# Patient Record
Sex: FEMALE | Race: BLACK OR AFRICAN AMERICAN | NOT HISPANIC OR LATINO
[De-identification: names, ages, dates, MRNs, and addresses within clinical notes are randomized per-mention and may not be internally consistent; named-entity substitution may affect disease eponyms.]

---

## 2016-10-11 RX ORDER — DOCUSATE SODIUM 100 MG
1 CAPSULE ORAL
Qty: 0 | Refills: 0 | DISCHARGE
Start: 2016-10-11

## 2017-01-29 ENCOUNTER — RESULT REVIEW (OUTPATIENT)
Age: 52
End: 2017-01-29

## 2017-01-30 ENCOUNTER — OUTPATIENT (OUTPATIENT)
Dept: OUTPATIENT SERVICES | Facility: HOSPITAL | Age: 52
LOS: 1 days | End: 2017-01-30

## 2017-01-30 ENCOUNTER — APPOINTMENT (OUTPATIENT)
Dept: OBGYN | Facility: HOSPITAL | Age: 52
End: 2017-01-30

## 2017-01-30 ENCOUNTER — LABORATORY RESULT (OUTPATIENT)
Age: 52
End: 2017-01-30

## 2017-01-30 VITALS
SYSTOLIC BLOOD PRESSURE: 120 MMHG | BODY MASS INDEX: 53.92 KG/M2 | HEIGHT: 62 IN | DIASTOLIC BLOOD PRESSURE: 75 MMHG | WEIGHT: 293 LBS

## 2017-01-30 DIAGNOSIS — E66.01 MORBID (SEVERE) OBESITY DUE TO EXCESS CALORIES: ICD-10-CM

## 2017-01-30 DIAGNOSIS — Z98.51 TUBAL LIGATION STATUS: Chronic | ICD-10-CM

## 2017-01-30 DIAGNOSIS — Z00.00 ENCOUNTER FOR GENERAL ADULT MEDICAL EXAMINATION W/OUT ABNORMAL FINDINGS: ICD-10-CM

## 2017-01-30 DIAGNOSIS — R10.2 PELVIC AND PERINEAL PAIN: ICD-10-CM

## 2017-01-31 DIAGNOSIS — R10.31 RIGHT LOWER QUADRANT PAIN: ICD-10-CM

## 2017-01-31 DIAGNOSIS — Z00.00 ENCOUNTER FOR GENERAL ADULT MEDICAL EXAMINATION WITHOUT ABNORMAL FINDINGS: ICD-10-CM

## 2017-01-31 DIAGNOSIS — R10.2 PELVIC AND PERINEAL PAIN: ICD-10-CM

## 2017-01-31 PROBLEM — E66.01 MORBID OBESITY: Status: RESOLVED | Noted: 2017-01-31 | Resolved: 2017-01-31

## 2017-01-31 LAB
C TRACH RRNA SPEC QL NAA+PROBE: NOT DETECTED — SIGNIFICANT CHANGE UP
C TRACH RRNA SPEC QL NAA+PROBE: SIGNIFICANT CHANGE UP
GC AMPLIFICATION INTERPRETATION: SIGNIFICANT CHANGE UP
HPV HIGH+LOW RISK DNA PNL CVX: SIGNIFICANT CHANGE UP
N GONORRHOEA RRNA SPEC QL NAA+PROBE: NOT DETECTED — SIGNIFICANT CHANGE UP
SPECIMEN SOURCE: SIGNIFICANT CHANGE UP

## 2017-02-02 LAB — CYTOLOGY SPEC DOC CYTO: SIGNIFICANT CHANGE UP

## 2017-03-16 ENCOUNTER — APPOINTMENT (OUTPATIENT)
Dept: GASTROENTEROLOGY | Facility: HOSPITAL | Age: 52
End: 2017-03-16

## 2019-04-24 ENCOUNTER — APPOINTMENT (OUTPATIENT)
Dept: CARDIOLOGY | Facility: CLINIC | Age: 54
End: 2019-04-24

## 2019-04-29 ENCOUNTER — APPOINTMENT (OUTPATIENT)
Dept: VASCULAR SURGERY | Facility: CLINIC | Age: 54
End: 2019-04-29
Payer: MEDICARE

## 2019-04-29 VITALS
WEIGHT: 293 LBS | SYSTOLIC BLOOD PRESSURE: 109 MMHG | HEIGHT: 62 IN | HEART RATE: 70 BPM | DIASTOLIC BLOOD PRESSURE: 50 MMHG | TEMPERATURE: 98 F | BODY MASS INDEX: 53.92 KG/M2

## 2019-04-29 PROCEDURE — 99204 OFFICE O/P NEW MOD 45 MIN: CPT

## 2019-04-29 PROCEDURE — G0365: CPT

## 2019-04-30 NOTE — PHYSICAL EXAM
[2+] : left 2+ [No Rash or Lesion] : No rash or lesion [Alert] : alert [Calm] : calm [Normal Heart Sounds] : normal heart sounds [JVD] : no jugular venous distention  [Normal Breath Sounds] : Normal breath sounds [Abdomen Masses] : No abdominal masses [] : not present [Ankle Swelling (On Exam)] : not present [Varicose Veins Of Lower Extremities] : not present [Oriented to Place] : oriented to place [Skin Ulcer] : no ulcer [de-identified] : right ij permcath  [de-identified] : appears well

## 2019-04-30 NOTE — HISTORY OF PRESENT ILLNESS
[FreeTextEntry1] : 52 yo female with history of dm, htn, hld, afib on eliquis, s/p loop recorder, esrd on hd started in February via right ij PermCath presents for evaluation prior to permanent hd access.  pt is right hand dominant \par pt denies any history of left upper extremity surgery.  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  \par

## 2019-04-30 NOTE — CONSULT LETTER
[Dear  ___] : Dear  [unfilled], [Consult Letter:] : I had the pleasure of evaluating your patient, [unfilled]. [Consult Closing:] : Thank you very much for allowing me to participate in the care of this patient.  If you have any questions, please do not hesitate to contact me. [Please see my note below.] : Please see my note below. [Sincerely,] : Sincerely, [FreeTextEntry3] : REESE Manuel MD

## 2019-04-30 NOTE — ASSESSMENT
[FreeTextEntry1] : 52 yo female with history of dm, htn, hld, afib on eliquis, s/p loop recorder, esrd on hd started in February via right ij PermCath presents for evaluation prior to permanent hd access.\par left upper extremity vein mapping completed \par will plan for left upper extremity brachial cephalic avf\par pt advised to obtain medical and cardiac clearance prior to surgery

## 2019-05-16 ENCOUNTER — APPOINTMENT (OUTPATIENT)
Dept: VASCULAR SURGERY | Facility: HOSPITAL | Age: 54
End: 2019-05-16

## 2019-05-31 ENCOUNTER — APPOINTMENT (OUTPATIENT)
Dept: VASCULAR SURGERY | Facility: CLINIC | Age: 54
End: 2019-05-31

## 2019-06-16 ENCOUNTER — FORM ENCOUNTER (OUTPATIENT)
Age: 54
End: 2019-06-16

## 2019-06-17 ENCOUNTER — OUTPATIENT (OUTPATIENT)
Dept: OUTPATIENT SERVICES | Facility: HOSPITAL | Age: 54
LOS: 1 days | End: 2019-06-17
Payer: COMMERCIAL

## 2019-06-17 VITALS
WEIGHT: 293 LBS | OXYGEN SATURATION: 96 % | TEMPERATURE: 98 F | DIASTOLIC BLOOD PRESSURE: 80 MMHG | HEART RATE: 72 BPM | SYSTOLIC BLOOD PRESSURE: 140 MMHG | HEIGHT: 62 IN | RESPIRATION RATE: 18 BRPM

## 2019-06-17 DIAGNOSIS — E11.9 TYPE 2 DIABETES MELLITUS WITHOUT COMPLICATIONS: ICD-10-CM

## 2019-06-17 DIAGNOSIS — N18.6 END STAGE RENAL DISEASE: ICD-10-CM

## 2019-06-17 DIAGNOSIS — Z98.51 TUBAL LIGATION STATUS: Chronic | ICD-10-CM

## 2019-06-17 DIAGNOSIS — Z01.818 ENCOUNTER FOR OTHER PREPROCEDURAL EXAMINATION: ICD-10-CM

## 2019-06-17 LAB
MRSA PCR RESULT.: SIGNIFICANT CHANGE UP
S AUREUS DNA NOSE QL NAA+PROBE: SIGNIFICANT CHANGE UP

## 2019-06-17 PROCEDURE — 71046 X-RAY EXAM CHEST 2 VIEWS: CPT | Mod: 26

## 2019-06-17 PROCEDURE — 71046 X-RAY EXAM CHEST 2 VIEWS: CPT

## 2019-06-17 PROCEDURE — 87641 MR-STAPH DNA AMP PROBE: CPT

## 2019-06-17 PROCEDURE — 87640 STAPH A DNA AMP PROBE: CPT

## 2019-06-17 PROCEDURE — G0463: CPT

## 2019-06-17 RX ORDER — INSULIN ASPART 100 [IU]/ML
30 INJECTION, SUSPENSION SUBCUTANEOUS
Qty: 0 | Refills: 0 | DISCHARGE

## 2019-06-17 NOTE — H&P PST ADULT - HISTORY OF PRESENT ILLNESS
This is a 52 y/o female with PMH  of COPD,  HTN, HLD, DM, afib with loop recorder , last dose eliquis 6/16/2019, with renal failure , on hemodialysis  3x a week via perm cath, she presents today for left  upper brachial AV fistula creation

## 2019-06-17 NOTE — H&P PST ADULT - NSICDXPROBLEM_GEN_ALL_CORE_FT
PROBLEM DIAGNOSES  Problem: End stage renal disease  Assessment and Plan: left upper brachial arteriovenous fistula creation  STAT BMP on admit    Problem: Diabetes mellitus  Assessment and Plan: finger stick on admit

## 2019-06-17 NOTE — H&P PST ADULT - NSICDXPASTMEDICALHX_GEN_ALL_CORE_FT
PAST MEDICAL HISTORY:  Atrial fibrillation     Chronic GERD     COPD (chronic obstructive pulmonary disease)     DM (diabetes mellitus)     HTN (hypertension)     Morbid obesity     CHAMP (obstructive sleep apnea) non compliance with CPAP PAST MEDICAL HISTORY:  Atrial fibrillation with loop recorder 2014    Chronic GERD     COPD (chronic obstructive pulmonary disease)     DM (diabetes mellitus)     HTN (hypertension)     Morbid obesity     CHAMP (obstructive sleep apnea) non compliance with CPAP

## 2019-06-17 NOTE — H&P PST ADULT - NSICDXFAMILYHX_GEN_ALL_CORE_FT
FAMILY HISTORY:  Family hx of hypertension    Mother  Still living? Unknown  Family history of diabetes mellitus, Age at diagnosis: Age Unknown

## 2019-06-19 ENCOUNTER — INPATIENT (INPATIENT)
Facility: HOSPITAL | Age: 54
LOS: 2 days | Discharge: HOME | DRG: 264 | End: 2019-06-22
Attending: INTERNAL MEDICINE | Admitting: INTERNAL MEDICINE
Payer: COMMERCIAL

## 2019-06-19 ENCOUNTER — FORM ENCOUNTER (OUTPATIENT)
Age: 54
End: 2019-06-19

## 2019-06-19 VITALS
HEIGHT: 62 IN | WEIGHT: 293 LBS | OXYGEN SATURATION: 100 % | RESPIRATION RATE: 18 BRPM | DIASTOLIC BLOOD PRESSURE: 69 MMHG | TEMPERATURE: 98 F | SYSTOLIC BLOOD PRESSURE: 130 MMHG | HEART RATE: 84 BPM

## 2019-06-19 DIAGNOSIS — G47.33 OBSTRUCTIVE SLEEP APNEA (ADULT) (PEDIATRIC): ICD-10-CM

## 2019-06-19 DIAGNOSIS — E66.01 MORBID (SEVERE) OBESITY DUE TO EXCESS CALORIES: ICD-10-CM

## 2019-06-19 DIAGNOSIS — J44.9 CHRONIC OBSTRUCTIVE PULMONARY DISEASE, UNSPECIFIED: ICD-10-CM

## 2019-06-19 DIAGNOSIS — Z98.51 TUBAL LIGATION STATUS: Chronic | ICD-10-CM

## 2019-06-19 DIAGNOSIS — K21.9 GASTRO-ESOPHAGEAL REFLUX DISEASE WITHOUT ESOPHAGITIS: ICD-10-CM

## 2019-06-19 DIAGNOSIS — Z29.9 ENCOUNTER FOR PROPHYLACTIC MEASURES, UNSPECIFIED: ICD-10-CM

## 2019-06-19 DIAGNOSIS — I48.91 UNSPECIFIED ATRIAL FIBRILLATION: ICD-10-CM

## 2019-06-19 DIAGNOSIS — T82.41XA BREAKDOWN (MECHANICAL) OF VASCULAR DIALYSIS CATHETER, INITIAL ENCOUNTER: ICD-10-CM

## 2019-06-19 DIAGNOSIS — N18.6 END STAGE RENAL DISEASE: ICD-10-CM

## 2019-06-19 DIAGNOSIS — I10 ESSENTIAL (PRIMARY) HYPERTENSION: ICD-10-CM

## 2019-06-19 DIAGNOSIS — E11.9 TYPE 2 DIABETES MELLITUS WITHOUT COMPLICATIONS: ICD-10-CM

## 2019-06-19 LAB
ALBUMIN SERPL ELPH-MCNC: 3.3 G/DL — LOW (ref 3.5–5)
ALP SERPL-CCNC: 194 U/L — HIGH (ref 40–120)
ALT FLD-CCNC: 15 U/L DA — SIGNIFICANT CHANGE UP (ref 10–60)
ANION GAP SERPL CALC-SCNC: 7 MMOL/L — SIGNIFICANT CHANGE UP (ref 5–17)
APTT BLD: 34.3 SEC — SIGNIFICANT CHANGE UP (ref 27.5–36.3)
AST SERPL-CCNC: 53 U/L — HIGH (ref 10–40)
BASOPHILS # BLD AUTO: 0.04 K/UL — SIGNIFICANT CHANGE UP (ref 0–0.2)
BASOPHILS NFR BLD AUTO: 0.5 % — SIGNIFICANT CHANGE UP (ref 0–2)
BILIRUB SERPL-MCNC: 0.4 MG/DL — SIGNIFICANT CHANGE UP (ref 0.2–1.2)
BUN SERPL-MCNC: 40 MG/DL — HIGH (ref 7–18)
CALCIUM SERPL-MCNC: 8.7 MG/DL — SIGNIFICANT CHANGE UP (ref 8.4–10.5)
CHLORIDE SERPL-SCNC: 101 MMOL/L — SIGNIFICANT CHANGE UP (ref 96–108)
CO2 SERPL-SCNC: 29 MMOL/L — SIGNIFICANT CHANGE UP (ref 22–31)
CREAT SERPL-MCNC: 3.79 MG/DL — HIGH (ref 0.5–1.3)
EOSINOPHIL # BLD AUTO: 0.32 K/UL — SIGNIFICANT CHANGE UP (ref 0–0.5)
EOSINOPHIL NFR BLD AUTO: 4.2 % — SIGNIFICANT CHANGE UP (ref 0–6)
GLUCOSE BLDC GLUCOMTR-MCNC: 207 MG/DL — HIGH (ref 70–99)
GLUCOSE BLDC GLUCOMTR-MCNC: 233 MG/DL — HIGH (ref 70–99)
GLUCOSE SERPL-MCNC: 154 MG/DL — HIGH (ref 70–99)
HCT VFR BLD CALC: 38.5 % — SIGNIFICANT CHANGE UP (ref 34.5–45)
HGB BLD-MCNC: 11.2 G/DL — LOW (ref 11.5–15.5)
IMM GRANULOCYTES NFR BLD AUTO: 0.3 % — SIGNIFICANT CHANGE UP (ref 0–1.5)
INR BLD: 1 RATIO — SIGNIFICANT CHANGE UP (ref 0.88–1.16)
LYMPHOCYTES # BLD AUTO: 1.55 K/UL — SIGNIFICANT CHANGE UP (ref 1–3.3)
LYMPHOCYTES # BLD AUTO: 20.1 % — SIGNIFICANT CHANGE UP (ref 13–44)
MCHC RBC-ENTMCNC: 25.6 PG — LOW (ref 27–34)
MCHC RBC-ENTMCNC: 29.1 GM/DL — LOW (ref 32–36)
MCV RBC AUTO: 88.1 FL — SIGNIFICANT CHANGE UP (ref 80–100)
MONOCYTES # BLD AUTO: 0.7 K/UL — SIGNIFICANT CHANGE UP (ref 0–0.9)
MONOCYTES NFR BLD AUTO: 9.1 % — SIGNIFICANT CHANGE UP (ref 2–14)
NEUTROPHILS # BLD AUTO: 5.08 K/UL — SIGNIFICANT CHANGE UP (ref 1.8–7.4)
NEUTROPHILS NFR BLD AUTO: 65.8 % — SIGNIFICANT CHANGE UP (ref 43–77)
NRBC # BLD: 0 /100 WBCS — SIGNIFICANT CHANGE UP (ref 0–0)
PLATELET # BLD AUTO: 354 K/UL — SIGNIFICANT CHANGE UP (ref 150–400)
POTASSIUM SERPL-MCNC: 5.8 MMOL/L — HIGH (ref 3.5–5.3)
POTASSIUM SERPL-SCNC: 5.8 MMOL/L — HIGH (ref 3.5–5.3)
PROT SERPL-MCNC: 7.9 G/DL — SIGNIFICANT CHANGE UP (ref 6–8.3)
PROTHROM AB SERPL-ACNC: 11.1 SEC — SIGNIFICANT CHANGE UP (ref 10–12.9)
RBC # BLD: 4.37 M/UL — SIGNIFICANT CHANGE UP (ref 3.8–5.2)
RBC # FLD: 15.4 % — HIGH (ref 10.3–14.5)
SODIUM SERPL-SCNC: 137 MMOL/L — SIGNIFICANT CHANGE UP (ref 135–145)
WBC # BLD: 7.71 K/UL — SIGNIFICANT CHANGE UP (ref 3.8–10.5)
WBC # FLD AUTO: 7.71 K/UL — SIGNIFICANT CHANGE UP (ref 3.8–10.5)

## 2019-06-19 PROCEDURE — 99285 EMERGENCY DEPT VISIT HI MDM: CPT

## 2019-06-19 RX ORDER — ALBUTEROL 90 UG/1
2 AEROSOL, METERED ORAL EVERY 6 HOURS
Refills: 0 | Status: DISCONTINUED | OUTPATIENT
Start: 2019-06-19 | End: 2019-06-22

## 2019-06-19 RX ORDER — INSULIN GLARGINE 100 [IU]/ML
30 INJECTION, SOLUTION SUBCUTANEOUS AT BEDTIME
Refills: 0 | Status: DISCONTINUED | OUTPATIENT
Start: 2019-06-19 | End: 2019-06-22

## 2019-06-19 RX ORDER — INSULIN LISPRO 100/ML
VIAL (ML) SUBCUTANEOUS
Refills: 0 | Status: DISCONTINUED | OUTPATIENT
Start: 2019-06-19 | End: 2019-06-22

## 2019-06-19 RX ORDER — INSULIN LISPRO 100/ML
VIAL (ML) SUBCUTANEOUS
Refills: 0 | Status: DISCONTINUED | OUTPATIENT
Start: 2019-06-19 | End: 2019-06-19

## 2019-06-19 RX ORDER — MONTELUKAST 4 MG/1
10 TABLET, CHEWABLE ORAL AT BEDTIME
Refills: 0 | Status: DISCONTINUED | OUTPATIENT
Start: 2019-06-19 | End: 2019-06-22

## 2019-06-19 RX ORDER — CINACALCET 30 MG/1
60 TABLET, FILM COATED ORAL AT BEDTIME
Refills: 0 | Status: DISCONTINUED | OUTPATIENT
Start: 2019-06-19 | End: 2019-06-22

## 2019-06-19 RX ORDER — BUDESONIDE AND FORMOTEROL FUMARATE DIHYDRATE 160; 4.5 UG/1; UG/1
2 AEROSOL RESPIRATORY (INHALATION)
Refills: 0 | Status: DISCONTINUED | OUTPATIENT
Start: 2019-06-19 | End: 2019-06-22

## 2019-06-19 RX ORDER — SEVELAMER CARBONATE 2400 MG/1
1600 POWDER, FOR SUSPENSION ORAL
Refills: 0 | Status: DISCONTINUED | OUTPATIENT
Start: 2019-06-19 | End: 2019-06-22

## 2019-06-19 RX ORDER — SODIUM CHLORIDE 9 MG/ML
1000 INJECTION, SOLUTION INTRAVENOUS
Refills: 0 | Status: DISCONTINUED | OUTPATIENT
Start: 2019-06-19 | End: 2019-06-19

## 2019-06-19 RX ORDER — ATORVASTATIN CALCIUM 80 MG/1
80 TABLET, FILM COATED ORAL AT BEDTIME
Refills: 0 | Status: DISCONTINUED | OUTPATIENT
Start: 2019-06-19 | End: 2019-06-22

## 2019-06-19 RX ORDER — ACETAMINOPHEN 500 MG
1000 TABLET ORAL ONCE
Refills: 0 | Status: COMPLETED | OUTPATIENT
Start: 2019-06-19 | End: 2019-06-19

## 2019-06-19 RX ORDER — DOCUSATE SODIUM 100 MG
100 CAPSULE ORAL
Refills: 0 | Status: DISCONTINUED | OUTPATIENT
Start: 2019-06-19 | End: 2019-06-22

## 2019-06-19 RX ADMIN — Medication 2: at 23:19

## 2019-06-19 RX ADMIN — INSULIN GLARGINE 30 UNIT(S): 100 INJECTION, SOLUTION SUBCUTANEOUS at 23:18

## 2019-06-19 RX ADMIN — Medication 1000 MILLIGRAM(S): at 23:19

## 2019-06-19 RX ADMIN — Medication 400 MILLIGRAM(S): at 22:58

## 2019-06-19 NOTE — H&P ADULT - ASSESSMENT
Pt admitted for dislodgement of permacath, pending AV fistula placement tomorrow, last dialyzed on 6/18, was supposed to be dialyzed once more prior to surgery.

## 2019-06-19 NOTE — ED ADULT NURSE NOTE - NSIMPLEMENTINTERV_GEN_ALL_ED
Implemented All Fall Risk Interventions:  [place] to call system. Call bell, personal items and telephone within reach. Instruct patient to call for assistance. Room bathroom lighting operational. Non-slip footwear when patient is off stretcher. Physically safe environment: no spills, clutter or unnecessary equipment. Stretcher in lowest position, wheels locked, appropriate side rails in place. Provide visual cue, wrist band, yellow gown, etc. Monitor gait and stability. Monitor for mental status changes and reorient to person, place, and time. Review medications for side effects contributing to fall risk. Reinforce activity limits and safety measures with patient and family.

## 2019-06-19 NOTE — H&P ADULT - PROBLEM SELECTOR PLAN 1
permacath is causing patient discomfort, dislodged approximately 10cm   f/u vascular in AM - Dr. Manuel for possible replacement of permacath vs shiley placement  NPO after midnight for AV fistula placement

## 2019-06-19 NOTE — H&P ADULT - PROBLEM SELECTOR PLAN 6
c/w cardizem short-acting with parameters for now  continue to monitor BP  dash/renal diet to be resumed after OR

## 2019-06-19 NOTE — H&P ADULT - HISTORY OF PRESENT ILLNESS
54 yo F with PMH of Atrial fibrillation, GERD, COPD, HTN, DM, CHAMP and a significant PSHx of tubal ligation presents to the ED with a dislodged Permacath at right subclavian site. Patient receives dialysis Tuesday, Thursday and Saturday. Patient was dialyzed yesterday 6/18 and was supposed to get another HD session today 6/19 in preparation for AV fistula procedure tomorrow 6/20  by Dr Manuel. Upon arrival to dialysis, Permacath was noted to be dislodged approx 10 cm from original depth. Patient did not receive HD today. 54 yo F with PMH of Atrial fibrillation, GERD, COPD, HTN, DM, CHAMP and a significant PSHx of tubal ligation presents to the ED with a dislodged Permacath at right subclavian site. Patient receives dialysis Tuesday, Thursday and Saturday. Patient was dialyzed yesterday 6/18 and was supposed to get another HD session today 6/19 in preparation for AV fistula procedure tomorrow 6/20  by Dr Manuel. Upon arrival to dialysis, Permacath was noted to be dislodged approx 10 cm from original depth. Patient did not receive HD today. Pt otherwise states discomfort  in the chest worsening over the past couple weeks, describes skin area as painful, tender to touch, cannot lie down on her right side, feels itching around the site. Pt otherwise denies fevers, chills, admits to sweats, but attributes this to menopause.

## 2019-06-19 NOTE — H&P ADULT - PROBLEM SELECTOR PLAN 10
IMPROVE VTE Individual Risk Assessment          RISK                                                          Points  [  ] Previous VTE                                                3  [  ] Thrombophilia                                             2  [  ] Lower limb paralysis                                   2        (unable to hold up >15 seconds)    [  ] Current Cancer                                             2         (within 6 months)  [ x ] Immobilization > 24 hrs                              1  [  ] ICU/CCU stay > 24 hours                             1  [ x ] Age > 60                                                         1    IMPROVE VTE Score: 2    continue with full anticoagulation after patient returns from OR

## 2019-06-19 NOTE — ED ADULT NURSE NOTE - PMH
Atrial fibrillation  with loop recorder 2014  Chronic GERD    COPD (chronic obstructive pulmonary disease)    DM (diabetes mellitus)    HTN (hypertension)    Morbid obesity    CHAMP (obstructive sleep apnea)  non compliance with CPAP

## 2019-06-19 NOTE — ED PROVIDER NOTE - CLINICAL SUMMARY MEDICAL DECISION MAKING FREE TEXT BOX
54 y/o F presents with dislodged permacath; last dialyzed yesterday. Spoke with Dr Sahu who plan to place another permacath tomorrow during AV fistula placement. Spoke with vascular surgery who request labs and will see patient to remove catheter. 52 y/o F presents with dislodged permacath; last dialyzed yesterday. Spoke with Dr Sahu who plan to place another permacath tomorrow during AV fistula placement. Spoke with vascular surgery who request labs and will see patient to remove catheter. NPO after midnight

## 2019-06-19 NOTE — H&P ADULT - PROBLEM SELECTOR PLAN 2
NPO after midnight for AV fistula placement  last dialysis 6/18  f/u BMP  euvolemic  f/u nephro - Dr. Lopez NPO after midnight for AV fistula placement  last dialysis 6/18  f/u BMP  euvolemic  f/u nephro - Dr. Lopez  dash/renal diet to be resumed after OR  c/w phos binders with meals to be resumed after OR

## 2019-06-19 NOTE — ED PROVIDER NOTE - OBJECTIVE STATEMENT
54 y/o F with a significant PMHx of atrial fibrillation, GERD, COPD, HTN, DM, CHAMP and a significant PSHx of tubal ligation presents to the ED with a dislodged Permacath. Patient receiving dialysis Tuesday, Thursday and Saturday. Patient dialyzed yesterday and was supposed to get another today for AV fistula procedure tomorrow. Upon arrival to dialysis, Permacath was noticed to be dislodged about 10 cm 54 y/o F with a significant PMHx of atrial fibrillation, GERD, COPD, HTN, DM, CHAMP and a significant PSHx of tubal ligation presents to the ED with a dislodged Permacath at right subclavian. Patient receiving dialysis Tuesday, Thursday and Saturday. Patient dialyzed yesterday and was supposed to get another today for AV fistula procedure tomorrow. Upon arrival to dialysis, Permacath was noticed to be dislodged about 10 cm from original depth. 52 y/o F with a significant PMHx of atrial fibrillation, GERD, COPD, HTN, DM, CHAMP and a significant PSHx of tubal ligation presents to the ED with a dislodged Permacath at right subclavian. Patient receiving dialysis Tuesday, Thursday and Saturday. Patient was dialyzed yesterday and was supposed to get another HD session today in preparation for AV fistula procedure tomorrow by Dr Gupta. Upon arrival to dialysis, Permacath was noticed to be dislodged about 10 cm from original depth. Patient did not receive HD today.

## 2019-06-19 NOTE — H&P ADULT - NSHPPHYSICALEXAM_GEN_ALL_CORE
Vital Signs Last 24 Hrs  T(C): 36.7 (19 Jun 2019 17:11), Max: 36.7 (19 Jun 2019 17:11)  T(F): 98.1 (19 Jun 2019 17:11), Max: 98.1 (19 Jun 2019 17:11)  HR: 84 (19 Jun 2019 17:11) (84 - 84)  BP: 130/69 (19 Jun 2019 17:11) (130/69 - 130/69)  RR: 18 (19 Jun 2019 17:11) (18 - 18)  SpO2: 100% (19 Jun 2019 17:11) (100% - 100%)  ________________________________________________  PHYSICAL EXAM:  GENERAL: NAD  HEENT: Normocephalic;  conjunctivae and sclerae clear; moist mucous membranes;   NECK : supple, no JVD  CHEST/LUNG: Clear to auscultation bilaterally with good air entry   HEART: S1 S2  regular; no murmurs, gallops or rubs  ABDOMEN: Soft, Nontender, Nondistended; Bowel sounds present  EXTREMITIES: no cyanosis; no edema; no calf tenderness  NERVOUS SYSTEM:  Awake and alert; Oriented  to place, person and time Vital Signs Last 24 Hrs  T(C): 36.7 (19 Jun 2019 17:11), Max: 36.7 (19 Jun 2019 17:11)  T(F): 98.1 (19 Jun 2019 17:11), Max: 98.1 (19 Jun 2019 17:11)  HR: 84 (19 Jun 2019 17:11) (84 - 84)  BP: 130/69 (19 Jun 2019 17:11) (130/69 - 130/69)  RR: 18 (19 Jun 2019 17:11) (18 - 18)  SpO2: 100% (19 Jun 2019 17:11) (100% - 100%)  ________________________________________________  PHYSICAL EXAM:  GENERAL: NAD  HEENT: Normocephalic;  conjunctivae and sclerae clear; moist mucous membranes;   NECK : supple, no JVD  CHEST/LUNG: Clear to auscultation bilaterally with good air entry; right chest permacath approx 10cm dislodgement with coloration difference visible; tenderness to palpation of chest of proximal permacath site  HEART: S1 S2  regular; no murmurs, gallops or rubs  ABDOMEN: Soft, Nontender, Nondistended; Bowel sounds present  EXTREMITIES: no cyanosis; no edema; no calf tenderness  NERVOUS SYSTEM:  Awake and alert; Oriented  to place, person and time

## 2019-06-19 NOTE — H&P ADULT - NSICDXPASTMEDICALHX_GEN_ALL_CORE_FT
PAST MEDICAL HISTORY:  Atrial fibrillation with loop recorder 2014    Chronic GERD     COPD (chronic obstructive pulmonary disease)     DM (diabetes mellitus)     HTN (hypertension)     Morbid obesity     CHAMP (obstructive sleep apnea) non compliance with CPAP

## 2019-06-19 NOTE — CONSULT NOTE ADULT - SUBJECTIVE AND OBJECTIVE BOX
HPI:  52 yo F with PMH of Atrial fibrillation, GERD, COPD, HTN, DM, CHAMP and a significant PSHx of tubal ligation presents to the ED with a dislodged Permacath at right subclavian site. Patient receives dialysis Tuesday, Thursday and Saturday. Patient was dialyzed yesterday 6/18 and was supposed to get another HD session today 6/19 in preparation for AV fistula procedure tomorrow 6/20  by Dr Manuel. Upon arrival to dialysis, Permacath was noted to be dislodged approx 10 cm from original depth. Patient did not receive HD today. (19 Jun 2019 21:23)      PAST MEDICAL & SURGICAL HISTORY:  CHAMP (obstructive sleep apnea): non compliance with CPAP  Chronic GERD  Morbid obesity  HTN (hypertension)  Atrial fibrillation: with loop recorder 2014  DM (diabetes mellitus)  COPD (chronic obstructive pulmonary disease)  H/O tubal ligation: 1989      MEDICATIONS  (STANDING):  acetaminophen  IVPB .. 1000 milliGRAM(s) IV Intermittent once  atorvastatin 80 milliGRAM(s) Oral at bedtime  buDESOnide 160 MICROgram(s)/formoterol 4.5 MICROgram(s) Inhaler 2 Puff(s) Inhalation two times a day  cinacalcet 60 milliGRAM(s) Oral at bedtime  docusate sodium 100 milliGRAM(s) Oral two times a day  insulin glargine Injectable (LANTUS) 30 Unit(s) SubCutaneous at bedtime  insulin lispro (HumaLOG) corrective regimen sliding scale   SubCutaneous three times a day before meals  montelukast 10 milliGRAM(s) Oral at bedtime  sevelamer carbonate 1600 milliGRAM(s) Oral three times a day with meals    MEDICATIONS  (PRN):  ALBUTerol    90 MICROgram(s) HFA Inhaler 2 Puff(s) Inhalation every 6 hours PRN Shortness of Breath and/or Wheezing      Allergies    latex (Rash)  penicillin (Unknown)    Intolerances        SOCIAL HISTORY:  No drug use    FAMILY HISTORY:  Family hx of hypertension  Family history of diabetes mellitus    Father without Cardiac history.    REVIEW OF SYSTEMS:  CONSTITUTIONAL: In no acute distress.        Vital Signs Last 24 Hrs  T(C): 36.7 (19 Jun 2019 17:11), Max: 36.7 (19 Jun 2019 17:11)  T(F): 98.1 (19 Jun 2019 17:11), Max: 98.1 (19 Jun 2019 17:11)  HR: 84 (19 Jun 2019 17:11) (84 - 84)  BP: 130/69 (19 Jun 2019 17:11) (130/69 - 130/69)  BP(mean): --  RR: 18 (19 Jun 2019 17:11) (18 - 18)  SpO2: 100% (19 Jun 2019 17:11) (100% - 100%)    Daily Height in cm: 157.48 (19 Jun 2019 17:11)    Daily     PHYSICAL EXAM:  CONSTITIONAL/GENERAL: NAD, well-groomed, well-developed  HEAD:  Atraumatic, Normocephalic  VISUAL/EYES: EOMI, PERRL, conjunctiva and sclera clear  ENMT: Moist mucous membranes, Good dentition, No lesions  NECK: Supple, No JVD  NERVOUS SYSTEM:  Alert & Oriented X3, Good concentration  RESPIRATORY/CHEST: Clear to percussion bilaterally; Normal respiratory effort  Partially dislodged Rt SCL Permacath.  PC secured in position by tape.    CARDIOVASCULAR/HEART: Regular rate and rhythm  GASTROINTESTINAL/ABDOMEN: Soft, Nontender, Nondistended; Bowel sounds present  GENITOURINARY: normal external genitalia  BREASTS: no breast lumps  MUSCULOSKELETAL/EXTREMITIES:  No clubbing, cyanosis, or edema  VASCULAR: equal peripheral pulses  LYMPHATIC: No lymphadenopathy noted  SKIN: No rashes or lesions  PSYCHIATRIC: normal affect      LABS:                        11.2   7.71  )-----------( 354      ( 19 Jun 2019 19:29 )             38.5     Neutrophil %:   06-19    137  |  101  |  40<H>  ----------------------------<  154<H>  5.8<H>   |  29  |  3.79<H>    Ca    8.7      19 Jun 2019 19:29    TPro  7.9  /  Alb  3.3<L>  /  TBili  0.4  /  DBili  x   /  AST  53<H>  /  ALT  15  /  AlkPhos  194<H>  06-19    PT/INR - ( 19 Jun 2019 19:29 )   PT: 11.1 sec;   INR: 1.00 ratio         PTT - ( 19 Jun 2019 19:29 )  PTT:34.3 sec          A/P    Partially dislodged Rt SCL Permacath.  PC secured in position with tape.  Discussed with Dr Kaleb  Plan Lt AVF and PC change over wire 6/20/19.

## 2019-06-19 NOTE — H&P ADULT - PROBLEM SELECTOR PLAN 3
holding AM anticoagulation given AV fistula placement  PRIMARY TEAM TO RESTART ANTICOAGULATION AFTER OR

## 2019-06-19 NOTE — ED ADULT NURSE NOTE - OBJECTIVE STATEMENT
Pt came in for displaced dialysis catheter. Catheter is open with some redness around it no drainage. Pt ambulates with a roller due to bilateral leg weakness. Pt is scheduled to have an AV Fistula placement by Dr. Manuel tomorrow.

## 2019-06-19 NOTE — ED ADULT NURSE NOTE - CHPI ED NUR SYMPTOMS NEG
no dizziness/no tingling/no vomiting/no decreased eating/drinking/no chills/no nausea/no weakness/no pain/no fever

## 2019-06-20 ENCOUNTER — OUTPATIENT (OUTPATIENT)
Dept: OUTPATIENT SERVICES | Facility: HOSPITAL | Age: 54
LOS: 1 days | End: 2019-06-20
Payer: COMMERCIAL

## 2019-06-20 ENCOUNTER — APPOINTMENT (OUTPATIENT)
Dept: VASCULAR SURGERY | Facility: HOSPITAL | Age: 54
End: 2019-06-20
Payer: MEDICARE

## 2019-06-20 ENCOUNTER — APPOINTMENT (OUTPATIENT)
Dept: VASCULAR SURGERY | Facility: HOSPITAL | Age: 54
End: 2019-06-20

## 2019-06-20 DIAGNOSIS — N18.6 END STAGE RENAL DISEASE: ICD-10-CM

## 2019-06-20 DIAGNOSIS — Z98.51 TUBAL LIGATION STATUS: Chronic | ICD-10-CM

## 2019-06-20 DIAGNOSIS — I12.0 HYPERTENSIVE CHRONIC KIDNEY DISEASE WITH STAGE 5 CHRONIC KIDNEY DISEASE OR END STAGE RENAL DISEASE: ICD-10-CM

## 2019-06-20 DIAGNOSIS — N18.9 CHRONIC KIDNEY DISEASE, UNSPECIFIED: ICD-10-CM

## 2019-06-20 DIAGNOSIS — T82.41XA BREAKDOWN (MECHANICAL) OF VASCULAR DIALYSIS CATHETER, INITIAL ENCOUNTER: ICD-10-CM

## 2019-06-20 PROBLEM — K21.9 GASTRO-ESOPHAGEAL REFLUX DISEASE WITHOUT ESOPHAGITIS: Chronic | Status: ACTIVE | Noted: 2019-06-17

## 2019-06-20 LAB
ALBUMIN SERPL ELPH-MCNC: 3.3 G/DL — LOW (ref 3.5–5)
ALP SERPL-CCNC: 195 U/L — HIGH (ref 40–120)
ALT FLD-CCNC: 10 U/L DA — SIGNIFICANT CHANGE UP (ref 10–60)
ANION GAP SERPL CALC-SCNC: 6 MMOL/L — SIGNIFICANT CHANGE UP (ref 5–17)
AST SERPL-CCNC: 8 U/L — LOW (ref 10–40)
BILIRUB SERPL-MCNC: 0.2 MG/DL — SIGNIFICANT CHANGE UP (ref 0.2–1.2)
BUN SERPL-MCNC: 43 MG/DL — HIGH (ref 7–18)
CALCIUM SERPL-MCNC: 9.2 MG/DL — SIGNIFICANT CHANGE UP (ref 8.4–10.5)
CHLORIDE SERPL-SCNC: 102 MMOL/L — SIGNIFICANT CHANGE UP (ref 96–108)
CO2 SERPL-SCNC: 29 MMOL/L — SIGNIFICANT CHANGE UP (ref 22–31)
CREAT SERPL-MCNC: 3.97 MG/DL — HIGH (ref 0.5–1.3)
GLUCOSE BLDC GLUCOMTR-MCNC: 85 MG/DL — SIGNIFICANT CHANGE UP (ref 70–99)
GLUCOSE BLDC GLUCOMTR-MCNC: 99 MG/DL — SIGNIFICANT CHANGE UP (ref 70–99)
GLUCOSE BLDC GLUCOMTR-MCNC: 99 MG/DL — SIGNIFICANT CHANGE UP (ref 70–99)
GLUCOSE SERPL-MCNC: 158 MG/DL — HIGH (ref 70–99)
HBA1C BLD-MCNC: 7.8 % — HIGH (ref 4–5.6)
HCG SERPL-ACNC: 9 MIU/ML — HIGH
HCV AB S/CO SERPL IA: 0.15 S/CO — SIGNIFICANT CHANGE UP (ref 0–0.99)
HCV AB SERPL-IMP: SIGNIFICANT CHANGE UP
POTASSIUM SERPL-MCNC: 4.2 MMOL/L — SIGNIFICANT CHANGE UP (ref 3.5–5.3)
POTASSIUM SERPL-SCNC: 4.2 MMOL/L — SIGNIFICANT CHANGE UP (ref 3.5–5.3)
PROT SERPL-MCNC: 7.5 G/DL — SIGNIFICANT CHANGE UP (ref 6–8.3)
SODIUM SERPL-SCNC: 137 MMOL/L — SIGNIFICANT CHANGE UP (ref 135–145)

## 2019-06-20 PROCEDURE — 36558 INSERT TUNNELED CV CATH: CPT

## 2019-06-20 PROCEDURE — C1750: CPT

## 2019-06-20 PROCEDURE — 76937 US GUIDE VASCULAR ACCESS: CPT | Mod: 26

## 2019-06-20 PROCEDURE — 71045 X-RAY EXAM CHEST 1 VIEW: CPT | Mod: 26,77

## 2019-06-20 PROCEDURE — 35321 RECHANNELING OF ARTERY: CPT

## 2019-06-20 PROCEDURE — 36821 AV FUSION DIRECT ANY SITE: CPT

## 2019-06-20 PROCEDURE — 76830 TRANSVAGINAL US NON-OB: CPT | Mod: 26

## 2019-06-20 PROCEDURE — 77001 FLUOROGUIDE FOR VEIN DEVICE: CPT | Mod: 26

## 2019-06-20 PROCEDURE — C1889: CPT

## 2019-06-20 PROCEDURE — 36558 INSERT TUNNELED CV CATH: CPT | Mod: LT

## 2019-06-20 PROCEDURE — 36818 AV FUSE UPPR ARM CEPHALIC: CPT

## 2019-06-20 PROCEDURE — C1894: CPT

## 2019-06-20 PROCEDURE — 76000 FLUOROSCOPY <1 HR PHYS/QHP: CPT

## 2019-06-20 PROCEDURE — 71045 X-RAY EXAM CHEST 1 VIEW: CPT | Mod: 26

## 2019-06-20 RX ORDER — HEPARIN SODIUM 5000 [USP'U]/ML
1000 INJECTION INTRAVENOUS; SUBCUTANEOUS ONCE
Refills: 0 | Status: COMPLETED | OUTPATIENT
Start: 2019-06-21 | End: 2019-06-21

## 2019-06-20 RX ORDER — FUROSEMIDE 40 MG
80 TABLET ORAL DAILY
Refills: 0 | Status: DISCONTINUED | OUTPATIENT
Start: 2019-06-20 | End: 2019-06-22

## 2019-06-20 RX ORDER — HEPARIN SODIUM 5000 [USP'U]/ML
1 INJECTION INTRAVENOUS; SUBCUTANEOUS ONCE
Refills: 0 | Status: COMPLETED | OUTPATIENT
Start: 2019-06-21 | End: 2019-06-21

## 2019-06-20 RX ORDER — CHLORHEXIDINE GLUCONATE 213 G/1000ML
1 SOLUTION TOPICAL DAILY
Refills: 0 | Status: DISCONTINUED | OUTPATIENT
Start: 2019-06-20 | End: 2019-06-22

## 2019-06-20 RX ORDER — FENTANYL CITRATE 50 UG/ML
25 INJECTION INTRAVENOUS
Refills: 0 | Status: DISCONTINUED | OUTPATIENT
Start: 2019-06-20 | End: 2019-06-20

## 2019-06-20 RX ORDER — DILTIAZEM HCL 120 MG
120 CAPSULE, EXT RELEASE 24 HR ORAL DAILY
Refills: 0 | Status: DISCONTINUED | OUTPATIENT
Start: 2019-06-20 | End: 2019-06-20

## 2019-06-20 RX ORDER — SODIUM CHLORIDE 9 MG/ML
1000 INJECTION, SOLUTION INTRAVENOUS
Refills: 0 | Status: DISCONTINUED | OUTPATIENT
Start: 2019-06-20 | End: 2019-06-20

## 2019-06-20 RX ORDER — DILTIAZEM HCL 120 MG
120 CAPSULE, EXT RELEASE 24 HR ORAL EVERY 6 HOURS
Refills: 0 | Status: DISCONTINUED | OUTPATIENT
Start: 2019-06-20 | End: 2019-06-20

## 2019-06-20 RX ORDER — FAMOTIDINE 10 MG/ML
20 INJECTION INTRAVENOUS EVERY 24 HOURS
Refills: 0 | Status: DISCONTINUED | OUTPATIENT
Start: 2019-06-20 | End: 2019-06-22

## 2019-06-20 RX ORDER — ERYTHROPOIETIN 10000 [IU]/ML
10000 INJECTION, SOLUTION INTRAVENOUS; SUBCUTANEOUS ONCE
Refills: 0 | Status: COMPLETED | OUTPATIENT
Start: 2019-06-21 | End: 2019-06-21

## 2019-06-20 RX ORDER — SODIUM CHLORIDE 9 MG/ML
1000 INJECTION INTRAMUSCULAR; INTRAVENOUS; SUBCUTANEOUS
Refills: 0 | Status: DISCONTINUED | OUTPATIENT
Start: 2019-06-20 | End: 2019-06-20

## 2019-06-20 RX ORDER — ACETAMINOPHEN 500 MG
1000 TABLET ORAL ONCE
Refills: 0 | Status: COMPLETED | OUTPATIENT
Start: 2019-06-20 | End: 2019-06-20

## 2019-06-20 RX ORDER — DILTIAZEM HCL 120 MG
60 CAPSULE, EXT RELEASE 24 HR ORAL EVERY 6 HOURS
Refills: 0 | Status: DISCONTINUED | OUTPATIENT
Start: 2019-06-20 | End: 2019-06-22

## 2019-06-20 RX ORDER — OXYCODONE AND ACETAMINOPHEN 5; 325 MG/1; MG/1
1 TABLET ORAL EVERY 4 HOURS
Refills: 0 | Status: DISCONTINUED | OUTPATIENT
Start: 2019-06-20 | End: 2019-06-21

## 2019-06-20 RX ORDER — HYDROMORPHONE HYDROCHLORIDE 2 MG/ML
0.5 INJECTION INTRAMUSCULAR; INTRAVENOUS; SUBCUTANEOUS
Refills: 0 | Status: DISCONTINUED | OUTPATIENT
Start: 2019-06-20 | End: 2019-06-20

## 2019-06-20 RX ORDER — HEPARIN SODIUM 5000 [USP'U]/ML
500 INJECTION INTRAVENOUS; SUBCUTANEOUS
Refills: 0 | Status: COMPLETED | OUTPATIENT
Start: 2019-06-21 | End: 2019-06-21

## 2019-06-20 RX ADMIN — FAMOTIDINE 20 MILLIGRAM(S): 10 INJECTION INTRAVENOUS at 05:22

## 2019-06-20 RX ADMIN — HYDROMORPHONE HYDROCHLORIDE 0.5 MILLIGRAM(S): 2 INJECTION INTRAMUSCULAR; INTRAVENOUS; SUBCUTANEOUS at 23:07

## 2019-06-20 RX ADMIN — Medication 1000 MILLIGRAM(S): at 21:23

## 2019-06-20 RX ADMIN — CHLORHEXIDINE GLUCONATE 1 APPLICATION(S): 213 SOLUTION TOPICAL at 12:23

## 2019-06-20 RX ADMIN — BUDESONIDE AND FORMOTEROL FUMARATE DIHYDRATE 2 PUFF(S): 160; 4.5 AEROSOL RESPIRATORY (INHALATION) at 09:42

## 2019-06-20 RX ADMIN — Medication 400 MILLIGRAM(S): at 09:41

## 2019-06-20 RX ADMIN — Medication 100 MILLIGRAM(S): at 05:22

## 2019-06-20 RX ADMIN — Medication 80 MILLIGRAM(S): at 05:21

## 2019-06-20 RX ADMIN — Medication 60 MILLIGRAM(S): at 05:21

## 2019-06-20 RX ADMIN — Medication 1000 MILLIGRAM(S): at 12:11

## 2019-06-20 RX ADMIN — Medication 400 MILLIGRAM(S): at 21:16

## 2019-06-20 RX ADMIN — BUDESONIDE AND FORMOTEROL FUMARATE DIHYDRATE 2 PUFF(S): 160; 4.5 AEROSOL RESPIRATORY (INHALATION) at 22:46

## 2019-06-20 NOTE — PROGRESS NOTE ADULT - PROBLEM SELECTOR PLAN 3
PT is NPO today for OR. Monitor FS. FS 85 this AM PT is NPO today for OR. Monitor FS. FS 85 this AM. Diet ordered and food placed at bedside for Post op meal

## 2019-06-20 NOTE — PROGRESS NOTE ADULT - PROBLEM SELECTOR PLAN 1
Pt came to ER for HD catheter that was no longer functioning due to migration. Perma cath was in place initially but dislodged overnight. Plan was for AVF placement in OR today and due to dislodgement. A  new Perma Cath was also to be placed in OR for HD today . D/W Dr Morel. Pregnancy test  was done and HCG was 9 which is indeterminate. OR on hold until after TVUS and note from OB GYN as to findings Pt came to ER for HD catheter that was no longer functioning due to migration 10 cm out. Perma cath was in place initially but dislodged overnight. Plan was for AVF placement in OR today and due to dislodgement. A  new Perma Cath was also to be placed in OR for HD today . Last HD 6/18 not completed. D/W Dr Manuel and Mike. Pregnancy test  was done and HCG was 9 which is indeterminate. OR on hold until after TVUS and note from OB GYN as to findings.  CXR confirmed Permacath is out w/o retained product Pt came to ER for HD catheter that was no longer functioning due to migration 10 cm out. Perma cath was in place initially but dislodged overnight. Plan was for AVF placement in OR today and due to dislodgement. A  new Perma Cath was also to be placed in OR for HD today . Last HD 6/18 not completed. D/W Dr Manuel and Mike. Pregnancy test  was done and HCG was 9 which is indeterminate. OR on hold until after TVUS and note from OB GYN as to findings.  Pt finally went to or approx 3:30PM  CXR confirmed Permacath is out w/o retained product

## 2019-06-20 NOTE — BRIEF OPERATIVE NOTE - OPERATION/FINDINGS
Normal vascular anatomy of LUE with successful creation of LUE AVF. Normal vascular anatomy of L IJ with successful placement of L IJ tunneled HD catheter

## 2019-06-20 NOTE — CONSULT NOTE ADULT - SUBJECTIVE AND OBJECTIVE BOX
Pt interviewed and examined. Full note to follow. [facility] NEPHROLOGY- CONSULTATION NOTE    Patient is a 53y Female who presented to the hospital with tunneled catheter accidentally came out.  Pt scheduled for AVF creation today anyhow.  Vascular sx, Dr. Manuel will do both procedures.  Beta HCG was equivocal, but ob-gyn cleared for surgery as this is not c/w pregnancy in esrd pt per gyn.  Pt feels well    PAST MEDICAL & SURGICAL HISTORY:  CHAMP (obstructive sleep apnea): non compliance with CPAP  Chronic GERD  Morbid obesity  HTN (hypertension)  Atrial fibrillation: with loop recorder 2014  DM (diabetes mellitus)  COPD (chronic obstructive pulmonary disease)  H/O tubal ligation: 1989    latex (Rash)  penicillin (Unknown)    Home Medications Reviewed  Hospital Medications:   MEDICATIONS  (STANDING):  atorvastatin 80 milliGRAM(s) Oral at bedtime  buDESOnide 160 MICROgram(s)/formoterol 4.5 MICROgram(s) Inhaler 2 Puff(s) Inhalation two times a day  chlorhexidine 2% Cloths 1 Application(s) Topical daily  cinacalcet 60 milliGRAM(s) Oral at bedtime  diltiazem    Tablet 60 milliGRAM(s) Oral every 6 hours  docusate sodium 100 milliGRAM(s) Oral two times a day  famotidine Injectable 20 milliGRAM(s) IV Push every 24 hours  furosemide    Tablet 80 milliGRAM(s) Oral daily  insulin glargine Injectable (LANTUS) 30 Unit(s) SubCutaneous at bedtime  insulin lispro (HumaLOG) corrective regimen sliding scale   SubCutaneous three times a day before meals  montelukast 10 milliGRAM(s) Oral at bedtime  sevelamer carbonate 1600 milliGRAM(s) Oral three times a day with meals  sodium chloride 0.9%. 1000 milliLiter(s) (30 mL/Hr) IV Continuous <Continuous>    SOCIAL HISTORY:  Denies ETOh,Smoking,   FAMILY HISTORY:  Family hx of hypertension  Family history of diabetes mellitus    REVIEW OF SYSTEMS:  CONSTITUTIONAL: No weakness, fevers or chills  EYES/ENT: No visual changes;  No vertigo or throat pain   NECK: No pain or stiffness  RESPIRATORY: No cough, wheezing, hemoptysis; No shortness of breath  CARDIOVASCULAR: No chest pain or palpitations.  GASTROINTESTINAL: No abdominal or epigastric pain. No nausea, vomiting, or hematemesis; No diarrhea or constipation. No melena or hematochezia.  GENITOURINARY: No dysuria, frequency, foamy urine, urinary urgency, incontinence or hematuria  NEUROLOGICAL: No numbness or weakness  SKIN: No itching, burning, rashes, or lesions   VASCULAR: No bilateral lower extremity edema.   All other review of systems is negative unless indicated above.    VITALS:  T(F): 98.2 (06-20-19 @ 14:15), Max: 98.2 (06-20-19 @ 14:15)  HR: 81 (06-20-19 @ 14:15)  BP: 112/95 (06-20-19 @ 14:15)  RR: 18 (06-20-19 @ 14:15)  SpO2: 100% (06-20-19 @ 14:15)  Wt(kg): --    06-20 @ 07:01  -  06-20 @ 20:21  --------------------------------------------------------  IN: 600 mL / OUT: 50 mL / NET: 550 mL          PHYSICAL EXAM:  Constitutional: NAD  HEENT: anicteric sclera, oropharynx clear, MMM  Neck: No JVD  Respiratory: CTAB, no wheezes, rales or rhonchi  Cardiovascular: S1, S2, RRR  Gastrointestinal: BS+, soft, NT/ND  Extremities: No cyanosis or clubbing. No peripheral edema  Neurological: A/O x 3, no focal deficits  Psychiatric: Normal mood, normal affect  : No CVA tenderness. No daniel.   Skin: No rashes  Vascular Access:  Tunneled catheter RUE has been removed.  There is a small 1-2cm X 1-2 cm hematoma, mildly tender. Dressing benign.    LABS:  06-20    137  |  102  |  43<H>  ----------------------------<  158<H>  4.2   |  29  |  3.97<H>    Ca    9.2      20 Jun 2019 01:03    TPro  7.5  /  Alb  3.3<L>  /  TBili  0.2  /  DBili      /  AST  8<L>  /  ALT  10  /  AlkPhos  195<H>  06-20    Creatinine Trend: 3.97 <--, 3.79 <--                        11.2   7.71  )-----------( 354      ( 19 Jun 2019 19:29 )             38.5     Urine Studies:      RADIOLOGY & ADDITIONAL STUDIES:

## 2019-06-20 NOTE — PROGRESS NOTE ADULT - SUBJECTIVE AND OBJECTIVE BOX
Vascular Surgery    Subjective:  Pt resting comfortably. No acute complaints  NPO for procedure today.    T(C): 36.7 (06-20-19 @ 05:15), Max: 36.7 (06-19-19 @ 17:11)  HR: 78 (06-20-19 @ 05:15) (78 - 84)  BP: 124/63 (06-20-19 @ 05:15) (122/65 - 130/69)  RR: 16 (06-20-19 @ 05:15) (16 - 18)  SpO2: 96% (06-20-19 @ 05:15) (96% - 100%)    Physical:  Gen: A&O x3. NAD  Chest: Right chest Dressing C/D/I, surrounding erythema around catheter site. No swelling or fluctuance noted.

## 2019-06-20 NOTE — PROGRESS NOTE ADULT - SUBJECTIVE AND OBJECTIVE BOX
Patient underwent uncomplicated creation of LUE AV fistula and placement of L IJ permacath.     -Stable for vascular surgery standpoint for DC  -OK to use L IJ permacath for HD as scheduled  -Do not use AV fistula until cleared by vascular surgeon  -Follow up with Dr. Manuel in 2 weeks in clinic

## 2019-06-20 NOTE — PROGRESS NOTE ADULT - SUBJECTIVE AND OBJECTIVE BOX
54 yo F with PMH of Atrial fibrillation, GERD, COPD, HTN, DM, CHAMP and a significant PSHx of tubal ligation presents to the ED with a dislodged Permacath at right subclavian site. Patient receives dialysis Tuesday, Thursday and Saturday. Patient was dialyzed yesterday 6/18 and was supposed to get another HD session today 6/19 in preparation for AV fistula procedure tomorrow 6/20  by Dr Manuel. Upon arrival to dialysis, Perma cath was noted to be dislodged approx 10 cm from original depth. Patient did not receive HD today. Pt otherwise states discomfort  in the chest worsening over the past couple weeks, describes skin area as painful, tender to touch, cannot lie down on her right side, feels itching around the site. Pt otherwise denies fevers, chills, admits to sweats, but attributes this to menopause.   Plan is OR today for both AVF and Perma cath replacement. Perma cath actually completely dislodged and was on the floor  Skin is irritated around site and very pruritic. Pt is trying to refrain from scratching. She is also c/o feeling like there is something in her chest. Stat portable CXR done to r/o retained product. Pt has the Perma cath at bedside in plastic bag  It appears intact. Awaiting CXR results. HCG is 9 which is indeterminant. OB GYN consult was called.  NP Note discussed with  Primary Attending    Patient is a 53y old  Female who presents with a chief complaint of Permacath dislodged, pending AV fistula creation (20 Jun 2019 10:23)      INTERVAL HPI/OVERNIGHT EVENTS: no new complaints    MEDICATIONS  (STANDING):  atorvastatin 80 milliGRAM(s) Oral at bedtime  buDESOnide 160 MICROgram(s)/formoterol 4.5 MICROgram(s) Inhaler 2 Puff(s) Inhalation two times a day  chlorhexidine 2% Cloths 1 Application(s) Topical daily  cinacalcet 60 milliGRAM(s) Oral at bedtime  diltiazem    Tablet 60 milliGRAM(s) Oral every 6 hours  docusate sodium 100 milliGRAM(s) Oral two times a day  famotidine Injectable 20 milliGRAM(s) IV Push every 24 hours  furosemide    Tablet 80 milliGRAM(s) Oral daily  insulin glargine Injectable (LANTUS) 30 Unit(s) SubCutaneous at bedtime  insulin lispro (HumaLOG) corrective regimen sliding scale   SubCutaneous three times a day before meals  montelukast 10 milliGRAM(s) Oral at bedtime  sevelamer carbonate 1600 milliGRAM(s) Oral three times a day with meals    MEDICATIONS  (PRN):  ALBUTerol    90 MICROgram(s) HFA Inhaler 2 Puff(s) Inhalation every 6 hours PRN Shortness of Breath and/or Wheezing      __________________________________________________  REVIEW OF SYSTEMS:    CONSTITUTIONAL: No fever,   EYES: no acute visual disturbances  NECK: No pain or stiffness  RESPIRATORY: No cough; No shortness of breath  CARDIOVASCULAR: No chest pain, no palpitations  GASTROINTESTINAL: No pain. No nausea or vomiting; No diarrhea   NEUROLOGICAL: No headache or numbness, no tremors  MUSCULOSKELETAL: No joint pain, no muscle pain  GENITOURINARY: no dysuria, no frequency, no hesitancy  PSYCHIATRY: no depression , no anxiety  Skin: i  ALL OTHER  ROS negative        Vital Signs Last 24 Hrs  T(C): 36.7 (20 Jun 2019 05:15), Max: 36.7 (19 Jun 2019 17:11)  T(F): 98 (20 Jun 2019 05:15), Max: 98.1 (19 Jun 2019 17:11)  HR: 78 (20 Jun 2019 05:15) (78 - 84)  BP: 124/63 (20 Jun 2019 05:15) (122/65 - 130/69)  BP(mean): --  RR: 16 (20 Jun 2019 05:15) (16 - 18)  SpO2: 96% (20 Jun 2019 05:15) (96% - 100%)    ________________________________________________  PHYSICAL EXAM:  GENERAL: NAD  HEENT: Normocephalic;  conjunctivae and sclerae clear; moist mucous membranes;   NECK : supple  CHEST/LUNG: Clear to auscultation bilaterally with good air entry   HEART: S1 S2  regular; no murmurs, gallops or rubs  ABDOMEN: Soft, Nontender, Nondistended; Bowel sounds present  EXTREMITIES: no cyanosis; no edema; no calf tenderness  SKIN: warm and dry; no rash  NERVOUS SYSTEM:  Awake and alert; Oriented  to place, person and time ; no new deficits    _________________________________________________  LABS:                        11.2   7.71  )-----------( 354      ( 19 Jun 2019 19:29 )             38.5     06-20    137  |  102  |  43<H>  ----------------------------<  158<H>  4.2   |  29  |  3.97<H>    Ca    9.2      20 Jun 2019 01:03    TPro  7.5  /  Alb  3.3<L>  /  TBili  0.2  /  DBili  x   /  AST  8<L>  /  ALT  10  /  AlkPhos  195<H>  06-20    PT/INR - ( 19 Jun 2019 19:29 )   PT: 11.1 sec;   INR: 1.00 ratio         PTT - ( 19 Jun 2019 19:29 )  PTT:34.3 sec    CAPILLARY BLOOD GLUCOSE      POCT Blood Glucose.: 99 mg/dL (20 Jun 2019 07:39)  POCT Blood Glucose.: 207 mg/dL (19 Jun 2019 22:37)  POCT Blood Glucose.: 233 mg/dL (19 Jun 2019 22:13)        RADIOLOGY & ADDITIONAL TESTS:    Imaging Personally Reviewed:  YES/NO    Consultant(s) Notes Reviewed:   YES/ No    Care Discussed with Consultants :     Plan of care was discussed with patient and /or primary care giver; all questions and concerns were addressed and care was aligned with patient's wishes. 54 yo F with PMH of Atrial fibrillation, GERD, COPD, HTN, DM, CHAMP and a significant PSHx of tubal ligation presents to the ED with a dislodged Permacath at right subclavian site. Patient receives dialysis Tuesday, Thursday and Saturday. Patient was dialyzed yesterday 6/18 and was supposed to get another HD session today 6/19 in preparation for AV fistula procedure tomorrow 6/20  by Dr Manuel. Upon arrival to dialysis, Perma cath was noted to be dislodged approx 10 cm from original depth. Patient did not receive HD today. Pt otherwise states discomfort  in the chest worsening over the past couple weeks, describes skin area as painful, tender to touch, cannot lie down on her right side, feels itching around the site. Pt otherwise denies fevers, chills, admits to sweats, but attributes this to menopause.   Plan is OR today for both AVF and Perma cath replacement. Perma cath actually completely dislodged and was on the floor  Skin is irritated around site and very pruritic. Pt is trying to refrain from scratching. She is also c/o feeling like there is something in her chest. Stat portable CXR done to r/o retained product. Pt has the Perma cath at bedside in plastic bag  It appears intact. Awaiting CXR results. HCG is 9 which is indeterminant. OB GYN consult was called. Order placed for urgent TVUS to rule out pregnancy. Discussed with Dr Manuel and Dr Sahu  NP Note discussed with  Primary Attending    Patient is a 53y old  Female who presents with a chief complaint of Permacath dislodged, pending AV fistula creation (20 Jun 2019 10:23)      INTERVAL HPI/OVERNIGHT EVENTS: no new complaints    MEDICATIONS  (STANDING):  atorvastatin 80 milliGRAM(s) Oral at bedtime  buDESOnide 160 MICROgram(s)/formoterol 4.5 MICROgram(s) Inhaler 2 Puff(s) Inhalation two times a day  chlorhexidine 2% Cloths 1 Application(s) Topical daily  cinacalcet 60 milliGRAM(s) Oral at bedtime  diltiazem    Tablet 60 milliGRAM(s) Oral every 6 hours  docusate sodium 100 milliGRAM(s) Oral two times a day  famotidine Injectable 20 milliGRAM(s) IV Push every 24 hours  furosemide    Tablet 80 milliGRAM(s) Oral daily  insulin glargine Injectable (LANTUS) 30 Unit(s) SubCutaneous at bedtime  insulin lispro (HumaLOG) corrective regimen sliding scale   SubCutaneous three times a day before meals  montelukast 10 milliGRAM(s) Oral at bedtime  sevelamer carbonate 1600 milliGRAM(s) Oral three times a day with meals    MEDICATIONS  (PRN):  ALBUTerol    90 MICROgram(s) HFA Inhaler 2 Puff(s) Inhalation every 6 hours PRN Shortness of Breath and/or Wheezing      __________________________________________________  REVIEW OF SYSTEMS:    CONSTITUTIONAL: No fever,   EYES: no acute visual disturbances  NECK: No pain or stiffness  RESPIRATORY: No cough; No shortness of breath  CARDIOVASCULAR: No chest pain, no palpitations  GASTROINTESTINAL: No pain. No nausea or vomiting; No diarrhea   NEUROLOGICAL: No headache or numbness, no tremors  MUSCULOSKELETAL: No joint pain, no muscle pain  GENITOURINARY: no dysuria, no frequency, no hesitancy  PSYCHIATRY: no depression , no anxiety  Skin: C/O itching at Perma cath site  ALL OTHER  ROS negative        Vital Signs Last 24 Hrs  T(C): 36.7 (20 Jun 2019 05:15), Max: 36.7 (19 Jun 2019 17:11)  T(F): 98 (20 Jun 2019 05:15), Max: 98.1 (19 Jun 2019 17:11)  HR: 78 (20 Jun 2019 05:15) (78 - 84)  BP: 124/63 (20 Jun 2019 05:15) (122/65 - 130/69)  BP(mean): --  RR: 16 (20 Jun 2019 05:15) (16 - 18)  SpO2: 96% (20 Jun 2019 05:15) (96% - 100%)    ________________________________________________  PHYSICAL EXAM:  GENERAL: NAD  HEENT: Normocephalic;  conjunctivae and sclerae clear; moist mucous membranes;   NECK : supple  CHEST/LUNG: Clear to auscultation bilaterally , no wheezing  HEART: S1 S2  regular; no murmurs, gallops or rubs  ABDOMEN: Soft, Nontender, Nondistended; Bowel sounds present  EXTREMITIES: no cyanosis; no edema; no calf tenderness  SKIN: irritation at permacath site  NERVOUS SYSTEM:  Awake and alert; Oriented  to place, person and time ;  _________________________________________________  LABS:                        11.2   7.71  )-----------( 354      ( 19 Jun 2019 19:29 )             38.5     06-20    137  |  102  |  43<H>  ----------------------------<  158<H>  4.2   |  29  |  3.97<H>    Ca    9.2      20 Jun 2019 01:03    TPro  7.5  /  Alb  3.3<L>  /  TBili  0.2  /  DBili  x   /  AST  8<L>  /  ALT  10  /  AlkPhos  195<H>  06-20    PT/INR - ( 19 Jun 2019 19:29 )   PT: 11.1 sec;   INR: 1.00 ratio         PTT - ( 19 Jun 2019 19:29 )  PTT:34.3 sec    CAPILLARY BLOOD GLUCOSE      POCT Blood Glucose.: 99 mg/dL (20 Jun 2019 07:39)  POCT Blood Glucose.: 207 mg/dL (19 Jun 2019 22:37)  POCT Blood Glucose.: 233 mg/dL (19 Jun 2019 22:13)        RADIOLOGY & ADDITIONAL TESTS:    Imaging Personally Reviewed:  YES  Consultant(s) Notes Reviewed:   YES    Care Discussed with Consultants :     Plan of care was discussed with patient ; all questions and concerns were addressed and care was aligned with patient's wishes.

## 2019-06-20 NOTE — PROGRESS NOTE ADULT - PROBLEM SELECTOR PLAN 5
Famotidine for GI ppx  Chlorhexidine for Infection control Famotidine for GI ppx  Chlorhexidine for Infection control  No DVT ppx until after sx

## 2019-06-20 NOTE — CHART NOTE - NSCHARTNOTEFT_GEN_A_CORE
GYN note  pt seen at bedside  pt is on Hemodialysis   pt is for AV fistula today delayed OR due to pre op bhcg 9   pt is waiting for sonogram    pt is perimenopausal at age 52yo pt states she has not been sexually active x10yrs  pt had hx of bilateral tubal ligation   -dw in house obgyn md dr coppola: pt is cleared for OR for procedure   -sonogram ordered per nephrologist  -sonogram notified in regards to case

## 2019-06-20 NOTE — CONSULT NOTE ADULT - PROBLEM SELECTOR RECOMMENDATION 9
HD later today or tomorrow following tunneled catheter placement by vascular sx.  Continue with maintenance hemodialysis treatment. Monitor BMP.

## 2019-06-20 NOTE — CONSULT NOTE ADULT - ASSESSMENT
53 year-old woman with ESRD on HD with HD catheter accidentally came out and to have new AVF placed.

## 2019-06-20 NOTE — BRIEF OPERATIVE NOTE - NSICDXBRIEFPROCEDURE_GEN_ALL_CORE_FT
PROCEDURES:  Percutaneous insertion of tunneled hemodialysis catheter 20-Jun-2019 19:24:34  Se King  Creation, arteriovenous fistula 20-Jun-2019 19:23:53  Se King

## 2019-06-21 ENCOUNTER — TRANSCRIPTION ENCOUNTER (OUTPATIENT)
Age: 54
End: 2019-06-21

## 2019-06-21 LAB
ANION GAP SERPL CALC-SCNC: 8 MMOL/L — SIGNIFICANT CHANGE UP (ref 5–17)
BUN SERPL-MCNC: 50 MG/DL — HIGH (ref 7–18)
CALCIUM SERPL-MCNC: 8.2 MG/DL — LOW (ref 8.4–10.5)
CHLORIDE SERPL-SCNC: 102 MMOL/L — SIGNIFICANT CHANGE UP (ref 96–108)
CO2 SERPL-SCNC: 27 MMOL/L — SIGNIFICANT CHANGE UP (ref 22–31)
CREAT SERPL-MCNC: 4.15 MG/DL — HIGH (ref 0.5–1.3)
GLUCOSE BLDC GLUCOMTR-MCNC: 113 MG/DL — HIGH (ref 70–99)
GLUCOSE BLDC GLUCOMTR-MCNC: 116 MG/DL — HIGH (ref 70–99)
GLUCOSE BLDC GLUCOMTR-MCNC: 146 MG/DL — HIGH (ref 70–99)
GLUCOSE BLDC GLUCOMTR-MCNC: 150 MG/DL — HIGH (ref 70–99)
GLUCOSE BLDC GLUCOMTR-MCNC: 167 MG/DL — HIGH (ref 70–99)
GLUCOSE BLDC GLUCOMTR-MCNC: 196 MG/DL — HIGH (ref 70–99)
GLUCOSE SERPL-MCNC: 171 MG/DL — HIGH (ref 70–99)
HCT VFR BLD CALC: 34 % — LOW (ref 34.5–45)
HGB BLD-MCNC: 9.8 G/DL — LOW (ref 11.5–15.5)
INR BLD: 1.08 RATIO — SIGNIFICANT CHANGE UP (ref 0.88–1.16)
MCHC RBC-ENTMCNC: 25.3 PG — LOW (ref 27–34)
MCHC RBC-ENTMCNC: 28.8 GM/DL — LOW (ref 32–36)
MCV RBC AUTO: 87.6 FL — SIGNIFICANT CHANGE UP (ref 80–100)
NRBC # BLD: 0 /100 WBCS — SIGNIFICANT CHANGE UP (ref 0–0)
PLATELET # BLD AUTO: 269 K/UL — SIGNIFICANT CHANGE UP (ref 150–400)
POTASSIUM SERPL-MCNC: 4.3 MMOL/L — SIGNIFICANT CHANGE UP (ref 3.5–5.3)
POTASSIUM SERPL-SCNC: 4.3 MMOL/L — SIGNIFICANT CHANGE UP (ref 3.5–5.3)
PROTHROM AB SERPL-ACNC: 12 SEC — SIGNIFICANT CHANGE UP (ref 10–12.9)
RBC # BLD: 3.88 M/UL — SIGNIFICANT CHANGE UP (ref 3.8–5.2)
RBC # FLD: 15.5 % — HIGH (ref 10.3–14.5)
SODIUM SERPL-SCNC: 137 MMOL/L — SIGNIFICANT CHANGE UP (ref 135–145)
WBC # BLD: 6.86 K/UL — SIGNIFICANT CHANGE UP (ref 3.8–10.5)
WBC # FLD AUTO: 6.86 K/UL — SIGNIFICANT CHANGE UP (ref 3.8–10.5)

## 2019-06-21 RX ORDER — TRAMADOL HYDROCHLORIDE 50 MG/1
50 TABLET ORAL ONCE
Refills: 0 | Status: DISCONTINUED | OUTPATIENT
Start: 2019-06-21 | End: 2019-06-21

## 2019-06-21 RX ORDER — OXYCODONE AND ACETAMINOPHEN 5; 325 MG/1; MG/1
1 TABLET ORAL EVERY 4 HOURS
Refills: 0 | Status: DISCONTINUED | OUTPATIENT
Start: 2019-06-21 | End: 2019-06-21

## 2019-06-21 RX ORDER — APIXABAN 2.5 MG/1
5 TABLET, FILM COATED ORAL
Refills: 0 | Status: DISCONTINUED | OUTPATIENT
Start: 2019-06-21 | End: 2019-06-22

## 2019-06-21 RX ADMIN — Medication 60 MILLIGRAM(S): at 00:23

## 2019-06-21 RX ADMIN — HYDROMORPHONE HYDROCHLORIDE 0.5 MILLIGRAM(S): 2 INJECTION INTRAMUSCULAR; INTRAVENOUS; SUBCUTANEOUS at 00:34

## 2019-06-21 RX ADMIN — BUDESONIDE AND FORMOTEROL FUMARATE DIHYDRATE 2 PUFF(S): 160; 4.5 AEROSOL RESPIRATORY (INHALATION) at 23:36

## 2019-06-21 RX ADMIN — TRAMADOL HYDROCHLORIDE 50 MILLIGRAM(S): 50 TABLET ORAL at 11:25

## 2019-06-21 RX ADMIN — OXYCODONE AND ACETAMINOPHEN 1 TABLET(S): 5; 325 TABLET ORAL at 01:19

## 2019-06-21 RX ADMIN — SEVELAMER CARBONATE 1600 MILLIGRAM(S): 2400 POWDER, FOR SUSPENSION ORAL at 09:11

## 2019-06-21 RX ADMIN — HEPARIN SODIUM 1 DOSE(S): 5000 INJECTION INTRAVENOUS; SUBCUTANEOUS at 20:31

## 2019-06-21 RX ADMIN — TRAMADOL HYDROCHLORIDE 50 MILLIGRAM(S): 50 TABLET ORAL at 12:05

## 2019-06-21 RX ADMIN — MONTELUKAST 10 MILLIGRAM(S): 4 TABLET, CHEWABLE ORAL at 00:23

## 2019-06-21 RX ADMIN — CHLORHEXIDINE GLUCONATE 1 APPLICATION(S): 213 SOLUTION TOPICAL at 11:35

## 2019-06-21 RX ADMIN — Medication 1: at 11:33

## 2019-06-21 RX ADMIN — BUDESONIDE AND FORMOTEROL FUMARATE DIHYDRATE 2 PUFF(S): 160; 4.5 AEROSOL RESPIRATORY (INHALATION) at 09:11

## 2019-06-21 RX ADMIN — CINACALCET 60 MILLIGRAM(S): 30 TABLET, FILM COATED ORAL at 00:22

## 2019-06-21 RX ADMIN — Medication 80 MILLIGRAM(S): at 05:45

## 2019-06-21 RX ADMIN — OXYCODONE AND ACETAMINOPHEN 1 TABLET(S): 5; 325 TABLET ORAL at 23:34

## 2019-06-21 RX ADMIN — Medication 60 MILLIGRAM(S): at 23:39

## 2019-06-21 RX ADMIN — SEVELAMER CARBONATE 1600 MILLIGRAM(S): 2400 POWDER, FOR SUSPENSION ORAL at 11:34

## 2019-06-21 RX ADMIN — ATORVASTATIN CALCIUM 80 MILLIGRAM(S): 80 TABLET, FILM COATED ORAL at 00:23

## 2019-06-21 RX ADMIN — Medication 60 MILLIGRAM(S): at 11:35

## 2019-06-21 RX ADMIN — ATORVASTATIN CALCIUM 80 MILLIGRAM(S): 80 TABLET, FILM COATED ORAL at 23:41

## 2019-06-21 RX ADMIN — HEPARIN SODIUM 1000 UNIT(S): 5000 INJECTION INTRAVENOUS; SUBCUTANEOUS at 20:29

## 2019-06-21 RX ADMIN — OXYCODONE AND ACETAMINOPHEN 1 TABLET(S): 5; 325 TABLET ORAL at 00:23

## 2019-06-21 RX ADMIN — OXYCODONE AND ACETAMINOPHEN 1 TABLET(S): 5; 325 TABLET ORAL at 13:31

## 2019-06-21 RX ADMIN — FAMOTIDINE 20 MILLIGRAM(S): 10 INJECTION INTRAVENOUS at 05:45

## 2019-06-21 RX ADMIN — MONTELUKAST 10 MILLIGRAM(S): 4 TABLET, CHEWABLE ORAL at 23:33

## 2019-06-21 RX ADMIN — Medication 100 MILLIGRAM(S): at 05:45

## 2019-06-21 RX ADMIN — HEPARIN SODIUM 500 UNIT(S): 5000 INJECTION INTRAVENOUS; SUBCUTANEOUS at 20:38

## 2019-06-21 RX ADMIN — Medication 60 MILLIGRAM(S): at 05:45

## 2019-06-21 RX ADMIN — CINACALCET 60 MILLIGRAM(S): 30 TABLET, FILM COATED ORAL at 23:33

## 2019-06-21 RX ADMIN — HEPARIN SODIUM 1 DOSE(S): 5000 INJECTION INTRAVENOUS; SUBCUTANEOUS at 20:29

## 2019-06-21 RX ADMIN — OXYCODONE AND ACETAMINOPHEN 1 TABLET(S): 5; 325 TABLET ORAL at 05:01

## 2019-06-21 RX ADMIN — OXYCODONE AND ACETAMINOPHEN 1 TABLET(S): 5; 325 TABLET ORAL at 14:15

## 2019-06-21 RX ADMIN — OXYCODONE AND ACETAMINOPHEN 1 TABLET(S): 5; 325 TABLET ORAL at 07:19

## 2019-06-21 RX ADMIN — HEPARIN SODIUM 500 UNIT(S): 5000 INJECTION INTRAVENOUS; SUBCUTANEOUS at 20:37

## 2019-06-21 RX ADMIN — ERYTHROPOIETIN 10000 UNIT(S): 10000 INJECTION, SOLUTION INTRAVENOUS; SUBCUTANEOUS at 19:54

## 2019-06-21 NOTE — PROGRESS NOTE ADULT - SUBJECTIVE AND OBJECTIVE BOX
Pt interviewed and examined. Full note to follow [facility] NEPHROLOGY- PROGRESS NOTE    Patient is a 53y Female who presented to the hospital with tunneled catheter accidentally came out.  Pt was to have AVF creation the day of admission anyhow.  Vascular sx, Dr. Manuel did both procedures.  Of note: Beta HCG was equivocal, but ob-gyn cleared for surgery as this is not c/w pregnancy in esrd pt per gyn.    Pt feels well, but needs to have family at home to help her when she gets home and so she is unable to leave the hospital tonight.   Plan is for HD this evening, then pt to go home tomorrow morning. she will go back to TTS schedule.    Pt reports feeling mildly overloaded with mild SOB.  She has some pain at LUE tunneled catheter insertion site and site of LUE AVF creation.    REVIEW OF SYSTEMS: no h/a, cp, abd pain      VITALS:  T(F): 98.3 (06-21-19 @ 12:59), Max: 98.4 (06-20-19 @ 23:00)  HR: 83 (06-21-19 @ 12:59)  BP: 123/49 (06-21-19 @ 12:59)  RR: 20 (06-21-19 @ 12:59)  SpO2: 100% (06-21-19 @ 12:59)  Wt(kg): --    06-20 @ 07:01  -  06-21 @ 07:00  --------------------------------------------------------  IN: 600 mL / OUT: 50 mL / NET: 550 mL          PHYSICAL EXAM:  Constitutional: NAD  HEENT: anicteric sclera, oropharynx clear, MMM  Neck: No JVD  Respiratory: CTAB, no wheezes, rales or rhonchi  Cardiovascular: S1, S2, RRR  Gastrointestinal: BS+, soft, NT/ND  Extremities: No cyanosis or clubbing. No peripheral edema  Neurological: A/O x 3, no focal deficits  Psychiatric: Normal mood, normal affect  : No CVA tenderness. No daniel.   Skin: No rashes  Vascular Access:  R IJ Tunneled catheter has been removed.  There is a small 1-2cm X 1-2 cm hematoma, mildly tender. Scar at former exit site benign.  L IJ tunneled catheter benign. Mildly tender. Dressing c/d/i.  LUE AVF + thrill/bruit. Mildly tender. Dressing c/d/i.    LABS:  06-20    137  |  102  |  43<H>  ----------------------------<  158<H>  4.2   |  29  |  3.97<H>    Ca    9.2      20 Jun 2019 01:03    TPro  7.5  /  Alb  3.3<L>  /  TBili  0.2  /  DBili      /  AST  8<L>  /  ALT  10  /  AlkPhos  195<H>  06-20    Creatinine Trend: 3.97 <--, 3.79 <--                        11.2   7.71  )-----------( 354      ( 19 Jun 2019 19:29 )             38.5

## 2019-06-21 NOTE — PROGRESS NOTE ADULT - SUBJECTIVE AND OBJECTIVE BOX
Patient seen and examined at bedside. POD 1 s/p LUE AV fistula creation and L IJ tunneled HD catheter insertion. HD stable and afebrile. No acute events.    Vital Signs Last 24 Hrs  T(C): 36.8 (21 Jun 2019 05:42), Max: 36.9 (20 Jun 2019 23:00)  T(F): 98.2 (21 Jun 2019 05:42), Max: 98.4 (20 Jun 2019 23:00)  HR: 79 (21 Jun 2019 05:42) (79 - 97)  BP: 129/62 (21 Jun 2019 05:42) (102/46 - 157/80)  BP(mean): --  RR: 16 (21 Jun 2019 05:42) (12 - 20)  SpO2: 99% (21 Jun 2019 05:42) (96% - 100%)                        11.2   7.71  )-----------( 354      ( 19 Jun 2019 19:29 )             38.5   06-20    137  |  102  |  43<H>  ----------------------------<  158<H>  4.2   |  29  |  3.97<H>    Ca    9.2      20 Jun 2019 01:03    TPro  7.5  /  Alb  3.3<L>  /  TBili  0.2  /  DBili  x   /  AST  8<L>  /  ALT  10  /  AlkPhos  195<H>  06-20    General: AAOx3, resting in bed in NAD  Resp: Reg resp effort on RA  MSK: dressings CDI, LUE compartments soft. Palpable thrill of LUE AVF    A/P  53 F POD 1 s/p LUE AVF creation and L IJ tunneled HD cath insertion  -Stable from vascular surgery standpoint for DC  -Continue HD with L IJ permacath  -Do not use AVF until cleared by vascular surgeon  -Follow up with Dr Manuel in two weeks in clinic

## 2019-06-21 NOTE — PROGRESS NOTE ADULT - ATTENDING COMMENTS
[facility] NEPHROLOGY  Keaton Lopez M.D.  Juma Green D.O.  Myla Hoffmann M.D.  Julia Brewer, MSN, ANP-C    Telephone: [phone]  Facsimile: [phone]    [address] [place] [address]

## 2019-06-21 NOTE — DISCHARGE NOTE PROVIDER - NSDCCPCAREPLAN_GEN_ALL_CORE_FT
PRINCIPAL DISCHARGE DIAGNOSIS  Diagnosis: Hemodialysis catheter malfunction  Assessment and Plan of Treatment: -Continue HD with L IJ permacath  -Do not use AVF until cleared by vascular surgeon  -Follow up with Dr Manuel in two weeks in clinic  -last HD in hosptial : 6.21.2019         SECONDARY DISCHARGE DIAGNOSES  Diagnosis: Morbid obesity  Assessment and Plan of Treatment: encourage control diet and exercises on regular basis    Diagnosis: Atrial fibrillation  Assessment and Plan of Treatment: Continue  home meds    Diagnosis: DM (diabetes mellitus)  Assessment and Plan of Treatment: continue home meds  f/u with PMD  HgA1c is : 7.8% on 6.20.2019    Diagnosis: Anemia of renal disease  Assessment and Plan of Treatment: continue eopogen on HD  Hg 11.2 on 6.20.2019  f/u CBC

## 2019-06-21 NOTE — DISCHARGE NOTE PROVIDER - HOSPITAL COURSE
54 yo F with PMH of Atrial fibrillation, GERD, COPD, HTN, DM, CHAMP and a significant PSHx of tubal ligation presents to the ED with a dislodged Permacath at right subclavian site.     Admitted for both AVF creation and Perma cath replacement.  Procedures well done by Vascular specialist on 6.20.2019 then able to complete HD on 6.21.2019.  Resume eliquids for A-fib Pt is medically stable, d/c home with HD schedule and f/u vascular specialist.  Advised to take pain meds if she is in pain to surgical site for next 2 days.

## 2019-06-21 NOTE — DISCHARGE NOTE PROVIDER - CARE PROVIDER_API CALL
Dinesh Manuel)  Vascular Surgery  [address], [address]  [place] [address]  Phone: [phone]  Fax: [phone]  Follow Up Time:

## 2019-06-22 ENCOUNTER — TRANSCRIPTION ENCOUNTER (OUTPATIENT)
Age: 54
End: 2019-06-22

## 2019-06-22 VITALS
DIASTOLIC BLOOD PRESSURE: 43 MMHG | OXYGEN SATURATION: 100 % | SYSTOLIC BLOOD PRESSURE: 118 MMHG | RESPIRATION RATE: 18 BRPM | HEART RATE: 77 BPM | TEMPERATURE: 98 F

## 2019-06-22 LAB
GLUCOSE BLDC GLUCOMTR-MCNC: 194 MG/DL — HIGH (ref 70–99)
GLUCOSE BLDC GLUCOMTR-MCNC: 230 MG/DL — HIGH (ref 70–99)
GLUCOSE BLDC GLUCOMTR-MCNC: 255 MG/DL — HIGH (ref 70–99)

## 2019-06-22 PROCEDURE — 71045 X-RAY EXAM CHEST 1 VIEW: CPT

## 2019-06-22 PROCEDURE — 84702 CHORIONIC GONADOTROPIN TEST: CPT

## 2019-06-22 PROCEDURE — 94640 AIRWAY INHALATION TREATMENT: CPT

## 2019-06-22 PROCEDURE — 86803 HEPATITIS C AB TEST: CPT

## 2019-06-22 PROCEDURE — 86901 BLOOD TYPING SEROLOGIC RH(D): CPT

## 2019-06-22 PROCEDURE — 80048 BASIC METABOLIC PNL TOTAL CA: CPT

## 2019-06-22 PROCEDURE — 36415 COLL VENOUS BLD VENIPUNCTURE: CPT

## 2019-06-22 PROCEDURE — 83036 HEMOGLOBIN GLYCOSYLATED A1C: CPT

## 2019-06-22 PROCEDURE — 85610 PROTHROMBIN TIME: CPT

## 2019-06-22 PROCEDURE — 80053 COMPREHEN METABOLIC PANEL: CPT

## 2019-06-22 PROCEDURE — 76830 TRANSVAGINAL US NON-OB: CPT

## 2019-06-22 PROCEDURE — 99285 EMERGENCY DEPT VISIT HI MDM: CPT | Mod: 25

## 2019-06-22 PROCEDURE — 86850 RBC ANTIBODY SCREEN: CPT

## 2019-06-22 PROCEDURE — 85730 THROMBOPLASTIN TIME PARTIAL: CPT

## 2019-06-22 PROCEDURE — 82962 GLUCOSE BLOOD TEST: CPT

## 2019-06-22 PROCEDURE — 85027 COMPLETE CBC AUTOMATED: CPT

## 2019-06-22 PROCEDURE — 86900 BLOOD TYPING SEROLOGIC ABO: CPT

## 2019-06-22 RX ORDER — ACETAMINOPHEN 500 MG
650 TABLET ORAL ONCE
Refills: 0 | Status: COMPLETED | OUTPATIENT
Start: 2019-06-22 | End: 2019-06-22

## 2019-06-22 RX ORDER — ACETAMINOPHEN 500 MG
650 TABLET ORAL EVERY 6 HOURS
Refills: 0 | Status: DISCONTINUED | OUTPATIENT
Start: 2019-06-22 | End: 2019-06-22

## 2019-06-22 RX ADMIN — FAMOTIDINE 20 MILLIGRAM(S): 10 INJECTION INTRAVENOUS at 06:11

## 2019-06-22 RX ADMIN — Medication 650 MILLIGRAM(S): at 10:00

## 2019-06-22 RX ADMIN — APIXABAN 5 MILLIGRAM(S): 2.5 TABLET, FILM COATED ORAL at 06:11

## 2019-06-22 RX ADMIN — Medication 650 MILLIGRAM(S): at 09:00

## 2019-06-22 RX ADMIN — Medication 650 MILLIGRAM(S): at 06:03

## 2019-06-22 RX ADMIN — Medication 650 MILLIGRAM(S): at 06:09

## 2019-06-22 RX ADMIN — OXYCODONE AND ACETAMINOPHEN 1 TABLET(S): 5; 325 TABLET ORAL at 00:07

## 2019-06-22 RX ADMIN — Medication 100 MILLIGRAM(S): at 06:10

## 2019-06-22 RX ADMIN — SEVELAMER CARBONATE 1600 MILLIGRAM(S): 2400 POWDER, FOR SUSPENSION ORAL at 08:23

## 2019-06-22 RX ADMIN — SEVELAMER CARBONATE 1600 MILLIGRAM(S): 2400 POWDER, FOR SUSPENSION ORAL at 12:11

## 2019-06-22 RX ADMIN — Medication 60 MILLIGRAM(S): at 06:10

## 2019-06-22 RX ADMIN — CHLORHEXIDINE GLUCONATE 1 APPLICATION(S): 213 SOLUTION TOPICAL at 12:14

## 2019-06-22 RX ADMIN — BUDESONIDE AND FORMOTEROL FUMARATE DIHYDRATE 2 PUFF(S): 160; 4.5 AEROSOL RESPIRATORY (INHALATION) at 09:00

## 2019-06-22 RX ADMIN — Medication 1: at 08:23

## 2019-06-22 RX ADMIN — Medication 80 MILLIGRAM(S): at 06:11

## 2019-06-22 RX ADMIN — Medication 60 MILLIGRAM(S): at 12:11

## 2019-06-22 NOTE — CHART NOTE - NSCHARTNOTEFT_GEN_A_CORE
EVENT: Headache  - Pt c/o of headache which she rated as a 4/10    OBJECTIVE:  Vital Signs Last 24 Hrs  T(C): 36.7 (22 Jun 2019 05:05), Max: 37 (21 Jun 2019 22:58)  T(F): 98 (22 Jun 2019 05:05), Max: 98.6 (21 Jun 2019 22:58)  HR: 77 (22 Jun 2019 05:05) (71 - 84)  BP: 105/47 (22 Jun 2019 05:05) (104/74 - 147/67)  BP(mean): --  RR: 18 (22 Jun 2019 05:05) (18 - 20)  SpO2: 100% (22 Jun 2019 05:05) (98% - 100%)    FOCUSED PHYSICAL EXAM:  Neuro: awake, alert, oriented x 3. No neuro deficit  Cardiovascular: Pulses +2 B/L in lower and upper extremities, HR regular, BP stable, No edema.  Respiratory: Respirations regular, unlabored, breath sounds clear B/L.   GI: Abdomen soft, non-tender, positive bowel sounds.  : no bladder distention noted. No complaints at this time.  Skin: Dry, intact, no bruising, no diaphoresis.    LABS:                        9.8    6.86  )-----------( 269      ( 21 Jun 2019 19:20 )             34.0     06-21    137  |  102  |  50<H>  ----------------------------<  171<H>  4.3   |  27  |  4.15<H>    Ca    8.2<L>      21 Jun 2019 19:20        EKG:   IMGAGING:    ASSESSMENT:  HPI:  54 yo F with PMH of Atrial fibrillation, GERD, COPD, HTN, DM, CHAMP and a significant PSHx of tubal ligation presents to the ED with a dislodged Permacath at right subclavian site. Patient receives dialysis Tuesday, Thursday and Saturday. Patient was dialyzed yesterday 6/18 and was supposed to get another HD session today 6/19 in preparation for AV fistula procedure tomorrow 6/20  by Dr Manuel. Upon arrival to dialysis, Permacath was noted to be dislodged approx 10 cm from original depth. Patient did not receive HD today. Pt otherwise states discomfort  in the chest worsening over the past couple weeks, describes skin area as painful, tender to touch, cannot lie down on her right side, feels itching around the site. Pt otherwise denies fevers, chills, admits to sweats, but attributes this to menopause. (19 Jun 2019 21:23)      PLAN: Tylenol 650mg, PO x 1 dose ordered

## 2019-06-22 NOTE — DISCHARGE NOTE NURSING/CASE MANAGEMENT/SOCIAL WORK - NSDCDPATPORTLINK_GEN_ALL_CORE
You can access the [facility] Patient Portal, offered by [facility], by registering with the following website: [email or web address]

## 2019-06-22 NOTE — PROGRESS NOTE ADULT - REASON FOR ADMISSION
permacath dislodged, pending AV fistula creation

## 2019-06-22 NOTE — PROGRESS NOTE ADULT - SUBJECTIVE AND OBJECTIVE BOX
Patient seen and examined at bedside. POD 2 s/p LUE AV fistula creation and L IJ tunneled HD catheter insertion. Patient seen and examined at bedside. Patient states she is doing well, c/o mininal pain at the catheter site. HD done yesterday. Patient remnainsHD stable and afebrile. No acute events.    Vital Signs Last 24 Hrs  T(C): 36.7 (22 Jun 2019 05:05), Max: 37 (21 Jun 2019 22:58)  T(F): 98 (22 Jun 2019 05:05), Max: 98.6 (21 Jun 2019 22:58)  HR: 77 (22 Jun 2019 05:05) (71 - 84)  BP: 105/47 (22 Jun 2019 05:05) (104/74 - 147/67)  BP(mean): --  RR: 18 (22 Jun 2019 05:05) (18 - 20)  SpO2: 100% (22 Jun 2019 05:05) (98% - 100%)    PHYSICAL EXAM    General: AAOx3, sitting in NAD  Resp: Reg resp effort on RA  MSK: dressings CDI, LUE compartments soft. Palpable thrill of LUE AVF  Neuro: Awake and alert    LAbs                        9.8    6.86  )-----------( 269      ( 21 Jun 2019 19:20 )             34.0   06-21    137  |  102  |  50<H>  ----------------------------<  171<H>  4.3   |  27  |  4.15<H>    Ca    8.2<L>      21 Jun 2019 19:20

## 2019-06-22 NOTE — PROGRESS NOTE ADULT - ASSESSMENT
53y.o. Female with ESRD, dislodged PC    -OR today for L AVF and PC placement  -Keep NPO
Patient seen and examined at bedside. POD 2 s/p LUE AV fistula creation and L IJ tunneled HD catheter insertion  - Pain control  - Do not use AVF until cleared by vascular surgeon  - Patient to follow up in 2 weeks w/ Dr. Manuel  - Management per primary team
53 year-old woman with ESRD on HD with HD catheter accidentally came out, now s/p new tunneled catheter placement and s/p new AVF placement.      Plan is for HD this evening, then pt to go home tomorrow morning. she will go back to TTS schedule.

## 2019-06-28 ENCOUNTER — APPOINTMENT (OUTPATIENT)
Dept: VASCULAR SURGERY | Facility: CLINIC | Age: 54
End: 2019-06-28

## 2019-07-01 ENCOUNTER — APPOINTMENT (OUTPATIENT)
Dept: INTERNAL MEDICINE | Facility: CLINIC | Age: 54
End: 2019-07-01

## 2019-07-17 ENCOUNTER — APPOINTMENT (OUTPATIENT)
Dept: VASCULAR SURGERY | Facility: CLINIC | Age: 54
End: 2019-07-17
Payer: MEDICAID

## 2019-07-17 ENCOUNTER — APPOINTMENT (OUTPATIENT)
Dept: VASCULAR SURGERY | Facility: CLINIC | Age: 54
End: 2019-07-17
Payer: MEDICARE

## 2019-07-17 VITALS
HEIGHT: 62 IN | BODY MASS INDEX: 53.92 KG/M2 | HEART RATE: 78 BPM | DIASTOLIC BLOOD PRESSURE: 97 MMHG | SYSTOLIC BLOOD PRESSURE: 166 MMHG | WEIGHT: 293 LBS

## 2019-07-17 PROCEDURE — 99024 POSTOP FOLLOW-UP VISIT: CPT

## 2019-07-17 PROCEDURE — 93990 DOPPLER FLOW TESTING: CPT

## 2019-07-17 NOTE — REASON FOR VISIT
[de-identified] : left upper extremity brachial cephalic avf [de-identified] : 6/20/19 [de-identified] : 54 yo female with a history of esrd on hd via left ij permcath presents for follow up after left upper extremity avf.  pt without any complaints of left hand pain

## 2019-07-17 NOTE — PHYSICAL EXAM
[JVD] : no jugular venous distention  [2+] : left 2+ [Alert] : alert [Calm] : calm [de-identified] : appears well  [de-identified] : incision clean and dry sutures in place, bruit over the avf, no thrill

## 2019-07-17 NOTE — DISCUSSION/SUMMARY
[FreeTextEntry1] : 54 yo female with a history of esrd on hd via left ij leopoldocatgillian presents for follow up after left upper extremity avf\par sutures removed \par duplex shows >75% at the juxta-anastomosis and distal upper extremity with flow rate of 228 \par will arrange for fistulagram next week given severe stenosis and decreased flow rate

## 2019-07-23 ENCOUNTER — FORM ENCOUNTER (OUTPATIENT)
Age: 54
End: 2019-07-23

## 2019-07-23 PROBLEM — K21.9 GERD (GASTROESOPHAGEAL REFLUX DISEASE): Status: ACTIVE | Noted: 2019-07-23

## 2019-07-23 PROBLEM — G47.30 SLEEP APNEA: Status: ACTIVE | Noted: 2019-07-23

## 2019-07-23 RX ORDER — CINACALCET HYDROCHLORIDE 60 MG/1
60 TABLET, COATED ORAL
Refills: 0 | Status: ACTIVE | COMMUNITY

## 2019-07-23 RX ORDER — INSULIN DEGLUDEC INJECTION 100 U/ML
100 INJECTION, SOLUTION SUBCUTANEOUS
Refills: 0 | Status: ACTIVE | COMMUNITY

## 2019-07-23 RX ORDER — GABAPENTIN 300 MG/1
300 CAPSULE ORAL
Refills: 0 | Status: ACTIVE | COMMUNITY

## 2019-07-23 RX ORDER — IPRATROPIUM BROMIDE AND ALBUTEROL 20; 100 UG/1; UG/1
20-100 SPRAY, METERED RESPIRATORY (INHALATION)
Refills: 0 | Status: ACTIVE | COMMUNITY

## 2019-07-23 RX ORDER — MONTELUKAST SODIUM 10 MG/1
10 TABLET, FILM COATED ORAL
Refills: 0 | Status: ACTIVE | COMMUNITY

## 2019-07-23 RX ORDER — SEVELAMER CARBONATE 800 MG/1
800 TABLET, FILM COATED ORAL
Refills: 0 | Status: ACTIVE | COMMUNITY

## 2019-07-23 RX ORDER — ROSUVASTATIN CALCIUM 40 MG/1
40 TABLET, FILM COATED ORAL
Refills: 0 | Status: ACTIVE | COMMUNITY

## 2019-07-23 RX ORDER — DOCUSATE SODIUM 100 MG/1
100 CAPSULE, LIQUID FILLED ORAL
Refills: 0 | Status: ACTIVE | COMMUNITY

## 2019-07-23 RX ORDER — BUDESONIDE AND FORMOTEROL FUMARATE DIHYDRATE 160; 4.5 UG/1; UG/1
160-4.5 AEROSOL RESPIRATORY (INHALATION)
Refills: 0 | Status: ACTIVE | COMMUNITY

## 2019-07-24 ENCOUNTER — APPOINTMENT (OUTPATIENT)
Dept: ENDOVASCULAR SURGERY | Facility: CLINIC | Age: 54
End: 2019-07-24
Payer: MEDICARE

## 2019-07-24 VITALS
HEART RATE: 78 BPM | HEIGHT: 62 IN | DIASTOLIC BLOOD PRESSURE: 78 MMHG | SYSTOLIC BLOOD PRESSURE: 121 MMHG | TEMPERATURE: 98.4 F | WEIGHT: 293 LBS | BODY MASS INDEX: 53.92 KG/M2 | RESPIRATION RATE: 20 BRPM | OXYGEN SATURATION: 99 %

## 2019-07-24 DIAGNOSIS — K21.9 GASTRO-ESOPHAGEAL REFLUX DISEASE W/OUT ESOPHAGITIS: ICD-10-CM

## 2019-07-24 DIAGNOSIS — G47.30 SLEEP APNEA, UNSPECIFIED: ICD-10-CM

## 2019-07-24 DIAGNOSIS — G47.33 OBSTRUCTIVE SLEEP APNEA (ADULT) (PEDIATRIC): ICD-10-CM

## 2019-07-24 DIAGNOSIS — M25.473 EFFUSION, UNSPECIFIED ANKLE: ICD-10-CM

## 2019-07-24 PROCEDURE — 36216Z: CUSTOM | Mod: 58

## 2019-07-24 PROCEDURE — 36902Z: CUSTOM | Mod: 58

## 2019-07-24 RX ORDER — ROSUVASTATIN CALCIUM 5 MG/1
TABLET, FILM COATED ORAL
Refills: 0 | Status: ACTIVE | COMMUNITY

## 2019-07-24 RX ORDER — DILTIAZEM HYDROCHLORIDE 120 MG/1
120 CAPSULE, EXTENDED RELEASE ORAL
Refills: 0 | Status: ACTIVE | COMMUNITY

## 2019-07-24 NOTE — HISTORY OF PRESENT ILLNESS
[] : left brachiocephalic fistula [FreeTextEntry1] : 6/20/2019 Dr. Manuel [FreeTextEntry4] : yesterday via tunneled catheter [FreeTextEntry5] : last night 7:30 [FreeTextEntry6] : Dr. Gonzalez

## 2019-07-24 NOTE — PAST MEDICAL HISTORY
[Increasing age ( >40 years old)] : Increasing age ( >40 years old) [Obesity: BMI >25] : Obesity: BMI >25 [No therapy indicated for cases scheduled for less than one hour] : No therapy indicated for cases scheduled for less than one hour. [FreeTextEntry1] : Malignant Hyperthermia Screening Tool and Risk of Bleeding Assessment\par \par Ms. ANTONIA ORTIZ denies family history of unexpected death following Anesthesia or Exercise.\par Denies Family history of Malignant Hyperthermia, Muscle or Neuromuscular disorder and High Temperature following exercise.\par \par Ms. ANTONIA ORTIZ denies history of Muscle Spasm, Dark or Chocolate - Colored urine and Unanticipated fever immediately following anesthesia or serious exercise. \par Ms. ORTIZ also denies bleeding tendencies/ Risks of Bleeding.\par

## 2019-07-24 NOTE — PROCEDURE
[Resume diet] : resume diet [Vital signs on admission the q 15 mins x2] : Vital signs on admission the q 15 mins x2 [Site check for bleeding/hematoma/thrill/bruit] : Site check for bleeding/hematoma/thrill/bruit [FreeTextEntry1] : left arm fistula fistulogram/angioplasty

## 2019-07-24 NOTE — REASON FOR VISIT
[Other ___] : a [unfilled] visit for [Inadequate Flow within AVF] : inadequate flow within AVF [Non-Maturing Fistula] : non-maturing fistula [Family Member] : family member

## 2019-08-06 ENCOUNTER — FORM ENCOUNTER (OUTPATIENT)
Age: 54
End: 2019-08-06

## 2019-08-06 NOTE — REASON FOR VISIT
[Other ___] : a [unfilled] visit for [Non-Maturing Fistula] : non-maturing fistula [Inadequate Flow within AVF] : inadequate flow within AVF [Family Member] : family member

## 2019-08-07 ENCOUNTER — APPOINTMENT (OUTPATIENT)
Dept: ENDOVASCULAR SURGERY | Facility: CLINIC | Age: 54
End: 2019-08-07
Payer: MEDICARE

## 2019-08-07 VITALS
HEIGHT: 62 IN | TEMPERATURE: 98.7 F | HEART RATE: 76 BPM | SYSTOLIC BLOOD PRESSURE: 168 MMHG | BODY MASS INDEX: 53.92 KG/M2 | RESPIRATION RATE: 18 BRPM | OXYGEN SATURATION: 99 % | WEIGHT: 293 LBS | DIASTOLIC BLOOD PRESSURE: 83 MMHG

## 2019-08-07 PROCEDURE — 36011Z: CUSTOM | Mod: 59

## 2019-08-07 PROCEDURE — 36216Z: CUSTOM | Mod: 58

## 2019-08-07 PROCEDURE — 36909Z: CUSTOM

## 2019-08-07 PROCEDURE — 36902Z: CUSTOM | Mod: 58

## 2019-08-09 NOTE — PROCEDURE
[Resume diet] : resume diet [Site check for bleeding/hematoma/thrill/bruit] : Site check for bleeding/hematoma/thrill/bruit [Vital signs on admission the q 15 mins x2] : Vital signs on admission the q 15 mins x2 [FreeTextEntry1] : left arm fistula fistulogram/angioplasty/coil

## 2019-08-09 NOTE — PHYSICAL EXAM
[Thrill] : thrill [2+] : left 2+ [Alert] : alert [Calm] : calm [JVD] : no jugular venous distention  [de-identified] : appears well  [de-identified] : incision clean and dry sutures in place, bruit over the avf, no thrill

## 2019-08-09 NOTE — HISTORY OF PRESENT ILLNESS
[] : left brachiocephalic fistula [FreeTextEntry1] : 6/20/2019 Dr. Manuel [FreeTextEntry5] : yesterday at 7pm 8/6/2019 [FreeTextEntry4] : yesterday [FreeTextEntry6] : Dr. Gonzalez

## 2019-08-20 ENCOUNTER — FORM ENCOUNTER (OUTPATIENT)
Age: 54
End: 2019-08-20

## 2019-08-21 ENCOUNTER — APPOINTMENT (OUTPATIENT)
Dept: ENDOVASCULAR SURGERY | Facility: CLINIC | Age: 54
End: 2019-08-21
Payer: MEDICARE

## 2019-08-21 VITALS
HEART RATE: 91 BPM | WEIGHT: 293 LBS | HEIGHT: 62 IN | RESPIRATION RATE: 15 BRPM | OXYGEN SATURATION: 96 % | BODY MASS INDEX: 53.92 KG/M2 | DIASTOLIC BLOOD PRESSURE: 71 MMHG | TEMPERATURE: 98.7 F | SYSTOLIC BLOOD PRESSURE: 107 MMHG

## 2019-08-21 PROCEDURE — 36216Z: CUSTOM | Mod: 58

## 2019-08-21 PROCEDURE — 36902Z: CUSTOM | Mod: 78

## 2019-08-21 NOTE — HISTORY OF PRESENT ILLNESS
[] : left brachiocephalic fistula [FreeTextEntry1] : alert and oriented\par no reported falls\par BS:70\par accompanied by sister\par No meds taken this AM\par dialysis via tunneled catheter left chest\par walks with walker\par  [FreeTextEntry5] : yesterday at  [FreeTextEntry4] : yesterday [FreeTextEntry6] : Dr. Gonzalez

## 2019-08-21 NOTE — PHYSICAL EXAM
[Thrill] : thrill [2+] : left 2+ [Alert] : alert [Calm] : calm [de-identified] : appears well  [JVD] : no jugular venous distention  [de-identified] : incision clean and dry sutures in place, bruit over the avf, no thrill

## 2019-08-21 NOTE — PROCEDURE
[Resume diet] : resume diet [Site check for bleeding/hematoma/thrill/bruit] : Site check for bleeding/hematoma/thrill/bruit [Vital signs on admission the q 15 mins x2] : Vital signs on admission the q 15 mins x2 [FreeTextEntry1] : left arm fistula fistulogram/angioplasty

## 2019-09-09 ENCOUNTER — APPOINTMENT (OUTPATIENT)
Dept: VASCULAR SURGERY | Facility: CLINIC | Age: 54
End: 2019-09-09
Payer: MEDICARE

## 2019-09-09 PROCEDURE — 99024 POSTOP FOLLOW-UP VISIT: CPT

## 2019-09-09 PROCEDURE — 93990 DOPPLER FLOW TESTING: CPT

## 2019-09-11 NOTE — HISTORY OF PRESENT ILLNESS
[] : left brachiocephalic fistula [FreeTextEntry1] : 54 yo female with history of esrd on hd via permcath presents for follow up of left upper extremity avf.  pt without any complaints at this time.

## 2019-09-11 NOTE — PHYSICAL EXAM
[Thrill] : thrill [Hand well perfused] : hand well perfused [Aneurysm] : no aneurysm [Bleeding] : no bleeding [Ulcer] : no ulcer [Normal] : normoactive bowel sounds, soft and nontender, no hepatosplenomegaly or masses appreciated [de-identified] : intact

## 2019-09-11 NOTE — DISCUSSION/SUMMARY
[FreeTextEntry1] : 54 yo female with a history of esrd on hd via left ij permcath presents for follow up after left upper extremity avf\par duplex shows avf is mature although noted to be deep with faint thrill \par pt advised will need superficialization of the avf prior to accessing avf

## 2019-09-13 ENCOUNTER — APPOINTMENT (OUTPATIENT)
Dept: ENDOVASCULAR SURGERY | Facility: CLINIC | Age: 54
End: 2019-09-13

## 2019-09-26 ENCOUNTER — APPOINTMENT (OUTPATIENT)
Dept: VASCULAR SURGERY | Facility: HOSPITAL | Age: 54
End: 2019-09-26

## 2019-10-15 ENCOUNTER — OUTPATIENT (OUTPATIENT)
Dept: OUTPATIENT SERVICES | Facility: HOSPITAL | Age: 54
LOS: 1 days | End: 2019-10-15
Payer: COMMERCIAL

## 2019-10-15 VITALS
HEART RATE: 60 BPM | DIASTOLIC BLOOD PRESSURE: 69 MMHG | TEMPERATURE: 98 F | WEIGHT: 293 LBS | HEIGHT: 62 IN | RESPIRATION RATE: 16 BRPM | SYSTOLIC BLOOD PRESSURE: 150 MMHG | OXYGEN SATURATION: 98 %

## 2019-10-15 DIAGNOSIS — I10 ESSENTIAL (PRIMARY) HYPERTENSION: ICD-10-CM

## 2019-10-15 DIAGNOSIS — Z98.890 OTHER SPECIFIED POSTPROCEDURAL STATES: Chronic | ICD-10-CM

## 2019-10-15 DIAGNOSIS — I48.91 UNSPECIFIED ATRIAL FIBRILLATION: ICD-10-CM

## 2019-10-15 DIAGNOSIS — N18.6 END STAGE RENAL DISEASE: ICD-10-CM

## 2019-10-15 DIAGNOSIS — J44.9 CHRONIC OBSTRUCTIVE PULMONARY DISEASE, UNSPECIFIED: ICD-10-CM

## 2019-10-15 DIAGNOSIS — E66.01 MORBID (SEVERE) OBESITY DUE TO EXCESS CALORIES: ICD-10-CM

## 2019-10-15 DIAGNOSIS — Z01.818 ENCOUNTER FOR OTHER PREPROCEDURAL EXAMINATION: ICD-10-CM

## 2019-10-15 DIAGNOSIS — Z95.818 PRESENCE OF OTHER CARDIAC IMPLANTS AND GRAFTS: Chronic | ICD-10-CM

## 2019-10-15 DIAGNOSIS — E11.9 TYPE 2 DIABETES MELLITUS WITHOUT COMPLICATIONS: ICD-10-CM

## 2019-10-15 DIAGNOSIS — Z98.51 TUBAL LIGATION STATUS: Chronic | ICD-10-CM

## 2019-10-15 DIAGNOSIS — G47.33 OBSTRUCTIVE SLEEP APNEA (ADULT) (PEDIATRIC): ICD-10-CM

## 2019-10-15 LAB
MRSA PCR RESULT.: SIGNIFICANT CHANGE UP
S AUREUS DNA NOSE QL NAA+PROBE: DETECTED

## 2019-10-15 PROCEDURE — 87640 STAPH A DNA AMP PROBE: CPT

## 2019-10-15 PROCEDURE — G0463: CPT

## 2019-10-15 RX ORDER — POVIDONE-IODINE 5 %
1 AEROSOL (ML) TOPICAL ONCE
Refills: 0 | Status: COMPLETED | OUTPATIENT
Start: 2019-10-17 | End: 2019-10-17

## 2019-10-15 RX ORDER — BUDESONIDE AND FORMOTEROL FUMARATE DIHYDRATE 160; 4.5 UG/1; UG/1
2 AEROSOL RESPIRATORY (INHALATION)
Qty: 0 | Refills: 0 | DISCHARGE

## 2019-10-15 RX ORDER — APIXABAN 2.5 MG/1
1 TABLET, FILM COATED ORAL
Qty: 0 | Refills: 0 | DISCHARGE

## 2019-10-15 RX ORDER — SODIUM CHLORIDE 9 MG/ML
3 INJECTION INTRAMUSCULAR; INTRAVENOUS; SUBCUTANEOUS EVERY 8 HOURS
Refills: 0 | Status: DISCONTINUED | OUTPATIENT
Start: 2019-10-17 | End: 2019-10-25

## 2019-10-15 RX ORDER — INSULIN ASPART 100 [IU]/ML
30 INJECTION, SUSPENSION SUBCUTANEOUS
Qty: 0 | Refills: 0 | DISCHARGE

## 2019-10-15 NOTE — H&P PST ADULT - ASSESSMENT
54 y/o female with PMH  of COPD, morbid obesity, BMI 58.3,   HTN, HLD, DM type 2, afib on Eliquis, with loop recorder - battery most likely depleted as per Cardiology, , last dose Eliquis 10/15/2019, diagnosed with end stage renal disease .

## 2019-10-15 NOTE — H&P PST ADULT - NEGATIVE NEUROLOGICAL SYMPTOMS
no loss of consciousness/no focal seizures/no syncope/no tremors/no facial palsy/no confusion/no headache/no transient paralysis/no weakness/no paresthesias/no hemiparesis/no generalized seizures/no vertigo/no loss of sensation

## 2019-10-15 NOTE — H&P PST ADULT - NSICDXPROBLEM_GEN_ALL_CORE_FT
PROBLEM DIAGNOSES  Problem: Morbid obesity  Assessment and Plan: BMI- 58.3, DR. Reece Anesthesia aware    Problem: Atrial fibrillation  Assessment and Plan: Pt to hold Eliquis 2-3 days before sx , pt had last dose today in am , Dr. Reece Anesthesia aware, to notify Surgeon     Problem: Type 2 diabetes mellitus  Assessment and Plan: Pt to take 60 units Tresiba night before surgery, take normal dose of Novolog 70/30/insulin day before at lunch , to check F/S in am of sx , pt verbalized understanding     Problem: CHAMP (obstructive sleep apnea)  Assessment and Plan:     Problem: COPD, mild  Assessment and Plan: Pt to use her pump in am of sx, pt verbalized understanding     Problem: Hypertension  Assessment and Plan: Pt to take BP meds in am of sx with few sips of water, pt verbalized understanding     Problem: End stage renal disease  Assessment and Plan:  pt is scheduled for revision of left arm AV fistula on 10/17/19.  To check BMP in am of sx and administer povidone 5

## 2019-10-15 NOTE — H&P PST ADULT - HISTORY OF PRESENT ILLNESS
52 y/o female with PMH  of COPD, morbid obesity, BMI 58.3,   HTN, HLD, DM type 2, afib on Eliquis, with loop recorder - battery most likely depleted as per Cardiology, , last dose Eliquis 10/15/2019, with diagnosed with end stage renal disease  , on hemodialysis, Tue, Th, Sat ( to have HD tomorrow  before sx) via left chest perm cath, she presents today for evaluation before surgery, pt is scheduled for revision of left arm AV fistula on 10/17/19.

## 2019-10-15 NOTE — H&P PST ADULT - NSICDXPASTSURGICALHX_GEN_ALL_CORE_FT
PAST SURGICAL HISTORY:  H/O tubal ligation 1989    History of vascular access device s/p insertion right chest permacath 2/2019, removal 6/2019, insertion left chest permacath 6/2019    S/P arteriovenous (AV) fistula creation left  arm 6/2019    Status post placement of implantable loop recorder left chest- 2015

## 2019-10-15 NOTE — H&P PST ADULT - NEGATIVE OPHTHALMOLOGIC SYMPTOMS
no diplopia/no photophobia/no pain R/no blurred vision L/no blurred vision R/no lacrimation L/no lacrimation R/no discharge L/no discharge R/no pain L/no irritation L/no irritation R

## 2019-10-15 NOTE — H&P PST ADULT - GASTROINTESTINAL DETAILS
no distention/no masses palpable/no bruit/bowel sounds normal/no rebound tenderness/soft/normal/nontender

## 2019-10-15 NOTE — H&P PST ADULT - RX
CPAP , machine missing mouth piece , pt no compliant with CPAP usage, told to bring CPAP to hospital for sx , pt verbalized understanding

## 2019-10-15 NOTE — H&P PST ADULT - NEGATIVE BREAST SYMPTOMS
no breast tenderness L/no breast lump R/no breast tenderness R/no nipple discharge L/no breast lump L

## 2019-10-16 ENCOUNTER — TRANSCRIPTION ENCOUNTER (OUTPATIENT)
Age: 54
End: 2019-10-16

## 2019-10-17 ENCOUNTER — APPOINTMENT (OUTPATIENT)
Dept: VASCULAR SURGERY | Facility: HOSPITAL | Age: 54
End: 2019-10-17

## 2019-10-17 ENCOUNTER — OUTPATIENT (OUTPATIENT)
Dept: OUTPATIENT SERVICES | Facility: HOSPITAL | Age: 54
LOS: 1 days | End: 2019-10-17
Payer: COMMERCIAL

## 2019-10-17 VITALS
SYSTOLIC BLOOD PRESSURE: 140 MMHG | DIASTOLIC BLOOD PRESSURE: 78 MMHG | HEART RATE: 81 BPM | TEMPERATURE: 99 F | OXYGEN SATURATION: 98 % | RESPIRATION RATE: 21 BRPM

## 2019-10-17 VITALS
OXYGEN SATURATION: 98 % | RESPIRATION RATE: 16 BRPM | WEIGHT: 293 LBS | DIASTOLIC BLOOD PRESSURE: 54 MMHG | TEMPERATURE: 97 F | SYSTOLIC BLOOD PRESSURE: 151 MMHG | HEIGHT: 62 IN | HEART RATE: 86 BPM

## 2019-10-17 DIAGNOSIS — Z98.890 OTHER SPECIFIED POSTPROCEDURAL STATES: Chronic | ICD-10-CM

## 2019-10-17 DIAGNOSIS — Z01.818 ENCOUNTER FOR OTHER PREPROCEDURAL EXAMINATION: ICD-10-CM

## 2019-10-17 DIAGNOSIS — Z98.51 TUBAL LIGATION STATUS: Chronic | ICD-10-CM

## 2019-10-17 DIAGNOSIS — Z95.818 PRESENCE OF OTHER CARDIAC IMPLANTS AND GRAFTS: Chronic | ICD-10-CM

## 2019-10-17 DIAGNOSIS — N18.6 END STAGE RENAL DISEASE: ICD-10-CM

## 2019-10-17 LAB
ANION GAP SERPL CALC-SCNC: 11 MMOL/L — SIGNIFICANT CHANGE UP (ref 5–17)
BUN SERPL-MCNC: 52 MG/DL — HIGH (ref 7–18)
CALCIUM SERPL-MCNC: 9.2 MG/DL — SIGNIFICANT CHANGE UP (ref 8.4–10.5)
CHLORIDE SERPL-SCNC: 99 MMOL/L — SIGNIFICANT CHANGE UP (ref 96–108)
CO2 SERPL-SCNC: 25 MMOL/L — SIGNIFICANT CHANGE UP (ref 22–31)
CREAT SERPL-MCNC: 5.98 MG/DL — HIGH (ref 0.5–1.3)
GLUCOSE BLDC GLUCOMTR-MCNC: 123 MG/DL — HIGH (ref 70–99)
GLUCOSE BLDC GLUCOMTR-MCNC: 77 MG/DL — SIGNIFICANT CHANGE UP (ref 70–99)
GLUCOSE SERPL-MCNC: 129 MG/DL — HIGH (ref 70–99)
HCG SERPL-ACNC: 11 MIU/ML — HIGH
POTASSIUM SERPL-MCNC: 4.7 MMOL/L — SIGNIFICANT CHANGE UP (ref 3.5–5.3)
POTASSIUM SERPL-SCNC: 4.7 MMOL/L — SIGNIFICANT CHANGE UP (ref 3.5–5.3)
SODIUM SERPL-SCNC: 135 MMOL/L — SIGNIFICANT CHANGE UP (ref 135–145)

## 2019-10-17 PROCEDURE — 36415 COLL VENOUS BLD VENIPUNCTURE: CPT

## 2019-10-17 PROCEDURE — 84702 CHORIONIC GONADOTROPIN TEST: CPT

## 2019-10-17 PROCEDURE — 82962 GLUCOSE BLOOD TEST: CPT

## 2019-10-17 PROCEDURE — 80048 BASIC METABOLIC PNL TOTAL CA: CPT

## 2019-10-17 PROCEDURE — 36832 AV FISTULA REVISION OPEN: CPT

## 2019-10-17 PROCEDURE — C1889: CPT

## 2019-10-17 RX ORDER — ACETAMINOPHEN 500 MG
1000 TABLET ORAL ONCE
Refills: 0 | Status: DISCONTINUED | OUTPATIENT
Start: 2019-10-17 | End: 2019-10-17

## 2019-10-17 RX ORDER — TRAMADOL HYDROCHLORIDE 50 MG/1
50 TABLET ORAL EVERY 6 HOURS
Refills: 0 | Status: DISCONTINUED | OUTPATIENT
Start: 2019-10-17 | End: 2019-10-17

## 2019-10-17 RX ORDER — SODIUM CHLORIDE 9 MG/ML
1000 INJECTION, SOLUTION INTRAVENOUS
Refills: 0 | Status: DISCONTINUED | OUTPATIENT
Start: 2019-10-17 | End: 2019-10-17

## 2019-10-17 RX ORDER — FENTANYL CITRATE 50 UG/ML
25 INJECTION INTRAVENOUS
Refills: 0 | Status: DISCONTINUED | OUTPATIENT
Start: 2019-10-17 | End: 2019-10-17

## 2019-10-17 RX ORDER — TRAMADOL HYDROCHLORIDE 50 MG/1
1 TABLET ORAL
Qty: 16 | Refills: 0
Start: 2019-10-17 | End: 2019-10-20

## 2019-10-17 RX ORDER — CHLORHEXIDINE GLUCONATE 213 G/1000ML
1 SOLUTION TOPICAL ONCE
Refills: 0 | Status: COMPLETED | OUTPATIENT
Start: 2019-10-17 | End: 2019-10-17

## 2019-10-17 RX ADMIN — Medication 1 APPLICATION(S): at 10:09

## 2019-10-17 RX ADMIN — SODIUM CHLORIDE 3 MILLILITER(S): 9 INJECTION INTRAMUSCULAR; INTRAVENOUS; SUBCUTANEOUS at 10:02

## 2019-10-17 RX ADMIN — CHLORHEXIDINE GLUCONATE 1 APPLICATION(S): 213 SOLUTION TOPICAL at 10:09

## 2019-10-17 NOTE — ASU PATIENT PROFILE, ADULT - VISION (WITH CORRECTIVE LENSES IF THE PATIENT USUALLY WEARS THEM):
normal...
Partially impaired: cannot see medication labels or newsprint, but can see obstacles in path, and the surrounding layout; can count fingers at arm's length

## 2019-10-17 NOTE — ASU PATIENT PROFILE, ADULT - PMH
Atrial fibrillation    COPD (chronic obstructive pulmonary disease)    DM (diabetes mellitus)    HTN (hypertension)    Morbid obesity

## 2019-10-17 NOTE — ASU DISCHARGE PLAN (ADULT/PEDIATRIC) - CARE PROVIDER_API CALL
Dinesh Manuel)  Vascular Surgery  [address], [address]  [place] [address]  Phone: [phone]  Fax: [phone]  Follow Up Time:

## 2019-10-17 NOTE — ASU PREOP CHECKLIST - MUPIRONCIN COMMENTS
done in pst on tuesday 10.15.19 x1 done in pst on tuesday 10.15.19 x1.  patient did not do 5 day treatment.  nasal swabs done as ordered in asu, bilaterally

## 2019-10-17 NOTE — CHART NOTE - NSCHARTNOTEFT_GEN_A_CORE
Call received from Anesthesia regarding patient's quant bhcg of 11 shown on preop evaluation.   she is scheduled for fistula revision today for her ESRD.   pt reports she has not been sexually active for "years".   bhcg most likely falsely positive. unable to do urine pregnancy test as patient anuric.   okay to proceed w/ scheduled surgery.

## 2019-10-17 NOTE — ASU PREOP CHECKLIST - 4.
BMP and HCG results reviewed for specimens sent today.  Dr. Manuel made aware that HCG result is 11 (high)

## 2019-10-17 NOTE — ASU PREOP CHECKLIST - 3.
patient has anterior chest (Mid to right) loop recorder implanted which is stated to be inoperable on cardiac clearance (dead battery)

## 2019-11-04 ENCOUNTER — APPOINTMENT (OUTPATIENT)
Dept: VASCULAR SURGERY | Facility: CLINIC | Age: 54
End: 2019-11-04
Payer: MEDICARE

## 2019-11-04 VITALS
HEART RATE: 92 BPM | HEIGHT: 62 IN | WEIGHT: 293 LBS | SYSTOLIC BLOOD PRESSURE: 134 MMHG | DIASTOLIC BLOOD PRESSURE: 58 MMHG | BODY MASS INDEX: 53.92 KG/M2

## 2019-11-04 PROBLEM — G47.33 OBSTRUCTIVE SLEEP APNEA (ADULT) (PEDIATRIC): Chronic | Status: ACTIVE | Noted: 2019-06-17

## 2019-11-04 PROBLEM — N18.6 END STAGE RENAL DISEASE: Chronic | Status: ACTIVE | Noted: 2019-10-15

## 2019-11-04 PROBLEM — Z01.818 ENCOUNTER FOR OTHER PREPROCEDURAL EXAMINATION: Chronic | Status: ACTIVE | Noted: 2019-10-15

## 2019-11-04 PROBLEM — D64.9 ANEMIA, UNSPECIFIED: Chronic | Status: ACTIVE | Noted: 2019-10-15

## 2019-11-04 PROCEDURE — 93990 DOPPLER FLOW TESTING: CPT

## 2019-11-04 PROCEDURE — 99024 POSTOP FOLLOW-UP VISIT: CPT

## 2019-11-04 NOTE — REASON FOR VISIT
[de-identified] : Status post revision of left arm AV fistula with superficial invasion of the vein. Good thrill. Incision is clean. Suspect swelling and edema. Followup in 2-3 weeks with repeat duplex.

## 2019-12-11 ENCOUNTER — APPOINTMENT (OUTPATIENT)
Dept: VASCULAR SURGERY | Facility: CLINIC | Age: 54
End: 2019-12-11
Payer: MEDICARE

## 2019-12-11 PROCEDURE — 99024 POSTOP FOLLOW-UP VISIT: CPT

## 2019-12-11 PROCEDURE — 93990 DOPPLER FLOW TESTING: CPT

## 2019-12-11 NOTE — PHYSICAL EXAM
[Thrill] : thrill [Normal] : no acute distress, well-nourished, well developed, well appearing [Hand well perfused] : hand well perfused [Ulcer] : no ulcer [de-identified] : intact

## 2019-12-11 NOTE — DISCUSSION/SUMMARY
[FreeTextEntry1] : 55 yo female with a history of esrd on hd via left ij permcath presents for follow up after left upper extremity avf and superficialization\par \par duplex shows 50-75% at the juxta-anastomosis and distal upper extremity with flow rate of 1597 \par avf is large in size and superficial at the distal upper arm.  \par will start to access avf for hd, avf was marked to assist hd rn in accessing avf\par will arrange for permcath removal in 2-3 weeks

## 2019-12-11 NOTE — HISTORY OF PRESENT ILLNESS
[] : left brachiocephalic fistula [FreeTextEntry1] : 53 yo female with history of esrd on hd via permcath presents for follow up of left upper extremity avf and superficialization.  pt without any complaints at this time.  \par \par

## 2019-12-19 ENCOUNTER — APPOINTMENT (OUTPATIENT)
Dept: ENDOVASCULAR SURGERY | Facility: CLINIC | Age: 54
End: 2019-12-19
Payer: MEDICARE

## 2019-12-19 VITALS
HEIGHT: 62 IN | SYSTOLIC BLOOD PRESSURE: 107 MMHG | BODY MASS INDEX: 53.92 KG/M2 | OXYGEN SATURATION: 97 % | RESPIRATION RATE: 15 BRPM | DIASTOLIC BLOOD PRESSURE: 65 MMHG | TEMPERATURE: 97.9 F | WEIGHT: 293 LBS | HEART RATE: 72 BPM

## 2019-12-19 PROCEDURE — 36902Z: CUSTOM

## 2019-12-19 PROCEDURE — 36907Z: CUSTOM | Mod: 59

## 2019-12-23 NOTE — HISTORY OF PRESENT ILLNESS
[] : left brachiocephalic fistula [FreeTextEntry1] : left tunneled catheter in place\par pt complaining about swollen neck and difficulty swallowing  \par alert and oriented x 3 \par accompanied by sister \par  last Eliquis yesterday morning  [FreeTextEntry6] : Dr Gonzalez [FreeTextEntry5] : yesterday at  11:30pm

## 2019-12-23 NOTE — PROCEDURE
[Resume diet] : resume diet [Site check for bleeding/hematoma/thrill/bruit] : Site check for bleeding/hematoma/thrill/bruit [Vital signs on admission the q 15 mins x2] : Vital signs on admission the q 15 mins x2 [FreeTextEntry1] : left arm fistulogram/angioplasty\par Tunneled catheter removal(left)

## 2020-01-02 ENCOUNTER — APPOINTMENT (OUTPATIENT)
Dept: VASCULAR SURGERY | Facility: CLINIC | Age: 55
End: 2020-01-02
Payer: MEDICARE

## 2020-01-02 ENCOUNTER — FORM ENCOUNTER (OUTPATIENT)
Age: 55
End: 2020-01-02

## 2020-01-02 PROCEDURE — 93990 DOPPLER FLOW TESTING: CPT

## 2020-01-03 ENCOUNTER — APPOINTMENT (OUTPATIENT)
Dept: ENDOVASCULAR SURGERY | Facility: CLINIC | Age: 55
End: 2020-01-03
Payer: MEDICARE

## 2020-01-03 VITALS
WEIGHT: 293 LBS | OXYGEN SATURATION: 96 % | HEART RATE: 78 BPM | BODY MASS INDEX: 53.92 KG/M2 | SYSTOLIC BLOOD PRESSURE: 131 MMHG | RESPIRATION RATE: 22 BRPM | TEMPERATURE: 97.9 F | DIASTOLIC BLOOD PRESSURE: 75 MMHG | HEIGHT: 62 IN

## 2020-01-03 PROCEDURE — 36901Z: CUSTOM

## 2020-01-03 NOTE — PAST MEDICAL HISTORY
[Increasing age ( >40 years old)] : Increasing age ( >40 years old) [No therapy indicated for cases scheduled for less than one hour] : No therapy indicated for cases scheduled for less than one hour. [Obesity: BMI >25] : Obesity: BMI >25 [FreeTextEntry1] : Malignant Hyperthermia Screening Tool and Risk of Bleeding Assessment\par \par Ms. ANTONIA ORTIZ denies family history of unexpected death following Anesthesia or Exercise.\par Denies Family history of Malignant Hyperthermia, Muscle or Neuromuscular disorder and High Temperature following exercise.\par \par Ms. ANTONIA ORTIZ denies history of Muscle Spasm, Dark or Chocolate - Colored urine and Unanticipated fever immediately following anesthesia or serious exercise. \par Ms. ORTIZ also denies bleeding tendencies/ Risks of Bleeding.\par

## 2020-01-03 NOTE — HISTORY OF PRESENT ILLNESS
[] : left brachiocephalic fistula [FreeTextEntry1] : July Dr Manuel [FreeTextEntry5] : yesterday at  11:30pm [FreeTextEntry4] : tuesday [FreeTextEntry6] : Dr Gonzalez

## 2020-01-03 NOTE — PROCEDURE
[Resume diet] : resume diet [Site check for bleeding/hematoma/thrill/bruit] : Site check for bleeding/hematoma/thrill/bruit [Vital signs on admission the q 15 mins x2] : Vital signs on admission the q 15 mins x2 [FSBS] : FSBS [Ice pack to ___ arm x 15 minutes] : Ice pack to [unfilled] arm x 15 minutes [FreeTextEntry1] : left arm fistulogram\par Tunneled catheter removal(left)

## 2020-02-03 ENCOUNTER — APPOINTMENT (OUTPATIENT)
Dept: VASCULAR SURGERY | Facility: CLINIC | Age: 55
End: 2020-02-03
Payer: MEDICAID

## 2020-02-03 ENCOUNTER — FORM ENCOUNTER (OUTPATIENT)
Age: 55
End: 2020-02-03

## 2020-02-03 PROCEDURE — 99213 OFFICE O/P EST LOW 20 MIN: CPT

## 2020-02-03 NOTE — DISCUSSION/SUMMARY
[FreeTextEntry1] : 53 yo female with history of esrd on hd via left upper extremity avf presents for follow up after failed hd today\par given pt was not able to be dialyzed today will arrange for fistulogram tomorrow \par

## 2020-02-03 NOTE — HISTORY OF PRESENT ILLNESS
[] : left brachiocephalic fistula [FreeTextEntry1] : 53 yo female with history of esrd on hd via left upper extremity avf presents for follow up after failed hd today.  pt states that they were able to access the avf but unable to dialyze 2/2 elevated arterial pressures.  \par \par

## 2020-02-03 NOTE — PHYSICAL EXAM
[Thrill] : thrill [Aneurysm] : no aneurysm [Normal] : normoactive bowel sounds, soft and nontender, no hepatosplenomegaly or masses appreciated [Ulcer] : no ulcer [de-identified] : intact

## 2020-02-04 ENCOUNTER — APPOINTMENT (OUTPATIENT)
Dept: ENDOVASCULAR SURGERY | Facility: CLINIC | Age: 55
End: 2020-02-04
Payer: MEDICAID

## 2020-02-04 VITALS
HEART RATE: 67 BPM | TEMPERATURE: 98.2 F | SYSTOLIC BLOOD PRESSURE: 128 MMHG | OXYGEN SATURATION: 97 % | WEIGHT: 293 LBS | HEIGHT: 62 IN | BODY MASS INDEX: 53.92 KG/M2 | DIASTOLIC BLOOD PRESSURE: 63 MMHG | RESPIRATION RATE: 15 BRPM

## 2020-02-04 PROCEDURE — 36902Z: CUSTOM

## 2020-02-04 PROCEDURE — 36215Z: CUSTOM | Mod: 59

## 2020-02-04 RX ORDER — RANITIDINE 150 MG/1
TABLET, FILM COATED ORAL
Refills: 0 | Status: DISCONTINUED | COMMUNITY
End: 2020-02-04

## 2020-02-07 NOTE — PROCEDURE
[FSBS] : FSBS [Resume diet] : resume diet [Site check for bleeding/hematoma/thrill/bruit] : Site check for bleeding/hematoma/thrill/bruit [Vital signs on admission the q 15 mins x2] : Vital signs on admission the q 15 mins x2 [FreeTextEntry1] : left arm fistulogram/angioplasty\par Tunneled catheter removal(left)

## 2020-02-07 NOTE — HISTORY OF PRESENT ILLNESS
[] : left brachiocephalic fistula [FreeTextEntry1] : July Dr Manuel [FreeTextEntry5] : yesterday at  9 pm [FreeTextEntry4] : yesterday  [FreeTextEntry6] : Dr Gonzalez

## 2020-02-14 ENCOUNTER — APPOINTMENT (OUTPATIENT)
Dept: VASCULAR SURGERY | Facility: CLINIC | Age: 55
End: 2020-02-14
Payer: MEDICAID

## 2020-02-14 VITALS
HEART RATE: 68 BPM | HEIGHT: 62 IN | WEIGHT: 293 LBS | OXYGEN SATURATION: 100 % | SYSTOLIC BLOOD PRESSURE: 126 MMHG | BODY MASS INDEX: 53.92 KG/M2 | DIASTOLIC BLOOD PRESSURE: 75 MMHG

## 2020-02-14 PROCEDURE — 99214 OFFICE O/P EST MOD 30 MIN: CPT

## 2020-02-14 PROCEDURE — 93990 DOPPLER FLOW TESTING: CPT

## 2020-02-14 NOTE — PHYSICAL EXAM
[Bleeding] : no bleeding [Aneurysm] : no aneurysm [Thrill] : thrill [Hand well perfused] : hand well perfused [Normal] : normoactive bowel sounds, soft and nontender, no hepatosplenomegaly or masses appreciated

## 2020-02-14 NOTE — ASSESSMENT
[FreeTextEntry1] : Patient with renal failure on hemodialysis with left brachiocephalic fistula. Patient has had multiple interventions and based on duplex and fistulogram the fistula is adequate for use. I had a long conversation with the patient and the patient's dialysis unit regarding the size and location of the fistula and became over the plan is for us access of the fistula to see if it can be managed at this point without further intervention.

## 2020-03-05 ENCOUNTER — FORM ENCOUNTER (OUTPATIENT)
Age: 55
End: 2020-03-05

## 2020-03-06 ENCOUNTER — APPOINTMENT (OUTPATIENT)
Dept: ENDOVASCULAR SURGERY | Facility: CLINIC | Age: 55
End: 2020-03-06
Payer: MEDICAID

## 2020-03-06 VITALS
BODY MASS INDEX: 53.92 KG/M2 | RESPIRATION RATE: 16 BRPM | WEIGHT: 293 LBS | TEMPERATURE: 98.3 F | SYSTOLIC BLOOD PRESSURE: 147 MMHG | DIASTOLIC BLOOD PRESSURE: 73 MMHG | HEIGHT: 62 IN | OXYGEN SATURATION: 98 %

## 2020-03-06 VITALS — HEART RATE: 71 BPM

## 2020-03-06 DIAGNOSIS — T82.898A OTHER SPECIFIED COMPLICATION OF VASCULAR PROSTHETIC DEVICES, IMPLANTS AND GRAFTS, INITIAL ENCOUNTER: ICD-10-CM

## 2020-03-06 PROCEDURE — 93990 DOPPLER FLOW TESTING: CPT

## 2020-03-06 PROCEDURE — 36901Z: CUSTOM

## 2020-03-06 NOTE — REASON FOR VISIT
[Other ___] : a [unfilled] visit for [Difficult Cannulation] : difficult cannulation [High Arterial/Negative Pressure] : high arterial/negative pressure [Other: ___] : [unfilled]

## 2020-03-10 NOTE — PROCEDURE
[FSBS] : FSBS [Resume diet] : resume diet [Site check for bleeding/hematoma/thrill/bruit] : Site check for bleeding/hematoma/thrill/bruit [Vital signs on admission the q 15 mins x2] : Vital signs on admission the q 15 mins x2 [FreeTextEntry1] : left arm fistulogram\par Tunneled catheter removal(left)

## 2020-03-10 NOTE — HISTORY OF PRESENT ILLNESS
[] : left brachiocephalic fistula [FreeTextEntry1] : July Dr Manuel [FreeTextEntry4] : 3/3/2020 [FreeTextEntry5] : yesterday at  9 pm [FreeTextEntry6] : Dr Gonzalez

## 2020-03-30 ENCOUNTER — APPOINTMENT (OUTPATIENT)
Dept: VASCULAR SURGERY | Facility: CLINIC | Age: 55
End: 2020-03-30

## 2020-04-08 ENCOUNTER — APPOINTMENT (OUTPATIENT)
Dept: ENDOVASCULAR SURGERY | Facility: CLINIC | Age: 55
End: 2020-04-08

## 2020-04-09 ENCOUNTER — APPOINTMENT (OUTPATIENT)
Dept: VASCULAR SURGERY | Facility: CLINIC | Age: 55
End: 2020-04-09

## 2020-05-04 ENCOUNTER — APPOINTMENT (OUTPATIENT)
Dept: VASCULAR SURGERY | Facility: CLINIC | Age: 55
End: 2020-05-04

## 2020-05-19 ENCOUNTER — RESULT REVIEW (OUTPATIENT)
Age: 55
End: 2020-05-19

## 2020-05-28 ENCOUNTER — APPOINTMENT (OUTPATIENT)
Dept: SURGERY | Facility: CLINIC | Age: 55
End: 2020-05-28
Payer: MEDICARE

## 2020-05-28 VITALS
HEIGHT: 62 IN | HEART RATE: 74 BPM | SYSTOLIC BLOOD PRESSURE: 152 MMHG | OXYGEN SATURATION: 98 % | WEIGHT: 293 LBS | BODY MASS INDEX: 53.92 KG/M2 | DIASTOLIC BLOOD PRESSURE: 71 MMHG

## 2020-05-28 DIAGNOSIS — I73.9 PERIPHERAL VASCULAR DISEASE, UNSPECIFIED: ICD-10-CM

## 2020-05-28 DIAGNOSIS — Z82.5 FAMILY HISTORY OF ASTHMA AND OTHER CHRONIC LOWER RESPIRATORY DISEASES: ICD-10-CM

## 2020-05-28 PROCEDURE — 99203 OFFICE O/P NEW LOW 30 MIN: CPT

## 2020-06-01 PROBLEM — I73.9 PVD (PERIPHERAL VASCULAR DISEASE): Status: ACTIVE | Noted: 2020-06-01

## 2020-06-01 PROBLEM — Z82.5 FAMILY HISTORY OF ASTHMA: Status: ACTIVE | Noted: 2020-05-28

## 2020-06-01 NOTE — HISTORY OF PRESENT ILLNESS
[de-identified] : This is a 54 year  old patient who was referred by Dr. Balaji Casas  with the chief complaint of having  left arm and left leg masses.  She reports having this condition for 3-4 weeks. She denies any trauma to the area.   She denies any fever or  night sweats. Appetite is good and weight is stable.  She states that the arm  mass is getting bigger and  more symptomatic. She wants to know if it could  be surgically  removed.\par \par

## 2020-06-01 NOTE — PHYSICAL EXAM
[Alert] : alert [Oriented to Place] : oriented to place [Oriented to Person] : oriented to person [Calm] : calm [Oriented to Time] : oriented to time [Abdominal Masses] : No abdominal masses [Abdomen Tenderness] : ~T ~M No abdominal tenderness [de-identified] : She  is alert, well-groomed [FreeTextEntry1] : left leg ulcer medial aspect , clean  [de-identified] :   anicteric.  Nasal mucosa pink, septum midline. Oral mucosa pink.  Tongue midline, Pharynx without exudates.\par   [de-identified] : left forearm mass is mobile, Firm, Smooth, non- tender,   Well defined. Superficial. No palpable lymph nodes.   overlying skin with small ulceration,  Mass size -5  cm x  5 cm.  [de-identified] : obese

## 2020-06-01 NOTE — CONSULT LETTER
[Dear  ___] : Dear  [unfilled], [Consult Letter:] : I had the pleasure of evaluating your patient, [unfilled]. [Please see my note below.] : Please see my note below. [Consult Closing:] : Thank you very much for allowing me to participate in the care of this patient.  If you have any questions, please do not hesitate to contact me. [Sincerely,] : Sincerely, [FreeTextEntry3] : Gus Argueta MD, FACS

## 2020-06-01 NOTE — PLAN
[FreeTextEntry1] : Ms. ORTIZ  was told significance of findings, options, risks and benefits were explained.  Informed consent for excision left forearm mass and potential risks, benefits and alternatives (surgical options were discussed including non-surgical options or the option of no surgery) to the planned surgery were discussed in depth.  All surgical options were discussed including non-surgical treatments.  She wishes to proceed with surgery.  We will plan for surgery on at the next available date, pending any required insurance pre-certification or pre-approval. She agrees to obtain any necessary pre-operative evaluations and testing prior to surgery.  Patient was advised to loose weight.  I reviewed importance of behavioral modification. Nutrition was  reviewed and reinforced, including the importance  of making healthy food choices. The importance of maintaining regular exercise/physical activity also reinforced. Recommend patient to see nutritionist and bariatric surgeon.  Advised to keep left leg elevated. \par Patient advised to seek immediate medical attention with any acute change in symptoms or with the development of any new or worsening symptoms.  Patient's questions and concerns addressed to patient's satisfaction, and patient verbalized an understanding of the information discussed.\par \par

## 2020-06-18 ENCOUNTER — APPOINTMENT (OUTPATIENT)
Dept: SURGERY | Facility: CLINIC | Age: 55
End: 2020-06-18
Payer: MEDICARE

## 2020-06-18 VITALS
BODY MASS INDEX: 53.92 KG/M2 | TEMPERATURE: 97.2 F | OXYGEN SATURATION: 98 % | WEIGHT: 293 LBS | DIASTOLIC BLOOD PRESSURE: 70 MMHG | HEIGHT: 62 IN | HEART RATE: 75 BPM | SYSTOLIC BLOOD PRESSURE: 139 MMHG

## 2020-06-18 DIAGNOSIS — M79.89 OTHER SPECIFIED SOFT TISSUE DISORDERS: ICD-10-CM

## 2020-06-18 PROCEDURE — 99213 OFFICE O/P EST LOW 20 MIN: CPT

## 2020-06-18 NOTE — PHYSICAL EXAM
[Alert] : alert [Oriented to Person] : oriented to person [Oriented to Place] : oriented to place [Calm] : calm [Oriented to Time] : oriented to time [Abdominal Masses] : No abdominal masses [Abdomen Tenderness] : ~T ~M No abdominal tenderness [de-identified] : She  is alert, well-groomed [de-identified] :   anicteric.  Nasal mucosa pink, septum midline. Oral mucosa pink.  Tongue midline, Pharynx without exudates.\par   [FreeTextEntry1] : left leg ulcer medial aspect , clean  [de-identified] : obese  [de-identified] : left forearm mass is mobile, Firm, Smooth, non- tender,   Well defined. Superficial. No palpable lymph nodes.   overlying skin with small ulceration,  Mass size -5  cm x  5 cm.

## 2020-06-18 NOTE — PLAN
[FreeTextEntry1] : Ms. ORTIZ was told significance of findings, options, risks and benefits were explained. Informed consent for excision left forearm mass and potential risks, benefits and alternatives (surgical options were discussed including non-surgical options or the option of no surgery) to the planned surgery were discussed in depth. All surgical options were discussed including non-surgical treatments. She wishes to proceed with surgery. We will plan for surgery on at the next available date, pending any required insurance pre-certification or pre-approval. She agrees to obtain any necessary pre-operative evaluations and testing prior to surgery. Patient was advised to loose weight. I reviewed importance of behavioral modification. Nutrition was reviewed and reinforced, including the importance of making healthy food choices. The importance of maintaining regular exercise/physical activity also reinforced. Recommend patient to see nutritionist and bariatric surgeon. Advised to keep left leg elevated. \par Patient advised to seek immediate medical attention with any acute change in symptoms or with the development of any new or worsening symptoms. Patient's questions and concerns addressed to patient's satisfaction, and patient verbalized an understanding of the information discussed.

## 2020-06-18 NOTE — HISTORY OF PRESENT ILLNESS
[de-identified] : This is a 54 year  old patient who was referred by Dr. Balaji Casas  with the chief complaint of having  left arm and left leg masses.  She reports having this condition for 3-4 weeks. patient was advised to have excision left forearm mass. she was also advised to consider bariatric surgery for weight lose.  she was advised to have it removed but could not schedule it due to the COVID pandemic . she stated that the mass "popped" on 06/12/2020  and drained  a lot of smelly puss. she is asking for surgical excision. \par

## 2020-06-28 ENCOUNTER — APPOINTMENT (OUTPATIENT)
Dept: DISASTER EMERGENCY | Facility: CLINIC | Age: 55
End: 2020-06-28

## 2020-11-19 NOTE — H&P PST ADULT - AGENT'S NAME
Patient stated that from now on  he would like for his prescriptions to be faxed the [facility]. FX # [id number]  It should read ATT. Gianluca Norton/ Elena . Patient can be reached at [phone] if needed. Okay to leave a message.
There is a care coordination note in. This will have to be something that we just have to pay attention to.
Unable to find this exact fax number in our pharmacy directory. Was able to google fax number which brings up [facility]. Writer added [facility] (verified correct address) to patient's pharmacy. Is there a way to note the below information somewhere in his chart for future refills?
july Wang

## 2021-01-01 ENCOUNTER — TRANSCRIPTION ENCOUNTER (OUTPATIENT)
Age: 56
End: 2021-01-01

## 2021-01-01 ENCOUNTER — APPOINTMENT (OUTPATIENT)
Dept: WOUND CARE | Facility: CLINIC | Age: 56
End: 2021-01-01
Payer: MEDICARE

## 2021-01-01 ENCOUNTER — APPOINTMENT (OUTPATIENT)
Dept: WOUND CARE | Facility: CLINIC | Age: 56
End: 2021-01-01

## 2021-01-01 ENCOUNTER — APPOINTMENT (OUTPATIENT)
Dept: VASCULAR SURGERY | Facility: CLINIC | Age: 56
End: 2021-01-01

## 2021-01-01 ENCOUNTER — INPATIENT (INPATIENT)
Facility: HOSPITAL | Age: 56
LOS: 3 days | Discharge: HOME | DRG: 638 | End: 2021-09-03
Attending: INTERNAL MEDICINE | Admitting: INTERNAL MEDICINE
Payer: COMMERCIAL

## 2021-01-01 ENCOUNTER — EMERGENCY (EMERGENCY)
Facility: HOSPITAL | Age: 56
LOS: 1 days | Discharge: HOME | End: 2021-01-01
Attending: EMERGENCY MEDICINE | Admitting: EMERGENCY MEDICINE
Payer: MEDICARE

## 2021-01-01 ENCOUNTER — LABORATORY RESULT (OUTPATIENT)
Age: 56
End: 2021-01-01

## 2021-01-01 VITALS
DIASTOLIC BLOOD PRESSURE: 70 MMHG | RESPIRATION RATE: 16 BRPM | HEART RATE: 82 BPM | OXYGEN SATURATION: 99 % | TEMPERATURE: 98 F | SYSTOLIC BLOOD PRESSURE: 145 MMHG

## 2021-01-01 VITALS
RESPIRATION RATE: 16 BRPM | TEMPERATURE: 95.4 F | DIASTOLIC BLOOD PRESSURE: 85 MMHG | SYSTOLIC BLOOD PRESSURE: 121 MMHG | HEART RATE: 77 BPM

## 2021-01-01 VITALS
RESPIRATION RATE: 16 BRPM | TEMPERATURE: 99 F | DIASTOLIC BLOOD PRESSURE: 80 MMHG | OXYGEN SATURATION: 95 % | SYSTOLIC BLOOD PRESSURE: 126 MMHG | HEART RATE: 83 BPM

## 2021-01-01 VITALS
RESPIRATION RATE: 16 BRPM | HEART RATE: 81 BPM | SYSTOLIC BLOOD PRESSURE: 135 MMHG | DIASTOLIC BLOOD PRESSURE: 43 MMHG | OXYGEN SATURATION: 99 % | HEIGHT: 62 IN | TEMPERATURE: 99 F

## 2021-01-01 VITALS
HEART RATE: 78 BPM | WEIGHT: 293 LBS | DIASTOLIC BLOOD PRESSURE: 74 MMHG | TEMPERATURE: 98 F | OXYGEN SATURATION: 95 % | SYSTOLIC BLOOD PRESSURE: 132 MMHG | RESPIRATION RATE: 16 BRPM | HEIGHT: 62 IN

## 2021-01-01 VITALS
SYSTOLIC BLOOD PRESSURE: 190 MMHG | HEART RATE: 61 BPM | TEMPERATURE: 97 F | DIASTOLIC BLOOD PRESSURE: 82 MMHG | RESPIRATION RATE: 16 BRPM

## 2021-01-01 DIAGNOSIS — Z98.890 OTHER SPECIFIED POSTPROCEDURAL STATES: Chronic | ICD-10-CM

## 2021-01-01 DIAGNOSIS — J44.9 CHRONIC OBSTRUCTIVE PULMONARY DISEASE, UNSPECIFIED: ICD-10-CM

## 2021-01-01 DIAGNOSIS — E78.5 HYPERLIPIDEMIA, UNSPECIFIED: ICD-10-CM

## 2021-01-01 DIAGNOSIS — E11.59 TYPE 2 DIABETES MELLITUS WITH OTHER CIRCULATORY COMPLICATIONS: ICD-10-CM

## 2021-01-01 DIAGNOSIS — N18.9 CHRONIC KIDNEY DISEASE, UNSPECIFIED: ICD-10-CM

## 2021-01-01 DIAGNOSIS — Z98.51 TUBAL LIGATION STATUS: Chronic | ICD-10-CM

## 2021-01-01 DIAGNOSIS — Z29.9 ENCOUNTER FOR PROPHYLACTIC MEASURES, UNSPECIFIED: ICD-10-CM

## 2021-01-01 DIAGNOSIS — N18.6 END STAGE RENAL DISEASE: ICD-10-CM

## 2021-01-01 DIAGNOSIS — I48.0 PAROXYSMAL ATRIAL FIBRILLATION: ICD-10-CM

## 2021-01-01 DIAGNOSIS — R10.31 RIGHT LOWER QUADRANT PAIN: ICD-10-CM

## 2021-01-01 DIAGNOSIS — N25.81 SECONDARY HYPERPARATHYROIDISM OF RENAL ORIGIN: ICD-10-CM

## 2021-01-01 DIAGNOSIS — Z87.09 PERSONAL HISTORY OF OTHER DISEASES OF THE RESPIRATORY SYSTEM: ICD-10-CM

## 2021-01-01 DIAGNOSIS — I10 ESSENTIAL (PRIMARY) HYPERTENSION: ICD-10-CM

## 2021-01-01 DIAGNOSIS — E11.9 TYPE 2 DIABETES MELLITUS W/OUT COMPLICATIONS: ICD-10-CM

## 2021-01-01 DIAGNOSIS — R80.9 OTHER SPECIFIED DIABETES MELLITUS WITH OTHER DIABETIC KIDNEY COMPLICATION: ICD-10-CM

## 2021-01-01 DIAGNOSIS — Z95.818 PRESENCE OF OTHER CARDIAC IMPLANTS AND GRAFTS: Chronic | ICD-10-CM
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
## 2021-01-01 DIAGNOSIS — Z02.9 ENCOUNTER FOR ADMINISTRATIVE EXAMINATIONS, UNSPECIFIED: ICD-10-CM

## 2021-01-01 DIAGNOSIS — I48.91 UNSPECIFIED ATRIAL FIBRILLATION: ICD-10-CM

## 2021-01-01 DIAGNOSIS — E13.29 OTHER SPECIFIED DIABETES MELLITUS WITH OTHER DIABETIC KIDNEY COMPLICATION: ICD-10-CM

## 2021-01-01 DIAGNOSIS — Z99.2 DEPENDENCE ON RENAL DIALYSIS: ICD-10-CM

## 2021-01-01 DIAGNOSIS — E11.649 TYPE 2 DIABETES MELLITUS WITH HYPOGLYCEMIA WITHOUT COMA: ICD-10-CM

## 2021-01-01 DIAGNOSIS — S31.109A UNSPECIFIED OPEN WOUND OF ABDOMINAL WALL, UNSPECIFIED QUADRANT W/OUT PENETRATION INTO PERITONEAL CAVITY, INITIAL ENCOUNTER: ICD-10-CM

## 2021-01-01 DIAGNOSIS — R47.81 SLURRED SPEECH: ICD-10-CM

## 2021-01-01 DIAGNOSIS — Z79.4 OTHER SPECIFIED DIABETES MELLITUS WITH OTHER DIABETIC KIDNEY COMPLICATION: ICD-10-CM

## 2021-01-01 DIAGNOSIS — E11.9 TYPE 2 DIABETES MELLITUS WITHOUT COMPLICATIONS: ICD-10-CM

## 2021-01-01 LAB
A1C WITH ESTIMATED AVERAGE GLUCOSE RESULT: 6.7 % — HIGH (ref 4–5.6)
ALBUMIN SERPL ELPH-MCNC: 2.9 G/DL — LOW (ref 3.5–5)
ALBUMIN SERPL ELPH-MCNC: 3 G/DL — LOW (ref 3.5–5)
ALBUMIN SERPL ELPH-MCNC: 3.1 G/DL — LOW (ref 3.5–5)
ALBUMIN SERPL ELPH-MCNC: 3.2 G/DL — LOW (ref 3.5–5)
ALBUMIN SERPL ELPH-MCNC: 3.3 G/DL — LOW (ref 3.5–5)
ALBUMIN SERPL ELPH-MCNC: 3.9 G/DL — SIGNIFICANT CHANGE UP (ref 3.3–5)
ALP SERPL-CCNC: 248 U/L — HIGH (ref 40–120)
ALP SERPL-CCNC: 260 U/L — HIGH (ref 40–120)
ALP SERPL-CCNC: 272 U/L — HIGH (ref 40–120)
ALP SERPL-CCNC: 294 U/L — HIGH (ref 40–120)
ALP SERPL-CCNC: 298 U/L — HIGH (ref 40–120)
ALP SERPL-CCNC: 360 U/L — HIGH (ref 40–120)
ALT FLD-CCNC: 12 U/L DA — SIGNIFICANT CHANGE UP (ref 10–60)
ALT FLD-CCNC: 13 U/L DA — SIGNIFICANT CHANGE UP (ref 10–60)
ALT FLD-CCNC: 14 U/L DA — SIGNIFICANT CHANGE UP (ref 10–60)
ALT FLD-CCNC: 5 U/L — SIGNIFICANT CHANGE UP (ref 4–33)
ANION GAP SERPL CALC-SCNC: 10 MMOL/L — SIGNIFICANT CHANGE UP (ref 5–17)
ANION GAP SERPL CALC-SCNC: 11 MMOL/L — SIGNIFICANT CHANGE UP (ref 5–17)
ANION GAP SERPL CALC-SCNC: 11 MMOL/L — SIGNIFICANT CHANGE UP (ref 5–17)
ANION GAP SERPL CALC-SCNC: 12 MMOL/L — SIGNIFICANT CHANGE UP (ref 5–17)
ANION GAP SERPL CALC-SCNC: 12 MMOL/L — SIGNIFICANT CHANGE UP (ref 5–17)
ANION GAP SERPL CALC-SCNC: 13 MMOL/L — SIGNIFICANT CHANGE UP (ref 7–14)
APTT BLD: 32.1 SEC — SIGNIFICANT CHANGE UP (ref 27.5–35.5)
AST SERPL-CCNC: 10 U/L — SIGNIFICANT CHANGE UP (ref 10–40)
AST SERPL-CCNC: 7 U/L — LOW (ref 10–40)
AST SERPL-CCNC: 8 U/L — LOW (ref 10–40)
AST SERPL-CCNC: <5 U/L — SIGNIFICANT CHANGE UP (ref 4–32)
BASOPHILS # BLD AUTO: 0.03 K/UL — SIGNIFICANT CHANGE UP (ref 0–0.2)
BASOPHILS # BLD AUTO: 0.05 K/UL — SIGNIFICANT CHANGE UP (ref 0–0.2)
BASOPHILS NFR BLD AUTO: 0.3 % — SIGNIFICANT CHANGE UP (ref 0–2)
BASOPHILS NFR BLD AUTO: 0.4 % — SIGNIFICANT CHANGE UP (ref 0–2)
BILIRUB SERPL-MCNC: 0.2 MG/DL — SIGNIFICANT CHANGE UP (ref 0.2–1.2)
BILIRUB SERPL-MCNC: 0.4 MG/DL — SIGNIFICANT CHANGE UP (ref 0.2–1.2)
BUN SERPL-MCNC: 24 MG/DL — HIGH (ref 7–23)
BUN SERPL-MCNC: 48 MG/DL — HIGH (ref 7–18)
BUN SERPL-MCNC: 60 MG/DL — HIGH (ref 7–18)
BUN SERPL-MCNC: 62 MG/DL — HIGH (ref 7–18)
BUN SERPL-MCNC: 81 MG/DL — HIGH (ref 7–18)
BUN SERPL-MCNC: 86 MG/DL — HIGH (ref 7–18)
CALCIUM SERPL-MCNC: 8.3 MG/DL — LOW (ref 8.4–10.5)
CALCIUM SERPL-MCNC: 8.4 MG/DL — SIGNIFICANT CHANGE UP (ref 8.4–10.5)
CALCIUM SERPL-MCNC: 8.4 MG/DL — SIGNIFICANT CHANGE UP (ref 8.4–10.5)
CALCIUM SERPL-MCNC: 8.6 MG/DL — SIGNIFICANT CHANGE UP (ref 8.4–10.5)
CALCIUM SERPL-MCNC: 8.6 MG/DL — SIGNIFICANT CHANGE UP (ref 8.4–10.5)
CALCIUM SERPL-MCNC: 9.1 MG/DL — SIGNIFICANT CHANGE UP (ref 8.4–10.5)
CALCIUM SERPL-MCNC: 9.7 MG/DL — SIGNIFICANT CHANGE UP (ref 8.4–10.5)
CHLORIDE SERPL-SCNC: 93 MMOL/L — LOW (ref 96–108)
CHLORIDE SERPL-SCNC: 93 MMOL/L — LOW (ref 98–107)
CHLORIDE SERPL-SCNC: 95 MMOL/L — LOW (ref 96–108)
CHLORIDE SERPL-SCNC: 97 MMOL/L — SIGNIFICANT CHANGE UP (ref 96–108)
CHLORIDE SERPL-SCNC: 97 MMOL/L — SIGNIFICANT CHANGE UP (ref 96–108)
CHLORIDE SERPL-SCNC: 98 MMOL/L — SIGNIFICANT CHANGE UP (ref 96–108)
CHOLEST SERPL-MCNC: 146 MG/DL — SIGNIFICANT CHANGE UP
CO2 SERPL-SCNC: 26 MMOL/L — SIGNIFICANT CHANGE UP (ref 22–31)
CO2 SERPL-SCNC: 27 MMOL/L — SIGNIFICANT CHANGE UP (ref 22–31)
CO2 SERPL-SCNC: 27 MMOL/L — SIGNIFICANT CHANGE UP (ref 22–31)
CO2 SERPL-SCNC: 28 MMOL/L — SIGNIFICANT CHANGE UP (ref 22–31)
CO2 SERPL-SCNC: 28 MMOL/L — SIGNIFICANT CHANGE UP (ref 22–31)
CO2 SERPL-SCNC: 29 MMOL/L — SIGNIFICANT CHANGE UP (ref 22–31)
CORE LAB BIOPSY: NORMAL
COVID-19 SPIKE DOMAIN AB INTERP: POSITIVE
COVID-19 SPIKE DOMAIN ANTIBODY RESULT: 112 U/ML — HIGH
CREAT SERPL-MCNC: 10.2 MG/DL — HIGH (ref 0.5–1.3)
CREAT SERPL-MCNC: 11.3 MG/DL — HIGH (ref 0.5–1.3)
CREAT SERPL-MCNC: 12.5 MG/DL — HIGH (ref 0.5–1.3)
CREAT SERPL-MCNC: 5.43 MG/DL — HIGH (ref 0.5–1.3)
CREAT SERPL-MCNC: 8.28 MG/DL — HIGH (ref 0.5–1.3)
CREAT SERPL-MCNC: 9.97 MG/DL — HIGH (ref 0.5–1.3)
EOSINOPHIL # BLD AUTO: 0.21 K/UL — SIGNIFICANT CHANGE UP (ref 0–0.5)
EOSINOPHIL # BLD AUTO: 0.7 K/UL — HIGH (ref 0–0.5)
EOSINOPHIL NFR BLD AUTO: 1.8 % — SIGNIFICANT CHANGE UP (ref 0–6)
EOSINOPHIL NFR BLD AUTO: 5.2 % — SIGNIFICANT CHANGE UP (ref 0–6)
ESTIMATED AVERAGE GLUCOSE: 146 MG/DL — HIGH (ref 68–114)
FERRITIN SERPL-MCNC: 1016 NG/ML — HIGH (ref 15–150)
GLUCOSE BLDC GLUCOMTR-MCNC: 102 MG/DL — HIGH (ref 70–99)
GLUCOSE BLDC GLUCOMTR-MCNC: 104 MG/DL — HIGH (ref 70–99)
GLUCOSE BLDC GLUCOMTR-MCNC: 108 MG/DL — HIGH (ref 70–99)
GLUCOSE BLDC GLUCOMTR-MCNC: 110 MG/DL — HIGH (ref 70–99)
GLUCOSE BLDC GLUCOMTR-MCNC: 115 MG/DL — HIGH (ref 70–99)
GLUCOSE BLDC GLUCOMTR-MCNC: 115 MG/DL — HIGH (ref 70–99)
GLUCOSE BLDC GLUCOMTR-MCNC: 118 MG/DL — HIGH (ref 70–99)
GLUCOSE BLDC GLUCOMTR-MCNC: 123 MG/DL — HIGH (ref 70–99)
GLUCOSE BLDC GLUCOMTR-MCNC: 124 MG/DL — HIGH (ref 70–99)
GLUCOSE BLDC GLUCOMTR-MCNC: 140 MG/DL — HIGH (ref 70–99)
GLUCOSE BLDC GLUCOMTR-MCNC: 49 MG/DL — CRITICAL LOW (ref 70–99)
GLUCOSE BLDC GLUCOMTR-MCNC: 62 MG/DL — LOW (ref 70–99)
GLUCOSE BLDC GLUCOMTR-MCNC: 64 MG/DL — LOW (ref 70–99)
GLUCOSE BLDC GLUCOMTR-MCNC: 68 MG/DL — LOW (ref 70–99)
GLUCOSE BLDC GLUCOMTR-MCNC: 76 MG/DL — SIGNIFICANT CHANGE UP (ref 70–99)
GLUCOSE BLDC GLUCOMTR-MCNC: 76 MG/DL — SIGNIFICANT CHANGE UP (ref 70–99)
GLUCOSE BLDC GLUCOMTR-MCNC: 77 MG/DL — SIGNIFICANT CHANGE UP (ref 70–99)
GLUCOSE BLDC GLUCOMTR-MCNC: 79 MG/DL — SIGNIFICANT CHANGE UP (ref 70–99)
GLUCOSE BLDC GLUCOMTR-MCNC: 83 MG/DL — SIGNIFICANT CHANGE UP (ref 70–99)
GLUCOSE BLDC GLUCOMTR-MCNC: 85 MG/DL — SIGNIFICANT CHANGE UP (ref 70–99)
GLUCOSE BLDC GLUCOMTR-MCNC: 91 MG/DL — SIGNIFICANT CHANGE UP (ref 70–99)
GLUCOSE BLDC GLUCOMTR-MCNC: 92 MG/DL — SIGNIFICANT CHANGE UP (ref 70–99)
GLUCOSE BLDC GLUCOMTR-MCNC: 92 MG/DL — SIGNIFICANT CHANGE UP (ref 70–99)
GLUCOSE BLDC GLUCOMTR-MCNC: 94 MG/DL — SIGNIFICANT CHANGE UP (ref 70–99)
GLUCOSE BLDC GLUCOMTR-MCNC: 95 MG/DL — SIGNIFICANT CHANGE UP (ref 70–99)
GLUCOSE BLDC GLUCOMTR-MCNC: 95 MG/DL — SIGNIFICANT CHANGE UP (ref 70–99)
GLUCOSE BLDC GLUCOMTR-MCNC: 96 MG/DL — SIGNIFICANT CHANGE UP (ref 70–99)
GLUCOSE BLDC GLUCOMTR-MCNC: 97 MG/DL — SIGNIFICANT CHANGE UP (ref 70–99)
GLUCOSE BLDC GLUCOMTR-MCNC: 99 MG/DL — SIGNIFICANT CHANGE UP (ref 70–99)
GLUCOSE SERPL-MCNC: 131 MG/DL — HIGH (ref 70–99)
GLUCOSE SERPL-MCNC: 26 MG/DL — CRITICAL LOW (ref 70–99)
GLUCOSE SERPL-MCNC: 62 MG/DL — LOW (ref 70–99)
GLUCOSE SERPL-MCNC: 81 MG/DL — SIGNIFICANT CHANGE UP (ref 70–99)
GLUCOSE SERPL-MCNC: 92 MG/DL — SIGNIFICANT CHANGE UP (ref 70–99)
GLUCOSE SERPL-MCNC: 96 MG/DL — SIGNIFICANT CHANGE UP (ref 70–99)
HBV SURFACE AG SER-ACNC: SIGNIFICANT CHANGE UP
HCG SERPL-ACNC: 4 MIU/ML — SIGNIFICANT CHANGE UP
HCT VFR BLD CALC: 28.5 % — LOW (ref 34.5–45)
HCT VFR BLD CALC: 28.7 % — LOW (ref 34.5–45)
HCT VFR BLD CALC: 30 % — LOW (ref 34.5–45)
HCT VFR BLD CALC: 30.6 % — LOW (ref 34.5–45)
HCT VFR BLD CALC: 35.3 % — SIGNIFICANT CHANGE UP (ref 34.5–45)
HDLC SERPL-MCNC: 53 MG/DL — SIGNIFICANT CHANGE UP
HGB BLD-MCNC: 8.6 G/DL — LOW (ref 11.5–15.5)
HGB BLD-MCNC: 8.6 G/DL — LOW (ref 11.5–15.5)
HGB BLD-MCNC: 8.9 G/DL — LOW (ref 11.5–15.5)
HGB BLD-MCNC: 9.2 G/DL — LOW (ref 11.5–15.5)
HGB BLD-MCNC: 9.8 G/DL — LOW (ref 11.5–15.5)
IANC: 10.65 K/UL — HIGH (ref 1.5–8.5)
IMM GRANULOCYTES NFR BLD AUTO: 0.8 % — SIGNIFICANT CHANGE UP (ref 0–1.5)
IMM GRANULOCYTES NFR BLD AUTO: 0.8 % — SIGNIFICANT CHANGE UP (ref 0–1.5)
INR BLD: 1.08 RATIO — SIGNIFICANT CHANGE UP (ref 0.88–1.16)
IRON SATN MFR SERPL: 22 % — SIGNIFICANT CHANGE UP (ref 15–50)
IRON SATN MFR SERPL: 42 UG/DL — SIGNIFICANT CHANGE UP (ref 40–150)
LIPID PNL WITH DIRECT LDL SERPL: 74 MG/DL — SIGNIFICANT CHANGE UP
LYMPHOCYTES # BLD AUTO: 0.71 K/UL — LOW (ref 1–3.3)
LYMPHOCYTES # BLD AUTO: 0.91 K/UL — LOW (ref 1–3.3)
LYMPHOCYTES # BLD AUTO: 6.1 % — LOW (ref 13–44)
LYMPHOCYTES # BLD AUTO: 6.8 % — LOW (ref 13–44)
MAGNESIUM SERPL-MCNC: 1.8 MG/DL — SIGNIFICANT CHANGE UP (ref 1.6–2.6)
MAGNESIUM SERPL-MCNC: 1.9 MG/DL — SIGNIFICANT CHANGE UP (ref 1.6–2.6)
MAGNESIUM SERPL-MCNC: 2.1 MG/DL — SIGNIFICANT CHANGE UP (ref 1.6–2.6)
MAGNESIUM SERPL-MCNC: 2.1 MG/DL — SIGNIFICANT CHANGE UP (ref 1.6–2.6)
MAGNESIUM SERPL-MCNC: 2.2 MG/DL — SIGNIFICANT CHANGE UP (ref 1.6–2.6)
MCHC RBC-ENTMCNC: 27.8 GM/DL — LOW (ref 32–36)
MCHC RBC-ENTMCNC: 28.5 PG — SIGNIFICANT CHANGE UP (ref 27–34)
MCHC RBC-ENTMCNC: 28.6 PG — SIGNIFICANT CHANGE UP (ref 27–34)
MCHC RBC-ENTMCNC: 29 PG — SIGNIFICANT CHANGE UP (ref 27–34)
MCHC RBC-ENTMCNC: 29.1 PG — SIGNIFICANT CHANGE UP (ref 27–34)
MCHC RBC-ENTMCNC: 29.2 PG — SIGNIFICANT CHANGE UP (ref 27–34)
MCHC RBC-ENTMCNC: 29.7 GM/DL — LOW (ref 32–36)
MCHC RBC-ENTMCNC: 30 GM/DL — LOW (ref 32–36)
MCHC RBC-ENTMCNC: 30.1 GM/DL — LOW (ref 32–36)
MCHC RBC-ENTMCNC: 30.2 GM/DL — LOW (ref 32–36)
MCV RBC AUTO: 102.6 FL — HIGH (ref 80–100)
MCV RBC AUTO: 96 FL — SIGNIFICANT CHANGE UP (ref 80–100)
MCV RBC AUTO: 96.5 FL — SIGNIFICANT CHANGE UP (ref 80–100)
MCV RBC AUTO: 97 FL — SIGNIFICANT CHANGE UP (ref 80–100)
MCV RBC AUTO: 97.1 FL — SIGNIFICANT CHANGE UP (ref 80–100)
MONOCYTES # BLD AUTO: 0.72 K/UL — SIGNIFICANT CHANGE UP (ref 0–0.9)
MONOCYTES # BLD AUTO: 1.05 K/UL — HIGH (ref 0–0.9)
MONOCYTES NFR BLD AUTO: 6.2 % — SIGNIFICANT CHANGE UP (ref 2–14)
MONOCYTES NFR BLD AUTO: 7.8 % — SIGNIFICANT CHANGE UP (ref 2–14)
NEUTROPHILS # BLD AUTO: 10.65 K/UL — HIGH (ref 1.8–7.4)
NEUTROPHILS # BLD AUTO: 9.9 K/UL — HIGH (ref 1.8–7.4)
NEUTROPHILS NFR BLD AUTO: 79 % — HIGH (ref 43–77)
NEUTROPHILS NFR BLD AUTO: 84.8 % — HIGH (ref 43–77)
NON HDL CHOLESTEROL: 93 MG/DL — SIGNIFICANT CHANGE UP
NRBC # BLD: 0 /100 WBCS — SIGNIFICANT CHANGE UP
NRBC # BLD: 0 /100 WBCS — SIGNIFICANT CHANGE UP (ref 0–0)
NRBC # FLD: 0 K/UL — SIGNIFICANT CHANGE UP
PHOSPHATE SERPL-MCNC: 6.3 MG/DL — HIGH (ref 2.5–4.5)
PHOSPHATE SERPL-MCNC: 7.2 MG/DL — HIGH (ref 2.5–4.5)
PHOSPHATE SERPL-MCNC: 7.3 MG/DL — HIGH (ref 2.5–4.5)
PHOSPHATE SERPL-MCNC: 8.4 MG/DL — HIGH (ref 2.5–4.5)
PLATELET # BLD AUTO: 253 K/UL — SIGNIFICANT CHANGE UP (ref 150–400)
PLATELET # BLD AUTO: 285 K/UL — SIGNIFICANT CHANGE UP (ref 150–400)
PLATELET # BLD AUTO: 327 K/UL — SIGNIFICANT CHANGE UP (ref 150–400)
PLATELET # BLD AUTO: 330 K/UL — SIGNIFICANT CHANGE UP (ref 150–400)
PLATELET # BLD AUTO: 356 K/UL — SIGNIFICANT CHANGE UP (ref 150–400)
POTASSIUM SERPL-MCNC: 3.7 MMOL/L — SIGNIFICANT CHANGE UP (ref 3.5–5.3)
POTASSIUM SERPL-MCNC: 4.5 MMOL/L — SIGNIFICANT CHANGE UP (ref 3.5–5.3)
POTASSIUM SERPL-MCNC: 4.8 MMOL/L — SIGNIFICANT CHANGE UP (ref 3.5–5.3)
POTASSIUM SERPL-MCNC: 5.3 MMOL/L — SIGNIFICANT CHANGE UP (ref 3.5–5.3)
POTASSIUM SERPL-MCNC: 5.5 MMOL/L — HIGH (ref 3.5–5.3)
POTASSIUM SERPL-MCNC: 5.6 MMOL/L — HIGH (ref 3.5–5.3)
POTASSIUM SERPL-SCNC: 3.7 MMOL/L — SIGNIFICANT CHANGE UP (ref 3.5–5.3)
POTASSIUM SERPL-SCNC: 4.5 MMOL/L — SIGNIFICANT CHANGE UP (ref 3.5–5.3)
POTASSIUM SERPL-SCNC: 4.8 MMOL/L — SIGNIFICANT CHANGE UP (ref 3.5–5.3)
POTASSIUM SERPL-SCNC: 5.3 MMOL/L — SIGNIFICANT CHANGE UP (ref 3.5–5.3)
POTASSIUM SERPL-SCNC: 5.5 MMOL/L — HIGH (ref 3.5–5.3)
POTASSIUM SERPL-SCNC: 5.6 MMOL/L — HIGH (ref 3.5–5.3)
PROT SERPL-MCNC: 7.4 G/DL — SIGNIFICANT CHANGE UP (ref 6–8.3)
PROT SERPL-MCNC: 7.5 G/DL — SIGNIFICANT CHANGE UP (ref 6–8.3)
PROT SERPL-MCNC: 7.5 G/DL — SIGNIFICANT CHANGE UP (ref 6–8.3)
PROT SERPL-MCNC: 7.6 G/DL — SIGNIFICANT CHANGE UP (ref 6–8.3)
PROT SERPL-MCNC: 7.9 G/DL — SIGNIFICANT CHANGE UP (ref 6–8.3)
PROT SERPL-MCNC: 8.2 G/DL — SIGNIFICANT CHANGE UP (ref 6–8.3)
PROTHROM AB SERPL-ACNC: 12.8 SEC — SIGNIFICANT CHANGE UP (ref 10.6–13.6)
PTH-INTACT FLD-MCNC: 739 PG/ML — HIGH (ref 15–65)
RBC # BLD: 2.96 M/UL — LOW (ref 3.8–5.2)
RBC # BLD: 2.97 M/UL — LOW (ref 3.8–5.2)
RBC # BLD: 3.11 M/UL — LOW (ref 3.8–5.2)
RBC # BLD: 3.15 M/UL — LOW (ref 3.8–5.2)
RBC # BLD: 3.44 M/UL — LOW (ref 3.8–5.2)
RBC # FLD: 14.5 % — SIGNIFICANT CHANGE UP (ref 10.3–14.5)
RBC # FLD: 14.5 % — SIGNIFICANT CHANGE UP (ref 10.3–14.5)
RBC # FLD: 14.6 % — HIGH (ref 10.3–14.5)
RBC # FLD: 14.6 % — HIGH (ref 10.3–14.5)
RBC # FLD: 14.9 % — HIGH (ref 10.3–14.5)
SARS-COV-2 IGG+IGM SERPL QL IA: 112 U/ML — HIGH
SARS-COV-2 IGG+IGM SERPL QL IA: POSITIVE
SARS-COV-2 RNA SPEC QL NAA+PROBE: SIGNIFICANT CHANGE UP
SODIUM SERPL-SCNC: 132 MMOL/L — LOW (ref 135–145)
SODIUM SERPL-SCNC: 133 MMOL/L — LOW (ref 135–145)
SODIUM SERPL-SCNC: 135 MMOL/L — SIGNIFICANT CHANGE UP (ref 135–145)
SODIUM SERPL-SCNC: 137 MMOL/L — SIGNIFICANT CHANGE UP (ref 135–145)
TIBC SERPL-MCNC: 192 UG/DL — LOW (ref 250–450)
TRIGL SERPL-MCNC: 97 MG/DL — SIGNIFICANT CHANGE UP
UIBC SERPL-MCNC: 150 UG/DL — SIGNIFICANT CHANGE UP (ref 110–370)
WBC # BLD: 10.43 K/UL — SIGNIFICANT CHANGE UP (ref 3.8–10.5)
WBC # BLD: 10.84 K/UL — HIGH (ref 3.8–10.5)
WBC # BLD: 11.66 K/UL — HIGH (ref 3.8–10.5)
WBC # BLD: 13.47 K/UL — HIGH (ref 3.8–10.5)
WBC # BLD: 9.87 K/UL — SIGNIFICANT CHANGE UP (ref 3.8–10.5)
WBC # FLD AUTO: 10.43 K/UL — SIGNIFICANT CHANGE UP (ref 3.8–10.5)
WBC # FLD AUTO: 10.84 K/UL — HIGH (ref 3.8–10.5)
WBC # FLD AUTO: 11.66 K/UL — HIGH (ref 3.8–10.5)
WBC # FLD AUTO: 13.47 K/UL — HIGH (ref 3.8–10.5)
WBC # FLD AUTO: 9.87 K/UL — SIGNIFICANT CHANGE UP (ref 3.8–10.5)

## 2021-01-01 PROCEDURE — 70450 CT HEAD/BRAIN W/O DYE: CPT | Mod: 26

## 2021-01-01 PROCEDURE — 99213 OFFICE O/P EST LOW 20 MIN: CPT | Mod: 25

## 2021-01-01 PROCEDURE — 36415 COLL VENOUS BLD VENIPUNCTURE: CPT

## 2021-01-01 PROCEDURE — 70551 MRI BRAIN STEM W/O DYE: CPT

## 2021-01-01 PROCEDURE — 87340 HEPATITIS B SURFACE AG IA: CPT

## 2021-01-01 PROCEDURE — 99214 OFFICE O/P EST MOD 30 MIN: CPT | Mod: 25

## 2021-01-01 PROCEDURE — 73590 X-RAY EXAM OF LOWER LEG: CPT

## 2021-01-01 PROCEDURE — 73590 X-RAY EXAM OF LOWER LEG: CPT | Mod: 26,LT

## 2021-01-01 PROCEDURE — 72125 CT NECK SPINE W/O DYE: CPT | Mod: 26

## 2021-01-01 PROCEDURE — 86769 SARS-COV-2 COVID-19 ANTIBODY: CPT

## 2021-01-01 PROCEDURE — 71250 CT THORAX DX C-: CPT | Mod: MA

## 2021-01-01 PROCEDURE — 70551 MRI BRAIN STEM W/O DYE: CPT | Mod: 26

## 2021-01-01 PROCEDURE — 11042 DBRDMT SUBQ TIS 1ST 20SQCM/<: CPT

## 2021-01-01 PROCEDURE — 99285 EMERGENCY DEPT VISIT HI MDM: CPT | Mod: CS

## 2021-01-01 PROCEDURE — 83550 IRON BINDING TEST: CPT

## 2021-01-01 PROCEDURE — 74176 CT ABD & PELVIS W/O CONTRAST: CPT | Mod: 26,MA

## 2021-01-01 PROCEDURE — 94640 AIRWAY INHALATION TREATMENT: CPT

## 2021-01-01 PROCEDURE — 73030 X-RAY EXAM OF SHOULDER: CPT

## 2021-01-01 PROCEDURE — 74176 CT ABD & PELVIS W/O CONTRAST: CPT | Mod: MA

## 2021-01-01 PROCEDURE — 99285 EMERGENCY DEPT VISIT HI MDM: CPT | Mod: 25

## 2021-01-01 PROCEDURE — 71250 CT THORAX DX C-: CPT | Mod: 26,MA

## 2021-01-01 PROCEDURE — 73564 X-RAY EXAM KNEE 4 OR MORE: CPT

## 2021-01-01 PROCEDURE — 85730 THROMBOPLASTIN TIME PARTIAL: CPT

## 2021-01-01 PROCEDURE — 93005 ELECTROCARDIOGRAM TRACING: CPT

## 2021-01-01 PROCEDURE — 82728 ASSAY OF FERRITIN: CPT

## 2021-01-01 PROCEDURE — 82962 GLUCOSE BLOOD TEST: CPT

## 2021-01-01 PROCEDURE — 80061 LIPID PANEL: CPT

## 2021-01-01 PROCEDURE — 73502 X-RAY EXAM HIP UNI 2-3 VIEWS: CPT | Mod: 26,LT

## 2021-01-01 PROCEDURE — 87635 SARS-COV-2 COVID-19 AMP PRB: CPT

## 2021-01-01 PROCEDURE — 83970 ASSAY OF PARATHORMONE: CPT

## 2021-01-01 PROCEDURE — 99284 EMERGENCY DEPT VISIT MOD MDM: CPT

## 2021-01-01 PROCEDURE — 85027 COMPLETE CBC AUTOMATED: CPT

## 2021-01-01 PROCEDURE — 85610 PROTHROMBIN TIME: CPT

## 2021-01-01 PROCEDURE — 83036 HEMOGLOBIN GLYCOSYLATED A1C: CPT

## 2021-01-01 PROCEDURE — 72125 CT NECK SPINE W/O DYE: CPT

## 2021-01-01 PROCEDURE — 83540 ASSAY OF IRON: CPT

## 2021-01-01 PROCEDURE — 82310 ASSAY OF CALCIUM: CPT

## 2021-01-01 PROCEDURE — 73502 X-RAY EXAM HIP UNI 2-3 VIEWS: CPT

## 2021-01-01 PROCEDURE — 99222 1ST HOSP IP/OBS MODERATE 55: CPT

## 2021-01-01 PROCEDURE — 73564 X-RAY EXAM KNEE 4 OR MORE: CPT | Mod: 26,50

## 2021-01-01 PROCEDURE — 11045 DBRDMT SUBQ TISS EACH ADDL: CPT

## 2021-01-01 PROCEDURE — 84702 CHORIONIC GONADOTROPIN TEST: CPT

## 2021-01-01 PROCEDURE — 80053 COMPREHEN METABOLIC PANEL: CPT

## 2021-01-01 PROCEDURE — 70450 CT HEAD/BRAIN W/O DYE: CPT

## 2021-01-01 PROCEDURE — 85025 COMPLETE CBC W/AUTO DIFF WBC: CPT

## 2021-01-01 PROCEDURE — 83735 ASSAY OF MAGNESIUM: CPT

## 2021-01-01 PROCEDURE — 84100 ASSAY OF PHOSPHORUS: CPT

## 2021-01-01 PROCEDURE — 73030 X-RAY EXAM OF SHOULDER: CPT | Mod: 26,RT

## 2021-01-01 PROCEDURE — 99261: CPT

## 2021-01-01 RX ORDER — SODIUM ZIRCONIUM CYCLOSILICATE 10 G/10G
10 POWDER, FOR SUSPENSION ORAL ONCE
Refills: 0 | Status: COMPLETED | OUTPATIENT
Start: 2021-01-01 | End: 2021-01-01

## 2021-01-01 RX ORDER — LANOLIN ALCOHOL/MO/W.PET/CERES
5 CREAM (GRAM) TOPICAL AT BEDTIME
Refills: 0 | Status: DISCONTINUED | OUTPATIENT
Start: 2021-01-01 | End: 2021-01-01

## 2021-01-01 RX ORDER — FAMOTIDINE 10 MG/ML
20 INJECTION INTRAVENOUS EVERY 24 HOURS
Refills: 0 | Status: DISCONTINUED | OUTPATIENT
Start: 2021-01-01 | End: 2021-01-01

## 2021-01-01 RX ORDER — CINACALCET 30 MG/1
60 TABLET, FILM COATED ORAL AT BEDTIME
Refills: 0 | Status: DISCONTINUED | OUTPATIENT
Start: 2021-01-01 | End: 2021-01-01

## 2021-01-01 RX ORDER — PANTOPRAZOLE SODIUM 20 MG/1
40 TABLET, DELAYED RELEASE ORAL
Refills: 0 | Status: DISCONTINUED | OUTPATIENT
Start: 2021-01-01 | End: 2021-01-01

## 2021-01-01 RX ORDER — CYCLOBENZAPRINE HYDROCHLORIDE 10 MG/1
1 TABLET, FILM COATED ORAL
Qty: 0 | Refills: 0 | DISCHARGE
Start: 2021-01-01

## 2021-01-01 RX ORDER — DEXTROSE 50 % IN WATER 50 %
25 SYRINGE (ML) INTRAVENOUS ONCE
Refills: 0 | Status: COMPLETED | OUTPATIENT
Start: 2021-01-01 | End: 2021-01-01

## 2021-01-01 RX ORDER — SODIUM ZIRCONIUM CYCLOSILICATE 10 G/10G
5 POWDER, FOR SUSPENSION ORAL ONCE
Refills: 0 | Status: COMPLETED | OUTPATIENT
Start: 2021-01-01 | End: 2021-01-01

## 2021-01-01 RX ORDER — SODIUM HYPOCHLORITE 1.25 MG/ML
0.12 SOLUTION TOPICAL
Qty: 1 | Refills: 3 | Status: ACTIVE | COMMUNITY
Start: 2021-01-01 | End: 1900-01-01

## 2021-01-01 RX ORDER — OXYCODONE HYDROCHLORIDE 5 MG/1
5 TABLET ORAL ONCE
Refills: 0 | Status: DISCONTINUED | OUTPATIENT
Start: 2021-01-01 | End: 2021-01-01

## 2021-01-01 RX ORDER — SODIUM CHLORIDE 9 MG/ML
1000 INJECTION, SOLUTION INTRAVENOUS
Refills: 0 | Status: DISCONTINUED | OUTPATIENT
Start: 2021-01-01 | End: 2021-01-01

## 2021-01-01 RX ORDER — DEXTROSE 50 % IN WATER 50 %
50 SYRINGE (ML) INTRAVENOUS ONCE
Refills: 0 | Status: COMPLETED | OUTPATIENT
Start: 2021-01-01 | End: 2021-01-01

## 2021-01-01 RX ORDER — HYDROXYZINE HYDROCHLORIDE 25 MG/1
25 TABLET ORAL
Qty: 60 | Refills: 0 | Status: ACTIVE | COMMUNITY
Start: 2021-01-01

## 2021-01-01 RX ORDER — ATORVASTATIN CALCIUM 80 MG/1
40 TABLET, FILM COATED ORAL AT BEDTIME
Refills: 0 | Status: DISCONTINUED | OUTPATIENT
Start: 2021-01-01 | End: 2021-01-01

## 2021-01-01 RX ORDER — HEPARIN SODIUM 5000 [USP'U]/ML
5000 INJECTION INTRAVENOUS; SUBCUTANEOUS EVERY 8 HOURS
Refills: 0 | Status: DISCONTINUED | OUTPATIENT
Start: 2021-01-01 | End: 2021-01-01

## 2021-01-01 RX ORDER — FUROSEMIDE 40 MG
1 TABLET ORAL
Qty: 0 | Refills: 0 | DISCHARGE

## 2021-01-01 RX ORDER — NYSTATIN 500MM UNIT
500000 POWDER (EA) MISCELLANEOUS
Refills: 0 | Status: COMPLETED | OUTPATIENT
Start: 2021-01-01 | End: 2021-01-01

## 2021-01-01 RX ORDER — MIRTAZAPINE 7.5 MG/1
7.5 TABLET, FILM COATED ORAL
Qty: 30 | Refills: 0 | Status: ACTIVE | COMMUNITY
Start: 2021-01-01

## 2021-01-01 RX ORDER — SEVELAMER CARBONATE 2400 MG/1
3200 POWDER, FOR SUSPENSION ORAL
Refills: 0 | Status: DISCONTINUED | OUTPATIENT
Start: 2021-01-01 | End: 2021-01-01

## 2021-01-01 RX ORDER — INSULIN LISPRO 100/ML
VIAL (ML) SUBCUTANEOUS
Refills: 0 | Status: DISCONTINUED | OUTPATIENT
Start: 2021-01-01 | End: 2021-01-01

## 2021-01-01 RX ORDER — LIDOCAINE 4 G/100G
1 CREAM TOPICAL DAILY
Refills: 0 | Status: DISCONTINUED | OUTPATIENT
Start: 2021-01-01 | End: 2021-01-01

## 2021-01-01 RX ORDER — ACETAMINOPHEN 500 MG
975 TABLET ORAL ONCE
Refills: 0 | Status: COMPLETED | OUTPATIENT
Start: 2021-01-01 | End: 2021-01-01

## 2021-01-01 RX ORDER — ERYTHROPOIETIN 10000 [IU]/ML
8000 INJECTION, SOLUTION INTRAVENOUS; SUBCUTANEOUS
Refills: 0 | Status: DISCONTINUED | OUTPATIENT
Start: 2021-01-01 | End: 2021-01-01

## 2021-01-01 RX ORDER — HYDROMORPHONE HYDROCHLORIDE 2 MG/ML
0.5 INJECTION INTRAMUSCULAR; INTRAVENOUS; SUBCUTANEOUS EVERY 6 HOURS
Refills: 0 | Status: DISCONTINUED | OUTPATIENT
Start: 2021-01-01 | End: 2021-01-01

## 2021-01-01 RX ORDER — DILTIAZEM HCL 120 MG
120 CAPSULE, EXT RELEASE 24 HR ORAL DAILY
Refills: 0 | Status: DISCONTINUED | OUTPATIENT
Start: 2021-01-01 | End: 2021-01-01

## 2021-01-01 RX ORDER — DILTIAZEM HYDROCHLORIDE 240 MG/1
240 TABLET, EXTENDED RELEASE ORAL
Refills: 0 | Status: DISCONTINUED | COMMUNITY
End: 2021-01-01

## 2021-01-01 RX ORDER — APIXABAN 2.5 MG/1
1 TABLET, FILM COATED ORAL
Qty: 0 | Refills: 0 | DISCHARGE

## 2021-01-01 RX ORDER — HEPARIN SODIUM 5000 [USP'U]/ML
2000 INJECTION INTRAVENOUS; SUBCUTANEOUS ONCE
Refills: 0 | Status: COMPLETED | OUTPATIENT
Start: 2021-01-01 | End: 2021-01-01

## 2021-01-01 RX ORDER — NYSTATIN 500MM UNIT
500000 POWDER (EA) MISCELLANEOUS ONCE
Refills: 0 | Status: DISCONTINUED | OUTPATIENT
Start: 2021-01-01 | End: 2021-01-01

## 2021-01-01 RX ORDER — ASPIRIN/CALCIUM CARB/MAGNESIUM 324 MG
81 TABLET ORAL DAILY
Refills: 0 | Status: DISCONTINUED | OUTPATIENT
Start: 2021-01-01 | End: 2021-01-01

## 2021-01-01 RX ORDER — INSULIN DEGLUDEC 100 U/ML
60 INJECTION, SOLUTION SUBCUTANEOUS
Qty: 0 | Refills: 0 | DISCHARGE

## 2021-01-01 RX ORDER — HUMAN INSULIN 100 [IU]/ML
(70-30) 100 INJECTION, SUSPENSION SUBCUTANEOUS
Refills: 0 | Status: DISCONTINUED | COMMUNITY
End: 2021-01-01

## 2021-01-01 RX ORDER — SERTRALINE HYDROCHLORIDE 50 MG/1
50 TABLET, FILM COATED ORAL
Qty: 30 | Refills: 0 | Status: ACTIVE | COMMUNITY
Start: 2021-01-01

## 2021-01-01 RX ORDER — CYCLOBENZAPRINE HYDROCHLORIDE 10 MG/1
5 TABLET, FILM COATED ORAL THREE TIMES A DAY
Refills: 0 | Status: DISCONTINUED | OUTPATIENT
Start: 2021-01-01 | End: 2021-01-01

## 2021-01-01 RX ORDER — LORATADINE 10 MG/1
10 TABLET ORAL DAILY
Refills: 0 | Status: DISCONTINUED | OUTPATIENT
Start: 2021-01-01 | End: 2021-01-01

## 2021-01-01 RX ORDER — MONTELUKAST 4 MG/1
10 TABLET, CHEWABLE ORAL AT BEDTIME
Refills: 0 | Status: DISCONTINUED | OUTPATIENT
Start: 2021-01-01 | End: 2021-01-01

## 2021-01-01 RX ORDER — DEXTROSE 50 % IN WATER 50 %
25 SYRINGE (ML) INTRAVENOUS ONCE
Refills: 0 | Status: DISCONTINUED | OUTPATIENT
Start: 2021-01-01 | End: 2021-01-01

## 2021-01-01 RX ORDER — ACETAMINOPHEN 500 MG
650 TABLET ORAL EVERY 6 HOURS
Refills: 0 | Status: DISCONTINUED | OUTPATIENT
Start: 2021-01-01 | End: 2021-01-01

## 2021-01-01 RX ORDER — INSULIN GLARGINE 100 [IU]/ML
64 INJECTION, SOLUTION SUBCUTANEOUS AT BEDTIME
Refills: 0 | Status: DISCONTINUED | OUTPATIENT
Start: 2021-01-01 | End: 2021-01-01

## 2021-01-01 RX ORDER — INSULIN ASPART 100 [IU]/ML
35 INJECTION, SUSPENSION SUBCUTANEOUS
Qty: 0 | Refills: 0 | DISCHARGE

## 2021-01-01 RX ORDER — CYCLOBENZAPRINE HYDROCHLORIDE 10 MG/1
1 TABLET, FILM COATED ORAL
Qty: 14 | Refills: 0
Start: 2021-01-01

## 2021-01-01 RX ORDER — FUROSEMIDE 20 MG/1
20 TABLET ORAL
Refills: 0 | Status: DISCONTINUED | COMMUNITY
End: 2021-01-01

## 2021-01-01 RX ORDER — HYDROMORPHONE HYDROCHLORIDE 2 MG/ML
0.5 INJECTION INTRAMUSCULAR; INTRAVENOUS; SUBCUTANEOUS ONCE
Refills: 0 | Status: DISCONTINUED | OUTPATIENT
Start: 2021-01-01 | End: 2021-01-01

## 2021-01-01 RX ORDER — TRAMADOL HYDROCHLORIDE 50 MG/1
50 TABLET ORAL EVERY 6 HOURS
Refills: 0 | Status: DISCONTINUED | OUTPATIENT
Start: 2021-01-01 | End: 2021-01-01

## 2021-01-01 RX ORDER — SEVELAMER CARBONATE 2400 MG/1
2400 POWDER, FOR SUSPENSION ORAL
Refills: 0 | Status: DISCONTINUED | OUTPATIENT
Start: 2021-01-01 | End: 2021-01-01

## 2021-01-01 RX ORDER — APIXABAN 2.5 MG/1
2.5 TABLET, FILM COATED ORAL
Refills: 0 | Status: DISCONTINUED | COMMUNITY
End: 2021-01-01

## 2021-01-01 RX ORDER — ALBUTEROL 90 UG/1
2 AEROSOL, METERED ORAL EVERY 6 HOURS
Refills: 0 | Status: DISCONTINUED | OUTPATIENT
Start: 2021-01-01 | End: 2021-01-01

## 2021-01-01 RX ADMIN — HEPARIN SODIUM 5000 UNIT(S): 5000 INJECTION INTRAVENOUS; SUBCUTANEOUS at 05:35

## 2021-01-01 RX ADMIN — SODIUM ZIRCONIUM CYCLOSILICATE 5 GRAM(S): 10 POWDER, FOR SUSPENSION ORAL at 06:04

## 2021-01-01 RX ADMIN — CINACALCET 60 MILLIGRAM(S): 30 TABLET, FILM COATED ORAL at 21:30

## 2021-01-01 RX ADMIN — Medication 650 MILLIGRAM(S): at 00:27

## 2021-01-01 RX ADMIN — TRAMADOL HYDROCHLORIDE 50 MILLIGRAM(S): 50 TABLET ORAL at 14:09

## 2021-01-01 RX ADMIN — Medication 81 MILLIGRAM(S): at 14:14

## 2021-01-01 RX ADMIN — HYDROMORPHONE HYDROCHLORIDE 0.5 MILLIGRAM(S): 2 INJECTION INTRAMUSCULAR; INTRAVENOUS; SUBCUTANEOUS at 01:11

## 2021-01-01 RX ADMIN — SEVELAMER CARBONATE 3200 MILLIGRAM(S): 2400 POWDER, FOR SUSPENSION ORAL at 17:43

## 2021-01-01 RX ADMIN — PANTOPRAZOLE SODIUM 40 MILLIGRAM(S): 20 TABLET, DELAYED RELEASE ORAL at 05:13

## 2021-01-01 RX ADMIN — LIDOCAINE 1 PATCH: 4 CREAM TOPICAL at 03:35

## 2021-01-01 RX ADMIN — Medication 120 MILLIGRAM(S): at 06:51

## 2021-01-01 RX ADMIN — ATORVASTATIN CALCIUM 40 MILLIGRAM(S): 80 TABLET, FILM COATED ORAL at 21:30

## 2021-01-01 RX ADMIN — Medication 25 GRAM(S): at 09:00

## 2021-01-01 RX ADMIN — SODIUM CHLORIDE 40 MILLILITER(S): 9 INJECTION, SOLUTION INTRAVENOUS at 14:15

## 2021-01-01 RX ADMIN — Medication 120 MILLIGRAM(S): at 05:35

## 2021-01-01 RX ADMIN — CINACALCET 60 MILLIGRAM(S): 30 TABLET, FILM COATED ORAL at 21:44

## 2021-01-01 RX ADMIN — Medication 300 MILLIGRAM(S): at 02:45

## 2021-01-01 RX ADMIN — ALBUTEROL 2 PUFF(S): 90 AEROSOL, METERED ORAL at 07:55

## 2021-01-01 RX ADMIN — MONTELUKAST 10 MILLIGRAM(S): 4 TABLET, CHEWABLE ORAL at 22:10

## 2021-01-01 RX ADMIN — PANTOPRAZOLE SODIUM 40 MILLIGRAM(S): 20 TABLET, DELAYED RELEASE ORAL at 05:35

## 2021-01-01 RX ADMIN — CYCLOBENZAPRINE HYDROCHLORIDE 5 MILLIGRAM(S): 10 TABLET, FILM COATED ORAL at 10:28

## 2021-01-01 RX ADMIN — Medication 120 MILLIGRAM(S): at 06:04

## 2021-01-01 RX ADMIN — TRAMADOL HYDROCHLORIDE 50 MILLIGRAM(S): 50 TABLET ORAL at 20:37

## 2021-01-01 RX ADMIN — LORATADINE 10 MILLIGRAM(S): 10 TABLET ORAL at 14:09

## 2021-01-01 RX ADMIN — HEPARIN SODIUM 5000 UNIT(S): 5000 INJECTION INTRAVENOUS; SUBCUTANEOUS at 06:51

## 2021-01-01 RX ADMIN — ALBUTEROL 2 PUFF(S): 90 AEROSOL, METERED ORAL at 21:31

## 2021-01-01 RX ADMIN — FAMOTIDINE 20 MILLIGRAM(S): 10 INJECTION INTRAVENOUS at 06:51

## 2021-01-01 RX ADMIN — SODIUM ZIRCONIUM CYCLOSILICATE 10 GRAM(S): 10 POWDER, FOR SUSPENSION ORAL at 20:16

## 2021-01-01 RX ADMIN — Medication 650 MILLIGRAM(S): at 11:20

## 2021-01-01 RX ADMIN — ATORVASTATIN CALCIUM 40 MILLIGRAM(S): 80 TABLET, FILM COATED ORAL at 21:46

## 2021-01-01 RX ADMIN — Medication 975 MILLIGRAM(S): at 17:58

## 2021-01-01 RX ADMIN — Medication 25 GRAM(S): at 12:53

## 2021-01-01 RX ADMIN — HEPARIN SODIUM 5000 UNIT(S): 5000 INJECTION INTRAVENOUS; SUBCUTANEOUS at 14:57

## 2021-01-01 RX ADMIN — ALBUTEROL 2 PUFF(S): 90 AEROSOL, METERED ORAL at 14:24

## 2021-01-01 RX ADMIN — TRAMADOL HYDROCHLORIDE 50 MILLIGRAM(S): 50 TABLET ORAL at 07:40

## 2021-01-01 RX ADMIN — CYCLOBENZAPRINE HYDROCHLORIDE 5 MILLIGRAM(S): 10 TABLET, FILM COATED ORAL at 22:48

## 2021-01-01 RX ADMIN — HYDROMORPHONE HYDROCHLORIDE 0.5 MILLIGRAM(S): 2 INJECTION INTRAMUSCULAR; INTRAVENOUS; SUBCUTANEOUS at 22:27

## 2021-01-01 RX ADMIN — ERYTHROPOIETIN 8000 UNIT(S): 10000 INJECTION, SOLUTION INTRAVENOUS; SUBCUTANEOUS at 11:49

## 2021-01-01 RX ADMIN — TRAMADOL HYDROCHLORIDE 50 MILLIGRAM(S): 50 TABLET ORAL at 08:10

## 2021-01-01 RX ADMIN — ALBUTEROL 2 PUFF(S): 90 AEROSOL, METERED ORAL at 14:14

## 2021-01-01 RX ADMIN — SEVELAMER CARBONATE 3200 MILLIGRAM(S): 2400 POWDER, FOR SUSPENSION ORAL at 14:54

## 2021-01-01 RX ADMIN — ATORVASTATIN CALCIUM 40 MILLIGRAM(S): 80 TABLET, FILM COATED ORAL at 22:09

## 2021-01-01 RX ADMIN — Medication 975 MILLIGRAM(S): at 17:28

## 2021-01-01 RX ADMIN — Medication 650 MILLIGRAM(S): at 10:48

## 2021-01-01 RX ADMIN — HEPARIN SODIUM 5000 UNIT(S): 5000 INJECTION INTRAVENOUS; SUBCUTANEOUS at 21:30

## 2021-01-01 RX ADMIN — ALBUTEROL 2 PUFF(S): 90 AEROSOL, METERED ORAL at 22:09

## 2021-01-01 RX ADMIN — FAMOTIDINE 20 MILLIGRAM(S): 10 INJECTION INTRAVENOUS at 06:07

## 2021-01-01 RX ADMIN — CYCLOBENZAPRINE HYDROCHLORIDE 5 MILLIGRAM(S): 10 TABLET, FILM COATED ORAL at 17:44

## 2021-01-01 RX ADMIN — OXYCODONE HYDROCHLORIDE 5 MILLIGRAM(S): 5 TABLET ORAL at 18:00

## 2021-01-01 RX ADMIN — Medication 81 MILLIGRAM(S): at 14:23

## 2021-01-01 RX ADMIN — Medication 5 MILLIGRAM(S): at 22:13

## 2021-01-01 RX ADMIN — Medication 500000 UNIT(S): at 17:21

## 2021-01-01 RX ADMIN — Medication 120 MILLIGRAM(S): at 05:13

## 2021-01-01 RX ADMIN — HYDROMORPHONE HYDROCHLORIDE 0.5 MILLIGRAM(S): 2 INJECTION INTRAMUSCULAR; INTRAVENOUS; SUBCUTANEOUS at 01:10

## 2021-01-01 RX ADMIN — CINACALCET 60 MILLIGRAM(S): 30 TABLET, FILM COATED ORAL at 22:09

## 2021-01-01 RX ADMIN — Medication 500000 UNIT(S): at 05:13

## 2021-01-01 RX ADMIN — HEPARIN SODIUM 5000 UNIT(S): 5000 INJECTION INTRAVENOUS; SUBCUTANEOUS at 21:38

## 2021-01-01 RX ADMIN — ALBUTEROL 2 PUFF(S): 90 AEROSOL, METERED ORAL at 16:14

## 2021-01-01 RX ADMIN — ALBUTEROL 2 PUFF(S): 90 AEROSOL, METERED ORAL at 21:44

## 2021-01-01 RX ADMIN — ALBUTEROL 2 PUFF(S): 90 AEROSOL, METERED ORAL at 22:16

## 2021-01-01 RX ADMIN — ALBUTEROL 2 PUFF(S): 90 AEROSOL, METERED ORAL at 03:00

## 2021-01-01 RX ADMIN — LIDOCAINE 1 PATCH: 4 CREAM TOPICAL at 15:50

## 2021-01-01 RX ADMIN — Medication 650 MILLIGRAM(S): at 10:28

## 2021-01-01 RX ADMIN — HEPARIN SODIUM 5000 UNIT(S): 5000 INJECTION INTRAVENOUS; SUBCUTANEOUS at 21:44

## 2021-01-01 RX ADMIN — Medication 650 MILLIGRAM(S): at 23:01

## 2021-01-01 RX ADMIN — MONTELUKAST 10 MILLIGRAM(S): 4 TABLET, CHEWABLE ORAL at 21:30

## 2021-01-01 RX ADMIN — HEPARIN SODIUM 2000 UNIT(S): 5000 INJECTION INTRAVENOUS; SUBCUTANEOUS at 10:30

## 2021-01-01 RX ADMIN — SEVELAMER CARBONATE 2400 MILLIGRAM(S): 2400 POWDER, FOR SUSPENSION ORAL at 17:47

## 2021-01-01 RX ADMIN — HEPARIN SODIUM 5000 UNIT(S): 5000 INJECTION INTRAVENOUS; SUBCUTANEOUS at 05:12

## 2021-01-01 RX ADMIN — SEVELAMER CARBONATE 2400 MILLIGRAM(S): 2400 POWDER, FOR SUSPENSION ORAL at 17:17

## 2021-01-01 RX ADMIN — LIDOCAINE 1 PATCH: 4 CREAM TOPICAL at 14:51

## 2021-01-01 RX ADMIN — SEVELAMER CARBONATE 2400 MILLIGRAM(S): 2400 POWDER, FOR SUSPENSION ORAL at 10:49

## 2021-01-01 RX ADMIN — HEPARIN SODIUM 5000 UNIT(S): 5000 INJECTION INTRAVENOUS; SUBCUTANEOUS at 06:06

## 2021-01-01 RX ADMIN — Medication 0: at 06:21

## 2021-01-01 RX ADMIN — Medication 50 MILLILITER(S): at 07:00

## 2021-01-01 RX ADMIN — ATORVASTATIN CALCIUM 40 MILLIGRAM(S): 80 TABLET, FILM COATED ORAL at 21:39

## 2021-01-01 RX ADMIN — Medication 650 MILLIGRAM(S): at 17:43

## 2021-01-01 RX ADMIN — SEVELAMER CARBONATE 2400 MILLIGRAM(S): 2400 POWDER, FOR SUSPENSION ORAL at 07:40

## 2021-01-01 RX ADMIN — HYDROMORPHONE HYDROCHLORIDE 0.5 MILLIGRAM(S): 2 INJECTION INTRAMUSCULAR; INTRAVENOUS; SUBCUTANEOUS at 23:20

## 2021-01-01 RX ADMIN — Medication 500000 UNIT(S): at 17:47

## 2021-01-01 RX ADMIN — Medication 81 MILLIGRAM(S): at 14:54

## 2021-01-01 RX ADMIN — Medication 500000 UNIT(S): at 00:19

## 2021-01-01 RX ADMIN — HYDROMORPHONE HYDROCHLORIDE 0.5 MILLIGRAM(S): 2 INJECTION INTRAMUSCULAR; INTRAVENOUS; SUBCUTANEOUS at 00:28

## 2021-01-01 RX ADMIN — CINACALCET 60 MILLIGRAM(S): 30 TABLET, FILM COATED ORAL at 22:16

## 2021-01-01 RX ADMIN — Medication 500000 UNIT(S): at 14:26

## 2021-01-01 RX ADMIN — ALBUTEROL 2 PUFF(S): 90 AEROSOL, METERED ORAL at 10:49

## 2021-01-01 RX ADMIN — OXYCODONE HYDROCHLORIDE 5 MILLIGRAM(S): 5 TABLET ORAL at 17:28

## 2021-01-01 RX ADMIN — Medication 500000 UNIT(S): at 06:51

## 2021-01-01 RX ADMIN — HEPARIN SODIUM 5000 UNIT(S): 5000 INJECTION INTRAVENOUS; SUBCUTANEOUS at 14:10

## 2021-01-01 RX ADMIN — HEPARIN SODIUM 5000 UNIT(S): 5000 INJECTION INTRAVENOUS; SUBCUTANEOUS at 14:24

## 2021-01-01 RX ADMIN — LORATADINE 10 MILLIGRAM(S): 10 TABLET ORAL at 14:52

## 2021-01-01 RX ADMIN — HEPARIN SODIUM 5000 UNIT(S): 5000 INJECTION INTRAVENOUS; SUBCUTANEOUS at 22:10

## 2021-01-01 RX ADMIN — ERYTHROPOIETIN 8000 UNIT(S): 10000 INJECTION, SOLUTION INTRAVENOUS; SUBCUTANEOUS at 11:22

## 2021-01-01 RX ADMIN — LORATADINE 10 MILLIGRAM(S): 10 TABLET ORAL at 14:24

## 2021-01-01 RX ADMIN — ERYTHROPOIETIN 8000 UNIT(S): 10000 INJECTION, SOLUTION INTRAVENOUS; SUBCUTANEOUS at 16:49

## 2021-01-01 RX ADMIN — MONTELUKAST 10 MILLIGRAM(S): 4 TABLET, CHEWABLE ORAL at 21:38

## 2021-01-01 RX ADMIN — LIDOCAINE 1 PATCH: 4 CREAM TOPICAL at 20:37

## 2021-01-01 RX ADMIN — HYDROMORPHONE HYDROCHLORIDE 0.5 MILLIGRAM(S): 2 INJECTION INTRAMUSCULAR; INTRAVENOUS; SUBCUTANEOUS at 22:11

## 2021-01-01 RX ADMIN — MONTELUKAST 10 MILLIGRAM(S): 4 TABLET, CHEWABLE ORAL at 21:44

## 2021-03-22 ENCOUNTER — EMERGENCY (EMERGENCY)
Facility: HOSPITAL | Age: 56
LOS: 1 days | Discharge: HOME | End: 2021-03-22
Attending: EMERGENCY MEDICINE | Admitting: EMERGENCY MEDICINE
Payer: MEDICARE

## 2021-03-22 VITALS
HEIGHT: 62 IN | DIASTOLIC BLOOD PRESSURE: 88 MMHG | HEART RATE: 80 BPM | OXYGEN SATURATION: 99 % | SYSTOLIC BLOOD PRESSURE: 149 MMHG | TEMPERATURE: 98 F | RESPIRATION RATE: 20 BRPM

## 2021-03-22 DIAGNOSIS — Z95.818 PRESENCE OF OTHER CARDIAC IMPLANTS AND GRAFTS: Chronic | ICD-10-CM

## 2021-03-22 DIAGNOSIS — Z98.890 OTHER SPECIFIED POSTPROCEDURAL STATES: Chronic | ICD-10-CM

## 2021-03-22 DIAGNOSIS — Z98.51 TUBAL LIGATION STATUS: Chronic | ICD-10-CM

## 2021-03-22 PROCEDURE — 99285 EMERGENCY DEPT VISIT HI MDM: CPT | Mod: 25

## 2021-03-22 PROCEDURE — 36000 PLACE NEEDLE IN VEIN: CPT

## 2021-03-22 NOTE — ED ADULT NURSE NOTE - INTERVENTIONS DEFINITIONS
Call bell, personal items and telephone within reach/Instruct patient to call for assistance/Physically safe environment: no spills, clutter or unnecessary equipment/Stretcher in lowest position, wheels locked, appropriate side rails in place

## 2021-03-22 NOTE — ED ADULT TRIAGE NOTE - CHIEF COMPLAINT QUOTE
pt c/o generalized weakness in B/L LE x1 month. Denies pain, numbness, falls. FS 59 pt given 8 oz juice charge RN aware pt c/o generalized weakness in B/L LE x1 month. Denies pain, numbness, falls. FS 59 pt given 8 oz juice charge RN aware. Rpt FS 69 pt given more juice pt c/o generalized weakness in B/L LE x1 month. Denies pain, numbness, falls. FS 59 pt given 8 oz juice charge RN aware. Rpt FS 69 pt given more juice. Pt noted with L arm fistula states goes for dialysis MWF had dialysis today.

## 2021-03-22 NOTE — ED ADULT NURSE NOTE - CHIEF COMPLAINT QUOTE
pt c/o generalized weakness in B/L LE x1 month. Denies pain, numbness, falls. FS 59 pt given 8 oz juice charge RN aware. Rpt FS 69 pt given more juice. Pt noted with L arm fistula states goes for dialysis MWF had dialysis today.

## 2021-03-22 NOTE — ED ADULT NURSE NOTE - OBJECTIVE STATEMENT
Received patient to trauma room C, A&OX4, ambulatory w/ walker. Pt. c/o b/l LE weakness x1 month. Pt. denies any numbness, tingling, recent falls, HA, chest pain, or SOB. L AV fistula noted, patient received dialysis MWF. NSR on cardiac monitor. Respirations appear unlabored. PHx HTN, ESRD, Afib on thinners, DM. MD at bedside for evaluation. Comfort measures in place. Awaiting further orders. Will continue to monitor.

## 2021-03-23 VITALS
HEART RATE: 74 BPM | OXYGEN SATURATION: 100 % | SYSTOLIC BLOOD PRESSURE: 160 MMHG | RESPIRATION RATE: 17 BRPM | DIASTOLIC BLOOD PRESSURE: 79 MMHG | TEMPERATURE: 98 F

## 2021-03-23 LAB
BASOPHILS # BLD AUTO: 0.02 K/UL — SIGNIFICANT CHANGE UP (ref 0–0.2)
BASOPHILS NFR BLD AUTO: 0.2 % — SIGNIFICANT CHANGE UP (ref 0–2)
EOSINOPHIL # BLD AUTO: 0.29 K/UL — SIGNIFICANT CHANGE UP (ref 0–0.5)
EOSINOPHIL NFR BLD AUTO: 2.6 % — SIGNIFICANT CHANGE UP (ref 0–6)
HCT VFR BLD CALC: 33.7 % — LOW (ref 34.5–45)
HGB BLD-MCNC: 9.9 G/DL — LOW (ref 11.5–15.5)
IANC: 8.84 K/UL — HIGH (ref 1.5–8.5)
IMM GRANULOCYTES NFR BLD AUTO: 0.6 % — SIGNIFICANT CHANGE UP (ref 0–1.5)
LYMPHOCYTES # BLD AUTO: 1.06 K/UL — SIGNIFICANT CHANGE UP (ref 1–3.3)
LYMPHOCYTES # BLD AUTO: 9.6 % — LOW (ref 13–44)
MCHC RBC-ENTMCNC: 28.7 PG — SIGNIFICANT CHANGE UP (ref 27–34)
MCHC RBC-ENTMCNC: 29.4 GM/DL — LOW (ref 32–36)
MCV RBC AUTO: 97.7 FL — SIGNIFICANT CHANGE UP (ref 80–100)
MONOCYTES # BLD AUTO: 0.75 K/UL — SIGNIFICANT CHANGE UP (ref 0–0.9)
MONOCYTES NFR BLD AUTO: 6.8 % — SIGNIFICANT CHANGE UP (ref 2–14)
NEUTROPHILS # BLD AUTO: 8.84 K/UL — HIGH (ref 1.8–7.4)
NEUTROPHILS NFR BLD AUTO: 80.2 % — HIGH (ref 43–77)
NRBC # BLD: 0 /100 WBCS — SIGNIFICANT CHANGE UP
NRBC # FLD: 0 K/UL — SIGNIFICANT CHANGE UP
PLATELET # BLD AUTO: 329 K/UL — SIGNIFICANT CHANGE UP (ref 150–400)
RBC # BLD: 3.45 M/UL — LOW (ref 3.8–5.2)
RBC # FLD: 13.9 % — SIGNIFICANT CHANGE UP (ref 10.3–14.5)
TSH SERPL-MCNC: 2.57 UIU/ML — SIGNIFICANT CHANGE UP (ref 0.27–4.2)
WBC # BLD: 11.03 K/UL — HIGH (ref 3.8–10.5)
WBC # FLD AUTO: 11.03 K/UL — HIGH (ref 3.8–10.5)

## 2021-03-23 PROCEDURE — 70450 CT HEAD/BRAIN W/O DYE: CPT | Mod: 26

## 2021-03-23 NOTE — ED ADULT NURSE REASSESSMENT NOTE - NS ED NURSE REASSESS COMMENT FT1
Pt. IV dc'ed. Patient VSS. Respirations appear unlabored. [facility] amb[facility]lette here for pick-up. Pt. provided with discharge paperwork by MD.

## 2021-03-23 NOTE — ED PROVIDER NOTE - NS ED ROS FT
General: Denies fevers  Eyes: Denies vision changes  ENMT: Denies sore throat  CV: Denies chest pain  Resp: Denies SOB  GI: Denies abdominal pain, nausea, vomiting, diarrhea  : Denies painful urination  Skin: Denies new rashes  Neuro: BL UE/LE shaking. Denies headache, focal weakness  MSK: BLE weakness. Denies knee pain

## 2021-03-23 NOTE — ED PROVIDER NOTE - PROGRESS NOTE DETAILS
Received sign out from Resident: Pt ambulating in the ED with roller without assistance, stable. labs unremarkable.

## 2021-03-23 NOTE — ED PROVIDER NOTE - PATIENT PORTAL LINK FT
You can access the FollowMyHealth Patient Portal offered by [facility] by registering at the following website: [email or web address]. By joining [employer]’s FollowMyHealth portal, you will also be able to view your health information using other applications (apps) compatible with our system.

## 2021-03-23 NOTE — ED PROCEDURE NOTE - NS ED PROC PERFORMED BY1 FT
Detail Level: Detailed Anesthesia Type: 1% Xylocaine with epinephrine Anesthesia Volume In Cc: 0.5 Consent: The patient's consent was obtained including but not limited to risks of crusting, scabbing, blistering, scarring, darker or lighter pigmentary change, recurrence, incomplete removal and infection. Price (Use Numbers Only, No Special Characters Or $): 200 Myself Post-Care Instructions: I reviewed with the patient in detail post-care instructions. Patient is to wear sunprotection, and avoid picking at any of the treated lesions. Pt may apply Vaseline to crusted or scabbing areas.

## 2021-03-23 NOTE — ED PROVIDER NOTE - PHYSICAL EXAMINATION
I have reviewed the triage vital signs.  Const: AAOx3, in NAD  Eyes: no conjunctival injection  HENT: NCAT, Neck supple, oral mucosa moist  CV: RRR, +S1, S2  Resp: CTAB, no respiratory distress  GI: Abdomen soft, NTND, no guarding  Extremities: No peripheral edema,  2+ radial and DP pulses  Skin: Warm, well perfused, no rash  MSK: No gross deformities appreciated  Neuro: No focal sensory or motor deficits  Psych: Appropriate mood and affect

## 2021-03-23 NOTE — ED PROVIDER NOTE - ATTENDING CONTRIBUTION TO CARE
Seen and examined, hx ESRD, completed full HD and then when attempting to ambulate home c/o weakness and legs "buckling" fell to bilat knees, was unable to stand or walk at that time. To ED c/o generalized weakness, has c/o fatigue x 1 wk, dec. po intake. Denies fever/chills, no cough/sore throat/SOB, no CP, no abd pain/back pains, no edema, NT calves. MMM, clear lungs, heart reg, abd soft, NT to palp, no CVAT, no edema, NT calves.

## 2021-03-23 NOTE — PROVIDER CONTACT NOTE (OTHER) - ASSESSMENT
Pt has [employer] for transpot benefits through [employer].  SW contacted [facility] to arrange trip.  [facility] requested; as per rep. Sharda, trip will be canvassed out, they have up to three hours to find a provider.

## 2021-03-23 NOTE — PROVIDER CONTACT NOTE (OTHER) - ACTION/TREATMENT ORDERED:
[facility] vendor to be assigned. [facility] vendor to be assigned.  Update:  Transport assigned to [facility], as per [facility] transport.

## 2021-03-23 NOTE — ED ADULT NURSE REASSESSMENT NOTE - NS ED NURSE REASSESS COMMENT FT1
Pt a&ox4 and ambulatory with a walker. pt ambulated with steady gait. pt requesting to go home at this time. VS as noted, call bell in reach, SW contacted for ambulette home. Will continue to monitor.

## 2021-03-23 NOTE — ED PROVIDER NOTE - NSFOLLOWUPINSTRUCTIONS_ED_ALL_ED_FT
1) Please Follow up with PMD/Clinic within 2-3 days     2)  Please take Tylenol 650mg every 4-6 hours as needed for pain.    3) Please return to  Emergency Department for any new or worsening symptoms.

## 2021-03-23 NOTE — ED PROVIDER NOTE - OBJECTIVE STATEMENT
55F PMH includes ESRD on dialysis MWF via L AV fistula, HTN, DM (trulicity, tresiba), AFib on Eliquis sent from dialysis center s/p fall. Pt states she completed dialysis, and upon leaving felt her legs feel shaky and fell onto her knees. Denies headstrike or LOC. Did not attempt to ambulate after the event, was brought to ER via EMS. Pt reports feeling BLE weakness x 1 mo. A/w BL UE/LE "shaking." Pt has continued to ambulate with her rolling walker (her baseline) but with more difficulty. Denies headache, visual changes, n/v, leg swelling.    Pt noted to by hypoglycemic to 59 in triage, received juice with improvement to 81.

## 2021-03-23 NOTE — ED PROVIDER NOTE - CLINICAL SUMMARY MEDICAL DECISION MAKING FREE TEXT BOX
Kulwant, PGY2 - 55F PMH includes ESRD on dialysis, AF on Eliquis sent after dialysis session for fall, in setting of generalized BLE weakness. Neuro exam non-focal. Will eval for electrolyte abnormality, kidney/liver dysfunction, ICH. Plan: labs, CTH

## 2021-03-23 NOTE — ED PROVIDER NOTE - PMH
Anemia    Atrial fibrillation  with loop recorder 2015, battery most likely depleted, as per cardiac clearance, Dr. Reece Anesthesia aware, pt on Eliquis  Chronic GERD    COPD (chronic obstructive pulmonary disease)    DM (diabetes mellitus)    End-stage renal disease    HTN (hypertension)    Morbid obesity  BMI - 58.3  CHAMP (obstructive sleep apnea)  non compliance with CPAP, Anesthesia Dr. Reece aware, pt told to bring CPAP for sx, pr verbalized understanding  Potential difficult airway on pre-intubation assessment  airway class III, large neck, morbid obesity, hx of CHAMP, no compliance with CPAP- Dr. Reece, Anesthesia aware

## 2021-04-27 ENCOUNTER — INPATIENT (INPATIENT)
Facility: HOSPITAL | Age: 56
LOS: 2 days | Discharge: HOME HEALTH | End: 2021-04-30
Attending: INTERNAL MEDICINE | Admitting: INTERNAL MEDICINE
Payer: MEDICARE

## 2021-04-27 VITALS
HEART RATE: 74 BPM | RESPIRATION RATE: 18 BRPM | DIASTOLIC BLOOD PRESSURE: 60 MMHG | SYSTOLIC BLOOD PRESSURE: 133 MMHG | OXYGEN SATURATION: 98 % | TEMPERATURE: 98 F | HEIGHT: 62 IN

## 2021-04-27 DIAGNOSIS — J44.9 CHRONIC OBSTRUCTIVE PULMONARY DISEASE, UNSPECIFIED: ICD-10-CM

## 2021-04-27 DIAGNOSIS — Z95.818 PRESENCE OF OTHER CARDIAC IMPLANTS AND GRAFTS: Chronic | ICD-10-CM

## 2021-04-27 DIAGNOSIS — Z98.890 OTHER SPECIFIED POSTPROCEDURAL STATES: Chronic | ICD-10-CM

## 2021-04-27 DIAGNOSIS — I48.91 UNSPECIFIED ATRIAL FIBRILLATION: ICD-10-CM

## 2021-04-27 DIAGNOSIS — Z98.51 TUBAL LIGATION STATUS: Chronic | ICD-10-CM

## 2021-04-27 DIAGNOSIS — R25.1 TREMOR, UNSPECIFIED: ICD-10-CM

## 2021-04-27 DIAGNOSIS — E11.9 TYPE 2 DIABETES MELLITUS WITHOUT COMPLICATIONS: ICD-10-CM

## 2021-04-27 DIAGNOSIS — R53.1 WEAKNESS: ICD-10-CM

## 2021-04-27 DIAGNOSIS — N18.6 END STAGE RENAL DISEASE: ICD-10-CM

## 2021-04-27 DIAGNOSIS — Z29.9 ENCOUNTER FOR PROPHYLACTIC MEASURES, UNSPECIFIED: ICD-10-CM

## 2021-04-27 LAB
ALBUMIN SERPL ELPH-MCNC: 4 G/DL — SIGNIFICANT CHANGE UP (ref 3.3–5)
ALBUMIN SERPL ELPH-MCNC: 4.1 G/DL — SIGNIFICANT CHANGE UP (ref 3.3–5)
ALP SERPL-CCNC: 291 U/L — HIGH (ref 40–120)
ALP SERPL-CCNC: 293 U/L — HIGH (ref 40–120)
ALT FLD-CCNC: 5 U/L — SIGNIFICANT CHANGE UP (ref 4–33)
ALT FLD-CCNC: 5 U/L — SIGNIFICANT CHANGE UP (ref 4–33)
AMMONIA BLD-MCNC: 22 UMOL/L — SIGNIFICANT CHANGE UP (ref 11–55)
ANION GAP SERPL CALC-SCNC: 17 MMOL/L — HIGH (ref 7–14)
APTT BLD: 36.9 SEC — HIGH (ref 27–36.3)
AST SERPL-CCNC: 7 U/L — SIGNIFICANT CHANGE UP (ref 4–32)
AST SERPL-CCNC: 7 U/L — SIGNIFICANT CHANGE UP (ref 4–32)
BASOPHILS # BLD AUTO: 0.03 K/UL — SIGNIFICANT CHANGE UP (ref 0–0.2)
BASOPHILS NFR BLD AUTO: 0.3 % — SIGNIFICANT CHANGE UP (ref 0–2)
BILIRUB DIRECT SERPL-MCNC: <0.2 MG/DL — SIGNIFICANT CHANGE UP (ref 0–0.2)
BILIRUB INDIRECT FLD-MCNC: >0.1 MG/DL — SIGNIFICANT CHANGE UP (ref 0–1)
BILIRUB SERPL-MCNC: 0.3 MG/DL — SIGNIFICANT CHANGE UP (ref 0.2–1.2)
BILIRUB SERPL-MCNC: 0.3 MG/DL — SIGNIFICANT CHANGE UP (ref 0.2–1.2)
BLOOD GAS VENOUS COMPREHENSIVE RESULT: SIGNIFICANT CHANGE UP
BUN SERPL-MCNC: 78 MG/DL — HIGH (ref 7–23)
CALCIUM SERPL-MCNC: 8.6 MG/DL — SIGNIFICANT CHANGE UP (ref 8.4–10.5)
CHLORIDE SERPL-SCNC: 95 MMOL/L — LOW (ref 98–107)
CK SERPL-CCNC: 77 U/L — SIGNIFICANT CHANGE UP (ref 25–170)
CO2 SERPL-SCNC: 23 MMOL/L — SIGNIFICANT CHANGE UP (ref 22–31)
CREAT SERPL-MCNC: 10.96 MG/DL — HIGH (ref 0.5–1.3)
DIALYSIS INSTRUMENT RESULT - HEPATITIS B SURFACE ANTIGEN: NEGATIVE — SIGNIFICANT CHANGE UP
EOSINOPHIL # BLD AUTO: 0.2 K/UL — SIGNIFICANT CHANGE UP (ref 0–0.5)
EOSINOPHIL NFR BLD AUTO: 2 % — SIGNIFICANT CHANGE UP (ref 0–6)
GLUCOSE SERPL-MCNC: 80 MG/DL — SIGNIFICANT CHANGE UP (ref 70–99)
HCT VFR BLD CALC: 31.9 % — LOW (ref 34.5–45)
HGB BLD-MCNC: 9.9 G/DL — LOW (ref 11.5–15.5)
IANC: 8.24 K/UL — SIGNIFICANT CHANGE UP (ref 1.5–8.5)
IMM GRANULOCYTES NFR BLD AUTO: 0.4 % — SIGNIFICANT CHANGE UP (ref 0–1.5)
INR BLD: 1.25 RATIO — HIGH (ref 0.88–1.16)
LYMPHOCYTES # BLD AUTO: 0.66 K/UL — LOW (ref 1–3.3)
LYMPHOCYTES # BLD AUTO: 6.7 % — LOW (ref 13–44)
MAGNESIUM SERPL-MCNC: 2.2 MG/DL — SIGNIFICANT CHANGE UP (ref 1.6–2.6)
MCHC RBC-ENTMCNC: 29.9 PG — SIGNIFICANT CHANGE UP (ref 27–34)
MCHC RBC-ENTMCNC: 31 GM/DL — LOW (ref 32–36)
MCV RBC AUTO: 96.4 FL — SIGNIFICANT CHANGE UP (ref 80–100)
MONOCYTES # BLD AUTO: 0.64 K/UL — SIGNIFICANT CHANGE UP (ref 0–0.9)
MONOCYTES NFR BLD AUTO: 6.5 % — SIGNIFICANT CHANGE UP (ref 2–14)
NEUTROPHILS # BLD AUTO: 8.24 K/UL — HIGH (ref 1.8–7.4)
NEUTROPHILS NFR BLD AUTO: 84.1 % — HIGH (ref 43–77)
NRBC # BLD: 0 /100 WBCS — SIGNIFICANT CHANGE UP
NRBC # FLD: 0 K/UL — SIGNIFICANT CHANGE UP
PHOSPHATE SERPL-MCNC: 8.3 MG/DL — HIGH (ref 2.5–4.5)
PLATELET # BLD AUTO: 300 K/UL — SIGNIFICANT CHANGE UP (ref 150–400)
POTASSIUM SERPL-MCNC: 5.9 MMOL/L — HIGH (ref 3.5–5.3)
POTASSIUM SERPL-MCNC: 6 MMOL/L — HIGH (ref 3.5–5.3)
POTASSIUM SERPL-SCNC: 5.9 MMOL/L — HIGH (ref 3.5–5.3)
POTASSIUM SERPL-SCNC: 6 MMOL/L — HIGH (ref 3.5–5.3)
PROT SERPL-MCNC: 7.5 G/DL — SIGNIFICANT CHANGE UP (ref 6–8.3)
PROT SERPL-MCNC: 7.7 G/DL — SIGNIFICANT CHANGE UP (ref 6–8.3)
PROTHROM AB SERPL-ACNC: 14.1 SEC — HIGH (ref 10.6–13.6)
RBC # BLD: 3.31 M/UL — LOW (ref 3.8–5.2)
RBC # FLD: 13.2 % — SIGNIFICANT CHANGE UP (ref 10.3–14.5)
SARS-COV-2 RNA SPEC QL NAA+PROBE: SIGNIFICANT CHANGE UP
SODIUM SERPL-SCNC: 135 MMOL/L — SIGNIFICANT CHANGE UP (ref 135–145)
WBC # BLD: 9.81 K/UL — SIGNIFICANT CHANGE UP (ref 3.8–10.5)
WBC # FLD AUTO: 9.81 K/UL — SIGNIFICANT CHANGE UP (ref 3.8–10.5)

## 2021-04-27 PROCEDURE — 99285 EMERGENCY DEPT VISIT HI MDM: CPT | Mod: 25

## 2021-04-27 PROCEDURE — 99223 1ST HOSP IP/OBS HIGH 75: CPT | Mod: GC

## 2021-04-27 PROCEDURE — 93010 ELECTROCARDIOGRAM REPORT: CPT

## 2021-04-27 PROCEDURE — 99223 1ST HOSP IP/OBS HIGH 75: CPT

## 2021-04-27 PROCEDURE — 71045 X-RAY EXAM CHEST 1 VIEW: CPT | Mod: 26

## 2021-04-27 PROCEDURE — 70450 CT HEAD/BRAIN W/O DYE: CPT | Mod: 26

## 2021-04-27 RX ORDER — ALBUTEROL 90 UG/1
2 AEROSOL, METERED ORAL EVERY 6 HOURS
Refills: 0 | Status: DISCONTINUED | OUTPATIENT
Start: 2021-04-27 | End: 2021-04-30

## 2021-04-27 RX ORDER — SODIUM CHLORIDE 9 MG/ML
1000 INJECTION, SOLUTION INTRAVENOUS
Refills: 0 | Status: DISCONTINUED | OUTPATIENT
Start: 2021-04-27 | End: 2021-04-30

## 2021-04-27 RX ORDER — LANOLIN ALCOHOL/MO/W.PET/CERES
6 CREAM (GRAM) TOPICAL ONCE
Refills: 0 | Status: COMPLETED | OUTPATIENT
Start: 2021-04-27 | End: 2021-04-28

## 2021-04-27 RX ORDER — SODIUM CHLORIDE 9 MG/ML
3 INJECTION INTRAMUSCULAR; INTRAVENOUS; SUBCUTANEOUS EVERY 8 HOURS
Refills: 0 | Status: DISCONTINUED | OUTPATIENT
Start: 2021-04-27 | End: 2021-04-30

## 2021-04-27 RX ORDER — FUROSEMIDE 40 MG
80 TABLET ORAL DAILY
Refills: 0 | Status: DISCONTINUED | OUTPATIENT
Start: 2021-04-27 | End: 2021-04-27

## 2021-04-27 RX ORDER — INSULIN LISPRO 100/ML
VIAL (ML) SUBCUTANEOUS
Refills: 0 | Status: DISCONTINUED | OUTPATIENT
Start: 2021-04-27 | End: 2021-04-30

## 2021-04-27 RX ORDER — APIXABAN 2.5 MG/1
2.5 TABLET, FILM COATED ORAL
Refills: 0 | Status: DISCONTINUED | OUTPATIENT
Start: 2021-04-27 | End: 2021-04-28

## 2021-04-27 RX ORDER — DEXTROSE 50 % IN WATER 50 %
15 SYRINGE (ML) INTRAVENOUS ONCE
Refills: 0 | Status: DISCONTINUED | OUTPATIENT
Start: 2021-04-27 | End: 2021-04-30

## 2021-04-27 RX ORDER — ERYTHROPOIETIN 10000 [IU]/ML
6000 INJECTION, SOLUTION INTRAVENOUS; SUBCUTANEOUS
Refills: 0 | Status: DISCONTINUED | OUTPATIENT
Start: 2021-04-27 | End: 2021-04-30

## 2021-04-27 RX ORDER — SODIUM ZIRCONIUM CYCLOSILICATE 10 G/10G
10 POWDER, FOR SUSPENSION ORAL ONCE
Refills: 0 | Status: COMPLETED | OUTPATIENT
Start: 2021-04-27 | End: 2021-04-27

## 2021-04-27 RX ORDER — CINACALCET 30 MG/1
60 TABLET, FILM COATED ORAL DAILY
Refills: 0 | Status: DISCONTINUED | OUTPATIENT
Start: 2021-04-27 | End: 2021-04-30

## 2021-04-27 RX ORDER — DILTIAZEM HCL 120 MG
120 CAPSULE, EXT RELEASE 24 HR ORAL DAILY
Refills: 0 | Status: DISCONTINUED | OUTPATIENT
Start: 2021-04-27 | End: 2021-04-30

## 2021-04-27 RX ORDER — DEXTROSE 50 % IN WATER 50 %
25 SYRINGE (ML) INTRAVENOUS ONCE
Refills: 0 | Status: DISCONTINUED | OUTPATIENT
Start: 2021-04-27 | End: 2021-04-30

## 2021-04-27 RX ORDER — ATORVASTATIN CALCIUM 80 MG/1
80 TABLET, FILM COATED ORAL AT BEDTIME
Refills: 0 | Status: DISCONTINUED | OUTPATIENT
Start: 2021-04-27 | End: 2021-04-30

## 2021-04-27 RX ORDER — DIVALPROEX SODIUM 500 MG/1
250 TABLET, DELAYED RELEASE ORAL
Refills: 0 | Status: DISCONTINUED | OUTPATIENT
Start: 2021-04-27 | End: 2021-04-30

## 2021-04-27 RX ORDER — MONTELUKAST 4 MG/1
10 TABLET, CHEWABLE ORAL AT BEDTIME
Refills: 0 | Status: DISCONTINUED | OUTPATIENT
Start: 2021-04-27 | End: 2021-04-30

## 2021-04-27 RX ORDER — LANOLIN ALCOHOL/MO/W.PET/CERES
6 CREAM (GRAM) TOPICAL ONCE
Refills: 0 | Status: DISCONTINUED | OUTPATIENT
Start: 2021-04-27 | End: 2021-04-27

## 2021-04-27 RX ORDER — CHLORHEXIDINE GLUCONATE 213 G/1000ML
1 SOLUTION TOPICAL DAILY
Refills: 0 | Status: DISCONTINUED | OUTPATIENT
Start: 2021-04-27 | End: 2021-04-30

## 2021-04-27 RX ORDER — SEVELAMER CARBONATE 2400 MG/1
2400 POWDER, FOR SUSPENSION ORAL
Refills: 0 | Status: DISCONTINUED | OUTPATIENT
Start: 2021-04-27 | End: 2021-04-29

## 2021-04-27 RX ORDER — INSULIN GLARGINE 100 [IU]/ML
30 INJECTION, SOLUTION SUBCUTANEOUS AT BEDTIME
Refills: 0 | Status: DISCONTINUED | OUTPATIENT
Start: 2021-04-27 | End: 2021-04-30

## 2021-04-27 RX ORDER — LORATADINE 10 MG/1
10 TABLET ORAL DAILY
Refills: 0 | Status: DISCONTINUED | OUTPATIENT
Start: 2021-04-27 | End: 2021-04-30

## 2021-04-27 RX ORDER — GLUCAGON INJECTION, SOLUTION 0.5 MG/.1ML
1 INJECTION, SOLUTION SUBCUTANEOUS ONCE
Refills: 0 | Status: DISCONTINUED | OUTPATIENT
Start: 2021-04-27 | End: 2021-04-30

## 2021-04-27 RX ADMIN — SEVELAMER CARBONATE 2400 MILLIGRAM(S): 2400 POWDER, FOR SUSPENSION ORAL at 13:52

## 2021-04-27 RX ADMIN — ATORVASTATIN CALCIUM 80 MILLIGRAM(S): 80 TABLET, FILM COATED ORAL at 22:53

## 2021-04-27 RX ADMIN — MONTELUKAST 10 MILLIGRAM(S): 4 TABLET, CHEWABLE ORAL at 22:53

## 2021-04-27 RX ADMIN — SODIUM CHLORIDE 3 MILLILITER(S): 9 INJECTION INTRAMUSCULAR; INTRAVENOUS; SUBCUTANEOUS at 22:52

## 2021-04-27 RX ADMIN — SODIUM ZIRCONIUM CYCLOSILICATE 10 GRAM(S): 10 POWDER, FOR SUSPENSION ORAL at 11:58

## 2021-04-27 RX ADMIN — APIXABAN 2.5 MILLIGRAM(S): 2.5 TABLET, FILM COATED ORAL at 22:53

## 2021-04-27 RX ADMIN — ERYTHROPOIETIN 6000 UNIT(S): 10000 INJECTION, SOLUTION INTRAVENOUS; SUBCUTANEOUS at 20:37

## 2021-04-27 RX ADMIN — CINACALCET 60 MILLIGRAM(S): 30 TABLET, FILM COATED ORAL at 13:52

## 2021-04-27 NOTE — H&P ADULT - PROBLEM SELECTOR PLAN 2
with mild hyperkalemia   no EKG changes  leandro renal, lokelma now and HD later today  Resume Epo 6K with HD. Monitor Hb.  Secondary HPT of renal origin: Phosphorus uncontrolled. Hold hectorol 4 mcg with HD and resume sensipar 60 mg daily and renvela 3 tabs with meals. Monitor serum calcium and phosphorus  pt previously noted on Lasix, but per pt's pharmacy not currently on - will defer to renal

## 2021-04-27 NOTE — CHART NOTE - NSCHARTNOTEFT_GEN_A_CORE
Patient seen and assessed post dialysis. Patient still has jerking movements of upper extremities. Patient reports jerking movements are lesser than earlier. Depakote 250 mg po BID ordered as per neuro recommendations as jerking movements persist post dialysis. Patient reports that she takes Wellbutrin and Doxepin. Will hold this medication until evaluated by behavioral health as it can contribute to myoclonus. Will continue to monitor. Patient seen and assessed post dialysis. Patient still has jerking movements of upper extremities. Patient reports jerking movements are lesser than earlier. Depakote 250 mg po BID ordered as per neuro recommendations as jerking movements persist post dialysis. Patient reports that she takes Wellbutrin and Doxepin. Will hold these medications until evaluated by behavioral health as it can contribute to myoclonus. Will continue to monitor. Patient seen and assessed post dialysis. Patient still has jerking movements of upper extremities post HD. Patient reports jerking movements have lessened compared to earlier however, still present therefore ordered Depakote 250 mg po BID as per neuro recommendation. Patient reports that she takes Wellbutrin and Doxepin. Will hold these medications until evaluated by behavioral health as it can contribute to myoclonus. Will continue to monitor.

## 2021-04-27 NOTE — ED ADULT NURSE REASSESSMENT NOTE - NS ED NURSE REASSESS COMMENT FT1
Pt provided lunch as per FELIX Parsons's "ok", pt in NAD, medicated as per ordered, will continue to monitor.

## 2021-04-27 NOTE — CONSULT NOTE ADULT - ATTENDING COMMENTS
Patient seen and examined with neurology medical student and above note reviewed and edited where appropriate and I agree with assessment and plan as outlined. Patient hx as noted and now presenting with abnormal movements in her arms and legs, and speech hesitancy since Saturday. She also missed dialysis yesterday.   She states she cannot control theses movements and they are presenting her form walking and carrying out her usual daily activities.   Exam as outlined above and she has myoclonic jerks of arms and legs bilaterally and asterixis of bilateral upper extremities.    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 Less likely serotonin syndrome.    1. CPK and ammonia levels     2. EEG     3.. If symptoms persist after dialysis would start depakote 250mg BID and assess response    4. Obtain behavioral health consult to help manage her medications for mood and affect disturbance including the doxepin and wellbutrin as in this setting may contribute more to myoclonus     5. Continue medical mgt and supportive care and all questions answered and patient understood plan.

## 2021-04-27 NOTE — ED ADULT TRIAGE NOTE - CHIEF COMPLAINT QUOTE
p/t with hx ESRD on HD, last treatment on Friday c/o of generalized weakness, p;/t denies any chest pain denies sob, fs [id number]

## 2021-04-27 NOTE — ED ADULT NURSE REASSESSMENT NOTE - NS ED NURSE REASSESS COMMENT FT1
Report given to CDU IVANNA Delgadillo, pt in [facility], being transported by TATYANA Dunaway.

## 2021-04-27 NOTE — CONSULT NOTE ADULT - SUBJECTIVE AND OBJECTIVE BOX
[facility] NEPHROLOGY- CONSULTATION NOTE    55y Female with history of below presents with tremors. Nephrology consulted for ESRD status.    Patient well known to our practice for h/o ESRD on HD MWF at [facility] where she follows with me. Last HD on  as an outpatient as patient missed HD on .     REVIEW OF SYSTEMS:  Gen: no changes in weight  HEENT: no rhinorrhea  Neck: no sore throat  Cards: no chest pain  Resp: no dyspnea  GI: no nausea or vomiting or diarrhea  : no dysuria or hematuria  Vascular: no LE edema  Derm: no rashes  Neuro: no numbness/tingling, + LE weakness, + UE and LE tremors    latex (Rash)  penicillin (Nausea)      Home Medications Reviewed  Hospital Medications:   MEDICATIONS  (STANDING):      PAST MEDICAL & SURGICAL HISTORY:  COPD (chronic obstructive pulmonary disease)    DM (diabetes mellitus)    Atrial fibrillation  with loop recorder , battery most likely depleted, as per cardiac clearance, Dr. Reece Anesthesia aware, pt on Eliquis    HTN (hypertension)    Morbid obesity  BMI - 58.3    Chronic GERD    CHAMP (obstructive sleep apnea)  non compliance with CPAP, Anesthesia Dr. Reece aware, pt told to bring CPAP for sx, pr verbalized understanding    Potential difficult airway on pre-intubation assessment  airway class III, large neck, morbid obesity, hx of CHAMP, no compliance with CPAP- Dr. Reece, Anesthesia aware    End-stage renal disease    Anemia    H/O tubal ligation      Status post placement of implantable loop recorder  left chest-     History of vascular access device  s/p insertion right chest permacath 2019, removal 2019, insertion left chest permacath 2019    S/P arteriovenous (AV) fistula creation  left  arm 2019        FAMILY HISTORY:  Family history of diabetes mellitus  mother-     Family hx of hypertension  mother-         SOCIAL HISTORY:  Denies toxic substance use     VITALS:  T(F): 98 (21 @ 09:22), Max: 98 (21 @ 09:22)  HR: 74 (21 @ 09:22)  BP: 133/60 (21 @ 09:22)  RR: 18 (21 @ 09:22)  SpO2: 98% (21 @ 09:22)  Wt(kg): --    Height (cm): 157.5 ( @ 09:22)    PHYSICAL EXAM:  Gen: NAD, calm  HEENT: MMM  Neck: no JVD  Cards: RRR, +S1/S2, no M/G/R  Resp: CTA B/L  GI: soft, NT/ND, NABS  : no CVA tenderness  Vascular: no LE edema B/L, LUE AVF + bruit/thrill  Derm: no rashes  Neuro: non-focal, + LE tremors    LABS:      135  |  95<L>  |  78<H>  ----------------------------<  80  5.9<H>   |  23  |  10.96<H>    Ca    8.6      2021 10:25  Phos  8.3       Mg     2.2         TPro  7.7  /  Alb  4.1  /  TBili  0.3  /  DBili      /  AST  7   /  ALT  5   /  AlkPhos  291<H>      Creatinine Trend: 10.96 <--                        9.9    9.81  )-----------( 300      ( 2021 10:25 )             31.9     Urine Studies:        RADIOLOGY & ADDITIONAL STUDIES:    < from: CT Head No Cont (21 @ 11:09) >  Impression:    No acute hemorrhage, mass effect or extra-axial collections.    < end of copied text >

## 2021-04-27 NOTE — ED PROVIDER NOTE - CROS ED CARDIOVAS ALL NEG
negative... [Normal] : Developmental history within normal limits [Roll Over: ___ Months] : Roll Over: [unfilled] months [Pull Self to Stand ___ Months] : Pull self to stand: [unfilled] months [Sit Up: ___ Months] : Sit Up: [unfilled] months [Walk ___ Months] : Walk: [unfilled] months [Right] : right [Verbally] : verbally [FreeTextEntry2] : No [FreeTextEntry3] : No

## 2021-04-27 NOTE — CONSULT NOTE ADULT - ASSESSMENT
55y RH F w pmhx ESRD HD M/W/F (last dialysis 4 days ago, missed yesterday) via L AV fistula, DM, afib on eliquis, HTN, anxiety recently started on buproprion and doxepin presenting with worsening "jittery" movements in arms, hands, legs and several days of gait instability after missing dialysis 5 days ago. CT head negative.     Impression: Physical remarkable for diffuse myoclonus, labs remarkable for BUN/Cr 78/10.96, CT head unremarkable. Most likely metabolic causing multifocal myoclonus 2/2 missing dialysis. Common causes of metabolic disorders leading to myoclonus include liver and renal failure, hypoglycemia (including relative hypoglycemia), and dialysis syndrome. Can also be seen in patients taking TCAs and SNRIs.      Recommendations:   [ ] f/u CT Head  [ ] send CPK, LFTs, Ammonia  [ ] c/w dialysis  [ ] If myoclonus continue after dialysis start Depakote 250mg BID > Keppra due to ESRD  [ ] If myoclonus continue greater than 24 hrs after dialysis recommend EEG    Case seen and discussed with Neurology Attending Dr. Grajeda  55y RH F w pmhx ESRD HD M/W/F (last dialysis 4 days ago, missed yesterday) via L AV fistula, DM, afib on eliquis, HTN, anxiety recently started on buproprion and doxepin presenting with worsening "jittery" movements in arms, hands, legs and several days of gait instability after missing dialysis 5 days ago. CT head negative.     Impression: Physical remarkable for diffuse myoclonus, labs remarkable for BUN/Cr 78/10.96, CT head unremarkable. Most likely metabolic causing multifocal myoclonus 2/2 missing dialysis. Common causes of metabolic disorders leading to myoclonus include liver and renal failure, hypoglycemia (including relative hypoglycemia), and dialysis syndrome. Can also be seen in patients taking TCAs and SNRIs.      Recommendations:   [ ] f/u CT Head  [ ] send CPK, LFTs, Ammonia  [ ] c/w dialysis  [ ] If myoclonus continue after dialysis start Depakote 250mg BID NOT  Keppra due to ESRD  [ ] If myoclonus continue greater than 24 hrs after dialysis recommend EEG  [] Behavorial health evaluation to assist in managing the antidepressants that were recently started.    Case discussed and seen with Neurology Attending Dr. Grajeda

## 2021-04-27 NOTE — ED PROVIDER NOTE - CLINICAL SUMMARY MEDICAL DECISION MAKING FREE TEXT BOX
55yF w/pmhx ESRD HD M/W/F (last dialysis 4 days ago, missed yesterday) via L AV fistula, DM, afib on eliquis, HTN presenting with weakness/shakiness. Pt reports feeling shaky/jittery for the past few months worse since yesterday, feeling wobbly unable to ambulate. On exam pt is well appearing, non focal neuro exam although unable to ambulate. EKG NSR no evidence of hyperkalemia. Concern for electrolyte abnormality, given inability to ambulate will get CT head. Plan: cbc/cmp/mg/phos/vbg, CT head, CXR. 55yF w/pmhx ESRD HD M/W/F (last dialysis 4 days ago, missed yesterday) via L AV fistula, DM, afib on eliquis, HTN presenting with weakness/shakiness. Pt reports feeling shaky/jittery for the past few months much worse since last week preventing her from walking, feeling wobbly unable to ambulate. On exam pt is nontoxic appearing, non focal neuro exam although unable to ambulate. EKG NSR no evidence of hyperkalemia. Concern for electrolyte abnormality, given inability to ambulate will get CT head. Plan: cbc/cmp/mg/phos/vbg, CT head, CXR.

## 2021-04-27 NOTE — H&P ADULT - ENMT
[address]     Department of Internal Medicine - Staff Internal Medicine Service          ADMISSION NOTE/HISTORY AND PHYSICAL EXAMINATION   ______________________________________________________________________    HISTORY OBTAIN FROM:  Patient    CHIEF COMPLAINT:  Fall from standing height today  Gradually worsening shortness of breath since one week      HISTORY OF PRESENT ILLNESS:    78-year-old female with past medical history of COPD, asthma on home inhalers & methadone for opiate rehab brought by emergency response services after she had a fall at home. Patient stated that she had been feeling dizzy since early morning and tripped over her feet while climbing her stairs and hit her nose. Patient was slightly drowsy and in mild distress due to her nose pain, seemed that she did not remember losing consciousness however she might have. She also has been suffering from increasing onset shortness of breath worse per her baseline for the last week, however denies any fever, nausea, vomiting, flulike symptoms or exposure to anyone with similar symptoms. Imaging of the head and neck was negative for any acute bleed, patient is mildly drowsy and slowly responsive however oriented to time, place and person. Venous blood gases show chronic bicarb retention, patient denies being on any home oxygen. PAST MEDICAL HISTORY:        Diagnosis Date    Anxiety     Chronic obstructive pulmonary disease with acute exacerbation ([address] 4/19/2017    Drug abuse, opioid type ([address]     Ground-glass attenuation areas in the upper lobes  4/19/2017    Mild intermittent asthma 4/19/2017    Opioid dependence on maintenance agonist therapy, no symptoms ([address] 4/19/2017       PAST SURGICAL HISTORY:    History reviewed. No pertinent surgical history. MEDICATION PRIOR TO ADMISSION:  Not in a hospital admission. Allergies:  Patient has no known allergies.     SOCIAL HISTORY:  Current half a pack have discussed the care of Obey Naranjo, including pertinent history and exam findings with the resident. I have reviewed the key elements of all parts of the encounter with the resident. I have seen and examined the patient with the resident and the key elements of all parts of the encounter have been performed by me.         Eleuterio Carcamo MD  Attending and Faculty Internal Medicine  [facility]  Internal [address]   11/10/18 No oral lesions; no gross abnormalities negative

## 2021-04-27 NOTE — ED PROVIDER NOTE - PSH
H/O tubal ligation  1989  History of vascular access device  s/p insertion right chest permacath 2/2019, removal 6/2019, insertion left chest permacath 6/2019  S/P arteriovenous (AV) fistula creation  left  arm 6/2019  Status post placement of implantable loop recorder  left chest- 2015

## 2021-04-27 NOTE — H&P ADULT - PROBLEM SELECTOR PLAN 4
pt on home AM novolog 35 units and Tresiba 60 units QHS  previous admissions reviewed, pt maintained on 30 units Lantus - will start 30 units Lantus QHS and adjust as needed  start NISS

## 2021-04-27 NOTE — H&P ADULT - PROBLEM SELECTOR PLAN 1
subacute - but per pt's history most recently with speech disturbance and proximal muscle weakness leading to ataxia and difficulty moving - needing to r.o neurological process  neuro eval - ? MR, out-pt EMG - check CPK  ? d/t new meds- Neurontin and doxepin started for MDD - hold for now  ? ESRD related

## 2021-04-27 NOTE — CONSULT NOTE ADULT - TIME BILLING
Plan discussed with patient, neurology and medical teams.
agree with the above assessment and plan by MARIA ESTHER Blackwell.  55y F office pt  w/ pmhx ESRD HD M/W/F (last dialysis 4 days ago, missed yesterday) via L AV fistula, DM, afib on eliquis (s/p ILR), HLD, HTN presenting with weakness/shakiness.    1. Weakness  - ? r/t missed HD vs starting Buproprion and Doxepin   -head CT noted  -EKG without ischemic changes  -med f/u     2. Hypertension  -stable  -resume cardizem    3. Hyperlipidemia  -resume statin    4. Paroxysmal Atrial Fibrillation  -remains stable and in sinus rhythm  -loop battery is depleted and unable to be interrogated  -holter/event monitor in sept 2020 without af burden  -start asa 81 mg QD   -resume cardizem     5. ESRD  -hyperkalemia noted -plan for hd today   -lasix per renal for le edema   -renal f/u     dvt ppx

## 2021-04-27 NOTE — CONSULT NOTE ADULT - ASSESSMENT
Echo 5/21/19: EF 50%, grossly nl sys fx, min AR, min MR     a/p   55y F office pt  w/ pmhx ESRD HD M/W/F (last dialysis 4 days ago, missed yesterday) via L AV fistula, DM, afib on eliquis (s/p ILR), HLD, HTN presenting with weakness/shakiness.    1. Weakness  - ? r/t missed HD vs starting Buproprion and Doxepin   -head CT noted  -EKG without ischemic changes  -med f/u     2. Hypertension  -stable  -resume cardizem    3. Hyperlipidemia  -resume statin    4. Paroxysmal Atrial Fibrillation  -remains stable and in sinus rhythm  -loop battery is depleted and unable to be interrogated  -holter/event monitor in sept 2020 without af burden  -start asa 81 mg QD   -resume cardizem     5. ESRD  -hyperkalemia noted -plan for hd today   -lasix per renal for le edema   -renal f/u     dvt ppx

## 2021-04-27 NOTE — ED ADULT NURSE NOTE - OBJECTIVE STATEMENT
Pt received to spot 26 presents with weakness x 2 days, and shakiness. Pt is a dialysis pt, with left arm AV fistula, states "I usually get dialysis MWF, I missed Monday because I was not feeling well". Pt a&ox3, ambulatory with walker at baseline however currently reports difficulty walking due to weakness. Pt skin intact, respirations even and unlabored, pt denies CP, SOB, fever, chills or any other symptoms. 22G IV placed in rt arm, labs drawn and sent, will continue to monitor.

## 2021-04-27 NOTE — ED ADULT NURSE NOTE - NSIMPLEMENTINTERV_GEN_ALL_ED
Implemented All Fall Risk Interventions:  [place] to call system. Call bell, personal items and telephone within reach. Instruct patient to call for assistance. Room bathroom lighting operational. Non-slip footwear when patient is off stretcher. Physically safe environment: no spills, clutter or unnecessary equipment. Stretcher in lowest position, wheels locked, appropriate side rails in place. Provide visual cue, wrist band, yellow gown, etc. Monitor gait and stability. Monitor for mental status changes and reorient to person, place, and time. Review medications for side effects contributing to fall risk. Reinforce activity limits and safety measures with patient and family.

## 2021-04-27 NOTE — ED PROVIDER NOTE - PHYSICAL EXAMINATION
Neuro: A&Ox3, symmetric facial expressions, appears tremulous with finger to nose testing, strength 5/5 in all extremities

## 2021-04-27 NOTE — CONSULT NOTE ADULT - ASSESSMENT
55y Female with history of ESRD on HD presents with tremors. Nephrology consulted for ESRD status.    1) ESRD: Last HD on 4/23 as an outpatient. Plan for next maintenance HD today. Monitor electrolytes.    2) HTN with ESRD: BP controlled. Resume home medications. Monitor BP.    3) Anemia of renal disease: Hb borderline. Resume Epo 6K with HD. Monitor Hb.    4) Secondary HPT of renal origin: Phosphorus uncontrolled. Hold hectorol 4 mcg with HD and resume sensipar 60 mg daily and renvela 3 tabs with meals. Monitor serum calcium and phosphorus.    5) Hyperkalemia: In setting of missed. HD. Will give lokelma 10 grams PO X 1 dose and plan for HD later today. Monitor serum K.      [facility] NEPHROLOGY  Keaton Lopez M.D.  Juma Green D.O.  Mlya Hoffmann M.D.  Julia Brewer, JASIEL, ANP-C    Telephone: [phone]  Facsimile: [phone]    [address] [place] [address]

## 2021-04-27 NOTE — CONSULT NOTE ADULT - SUBJECTIVE AND OBJECTIVE BOX
HPI:  55y RH F w pmhx ESRD HD M/W/F (last dialysis 4 days ago, missed yesterday) via L AV fistula, DM, afib on eliquis, HTN, anxiety recently started on buproprion and doxepin presenting with worsening "jittery" movements in arms, hands, legs and several days of gait instability after missing dialysis 5 days ago. States for the past few months she has had tremor in hands and arms that worsened with intentional movements, but 4 days ago began to feel them in both LE after missing dialysis 5 days ago. The movements have been getting progressively more intense since missing dialysis, and today patient states she was unable to stand up from bed or walk due to movements. Has felt these movements in her LE intermittently before after dialysis, which brought her to ED 3/23/21, but also notes she does not often walk except at dialysis. States that whenever she tries to move any extremity the jittery movements get worse and they feel weaker, has been unable to pull herself up in bed in past few days. Also believes that movements have been affecting speech, making difficult for her to "get words out" but denies any slurred speech. States her anxiety is better controlled on new medications, and that her finger sticks at home are in the low 100s.       ED pt's meds confirmed with her pharmacy  (2021 13:36)    A 10-system ROS was performed and is negative except for those items noted above and/or in the HPI.    PAST MEDICAL & SURGICAL HISTORY:  COPD (chronic obstructive pulmonary disease)    DM (diabetes mellitus)  Atrial fibrillation  with loop recorder , battery most likely depleted, as per cardiac clearance, Dr. Reece Anesthesia aware, pt on Eliquis  HTN (hypertension)  Morbid obesity  BMI - 58.3  Chronic GERD  CHAMP (obstructive sleep apnea)  non compliance with CPAP, Anesthesia Dr. Reece aware, pt told to bring CPAP for sx, pr verbalized understanding  Potential difficult airway on pre-intubation assessment  airway class III, large neck, morbid obesity, hx of CHAMP, no compliance with CPAP- Dr. Reece, Anesthesia aware  End-stage renal disease  Anemia    H/O tubal ligation      Status post placement of implantable loop recorder  left chest-     History of vascular access device  s/p insertion right chest permacath 2019, removal 2019, insertion left chest permacath 2019    S/P arteriovenous (AV) fistula creation  left  arm 2019      FAMILY HISTORY:  Family history of diabetes mellitus  mother-     Family hx of hypertension  mother-       SOCIAL HISTORY:   T/E/D: No smoke, drink, drugs     MEDICATIONS (HOME):  Home Medications:  acetaminophen 325 mg oral tablet: 2 tab(s) orally every 6 hours, As needed, Mild Pain (1 - 3) (17 Oct 2019 09:39)  Cardizem  mg/24 hours oral capsule, extended release: 1 cap(s) orally once a day (17 Oct 2019 09:39)  Combivent Respimat CFC free 20 mcg-100 mcg/inh inhalation aerosol: 1 puff(s) inhaled 4 times a day, As Needed (17 Oct 2019 09:39)  Eliquis 2.5 mg oral tablet: 1 tab(s) orally 2 times a day (17 Oct 2019 09:39)  furosemide 80 mg oral tablet: 1 tab(s) orally once a day (17 Oct 2019 09:39)  gabapentin 300 mg oral capsule: 1 cap(s) orally 2 times a day (17 Oct 2019 09:39)  NovoLOG Mix 70/30 FlexPen subcutaneous suspension: 35 unit(s) subcutaneous once a day at lunch  (17 Oct 2019 09:39)  raNITIdine 150 mg oral capsule: 1 cap(s) orally 2 times a day (17 Oct 2019 09:39)  rosuvastatin 40 mg oral tablet: 1 tab(s) orally once a day (at bedtime) (17 Oct 2019 09:39)  Sensipar 60 mg oral tablet: 1 tab(s) orally once a day (at bedtime) (17 Oct 2019 09:39)  sevelamer carbonate 800 mg oral tablet: 2 tab(s) orally 3 times a day (with meals) (17 Oct 2019 09:39)  Singulair 10 mg oral tablet: 1 tab(s) orally once a day (at bedtime) (17 Oct 2019 09:39)  Tresiba FlexTouch 100 units/mL subcutaneous solution: 60 unit(s) subcutaneous once a day (at bedtime) (17 Oct 2019 09:39)  vitamin b complex: 1 tab(s) orally once a day (17 Oct 2019 09:39)  ZyrTEC 10 mg oral tablet: 1 tab(s) orally once a day (at bedtime) (17 Oct 2019 09:39)      Exam:   MS: A&Ox3. Language fluent but difficulty producing speech due to diffuse myoclonus. Attentive.   CN: LEYDI. EOMI with pain to L eye abduction. V1-V3 intact. Face symmetric. T/u midline.   Motor: Diffuse myoclonus, asterixis in hands; Raises UE against gravity bilaterally w/ no drift, raises RLE against gravity  Sensation: intact to light touch throughout  Reflexes: Biceps and brachioradialis 2+ b/l, patellar 1+ b/l, unable to adequately asses achilles due to myoclonus  Coordination: Dysmetria on FNF L >R   Gait: Deferred     STUDIES & IMAGIN-27 Na --   K 6.0<H>   P --   Mg --   Ca --   Cr --      EXAM: CT BRAIN    PROCEDURE DATE: 2021    INTERPRETATION: Noncontrast CT of the brain.    CLINICAL INDICATION: Difficulty ambulating    TECHNIQUE : Axial CT scanning of the brain was obtained from the skull base to the vertex without the administration of intravenous contrast.    COMPARISON: A brain CT dated 3/23/2021    FINDINGS: No acute hemorrhage, hydrocephalus, midline shift or extra-axial collections are present. The orbits are not remarkable in appearance. A punctate calcification is again noted along the left dorsal fuentes.    The visualized paranasal sinuses and tympanomastoid cavities are free of acute disease.    Impression:    No acute hemorrhage, mass effect or extra-axial collections.       HPI:  55y RH F w pmhx ESRD HD M/W/F (last dialysis 4 days ago, missed yesterday) via L AV fistula, DM, afib on eliquis, HTN, anxiety recently started on buproprion and doxepin presenting with worsening "jittery" movements in arms, hands, legs and several days of gait instability after missing dialysis 5 days ago. States for the past few months she has had tremor in hands and arms that worsened with intentional movements, but 3 days ago began to feel them in both LE after missing dialysis 5 days ago. The movements have been getting progressively more intense since missing dialysis, and today patient states she was unable to stand up from bed or walk due to movements. Has felt these movements in her LE intermittently before after dialysis, which brought her to ED 3/23/21, but also notes she does not often walk except at dialysis. States that whenever she tries to move any extremity the jittery movements get worse and they feel weaker, has been unable to pull herself up in bed in past few days. Also believes that movements have been affecting speech, making difficult for her to "get words out" but denies any slurred speech. States her anxiety is better controlled on new medications, and that her finger sticks at home are in the low 100s.       ED pt's meds confirmed with her pharmacy  (2021 13:36)    A 10-system ROS was performed and is negative except for those items noted above and/or in the HPI.    PAST MEDICAL & SURGICAL HISTORY:  COPD (chronic obstructive pulmonary disease)    DM (diabetes mellitus)  Atrial fibrillation  with loop recorder , battery most likely depleted, as per cardiac clearance, Dr. Reece Anesthesia aware, pt on Eliquis  HTN (hypertension)  Morbid obesity  BMI - 58.3  Chronic GERD  CHAMP (obstructive sleep apnea)  non compliance with CPAP, Anesthesia Dr. Reece aware, pt told to bring CPAP for sx, pr verbalized understanding  Potential difficult airway on pre-intubation assessment  airway class III, large neck, morbid obesity, hx of CHAMP, no compliance with CPAP- Dr. Reece, Anesthesia aware  End-stage renal disease  Anemia    H/O tubal ligation      Status post placement of implantable loop recorder  left chest-     History of vascular access device  s/p insertion right chest permacath 2019, removal 2019, insertion left chest permacath 2019    S/P arteriovenous (AV) fistula creation  left  arm 2019      FAMILY HISTORY:  Family history of diabetes mellitus  mother-     Family hx of hypertension  mother-       SOCIAL HISTORY:   T/E/D: No smoke, drink, drugs     MEDICATIONS (HOME):  Home Medications:  acetaminophen 325 mg oral tablet: 2 tab(s) orally every 6 hours, As needed, Mild Pain (1 - 3) (17 Oct 2019 09:39)  Cardizem  mg/24 hours oral capsule, extended release: 1 cap(s) orally once a day (17 Oct 2019 09:39)  Combivent Respimat CFC free 20 mcg-100 mcg/inh inhalation aerosol: 1 puff(s) inhaled 4 times a day, As Needed (17 Oct 2019 09:39)  Eliquis 2.5 mg oral tablet: 1 tab(s) orally 2 times a day (17 Oct 2019 09:39)  furosemide 80 mg oral tablet: 1 tab(s) orally once a day (17 Oct 2019 09:39)  gabapentin 300 mg oral capsule: 1 cap(s) orally 2 times a day (17 Oct 2019 09:39)  NovoLOG Mix 70/30 FlexPen subcutaneous suspension: 35 unit(s) subcutaneous once a day at lunch  (17 Oct 2019 09:39)  raNITIdine 150 mg oral capsule: 1 cap(s) orally 2 times a day (17 Oct 2019 09:39)  rosuvastatin 40 mg oral tablet: 1 tab(s) orally once a day (at bedtime) (17 Oct 2019 09:39)  Sensipar 60 mg oral tablet: 1 tab(s) orally once a day (at bedtime) (17 Oct 2019 09:39)  sevelamer carbonate 800 mg oral tablet: 2 tab(s) orally 3 times a day (with meals) (17 Oct 2019 09:39)  Singulair 10 mg oral tablet: 1 tab(s) orally once a day (at bedtime) (17 Oct 2019 09:39)  Tresiba FlexTouch 100 units/mL subcutaneous solution: 60 unit(s) subcutaneous once a day (at bedtime) (17 Oct 2019 09:39)  vitamin b complex: 1 tab(s) orally once a day (17 Oct 2019 09:39)  ZyrTEC 10 mg oral tablet: 1 tab(s) orally once a day (at bedtime) (17 Oct 2019 09:39)      Exam:   MS: A&Ox3. Language fluent but difficulty producing speech due to diffuse myoclonus. Attentive.   CN: LEYDI. EOMI with pain to L eye abduction. V1-V3 intact. Face symmetric. T/u midline.   Motor: Diffuse myoclonus, asterixis in hands; Raises UE against gravity bilaterally w/ no drift, raises RLE against gravity  Sensation: intact to light touch throughout  Reflexes: Biceps and brachioradialis 2+ b/l, patellar 1+ b/l, unable to adequately asses achilles due to myoclonus  Coordination: Dysmetria on FNF L >R   Gait: Deferred     STUDIES & IMAGIN-27 Na --   K 6.0<H>   P --   Mg --   Ca --   Cr --      EXAM: CT BRAIN    PROCEDURE DATE: 2021    INTERPRETATION: Noncontrast CT of the brain.    CLINICAL INDICATION: Difficulty ambulating    TECHNIQUE : Axial CT scanning of the brain was obtained from the skull base to the vertex without the administration of intravenous contrast.    COMPARISON: A brain CT dated 3/23/2021    FINDINGS: No acute hemorrhage, hydrocephalus, midline shift or extra-axial collections are present. The orbits are not remarkable in appearance. A punctate calcification is again noted along the left dorsal fuentes.    The visualized paranasal sinuses and tympanomastoid cavities are free of acute disease.    Impression:    No acute hemorrhage, mass effect or extra-axial collections.

## 2021-04-27 NOTE — ED PROVIDER NOTE - ATTENDING CONTRIBUTION TO CARE
agree with above hpi  on my exam  Vital Signs Last 24 Hrs  vital signs: T(C): 36.7 (04-27-21 @ 09:22), Max: 36.7 (04-27-21 @ 09:22)  HR: 74 (04-27-21 @ 09:22) (74 - 74)  BP: 133/60 (04-27-21 @ 09:22) (133/60 - 133/60)  BP(mean): --  RR: 18 (04-27-21 @ 09:22) (18 - 18)  SpO2: 98% (04-27-21 @ 09:22) (98% - 98%)  GEN - NAD; nontoxic appearing, elevated bmi  HEAD - NC/AT   EYES- PERRL, EOMI  ENT: Airway patent, mmm, Oral cavity and pharynx normal. No inflammation, swelling, exudate, or lesions.  NECK: Neck supple, non-tender without lymphadenopathy, no masses.  PULMONARY - CTA b/l, symmetric breath sounds.   CARDIAC -s1s2, RRR, no M,G,R  ABDOMEN - +BS, ND, NT, soft, no guarding, no rebound, no masses   BACK - no CVA tenderness, Normal  spine   EXTREMITIES - FROM, no edema   SKIN - no rash or bruising   NEUROLOGIC - ao3, cn2-12 intact, 5/5 strength in all extremities, sensation grossly intact, f-n nl however b/l hands somewhat tremulous intermittently, no dysdiadochokinesia  PSYCH -nl mood/affect, nl insight.  Patient presents to the ed with generalized weakness, full body intermittent shakiness/tremor which has been ongoing for weeks-month but worsened over last few days, to point that makes patient feel unsteady and weak, unable to ambulate as of yesterday where as she normally ambulates with walker. Patient denies any fevers, chills, ha, cp, vision changes, cough, sob, abd pain, vomiting, diarrhea. Urinates once a day and already urinated this am. Missed her hd yesterday 2/2 not feeling she can ambulate safely. On exam patient appears nontoxic,  mild intermittent generalized tremor on exam, otherwise no other focal deficits. plan for labs, ekg, ct brain, nephrology consultation, evaluate for diff including but not limited to elec disturbance, organ dysfunction, mass, nephro for hd, reass.

## 2021-04-27 NOTE — H&P ADULT - HISTORY OF PRESENT ILLNESS
55yF w pmhx ESRD HD M/W/F (last dialysis 4 days ago, missed yesterday) via L AV fistula, DM, afib on eliquis, HTN presenting with weakness/shakiness. Pt states for the past few months she has weakness and feels "jittery' in her arms, now worse over the last few days and involving her feet. She reports being started on Buproprion and Doxepin for depression recently. She states yesterday her shakiness/ feeling jittery worsened, feels "wobbly" when she attempts to ambulate, needs assistance to ambulate or even [place] in bed. she states its hard for her to raise her arm up d/t tremors and weakness. She reports yesterday afternoon, unknown time on onset, her speech changed, she reports having "double talk", denies slurred speech. Pt denies fever/chills, chest pain, dizziness, lightheadedness, difficulty swallowing/ in Ed found with mild hand tremors, no dysarthria.  55yF w pmhx ESRD HD M/W/F (last dialysis 4 days ago, missed yesterday) via L AV fistula, DM, afib on eliquis, HTN presenting with weakness/shakiness. Pt states for the past few months she has weakness and feels "jittery' in her arms, now worse over the last few days and involving her feet. She reports being started on Buproprion and Doxepin for depression recently. She states yesterday her shakiness/ feeling jittery worsened, feels "wobbly" when she attempts to ambulate, needs assistance to ambulate or even easton in bed. she states its hard for her to raise her arm up d/t tremors and weakness. She reports yesterday afternoon, unknown time on onset, her speech changed, she reports having "double talk", denies slurred speech. Pt denies fever/chills, chest pain, dizziness, lightheadedness, difficulty swallowing/ in Ed found with mild hand tremors, no dysarthria.   pt's meds confirmed with her pharmacy

## 2021-04-27 NOTE — H&P ADULT - NSICDXFAMILYHX_GEN_ALL_CORE_FT
FAMILY HISTORY:  Family history of diabetes mellitus, mother-   Family hx of hypertension, mother-

## 2021-04-27 NOTE — H&P ADULT - PROBLEM SELECTOR PLAN 3
currently in sinus  cw Eliquis  per out-pt pharmacy pt not on Cardizem, but per cards continue - will resume w parameters

## 2021-04-27 NOTE — H&P ADULT - ASSESSMENT
55yF w pmhx ESRD HD M/W/F (last dialysis 4 days ago, missed yesterday) via L AV fistula, DM, afib on Eliquis, HTN presenting with generalized weakness, resting tremors

## 2021-04-27 NOTE — ED PROVIDER NOTE - OBJECTIVE STATEMENT
55yF w/pmhx ESRD HD M/W/F (last dialysis 4 days ago, missed yesterday) via L AV fistula, DM, afib on eliquis, HTN presenting with weakness/shakiness. Pt states for the past few months she has weakness and feels "jittery'. She reports being started on Buproprion and Doxepin for depression. She states yesterday her shakiness/ feeling jittery worsened, feels "wobbly" when she attempts to ambulate, needs assistance to ambulate. She reports yesterday afternoon, unknown time on onset, her speech changed, she reports having "double talk", denies slurred speech. Pt denies fever/chills, chest pain, dizziness, lightheadedness, difficulty swallowing, abd pain n/v/d, leg pain, back pain, headache, changes to vision, recent travel or illness or any other concerns.

## 2021-04-27 NOTE — H&P ADULT - NSICDXPASTMEDICALHX_GEN_ALL_CORE_FT
PAST MEDICAL HISTORY:  Anemia     Atrial fibrillation with loop recorder 2015, battery most likely depleted, as per cardiac clearance, Dr. Reece Anesthesia aware, pt on Eliquis    Chronic GERD     COPD (chronic obstructive pulmonary disease)     DM (diabetes mellitus)     End-stage renal disease     HTN (hypertension)     Morbid obesity BMI - 58.3    CHAMP (obstructive sleep apnea) non compliance with CPAP, Anesthesia Dr. Reece aware, pt told to bring CPAP for sx, pr verbalized understanding    Potential difficult airway on pre-intubation assessment airway class III, large neck, morbid obesity, hx of CHAMP, no compliance with CPAP- Dr. Reece, Anesthesia aware

## 2021-04-27 NOTE — ED PROVIDER NOTE - PROGRESS NOTE DETAILS
FELIX Parsons: Pt seen by nephrologist  in the ER, requesting admission to . K 5.8 nephro ordered for [facility], rpt K sent. Spoke with  who accepts pt for admission. Pt agrees with admission

## 2021-04-27 NOTE — CONSULT NOTE ADULT - SUBJECTIVE AND OBJECTIVE BOX
CARDIOLOGY CONSULT - Dr. Bullock         HPI:    55yF office pt  w/pmhx ESRD HD M/W/F (last dialysis 4 days ago, missed yesterday) via L AV fistula, DM, afib on eliquis (s/p ILR), HLD, HTN presenting with weakness/shakiness. Pt states for the past few months she has weakness and feels "jittery'. She reports being started on Buproprion and Doxepin for depression. She states yesterday her shakiness/ feeling jittery worsened, feels "wobbly" when she attempts to ambulate, needs assistance to ambulate. She reports yesterday afternoon, unknown time on onset, her speech changed, she reports having "double talk", denies slurred speech. Patient was unable to go to HD 2/2 fatigue.  Pt denies fever/chills, chest pain, dizziness, lightheadedness, difficulty swallowing, abd pain n/v/d, leg pain, back pain, headache, changes to vision, recent travel or illness or any other concerns.    Pt was last seen in office in 2020, was placed on holter monitor for af burden.  Upon assessment of holter no afib was noted.  However pt did not follow up in office and has been on Eliquis since.          PAST MEDICAL & SURGICAL HISTORY:  COPD (chronic obstructive pulmonary disease)    DM (diabetes mellitus)    Atrial fibrillation  with loop recorder , battery most likely depleted, as per cardiac clearance, Dr. Reece Anesthesia aware, pt on Eliquis    HTN (hypertension)    Morbid obesity  BMI - 58.3    Chronic GERD    CHAMP (obstructive sleep apnea)  non compliance with CPAP, Anesthesia Dr. Reece aware, pt told to bring CPAP for sx, pr verbalized understanding    Potential difficult airway on pre-intubation assessment  airway class III, large neck, morbid obesity, hx of CHAMP, no compliance with CPAP- Dr. Reece, Anesthesia aware    End-stage renal disease    Anemia    H/O tubal ligation      Status post placement of implantable loop recorder  left chest-     History of vascular access device  s/p insertion right chest permacath 2019, removal 2019, insertion left chest permacath 2019    S/P arteriovenous (AV) fistula creation  left  arm 2019           PREVIOUS DIAGNOSTIC TESTING:    x[]office echo 19  Dimensions: Normal Values: Derived Variables: Doppler:/  LA 3.2 2.0-4.0 cm LVMI g/m2 Ao Peak Augusto m/sec  Ao 2.9 2.0-3.8 cm RWT Ao Peak Grad mmHg  Septum 0.9 0.6-1.2 cm Estimated JOSUE cm2 Ao Mean Grad mmHg  PWT 0.9 0.6-1.1 cm Estimated MVA cm2 E Wave Peak V 0.9 m/sec  LVIDdias 5.3 3.0-5.6 cm LVOT Diam cm A Wave Peak V 0.9 m/sec  LVIDsys 3.5 1.8-4.0 cm Ejection Fraction 60 % MV Mean Grad mmHg  MV PHT msec  TR JET Peak Grad 15 mmHg  Observations:  Aortic Valve: Calcified trileaflet aortic valve with normal systolic opening. Minimal aortic regurgitation seen.  Aorta: Normal aortic root.   Mitral Valve: Mitral annular calcification. Normal mitral leaflets with normal diastolic opening.   Mild mitral valve regurgitation seen.  Left Atrium: Normal left atrium.   Left Ventricle: Normal left ventricular size and thickness.  The endocardium is poorly visualized. Grossly normal left ventricular systolic function.  Right Heart: Normal right atrium. Normal right ventricular size and systolic function.  Normal tricuspid valve. Minimal tricuspid regurgitation.  Normal pulmonic valve. Trace pulmonary valve regurgitation seen.   Pericardium/Pleura: Normal pericardium with no evidence of pericardial effusion.  Hemodynamic: PASP estimated at 25 mmHg, assuming right atrial pressure equals 10 mmHg.  Conclusions:  1. Calcified trileaflet aortic valve with normal systolic opening. Minimal aortic regurgitation.  2. Normal aortic root.  3. Mild mitral regurgitation.  4. Normal left atrium.  5. Normal left ventricular size and thickness.  6. The left ventricle is poorly visualized. Grossly normal systolic function.  7. Ejection fraction estimated at 60%.  8. Normal right ventricular size and systolic function.  9. Normal pulmonary pressures.  [ ]  Catheterization:  [ ] Stress Test:  	    MEDICATIONS:  Home Medications:  acetaminophen 325 mg oral tablet: 2 tab(s) orally every 6 hours, As needed, Mild Pain (1 - 3) (17 Oct 2019 09:39)  Cardizem  mg/24 hours oral capsule, extended release: 1 cap(s) orally once a day (17 Oct 2019 09:39)  Combivent Respimat CFC free 20 mcg-100 mcg/inh inhalation aerosol: 1 puff(s) inhaled 4 times a day, As Needed (17 Oct 2019 09:39)  Eliquis 2.5 mg oral tablet: 1 tab(s) orally 2 times a day (17 Oct 2019 09:39)  furosemide 80 mg oral tablet: 1 tab(s) orally once a day (17 Oct 2019 09:39)  gabapentin 300 mg oral capsule: 1 cap(s) orally 2 times a day (17 Oct 2019 09:39)  NovoLOG Mix 70/30 FlexPen subcutaneous suspension: 35 unit(s) subcutaneous once a day at lunch  (17 Oct 2019 09:39)  raNITIdine 150 mg oral capsule: 1 cap(s) orally 2 times a day (17 Oct 2019 09:39)  rosuvastatin 40 mg oral tablet: 1 tab(s) orally once a day (at bedtime) (17 Oct 2019 09:39)  Sensipar 60 mg oral tablet: 1 tab(s) orally once a day (at bedtime) (17 Oct 2019 09:39)  sevelamer carbonate 800 mg oral tablet: 2 tab(s) orally 3 times a day (with meals) (17 Oct 2019 09:39)  Singulair 10 mg oral tablet: 1 tab(s) orally once a day (at bedtime) (17 Oct 2019 09:39)  Tresiba FlexTouch 100 units/mL subcutaneous solution: 60 unit(s) subcutaneous once a day (at bedtime) (17 Oct 2019 09:39)  vitamin b complex: 1 tab(s) orally once a day (17 Oct 2019 09:39)  ZyrTEC 10 mg oral tablet: 1 tab(s) orally once a day (at bedtime) (17 Oct 2019 09:39)      MEDICATIONS  (STANDING):  chlorhexidine 2% Cloths 1 Application(s) Topical daily  cinacalcet 60 milliGRAM(s) Oral daily  epoetin anabela-epbx (RETACRIT) Injectable 6000 Unit(s) IV Push <User Schedule>  sevelamer carbonate 2400 milliGRAM(s) Oral three times a day with meals      FAMILY HISTORY:  Family history of diabetes mellitus  mother-     Family hx of hypertension  mother-         SOCIAL HISTORY:    [ x] Non-smoker  [ ] Smoker  [ ] Alcohol    Allergies    latex (Rash)  penicillin (Nausea)    Intolerances    	    REVIEW OF SYSTEMS:  CONSTITUTIONAL: No fever, weight loss, or fatigue  EYES: No eye pain, visual disturbances, or discharge  ENMT:  No difficulty hearing, tinnitus, vertigo; No sinus or throat pain  NECK: No pain or stiffness  RESPIRATORY: No cough, wheezing, chills or hemoptysis; No Shortness of Breath  CARDIOVASCULAR: No chest pain, palpitations, passing out, dizziness, or leg swelling  GASTROINTESTINAL: No abdominal or epigastric pain. No nausea, vomiting, or hematemesis; No diarrhea or constipation. No melena or hematochezia.  GENITOURINARY: No dysuria, frequency, hematuria, or incontinence  NEUROLOGICAL: No headaches, memory loss, +loss of strength, no numbness, or tremors  SKIN: No itching, burning, rashes, or lesions   	    [x ] All others negative	see hpi   [ ] Unable to obtain    PHYSICAL EXAM:  T(C): 36.7 (21 @ 09:22), Max: 36.7 (21 @ 09:22)  HR: 74 (21 @ 09:22) (74 - 74)  BP: 133/60 (21 @ 09:22) (133/60 - 133/60)  RR: 18 (21 @ 09:22) (18 - 18)  SpO2: 98% (21 @ 09:22) (98% - 98%)  Wt(kg): --  I&O's Summary      Appearance: Normal	  Psychiatry: A & O x 3, Mood & affect appropriate  HEENT:   Normal oral mucosa, PERRL, EOMI	  Lymphatic: No lymphadenopathy  Cardiovascular: Normal S1 S2,RRR, No JVD, No murmurs  Respiratory: Lungs clear to auscultation	  Gastrointestinal:  Soft, Non-tender, + BS	  Skin: No rashes, No ecchymoses, No cyanosis	  Neurologic: Non-focal  Extremities: Normal range of motion, No clubbing, cyanosis or edema  Vascular: Peripheral pulses palpable 2+ bilaterally    TELEMETRY: NSR  	    ECG:  	NSR 73 - no ischemic changes   RADIOLOGY:   CT Head No Cont (21 @ 11:09)   COMPARISON: A brain CT dated 3/23/2021    FINDINGS:  No acute hemorrhage, hydrocephalus, midline shift or extra-axial collections are present. The orbits are not remarkable in appearance. A punctatecalcification is again noted along the left dorsal fuentes.    The visualized paranasal sinuses and tympanomastoid cavities are free of acute disease.    Impression:    No acute hemorrhage, mass effect or extra-axial collections.    Xray Chest 1 View- PORTABLE-Urgent (Xray Chest 1 View- PORTABLE-Urgent .) (21 @ 10:55)     INTERPRETATION:    The lungs are clear. Heart is difficult to evaluate on this AP view. Loop recorder is seen. No effusions or congestion to indicate CHF.      COMPARISON:  2019      IMPRESSION:  No acute cardiopulmonary pathology.        OTHER: 	  	  LABS:	 	    CARDIAC MARKERS:                                  9.9    9.81  )-----------( 300      ( 2021 10:25 )             31.9         x   |  x   |  x   ----------------------------<  x   6.0<H>   |  x   |  x     Ca    8.6      2021 10:25  Phos  8.3       Mg     2.2         TPro  7.7  /  Alb  4.1  /  TBili  0.3  /  DBili  x   /  AST  7   /  ALT  5   /  AlkPhos  291<H>      PT/INR - ( 2021 10:25 )   PT: 14.1 sec;   INR: 1.25 ratio         PTT - ( 2021 10:25 )  PTT:36.9 sec  proBNP:   Lipid Profile:   HgA1c:   TSH:

## 2021-04-28 LAB
A1C WITH ESTIMATED AVERAGE GLUCOSE RESULT: 7.2 % — HIGH (ref 4–5.6)
ALBUMIN SERPL ELPH-MCNC: 4 G/DL — SIGNIFICANT CHANGE UP (ref 3.3–5)
ALP SERPL-CCNC: 284 U/L — HIGH (ref 40–120)
ALT FLD-CCNC: <5 U/L — SIGNIFICANT CHANGE UP (ref 4–33)
ANION GAP SERPL CALC-SCNC: 14 MMOL/L — SIGNIFICANT CHANGE UP (ref 7–14)
AST SERPL-CCNC: 6 U/L — SIGNIFICANT CHANGE UP (ref 4–32)
BILIRUB SERPL-MCNC: 0.3 MG/DL — SIGNIFICANT CHANGE UP (ref 0.2–1.2)
BUN SERPL-MCNC: 45 MG/DL — HIGH (ref 7–23)
CALCIUM SERPL-MCNC: 9.1 MG/DL — SIGNIFICANT CHANGE UP (ref 8.4–10.5)
CHLORIDE SERPL-SCNC: 95 MMOL/L — LOW (ref 98–107)
CK SERPL-CCNC: 57 U/L — SIGNIFICANT CHANGE UP (ref 25–170)
CO2 SERPL-SCNC: 27 MMOL/L — SIGNIFICANT CHANGE UP (ref 22–31)
COVID-19 SPIKE DOMAIN AB INTERP: POSITIVE
COVID-19 SPIKE DOMAIN ANTIBODY RESULT: >250 U/ML — HIGH
CREAT SERPL-MCNC: 7.78 MG/DL — HIGH (ref 0.5–1.3)
ESTIMATED AVERAGE GLUCOSE: 160 MG/DL — HIGH (ref 68–114)
GLUCOSE SERPL-MCNC: 148 MG/DL — HIGH (ref 70–99)
HBV SURFACE AG SER-ACNC: SIGNIFICANT CHANGE UP
HCT VFR BLD CALC: 31.7 % — LOW (ref 34.5–45)
HGB BLD-MCNC: 9.5 G/DL — LOW (ref 11.5–15.5)
MCHC RBC-ENTMCNC: 29.2 PG — SIGNIFICANT CHANGE UP (ref 27–34)
MCHC RBC-ENTMCNC: 30 GM/DL — LOW (ref 32–36)
MCV RBC AUTO: 97.5 FL — SIGNIFICANT CHANGE UP (ref 80–100)
NRBC # BLD: 0 /100 WBCS — SIGNIFICANT CHANGE UP
NRBC # FLD: 0 K/UL — SIGNIFICANT CHANGE UP
PLATELET # BLD AUTO: 274 K/UL — SIGNIFICANT CHANGE UP (ref 150–400)
POTASSIUM SERPL-MCNC: 4.3 MMOL/L — SIGNIFICANT CHANGE UP (ref 3.5–5.3)
POTASSIUM SERPL-SCNC: 4.3 MMOL/L — SIGNIFICANT CHANGE UP (ref 3.5–5.3)
PROT SERPL-MCNC: 7.5 G/DL — SIGNIFICANT CHANGE UP (ref 6–8.3)
RBC # BLD: 3.25 M/UL — LOW (ref 3.8–5.2)
RBC # FLD: 13.3 % — SIGNIFICANT CHANGE UP (ref 10.3–14.5)
SARS-COV-2 IGG+IGM SERPL QL IA: >250 U/ML — HIGH
SARS-COV-2 IGG+IGM SERPL QL IA: POSITIVE
SODIUM SERPL-SCNC: 136 MMOL/L — SIGNIFICANT CHANGE UP (ref 135–145)
WBC # BLD: 8.94 K/UL — SIGNIFICANT CHANGE UP (ref 3.8–10.5)
WBC # FLD AUTO: 8.94 K/UL — SIGNIFICANT CHANGE UP (ref 3.8–10.5)

## 2021-04-28 PROCEDURE — 99233 SBSQ HOSP IP/OBS HIGH 50: CPT | Mod: GC

## 2021-04-28 PROCEDURE — 99223 1ST HOSP IP/OBS HIGH 75: CPT

## 2021-04-28 RX ORDER — ASPIRIN/CALCIUM CARB/MAGNESIUM 324 MG
81 TABLET ORAL DAILY
Refills: 0 | Status: DISCONTINUED | OUTPATIENT
Start: 2021-04-28 | End: 2021-04-30

## 2021-04-28 RX ORDER — LANOLIN ALCOHOL/MO/W.PET/CERES
3 CREAM (GRAM) TOPICAL AT BEDTIME
Refills: 0 | Status: DISCONTINUED | OUTPATIENT
Start: 2021-04-28 | End: 2021-04-30

## 2021-04-28 RX ADMIN — INSULIN GLARGINE 30 UNIT(S): 100 INJECTION, SOLUTION SUBCUTANEOUS at 00:11

## 2021-04-28 RX ADMIN — SEVELAMER CARBONATE 2400 MILLIGRAM(S): 2400 POWDER, FOR SUSPENSION ORAL at 13:11

## 2021-04-28 RX ADMIN — Medication 6 MILLIGRAM(S): at 00:11

## 2021-04-28 RX ADMIN — ATORVASTATIN CALCIUM 80 MILLIGRAM(S): 80 TABLET, FILM COATED ORAL at 21:16

## 2021-04-28 RX ADMIN — SODIUM CHLORIDE 3 MILLILITER(S): 9 INJECTION INTRAMUSCULAR; INTRAVENOUS; SUBCUTANEOUS at 13:07

## 2021-04-28 RX ADMIN — LORATADINE 10 MILLIGRAM(S): 10 TABLET ORAL at 10:27

## 2021-04-28 RX ADMIN — SODIUM CHLORIDE 3 MILLILITER(S): 9 INJECTION INTRAMUSCULAR; INTRAVENOUS; SUBCUTANEOUS at 05:59

## 2021-04-28 RX ADMIN — INSULIN GLARGINE 30 UNIT(S): 100 INJECTION, SOLUTION SUBCUTANEOUS at 22:43

## 2021-04-28 RX ADMIN — MONTELUKAST 10 MILLIGRAM(S): 4 TABLET, CHEWABLE ORAL at 21:16

## 2021-04-28 RX ADMIN — APIXABAN 2.5 MILLIGRAM(S): 2.5 TABLET, FILM COATED ORAL at 10:26

## 2021-04-28 RX ADMIN — CHLORHEXIDINE GLUCONATE 1 APPLICATION(S): 213 SOLUTION TOPICAL at 13:12

## 2021-04-28 RX ADMIN — SEVELAMER CARBONATE 2400 MILLIGRAM(S): 2400 POWDER, FOR SUSPENSION ORAL at 10:26

## 2021-04-28 RX ADMIN — SODIUM CHLORIDE 3 MILLILITER(S): 9 INJECTION INTRAMUSCULAR; INTRAVENOUS; SUBCUTANEOUS at 21:16

## 2021-04-28 RX ADMIN — CINACALCET 60 MILLIGRAM(S): 30 TABLET, FILM COATED ORAL at 13:12

## 2021-04-28 RX ADMIN — DIVALPROEX SODIUM 250 MILLIGRAM(S): 500 TABLET, DELAYED RELEASE ORAL at 17:08

## 2021-04-28 RX ADMIN — DIVALPROEX SODIUM 250 MILLIGRAM(S): 500 TABLET, DELAYED RELEASE ORAL at 05:59

## 2021-04-28 RX ADMIN — Medication 3 MILLIGRAM(S): at 23:00

## 2021-04-28 RX ADMIN — Medication 120 MILLIGRAM(S): at 05:59

## 2021-04-28 RX ADMIN — SEVELAMER CARBONATE 2400 MILLIGRAM(S): 2400 POWDER, FOR SUSPENSION ORAL at 17:08

## 2021-04-28 NOTE — PROGRESS NOTE ADULT - ASSESSMENT
55y Female with history of ESRD on HD presents with tremors. Nephrology consulted for ESRD status.    1) ESRD: Last HD on 4/27 tolerated well with 3L removed. Plan for next maintenance HD on 4/29. Oo not suspect episodes of shaking due to "uremia" from only one missed dialysis treatment given symptoms preceded missed HD. Monitor electrolytes.    2) HTN with ESRD: BP controlled. Continue with current medications. Monitor BP.    3) Anemia of renal disease: Hb borderline. Continue with Epo 6K with HD. Monitor Hb.    4) Secondary HPT of renal origin: Phosphorus uncontrolled. Holding hectorol 4 mcg with HD but continue with sensipar 60 mg daily and renvela 3 tabs with meals. Monitor serum calcium and phosphorus.    5) Hyperkalemia: Resolved with HD. Change diet to renal diet. Monitor serum K.      [facility]  Keaton Lopez M.D.  RALPH Cody M.D.  Julia Brewer, JASIEL, ANP-C    Telephone: [phone]  Facsimile: [phone]    [address] [place] [address]

## 2021-04-28 NOTE — CONSULT NOTE ADULT - SUBJECTIVE AND OBJECTIVE BOX
Neurology Consult    Reason for Consult: Patient is a 55y old  Female who presents with a chief complaint of tremors (2021 17:13)      HPI:  55yF w pmhx ESRD HD M/W/F (last dialysis 4 days ago, missed yesterday) via L AV fistula, DM, afib on eliquis, HTN presenting with weakness/shakiness. Pt states for the past few months she has weakness and feels "jittery' in her arms, now worse over the last few days and involving her feet. She reports being started on Buproprion and Doxepin for depression recently. She states yesterday her shakiness/ feeling jittery worsened, feels "wobbly" when she attempts to ambulate, needs assistance to ambulate or even [place] in bed. she states its hard for her to raise her arm up d/t tremors and weakness. She reports yesterday afternoon, unknown time on onset, her speech changed, she reports having "double talk", denies slurred speech. Pt denies fever/chills, chest pain, dizziness, lightheadedness, difficulty swallowing/ in Ed found with mild hand tremors, no dysarthria.   pt's meds confirmed with her pharmacy   myoclonus still present but improving after HD        PAST MEDICAL & SURGICAL HISTORY:  COPD (chronic obstructive pulmonary disease)    DM (diabetes mellitus)    Atrial fibrillation  with loop recorder , battery most likely depleted, as per cardiac clearance, Dr. Reece Anesthesia aware, pt on Eliquis    HTN (hypertension)    Morbid obesity  BMI - 58.3    Chronic GERD    CHAMP (obstructive sleep apnea)  non compliance with CPAP, Anesthesia Dr. Reece aware, pt told to bring CPAP for sx, pr verbalized understanding    Potential difficult airway on pre-intubation assessment  airway class III, large neck, morbid obesity, hx of CHAMP, no compliance with CPAP- Dr. Reece, Anesthesia aware    End-stage renal disease    Anemia    H/O tubal ligation      Status post placement of implantable loop recorder  left chest-     History of vascular access device  s/p insertion right chest permacath 2019, removal 2019, insertion left chest permacath 2019    S/P arteriovenous (AV) fistula creation  left  arm 2019        Allergies: Allergies    latex (Rash)  penicillin (Nausea)    Intolerances        Social History: Denies toxic habits including tobacco, ETOH or illicit drugs.    Family History: FAMILY HISTORY:  Family history of diabetes mellitus  mother-     Family hx of hypertension  mother-     . No family history of strokes    Medications: MEDICATIONS  (STANDING):  apixaban 2.5 milliGRAM(s) Oral two times a day  atorvastatin 80 milliGRAM(s) Oral at bedtime  chlorhexidine 2% Cloths 1 Application(s) Topical daily  cinacalcet 60 milliGRAM(s) Oral daily  dextrose 40% Gel 15 Gram(s) Oral once  dextrose 5%. 1000 milliLiter(s) (50 mL/Hr) IV Continuous <Continuous>  dextrose 50% Injectable 25 Gram(s) IV Push once  diltiazem    milliGRAM(s) Oral daily  diVALproex  milliGRAM(s) Oral two times a day  epoetin anabela-epbx (RETACRIT) Injectable 6000 Unit(s) IV Push <User Schedule>  glucagon  Injectable 1 milliGRAM(s) IntraMuscular once  insulin glargine Injectable (LANTUS) 30 Unit(s) SubCutaneous at bedtime  insulin lispro (ADMELOG) corrective regimen sliding scale   SubCutaneous three times a day before meals  loratadine 10 milliGRAM(s) Oral daily  montelukast 10 milliGRAM(s) Oral at bedtime  sevelamer carbonate 2400 milliGRAM(s) Oral three times a day with meals  sodium chloride 0.9% lock flush 3 milliLiter(s) IV Push every 8 hours    MEDICATIONS  (PRN):  ALBUTerol    90 MICROgram(s) HFA Inhaler 2 Puff(s) Inhalation every 6 hours PRN Shortness of Breath and/or Wheezing      Review of Systems:  CONSTITUTIONAL:  No weight loss, fever, chills, weakness or fatigue.  HEENT:  Eyes:  No visual loss, blurred vision, double vision or yellow sclera. Ears, Nose, Throat:  No hearing loss, sneezing, congestion, runny nose or sore throat.  SKIN:  No rash or itching.  CARDIOVASCULAR:  No chest pain, chest pressure or chest discomfort. No palpitations or edema.  RESPIRATORY:  No shortness of breath, cough or sputum.  GASTROINTESTINAL:  No anorexia, nausea, vomiting or diarrhea. No abdominal pain or blood.  GENITOURINARY:  No burning on urination or incontinence   NEUROLOGICAL: tremor   MUSCULOSKELETAL:  No muscle, back pain, joint pain or stiffness.  HEMATOLOGIC:  No anemia, bleeding or bruising.  LYMPHATICS:  No enlarged nodes. No history of splenectomy.  PSYCHIATRIC:  No history of depression or anxiety.  ENDOCRINOLOGIC:  No reports of sweating, cold or heat intolerance. No polyuria or polydipsia.      Vitals:  Vital Signs Last 24 Hrs  T(C): 36.9 (2021 05:56), Max: 36.9 (2021 22:45)  T(F): 98.5 (2021 05:56), Max: 98.5 (2021 05:56)  HR: 81 (2021 05:56) (70 - 81)  BP: 139/64 (2021 05:56) (125/69 - 143/67)  BP(mean): 74 (2021 13:36) (74 - 74)  RR: 17 (2021 05:56) (12 - 20)  SpO2: 96% (2021 05:56) (96% - 100%)    General Exam:   General Appearance: Appropriately dressed and in no acute distress       Head: Normocephalic, atraumatic and no dysmorphic features  Ear, Nose, and Throat: Moist mucous membranes  CVS: S1S2+  Resp: No SOB, no wheeze or rhonchi  GI: soft NT/ND  Extremities: No edema or cyanosis  Skin: No bruises or rashes     Neurological Exam:  Mental Status: Awake, alert and oriented x 3.  Able to follow simple and complex verbal commands. Able to name and repeat. fluent speech. No obvious aphasia or dysarthria noted. stuttering speech improving   Cranial Nerves: PERRL, EOMI, VFFC, sensation V1-V3 intact,  no obvious facial asymmetry, equal elevation of palate, scm/trap 5/5, tongue is midline on protrusion. no obvious papilledema on fundoscopic exam. hearing is grossly intact.   Motor: ROB uppers >lowers + myoclonus   Sensation: Intact to light touch and pinprick throughout. no right/left confusion. no extinction to tactile on DSS.   Reflexes: 1+ throughout at biceps, brachioradialis, triceps, patellars and ankles bilaterally and equal. No clonus. R toe and L toe were both downgoing.  Coordination: No dysmetria on FNF   Gait: defferred     Data/Labs/Imaging which I personally reviewed.     Labs:     CBC Full  -  ( 2021 07:04 )  WBC Count : 8.94 K/uL  RBC Count : 3.25 M/uL  Hemoglobin : 9.5 g/dL  Hematocrit : 31.7 %  Platelet Count - Automated : 274 K/uL  Mean Cell Volume : 97.5 fL  Mean Cell Hemoglobin : 29.2 pg  Mean Cell Hemoglobin Concentration : 30.0 gm/dL  Auto Neutrophil # : x  Auto Lymphocyte # : x  Auto Monocyte # : x  Auto Eosinophil # : x  Auto Basophil # : x  Auto Neutrophil % : x  Auto Lymphocyte % : x  Auto Monocyte % : x  Auto Eosinophil % : x  Auto Basophil % : x        136  |  95<L>  |  45<H>  ----------------------------<  148<H>  4.3   |  27  |  7.78<H>    Ca    9.1      2021 07:04  Phos  8.3       Mg     2.2         TPro  7.5  /  Alb  4.0  /  TBili  0.3  /  DBili  x   /  AST  6   /  ALT  <5  /  AlkPhos  284<H>      LIVER FUNCTIONS - ( 2021 07:04 )  Alb: 4.0 g/dL / Pro: 7.5 g/dL / ALK PHOS: 284 U/L / ALT: <5 U/L / AST: 6 U/L / GGT: x           PT/INR - ( 2021 10:25 )   PT: 14.1 sec;   INR: 1.25 ratio         PTT - ( 2021 10:25 )  PTT:36.9 sec    EXAM: CT BRAIN    PROCEDURE DATE: 2021    INTERPRETATION: Noncontrast CT of the brain.    CLINICAL INDICATION: Difficulty ambulating    TECHNIQUE : Axial CT scanning of the brain was obtained from the skull base to the vertex without the administration of intravenous contrast.    COMPARISON: A brain CT dated 3/23/2021    FINDINGS: No acute hemorrhage, hydrocephalus, midline shift or extra-axial collections are present. The orbits are not remarkable in appearance. A punctate calcification is again noted along the left dorsal fuentes.    The visualized paranasal sinuses and tympanomastoid cavities are free of acute disease.    Impression:    No acute hemorrhage, mass effect or extra-axial collections.

## 2021-04-28 NOTE — PHYSICAL THERAPY INITIAL EVALUATION ADULT - ADDITIONAL COMMENTS
Pt reports living alone in an apartment, no steps to enter, +elevator. Pt uses rollator while ambulating in community and holds on to furniture while ambulating within apartment. Pt has home health aide (sister), 6 days/week, 5 hours/day.

## 2021-04-28 NOTE — PHYSICAL THERAPY INITIAL EVALUATION ADULT - PERTINENT HX OF CURRENT PROBLEM, REHAB EVAL
Pt is a 55 year old female presenting to [facility] with weakness/shakiness. Pt states for past few months has weakness and feels "jittery' in arms, now worse over the last few days and involving feet. Pt reports speech changed, reports having "double talk." CT head negative. Labs with elevated BUN/Cr, hyperkalemia. Etiology of myoclonus likely secondary to metabolic changes. See below for PMH.

## 2021-04-28 NOTE — PROGRESS NOTE ADULT - ASSESSMENT
Assessment and plan: Patient with ESRD and presented with diffuse myoclonic jerks of arm, legs and trunk in setting of missing dialysis and having uremia. She was also recently started on wellbutrin and doxepin and this too may have contributed to her abnormal movements in  setting of elevated serotonin levels.    1. She responded very well to depakote 250mg BID and discontinuing the dozepin and wellbutrin    2. Greatly appreciate behavioral health input and consultation    3. Ammonia level was normal    4. No need for EEG at this point and I will discontinue     5. Continue medical mgt and supportive care and all questions answered and patient understood plan.

## 2021-04-28 NOTE — BH CONSULTATION LIAISON ASSESSMENT NOTE - CURRENT MEDICATION
MEDICATIONS  (STANDING):  apixaban 2.5 milliGRAM(s) Oral two times a day  atorvastatin 80 milliGRAM(s) Oral at bedtime  chlorhexidine 2% Cloths 1 Application(s) Topical daily  cinacalcet 60 milliGRAM(s) Oral daily  dextrose 40% Gel 15 Gram(s) Oral once  dextrose 5%. 1000 milliLiter(s) (50 mL/Hr) IV Continuous <Continuous>  dextrose 50% Injectable 25 Gram(s) IV Push once  diltiazem    milliGRAM(s) Oral daily  diVALproex  milliGRAM(s) Oral two times a day  epoetin anabela-epbx (RETACRIT) Injectable 6000 Unit(s) IV Push <User Schedule>  glucagon  Injectable 1 milliGRAM(s) IntraMuscular once  insulin glargine Injectable (LANTUS) 30 Unit(s) SubCutaneous at bedtime  insulin lispro (ADMELOG) corrective regimen sliding scale   SubCutaneous three times a day before meals  loratadine 10 milliGRAM(s) Oral daily  montelukast 10 milliGRAM(s) Oral at bedtime  sevelamer carbonate 2400 milliGRAM(s) Oral three times a day with meals  sodium chloride 0.9% lock flush 3 milliLiter(s) IV Push every 8 hours    MEDICATIONS  (PRN):  ALBUTerol    90 MICROgram(s) HFA Inhaler 2 Puff(s) Inhalation every 6 hours PRN Shortness of Breath and/or Wheezing

## 2021-04-28 NOTE — BH CONSULTATION LIAISON ASSESSMENT NOTE - NSBHCHARTREVIEWLAB_PSY_A_CORE FT
CBC Full  -  ( 28 Apr 2021 07:04 )  WBC Count : 8.94 K/uL  RBC Count : 3.25 M/uL  Hemoglobin : 9.5 g/dL  Hematocrit : 31.7 %  Platelet Count - Automated : 274 K/uL  Mean Cell Volume : 97.5 fL  Mean Cell Hemoglobin : 29.2 pg  Mean Cell Hemoglobin Concentration : 30.0 gm/dL  Auto Neutrophil # : x  Auto Lymphocyte # : x  Auto Monocyte # : x  Auto Eosinophil # : x  Auto Basophil # : x  Auto Neutrophil % : x  Auto Lymphocyte % : x  Auto Monocyte % : x  Auto Eosinophil % : x  Auto Basophil % : x    04-28    136  |  95<L>  |  45<H>  ----------------------------<  148<H>  4.3   |  27  |  7.78<H>    Ca    9.1      28 Apr 2021 07:04  Phos  8.3     04-27  Mg     2.2     04-27    TPro  7.5  /  Alb  4.0  /  TBili  0.3  /  DBili  x   /  AST  6   /  ALT  <5  /  AlkPhos  284<H>  04-28

## 2021-04-28 NOTE — PHYSICAL THERAPY INITIAL EVALUATION ADULT - GAIT DEVIATIONS NOTED, PT EVAL
decreased spencer/decreased step length/decreased stride length/increased stride width/decreased weight-shifting ability

## 2021-04-28 NOTE — BH CONSULTATION LIAISON ASSESSMENT NOTE - HPI (INCLUDE ILLNESS QUALITY, SEVERITY, DURATION, TIMING, CONTEXT, MODIFYING FACTORS, ASSOCIATED SIGNS AND SYMPTOMS)
54y/o AA women with past psych hx of anxiety and depression, no previous psych inpatient admissions, no hx of SI or substance abuse, seen outpatient by Dr. Luis Armando Colbert ([phone]), and PMHx of DM, ESRD on HD (MWF), HTN, HLD, and COPD, presents to [facility] ED for tremors needing HD treatment, last HD was on Friday 04/23. Patient states she has been experiencing these tremors in her upper extremities b/l for some time now, couldn't tell me how long, but over this weekend, the tremors worsened as they have now moved to her legs and feet b/l. Patient is unable to tell me if the tremors are intentional. Patient states she started stuttering on 4/25 and has been intermittent since. Denies slurred speech. Patient states she started taking Bupropion and Doxepin in the last month but cannot attribute the tremors to the medications. However, patient states the tremors may have worsened a little bit after starting these medications.   Patient currently denies feeling of hopelessness, anhedonia. Patient states she typically cannot sleep at night without her medications. She states she took melatonin last night but was only able to sleep for a few hours.   Patient denies passive/active SI/HI, AH, VH, and paranoia. 54y/o AA women with past psych hx of anxiety and depression, no previous psych inpatient admissions, no hx of SI or substance abuse, seen outpatient by Dr. Luis Armando Colbert ([phone]), and PMHx of DM, ESRD on HD (MWF), HTN, HLD, and COPD, presents to [facility] ED for intermittent tremors and needing HD treatment, last HD was on Friday 04/23. Patient states she has been experiencing these tremors in her upper extremities b/l for some time now, couldn't tell me how long, but over this weekend, the tremors worsened as they have now moved to her legs and feet b/l. Patient is unable to tell me if the tremors are intentional. Patient states she started stuttering on 4/25 and has been intermittent since. Denies slurred speech. Patient states she started taking Bupropion and Doxepin in the last month but cannot attribute the tremors to the medications. However, patient states the tremors may have worsened a little bit after starting these medications.   Patient currently denies feeling of hopelessness, anhedonia. Patient states she typically cannot sleep at night without her medications. She states she took melatonin last night but was only able to sleep for a few hours.   Patient denies passive/active SI/HI, AH, VH, and paranoia.  Unable to obtain collateral information from patients outpatient psychiatrist, Dr. Colbert. 56y/o AA women domiciled at home, with past psych hx of anxiety and depression, no previous psych inpatient admissions, no hx of SI or substance abuse, seen outpatient by Dr. Luis Armando Colbert ([phone]), and PMHx of DM, ESRD on HD (MWF), HTN, HLD, CHAMP, and COPD, presents to [facility] ED for intermittent tremors and needing HD treatment, last HD was on Friday 04/23. Patient states she has been experiencing these tremors in her upper extremities b/l for some time now, couldn't tell me how long, but over this weekend, the tremors worsened as they have now moved to her legs and feet b/l. Patient is unable to tell me if the tremors are intentional. Patient states she started stuttering on 4/25 and has been intermittent since. Denies slurred speech. Patient states she started taking Bupropion and Doxepin in the last month but cannot attribute the tremors to the medications. However, patient states the tremors may have worsened a little bit after starting these medications.   Patient currently denies feeling of hopelessness, anhedonia. Patient states she typically cannot sleep at night without her medications. She states she took melatonin last night but was only able to sleep for a few hours.   Patient denies passive/active SI/HI, AH, VH, and paranoia.  Unable to obtain collateral information from patients outpatient psychiatrist, Dr. Colbert due to no answer and voicemail box being full.

## 2021-04-28 NOTE — BH CONSULTATION LIAISON ASSESSMENT NOTE - SUMMARY
54y/o AA women with past psych hx of anxiety and depression, no previous psych inpatient admissions, no hx of SI or substance abuse, seen outpatient by Dr. Luis Armando Colbert ([phone]), and PMHx of DM, ESRD on HD (MWF), HTN, HLD, and COPD, presents to [facility] ED for tremors needing HD treatment, last HD was on Friday 04/23. Patient states she has been experiencing these tremors in her upper extremities b/l for some time now, couldn't tell me how long, but over this weekend, the tremors worsened as they have now moved to her legs and feet b/l. Patient is unable to tell me if the tremors are intentional. Patient states she started stuttering on 4/25 and has been intermittent since. Denies slurred speech. Patient states she started taking Bupropion and Doxepin in the last month but cannot attribute the tremors to the medications. However, patient states the tremors may have worsened a little bit after starting these medications.   Patient currently denies feeling of hopelessness, anhedonia. Patient states she typically cannot sleep at night without her medications. She states she took melatonin last night but was only able to sleep for a few hours.   Patient denies passive/active SI/HI, AH, VH, and paranoia.     54y/o AA women with past psych hx of anxiety and depression, no previous psych inpatient admissions, no hx of SI or substance abuse, seen outpatient by Dr. Luis Armando Colbert ([phone]), and PMHx of DM, ESRD on HD (MWF), HTN, HLD, and COPD, presents to [facility] ED for intermittent tremors and needing HD treatment, last HD was on Friday 04/23. Patient states she has been experiencing these tremors in her upper extremities b/l for some time now, couldn't tell me how long, but over this weekend, the tremors worsened as they have now moved to her legs and feet b/l. Patient is unable to tell me if the tremors are intentional. Patient states she started stuttering on 4/25 and has been intermittent since. Denies slurred speech. Patient states she started taking Bupropion and Doxepin in the last month but cannot attribute the tremors to the medications. However, patient states the tremors may have worsened a little bit after starting these medications.   Patient currently denies feeling of hopelessness, anhedonia. Patient states she typically cannot sleep at night without her medications. She states she took melatonin last night but was only able to sleep for a few hours. Patient denies passive/active SI/HI, AH, VH, and paranoia.    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      54y/o AA women with past psych hx of anxiety and depression, no previous psych inpatient admissions, no hx of SI or substance abuse, seen outpatient by Dr. Luis Armando Colbert ([phone]), and PMHx of DM, ESRD on HD (MWF), HTN, HLD, CHAMP, and COPD, presents to [facility] ED for intermittent tremors and needing HD treatment, last HD was on Friday 04/23. Patient states she has been experiencing these tremors in her upper extremities b/l for some time now, couldn't tell me how long, but over this weekend, the tremors worsened as they have now moved to her legs and feet b/l. Patient is unable to tell me if the tremors are intentional. Patient states she started stuttering on 4/25 and has been intermittent since. Denies slurred speech. Patient states she started taking Bupropion and Doxepin in the last month but cannot attribute the tremors to the medications. However, patient states the tremors may have worsened a little bit after starting these medications.   Patient currently denies feeling of hopelessness, anhedonia. Patient states she typically cannot sleep at night without her medications. She states she took melatonin last night but was only able to sleep for a few hours. Patient denies passive/active SI/HI, AH, VH, and paranoia.    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 Patient underwent HD yesterday and was still complaining of the tremors. Depakote 250 mg PO BID was given as per neuro recommendations.     Plan:  - Hold Bupropion and Doxepin for now  - F/u with outpatient psychiatrist, Dr. Colbert, and perhaps start on SSRI  - Continue Depakote 250mg PO BID as per neuro recommendation     56y/o AA women with past psych hx of anxiety and depression, no previous psych inpatient admissions, no hx of SI or substance abuse, seen outpatient by Dr. Luis Armando Colbert ([phone]), and PMHx of DM, ESRD on HD (MWF), HTN, HLD, CHAMP, and COPD, presents to [facility] ED for intermittent tremors and needing HD treatment, last HD was on Friday 04/23. Patient states she has been experiencing these tremors in her upper extremities b/l for some time now, couldn't tell me how long, but over this weekend, the tremors worsened as they have now moved to her legs and feet b/l. Patient is unable to tell me if the tremors are intentional. Patient states she started stuttering on 4/25 and has been intermittent since. Denies slurred speech. Patient states she started taking Bupropion and Doxepin in the last month but cannot attribute the tremors to the medications. However, patient states the tremors may have worsened a little bit after starting these medications.   Patient currently denies feeling of hopelessness, anhedonia. Patient states she typically cannot sleep at night without her medications. She states she took melatonin last night but was only able to sleep for a few hours. Patient denies passive/active SI/HI, AH, VH, and paranoia.    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 Patient underwent HD yesterday and was still complaining of the tremors. Depakote 250 mg PO BID was given as per neuro recommendations.     Plan:  - Hold Bupropion and Doxepin for now  - F/u with outpatient psychiatrist, Dr. Colbert, and perhaps start on SSRI  - Continue Depakote 250mg PO BID as per neuro recommendation  - F/u for outpatient treatment for CHAMP. Educated patient on possible complications, including cardiac complications, if left untreated.   56y/o AA women with past psych hx of anxiety and depression, no previous psych inpatient admissions, no hx of SI or substance abuse, seen outpatient by Dr. Luis Armando Colbert ([phone]), and PMHx of DM, ESRD on HD (MWF), HTN, HLD, CHAMP, and COPD, presents to [facility] ED for intermittent tremors and needing HD treatment, last HD was on Friday 04/23. Psychiatry has been consulted for medication management.    Patient states she started taking Bupropion and Doxepin in the last month but cannot attribute the tremors to the medications. However, patient states the tremors may have worsened a little bit after starting these medications. Patient currently denies feeling of hopelessness, anhedonia. Patient states she typically cannot sleep at night without her medications. She states she took melatonin last night but was able to sleep for a few hours. Patient denies passive/active SI/HI, AH, VH, and paranoia.    Plan:  - Reasonable to hold Bupropion and Doxepin for now  - F/u with outpatient psychiatrist, Dr. Colbert, and perhaps start on SSRI to target mood and anxiety. Patient aware and in agreement  - Continue Depakote 250mg PO BID as per neuro recommendation  - F/u for outpatient treatment for CHAMP. Educated patient on possible complications, including cardiac complications, if left untreated.

## 2021-04-28 NOTE — PROGRESS NOTE ADULT - ASSESSMENT
Echo 5/21/19: EF 50%, grossly nl sys fx, min AR, min MR     a/p   55y F office pt  w/ pmhx ESRD HD M/W/F (last dialysis 4 days ago, missed yesterday) via L AV fistula, DM, afib on eliquis (s/p ILR), HLD, HTN presenting with weakness/shakiness.    1. Weakness  - ? r/t missed HD vs starting Buproprion and Doxepin   -head CT noted  -EKG without ischemic changes  -med f/u  -Neuro eval noted : Etiology of myoclonus likely 2/2 metabolic changes (ie uremia from missed HD/hyperkalemia) can also be from new meds   -workup per neuro/ med     2. Hypertension  -stable  -c/w cardizem    3. Hyperlipidemia  - statin    4. Paroxysmal Atrial Fibrillation  -No further clinical recurrence since initial event in 2015 in setting of respiratory infection  -remains stable and in sinus rhythm  -loop battery is depleted and unable to be interrogated  -holter/event monitor in sept 2020 without af burden  -would stop eliquis and Start asa 81 mg QD   -c/w cardizem     5. ESRD  -renal f/u , hd  per renal    dvt ppx

## 2021-04-28 NOTE — BH CONSULTATION LIAISON ASSESSMENT NOTE - DIFFERENTIAL
Tremors secondary to hyperthyroidism vs. Drug-induced tremors Drug-induced myoclonus vs. Tremors secondary to hyperthyroidism  Drug-induced myoclonus

## 2021-04-28 NOTE — PHYSICAL THERAPY INITIAL EVALUATION ADULT - PATIENT PROFILE REVIEW, REHAB EVAL
PT orders received, no formal activity orders. Consulted with Charles GONCALVES, pt may participate in PT evaluation./yes

## 2021-04-28 NOTE — BH CONSULTATION LIAISON ASSESSMENT NOTE - RISK ASSESSMENT
Risk factors - anxiety, insomnia, easily frustrated  Protective factors - social support  No access to firearms such as guns

## 2021-04-28 NOTE — BH CONSULTATION LIAISON ASSESSMENT NOTE - NSBHCHARTREVIEWVS_PSY_A_CORE FT
Vital Signs Last 24 Hrs  T(C): 37.2 (28 Apr 2021 10:35), Max: 37.2 (28 Apr 2021 10:35)  T(F): 99 (28 Apr 2021 10:35), Max: 99 (28 Apr 2021 10:35)  HR: 75 (28 Apr 2021 10:35) (70 - 81)  BP: 139/61 (28 Apr 2021 10:35) (125/69 - 143/67)  BP(mean): 74 (27 Apr 2021 13:36) (74 - 74)  RR: 18 (28 Apr 2021 10:35) (12 - 20)  SpO2: 96% (28 Apr 2021 10:35) (96% - 100%)

## 2021-04-28 NOTE — BH CONSULTATION LIAISON ASSESSMENT NOTE - CASE SUMMARY
Met with the patient. Case discussed with FELIX Finley, impression and plan discussed and agreed upon. Above note was reviewed and edited as needed. Patient states that she has had chronic tremors, which did worsen after the start of Doxepin one month ago. Has been on Wellbutrin for 2 months with an increased dose the same time as start of Doxepin. No mood symptoms today, denies any si or hi, no psychosis. Aware and in agreement with plan above. Discussed importance of treating CHAMP. Care coordinated with Dr Grajeda as well.

## 2021-04-28 NOTE — CONSULT NOTE ADULT - ASSESSMENT
55y RH morbidly obese AAF w pmhx ESRD HD M/W/F (last dialysis 4 days ago, missed a session day prior) via L AV fistula, DM, afib on eliquis, HTN, anxiety recently started on buproprion and doxepin presenting with worsening "jittery" movements in arms, hands, legs and several days of gait instability after missing dialysis 5 days ago. CT head negative. labs with elevated BUN/Cr.   Etiology of myoclonus likely 2/2 metabolic changes (ie uremia from missed HD) can also be from new meds including bupropion but lower suspicion. improving after HD    - send CPK, LFTs, Ammonia  - c/w dialysis  - If myoclonus continue after dialysis start Depakote 250mg BID  -  If myoclonus continue greater than 24 hrs after dialysis recommend EEG  - behavorial health evaluation to assist in managing the antidepressants that were recently started.  - check FS, glucose control <180  - GI/DVT ppx  - Counseling on diet, exercise, and medication adherence was done  - Counseling on smoking cessation and alcohol consumption offered when appropriate.  - Pain assessed and judicious use of narcotics when appropriate was discussed.    - Stroke education given when appropriate.  - Importance of fall prevention discussed.   - Differential diagnosis and plan of care discussed with patient and/or family and primary team  - Thank you for allowing me to participate in the care of this patient. Call with questions.   Chris Zeng MD  Vascular Neurology  Office: [phone]    55y RH morbidly obese AAF w pmhx ESRD HD M/W/F (last dialysis 4 days ago, missed a session day prior) via L AV fistula, DM, afib on eliquis, HTN, anxiety recently started on buproprion and doxepin presenting with worsening "jittery" movements in arms, hands, legs and several days of gait instability after missing dialysis 5 days ago. CT head negative. 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 improving after HD    - send CPK, LFTs, Ammonia  - c/w dialysis  - If myoclonus continue after dialysis start Depakote 250mg BID  -  If myoclonus continue greater than 24 hrs after dialysis recommend EEG  - behavorial health evaluation to assist in managing the antidepressants that were recently started.  - check FS, glucose control <180  - GI/DVT ppx  - Counseling on diet, exercise, and medication adherence was done  - Counseling on smoking cessation and alcohol consumption offered when appropriate.  - Pain assessed and judicious use of narcotics when appropriate was discussed.    - Stroke education given when appropriate.  - Importance of fall prevention discussed.   - Differential diagnosis and plan of care discussed with patient and/or family and primary team  - Thank you for allowing me to participate in the care of this patient. Call with questions.   Chris Zeng MD  Vascular Neurology  Office: [phone]

## 2021-04-29 ENCOUNTER — TRANSCRIPTION ENCOUNTER (OUTPATIENT)
Age: 56
End: 2021-04-29

## 2021-04-29 LAB
ALBUMIN SERPL ELPH-MCNC: 4 G/DL — SIGNIFICANT CHANGE UP (ref 3.3–5)
ALP SERPL-CCNC: 264 U/L — HIGH (ref 40–120)
ALT FLD-CCNC: 5 U/L — SIGNIFICANT CHANGE UP (ref 4–33)
ANION GAP SERPL CALC-SCNC: 17 MMOL/L — HIGH (ref 7–14)
AST SERPL-CCNC: <5 U/L — LOW (ref 4–32)
BILIRUB SERPL-MCNC: 0.3 MG/DL — SIGNIFICANT CHANGE UP (ref 0.2–1.2)
BUN SERPL-MCNC: 61 MG/DL — HIGH (ref 7–23)
CALCIUM SERPL-MCNC: 8.9 MG/DL — SIGNIFICANT CHANGE UP (ref 8.4–10.5)
CHLORIDE SERPL-SCNC: 94 MMOL/L — LOW (ref 98–107)
CO2 SERPL-SCNC: 26 MMOL/L — SIGNIFICANT CHANGE UP (ref 22–31)
CREAT SERPL-MCNC: 9.58 MG/DL — HIGH (ref 0.5–1.3)
CULTURE RESULTS: SIGNIFICANT CHANGE UP
GLUCOSE SERPL-MCNC: 81 MG/DL — SIGNIFICANT CHANGE UP (ref 70–99)
HCT VFR BLD CALC: 32.3 % — LOW (ref 34.5–45)
HGB BLD-MCNC: 9.8 G/DL — LOW (ref 11.5–15.5)
MAGNESIUM SERPL-MCNC: 2.2 MG/DL — SIGNIFICANT CHANGE UP (ref 1.6–2.6)
MCHC RBC-ENTMCNC: 29.8 PG — SIGNIFICANT CHANGE UP (ref 27–34)
MCHC RBC-ENTMCNC: 30.3 GM/DL — LOW (ref 32–36)
MCV RBC AUTO: 98.2 FL — SIGNIFICANT CHANGE UP (ref 80–100)
NRBC # BLD: 0 /100 WBCS — SIGNIFICANT CHANGE UP
NRBC # FLD: 0 K/UL — SIGNIFICANT CHANGE UP
PHOSPHATE SERPL-MCNC: 7.2 MG/DL — HIGH (ref 2.5–4.5)
PLATELET # BLD AUTO: 268 K/UL — SIGNIFICANT CHANGE UP (ref 150–400)
POTASSIUM SERPL-MCNC: 4.9 MMOL/L — SIGNIFICANT CHANGE UP (ref 3.5–5.3)
POTASSIUM SERPL-SCNC: 4.9 MMOL/L — SIGNIFICANT CHANGE UP (ref 3.5–5.3)
PROT SERPL-MCNC: 7.3 G/DL — SIGNIFICANT CHANGE UP (ref 6–8.3)
RBC # BLD: 3.29 M/UL — LOW (ref 3.8–5.2)
RBC # FLD: 13.2 % — SIGNIFICANT CHANGE UP (ref 10.3–14.5)
SODIUM SERPL-SCNC: 137 MMOL/L — SIGNIFICANT CHANGE UP (ref 135–145)
SPECIMEN SOURCE: SIGNIFICANT CHANGE UP
WBC # BLD: 8.09 K/UL — SIGNIFICANT CHANGE UP (ref 3.8–10.5)
WBC # FLD AUTO: 8.09 K/UL — SIGNIFICANT CHANGE UP (ref 3.8–10.5)

## 2021-04-29 RX ORDER — SEVELAMER CARBONATE 2400 MG/1
3200 POWDER, FOR SUSPENSION ORAL
Refills: 0 | Status: DISCONTINUED | OUTPATIENT
Start: 2021-04-29 | End: 2021-04-30

## 2021-04-29 RX ORDER — HEPARIN SODIUM 5000 [USP'U]/ML
1000 INJECTION INTRAVENOUS; SUBCUTANEOUS ONCE
Refills: 0 | Status: COMPLETED | OUTPATIENT
Start: 2021-04-29 | End: 2021-04-29

## 2021-04-29 RX ORDER — HEPARIN SODIUM 5000 [USP'U]/ML
1000 INJECTION INTRAVENOUS; SUBCUTANEOUS
Refills: 0 | Status: COMPLETED | OUTPATIENT
Start: 2021-04-29 | End: 2021-04-29

## 2021-04-29 RX ADMIN — HEPARIN SODIUM 1000 UNIT(S): 5000 INJECTION INTRAVENOUS; SUBCUTANEOUS at 12:40

## 2021-04-29 RX ADMIN — Medication 120 MILLIGRAM(S): at 06:30

## 2021-04-29 RX ADMIN — DIVALPROEX SODIUM 250 MILLIGRAM(S): 500 TABLET, DELAYED RELEASE ORAL at 05:15

## 2021-04-29 RX ADMIN — DIVALPROEX SODIUM 250 MILLIGRAM(S): 500 TABLET, DELAYED RELEASE ORAL at 19:12

## 2021-04-29 RX ADMIN — ERYTHROPOIETIN 6000 UNIT(S): 10000 INJECTION, SOLUTION INTRAVENOUS; SUBCUTANEOUS at 11:51

## 2021-04-29 RX ADMIN — CINACALCET 60 MILLIGRAM(S): 30 TABLET, FILM COATED ORAL at 19:11

## 2021-04-29 RX ADMIN — HEPARIN SODIUM 1000 UNIT(S): 5000 INJECTION INTRAVENOUS; SUBCUTANEOUS at 11:40

## 2021-04-29 RX ADMIN — HEPARIN SODIUM 1000 UNIT(S): 5000 INJECTION INTRAVENOUS; SUBCUTANEOUS at 13:40

## 2021-04-29 RX ADMIN — SODIUM CHLORIDE 3 MILLILITER(S): 9 INJECTION INTRAMUSCULAR; INTRAVENOUS; SUBCUTANEOUS at 19:31

## 2021-04-29 RX ADMIN — Medication 2: at 11:51

## 2021-04-29 RX ADMIN — MONTELUKAST 10 MILLIGRAM(S): 4 TABLET, CHEWABLE ORAL at 23:43

## 2021-04-29 RX ADMIN — ATORVASTATIN CALCIUM 80 MILLIGRAM(S): 80 TABLET, FILM COATED ORAL at 23:43

## 2021-04-29 RX ADMIN — Medication 81 MILLIGRAM(S): at 19:12

## 2021-04-29 RX ADMIN — SEVELAMER CARBONATE 3200 MILLIGRAM(S): 2400 POWDER, FOR SUSPENSION ORAL at 19:11

## 2021-04-29 RX ADMIN — INSULIN GLARGINE 30 UNIT(S): 100 INJECTION, SOLUTION SUBCUTANEOUS at 23:42

## 2021-04-29 RX ADMIN — LORATADINE 10 MILLIGRAM(S): 10 TABLET ORAL at 19:12

## 2021-04-29 RX ADMIN — SODIUM CHLORIDE 3 MILLILITER(S): 9 INJECTION INTRAMUSCULAR; INTRAVENOUS; SUBCUTANEOUS at 05:14

## 2021-04-29 RX ADMIN — Medication 3 MILLIGRAM(S): at 23:43

## 2021-04-29 NOTE — DISCHARGE NOTE PROVIDER - NSDCCPCAREPLAN_GEN_ALL_CORE_FT
PRINCIPAL DISCHARGE DIAGNOSIS  Diagnosis: Weakness generalized  Assessment and Plan of Treatment: You experienced weakness and tremors for which you sought treatment at the hospital. The tremors have since resolved. You will be receiving physical therapy services at home following your discharge to help improve your strength. Please continue to take your medicaction as prescribed and make appointments to follow up with your nephrologist and primary care provider after discharge.      SECONDARY DISCHARGE DIAGNOSES  Diagnosis: End-stage renal disease  Assessment and Plan of Treatment: Please continue to follow your dialysis schedule and refer to your primary provider/nephrologist for further care/recommendations. Continue your medications and supplementation as directed.    Diagnosis: DM (diabetes mellitus)  Assessment and Plan of Treatment: Continue medications as directed. Please be mindful of your sugar intake and follow-up with your primary care provider to further monitor your blood glucose levels.    Diagnosis: COPD (chronic obstructive pulmonary disease)  Assessment and Plan of Treatment: Please continue to take your medications as directed and make an appointment to see your primary care provider and pulmonologist following your hospital discharge.    Diagnosis: Hyperkalemia  Assessment and Plan of Treatment:

## 2021-04-29 NOTE — DISCHARGE NOTE PROVIDER - PROVIDER TOKENS
PROVIDER:[TOKEN:[[id number]],SCHEDULEDAPPT:[05/04/2021],SCHEDULEDAPPTTIME:[03:15 AM]] PROVIDER:[TOKEN:[[id number]],SCHEDULEDAPPT:[05/04/2021],SCHEDULEDAPPTTIME:[03:15 AM]],FREE:[LAST:[PCP],PHONE:[(   )    -],FAX:[(   )    -],FOLLOWUP:[1 week]],FREE:[LAST:[Nephrology],PHONE:[(   )    -],FAX:[(   )    -],FOLLOWUP:[1 week]]

## 2021-04-29 NOTE — PROGRESS NOTE ADULT - ASSESSMENT
55y Female with history of ESRD on HD presents with tremors. Nephrology consulted for ESRD status.    1) ESRD: Last HD on 4/27 tolerated well with 3L removed. Plan for next maintenance HD today prior to discharge. Patient can subsequently follow up on 4/30 as an outpatient to place back on MWF schedule. Monitor electrolytes.    2) HTN with ESRD: BP controlled. Continue with current medications. Monitor BP.    3) Anemia of renal disease: Hb borderline. Continue with Epo 6K with HD. Monitor Hb.    4) Secondary HPT of renal origin: Phosphorus uncontrolled. Increase renvela to 4 tabs with meals. Holding hectorol 4 mcg with HD but continue with sensipar 60 mg daily. Monitor serum calcium and phosphorus.    5) Hyperkalemia: Resolved with HD. Continue with renal diet. Monitor serum K.      [facility] NEPHROLOGY  Keaton Lopez M.D.  Juma Green D.O.  Myla Hoffmann M.D.  Julia Brewer, MSN, ANP-C    Telephone: [phone]  Facsimile: [phone]    [address] [place] [address]

## 2021-04-29 NOTE — PROGRESS NOTE ADULT - ASSESSMENT
Echo 5/21/19: EF 50%, grossly nl sys fx, min AR, min MR     a/p   55y F office pt  w/ pmhx ESRD HD M/W/F (last dialysis 4 days ago, missed yesterday) via L AV fistula, DM, afib on eliquis (s/p ILR), HLD, HTN presenting with weakness/shakiness.    1. Weakness  - ? 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 Hypertension  -stable  -c/w cardizem    3. Hyperlipidemia  - statin    4. Paroxysmal Atrial Fibrillation  -No further clinical recurrence since initial event in 2015 in setting of respiratory infection  -remains stable and in sinus rhythm  -loop battery is depleted and unable to be interrogated  -holter/event monitor in sept 2020 without af burden  -c/w cardizem, asa     5. ESRD  -renal f/u , hd  per renal    dvt ppx     plan discussed with ACP    Echo 5/21/19: EF 50%, grossly nl sys fx, min AR, min MR     a/p   55y F office pt  w/ pmhx ESRD HD M/W/F (last dialysis 4 days ago, missed yesterday) via L AV fistula, DM, afib on eliquis (s/p ILR), HLD, HTN presenting with weakness/shakiness.    1. Weakness  - ? 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 Hypertension  -stable  -c/w cardizem    3. Hyperlipidemia  - statin    4. Paroxysmal Atrial Fibrillation  -No further clinical recurrence since initial event in 2015 in setting of respiratory infection  -remains stable and in sinus rhythm  -loop battery is depleted and unable to be interrogated  -holter/event monitor in sept 2020 without af burden  -c/w cardizem, asa     5. ESRD  -renal f/u , hd  per renal    dvt ppx     plan discussed with ACP     pt to f/u w Dr. Simon Bullock on 5/4 @ 315 pm

## 2021-04-29 NOTE — DISCHARGE NOTE PROVIDER - CARE PROVIDER_API CALL
Simon Bullock (MD)  Cardiovascular Disease; Interventional Cardiology; Nuclear Cardiology  [address], [address]  [place] [address]  Phone: [phone]  Fax: [phone]  Scheduled Appointment: 05/04/2021 03:15 AM   Simon Bullock (MD)  Cardiovascular Disease; Interventional Cardiology; Nuclear Cardiology  [address], [address]  [place] [address]  Phone: [phone]  Fax: [phone]  Scheduled Appointment: 05/04/2021 03:15 AM    PCP,   Phone: (   )    -  Fax: (   )    -  Follow Up Time: 1 week    Nephrology,   Phone: (   )    -  Fax: (   )    -  Follow Up Time: 1 week

## 2021-04-29 NOTE — DISCHARGE NOTE PROVIDER - HOSPITAL COURSE
55yF w pmhx ESRD HD M/W/F (last dialysis 4 days ago, missed yesterday) via L AV fistula, DM, afib on Eliquis, HTN presenting with generalized weakness, resting tremors.  Tremors resolved after HD. Neurology and Nephrology consults were obtained, recommendations appreciated.  Pt to continue on Eliquis , Cardizem resumed as per cardiology.   Patient will continue to work with physical therapy following discharge.     On 4/30/ 2021, this case was discussed with Dr. Thompson, who agrees that the patient is medically stable for discharge.   All prescriptions were sent to a mutually agreed upon pharmacy.

## 2021-04-29 NOTE — PROGRESS NOTE ADULT - ASSESSMENT
55y RH morbidly obese AAF w pmhx ESRD HD M/W/F (last dialysis 4 days ago, missed a session day prior) via L AV fistula, DM, afib on eliquis, HTN, anxiety recently started on buproprion and doxepin presenting with worsening "jittery" movements in arms, hands, legs and several days of gait instability after missing dialysis 5 days ago. CT head negative. 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 improving after HD    - CPK and Ammonia WNL  - c/w dialysis per schedule   - improved with Depakote 250mg BID  - no need for EEG  - behavorial health evaluation to assist in managing the antidepressants that were recently started. d/barrie wellbutrin and dozapin  - check FS, glucose control <180  - GI/DVT ppx  - Counseling on diet, exercise, and medication adherence was done  - Counseling on smoking cessation and alcohol consumption offered when appropriate.  - Pain assessed and judicious use of narcotics when appropriate was discussed.    - Stroke education given when appropriate.  - Importance of fall prevention discussed.   - Differential diagnosis and plan of care discussed with patient and/or family and primary team  - Thank you for allowing me to participate in the care of this patient. Call with questions.   Chris Zeng MD  Vascular Neurology  Office: [phone]

## 2021-04-29 NOTE — DISCHARGE NOTE PROVIDER - NSDCMRMEDTOKEN_GEN_ALL_CORE_FT
acetaminophen 325 mg oral tablet: 2 tab(s) orally every 6 hours, As needed, Mild Pain (1 - 3)  Cardizem  mg/24 hours oral capsule, extended release: 1 cap(s) orally once a day  Combivent Respimat CFC free 20 mcg-100 mcg/inh inhalation aerosol: 1 puff(s) inhaled 4 times a day, As Needed  Eliquis 2.5 mg oral tablet: 1 tab(s) orally 2 times a day  furosemide 80 mg oral tablet: 1 tab(s) orally once a day  gabapentin 300 mg oral capsule: 1 cap(s) orally 2 times a day  NovoLOG Mix 70/30 FlexPen subcutaneous suspension: 35 unit(s) subcutaneous once a day at lunch   raNITIdine 150 mg oral capsule: 1 cap(s) orally 2 times a day  rosuvastatin 40 mg oral tablet: 1 tab(s) orally once a day (at bedtime)  Sensipar 60 mg oral tablet: 1 tab(s) orally once a day (at bedtime)  sevelamer carbonate 800 mg oral tablet: 2 tab(s) orally 3 times a day (with meals)  Singulair 10 mg oral tablet: 1 tab(s) orally once a day (at bedtime)  traMADol 50 mg oral tablet: 1 tab(s) orally every 6 hours, As needed, Moderate Pain (4 - 6) MDD:3 tabs  Tresiba FlexTouch 100 units/mL subcutaneous solution: 60 unit(s) subcutaneous once a day (at bedtime)  vitamin b complex: 1 tab(s) orally once a day  ZyrTEC 10 mg oral tablet: 1 tab(s) orally once a day (at bedtime)   acetaminophen 325 mg oral tablet: 2 tab(s) orally every 6 hours, As needed, Mild Pain (1 - 3)  aspirin 81 mg oral delayed release tablet: 1 tab(s) orally once a day  Cardizem  mg/24 hours oral capsule, extended release: 1 cap(s) orally once a day  Combivent Respimat CFC free 20 mcg-100 mcg/inh inhalation aerosol: 1 puff(s) inhaled 4 times a day, As Needed  divalproex sodium 250 mg oral delayed release tablet: 1 tab(s) orally 2 times a day  Eliquis 2.5 mg oral tablet: 1 tab(s) orally 2 times a day  furosemide 80 mg oral tablet: 1 tab(s) orally once a day  NovoLOG Mix 70/30 FlexPen subcutaneous suspension: 35 unit(s) subcutaneous once a day at lunch   raNITIdine 150 mg oral capsule: 1 cap(s) orally 2 times a day  rosuvastatin 40 mg oral tablet: 1 tab(s) orally once a day (at bedtime)  Sensipar 60 mg oral tablet: 1 tab(s) orally once a day (at bedtime)  sevelamer carbonate 800 mg oral tablet: 2 tab(s) orally 3 times a day (with meals)  Singulair 10 mg oral tablet: 1 tab(s) orally once a day (at bedtime)  traMADol 50 mg oral tablet: 1 tab(s) orally every 6 hours, As needed, Moderate Pain (4 - 6) MDD:3 tabs  Tresiba FlexTouch 100 units/mL subcutaneous solution: 60 unit(s) subcutaneous once a day (at bedtime)  vitamin b complex: 1 tab(s) orally once a day  ZyrTEC 10 mg oral tablet: 1 tab(s) orally once a day (at bedtime)

## 2021-04-30 ENCOUNTER — TRANSCRIPTION ENCOUNTER (OUTPATIENT)
Age: 56
End: 2021-04-30

## 2021-04-30 VITALS
OXYGEN SATURATION: 100 % | HEART RATE: 85 BPM | SYSTOLIC BLOOD PRESSURE: 125 MMHG | DIASTOLIC BLOOD PRESSURE: 55 MMHG | TEMPERATURE: 98 F | RESPIRATION RATE: 18 BRPM

## 2021-04-30 LAB
ALBUMIN SERPL ELPH-MCNC: 3.8 G/DL — SIGNIFICANT CHANGE UP (ref 3.3–5)
ALP SERPL-CCNC: 254 U/L — HIGH (ref 40–120)
ALT FLD-CCNC: <5 U/L — LOW (ref 4–33)
ANION GAP SERPL CALC-SCNC: 18 MMOL/L — HIGH (ref 7–14)
AST SERPL-CCNC: 6 U/L — SIGNIFICANT CHANGE UP (ref 4–32)
BILIRUB SERPL-MCNC: 0.3 MG/DL — SIGNIFICANT CHANGE UP (ref 0.2–1.2)
BUN SERPL-MCNC: 47 MG/DL — HIGH (ref 7–23)
CALCIUM SERPL-MCNC: 8.7 MG/DL — SIGNIFICANT CHANGE UP (ref 8.4–10.5)
CHLORIDE SERPL-SCNC: 93 MMOL/L — LOW (ref 98–107)
CO2 SERPL-SCNC: 24 MMOL/L — SIGNIFICANT CHANGE UP (ref 22–31)
CREAT SERPL-MCNC: 8.1 MG/DL — HIGH (ref 0.5–1.3)
GLUCOSE SERPL-MCNC: 175 MG/DL — HIGH (ref 70–99)
HCT VFR BLD CALC: 32.6 % — LOW (ref 34.5–45)
HGB BLD-MCNC: 9.8 G/DL — LOW (ref 11.5–15.5)
MCHC RBC-ENTMCNC: 29.1 PG — SIGNIFICANT CHANGE UP (ref 27–34)
MCHC RBC-ENTMCNC: 30.1 GM/DL — LOW (ref 32–36)
MCV RBC AUTO: 96.7 FL — SIGNIFICANT CHANGE UP (ref 80–100)
NRBC # BLD: 0 /100 WBCS — SIGNIFICANT CHANGE UP
NRBC # FLD: 0 K/UL — SIGNIFICANT CHANGE UP
PLATELET # BLD AUTO: 286 K/UL — SIGNIFICANT CHANGE UP (ref 150–400)
POTASSIUM SERPL-MCNC: 4.5 MMOL/L — SIGNIFICANT CHANGE UP (ref 3.5–5.3)
POTASSIUM SERPL-SCNC: 4.5 MMOL/L — SIGNIFICANT CHANGE UP (ref 3.5–5.3)
PROT SERPL-MCNC: 7.2 G/DL — SIGNIFICANT CHANGE UP (ref 6–8.3)
RBC # BLD: 3.37 M/UL — LOW (ref 3.8–5.2)
RBC # FLD: 13.1 % — SIGNIFICANT CHANGE UP (ref 10.3–14.5)
SODIUM SERPL-SCNC: 135 MMOL/L — SIGNIFICANT CHANGE UP (ref 135–145)
WBC # BLD: 7.72 K/UL — SIGNIFICANT CHANGE UP (ref 3.8–10.5)
WBC # FLD AUTO: 7.72 K/UL — SIGNIFICANT CHANGE UP (ref 3.8–10.5)

## 2021-04-30 RX ORDER — ERYTHROPOIETIN 10000 [IU]/ML
6000 INJECTION, SOLUTION INTRAVENOUS; SUBCUTANEOUS
Refills: 0 | Status: DISCONTINUED | OUTPATIENT
Start: 2021-04-30 | End: 2021-04-30

## 2021-04-30 RX ORDER — HEPARIN SODIUM 5000 [USP'U]/ML
1000 INJECTION INTRAVENOUS; SUBCUTANEOUS ONCE
Refills: 0 | Status: COMPLETED | OUTPATIENT
Start: 2021-04-30 | End: 2021-04-30

## 2021-04-30 RX ORDER — HEPARIN SODIUM 5000 [USP'U]/ML
1000 INJECTION INTRAVENOUS; SUBCUTANEOUS
Refills: 0 | Status: COMPLETED | OUTPATIENT
Start: 2021-04-30 | End: 2021-04-30

## 2021-04-30 RX ORDER — ASPIRIN/CALCIUM CARB/MAGNESIUM 324 MG
1 TABLET ORAL
Qty: 0 | Refills: 0 | DISCHARGE
Start: 2021-04-30

## 2021-04-30 RX ORDER — DILTIAZEM HCL 120 MG
1 CAPSULE, EXT RELEASE 24 HR ORAL
Qty: 30 | Refills: 0
Start: 2021-04-30 | End: 2021-05-29

## 2021-04-30 RX ORDER — GABAPENTIN 400 MG/1
1 CAPSULE ORAL
Qty: 0 | Refills: 0 | DISCHARGE

## 2021-04-30 RX ORDER — DIVALPROEX SODIUM 500 MG/1
1 TABLET, DELAYED RELEASE ORAL
Qty: 0 | Refills: 0 | DISCHARGE
Start: 2021-04-30

## 2021-04-30 RX ADMIN — SEVELAMER CARBONATE 3200 MILLIGRAM(S): 2400 POWDER, FOR SUSPENSION ORAL at 10:34

## 2021-04-30 RX ADMIN — CINACALCET 60 MILLIGRAM(S): 30 TABLET, FILM COATED ORAL at 11:34

## 2021-04-30 RX ADMIN — SEVELAMER CARBONATE 3200 MILLIGRAM(S): 2400 POWDER, FOR SUSPENSION ORAL at 17:50

## 2021-04-30 RX ADMIN — ERYTHROPOIETIN 6000 UNIT(S): 10000 INJECTION, SOLUTION INTRAVENOUS; SUBCUTANEOUS at 13:41

## 2021-04-30 RX ADMIN — SODIUM CHLORIDE 3 MILLILITER(S): 9 INJECTION INTRAMUSCULAR; INTRAVENOUS; SUBCUTANEOUS at 17:50

## 2021-04-30 RX ADMIN — HEPARIN SODIUM 1000 UNIT(S): 5000 INJECTION INTRAVENOUS; SUBCUTANEOUS at 12:01

## 2021-04-30 RX ADMIN — HEPARIN SODIUM 1000 UNIT(S): 5000 INJECTION INTRAVENOUS; SUBCUTANEOUS at 14:11

## 2021-04-30 RX ADMIN — DIVALPROEX SODIUM 250 MILLIGRAM(S): 500 TABLET, DELAYED RELEASE ORAL at 05:38

## 2021-04-30 RX ADMIN — LORATADINE 10 MILLIGRAM(S): 10 TABLET ORAL at 11:33

## 2021-04-30 RX ADMIN — Medication 81 MILLIGRAM(S): at 11:34

## 2021-04-30 RX ADMIN — CHLORHEXIDINE GLUCONATE 1 APPLICATION(S): 213 SOLUTION TOPICAL at 11:34

## 2021-04-30 RX ADMIN — SODIUM CHLORIDE 3 MILLILITER(S): 9 INJECTION INTRAMUSCULAR; INTRAVENOUS; SUBCUTANEOUS at 00:26

## 2021-04-30 RX ADMIN — DIVALPROEX SODIUM 250 MILLIGRAM(S): 500 TABLET, DELAYED RELEASE ORAL at 17:50

## 2021-04-30 RX ADMIN — SODIUM CHLORIDE 3 MILLILITER(S): 9 INJECTION INTRAMUSCULAR; INTRAVENOUS; SUBCUTANEOUS at 05:39

## 2021-04-30 RX ADMIN — HEPARIN SODIUM 1000 UNIT(S): 5000 INJECTION INTRAVENOUS; SUBCUTANEOUS at 13:02

## 2021-04-30 RX ADMIN — Medication 120 MILLIGRAM(S): at 05:38

## 2021-04-30 NOTE — PROGRESS NOTE ADULT - PROBLEM SELECTOR PLAN 2
with mild hyperkalemia   no EKG changes  leandro renal, lokelma now and HD later today  Resume Epo 6K with HD. Monitor Hb.  Secondary HPT of renal origin: Phosphorus uncontrolled. Hold hectorol 4 mcg with HD and resume sensipar 60 mg daily and renvela 3 tabs with meals. Monitor serum calcium and phosphorus

## 2021-04-30 NOTE — DISCHARGE NOTE NURSING/CASE MANAGEMENT/SOCIAL WORK - NSSCNAMETXT_GEN_ALL_CORE
Clinic Care Coordination Contact    Follow Up Progress Note      Assessment: chart reviewed and pro-active outreach call placed to patient. Per 10-1-19 OV note patient saw PCP for breast mass and was to have diagnostic mammogram and breast ultrasound. Patient did not schedule these as she thought the echocardiogram that she was scheduled for was those tests. She would like to have the tests and for convenience would like them done the same day as either her echo or her other specialty appointments as already scheduled at the [facility]. No orders in place, however and will need provider to order prior to scheduling    Goals addressed this encounter:   Goals Addressed    None          Intervention/Education provided during outreach: reviewed tests          Plan:   Message sent to provider pool for orders. Message sent to [facility] staff to assist with scheduling tests per patients request  Care Coordinator will follow up in 1-2 weeks to make sure tests are scheduled and completed and patient understands test results and follow up plan.     Vibha Carvajal RN  [facility]-Care Coordination  [facility]  [facility]  [phone]       [facility]

## 2021-04-30 NOTE — PROGRESS NOTE ADULT - ASSESSMENT
Echo 5/21/19: EF 50%, grossly nl sys fx, min AR, min MR     a/p   55y F office pt  w/ pmhx ESRD HD M/W/F (last dialysis 4 days ago, missed yesterday) via L AV fistula, DM, afib on eliquis (s/p ILR), HLD, HTN presenting with weakness/shakiness.    1. Weakness  - ? 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 Hypertension  -stable  -c/w cardizem    3. Hyperlipidemia  - statin    4. Paroxysmal Atrial Fibrillation  -No further clinical recurrence since initial event in 2015 in setting of respiratory infection  -remains stable and in sinus rhythm  -loop battery is depleted and unable to be interrogated  -holter/event monitor in sept 2020 without af burden  -c/w cardizem, asa     5. ESRD  -renal f/u , hd  per renal    dvt ppx     DCP per primary team  plan discussed with ACP     pt to f/u w Dr. Simon Bullock on 5/4 @ 315 pm

## 2021-04-30 NOTE — PROGRESS NOTE ADULT - PROBLEM SELECTOR PLAN 4
pt on home AM novolog 35 units and Tresiba 60 units QHS  previous admissions reviewed, pt maintained on 30 units Lantus - cont 30 units Lantus QHS and adjust as needed  start NISS

## 2021-04-30 NOTE — PROGRESS NOTE ADULT - ASSESSMENT
55y RH morbidly obese AAF w pmhx ESRD HD M/W/F (last dialysis 4 days ago, missed a session day prior) via L AV fistula, DM, afib on eliquis, HTN, anxiety recently started on buproprion and doxepin presenting with worsening "jittery" movements in arms, hands, legs and several days of gait instability after missing dialysis 5 days ago. CT head negative. 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 improving after HD  and VPA  - CPK and Ammonia WNL  - c/w dialysis per schedule   - improved with Depakote 250mg BID  - no need for EEG  - behavorial health evaluation to assist in managing the antidepressants that were recently started. d/barrie wellbutrin and dozapin  - check FS, glucose control <180  - GI/DVT ppx  - Counseling on diet, exercise, and medication adherence was done  - Counseling on smoking cessation and alcohol consumption offered when appropriate.  - Pain assessed and judicious use of narcotics when appropriate was discussed.    - Stroke education given when appropriate.  - Importance of fall prevention discussed.   - Differential diagnosis and plan of care discussed with patient and/or family and primary team  - Thank you for allowing me to participate in the care of this patient. Call with questions.   - d/c planning, outpt f/u for further management. [phone]  Chris Zeng MD  Vascular Neurology  Office: [phone]

## 2021-04-30 NOTE — PROGRESS NOTE ADULT - PROBLEM SELECTOR PLAN 1
resolved after HD   neuro eval appreciated   bella med related,  hold Bupropion and Doxepin for now

## 2021-04-30 NOTE — DISCHARGE NOTE NURSING/CASE MANAGEMENT/SOCIAL WORK - PATIENT PORTAL LINK FT
You can access the FollowMyHealth Patient Portal offered by [facility] by registering at the following website: [email or web address]. By joining [employer]’s FollowMyHealth portal, you will also be able to view your health information using other applications (apps) compatible with our system.

## 2021-04-30 NOTE — PROGRESS NOTE ADULT - SUBJECTIVE AND OBJECTIVE BOX
CARDIOLOGY FOLLOW UP - Dr. Bullock  Date of Service: 4/30/21  CC: denies cp, sob, and palpitations     Review of Systems:  Constitutional: No fever, weight loss, or fatigue  Respiratory: No cough, wheezing, or hemoptysis, no shortness of breath  Cardiovascular: No chest pain, palpitations, passing out, dizziness, or leg swelling  Gastrointestinal: No abd or epigastric pain.  No nausea, vomiting, or hematemesis; no diarrhea or constipation, no melena or hematochezia  Vascular: no edema       PHYSICAL EXAM:  T(C): 36.7 (04-30-21 @ 11:50), Max: 37.1 (04-29-21 @ 21:10)  HR: 76 (04-30-21 @ 11:50) (73 - 96)  BP: 145/82 (04-30-21 @ 11:50) (112/65 - 157/72)  RR: 18 (04-30-21 @ 11:50) (16 - 20)  SpO2: 100% (04-30-21 @ 09:10) (99% - 100%)  Wt(kg): --  I&O's Summary    29 Apr 2021 07:01  -  30 Apr 2021 07:00  --------------------------------------------------------  IN: 700 mL / OUT: 3600 mL / NET: -2900 mL        Appearance: Normal	  Cardiovascular: Normal S1 S2,RRR, No JVD, No murmurs  Respiratory: Lungs clear to auscultation	  Gastrointestinal:  Soft, Non-tender, + BS	  Extremities: Normal range of motion, No clubbing, cyanosis or edema      Home Medications:  acetaminophen 325 mg oral tablet: 2 tab(s) orally every 6 hours, As needed, Mild Pain (1 - 3) (17 Oct 2019 09:39)  Cardizem  mg/24 hours oral capsule, extended release: 1 cap(s) orally once a day (17 Oct 2019 09:39)  Combivent Respimat CFC free 20 mcg-100 mcg/inh inhalation aerosol: 1 puff(s) inhaled 4 times a day, As Needed (17 Oct 2019 09:39)  Eliquis 2.5 mg oral tablet: 1 tab(s) orally 2 times a day (17 Oct 2019 09:39)  furosemide 80 mg oral tablet: 1 tab(s) orally once a day (17 Oct 2019 09:39)  gabapentin 300 mg oral capsule: 1 cap(s) orally 2 times a day (17 Oct 2019 09:39)  NovoLOG Mix 70/30 FlexPen subcutaneous suspension: 35 unit(s) subcutaneous once a day at lunch  (17 Oct 2019 09:39)  raNITIdine 150 mg oral capsule: 1 cap(s) orally 2 times a day (17 Oct 2019 09:39)  rosuvastatin 40 mg oral tablet: 1 tab(s) orally once a day (at bedtime) (17 Oct 2019 09:39)  Sensipar 60 mg oral tablet: 1 tab(s) orally once a day (at bedtime) (17 Oct 2019 09:39)  sevelamer carbonate 800 mg oral tablet: 2 tab(s) orally 3 times a day (with meals) (17 Oct 2019 09:39)  Singulair 10 mg oral tablet: 1 tab(s) orally once a day (at bedtime) (17 Oct 2019 09:39)  Tresiba FlexTouch 100 units/mL subcutaneous solution: 60 unit(s) subcutaneous once a day (at bedtime) (17 Oct 2019 09:39)  vitamin b complex: 1 tab(s) orally once a day (17 Oct 2019 09:39)  ZyrTEC 10 mg oral tablet: 1 tab(s) orally once a day (at bedtime) (17 Oct 2019 09:39)      MEDICATIONS  (STANDING):  aspirin enteric coated 81 milliGRAM(s) Oral daily  atorvastatin 80 milliGRAM(s) Oral at bedtime  chlorhexidine 2% Cloths 1 Application(s) Topical daily  cinacalcet 60 milliGRAM(s) Oral daily  dextrose 40% Gel 15 Gram(s) Oral once  dextrose 5%. 1000 milliLiter(s) (50 mL/Hr) IV Continuous <Continuous>  dextrose 50% Injectable 25 Gram(s) IV Push once  diltiazem    milliGRAM(s) Oral daily  diVALproex  milliGRAM(s) Oral two times a day  epoetin anabela-epbx (RETACRIT) Injectable 6000 Unit(s) IV Push <User Schedule>  glucagon  Injectable 1 milliGRAM(s) IntraMuscular once  heparin   Injectable. 1000 Unit(s) Dialysis. every 1 hour  insulin glargine Injectable (LANTUS) 30 Unit(s) SubCutaneous at bedtime  insulin lispro (ADMELOG) corrective regimen sliding scale   SubCutaneous three times a day before meals  loratadine 10 milliGRAM(s) Oral daily  melatonin 3 milliGRAM(s) Oral at bedtime  montelukast 10 milliGRAM(s) Oral at bedtime  sevelamer carbonate 3200 milliGRAM(s) Oral three times a day with meals  sodium chloride 0.9% lock flush 3 milliLiter(s) IV Push every 8 hours      TELEMETRY: 	NSR     ECG:  	  RADIOLOGY:   DIAGNOSTIC TESTING:  [ ] Echocardiogram:  [ ]  Catheterization:  [ ] Stress Test:    OTHER: 	    LABS:	 	    Creatine Kinase, Serum: 57 U/L [25 - 170] (04-28 @ 07:04)  Creatine Kinase, Serum: 77 U/L [25 - 170] (04-27 @ 19:30)                          9.8    8.09  )-----------( 268      ( 29 Apr 2021 06:39 )             32.3     04-29    137  |  94<L>  |  61<H>  ----------------------------<  81  4.9   |  26  |  9.58<H>    Ca    8.9      29 Apr 2021 06:39  Phos  7.2     04-29  Mg     2.2     04-29    TPro  7.3  /  Alb  4.0  /  TBili  0.3  /  DBili  x   /  AST  <5<L>  /  ALT  5   /  AlkPhos  264<H>  04-29            
Name of Patient : ANTONIA ORTIZ  MRN: [id number]  DATE OF SERVICE: 04-30-21 @ 22:08    Subjective: Patient seen and examined. No new events except as noted.     REVIEW OF SYSTEMS:    CONSTITUTIONAL: No weakness, fevers or chills  EYES/ENT: No visual changes;  No vertigo or throat pain   NECK: No pain or stiffness  RESPIRATORY: No cough, wheezing, hemoptysis; No shortness of breath  CARDIOVASCULAR: No chest pain or palpitations  GASTROINTESTINAL: No abdominal or epigastric pain. No nausea, vomiting, or hematemesis; No diarrhea or constipation. No melena or hematochezia.  GENITOURINARY: No dysuria, frequency or hematuria  NEUROLOGICAL: No numbness or weakness  SKIN: No itching, burning, rashes, or lesions   All other review of systems is negative unless indicated above.    MEDICATIONS:  MEDICATIONS  (STANDING):  aspirin enteric coated 81 milliGRAM(s) Oral daily  atorvastatin 80 milliGRAM(s) Oral at bedtime  chlorhexidine 2% Cloths 1 Application(s) Topical daily  cinacalcet 60 milliGRAM(s) Oral daily  dextrose 40% Gel 15 Gram(s) Oral once  dextrose 5%. 1000 milliLiter(s) (50 mL/Hr) IV Continuous <Continuous>  dextrose 50% Injectable 25 Gram(s) IV Push once  diltiazem    milliGRAM(s) Oral daily  diVALproex  milliGRAM(s) Oral two times a day  epoetin anabela-epbx (RETACRIT) Injectable 6000 Unit(s) IV Push <User Schedule>  glucagon  Injectable 1 milliGRAM(s) IntraMuscular once  insulin glargine Injectable (LANTUS) 30 Unit(s) SubCutaneous at bedtime  insulin lispro (ADMELOG) corrective regimen sliding scale   SubCutaneous three times a day before meals  loratadine 10 milliGRAM(s) Oral daily  melatonin 3 milliGRAM(s) Oral at bedtime  montelukast 10 milliGRAM(s) Oral at bedtime  sevelamer carbonate 3200 milliGRAM(s) Oral three times a day with meals  sodium chloride 0.9% lock flush 3 milliLiter(s) IV Push every 8 hours      PHYSICAL EXAM:  T(C): 36.9 (04-30-21 @ 17:30), Max: 36.9 (04-30-21 @ 17:30)  HR: 85 (04-30-21 @ 17:30) (75 - 85)  BP: 125/55 (04-30-21 @ 17:30) (124/65 - 145/82)  RR: 18 (04-30-21 @ 17:30) (16 - 20)  SpO2: 100% (04-30-21 @ 17:30) (100% - 100%)  Wt(kg): --  I&O's Summary    29 Apr 2021 07:01 - 30 Apr 2021 07:00  --------------------------------------------------------  IN: 700 mL / OUT: 3600 mL / NET: -2900 mL    30 Apr 2021 07:01  -  30 Apr 2021 22:08  --------------------------------------------------------  IN: 800 mL / OUT: 2800 mL / NET: -2000 mL          Appearance: Normal	  HEENT:  PERRLA   Lymphatic: No lymphadenopathy   Cardiovascular: Normal S1 S2, no JVD  Respiratory: normal effort , clear  Gastrointestinal:  Soft, Non-tender  Skin: No rashes,  warm to touch  Psychiatry:  Mood & affect appropriate  Musculuskeletal: No edema      All labs, Imaging and EKGs personally reviewed                 04-29-21 @ 07:01  -  04-30-21 @ 07:00  --------------------------------------------------------  IN: 700 mL / OUT: 3600 mL / NET: -2900 mL    04-30-21 @ 07:01  -  04-30-21 @ 22:08  --------------------------------------------------------  IN: 800 mL / OUT: 2800 mL / NET: -2000 mL                                  9.8    7.72  )-----------( 286      ( 30 Apr 2021 12:34 )             32.6               04-30    135  |  93<L>  |  47<H>  ----------------------------<  175<H>  4.5   |  24  |  8.10<H>    Ca    8.7      30 Apr 2021 12:34  Phos  7.2     04-29  Mg     2.2     04-29    TPro  7.2  /  Alb  3.8  /  TBili  0.3  /  DBili  x   /  AST  6   /  ALT  <5<L>  /  AlkPhos  254<H>  04-30                         
Neurology Progress Note    S: Patient seen and examined. No new events overnight. patient denied CP, SOB, HA or pain. bettter after HD and VPA. d/c planning     Medication:  ALBUTerol    90 MICROgram(s) HFA Inhaler 2 Puff(s) Inhalation every 6 hours PRN  aspirin enteric coated 81 milliGRAM(s) Oral daily  atorvastatin 80 milliGRAM(s) Oral at bedtime  chlorhexidine 2% Cloths 1 Application(s) Topical daily  cinacalcet 60 milliGRAM(s) Oral daily  dextrose 40% Gel 15 Gram(s) Oral once  dextrose 5%. 1000 milliLiter(s) IV Continuous <Continuous>  dextrose 50% Injectable 25 Gram(s) IV Push once  diltiazem    milliGRAM(s) Oral daily  diVALproex  milliGRAM(s) Oral two times a day  epoetin anabela-epbx (RETACRIT) Injectable 6000 Unit(s) IV Push <User Schedule>  glucagon  Injectable 1 milliGRAM(s) IntraMuscular once  insulin glargine Injectable (LANTUS) 30 Unit(s) SubCutaneous at bedtime  insulin lispro (ADMELOG) corrective regimen sliding scale   SubCutaneous three times a day before meals  loratadine 10 milliGRAM(s) Oral daily  melatonin 3 milliGRAM(s) Oral at bedtime  montelukast 10 milliGRAM(s) Oral at bedtime  sevelamer carbonate 2400 milliGRAM(s) Oral three times a day with meals  sodium chloride 0.9% lock flush 3 milliLiter(s) IV Push every 8 hours      Vitals:  Vital Signs Last 24 Hrs  T(C): 36.7 (30 Apr 2021 05:29), Max: 37.1 (29 Apr 2021 21:10)  T(F): 98 (30 Apr 2021 05:29), Max: 98.8 (29 Apr 2021 21:10)  HR: 75 (30 Apr 2021 05:29) (73 - 96)  BP: 144/73 (30 Apr 2021 05:29) (112/65 - 157/72)  BP(mean): --  RR: 20 (30 Apr 2021 05:29) (16 - 20)  SpO2: 100% (30 Apr 2021 05:29) (99% - 100%)    General Exam:   General Appearance: Appropriately dressed and in no acute distress       Head: Normocephalic, atraumatic and no dysmorphic features  Ear, Nose, and Throat: Moist mucous membranes  CVS: S1S2+  Resp: No SOB, no wheeze or rhonchi  GI: soft NT/ND  Extremities: No edema or cyanosis  Skin: No bruises or rashes     Neurological Exam:  Mental Status: Awake, alert and oriented x 3.  Able to follow simple and complex verbal commands. Able to name and repeat. fluent speech. No obvious aphasia or dysarthria noted. stuttering speech improving   Cranial Nerves: PERRL, EOMI, VFFC, sensation V1-V3 intact,  no obvious facial asymmetry, equal elevation of palate, scm/trap 5/5, tongue is midline on protrusion. no obvious papilledema on fundoscopic exam. hearing is grossly intact.   Motor: ROB uppers >lowers + myoclonus but improved   Sensation: Intact to light touch and pinprick throughout. no right/left confusion. no extinction to tactile on DSS.   Reflexes: 1+ throughout at biceps, brachioradialis, triceps, patellars and ankles bilaterally and equal. No clonus. R toe and L toe were both downgoing.  Coordination: No dysmetria on FNF   Gait: defferred     I personally reviewed the below data/images/labs:  CBC Full  -  ( 29 Apr 2021 06:39 )  WBC Count : 8.09 K/uL  RBC Count : 3.29 M/uL  Hemoglobin : 9.8 g/dL  Hematocrit : 32.3 %  Platelet Count - Automated : 268 K/uL  Mean Cell Volume : 98.2 fL  Mean Cell Hemoglobin : 29.8 pg  Mean Cell Hemoglobin Concentration : 30.3 gm/dL  Auto Neutrophil # : x  Auto Lymphocyte # : x  Auto Monocyte # : x  Auto Eosinophil # : x  Auto Basophil # : x  Auto Neutrophil % : x  Auto Lymphocyte % : x  Auto Monocyte % : x  Auto Eosinophil % : x  Auto Basophil % : x    04-29    137  |  94<L>  |  61<H>  ----------------------------<  81  4.9   |  26  |  9.58<H>    Ca    8.9      29 Apr 2021 06:39  Phos  7.2     04-29  Mg     2.2     04-29    TPro  7.3  /  Alb  4.0  /  TBili  0.3  /  DBili  x   /  AST  <5<L>  /  ALT  5   /  AlkPhos  264<H>  04-29    EXAM: CT BRAIN    PROCEDURE DATE: Apr 27 2021    INTERPRETATION: Noncontrast CT of the brain.          CLINICAL INDICATION: Difficulty ambulating    TECHNIQUE : Axial CT scanning of the brain was obtained from the skull base to the vertex without the administration of intravenous contrast.    COMPARISON: A brain CT dated 3/23/2021    FINDINGS: No acute hemorrhage, hydrocephalus, midline shift or extra-axial collections are present. The orbits are not remarkable in appearance. A punctate calcification is again noted along the left dorsal fuentes.    The visualized paranasal sinuses and tympanomastoid cavities are free of acute disease.    Impression:    No acute hemorrhage, mass effect or extra-axial collections.      
CARDIOLOGY FOLLOW UP - Dr. Bullock  DATE OF SERVICE: 4/28/21     CC no cp or sob        REVIEW OF SYSTEMS:  CONSTITUTIONAL: No fever, weight loss, or fatigue  RESPIRATORY: No cough, wheezing, chills or hemoptysis; No Shortness of Breath  CARDIOVASCULAR: No chest pain, palpitations, passing out, dizziness, or leg swelling  GASTROINTESTINAL: No abdominal or epigastric pain. No nausea, vomiting, or hematemesis; No diarrhea or constipation. No melena or hematochezia.  VASCULAR: No edema     PHYSICAL EXAM:  T(C): 37.2 (04-28-21 @ 10:35), Max: 37.2 (04-28-21 @ 10:35)  HR: 75 (04-28-21 @ 10:35) (70 - 81)  BP: 139/61 (04-28-21 @ 10:35) (125/69 - 143/67)  RR: 18 (04-28-21 @ 10:35) (12 - 20)  SpO2: 96% (04-28-21 @ 10:35) (96% - 100%)  Wt(kg): --  I&O's Summary    27 Apr 2021 07:01  -  28 Apr 2021 07:00  --------------------------------------------------------  IN: 800 mL / OUT: 3800 mL / NET: -3000 mL        Appearance: Normal	  Cardiovascular: Normal S1 S2,RRR, No JVD, No murmurs  Respiratory: Lungs clear to auscultation	  Gastrointestinal:  Soft, Non-tender, + BS	  Extremities:  le edema+       Home Medications:  acetaminophen 325 mg oral tablet: 2 tab(s) orally every 6 hours, As needed, Mild Pain (1 - 3) (17 Oct 2019 09:39)  Cardizem  mg/24 hours oral capsule, extended release: 1 cap(s) orally once a day (17 Oct 2019 09:39)  Combivent Respimat CFC free 20 mcg-100 mcg/inh inhalation aerosol: 1 puff(s) inhaled 4 times a day, As Needed (17 Oct 2019 09:39)  Eliquis 2.5 mg oral tablet: 1 tab(s) orally 2 times a day (17 Oct 2019 09:39)  furosemide 80 mg oral tablet: 1 tab(s) orally once a day (17 Oct 2019 09:39)  gabapentin 300 mg oral capsule: 1 cap(s) orally 2 times a day (17 Oct 2019 09:39)  NovoLOG Mix 70/30 FlexPen subcutaneous suspension: 35 unit(s) subcutaneous once a day at lunch  (17 Oct 2019 09:39)  raNITIdine 150 mg oral capsule: 1 cap(s) orally 2 times a day (17 Oct 2019 09:39)  rosuvastatin 40 mg oral tablet: 1 tab(s) orally once a day (at bedtime) (17 Oct 2019 09:39)  Sensipar 60 mg oral tablet: 1 tab(s) orally once a day (at bedtime) (17 Oct 2019 09:39)  sevelamer carbonate 800 mg oral tablet: 2 tab(s) orally 3 times a day (with meals) (17 Oct 2019 09:39)  Singulair 10 mg oral tablet: 1 tab(s) orally once a day (at bedtime) (17 Oct 2019 09:39)  Tresiba FlexTouch 100 units/mL subcutaneous solution: 60 unit(s) subcutaneous once a day (at bedtime) (17 Oct 2019 09:39)  vitamin b complex: 1 tab(s) orally once a day (17 Oct 2019 09:39)  ZyrTEC 10 mg oral tablet: 1 tab(s) orally once a day (at bedtime) (17 Oct 2019 09:39)      MEDICATIONS  (STANDING):  apixaban 2.5 milliGRAM(s) Oral two times a day  atorvastatin 80 milliGRAM(s) Oral at bedtime  chlorhexidine 2% Cloths 1 Application(s) Topical daily  cinacalcet 60 milliGRAM(s) Oral daily  dextrose 40% Gel 15 Gram(s) Oral once  dextrose 5%. 1000 milliLiter(s) (50 mL/Hr) IV Continuous <Continuous>  dextrose 50% Injectable 25 Gram(s) IV Push once  diltiazem    milliGRAM(s) Oral daily  diVALproex  milliGRAM(s) Oral two times a day  epoetin anabela-epbx (RETACRIT) Injectable 6000 Unit(s) IV Push <User Schedule>  glucagon  Injectable 1 milliGRAM(s) IntraMuscular once  insulin glargine Injectable (LANTUS) 30 Unit(s) SubCutaneous at bedtime  insulin lispro (ADMELOG) corrective regimen sliding scale   SubCutaneous three times a day before meals  loratadine 10 milliGRAM(s) Oral daily  montelukast 10 milliGRAM(s) Oral at bedtime  sevelamer carbonate 2400 milliGRAM(s) Oral three times a day with meals  sodium chloride 0.9% lock flush 3 milliLiter(s) IV Push every 8 hours      TELEMETRY: 	    ECG:  	  RADIOLOGY:   DIAGNOSTIC TESTING:  [ ] Echocardiogram:  [ ]  Catheterization:  [ ] Stress Test:    OTHER: 	    LABS:	 	    Creatine Kinase, Serum: 57 U/L [25 - 170] (04-28 @ 07:04)  Creatine Kinase, Serum: 77 U/L [25 - 170] (04-27 @ 19:30)                          9.5    8.94  )-----------( 274      ( 28 Apr 2021 07:04 )             31.7     04-28    136  |  95<L>  |  45<H>  ----------------------------<  148<H>  4.3   |  27  |  7.78<H>    Ca    9.1      28 Apr 2021 07:04  Phos  8.3     04-27  Mg     2.2     04-27    TPro  7.5  /  Alb  4.0  /  TBili  0.3  /  DBili  x   /  AST  6   /  ALT  <5  /  AlkPhos  284<H>  04-28    PT/INR - ( 27 Apr 2021 10:25 )   PT: 14.1 sec;   INR: 1.25 ratio         PTT - ( 27 Apr 2021 10:25 )  PTT:36.9 sec        
CARDIOLOGY FOLLOW UP - Dr. Bullock  Date of Service: 4/29/21  CC: denies cp, sob, and palpitations     Review of Systems:  Constitutional: No fever, weight loss, or fatigue  Respiratory: No cough, wheezing, or hemoptysis, no shortness of breath  Cardiovascular: No chest pain, palpitations, passing out, dizziness, or leg swelling  Gastrointestinal: No abd or epigastric pain.  No nausea, vomiting, or hematemesis; no diarrhea or constipation, no melena or hematochezia  Vascular: no edema       PHYSICAL EXAM:  T(C): 36.3 (04-29-21 @ 06:09), Max: 37.4 (04-28-21 @ 14:58)  HR: 71 (04-29-21 @ 06:09) (71 - 83)  BP: 115/70 (04-29-21 @ 06:09) (115/70 - 157/100)  RR: 18 (04-29-21 @ 06:09) (17 - 18)  SpO2: 97% (04-29-21 @ 06:09) (97% - 98%)  Wt(kg): --  I&O's Summary      Appearance: Normal	  Cardiovascular: Normal S1 S2,RRR, No JVD, No murmurs  Respiratory: Lungs clear to auscultation	  Gastrointestinal:  Soft, Non-tender, + BS	  Extremities: Normal range of motion, No clubbing, cyanosis or edema      Home Medications:  acetaminophen 325 mg oral tablet: 2 tab(s) orally every 6 hours, As needed, Mild Pain (1 - 3) (17 Oct 2019 09:39)  Cardizem  mg/24 hours oral capsule, extended release: 1 cap(s) orally once a day (17 Oct 2019 09:39)  Combivent Respimat CFC free 20 mcg-100 mcg/inh inhalation aerosol: 1 puff(s) inhaled 4 times a day, As Needed (17 Oct 2019 09:39)  Eliquis 2.5 mg oral tablet: 1 tab(s) orally 2 times a day (17 Oct 2019 09:39)  furosemide 80 mg oral tablet: 1 tab(s) orally once a day (17 Oct 2019 09:39)  gabapentin 300 mg oral capsule: 1 cap(s) orally 2 times a day (17 Oct 2019 09:39)  NovoLOG Mix 70/30 FlexPen subcutaneous suspension: 35 unit(s) subcutaneous once a day at lunch  (17 Oct 2019 09:39)  raNITIdine 150 mg oral capsule: 1 cap(s) orally 2 times a day (17 Oct 2019 09:39)  rosuvastatin 40 mg oral tablet: 1 tab(s) orally once a day (at bedtime) (17 Oct 2019 09:39)  Sensipar 60 mg oral tablet: 1 tab(s) orally once a day (at bedtime) (17 Oct 2019 09:39)  sevelamer carbonate 800 mg oral tablet: 2 tab(s) orally 3 times a day (with meals) (17 Oct 2019 09:39)  Singulair 10 mg oral tablet: 1 tab(s) orally once a day (at bedtime) (17 Oct 2019 09:39)  Tresiba FlexTouch 100 units/mL subcutaneous solution: 60 unit(s) subcutaneous once a day (at bedtime) (17 Oct 2019 09:39)  vitamin b complex: 1 tab(s) orally once a day (17 Oct 2019 09:39)  ZyrTEC 10 mg oral tablet: 1 tab(s) orally once a day (at bedtime) (17 Oct 2019 09:39)      MEDICATIONS  (STANDING):  aspirin enteric coated 81 milliGRAM(s) Oral daily  atorvastatin 80 milliGRAM(s) Oral at bedtime  chlorhexidine 2% Cloths 1 Application(s) Topical daily  cinacalcet 60 milliGRAM(s) Oral daily  dextrose 40% Gel 15 Gram(s) Oral once  dextrose 5%. 1000 milliLiter(s) (50 mL/Hr) IV Continuous <Continuous>  dextrose 50% Injectable 25 Gram(s) IV Push once  diltiazem    milliGRAM(s) Oral daily  diVALproex  milliGRAM(s) Oral two times a day  epoetin anabela-epbx (RETACRIT) Injectable 6000 Unit(s) IV Push <User Schedule>  glucagon  Injectable 1 milliGRAM(s) IntraMuscular once  heparin   Injectable. 1000 Unit(s) Dialysis. once  heparin   Injectable. 1000 Unit(s) Dialysis. every 1 hour  insulin glargine Injectable (LANTUS) 30 Unit(s) SubCutaneous at bedtime  insulin lispro (ADMELOG) corrective regimen sliding scale   SubCutaneous three times a day before meals  loratadine 10 milliGRAM(s) Oral daily  melatonin 3 milliGRAM(s) Oral at bedtime  montelukast 10 milliGRAM(s) Oral at bedtime  sevelamer carbonate 3200 milliGRAM(s) Oral three times a day with meals  sodium chloride 0.9% lock flush 3 milliLiter(s) IV Push every 8 hours      TELEMETRY: 	NSR     ECG:  	  RADIOLOGY:   DIAGNOSTIC TESTING:  [ ] Echocardiogram:  [ ]  Catheterization:  [ ] Stress Test:    OTHER: 	    LABS:	 	    Creatine Kinase, Serum: 57 U/L [25 - 170] (04-28 @ 07:04)  Creatine Kinase, Serum: 77 U/L [25 - 170] (04-27 @ 19:30)                          9.8    8.09  )-----------( 268      ( 29 Apr 2021 06:39 )             32.3     04-29    137  |  94<L>  |  61<H>  ----------------------------<  81  4.9   |  26  |  9.58<H>    Ca    8.9      29 Apr 2021 06:39  Phos  7.2     04-29  Mg     2.2     04-29    TPro  7.3  /  Alb  4.0  /  TBili  0.3  /  DBili  x   /  AST  <5<L>  /  ALT  5   /  AlkPhos  264<H>  04-29            
[facility] NEPHROLOGY- PROGRESS NOTE    55y Female with history of ESRD on HD presents with tremors. Nephrology consulted for ESRD status.    REVIEW OF SYSTEMS:  Gen: no changes in weight  Cards: no chest pain  Resp: no dyspnea  GI: no nausea or vomiting or diarrhea  Vascular: no LE edema, + LE weakness    latex (Rash)  penicillin (Nausea)      Hospital Medications: Medications reviewed      VITALS:  T(F): 98 (04-30-21 @ 05:29), Max: 98.8 (04-29-21 @ 21:10)  HR: 75 (04-30-21 @ 05:29)  BP: 144/73 (04-30-21 @ 05:29)  RR: 20 (04-30-21 @ 05:29)  SpO2: 100% (04-30-21 @ 05:29)  Wt(kg): --    04-29 @ 07:01  -  04-30 @ 07:00  --------------------------------------------------------  IN: 700 mL / OUT: 3600 mL / NET: -2900 mL      PHYSICAL EXAM:    Gen: NAD, calm  Cards: RRR, +S1/S2, no M/G/R  Resp: CTA B/L  GI: soft, NT/ND, NABS  Vascular: no LE edema B/L, LUE AVF + bruit/thrill      LABS:  04-29    137  |  94<L>  |  61<H>  ----------------------------<  81  4.9   |  26  |  9.58<H>    Ca    8.9      29 Apr 2021 06:39  Phos  7.2     04-29  Mg     2.2     04-29    TPro  7.3  /  Alb  4.0  /  TBili  0.3  /  DBili      /  AST  <5<L>  /  ALT  5   /  AlkPhos  264<H>  04-29    Creatinine Trend: 9.58 <--, 7.78 <--, 10.96 <--                        9.8    8.09  )-----------( 268      ( 29 Apr 2021 06:39 )             32.3     Urine Studies:          
Neurology Progress Note    S: Patient seen and examined. No new events overnight. patient denied CP, SOB, HA or pain. bettter after HD and VPA    Medication:  ALBUTerol    90 MICROgram(s) HFA Inhaler 2 Puff(s) Inhalation every 6 hours PRN  aspirin enteric coated 81 milliGRAM(s) Oral daily  atorvastatin 80 milliGRAM(s) Oral at bedtime  chlorhexidine 2% Cloths 1 Application(s) Topical daily  cinacalcet 60 milliGRAM(s) Oral daily  dextrose 40% Gel 15 Gram(s) Oral once  dextrose 5%. 1000 milliLiter(s) IV Continuous <Continuous>  dextrose 50% Injectable 25 Gram(s) IV Push once  diltiazem    milliGRAM(s) Oral daily  diVALproex  milliGRAM(s) Oral two times a day  epoetin anabela-epbx (RETACRIT) Injectable 6000 Unit(s) IV Push <User Schedule>  glucagon  Injectable 1 milliGRAM(s) IntraMuscular once  insulin glargine Injectable (LANTUS) 30 Unit(s) SubCutaneous at bedtime  insulin lispro (ADMELOG) corrective regimen sliding scale   SubCutaneous three times a day before meals  loratadine 10 milliGRAM(s) Oral daily  melatonin 3 milliGRAM(s) Oral at bedtime  montelukast 10 milliGRAM(s) Oral at bedtime  sevelamer carbonate 2400 milliGRAM(s) Oral three times a day with meals  sodium chloride 0.9% lock flush 3 milliLiter(s) IV Push every 8 hours      Vitals:  Vital Signs Last 24 Hrs  T(C): 36.3 (29 Apr 2021 06:09), Max: 37.4 (28 Apr 2021 14:58)  T(F): 97.4 (29 Apr 2021 06:09), Max: 99.4 (28 Apr 2021 14:58)  HR: 71 (29 Apr 2021 06:09) (71 - 83)  BP: 115/70 (29 Apr 2021 06:09) (115/70 - 157/100)  BP(mean): --  RR: 18 (29 Apr 2021 06:09) (17 - 18)  SpO2: 97% (29 Apr 2021 06:09) (96% - 98%)    General Exam:   General Appearance: Appropriately dressed and in no acute distress       Head: Normocephalic, atraumatic and no dysmorphic features  Ear, Nose, and Throat: Moist mucous membranes  CVS: S1S2+  Resp: No SOB, no wheeze or rhonchi  GI: soft NT/ND  Extremities: No edema or cyanosis  Skin: No bruises or rashes     Neurological Exam:  Mental Status: Awake, alert and oriented x 3.  Able to follow simple and complex verbal commands. Able to name and repeat. fluent speech. No obvious aphasia or dysarthria noted. stuttering speech improving   Cranial Nerves: PERRL, EOMI, VFFC, sensation V1-V3 intact,  no obvious facial asymmetry, equal elevation of palate, scm/trap 5/5, tongue is midline on protrusion. no obvious papilledema on fundoscopic exam. hearing is grossly intact.   Motor: ROB uppers >lowers + myoclonus but improved   Sensation: Intact to light touch and pinprick throughout. no right/left confusion. no extinction to tactile on DSS.   Reflexes: 1+ throughout at biceps, brachioradialis, triceps, patellars and ankles bilaterally and equal. No clonus. R toe and L toe were both downgoing.  Coordination: No dysmetria on FNF   Gait: defferred     I personally reviewed the below data/images/labs:      CBC Full  -  ( 29 Apr 2021 06:39 )  WBC Count : 8.09 K/uL  RBC Count : 3.29 M/uL  Hemoglobin : 9.8 g/dL  Hematocrit : 32.3 %  Platelet Count - Automated : 268 K/uL  Mean Cell Volume : 98.2 fL  Mean Cell Hemoglobin : 29.8 pg  Mean Cell Hemoglobin Concentration : 30.3 gm/dL  Auto Neutrophil # : x  Auto Lymphocyte # : x  Auto Monocyte # : x  Auto Eosinophil # : x  Auto Basophil # : x  Auto Neutrophil % : x  Auto Lymphocyte % : x  Auto Monocyte % : x  Auto Eosinophil % : x  Auto Basophil % : x    04-29    137  |  94<L>  |  61<H>  ----------------------------<  81  4.9   |  26  |  9.58<H>    Ca    8.9      29 Apr 2021 06:39  Phos  7.2     04-29  Mg     2.2     04-29    TPro  7.3  /  Alb  4.0  /  TBili  0.3  /  DBili  x   /  AST  <5<L>  /  ALT  5   /  AlkPhos  264<H>  04-29    LIVER FUNCTIONS - ( 29 Apr 2021 06:39 )  Alb: 4.0 g/dL / Pro: 7.3 g/dL / ALK PHOS: 264 U/L / ALT: 5 U/L / AST: <5 U/L / GGT: x           PT/INR - ( 27 Apr 2021 10:25 )   PT: 14.1 sec;   INR: 1.25 ratio         PTT - ( 27 Apr 2021 10:25 )  PTT:36.9 sec    EXAM: CT BRAIN    PROCEDURE DATE: Apr 27 2021    INTERPRETATION: Noncontrast CT of the brain.    CLINICAL INDICATION: Difficulty ambulating    TECHNIQUE : Axial CT scanning of the brain was obtained from the skull base to the vertex without the administration of intravenous contrast.    COMPARISON: A brain CT dated 3/23/2021    FINDINGS: No acute hemorrhage, hydrocephalus, midline shift or extra-axial collections are present. The orbits are not remarkable in appearance. A punctate calcification is again noted along the left dorsal fuentse.    The visualized paranasal sinuses and tympanomastoid cavities are free of acute disease.    Impression:    No acute hemorrhage, mass effect or extra-axial collections.      
[facility] NEPHROLOGY- PROGRESS NOTE    55y Female with history of ESRD on HD presents with tremors. Nephrology consulted for ESRD status.    REVIEW OF SYSTEMS:  Gen: no changes in weight  Cards: no chest pain  Resp: no dyspnea  GI: no nausea or vomiting or diarrhea  Vascular: no LE edema, + LE weakness    latex (Rash)  penicillin (Nausea)      Hospital Medications: Medications reviewed    VITALS:  T(F): 97.4 (04-29-21 @ 06:09), Max: 99.4 (04-28-21 @ 14:58)  HR: 71 (04-29-21 @ 06:09)  BP: 115/70 (04-29-21 @ 06:09)  RR: 18 (04-29-21 @ 06:09)  SpO2: 97% (04-29-21 @ 06:09)  Wt(kg): --      PHYSICAL EXAM:    Gen: NAD, calm  Cards: RRR, +S1/S2, no M/G/R  Resp: CTA B/L  GI: soft, NT/ND, NABS  Vascular: no LE edema B/L, LUE AVF + bruit/thrill      LABS:  04-29    137  |  94<L>  |  61<H>  ----------------------------<  81  4.9   |  26  |  9.58<H>    Ca    8.9      29 Apr 2021 06:39  Phos  7.2     04-29  Mg     2.2     04-29    TPro  7.3  /  Alb  4.0  /  TBili  0.3  /  DBili      /  AST  <5<L>  /  ALT  5   /  AlkPhos  264<H>  04-29    Creatinine Trend: 9.58 <--, 7.78 <--, 10.96 <--                        9.8    8.09  )-----------( 268      ( 29 Apr 2021 06:39 )             32.3     Urine Studies:      
Patient seen and examined at bedside and patient reports her arm and leg tremors have improved significantly since yesterday. She got dialysis last night and was also  started on depakote 250mg BID and it helped a great deal.  Sh reports mild hand tremors but is able now to brush her teeth and comb her hair.  No new neurologic symptoms.       MEDICATIONS  (STANDING):  apixaban 2.5 milliGRAM(s) Oral two times a day  atorvastatin 80 milliGRAM(s) Oral at bedtime  chlorhexidine 2% Cloths 1 Application(s) Topical daily  cinacalcet 60 milliGRAM(s) Oral daily  dextrose 40% Gel 15 Gram(s) Oral once  dextrose 5%. 1000 milliLiter(s) (50 mL/Hr) IV Continuous <Continuous>  dextrose 50% Injectable 25 Gram(s) IV Push once  diltiazem    milliGRAM(s) Oral daily  diVALproex  milliGRAM(s) Oral two times a day  epoetin anabela-epbx (RETACRIT) Injectable 6000 Unit(s) IV Push <User Schedule>  glucagon  Injectable 1 milliGRAM(s) IntraMuscular once  insulin glargine Injectable (LANTUS) 30 Unit(s) SubCutaneous at bedtime  insulin lispro (ADMELOG) corrective regimen sliding scale   SubCutaneous three times a day before meals  loratadine 10 milliGRAM(s) Oral daily  montelukast 10 milliGRAM(s) Oral at bedtime  sevelamer carbonate 2400 milliGRAM(s) Oral three times a day with meals  sodium chloride 0.9% lock flush 3 milliLiter(s) IV Push every 8 hours        Vital Signs Last 24 Hrs  T(C): 37.2 (28 Apr 2021 10:35), Max: 37.2 (28 Apr 2021 10:35)  T(F): 99 (28 Apr 2021 10:35), Max: 99 (28 Apr 2021 10:35)  HR: 75 (28 Apr 2021 10:35) (70 - 81)  BP: 139/61 (28 Apr 2021 10:35) (125/69 - 143/67)  BP(mean): 74 (27 Apr 2021 13:36) (74 - 74)  RR: 18 (28 Apr 2021 10:35) (12 - 20)  SpO2: 96% (28 Apr 2021 10:35) (96% - 100%)        Constitutional: awake and alert.  HEENT: PERRLA, EOMI,   Neck: Supple.  Cardiovascular: S1 and S2, regular / irregular rhythm  Gastrointestinal: soft, nontender  Extremities:  no edema  Musculoskeletal: no joint swelling/tenderness, no abnormal movements  Skin: No rashes, dry skin    Neurological exam:  Mental status: A x O x 3. Appropriately interactive, normal affect. Speech fluent, No Aphasia or paraphasic errors. Naming /repetition intact   CN: PERRL, EOMI, VFF, facial sensation normal, no NLFD, tongue midline,   Motor: No pronator drift, Strength 5/5 in all 4 ext, normal bulk and tone, mild hand tremors of bilateral hands and asterixsis noted   Sens: Intact to light touch,  Reflexes: Symmetric and normal, BJ 2+, TJ 2+, BR 2+, KJ 1+, AJ 0, downgoing toes b/l  Coord:  No FNFA, dysmetria      Labs:                        9.5    8.94  )-----------( 274      ( 28 Apr 2021 07:04 )             31.7     04-28    136  |  95<L>  |  45<H>  ----------------------------<  148<H>  4.3   |  27  |  7.78<H>    Ca    9.1      28 Apr 2021 07:04  Phos  8.3     04-27  Mg     2.2     04-27    TPro  7.5  /  Alb  4.0  /  TBili  0.3  /  DBili  x   /  AST  6   /  ALT  <5  /  AlkPhos  284<H>  04-28        PT/INR - ( 27 Apr 2021 10:25 )   PT: 14.1 sec;   INR: 1.25 ratio         PTT - ( 27 Apr 2021 10:25 )  PTT:36.9 sec      
[facility] NEPHROLOGY- PROGRESS NOTE    55y Female with history of ESRD on HD presents with tremors. Nephrology consulted for ESRD status.    REVIEW OF SYSTEMS:  Gen: no changes in weight  Cards: no chest pain  Resp: no dyspnea  GI: no nausea or vomiting or diarrhea  Vascular: no LE edema, + LE weakness    latex (Rash)  penicillin (Nausea)      Hospital Medications: Medications reviewed    VITALS:  T(F): 98.5 (04-28-21 @ 05:56), Max: 98.5 (04-28-21 @ 05:56)  HR: 81 (04-28-21 @ 05:56)  BP: 139/64 (04-28-21 @ 05:56)  RR: 17 (04-28-21 @ 05:56)  SpO2: 96% (04-28-21 @ 05:56)  Wt(kg): --  Height (cm): 157.5 (04-27 @ 09:22)    04-27 @ 07:01  -  04-28 @ 07:00  --------------------------------------------------------  IN: 800 mL / OUT: 3800 mL / NET: -3000 mL        PHYSICAL EXAM:    Gen: NAD, calm  Cards: RRR, +S1/S2, no M/G/R  Resp: CTA B/L  GI: soft, NT/ND, NABS  Vascular: no LE edema B/L, LUE AVF + bruit/thrill    LABS:  04-28    136  |  95<L>  |  45<H>  ----------------------------<  148<H>  4.3   |  27  |  7.78<H>    Ca    9.1      28 Apr 2021 07:04  Phos  8.3     04-27  Mg     2.2     04-27    TPro  7.5  /  Alb  4.0  /  TBili  0.3  /  DBili      /  AST  6   /  ALT  <5  /  AlkPhos  284<H>  04-28    Creatinine Trend: 7.78 <--, 10.96 <--                        9.5    8.94  )-----------( 274      ( 28 Apr 2021 07:04 )             31.7     Urine Studies:        RADIOLOGY & ADDITIONAL STUDIES:
Name of Patient : ANTONIA ORTIZ  MRN: [id number]  DATE OF SERVICE: 04-28-21 @ 16:23    Subjective: Patient seen and examined. No new events except as noted.     REVIEW OF SYSTEMS:    CONSTITUTIONAL: No weakness, fevers or chills  EYES/ENT: No visual changes;  No vertigo or throat pain   NECK: No pain or stiffness  RESPIRATORY: No cough, wheezing, hemoptysis; No shortness of breath  CARDIOVASCULAR: No chest pain or palpitations  GASTROINTESTINAL: No abdominal or epigastric pain. No nausea, vomiting, or hematemesis; No diarrhea or constipation. No melena or hematochezia.  GENITOURINARY: No dysuria, frequency or hematuria  NEUROLOGICAL: No numbness or weakness  SKIN: No itching, burning, rashes, or lesions   All other review of systems is negative unless indicated above.    MEDICATIONS:  MEDICATIONS  (STANDING):  apixaban 2.5 milliGRAM(s) Oral two times a day  atorvastatin 80 milliGRAM(s) Oral at bedtime  chlorhexidine 2% Cloths 1 Application(s) Topical daily  cinacalcet 60 milliGRAM(s) Oral daily  dextrose 40% Gel 15 Gram(s) Oral once  dextrose 5%. 1000 milliLiter(s) (50 mL/Hr) IV Continuous <Continuous>  dextrose 50% Injectable 25 Gram(s) IV Push once  diltiazem    milliGRAM(s) Oral daily  diVALproex  milliGRAM(s) Oral two times a day  epoetin anabela-epbx (RETACRIT) Injectable 6000 Unit(s) IV Push <User Schedule>  glucagon  Injectable 1 milliGRAM(s) IntraMuscular once  insulin glargine Injectable (LANTUS) 30 Unit(s) SubCutaneous at bedtime  insulin lispro (ADMELOG) corrective regimen sliding scale   SubCutaneous three times a day before meals  loratadine 10 milliGRAM(s) Oral daily  montelukast 10 milliGRAM(s) Oral at bedtime  sevelamer carbonate 2400 milliGRAM(s) Oral three times a day with meals  sodium chloride 0.9% lock flush 3 milliLiter(s) IV Push every 8 hours      PHYSICAL EXAM:  T(C): 37.4 (04-28-21 @ 14:58), Max: 37.4 (04-28-21 @ 14:58)  HR: 83 (04-28-21 @ 14:58) (71 - 83)  BP: 126/54 (04-28-21 @ 14:58) (125/69 - 143/67)  RR: 17 (04-28-21 @ 14:58) (17 - 20)  SpO2: 98% (04-28-21 @ 14:58) (96% - 99%)  Wt(kg): --  I&O's Summary    27 Apr 2021 07:01  -  28 Apr 2021 07:00  --------------------------------------------------------  IN: 800 mL / OUT: 3800 mL / NET: -3000 mL      Height (cm): 157.5 (04-28 @ 10:35)  Weight (kg): 158 (04-28 @ 10:35)  BMI (kg/m2): 63.7 (04-28 @ 10:35)  BSA (m2): 2.42 (04-28 @ 10:35)    Appearance: Normal, obese  HEENT:  PERRLA   Lymphatic: No lymphadenopathy   Cardiovascular: Normal S1 S2, no JVD  Respiratory: normal effort , clear  Gastrointestinal:  Soft, Non-tender  Skin: No rashes,  warm to touch  Psychiatry:  Mood & affect appropriate  Musculuskeletal: No edema      All labs, Imaging and EKGs personally reviewed         04-27-21 @ 07:01  -  04-28-21 @ 07:00  --------------------------------------------------------  IN: 800 mL / OUT: 3800 mL / NET: -3000 mL                          9.5    8.94  )-----------( 274      ( 28 Apr 2021 07:04 )             31.7               04-28    136  |  95<L>  |  45<H>  ----------------------------<  148<H>  4.3   |  27  |  7.78<H>    Ca    9.1      28 Apr 2021 07:04  Phos  8.3     04-27  Mg     2.2     04-27    TPro  7.5  /  Alb  4.0  /  TBili  0.3  /  DBili  x   /  AST  6   /  ALT  <5  /  AlkPhos  284<H>  04-28    PT/INR - ( 27 Apr 2021 10:25 )   PT: 14.1 sec;   INR: 1.25 ratio         PTT - ( 27 Apr 2021 10:25 )  PTT:36.9 sec       CARDIAC MARKERS ( 28 Apr 2021 07:04 )  x     / x     / 57 U/L / x     / x      CARDIAC MARKERS ( 27 Apr 2021 19:30 )  x     / x     / 77 U/L / x     / x                            
Name of Patient : ANTONIA ORTIZ  MRN: [id number]  DATE OF SERVICE: 04-29-21 @ 23:17    Subjective: Patient seen and examined. No new events except as noted.     REVIEW OF SYSTEMS:    CONSTITUTIONAL: No weakness, fevers or chills  EYES/ENT: No visual changes;  No vertigo or throat pain   NECK: No pain or stiffness  RESPIRATORY: No cough, wheezing, hemoptysis; No shortness of breath  CARDIOVASCULAR: No chest pain or palpitations  GASTROINTESTINAL: No abdominal or epigastric pain. No nausea, vomiting, or hematemesis; No diarrhea or constipation. No melena or hematochezia.  GENITOURINARY: No dysuria, frequency or hematuria  NEUROLOGICAL: No numbness or weakness  SKIN: No itching, burning, rashes, or lesions   All other review of systems is negative unless indicated above.    MEDICATIONS:  MEDICATIONS  (STANDING):  aspirin enteric coated 81 milliGRAM(s) Oral daily  atorvastatin 80 milliGRAM(s) Oral at bedtime  chlorhexidine 2% Cloths 1 Application(s) Topical daily  cinacalcet 60 milliGRAM(s) Oral daily  dextrose 40% Gel 15 Gram(s) Oral once  dextrose 5%. 1000 milliLiter(s) (50 mL/Hr) IV Continuous <Continuous>  dextrose 50% Injectable 25 Gram(s) IV Push once  diltiazem    milliGRAM(s) Oral daily  diVALproex  milliGRAM(s) Oral two times a day  epoetin anabela-epbx (RETACRIT) Injectable 6000 Unit(s) IV Push <User Schedule>  glucagon  Injectable 1 milliGRAM(s) IntraMuscular once  insulin glargine Injectable (LANTUS) 30 Unit(s) SubCutaneous at bedtime  insulin lispro (ADMELOG) corrective regimen sliding scale   SubCutaneous three times a day before meals  loratadine 10 milliGRAM(s) Oral daily  melatonin 3 milliGRAM(s) Oral at bedtime  montelukast 10 milliGRAM(s) Oral at bedtime  sevelamer carbonate 3200 milliGRAM(s) Oral three times a day with meals  sodium chloride 0.9% lock flush 3 milliLiter(s) IV Push every 8 hours      PHYSICAL EXAM:  T(C): 36.6 (04-29-21 @ 15:10), Max: 36.7 (04-29-21 @ 11:26)  HR: 73 (04-29-21 @ 15:10) (71 - 77)  BP: 157/72 (04-29-21 @ 15:10) (115/70 - 157/72)  RR: 16 (04-29-21 @ 15:10) (16 - 18)  SpO2: 97% (04-29-21 @ 06:09) (97% - 97%)  Wt(kg): --  I&O's Summary    29 Apr 2021 07:01  -  29 Apr 2021 23:17  --------------------------------------------------------  IN: 700 mL / OUT: 3600 mL / NET: -2900 mL          Appearance: Normal	  HEENT:  PERRLA   Lymphatic: No lymphadenopathy   Cardiovascular: Normal S1 S2, no JVD  Respiratory: normal effort , clear  Gastrointestinal:  Soft, Non-tender  Skin: No rashes,  warm to touch  Psychiatry:  Mood & affect appropriate  Musculuskeletal: No edema      All labs, Imaging and EKGs personally reviewed       04-29-21 @ 07:01  -  04-29-21 @ 23:17  --------------------------------------------------------  IN: 700 mL / OUT: 3600 mL / NET: -2900 mL                          9.8    8.09  )-----------( 268      ( 29 Apr 2021 06:39 )             32.3               04-29    137  |  94<L>  |  61<H>  ----------------------------<  81  4.9   |  26  |  9.58<H>    Ca    8.9      29 Apr 2021 06:39  Phos  7.2     04-29  Mg     2.2     04-29    TPro  7.3  /  Alb  4.0  /  TBili  0.3  /  DBili  x   /  AST  <5<L>  /  ALT  5   /  AlkPhos  264<H>  04-29           CARDIAC MARKERS ( 28 Apr 2021 07:04 )  x     / x     / 57 U/L / x     / x

## 2021-04-30 NOTE — PROGRESS NOTE ADULT - ASSESSMENT
55y Female with history of ESRD on HD presents with tremors. Nephrology consulted for ESRD status.    1) ESRD: Last HD on 4/29 tolerated well with 2.9L removed. Plan for next maintenance HD today to place back on MWF schedule prior to discharge. Patient can subsequently follow up on 5/3 as an outpatient. Monitor electrolytes.    2) HTN with ESRD: BP controlled. Continue with current medications. Monitor BP.    3) Anemia of renal disease: Hb borderline. Continue with Epo 6K with HD. Monitor Hb.    4) Secondary HPT of renal origin: Phosphorus uncontrolled. Continue with renvela 4 tabs with meals. Holding hectorol 4 mcg with HD but continue with sensipar 60 mg daily. Monitor serum calcium and phosphorus.    5) Hyperkalemia: Resolved with HD. Continue with renal diet. Monitor serum K.      [facility] NEPHROLOGY  Keaton Lopez M.D.  Juma Green D.O.  Myla Hoffmann M.D.  Julia Brewer, MSN, ANP-C    Telephone: [phone]  Facsimile: [phone]    [address] [place] [address]

## 2021-04-30 NOTE — PROGRESS NOTE ADULT - NSICDXPILOT_GEN_ALL_CORE
[place]

## 2021-05-21 NOTE — H&P PST ADULT - NSICDXPASTMEDICALHX_GEN_ALL_CORE_FT
none
PAST MEDICAL HISTORY:  Anemia     Atrial fibrillation with loop recorder 2015, battery most likely depleted, as per cardiac clearance, Dr. Reece Anesthesia aware, pt on Eliquis    Chronic GERD     COPD (chronic obstructive pulmonary disease)     DM (diabetes mellitus)     End-stage renal disease     HTN (hypertension)     Morbid obesity BMI - 58.3    CHAMP (obstructive sleep apnea) non compliance with CPAP, Anesthesia Dr. Reece aware, pt told to bring CPAP for sx, pr verbalized understanding    Potential difficult airway on pre-intubation assessment airway class III, large neck, morbid obesity, hx of CHAMP, no compliance with CPAP- Dr. Reece, Anesthesia aware

## 2021-08-30 NOTE — H&P ADULT - PROBLEM SELECTOR PLAN 7
C/w HSQ due to renal failure   IMPROVE VTE Individual Risk Assessment  RISK                                                                Points  [  ] Previous VTE                                                  3  [  ] Thrombophilia                                               2  [x  ] Lower limb paralysis                                      2        (unable to hold up >15 seconds)    [  ] Current Cancer                                              2         (within 6 months)  [ x ] Immobilization > 24 hrs                                1  [  ] ICU/CCU stay > 24 hours                              1  [  ] Age > 60                                                      1  IMPROVE VTE Score ___3_____

## 2021-08-30 NOTE — ED ADULT NURSE NOTE - OBJECTIVE STATEMENT
Patient present to ED with c/o of inability to walk for pass few months. As per patient she uses a rolling walker to get around and wheelchair, s/p fall in bathroom today. hitting right shoulder, left leg and back and neck

## 2021-08-30 NOTE — H&P ADULT - PROBLEM SELECTOR PLAN 4
Pt previously on Eliquis due to PAF, had an implantable loop which did not detect any arrythmias for some time   She was instructed to stop Eliquis and only take ASA for the past 2-3 months   Pt currently denies chest pain/ palpitations  Monitor HR during HD, keep electrolytes replete

## 2021-08-30 NOTE — ED PROVIDER NOTE - OBJECTIVE STATEMENT
55 y.o female with a significant history of ESRD, on dialysis, presents to the ED after a fall. Patient's rollator is broken so she has been using a wheelchair and has been getting more deconditioned for the last few weeks. Now she got out of the bathtub and slipped and fell, injuring her right shoulder, left leg, and striking her head. Patient is complaining of mild headache, right shoulder pain, and left leg pain. Patient denies of LOC, vomiting, or any other complaints. Allergy to Penicillin. Patient was on Eliquis during her last hospitalization.

## 2021-08-30 NOTE — H&P ADULT - HISTORY OF PRESENT ILLNESS
Patient is a 54 y/o female, from home, baseline ambulatory with rollator, w/ PMHx of Afib s/p ILR (taken off Eliquis in the past 3 months), ESRD on HD M/W/F (last 3 days ago via L AV fistula), T2DM, HTN, HLD, Iron deficiency anemia, hypercalcemia, and COPD, presented after a mechanical fall sustained while getting out of the bathtub. Pt endorsed that she has been feeling more unsteady on her feet in the recent few weeks, and that her rollator was broken. Her HD unit allowed her to loan a wheelchair but she is unable to do many of her ADL's and fell face first while getting out of the shower; she sustained minor head injury, but no LOC. She reports a lot of pain over her left knee and Right shoulder, and is anxious about being unable to take care of herself and her grandson who is her dependent. Due to this, she missed her scheduled HD for 8/20/21. She endorses that she sometimes gets dizzy while getting up from a sitting position, but that these symptoms have no relation to HD sessions and that she no longer takes any Lasix Pt denies any prior fevers, diarrhea, lower extremity swelling, chest pain, palpitations, excessive SOB, or any wheezing. Of note, pt reports being off Eliquis x 3 months because her outpatient cardio sees no further evidence of PAF; she does not use her inhalers due to lack of instruction on how.     Tremors resolved after HD. Neurology and Nephrology consults were obtained, recommendations appreciated.  Pt to continue on Eliquis , Cardizem resumed as per cardiology.   Patient will continue to work with physical therapy following discharge.  Patient is a 54 y/o female, from home, baseline ambulatory with rollator, w/ PMHx of Afib s/p ILR (taken off Eliquis in the past 3 months), ESRD on HD M/W/F (last 3 days ago via L AV fistula), T2DM, HTN, HLD, Iron deficiency anemia, hypercalcemia, and COPD, presented after a mechanical fall sustained while getting out of the bathtub. Pt endorsed that she has been feeling more unsteady on her feet in the recent few weeks, and that her rollator was broken. Her HD unit allowed her to loan a wheelchair but she is unable to do many of her ADL's and fell face first while getting out of the shower; she sustained minor head injury, but no LOC. She reports a lot of pain over her left knee and Right shoulder, and is anxious about being unable to take care of herself and her grandson who is her dependent. Due to this, she missed her scheduled HD for 8/20/21. She endorses that she sometimes gets dizzy while getting up from a sitting position, but that these symptoms have no relation to HD sessions and that she no longer takes any Lasix Pt denies any prior fevers, diarrhea, lower extremity swelling, chest pain, palpitations, excessive SOB, or any wheezing. Of note, pt reports being off Eliquis x 3 months because her outpatient cardio sees no further evidence of PAF; she does not use her inhalers due to lack of instruction on how.

## 2021-08-30 NOTE — H&P ADULT - PROBLEM SELECTOR PLAN 2
Pt is on HD via L AVF on M/W/F, missed her Monday 8/30 session  Does not complain of any uremic symptoms/ oriented  BUN/ Sr. Cr 81/11.3  Dr. Li consulted to restart HD in AM   Slightly hyperkalemic to 5.6, given one dose of lokelma  F/u Phos, magnesium, and potassium in AM

## 2021-08-30 NOTE — ED PROVIDER NOTE - PROGRESS NOTE DETAILS
jocelin goldstein, will see pt for dialysis. discussed the case with the admitting MD who accepts the patient for admission. pt unable to ambulate and may need further or advance imaging. admit for further care

## 2021-08-30 NOTE — H&P ADULT - NSHPPHYSICALEXAM_GEN_ALL_CORE
GENERAL APPEARANCE: Morbidly obese, AAF, AA0x3, in NAD   THROAT: Oral cavity and pharynx normal. No inflammation, swelling, exudate, or lesions.  CARDIAC: Normal S1 and S2. No S3, S4 or murmurs. Rhythm is regular. There is no peripheral edema, cyanosis or pallor.  LUNGS: Clear to auscultation and percussion without rales, rhonchi, wheezing or diminished breath sounds.  ABDOMEN: Positive bowel sounds. Soft, distended, nontender. No guarding or rebound. No masses.  EXTREMITIES: No significant deformity or joint abnormality. Left knee tenderness ++ No edema. Peripheral pulses intact. No varicosities.  NEUROLOGICAL: CN II-XII intact. Strength and sensation symmetric and intact throughout. Reflexes 2+ throughout.   SKIN: No skin breakdown, lesions or rashes noted

## 2021-08-30 NOTE — ED PROVIDER NOTE - CARE PLAN
1 Principal Discharge DX:	Fall  Secondary Diagnosis:	Closed head injury with concussion, without loss of consciousness, initial encounter  Secondary Diagnosis:	Hyperkalemia  Secondary Diagnosis:	Uremic syndrome

## 2021-08-30 NOTE — H&P ADULT - ASSESSMENT
Patient is a 54 y/o female, from home, baseline ambulatory with rollator, w/ PMHx of Afib s/p ILR (taken off Eliquis in the past 3 months), ESRD on HD M/W/F (last 3 days ago via L AV fistula), T2DM, HTN, HLD, Iron deficiency anemia, hypercalcemia, and COPD, presented after a mechanical fall sustained while getting out of the bathtub.

## 2021-08-30 NOTE — ED PROVIDER NOTE - SECONDARY DIAGNOSIS.
Hyperkalemia Uremic syndrome Closed head injury with concussion, without loss of consciousness, initial encounter

## 2021-08-30 NOTE — H&P ADULT - PROBLEM SELECTOR PLAN 5
Pt takes 80U Tresiba (degludec) -> equivalent is 64U of Lantus   C/w HSS, and Accuchecks ACHS   Carb consistent/ DASH/ Renal diet

## 2021-08-30 NOTE — ED PROVIDER NOTE - CONSTITUTIONAL, MLM
normal... Well appearing, awake, alert, oriented to person, place, time/situation and in no apparent distress. Well appearing, awake, alert, oriented to person, place, time/situation and in no apparent distress. Obese

## 2021-08-30 NOTE — H&P ADULT - PROBLEM SELECTOR PLAN 1
Pt denies having any chest pain/ palpitations/ prodrome/ LOC which could indicate an insidious cause of fall  Likely mechanical due to debility, lack of rollator for assist, wet floor from bathroom  PT consult for skilled needs assessment  Whole body CT did not show any fractures or bleeds   F/u Orthostatic BP (not fluid overloaded)

## 2021-08-30 NOTE — H&P ADULT - PROBLEM SELECTOR PLAN 3
Adm Hb/ Hct 8.6/28.7  F/u Iron panel (especially due to complaints of RLS symptoms)  C/w EPO as per nephro   Pt on Auryxia (Ferric citrate outpatient)-> continue repletion depending on panel

## 2021-08-30 NOTE — ED PROVIDER NOTE - CLINICAL SUMMARY MEDICAL DECISION MAKING FREE TEXT BOX
xray the affected areas. PAN-scan since she is on anticoagulants. Patient has MWF dialysis and missed a session today because she couldn't get up and had to call the ambulance. Pain control. xray the affected areas. PAN-scan since she is on anticoagulants. Patient has MWF dialysis and missed a session today because she couldn't get up and had to call the ambulance. Pain control. pt deconditioned bc she is not walking due to broken rollator.

## 2021-08-31 NOTE — CONSULT NOTE ADULT - SUBJECTIVE AND OBJECTIVE BOX
Patient is a 55y old  Female who presents with a chief complaint of Mechanical Fall, ESRD on HD (30 Aug 2021 21:51)      HPI:  Patient is a 54 y/o female, from home, baseline ambulatory with rollator, w/ PMHx of Afib s/p ILR (taken off Eliquis in the past 3 months), ESRD on HD M/W/F (last 3 days ago via L AV fistula), T2DM, HTN, HLD, Iron deficiency anemia, hypercalcemia, and COPD, presented after a mechanical fall sustained while getting out of the bathtub. Pt endorsed that she has been feeling more unsteady on her feet in the recent few weeks, and that her rollator was broken. Her HD unit allowed her to loan a wheelchair but she is unable to do many of her ADL's and fell face first while getting out of the shower.  She hit her head and legs but no lOC.  Was noted to have slurred speech this AM, no last known normal.  She notes that she has been having slurred speech since starting Wellbutrin recently.     Neurological Review of Systems:  No difficulty with language.  No vision loss or double vision.  No dizziness, vertigo or new hearing loss.  No difficulty with  swallowing.  No focal weakness.  No focal sensory changes.  No numbness or tingling in the bilateral lower extremities.  No difficulty with balance. +chronic difficulty with ambulation, uses walker.      MEDICATIONS  (STANDING):  ALBUTerol    90 MICROgram(s) HFA Inhaler 2 Puff(s) Inhalation every 6 hours  aspirin enteric coated 81 milliGRAM(s) Oral daily  atorvastatin 40 milliGRAM(s) Oral at bedtime  cinacalcet 60 milliGRAM(s) Oral at bedtime  dextrose 5%. 1000 milliLiter(s) (40 mL/Hr) IV Continuous <Continuous>  dextrose 50% Injectable 25 Gram(s) IV Push once  dextrose 50% Injectable 25 Gram(s) IV Push once  dextrose 50% Injectable 25 Gram(s) IV Push once  diltiazem    milliGRAM(s) Oral daily  epoetin anabela-epbx (RETACRIT) Injectable 8000 Unit(s) IV Push <User Schedule>  famotidine Injectable 20 milliGRAM(s) IV Push every 24 hours  heparin   Injectable 5000 Unit(s) SubCutaneous every 8 hours  insulin glargine Injectable (LANTUS) 64 Unit(s) SubCutaneous at bedtime  insulin lispro (ADMELOG) corrective regimen sliding scale   SubCutaneous Before meals and at bedtime  loratadine 10 milliGRAM(s) Oral daily  montelukast 10 milliGRAM(s) Oral at bedtime    MEDICATIONS  (PRN):  acetaminophen   Tablet .. 650 milliGRAM(s) Oral every 6 hours PRN Mild Pain (1 - 3)  cyclobenzaprine 5 milliGRAM(s) Oral three times a day PRN Muscle Spasm  HYDROmorphone  Injectable 0.5 milliGRAM(s) IV Push every 6 hours PRN Severe Pain (7 - 10)  melatonin 5 milliGRAM(s) Oral at bedtime PRN Insomnia  traMADol 50 milliGRAM(s) Oral every 6 hours PRN Moderate Pain (4 - 6)    Allergies    latex (Rash)  penicillin (Nausea)    Intolerances      PAST MEDICAL & SURGICAL HISTORY:  COPD (chronic obstructive pulmonary disease)    DM (diabetes mellitus)    Atrial fibrillation  with loop recorder , battery most likely depleted, as per cardiac clearance, Dr. Reece Anesthesia aware, pt on Eliquis    HTN (hypertension)    Morbid obesity  BMI - 58.3    Chronic GERD    CHAMP (obstructive sleep apnea)  non compliance with CPAP, Anesthesia Dr. Reece aware, pt told to bring CPAP for sx, pr verbalized understanding    Potential difficult airway on pre-intubation assessment  airway class III, large neck, morbid obesity, hx of CHAMP, no compliance with CPAP- Dr. Reece, Anesthesia aware    End-stage renal disease    Anemia    H/O tubal ligation      Status post placement of implantable loop recorder  left chest-     History of vascular access device  s/p insertion right chest permacath 2019, removal 2019, insertion left chest permacath 2019    S/P arteriovenous (AV) fistula creation  left  arm 2019      FAMILY HISTORY:  Family history of diabetes mellitus  mother-     Family hx of hypertension  mother-       SOCIAL HISTORY: non smoker    Review of Systems:  Constitutional:  No fevers.                    Eyes, Ears, Mouth, Throat: No vision loss   Respiratory: No cough.                                Cardiovascular: No chest pain  Gastrointestinal: No vomiting.                                         Genitourinary: No urinary incontinence   Musculoskeletal: No joint pain.                                                           Dermatologic: No rash.  Neurological: as per HPI                                                                      Psychiatric: No behavioral problems.  Endocrine: No known hypoglycemia.               Hematologic/Lymphatic: No easy bleeding.    O:  Vital Signs Last 24 Hrs  T(C): 36.7 (31 Aug 2021 11:08), Max: 36.7 (31 Aug 2021 07:00)  T(F): 98 (31 Aug 2021 11:08), Max: 98 (31 Aug 2021 07:00)  HR: 68 (31 Aug 2021 11:08) (68 - 94)  BP: 140/64 (31 Aug 2021 11:08) (120/66 - 140/64)  BP(mean): --  RR: 18 (31 Aug 2021 11:08) (18 - 18)  SpO2: 99% (31 Aug 2021 11:08) (94% - 99%)    General Exam:   General appearance: No acute distress                 Cardiovascular: Pedal dorsalis pulses intact bilaterally    Mental Status: Orientated to self, date and place.  Attention intact.  + dysarthria (1), No aphasia or neglect.  Knowledge intact.  Registration intact.  Short and long term memory grossly intact.      Cranial Nerves: CN I - not tested.  PERRL, EOMI, VFF, no nystagmus or diplopia.  No APD.  Fundi not visualized.  CN V1-3 intact to light touch.  No facial asymmetry.  Hearing intact to finger rub bilaterally.  Tongue, uvula and palate midline.  Sternocleidomastoid and Trapezius intact bilaterally.    Motor:   Tone: normal.                  Strength intact throughout  No pronator drift bilaterally                      No dysmetria on finger-nose-finger or heel-shin-heel  No truncal ataxia.  No resting, postural or action tremor.  No myoclonus.    Sensation: intact to light touch    Deep Tendon Reflexes: 1+ bilateral biceps, triceps, brachioradialis, knee and ankle  Toes flexor bilaterally    Gait: pt declines    Other: NIHSS 1, MRS 4    LABS:                        8.9    10.84 )-----------( 356      ( 31 Aug 2021 06:22 )             30.0     08-31    135  |  97  |  86<H>  ----------------------------<  26<LL>  5.5<H>   |  26  |  12.50<H>    Ca    9.1      31 Aug 2021 06:22  Phos  8.4     08-31  Mg     2.1     08-    TPro  8.2  /  Alb  3.1<L>  /  TBili  0.4  /  DBili  x   /  AST  10  /  ALT  13  /  AlkPhos  294<H>      PT/INR - ( 30 Aug 2021 18:43 )   PT: 12.8 sec;   INR: 1.08 ratio         PTT - ( 30 Aug 2021 18:43 )  PTT:32.1 sec        RADIOLOGY & ADDITIONAL STUDIES:    < from: 12 Lead ECG (21 @ 09:36) >  Ventricular Rate 73 BPM    Atrial Rate 73 BPM    P-R Interval 200 ms    QRS Duration 100 ms    Q-T Interval 408 ms    QTC Calculation(Bazett) 449 ms    P Axis 69 degrees    R Axis 12 degrees    T Axis 73 degrees    Diagnosis Line Normal sinus rhythm  Normal ECG    < end of copied text >  < from: CT Brain Stroke Protocol (21 @ 09:26) >    EXAM:  CT BRAIN STROKE PROTOCOL                            PROCEDURE DATE:  2021          INTERPRETATION:  INDICATION:  Code stroke. History of fall. Altered mental status. History of atrial fibrillation.  TECHNIQUE:  A non contrast 2.5mm axial CT study of the brain was performed from skull base to vertex. Coronal and sagittal reformations were generated from the axial data. In addition, RAPID artificial intelligence screening was utilized to screening for hemorrhage.  COMPARISON EXAMINATION:  CT dated 2021    FINDINGS:    HEMISPHERES:  Both hemispheres appear to be symmetric. No acute infarct, hemorrhage, or space occupying lesion is identified. There is no gross intraluminal thrombus.  VENTRICLES:  Midline and normal in size.  POSTERIOR FOSSA:  No acute abnormality noted. There is a focal calcification in the left paracentral fuentes. This is also visible on prior CT.  EXTRACEREBRAL SPACES:  No subdural or epidural collections are noted.  SKULL BASE AND CALVARIUM:  Appears intact.  No fracture or destructive lesion is identified.  SINUSES AND MASTOIDS:  Moderate mucosal disease is noted in the left maxillary sinus with inspissated secretions.  MISCELLANEOUS:  No orbital or suprasellar abnormality noted.    IMPRESSION:    1)  no CT evidence of an acute infarct, hemorrhage, or space occupying lesion. No acute abnormality suggested.  2)  moderate inflammatory changes left maxillary sinus.    Follow-up MR imaging of the brain may be considered for further assessment.    Physician: RODRIGO ROCKWELL [id number]    --- End of Report ---            ZAIDA ELLIS MD; Attending Radiologist  This document has been electronically signed. Aug 31 2021  9:33AM    < end of copied text >

## 2021-08-31 NOTE — ED ADULT NURSE REASSESSMENT NOTE - NS ED NURSE REASSESS COMMENT FT1
patient received from main ED, lethargic, diaphoretic, blood sugar 26 -dextrose given as ordered, recheck blood sugar 49, -dextrose given . NP Ursula called to assess. as present time patient sitting up on stretcher, awake, disoriented to situation. daughter at bedside.

## 2021-08-31 NOTE — PATIENT PROFILE ADULT - NSPROIMPLANTSMEDDEV_GEN_A_NUR
defibrillator (old-not being used currently) defibrillator (old-not being used currently)/Vascular access device

## 2021-08-31 NOTE — PATIENT PROFILE ADULT - IS THERE A SUSPICION OF ABUSE/NEGLIGENCE?
Detail Level: Simple Patient has script still at home if need refill patient can call with name and strength no

## 2021-08-31 NOTE — CONSULT NOTE ADULT - SUBJECTIVE AND OBJECTIVE BOX
[facility] NEPHROLOGY- CONSULTATION NOTE    Patient is a 54yo Female with PMH as below including ESRD on HD presents s/p fall. Nephrology consulted for ESRD status    Pt well known to our service and follows with Dr. Green at [facility] ([facility]); last HD 21.   Pt states she was getting for dialysis on  and fell face forward. She c/o frequent falls in the past. She denies any LOC but c/o b/l knee pain, R shoulder back pain with difficulty ambulating.   Pt denies any SOB or chest pain. Pt with hypoglycemia in the ER; s/p stroke code.       PAST MEDICAL & SURGICAL HISTORY:  COPD (chronic obstructive pulmonary disease)    DM (diabetes mellitus)    Atrial fibrillation  with loop recorder , battery most likely depleted, as per cardiac clearance, Dr. Reece Anesthesia aware, pt on Eliquis    HTN (hypertension)    Morbid obesity  BMI - 58.3    Chronic GERD    CHAMP (obstructive sleep apnea)  non compliance with CPAP, Anesthesia Dr. Reece aware, pt told to bring CPAP for sx, pr verbalized understanding    Potential difficult airway on pre-intubation assessment  airway class III, large neck, morbid obesity, hx of CHAMP, no compliance with CPAP- Dr. Reece, Anesthesia aware    End-stage renal disease    Anemia    H/O tubal ligation      Status post placement of implantable loop recorder  left chest-     History of vascular access device  s/p insertion right chest permacath 2019, removal 2019, insertion left chest permacath 2019    S/P arteriovenous (AV) fistula creation  left  arm 2019      latex (Rash)  penicillin (Nausea)    Home Medications Reviewed  Hospital Medications:   MEDICATIONS  (STANDING):  ALBUTerol    90 MICROgram(s) HFA Inhaler 2 Puff(s) Inhalation every 6 hours  aspirin enteric coated 81 milliGRAM(s) Oral daily  atorvastatin 40 milliGRAM(s) Oral at bedtime  cinacalcet 60 milliGRAM(s) Oral at bedtime  dextrose 5%. 1000 milliLiter(s) (40 mL/Hr) IV Continuous <Continuous>  dextrose 50% Injectable 25 Gram(s) IV Push once  dextrose 50% Injectable 25 Gram(s) IV Push once  dextrose 50% Injectable 25 Gram(s) IV Push once  diltiazem    milliGRAM(s) Oral daily  epoetin anabela-epbx (RETACRIT) Injectable 8000 Unit(s) IV Push <User Schedule>  famotidine Injectable 20 milliGRAM(s) IV Push every 24 hours  heparin   Injectable 5000 Unit(s) SubCutaneous every 8 hours  insulin glargine Injectable (LANTUS) 64 Unit(s) SubCutaneous at bedtime  insulin lispro (ADMELOG) corrective regimen sliding scale   SubCutaneous Before meals and at bedtime  loratadine 10 milliGRAM(s) Oral daily  montelukast 10 milliGRAM(s) Oral at bedtime    SOCIAL HISTORY:  Denies ETOh, Smoking, or drug use  FAMILY HISTORY:  Family history of diabetes mellitus  mother-     Family hx of hypertension  mother-         REVIEW OF SYSTEMS:  Gen: no changes in weight  HEENT: no rhinorrhea  Neck: no sore throat  Cards: no chest pain  Resp: no dyspnea  GI: no nausea or vomiting or diarrhea  : no dysuria or hematuria  Vascular: no LE edema  b/l knee, Rt shoulder and lower back pain  Derm: no rashes  Neuro: no numbness/tingling  All other review of systems is negative unless indicated above.    VITALS:  T(F): 98 (21 @ 11:08), Max: 98 (21 @ 07:00)  HR: 68 (21 @ 11:08)  BP: 140/64 (21 @ 11:08)  RR: 18 (21 @ 11:08)  SpO2: 99% (21 @ 11:08)  Wt(kg): --      PHYSICAL EXAM:  Gen: NAD, Obese  HEENT: MMM  Neck: no JVD  Cards: RRR, +S1/S2,   Resp: CTA B/L  GI: soft, NT/ND, NABS  : no daniel  Extremities: no LE edema B/L  Derm: no rashes  Neuro: non-focal  Access: Left AVF +thrill +bruit with small needle scabs    LABS:      135  |  97  |  86<H>  ----------------------------<  26<LL>  5.5<H>   |  26  |  12.50<H>    Ca    9.1      31 Aug 2021 06:22  Phos  8.4       Mg     2.1         TPro  8.2  /  Alb  3.1<L>  /  TBili  0.4  /  DBili      /  AST  10  /  ALT  13  /  AlkPhos  294<H>      Creatinine Trend: 12.50 <--, 11.30 <--                        8.9    10.84 )-----------( 356      ( 31 Aug 2021 06:22 )             30.0     Urine Studies:      RADIOLOGY & ADDITIONAL STUDIES:    < from: CT Brain Stroke Protocol (21 @ 09:26) >    EXAM:  CT BRAIN STROKE PROTOCOL                            PROCEDURE DATE:  2021      < end of copied text >  < from: CT Brain Stroke Protocol (21 @ 09:26) >  IMPRESSION:    1)  no CT evidence of an acute infarct, hemorrhage, or space occupying lesion. No acute abnormality suggested.  2)  moderate inflammatory changes left maxillary sinus.    Follow-up MR imaging of the brain may be considered for further assessment.    Physician: RODRIGO ROCKWELL [id number]    < end of copied text >

## 2021-08-31 NOTE — CHART NOTE - NSCHARTNOTEFT_GEN_A_CORE
Pt seen at bedside on rounds, dtr at bedside in ED: Pt noted with onset of slurred speech, worsening AMS - stroke code called    Patient is a 55y old  Female who presents with a chief complaint of Mechanical Fall, ESRD on HD (30 Aug 2021 21:51)      SUBJECTIVE / OVERNIGHT EVENTS: recurrent hypoglycemia as low as 26 overnight and 49 this am treated with IVP dextrose    ADDITIONAL REVIEW OF SYSTEMS:    MEDICATIONS  (STANDING):  ALBUTerol    90 MICROgram(s) HFA Inhaler 2 Puff(s) Inhalation every 6 hours  aspirin enteric coated 81 milliGRAM(s) Oral daily  atorvastatin 40 milliGRAM(s) Oral at bedtime  cinacalcet 60 milliGRAM(s) Oral at bedtime  dextrose 50% Injectable 25 Gram(s) IV Push once  diltiazem    milliGRAM(s) Oral daily  epoetin anabela-epbx (RETACRIT) Injectable 8000 Unit(s) IV Push <User Schedule>  famotidine Injectable 20 milliGRAM(s) IV Push every 24 hours  heparin   Injectable 5000 Unit(s) SubCutaneous every 8 hours  insulin glargine Injectable (LANTUS) 64 Unit(s) SubCutaneous at bedtime  insulin lispro (ADMELOG) corrective regimen sliding scale   SubCutaneous Before meals and at bedtime  loratadine 10 milliGRAM(s) Oral daily  montelukast 10 milliGRAM(s) Oral at bedtime    MEDICATIONS  (PRN):  acetaminophen   Tablet .. 650 milliGRAM(s) Oral every 6 hours PRN Mild Pain (1 - 3)  cyclobenzaprine 5 milliGRAM(s) Oral three times a day PRN Muscle Spasm  HYDROmorphone  Injectable 0.5 milliGRAM(s) IV Push every 6 hours PRN Severe Pain (7 - 10)  melatonin 5 milliGRAM(s) Oral at bedtime PRN Insomnia  traMADol 50 milliGRAM(s) Oral every 6 hours PRN Moderate Pain (4 - 6)    CAPILLARY BLOOD GLUCOSE      POCT Blood Glucose.: 49 mg/dL (31 Aug 2021 08:53)  POCT Blood Glucose.: 118 mg/dL (31 Aug 2021 07:44)  POCT Blood Glucose.: 104 mg/dL (31 Aug 2021 06:07)  POCT Blood Glucose.: 79 mg/dL (31 Aug 2021 05:35)    I&O's Summary      PHYSICAL EXAM:  Vital Signs Last 24 Hrs  T(C): 36.7 (31 Aug 2021 07:00), Max: 36.7 (31 Aug 2021 07:00)  T(F): 98 (31 Aug 2021 07:00), Max: 98 (31 Aug 2021 07:00)  HR: 69 (31 Aug 2021 07:00) (69 - 94)  BP: 130/78 (31 Aug 2021 07:00) (120/66 - 139/58)  BP(mean): --  RR: 18 (31 Aug 2021 07:00) (16 - 18)  SpO2: 99% (31 Aug 2021 07:00) (94% - 99%)    General: Morbid obese, appears well above stated and documented age of 55  HEENT: AT/NC: moist membranes, pupils equal round reactive,   RESPIRATORY: Normal respiratory effort; lungs are clear to auscultation bilaterally  CARDIOVASCULAR: RRR, S1S2  ABDOMEN: obese, soft, NT no rebound/guarding; No hepatosplenomegaly  Neuro A+Ox1, pt with onset of slurred speech during conversation, unable to fully state her dtrs name (dtr at bedside confirmed this is the not her baseline), lifts and resist BUE weakly.  MSK: LUE AVF +bruit/thrill, normal muscle tone  SKIN: No rashes; no palpable lesions      LABS:                      8.9    10.84 )-----------( 356      ( 31 Aug 2021 06:22 )             30.0     08-31    135  |  97  |  86<H>  ----------------------------<  26<LL>  5.5<H>   |  26  |  12.50<H>    Ca    9.1      31 Aug 2021 06:22  Phos  8.4     08-31  Mg     2.1     08-31    TPro  8.2  /  Alb  3.1<L>  /  TBili  0.4  /  DBili  x   /  AST  10  /  ALT  13  /  AlkPhos  294<H>  08-31    PT/INR - ( 30 Aug 2021 18:43 )   PT: 12.8 sec;   INR: 1.08 ratio       PTT - ( 30 Aug 2021 18:43 )  PTT:32.1 sec    COVID-19 PCR: NotDetec (30 Aug 2021 16:13)  COVID-19 PCR: NotDetec (27 Apr 2021 10:41)    A/P  55 year year old Woman with hx of ESRD on HD TIW, HTN, afib off eliquis x 3 months (dtr does not know why PCP took her off); lives alone presented from home s/p fall why trying to bathe (as per dtr Ashley at bedside), pt on arrival to ED yesterday as per d/w ED staff, pt now with worsening AMS and a period of slurred speech,w/o facial droop    AMS/slurred speech likely TIA vs CVA  she has afib and has been off her AC  CT head now _ read by radiologist (no acute findings)  MR as per d/w radiologist especially given fall (unwitnessed), afib off AC  telemonitor  neuro consult Pt seen at bedside on rounds, dtr at bedside in ED: Pt noted with onset of slurred speech, worsening AMS - stroke code called    Patient is a 55y old  Female who presents with a chief complaint of Mechanical Fall, ESRD on HD (30 Aug 2021 21:51)      SUBJECTIVE / OVERNIGHT EVENTS: recurrent hypoglycemia as low as 26 overnight and 49 this am treated with IVP dextrose; c/o L knee pain    ADDITIONAL REVIEW OF SYSTEMS:    MEDICATIONS  (STANDING):  ALBUTerol    90 MICROgram(s) HFA Inhaler 2 Puff(s) Inhalation every 6 hours  aspirin enteric coated 81 milliGRAM(s) Oral daily  atorvastatin 40 milliGRAM(s) Oral at bedtime  cinacalcet 60 milliGRAM(s) Oral at bedtime  dextrose 50% Injectable 25 Gram(s) IV Push once  diltiazem    milliGRAM(s) Oral daily  epoetin anabela-epbx (RETACRIT) Injectable 8000 Unit(s) IV Push <User Schedule>  famotidine Injectable 20 milliGRAM(s) IV Push every 24 hours  heparin   Injectable 5000 Unit(s) SubCutaneous every 8 hours  insulin glargine Injectable (LANTUS) 64 Unit(s) SubCutaneous at bedtime  insulin lispro (ADMELOG) corrective regimen sliding scale   SubCutaneous Before meals and at bedtime  loratadine 10 milliGRAM(s) Oral daily  montelukast 10 milliGRAM(s) Oral at bedtime    MEDICATIONS  (PRN):  acetaminophen   Tablet .. 650 milliGRAM(s) Oral every 6 hours PRN Mild Pain (1 - 3)  cyclobenzaprine 5 milliGRAM(s) Oral three times a day PRN Muscle Spasm  HYDROmorphone  Injectable 0.5 milliGRAM(s) IV Push every 6 hours PRN Severe Pain (7 - 10)  melatonin 5 milliGRAM(s) Oral at bedtime PRN Insomnia  traMADol 50 milliGRAM(s) Oral every 6 hours PRN Moderate Pain (4 - 6)    CAPILLARY BLOOD GLUCOSE      POCT Blood Glucose.: 49 mg/dL (31 Aug 2021 08:53)  POCT Blood Glucose.: 118 mg/dL (31 Aug 2021 07:44)  POCT Blood Glucose.: 104 mg/dL (31 Aug 2021 06:07)  POCT Blood Glucose.: 79 mg/dL (31 Aug 2021 05:35)    I&O's Summary      PHYSICAL EXAM:  Vital Signs Last 24 Hrs  T(C): 36.7 (31 Aug 2021 07:00), Max: 36.7 (31 Aug 2021 07:00)  T(F): 98 (31 Aug 2021 07:00), Max: 98 (31 Aug 2021 07:00)  HR: 69 (31 Aug 2021 07:00) (69 - 94)  BP: 130/78 (31 Aug 2021 07:00) (120/66 - 139/58)  BP(mean): --  RR: 18 (31 Aug 2021 07:00) (16 - 18)  SpO2: 99% (31 Aug 2021 07:00) (94% - 99%)    General: Morbid obese, appears well above stated and documented age of 55  HEENT: AT/NC: moist membranes, pupils equal round reactive,   RESPIRATORY: Normal respiratory effort; lungs are clear to auscultation bilaterally  CARDIOVASCULAR: RRR, S1S2  ABDOMEN: obese, soft, NT no rebound/guarding; No hepatosplenomegaly  Neuro A+Ox1, pt with onset of slurred speech during conversation, unable to fully state her dtrs name (dtr at bedside confirmed this is the not her baseline), lifts and resist BUE weakly.  MSK: LUE AVF +bruit/thrill, normal muscle tone  SKIN: No rashes; no palpable lesions      LABS:                      8.9    10.84 )-----------( 356      ( 31 Aug 2021 06:22 )             30.0     08-31    135  |  97  |  86<H>  ----------------------------<  26<LL>  5.5<H>   |  26  |  12.50<H>    Ca    9.1      31 Aug 2021 06:22  Phos  8.4     08-31  Mg     2.1     08-31    TPro  8.2  /  Alb  3.1<L>  /  TBili  0.4  /  DBili  x   /  AST  10  /  ALT  13  /  AlkPhos  294<H>  08-31    PT/INR - ( 30 Aug 2021 18:43 )   PT: 12.8 sec;   INR: 1.08 ratio       PTT - ( 30 Aug 2021 18:43 )  PTT:32.1 sec    COVID-19 PCR: NotDetec (30 Aug 2021 16:13)  COVID-19 PCR: NotDetec (27 Apr 2021 10:41)    A/P  55 year year old Woman with hx of ESRD on HD TIW, HTN, afib off eliquis x 3 months (dtr does not know why PCP took her off); lives alone presented from home s/p fall why trying to bathe (as per dtr Ashley at bedside), pt on arrival to ED yesterday as per d/w ED staff, pt now with worsening AMS and a period of slurred speech,w/o facial droop    AMS/slurred speech likely TIA vs CVA  she has afib and has been off her AC  CT head now _ read by radiologist (no acute findings)  MR as per d/w radiologist especially given fall (unwitnessed), afib off AC  telemonitor  neuro consult Pt seen at bedside on rounds, dtr at bedside in ED: Pt noted with onset of slurred speech, worsening AMS - stroke code called    Patient is a 55y old  Female who presents with a chief complaint of Mechanical Fall, ESRD on HD (30 Aug 2021 21:51)      SUBJECTIVE / OVERNIGHT EVENTS: recurrent hypoglycemia as low as 26 overnight and 49 this am treated with IVP dextrose; c/o L knee pain    ADDITIONAL REVIEW OF SYSTEMS:    MEDICATIONS  (STANDING):  ALBUTerol    90 MICROgram(s) HFA Inhaler 2 Puff(s) Inhalation every 6 hours  aspirin enteric coated 81 milliGRAM(s) Oral daily  atorvastatin 40 milliGRAM(s) Oral at bedtime  cinacalcet 60 milliGRAM(s) Oral at bedtime  dextrose 50% Injectable 25 Gram(s) IV Push once  diltiazem    milliGRAM(s) Oral daily  epoetin anabela-epbx (RETACRIT) Injectable 8000 Unit(s) IV Push <User Schedule>  famotidine Injectable 20 milliGRAM(s) IV Push every 24 hours  heparin   Injectable 5000 Unit(s) SubCutaneous every 8 hours  insulin glargine Injectable (LANTUS) 64 Unit(s) SubCutaneous at bedtime  insulin lispro (ADMELOG) corrective regimen sliding scale   SubCutaneous Before meals and at bedtime  loratadine 10 milliGRAM(s) Oral daily  montelukast 10 milliGRAM(s) Oral at bedtime    MEDICATIONS  (PRN):  acetaminophen   Tablet .. 650 milliGRAM(s) Oral every 6 hours PRN Mild Pain (1 - 3)  cyclobenzaprine 5 milliGRAM(s) Oral three times a day PRN Muscle Spasm  HYDROmorphone  Injectable 0.5 milliGRAM(s) IV Push every 6 hours PRN Severe Pain (7 - 10)  melatonin 5 milliGRAM(s) Oral at bedtime PRN Insomnia  traMADol 50 milliGRAM(s) Oral every 6 hours PRN Moderate Pain (4 - 6)    CAPILLARY BLOOD GLUCOSE      POCT Blood Glucose.: 49 mg/dL (31 Aug 2021 08:53)  POCT Blood Glucose.: 118 mg/dL (31 Aug 2021 07:44)  POCT Blood Glucose.: 104 mg/dL (31 Aug 2021 06:07)  POCT Blood Glucose.: 79 mg/dL (31 Aug 2021 05:35)    I&O's Summary      PHYSICAL EXAM:  Vital Signs Last 24 Hrs  T(C): 36.7 (31 Aug 2021 07:00), Max: 36.7 (31 Aug 2021 07:00)  T(F): 98 (31 Aug 2021 07:00), Max: 98 (31 Aug 2021 07:00)  HR: 69 (31 Aug 2021 07:00) (69 - 94)  BP: 130/78 (31 Aug 2021 07:00) (120/66 - 139/58)  BP(mean): --  RR: 18 (31 Aug 2021 07:00) (16 - 18)  SpO2: 99% (31 Aug 2021 07:00) (94% - 99%)    General: Morbid obese, appears well above stated and documented age of 55  HEENT: AT/NC: moist membranes, pupils equal round reactive,   RESPIRATORY: Normal respiratory effort; lungs are clear to auscultation bilaterally  CARDIOVASCULAR: RRR, S1S2  ABDOMEN: obese, soft, NT no rebound/guarding; No hepatosplenomegaly  Neuro A+Ox1, pt with onset of slurred speech during conversation, unable to fully state her dtrs name (dtr at bedside confirmed this is the not her baseline), lifts and resist BUE weakly; initially need re-orientation this morning as to place; "I was in the ED last night". she was not aware she is still at the hospital  MSK: JOSE ROBERTO AVF +bruit/thrill, normal muscle tone  SKIN: No rashes; no palpable lesions      LABS:                      8.9    10.84 )-----------( 356      ( 31 Aug 2021 06:22 )             30.0     08-31    135  |  97  |  86<H>  ----------------------------<  26<LL>  5.5<H>   |  26  |  12.50<H>    Ca    9.1      31 Aug 2021 06:22  Phos  8.4     08-31  Mg     2.1     08-31    TPro  8.2  /  Alb  3.1<L>  /  TBili  0.4  /  DBili  x   /  AST  10  /  ALT  13  /  AlkPhos  294<H>  08-31    PT/INR - ( 30 Aug 2021 18:43 )   PT: 12.8 sec;   INR: 1.08 ratio       PTT - ( 30 Aug 2021 18:43 )  PTT:32.1 sec    COVID-19 PCR: NotDetec (30 Aug 2021 16:13)  COVID-19 PCR: NotDetec (27 Apr 2021 10:41)    A/P  55 year year old Woman with hx of ESRD on HD TIW, HTN, afib off eliquis x 3 months (dtr does not know why PCP took her off); lives alone presented from home s/p fall why trying to bathe (as per dtr Ashley at bedside), pt on arrival to ED yesterday as per d/w ED staff, pt now with worsening AMS and a period of slurred speech,w/o facial droop; last known wellness is unknown, pt was found by RN altered this am     AMS/slurred speech likely TIA vs CVA  she has afib and has been off her AC  CT head now _ read by radiologist (no acute findings)  MR as per d/w radiologist especially given fall (unwitnessed), afib off AC  telemonitor  neuro consult Pt seen at bedside on rounds, dtr at bedside in ED: Pt noted with onset of slurred speech, worsening AMS - stroke code called    Patient is a 55y old  Female who presents with a chief complaint of Mechanical Fall, ESRD on HD (30 Aug 2021 21:51)      SUBJECTIVE / OVERNIGHT EVENTS: recurrent hypoglycemia as low as 26 overnight and 49 this am treated with IVP dextrose; c/o L knee pain    ADDITIONAL REVIEW OF SYSTEMS:    MEDICATIONS  (STANDING):  ALBUTerol    90 MICROgram(s) HFA Inhaler 2 Puff(s) Inhalation every 6 hours  aspirin enteric coated 81 milliGRAM(s) Oral daily  atorvastatin 40 milliGRAM(s) Oral at bedtime  cinacalcet 60 milliGRAM(s) Oral at bedtime  dextrose 50% Injectable 25 Gram(s) IV Push once  diltiazem    milliGRAM(s) Oral daily  epoetin anabela-epbx (RETACRIT) Injectable 8000 Unit(s) IV Push <User Schedule>  famotidine Injectable 20 milliGRAM(s) IV Push every 24 hours  heparin   Injectable 5000 Unit(s) SubCutaneous every 8 hours  insulin glargine Injectable (LANTUS) 64 Unit(s) SubCutaneous at bedtime  insulin lispro (ADMELOG) corrective regimen sliding scale   SubCutaneous Before meals and at bedtime  loratadine 10 milliGRAM(s) Oral daily  montelukast 10 milliGRAM(s) Oral at bedtime    MEDICATIONS  (PRN):  acetaminophen   Tablet .. 650 milliGRAM(s) Oral every 6 hours PRN Mild Pain (1 - 3)  cyclobenzaprine 5 milliGRAM(s) Oral three times a day PRN Muscle Spasm  HYDROmorphone  Injectable 0.5 milliGRAM(s) IV Push every 6 hours PRN Severe Pain (7 - 10)  melatonin 5 milliGRAM(s) Oral at bedtime PRN Insomnia  traMADol 50 milliGRAM(s) Oral every 6 hours PRN Moderate Pain (4 - 6)    CAPILLARY BLOOD GLUCOSE      POCT Blood Glucose.: 49 mg/dL (31 Aug 2021 08:53)  POCT Blood Glucose.: 118 mg/dL (31 Aug 2021 07:44)  POCT Blood Glucose.: 104 mg/dL (31 Aug 2021 06:07)  POCT Blood Glucose.: 79 mg/dL (31 Aug 2021 05:35)    I&O's Summary      PHYSICAL EXAM:  Vital Signs Last 24 Hrs  T(C): 36.7 (31 Aug 2021 07:00), Max: 36.7 (31 Aug 2021 07:00)  T(F): 98 (31 Aug 2021 07:00), Max: 98 (31 Aug 2021 07:00)  HR: 69 (31 Aug 2021 07:00) (69 - 94)  BP: 130/78 (31 Aug 2021 07:00) (120/66 - 139/58)  BP(mean): --  RR: 18 (31 Aug 2021 07:00) (16 - 18)  SpO2: 99% (31 Aug 2021 07:00) (94% - 99%)    General: Morbid obese, appears well above stated and documented age of 55  HEENT: AT/NC: moist membranes, pupils equal round reactive,   RESPIRATORY: Normal respiratory effort; lungs are clear to auscultation bilaterally  CARDIOVASCULAR: RRR, S1S2  ABDOMEN: obese, soft, NT no rebound/guarding; No hepatosplenomegaly  Neuro A+Ox1, pt with onset of slurred speech during conversation, unable to fully state her dtrs name (dtr at bedside confirmed this is the not her baseline), lifts and resist BUE weakly; initially need re-orientation this morning as to place; "I was in the ED last night". she was not aware she is still at the hospital  MSK: JOSE ROBERTO AVF +bruit/thrill, normal muscle tone  SKIN: No rashes; no palpable lesions      LABS:                      8.9    10.84 )-----------( 356      ( 31 Aug 2021 06:22 )             30.0     08-31    135  |  97  |  86<H>  ----------------------------<  26<LL>  5.5<H>   |  26  |  12.50<H>    Ca    9.1      31 Aug 2021 06:22  Phos  8.4     08-31  Mg     2.1     08-31    TPro  8.2  /  Alb  3.1<L>  /  TBili  0.4  /  DBili  x   /  AST  10  /  ALT  13  /  AlkPhos  294<H>  08-31    PT/INR - ( 30 Aug 2021 18:43 )   PT: 12.8 sec;   INR: 1.08 ratio       PTT - ( 30 Aug 2021 18:43 )  PTT:32.1 sec    COVID-19 PCR: NotDetec (30 Aug 2021 16:13)  COVID-19 PCR: NotDetec (27 Apr 2021 10:41)    A/P  55 year year old Woman with hx of ESRD on HD TIW, HTN, afib off eliquis x 3 months (dtr does not know why PCP took her off); lives alone presented from home s/p fall why trying to bathe (as per dtr Ashley at bedside), pt on arrival to ED yesterday as per d/w ED staff, pt now with worsening AMS and a period of slurred speech,w/o facial droop; last known wellness is unknown, pt was found by RN altered this am     AMS/slurred speech likely TIA vs CVA  she has afib and has been off her AC  CT head now _ read by radiologist (no acute findings)  MR as per d/w radiologist especially given fall (unwitnessed), afib off AC  not a candidate for TPA as last wellness is unknown, dtr at bedside last conversation with her at baseline via telephone was ~24hrs prior  telemonitor  neuro consult

## 2021-09-01 NOTE — CONSULT NOTE ADULT - PROBLEM SELECTOR RECOMMENDATION 9
due to high dose treseba and esrd  hold all insulin   low dose admelog prn for now  compliance with diet d/w pt ( iced tea/ gatorade at bedside)  hba1c level  fsg ac and hs  nutrition eval

## 2021-09-01 NOTE — PHARMACOTHERAPY INTERVENTION NOTE - COMMENTS
Recommended to adjust dose or change to protonix since patient is ESRD on HD. Also patient is no NPO so can receive PO meds.   Pepcid for Hemodialysis: 20 mg orally immediately after dialysis (60 mg/week) OR 10 mg IV after dialysis.   S/t NP said will look into and maybe change to PO protonix.

## 2021-09-01 NOTE — CONSULT NOTE ADULT - SUBJECTIVE AND OBJECTIVE BOX
Patient is a 55y old  Female who presents with a chief complaint of Mechanical Fall, ESRD on HD (31 Aug 2021 18:11)      HPI:  Patient is a 54 y/o female, from home, baseline ambulatory with rollator, w/ PMHx of Afib s/p ILR (taken off Eliquis in the past 3 months), ESRD on HD M/W/F (last 3 days ago via L AV fistula), T2DM, HTN, HLD, Iron deficiency anemia, hypercalcemia, and COPD, presented after a mechanical fall sustained while getting out of the bathtub. Pt endorsed that she has been feeling more unsteady on her feet in the recent few weeks, and that her rollator was broken. Her HD unit allowed her to loan a wheelchair but she is unable to do many of her ADL's and fell face first while getting out of the shower; she sustained minor head injury, but no LOC. She reports a lot of pain over her left knee and Right shoulder, and is anxious about being unable to take care of herself and her grandson who is her dependent. Due to this, she missed her scheduled HD for 21. She endorses that she sometimes gets dizzy while getting up from a sitting position, but that these symptoms have no relation to HD sessions and that she no longer takes any Lasix Pt denies any prior fevers, diarrhea, lower extremity swelling, chest pain, palpitations, excessive SOB, or any wheezing. Of note, pt reports being off Eliquis x 3 months because her outpatient cardio sees no further evidence of PAF; she does not use her inhalers due to lack of instruction on how.    (30 Aug 2021 21:51)      PAST MEDICAL & SURGICAL HISTORY:  COPD (chronic obstructive pulmonary disease)    DM (diabetes mellitus)    Atrial fibrillation  with loop recorder , battery most likely depleted, as per cardiac clearance, Dr. Reece Anesthesia aware, pt on Eliquis    HTN (hypertension)    Morbid obesity  BMI - 58.3    Chronic GERD    CHAMP (obstructive sleep apnea)  non compliance with CPAP, Anesthesia Dr. Reece aware, pt told to bring CPAP for sx, pr verbalized understanding    Potential difficult airway on pre-intubation assessment  airway class III, large neck, morbid obesity, hx of CHAMP, no compliance with CPAP- Dr. Reece, Anesthesia aware    End-stage renal disease    Anemia    H/O tubal ligation      Status post placement of implantable loop recorder  left chest-     History of vascular access device  s/p insertion right chest permacath 2019, removal 2019, insertion left chest permacath 2019    S/P arteriovenous (AV) fistula creation  left  arm 2019           MEDICATIONS  (STANDING):  ALBUTerol    90 MICROgram(s) HFA Inhaler 2 Puff(s) Inhalation every 6 hours  aspirin enteric coated 81 milliGRAM(s) Oral daily  atorvastatin 40 milliGRAM(s) Oral at bedtime  cinacalcet 60 milliGRAM(s) Oral at bedtime  dextrose 5%. 1000 milliLiter(s) (40 mL/Hr) IV Continuous <Continuous>  dextrose 50% Injectable 25 Gram(s) IV Push once  dextrose 50% Injectable 25 Gram(s) IV Push once  dextrose 50% Injectable 25 Gram(s) IV Push once  diltiazem    milliGRAM(s) Oral daily  epoetin anabela-epbx (RETACRIT) Injectable 8000 Unit(s) IV Push <User Schedule>  famotidine Injectable 20 milliGRAM(s) IV Push every 24 hours  heparin   Injectable 5000 Unit(s) SubCutaneous every 8 hours  insulin glargine Injectable (LANTUS) 64 Unit(s) SubCutaneous at bedtime  insulin lispro (ADMELOG) corrective regimen sliding scale   SubCutaneous Before meals and at bedtime  loratadine 10 milliGRAM(s) Oral daily  montelukast 10 milliGRAM(s) Oral at bedtime  nystatin    Suspension 576243 Unit(s) Oral four times a day  sevelamer carbonate 2400 milliGRAM(s) Oral three times a day with meals    MEDICATIONS  (PRN):  acetaminophen   Tablet .. 650 milliGRAM(s) Oral every 6 hours PRN Mild Pain (1 - 3)  cyclobenzaprine 5 milliGRAM(s) Oral three times a day PRN Muscle Spasm  melatonin 5 milliGRAM(s) Oral at bedtime PRN Insomnia  traMADol 50 milliGRAM(s) Oral every 6 hours PRN Moderate Pain (4 - 6)      FAMILY HISTORY:  Family history of diabetes mellitus  mother-     Family hx of hypertension  mother-         SOCIAL HISTORY:      REVIEW OF SYSTEMS:  CONSTITUTIONAL: No fever, weight loss, or fatigue  EYES: No eye pain, visual disturbances, or discharge  ENT:  No difficulty hearing, tinnitus, vertigo; No sinus or throat pain  NECK: No pain or stiffness  RESPIRATORY: No cough, wheezing, chills or hemoptysis; No Shortness of Breath  CARDIOVASCULAR: No chest pain, palpitations, passing out, dizziness, or leg swelling  GASTROINTESTINAL: No abdominal or epigastric pain. No nausea, vomiting, or hematemesis; No diarrhea or constipation. No melena or hematochezia.  GENITOURINARY: No dysuria, frequency, hematuria, or incontinence  NEUROLOGICAL: No headaches, memory loss, loss of strength, numbness, or tremors  SKIN: No itching, burning, rashes, or lesions   LYMPH Nodes: No enlarged glands  ENDOCRINE: No heat or cold intolerance; No hair loss  MUSCULOSKELETAL: No joint pain or swelling; No muscle, back, or extremity pain  PSYCHIATRIC: No depression, anxiety, mood swings, or difficulty sleeping  HEME/LYMPH: No easy bruising, or bleeding gums  ALLERGY AND IMMUNOLOGIC: No hives or eczema	        Vital Signs Last 24 Hrs  T(C): 37.2 (01 Sep 2021 05:09), Max: 37.2 (01 Sep 2021 05:09)  T(F): 98.9 (01 Sep 2021 05:09), Max: 98.9 (01 Sep 2021 05:09)  HR: 92 (01 Sep 2021 05:09) (68 - 92)  BP: 142/85 (01 Sep 2021 05:09) (140/64 - 184/86)  BP(mean): --  RR: 18 (01 Sep 2021 05:09) (17 - 18)  SpO2: 100% (01 Sep 2021 05:09) (95% - 100%)      Constitutional:    NC/AT:    HEENT:    Neck:  No JVD, bruits or thyromegaly    Respiratory:  Clear without rales or rhonchi    Cardiovascular:  RR without murmur, rub or gallop.    Gastrointestinal: Soft without hepatosplenomegaly.    Extremities: without cyanosis, clubbing or edema.    Neurological:  Oriented   x      . No gross sensory or motor defects.        LABS:                        8.6    9.87  )-----------( 285      ( 01 Sep 2021 06:37 )             28.5     09-    137  |  98  |  62<H>  ----------------------------<  62<L>  5.3   |  28  |  9.97<H>    Ca    8.6      01 Sep 2021 06:37  Phos  7.3       Mg     2.1         TPro  7.5  /  Alb  3.2<L>  /  TBili  0.4  /  DBili  x   /  AST  8<L>  /  ALT  13  /  AlkPhos  272<H>          PT/INR - ( 30 Aug 2021 18:43 )   PT: 12.8 sec;   INR: 1.08 ratio         PTT - ( 30 Aug 2021 18:43 )  PTT:32.1 sec    CAPILLARY BLOOD GLUCOSE      POCT Blood Glucose.: 76 mg/dL (01 Sep 2021 07:34)  POCT Blood Glucose.: 95 mg/dL (31 Aug 2021 21:39)  POCT Blood Glucose.: 102 mg/dL (31 Aug 2021 18:08)  POCT Blood Glucose.: 123 mg/dL (31 Aug 2021 17:33)  POCT Blood Glucose.: 95 mg/dL (31 Aug 2021 16:27)  POCT Blood Glucose.: 108 mg/dL (31 Aug 2021 15:17)  POCT Blood Glucose.: 124 mg/dL (31 Aug 2021 13:36)  POCT Blood Glucose.: 76 mg/dL (31 Aug 2021 12:00)  POCT Blood Glucose.: 92 mg/dL (31 Aug 2021 10:58)  POCT Blood Glucose.: 64 mg/dL (31 Aug 2021 10:07)      RADIOLOGY & ADDITIONAL STUDIES: Patient is a 55y old  Female who presents with a chief complaint of Mechanical Fall, ESRD on HD (31 Aug 2021 18:11)      HPI:  Patient is a 56 y/o female, from home, baseline ambulatory with rollator, w/ PMHx of Afib s/p ILR (taken off Eliquis in the past 3 months), ESRD on HD M/W/F (last 3 days ago via L AV fistula), T2DM, HTN, HLD, Iron deficiency anemia, hypercalcemia, and COPD, presented after a mechanical fall sustained while getting out of the bathtub. Pt endorsed that she has been feeling more unsteady on her feet in the recent few weeks, and that her rollator was broken. Her HD unit allowed her to loan a wheelchair but she is unable to do many of her ADL's and fell face first while getting out of the shower; she sustained minor head injury, but no LOC. She reports a lot of pain over her left knee and Right shoulder, and is anxious about being unable to take care of herself and her grandson who is her dependent. Due to this, she missed her scheduled HD for 21. She endorses that she sometimes gets dizzy while getting up from a sitting position, but that these symptoms have no relation to HD sessions and that she no longer takes any Lasix Pt denies any prior fevers, diarrhea, lower extremity swelling, chest pain, palpitations, excessive SOB, or any wheezing. Of note, pt reports being off Eliquis x 3 months because her outpatient cardio sees no further evidence of PAF; she does not use her inhalers due to lack of instruction on how. Found to have hypoglycemia. Pt takes 80 units of tresiba and trulicity as out pt with frequent hypos. pt admits to drinking sugary drinks to keep up with low sugars.    (30 Aug 2021 21:51)      PAST MEDICAL & SURGICAL HISTORY:  COPD (chronic obstructive pulmonary disease)    DM (diabetes mellitus)    Atrial fibrillation  with loop recorder , battery most likely depleted, as per cardiac clearance, Dr. Reece Anesthesia aware, pt on Eliquis    HTN (hypertension)    Morbid obesity  BMI - 58.3    Chronic GERD    CHAMP (obstructive sleep apnea)  non compliance with CPAP, Anesthesia Dr. Reece aware, pt told to bring CPAP for sx, pr verbalized understanding    Potential difficult airway on pre-intubation assessment  airway class III, large neck, morbid obesity, hx of CHAMP, no compliance with CPAP- Dr. Reece, Anesthesia aware    End-stage renal disease    Anemia    H/O tubal ligation      Status post placement of implantable loop recorder  left chest-     History of vascular access device  s/p insertion right chest permacath 2019, removal 2019, insertion left chest permacath 2019    S/P arteriovenous (AV) fistula creation  left  arm 2019           MEDICATIONS  (STANDING):  ALBUTerol    90 MICROgram(s) HFA Inhaler 2 Puff(s) Inhalation every 6 hours  aspirin enteric coated 81 milliGRAM(s) Oral daily  atorvastatin 40 milliGRAM(s) Oral at bedtime  cinacalcet 60 milliGRAM(s) Oral at bedtime  dextrose 5%. 1000 milliLiter(s) (40 mL/Hr) IV Continuous <Continuous>  dextrose 50% Injectable 25 Gram(s) IV Push once  dextrose 50% Injectable 25 Gram(s) IV Push once  dextrose 50% Injectable 25 Gram(s) IV Push once  diltiazem    milliGRAM(s) Oral daily  epoetin anabela-epbx (RETACRIT) Injectable 8000 Unit(s) IV Push <User Schedule>  famotidine Injectable 20 milliGRAM(s) IV Push every 24 hours  heparin   Injectable 5000 Unit(s) SubCutaneous every 8 hours  insulin glargine Injectable (LANTUS) 64 Unit(s) SubCutaneous at bedtime  insulin lispro (ADMELOG) corrective regimen sliding scale   SubCutaneous Before meals and at bedtime  loratadine 10 milliGRAM(s) Oral daily  montelukast 10 milliGRAM(s) Oral at bedtime  nystatin    Suspension 298556 Unit(s) Oral four times a day  sevelamer carbonate 2400 milliGRAM(s) Oral three times a day with meals    MEDICATIONS  (PRN):  acetaminophen   Tablet .. 650 milliGRAM(s) Oral every 6 hours PRN Mild Pain (1 - 3)  cyclobenzaprine 5 milliGRAM(s) Oral three times a day PRN Muscle Spasm  melatonin 5 milliGRAM(s) Oral at bedtime PRN Insomnia  traMADol 50 milliGRAM(s) Oral every 6 hours PRN Moderate Pain (4 - 6)      FAMILY HISTORY:  Family history of diabetes mellitus  mother-     Family hx of hypertension  mother-         SOCIAL HISTORY:      REVIEW OF SYSTEMS:  CONSTITUTIONAL: No fever, weight loss, or fatigue  EYES: No eye pain, visual disturbances, or discharge  ENT:  No difficulty hearing, tinnitus, vertigo; No sinus or throat pain  NECK: No pain or stiffness  RESPIRATORY: No cough, wheezing, chills or hemoptysis; No Shortness of Breath  CARDIOVASCULAR: No chest pain, palpitations, passing out, dizziness, or leg swelling  GASTROINTESTINAL: No abdominal or epigastric pain. No nausea, vomiting, or hematemesis; No diarrhea or constipation. No melena or hematochezia.  GENITOURINARY: No dysuria, frequency, hematuria, or incontinence  NEUROLOGICAL: No headaches, memory loss, loss of strength, numbness, or tremors  SKIN: No itching, burning, rashes, or lesions   LYMPH Nodes: No enlarged glands  ENDOCRINE: No heat or cold intolerance; No hair loss  MUSCULOSKELETAL: No joint pain or swelling; No muscle, back, or extremity pain  PSYCHIATRIC: No depression, anxiety, mood swings, or difficulty sleeping  HEME/LYMPH: No easy bruising, or bleeding gums  ALLERGY AND IMMUNOLOGIC: No hives or eczema	        Vital Signs Last 24 Hrs  T(C): 37.2 (01 Sep 2021 05:09), Max: 37.2 (01 Sep 2021 05:09)  T(F): 98.9 (01 Sep 2021 05:09), Max: 98.9 (01 Sep 2021 05:09)  HR: 92 (01 Sep 2021 05:09) (68 - 92)  BP: 142/85 (01 Sep 2021 05:09) (140/64 - 184/86)  BP(mean): --  RR: 18 (01 Sep 2021 05:09) (17 - 18)  SpO2: 100% (01 Sep 2021 05:09) (95% - 100%)      Constitutional:    HEENT: nad    Neck:  No JVD, bruits or thyromegaly    Respiratory:  Clear without rales or rhonchi    Cardiovascular:  RR without murmur, rub or gallop.    Gastrointestinal: Soft without hepatosplenomegaly.    Extremities: without cyanosis, clubbing or edema.    Neurological:  Oriented   x  3    . No gross sensory or motor defects.        LABS:                        8.6    9.87  )-----------( 285      ( 01 Sep 2021 06:37 )             28.5         137  |  98  |  62<H>  ----------------------------<  62<L>  5.3   |  28  |  9.97<H>    Ca    8.6      01 Sep 2021 06:37  Phos  7.3       Mg     2.1         TPro  7.5  /  Alb  3.2<L>  /  TBili  0.4  /  DBili  x   /  AST  8<L>  /  ALT  13  /  AlkPhos  272<H>          PT/INR - ( 30 Aug 2021 18:43 )   PT: 12.8 sec;   INR: 1.08 ratio         PTT - ( 30 Aug 2021 18:43 )  PTT:32.1 sec    CAPILLARY BLOOD GLUCOSE      POCT Blood Glucose.: 76 mg/dL (01 Sep 2021 07:34)  POCT Blood Glucose.: 95 mg/dL (31 Aug 2021 21:39)  POCT Blood Glucose.: 102 mg/dL (31 Aug 2021 18:08)  POCT Blood Glucose.: 123 mg/dL (31 Aug 2021 17:33)  POCT Blood Glucose.: 95 mg/dL (31 Aug 2021 16:27)  POCT Blood Glucose.: 108 mg/dL (31 Aug 2021 15:17)  POCT Blood Glucose.: 124 mg/dL (31 Aug 2021 13:36)  POCT Blood Glucose.: 76 mg/dL (31 Aug 2021 12:00)  POCT Blood Glucose.: 92 mg/dL (31 Aug 2021 10:58)  POCT Blood Glucose.: 64 mg/dL (31 Aug 2021 10:07)      RADIOLOGY & ADDITIONAL STUDIES:

## 2021-09-02 NOTE — PHYSICAL THERAPY INITIAL EVALUATION ADULT - PERTINENT HX OF CURRENT PROBLEM, REHAB EVAL
PMHx of Afib s/p ILR (taken off Eliquis in the past 3 months), ESRD on HD M/W/F (last 3 days ago via L AV fistula), T2DM, HTN, HLD, Iron deficiency anemia, hypercalcemia, and COPD, presented after a mechanical fall sustained while getting out of the bathtub.
DEREALIZATION/DEPERSONALIZATION

## 2021-09-02 NOTE — PROGRESS NOTE ADULT - PROBLEM SELECTOR PLAN 8
hx COPD, morbid obese, no hx smoking  + wheezing  CT chest shows RUL nodular opacity  c/w albuterol inhaler  monitor respiratory symptoms
hx COPD, morbid obese, no hx smoking  + wheezing  CT chest shows RUL nodular opacity  c/w albuterol inhaler  monitor respiratory symptoms

## 2021-09-02 NOTE — PROGRESS NOTE ADULT - PROBLEM SELECTOR PLAN 5
Pt previously on Eliquis due to PAF, had an implantable loop which did not detect any arrhythmias    She was instructed to stop Eliquis and only take ASA for the past 2-3 months   Pt currently denies chest pain/ palpitations  Monitor HR, keep electrolytes wnl   Cardiology dr. Dorsey following
Pt previously on Eliquis due to PAF, had an implantable loop which did not detect any arrhythmias    She was instructed to stop Eliquis and only take ASA for the past 2-3 months   Pt currently denies chest pain/ palpitations  Monitor HR , keep electrolytes replete  Cardiology dr. Dorsey consulted

## 2021-09-02 NOTE — PROGRESS NOTE ADULT - PROBLEM SELECTOR PLAN 1
Pt denies having any chest pain/ palpitations/ prodrome/ LOC at home which could indicate an insidious cause of fall  Likely mechanical due to debility, lack of rollator for assist, wet floor from bathroom, hypoglycemia  pt was noted AMS, slurred speech in ED  PT consult for skilled needs assessment  Whole body CT did not show any fractures or bleeds   Xray upper/lower Ext- no fx, left shoulder concern for RA.   monitor Orthostatic BP (not fluid overloaded) when pt is able to stand up  f/u MR brain to r/o stroke  pt has ILR in placed 5 yr ago, compatible for MRI. cardiology dr. Dorsey consulted and ok for test.
Pt denies having any chest pain/ palpitations/ prodrome/ LOC at home which could indicate an insidious cause of fall  Likely mechanical due to debility, lack of rollator for assist, wet floor from bathroom, hypoglycemia  pt was noted AMS, slurred speech in ED  PT consult for skilled needs assessment  Whole body CT did not show any fractures or bleeds   Xray upper/lower Ext- no fx, left shoulder concern for RA.   monitor Orthostatic BP (not fluid overloaded) when pt is able to stand up  MR brain neg for acute stroke  pt has ILR in placed 5 yr ago, compatible for MRI. cardiology dr. Dorsey consulted and ok for test.

## 2021-09-02 NOTE — CONSULT NOTE ADULT - REASON FOR ADMISSION
Mechanical Fall, ESRD on HD

## 2021-09-02 NOTE — PROGRESS NOTE ADULT - PROBLEM SELECTOR PLAN 3
Serum phosphorus elevated with acceptable serum calcium.  Pt on Auryxia 3 tabs tid with meals, [facility]   c/w renvela 3 tabs tid with meals.   c/w Sensipar 60mg PO qd.    Monitor serum calcium and phosphorus.  nephrology following
Serum phosphorus elevated with acceptable serum calcium.  Pt on Auryxia 3 tabs tid with meals, which is unavailable at [facility].   c/w renvela 3 tabs tid with meals.   c/w Sensipar 60mg PO qd.    Monitor serum calcium and phosphorus.  nephrology following

## 2021-09-02 NOTE — CONSULT NOTE ADULT - ASSESSMENT
55 yoF  with history of ESRD on HD presents s/p fall. Nephrology consulted for ESRD status.    1) ESRD: Pt missed HD 8/30. Plan for HD today and then again 9/1 to place back on MWF schedule.  Monitor electrolytes.  2) HTN with ESRD: BP well controlled. Continue with current medications and low sodium diet. Monitor BP.  3) Anemia of renal disease: Hb low. Will check iron studies including ferritin in am. Will c/w Epogen 8k units IV tiw with HD. Monitor Hb.  4) Secondary Hyperparathyroidism: Serum phosphorus elevated with acceptable serum calcium. Pt on Auryxia 3 tabs tid with meals, which is unavailable at [facility].   Recc renvela 3 tabs tid with meals. Check PTHi in am. c/w Sensipar 60mg PO qd.  Monitor serum calcium and phosphorus.    [facility] NEPHROLOGY  Keaton Lopez M.D.  Juma Green D.O.  Myla Hoffmann M.D.  Julia Brewer, JASIEL, ANP-C  [phone]    [address] [place] [id number]    
Slurred speech in patient with recent concussion concerning for postconcussive syndrome may be also due to medication side effect.  Will recommend to obtain MRI brain  to r/o stroke but less likely given patient's history.    jocelin Maurice
54 y/o female, from home, baseline ambulatory with rollator, w/ PMHx of Afib s/p ILR (taken off Eliquis in the past 3 months), ESRD on HD M/W/F (last 3 days ago via L AV fistula), T2DM, HTN, HLD, Iron deficiency anemia, hypercalcemia, and COPD,CHAMP presented after a mechanical fall.  1.D/C tele.  2.Echocardiogram.  3.ESRD-HD as per renal.  4.PAF-asa,cardizem.Taken off eliquis-?fall/bleed risk.  5.DM-as per Endocrine.  6.Lipid d/o-statin.  7.COPD/CHAMP-declines CPAP.  8.GI and DVT prophylaxis.  
Patient is a 56 y/o female, from home, baseline ambulatory with rollator, w/ PMHx of Afib s/p ILR (taken off Eliquis in the past 3 months), ESRD on HD M/W/F (last 3 days ago via L AV fistula), T2DM, HTN, HLD, Iron deficiency anemia, hypercalcemia, and COPD, presented after a mechanical fall sustained while getting out of the bathtub. Found to have hypoglycemia. Pt takes 80 units of tresiba and trulicity as out pt with frequent hypos. pt admits to drinking sugary drinks to keep up with low sugars.

## 2021-09-02 NOTE — DISCHARGE NOTE PROVIDER - NSDCCPCAREPLAN_GEN_ALL_CORE_FT
PRINCIPAL DISCHARGE DIAGNOSIS  Diagnosis: Fall  Assessment and Plan of Treatment: You will be receiving physical therapy at home for strengthening exercise and gait stability.  Please make sure to more slowly and to use your assistive devices to help prevent your risk of fall injuries.      SECONDARY DISCHARGE DIAGNOSES  Diagnosis: Closed head injury with concussion, without loss of consciousness, initial encounter  Assessment and Plan of Treatment: You CTscan was negative for acute bleed.  Report any ongoing symptom of headache, nausea or vomiting, visual changes.    Diagnosis: Diabetes  Assessment and Plan of Treatment: Make sure to follow up with endocrinologist and your primary care doctor as outpatient.  Report signs and symptoms of hypo and hyperglycemia to your doctor such as dizziness, lightheadedness, increasing thirst, appetite and increase urination, cold, clammy skin.      Diagnosis: Hyperkalemia  Assessment and Plan of Treatment: Abnormal potassium level can fetal to your heart.  Make sure to follow your scheduled dialysis regimen.    Diagnosis: Uremic syndrome  Assessment and Plan of Treatment: Make sure to continue with your scheduled dialysis regimen and your uremic state is due to your inability to clean your blood with your kidneys and therefore you must follow your strict dialysis treatment.

## 2021-09-02 NOTE — PROGRESS NOTE ADULT - PROBLEM SELECTOR PLAN 6
Pt takes 80U Tresiba (degludec) -> equivalent is 64U of Lantus   C/w HSS, and Accuchecks ACHS   hold for 64 U for now due to hypoglycemia in AM  Carb consistent/ DASH/ Renal diet  endo. dr. Maurice following.
Pt takes 80U Tresiba (degludec) -> equivalent is 64U of Lantus   C/w HSS, and Accuchecks ACHS   Hold for 64 U for now due to hypoglycemia in AM  POCT glucose wnl thus far, A1c 6.7 on 8/31  Carb consistent/ DASH/ Renal diet  Endo. Dr. Maurice following.

## 2021-09-02 NOTE — DISCHARGE NOTE PROVIDER - NSDCMRMEDTOKEN_GEN_ALL_CORE_FT
acetaminophen 325 mg oral tablet: 2 tab(s) orally every 6 hours, As needed, Mild Pain (1 - 3)  aspirin 81 mg oral delayed release tablet: 1 tab(s) orally once a day  Bariatric Rollator: 1 MDD:1  Bariatric Shower Chair: 1   Bariatric Wheelchair: 1   Cardizem  mg/24 hours oral capsule, extended release: 1 cap(s) orally once a day  Combivent Respimat CFC free 20 mcg-100 mcg/inh inhalation aerosol: 1 puff(s) inhaled 4 times a day, As Needed  Raised Toilet Seat: 1   raNITIdine 150 mg oral capsule: 1 cap(s) orally 2 times a day  rosuvastatin 40 mg oral tablet: 1 tab(s) orally once a day (at bedtime)  Sensipar 60 mg oral tablet: 1 tab(s) orally once a day (at bedtime)  sevelamer carbonate 800 mg oral tablet: 2 tab(s) orally 3 times a day (with meals)  Singulair 10 mg oral tablet: 1 tab(s) orally once a day (at bedtime)  Tresiba FlexTouch 100 units/mL subcutaneous solution: 80 unit(s) subcutaneous once a day (at bedtime)  vitamin b complex: 1 tab(s) orally once a day  ZyrTEC 10 mg oral tablet: 1 tab(s) orally once a day (at bedtime)   acetaminophen 325 mg oral tablet: 2 tab(s) orally every 6 hours, As needed, Mild Pain (1 - 3)  aspirin 81 mg oral delayed release tablet: 1 tab(s) orally once a day  Bariatric Rollator: 1 MDD:1  Bariatric Shower Chair: 1   Bariatric Wheelchair: 1   Cardizem  mg/24 hours oral capsule, extended release: 1 cap(s) orally once a day  Combivent Respimat CFC free 20 mcg-100 mcg/inh inhalation aerosol: 1 puff(s) inhaled 4 times a day, As Needed  cyclobenzaprine 5 mg oral tablet: 1 tab(s) orally 3 times a day, As needed, Muscle Spasm  Raised Toilet Seat: 1   raNITIdine 150 mg oral capsule: 1 cap(s) orally 2 times a day  rosuvastatin 40 mg oral tablet: 1 tab(s) orally once a day (at bedtime)  Sensipar 60 mg oral tablet: 1 tab(s) orally once a day (at bedtime)  sevelamer carbonate 800 mg oral tablet: 2 tab(s) orally 3 times a day (with meals)  Singulair 10 mg oral tablet: 1 tab(s) orally once a day (at bedtime)  Tresiba FlexTouch 100 units/mL subcutaneous solution: 80 unit(s) subcutaneous once a day (at bedtime)  vitamin b complex: 1 tab(s) orally once a day  ZyrTEC 10 mg oral tablet: 1 tab(s) orally once a day (at bedtime)   acetaminophen 325 mg oral tablet: 2 tab(s) orally every 6 hours, As needed, Mild Pain (1 - 3)  aspirin 81 mg oral delayed release tablet: 1 tab(s) orally once a day  Bariatric Rollator: 1 MDD:1  Bariatric Shower Chair: 1   Bariatric Wheelchair: 1   Cardizem  mg/24 hours oral capsule, extended release: 1 cap(s) orally once a day  Combivent Respimat CFC free 20 mcg-100 mcg/inh inhalation aerosol: 1 puff(s) inhaled 4 times a day, As Needed  cyclobenzaprine 5 mg oral tablet: 1 tab(s) orally 3 times a day, As needed, Muscle Spasm  cyclobenzaprine 5 mg oral tablet: 1 tab(s) orally 3 times a day, As needed, Muscle Spasm  Raised Toilet Seat: 1   raNITIdine 150 mg oral capsule: 1 cap(s) orally 2 times a day  rosuvastatin 40 mg oral tablet: 1 tab(s) orally once a day (at bedtime)  Sensipar 60 mg oral tablet: 1 tab(s) orally once a day (at bedtime)  sevelamer carbonate 800 mg oral tablet: 2 tab(s) orally 3 times a day (with meals)  Singulair 10 mg oral tablet: 1 tab(s) orally once a day (at bedtime)  Tresiba FlexTouch 100 units/mL subcutaneous solution: 80 unit(s) subcutaneous once a day (at bedtime)  vitamin b complex: 1 tab(s) orally once a day  ZyrTEC 10 mg oral tablet: 1 tab(s) orally once a day (at bedtime)

## 2021-09-02 NOTE — PROGRESS NOTE ADULT - PROBLEM SELECTOR PLAN 10
from home. pt states she has a custody of grandson 11 yrs old. SW consulted.   PT eval pending.  MRI brain neg for acute stroke  Need reinstate outpt HD
from home. pt states she has a custody of grandson 11 yrs old. SW consulted.   PT eval pending.  MRI brain pending  need reinstate outpt HD

## 2021-09-02 NOTE — DISCHARGE NOTE PROVIDER - NSDCFUSCHEDAPPT_GEN_ALL_CORE_FT
ANTONIA ORTIZ ; 09/27/2021 ; [facility] [address] ANTONIA Sarah ; 09/27/2021 ; [facility] [address]

## 2021-09-02 NOTE — DISCHARGE NOTE PROVIDER - HOSPITAL COURSE
Patient is a 54 y/o female, from home, baseline ambulatory with rollator, w/ PMHx of Afib s/p ILR (taken off Eliquis in the past 3 months), ESRD on HD M/W/F (last 3 days ago via L AV fistula), T2DM, HTN, HLD, Iron deficiency anemia, hypercalcemia, and COPD, presented after a mechanical fall sustained while getting out of the bathtub. Lower extremities and rt shoulder imaging where negative for acute fractures.  CT brain no acute bleeding. + mod inflammatory changes left maxillary sinus. CT chest RUL nodular opacity and Brain MRI was negative of acute bleed or stroke.  Pt was followed here by cardiology and nephrology following.  Last dialysis was planned on 9/3 for an early session in [place] for her d/c.  Pt was also evaluated by PT and recommended home PT.       Please note that this a brief summary of hospital course, please refer to daily progress notes and consult notes for full course and events.

## 2021-09-02 NOTE — CONSULT NOTE ADULT - SUBJECTIVE AND OBJECTIVE BOX
CHIEF COMPLAINT:Patient is a 55y old  Female who presents with a chief complaint of Mechanical Fall, ESRD on HD (02 Sep 2021 10:09)      HPI:  Patient is a 56 y/o female, from home, baseline ambulatory with rollator, w/ PMHx of Afib s/p ILR (taken off Eliquis in the past 3 months), ESRD on HD M/W/F (last 3 days ago via L AV fistula), T2DM, HTN, HLD, Iron deficiency anemia, hypercalcemia, and COPD, presented after a mechanical fall sustained while getting out of the bathtub. Pt endorsed that she has been feeling more unsteady on her feet in the recent few weeks, and that her rollator was broken. Her HD unit allowed her to loan a wheelchair but she is unable to do many of her ADL's and fell face first while getting out of the shower; she sustained minor head injury, but no LOC. She reports a lot of pain over her left knee and Right shoulder, and is anxious about being unable to take care of herself and her grandson who is her dependent. Due to this, she missed her scheduled HD for 21. She endorses that she sometimes gets dizzy while getting up from a sitting position, but that these symptoms have no relation to HD sessions and that she no longer takes any Lasix Pt denies any prior fevers, diarrhea, lower extremity swelling, chest pain, palpitations, excessive SOB, or any wheezing. Of note, pt reports being off Eliquis x 3 months because her outpatient cardio sees no further evidence of PAF; she does not use her inhalers due to lack of instruction on how.    (30 Aug 2021 21:51)      PAST MEDICAL & SURGICAL HISTORY:  COPD (chronic obstructive pulmonary disease)    DM (diabetes mellitus)    Atrial fibrillation  with loop recorder , battery depleted    HTN (hypertension)    Morbid obesity  BMI - 58.3    Chronic GERD    CHAMP (obstructive sleep apnea)      End-stage renal disease    Anemia    H/O tubal ligation        History of vascular access device  s/p insertion right chest permacath 2019, removal 2019, insertion left chest permacath 2019    S/P arteriovenous (AV) fistula creation  left  arm 2019        MEDICATIONS  (STANDING):  ALBUTerol    90 MICROgram(s) HFA Inhaler 2 Puff(s) Inhalation every 6 hours  aspirin enteric coated 81 milliGRAM(s) Oral daily  atorvastatin 40 milliGRAM(s) Oral at bedtime  cinacalcet 60 milliGRAM(s) Oral at bedtime  dextrose 5%. 1000 milliLiter(s) (40 mL/Hr) IV Continuous <Continuous>  dextrose 50% Injectable 25 Gram(s) IV Push once  dextrose 50% Injectable 25 Gram(s) IV Push once  dextrose 50% Injectable 25 Gram(s) IV Push once  diltiazem    milliGRAM(s) Oral daily  epoetin anabela-epbx (RETACRIT) Injectable 8000 Unit(s) IV Push <User Schedule>  heparin   Injectable 5000 Unit(s) SubCutaneous every 8 hours  insulin lispro (ADMELOG) corrective regimen sliding scale   SubCutaneous Before meals and at bedtime  loratadine 10 milliGRAM(s) Oral daily  montelukast 10 milliGRAM(s) Oral at bedtime  nystatin    Suspension 716346 Unit(s) Oral four times a day  pantoprazole    Tablet 40 milliGRAM(s) Oral before breakfast  sevelamer carbonate 2400 milliGRAM(s) Oral three times a day with meals    MEDICATIONS  (PRN):  acetaminophen   Tablet .. 650 milliGRAM(s) Oral every 6 hours PRN Mild Pain (1 - 3)  cyclobenzaprine 5 milliGRAM(s) Oral three times a day PRN Muscle Spasm  melatonin 5 milliGRAM(s) Oral at bedtime PRN Insomnia      FAMILY HISTORY:  Family history of diabetes mellitus  mother-     Family hx of hypertension  mother-         SOCIAL HISTORY:    [x ] Non-smoker    [x ] Alcohol-denies    Allergies    latex (Rash)  penicillin (Nausea)  strawberry (Rash)    Intolerances    caffeine (Nausea)  	    REVIEW OF SYSTEMS:  CONSTITUTIONAL: No fever, weight loss, or fatigue  EYES: No eye pain, visual disturbances, or discharge  ENT:  No difficulty hearing, tinnitus, vertigo; No sinus or throat pain  NECK: No pain or stiffness  RESPIRATORY: No cough, wheezing, chills or hemoptysis; No Shortness of Breath  CARDIOVASCULAR: No chest pain, palpitations, passing out, dizziness, or leg swelling  GASTROINTESTINAL: No abdominal or epigastric pain. No nausea, vomiting, or hematemesis; No diarrhea or constipation. No melena or hematochezia.  GENITOURINARY: No dysuria, frequency, hematuria, or incontinence  NEUROLOGICAL: No headaches, memory loss, loss of strength, numbness, or tremors  SKIN: No itching, burning, rashes, or lesions   LYMPH Nodes: No enlarged glands  ENDOCRINE: No heat or cold intolerance; No hair loss  MUSCULOSKELETAL: No joint pain or swelling; No muscle, back, or extremity pain  PSYCHIATRIC: No depression, anxiety, mood swings, or difficulty sleeping  HEME/LYMPH: No easy bruising, or bleeding gums  ALLERGY AND IMMUNOLOGIC: No hives or eczema	        PHYSICAL EXAM:  T(C): 36.7 (21 @ 04:52), Max: 37.1 (21 @ 21:09)  HR: 75 (21 @ 04:52) (71 - 93)  BP: 145/81 (21 @ 04:52) (143/78 - 197/89)  RR: 18 (21 @ 04:52) (17 - 18)  SpO2: 96% (21 @ 04:52) (94% - 100%)  Wt(kg): --  I&O's Summary    01 Sep 2021 07:01  -  02 Sep 2021 07:00  --------------------------------------------------------  IN: 840 mL / OUT: 3100 mL / NET: -2260 mL        Appearance: Normal	  HEENT:   Normal oral mucosa, PERRL, EOMI	  Lymphatic: No lymphadenopathy  Cardiovascular: Normal S1 S2, No JVD, No murmurs, No edema  Respiratory: Lungs clear to auscultation	  Psychiatry: A & O x 3, Mood & affect appropriate  Gastrointestinal:  Soft, Non-tender, + BS	  Skin: No rashes, No ecchymoses, No cyanosis	  Neurologic: Non-focal  Extremities: Normal range of motion, No clubbing, cyanosis or edema  Vascular: Peripheral pulses palpable 2+ bilaterally        ECG:  	NSR,q anterior leads    LABS:	 	                       9.2    10.43 )-----------( 253      ( 02 Sep 2021 06:58 )             30.6         133<L>  |  95<L>  |  48<H>  ----------------------------<  92  4.5   |  28  |  8.28<H>    Ca    8.3<L>      02 Sep 2021 06:58  Phos  6.3       Mg     1.9         TPro  7.4  /  Alb  3.0<L>  /  TBili  0.4  /  DBili  x   /  AST  7<L>  /  ALT  13  /  AlkPhos  260<H>      ra< from: MR Head No Cont (21 @ 09:56) >  EXAM:  MR BRAIN                            PROCEDURE DATE:  2021          INTERPRETATION:  MRI OF THE BRAIN WITHOUT CONTRAST.    CLINICAL INDICATION: Rule out CVA, ESRD on hemodialysis, hypertension and A. fib    TECHNIQUE: Multiplanar multisequence noncontrast MRI of the brain was performed.    COMPARISON: CT brain, 2021.    FINDINGS:    The ventricular and sulcal size and configuration is age appropriate. There are scattered T2/FLAIR hyperintensities in the subcortical and periventricular white matter which are nonspecific but most commonly represent chronic microvascular ischemic changes.    There is no acute infarction, intracranial hemorrhage, mass lesion, mass effect or herniation. There is no abnormal extra-axial fluid collection or hydrocephalus.    The flow-voids of the major central arterial circulation at the skull base are normal, suggestive of of their patency.    The visualized globes are symmetric bilaterally.    There is mild right tympanomastoid effusion and mild mucosal thickening of the left maxillary sinus. There is diffuse T1 and T2 hypointensity of the skull which corresponds to increased density on CT most likely represent changes related to ESRD.    IMPRESSION:    There is no acute infarction, acute hemorrhage, cerebral edema, or intracranial mass effect.      --- End of Report ---            JAQUI LORA MD; Attending Radiologist  This document has been electronically signed. Sep  2 2021 10:19AM    < end of copied text >  < from: CT Brain Stroke Protocol (21 @ 09:26) >  EXAM:  CT BRAIN STROKE PROTOCOL                            PROCEDURE DATE:  2021          INTERPRETATION:  INDICATION:  Code stroke. History of fall. Altered mental status. History of atrial fibrillation.  TECHNIQUE:  A non contrast 2.5mm axial CT study of the brain was performed from skull base to vertex. Coronal and sagittal reformations were generated from the axial data. In addition, RAPID artificial intelligence screening was utilized to screening for hemorrhage.  COMPARISON EXAMINATION:  CT dated 2021    FINDINGS:    HEMISPHERES:  Both hemispheres appear to be symmetric. No acute infarct, hemorrhage, or space occupying lesion is identified. There is no gross intraluminal thrombus.  VENTRICLES:  Midline and normal in size.  POSTERIOR FOSSA:  No acute abnormality noted. There is a focal calcification in the left paracentral fuentes. This is also visible on prior CT.  EXTRACEREBRAL SPACES:  No subdural or epidural collections are noted.  SKULL BASE AND CALVARIUM:  Appears intact.  No fracture or destructive lesion is identified.  SINUSES AND MASTOIDS:  Moderate mucosal disease is noted in the left maxillary sinus with inspissated secretions.  MISCELLANEOUS:  No orbital or suprasellar abnormality noted.    IMPRESSION:    1)  no CT evidence of an acute infarct, hemorrhage, or space occupying lesion. No acute abnormality suggested.  2)  moderate inflammatory changes left maxillary sinus.    Follow-up MR imaging of the brain may be considered for further assessment.    Physician: RODRIGO ROCKWELL [id number]        ZAIDA ELLIS MD; Attending Radiologist  This document has been electronically signed. Aug 31 2021  9:33AM      EXAM:  CT CHEST                          EXAM:  CT ABDOMEN AND PELVIS                            PROCEDURE DATE:  2021          INTERPRETATION:  CLINICAL INFORMATION: fall l hip pain    COMPARISON: 10.3.16.    CONTRAST/COMPLICATIONS:  IV Contrast: NONE  Oral Contrast: NONE  Complications: None reported at time of study completion    PROCEDURE:  CT of the Chest, Abdomen and Pelvis was performed.  Sagittal and coronal reformats were performed.    FINDINGS:  CHEST:  LUNGS AND LARGE AIRWAYS: Patent central airways. Mosaic attenuation. Right upper lobe airspace opacities.  PLEURA: No pleural effusion.  VESSELS: Within normal limits.  HEART: Heart size is normal.  No pericardial effusion.  MEDIASTINUM AND ORGER: No lymphadenopathy.  CHEST WALL AND LOWER NECK: Cardiac device in the chest wall.    ABDOMEN AND PELVIS:  LIVER: Within normal limits.  BILE DUCTS: Normal caliber.  GALLBLADDER: Cholelithiasis.  SPLEEN: Within normal limits.  PANCREAS: Within normal limits.  ADRENALS: Within normal limits.  KIDNEYS/URETERS: Subcentimeter nonobstructing renal calculi.    BLADDER: Within normal limits.  REPRODUCTIVE ORGANS: Fibroid uterus. Air and debris noted in the endometrium.    BOWEL: No bowel obstruction. The appendix is normal.  PERITONEUM: No ascites.  VESSELS:  Within normal limits.  RETROPERITONEUM/LYMPH NODES: No lymphadenopathy.  ABDOMINAL WALL: Small umbilical hernia.  BONES: Erosive changes at the sacroiliac joints are new since 2016. Dystrophic calcification left paraspinal muscle.    IMPRESSION: No evidence of acute fracture.    Erosive changes involving the sacroiliac joints are new may represent renal osteodystrophy.    Right upper lobe nodular opacities of uncertain etiology but may be infectious.    Fibroid uterus. Air and debris noted in the endometrial canal.

## 2021-09-02 NOTE — CHART NOTE - NSCHARTNOTEFT_GEN_A_CORE
EVENT: Notified by nurse patient c/o neck and back pain    SUBJECTIVE:  Patient is a 56 y/o female, from home, baseline ambulatory with rollator, w/ PMHx of Afib s/p ILR (taken off Eliquis in the past 3 months), ESRD on HD M/W/F (last 3 days ago via L AV fistula), T2DM, HTN, HLD, Iron deficiency anemia, hypercalcemia, and COPD, presented after a mechanical fall sustained while getting out of the bathtub. CT brain no acute bleeding. + mod inflammatory changes left maxillary sinus. CT chest RUL nodular opacity. Pt is s/p brain MRI w/o acute finding. Pending PT eval. D/C planning ongoing.       Problem/Plan - 1:  ·  Problem: Accident due to mechanical fall without injury.   ·  Plan: Pt denies having any chest pain/ palpitations/ prodrome/ LOC at home which could indicate an insidious cause of fall  Likely mechanical due to debility, lack of rollator for assist, wet floor from bathroom, hypoglycemia  pt was noted AMS, slurred speech in ED  PT consult for skilled needs assessment  Whole body CT did not show any fractures or bleeds   Xray upper/lower Ext- no fx, left shoulder concern for RA.   monitor Orthostatic BP (not fluid overloaded) when pt is able to stand up  MR brain neg for acute stroke  pt has ILR in placed 5 yr ago, compatible for MRI. cardiology dr. Dorsey consulted and ok for test.      OBJECTIVE:  Vital Signs Last 24 Hrs  T(C): 37.1 (02 Sep 2021 21:27), Max: 37.1 (02 Sep 2021 21:27)  T(F): 98.7 (02 Sep 2021 21:27), Max: 98.7 (02 Sep 2021 21:27)  HR: 82 (02 Sep 2021 21:27) (75 - 82)  BP: 112/68 (02 Sep 2021 21:27) (112/68 - 155/74)  BP(mean): --  RR: 18 (02 Sep 2021 21:27) (18 - 18)  SpO2: 98% (02 Sep 2021 21:27) (94% - 98%)    PHYSICAL EXAM:  Neuro: Awake and alert, oriented to person, place, and time  Cardiovascular: + S1, S2, no murmurs, rubs, or bruits  Respiratory: clear to auscultation bilaterally with good air entry   GI: Abdomen soft, non-tender, bowel sounds present   : Non distended;   Skin: warm and dry; no rash    acetaminophen   Tablet .. 650 milliGRAM(s) Oral every 6 hours PRN  ALBUTerol    90 MICROgram(s) HFA Inhaler 2 Puff(s) Inhalation every 6 hours  aspirin enteric coated 81 milliGRAM(s) Oral daily  atorvastatin 40 milliGRAM(s) Oral at bedtime  cinacalcet 60 milliGRAM(s) Oral at bedtime  cyclobenzaprine 5 milliGRAM(s) Oral three times a day PRN  dextrose 5%. 1000 milliLiter(s) IV Continuous <Continuous>  dextrose 50% Injectable 25 Gram(s) IV Push once  dextrose 50% Injectable 25 Gram(s) IV Push once  dextrose 50% Injectable 25 Gram(s) IV Push once  diltiazem    milliGRAM(s) Oral daily  epoetin anabela-epbx (RETACRIT) Injectable 8000 Unit(s) IV Push <User Schedule>  heparin   Injectable 5000 Unit(s) SubCutaneous every 8 hours  HYDROmorphone  Injectable 0.5 milliGRAM(s) IV Push once PRN  insulin lispro (ADMELOG) corrective regimen sliding scale   SubCutaneous Before meals and at bedtime  lidocaine   4% Patch 1 Patch Transdermal daily  loratadine 10 milliGRAM(s) Oral daily  melatonin 5 milliGRAM(s) Oral at bedtime PRN  montelukast 10 milliGRAM(s) Oral at bedtime  pantoprazole    Tablet 40 milliGRAM(s) Oral before breakfast  sevelamer carbonate 2400 milliGRAM(s) Oral three times a day with meals    LABS:                        9.2    10.43 )-----------( 253      ( 02 Sep 2021 06:58 )             30.6     09-02    133<L>  |  95<L>  |  48<H>  ----------------------------<  92  4.5   |  28  |  8.28<H>    Ca    8.3<L>      02 Sep 2021 06:58  Phos  6.3     09-02  Mg     1.9     09-02    TPro  7.4  /  Alb  3.0<L>  /  TBili  0.4  /  DBili  x   /  AST  7<L>  /  ALT  13  /  AlkPhos  260<H>  09-02        EKG:   IMAGING:    ASSESSMENT:  HPI:  Patient is a 56 y/o female, from home, baseline ambulatory with rollator, w/ PMHx of Afib s/p ILR (taken off Eliquis in the past 3 months), ESRD on HD M/W/F (last 3 days ago via L AV fistula), T2DM, HTN, HLD, Iron deficiency anemia, hypercalcemia, and COPD, presented after a mechanical fall sustained while getting out of the bathtub. Pt endorsed that she has been feeling more unsteady on her feet in the recent few weeks, and that her rollator was broken. Her HD unit allowed her to loan a wheelchair but she is unable to do many of her ADL's and fell face first while getting out of the shower; she sustained minor head injury, but no LOC. She reports a lot of pain over her left knee and Right shoulder, and is anxious about being unable to take care of herself and her grandson who is her dependent. Due to this, she missed her scheduled HD for 8/20/21. She endorses that she sometimes gets dizzy while getting up from a sitting position, but that these symptoms have no relation to HD sessions and that she no longer takes any Lasix Pt denies any prior fevers, diarrhea, lower extremity swelling, chest pain, palpitations, excessive SOB, or any wheezing. Of note, pt reports being off Eliquis x 3 months because her outpatient cardio sees no further evidence of PAF; she does not use her inhalers due to lack of instruction on how.    (30 Aug 2021 21:51)      PLAN:     FOLLOW UP / RESULT:

## 2021-09-02 NOTE — PROGRESS NOTE ADULT - PROBLEM SELECTOR PLAN 4
Adm Hb/ Hct 8.6/28.7  F/u Iron panel (especially due to complaints of RLS symptoms)  C/w EPO as per nephro   Pt on Auryxia (Ferric citrate outpatient)-> continue repletion depending on panel
Adm Hb/ Hct 8.6/28.7  F/u Iron panel (especially due to complaints of RLS symptoms)  C/w EPO as per nephro   Pt on Auryxia (Ferric citrate outpatient)-> continue repletion depending on panel

## 2021-09-02 NOTE — PROGRESS NOTE ADULT - PROBLEM SELECTOR PLAN 7
C/w home dose of statin   Carb consistent/ DASH/ Renal diet
C/w home dose of statin   Carb consistent/ DASH/ Renal diet

## 2021-09-02 NOTE — PROGRESS NOTE ADULT - PROBLEM SELECTOR PLAN 2
Pt is on HD via L AVF on M/W/F, missed her Monday 8/30 session  Does not complain of any uremic symptoms   BUN/ Sr. Cr 62/9.97  Dr. Li/Arash following  HD 8/31, 9/1  Slightly hyperkalemic to 5.6, given one dose of lokelma, improving   monitor lytes
Pt is on HD via L AVF on M/W/F, missed her Monday 8/30 session  Does not complain of any uremic symptoms   BUN/ Sr. Cr 48/8.28  Dr. Li/Arash following  HD 8/31, 9/1  K has normalized, 4.5

## 2021-09-03 NOTE — PROGRESS NOTE ADULT - REASON FOR ADMISSION
Mechanical Fall, ESRD on HD

## 2021-09-03 NOTE — DISCHARGE NOTE NURSING/CASE MANAGEMENT/SOCIAL WORK - PATIENT PORTAL LINK FT
You can access the FollowMyHealth Patient Portal offered by [facility] by registering at the following website: [email or web address]. By joining [employer]’s FollowMyHealth portal, you will also be able to view your health information using other applications (apps) compatible with our system.

## 2021-09-03 NOTE — PROGRESS NOTE ADULT - PROVIDER SPECIALTY LIST ADULT
Nephrology
Endocrinology
Endocrinology
Nephrology
Cardiology
Nephrology
Internal Medicine
Internal Medicine

## 2021-09-03 NOTE — PROGRESS NOTE ADULT - ASSESSMENT
55 yoF  with history of ESRD on HD presents s/p fall. Nephrology consulted for ESRD status.    1) ESRD: Last HD 9/1, tolerated well with 2.5L removed. Pt for next maintenance HD today. Monitor electrolytes.  2) HTN with ESRD: BP acceptable.  Continue with current medications and low sodium diet. Monitor BP.  3) Anemia of renal disease: Hb low but improving. Will avoid Venofer due to elevated Ferritin.  c/w Epogen 8k units IV tiw with HD. Monitor Hb.  4) Secondary Hyperparathyroidism: Serum phosphorus elevated with acceptable serum calcium. Pt on Auryxia 3 tabs tid with meals, which is unavailable at [facility].   Will increase renvela to 4 tabs tid with meals. PThi 739- c/w Sensipar 60mg PO qd.  Monitor serum calcium and phosphorus.    [facility] NEPHROLOGY  Keaton Lopez M.D.  Juma Green D.O.  Myla Hoffmann M.D.  Julia Brewer, MSN, ANP-C  [phone]    [address] [place] [address]    
55 yoF  with history of ESRD on HD presents s/p fall. Nephrology consulted for ESRD status.    1) ESRD: Last HD 8/31, tolerated well with 3L removed. Pt seen on HD today (to switch back to MWF schedule), hemodynamically stable, tolerating UF goal of 2.5L. Monitor electrolytes.  2) HTN with ESRD: BP well controlled. Continue with current medications and low sodium diet. Monitor BP.  3) Anemia of renal disease: Hb low. Will avoid Venofer due to elevated Ferritin.  c/w Epogen 8k units IV tiw with HD. Monitor Hb.  4) Secondary Hyperparathyroidism: Serum phosphorus elevated with acceptable serum calcium. Pt on Auryxia 3 tabs tid with meals, which is unavailable at [facility].   c/w renvela 3 tabs tid with meals. PThi 739- c/w Sensipar 60mg PO qd.  Monitor serum calcium and phosphorus.    [facility] NEPHROLOGY  Keaton Lopez M.D.  Juma Green D.O.  Myla Hoffmann M.D.  Julia Brewer, MSN, ANP-C  [phone]    [address] [place] [address]    
56 y/o female, from home, baseline ambulatory with rollator, w/ PMHx of Afib s/p ILR (taken off Eliquis in the past 3 months), ESRD on HD M/W/F (last 3 days ago via L AV fistula), T2DM, HTN, HLD, Iron deficiency anemia, hypercalcemia, and COPD,CHAMP presented after a mechanical fall.  1.PT.  2.Echocardiogram.  3.ESRD-HD as per renal.  4.PAF-asa,cardizem.Taken off eliquis-?fall/bleed risk.  5.DM-as per Endocrine.  6.Lipid d/o-statin.  7.COPD/CHAMP-declines CPAP.  8.GI and DVT prophylaxis.  
55 yoF  with history of ESRD on HD presents s/p fall. Nephrology consulted for ESRD status.    1) ESRD: Last HD 9/1, tolerated well with 2.5L removed. Pt for next maintenance HD 9/3. Monitor electrolytes.  2) HTN with ESRD: BP acceptable.  Continue with current medications and low sodium diet. Monitor BP.  3) Anemia of renal disease: Hb low but improving. Will avoid Venofer due to elevated Ferritin.  c/w Epogen 8k units IV tiw with HD. Monitor Hb.  4) Secondary Hyperparathyroidism: Serum phosphorus elevated with acceptable serum calcium. Pt on Auryxia 3 tabs tid with meals, which is unavailable at [facility].   c/w renvela 3 tabs tid with meals. PThi 739- c/w Sensipar 60mg PO qd.  Monitor serum calcium and phosphorus.    [facility] NEPHROLOGY  Keaton Lopez M.D.  Juma Green D.O.  Myla Hoffmann M.D.  Julia Brewer, JASIEL, ANP-C  [phone]    [address] [place] [address]    
Patient is a 56 y/o female, from home, baseline ambulatory with rollator, w/ PMHx of Afib s/p ILR (taken off Eliquis in the past 3 months), ESRD on HD M/W/F (last 3 days ago via L AV fistula), T2DM, HTN, HLD, Iron deficiency anemia, hypercalcemia, and COPD, presented after a mechanical fall sustained while getting out of the bathtub. CT brain no acute bleeding. + mod inflammatory changes left maxillary sinus. CT chest RUL nodular opacity. MRI brain ordered. cardiology and nephrology following. HD scheduled today. PT consulted.   9/2- Pt is s/p brain MRI w/o acute finding. Pending PT eval. D/C planning ongoing.  
Patient is a 54 y/o female, from home, baseline ambulatory with rollator, w/ PMHx of Afib s/p ILR (taken off Eliquis in the past 3 months), ESRD on HD M/W/F (last 3 days ago via L AV fistula), T2DM, HTN, HLD, Iron deficiency anemia, hypercalcemia, and COPD, presented after a mechanical fall sustained while getting out of the bathtub. CT brain no acute bleeding. + mod inflammatory changes left maxillary sinus. CT chest RUL nodular opacity. MRI brain ordered. cardiology and nephrology following. HD scheduled today. PT consulted.

## 2021-09-03 NOTE — DISCHARGE NOTE NURSING/CASE MANAGEMENT/SOCIAL WORK - NSDCPEFALRISK_GEN_ALL_CORE
For information on Fall & injury Prevention, visit [email or web address]

## 2021-09-03 NOTE — PROGRESS NOTE ADULT - SUBJECTIVE AND OBJECTIVE BOX
CHIEF COMPLAINT:Patient is a 55y old  Female who presents with a chief complaint of Mechanical Fall, ESRD on HD.Pt appears comfortable.    	  REVIEW OF SYSTEMS:  CONSTITUTIONAL: No fever, weight loss, or fatigue  EYES: No eye pain, visual disturbances, or discharge  ENT:  No difficulty hearing, tinnitus, vertigo; No sinus or throat pain  NECK: No pain or stiffness  RESPIRATORY: No cough, wheezing, chills or hemoptysis; No Shortness of Breath  CARDIOVASCULAR: No chest pain, palpitations, passing out, dizziness, or leg swelling  GASTROINTESTINAL: No abdominal or epigastric pain. No nausea, vomiting, or hematemesis; No diarrhea or constipation. No melena or hematochezia.  GENITOURINARY: No dysuria, frequency, hematuria, or incontinence  NEUROLOGICAL: No headaches, memory loss, loss of strength, numbness, or tremors  SKIN: No itching, burning, rashes, or lesions   LYMPH Nodes: No enlarged glands  ENDOCRINE: No heat or cold intolerance; No hair loss  MUSCULOSKELETAL: No joint pain or swelling; No muscle, back, or extremity pain  PSYCHIATRIC: No depression, anxiety, mood swings, or difficulty sleeping  HEME/LYMPH: No easy bruising, or bleeding gums  ALLERGY AND IMMUNOLOGIC: No hives or eczema	      PHYSICAL EXAM:  T(C): 36.8 (09-03-21 @ 04:41), Max: 37.1 (09-02-21 @ 21:27)  HR: 76 (09-03-21 @ 04:41) (75 - 82)  BP: 142/76 (09-03-21 @ 04:41) (112/68 - 145/78)  RR: 18 (09-03-21 @ 04:41) (18 - 18)  SpO2: 94% (09-03-21 @ 04:41) (94% - 98%)    I&O's Summary    02 Sep 2021 07:01  -  03 Sep 2021 07:00  --------------------------------------------------------  IN: 240 mL / OUT: 0 mL / NET: 240 mL        Appearance: Normal	  HEENT:   Normal oral mucosa, PERRL, EOMI	  Lymphatic: No lymphadenopathy  Cardiovascular: Normal S1 S2, No JVD, No murmurs, No edema  Respiratory: Lungs clear to auscultation	  Psychiatry: A & O x 3, Mood & affect appropriate  Gastrointestinal:  Soft, Non-tender, + BS	  Skin: No rashes, No ecchymoses, No cyanosis	  Neurologic: Non-focal  Extremities: Normal range of motion, No clubbing, cyanosis or edema  Vascular: Peripheral pulses palpable 2+ bilaterally    MEDICATIONS  (STANDING):  ALBUTerol    90 MICROgram(s) HFA Inhaler 2 Puff(s) Inhalation every 6 hours  aspirin enteric coated 81 milliGRAM(s) Oral daily  atorvastatin 40 milliGRAM(s) Oral at bedtime  cinacalcet 60 milliGRAM(s) Oral at bedtime  dextrose 5%. 1000 milliLiter(s) (40 mL/Hr) IV Continuous <Continuous>  dextrose 50% Injectable 25 Gram(s) IV Push once  dextrose 50% Injectable 25 Gram(s) IV Push once  dextrose 50% Injectable 25 Gram(s) IV Push once  diltiazem    milliGRAM(s) Oral daily  epoetin anabela-epbx (RETACRIT) Injectable 8000 Unit(s) IV Push <User Schedule>  heparin   Injectable 5000 Unit(s) SubCutaneous every 8 hours  insulin lispro (ADMELOG) corrective regimen sliding scale   SubCutaneous Before meals and at bedtime  lidocaine   4% Patch 1 Patch Transdermal daily  loratadine 10 milliGRAM(s) Oral daily  montelukast 10 milliGRAM(s) Oral at bedtime  pantoprazole    Tablet 40 milliGRAM(s) Oral before breakfast  sevelamer carbonate 3200 milliGRAM(s) Oral three times a day with meals      	  	  LABS:	 	                    9.2    10.43 )-----------( 253      ( 02 Sep 2021 06:58 )             30.6     09-03    132<L>  |  93<L>  |  60<H>  ----------------------------<  81  4.8   |  27  |  10.20<H>    Ca    8.4      03 Sep 2021 06:43  Phos  7.2     09-03  Mg     2.2     09-03    TPro  7.5  /  Alb  2.9<L>  /  TBili  0.4  /  DBili  x   /  AST  8<L>  /  ALT  12  /  AlkPhos  248<H>  09-03    Lipid Profile: Cholesterol 146  LDL --  HDL 53  TG 97    e    	        
Interval Events:  pt in nad    Allergies    latex (Rash)  penicillin (Nausea)  strawberry (Rash)    Intolerances    caffeine (Nausea)    Endocrine/Metabolic Medications:  atorvastatin 40 milliGRAM(s) Oral at bedtime  cinacalcet 60 milliGRAM(s) Oral at bedtime  dextrose 50% Injectable 25 Gram(s) IV Push once  dextrose 50% Injectable 25 Gram(s) IV Push once  dextrose 50% Injectable 25 Gram(s) IV Push once  insulin lispro (ADMELOG) corrective regimen sliding scale   SubCutaneous Before meals and at bedtime      Vital Signs Last 24 Hrs  T(C): 36.7 (02 Sep 2021 04:52), Max: 37.2 (01 Sep 2021 10:30)  T(F): 98.1 (02 Sep 2021 04:52), Max: 98.9 (01 Sep 2021 10:30)  HR: 75 (02 Sep 2021 04:52) (71 - 93)  BP: 145/81 (02 Sep 2021 04:52) (143/78 - 197/89)  BP(mean): --  RR: 18 (02 Sep 2021 04:52) (16 - 18)  SpO2: 96% (02 Sep 2021 04:52) (94% - 100%)      PHYSICAL EXAM  All physical exam findings normal, except those marked:  General:	Alert, active, cooperative, NAD, well hydrated  .		[] Abnormal:  Neck		Normal: supple, no cervical adenopathy, no palpable thyroid  .		[] Abnormal:  Cardiovascular	Normal: regular rate, normal S1, S2, no murmurs  .		[] Abnormal:  Respiratory	Normal: no chest wall deformity, normal respiratory pattern, CTA B/L  .		[] Abnormal:  Abdominal	Normal: soft, ND, NT, bowel sounds present, no masses, no organomegaly  .		[] Abnormal:  		Normal normal genitalia, testes descended, circumcised/uncircumcised  .		Giovanna stage:			Breast giovanna:  .		Menstrual history:  .		[] Abnormal:  Extremities	Normal: FROM x4  .		[] Abnormal:  Skin		Normal: intact and not indurated, no rash, no acanthosis nigricans  .		[] Abnormal:  Neurologic	Normal: grossly intact  .		[] Abnormal:    LABS                        9.2    10.43 )-----------( 253      ( 02 Sep 2021 06:58 )             30.6                               133    |  95     |  48                  Calcium: 8.3   / iCa: x      (09-02 @ 06:58)    ----------------------------<  92        Magnesium: 1.9                              4.5     |  28     |  8.28             Phosphorous: 6.3      TPro  7.4    /  Alb  3.0    /  TBili  0.4    /  DBili  x      /  AST  7      /  ALT  13     /  AlkPhos  260    02 Sep 2021 06:58    CAPILLARY BLOOD GLUCOSE      POCT Blood Glucose.: 94 mg/dL (02 Sep 2021 07:43)  POCT Blood Glucose.: 110 mg/dL (01 Sep 2021 21:23)  POCT Blood Glucose.: 140 mg/dL (01 Sep 2021 16:42)  POCT Blood Glucose.: 91 mg/dL (01 Sep 2021 11:29)        Assesment/plan      · Assessment	  Patient is a 56 y/o female, from home, baseline ambulatory with rollator, w/ PMHx of Afib s/p ILR (taken off Eliquis in the past 3 months), ESRD on HD M/W/F (last 3 days ago via L AV fistula), T2DM, HTN, HLD, Iron deficiency anemia, hypercalcemia, and COPD, presented after a mechanical fall sustained while getting out of the bathtub. Found to have hypoglycemia. Pt takes 80 units of tresiba and trulicity as out pt with frequent hypos. pt admits to drinking sugary drinks to keep up with low sugars.         Problem/Recommendation - 1:  ·  Problem: Uncontrolled diabetes mellitus with hypoglycemia.    due to high dose treseba and esrd  off of insulin fsg good control  low dose admelog prn for now  compliance with diet d/w [id number] level  fsg ac and hs  nutrition eval.     Problem/Recommendation - 2:  ·  Problem: Accident due to mechanical fall without injury.   ·  Recommendation: ? due to hypoglycemia- repeated episodes of falls  w/u per prim team.     Problem/Recommendation - 3:  ·  Problem: ESRD on hemodialysis.   ·  Recommendation: per nephro.    
Interval Events:  pt in nad    Allergies    latex (Rash)  penicillin (Nausea)  strawberry (Rash)    Intolerances    caffeine (Nausea)    Endocrine/Metabolic Medications:  atorvastatin 40 milliGRAM(s) Oral at bedtime  cinacalcet 60 milliGRAM(s) Oral at bedtime  dextrose 50% Injectable 25 Gram(s) IV Push once  dextrose 50% Injectable 25 Gram(s) IV Push once  dextrose 50% Injectable 25 Gram(s) IV Push once  insulin lispro (ADMELOG) corrective regimen sliding scale   SubCutaneous Before meals and at bedtime      Vital Signs Last 24 Hrs  T(C): 37.1 (03 Sep 2021 10:51), Max: 37.1 (02 Sep 2021 21:27)  T(F): 98.8 (03 Sep 2021 10:51), Max: 98.8 (03 Sep 2021 10:51)  HR: 75 (03 Sep 2021 10:51) (75 - 82)  BP: 118/47 (03 Sep 2021 10:51) (112/68 - 145/78)  BP(mean): --  RR: 17 (03 Sep 2021 10:51) (17 - 18)  SpO2: 100% (03 Sep 2021 10:51) (94% - 100%)      PHYSICAL EXAM  All physical exam findings normal, except those marked:  General:	Alert, active, cooperative, NAD, well hydrated  .		[] Abnormal:  Neck		Normal: supple, no cervical adenopathy, no palpable thyroid  .		[] Abnormal:  Cardiovascular	Normal: regular rate, normal S1, S2, no murmurs  .		[] Abnormal:  Respiratory	Normal: no chest wall deformity, normal respiratory pattern, CTA B/L  .		[] Abnormal:  Abdominal	Normal: soft, ND, NT, bowel sounds present, no masses, no organomegaly  .		[] Abnormal:  		Normal normal genitalia, testes descended, circumcised/uncircumcised  .		Giovanna stage:			Breast giovanna:  .		Menstrual history:  .		[] Abnormal:  Extremities	Normal: FROM x4  .		[] Abnormal:  Skin		Normal: intact and not indurated, no rash, no acanthosis nigricans  .		[] Abnormal:  Neurologic	Normal: grossly intact  .		[] Abnormal:    LABS                              132    |  93     |  60                  Calcium: 8.4   / iCa: x      (09-03 @ 06:43)    ----------------------------<  81        Magnesium: 2.2                              4.8     |  27     |  10.20            Phosphorous: 7.2      TPro  7.5    /  Alb  2.9    /  TBili  0.4    /  DBili  x      /  AST  8      /  ALT  12     /  AlkPhos  248    03 Sep 2021 06:43    CAPILLARY BLOOD GLUCOSE      POCT Blood Glucose.: 99 mg/dL (03 Sep 2021 11:46)  POCT Blood Glucose.: 83 mg/dL (03 Sep 2021 10:24)  POCT Blood Glucose.: 77 mg/dL (03 Sep 2021 10:02)  POCT Blood Glucose.: 96 mg/dL (03 Sep 2021 08:19)  POCT Blood Glucose.: 115 mg/dL (02 Sep 2021 21:42)  POCT Blood Glucose.: 115 mg/dL (02 Sep 2021 16:49)  POCT Blood Glucose.: 92 mg/dL (02 Sep 2021 15:57)        Assesment/plan    Patient is a 54 y/o female, from home, baseline ambulatory with rollator, w/ PMHx of Afib s/p ILR (taken off Eliquis in the past 3 months), ESRD on HD M/W/F (last 3 days ago via L AV fistula), T2DM, HTN, HLD, Iron deficiency anemia, hypercalcemia, and COPD, presented after a mechanical fall sustained while getting out of the bathtub. Found to have hypoglycemia. Pt takes 80 units of tresiba and trulicity as out pt with frequent hypos. pt admits to drinking sugary drinks to keep up with low sugars.         Problem/Recommendation - 1:  ·  Problem: Uncontrolled diabetes mellitus with hypoglycemia.    due to high dose treseba and esrd  off of insulin fsg good control  low dose admelog prn for now  compliance with diet d/w pt  pt will not need insulin tx  fsg ac and hs  nutrition eval.     Problem/Recommendation - 2:  ·  Problem: Accident due to mechanical fall without injury.   ·  Recommendation: ? due to hypoglycemia- repeated episodes of falls  w/u per prim team.     Problem/Recommendation - 3:  ·  Problem: ESRD on hemodialysis.   ·  Recommendation: per nephro.    
NP Note discussed with  Primary Attending    INTERVAL HPI/OVERNIGHT EVENTS: no new complaints    MEDICATIONS  (STANDING):  ALBUTerol    90 MICROgram(s) HFA Inhaler 2 Puff(s) Inhalation every 6 hours  aspirin enteric coated 81 milliGRAM(s) Oral daily  atorvastatin 40 milliGRAM(s) Oral at bedtime  cinacalcet 60 milliGRAM(s) Oral at bedtime  dextrose 5%. 1000 milliLiter(s) (40 mL/Hr) IV Continuous <Continuous>  dextrose 50% Injectable 25 Gram(s) IV Push once  dextrose 50% Injectable 25 Gram(s) IV Push once  dextrose 50% Injectable 25 Gram(s) IV Push once  diltiazem    milliGRAM(s) Oral daily  epoetin anabela-epbx (RETACRIT) Injectable 8000 Unit(s) IV Push <User Schedule>  heparin   Injectable 5000 Unit(s) SubCutaneous every 8 hours  insulin glargine Injectable (LANTUS) 64 Unit(s) SubCutaneous at bedtime  insulin lispro (ADMELOG) corrective regimen sliding scale   SubCutaneous Before meals and at bedtime  loratadine 10 milliGRAM(s) Oral daily  montelukast 10 milliGRAM(s) Oral at bedtime  nystatin    Suspension 768527 Unit(s) Oral four times a day  sevelamer carbonate 2400 milliGRAM(s) Oral three times a day with meals    MEDICATIONS  (PRN):  acetaminophen   Tablet .. 650 milliGRAM(s) Oral every 6 hours PRN Mild Pain (1 - 3)  cyclobenzaprine 5 milliGRAM(s) Oral three times a day PRN Muscle Spasm  melatonin 5 milliGRAM(s) Oral at bedtime PRN Insomnia  traMADol 50 milliGRAM(s) Oral every 6 hours PRN Moderate Pain (4 - 6)      __________________________________________________  REVIEW OF SYSTEMS:    CONSTITUTIONAL: No fever,   EYES: no acute visual disturbances  NECK: No pain or stiffness  RESPIRATORY: No cough; No shortness of breath  CARDIOVASCULAR: No chest pain, no palpitations  GASTROINTESTINAL: No pain. No nausea or vomiting; No diarrhea   NEUROLOGICAL: No headache or numbness, no tremors  MUSCULOSKELETAL: No joint pain, right shoulder muscle pain  GENITOURINARY: no dysuria, no frequency, no hesitancy  PSYCHIATRY: no depression , no anxiety  ALL OTHER  ROS negative        Vital Signs Last 24 Hrs  T(C): 36.9 (01 Sep 2021 14:04), Max: 37.2 (01 Sep 2021 05:09)  T(F): 98.4 (01 Sep 2021 14:04), Max: 98.9 (01 Sep 2021 05:09)  HR: 71 (01 Sep 2021 14:04) (68 - 92)  BP: 197/89 (01 Sep 2021 14:04) (130/77 - 197/89)  BP(mean): --  RR: 17 (01 Sep 2021 14:04) (16 - 18)  SpO2: 100% (01 Sep 2021 14:04) (95% - 100%)    ________________________________________________  PHYSICAL EXAM:  GENERAL: NAD, obese  HEENT: Normocephalic;  conjunctivae and sclerae clear; moist mucous membranes;   NECK : supple  CHEST/LUNG: Clear to auscultation bilaterally with good air entry   HEART: S1 S2  regular; no murmurs, gallops or rubs  ABDOMEN: Soft, Nontender, Nondistended; Bowel sounds present  EXTREMITIES: no cyanosis; trace edema BLE, ; no calf tenderness  SKIN: warm and dry; no rash, Left arm AVF  NERVOUS SYSTEM:  Awake and alert; Oriented  to place, person and time ; no new deficits    _________________________________________________  LABS:                        8.6    9.87  )-----------( 285      ( 01 Sep 2021 06:37 )             28.5     09-01    137  |  98  |  62<H>  ----------------------------<  62<L>  5.3   |  28  |  9.97<H>    Ca    8.6      01 Sep 2021 06:37  Phos  7.3     09-01  Mg     2.1     09-01    TPro  7.5  /  Alb  3.2<L>  /  TBili  0.4  /  DBili  x   /  AST  8<L>  /  ALT  13  /  AlkPhos  272<H>  09-01    PT/INR - ( 30 Aug 2021 18:43 )   PT: 12.8 sec;   INR: 1.08 ratio         PTT - ( 30 Aug 2021 18:43 )  PTT:32.1 sec    CAPILLARY BLOOD GLUCOSE      POCT Blood Glucose.: 91 mg/dL (01 Sep 2021 11:29)  POCT Blood Glucose.: 76 mg/dL (01 Sep 2021 07:34)  POCT Blood Glucose.: 95 mg/dL (31 Aug 2021 21:39)  POCT Blood Glucose.: 102 mg/dL (31 Aug 2021 18:08)  POCT Blood Glucose.: 123 mg/dL (31 Aug 2021 17:33)  POCT Blood Glucose.: 95 mg/dL (31 Aug 2021 16:27)  POCT Blood Glucose.: 108 mg/dL (31 Aug 2021 15:17)        RADIOLOGY & ADDITIONAL TESTS:    Imaging  Reviewed:  YES    Consultant(s) Notes Reviewed:   YES      Plan of care was discussed with patient and /or primary care giver; all questions and concerns were addressed 
[facility] NEPHROLOGY- PROGRESS NOTE    54 yo F with history of ESRD on HD presents s/p fall. Nephrology consulted for ESRD status.    Hospital Medications: Medications reviewed.  REVIEW OF SYSTEMS:  CONSTITUTIONAL: No fevers or chills  RESPIRATORY: No shortness of breath  CARDIOVASCULAR: No chest pain.  GASTROINTESTINAL: No nausea, vomiting, diarrhea or abdominal pain.   VASCULAR: No bilateral lower extremity edema. Left knee pain and neck pain    VITALS:  T(F): 98.1 (09-02-21 @ 04:52), Max: 98.7 (09-01-21 @ 21:09)  HR: 75 (09-02-21 @ 04:52)  BP: 145/81 (09-02-21 @ 04:52)  RR: 18 (09-02-21 @ 04:52)  SpO2: 96% (09-02-21 @ 04:52)  Wt(kg): --    09-01 @ 07:01  -  09-02 @ 07:00  --------------------------------------------------------  IN: 840 mL / OUT: 3100 mL / NET: -2260 mL      PHYSICAL EXAM:  Constitutional: NAD/ Obese  HEENT: anicteric sclera,   Respiratory: CTAB,   Cardiovascular: S1, S2, RRR  Gastrointestinal: BS+, soft, NT/ND  Extremities: No peripheral edema  Access: Left AVF +thrill +bruit with small needle scabs    LABS:  09-02    133<L>  |  95<L>  |  48<H>  ----------------------------<  92  4.5   |  28  |  8.28<H>    Ca    8.3<L>      02 Sep 2021 06:58  Phos  6.3     09-02  Mg     1.9     09-02    TPro  7.4  /  Alb  3.0<L>  /  TBili  0.4  /  DBili      /  AST  7<L>  /  ALT  13  /  AlkPhos  260<H>  09-02    Creatinine Trend: 8.28 <--, 9.97 <--, 12.50 <--, 11.30 <--                        9.2    10.43 )-----------( 253      ( 02 Sep 2021 06:58 )             30.6     Urine Studies:        
[facility] NEPHROLOGY- PROGRESS NOTE    56 yo F with history of ESRD on HD presents s/p fall. Nephrology consulted for ESRD status.    Hospital Medications: Medications reviewed.  REVIEW OF SYSTEMS:  CONSTITUTIONAL: No fevers or chills  RESPIRATORY: No shortness of breath  CARDIOVASCULAR: No chest pain.  GASTROINTESTINAL: No nausea, vomiting, diarrhea or abdominal pain.   VASCULAR: No bilateral lower extremity edema. Left knee pain and neck pain    VITALS:  T(F): 98.3 (09-03-21 @ 04:41), Max: 98.7 (09-02-21 @ 21:27)  HR: 76 (09-03-21 @ 04:41)  BP: 142/76 (09-03-21 @ 04:41)  RR: 18 (09-03-21 @ 04:41)  SpO2: 94% (09-03-21 @ 04:41)  Wt(kg): --    09-02 @ 07:01  -  09-03 @ 07:00  --------------------------------------------------------  IN: 240 mL / OUT: 0 mL / NET: 240 mL      PHYSICAL EXAM:  Constitutional: NAD/ Obese  HEENT: anicteric sclera,   Respiratory: CTAB,   Cardiovascular: S1, S2, RRR  Gastrointestinal: BS+, soft, NT/ND  Extremities: No peripheral edema  Access: Left AVF +thrill +bruit with small needle scabs    LABS:  09-03    132<L>  |  93<L>  |  60<H>  ----------------------------<  81  4.8   |  27  |  10.20<H>    Ca    8.4      03 Sep 2021 06:43  Phos  7.2     09-03  Mg     2.2     09-03    TPro  7.5  /  Alb  2.9<L>  /  TBili  0.4  /  DBili      /  AST  8<L>  /  ALT  12  /  AlkPhos  248<H>  09-03    Creatinine Trend: 10.20 <--, 8.28 <--, 9.97 <--, 12.50 <--, 11.30 <--                        9.2    10.43 )-----------( 253      ( 02 Sep 2021 06:58 )             30.6     Urine Studies:          
[facility] NEPHROLOGY- PROGRESS NOTE    56 yo F with history of ESRD on HD presents s/p fall. Nephrology consulted for ESRD status.    Hospital Medications: Medications reviewed.  REVIEW OF SYSTEMS:  CONSTITUTIONAL: No fevers or chills  RESPIRATORY: No shortness of breath  CARDIOVASCULAR: No chest pain.  GASTROINTESTINAL: No nausea, vomiting, diarrhea or abdominal pain.   VASCULAR: No bilateral lower extremity edema. b/l knee pain and Rt shoulder pain    VITALS:  T(F): 98.9 (09-01-21 @ 10:30), Max: 98.9 (09-01-21 @ 05:09)  HR: 84 (09-01-21 @ 10:30)  BP: 184/74 (09-01-21 @ 10:30)  RR: 16 (09-01-21 @ 10:30)  SpO2: 98% (09-01-21 @ 10:30)  Wt(kg): --  Height (cm): 157.5 (08-30 @ 15:26)  Weight (kg): 145.1 (08-30 @ 15:26)  BMI (kg/m2): 58.5 (08-30 @ 15:26)  BSA (m2): 2.33 (08-30 @ 15:26)    08-31 @ 07:01  -  09-01 @ 07:00  --------------------------------------------------------  IN: 600 mL / OUT: 3600 mL / NET: -3000 mL      PHYSICAL EXAM:  Constitutional: NAD/ Obese  HEENT: anicteric sclera,   Respiratory: CTAB,   Cardiovascular: S1, S2, RRR  Gastrointestinal: BS+, soft, NT/ND  Extremities: No peripheral edema  Access: Left AVF +thrill +bruit with small needle scabs      LABS:  09-01    137  |  98  |  62<H>  ----------------------------<  62<L>  5.3   |  28  |  9.97<H>    Ca    8.6      01 Sep 2021 06:37  Phos  7.3     09-01  Mg     2.1     09-01    TPro  7.5  /  Alb  3.2<L>  /  TBili  0.4  /  DBili      /  AST  8<L>  /  ALT  13  /  AlkPhos  272<H>  09-01    Creatinine Trend: 9.97 <--, 12.50 <--, 11.30 <--                        8.6    9.87  )-----------( 285      ( 01 Sep 2021 06:37 )             28.5     Urine Studies:      RADIOLOGY & ADDITIONAL STUDIES:  
NP Note discussed with  Primary Attending    INTERVAL HPI/OVERNIGHT EVENTS: no new complaints    MEDICATIONS  (STANDING):  ALBUTerol    90 MICROgram(s) HFA Inhaler 2 Puff(s) Inhalation every 6 hours  aspirin enteric coated 81 milliGRAM(s) Oral daily  atorvastatin 40 milliGRAM(s) Oral at bedtime  cinacalcet 60 milliGRAM(s) Oral at bedtime  dextrose 5%. 1000 milliLiter(s) (40 mL/Hr) IV Continuous <Continuous>  dextrose 50% Injectable 25 Gram(s) IV Push once  dextrose 50% Injectable 25 Gram(s) IV Push once  dextrose 50% Injectable 25 Gram(s) IV Push once  diltiazem    milliGRAM(s) Oral daily  epoetin anabela-epbx (RETACRIT) Injectable 8000 Unit(s) IV Push <User Schedule>  heparin   Injectable 5000 Unit(s) SubCutaneous every 8 hours  insulin lispro (ADMELOG) corrective regimen sliding scale   SubCutaneous Before meals and at bedtime  lidocaine   4% Patch 1 Patch Transdermal daily  loratadine 10 milliGRAM(s) Oral daily  montelukast 10 milliGRAM(s) Oral at bedtime  nystatin    Suspension [id number] Unit(s) Oral four times a day  pantoprazole    Tablet 40 milliGRAM(s) Oral before breakfast  sevelamer carbonate 2400 milliGRAM(s) Oral three times a day with meals    MEDICATIONS  (PRN):  acetaminophen   Tablet .. 650 milliGRAM(s) Oral every 6 hours PRN Mild Pain (1 - 3)  cyclobenzaprine 5 milliGRAM(s) Oral three times a day PRN Muscle Spasm  melatonin 5 milliGRAM(s) Oral at bedtime PRN Insomnia        __________________________________________________  REVIEW OF SYSTEMS:    CONSTITUTIONAL: No fever,   EYES: no acute visual disturbances  NECK: No pain or stiffness  RESPIRATORY: No cough; No shortness of breath  CARDIOVASCULAR: No chest pain, no palpitations  GASTROINTESTINAL: No pain. No nausea or vomiting; No diarrhea   NEUROLOGICAL: No headache or numbness, no tremors  MUSCULOSKELETAL: No joint pain, right shoulder muscle pain  GENITOURINARY: no dysuria, no frequency, no hesitancy  PSYCHIATRY: no depression , no anxiety  ALL OTHER  ROS negative        Vital Signs Last 24 Hrs  T(C): 36.7 (02 Sep 2021 04:52), Max: 37.1 (01 Sep 2021 21:09)  T(F): 98.1 (02 Sep 2021 04:52), Max: 98.7 (01 Sep 2021 21:09)  HR: 75 (02 Sep 2021 04:52) (71 - 93)  BP: 145/81 (02 Sep 2021 04:52) (143/78 - 197/89)  BP(mean): --  RR: 18 (02 Sep 2021 04:52) (17 - 18)  SpO2: 96% (02 Sep 2021 04:52) (94% - 100%)  ________________________________________________  PHYSICAL EXAM:  GENERAL: NAD, morbidly obese  HEENT: Normocephalic;  conjunctivae and sclerae clear; moist mucous membranes;   NECK : supple  CHEST/LUNG: Clear to auscultation bilaterally with good air entry   HEART: S1 S2 regular; no murmurs, gallops or rubs  ABDOMEN: obese sft, nt, nd, no rebound or guarding, +BS  EXTREMITIES: no cyanosis; trace edema BLE, no calf tenderness  SKIN: warm and dry; no rash, Left arm AVF  NERVOUS SYSTEM:  Awake and alert; Oriented  to place, person and time; no new deficits, moving all extremities LLE w/ decreased strength, SILT    _________________________________________________  LABS:                                   9.2    10.43 )-----------( 253      ( 02 Sep 2021 06:58 )             30.6     09-02    133<L>  |  95<L>  |  48<H>  ----------------------------<  92  4.5   |  28  |  8.28<H>    Ca    8.3<L>      02 Sep 2021 06:58  Phos  6.3     09-02  Mg     1.9     09-02    TPro  7.4  /  Alb  3.0<L>  /  TBili  0.4  /  DBili  x   /  AST  7<L>  /  ALT  13  /  AlkPhos  260<H>  09-02      PT/INR - ( 30 Aug 2021 18:43 )   PT: 12.8 sec;   INR: 1.08 ratio         PTT - ( 30 Aug 2021 18:43 )  PTT:32.1 sec    CAPILLARY BLOOD GLUCOSE    POCT Blood Glucose.: 68 mg/dL (02 Sep 2021 11:17)  POCT Blood Glucose.: 94 mg/dL (02 Sep 2021 07:43)  POCT Blood Glucose.: 110 mg/dL (01 Sep 2021 21:23)  POCT Blood Glucose.: 140 mg/dL (01 Sep 2021 16:42)          RADIOLOGY & ADDITIONAL TESTS:    Imaging  Reviewed:  YES    Consultant(s) Notes Reviewed:   YES      Plan of care was discussed with patient and /or primary care giver; all questions and concerns were addressed

## 2021-09-03 NOTE — CHART NOTE - NSCHARTNOTEFT_GEN_A_CORE
Patient is a 54 y/o female, from home, baseline ambulatory with rollator, w/ PMHx of Afib s/p ILR (taken off Eliquis in the past 3 months), ESRD on HD M/W/F (last 3 days ago via L AV fistula), T2DM, HTN, HLD, Iron deficiency anemia, hypercalcemia, and COPD, presented after a mechanical fall sustained while getting out of the bathtub. Lower extremities and rt shoulder imaging where negative for acute fractures.  CT brain no acute bleeding. + mod inflammatory changes left maxillary sinus. CT chest RUL nodular opacity and Brain MRI was negative of acute bleed or stroke.  Pt was followed here by cardiology and nephrology following. PT eval found patient can continue physical therapy at home but would require additional DMEs for a safe discharge. Given continued debility and body habitus, patient will need a wheel chair, rollator shower chair and toliet seat in order to complete her ADLs at home Patient is a 54 y/o female, from home, baseline ambulatory with rollator, w/ PMHx of Afib s/p ILR (taken off Eliquis in the past 3 months), ESRD on HD M/W/F (last 3 days ago via L AV fistula), T2DM, HTN, HLD, Iron deficiency anemia, hypercalcemia, and COPD, presented after a mechanical fall sustained while getting out of the bathtub. Lower extremities and rt shoulder imaging where negative for acute fractures.  CT brain no acute bleeding. + mod inflammatory changes left maxillary sinus. CT chest RUL nodular opacity and Brain MRI was negative of acute bleed or stroke.  Pt was followed here by cardiology and nephrology following. PT eval found patient can continue physical therapy at home but would require additional DMEs for a safe discharge. Given continued debility and body habitus, patient will need a bariatric rollatory, shower chair, wheel chair, and raised toliet seat in order to complete her ADLs at home

## 2021-10-29 NOTE — ASU DISCHARGE PLAN (ADULT/PEDIATRIC) - PROCEDURE
left arteriovenous fistula revision Course: Stage D Nonischemic Cardiomyopathy, s/p HM2 9/2017; Cardiogenic shock; Hemodynamic instability; Acute hypoxemic respiratory failure s/p trach 6/25, decannulated 9/24; Reintubated 9/26; GI bleed, s/p Enteroscopy 6/14; Anemia in setting of melena; CKD; Stress hyperglycemia; C.diff positive 6/20; Hypovolemic shock; Septic shock; Leukocytosis; GB thickening/percholecystic s/p perc dawn by IR 7/30; SMA stenosis s/p SMA Stent on 10/18; Serratia/citrobacter PNA 7/7; Stenotrophomonas PNA 8/1; Enterobacter PNA 8/13; Nasuea/vomiting; Deconditioning; Hyponatremia. 6/15 Capsule study (+) for small bowel bleed, balloon endoscopy (old blood in prox ileum); post EGD - septic w/ L opacity, re-intubated for concern for aspiration, TTE (Mod MR, decrease biV w/ interventricular septum bowing towards R); 6/20 +C Diff;

## 2021-11-05 NOTE — ED ADULT NURSE NOTE - NSFALLRSKINDICTYPE_ED_ALL_ED
Need for Mobility Assisted Device Pt now improving and on RA; Initially 86% on RL. Pt has nonproductive cough although suspicion for mucus collection and difficulty with expectoration  - Guaifenesin PRN  - Monitor O2 sats  RVP negative Fluids: Start NS @75cc/hr    VTE: Will hold for now as pt may undergo liver biopsy  Access: PIV  Code Status: FULL CODE  Dispo: Pending medical optimization

## 2021-11-10 NOTE — ED ADULT NURSE NOTE - OBJECTIVE STATEMENT
received pt in 7, 56 yr/o female A+Ox4, wheel bound at baseline. PMH of ERD, on dialysis M/W/F left upper arm fistula bruity strong palpable thrill noted, HTN, DMII, Obesity. C/O abdominal wound with pain. first noticed wound on 10/7, presented to the ED for worsening of wound. appears to be a stage IV 10x8 pressure ulcer. VSS, denies chest pain and SOB, R even and unlabored. denies N/V/D/C. right underarm 20g placed, labs drawn and sent will continue to monitor.

## 2021-11-10 NOTE — ED ADULT NURSE NOTE - CHIEF COMPLAINT QUOTE
Pt sent from dialysis with  Larger stage 2 ulcer to R side of  [facility] since Sunday.  Pt dialysis Mon/wed/Frid.  Fistula noted on L FA

## 2021-11-10 NOTE — ED ADULT TRIAGE NOTE - CHIEF COMPLAINT QUOTE
Pt sent from dialysis with  Larger stage 2 ulcer to R side of  [facility] since Sunday.  Pt dialysis Mon/wed/Frid.  Fistula noted on L FA

## 2021-11-11 NOTE — ED PROVIDER NOTE - NSFOLLOWUPINSTRUCTIONS_ED_ALL_ED_FT
You presented to the hospital with an abdominal ulcer. This can be due to the area being moist and unable to keep dry. You will be discharged on Clindamycin (an antibiotic) to take as prescribed for 5 days to combat any infection in the area. Please follow up with your nephrologist for continued care and to follow up on your blood cultures. If you begin to experience symptoms of fevers, chills, please follow up with PMD or return to the ED. Please keep the area clean and dry w/ provided covers.

## 2021-11-11 NOTE — ED PROVIDER NOTE - OBJECTIVE STATEMENT
Pt is a 57yo F w/ PMH Afib s/p ILR, ESRD on HD MWF, DM2, HTN, HLD, COPD p/w abdominal ulcer. Ulcer located underneath R panus w/ surrounding erythema and tenderness though no active discharge noted. She notes first noticing the ulcer 4-5 days prior to presentation. Was followed by nephrologist and attempted washing wish saline, bacitracin w/ no relief. Advised to present to the ED for further care. Denies any systemic symptoms such as fevers, chills, NV CP, SOB.

## 2021-11-11 NOTE — ED PROVIDER NOTE - CLINICAL SUMMARY MEDICAL DECISION MAKING FREE TEXT BOX
Pt is a 55yo F w/ PMH Afib s/p ILR, ESRD on HD MWF, DM2, HTN, HLD, COPD p/w abdominal ulcer. Pt otherwise non-toxic appearing. Will obtain labs and administer Clindamycin and can otherwise f/u outpt w/ wound care and c/w Clindamycin for 5 days.

## 2021-11-11 NOTE — ED PROVIDER NOTE - PATIENT PORTAL LINK FT
You can access the FollowMyHealth Patient Portal offered by [facility] by registering at the following website: [email or web address]. By joining [employer]’s FollowMyHealth portal, you will also be able to view your health information using other applications (apps) compatible with our system.

## 2021-11-11 NOTE — ED ADULT NURSE REASSESSMENT NOTE - NS ED NURSE REASSESS COMMENT FT1
Pt A&Ox4, discharged and transported home via [facility] ems. non-emergent ambulance form given to EMS signed by provider. Pt well appearing and in no acute distress upon discharge. Respirations even and unlabored, speaking in full sentences without any difficulty, no accessory muscle use. Pt denies any chest pain, dyspnea, dizziness, blurry vision, nausea, vomiting or discomfort.

## 2021-11-11 NOTE — ED ADULT NURSE REASSESSMENT NOTE - NS ED NURSE REASSESS COMMENT FT1
Report and pt received at change of shift. Pt presents A&Ox4, resting on stretcher. Respirations even and unlabored, speaking in full sentences without any difficulty, no accessory muscle use. Pt denies any chest pain, dyspnea, dizziness, blurry vision, nausea, vomiting or discomfort at this time. Pt in no acute distress at this time. Currently discharged, awaiting ambulette .

## 2021-11-11 NOTE — ED PROVIDER NOTE - ATTENDING CONTRIBUTION TO CARE
55yo F PMH Afib s/p ILR, ESRD on HD MWF, DM2, HTN, HLD, COPD, p/w skin ulcer in RLQ area under abdominal skin pannus.  Ulceration was noted ~5 days ago but she does not know if it was present prior to that.  She was told to come to ED after her dialysis session to get the wound evaluated. Pt denies fevers, discharge from the site, abdominal pain, or other complaints.  says she has a home aide that visits her daily ~4hrs / day and her aide is able to help her keep area clean and dry and pt is able to f/u with wound care who she plans to make an appointment with     General: Patient alert in no apparent distress  Skin: ~5x4cm ulcer in lower R abdominal area under pannus, no discharge or odor noted, mild surrounding redness without crepitus.   HEENT: Head atraumatic. Oral mucosa moist.   Eyes: Conjunctiva normal  Cardiac: Regular rhythm and rate. No pretibial edema b/l  Respiratory: Lungs clear b/l and symmetric. No respiratory distress. Able to speak in complete sentences.  Gastrointestinal: Abdomen Obese, soft, nondistended, nontender  Musculoskeletal: Moves all extremities spontaneously  Neurological: alert and oriented to person, place, and time  Psychiatric: Calm and cooperative    a/p  abdominal ulcer  appears chronic with acute component with mild associated cellulitis  labs, likely d/c with PO clindamycin and wound care f/u

## 2021-11-11 NOTE — ED PROVIDER NOTE - PHYSICAL EXAMINATION
GENERAL: NAD, lying in bed comfortably, morbidly obese  HEAD:  Atraumatic, Normocephalic  EYES: EOMI, PERRLA, conjunctiva and sclera clear  ENT: Moist mucous membranes  NECK: Supple, No JVD  CHEST/LUNG: CTABL; No rales, rhonchi, wheezing, or rubs. Unlabored respirations  HEART: RRR. No M/R/G  ABDOMEN: Soft, nontender, non-distended, normoactive BS. No hepatomegaly. Underneath panus noted 5cm ulcer w/ no discharge though w/ surrounding erythema   EXTREMITIES:  2+ Peripheral Pulses, brisk capillary refill. No clubbing, cyanosis, or edema  NERVOUS SYSTEM:  Alert & Oriented X3, speech clear. No deficits, CN II-XII intact. Normal sensation   MSK: FROM all 4 extremities, full and equal strength  PSYCH: Normal affect, normal speech, normal behavior  SKIN: No rashes or lesions as noted on abdomen GENERAL: NAD, lying in bed comfortably, morbidly obese  HEAD:  Atraumatic, Normocephalic  EYES: EOMI, PERRLA, conjunctiva and sclera clear  ENT: Moist mucous membranes  NECK: Supple, No JVD  CHEST/LUNG: CTABL; No rales, rhonchi, wheezing, or rubs. Unlabored respirations  HEART: RRR. No M/R/G  ABDOMEN: Soft, nontender, non-distended, normoactive BS. No hepatomegaly. Underneath panus noted 5cm ulcer w/ no discharge though w/ surrounding erythema   EXTREMITIES:  2+ Peripheral Pulses, brisk capillary refill. No clubbing, cyanosis, or edema  NERVOUS SYSTEM:  Alert & Oriented X3, speech clear. No deficits, CN II-XII intact. Normal sensation   MSK: FROM all 4 extremities, full and equal strength  PSYCH: Normal affect, normal speech, normal behavior  SKIN: No rashes or lesions other than as noted on abdomen

## 2021-11-11 NOTE — ED PROVIDER NOTE - NSFOLLOWUPCLINICS_GEN_ALL_ED_FT
[facility]  Wound Care  [address] [place] [address]  Phone: [phone]  Fax: [phone]

## 2021-12-02 PROBLEM — Z99.2 DIALYSIS PATIENT: Status: RESOLVED | Noted: 2021-01-01 | Resolved: 2021-01-01

## 2021-12-02 PROBLEM — Z87.09 HISTORY OF CHRONIC OBSTRUCTIVE LUNG DISEASE: Status: RESOLVED | Noted: 2021-01-01 | Resolved: 2021-01-01

## 2021-12-02 NOTE — HISTORY OF PRESENT ILLNESS
[FreeTextEntry1] : Ms. ANTONIA ORTIZ   presents to the office with a wound for 2 months duration.  The wound is located on  the lower pannus.  The patient has complaints of pain, 7/10.   The patient has been dressing the wound with dry gauze.  The patient denies fevers or chills.  The patient has localized pain to the wound upon dressing changes.  The patient has no other complaints or associated symptoms.  HbA1c is ??\par Pt. with ESRD on dialysis, [facility] dialysis center, COPD, DM\par Wound started as a pimple in october, went to [facility] referred to wound center, slowly has been growing in size\par \par

## 2021-12-02 NOTE — ASSESSMENT
[FreeTextEntry1] : 12/2/21\par Lower abdominal wound, under panus, s/p excisional debridement\par wound base thick brown yellow/brown slough, pathology and culture obtained, tolerated well

## 2021-12-02 NOTE — REVIEW OF SYSTEMS
[ Enter Vitals data ] [Feeling Tired] : feeling tired [SOB on Exertion] : shortness of breath during exertion [Arthralgias] : arthralgias [Skin Wound] : skin wound [As Noted in HPI] : as noted in HPI [Negative] : Psychiatric [FreeTextEntry2] : morbidly obese [FreeTextEntry3] : tearing from eyes [FreeTextEntry6] : copd on exertion [FreeTextEntry8] : dialysis pt.

## 2021-12-02 NOTE — PLAN
[FreeTextEntry1] : 12/2/21\par Plan - supplies ordered\par dakins to pharmacy\par shower wound daily, moistened dakins to wound/ABD/hypafix\par follow up next week

## 2021-12-02 NOTE — REASON FOR VISIT
[Consultation] : a consultation visit [Formal Caregiver] : formal caregiver [FreeTextEntry1] : abdominal wound

## 2021-12-02 NOTE — PHYSICAL EXAM
[Skin Ulcer] : ulcer [Alert] : alert [Oriented to Person] : oriented to person [Oriented to Place] : oriented to place [Oriented to Time] : oriented to time [Calm] : calm [Please See PDF for [employer] Analytics] : Please See PDF for [employer]. [2+] : left 2+ [Ankle Swelling Bilaterally] : bilaterally  [Ankle Swelling On The Left] : moderate [de-identified] : morbidly obese [de-identified] : soft, positive bowel sounds [de-identified] : weak arms and legs

## 2021-12-12 PROBLEM — J44.9 COPD (CHRONIC OBSTRUCTIVE PULMONARY DISEASE): Status: ACTIVE | Noted: 2019-07-23

## 2021-12-12 PROBLEM — E13.29 DIABETES MELLITUS OF OTHER TYPE WITH MICROALBUMINURIA, WITH LONG-TERM CURRENT USE OF INSULIN: Status: ACTIVE | Noted: 2019-07-24

## 2021-12-12 PROBLEM — N18.6 ESRD (END STAGE RENAL DISEASE): Status: ACTIVE | Noted: 2019-04-29

## 2021-12-12 NOTE — REVIEW OF SYSTEMS
[Feeling Tired] : feeling tired [SOB on Exertion] : shortness of breath during exertion [Arthralgias] : arthralgias [Skin Wound] : skin wound [As Noted in HPI] : as noted in HPI [Negative] : Psychiatric [FreeTextEntry2] : morbidly obese [FreeTextEntry3] : tearing from eyes [FreeTextEntry6] : copd on exertion [FreeTextEntry8] : dialysis pt.

## 2021-12-12 NOTE — ASSESSMENT
[FreeTextEntry1] : 12/2/21\par 56 yr old w ESRD with Lower abdominal wound, under panus, s/p excisional debridement and abscess\par \par wound base thick brown yellow/brown slough,\par   tolerated well increased depth with injected lidocaine\par on antibiotics in dialysis\par \par on antibiotics\par  datacomplexity- high  lab, xr, old rec, test resultsreview,, visualize imagecptreview previous\par riskhigh surgery , small bleeding stopped with pressure\par

## 2021-12-12 NOTE — HISTORY OF PRESENT ILLNESS
[FreeTextEntry1] : Ms. ANTONIA ORTIZ   presents to the office with a wound for 2 months duration.  The wound is located on  the lower pannus.  The patient has complaints of pain, 7/10.   The patient has been dressing the wound with dry gauze.  The patient denies fevers or chills.  The patient has localized pain to the wound upon dressing changes.  The patient has no other complaints or associated symptoms.  HbA1c is ??\par Pt. with ESRD on dialysis, [facility] dialysis [facility], COPD, DM\par Wound started as a pimple in october, went to [facility] referred to wound center, slowly has been growing in size\par \par

## 2021-12-12 NOTE — PLAN
[FreeTextEntry1] : 12/9/21 \par Plan - supplies ordered\par dakins to pharmacy\par shower santyl to wound base pack with  moistened dakins to wound/ABD/hypafix qod \par follow up next week

## 2021-12-12 NOTE — PHYSICAL EXAM
[2+] : left 2+ [Ankle Swelling Bilaterally] : bilaterally  [Ankle Swelling On The Left] : moderate [Skin Ulcer] : ulcer [Alert] : alert [Oriented to Person] : oriented to person [Oriented to Place] : oriented to place [Oriented to Time] : oriented to time [Calm] : calm [Please See PDF for [employer]] : Please See PDF for [employer]. [de-identified] : morbidly obese [de-identified] : soft, positive bowel sounds [de-identified] : weak arms and legs

## 2021-12-16 PROBLEM — J44.9 COPD (CHRONIC OBSTRUCTIVE PULMONARY DISEASE): Status: ACTIVE | Noted: 2019-07-24

## 2021-12-16 PROBLEM — I10 HYPERTENSION: Status: ACTIVE | Noted: 2019-07-23

## 2021-12-16 PROBLEM — I48.91 A-FIB: Status: ACTIVE | Noted: 2019-07-23

## 2021-12-16 PROBLEM — R10.31 ABDOMINAL PAIN, RLQ (RIGHT LOWER QUADRANT): Status: ACTIVE | Noted: 2017-01-31

## 2021-12-16 PROBLEM — E11.9 DIABETES MELLITUS: Status: ACTIVE | Noted: 2019-07-23

## 2021-12-16 PROBLEM — S31.109A WOUND, OPEN, ABDOMINAL WALL, ANTERIOR: Status: ACTIVE | Noted: 2021-01-01

## 2021-12-16 PROBLEM — E11.59 TYPE 2 DIABETES MELLITUS WITH OTHER CIRCULATORY COMPLICATION: Status: ACTIVE | Noted: 2019-07-24

## 2021-12-16 NOTE — PHYSICAL EXAM
[2+] : left 2+ [Ankle Swelling Bilaterally] : bilaterally  [Ankle Swelling On The Left] : moderate [Skin Ulcer] : ulcer [Alert] : alert [Oriented to Person] : oriented to person [Oriented to Place] : oriented to place [Oriented to Time] : oriented to time [Calm] : calm [Please See PDF for [employer]] : Please See PDF for [employer]. [de-identified] : morbidly obese [de-identified] : soft, positive bowel sounds [de-identified] : weak arms and legs

## 2021-12-16 NOTE — HISTORY OF PRESENT ILLNESS
[FreeTextEntry1] : Ms. ANTONIA ORTIZ   presents to the office with a wound for 2 months duration.  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  The patient has complaints of pain, 7/10.  \par  The patient has been dressing the wound with dry gauze.  The patient denies fevers or chills.  The patient has localized pain to the wound upon dressing changes.  The patient has no other complaints or associated symptoms.  HbA1c is ??\par Pt. with ESRD on dialysis, [facility] dialysis center, COPD, DM\par Wound started as a pimple in october, went to [facility] referred to wound center, slowly has been growing in size\par \par

## 2021-12-16 NOTE — ASSESSMENT
[FreeTextEntry1] :  \par 56 yr old w ESRD with Lower abdominal wound, under panus, s/p excisional debridement and abscess\par calciphylaxis, loss of skin, less necrosis, culture psuedomonas enteroco facalis\par wound base thick brown yellow/brown slough,\par   tolerated well increased depth  \par on antibiotics in dialysis\par on antibiotics\par  datacomplexity- high  lab, xr, old rec, test resultsreview,, visualize imagecptreview previous\par riskhigh surgery , small bleeding stopped with pressure\par \par 12/16/21 \par will order vac 125 with santyl on base and adaptic

## 2022-01-01 ENCOUNTER — TRANSCRIPTION ENCOUNTER (OUTPATIENT)
Age: 57
End: 2022-01-01

## 2022-01-01 ENCOUNTER — APPOINTMENT (OUTPATIENT)
Dept: VASCULAR SURGERY | Facility: CLINIC | Age: 57
End: 2022-01-01

## 2022-01-01 ENCOUNTER — NON-APPOINTMENT (OUTPATIENT)
Age: 57
End: 2022-01-01

## 2022-01-01 ENCOUNTER — APPOINTMENT (OUTPATIENT)
Dept: OBGYN | Facility: HOSPITAL | Age: 57
End: 2022-01-01

## 2022-01-01 ENCOUNTER — APPOINTMENT (OUTPATIENT)
Dept: DERMATOLOGY | Facility: CLINIC | Age: 57
End: 2022-01-01

## 2022-01-01 ENCOUNTER — APPOINTMENT (OUTPATIENT)
Dept: WOUND CARE | Facility: CLINIC | Age: 57
End: 2022-01-01

## 2022-01-01 ENCOUNTER — RESULT REVIEW (OUTPATIENT)
Age: 57
End: 2022-01-01

## 2022-01-01 ENCOUNTER — INPATIENT (INPATIENT)
Facility: HOSPITAL | Age: 57
LOS: 3 days | Discharge: EXPIRED | End: 2022-05-30
Attending: SPECIALIST | Admitting: SPECIALIST
Payer: MEDICARE

## 2022-01-01 ENCOUNTER — INPATIENT (INPATIENT)
Facility: HOSPITAL | Age: 57
LOS: 37 days | Discharge: NURSING, REHAB OR LONG-TERM CARE | End: 2022-04-26
Attending: INTERNAL MEDICINE | Admitting: INTERNAL MEDICINE
Payer: MEDICARE

## 2022-01-01 ENCOUNTER — INPATIENT (INPATIENT)
Facility: HOSPITAL | Age: 57
LOS: 50 days | Discharge: NURSING, REHAB OR LONG-TERM CARE | End: 2022-03-03
Attending: INTERNAL MEDICINE | Admitting: INTERNAL MEDICINE
Payer: MEDICARE

## 2022-01-01 VITALS
HEART RATE: 80 BPM | HEIGHT: 62 IN | RESPIRATION RATE: 18 BRPM | TEMPERATURE: 99 F | OXYGEN SATURATION: 99 % | SYSTOLIC BLOOD PRESSURE: 118 MMHG | DIASTOLIC BLOOD PRESSURE: 56 MMHG

## 2022-01-01 VITALS
HEART RATE: 108 BPM | OXYGEN SATURATION: 100 % | HEIGHT: 62 IN | SYSTOLIC BLOOD PRESSURE: 71 MMHG | DIASTOLIC BLOOD PRESSURE: 52 MMHG | RESPIRATION RATE: 20 BRPM

## 2022-01-01 VITALS
SYSTOLIC BLOOD PRESSURE: 119 MMHG | HEART RATE: 70 BPM | HEIGHT: 62 IN | TEMPERATURE: 97 F | RESPIRATION RATE: 22 BRPM | DIASTOLIC BLOOD PRESSURE: 50 MMHG

## 2022-01-01 VITALS
OXYGEN SATURATION: 98 % | DIASTOLIC BLOOD PRESSURE: 60 MMHG | TEMPERATURE: 98 F | HEART RATE: 70 BPM | SYSTOLIC BLOOD PRESSURE: 126 MMHG | RESPIRATION RATE: 18 BRPM

## 2022-01-01 VITALS
HEART RATE: 72 BPM | TEMPERATURE: 98 F | SYSTOLIC BLOOD PRESSURE: 121 MMHG | OXYGEN SATURATION: 99 % | RESPIRATION RATE: 16 BRPM | DIASTOLIC BLOOD PRESSURE: 84 MMHG

## 2022-01-01 DIAGNOSIS — D63.8 ANEMIA IN OTHER CHRONIC DISEASES CLASSIFIED ELSEWHERE: ICD-10-CM

## 2022-01-01 DIAGNOSIS — I48.91 UNSPECIFIED ATRIAL FIBRILLATION: ICD-10-CM

## 2022-01-01 DIAGNOSIS — Z95.818 PRESENCE OF OTHER CARDIAC IMPLANTS AND GRAFTS: Chronic | ICD-10-CM

## 2022-01-01 DIAGNOSIS — U07.1 COVID-19: ICD-10-CM

## 2022-01-01 DIAGNOSIS — S31.109A UNSPECIFIED OPEN WOUND OF ABDOMINAL WALL, UNSPECIFIED QUADRANT WITHOUT PENETRATION INTO PERITONEAL CAVITY, INITIAL ENCOUNTER: ICD-10-CM

## 2022-01-01 DIAGNOSIS — E11.65 TYPE 2 DIABETES MELLITUS WITH HYPERGLYCEMIA: ICD-10-CM

## 2022-01-01 DIAGNOSIS — I10 ESSENTIAL (PRIMARY) HYPERTENSION: ICD-10-CM

## 2022-01-01 DIAGNOSIS — Z98.890 OTHER SPECIFIED POSTPROCEDURAL STATES: Chronic | ICD-10-CM

## 2022-01-01 DIAGNOSIS — Z79.899 OTHER LONG TERM (CURRENT) DRUG THERAPY: ICD-10-CM

## 2022-01-01 DIAGNOSIS — E78.5 HYPERLIPIDEMIA, UNSPECIFIED: ICD-10-CM

## 2022-01-01 DIAGNOSIS — Z98.51 TUBAL LIGATION STATUS: Chronic | ICD-10-CM

## 2022-01-01 DIAGNOSIS — E11.9 TYPE 2 DIABETES MELLITUS WITHOUT COMPLICATIONS: ICD-10-CM

## 2022-01-01 DIAGNOSIS — N93.9 ABNORMAL UTERINE AND VAGINAL BLEEDING, UNSPECIFIED: ICD-10-CM

## 2022-01-01 DIAGNOSIS — N18.6 END STAGE RENAL DISEASE: ICD-10-CM

## 2022-01-01 DIAGNOSIS — M79.89 OTHER SPECIFIED SOFT TISSUE DISORDERS: ICD-10-CM

## 2022-01-01 DIAGNOSIS — F32.A DEPRESSION, UNSPECIFIED: ICD-10-CM

## 2022-01-01 DIAGNOSIS — R19.7 DIARRHEA, UNSPECIFIED: ICD-10-CM

## 2022-01-01 DIAGNOSIS — L88 PYODERMA GANGRENOSUM: ICD-10-CM

## 2022-01-01 LAB
% ALBUMIN: 40.8 % — SIGNIFICANT CHANGE UP
% ALPHA 1: 5.8 % — SIGNIFICANT CHANGE UP
% ALPHA 2: 20.1 % — SIGNIFICANT CHANGE UP
% BETA: 12.6 % — SIGNIFICANT CHANGE UP
% GAMMA: 20.7 % — SIGNIFICANT CHANGE UP
% M SPIKE: 6.2 % — SIGNIFICANT CHANGE UP
-  AMIKACIN: SIGNIFICANT CHANGE UP
-  AMOXICILLIN/CLAVULANIC ACID: SIGNIFICANT CHANGE UP
-  AMOXICILLIN/CLAVULANIC ACID: SIGNIFICANT CHANGE UP
-  AMPICILLIN/SULBACTAM: SIGNIFICANT CHANGE UP
-  AMPICILLIN: SIGNIFICANT CHANGE UP
-  AZTREONAM: SIGNIFICANT CHANGE UP
-  CEFAZOLIN: SIGNIFICANT CHANGE UP
-  CEFEPIME: SIGNIFICANT CHANGE UP
-  CEFOXITIN: SIGNIFICANT CHANGE UP
-  CEFOXITIN: SIGNIFICANT CHANGE UP
-  CEFTAZIDIME: SIGNIFICANT CHANGE UP
-  CEFTRIAXONE: SIGNIFICANT CHANGE UP
-  CEFTRIAXONE: SIGNIFICANT CHANGE UP
-  CIPROFLOXACIN: SIGNIFICANT CHANGE UP
-  CLINDAMYCIN: SIGNIFICANT CHANGE UP
-  CLINDAMYCIN: SIGNIFICANT CHANGE UP
-  DAPTOMYCIN: SIGNIFICANT CHANGE UP
-  ERTAPENEM: SIGNIFICANT CHANGE UP
-  ERTAPENEM: SIGNIFICANT CHANGE UP
-  ERYTHROMYCIN: SIGNIFICANT CHANGE UP
-  ERYTHROMYCIN: SIGNIFICANT CHANGE UP
-  GENTAMICIN: SIGNIFICANT CHANGE UP
-  IMIPENEM: SIGNIFICANT CHANGE UP
-  LEVOFLOXACIN: SIGNIFICANT CHANGE UP
-  LINEZOLID: SIGNIFICANT CHANGE UP
-  MEROPENEM: SIGNIFICANT CHANGE UP
-  OXACILLIN: SIGNIFICANT CHANGE UP
-  OXACILLIN: SIGNIFICANT CHANGE UP
-  PENICILLIN: SIGNIFICANT CHANGE UP
-  PENICILLIN: SIGNIFICANT CHANGE UP
-  PIPERACILLIN/TAZOBACTAM: SIGNIFICANT CHANGE UP
-  RIFAMPIN: SIGNIFICANT CHANGE UP
-  RIFAMPIN: SIGNIFICANT CHANGE UP
-  STAPHYLOCOCCUS EPIDERMIDIS, METHICILLIN RESISTANT: SIGNIFICANT CHANGE UP
-  TETRACYCLINE: SIGNIFICANT CHANGE UP
-  TOBRAMYCIN: SIGNIFICANT CHANGE UP
-  TRIMETHOPRIM/SULFAMETHOXAZOLE: SIGNIFICANT CHANGE UP
-  VANCOMYCIN: SIGNIFICANT CHANGE UP
A-TUMOR NECROSIS FACT SERPL-MCNC: <1.7 PG/ML — SIGNIFICANT CHANGE UP
A1C WITH ESTIMATED AVERAGE GLUCOSE RESULT: 6.1 % — HIGH (ref 4–5.6)
A1C WITH ESTIMATED AVERAGE GLUCOSE RESULT: 6.3 % — HIGH (ref 4–5.6)
A1C WITH ESTIMATED AVERAGE GLUCOSE RESULT: 7 % — HIGH (ref 4–5.6)
ALBUMIN SERPL ELPH-MCNC: 1.5 G/DL — LOW (ref 3.3–5)
ALBUMIN SERPL ELPH-MCNC: 1.7 G/DL — LOW (ref 3.3–5)
ALBUMIN SERPL ELPH-MCNC: 1.8 G/DL — LOW (ref 3.3–5)
ALBUMIN SERPL ELPH-MCNC: 2.2 G/DL — LOW (ref 3.3–5)
ALBUMIN SERPL ELPH-MCNC: 2.4 G/DL — LOW (ref 3.3–5)
ALBUMIN SERPL ELPH-MCNC: 2.4 G/DL — LOW (ref 3.3–5)
ALBUMIN SERPL ELPH-MCNC: 2.8 G/DL — LOW (ref 3.3–5)
ALBUMIN SERPL ELPH-MCNC: 3 G/DL — LOW (ref 3.3–5)
ALBUMIN SERPL ELPH-MCNC: 3 G/DL — LOW (ref 3.3–5)
ALBUMIN SERPL ELPH-MCNC: 3.14 G/DL — LOW (ref 3.3–4.4)
ALBUMIN SERPL ELPH-MCNC: 3.3 G/DL — SIGNIFICANT CHANGE UP (ref 3.3–5)
ALBUMIN SERPL ELPH-MCNC: 3.4 G/DL — SIGNIFICANT CHANGE UP (ref 3.3–5)
ALBUMIN SERPL ELPH-MCNC: 3.6 G/DL — SIGNIFICANT CHANGE UP (ref 3.3–5)
ALBUMIN SERPL ELPH-MCNC: 3.7 G/DL — SIGNIFICANT CHANGE UP (ref 3.3–5)
ALBUMIN SERPL ELPH-MCNC: 3.8 G/DL — SIGNIFICANT CHANGE UP (ref 3.3–5)
ALBUMIN SERPL ELPH-MCNC: 3.9 G/DL — SIGNIFICANT CHANGE UP (ref 3.3–5)
ALBUMIN/GLOB SERPL ELPH: 0.7 RATIO — SIGNIFICANT CHANGE UP
ALP SERPL-CCNC: 142 U/L — HIGH (ref 40–120)
ALP SERPL-CCNC: 156 U/L — HIGH (ref 40–120)
ALP SERPL-CCNC: 157 U/L — HIGH (ref 40–120)
ALP SERPL-CCNC: 170 U/L — HIGH (ref 40–120)
ALP SERPL-CCNC: 232 U/L — HIGH (ref 40–120)
ALP SERPL-CCNC: 248 U/L — HIGH (ref 40–120)
ALP SERPL-CCNC: 255 U/L — HIGH (ref 40–120)
ALP SERPL-CCNC: 262 U/L — HIGH (ref 40–120)
ALP SERPL-CCNC: 263 U/L — HIGH (ref 40–120)
ALP SERPL-CCNC: 264 U/L — HIGH (ref 40–120)
ALP SERPL-CCNC: 274 U/L — HIGH (ref 40–120)
ALP SERPL-CCNC: 284 U/L — HIGH (ref 40–120)
ALP SERPL-CCNC: 290 U/L — HIGH (ref 40–120)
ALP SERPL-CCNC: 294 U/L — HIGH (ref 40–120)
ALP SERPL-CCNC: 310 U/L — HIGH (ref 40–120)
ALP SERPL-CCNC: 394 U/L — HIGH (ref 40–120)
ALP SERPL-CCNC: 400 U/L — HIGH (ref 40–120)
ALP SERPL-CCNC: 407 U/L — HIGH (ref 40–120)
ALP SERPL-CCNC: 408 U/L — HIGH (ref 40–120)
ALP SERPL-CCNC: 409 U/L — HIGH (ref 40–120)
ALP SERPL-CCNC: 419 U/L — HIGH (ref 40–120)
ALP SERPL-CCNC: 429 U/L — HIGH (ref 40–120)
ALPHA1 GLOB SERPL ELPH-MCNC: 0.45 G/DL — HIGH (ref 0.1–0.3)
ALPHA2 GLOB SERPL ELPH-MCNC: 1.5 G/DL — HIGH (ref 0.6–1)
ALT FLD-CCNC: 11 U/L — SIGNIFICANT CHANGE UP (ref 4–33)
ALT FLD-CCNC: 11 U/L — SIGNIFICANT CHANGE UP (ref 4–33)
ALT FLD-CCNC: 444 U/L — HIGH (ref 4–33)
ALT FLD-CCNC: 480 U/L — HIGH (ref 4–33)
ALT FLD-CCNC: 5 U/L — SIGNIFICANT CHANGE UP (ref 4–33)
ALT FLD-CCNC: 6 U/L — SIGNIFICANT CHANGE UP (ref 4–33)
ALT FLD-CCNC: 7 U/L — SIGNIFICANT CHANGE UP (ref 4–33)
ALT FLD-CCNC: 8 U/L — SIGNIFICANT CHANGE UP (ref 4–33)
ALT FLD-CCNC: 87 U/L — HIGH (ref 4–33)
ALT FLD-CCNC: 9 U/L — SIGNIFICANT CHANGE UP (ref 4–33)
ALT FLD-CCNC: <5 U/L — LOW (ref 4–33)
ALT FLD-CCNC: <5 U/L — SIGNIFICANT CHANGE UP (ref 4–33)
ALT FLD-CCNC: <5 U/L — SIGNIFICANT CHANGE UP (ref 4–33)
ANION GAP SERPL CALC-SCNC: 12 MMOL/L — SIGNIFICANT CHANGE UP (ref 7–14)
ANION GAP SERPL CALC-SCNC: 12 MMOL/L — SIGNIFICANT CHANGE UP (ref 7–14)
ANION GAP SERPL CALC-SCNC: 13 MMOL/L — SIGNIFICANT CHANGE UP (ref 7–14)
ANION GAP SERPL CALC-SCNC: 14 MMOL/L — SIGNIFICANT CHANGE UP (ref 7–14)
ANION GAP SERPL CALC-SCNC: 15 MMOL/L — HIGH (ref 7–14)
ANION GAP SERPL CALC-SCNC: 16 MMOL/L — HIGH (ref 7–14)
ANION GAP SERPL CALC-SCNC: 17 MMOL/L — HIGH (ref 7–14)
ANION GAP SERPL CALC-SCNC: 18 MMOL/L — HIGH (ref 7–14)
ANION GAP SERPL CALC-SCNC: 19 MMOL/L — HIGH (ref 7–14)
ANION GAP SERPL CALC-SCNC: 20 MMOL/L — HIGH (ref 7–14)
ANION GAP SERPL CALC-SCNC: 22 MMOL/L — HIGH (ref 7–14)
ANION GAP SERPL CALC-SCNC: 23 MMOL/L — HIGH (ref 7–14)
ANION GAP SERPL CALC-SCNC: 23 MMOL/L — HIGH (ref 7–14)
ANION GAP SERPL CALC-SCNC: 25 MMOL/L — HIGH (ref 7–14)
ANION GAP SERPL CALC-SCNC: 26 MMOL/L — HIGH (ref 7–14)
ANION GAP SERPL CALC-SCNC: 27 MMOL/L — HIGH (ref 7–14)
ANION GAP SERPL CALC-SCNC: 28 MMOL/L — HIGH (ref 7–14)
ANION GAP SERPL CALC-SCNC: 29 MMOL/L — HIGH (ref 7–14)
ANION GAP SERPL CALC-SCNC: 32 MMOL/L — HIGH (ref 7–14)
ANION GAP SERPL CALC-SCNC: 32 MMOL/L — HIGH (ref 7–14)
ANION GAP SERPL CALC-SCNC: 36 MMOL/L — HIGH (ref 7–14)
ANION GAP SERPL CALC-SCNC: 36 MMOL/L — HIGH (ref 7–14)
ANISOCYTOSIS BLD QL: SLIGHT — SIGNIFICANT CHANGE UP
ANISOCYTOSIS BLD QL: SLIGHT — SIGNIFICANT CHANGE UP
APTT BLD: 115.4 SEC — SIGNIFICANT CHANGE UP (ref 27–36.3)
APTT BLD: 34.7 SEC — SIGNIFICANT CHANGE UP (ref 27–36.3)
APTT BLD: 36 SEC — SIGNIFICANT CHANGE UP (ref 27–36.3)
APTT BLD: 37 SEC — HIGH (ref 27–36.3)
APTT BLD: 37.5 SEC — HIGH (ref 27–36.3)
APTT BLD: 38.4 SEC — HIGH (ref 27–36.3)
APTT BLD: 38.6 SEC — HIGH (ref 27–36.3)
APTT BLD: 39 SEC — HIGH (ref 27–36.3)
APTT BLD: 41.2 SEC — HIGH (ref 27–36.3)
APTT BLD: 41.8 SEC — HIGH (ref 27–36.3)
APTT BLD: 43.3 SEC — HIGH (ref 27–36.3)
APTT BLD: 49.4 SEC — HIGH (ref 27–36.3)
APTT BLD: 56.3 SEC — HIGH (ref 27–36.3)
APTT BLD: 61.5 SEC — HIGH (ref 27–36.3)
AST SERPL-CCNC: 10 U/L — SIGNIFICANT CHANGE UP (ref 4–32)
AST SERPL-CCNC: 10 U/L — SIGNIFICANT CHANGE UP (ref 4–32)
AST SERPL-CCNC: 11 U/L — SIGNIFICANT CHANGE UP (ref 4–32)
AST SERPL-CCNC: 12 U/L — SIGNIFICANT CHANGE UP (ref 4–32)
AST SERPL-CCNC: 13 U/L — SIGNIFICANT CHANGE UP (ref 4–32)
AST SERPL-CCNC: 1314 U/L — HIGH (ref 4–32)
AST SERPL-CCNC: 1369 U/L — HIGH (ref 4–32)
AST SERPL-CCNC: 26 U/L — SIGNIFICANT CHANGE UP (ref 4–32)
AST SERPL-CCNC: 44 U/L — HIGH (ref 4–32)
AST SERPL-CCNC: 523 U/L — HIGH (ref 4–32)
AST SERPL-CCNC: 6 U/L — SIGNIFICANT CHANGE UP (ref 4–32)
AST SERPL-CCNC: 6 U/L — SIGNIFICANT CHANGE UP (ref 4–32)
AST SERPL-CCNC: 7 U/L — SIGNIFICANT CHANGE UP (ref 4–32)
AST SERPL-CCNC: 7 U/L — SIGNIFICANT CHANGE UP (ref 4–32)
AST SERPL-CCNC: 8 U/L — SIGNIFICANT CHANGE UP (ref 4–32)
AST SERPL-CCNC: 8 U/L — SIGNIFICANT CHANGE UP (ref 4–32)
AST SERPL-CCNC: 9 U/L — SIGNIFICANT CHANGE UP (ref 4–32)
AST SERPL-CCNC: <5 U/L — LOW (ref 4–32)
AST SERPL-CCNC: <5 U/L — LOW (ref 4–32)
AST SERPL-CCNC: <5 U/L — SIGNIFICANT CHANGE UP (ref 4–32)
AUTO DIFF PNL BLD: NEGATIVE — SIGNIFICANT CHANGE UP
B PERT DNA SPEC QL NAA+PROBE: SIGNIFICANT CHANGE UP
B PERT DNA SPEC QL NAA+PROBE: SIGNIFICANT CHANGE UP
B PERT IGG+IGM PNL SER: ABNORMAL
B PERT+PARAPERT DNA PNL SPEC NAA+PROBE: SIGNIFICANT CHANGE UP
B PERT+PARAPERT DNA PNL SPEC NAA+PROBE: SIGNIFICANT CHANGE UP
B-GLOBULIN SERPL ELPH-MCNC: 0.97 G/DL — SIGNIFICANT CHANGE UP (ref 0.6–1.1)
B-OH-BUTYR SERPL-SCNC: <0 MMOL/L — SIGNIFICANT CHANGE UP (ref 0–0.4)
BASE EXCESS BLDV CALC-SCNC: -0.6 MMOL/L — SIGNIFICANT CHANGE UP (ref -2–3)
BASE EXCESS BLDV CALC-SCNC: 2.2 MMOL/L — SIGNIFICANT CHANGE UP (ref -2–3)
BASOPHILS # BLD AUTO: 0 K/UL — SIGNIFICANT CHANGE UP (ref 0–0.2)
BASOPHILS # BLD AUTO: 0.03 K/UL — SIGNIFICANT CHANGE UP (ref 0–0.2)
BASOPHILS # BLD AUTO: 0.04 K/UL — SIGNIFICANT CHANGE UP (ref 0–0.2)
BASOPHILS # BLD AUTO: 0.05 K/UL — SIGNIFICANT CHANGE UP (ref 0–0.2)
BASOPHILS # BLD AUTO: 0.06 K/UL — SIGNIFICANT CHANGE UP (ref 0–0.2)
BASOPHILS # BLD AUTO: 0.07 K/UL — SIGNIFICANT CHANGE UP (ref 0–0.2)
BASOPHILS # BLD AUTO: 0.08 K/UL — SIGNIFICANT CHANGE UP (ref 0–0.2)
BASOPHILS # BLD AUTO: 0.1 K/UL — SIGNIFICANT CHANGE UP (ref 0–0.2)
BASOPHILS NFR BLD AUTO: 0 % — SIGNIFICANT CHANGE UP (ref 0–2)
BASOPHILS NFR BLD AUTO: 0.2 % — SIGNIFICANT CHANGE UP (ref 0–2)
BASOPHILS NFR BLD AUTO: 0.3 % — SIGNIFICANT CHANGE UP (ref 0–2)
BASOPHILS NFR BLD AUTO: 0.4 % — SIGNIFICANT CHANGE UP (ref 0–2)
BASOPHILS NFR BLD AUTO: 0.5 % — SIGNIFICANT CHANGE UP (ref 0–2)
BASOPHILS NFR BLD AUTO: 0.6 % — SIGNIFICANT CHANGE UP (ref 0–2)
BASOPHILS NFR BLD AUTO: 0.6 % — SIGNIFICANT CHANGE UP (ref 0–2)
BASOPHILS NFR BLD AUTO: 0.7 % — SIGNIFICANT CHANGE UP (ref 0–2)
BILIRUB DIRECT SERPL-MCNC: 1 MG/DL — HIGH (ref 0–0.3)
BILIRUB INDIRECT FLD-MCNC: 0.1 MG/DL — SIGNIFICANT CHANGE UP (ref 0–1)
BILIRUB INDIRECT FLD-MCNC: 0.1 MG/DL — SIGNIFICANT CHANGE UP (ref 0–1)
BILIRUB INDIRECT FLD-MCNC: 0.2 MG/DL — SIGNIFICANT CHANGE UP (ref 0–1)
BILIRUB SERPL-MCNC: 0.2 MG/DL — SIGNIFICANT CHANGE UP (ref 0.2–1.2)
BILIRUB SERPL-MCNC: 0.3 MG/DL — SIGNIFICANT CHANGE UP (ref 0.2–1.2)
BILIRUB SERPL-MCNC: 0.8 MG/DL — SIGNIFICANT CHANGE UP (ref 0.2–1.2)
BILIRUB SERPL-MCNC: 0.9 MG/DL — SIGNIFICANT CHANGE UP (ref 0.2–1.2)
BILIRUB SERPL-MCNC: 0.9 MG/DL — SIGNIFICANT CHANGE UP (ref 0.2–1.2)
BILIRUB SERPL-MCNC: 1.1 MG/DL — SIGNIFICANT CHANGE UP (ref 0.2–1.2)
BILIRUB SERPL-MCNC: 1.1 MG/DL — SIGNIFICANT CHANGE UP (ref 0.2–1.2)
BILIRUB SERPL-MCNC: 1.2 MG/DL — SIGNIFICANT CHANGE UP (ref 0.2–1.2)
BILIRUB SERPL-MCNC: <0.2 MG/DL — SIGNIFICANT CHANGE UP (ref 0.2–1.2)
BLD GP AB SCN SERPL QL: NEGATIVE — SIGNIFICANT CHANGE UP
BLOOD GAS ARTERIAL - LYTES,HGB,ICA,LACT RESULT: SIGNIFICANT CHANGE UP
BLOOD GAS ARTERIAL COMPREHENSIVE RESULT: SIGNIFICANT CHANGE UP
BLOOD GAS VENOUS COMPREHENSIVE RESULT: SIGNIFICANT CHANGE UP
BORDETELLA PARAPERTUSSIS (RAPRVP): SIGNIFICANT CHANGE UP
BORDETELLA PARAPERTUSSIS (RAPRVP): SIGNIFICANT CHANGE UP
BUN SERPL-MCNC: 11 MG/DL — SIGNIFICANT CHANGE UP (ref 7–23)
BUN SERPL-MCNC: 14 MG/DL — SIGNIFICANT CHANGE UP (ref 7–23)
BUN SERPL-MCNC: 14 MG/DL — SIGNIFICANT CHANGE UP (ref 7–23)
BUN SERPL-MCNC: 15 MG/DL — SIGNIFICANT CHANGE UP (ref 7–23)
BUN SERPL-MCNC: 16 MG/DL — SIGNIFICANT CHANGE UP (ref 7–23)
BUN SERPL-MCNC: 16 MG/DL — SIGNIFICANT CHANGE UP (ref 7–23)
BUN SERPL-MCNC: 17 MG/DL — SIGNIFICANT CHANGE UP (ref 7–23)
BUN SERPL-MCNC: 18 MG/DL — SIGNIFICANT CHANGE UP (ref 7–23)
BUN SERPL-MCNC: 19 MG/DL — SIGNIFICANT CHANGE UP (ref 7–23)
BUN SERPL-MCNC: 20 MG/DL — SIGNIFICANT CHANGE UP (ref 7–23)
BUN SERPL-MCNC: 21 MG/DL — SIGNIFICANT CHANGE UP (ref 7–23)
BUN SERPL-MCNC: 21 MG/DL — SIGNIFICANT CHANGE UP (ref 7–23)
BUN SERPL-MCNC: 22 MG/DL — SIGNIFICANT CHANGE UP (ref 7–23)
BUN SERPL-MCNC: 22 MG/DL — SIGNIFICANT CHANGE UP (ref 7–23)
BUN SERPL-MCNC: 23 MG/DL — SIGNIFICANT CHANGE UP (ref 7–23)
BUN SERPL-MCNC: 23 MG/DL — SIGNIFICANT CHANGE UP (ref 7–23)
BUN SERPL-MCNC: 24 MG/DL — HIGH (ref 7–23)
BUN SERPL-MCNC: 25 MG/DL — HIGH (ref 7–23)
BUN SERPL-MCNC: 26 MG/DL — HIGH (ref 7–23)
BUN SERPL-MCNC: 27 MG/DL — HIGH (ref 7–23)
BUN SERPL-MCNC: 28 MG/DL — HIGH (ref 7–23)
BUN SERPL-MCNC: 29 MG/DL — HIGH (ref 7–23)
BUN SERPL-MCNC: 29 MG/DL — HIGH (ref 7–23)
BUN SERPL-MCNC: 30 MG/DL — HIGH (ref 7–23)
BUN SERPL-MCNC: 31 MG/DL — HIGH (ref 7–23)
BUN SERPL-MCNC: 32 MG/DL — HIGH (ref 7–23)
BUN SERPL-MCNC: 34 MG/DL — HIGH (ref 7–23)
BUN SERPL-MCNC: 34 MG/DL — HIGH (ref 7–23)
BUN SERPL-MCNC: 35 MG/DL — HIGH (ref 7–23)
BUN SERPL-MCNC: 35 MG/DL — HIGH (ref 7–23)
BUN SERPL-MCNC: 36 MG/DL — HIGH (ref 7–23)
BUN SERPL-MCNC: 38 MG/DL — HIGH (ref 7–23)
BUN SERPL-MCNC: 40 MG/DL — HIGH (ref 7–23)
BUN SERPL-MCNC: 40 MG/DL — HIGH (ref 7–23)
BUN SERPL-MCNC: 41 MG/DL — HIGH (ref 7–23)
BUN SERPL-MCNC: 41 MG/DL — HIGH (ref 7–23)
BUN SERPL-MCNC: 42 MG/DL — HIGH (ref 7–23)
BUN SERPL-MCNC: 42 MG/DL — HIGH (ref 7–23)
BUN SERPL-MCNC: 45 MG/DL — HIGH (ref 7–23)
BUN SERPL-MCNC: 45 MG/DL — HIGH (ref 7–23)
BUN SERPL-MCNC: 47 MG/DL — HIGH (ref 7–23)
BUN SERPL-MCNC: 47 MG/DL — HIGH (ref 7–23)
BUN SERPL-MCNC: 48 MG/DL — HIGH (ref 7–23)
BUN SERPL-MCNC: 49 MG/DL — HIGH (ref 7–23)
BUN SERPL-MCNC: 51 MG/DL — HIGH (ref 7–23)
BUN SERPL-MCNC: 52 MG/DL — HIGH (ref 7–23)
BUN SERPL-MCNC: 56 MG/DL — HIGH (ref 7–23)
BUN SERPL-MCNC: 57 MG/DL — HIGH (ref 7–23)
BUN SERPL-MCNC: 59 MG/DL — HIGH (ref 7–23)
BUN SERPL-MCNC: 61 MG/DL — HIGH (ref 7–23)
BUN SERPL-MCNC: 71 MG/DL — HIGH (ref 7–23)
BUN SERPL-MCNC: 79 MG/DL — HIGH (ref 7–23)
BUN SERPL-MCNC: 8 MG/DL — SIGNIFICANT CHANGE UP (ref 7–23)
BUN SERPL-MCNC: 80 MG/DL — HIGH (ref 7–23)
BUN SERPL-MCNC: 87 MG/DL — HIGH (ref 7–23)
BUN SERPL-MCNC: 9 MG/DL — SIGNIFICANT CHANGE UP (ref 7–23)
C PNEUM DNA SPEC QL NAA+PROBE: SIGNIFICANT CHANGE UP
C PNEUM DNA SPEC QL NAA+PROBE: SIGNIFICANT CHANGE UP
C-ANCA SER-ACNC: NEGATIVE — SIGNIFICANT CHANGE UP
CA-I SERPL-SCNC: 1.1 MMOL/L — LOW (ref 1.15–1.33)
CALCIUM SERPL-MCNC: 10 MG/DL — SIGNIFICANT CHANGE UP (ref 8.4–10.5)
CALCIUM SERPL-MCNC: 10 MG/DL — SIGNIFICANT CHANGE UP (ref 8.4–10.5)
CALCIUM SERPL-MCNC: 10.1 MG/DL — SIGNIFICANT CHANGE UP (ref 8.4–10.5)
CALCIUM SERPL-MCNC: 10.2 MG/DL — SIGNIFICANT CHANGE UP (ref 8.4–10.5)
CALCIUM SERPL-MCNC: 10.4 MG/DL — SIGNIFICANT CHANGE UP (ref 8.4–10.5)
CALCIUM SERPL-MCNC: 10.5 MG/DL — SIGNIFICANT CHANGE UP (ref 8.4–10.5)
CALCIUM SERPL-MCNC: 10.6 MG/DL — HIGH (ref 8.4–10.5)
CALCIUM SERPL-MCNC: 10.7 MG/DL — HIGH (ref 8.4–10.5)
CALCIUM SERPL-MCNC: 10.8 MG/DL — HIGH (ref 8.4–10.5)
CALCIUM SERPL-MCNC: 11.2 MG/DL — HIGH (ref 8.4–10.5)
CALCIUM SERPL-MCNC: 7.9 MG/DL — LOW (ref 8.4–10.5)
CALCIUM SERPL-MCNC: 8.1 MG/DL — LOW (ref 8.4–10.5)
CALCIUM SERPL-MCNC: 8.2 MG/DL — LOW (ref 8.4–10.5)
CALCIUM SERPL-MCNC: 8.3 MG/DL — LOW (ref 8.4–10.5)
CALCIUM SERPL-MCNC: 8.4 MG/DL — SIGNIFICANT CHANGE UP (ref 8.4–10.5)
CALCIUM SERPL-MCNC: 8.4 MG/DL — SIGNIFICANT CHANGE UP (ref 8.4–10.5)
CALCIUM SERPL-MCNC: 8.5 MG/DL — SIGNIFICANT CHANGE UP (ref 8.4–10.5)
CALCIUM SERPL-MCNC: 8.6 MG/DL — SIGNIFICANT CHANGE UP (ref 8.4–10.5)
CALCIUM SERPL-MCNC: 8.7 MG/DL — SIGNIFICANT CHANGE UP (ref 8.4–10.5)
CALCIUM SERPL-MCNC: 8.7 MG/DL — SIGNIFICANT CHANGE UP (ref 8.4–10.5)
CALCIUM SERPL-MCNC: 8.8 MG/DL — SIGNIFICANT CHANGE UP (ref 8.4–10.5)
CALCIUM SERPL-MCNC: 8.9 MG/DL — SIGNIFICANT CHANGE UP (ref 8.4–10.5)
CALCIUM SERPL-MCNC: 9 MG/DL — SIGNIFICANT CHANGE UP (ref 8.4–10.5)
CALCIUM SERPL-MCNC: 9.1 MG/DL — SIGNIFICANT CHANGE UP (ref 8.4–10.5)
CALCIUM SERPL-MCNC: 9.2 MG/DL — SIGNIFICANT CHANGE UP (ref 8.4–10.5)
CALCIUM SERPL-MCNC: 9.2 MG/DL — SIGNIFICANT CHANGE UP (ref 8.4–10.5)
CALCIUM SERPL-MCNC: 9.3 MG/DL — SIGNIFICANT CHANGE UP (ref 8.4–10.5)
CALCIUM SERPL-MCNC: 9.4 MG/DL — SIGNIFICANT CHANGE UP (ref 8.4–10.5)
CALCIUM SERPL-MCNC: 9.4 MG/DL — SIGNIFICANT CHANGE UP (ref 8.4–10.5)
CALCIUM SERPL-MCNC: 9.5 MG/DL — SIGNIFICANT CHANGE UP (ref 8.4–10.5)
CALCIUM SERPL-MCNC: 9.6 MG/DL — SIGNIFICANT CHANGE UP (ref 8.4–10.5)
CALCIUM SERPL-MCNC: 9.7 MG/DL — SIGNIFICANT CHANGE UP (ref 8.4–10.5)
CALCIUM SERPL-MCNC: 9.7 MG/DL — SIGNIFICANT CHANGE UP (ref 8.4–10.5)
CALCIUM SERPL-MCNC: 9.8 MG/DL — SIGNIFICANT CHANGE UP (ref 8.4–10.5)
CALCIUM SERPL-MCNC: 9.9 MG/DL — SIGNIFICANT CHANGE UP (ref 8.4–10.5)
CHLORIDE BLDV-SCNC: 96 MMOL/L — SIGNIFICANT CHANGE UP (ref 96–108)
CHLORIDE BLDV-SCNC: 98 MMOL/L — SIGNIFICANT CHANGE UP (ref 96–108)
CHLORIDE SERPL-SCNC: 85 MMOL/L — LOW (ref 98–107)
CHLORIDE SERPL-SCNC: 85 MMOL/L — LOW (ref 98–107)
CHLORIDE SERPL-SCNC: 86 MMOL/L — LOW (ref 98–107)
CHLORIDE SERPL-SCNC: 86 MMOL/L — LOW (ref 98–107)
CHLORIDE SERPL-SCNC: 87 MMOL/L — LOW (ref 98–107)
CHLORIDE SERPL-SCNC: 88 MMOL/L — LOW (ref 98–107)
CHLORIDE SERPL-SCNC: 89 MMOL/L — LOW (ref 98–107)
CHLORIDE SERPL-SCNC: 90 MMOL/L — LOW (ref 98–107)
CHLORIDE SERPL-SCNC: 91 MMOL/L — LOW (ref 98–107)
CHLORIDE SERPL-SCNC: 92 MMOL/L — LOW (ref 98–107)
CHLORIDE SERPL-SCNC: 93 MMOL/L — LOW (ref 98–107)
CHLORIDE SERPL-SCNC: 94 MMOL/L — LOW (ref 98–107)
CHLORIDE SERPL-SCNC: 95 MMOL/L — LOW (ref 98–107)
CHLORIDE SERPL-SCNC: 96 MMOL/L — LOW (ref 98–107)
CHLORIDE SERPL-SCNC: 97 MMOL/L — LOW (ref 98–107)
CHLORIDE SERPL-SCNC: 99 MMOL/L — SIGNIFICANT CHANGE UP (ref 98–107)
CHOLEST SERPL-MCNC: 135 MG/DL — SIGNIFICANT CHANGE UP
CHOLEST SERPL-MCNC: 165 MG/DL — SIGNIFICANT CHANGE UP
CHOLEST SERPL-MCNC: 202 MG/DL — HIGH
CK MB BLD-MCNC: 6.5 % — HIGH (ref 0–2.5)
CK MB CFR SERPL CALC: 1.1 NG/ML — SIGNIFICANT CHANGE UP
CK SERPL-CCNC: 17 U/L — LOW (ref 25–170)
CO2 BLDV-SCNC: 28.7 MMOL/L — HIGH (ref 22–26)
CO2 BLDV-SCNC: 29.2 MMOL/L — HIGH (ref 22–26)
CO2 SERPL-SCNC: 12 MMOL/L — LOW (ref 22–31)
CO2 SERPL-SCNC: 15 MMOL/L — LOW (ref 22–31)
CO2 SERPL-SCNC: 17 MMOL/L — LOW (ref 22–31)
CO2 SERPL-SCNC: 18 MMOL/L — LOW (ref 22–31)
CO2 SERPL-SCNC: 18 MMOL/L — LOW (ref 22–31)
CO2 SERPL-SCNC: 19 MMOL/L — LOW (ref 22–31)
CO2 SERPL-SCNC: 19 MMOL/L — LOW (ref 22–31)
CO2 SERPL-SCNC: 20 MMOL/L — LOW (ref 22–31)
CO2 SERPL-SCNC: 21 MMOL/L — LOW (ref 22–31)
CO2 SERPL-SCNC: 22 MMOL/L — SIGNIFICANT CHANGE UP (ref 22–31)
CO2 SERPL-SCNC: 23 MMOL/L — SIGNIFICANT CHANGE UP (ref 22–31)
CO2 SERPL-SCNC: 24 MMOL/L — SIGNIFICANT CHANGE UP (ref 22–31)
CO2 SERPL-SCNC: 25 MMOL/L — SIGNIFICANT CHANGE UP (ref 22–31)
CO2 SERPL-SCNC: 26 MMOL/L — SIGNIFICANT CHANGE UP (ref 22–31)
CO2 SERPL-SCNC: 27 MMOL/L — SIGNIFICANT CHANGE UP (ref 22–31)
CO2 SERPL-SCNC: 28 MMOL/L — SIGNIFICANT CHANGE UP (ref 22–31)
CO2 SERPL-SCNC: 28 MMOL/L — SIGNIFICANT CHANGE UP (ref 22–31)
CO2 SERPL-SCNC: 29 MMOL/L — SIGNIFICANT CHANGE UP (ref 22–31)
CO2 SERPL-SCNC: 29 MMOL/L — SIGNIFICANT CHANGE UP (ref 22–31)
CO2 SERPL-SCNC: 7 MMOL/L — CRITICAL LOW (ref 22–31)
CO2 SERPL-SCNC: 7 MMOL/L — CRITICAL LOW (ref 22–31)
CO2 SERPL-SCNC: 9 MMOL/L — CRITICAL LOW (ref 22–31)
CO2 SERPL-SCNC: 9 MMOL/L — CRITICAL LOW (ref 22–31)
COLOR FLD: ABNORMAL
COMMENT - FLUIDS: SIGNIFICANT CHANGE UP
COMMENT - FLUIDS: SIGNIFICANT CHANGE UP
CREAT SERPL-MCNC: 1.15 MG/DL — SIGNIFICANT CHANGE UP (ref 0.5–1.3)
CREAT SERPL-MCNC: 1.26 MG/DL — SIGNIFICANT CHANGE UP (ref 0.5–1.3)
CREAT SERPL-MCNC: 1.5 MG/DL — HIGH (ref 0.5–1.3)
CREAT SERPL-MCNC: 1.88 MG/DL — HIGH (ref 0.5–1.3)
CREAT SERPL-MCNC: 2.34 MG/DL — HIGH (ref 0.5–1.3)
CREAT SERPL-MCNC: 3.05 MG/DL — HIGH (ref 0.5–1.3)
CREAT SERPL-MCNC: 3.08 MG/DL — HIGH (ref 0.5–1.3)
CREAT SERPL-MCNC: 3.18 MG/DL — HIGH (ref 0.5–1.3)
CREAT SERPL-MCNC: 3.28 MG/DL — HIGH (ref 0.5–1.3)
CREAT SERPL-MCNC: 3.32 MG/DL — HIGH (ref 0.5–1.3)
CREAT SERPL-MCNC: 3.33 MG/DL — HIGH (ref 0.5–1.3)
CREAT SERPL-MCNC: 3.69 MG/DL — HIGH (ref 0.5–1.3)
CREAT SERPL-MCNC: 3.82 MG/DL — HIGH (ref 0.5–1.3)
CREAT SERPL-MCNC: 3.95 MG/DL — HIGH (ref 0.5–1.3)
CREAT SERPL-MCNC: 4.09 MG/DL — HIGH (ref 0.5–1.3)
CREAT SERPL-MCNC: 4.27 MG/DL — HIGH (ref 0.5–1.3)
CREAT SERPL-MCNC: 4.46 MG/DL — HIGH (ref 0.5–1.3)
CREAT SERPL-MCNC: 4.56 MG/DL — HIGH (ref 0.5–1.3)
CREAT SERPL-MCNC: 4.57 MG/DL — HIGH (ref 0.5–1.3)
CREAT SERPL-MCNC: 4.69 MG/DL — HIGH (ref 0.5–1.3)
CREAT SERPL-MCNC: 4.8 MG/DL — HIGH (ref 0.5–1.3)
CREAT SERPL-MCNC: 4.8 MG/DL — HIGH (ref 0.5–1.3)
CREAT SERPL-MCNC: 4.88 MG/DL — HIGH (ref 0.5–1.3)
CREAT SERPL-MCNC: 4.89 MG/DL — HIGH (ref 0.5–1.3)
CREAT SERPL-MCNC: 4.91 MG/DL — HIGH (ref 0.5–1.3)
CREAT SERPL-MCNC: 4.94 MG/DL — HIGH (ref 0.5–1.3)
CREAT SERPL-MCNC: 5.05 MG/DL — HIGH (ref 0.5–1.3)
CREAT SERPL-MCNC: 5.13 MG/DL — HIGH (ref 0.5–1.3)
CREAT SERPL-MCNC: 5.16 MG/DL — HIGH (ref 0.5–1.3)
CREAT SERPL-MCNC: 5.2 MG/DL — HIGH (ref 0.5–1.3)
CREAT SERPL-MCNC: 5.27 MG/DL — HIGH (ref 0.5–1.3)
CREAT SERPL-MCNC: 5.29 MG/DL — HIGH (ref 0.5–1.3)
CREAT SERPL-MCNC: 5.34 MG/DL — HIGH (ref 0.5–1.3)
CREAT SERPL-MCNC: 5.42 MG/DL — HIGH (ref 0.5–1.3)
CREAT SERPL-MCNC: 5.43 MG/DL — HIGH (ref 0.5–1.3)
CREAT SERPL-MCNC: 5.48 MG/DL — HIGH (ref 0.5–1.3)
CREAT SERPL-MCNC: 5.55 MG/DL — HIGH (ref 0.5–1.3)
CREAT SERPL-MCNC: 5.59 MG/DL — HIGH (ref 0.5–1.3)
CREAT SERPL-MCNC: 5.62 MG/DL — HIGH (ref 0.5–1.3)
CREAT SERPL-MCNC: 5.63 MG/DL — HIGH (ref 0.5–1.3)
CREAT SERPL-MCNC: 5.79 MG/DL — HIGH (ref 0.5–1.3)
CREAT SERPL-MCNC: 5.94 MG/DL — HIGH (ref 0.5–1.3)
CREAT SERPL-MCNC: 5.95 MG/DL — HIGH (ref 0.5–1.3)
CREAT SERPL-MCNC: 6 MG/DL — HIGH (ref 0.5–1.3)
CREAT SERPL-MCNC: 6 MG/DL — HIGH (ref 0.5–1.3)
CREAT SERPL-MCNC: 6.01 MG/DL — HIGH (ref 0.5–1.3)
CREAT SERPL-MCNC: 6.03 MG/DL — HIGH (ref 0.5–1.3)
CREAT SERPL-MCNC: 6.1 MG/DL — HIGH (ref 0.5–1.3)
CREAT SERPL-MCNC: 6.11 MG/DL — HIGH (ref 0.5–1.3)
CREAT SERPL-MCNC: 6.19 MG/DL — HIGH (ref 0.5–1.3)
CREAT SERPL-MCNC: 6.21 MG/DL — HIGH (ref 0.5–1.3)
CREAT SERPL-MCNC: 6.23 MG/DL — HIGH (ref 0.5–1.3)
CREAT SERPL-MCNC: 6.31 MG/DL — HIGH (ref 0.5–1.3)
CREAT SERPL-MCNC: 6.37 MG/DL — HIGH (ref 0.5–1.3)
CREAT SERPL-MCNC: 6.52 MG/DL — HIGH (ref 0.5–1.3)
CREAT SERPL-MCNC: 6.6 MG/DL — HIGH (ref 0.5–1.3)
CREAT SERPL-MCNC: 6.73 MG/DL — HIGH (ref 0.5–1.3)
CREAT SERPL-MCNC: 6.85 MG/DL — HIGH (ref 0.5–1.3)
CREAT SERPL-MCNC: 6.88 MG/DL — HIGH (ref 0.5–1.3)
CREAT SERPL-MCNC: 6.92 MG/DL — HIGH (ref 0.5–1.3)
CREAT SERPL-MCNC: 6.98 MG/DL — HIGH (ref 0.5–1.3)
CREAT SERPL-MCNC: 7.07 MG/DL — HIGH (ref 0.5–1.3)
CREAT SERPL-MCNC: 7.12 MG/DL — HIGH (ref 0.5–1.3)
CREAT SERPL-MCNC: 7.13 MG/DL — HIGH (ref 0.5–1.3)
CREAT SERPL-MCNC: 7.18 MG/DL — HIGH (ref 0.5–1.3)
CREAT SERPL-MCNC: 7.24 MG/DL — HIGH (ref 0.5–1.3)
CREAT SERPL-MCNC: 7.34 MG/DL — HIGH (ref 0.5–1.3)
CREAT SERPL-MCNC: 7.48 MG/DL — HIGH (ref 0.5–1.3)
CREAT SERPL-MCNC: 7.54 MG/DL — HIGH (ref 0.5–1.3)
CREAT SERPL-MCNC: 7.68 MG/DL — HIGH (ref 0.5–1.3)
CREAT SERPL-MCNC: 7.74 MG/DL — HIGH (ref 0.5–1.3)
CREAT SERPL-MCNC: 7.98 MG/DL — HIGH (ref 0.5–1.3)
CREAT SERPL-MCNC: 8.24 MG/DL — HIGH (ref 0.5–1.3)
CREAT SERPL-MCNC: 9.22 MG/DL — HIGH (ref 0.5–1.3)
CRP SERPL-MCNC: 113.7 MG/L — HIGH
CRP SERPL-MCNC: 114.8 MG/L — HIGH
CRP SERPL-MCNC: 119.2 MG/L — HIGH
CRP SERPL-MCNC: 14.2 MG/L — HIGH
CRP SERPL-MCNC: 147.8 MG/L — HIGH
CRP SERPL-MCNC: 150.7 MG/L — HIGH
CRP SERPL-MCNC: 161.9 MG/L — HIGH
CRP SERPL-MCNC: 165.6 MG/L — HIGH
CRP SERPL-MCNC: 204.4 MG/L — HIGH
CRP SERPL-MCNC: 237.2 MG/L — HIGH
CRP SERPL-MCNC: 55.4 MG/L — HIGH
CRP SERPL-MCNC: 75.2 MG/L — HIGH
CRP SERPL-MCNC: 83.2 MG/L — HIGH
CRP SERPL-MCNC: 87.2 MG/L — HIGH
CULTURE RESULTS: SIGNIFICANT CHANGE UP
CYTOLOGY SPEC DOC CYTO: SIGNIFICANT CHANGE UP
D DIMER BLD IA.RAPID-MCNC: 192 NG/ML DDU — SIGNIFICANT CHANGE UP
D DIMER BLD IA.RAPID-MCNC: 279 NG/ML DDU — HIGH
D DIMER BLD IA.RAPID-MCNC: 282 NG/ML DDU — HIGH
D DIMER BLD IA.RAPID-MCNC: 310 NG/ML DDU — HIGH
D DIMER BLD IA.RAPID-MCNC: 325 NG/ML DDU — HIGH
D DIMER BLD IA.RAPID-MCNC: 444 NG/ML DDU — HIGH
D DIMER BLD IA.RAPID-MCNC: 487 NG/ML DDU — HIGH
D DIMER BLD IA.RAPID-MCNC: 491 NG/ML DDU — HIGH
D DIMER BLD IA.RAPID-MCNC: 496 NG/ML DDU — HIGH
D DIMER BLD IA.RAPID-MCNC: 500 NG/ML DDU — HIGH
DIALYSIS INSTRUMENT RESULT - HEPATITIS B SURFACE ANTIGEN: NEGATIVE — SIGNIFICANT CHANGE UP
EGFR: 10 ML/MIN/1.73M2 — LOW
EGFR: 11 ML/MIN/1.73M2 — LOW
EGFR: 11 ML/MIN/1.73M2 — LOW
EGFR: 12 ML/MIN/1.73M2 — LOW
EGFR: 13 ML/MIN/1.73M2 — LOW
EGFR: 14 ML/MIN/1.73M2 — LOW
EGFR: 15 ML/MIN/1.73M2 — LOW
EGFR: 15 ML/MIN/1.73M2 — LOW
EGFR: 16 ML/MIN/1.73M2 — LOW
EGFR: 16 ML/MIN/1.73M2 — LOW
EGFR: 17 ML/MIN/1.73M2 — LOW
EGFR: 17 ML/MIN/1.73M2 — LOW
EGFR: 24 ML/MIN/1.73M2 — LOW
EGFR: 31 ML/MIN/1.73M2 — LOW
EGFR: 40 ML/MIN/1.73M2 — LOW
EGFR: 49 ML/MIN/1.73M2 — LOW
EGFR: 5 ML/MIN/1.73M2 — LOW
EGFR: 55 ML/MIN/1.73M2 — LOW
EGFR: 6 ML/MIN/1.73M2 — LOW
EGFR: 6 ML/MIN/1.73M2 — LOW
EGFR: 7 ML/MIN/1.73M2 — LOW
EGFR: 8 ML/MIN/1.73M2 — LOW
EGFR: 9 ML/MIN/1.73M2 — LOW
EOSINOPHIL # BLD AUTO: 0 K/UL — SIGNIFICANT CHANGE UP (ref 0–0.5)
EOSINOPHIL # BLD AUTO: 0.03 K/UL — SIGNIFICANT CHANGE UP (ref 0–0.5)
EOSINOPHIL # BLD AUTO: 0.11 K/UL — SIGNIFICANT CHANGE UP (ref 0–0.5)
EOSINOPHIL # BLD AUTO: 0.16 K/UL — SIGNIFICANT CHANGE UP (ref 0–0.5)
EOSINOPHIL # BLD AUTO: 0.22 K/UL — SIGNIFICANT CHANGE UP (ref 0–0.5)
EOSINOPHIL # BLD AUTO: 0.26 K/UL — SIGNIFICANT CHANGE UP (ref 0–0.5)
EOSINOPHIL # BLD AUTO: 0.29 K/UL — SIGNIFICANT CHANGE UP (ref 0–0.5)
EOSINOPHIL # BLD AUTO: 0.29 K/UL — SIGNIFICANT CHANGE UP (ref 0–0.5)
EOSINOPHIL # BLD AUTO: 0.32 K/UL — SIGNIFICANT CHANGE UP (ref 0–0.5)
EOSINOPHIL # BLD AUTO: 0.36 K/UL — SIGNIFICANT CHANGE UP (ref 0–0.5)
EOSINOPHIL # BLD AUTO: 0.36 K/UL — SIGNIFICANT CHANGE UP (ref 0–0.5)
EOSINOPHIL # BLD AUTO: 0.39 K/UL — SIGNIFICANT CHANGE UP (ref 0–0.5)
EOSINOPHIL # BLD AUTO: 0.4 K/UL — SIGNIFICANT CHANGE UP (ref 0–0.5)
EOSINOPHIL # BLD AUTO: 0.43 K/UL — SIGNIFICANT CHANGE UP (ref 0–0.5)
EOSINOPHIL # BLD AUTO: 0.44 K/UL — SIGNIFICANT CHANGE UP (ref 0–0.5)
EOSINOPHIL # BLD AUTO: 0.44 K/UL — SIGNIFICANT CHANGE UP (ref 0–0.5)
EOSINOPHIL # BLD AUTO: 0.46 K/UL — SIGNIFICANT CHANGE UP (ref 0–0.5)
EOSINOPHIL # BLD AUTO: 0.47 K/UL — SIGNIFICANT CHANGE UP (ref 0–0.5)
EOSINOPHIL # BLD AUTO: 0.51 K/UL — HIGH (ref 0–0.5)
EOSINOPHIL # BLD AUTO: 0.53 K/UL — HIGH (ref 0–0.5)
EOSINOPHIL # BLD AUTO: 0.53 K/UL — HIGH (ref 0–0.5)
EOSINOPHIL # BLD AUTO: 0.58 K/UL — HIGH (ref 0–0.5)
EOSINOPHIL # BLD AUTO: 0.6 K/UL — HIGH (ref 0–0.5)
EOSINOPHIL # BLD AUTO: 0.66 K/UL — HIGH (ref 0–0.5)
EOSINOPHIL # BLD AUTO: 0.68 K/UL — HIGH (ref 0–0.5)
EOSINOPHIL # BLD AUTO: 1 K/UL — HIGH (ref 0–0.5)
EOSINOPHIL # BLD AUTO: 1.07 K/UL — HIGH (ref 0–0.5)
EOSINOPHIL # BLD AUTO: 1.07 K/UL — HIGH (ref 0–0.5)
EOSINOPHIL # BLD AUTO: 1.13 K/UL — HIGH (ref 0–0.5)
EOSINOPHIL # BLD AUTO: 1.18 K/UL — HIGH (ref 0–0.5)
EOSINOPHIL # BLD AUTO: 1.19 K/UL — HIGH (ref 0–0.5)
EOSINOPHIL # BLD AUTO: 1.29 K/UL — HIGH (ref 0–0.5)
EOSINOPHIL # BLD AUTO: 1.58 K/UL — HIGH (ref 0–0.5)
EOSINOPHIL # BLD AUTO: 1.59 K/UL — HIGH (ref 0–0.5)
EOSINOPHIL NFR BLD AUTO: 0 % — SIGNIFICANT CHANGE UP (ref 0–6)
EOSINOPHIL NFR BLD AUTO: 0.2 % — SIGNIFICANT CHANGE UP (ref 0–6)
EOSINOPHIL NFR BLD AUTO: 0.3 % — SIGNIFICANT CHANGE UP (ref 0–6)
EOSINOPHIL NFR BLD AUTO: 0.9 % — SIGNIFICANT CHANGE UP (ref 0–6)
EOSINOPHIL NFR BLD AUTO: 1.9 % — SIGNIFICANT CHANGE UP (ref 0–6)
EOSINOPHIL NFR BLD AUTO: 10.4 % — HIGH (ref 0–6)
EOSINOPHIL NFR BLD AUTO: 10.6 % — HIGH (ref 0–6)
EOSINOPHIL NFR BLD AUTO: 12.5 % — HIGH (ref 0–6)
EOSINOPHIL NFR BLD AUTO: 12.7 % — HIGH (ref 0–6)
EOSINOPHIL NFR BLD AUTO: 2 % — SIGNIFICANT CHANGE UP (ref 0–6)
EOSINOPHIL NFR BLD AUTO: 2 % — SIGNIFICANT CHANGE UP (ref 0–6)
EOSINOPHIL NFR BLD AUTO: 2.3 % — SIGNIFICANT CHANGE UP (ref 0–6)
EOSINOPHIL NFR BLD AUTO: 2.7 % — SIGNIFICANT CHANGE UP (ref 0–6)
EOSINOPHIL NFR BLD AUTO: 2.9 % — SIGNIFICANT CHANGE UP (ref 0–6)
EOSINOPHIL NFR BLD AUTO: 3.2 % — SIGNIFICANT CHANGE UP (ref 0–6)
EOSINOPHIL NFR BLD AUTO: 3.4 % — SIGNIFICANT CHANGE UP (ref 0–6)
EOSINOPHIL NFR BLD AUTO: 3.6 % — SIGNIFICANT CHANGE UP (ref 0–6)
EOSINOPHIL NFR BLD AUTO: 3.6 % — SIGNIFICANT CHANGE UP (ref 0–6)
EOSINOPHIL NFR BLD AUTO: 3.7 % — SIGNIFICANT CHANGE UP (ref 0–6)
EOSINOPHIL NFR BLD AUTO: 4.2 % — SIGNIFICANT CHANGE UP (ref 0–6)
EOSINOPHIL NFR BLD AUTO: 4.3 % — SIGNIFICANT CHANGE UP (ref 0–6)
EOSINOPHIL NFR BLD AUTO: 4.4 % — SIGNIFICANT CHANGE UP (ref 0–6)
EOSINOPHIL NFR BLD AUTO: 4.5 % — SIGNIFICANT CHANGE UP (ref 0–6)
EOSINOPHIL NFR BLD AUTO: 4.8 % — SIGNIFICANT CHANGE UP (ref 0–6)
EOSINOPHIL NFR BLD AUTO: 5 % — SIGNIFICANT CHANGE UP (ref 0–6)
EOSINOPHIL NFR BLD AUTO: 5.3 % — SIGNIFICANT CHANGE UP (ref 0–6)
EOSINOPHIL NFR BLD AUTO: 5.7 % — SIGNIFICANT CHANGE UP (ref 0–6)
EOSINOPHIL NFR BLD AUTO: 8.9 % — HIGH (ref 0–6)
EOSINOPHIL NFR BLD AUTO: 9.1 % — HIGH (ref 0–6)
EOSINOPHIL NFR BLD AUTO: 9.1 % — HIGH (ref 0–6)
EOSINOPHIL NFR BLD AUTO: 9.3 % — HIGH (ref 0–6)
EOSINOPHIL NFR BLD AUTO: 9.7 % — HIGH (ref 0–6)
ERYTHROCYTE [SEDIMENTATION RATE] IN BLOOD: 93 MM/HR — HIGH (ref 4–25)
ESTIMATED AVERAGE GLUCOSE: 128 — SIGNIFICANT CHANGE UP
ESTIMATED AVERAGE GLUCOSE: 134 — SIGNIFICANT CHANGE UP
ESTIMATED AVERAGE GLUCOSE: 154 — SIGNIFICANT CHANGE UP
FERRITIN SERPL-MCNC: 1742 NG/ML — HIGH (ref 15–150)
FERRITIN SERPL-MCNC: 1879 NG/ML — HIGH (ref 15–150)
FERRITIN SERPL-MCNC: 1974 NG/ML — HIGH (ref 15–150)
FERRITIN SERPL-MCNC: 1989 NG/ML — HIGH (ref 15–150)
FERRITIN SERPL-MCNC: 2185 NG/ML — HIGH (ref 15–150)
FERRITIN SERPL-MCNC: 2208 NG/ML — HIGH (ref 15–150)
FERRITIN SERPL-MCNC: 2261 NG/ML — HIGH (ref 15–150)
FERRITIN SERPL-MCNC: 2339 NG/ML — HIGH (ref 15–150)
FERRITIN SERPL-MCNC: 2363 NG/ML — HIGH (ref 15–150)
FERRITIN SERPL-MCNC: 2406 NG/ML — HIGH (ref 15–150)
FERRITIN SERPL-MCNC: 2472 NG/ML — HIGH (ref 15–150)
FERRITIN SERPL-MCNC: 2527 NG/ML — HIGH (ref 15–150)
FERRITIN SERPL-MCNC: 2538 NG/ML — HIGH (ref 15–150)
FERRITIN SERPL-MCNC: 2617 NG/ML — HIGH (ref 15–150)
FERRITIN SERPL-MCNC: 2829 NG/ML — HIGH (ref 15–150)
FERRITIN SERPL-MCNC: 2832 NG/ML — HIGH (ref 15–150)
FERRITIN SERPL-MCNC: 2932 NG/ML — HIGH (ref 15–150)
FIBRINOGEN PPP-MCNC: 670 MG/DL — HIGH (ref 290–520)
FIBRINOGEN PPP-MCNC: 796 MG/DL — HIGH (ref 290–520)
FIBRINOGEN PPP-MCNC: 797 MG/DL — HIGH (ref 290–520)
FIBRINOGEN PPP-MCNC: 908 MG/DL — HIGH (ref 290–520)
FIBRINOGEN PPP-MCNC: 934 MG/DL — HIGH (ref 290–520)
FIBRINOGEN PPP-MCNC: 944 MG/DL — HIGH (ref 330–520)
FIBRINOGEN PPP-MCNC: 957 MG/DL — HIGH (ref 290–520)
FIBRINOGEN PPP-MCNC: >1000 MG/DL — HIGH (ref 290–520)
FIBRINOGEN PPP-MCNC: >1000 MG/DL — HIGH (ref 330–520)
FLUAV H1 2009 PAND RNA SPEC QL NAA+PROBE: DETECTED
FLUAV SUBTYP SPEC NAA+PROBE: SIGNIFICANT CHANGE UP
FLUBV RNA SPEC QL NAA+PROBE: SIGNIFICANT CHANGE UP
FLUBV RNA SPEC QL NAA+PROBE: SIGNIFICANT CHANGE UP
FLUID INTAKE SUBSTANCE CLASS: SIGNIFICANT CHANGE UP
GAMMA GLOBULIN: 1.59 G/DL — SIGNIFICANT CHANGE UP (ref 0.7–1.7)
GAMMA INTERFERON BACKGROUND BLD IA-ACNC: 0 IU/ML — SIGNIFICANT CHANGE UP
GAMMA INTERFERON BACKGROUND BLD IA-ACNC: 0.03 IU/ML — SIGNIFICANT CHANGE UP
GAS PNL BLDA: SIGNIFICANT CHANGE UP
GAS PNL BLDV: 132 MMOL/L — LOW (ref 136–145)
GAS PNL BLDV: 133 MMOL/L — LOW (ref 136–145)
GAS PNL BLDV: SIGNIFICANT CHANGE UP
GIANT PLATELETS BLD QL SMEAR: PRESENT — SIGNIFICANT CHANGE UP
GIANT PLATELETS BLD QL SMEAR: PRESENT — SIGNIFICANT CHANGE UP
GLUCOSE BLDC GLUCOMTR-MCNC: 100 MG/DL — HIGH (ref 70–99)
GLUCOSE BLDC GLUCOMTR-MCNC: 101 MG/DL — HIGH (ref 70–99)
GLUCOSE BLDC GLUCOMTR-MCNC: 102 MG/DL — HIGH (ref 70–99)
GLUCOSE BLDC GLUCOMTR-MCNC: 103 MG/DL — HIGH (ref 70–99)
GLUCOSE BLDC GLUCOMTR-MCNC: 103 MG/DL — HIGH (ref 70–99)
GLUCOSE BLDC GLUCOMTR-MCNC: 104 MG/DL — HIGH (ref 70–99)
GLUCOSE BLDC GLUCOMTR-MCNC: 105 MG/DL — HIGH (ref 70–99)
GLUCOSE BLDC GLUCOMTR-MCNC: 107 MG/DL — HIGH (ref 70–99)
GLUCOSE BLDC GLUCOMTR-MCNC: 107 MG/DL — HIGH (ref 70–99)
GLUCOSE BLDC GLUCOMTR-MCNC: 108 MG/DL — HIGH (ref 70–99)
GLUCOSE BLDC GLUCOMTR-MCNC: 109 MG/DL — HIGH (ref 70–99)
GLUCOSE BLDC GLUCOMTR-MCNC: 110 MG/DL — HIGH (ref 70–99)
GLUCOSE BLDC GLUCOMTR-MCNC: 110 MG/DL — HIGH (ref 70–99)
GLUCOSE BLDC GLUCOMTR-MCNC: 111 MG/DL — HIGH (ref 70–99)
GLUCOSE BLDC GLUCOMTR-MCNC: 112 MG/DL — HIGH (ref 70–99)
GLUCOSE BLDC GLUCOMTR-MCNC: 113 MG/DL — HIGH (ref 70–99)
GLUCOSE BLDC GLUCOMTR-MCNC: 114 MG/DL — HIGH (ref 70–99)
GLUCOSE BLDC GLUCOMTR-MCNC: 114 MG/DL — HIGH (ref 70–99)
GLUCOSE BLDC GLUCOMTR-MCNC: 116 MG/DL — HIGH (ref 70–99)
GLUCOSE BLDC GLUCOMTR-MCNC: 117 MG/DL — HIGH (ref 70–99)
GLUCOSE BLDC GLUCOMTR-MCNC: 118 MG/DL — HIGH (ref 70–99)
GLUCOSE BLDC GLUCOMTR-MCNC: 119 MG/DL — HIGH (ref 70–99)
GLUCOSE BLDC GLUCOMTR-MCNC: 120 MG/DL — HIGH (ref 70–99)
GLUCOSE BLDC GLUCOMTR-MCNC: 121 MG/DL — HIGH (ref 70–99)
GLUCOSE BLDC GLUCOMTR-MCNC: 122 MG/DL — HIGH (ref 70–99)
GLUCOSE BLDC GLUCOMTR-MCNC: 123 MG/DL — HIGH (ref 70–99)
GLUCOSE BLDC GLUCOMTR-MCNC: 124 MG/DL — HIGH (ref 70–99)
GLUCOSE BLDC GLUCOMTR-MCNC: 125 MG/DL — HIGH (ref 70–99)
GLUCOSE BLDC GLUCOMTR-MCNC: 126 MG/DL — HIGH (ref 70–99)
GLUCOSE BLDC GLUCOMTR-MCNC: 127 MG/DL — HIGH (ref 70–99)
GLUCOSE BLDC GLUCOMTR-MCNC: 128 MG/DL — HIGH (ref 70–99)
GLUCOSE BLDC GLUCOMTR-MCNC: 129 MG/DL — HIGH (ref 70–99)
GLUCOSE BLDC GLUCOMTR-MCNC: 130 MG/DL — HIGH (ref 70–99)
GLUCOSE BLDC GLUCOMTR-MCNC: 130 MG/DL — HIGH (ref 70–99)
GLUCOSE BLDC GLUCOMTR-MCNC: 131 MG/DL — HIGH (ref 70–99)
GLUCOSE BLDC GLUCOMTR-MCNC: 132 MG/DL — HIGH (ref 70–99)
GLUCOSE BLDC GLUCOMTR-MCNC: 133 MG/DL — HIGH (ref 70–99)
GLUCOSE BLDC GLUCOMTR-MCNC: 134 MG/DL — HIGH (ref 70–99)
GLUCOSE BLDC GLUCOMTR-MCNC: 135 MG/DL — HIGH (ref 70–99)
GLUCOSE BLDC GLUCOMTR-MCNC: 136 MG/DL — HIGH (ref 70–99)
GLUCOSE BLDC GLUCOMTR-MCNC: 137 MG/DL — HIGH (ref 70–99)
GLUCOSE BLDC GLUCOMTR-MCNC: 138 MG/DL — HIGH (ref 70–99)
GLUCOSE BLDC GLUCOMTR-MCNC: 139 MG/DL — HIGH (ref 70–99)
GLUCOSE BLDC GLUCOMTR-MCNC: 140 MG/DL — HIGH (ref 70–99)
GLUCOSE BLDC GLUCOMTR-MCNC: 141 MG/DL — HIGH (ref 70–99)
GLUCOSE BLDC GLUCOMTR-MCNC: 142 MG/DL — HIGH (ref 70–99)
GLUCOSE BLDC GLUCOMTR-MCNC: 143 MG/DL — HIGH (ref 70–99)
GLUCOSE BLDC GLUCOMTR-MCNC: 144 MG/DL — HIGH (ref 70–99)
GLUCOSE BLDC GLUCOMTR-MCNC: 144 MG/DL — HIGH (ref 70–99)
GLUCOSE BLDC GLUCOMTR-MCNC: 145 MG/DL — HIGH (ref 70–99)
GLUCOSE BLDC GLUCOMTR-MCNC: 145 MG/DL — HIGH (ref 70–99)
GLUCOSE BLDC GLUCOMTR-MCNC: 146 MG/DL — HIGH (ref 70–99)
GLUCOSE BLDC GLUCOMTR-MCNC: 148 MG/DL — HIGH (ref 70–99)
GLUCOSE BLDC GLUCOMTR-MCNC: 149 MG/DL — HIGH (ref 70–99)
GLUCOSE BLDC GLUCOMTR-MCNC: 151 MG/DL — HIGH (ref 70–99)
GLUCOSE BLDC GLUCOMTR-MCNC: 151 MG/DL — HIGH (ref 70–99)
GLUCOSE BLDC GLUCOMTR-MCNC: 152 MG/DL — HIGH (ref 70–99)
GLUCOSE BLDC GLUCOMTR-MCNC: 153 MG/DL — HIGH (ref 70–99)
GLUCOSE BLDC GLUCOMTR-MCNC: 153 MG/DL — HIGH (ref 70–99)
GLUCOSE BLDC GLUCOMTR-MCNC: 154 MG/DL — HIGH (ref 70–99)
GLUCOSE BLDC GLUCOMTR-MCNC: 154 MG/DL — HIGH (ref 70–99)
GLUCOSE BLDC GLUCOMTR-MCNC: 155 MG/DL — HIGH (ref 70–99)
GLUCOSE BLDC GLUCOMTR-MCNC: 156 MG/DL — HIGH (ref 70–99)
GLUCOSE BLDC GLUCOMTR-MCNC: 157 MG/DL — HIGH (ref 70–99)
GLUCOSE BLDC GLUCOMTR-MCNC: 158 MG/DL — HIGH (ref 70–99)
GLUCOSE BLDC GLUCOMTR-MCNC: 159 MG/DL — HIGH (ref 70–99)
GLUCOSE BLDC GLUCOMTR-MCNC: 160 MG/DL — HIGH (ref 70–99)
GLUCOSE BLDC GLUCOMTR-MCNC: 161 MG/DL — HIGH (ref 70–99)
GLUCOSE BLDC GLUCOMTR-MCNC: 162 MG/DL — HIGH (ref 70–99)
GLUCOSE BLDC GLUCOMTR-MCNC: 163 MG/DL — HIGH (ref 70–99)
GLUCOSE BLDC GLUCOMTR-MCNC: 164 MG/DL — HIGH (ref 70–99)
GLUCOSE BLDC GLUCOMTR-MCNC: 164 MG/DL — HIGH (ref 70–99)
GLUCOSE BLDC GLUCOMTR-MCNC: 165 MG/DL — HIGH (ref 70–99)
GLUCOSE BLDC GLUCOMTR-MCNC: 166 MG/DL — HIGH (ref 70–99)
GLUCOSE BLDC GLUCOMTR-MCNC: 166 MG/DL — HIGH (ref 70–99)
GLUCOSE BLDC GLUCOMTR-MCNC: 167 MG/DL — HIGH (ref 70–99)
GLUCOSE BLDC GLUCOMTR-MCNC: 167 MG/DL — HIGH (ref 70–99)
GLUCOSE BLDC GLUCOMTR-MCNC: 168 MG/DL — HIGH (ref 70–99)
GLUCOSE BLDC GLUCOMTR-MCNC: 169 MG/DL — HIGH (ref 70–99)
GLUCOSE BLDC GLUCOMTR-MCNC: 170 MG/DL — HIGH (ref 70–99)
GLUCOSE BLDC GLUCOMTR-MCNC: 171 MG/DL — HIGH (ref 70–99)
GLUCOSE BLDC GLUCOMTR-MCNC: 172 MG/DL — HIGH (ref 70–99)
GLUCOSE BLDC GLUCOMTR-MCNC: 173 MG/DL — HIGH (ref 70–99)
GLUCOSE BLDC GLUCOMTR-MCNC: 174 MG/DL — HIGH (ref 70–99)
GLUCOSE BLDC GLUCOMTR-MCNC: 175 MG/DL — HIGH (ref 70–99)
GLUCOSE BLDC GLUCOMTR-MCNC: 175 MG/DL — HIGH (ref 70–99)
GLUCOSE BLDC GLUCOMTR-MCNC: 176 MG/DL — HIGH (ref 70–99)
GLUCOSE BLDC GLUCOMTR-MCNC: 176 MG/DL — HIGH (ref 70–99)
GLUCOSE BLDC GLUCOMTR-MCNC: 177 MG/DL — HIGH (ref 70–99)
GLUCOSE BLDC GLUCOMTR-MCNC: 178 MG/DL — HIGH (ref 70–99)
GLUCOSE BLDC GLUCOMTR-MCNC: 178 MG/DL — HIGH (ref 70–99)
GLUCOSE BLDC GLUCOMTR-MCNC: 179 MG/DL — HIGH (ref 70–99)
GLUCOSE BLDC GLUCOMTR-MCNC: 180 MG/DL — HIGH (ref 70–99)
GLUCOSE BLDC GLUCOMTR-MCNC: 181 MG/DL — HIGH (ref 70–99)
GLUCOSE BLDC GLUCOMTR-MCNC: 182 MG/DL — HIGH (ref 70–99)
GLUCOSE BLDC GLUCOMTR-MCNC: 183 MG/DL — HIGH (ref 70–99)
GLUCOSE BLDC GLUCOMTR-MCNC: 183 MG/DL — HIGH (ref 70–99)
GLUCOSE BLDC GLUCOMTR-MCNC: 184 MG/DL — HIGH (ref 70–99)
GLUCOSE BLDC GLUCOMTR-MCNC: 185 MG/DL — HIGH (ref 70–99)
GLUCOSE BLDC GLUCOMTR-MCNC: 186 MG/DL — HIGH (ref 70–99)
GLUCOSE BLDC GLUCOMTR-MCNC: 188 MG/DL — HIGH (ref 70–99)
GLUCOSE BLDC GLUCOMTR-MCNC: 188 MG/DL — HIGH (ref 70–99)
GLUCOSE BLDC GLUCOMTR-MCNC: 189 MG/DL — HIGH (ref 70–99)
GLUCOSE BLDC GLUCOMTR-MCNC: 189 MG/DL — HIGH (ref 70–99)
GLUCOSE BLDC GLUCOMTR-MCNC: 190 MG/DL — HIGH (ref 70–99)
GLUCOSE BLDC GLUCOMTR-MCNC: 191 MG/DL — HIGH (ref 70–99)
GLUCOSE BLDC GLUCOMTR-MCNC: 192 MG/DL — HIGH (ref 70–99)
GLUCOSE BLDC GLUCOMTR-MCNC: 192 MG/DL — HIGH (ref 70–99)
GLUCOSE BLDC GLUCOMTR-MCNC: 194 MG/DL — HIGH (ref 70–99)
GLUCOSE BLDC GLUCOMTR-MCNC: 194 MG/DL — HIGH (ref 70–99)
GLUCOSE BLDC GLUCOMTR-MCNC: 195 MG/DL — HIGH (ref 70–99)
GLUCOSE BLDC GLUCOMTR-MCNC: 196 MG/DL — HIGH (ref 70–99)
GLUCOSE BLDC GLUCOMTR-MCNC: 196 MG/DL — HIGH (ref 70–99)
GLUCOSE BLDC GLUCOMTR-MCNC: 197 MG/DL — HIGH (ref 70–99)
GLUCOSE BLDC GLUCOMTR-MCNC: 198 MG/DL — HIGH (ref 70–99)
GLUCOSE BLDC GLUCOMTR-MCNC: 199 MG/DL — HIGH (ref 70–99)
GLUCOSE BLDC GLUCOMTR-MCNC: 199 MG/DL — HIGH (ref 70–99)
GLUCOSE BLDC GLUCOMTR-MCNC: 201 MG/DL — HIGH (ref 70–99)
GLUCOSE BLDC GLUCOMTR-MCNC: 202 MG/DL — HIGH (ref 70–99)
GLUCOSE BLDC GLUCOMTR-MCNC: 202 MG/DL — HIGH (ref 70–99)
GLUCOSE BLDC GLUCOMTR-MCNC: 205 MG/DL — HIGH (ref 70–99)
GLUCOSE BLDC GLUCOMTR-MCNC: 205 MG/DL — HIGH (ref 70–99)
GLUCOSE BLDC GLUCOMTR-MCNC: 206 MG/DL — HIGH (ref 70–99)
GLUCOSE BLDC GLUCOMTR-MCNC: 206 MG/DL — HIGH (ref 70–99)
GLUCOSE BLDC GLUCOMTR-MCNC: 207 MG/DL — HIGH (ref 70–99)
GLUCOSE BLDC GLUCOMTR-MCNC: 208 MG/DL — HIGH (ref 70–99)
GLUCOSE BLDC GLUCOMTR-MCNC: 210 MG/DL — HIGH (ref 70–99)
GLUCOSE BLDC GLUCOMTR-MCNC: 210 MG/DL — HIGH (ref 70–99)
GLUCOSE BLDC GLUCOMTR-MCNC: 211 MG/DL — HIGH (ref 70–99)
GLUCOSE BLDC GLUCOMTR-MCNC: 213 MG/DL — HIGH (ref 70–99)
GLUCOSE BLDC GLUCOMTR-MCNC: 215 MG/DL — HIGH (ref 70–99)
GLUCOSE BLDC GLUCOMTR-MCNC: 216 MG/DL — HIGH (ref 70–99)
GLUCOSE BLDC GLUCOMTR-MCNC: 216 MG/DL — HIGH (ref 70–99)
GLUCOSE BLDC GLUCOMTR-MCNC: 217 MG/DL — HIGH (ref 70–99)
GLUCOSE BLDC GLUCOMTR-MCNC: 219 MG/DL — HIGH (ref 70–99)
GLUCOSE BLDC GLUCOMTR-MCNC: 220 MG/DL — HIGH (ref 70–99)
GLUCOSE BLDC GLUCOMTR-MCNC: 223 MG/DL — HIGH (ref 70–99)
GLUCOSE BLDC GLUCOMTR-MCNC: 223 MG/DL — HIGH (ref 70–99)
GLUCOSE BLDC GLUCOMTR-MCNC: 224 MG/DL — HIGH (ref 70–99)
GLUCOSE BLDC GLUCOMTR-MCNC: 225 MG/DL — HIGH (ref 70–99)
GLUCOSE BLDC GLUCOMTR-MCNC: 225 MG/DL — HIGH (ref 70–99)
GLUCOSE BLDC GLUCOMTR-MCNC: 226 MG/DL — HIGH (ref 70–99)
GLUCOSE BLDC GLUCOMTR-MCNC: 228 MG/DL — HIGH (ref 70–99)
GLUCOSE BLDC GLUCOMTR-MCNC: 230 MG/DL — HIGH (ref 70–99)
GLUCOSE BLDC GLUCOMTR-MCNC: 230 MG/DL — HIGH (ref 70–99)
GLUCOSE BLDC GLUCOMTR-MCNC: 232 MG/DL — HIGH (ref 70–99)
GLUCOSE BLDC GLUCOMTR-MCNC: 232 MG/DL — HIGH (ref 70–99)
GLUCOSE BLDC GLUCOMTR-MCNC: 234 MG/DL — HIGH (ref 70–99)
GLUCOSE BLDC GLUCOMTR-MCNC: 235 MG/DL — HIGH (ref 70–99)
GLUCOSE BLDC GLUCOMTR-MCNC: 236 MG/DL — HIGH (ref 70–99)
GLUCOSE BLDC GLUCOMTR-MCNC: 237 MG/DL — HIGH (ref 70–99)
GLUCOSE BLDC GLUCOMTR-MCNC: 238 MG/DL — HIGH (ref 70–99)
GLUCOSE BLDC GLUCOMTR-MCNC: 239 MG/DL — HIGH (ref 70–99)
GLUCOSE BLDC GLUCOMTR-MCNC: 239 MG/DL — HIGH (ref 70–99)
GLUCOSE BLDC GLUCOMTR-MCNC: 240 MG/DL — HIGH (ref 70–99)
GLUCOSE BLDC GLUCOMTR-MCNC: 244 MG/DL — HIGH (ref 70–99)
GLUCOSE BLDC GLUCOMTR-MCNC: 248 MG/DL — HIGH (ref 70–99)
GLUCOSE BLDC GLUCOMTR-MCNC: 250 MG/DL — HIGH (ref 70–99)
GLUCOSE BLDC GLUCOMTR-MCNC: 251 MG/DL — HIGH (ref 70–99)
GLUCOSE BLDC GLUCOMTR-MCNC: 252 MG/DL — HIGH (ref 70–99)
GLUCOSE BLDC GLUCOMTR-MCNC: 252 MG/DL — HIGH (ref 70–99)
GLUCOSE BLDC GLUCOMTR-MCNC: 254 MG/DL — HIGH (ref 70–99)
GLUCOSE BLDC GLUCOMTR-MCNC: 255 MG/DL — HIGH (ref 70–99)
GLUCOSE BLDC GLUCOMTR-MCNC: 257 MG/DL — HIGH (ref 70–99)
GLUCOSE BLDC GLUCOMTR-MCNC: 259 MG/DL — HIGH (ref 70–99)
GLUCOSE BLDC GLUCOMTR-MCNC: 260 MG/DL — HIGH (ref 70–99)
GLUCOSE BLDC GLUCOMTR-MCNC: 262 MG/DL — HIGH (ref 70–99)
GLUCOSE BLDC GLUCOMTR-MCNC: 263 MG/DL — HIGH (ref 70–99)
GLUCOSE BLDC GLUCOMTR-MCNC: 264 MG/DL — HIGH (ref 70–99)
GLUCOSE BLDC GLUCOMTR-MCNC: 266 MG/DL — HIGH (ref 70–99)
GLUCOSE BLDC GLUCOMTR-MCNC: 267 MG/DL — HIGH (ref 70–99)
GLUCOSE BLDC GLUCOMTR-MCNC: 270 MG/DL — HIGH (ref 70–99)
GLUCOSE BLDC GLUCOMTR-MCNC: 273 MG/DL — HIGH (ref 70–99)
GLUCOSE BLDC GLUCOMTR-MCNC: 275 MG/DL — HIGH (ref 70–99)
GLUCOSE BLDC GLUCOMTR-MCNC: 278 MG/DL — HIGH (ref 70–99)
GLUCOSE BLDC GLUCOMTR-MCNC: 283 MG/DL — HIGH (ref 70–99)
GLUCOSE BLDC GLUCOMTR-MCNC: 293 MG/DL — HIGH (ref 70–99)
GLUCOSE BLDC GLUCOMTR-MCNC: 294 MG/DL — HIGH (ref 70–99)
GLUCOSE BLDC GLUCOMTR-MCNC: 296 MG/DL — HIGH (ref 70–99)
GLUCOSE BLDC GLUCOMTR-MCNC: 303 MG/DL — HIGH (ref 70–99)
GLUCOSE BLDC GLUCOMTR-MCNC: 307 MG/DL — HIGH (ref 70–99)
GLUCOSE BLDC GLUCOMTR-MCNC: 309 MG/DL — HIGH (ref 70–99)
GLUCOSE BLDC GLUCOMTR-MCNC: 312 MG/DL — HIGH (ref 70–99)
GLUCOSE BLDC GLUCOMTR-MCNC: 317 MG/DL — HIGH (ref 70–99)
GLUCOSE BLDC GLUCOMTR-MCNC: 317 MG/DL — HIGH (ref 70–99)
GLUCOSE BLDC GLUCOMTR-MCNC: 33 MG/DL — CRITICAL LOW (ref 70–99)
GLUCOSE BLDC GLUCOMTR-MCNC: 338 MG/DL — HIGH (ref 70–99)
GLUCOSE BLDC GLUCOMTR-MCNC: 341 MG/DL — HIGH (ref 70–99)
GLUCOSE BLDC GLUCOMTR-MCNC: 344 MG/DL — HIGH (ref 70–99)
GLUCOSE BLDC GLUCOMTR-MCNC: 373 MG/DL — HIGH (ref 70–99)
GLUCOSE BLDC GLUCOMTR-MCNC: 386 MG/DL — HIGH (ref 70–99)
GLUCOSE BLDC GLUCOMTR-MCNC: 391 MG/DL — HIGH (ref 70–99)
GLUCOSE BLDC GLUCOMTR-MCNC: 411 MG/DL — HIGH (ref 70–99)
GLUCOSE BLDC GLUCOMTR-MCNC: 428 MG/DL — HIGH (ref 70–99)
GLUCOSE BLDC GLUCOMTR-MCNC: 428 MG/DL — HIGH (ref 70–99)
GLUCOSE BLDC GLUCOMTR-MCNC: 43 MG/DL — CRITICAL LOW (ref 70–99)
GLUCOSE BLDC GLUCOMTR-MCNC: 431 MG/DL — HIGH (ref 70–99)
GLUCOSE BLDC GLUCOMTR-MCNC: 47 MG/DL — CRITICAL LOW (ref 70–99)
GLUCOSE BLDC GLUCOMTR-MCNC: 511 MG/DL — CRITICAL HIGH (ref 70–99)
GLUCOSE BLDC GLUCOMTR-MCNC: 52 MG/DL — CRITICAL LOW (ref 70–99)
GLUCOSE BLDC GLUCOMTR-MCNC: 58 MG/DL — LOW (ref 70–99)
GLUCOSE BLDC GLUCOMTR-MCNC: 61 MG/DL — LOW (ref 70–99)
GLUCOSE BLDC GLUCOMTR-MCNC: 70 MG/DL — SIGNIFICANT CHANGE UP (ref 70–99)
GLUCOSE BLDC GLUCOMTR-MCNC: 71 MG/DL — SIGNIFICANT CHANGE UP (ref 70–99)
GLUCOSE BLDC GLUCOMTR-MCNC: 73 MG/DL — SIGNIFICANT CHANGE UP (ref 70–99)
GLUCOSE BLDC GLUCOMTR-MCNC: 73 MG/DL — SIGNIFICANT CHANGE UP (ref 70–99)
GLUCOSE BLDC GLUCOMTR-MCNC: 74 MG/DL — SIGNIFICANT CHANGE UP (ref 70–99)
GLUCOSE BLDC GLUCOMTR-MCNC: 75 MG/DL — SIGNIFICANT CHANGE UP (ref 70–99)
GLUCOSE BLDC GLUCOMTR-MCNC: 76 MG/DL — SIGNIFICANT CHANGE UP (ref 70–99)
GLUCOSE BLDC GLUCOMTR-MCNC: 77 MG/DL — SIGNIFICANT CHANGE UP (ref 70–99)
GLUCOSE BLDC GLUCOMTR-MCNC: 77 MG/DL — SIGNIFICANT CHANGE UP (ref 70–99)
GLUCOSE BLDC GLUCOMTR-MCNC: 79 MG/DL — SIGNIFICANT CHANGE UP (ref 70–99)
GLUCOSE BLDC GLUCOMTR-MCNC: 80 MG/DL — SIGNIFICANT CHANGE UP (ref 70–99)
GLUCOSE BLDC GLUCOMTR-MCNC: 80 MG/DL — SIGNIFICANT CHANGE UP (ref 70–99)
GLUCOSE BLDC GLUCOMTR-MCNC: 81 MG/DL — SIGNIFICANT CHANGE UP (ref 70–99)
GLUCOSE BLDC GLUCOMTR-MCNC: 82 MG/DL — SIGNIFICANT CHANGE UP (ref 70–99)
GLUCOSE BLDC GLUCOMTR-MCNC: 84 MG/DL — SIGNIFICANT CHANGE UP (ref 70–99)
GLUCOSE BLDC GLUCOMTR-MCNC: 85 MG/DL — SIGNIFICANT CHANGE UP (ref 70–99)
GLUCOSE BLDC GLUCOMTR-MCNC: 86 MG/DL — SIGNIFICANT CHANGE UP (ref 70–99)
GLUCOSE BLDC GLUCOMTR-MCNC: 87 MG/DL — SIGNIFICANT CHANGE UP (ref 70–99)
GLUCOSE BLDC GLUCOMTR-MCNC: 88 MG/DL — SIGNIFICANT CHANGE UP (ref 70–99)
GLUCOSE BLDC GLUCOMTR-MCNC: 89 MG/DL — SIGNIFICANT CHANGE UP (ref 70–99)
GLUCOSE BLDC GLUCOMTR-MCNC: 90 MG/DL — SIGNIFICANT CHANGE UP (ref 70–99)
GLUCOSE BLDC GLUCOMTR-MCNC: 90 MG/DL — SIGNIFICANT CHANGE UP (ref 70–99)
GLUCOSE BLDC GLUCOMTR-MCNC: 91 MG/DL — SIGNIFICANT CHANGE UP (ref 70–99)
GLUCOSE BLDC GLUCOMTR-MCNC: 92 MG/DL — SIGNIFICANT CHANGE UP (ref 70–99)
GLUCOSE BLDC GLUCOMTR-MCNC: 93 MG/DL — SIGNIFICANT CHANGE UP (ref 70–99)
GLUCOSE BLDC GLUCOMTR-MCNC: 93 MG/DL — SIGNIFICANT CHANGE UP (ref 70–99)
GLUCOSE BLDC GLUCOMTR-MCNC: 94 MG/DL — SIGNIFICANT CHANGE UP (ref 70–99)
GLUCOSE BLDC GLUCOMTR-MCNC: 94 MG/DL — SIGNIFICANT CHANGE UP (ref 70–99)
GLUCOSE BLDC GLUCOMTR-MCNC: 95 MG/DL — SIGNIFICANT CHANGE UP (ref 70–99)
GLUCOSE BLDC GLUCOMTR-MCNC: 96 MG/DL — SIGNIFICANT CHANGE UP (ref 70–99)
GLUCOSE BLDC GLUCOMTR-MCNC: 96 MG/DL — SIGNIFICANT CHANGE UP (ref 70–99)
GLUCOSE BLDC GLUCOMTR-MCNC: 97 MG/DL — SIGNIFICANT CHANGE UP (ref 70–99)
GLUCOSE BLDC GLUCOMTR-MCNC: 97 MG/DL — SIGNIFICANT CHANGE UP (ref 70–99)
GLUCOSE BLDC GLUCOMTR-MCNC: 98 MG/DL — SIGNIFICANT CHANGE UP (ref 70–99)
GLUCOSE BLDC GLUCOMTR-MCNC: 99 MG/DL — SIGNIFICANT CHANGE UP (ref 70–99)
GLUCOSE BLDC GLUCOMTR-MCNC: 99 MG/DL — SIGNIFICANT CHANGE UP (ref 70–99)
GLUCOSE BLDV-MCNC: 138 MG/DL — HIGH (ref 70–99)
GLUCOSE BLDV-MCNC: 95 MG/DL — SIGNIFICANT CHANGE UP (ref 70–99)
GLUCOSE SERPL-MCNC: 102 MG/DL — HIGH (ref 70–99)
GLUCOSE SERPL-MCNC: 104 MG/DL — HIGH (ref 70–99)
GLUCOSE SERPL-MCNC: 107 MG/DL — HIGH (ref 70–99)
GLUCOSE SERPL-MCNC: 109 MG/DL — HIGH (ref 70–99)
GLUCOSE SERPL-MCNC: 11 MG/DL — CRITICAL LOW (ref 70–99)
GLUCOSE SERPL-MCNC: 110 MG/DL — HIGH (ref 70–99)
GLUCOSE SERPL-MCNC: 111 MG/DL — HIGH (ref 70–99)
GLUCOSE SERPL-MCNC: 112 MG/DL — HIGH (ref 70–99)
GLUCOSE SERPL-MCNC: 115 MG/DL — HIGH (ref 70–99)
GLUCOSE SERPL-MCNC: 121 MG/DL — HIGH (ref 70–99)
GLUCOSE SERPL-MCNC: 122 MG/DL — HIGH (ref 70–99)
GLUCOSE SERPL-MCNC: 122 MG/DL — HIGH (ref 70–99)
GLUCOSE SERPL-MCNC: 123 MG/DL — HIGH (ref 70–99)
GLUCOSE SERPL-MCNC: 126 MG/DL — HIGH (ref 70–99)
GLUCOSE SERPL-MCNC: 130 MG/DL — HIGH (ref 70–99)
GLUCOSE SERPL-MCNC: 131 MG/DL — HIGH (ref 70–99)
GLUCOSE SERPL-MCNC: 132 MG/DL — HIGH (ref 70–99)
GLUCOSE SERPL-MCNC: 134 MG/DL — HIGH (ref 70–99)
GLUCOSE SERPL-MCNC: 134 MG/DL — HIGH (ref 70–99)
GLUCOSE SERPL-MCNC: 136 MG/DL — HIGH (ref 70–99)
GLUCOSE SERPL-MCNC: 137 MG/DL — HIGH (ref 70–99)
GLUCOSE SERPL-MCNC: 137 MG/DL — HIGH (ref 70–99)
GLUCOSE SERPL-MCNC: 140 MG/DL — HIGH (ref 70–99)
GLUCOSE SERPL-MCNC: 140 MG/DL — HIGH (ref 70–99)
GLUCOSE SERPL-MCNC: 141 MG/DL — HIGH (ref 70–99)
GLUCOSE SERPL-MCNC: 142 MG/DL — HIGH (ref 70–99)
GLUCOSE SERPL-MCNC: 142 MG/DL — HIGH (ref 70–99)
GLUCOSE SERPL-MCNC: 144 MG/DL — HIGH (ref 70–99)
GLUCOSE SERPL-MCNC: 144 MG/DL — HIGH (ref 70–99)
GLUCOSE SERPL-MCNC: 145 MG/DL — HIGH (ref 70–99)
GLUCOSE SERPL-MCNC: 147 MG/DL — HIGH (ref 70–99)
GLUCOSE SERPL-MCNC: 148 MG/DL — HIGH (ref 70–99)
GLUCOSE SERPL-MCNC: 152 MG/DL — HIGH (ref 70–99)
GLUCOSE SERPL-MCNC: 153 MG/DL — HIGH (ref 70–99)
GLUCOSE SERPL-MCNC: 154 MG/DL — HIGH (ref 70–99)
GLUCOSE SERPL-MCNC: 157 MG/DL — HIGH (ref 70–99)
GLUCOSE SERPL-MCNC: 160 MG/DL — HIGH (ref 70–99)
GLUCOSE SERPL-MCNC: 161 MG/DL — HIGH (ref 70–99)
GLUCOSE SERPL-MCNC: 161 MG/DL — HIGH (ref 70–99)
GLUCOSE SERPL-MCNC: 162 MG/DL — HIGH (ref 70–99)
GLUCOSE SERPL-MCNC: 163 MG/DL — HIGH (ref 70–99)
GLUCOSE SERPL-MCNC: 165 MG/DL — HIGH (ref 70–99)
GLUCOSE SERPL-MCNC: 165 MG/DL — HIGH (ref 70–99)
GLUCOSE SERPL-MCNC: 166 MG/DL — HIGH (ref 70–99)
GLUCOSE SERPL-MCNC: 167 MG/DL — HIGH (ref 70–99)
GLUCOSE SERPL-MCNC: 177 MG/DL — HIGH (ref 70–99)
GLUCOSE SERPL-MCNC: 178 MG/DL — HIGH (ref 70–99)
GLUCOSE SERPL-MCNC: 178 MG/DL — HIGH (ref 70–99)
GLUCOSE SERPL-MCNC: 179 MG/DL — HIGH (ref 70–99)
GLUCOSE SERPL-MCNC: 179 MG/DL — HIGH (ref 70–99)
GLUCOSE SERPL-MCNC: 180 MG/DL — HIGH (ref 70–99)
GLUCOSE SERPL-MCNC: 180 MG/DL — HIGH (ref 70–99)
GLUCOSE SERPL-MCNC: 184 MG/DL — HIGH (ref 70–99)
GLUCOSE SERPL-MCNC: 184 MG/DL — HIGH (ref 70–99)
GLUCOSE SERPL-MCNC: 185 MG/DL — HIGH (ref 70–99)
GLUCOSE SERPL-MCNC: 186 MG/DL — HIGH (ref 70–99)
GLUCOSE SERPL-MCNC: 189 MG/DL — HIGH (ref 70–99)
GLUCOSE SERPL-MCNC: 192 MG/DL — HIGH (ref 70–99)
GLUCOSE SERPL-MCNC: 205 MG/DL — HIGH (ref 70–99)
GLUCOSE SERPL-MCNC: 208 MG/DL — HIGH (ref 70–99)
GLUCOSE SERPL-MCNC: 210 MG/DL — HIGH (ref 70–99)
GLUCOSE SERPL-MCNC: 210 MG/DL — HIGH (ref 70–99)
GLUCOSE SERPL-MCNC: 220 MG/DL — HIGH (ref 70–99)
GLUCOSE SERPL-MCNC: 236 MG/DL — HIGH (ref 70–99)
GLUCOSE SERPL-MCNC: 237 MG/DL — HIGH (ref 70–99)
GLUCOSE SERPL-MCNC: 388 MG/DL — HIGH (ref 70–99)
GLUCOSE SERPL-MCNC: 43 MG/DL — CRITICAL LOW (ref 70–99)
GLUCOSE SERPL-MCNC: 67 MG/DL — LOW (ref 70–99)
GLUCOSE SERPL-MCNC: 76 MG/DL — SIGNIFICANT CHANGE UP (ref 70–99)
GLUCOSE SERPL-MCNC: 80 MG/DL — SIGNIFICANT CHANGE UP (ref 70–99)
GLUCOSE SERPL-MCNC: 81 MG/DL — SIGNIFICANT CHANGE UP (ref 70–99)
GLUCOSE SERPL-MCNC: 86 MG/DL — SIGNIFICANT CHANGE UP (ref 70–99)
GLUCOSE SERPL-MCNC: 86 MG/DL — SIGNIFICANT CHANGE UP (ref 70–99)
GLUCOSE SERPL-MCNC: 90 MG/DL — SIGNIFICANT CHANGE UP (ref 70–99)
GLUCOSE SERPL-MCNC: 90 MG/DL — SIGNIFICANT CHANGE UP (ref 70–99)
GLUCOSE SERPL-MCNC: 94 MG/DL — SIGNIFICANT CHANGE UP (ref 70–99)
GRAM STN FLD: SIGNIFICANT CHANGE UP
HADV DNA SPEC QL NAA+PROBE: SIGNIFICANT CHANGE UP
HADV DNA SPEC QL NAA+PROBE: SIGNIFICANT CHANGE UP
HAV IGM SER-ACNC: SIGNIFICANT CHANGE UP
HBV CORE AB SER-ACNC: SIGNIFICANT CHANGE UP
HBV CORE AB SER-ACNC: SIGNIFICANT CHANGE UP
HBV CORE IGM SER-ACNC: SIGNIFICANT CHANGE UP
HBV SURFACE AB SER-ACNC: <3 MIU/ML — LOW
HBV SURFACE AB SER-ACNC: <3 MIU/ML — LOW
HBV SURFACE AG SER-ACNC: SIGNIFICANT CHANGE UP
HCG SERPL-ACNC: <5 MIU/ML — SIGNIFICANT CHANGE UP
HCO3 BLDV-SCNC: 27 MMOL/L — SIGNIFICANT CHANGE UP (ref 22–29)
HCO3 BLDV-SCNC: 27 MMOL/L — SIGNIFICANT CHANGE UP (ref 22–29)
HCOV 229E RNA SPEC QL NAA+PROBE: SIGNIFICANT CHANGE UP
HCOV 229E RNA SPEC QL NAA+PROBE: SIGNIFICANT CHANGE UP
HCOV HKU1 RNA SPEC QL NAA+PROBE: SIGNIFICANT CHANGE UP
HCOV HKU1 RNA SPEC QL NAA+PROBE: SIGNIFICANT CHANGE UP
HCOV NL63 RNA SPEC QL NAA+PROBE: SIGNIFICANT CHANGE UP
HCOV NL63 RNA SPEC QL NAA+PROBE: SIGNIFICANT CHANGE UP
HCOV OC43 RNA SPEC QL NAA+PROBE: SIGNIFICANT CHANGE UP
HCOV OC43 RNA SPEC QL NAA+PROBE: SIGNIFICANT CHANGE UP
HCT VFR BLD CALC: 24.2 % — LOW (ref 34.5–45)
HCT VFR BLD CALC: 24.8 % — LOW (ref 34.5–45)
HCT VFR BLD CALC: 24.9 % — LOW (ref 34.5–45)
HCT VFR BLD CALC: 25.2 % — LOW (ref 34.5–45)
HCT VFR BLD CALC: 25.4 % — LOW (ref 34.5–45)
HCT VFR BLD CALC: 25.9 % — LOW (ref 34.5–45)
HCT VFR BLD CALC: 26 % — LOW (ref 34.5–45)
HCT VFR BLD CALC: 26.2 % — LOW (ref 34.5–45)
HCT VFR BLD CALC: 26.4 % — LOW (ref 34.5–45)
HCT VFR BLD CALC: 26.6 % — LOW (ref 34.5–45)
HCT VFR BLD CALC: 26.7 % — LOW (ref 34.5–45)
HCT VFR BLD CALC: 26.9 % — LOW (ref 34.5–45)
HCT VFR BLD CALC: 27 % — LOW (ref 34.5–45)
HCT VFR BLD CALC: 27.1 % — LOW (ref 34.5–45)
HCT VFR BLD CALC: 27.2 % — LOW (ref 34.5–45)
HCT VFR BLD CALC: 27.3 % — LOW (ref 34.5–45)
HCT VFR BLD CALC: 27.4 % — LOW (ref 34.5–45)
HCT VFR BLD CALC: 27.5 % — LOW (ref 34.5–45)
HCT VFR BLD CALC: 27.6 % — LOW (ref 34.5–45)
HCT VFR BLD CALC: 27.7 % — LOW (ref 34.5–45)
HCT VFR BLD CALC: 27.8 % — LOW (ref 34.5–45)
HCT VFR BLD CALC: 27.9 % — LOW (ref 34.5–45)
HCT VFR BLD CALC: 27.9 % — LOW (ref 34.5–45)
HCT VFR BLD CALC: 28 % — LOW (ref 34.5–45)
HCT VFR BLD CALC: 28.1 % — LOW (ref 34.5–45)
HCT VFR BLD CALC: 28.1 % — LOW (ref 34.5–45)
HCT VFR BLD CALC: 28.2 % — LOW (ref 34.5–45)
HCT VFR BLD CALC: 28.3 % — LOW (ref 34.5–45)
HCT VFR BLD CALC: 28.4 % — LOW (ref 34.5–45)
HCT VFR BLD CALC: 28.5 % — LOW (ref 34.5–45)
HCT VFR BLD CALC: 28.6 % — LOW (ref 34.5–45)
HCT VFR BLD CALC: 28.8 % — LOW (ref 34.5–45)
HCT VFR BLD CALC: 28.9 % — LOW (ref 34.5–45)
HCT VFR BLD CALC: 29.1 % — LOW (ref 34.5–45)
HCT VFR BLD CALC: 29.4 % — LOW (ref 34.5–45)
HCT VFR BLD CALC: 29.5 % — LOW (ref 34.5–45)
HCT VFR BLD CALC: 29.8 % — LOW (ref 34.5–45)
HCT VFR BLD CALC: 30 % — LOW (ref 34.5–45)
HCT VFR BLD CALC: 30 % — LOW (ref 34.5–45)
HCT VFR BLD CALC: 30.2 % — LOW (ref 34.5–45)
HCT VFR BLD CALC: 30.3 % — LOW (ref 34.5–45)
HCT VFR BLD CALC: 30.4 % — LOW (ref 34.5–45)
HCT VFR BLD CALC: 30.5 % — LOW (ref 34.5–45)
HCT VFR BLD CALC: 30.7 % — LOW (ref 34.5–45)
HCT VFR BLD CALC: 30.9 % — LOW (ref 34.5–45)
HCT VFR BLD CALC: 31.2 % — LOW (ref 34.5–45)
HCT VFR BLD CALC: 32 % — LOW (ref 34.5–45)
HCT VFR BLD CALC: 32.3 % — LOW (ref 34.5–45)
HCT VFR BLD CALC: 32.6 % — LOW (ref 34.5–45)
HCT VFR BLD CALC: 32.9 % — LOW (ref 34.5–45)
HCT VFR BLD CALC: 33.3 % — LOW (ref 34.5–45)
HCT VFR BLD CALC: 33.8 % — LOW (ref 34.5–45)
HCT VFR BLD CALC: 33.9 % — LOW (ref 34.5–45)
HCT VFR BLD CALC: 34.3 % — LOW (ref 34.5–45)
HCT VFR BLD CALC: 35.1 % — SIGNIFICANT CHANGE UP (ref 34.5–45)
HCT VFR BLD CALC: 35.6 % — SIGNIFICANT CHANGE UP (ref 34.5–45)
HCT VFR BLD CALC: 36 % — SIGNIFICANT CHANGE UP (ref 34.5–45)
HCT VFR BLD CALC: 36.2 % — SIGNIFICANT CHANGE UP (ref 34.5–45)
HCT VFR BLD CALC: 36.2 % — SIGNIFICANT CHANGE UP (ref 34.5–45)
HCT VFR BLD CALC: 36.3 % — SIGNIFICANT CHANGE UP (ref 34.5–45)
HCT VFR BLD CALC: 36.8 % — SIGNIFICANT CHANGE UP (ref 34.5–45)
HCT VFR BLDA CALC: 21 % — CRITICAL LOW (ref 34.5–46.5)
HCT VFR BLDA CALC: 26 % — LOW (ref 34.5–46.5)
HCV AB S/CO SERPL IA: 0.18 S/CO — SIGNIFICANT CHANGE UP (ref 0–0.99)
HCV AB S/CO SERPL IA: 0.19 S/CO — SIGNIFICANT CHANGE UP (ref 0–0.99)
HCV AB S/CO SERPL IA: 0.21 S/CO — SIGNIFICANT CHANGE UP (ref 0–0.99)
HCV AB S/CO SERPL IA: 0.25 S/CO — SIGNIFICANT CHANGE UP (ref 0–0.99)
HCV AB SERPL-IMP: SIGNIFICANT CHANGE UP
HDLC SERPL-MCNC: 45 MG/DL — LOW
HDLC SERPL-MCNC: 47 MG/DL — LOW
HGB BLD CALC-MCNC: 7 G/DL — CRITICAL LOW (ref 11.5–15.5)
HGB BLD CALC-MCNC: 8.6 G/DL — LOW (ref 11.5–15.5)
HGB BLD-MCNC: 10 G/DL — LOW (ref 11.5–15.5)
HGB BLD-MCNC: 10 G/DL — LOW (ref 11.5–15.5)
HGB BLD-MCNC: 10.2 G/DL — LOW (ref 11.5–15.5)
HGB BLD-MCNC: 10.4 G/DL — LOW (ref 11.5–15.5)
HGB BLD-MCNC: 10.4 G/DL — LOW (ref 11.5–15.5)
HGB BLD-MCNC: 10.5 G/DL — LOW (ref 11.5–15.5)
HGB BLD-MCNC: 6.8 G/DL — CRITICAL LOW (ref 11.5–15.5)
HGB BLD-MCNC: 7.1 G/DL — LOW (ref 11.5–15.5)
HGB BLD-MCNC: 7.2 G/DL — LOW (ref 11.5–15.5)
HGB BLD-MCNC: 7.2 G/DL — LOW (ref 11.5–15.5)
HGB BLD-MCNC: 7.3 G/DL — LOW (ref 11.5–15.5)
HGB BLD-MCNC: 7.4 G/DL — LOW (ref 11.5–15.5)
HGB BLD-MCNC: 7.5 G/DL — LOW (ref 11.5–15.5)
HGB BLD-MCNC: 7.6 G/DL — LOW (ref 11.5–15.5)
HGB BLD-MCNC: 7.6 G/DL — LOW (ref 11.5–15.5)
HGB BLD-MCNC: 7.7 G/DL — LOW (ref 11.5–15.5)
HGB BLD-MCNC: 7.8 G/DL — LOW (ref 11.5–15.5)
HGB BLD-MCNC: 7.9 G/DL — LOW (ref 11.5–15.5)
HGB BLD-MCNC: 8 G/DL — LOW (ref 11.5–15.5)
HGB BLD-MCNC: 8.1 G/DL — LOW (ref 11.5–15.5)
HGB BLD-MCNC: 8.2 G/DL — LOW (ref 11.5–15.5)
HGB BLD-MCNC: 8.3 G/DL — LOW (ref 11.5–15.5)
HGB BLD-MCNC: 8.3 G/DL — LOW (ref 11.5–15.5)
HGB BLD-MCNC: 8.4 G/DL — LOW (ref 11.5–15.5)
HGB BLD-MCNC: 8.5 G/DL — LOW (ref 11.5–15.5)
HGB BLD-MCNC: 8.6 G/DL — LOW (ref 11.5–15.5)
HGB BLD-MCNC: 8.6 G/DL — LOW (ref 11.5–15.5)
HGB BLD-MCNC: 8.7 G/DL — LOW (ref 11.5–15.5)
HGB BLD-MCNC: 8.7 G/DL — LOW (ref 11.5–15.5)
HGB BLD-MCNC: 8.8 G/DL — LOW (ref 11.5–15.5)
HGB BLD-MCNC: 8.8 G/DL — LOW (ref 11.5–15.5)
HGB BLD-MCNC: 9 G/DL — LOW (ref 11.5–15.5)
HGB BLD-MCNC: 9.1 G/DL — LOW (ref 11.5–15.5)
HGB BLD-MCNC: 9.2 G/DL — LOW (ref 11.5–15.5)
HGB BLD-MCNC: 9.2 G/DL — LOW (ref 11.5–15.5)
HGB BLD-MCNC: 9.3 G/DL — LOW (ref 11.5–15.5)
HGB BLD-MCNC: 9.6 G/DL — LOW (ref 11.5–15.5)
HGB BLD-MCNC: 9.6 G/DL — LOW (ref 11.5–15.5)
HGB BLD-MCNC: 9.7 G/DL — LOW (ref 11.5–15.5)
HIV 1+2 AB+HIV1 P24 AG SERPL QL IA: SIGNIFICANT CHANGE UP
HMPV RNA SPEC QL NAA+PROBE: SIGNIFICANT CHANGE UP
HMPV RNA SPEC QL NAA+PROBE: SIGNIFICANT CHANGE UP
HPIV1 RNA SPEC QL NAA+PROBE: SIGNIFICANT CHANGE UP
HPIV1 RNA SPEC QL NAA+PROBE: SIGNIFICANT CHANGE UP
HPIV2 RNA SPEC QL NAA+PROBE: SIGNIFICANT CHANGE UP
HPIV2 RNA SPEC QL NAA+PROBE: SIGNIFICANT CHANGE UP
HPIV3 RNA SPEC QL NAA+PROBE: SIGNIFICANT CHANGE UP
HPIV3 RNA SPEC QL NAA+PROBE: SIGNIFICANT CHANGE UP
HPIV4 RNA SPEC QL NAA+PROBE: SIGNIFICANT CHANGE UP
HPIV4 RNA SPEC QL NAA+PROBE: SIGNIFICANT CHANGE UP
HYPOCHROMIA BLD QL: SLIGHT — SIGNIFICANT CHANGE UP
IANC: 10.48 K/UL — HIGH (ref 1.5–8.5)
IANC: 10.53 K/UL — HIGH (ref 1.5–8.5)
IANC: 10.61 K/UL — HIGH (ref 1.5–8.5)
IANC: 10.82 K/UL — HIGH (ref 1.5–8.5)
IANC: 11.09 K/UL — HIGH (ref 1.5–8.5)
IANC: 11.16 K/UL — HIGH (ref 1.5–8.5)
IANC: 11.25 K/UL — HIGH (ref 1.5–8.5)
IANC: 11.82 K/UL — HIGH (ref 1.5–8.5)
IANC: 13.34 K/UL — HIGH (ref 1.8–7.4)
IANC: 13.36 K/UL — HIGH (ref 1.5–8.5)
IANC: 14.43 K/UL — HIGH (ref 1.5–8.5)
IANC: 17.56 K/UL — HIGH (ref 1.5–8.5)
IANC: 31.81 K/UL — HIGH (ref 1.8–7.4)
IANC: 45.59 K/UL — HIGH (ref 1.8–7.4)
IANC: 5.96 K/UL — SIGNIFICANT CHANGE UP (ref 1.8–7.4)
IANC: 7.16 K/UL — SIGNIFICANT CHANGE UP (ref 1.5–8.5)
IANC: 7.25 K/UL — SIGNIFICANT CHANGE UP (ref 1.5–8.5)
IANC: 7.38 K/UL — SIGNIFICANT CHANGE UP (ref 1.8–7.4)
IANC: 7.4 K/UL — SIGNIFICANT CHANGE UP (ref 1.8–7.4)
IANC: 7.5 K/UL — HIGH (ref 1.8–7.4)
IANC: 7.54 K/UL — HIGH (ref 1.8–7.4)
IANC: 7.71 K/UL — SIGNIFICANT CHANGE UP (ref 1.5–8.5)
IANC: 7.9 K/UL — HIGH (ref 1.8–7.4)
IANC: 8.09 K/UL — HIGH (ref 1.8–7.4)
IANC: 8.18 K/UL — SIGNIFICANT CHANGE UP (ref 1.5–8.5)
IANC: 8.49 K/UL — SIGNIFICANT CHANGE UP (ref 1.5–8.5)
IANC: 8.53 K/UL — HIGH (ref 1.5–8.5)
IANC: 8.8 K/UL — HIGH (ref 1.5–8.5)
IANC: 8.81 K/UL — HIGH (ref 1.5–8.5)
IANC: 8.82 K/UL — HIGH (ref 1.8–7.4)
IANC: 9.07 K/UL — HIGH (ref 1.5–8.5)
IANC: 9.3 K/UL — HIGH (ref 1.5–8.5)
IANC: 9.5 K/UL — HIGH (ref 1.5–8.5)
IANC: 9.65 K/UL — HIGH (ref 1.5–8.5)
IANC: 9.77 K/UL — HIGH (ref 1.5–8.5)
IANC: 9.8 K/UL — HIGH (ref 1.5–8.5)
IGA FLD-MCNC: 133 MG/DL — SIGNIFICANT CHANGE UP (ref 84–499)
IGA FLD-MCNC: 180 MG/DL — SIGNIFICANT CHANGE UP (ref 84–499)
IGA FLD-MCNC: 188 MG/DL — SIGNIFICANT CHANGE UP (ref 84–499)
IGG FLD-MCNC: 1076 MG/DL — SIGNIFICANT CHANGE UP (ref 610–1660)
IGG FLD-MCNC: 763 MG/DL — SIGNIFICANT CHANGE UP (ref 610–1660)
IGG FLD-MCNC: 992 MG/DL — SIGNIFICANT CHANGE UP (ref 610–1660)
IGM SERPL-MCNC: 42 MG/DL — SIGNIFICANT CHANGE UP (ref 35–242)
IGM SERPL-MCNC: 51 MG/DL — SIGNIFICANT CHANGE UP (ref 35–242)
IGM SERPL-MCNC: 61 MG/DL — SIGNIFICANT CHANGE UP (ref 35–242)
IL10 SERPL-MCNC: 10.2 PG/ML — HIGH
IL10 SERPL-MCNC: 6.2 PG/ML — HIGH
IL10 SERPL-MCNC: 9.2 PG/ML — HIGH
IL12 SERPL-MCNC: <1.9 PG/ML — SIGNIFICANT CHANGE UP
IL13 SERPL-MCNC: 3.7 PG/ML — HIGH
IL13 SERPL-MCNC: 9 PG/ML — HIGH
IL13 SERPL-MCNC: <1.7 PG/ML — SIGNIFICANT CHANGE UP
IL17A SERPL-MCNC: <1.4 PG/ML — SIGNIFICANT CHANGE UP
IL2 SERPL-MCNC: 1923.2 PG/ML — HIGH (ref 175.3–858.2)
IL2 SERPL-MCNC: 2132.6 PG/ML — HIGH (ref 175.3–858.2)
IL2 SERPL-MCNC: 3331.3 PG/ML — HIGH (ref 175.3–858.2)
IL2 SERPL-MCNC: <2.1 PG/ML — SIGNIFICANT CHANGE UP
IL4 SERPL-MCNC: <2.2 PG/ML — SIGNIFICANT CHANGE UP
IL6 SERPL-MCNC: 10.4 PG/ML — HIGH
IL6 SERPL-MCNC: 15.5 PG/ML — HIGH
IL6 SERPL-MCNC: 27.7 PG/ML — HIGH
IL8 SERPL-MCNC: 5.5 PG/ML — HIGH
IL8 SERPL-MCNC: <3 PG/ML — SIGNIFICANT CHANGE UP
IL8 SERPL-MCNC: <3 PG/ML — SIGNIFICANT CHANGE UP
IMM GRANULOCYTES NFR BLD AUTO: 0.3 % — SIGNIFICANT CHANGE UP (ref 0–1.5)
IMM GRANULOCYTES NFR BLD AUTO: 0.5 % — SIGNIFICANT CHANGE UP (ref 0–1.5)
IMM GRANULOCYTES NFR BLD AUTO: 0.6 % — SIGNIFICANT CHANGE UP (ref 0–1.5)
IMM GRANULOCYTES NFR BLD AUTO: 0.6 % — SIGNIFICANT CHANGE UP (ref 0–1.5)
IMM GRANULOCYTES NFR BLD AUTO: 0.7 % — SIGNIFICANT CHANGE UP (ref 0–1.5)
IMM GRANULOCYTES NFR BLD AUTO: 0.7 % — SIGNIFICANT CHANGE UP (ref 0–1.5)
IMM GRANULOCYTES NFR BLD AUTO: 0.8 % — SIGNIFICANT CHANGE UP (ref 0–1.5)
IMM GRANULOCYTES NFR BLD AUTO: 0.9 % — SIGNIFICANT CHANGE UP (ref 0–1.5)
IMM GRANULOCYTES NFR BLD AUTO: 1 % — SIGNIFICANT CHANGE UP (ref 0–1.5)
IMM GRANULOCYTES NFR BLD AUTO: 1 % — SIGNIFICANT CHANGE UP (ref 0–1.5)
IMM GRANULOCYTES NFR BLD AUTO: 1.1 % — SIGNIFICANT CHANGE UP (ref 0–1.5)
IMM GRANULOCYTES NFR BLD AUTO: 1.2 % — SIGNIFICANT CHANGE UP (ref 0–1.5)
IMM GRANULOCYTES NFR BLD AUTO: 1.2 % — SIGNIFICANT CHANGE UP (ref 0–1.5)
IMM GRANULOCYTES NFR BLD AUTO: 1.3 % — SIGNIFICANT CHANGE UP (ref 0–1.5)
IMM GRANULOCYTES NFR BLD AUTO: 1.4 % — SIGNIFICANT CHANGE UP (ref 0–1.5)
IMM GRANULOCYTES NFR BLD AUTO: 1.7 % — HIGH (ref 0–1.5)
IMM GRANULOCYTES NFR BLD AUTO: 1.8 % — HIGH (ref 0–1.5)
IMM GRANULOCYTES NFR BLD AUTO: 1.9 % — HIGH (ref 0–1.5)
IMM GRANULOCYTES NFR BLD AUTO: 2 % — HIGH (ref 0–1.5)
IMM GRANULOCYTES NFR BLD AUTO: 2.1 % — HIGH (ref 0–1.5)
IMM GRANULOCYTES NFR BLD AUTO: 2.5 % — HIGH (ref 0–1.5)
IMM GRANULOCYTES NFR BLD AUTO: 2.6 % — HIGH (ref 0–1.5)
IMM GRANULOCYTES NFR BLD AUTO: 2.9 % — HIGH (ref 0–1.5)
IMM GRANULOCYTES NFR BLD AUTO: 4.2 % — HIGH (ref 0–1.5)
IMM GRANULOCYTES NFR BLD AUTO: 4.4 % — HIGH (ref 0–1.5)
INR BLD: 1.14 RATIO — SIGNIFICANT CHANGE UP (ref 0.88–1.16)
INR BLD: 1.2 RATIO — HIGH (ref 0.88–1.16)
INR BLD: 1.22 RATIO — HIGH (ref 0.88–1.16)
INR BLD: 1.39 RATIO — HIGH (ref 0.88–1.16)
INR BLD: 1.86 RATIO — HIGH (ref 0.88–1.16)
INR BLD: 2.41 RATIO — HIGH (ref 0.88–1.16)
INR BLD: 2.71 RATIO — HIGH (ref 0.88–1.16)
INR BLD: 2.96 RATIO — HIGH (ref 0.88–1.16)
INR BLD: 3.39 RATIO — HIGH (ref 0.88–1.16)
INTERFERON GAMMA, BLOOD: <4.2 PG/ML — SIGNIFICANT CHANGE UP
INTERLEUKIN 1 BETA: <6.5 PG/ML — SIGNIFICANT CHANGE UP
INTERLEUKIN 5: <2.1 PG/ML — SIGNIFICANT CHANGE UP
IRON SATN MFR SERPL: 17 % — SIGNIFICANT CHANGE UP (ref 14–50)
IRON SATN MFR SERPL: 30 % — SIGNIFICANT CHANGE UP (ref 14–50)
IRON SATN MFR SERPL: 30 UG/DL — SIGNIFICANT CHANGE UP (ref 30–160)
IRON SATN MFR SERPL: 35 % — SIGNIFICANT CHANGE UP (ref 14–50)
IRON SATN MFR SERPL: 35 UG/DL — SIGNIFICANT CHANGE UP (ref 30–160)
IRON SATN MFR SERPL: 35 UG/DL — SIGNIFICANT CHANGE UP (ref 30–160)
KAPPA LC SER QL IFE: 11.57 MG/DL — HIGH (ref 0.33–1.94)
KAPPA LC SER QL IFE: 12.92 MG/DL — HIGH (ref 0.33–1.94)
KAPPA LC SER QL IFE: 20.08 MG/DL — HIGH (ref 0.33–1.94)
KAPPA/LAMBDA FREE LIGHT CHAIN RATIO, SERUM: 1.55 RATIO — SIGNIFICANT CHANGE UP (ref 0.26–1.65)
KAPPA/LAMBDA FREE LIGHT CHAIN RATIO, SERUM: 1.76 RATIO — HIGH (ref 0.26–1.65)
KAPPA/LAMBDA FREE LIGHT CHAIN RATIO, SERUM: 2.33 RATIO — HIGH (ref 0.26–1.65)
LACTATE BLDV-MCNC: 0.7 MMOL/L — SIGNIFICANT CHANGE UP (ref 0.5–2)
LACTATE BLDV-MCNC: 1 MMOL/L — SIGNIFICANT CHANGE UP (ref 0.5–2)
LAMBDA LC SER QL IFE: 12.96 MG/DL — HIGH (ref 0.57–2.63)
LAMBDA LC SER QL IFE: 4.97 MG/DL — HIGH (ref 0.57–2.63)
LAMBDA LC SER QL IFE: 7.36 MG/DL — HIGH (ref 0.57–2.63)
LDH SERPL L TO P-CCNC: 151 U/L — SIGNIFICANT CHANGE UP (ref 135–225)
LDH SERPL L TO P-CCNC: 192 U/L — SIGNIFICANT CHANGE UP (ref 135–225)
LDH SERPL L TO P-CCNC: 193 U/L — SIGNIFICANT CHANGE UP (ref 135–225)
LDH SERPL L TO P-CCNC: 194 U/L — SIGNIFICANT CHANGE UP (ref 135–225)
LDH SERPL L TO P-CCNC: 211 U/L — SIGNIFICANT CHANGE UP (ref 135–225)
LDH SERPL L TO P-CCNC: 212 U/L — SIGNIFICANT CHANGE UP (ref 135–225)
LDH SERPL L TO P-CCNC: 223 U/L — SIGNIFICANT CHANGE UP (ref 135–225)
LDH SERPL L TO P-CCNC: 251 U/L — HIGH (ref 135–225)
LDH SERPL L TO P-CCNC: 271 U/L — HIGH (ref 135–225)
LDH SERPL L TO P-CCNC: 304 U/L — HIGH (ref 135–225)
LDH SERPL L TO P-CCNC: 379 U/L — HIGH (ref 135–225)
LIDOCAIN IGE QN: 24 U/L — SIGNIFICANT CHANGE UP (ref 7–60)
LIPID PNL WITH DIRECT LDL SERPL: 123 MG/DL — HIGH
LIPID PNL WITH DIRECT LDL SERPL: 34 MG/DL — SIGNIFICANT CHANGE UP
LYMPHOCYTES # BLD AUTO: 0.4 K/UL — LOW (ref 1–3.3)
LYMPHOCYTES # BLD AUTO: 0.54 K/UL — LOW (ref 1–3.3)
LYMPHOCYTES # BLD AUTO: 0.6 K/UL — LOW (ref 1–3.3)
LYMPHOCYTES # BLD AUTO: 0.63 K/UL — LOW (ref 1–3.3)
LYMPHOCYTES # BLD AUTO: 0.73 K/UL — LOW (ref 1–3.3)
LYMPHOCYTES # BLD AUTO: 0.76 K/UL — LOW (ref 1–3.3)
LYMPHOCYTES # BLD AUTO: 0.8 K/UL — LOW (ref 1–3.3)
LYMPHOCYTES # BLD AUTO: 0.81 K/UL — LOW (ref 1–3.3)
LYMPHOCYTES # BLD AUTO: 0.82 K/UL — LOW (ref 1–3.3)
LYMPHOCYTES # BLD AUTO: 0.85 K/UL — LOW (ref 1–3.3)
LYMPHOCYTES # BLD AUTO: 0.85 K/UL — LOW (ref 1–3.3)
LYMPHOCYTES # BLD AUTO: 0.89 K/UL — LOW (ref 1–3.3)
LYMPHOCYTES # BLD AUTO: 0.9 K/UL — LOW (ref 1–3.3)
LYMPHOCYTES # BLD AUTO: 0.91 K/UL — LOW (ref 1–3.3)
LYMPHOCYTES # BLD AUTO: 0.92 K/UL — LOW (ref 1–3.3)
LYMPHOCYTES # BLD AUTO: 0.94 K/UL — LOW (ref 1–3.3)
LYMPHOCYTES # BLD AUTO: 1.03 K/UL — SIGNIFICANT CHANGE UP (ref 1–3.3)
LYMPHOCYTES # BLD AUTO: 1.06 K/UL — SIGNIFICANT CHANGE UP (ref 1–3.3)
LYMPHOCYTES # BLD AUTO: 1.08 K/UL — SIGNIFICANT CHANGE UP (ref 1–3.3)
LYMPHOCYTES # BLD AUTO: 1.11 K/UL — SIGNIFICANT CHANGE UP (ref 1–3.3)
LYMPHOCYTES # BLD AUTO: 1.12 K/UL — SIGNIFICANT CHANGE UP (ref 1–3.3)
LYMPHOCYTES # BLD AUTO: 1.14 K/UL — SIGNIFICANT CHANGE UP (ref 1–3.3)
LYMPHOCYTES # BLD AUTO: 1.14 K/UL — SIGNIFICANT CHANGE UP (ref 1–3.3)
LYMPHOCYTES # BLD AUTO: 1.16 K/UL — SIGNIFICANT CHANGE UP (ref 1–3.3)
LYMPHOCYTES # BLD AUTO: 1.21 K/UL — SIGNIFICANT CHANGE UP (ref 1–3.3)
LYMPHOCYTES # BLD AUTO: 1.22 K/UL — SIGNIFICANT CHANGE UP (ref 1–3.3)
LYMPHOCYTES # BLD AUTO: 1.24 K/UL — SIGNIFICANT CHANGE UP (ref 1–3.3)
LYMPHOCYTES # BLD AUTO: 1.26 K/UL — SIGNIFICANT CHANGE UP (ref 1–3.3)
LYMPHOCYTES # BLD AUTO: 1.26 K/UL — SIGNIFICANT CHANGE UP (ref 1–3.3)
LYMPHOCYTES # BLD AUTO: 1.29 K/UL — SIGNIFICANT CHANGE UP (ref 1–3.3)
LYMPHOCYTES # BLD AUTO: 1.39 K/UL — SIGNIFICANT CHANGE UP (ref 1–3.3)
LYMPHOCYTES # BLD AUTO: 1.51 K/UL — SIGNIFICANT CHANGE UP (ref 1–3.3)
LYMPHOCYTES # BLD AUTO: 1.63 K/UL — SIGNIFICANT CHANGE UP (ref 1–3.3)
LYMPHOCYTES # BLD AUTO: 1.7 K/UL — SIGNIFICANT CHANGE UP (ref 1–3.3)
LYMPHOCYTES # BLD AUTO: 1.7 K/UL — SIGNIFICANT CHANGE UP (ref 1–3.3)
LYMPHOCYTES # BLD AUTO: 1.76 K/UL — SIGNIFICANT CHANGE UP (ref 1–3.3)
LYMPHOCYTES # BLD AUTO: 1.8 % — LOW (ref 13–44)
LYMPHOCYTES # BLD AUTO: 10.3 % — LOW (ref 13–44)
LYMPHOCYTES # BLD AUTO: 10.5 % — LOW (ref 13–44)
LYMPHOCYTES # BLD AUTO: 10.6 % — LOW (ref 13–44)
LYMPHOCYTES # BLD AUTO: 10.8 % — LOW (ref 13–44)
LYMPHOCYTES # BLD AUTO: 12.8 % — LOW (ref 13–44)
LYMPHOCYTES # BLD AUTO: 13.8 % — SIGNIFICANT CHANGE UP (ref 13–44)
LYMPHOCYTES # BLD AUTO: 14.5 % — SIGNIFICANT CHANGE UP (ref 13–44)
LYMPHOCYTES # BLD AUTO: 2.7 % — LOW (ref 13–44)
LYMPHOCYTES # BLD AUTO: 3.5 % — LOW (ref 13–44)
LYMPHOCYTES # BLD AUTO: 4.3 % — LOW (ref 13–44)
LYMPHOCYTES # BLD AUTO: 5.5 % — LOW (ref 13–44)
LYMPHOCYTES # BLD AUTO: 5.9 % — LOW (ref 13–44)
LYMPHOCYTES # BLD AUTO: 5.9 % — LOW (ref 13–44)
LYMPHOCYTES # BLD AUTO: 6.1 % — LOW (ref 13–44)
LYMPHOCYTES # BLD AUTO: 6.4 % — LOW (ref 13–44)
LYMPHOCYTES # BLD AUTO: 6.4 % — LOW (ref 13–44)
LYMPHOCYTES # BLD AUTO: 7.2 % — LOW (ref 13–44)
LYMPHOCYTES # BLD AUTO: 7.2 % — LOW (ref 13–44)
LYMPHOCYTES # BLD AUTO: 7.3 % — LOW (ref 13–44)
LYMPHOCYTES # BLD AUTO: 7.4 % — LOW (ref 13–44)
LYMPHOCYTES # BLD AUTO: 7.4 % — LOW (ref 13–44)
LYMPHOCYTES # BLD AUTO: 7.5 % — LOW (ref 13–44)
LYMPHOCYTES # BLD AUTO: 7.6 % — LOW (ref 13–44)
LYMPHOCYTES # BLD AUTO: 8 % — LOW (ref 13–44)
LYMPHOCYTES # BLD AUTO: 8.1 % — LOW (ref 13–44)
LYMPHOCYTES # BLD AUTO: 8.4 % — LOW (ref 13–44)
LYMPHOCYTES # BLD AUTO: 8.7 % — LOW (ref 13–44)
LYMPHOCYTES # BLD AUTO: 9.1 % — LOW (ref 13–44)
LYMPHOCYTES # BLD AUTO: 9.1 % — LOW (ref 13–44)
LYMPHOCYTES # BLD AUTO: 9.3 % — LOW (ref 13–44)
LYMPHOCYTES # BLD AUTO: 9.4 % — LOW (ref 13–44)
LYMPHOCYTES # BLD AUTO: 9.5 % — LOW (ref 13–44)
LYMPHOCYTES # BLD AUTO: 9.5 % — LOW (ref 13–44)
LYMPHOCYTES # BLD AUTO: 9.7 % — LOW (ref 13–44)
LYMPHOCYTES # BLD AUTO: 9.8 % — LOW (ref 13–44)
LYMPHOCYTES # FLD: 2 % — SIGNIFICANT CHANGE UP
M PNEUMO DNA SPEC QL NAA+PROBE: SIGNIFICANT CHANGE UP
M PNEUMO DNA SPEC QL NAA+PROBE: SIGNIFICANT CHANGE UP
M TB IFN-G BLD-IMP: ABNORMAL
M TB IFN-G BLD-IMP: ABNORMAL
M TB IFN-G CD4+ BCKGRND COR BLD-ACNC: 0 IU/ML — SIGNIFICANT CHANGE UP
M TB IFN-G CD4+ BCKGRND COR BLD-ACNC: 0 IU/ML — SIGNIFICANT CHANGE UP
M TB IFN-G CD4+CD8+ BCKGRND COR BLD-ACNC: 0 IU/ML — SIGNIFICANT CHANGE UP
M TB IFN-G CD4+CD8+ BCKGRND COR BLD-ACNC: 0 IU/ML — SIGNIFICANT CHANGE UP
M-SPIKE: 0.5 G/DL — SIGNIFICANT CHANGE UP
MACROCYTES BLD QL: SLIGHT — SIGNIFICANT CHANGE UP
MACROCYTES BLD QL: SLIGHT — SIGNIFICANT CHANGE UP
MAGNESIUM SERPL-MCNC: 1.9 MG/DL — SIGNIFICANT CHANGE UP (ref 1.6–2.6)
MAGNESIUM SERPL-MCNC: 2 MG/DL — SIGNIFICANT CHANGE UP (ref 1.6–2.6)
MAGNESIUM SERPL-MCNC: 2.1 MG/DL — SIGNIFICANT CHANGE UP (ref 1.6–2.6)
MAGNESIUM SERPL-MCNC: 2.2 MG/DL — SIGNIFICANT CHANGE UP (ref 1.6–2.6)
MAGNESIUM SERPL-MCNC: 2.3 MG/DL — SIGNIFICANT CHANGE UP (ref 1.6–2.6)
MAGNESIUM SERPL-MCNC: 2.4 MG/DL — SIGNIFICANT CHANGE UP (ref 1.6–2.6)
MAGNESIUM SERPL-MCNC: 2.5 MG/DL — SIGNIFICANT CHANGE UP (ref 1.6–2.6)
MAGNESIUM SERPL-MCNC: 2.6 MG/DL — SIGNIFICANT CHANGE UP (ref 1.6–2.6)
MAGNESIUM SERPL-MCNC: 2.6 MG/DL — SIGNIFICANT CHANGE UP (ref 1.6–2.6)
MAGNESIUM SERPL-MCNC: 2.7 MG/DL — HIGH (ref 1.6–2.6)
MAGNESIUM SERPL-MCNC: 2.8 MG/DL — HIGH (ref 1.6–2.6)
MAGNESIUM SERPL-MCNC: 2.8 MG/DL — HIGH (ref 1.6–2.6)
MAGNESIUM SERPL-MCNC: 2.9 MG/DL — HIGH (ref 1.6–2.6)
MAGNESIUM SERPL-MCNC: 2.9 MG/DL — HIGH (ref 1.6–2.6)
MAGNESIUM SERPL-MCNC: 3.1 MG/DL — HIGH (ref 1.6–2.6)
MANUAL SMEAR VERIFICATION: SIGNIFICANT CHANGE UP
MANUAL SMEAR VERIFICATION: SIGNIFICANT CHANGE UP
MCHC RBC-ENTMCNC: 26.2 GM/DL — LOW (ref 32–36)
MCHC RBC-ENTMCNC: 26.4 PG — LOW (ref 27–34)
MCHC RBC-ENTMCNC: 26.6 PG — LOW (ref 27–34)
MCHC RBC-ENTMCNC: 26.7 PG — LOW (ref 27–34)
MCHC RBC-ENTMCNC: 26.8 PG — LOW (ref 27–34)
MCHC RBC-ENTMCNC: 26.8 PG — LOW (ref 27–34)
MCHC RBC-ENTMCNC: 26.9 GM/DL — LOW (ref 32–36)
MCHC RBC-ENTMCNC: 26.9 PG — LOW (ref 27–34)
MCHC RBC-ENTMCNC: 27 GM/DL — LOW (ref 32–36)
MCHC RBC-ENTMCNC: 27 PG — SIGNIFICANT CHANGE UP (ref 27–34)
MCHC RBC-ENTMCNC: 27.1 GM/DL — LOW (ref 32–36)
MCHC RBC-ENTMCNC: 27.1 PG — SIGNIFICANT CHANGE UP (ref 27–34)
MCHC RBC-ENTMCNC: 27.2 PG — SIGNIFICANT CHANGE UP (ref 27–34)
MCHC RBC-ENTMCNC: 27.3 GM/DL — LOW (ref 32–36)
MCHC RBC-ENTMCNC: 27.3 GM/DL — LOW (ref 32–36)
MCHC RBC-ENTMCNC: 27.3 PG — SIGNIFICANT CHANGE UP (ref 27–34)
MCHC RBC-ENTMCNC: 27.4 GM/DL — LOW (ref 32–36)
MCHC RBC-ENTMCNC: 27.4 PG — SIGNIFICANT CHANGE UP (ref 27–34)
MCHC RBC-ENTMCNC: 27.5 GM/DL — LOW (ref 32–36)
MCHC RBC-ENTMCNC: 27.5 GM/DL — LOW (ref 32–36)
MCHC RBC-ENTMCNC: 27.5 PG — SIGNIFICANT CHANGE UP (ref 27–34)
MCHC RBC-ENTMCNC: 27.6 GM/DL — LOW (ref 32–36)
MCHC RBC-ENTMCNC: 27.6 GM/DL — LOW (ref 32–36)
MCHC RBC-ENTMCNC: 27.6 PG — SIGNIFICANT CHANGE UP (ref 27–34)
MCHC RBC-ENTMCNC: 27.7 GM/DL — LOW (ref 32–36)
MCHC RBC-ENTMCNC: 27.7 GM/DL — LOW (ref 32–36)
MCHC RBC-ENTMCNC: 27.7 PG — SIGNIFICANT CHANGE UP (ref 27–34)
MCHC RBC-ENTMCNC: 27.8 GM/DL — LOW (ref 32–36)
MCHC RBC-ENTMCNC: 27.8 PG — SIGNIFICANT CHANGE UP (ref 27–34)
MCHC RBC-ENTMCNC: 27.9 GM/DL — LOW (ref 32–36)
MCHC RBC-ENTMCNC: 27.9 PG — SIGNIFICANT CHANGE UP (ref 27–34)
MCHC RBC-ENTMCNC: 28 GM/DL — LOW (ref 32–36)
MCHC RBC-ENTMCNC: 28 PG — SIGNIFICANT CHANGE UP (ref 27–34)
MCHC RBC-ENTMCNC: 28.1 PG — SIGNIFICANT CHANGE UP (ref 27–34)
MCHC RBC-ENTMCNC: 28.2 GM/DL — LOW (ref 32–36)
MCHC RBC-ENTMCNC: 28.2 PG — SIGNIFICANT CHANGE UP (ref 27–34)
MCHC RBC-ENTMCNC: 28.3 GM/DL — LOW (ref 32–36)
MCHC RBC-ENTMCNC: 28.3 GM/DL — LOW (ref 32–36)
MCHC RBC-ENTMCNC: 28.3 PG — SIGNIFICANT CHANGE UP (ref 27–34)
MCHC RBC-ENTMCNC: 28.4 GM/DL — LOW (ref 32–36)
MCHC RBC-ENTMCNC: 28.4 GM/DL — LOW (ref 32–36)
MCHC RBC-ENTMCNC: 28.5 GM/DL — LOW (ref 32–36)
MCHC RBC-ENTMCNC: 28.6 GM/DL — LOW (ref 32–36)
MCHC RBC-ENTMCNC: 28.6 PG — SIGNIFICANT CHANGE UP (ref 27–34)
MCHC RBC-ENTMCNC: 28.7 GM/DL — LOW (ref 32–36)
MCHC RBC-ENTMCNC: 28.8 GM/DL — LOW (ref 32–36)
MCHC RBC-ENTMCNC: 28.9 GM/DL — LOW (ref 32–36)
MCHC RBC-ENTMCNC: 29 GM/DL — LOW (ref 32–36)
MCHC RBC-ENTMCNC: 29.1 GM/DL — LOW (ref 32–36)
MCHC RBC-ENTMCNC: 29.1 GM/DL — LOW (ref 32–36)
MCHC RBC-ENTMCNC: 29.1 PG — SIGNIFICANT CHANGE UP (ref 27–34)
MCHC RBC-ENTMCNC: 29.2 GM/DL — LOW (ref 32–36)
MCHC RBC-ENTMCNC: 29.2 GM/DL — LOW (ref 32–36)
MCHC RBC-ENTMCNC: 29.3 GM/DL — LOW (ref 32–36)
MCHC RBC-ENTMCNC: 29.4 GM/DL — LOW (ref 32–36)
MCHC RBC-ENTMCNC: 29.5 GM/DL — LOW (ref 32–36)
MCHC RBC-ENTMCNC: 29.5 GM/DL — LOW (ref 32–36)
MCHC RBC-ENTMCNC: 29.6 GM/DL — LOW (ref 32–36)
MCHC RBC-ENTMCNC: 29.7 GM/DL — LOW (ref 32–36)
MCHC RBC-ENTMCNC: 29.7 GM/DL — LOW (ref 32–36)
MCV RBC AUTO: 100 FL — SIGNIFICANT CHANGE UP (ref 80–100)
MCV RBC AUTO: 100.6 FL — HIGH (ref 80–100)
MCV RBC AUTO: 100.7 FL — HIGH (ref 80–100)
MCV RBC AUTO: 101.7 FL — HIGH (ref 80–100)
MCV RBC AUTO: 102.2 FL — HIGH (ref 80–100)
MCV RBC AUTO: 102.6 FL — HIGH (ref 80–100)
MCV RBC AUTO: 102.7 FL — HIGH (ref 80–100)
MCV RBC AUTO: 103.1 FL — HIGH (ref 80–100)
MCV RBC AUTO: 103.4 FL — HIGH (ref 80–100)
MCV RBC AUTO: 92.4 FL — SIGNIFICANT CHANGE UP (ref 80–100)
MCV RBC AUTO: 92.6 FL — SIGNIFICANT CHANGE UP (ref 80–100)
MCV RBC AUTO: 92.7 FL — SIGNIFICANT CHANGE UP (ref 80–100)
MCV RBC AUTO: 93 FL — SIGNIFICANT CHANGE UP (ref 80–100)
MCV RBC AUTO: 93.1 FL — SIGNIFICANT CHANGE UP (ref 80–100)
MCV RBC AUTO: 93.2 FL — SIGNIFICANT CHANGE UP (ref 80–100)
MCV RBC AUTO: 93.7 FL — SIGNIFICANT CHANGE UP (ref 80–100)
MCV RBC AUTO: 93.7 FL — SIGNIFICANT CHANGE UP (ref 80–100)
MCV RBC AUTO: 93.8 FL — SIGNIFICANT CHANGE UP (ref 80–100)
MCV RBC AUTO: 93.8 FL — SIGNIFICANT CHANGE UP (ref 80–100)
MCV RBC AUTO: 94.2 FL — SIGNIFICANT CHANGE UP (ref 80–100)
MCV RBC AUTO: 94.2 FL — SIGNIFICANT CHANGE UP (ref 80–100)
MCV RBC AUTO: 94.4 FL — SIGNIFICANT CHANGE UP (ref 80–100)
MCV RBC AUTO: 94.4 FL — SIGNIFICANT CHANGE UP (ref 80–100)
MCV RBC AUTO: 94.5 FL — SIGNIFICANT CHANGE UP (ref 80–100)
MCV RBC AUTO: 94.6 FL — SIGNIFICANT CHANGE UP (ref 80–100)
MCV RBC AUTO: 94.6 FL — SIGNIFICANT CHANGE UP (ref 80–100)
MCV RBC AUTO: 94.7 FL — SIGNIFICANT CHANGE UP (ref 80–100)
MCV RBC AUTO: 94.8 FL — SIGNIFICANT CHANGE UP (ref 80–100)
MCV RBC AUTO: 94.8 FL — SIGNIFICANT CHANGE UP (ref 80–100)
MCV RBC AUTO: 94.9 FL — SIGNIFICANT CHANGE UP (ref 80–100)
MCV RBC AUTO: 95 FL — SIGNIFICANT CHANGE UP (ref 80–100)
MCV RBC AUTO: 95.2 FL — SIGNIFICANT CHANGE UP (ref 80–100)
MCV RBC AUTO: 95.3 FL — SIGNIFICANT CHANGE UP (ref 80–100)
MCV RBC AUTO: 95.4 FL — SIGNIFICANT CHANGE UP (ref 80–100)
MCV RBC AUTO: 95.5 FL — SIGNIFICANT CHANGE UP (ref 80–100)
MCV RBC AUTO: 95.5 FL — SIGNIFICANT CHANGE UP (ref 80–100)
MCV RBC AUTO: 95.6 FL — SIGNIFICANT CHANGE UP (ref 80–100)
MCV RBC AUTO: 95.6 FL — SIGNIFICANT CHANGE UP (ref 80–100)
MCV RBC AUTO: 95.7 FL — SIGNIFICANT CHANGE UP (ref 80–100)
MCV RBC AUTO: 95.9 FL — SIGNIFICANT CHANGE UP (ref 80–100)
MCV RBC AUTO: 95.9 FL — SIGNIFICANT CHANGE UP (ref 80–100)
MCV RBC AUTO: 96.1 FL — SIGNIFICANT CHANGE UP (ref 80–100)
MCV RBC AUTO: 96.2 FL — SIGNIFICANT CHANGE UP (ref 80–100)
MCV RBC AUTO: 96.3 FL — SIGNIFICANT CHANGE UP (ref 80–100)
MCV RBC AUTO: 96.5 FL — SIGNIFICANT CHANGE UP (ref 80–100)
MCV RBC AUTO: 96.7 FL — SIGNIFICANT CHANGE UP (ref 80–100)
MCV RBC AUTO: 96.8 FL — SIGNIFICANT CHANGE UP (ref 80–100)
MCV RBC AUTO: 96.8 FL — SIGNIFICANT CHANGE UP (ref 80–100)
MCV RBC AUTO: 96.9 FL — SIGNIFICANT CHANGE UP (ref 80–100)
MCV RBC AUTO: 97.1 FL — SIGNIFICANT CHANGE UP (ref 80–100)
MCV RBC AUTO: 97.1 FL — SIGNIFICANT CHANGE UP (ref 80–100)
MCV RBC AUTO: 97.2 FL — SIGNIFICANT CHANGE UP (ref 80–100)
MCV RBC AUTO: 97.2 FL — SIGNIFICANT CHANGE UP (ref 80–100)
MCV RBC AUTO: 97.3 FL — SIGNIFICANT CHANGE UP (ref 80–100)
MCV RBC AUTO: 97.4 FL — SIGNIFICANT CHANGE UP (ref 80–100)
MCV RBC AUTO: 97.5 FL — SIGNIFICANT CHANGE UP (ref 80–100)
MCV RBC AUTO: 97.6 FL — SIGNIFICANT CHANGE UP (ref 80–100)
MCV RBC AUTO: 97.6 FL — SIGNIFICANT CHANGE UP (ref 80–100)
MCV RBC AUTO: 97.8 FL — SIGNIFICANT CHANGE UP (ref 80–100)
MCV RBC AUTO: 97.9 FL — SIGNIFICANT CHANGE UP (ref 80–100)
MCV RBC AUTO: 98.2 FL — SIGNIFICANT CHANGE UP (ref 80–100)
MCV RBC AUTO: 98.4 FL — SIGNIFICANT CHANGE UP (ref 80–100)
MCV RBC AUTO: 98.4 FL — SIGNIFICANT CHANGE UP (ref 80–100)
MCV RBC AUTO: 98.6 FL — SIGNIFICANT CHANGE UP (ref 80–100)
MCV RBC AUTO: 99 FL — SIGNIFICANT CHANGE UP (ref 80–100)
MCV RBC AUTO: 99.1 FL — SIGNIFICANT CHANGE UP (ref 80–100)
MCV RBC AUTO: 99.3 FL — SIGNIFICANT CHANGE UP (ref 80–100)
MCV RBC AUTO: 99.3 FL — SIGNIFICANT CHANGE UP (ref 80–100)
METAMYELOCYTES # FLD: 0.9 % — SIGNIFICANT CHANGE UP (ref 0–1)
METAMYELOCYTES # FLD: 2.6 % — HIGH (ref 0–1)
METHOD TYPE: SIGNIFICANT CHANGE UP
MICROCYTES BLD QL: SLIGHT — SIGNIFICANT CHANGE UP
MONOCYTES # BLD AUTO: 0.45 K/UL — SIGNIFICANT CHANGE UP (ref 0–0.9)
MONOCYTES # BLD AUTO: 0.5 K/UL — SIGNIFICANT CHANGE UP (ref 0–0.9)
MONOCYTES # BLD AUTO: 0.57 K/UL — SIGNIFICANT CHANGE UP (ref 0–0.9)
MONOCYTES # BLD AUTO: 0.7 K/UL — SIGNIFICANT CHANGE UP (ref 0–0.9)
MONOCYTES # BLD AUTO: 0.72 K/UL — SIGNIFICANT CHANGE UP (ref 0–0.9)
MONOCYTES # BLD AUTO: 0.76 K/UL — SIGNIFICANT CHANGE UP (ref 0–0.9)
MONOCYTES # BLD AUTO: 0.96 K/UL — HIGH (ref 0–0.9)
MONOCYTES # BLD AUTO: 0.99 K/UL — HIGH (ref 0–0.9)
MONOCYTES # BLD AUTO: 1 K/UL — HIGH (ref 0–0.9)
MONOCYTES # BLD AUTO: 1.07 K/UL — HIGH (ref 0–0.9)
MONOCYTES # BLD AUTO: 1.11 K/UL — HIGH (ref 0–0.9)
MONOCYTES # BLD AUTO: 1.11 K/UL — HIGH (ref 0–0.9)
MONOCYTES # BLD AUTO: 1.14 K/UL — HIGH (ref 0–0.9)
MONOCYTES # BLD AUTO: 1.14 K/UL — HIGH (ref 0–0.9)
MONOCYTES # BLD AUTO: 1.19 K/UL — HIGH (ref 0–0.9)
MONOCYTES # BLD AUTO: 1.2 K/UL — HIGH (ref 0–0.9)
MONOCYTES # BLD AUTO: 1.21 K/UL — HIGH (ref 0–0.9)
MONOCYTES # BLD AUTO: 1.26 K/UL — HIGH (ref 0–0.9)
MONOCYTES # BLD AUTO: 1.27 K/UL — HIGH (ref 0–0.9)
MONOCYTES # BLD AUTO: 1.28 K/UL — HIGH (ref 0–0.9)
MONOCYTES # BLD AUTO: 1.31 K/UL — HIGH (ref 0–0.9)
MONOCYTES # BLD AUTO: 1.31 K/UL — HIGH (ref 0–0.9)
MONOCYTES # BLD AUTO: 1.33 K/UL — HIGH (ref 0–0.9)
MONOCYTES # BLD AUTO: 1.35 K/UL — HIGH (ref 0–0.9)
MONOCYTES # BLD AUTO: 1.39 K/UL — HIGH (ref 0–0.9)
MONOCYTES # BLD AUTO: 1.4 K/UL — HIGH (ref 0–0.9)
MONOCYTES # BLD AUTO: 1.41 K/UL — HIGH (ref 0–0.9)
MONOCYTES # BLD AUTO: 1.42 K/UL — HIGH (ref 0–0.9)
MONOCYTES # BLD AUTO: 1.42 K/UL — HIGH (ref 0–0.9)
MONOCYTES # BLD AUTO: 1.45 K/UL — HIGH (ref 0–0.9)
MONOCYTES # BLD AUTO: 1.45 K/UL — HIGH (ref 0–0.9)
MONOCYTES # BLD AUTO: 1.48 K/UL — HIGH (ref 0–0.9)
MONOCYTES # BLD AUTO: 1.56 K/UL — HIGH (ref 0–0.9)
MONOCYTES # BLD AUTO: 1.62 K/UL — HIGH (ref 0–0.9)
MONOCYTES # BLD AUTO: 1.66 K/UL — HIGH (ref 0–0.9)
MONOCYTES # BLD AUTO: 1.71 K/UL — HIGH (ref 0–0.9)
MONOCYTES NFR BLD AUTO: 0.9 % — LOW (ref 2–14)
MONOCYTES NFR BLD AUTO: 10 % — SIGNIFICANT CHANGE UP (ref 2–14)
MONOCYTES NFR BLD AUTO: 10.1 % — SIGNIFICANT CHANGE UP (ref 2–14)
MONOCYTES NFR BLD AUTO: 10.3 % — SIGNIFICANT CHANGE UP (ref 2–14)
MONOCYTES NFR BLD AUTO: 10.5 % — SIGNIFICANT CHANGE UP (ref 2–14)
MONOCYTES NFR BLD AUTO: 10.7 % — SIGNIFICANT CHANGE UP (ref 2–14)
MONOCYTES NFR BLD AUTO: 10.8 % — SIGNIFICANT CHANGE UP (ref 2–14)
MONOCYTES NFR BLD AUTO: 11.3 % — SIGNIFICANT CHANGE UP (ref 2–14)
MONOCYTES NFR BLD AUTO: 11.4 % — SIGNIFICANT CHANGE UP (ref 2–14)
MONOCYTES NFR BLD AUTO: 11.6 % — SIGNIFICANT CHANGE UP (ref 2–14)
MONOCYTES NFR BLD AUTO: 11.7 % — SIGNIFICANT CHANGE UP (ref 2–14)
MONOCYTES NFR BLD AUTO: 12 % — SIGNIFICANT CHANGE UP (ref 2–14)
MONOCYTES NFR BLD AUTO: 12 % — SIGNIFICANT CHANGE UP (ref 2–14)
MONOCYTES NFR BLD AUTO: 12.2 % — SIGNIFICANT CHANGE UP (ref 2–14)
MONOCYTES NFR BLD AUTO: 12.4 % — SIGNIFICANT CHANGE UP (ref 2–14)
MONOCYTES NFR BLD AUTO: 12.4 % — SIGNIFICANT CHANGE UP (ref 2–14)
MONOCYTES NFR BLD AUTO: 3.4 % — SIGNIFICANT CHANGE UP (ref 2–14)
MONOCYTES NFR BLD AUTO: 3.5 % — SIGNIFICANT CHANGE UP (ref 2–14)
MONOCYTES NFR BLD AUTO: 4.6 % — SIGNIFICANT CHANGE UP (ref 2–14)
MONOCYTES NFR BLD AUTO: 6.1 % — SIGNIFICANT CHANGE UP (ref 2–14)
MONOCYTES NFR BLD AUTO: 6.9 % — SIGNIFICANT CHANGE UP (ref 2–14)
MONOCYTES NFR BLD AUTO: 7.1 % — SIGNIFICANT CHANGE UP (ref 2–14)
MONOCYTES NFR BLD AUTO: 7.9 % — SIGNIFICANT CHANGE UP (ref 2–14)
MONOCYTES NFR BLD AUTO: 8 % — SIGNIFICANT CHANGE UP (ref 2–14)
MONOCYTES NFR BLD AUTO: 8.3 % — SIGNIFICANT CHANGE UP (ref 2–14)
MONOCYTES NFR BLD AUTO: 8.5 % — SIGNIFICANT CHANGE UP (ref 2–14)
MONOCYTES NFR BLD AUTO: 8.6 % — SIGNIFICANT CHANGE UP (ref 2–14)
MONOCYTES NFR BLD AUTO: 8.9 % — SIGNIFICANT CHANGE UP (ref 2–14)
MONOCYTES NFR BLD AUTO: 9.3 % — SIGNIFICANT CHANGE UP (ref 2–14)
MONOCYTES NFR BLD AUTO: 9.5 % — SIGNIFICANT CHANGE UP (ref 2–14)
MONOCYTES NFR BLD AUTO: 9.5 % — SIGNIFICANT CHANGE UP (ref 2–14)
MONOCYTES NFR BLD AUTO: 9.7 % — SIGNIFICANT CHANGE UP (ref 2–14)
MONOCYTES NFR BLD AUTO: 9.8 % — SIGNIFICANT CHANGE UP (ref 2–14)
MONOCYTES NFR BLD AUTO: 9.8 % — SIGNIFICANT CHANGE UP (ref 2–14)
MONOS+MACROS # FLD: 20 % — SIGNIFICANT CHANGE UP
MRSA PCR RESULT.: DETECTED
MRSA PCR RESULT.: SIGNIFICANT CHANGE UP
MYELOCYTES NFR BLD: 0.9 % — HIGH (ref 0–0)
MYELOCYTES NFR BLD: 2.6 % — HIGH (ref 0–0)
NEUTROPHILS # BLD AUTO: 10.48 K/UL — HIGH (ref 1.8–7.4)
NEUTROPHILS # BLD AUTO: 10.53 K/UL — HIGH (ref 1.8–7.4)
NEUTROPHILS # BLD AUTO: 10.61 K/UL — HIGH (ref 1.8–7.4)
NEUTROPHILS # BLD AUTO: 10.82 K/UL — HIGH (ref 1.8–7.4)
NEUTROPHILS # BLD AUTO: 11.09 K/UL — HIGH (ref 1.8–7.4)
NEUTROPHILS # BLD AUTO: 11.16 K/UL — HIGH (ref 1.8–7.4)
NEUTROPHILS # BLD AUTO: 11.25 K/UL — HIGH (ref 1.8–7.4)
NEUTROPHILS # BLD AUTO: 11.82 K/UL — HIGH (ref 1.8–7.4)
NEUTROPHILS # BLD AUTO: 13.34 K/UL — HIGH (ref 1.8–7.4)
NEUTROPHILS # BLD AUTO: 13.36 K/UL — HIGH (ref 1.8–7.4)
NEUTROPHILS # BLD AUTO: 14.43 K/UL — HIGH (ref 1.8–7.4)
NEUTROPHILS # BLD AUTO: 17.34 K/UL — HIGH (ref 1.8–7.4)
NEUTROPHILS # BLD AUTO: 31.81 K/UL — HIGH (ref 1.8–7.4)
NEUTROPHILS # BLD AUTO: 48.15 K/UL — HIGH (ref 1.8–7.4)
NEUTROPHILS # BLD AUTO: 5.96 K/UL — SIGNIFICANT CHANGE UP (ref 1.8–7.4)
NEUTROPHILS # BLD AUTO: 7.16 K/UL — SIGNIFICANT CHANGE UP (ref 1.8–7.4)
NEUTROPHILS # BLD AUTO: 7.25 K/UL — SIGNIFICANT CHANGE UP (ref 1.8–7.4)
NEUTROPHILS # BLD AUTO: 7.38 K/UL — SIGNIFICANT CHANGE UP (ref 1.8–7.4)
NEUTROPHILS # BLD AUTO: 7.4 K/UL — SIGNIFICANT CHANGE UP (ref 1.8–7.4)
NEUTROPHILS # BLD AUTO: 7.5 K/UL — HIGH (ref 1.8–7.4)
NEUTROPHILS # BLD AUTO: 7.54 K/UL — HIGH (ref 1.8–7.4)
NEUTROPHILS # BLD AUTO: 7.71 K/UL — HIGH (ref 1.8–7.4)
NEUTROPHILS # BLD AUTO: 7.9 K/UL — HIGH (ref 1.8–7.4)
NEUTROPHILS # BLD AUTO: 8.09 K/UL — HIGH (ref 1.8–7.4)
NEUTROPHILS # BLD AUTO: 8.18 K/UL — HIGH (ref 1.8–7.4)
NEUTROPHILS # BLD AUTO: 8.49 K/UL — HIGH (ref 1.8–7.4)
NEUTROPHILS # BLD AUTO: 8.53 K/UL — HIGH (ref 1.8–7.4)
NEUTROPHILS # BLD AUTO: 8.66 K/UL — HIGH (ref 1.8–7.4)
NEUTROPHILS # BLD AUTO: 8.8 K/UL — HIGH (ref 1.8–7.4)
NEUTROPHILS # BLD AUTO: 8.81 K/UL — HIGH (ref 1.8–7.4)
NEUTROPHILS # BLD AUTO: 8.82 K/UL — HIGH (ref 1.8–7.4)
NEUTROPHILS # BLD AUTO: 9.07 K/UL — HIGH (ref 1.8–7.4)
NEUTROPHILS # BLD AUTO: 9.3 K/UL — HIGH (ref 1.8–7.4)
NEUTROPHILS # BLD AUTO: 9.65 K/UL — HIGH (ref 1.8–7.4)
NEUTROPHILS # BLD AUTO: 9.77 K/UL — HIGH (ref 1.8–7.4)
NEUTROPHILS # BLD AUTO: 9.8 K/UL — HIGH (ref 1.8–7.4)
NEUTROPHILS NFR BLD AUTO: 62.5 % — SIGNIFICANT CHANGE UP (ref 43–77)
NEUTROPHILS NFR BLD AUTO: 65.3 % — SIGNIFICANT CHANGE UP (ref 43–77)
NEUTROPHILS NFR BLD AUTO: 66.4 % — SIGNIFICANT CHANGE UP (ref 43–77)
NEUTROPHILS NFR BLD AUTO: 66.9 % — SIGNIFICANT CHANGE UP (ref 43–77)
NEUTROPHILS NFR BLD AUTO: 67 % — SIGNIFICANT CHANGE UP (ref 43–77)
NEUTROPHILS NFR BLD AUTO: 67.5 % — SIGNIFICANT CHANGE UP (ref 43–77)
NEUTROPHILS NFR BLD AUTO: 68.3 % — SIGNIFICANT CHANGE UP (ref 43–77)
NEUTROPHILS NFR BLD AUTO: 68.3 % — SIGNIFICANT CHANGE UP (ref 43–77)
NEUTROPHILS NFR BLD AUTO: 68.7 % — SIGNIFICANT CHANGE UP (ref 43–77)
NEUTROPHILS NFR BLD AUTO: 69.2 % — SIGNIFICANT CHANGE UP (ref 43–77)
NEUTROPHILS NFR BLD AUTO: 69.5 % — SIGNIFICANT CHANGE UP (ref 43–77)
NEUTROPHILS NFR BLD AUTO: 74.1 % — SIGNIFICANT CHANGE UP (ref 43–77)
NEUTROPHILS NFR BLD AUTO: 74.7 % — SIGNIFICANT CHANGE UP (ref 43–77)
NEUTROPHILS NFR BLD AUTO: 74.8 % — SIGNIFICANT CHANGE UP (ref 43–77)
NEUTROPHILS NFR BLD AUTO: 75.5 % — SIGNIFICANT CHANGE UP (ref 43–77)
NEUTROPHILS NFR BLD AUTO: 76.5 % — SIGNIFICANT CHANGE UP (ref 43–77)
NEUTROPHILS NFR BLD AUTO: 76.5 % — SIGNIFICANT CHANGE UP (ref 43–77)
NEUTROPHILS NFR BLD AUTO: 76.7 % — SIGNIFICANT CHANGE UP (ref 43–77)
NEUTROPHILS NFR BLD AUTO: 76.7 % — SIGNIFICANT CHANGE UP (ref 43–77)
NEUTROPHILS NFR BLD AUTO: 77 % — SIGNIFICANT CHANGE UP (ref 43–77)
NEUTROPHILS NFR BLD AUTO: 77.7 % — HIGH (ref 43–77)
NEUTROPHILS NFR BLD AUTO: 77.9 % — HIGH (ref 43–77)
NEUTROPHILS NFR BLD AUTO: 78 % — HIGH (ref 43–77)
NEUTROPHILS NFR BLD AUTO: 78 % — HIGH (ref 43–77)
NEUTROPHILS NFR BLD AUTO: 78.1 % — HIGH (ref 43–77)
NEUTROPHILS NFR BLD AUTO: 78.4 % — HIGH (ref 43–77)
NEUTROPHILS NFR BLD AUTO: 79.9 % — HIGH (ref 43–77)
NEUTROPHILS NFR BLD AUTO: 80.7 % — HIGH (ref 43–77)
NEUTROPHILS NFR BLD AUTO: 81.2 % — HIGH (ref 43–77)
NEUTROPHILS NFR BLD AUTO: 84.2 % — HIGH (ref 43–77)
NEUTROPHILS NFR BLD AUTO: 86.1 % — HIGH (ref 43–77)
NEUTROPHILS NFR BLD AUTO: 90.1 % — HIGH (ref 43–77)
NEUTROPHILS NFR BLD AUTO: 91.4 % — HIGH (ref 43–77)
NEUTROPHILS NFR BLD AUTO: 93 % — HIGH (ref 43–77)
NEUTROPHILS-BODY FLUID: 78 % — SIGNIFICANT CHANGE UP
NEUTS BAND # BLD: 0.8 % — SIGNIFICANT CHANGE UP (ref 0–6)
NEUTS BAND # BLD: 1.7 % — SIGNIFICANT CHANGE UP (ref 0–6)
NEUTS BAND # BLD: 2.6 % — SIGNIFICANT CHANGE UP (ref 0–6)
NIGHT BLUE STAIN TISS: SIGNIFICANT CHANGE UP
NON HDL CHOLESTEROL: 157 MG/DL — HIGH
NON HDL CHOLESTEROL: 88 MG/DL — SIGNIFICANT CHANGE UP
NRBC # BLD: 0 /100 WBCS — SIGNIFICANT CHANGE UP
NRBC # BLD: 1 /100 WBCS — SIGNIFICANT CHANGE UP
NRBC # BLD: 1 /100 WBCS — SIGNIFICANT CHANGE UP
NRBC # BLD: 2 /100 WBCS — SIGNIFICANT CHANGE UP
NRBC # BLD: 2 /100 WBCS — SIGNIFICANT CHANGE UP
NRBC # BLD: 3 /100 WBCS — SIGNIFICANT CHANGE UP
NRBC # BLD: 3 /100 WBCS — SIGNIFICANT CHANGE UP
NRBC # BLD: 6 /100 WBCS — SIGNIFICANT CHANGE UP
NRBC # FLD: 0 K/UL — SIGNIFICANT CHANGE UP
NRBC # FLD: 0.02 K/UL — HIGH
NRBC # FLD: 0.03 K/UL — HIGH
NRBC # FLD: 0.04 K/UL — HIGH
NRBC # FLD: 0.04 K/UL — HIGH
NRBC # FLD: 0.05 K/UL — HIGH
NRBC # FLD: 0.06 K/UL — HIGH
NRBC # FLD: 0.08 K/UL — HIGH
NRBC # FLD: 0.08 K/UL — HIGH
NRBC # FLD: 0.09 K/UL — HIGH
NRBC # FLD: 0.1 K/UL — HIGH
NRBC # FLD: 0.11 K/UL — HIGH
NRBC # FLD: 0.2 K/UL — HIGH
NRBC # FLD: 0.3 K/UL — HIGH
NRBC # FLD: 0.49 K/UL — HIGH
NRBC # FLD: 0.5 K/UL — HIGH
NRBC # FLD: 0.64 K/UL — HIGH
NRBC # FLD: 0.89 K/UL — HIGH
NRBC # FLD: 1.27 K/UL — HIGH
NRBC # FLD: 2.09 K/UL — HIGH
NT-PROBNP SERPL-SCNC: 2178 PG/ML — HIGH
NT-PROBNP SERPL-SCNC: 2308 PG/ML — HIGH
NT-PROBNP SERPL-SCNC: 2798 PG/ML — HIGH
NT-PROBNP SERPL-SCNC: 3368 PG/ML — HIGH
NT-PROBNP SERPL-SCNC: 3719 PG/ML — HIGH
NT-PROBNP SERPL-SCNC: 4994 PG/ML — HIGH
NT-PROBNP SERPL-SCNC: 9677 PG/ML — HIGH
NT-PROBNP SERPL-SCNC: HIGH PG/ML
ORGANISM # SPEC MICROSCOPIC CNT: SIGNIFICANT CHANGE UP
P-ANCA SER-ACNC: NEGATIVE — SIGNIFICANT CHANGE UP
PCO2 BLDV: 44 MMHG — HIGH (ref 39–42)
PCO2 BLDV: 65 MMHG — HIGH (ref 39–42)
PH BLDV: 7.23 — LOW (ref 7.32–7.43)
PH BLDV: 7.4 — SIGNIFICANT CHANGE UP (ref 7.32–7.43)
PHOSPHATE SERPL-MCNC: 2.1 MG/DL — LOW (ref 2.5–4.5)
PHOSPHATE SERPL-MCNC: 2.2 MG/DL — LOW (ref 2.5–4.5)
PHOSPHATE SERPL-MCNC: 2.3 MG/DL — LOW (ref 2.5–4.5)
PHOSPHATE SERPL-MCNC: 2.4 MG/DL — LOW (ref 2.5–4.5)
PHOSPHATE SERPL-MCNC: 2.5 MG/DL — SIGNIFICANT CHANGE UP (ref 2.5–4.5)
PHOSPHATE SERPL-MCNC: 2.6 MG/DL — SIGNIFICANT CHANGE UP (ref 2.5–4.5)
PHOSPHATE SERPL-MCNC: 2.6 MG/DL — SIGNIFICANT CHANGE UP (ref 2.5–4.5)
PHOSPHATE SERPL-MCNC: 2.7 MG/DL — SIGNIFICANT CHANGE UP (ref 2.5–4.5)
PHOSPHATE SERPL-MCNC: 2.9 MG/DL — SIGNIFICANT CHANGE UP (ref 2.5–4.5)
PHOSPHATE SERPL-MCNC: 3 MG/DL — SIGNIFICANT CHANGE UP (ref 2.5–4.5)
PHOSPHATE SERPL-MCNC: 3.1 MG/DL — SIGNIFICANT CHANGE UP (ref 2.5–4.5)
PHOSPHATE SERPL-MCNC: 3.2 MG/DL — SIGNIFICANT CHANGE UP (ref 2.5–4.5)
PHOSPHATE SERPL-MCNC: 3.2 MG/DL — SIGNIFICANT CHANGE UP (ref 2.5–4.5)
PHOSPHATE SERPL-MCNC: 3.3 MG/DL — SIGNIFICANT CHANGE UP (ref 2.5–4.5)
PHOSPHATE SERPL-MCNC: 3.3 MG/DL — SIGNIFICANT CHANGE UP (ref 2.5–4.5)
PHOSPHATE SERPL-MCNC: 3.4 MG/DL — SIGNIFICANT CHANGE UP (ref 2.5–4.5)
PHOSPHATE SERPL-MCNC: 3.4 MG/DL — SIGNIFICANT CHANGE UP (ref 2.5–4.5)
PHOSPHATE SERPL-MCNC: 3.5 MG/DL — SIGNIFICANT CHANGE UP (ref 2.5–4.5)
PHOSPHATE SERPL-MCNC: 3.5 MG/DL — SIGNIFICANT CHANGE UP (ref 2.5–4.5)
PHOSPHATE SERPL-MCNC: 3.6 MG/DL — SIGNIFICANT CHANGE UP (ref 2.5–4.5)
PHOSPHATE SERPL-MCNC: 3.7 MG/DL — SIGNIFICANT CHANGE UP (ref 2.5–4.5)
PHOSPHATE SERPL-MCNC: 3.8 MG/DL — SIGNIFICANT CHANGE UP (ref 2.5–4.5)
PHOSPHATE SERPL-MCNC: 3.8 MG/DL — SIGNIFICANT CHANGE UP (ref 2.5–4.5)
PHOSPHATE SERPL-MCNC: 3.9 MG/DL — SIGNIFICANT CHANGE UP (ref 2.5–4.5)
PHOSPHATE SERPL-MCNC: 4 MG/DL — SIGNIFICANT CHANGE UP (ref 2.5–4.5)
PHOSPHATE SERPL-MCNC: 4.1 MG/DL — SIGNIFICANT CHANGE UP (ref 2.5–4.5)
PHOSPHATE SERPL-MCNC: 4.2 MG/DL — SIGNIFICANT CHANGE UP (ref 2.5–4.5)
PHOSPHATE SERPL-MCNC: 4.2 MG/DL — SIGNIFICANT CHANGE UP (ref 2.5–4.5)
PHOSPHATE SERPL-MCNC: 4.3 MG/DL — SIGNIFICANT CHANGE UP (ref 2.5–4.5)
PHOSPHATE SERPL-MCNC: 4.4 MG/DL — SIGNIFICANT CHANGE UP (ref 2.5–4.5)
PHOSPHATE SERPL-MCNC: 4.5 MG/DL — SIGNIFICANT CHANGE UP (ref 2.5–4.5)
PHOSPHATE SERPL-MCNC: 4.7 MG/DL — HIGH (ref 2.5–4.5)
PHOSPHATE SERPL-MCNC: 4.7 MG/DL — HIGH (ref 2.5–4.5)
PHOSPHATE SERPL-MCNC: 4.9 MG/DL — HIGH (ref 2.5–4.5)
PHOSPHATE SERPL-MCNC: 5 MG/DL — HIGH (ref 2.5–4.5)
PHOSPHATE SERPL-MCNC: 5.3 MG/DL — HIGH (ref 2.5–4.5)
PLAT MORPH BLD: NORMAL — SIGNIFICANT CHANGE UP
PLATELET # BLD AUTO: 263 K/UL — SIGNIFICANT CHANGE UP (ref 150–400)
PLATELET # BLD AUTO: 309 K/UL — SIGNIFICANT CHANGE UP (ref 150–400)
PLATELET # BLD AUTO: 321 K/UL — SIGNIFICANT CHANGE UP (ref 150–400)
PLATELET # BLD AUTO: 332 K/UL — SIGNIFICANT CHANGE UP (ref 150–400)
PLATELET # BLD AUTO: 332 K/UL — SIGNIFICANT CHANGE UP (ref 150–400)
PLATELET # BLD AUTO: 342 K/UL — SIGNIFICANT CHANGE UP (ref 150–400)
PLATELET # BLD AUTO: 344 K/UL — SIGNIFICANT CHANGE UP (ref 150–400)
PLATELET # BLD AUTO: 345 K/UL — SIGNIFICANT CHANGE UP (ref 150–400)
PLATELET # BLD AUTO: 345 K/UL — SIGNIFICANT CHANGE UP (ref 150–400)
PLATELET # BLD AUTO: 363 K/UL — SIGNIFICANT CHANGE UP (ref 150–400)
PLATELET # BLD AUTO: 367 K/UL — SIGNIFICANT CHANGE UP (ref 150–400)
PLATELET # BLD AUTO: 374 K/UL — SIGNIFICANT CHANGE UP (ref 150–400)
PLATELET # BLD AUTO: 376 K/UL — SIGNIFICANT CHANGE UP (ref 150–400)
PLATELET # BLD AUTO: 380 K/UL — SIGNIFICANT CHANGE UP (ref 150–400)
PLATELET # BLD AUTO: 383 K/UL — SIGNIFICANT CHANGE UP (ref 150–400)
PLATELET # BLD AUTO: 387 K/UL — SIGNIFICANT CHANGE UP (ref 150–400)
PLATELET # BLD AUTO: 388 K/UL — SIGNIFICANT CHANGE UP (ref 150–400)
PLATELET # BLD AUTO: 391 K/UL — SIGNIFICANT CHANGE UP (ref 150–400)
PLATELET # BLD AUTO: 400 K/UL — SIGNIFICANT CHANGE UP (ref 150–400)
PLATELET # BLD AUTO: 401 K/UL — HIGH (ref 150–400)
PLATELET # BLD AUTO: 405 K/UL — HIGH (ref 150–400)
PLATELET # BLD AUTO: 407 K/UL — HIGH (ref 150–400)
PLATELET # BLD AUTO: 407 K/UL — HIGH (ref 150–400)
PLATELET # BLD AUTO: 409 K/UL — HIGH (ref 150–400)
PLATELET # BLD AUTO: 411 K/UL — HIGH (ref 150–400)
PLATELET # BLD AUTO: 411 K/UL — HIGH (ref 150–400)
PLATELET # BLD AUTO: 416 K/UL — HIGH (ref 150–400)
PLATELET # BLD AUTO: 418 K/UL — HIGH (ref 150–400)
PLATELET # BLD AUTO: 419 K/UL — HIGH (ref 150–400)
PLATELET # BLD AUTO: 420 K/UL — HIGH (ref 150–400)
PLATELET # BLD AUTO: 422 K/UL — HIGH (ref 150–400)
PLATELET # BLD AUTO: 424 K/UL — HIGH (ref 150–400)
PLATELET # BLD AUTO: 427 K/UL — HIGH (ref 150–400)
PLATELET # BLD AUTO: 427 K/UL — HIGH (ref 150–400)
PLATELET # BLD AUTO: 428 K/UL — HIGH (ref 150–400)
PLATELET # BLD AUTO: 429 K/UL — HIGH (ref 150–400)
PLATELET # BLD AUTO: 432 K/UL — HIGH (ref 150–400)
PLATELET # BLD AUTO: 440 K/UL — HIGH (ref 150–400)
PLATELET # BLD AUTO: 445 K/UL — HIGH (ref 150–400)
PLATELET # BLD AUTO: 446 K/UL — HIGH (ref 150–400)
PLATELET # BLD AUTO: 447 K/UL — HIGH (ref 150–400)
PLATELET # BLD AUTO: 448 K/UL — HIGH (ref 150–400)
PLATELET # BLD AUTO: 449 K/UL — HIGH (ref 150–400)
PLATELET # BLD AUTO: 450 K/UL — HIGH (ref 150–400)
PLATELET # BLD AUTO: 451 K/UL — HIGH (ref 150–400)
PLATELET # BLD AUTO: 456 K/UL — HIGH (ref 150–400)
PLATELET # BLD AUTO: 462 K/UL — HIGH (ref 150–400)
PLATELET # BLD AUTO: 465 K/UL — HIGH (ref 150–400)
PLATELET # BLD AUTO: 467 K/UL — HIGH (ref 150–400)
PLATELET # BLD AUTO: 473 K/UL — HIGH (ref 150–400)
PLATELET # BLD AUTO: 474 K/UL — HIGH (ref 150–400)
PLATELET # BLD AUTO: 475 K/UL — HIGH (ref 150–400)
PLATELET # BLD AUTO: 476 K/UL — HIGH (ref 150–400)
PLATELET # BLD AUTO: 476 K/UL — HIGH (ref 150–400)
PLATELET # BLD AUTO: 479 K/UL — HIGH (ref 150–400)
PLATELET # BLD AUTO: 480 K/UL — HIGH (ref 150–400)
PLATELET # BLD AUTO: 494 K/UL — HIGH (ref 150–400)
PLATELET # BLD AUTO: 494 K/UL — HIGH (ref 150–400)
PLATELET # BLD AUTO: 499 K/UL — HIGH (ref 150–400)
PLATELET # BLD AUTO: 499 K/UL — HIGH (ref 150–400)
PLATELET # BLD AUTO: 500 K/UL — HIGH (ref 150–400)
PLATELET # BLD AUTO: 504 K/UL — HIGH (ref 150–400)
PLATELET # BLD AUTO: 512 K/UL — HIGH (ref 150–400)
PLATELET # BLD AUTO: 516 K/UL — HIGH (ref 150–400)
PLATELET # BLD AUTO: 527 K/UL — HIGH (ref 150–400)
PLATELET # BLD AUTO: 530 K/UL — HIGH (ref 150–400)
PLATELET # BLD AUTO: 547 K/UL — HIGH (ref 150–400)
PLATELET # BLD AUTO: 561 K/UL — HIGH (ref 150–400)
PLATELET # BLD AUTO: 562 K/UL — HIGH (ref 150–400)
PLATELET # BLD AUTO: 564 K/UL — HIGH (ref 150–400)
PLATELET # BLD AUTO: 572 K/UL — HIGH (ref 150–400)
PLATELET # BLD AUTO: 578 K/UL — HIGH (ref 150–400)
PLATELET # BLD AUTO: 605 K/UL — HIGH (ref 150–400)
PLATELET # BLD AUTO: 618 K/UL — HIGH (ref 150–400)
PLATELET # BLD AUTO: 641 K/UL — HIGH (ref 150–400)
PLATELET # BLD AUTO: 643 K/UL — HIGH (ref 150–400)
PLATELET # BLD AUTO: 661 K/UL — HIGH (ref 150–400)
PLATELET COUNT - ESTIMATE: ABNORMAL
PLATELET COUNT - ESTIMATE: ABNORMAL
PLATELET COUNT - ESTIMATE: NORMAL — SIGNIFICANT CHANGE UP
PO2 BLDV: 138 MMHG — SIGNIFICANT CHANGE UP
PO2 BLDV: 156 MMHG — SIGNIFICANT CHANGE UP
POLYCHROMASIA BLD QL SMEAR: SLIGHT — SIGNIFICANT CHANGE UP
POTASSIUM BLDV-SCNC: 3.8 MMOL/L — SIGNIFICANT CHANGE UP (ref 3.5–5.1)
POTASSIUM BLDV-SCNC: 4.2 MMOL/L — SIGNIFICANT CHANGE UP (ref 3.5–5.1)
POTASSIUM SERPL-MCNC: 3.2 MMOL/L — LOW (ref 3.5–5.3)
POTASSIUM SERPL-MCNC: 3.2 MMOL/L — LOW (ref 3.5–5.3)
POTASSIUM SERPL-MCNC: 3.3 MMOL/L — LOW (ref 3.5–5.3)
POTASSIUM SERPL-MCNC: 3.4 MMOL/L — LOW (ref 3.5–5.3)
POTASSIUM SERPL-MCNC: 3.5 MMOL/L — SIGNIFICANT CHANGE UP (ref 3.5–5.3)
POTASSIUM SERPL-MCNC: 3.5 MMOL/L — SIGNIFICANT CHANGE UP (ref 3.5–5.3)
POTASSIUM SERPL-MCNC: 3.6 MMOL/L — SIGNIFICANT CHANGE UP (ref 3.5–5.3)
POTASSIUM SERPL-MCNC: 3.7 MMOL/L — SIGNIFICANT CHANGE UP (ref 3.5–5.3)
POTASSIUM SERPL-MCNC: 3.8 MMOL/L — SIGNIFICANT CHANGE UP (ref 3.5–5.3)
POTASSIUM SERPL-MCNC: 3.9 MMOL/L — SIGNIFICANT CHANGE UP (ref 3.5–5.3)
POTASSIUM SERPL-MCNC: 4 MMOL/L — SIGNIFICANT CHANGE UP (ref 3.5–5.3)
POTASSIUM SERPL-MCNC: 4.1 MMOL/L — SIGNIFICANT CHANGE UP (ref 3.5–5.3)
POTASSIUM SERPL-MCNC: 4.2 MMOL/L — SIGNIFICANT CHANGE UP (ref 3.5–5.3)
POTASSIUM SERPL-MCNC: 4.3 MMOL/L — SIGNIFICANT CHANGE UP (ref 3.5–5.3)
POTASSIUM SERPL-MCNC: 4.4 MMOL/L — SIGNIFICANT CHANGE UP (ref 3.5–5.3)
POTASSIUM SERPL-MCNC: 4.5 MMOL/L — SIGNIFICANT CHANGE UP (ref 3.5–5.3)
POTASSIUM SERPL-MCNC: 4.5 MMOL/L — SIGNIFICANT CHANGE UP (ref 3.5–5.3)
POTASSIUM SERPL-MCNC: 4.6 MMOL/L — SIGNIFICANT CHANGE UP (ref 3.5–5.3)
POTASSIUM SERPL-MCNC: 4.7 MMOL/L — SIGNIFICANT CHANGE UP (ref 3.5–5.3)
POTASSIUM SERPL-MCNC: 4.8 MMOL/L — SIGNIFICANT CHANGE UP (ref 3.5–5.3)
POTASSIUM SERPL-MCNC: 4.8 MMOL/L — SIGNIFICANT CHANGE UP (ref 3.5–5.3)
POTASSIUM SERPL-MCNC: 4.9 MMOL/L — SIGNIFICANT CHANGE UP (ref 3.5–5.3)
POTASSIUM SERPL-MCNC: 5 MMOL/L — SIGNIFICANT CHANGE UP (ref 3.5–5.3)
POTASSIUM SERPL-MCNC: 5 MMOL/L — SIGNIFICANT CHANGE UP (ref 3.5–5.3)
POTASSIUM SERPL-MCNC: 5.1 MMOL/L — SIGNIFICANT CHANGE UP (ref 3.5–5.3)
POTASSIUM SERPL-MCNC: 5.1 MMOL/L — SIGNIFICANT CHANGE UP (ref 3.5–5.3)
POTASSIUM SERPL-MCNC: 5.2 MMOL/L — SIGNIFICANT CHANGE UP (ref 3.5–5.3)
POTASSIUM SERPL-MCNC: 5.2 MMOL/L — SIGNIFICANT CHANGE UP (ref 3.5–5.3)
POTASSIUM SERPL-MCNC: 5.5 MMOL/L — HIGH (ref 3.5–5.3)
POTASSIUM SERPL-MCNC: 5.5 MMOL/L — HIGH (ref 3.5–5.3)
POTASSIUM SERPL-MCNC: 5.7 MMOL/L — HIGH (ref 3.5–5.3)
POTASSIUM SERPL-SCNC: 3.2 MMOL/L — LOW (ref 3.5–5.3)
POTASSIUM SERPL-SCNC: 3.2 MMOL/L — LOW (ref 3.5–5.3)
POTASSIUM SERPL-SCNC: 3.3 MMOL/L — LOW (ref 3.5–5.3)
POTASSIUM SERPL-SCNC: 3.4 MMOL/L — LOW (ref 3.5–5.3)
POTASSIUM SERPL-SCNC: 3.5 MMOL/L — SIGNIFICANT CHANGE UP (ref 3.5–5.3)
POTASSIUM SERPL-SCNC: 3.5 MMOL/L — SIGNIFICANT CHANGE UP (ref 3.5–5.3)
POTASSIUM SERPL-SCNC: 3.6 MMOL/L — SIGNIFICANT CHANGE UP (ref 3.5–5.3)
POTASSIUM SERPL-SCNC: 3.7 MMOL/L — SIGNIFICANT CHANGE UP (ref 3.5–5.3)
POTASSIUM SERPL-SCNC: 3.8 MMOL/L — SIGNIFICANT CHANGE UP (ref 3.5–5.3)
POTASSIUM SERPL-SCNC: 3.9 MMOL/L — SIGNIFICANT CHANGE UP (ref 3.5–5.3)
POTASSIUM SERPL-SCNC: 4 MMOL/L — SIGNIFICANT CHANGE UP (ref 3.5–5.3)
POTASSIUM SERPL-SCNC: 4.1 MMOL/L — SIGNIFICANT CHANGE UP (ref 3.5–5.3)
POTASSIUM SERPL-SCNC: 4.2 MMOL/L — SIGNIFICANT CHANGE UP (ref 3.5–5.3)
POTASSIUM SERPL-SCNC: 4.3 MMOL/L — SIGNIFICANT CHANGE UP (ref 3.5–5.3)
POTASSIUM SERPL-SCNC: 4.4 MMOL/L — SIGNIFICANT CHANGE UP (ref 3.5–5.3)
POTASSIUM SERPL-SCNC: 4.5 MMOL/L — SIGNIFICANT CHANGE UP (ref 3.5–5.3)
POTASSIUM SERPL-SCNC: 4.5 MMOL/L — SIGNIFICANT CHANGE UP (ref 3.5–5.3)
POTASSIUM SERPL-SCNC: 4.6 MMOL/L — SIGNIFICANT CHANGE UP (ref 3.5–5.3)
POTASSIUM SERPL-SCNC: 4.7 MMOL/L — SIGNIFICANT CHANGE UP (ref 3.5–5.3)
POTASSIUM SERPL-SCNC: 4.8 MMOL/L — SIGNIFICANT CHANGE UP (ref 3.5–5.3)
POTASSIUM SERPL-SCNC: 4.8 MMOL/L — SIGNIFICANT CHANGE UP (ref 3.5–5.3)
POTASSIUM SERPL-SCNC: 4.9 MMOL/L — SIGNIFICANT CHANGE UP (ref 3.5–5.3)
POTASSIUM SERPL-SCNC: 5 MMOL/L — SIGNIFICANT CHANGE UP (ref 3.5–5.3)
POTASSIUM SERPL-SCNC: 5 MMOL/L — SIGNIFICANT CHANGE UP (ref 3.5–5.3)
POTASSIUM SERPL-SCNC: 5.1 MMOL/L — SIGNIFICANT CHANGE UP (ref 3.5–5.3)
POTASSIUM SERPL-SCNC: 5.1 MMOL/L — SIGNIFICANT CHANGE UP (ref 3.5–5.3)
POTASSIUM SERPL-SCNC: 5.2 MMOL/L — SIGNIFICANT CHANGE UP (ref 3.5–5.3)
POTASSIUM SERPL-SCNC: 5.2 MMOL/L — SIGNIFICANT CHANGE UP (ref 3.5–5.3)
POTASSIUM SERPL-SCNC: 5.5 MMOL/L — HIGH (ref 3.5–5.3)
POTASSIUM SERPL-SCNC: 5.5 MMOL/L — HIGH (ref 3.5–5.3)
POTASSIUM SERPL-SCNC: 5.7 MMOL/L — HIGH (ref 3.5–5.3)
PROCALCITONIN SERPL-MCNC: 1.36 NG/ML — HIGH (ref 0.02–0.1)
PROT PATTERN SERPL ELPH-IMP: SIGNIFICANT CHANGE UP
PROT PATTERN SERPL ELPH-IMP: SIGNIFICANT CHANGE UP
PROT SERPL-MCNC: 4.2 G/DL — LOW (ref 6–8.3)
PROT SERPL-MCNC: 4.9 G/DL — LOW (ref 6–8.3)
PROT SERPL-MCNC: 5.5 G/DL — LOW (ref 6–8.3)
PROT SERPL-MCNC: 6.1 G/DL — SIGNIFICANT CHANGE UP (ref 6–8.3)
PROT SERPL-MCNC: 6.2 G/DL — SIGNIFICANT CHANGE UP (ref 6–8.3)
PROT SERPL-MCNC: 6.3 G/DL — SIGNIFICANT CHANGE UP (ref 6–8.3)
PROT SERPL-MCNC: 6.4 G/DL — SIGNIFICANT CHANGE UP (ref 6–8.3)
PROT SERPL-MCNC: 6.6 G/DL — SIGNIFICANT CHANGE UP (ref 6–8.3)
PROT SERPL-MCNC: 6.7 G/DL — SIGNIFICANT CHANGE UP (ref 6–8.3)
PROT SERPL-MCNC: 6.7 G/DL — SIGNIFICANT CHANGE UP (ref 6–8.3)
PROT SERPL-MCNC: 6.8 G/DL — SIGNIFICANT CHANGE UP (ref 6–8.3)
PROT SERPL-MCNC: 7.1 G/DL — SIGNIFICANT CHANGE UP (ref 6–8.3)
PROT SERPL-MCNC: 7.2 G/DL — SIGNIFICANT CHANGE UP (ref 6–8.3)
PROT SERPL-MCNC: 7.3 G/DL — SIGNIFICANT CHANGE UP (ref 6–8.3)
PROT SERPL-MCNC: 7.4 G/DL — SIGNIFICANT CHANGE UP (ref 6–8.3)
PROT SERPL-MCNC: 7.5 G/DL — SIGNIFICANT CHANGE UP (ref 6–8.3)
PROT SERPL-MCNC: 7.6 G/DL — SIGNIFICANT CHANGE UP (ref 6–8.3)
PROT SERPL-MCNC: 7.7 G/DL — SIGNIFICANT CHANGE UP
PROT SERPL-MCNC: 7.7 G/DL — SIGNIFICANT CHANGE UP (ref 6–8.3)
PROT SERPL-MCNC: 7.9 G/DL — SIGNIFICANT CHANGE UP (ref 6–8.3)
PROT SERPL-MCNC: 7.9 G/DL — SIGNIFICANT CHANGE UP (ref 6–8.3)
PROT SERPL-MCNC: 8.3 G/DL — SIGNIFICANT CHANGE UP (ref 6–8.3)
PROTHROM AB SERPL-ACNC: 13.2 SEC — SIGNIFICANT CHANGE UP (ref 10.5–13.4)
PROTHROM AB SERPL-ACNC: 13.9 SEC — HIGH (ref 10.5–13.4)
PROTHROM AB SERPL-ACNC: 14.2 SEC — HIGH (ref 10.5–13.4)
PROTHROM AB SERPL-ACNC: 16.2 SEC — HIGH (ref 10.5–13.4)
PROTHROM AB SERPL-ACNC: 21.7 SEC — HIGH (ref 10.5–13.4)
PROTHROM AB SERPL-ACNC: 28.2 SEC — HIGH (ref 10.5–13.4)
PROTHROM AB SERPL-ACNC: 31.7 SEC — HIGH (ref 10.5–13.4)
PROTHROM AB SERPL-ACNC: 34.7 SEC — HIGH (ref 10.5–13.4)
PROTHROM AB SERPL-ACNC: 39.8 SEC — HIGH (ref 10.5–13.4)
PTH-INTACT FLD-MCNC: 107 PG/ML — HIGH (ref 15–65)
PTH-INTACT FLD-MCNC: 109 PG/ML — HIGH (ref 15–65)
PTH-INTACT FLD-MCNC: 118 PG/ML — HIGH (ref 15–65)
PTH-INTACT FLD-MCNC: 81 PG/ML — HIGH (ref 15–65)
QUANT TB PLUS MITOGEN MINUS NIL: 0.15 IU/ML — SIGNIFICANT CHANGE UP
QUANT TB PLUS MITOGEN MINUS NIL: 0.3 IU/ML — SIGNIFICANT CHANGE UP
RAPID RVP RESULT: DETECTED
RAPID RVP RESULT: SIGNIFICANT CHANGE UP
RBC # BLD: 2.53 M/UL — LOW (ref 3.8–5.2)
RBC # BLD: 2.55 M/UL — LOW (ref 3.8–5.2)
RBC # BLD: 2.56 M/UL — LOW (ref 3.8–5.2)
RBC # BLD: 2.61 M/UL — LOW (ref 3.8–5.2)
RBC # BLD: 2.62 M/UL — LOW (ref 3.8–5.2)
RBC # BLD: 2.69 M/UL — LOW (ref 3.8–5.2)
RBC # BLD: 2.7 M/UL — LOW (ref 3.8–5.2)
RBC # BLD: 2.71 M/UL — LOW (ref 3.8–5.2)
RBC # BLD: 2.72 M/UL — LOW (ref 3.8–5.2)
RBC # BLD: 2.73 M/UL — LOW (ref 3.8–5.2)
RBC # BLD: 2.76 M/UL — LOW (ref 3.8–5.2)
RBC # BLD: 2.77 M/UL — LOW (ref 3.8–5.2)
RBC # BLD: 2.78 M/UL — LOW (ref 3.8–5.2)
RBC # BLD: 2.78 M/UL — LOW (ref 3.8–5.2)
RBC # BLD: 2.79 M/UL — LOW (ref 3.8–5.2)
RBC # BLD: 2.81 M/UL — LOW (ref 3.8–5.2)
RBC # BLD: 2.83 M/UL — LOW (ref 3.8–5.2)
RBC # BLD: 2.84 M/UL — LOW (ref 3.8–5.2)
RBC # BLD: 2.84 M/UL — LOW (ref 3.8–5.2)
RBC # BLD: 2.85 M/UL — LOW (ref 3.8–5.2)
RBC # BLD: 2.85 M/UL — LOW (ref 3.8–5.2)
RBC # BLD: 2.86 M/UL — LOW (ref 3.8–5.2)
RBC # BLD: 2.86 M/UL — LOW (ref 3.8–5.2)
RBC # BLD: 2.87 M/UL — LOW (ref 3.8–5.2)
RBC # BLD: 2.87 M/UL — LOW (ref 3.8–5.2)
RBC # BLD: 2.88 M/UL — LOW (ref 3.8–5.2)
RBC # BLD: 2.89 M/UL — LOW (ref 3.8–5.2)
RBC # BLD: 2.9 M/UL — LOW (ref 3.8–5.2)
RBC # BLD: 2.91 M/UL — LOW (ref 3.8–5.2)
RBC # BLD: 2.91 M/UL — LOW (ref 3.8–5.2)
RBC # BLD: 2.93 M/UL — LOW (ref 3.8–5.2)
RBC # BLD: 2.93 M/UL — LOW (ref 3.8–5.2)
RBC # BLD: 2.94 M/UL — LOW (ref 3.8–5.2)
RBC # BLD: 2.95 M/UL — LOW (ref 3.8–5.2)
RBC # BLD: 2.96 M/UL — LOW (ref 3.8–5.2)
RBC # BLD: 3.01 M/UL — LOW (ref 3.8–5.2)
RBC # BLD: 3.01 M/UL — LOW (ref 3.8–5.2)
RBC # BLD: 3.03 M/UL — LOW (ref 3.8–5.2)
RBC # BLD: 3.05 M/UL — LOW (ref 3.8–5.2)
RBC # BLD: 3.09 M/UL — LOW (ref 3.8–5.2)
RBC # BLD: 3.1 M/UL — LOW (ref 3.8–5.2)
RBC # BLD: 3.1 M/UL — LOW (ref 3.8–5.2)
RBC # BLD: 3.11 M/UL — LOW (ref 3.8–5.2)
RBC # BLD: 3.12 M/UL — LOW (ref 3.8–5.2)
RBC # BLD: 3.16 M/UL — LOW (ref 3.8–5.2)
RBC # BLD: 3.21 M/UL — LOW (ref 3.8–5.2)
RBC # BLD: 3.22 M/UL — LOW (ref 3.8–5.2)
RBC # BLD: 3.24 M/UL — LOW (ref 3.8–5.2)
RBC # BLD: 3.24 M/UL — LOW (ref 3.8–5.2)
RBC # BLD: 3.27 M/UL — LOW (ref 3.8–5.2)
RBC # BLD: 3.28 M/UL — LOW (ref 3.8–5.2)
RBC # BLD: 3.36 M/UL — LOW (ref 3.8–5.2)
RBC # BLD: 3.37 M/UL — LOW (ref 3.8–5.2)
RBC # BLD: 3.47 M/UL — LOW (ref 3.8–5.2)
RBC # BLD: 3.49 M/UL — LOW (ref 3.8–5.2)
RBC # BLD: 3.5 M/UL — LOW (ref 3.8–5.2)
RBC # BLD: 3.59 M/UL — LOW (ref 3.8–5.2)
RBC # BLD: 3.63 M/UL — LOW (ref 3.8–5.2)
RBC # BLD: 3.68 M/UL — LOW (ref 3.8–5.2)
RBC # BLD: 3.68 M/UL — LOW (ref 3.8–5.2)
RBC # BLD: 3.78 M/UL — LOW (ref 3.8–5.2)
RBC # BLD: 3.79 M/UL — LOW (ref 3.8–5.2)
RBC # BLD: 3.79 M/UL — LOW (ref 3.8–5.2)
RBC # BLD: 3.86 M/UL — SIGNIFICANT CHANGE UP (ref 3.8–5.2)
RBC # FLD: 14.6 % — HIGH (ref 10.3–14.5)
RBC # FLD: 14.6 % — HIGH (ref 10.3–14.5)
RBC # FLD: 14.7 % — HIGH (ref 10.3–14.5)
RBC # FLD: 14.8 % — HIGH (ref 10.3–14.5)
RBC # FLD: 14.9 % — HIGH (ref 10.3–14.5)
RBC # FLD: 15.2 % — HIGH (ref 10.3–14.5)
RBC # FLD: 15.2 % — HIGH (ref 10.3–14.5)
RBC # FLD: 15.3 % — HIGH (ref 10.3–14.5)
RBC # FLD: 15.4 % — HIGH (ref 10.3–14.5)
RBC # FLD: 15.5 % — HIGH (ref 10.3–14.5)
RBC # FLD: 15.6 % — HIGH (ref 10.3–14.5)
RBC # FLD: 15.8 % — HIGH (ref 10.3–14.5)
RBC # FLD: 15.8 % — HIGH (ref 10.3–14.5)
RBC # FLD: 15.9 % — HIGH (ref 10.3–14.5)
RBC # FLD: 16.3 % — HIGH (ref 10.3–14.5)
RBC # FLD: 16.4 % — HIGH (ref 10.3–14.5)
RBC # FLD: 16.5 % — HIGH (ref 10.3–14.5)
RBC # FLD: 16.6 % — HIGH (ref 10.3–14.5)
RBC # FLD: 16.7 % — HIGH (ref 10.3–14.5)
RBC # FLD: 16.8 % — HIGH (ref 10.3–14.5)
RBC # FLD: 16.9 % — HIGH (ref 10.3–14.5)
RBC # FLD: 17 % — HIGH (ref 10.3–14.5)
RBC # FLD: 17 % — HIGH (ref 10.3–14.5)
RBC # FLD: 17.1 % — HIGH (ref 10.3–14.5)
RBC # FLD: 17.2 % — HIGH (ref 10.3–14.5)
RBC # FLD: 17.2 % — HIGH (ref 10.3–14.5)
RBC # FLD: 17.4 % — HIGH (ref 10.3–14.5)
RBC # FLD: 17.5 % — HIGH (ref 10.3–14.5)
RBC # FLD: 17.5 % — HIGH (ref 10.3–14.5)
RBC # FLD: 17.6 % — HIGH (ref 10.3–14.5)
RBC # FLD: 17.7 % — HIGH (ref 10.3–14.5)
RBC # FLD: 17.7 % — HIGH (ref 10.3–14.5)
RBC # FLD: 17.8 % — HIGH (ref 10.3–14.5)
RBC # FLD: 17.8 % — HIGH (ref 10.3–14.5)
RBC # FLD: 17.9 % — HIGH (ref 10.3–14.5)
RBC # FLD: 18.1 % — HIGH (ref 10.3–14.5)
RBC # FLD: 18.2 % — HIGH (ref 10.3–14.5)
RBC # FLD: 18.2 % — HIGH (ref 10.3–14.5)
RBC # FLD: 18.3 % — HIGH (ref 10.3–14.5)
RBC # FLD: 18.4 % — HIGH (ref 10.3–14.5)
RBC # FLD: 18.5 % — HIGH (ref 10.3–14.5)
RBC # FLD: 18.5 % — HIGH (ref 10.3–14.5)
RBC # FLD: 18.8 % — HIGH (ref 10.3–14.5)
RBC # FLD: 18.9 % — HIGH (ref 10.3–14.5)
RBC # FLD: 18.9 % — HIGH (ref 10.3–14.5)
RBC # FLD: 19.3 % — HIGH (ref 10.3–14.5)
RBC # FLD: 19.7 % — HIGH (ref 10.3–14.5)
RBC # FLD: 19.8 % — HIGH (ref 10.3–14.5)
RBC # FLD: 19.9 % — HIGH (ref 10.3–14.5)
RBC # FLD: 20.1 % — HIGH (ref 10.3–14.5)
RBC # FLD: 20.2 % — HIGH (ref 10.3–14.5)
RBC BLD AUTO: ABNORMAL
RCV VOL RI: SIGNIFICANT CHANGE UP CELLS/UL (ref 0–5)
RH IG SCN BLD-IMP: POSITIVE — SIGNIFICANT CHANGE UP
RSV RNA SPEC QL NAA+PROBE: SIGNIFICANT CHANGE UP
RSV RNA SPEC QL NAA+PROBE: SIGNIFICANT CHANGE UP
RV+EV RNA SPEC QL NAA+PROBE: SIGNIFICANT CHANGE UP
RV+EV RNA SPEC QL NAA+PROBE: SIGNIFICANT CHANGE UP
S AUREUS DNA NOSE QL NAA+PROBE: DETECTED
S AUREUS DNA NOSE QL NAA+PROBE: SIGNIFICANT CHANGE UP
SAO2 % BLDV: 99.2 % — SIGNIFICANT CHANGE UP
SAO2 % BLDV: 99.4 % — SIGNIFICANT CHANGE UP
SARS-COV-2 RNA SPEC QL NAA+PROBE: DETECTED
SARS-COV-2 RNA SPEC QL NAA+PROBE: SIGNIFICANT CHANGE UP
SODIUM SERPL-SCNC: 129 MMOL/L — LOW (ref 135–145)
SODIUM SERPL-SCNC: 130 MMOL/L — LOW (ref 135–145)
SODIUM SERPL-SCNC: 131 MMOL/L — LOW (ref 135–145)
SODIUM SERPL-SCNC: 132 MMOL/L — LOW (ref 135–145)
SODIUM SERPL-SCNC: 133 MMOL/L — LOW (ref 135–145)
SODIUM SERPL-SCNC: 134 MMOL/L — LOW (ref 135–145)
SODIUM SERPL-SCNC: 135 MMOL/L — SIGNIFICANT CHANGE UP (ref 135–145)
SODIUM SERPL-SCNC: 136 MMOL/L — SIGNIFICANT CHANGE UP (ref 135–145)
SODIUM SERPL-SCNC: 137 MMOL/L — SIGNIFICANT CHANGE UP (ref 135–145)
SODIUM SERPL-SCNC: 138 MMOL/L — SIGNIFICANT CHANGE UP (ref 135–145)
SODIUM SERPL-SCNC: 139 MMOL/L — SIGNIFICANT CHANGE UP (ref 135–145)
SODIUM SERPL-SCNC: 140 MMOL/L — SIGNIFICANT CHANGE UP (ref 135–145)
SODIUM SERPL-SCNC: 141 MMOL/L — SIGNIFICANT CHANGE UP (ref 135–145)
SPECIMEN SOURCE: SIGNIFICANT CHANGE UP
SURGICAL PATHOLOGY STUDY: SIGNIFICANT CHANGE UP
SURGICAL PATHOLOGY STUDY: SIGNIFICANT CHANGE UP
SYNOVIAL CRYSTALS CLARITY: ABNORMAL
SYNOVIAL CRYSTALS COLOR: ABNORMAL
SYNOVIAL CRYSTALS ID: ABNORMAL
SYNOVIAL CRYSTALS TUBE: SIGNIFICANT CHANGE UP
TIBC SERPL-MCNC: 117 UG/DL — LOW (ref 220–430)
TIBC SERPL-MCNC: 174 UG/DL — LOW (ref 220–430)
TIBC SERPL-MCNC: 99 UG/DL — LOW (ref 220–430)
TOTAL NUCLEATED CELL COUNT, BODY FLUID: 5620 CELLS/UL — HIGH (ref 0–5)
TRIGL SERPL-MCNC: 147 MG/DL — SIGNIFICANT CHANGE UP
TRIGL SERPL-MCNC: 171 MG/DL — HIGH
TRIGL SERPL-MCNC: 183 MG/DL — HIGH
TRIGL SERPL-MCNC: 222 MG/DL — HIGH
TRIGL SERPL-MCNC: 270 MG/DL — HIGH
TRIGL SERPL-MCNC: 281 MG/DL — HIGH
TROPONIN T, HIGH SENSITIVITY RESULT: 35 NG/L — SIGNIFICANT CHANGE UP
TROPONIN T, HIGH SENSITIVITY RESULT: 39 NG/L — SIGNIFICANT CHANGE UP
TROPONIN T, HIGH SENSITIVITY RESULT: 44 NG/L — SIGNIFICANT CHANGE UP
TROPONIN T, HIGH SENSITIVITY RESULT: 53 NG/L — CRITICAL HIGH
TROPONIN T, HIGH SENSITIVITY RESULT: 54 NG/L — CRITICAL HIGH
TROPONIN T, HIGH SENSITIVITY RESULT: 55 NG/L — CRITICAL HIGH
TROPONIN T, HIGH SENSITIVITY RESULT: 58 NG/L — CRITICAL HIGH
TROPONIN T, HIGH SENSITIVITY RESULT: 58 NG/L — CRITICAL HIGH
TROPONIN T, HIGH SENSITIVITY RESULT: 61 NG/L — CRITICAL HIGH
TROPONIN T, HIGH SENSITIVITY RESULT: 66 NG/L — CRITICAL HIGH
TROPONIN T, HIGH SENSITIVITY RESULT: 70 NG/L — CRITICAL HIGH
TROPONIN T, HIGH SENSITIVITY RESULT: 78 NG/L — CRITICAL HIGH
TUBE TYPE: SIGNIFICANT CHANGE UP
UIBC SERPL-MCNC: 144 UG/DL — SIGNIFICANT CHANGE UP (ref 110–370)
UIBC SERPL-MCNC: 64 UG/DL — LOW (ref 110–370)
UIBC SERPL-MCNC: 82 UG/DL — LOW (ref 110–370)
URATE SERPL-MCNC: 4.9 MG/DL — SIGNIFICANT CHANGE UP (ref 2.5–7)
URATE SERPL-MCNC: 5.6 MG/DL — SIGNIFICANT CHANGE UP (ref 2.5–7)
URATE SERPL-MCNC: 6.9 MG/DL — SIGNIFICANT CHANGE UP (ref 2.5–7)
URATE SERPL-MCNC: 8.3 MG/DL — HIGH (ref 2.5–7)
VARIANT LYMPHS # BLD: 0.9 % — SIGNIFICANT CHANGE UP (ref 0–6)
WBC # BLD: 10.13 K/UL — SIGNIFICANT CHANGE UP (ref 3.8–10.5)
WBC # BLD: 10.22 K/UL — SIGNIFICANT CHANGE UP (ref 3.8–10.5)
WBC # BLD: 10.37 K/UL — SIGNIFICANT CHANGE UP (ref 3.8–10.5)
WBC # BLD: 10.86 K/UL — HIGH (ref 3.8–10.5)
WBC # BLD: 10.98 K/UL — HIGH (ref 3.8–10.5)
WBC # BLD: 11.05 K/UL — HIGH (ref 3.8–10.5)
WBC # BLD: 11.05 K/UL — HIGH (ref 3.8–10.5)
WBC # BLD: 11.25 K/UL — HIGH (ref 3.8–10.5)
WBC # BLD: 11.28 K/UL — HIGH (ref 3.8–10.5)
WBC # BLD: 11.34 K/UL — HIGH (ref 3.8–10.5)
WBC # BLD: 11.39 K/UL — HIGH (ref 3.8–10.5)
WBC # BLD: 11.49 K/UL — HIGH (ref 3.8–10.5)
WBC # BLD: 11.63 K/UL — HIGH (ref 3.8–10.5)
WBC # BLD: 11.71 K/UL — HIGH (ref 3.8–10.5)
WBC # BLD: 11.77 K/UL — HIGH (ref 3.8–10.5)
WBC # BLD: 11.81 K/UL — HIGH (ref 3.8–10.5)
WBC # BLD: 11.89 K/UL — HIGH (ref 3.8–10.5)
WBC # BLD: 11.9 K/UL — HIGH (ref 3.8–10.5)
WBC # BLD: 11.95 K/UL — HIGH (ref 3.8–10.5)
WBC # BLD: 12.18 K/UL — HIGH (ref 3.8–10.5)
WBC # BLD: 12.21 K/UL — HIGH (ref 3.8–10.5)
WBC # BLD: 12.22 K/UL — HIGH (ref 3.8–10.5)
WBC # BLD: 12.44 K/UL — HIGH (ref 3.8–10.5)
WBC # BLD: 12.45 K/UL — HIGH (ref 3.8–10.5)
WBC # BLD: 12.51 K/UL — HIGH (ref 3.8–10.5)
WBC # BLD: 12.54 K/UL — HIGH (ref 3.8–10.5)
WBC # BLD: 12.55 K/UL — HIGH (ref 3.8–10.5)
WBC # BLD: 12.56 K/UL — HIGH (ref 3.8–10.5)
WBC # BLD: 12.56 K/UL — HIGH (ref 3.8–10.5)
WBC # BLD: 12.61 K/UL — HIGH (ref 3.8–10.5)
WBC # BLD: 12.62 K/UL — HIGH (ref 3.8–10.5)
WBC # BLD: 12.63 K/UL — HIGH (ref 3.8–10.5)
WBC # BLD: 12.69 K/UL — HIGH (ref 3.8–10.5)
WBC # BLD: 12.81 K/UL — HIGH (ref 3.8–10.5)
WBC # BLD: 12.91 K/UL — HIGH (ref 3.8–10.5)
WBC # BLD: 12.94 K/UL — HIGH (ref 3.8–10.5)
WBC # BLD: 12.97 K/UL — HIGH (ref 3.8–10.5)
WBC # BLD: 12.98 K/UL — HIGH (ref 3.8–10.5)
WBC # BLD: 13.07 K/UL — HIGH (ref 3.8–10.5)
WBC # BLD: 13.1 K/UL — HIGH (ref 3.8–10.5)
WBC # BLD: 13.1 K/UL — HIGH (ref 3.8–10.5)
WBC # BLD: 13.27 K/UL — HIGH (ref 3.8–10.5)
WBC # BLD: 13.49 K/UL — HIGH (ref 3.8–10.5)
WBC # BLD: 13.6 K/UL — HIGH (ref 3.8–10.5)
WBC # BLD: 13.61 K/UL — HIGH (ref 3.8–10.5)
WBC # BLD: 13.7 K/UL — HIGH (ref 3.8–10.5)
WBC # BLD: 13.84 K/UL — HIGH (ref 3.8–10.5)
WBC # BLD: 13.88 K/UL — HIGH (ref 3.8–10.5)
WBC # BLD: 13.88 K/UL — HIGH (ref 3.8–10.5)
WBC # BLD: 13.9 K/UL — HIGH (ref 3.8–10.5)
WBC # BLD: 13.96 K/UL — HIGH (ref 3.8–10.5)
WBC # BLD: 14.03 K/UL — HIGH (ref 3.8–10.5)
WBC # BLD: 14.3 K/UL — HIGH (ref 3.8–10.5)
WBC # BLD: 14.61 K/UL — HIGH (ref 3.8–10.5)
WBC # BLD: 14.76 K/UL — HIGH (ref 3.8–10.5)
WBC # BLD: 14.83 K/UL — HIGH (ref 3.8–10.5)
WBC # BLD: 15.2 K/UL — HIGH (ref 3.8–10.5)
WBC # BLD: 17.43 K/UL — HIGH (ref 3.8–10.5)
WBC # BLD: 18.45 K/UL — HIGH (ref 3.8–10.5)
WBC # BLD: 18.74 K/UL — HIGH (ref 3.8–10.5)
WBC # BLD: 19.95 K/UL — HIGH (ref 3.8–10.5)
WBC # BLD: 35.31 K/UL — HIGH (ref 3.8–10.5)
WBC # BLD: 37.58 K/UL — HIGH (ref 3.8–10.5)
WBC # BLD: 37.8 K/UL — HIGH (ref 3.8–10.5)
WBC # BLD: 37.93 K/UL — HIGH (ref 3.8–10.5)
WBC # BLD: 42.53 K/UL — CRITICAL HIGH (ref 3.8–10.5)
WBC # BLD: 49.26 K/UL — CRITICAL HIGH (ref 3.8–10.5)
WBC # BLD: 49.87 K/UL — CRITICAL HIGH (ref 3.8–10.5)
WBC # BLD: 50.37 K/UL — CRITICAL HIGH (ref 3.8–10.5)
WBC # BLD: 50.92 K/UL — CRITICAL HIGH (ref 3.8–10.5)
WBC # BLD: 8.66 K/UL — SIGNIFICANT CHANGE UP (ref 3.8–10.5)
WBC # BLD: 9.14 K/UL — SIGNIFICANT CHANGE UP (ref 3.8–10.5)
WBC # BLD: 9.23 K/UL — SIGNIFICANT CHANGE UP (ref 3.8–10.5)
WBC # BLD: 9.34 K/UL — SIGNIFICANT CHANGE UP (ref 3.8–10.5)
WBC # BLD: 9.37 K/UL — SIGNIFICANT CHANGE UP (ref 3.8–10.5)
WBC # BLD: 9.39 K/UL — SIGNIFICANT CHANGE UP (ref 3.8–10.5)
WBC # BLD: 9.64 K/UL — SIGNIFICANT CHANGE UP (ref 3.8–10.5)
WBC # BLD: 9.92 K/UL — SIGNIFICANT CHANGE UP (ref 3.8–10.5)
WBC # BLD: 9.93 K/UL — SIGNIFICANT CHANGE UP (ref 3.8–10.5)
WBC # FLD AUTO: 10.13 K/UL — SIGNIFICANT CHANGE UP (ref 3.8–10.5)
WBC # FLD AUTO: 10.22 K/UL — SIGNIFICANT CHANGE UP (ref 3.8–10.5)
WBC # FLD AUTO: 10.37 K/UL — SIGNIFICANT CHANGE UP (ref 3.8–10.5)
WBC # FLD AUTO: 10.86 K/UL — HIGH (ref 3.8–10.5)
WBC # FLD AUTO: 10.98 K/UL — HIGH (ref 3.8–10.5)
WBC # FLD AUTO: 11.05 K/UL — HIGH (ref 3.8–10.5)
WBC # FLD AUTO: 11.05 K/UL — HIGH (ref 3.8–10.5)
WBC # FLD AUTO: 11.25 K/UL — HIGH (ref 3.8–10.5)
WBC # FLD AUTO: 11.28 K/UL — HIGH (ref 3.8–10.5)
WBC # FLD AUTO: 11.34 K/UL — HIGH (ref 3.8–10.5)
WBC # FLD AUTO: 11.39 K/UL — HIGH (ref 3.8–10.5)
WBC # FLD AUTO: 11.49 K/UL — HIGH (ref 3.8–10.5)
WBC # FLD AUTO: 11.63 K/UL — HIGH (ref 3.8–10.5)
WBC # FLD AUTO: 11.71 K/UL — HIGH (ref 3.8–10.5)
WBC # FLD AUTO: 11.77 K/UL — HIGH (ref 3.8–10.5)
WBC # FLD AUTO: 11.81 K/UL — HIGH (ref 3.8–10.5)
WBC # FLD AUTO: 11.89 K/UL — HIGH (ref 3.8–10.5)
WBC # FLD AUTO: 11.9 K/UL — HIGH (ref 3.8–10.5)
WBC # FLD AUTO: 11.95 K/UL — HIGH (ref 3.8–10.5)
WBC # FLD AUTO: 12.18 K/UL — HIGH (ref 3.8–10.5)
WBC # FLD AUTO: 12.21 K/UL — HIGH (ref 3.8–10.5)
WBC # FLD AUTO: 12.22 K/UL — HIGH (ref 3.8–10.5)
WBC # FLD AUTO: 12.44 K/UL — HIGH (ref 3.8–10.5)
WBC # FLD AUTO: 12.45 K/UL — HIGH (ref 3.8–10.5)
WBC # FLD AUTO: 12.51 K/UL — HIGH (ref 3.8–10.5)
WBC # FLD AUTO: 12.54 K/UL — HIGH (ref 3.8–10.5)
WBC # FLD AUTO: 12.55 K/UL — HIGH (ref 3.8–10.5)
WBC # FLD AUTO: 12.56 K/UL — HIGH (ref 3.8–10.5)
WBC # FLD AUTO: 12.56 K/UL — HIGH (ref 3.8–10.5)
WBC # FLD AUTO: 12.61 K/UL — HIGH (ref 3.8–10.5)
WBC # FLD AUTO: 12.62 K/UL — HIGH (ref 3.8–10.5)
WBC # FLD AUTO: 12.63 K/UL — HIGH (ref 3.8–10.5)
WBC # FLD AUTO: 12.69 K/UL — HIGH (ref 3.8–10.5)
WBC # FLD AUTO: 12.81 K/UL — HIGH (ref 3.8–10.5)
WBC # FLD AUTO: 12.91 K/UL — HIGH (ref 3.8–10.5)
WBC # FLD AUTO: 12.94 K/UL — HIGH (ref 3.8–10.5)
WBC # FLD AUTO: 12.97 K/UL — HIGH (ref 3.8–10.5)
WBC # FLD AUTO: 12.98 K/UL — HIGH (ref 3.8–10.5)
WBC # FLD AUTO: 13.07 K/UL — HIGH (ref 3.8–10.5)
WBC # FLD AUTO: 13.1 K/UL — HIGH (ref 3.8–10.5)
WBC # FLD AUTO: 13.1 K/UL — HIGH (ref 3.8–10.5)
WBC # FLD AUTO: 13.27 K/UL — HIGH (ref 3.8–10.5)
WBC # FLD AUTO: 13.49 K/UL — HIGH (ref 3.8–10.5)
WBC # FLD AUTO: 13.6 K/UL — HIGH (ref 3.8–10.5)
WBC # FLD AUTO: 13.61 K/UL — HIGH (ref 3.8–10.5)
WBC # FLD AUTO: 13.7 K/UL — HIGH (ref 3.8–10.5)
WBC # FLD AUTO: 13.84 K/UL — HIGH (ref 3.8–10.5)
WBC # FLD AUTO: 13.88 K/UL — HIGH (ref 3.8–10.5)
WBC # FLD AUTO: 13.88 K/UL — HIGH (ref 3.8–10.5)
WBC # FLD AUTO: 13.9 K/UL — HIGH (ref 3.8–10.5)
WBC # FLD AUTO: 13.96 K/UL — HIGH (ref 3.8–10.5)
WBC # FLD AUTO: 14.03 K/UL — HIGH (ref 3.8–10.5)
WBC # FLD AUTO: 14.3 K/UL — HIGH (ref 3.8–10.5)
WBC # FLD AUTO: 14.61 K/UL — HIGH (ref 3.8–10.5)
WBC # FLD AUTO: 14.76 K/UL — HIGH (ref 3.8–10.5)
WBC # FLD AUTO: 14.83 K/UL — HIGH (ref 3.8–10.5)
WBC # FLD AUTO: 15.2 K/UL — HIGH (ref 3.8–10.5)
WBC # FLD AUTO: 17.43 K/UL — HIGH (ref 3.8–10.5)
WBC # FLD AUTO: 18.45 K/UL — HIGH (ref 3.8–10.5)
WBC # FLD AUTO: 18.74 K/UL — HIGH (ref 3.8–10.5)
WBC # FLD AUTO: 19.95 K/UL — HIGH (ref 3.8–10.5)
WBC # FLD AUTO: 35.31 K/UL — HIGH (ref 3.8–10.5)
WBC # FLD AUTO: 37.58 K/UL — HIGH (ref 3.8–10.5)
WBC # FLD AUTO: 37.8 K/UL — HIGH (ref 3.8–10.5)
WBC # FLD AUTO: 37.93 K/UL — HIGH (ref 3.8–10.5)
WBC # FLD AUTO: 42.53 K/UL — CRITICAL HIGH (ref 3.8–10.5)
WBC # FLD AUTO: 49.26 K/UL — CRITICAL HIGH (ref 3.8–10.5)
WBC # FLD AUTO: 49.87 K/UL — CRITICAL HIGH (ref 3.8–10.5)
WBC # FLD AUTO: 50.37 K/UL — CRITICAL HIGH (ref 3.8–10.5)
WBC # FLD AUTO: 50.92 K/UL — CRITICAL HIGH (ref 3.8–10.5)
WBC # FLD AUTO: 8.66 K/UL — SIGNIFICANT CHANGE UP (ref 3.8–10.5)
WBC # FLD AUTO: 9.14 K/UL — SIGNIFICANT CHANGE UP (ref 3.8–10.5)
WBC # FLD AUTO: 9.23 K/UL — SIGNIFICANT CHANGE UP (ref 3.8–10.5)
WBC # FLD AUTO: 9.34 K/UL — SIGNIFICANT CHANGE UP (ref 3.8–10.5)
WBC # FLD AUTO: 9.37 K/UL — SIGNIFICANT CHANGE UP (ref 3.8–10.5)
WBC # FLD AUTO: 9.39 K/UL — SIGNIFICANT CHANGE UP (ref 3.8–10.5)
WBC # FLD AUTO: 9.64 K/UL — SIGNIFICANT CHANGE UP (ref 3.8–10.5)
WBC # FLD AUTO: 9.92 K/UL — SIGNIFICANT CHANGE UP (ref 3.8–10.5)
WBC # FLD AUTO: 9.93 K/UL — SIGNIFICANT CHANGE UP (ref 3.8–10.5)

## 2022-01-01 PROCEDURE — 74177 CT ABD & PELVIS W/CONTRAST: CPT | Mod: 26,MA

## 2022-01-01 PROCEDURE — 93010 ELECTROCARDIOGRAM REPORT: CPT

## 2022-01-01 PROCEDURE — 99232 SBSQ HOSP IP/OBS MODERATE 35: CPT

## 2022-01-01 PROCEDURE — 99291 CRITICAL CARE FIRST HOUR: CPT | Mod: GC

## 2022-01-01 PROCEDURE — 36556 INSERT NON-TUNNEL CV CATH: CPT | Mod: GC

## 2022-01-01 PROCEDURE — 99222 1ST HOSP IP/OBS MODERATE 55: CPT

## 2022-01-01 PROCEDURE — 71045 X-RAY EXAM CHEST 1 VIEW: CPT | Mod: 26

## 2022-01-01 PROCEDURE — 88305 TISSUE EXAM BY PATHOLOGIST: CPT | Mod: 26

## 2022-01-01 PROCEDURE — 99223 1ST HOSP IP/OBS HIGH 75: CPT

## 2022-01-01 PROCEDURE — 76937 US GUIDE VASCULAR ACCESS: CPT | Mod: 26

## 2022-01-01 PROCEDURE — 99285 EMERGENCY DEPT VISIT HI MDM: CPT

## 2022-01-01 PROCEDURE — 99233 SBSQ HOSP IP/OBS HIGH 50: CPT

## 2022-01-01 PROCEDURE — 93306 TTE W/DOPPLER COMPLETE: CPT | Mod: 26

## 2022-01-01 PROCEDURE — 84165 PROTEIN E-PHORESIS SERUM: CPT | Mod: 26

## 2022-01-01 PROCEDURE — 72131 CT LUMBAR SPINE W/O DYE: CPT | Mod: 26

## 2022-01-01 PROCEDURE — 99291 CRITICAL CARE FIRST HOUR: CPT | Mod: 25

## 2022-01-01 PROCEDURE — 99292 CRITICAL CARE ADDL 30 MIN: CPT | Mod: 25

## 2022-01-01 PROCEDURE — 73562 X-RAY EXAM OF KNEE 3: CPT | Mod: 26,LT

## 2022-01-01 PROCEDURE — 99223 1ST HOSP IP/OBS HIGH 75: CPT | Mod: GC

## 2022-01-01 PROCEDURE — 99152 MOD SED SAME PHYS/QHP 5/>YRS: CPT

## 2022-01-01 PROCEDURE — 99291 CRITICAL CARE FIRST HOUR: CPT

## 2022-01-01 PROCEDURE — 70450 CT HEAD/BRAIN W/O DYE: CPT | Mod: 26

## 2022-01-01 PROCEDURE — 36901 INTRO CATH DIALYSIS CIRCUIT: CPT | Mod: LT

## 2022-01-01 PROCEDURE — 71275 CT ANGIOGRAPHY CHEST: CPT | Mod: 26,MA

## 2022-01-01 PROCEDURE — 99221 1ST HOSP IP/OBS SF/LOW 40: CPT

## 2022-01-01 PROCEDURE — 88141 CYTOPATH C/V INTERPRET: CPT

## 2022-01-01 PROCEDURE — 88304 TISSUE EXAM BY PATHOLOGIST: CPT | Mod: 26

## 2022-01-01 PROCEDURE — 99233 SBSQ HOSP IP/OBS HIGH 50: CPT | Mod: GC

## 2022-01-01 PROCEDURE — 76856 US EXAM PELVIC COMPLETE: CPT | Mod: 26

## 2022-01-01 PROCEDURE — 11104 PUNCH BX SKIN SINGLE LESION: CPT

## 2022-01-01 PROCEDURE — 88313 SPECIAL STAINS GROUP 2: CPT | Mod: 26

## 2022-01-01 PROCEDURE — 99292 CRITICAL CARE ADDL 30 MIN: CPT

## 2022-01-01 PROCEDURE — 73502 X-RAY EXAM HIP UNI 2-3 VIEWS: CPT | Mod: 26,LT

## 2022-01-01 PROCEDURE — 74018 RADEX ABDOMEN 1 VIEW: CPT | Mod: 26

## 2022-01-01 PROCEDURE — 99222 1ST HOSP IP/OBS MODERATE 55: CPT | Mod: 57

## 2022-01-01 PROCEDURE — 11105 PUNCH BX SKIN EA SEP/ADDL: CPT

## 2022-01-01 PROCEDURE — 99232 SBSQ HOSP IP/OBS MODERATE 35: CPT | Mod: GC

## 2022-01-01 PROCEDURE — 99223 1ST HOSP IP/OBS HIGH 75: CPT | Mod: 25

## 2022-01-01 PROCEDURE — 88312 SPECIAL STAINS GROUP 1: CPT | Mod: 26

## 2022-01-01 PROCEDURE — 74174 CTA ABD&PLVS W/CONTRAST: CPT | Mod: 26,MA

## 2022-01-01 PROCEDURE — 36620 INSERTION CATHETER ARTERY: CPT | Mod: GC

## 2022-01-01 PROCEDURE — 74177 CT ABD & PELVIS W/CONTRAST: CPT | Mod: 26

## 2022-01-01 DEVICE — IUD MIRENA: Type: IMPLANTABLE DEVICE | Status: FUNCTIONAL

## 2022-01-01 RX ORDER — TACROLIMUS/VEHICLE BASE NO.238 0.1 %
1 CREAM (GRAM) TOPICAL
Qty: 0 | Refills: 0 | DISCHARGE
Start: 2022-01-01

## 2022-01-01 RX ORDER — INSULIN LISPRO 100/ML
2 VIAL (ML) SUBCUTANEOUS
Refills: 0 | Status: DISCONTINUED | OUTPATIENT
Start: 2022-01-01 | End: 2022-01-01

## 2022-01-01 RX ORDER — OXYCODONE HYDROCHLORIDE 5 MG/1
10 TABLET ORAL EVERY 12 HOURS
Refills: 0 | Status: DISCONTINUED | OUTPATIENT
Start: 2022-01-01 | End: 2022-01-01

## 2022-01-01 RX ORDER — CYCLOSPORINE 100 MG/1
150 CAPSULE ORAL
Refills: 0 | Status: DISCONTINUED | OUTPATIENT
Start: 2022-01-01 | End: 2022-01-01

## 2022-01-01 RX ORDER — OXYCODONE HYDROCHLORIDE 5 MG/1
7.5 TABLET ORAL EVERY 4 HOURS
Refills: 0 | Status: DISCONTINUED | OUTPATIENT
Start: 2022-01-01 | End: 2022-01-01

## 2022-01-01 RX ORDER — GABAPENTIN 400 MG/1
1 CAPSULE ORAL
Qty: 0 | Refills: 0 | DISCHARGE
Start: 2022-01-01

## 2022-01-01 RX ORDER — DILTIAZEM HCL 120 MG
1 CAPSULE, EXT RELEASE 24 HR ORAL
Qty: 0 | Refills: 0 | DISCHARGE
Start: 2022-01-01

## 2022-01-01 RX ORDER — INSULIN GLARGINE 100 [IU]/ML
6 INJECTION, SOLUTION SUBCUTANEOUS AT BEDTIME
Refills: 0 | Status: DISCONTINUED | OUTPATIENT
Start: 2022-01-01 | End: 2022-01-01

## 2022-01-01 RX ORDER — CHLORHEXIDINE GLUCONATE 213 G/1000ML
1 SOLUTION TOPICAL DAILY
Refills: 0 | Status: DISCONTINUED | OUTPATIENT
Start: 2022-01-01 | End: 2022-01-01

## 2022-01-01 RX ORDER — ACETAMINOPHEN 500 MG
650 TABLET ORAL EVERY 8 HOURS
Refills: 0 | Status: COMPLETED | OUTPATIENT
Start: 2022-01-01 | End: 2022-01-01

## 2022-01-01 RX ORDER — INSULIN GLARGINE 100 [IU]/ML
10 INJECTION, SOLUTION SUBCUTANEOUS AT BEDTIME
Refills: 0 | Status: DISCONTINUED | OUTPATIENT
Start: 2022-01-01 | End: 2022-01-01

## 2022-01-01 RX ORDER — MIDODRINE HYDROCHLORIDE 2.5 MG/1
5 TABLET ORAL ONCE
Refills: 0 | Status: COMPLETED | OUTPATIENT
Start: 2022-01-01 | End: 2022-01-01

## 2022-01-01 RX ORDER — OXYCODONE HYDROCHLORIDE 5 MG/1
10 TABLET ORAL EVERY 6 HOURS
Refills: 0 | Status: DISCONTINUED | OUTPATIENT
Start: 2022-01-01 | End: 2022-01-01

## 2022-01-01 RX ORDER — CYCLOSPORINE 100 MG/1
2 CAPSULE ORAL
Qty: 0 | Refills: 0 | DISCHARGE
Start: 2022-01-01

## 2022-01-01 RX ORDER — DILTIAZEM HCL 120 MG
1 CAPSULE, EXT RELEASE 24 HR ORAL
Qty: 0 | Refills: 0 | DISCHARGE

## 2022-01-01 RX ORDER — LINEZOLID 600 MG/300ML
600 INJECTION, SOLUTION INTRAVENOUS ONCE
Refills: 0 | Status: COMPLETED | OUTPATIENT
Start: 2022-01-01 | End: 2022-01-01

## 2022-01-01 RX ORDER — MONTELUKAST 4 MG/1
10 TABLET, CHEWABLE ORAL AT BEDTIME
Refills: 0 | Status: DISCONTINUED | OUTPATIENT
Start: 2022-01-01 | End: 2022-01-01

## 2022-01-01 RX ORDER — CYCLOSPORINE 100 MG/1
150 CAPSULE ORAL ONCE
Refills: 0 | Status: COMPLETED | OUTPATIENT
Start: 2022-01-01 | End: 2022-01-01

## 2022-01-01 RX ORDER — SODIUM CHLORIDE 9 MG/ML
1000 INJECTION, SOLUTION INTRAVENOUS
Refills: 0 | Status: DISCONTINUED | OUTPATIENT
Start: 2022-01-01 | End: 2022-01-01

## 2022-01-01 RX ORDER — DEXTROSE 50 % IN WATER 50 %
15 SYRINGE (ML) INTRAVENOUS ONCE
Refills: 0 | Status: COMPLETED | OUTPATIENT
Start: 2022-01-01 | End: 2022-01-01

## 2022-01-01 RX ORDER — PANTOPRAZOLE SODIUM 20 MG/1
40 TABLET, DELAYED RELEASE ORAL
Refills: 0 | Status: DISCONTINUED | OUTPATIENT
Start: 2022-01-01 | End: 2022-01-01

## 2022-01-01 RX ORDER — NOREPINEPHRINE BITARTRATE/D5W 8 MG/250ML
0.05 PLASTIC BAG, INJECTION (ML) INTRAVENOUS
Qty: 8 | Refills: 0 | Status: DISCONTINUED | OUTPATIENT
Start: 2022-01-01 | End: 2022-01-01

## 2022-01-01 RX ORDER — OXYCODONE HYDROCHLORIDE 5 MG/1
5 TABLET ORAL EVERY 6 HOURS
Refills: 0 | Status: DISCONTINUED | OUTPATIENT
Start: 2022-01-01 | End: 2022-01-01

## 2022-01-01 RX ORDER — LIDOCAINE AND PRILOCAINE CREAM 25; 25 MG/G; MG/G
1 CREAM TOPICAL
Qty: 0 | Refills: 0 | DISCHARGE

## 2022-01-01 RX ORDER — GLUCAGON INJECTION, SOLUTION 0.5 MG/.1ML
1 INJECTION, SOLUTION SUBCUTANEOUS ONCE
Refills: 0 | Status: DISCONTINUED | OUTPATIENT
Start: 2022-01-01 | End: 2022-01-01

## 2022-01-01 RX ORDER — CEFTRIAXONE 500 MG/1
2000 INJECTION, POWDER, FOR SOLUTION INTRAMUSCULAR; INTRAVENOUS EVERY 24 HOURS
Refills: 0 | Status: DISCONTINUED | OUTPATIENT
Start: 2022-01-01 | End: 2022-01-01

## 2022-01-01 RX ORDER — ACETAMINOPHEN 500 MG
1000 TABLET ORAL ONCE
Refills: 0 | Status: COMPLETED | OUTPATIENT
Start: 2022-01-01 | End: 2022-01-01

## 2022-01-01 RX ORDER — FENTANYL CITRATE 50 UG/ML
25 INJECTION INTRAVENOUS ONCE
Refills: 0 | Status: DISCONTINUED | OUTPATIENT
Start: 2022-01-01 | End: 2022-01-01

## 2022-01-01 RX ORDER — CINACALCET 30 MG/1
1 TABLET, FILM COATED ORAL
Qty: 0 | Refills: 0 | DISCHARGE
Start: 2022-01-01

## 2022-01-01 RX ORDER — LIDOCAINE 4 G/100G
1 CREAM TOPICAL DAILY
Refills: 0 | Status: DISCONTINUED | OUTPATIENT
Start: 2022-01-01 | End: 2022-01-01

## 2022-01-01 RX ORDER — INSULIN LISPRO 100/ML
VIAL (ML) SUBCUTANEOUS AT BEDTIME
Refills: 0 | Status: DISCONTINUED | OUTPATIENT
Start: 2022-01-01 | End: 2022-01-01

## 2022-01-01 RX ORDER — PANTOPRAZOLE SODIUM 20 MG/1
40 TABLET, DELAYED RELEASE ORAL ONCE
Refills: 0 | Status: DISCONTINUED | OUTPATIENT
Start: 2022-01-01 | End: 2022-01-01

## 2022-01-01 RX ORDER — SENNA PLUS 8.6 MG/1
2 TABLET ORAL
Qty: 0 | Refills: 0 | DISCHARGE
Start: 2022-01-01

## 2022-01-01 RX ORDER — ONDANSETRON 8 MG/1
4 TABLET, FILM COATED ORAL ONCE
Refills: 0 | Status: COMPLETED | OUTPATIENT
Start: 2022-01-01 | End: 2022-01-01

## 2022-01-01 RX ORDER — ASPIRIN/CALCIUM CARB/MAGNESIUM 324 MG
81 TABLET ORAL DAILY
Refills: 0 | Status: DISCONTINUED | OUTPATIENT
Start: 2022-01-01 | End: 2022-01-01

## 2022-01-01 RX ORDER — DEXTROSE 50 % IN WATER 50 %
15 SYRINGE (ML) INTRAVENOUS ONCE
Refills: 0 | Status: DISCONTINUED | OUTPATIENT
Start: 2022-01-01 | End: 2022-01-01

## 2022-01-01 RX ORDER — HEPARIN SODIUM 5000 [USP'U]/ML
1800 INJECTION INTRAVENOUS; SUBCUTANEOUS
Qty: 25000 | Refills: 0 | Status: DISCONTINUED | OUTPATIENT
Start: 2022-01-01 | End: 2022-01-01

## 2022-01-01 RX ORDER — HYDROMORPHONE HYDROCHLORIDE 2 MG/ML
1 INJECTION INTRAMUSCULAR; INTRAVENOUS; SUBCUTANEOUS EVERY 6 HOURS
Refills: 0 | Status: DISCONTINUED | OUTPATIENT
Start: 2022-01-01 | End: 2022-01-01

## 2022-01-01 RX ORDER — INSULIN LISPRO 100/ML
10 VIAL (ML) SUBCUTANEOUS
Refills: 0 | Status: DISCONTINUED | OUTPATIENT
Start: 2022-01-01 | End: 2022-01-01

## 2022-01-01 RX ORDER — INSULIN DEGLUDEC 100 U/ML
80 INJECTION, SOLUTION SUBCUTANEOUS
Qty: 0 | Refills: 0 | DISCHARGE

## 2022-01-01 RX ORDER — OXYCODONE HYDROCHLORIDE 5 MG/1
1 TABLET ORAL
Qty: 0 | Refills: 0 | DISCHARGE
Start: 2022-01-01

## 2022-01-01 RX ORDER — VANCOMYCIN HCL 1 G
1000 VIAL (EA) INTRAVENOUS ONCE
Refills: 0 | Status: COMPLETED | OUTPATIENT
Start: 2022-01-01 | End: 2022-01-01

## 2022-01-01 RX ORDER — SIMETHICONE 80 MG/1
80 TABLET, CHEWABLE ORAL THREE TIMES A DAY
Refills: 0 | Status: DISCONTINUED | OUTPATIENT
Start: 2022-01-01 | End: 2022-01-01

## 2022-01-01 RX ORDER — HEPARIN SODIUM 5000 [USP'U]/ML
500 INJECTION INTRAVENOUS; SUBCUTANEOUS
Refills: 0 | Status: DISCONTINUED | OUTPATIENT
Start: 2022-01-01 | End: 2022-01-01

## 2022-01-01 RX ORDER — AMIODARONE HYDROCHLORIDE 400 MG/1
0.5 TABLET ORAL
Qty: 450 | Refills: 0 | Status: DISCONTINUED | OUTPATIENT
Start: 2022-01-01 | End: 2022-01-01

## 2022-01-01 RX ORDER — POLYETHYLENE GLYCOL 3350 17 G/17G
17 POWDER, FOR SOLUTION ORAL
Qty: 0 | Refills: 0 | DISCHARGE
Start: 2022-01-01

## 2022-01-01 RX ORDER — PANTOPRAZOLE SODIUM 20 MG/1
40 TABLET, DELAYED RELEASE ORAL DAILY
Refills: 0 | Status: DISCONTINUED | OUTPATIENT
Start: 2022-01-01 | End: 2022-01-01

## 2022-01-01 RX ORDER — CETIRIZINE HYDROCHLORIDE 10 MG/1
1 TABLET ORAL
Qty: 0 | Refills: 0 | DISCHARGE

## 2022-01-01 RX ORDER — DIPHENHYDRAMINE HCL 50 MG
25 CAPSULE ORAL
Refills: 0 | Status: DISCONTINUED | OUTPATIENT
Start: 2022-01-01 | End: 2022-01-01

## 2022-01-01 RX ORDER — LACTOBACILLUS ACIDOPHILUS 100MM CELL
1 CAPSULE ORAL DAILY
Refills: 0 | Status: DISCONTINUED | OUTPATIENT
Start: 2022-01-01 | End: 2022-01-01

## 2022-01-01 RX ORDER — POTASSIUM CHLORIDE 20 MEQ
20 PACKET (EA) ORAL ONCE
Refills: 0 | Status: COMPLETED | OUTPATIENT
Start: 2022-01-01 | End: 2022-01-01

## 2022-01-01 RX ORDER — NOREPINEPHRINE BITARTRATE/D5W 8 MG/250ML
0.05 PLASTIC BAG, INJECTION (ML) INTRAVENOUS
Qty: 32 | Refills: 0 | Status: DISCONTINUED | OUTPATIENT
Start: 2022-01-01 | End: 2022-01-01

## 2022-01-01 RX ORDER — APIXABAN 2.5 MG/1
2.5 TABLET, FILM COATED ORAL
Refills: 0 | Status: DISCONTINUED | OUTPATIENT
Start: 2022-01-01 | End: 2022-01-01

## 2022-01-01 RX ORDER — MINERAL OIL
133 OIL (ML) MISCELLANEOUS ONCE
Refills: 0 | Status: DISCONTINUED | OUTPATIENT
Start: 2022-01-01 | End: 2022-01-01

## 2022-01-01 RX ORDER — INSULIN LISPRO 100/ML
VIAL (ML) SUBCUTANEOUS EVERY 6 HOURS
Refills: 0 | Status: DISCONTINUED | OUTPATIENT
Start: 2022-01-01 | End: 2022-01-01

## 2022-01-01 RX ORDER — CYCLOSPORINE 100 MG/1
3 CAPSULE ORAL
Qty: 0 | Refills: 0 | DISCHARGE
Start: 2022-01-01

## 2022-01-01 RX ORDER — SODIUM,POTASSIUM PHOSPHATES 278-250MG
1 POWDER IN PACKET (EA) ORAL
Refills: 0 | Status: DISCONTINUED | OUTPATIENT
Start: 2022-01-01 | End: 2022-01-01

## 2022-01-01 RX ORDER — SODIUM ZIRCONIUM CYCLOSILICATE 10 G/10G
10 POWDER, FOR SUSPENSION ORAL ONCE
Refills: 0 | Status: COMPLETED | OUTPATIENT
Start: 2022-01-01 | End: 2022-01-01

## 2022-01-01 RX ORDER — INSULIN LISPRO 100/ML
VIAL (ML) SUBCUTANEOUS
Refills: 0 | Status: DISCONTINUED | OUTPATIENT
Start: 2022-01-01 | End: 2022-01-01

## 2022-01-01 RX ORDER — APIXABAN 2.5 MG/1
5 TABLET, FILM COATED ORAL EVERY 12 HOURS
Refills: 0 | Status: DISCONTINUED | OUTPATIENT
Start: 2022-01-01 | End: 2022-01-01

## 2022-01-01 RX ORDER — CEFEPIME 1 G/1
1000 INJECTION, POWDER, FOR SOLUTION INTRAMUSCULAR; INTRAVENOUS EVERY 12 HOURS
Refills: 0 | Status: DISCONTINUED | OUTPATIENT
Start: 2022-01-01 | End: 2022-01-01

## 2022-01-01 RX ORDER — LINAGLIPTIN 5 MG/1
1 TABLET, FILM COATED ORAL
Qty: 30 | Refills: 0
Start: 2022-01-01 | End: 2022-01-01

## 2022-01-01 RX ORDER — RANITIDINE HYDROCHLORIDE 150 MG/1
1 TABLET, FILM COATED ORAL
Qty: 0 | Refills: 0 | DISCHARGE

## 2022-01-01 RX ORDER — MIDODRINE HYDROCHLORIDE 2.5 MG/1
1 TABLET ORAL
Qty: 0 | Refills: 0 | DISCHARGE
Start: 2022-01-01

## 2022-01-01 RX ORDER — GABAPENTIN 400 MG/1
300 CAPSULE ORAL
Refills: 0 | Status: DISCONTINUED | OUTPATIENT
Start: 2022-01-01 | End: 2022-01-01

## 2022-01-01 RX ORDER — LANOLIN ALCOHOL/MO/W.PET/CERES
6 CREAM (GRAM) TOPICAL AT BEDTIME
Refills: 0 | Status: DISCONTINUED | OUTPATIENT
Start: 2022-01-01 | End: 2022-01-01

## 2022-01-01 RX ORDER — SODIUM HYPOCHLORITE 0.125 %
1 SOLUTION, NON-ORAL MISCELLANEOUS
Qty: 0 | Refills: 0 | DISCHARGE

## 2022-01-01 RX ORDER — LIDOCAINE 4 G/100G
1 CREAM TOPICAL
Qty: 0 | Refills: 0 | DISCHARGE
Start: 2022-01-01

## 2022-01-01 RX ORDER — NOREPINEPHRINE BITARTRATE/D5W 8 MG/250ML
0.05 PLASTIC BAG, INJECTION (ML) INTRAVENOUS
Qty: 16 | Refills: 0 | Status: DISCONTINUED | OUTPATIENT
Start: 2022-01-01 | End: 2022-01-01

## 2022-01-01 RX ORDER — COLLAGENASE CLOSTRIDIUM HIST. 250 UNIT/G
1 OINTMENT (GRAM) TOPICAL
Qty: 0 | Refills: 0 | DISCHARGE

## 2022-01-01 RX ORDER — COLLAGENASE CLOSTRIDIUM HIST. 250 UNIT/G
1 OINTMENT (GRAM) TOPICAL
Refills: 0 | Status: DISCONTINUED | OUTPATIENT
Start: 2022-01-01 | End: 2022-01-01

## 2022-01-01 RX ORDER — CYCLOSPORINE 100 MG/1
200 CAPSULE ORAL
Refills: 0 | Status: DISCONTINUED | OUTPATIENT
Start: 2022-01-01 | End: 2022-01-01

## 2022-01-01 RX ORDER — MIDODRINE HYDROCHLORIDE 2.5 MG/1
5 TABLET ORAL
Refills: 0 | Status: DISCONTINUED | OUTPATIENT
Start: 2022-01-01 | End: 2022-01-01

## 2022-01-01 RX ORDER — BUPROPION HYDROCHLORIDE 150 MG/1
1 TABLET, EXTENDED RELEASE ORAL
Qty: 0 | Refills: 0 | DISCHARGE

## 2022-01-01 RX ORDER — ERYTHROPOIETIN 10000 [IU]/ML
18000 INJECTION, SOLUTION INTRAVENOUS; SUBCUTANEOUS
Refills: 0 | Status: DISCONTINUED | OUTPATIENT
Start: 2022-01-01 | End: 2022-01-01

## 2022-01-01 RX ORDER — DIPHENHYDRAMINE HCL 50 MG
25 CAPSULE ORAL ONCE
Refills: 0 | Status: COMPLETED | OUTPATIENT
Start: 2022-01-01 | End: 2022-01-01

## 2022-01-01 RX ORDER — MORPHINE SULFATE 50 MG/1
4 CAPSULE, EXTENDED RELEASE ORAL ONCE
Refills: 0 | Status: DISCONTINUED | OUTPATIENT
Start: 2022-01-01 | End: 2022-01-01

## 2022-01-01 RX ORDER — OMEPRAZOLE 10 MG/1
2 CAPSULE, DELAYED RELEASE ORAL
Qty: 0 | Refills: 0 | DISCHARGE

## 2022-01-01 RX ORDER — DEXTROSE 50 % IN WATER 50 %
12.5 SYRINGE (ML) INTRAVENOUS ONCE
Refills: 0 | Status: COMPLETED | OUTPATIENT
Start: 2022-01-01 | End: 2022-01-01

## 2022-01-01 RX ORDER — DEXTROSE 50 % IN WATER 50 %
50 SYRINGE (ML) INTRAVENOUS ONCE
Refills: 0 | Status: COMPLETED | OUTPATIENT
Start: 2022-01-01 | End: 2022-01-01

## 2022-01-01 RX ORDER — SODIUM BICARBONATE 1 MEQ/ML
50 SYRINGE (ML) INTRAVENOUS ONCE
Refills: 0 | Status: COMPLETED | OUTPATIENT
Start: 2022-01-01 | End: 2022-01-01

## 2022-01-01 RX ORDER — OXYCODONE HYDROCHLORIDE 5 MG/1
10 TABLET ORAL EVERY 4 HOURS
Refills: 0 | Status: DISCONTINUED | OUTPATIENT
Start: 2022-01-01 | End: 2022-01-01

## 2022-01-01 RX ORDER — ERYTHROPOIETIN 10000 [IU]/ML
16000 INJECTION, SOLUTION INTRAVENOUS; SUBCUTANEOUS
Refills: 0 | Status: DISCONTINUED | OUTPATIENT
Start: 2022-01-01 | End: 2022-01-01

## 2022-01-01 RX ORDER — LINEZOLID 600 MG/300ML
600 INJECTION, SOLUTION INTRAVENOUS EVERY 12 HOURS
Refills: 0 | Status: DISCONTINUED | OUTPATIENT
Start: 2022-01-01 | End: 2022-01-01

## 2022-01-01 RX ORDER — SEVELAMER CARBONATE 2400 MG/1
800 POWDER, FOR SUSPENSION ORAL
Refills: 0 | Status: DISCONTINUED | OUTPATIENT
Start: 2022-01-01 | End: 2022-01-01

## 2022-01-01 RX ORDER — SODIUM HYPOCHLORITE 0.125 %
1 SOLUTION, NON-ORAL MISCELLANEOUS DAILY
Refills: 0 | Status: DISCONTINUED | OUTPATIENT
Start: 2022-01-01 | End: 2022-01-01

## 2022-01-01 RX ORDER — SODIUM BICARBONATE 1 MEQ/ML
0.11 SYRINGE (ML) INTRAVENOUS
Qty: 150 | Refills: 0 | Status: DISCONTINUED | OUTPATIENT
Start: 2022-01-01 | End: 2022-01-01

## 2022-01-01 RX ORDER — DEXTROSE 50 % IN WATER 50 %
12.5 SYRINGE (ML) INTRAVENOUS ONCE
Refills: 0 | Status: DISCONTINUED | OUTPATIENT
Start: 2022-01-01 | End: 2022-01-01

## 2022-01-01 RX ORDER — TRAZODONE HCL 50 MG
100 TABLET ORAL AT BEDTIME
Refills: 0 | Status: DISCONTINUED | OUTPATIENT
Start: 2022-01-01 | End: 2022-01-01

## 2022-01-01 RX ORDER — INSULIN GLARGINE 100 [IU]/ML
16 INJECTION, SOLUTION SUBCUTANEOUS AT BEDTIME
Refills: 0 | Status: DISCONTINUED | OUTPATIENT
Start: 2022-01-01 | End: 2022-01-01

## 2022-01-01 RX ORDER — SIMVASTATIN 20 MG/1
1 TABLET, FILM COATED ORAL
Qty: 0 | Refills: 0 | DISCHARGE

## 2022-01-01 RX ORDER — SODIUM CHLORIDE 9 MG/ML
1000 INJECTION INTRAMUSCULAR; INTRAVENOUS; SUBCUTANEOUS
Refills: 0 | Status: DISCONTINUED | OUTPATIENT
Start: 2022-01-01 | End: 2022-01-01

## 2022-01-01 RX ORDER — DEXTROSE 50 % IN WATER 50 %
25 SYRINGE (ML) INTRAVENOUS ONCE
Refills: 0 | Status: COMPLETED | OUTPATIENT
Start: 2022-01-01 | End: 2022-01-01

## 2022-01-01 RX ORDER — INSULIN LISPRO 100/ML
3 VIAL (ML) SUBCUTANEOUS
Refills: 0 | Status: DISCONTINUED | OUTPATIENT
Start: 2022-01-01 | End: 2022-01-01

## 2022-01-01 RX ORDER — ACETAMINOPHEN 500 MG
650 TABLET ORAL EVERY 8 HOURS
Refills: 0 | Status: DISCONTINUED | OUTPATIENT
Start: 2022-01-01 | End: 2022-01-01

## 2022-01-01 RX ORDER — MIRTAZAPINE 45 MG/1
7.5 TABLET, ORALLY DISINTEGRATING ORAL AT BEDTIME
Refills: 0 | Status: DISCONTINUED | OUTPATIENT
Start: 2022-01-01 | End: 2022-01-01

## 2022-01-01 RX ORDER — ACETAMINOPHEN 500 MG
650 TABLET ORAL EVERY 6 HOURS
Refills: 0 | Status: DISCONTINUED | OUTPATIENT
Start: 2022-01-01 | End: 2022-01-01

## 2022-01-01 RX ORDER — INSULIN GLARGINE 100 [IU]/ML
15 INJECTION, SOLUTION SUBCUTANEOUS
Qty: 0 | Refills: 0 | DISCHARGE
Start: 2022-01-01

## 2022-01-01 RX ORDER — INSULIN GLARGINE 100 [IU]/ML
7 INJECTION, SOLUTION SUBCUTANEOUS AT BEDTIME
Refills: 0 | Status: DISCONTINUED | OUTPATIENT
Start: 2022-01-01 | End: 2022-01-01

## 2022-01-01 RX ORDER — INSULIN LISPRO 100/ML
7 VIAL (ML) SUBCUTANEOUS
Refills: 0 | Status: DISCONTINUED | OUTPATIENT
Start: 2022-01-01 | End: 2022-01-01

## 2022-01-01 RX ORDER — DICLOFENAC SODIUM 30 MG/G
1 GEL TOPICAL
Qty: 0 | Refills: 0 | DISCHARGE

## 2022-01-01 RX ORDER — HYDROCORTISONE 20 MG
150 TABLET ORAL ONCE
Refills: 0 | Status: COMPLETED | OUTPATIENT
Start: 2022-01-01 | End: 2022-01-01

## 2022-01-01 RX ORDER — CINACALCET 30 MG/1
90 TABLET, FILM COATED ORAL DAILY
Refills: 0 | Status: DISCONTINUED | OUTPATIENT
Start: 2022-01-01 | End: 2022-01-01

## 2022-01-01 RX ORDER — ROSUVASTATIN CALCIUM 5 MG/1
1 TABLET ORAL
Qty: 0 | Refills: 0 | DISCHARGE

## 2022-01-01 RX ORDER — OXYCODONE HYDROCHLORIDE 5 MG/1
5 TABLET ORAL EVERY 4 HOURS
Refills: 0 | Status: DISCONTINUED | OUTPATIENT
Start: 2022-01-01 | End: 2022-01-01

## 2022-01-01 RX ORDER — LANOLIN ALCOHOL/MO/W.PET/CERES
2 CREAM (GRAM) TOPICAL
Qty: 0 | Refills: 0 | DISCHARGE
Start: 2022-01-01

## 2022-01-01 RX ORDER — CINACALCET 30 MG/1
30 TABLET, FILM COATED ORAL DAILY
Refills: 0 | Status: DISCONTINUED | OUTPATIENT
Start: 2022-01-01 | End: 2022-01-01

## 2022-01-01 RX ORDER — HEPARIN SODIUM 5000 [USP'U]/ML
300 INJECTION INTRAVENOUS; SUBCUTANEOUS
Qty: 10000 | Refills: 0 | Status: DISCONTINUED | OUTPATIENT
Start: 2022-01-01 | End: 2022-01-01

## 2022-01-01 RX ORDER — LANOLIN ALCOHOL/MO/W.PET/CERES
3 CREAM (GRAM) TOPICAL AT BEDTIME
Refills: 0 | Status: DISCONTINUED | OUTPATIENT
Start: 2022-01-01 | End: 2022-01-01

## 2022-01-01 RX ORDER — INSULIN LISPRO 100/ML
11 VIAL (ML) SUBCUTANEOUS
Refills: 0 | Status: DISCONTINUED | OUTPATIENT
Start: 2022-01-01 | End: 2022-01-01

## 2022-01-01 RX ORDER — APIXABAN 2.5 MG/1
2.5 TABLET, FILM COATED ORAL EVERY 12 HOURS
Refills: 0 | Status: DISCONTINUED | OUTPATIENT
Start: 2022-01-01 | End: 2022-01-01

## 2022-01-01 RX ORDER — CEFEPIME 1 G/1
1000 INJECTION, POWDER, FOR SOLUTION INTRAMUSCULAR; INTRAVENOUS ONCE
Refills: 0 | Status: COMPLETED | OUTPATIENT
Start: 2022-01-01 | End: 2022-01-01

## 2022-01-01 RX ORDER — CALCIUM GLUCONATE 100 MG/ML
2 VIAL (ML) INTRAVENOUS ONCE
Refills: 0 | Status: COMPLETED | OUTPATIENT
Start: 2022-01-01 | End: 2022-01-01

## 2022-01-01 RX ORDER — NYSTATIN 500MM UNIT
5 POWDER (EA) MISCELLANEOUS
Qty: 0 | Refills: 0 | DISCHARGE

## 2022-01-01 RX ORDER — DILTIAZEM HCL 120 MG
120 CAPSULE, EXT RELEASE 24 HR ORAL DAILY
Refills: 0 | Status: DISCONTINUED | OUTPATIENT
Start: 2022-01-01 | End: 2022-01-01

## 2022-01-01 RX ORDER — SENNA PLUS 8.6 MG/1
2 TABLET ORAL AT BEDTIME
Refills: 0 | Status: DISCONTINUED | OUTPATIENT
Start: 2022-01-01 | End: 2022-01-01

## 2022-01-01 RX ORDER — ERYTHROPOIETIN 10000 [IU]/ML
20000 INJECTION, SOLUTION INTRAVENOUS; SUBCUTANEOUS
Refills: 0 | Status: DISCONTINUED | OUTPATIENT
Start: 2022-01-01 | End: 2022-01-01

## 2022-01-01 RX ORDER — HYDROMORPHONE HYDROCHLORIDE 2 MG/ML
1 INJECTION INTRAMUSCULAR; INTRAVENOUS; SUBCUTANEOUS EVERY 8 HOURS
Refills: 0 | Status: DISCONTINUED | OUTPATIENT
Start: 2022-01-01 | End: 2022-01-01

## 2022-01-01 RX ORDER — SIMETHICONE 80 MG/1
80 TABLET, CHEWABLE ORAL ONCE
Refills: 0 | Status: COMPLETED | OUTPATIENT
Start: 2022-01-01 | End: 2022-01-01

## 2022-01-01 RX ORDER — FUROSEMIDE 40 MG
1 TABLET ORAL
Qty: 0 | Refills: 0 | DISCHARGE

## 2022-01-01 RX ORDER — HYDROMORPHONE HYDROCHLORIDE 2 MG/ML
0.25 INJECTION INTRAMUSCULAR; INTRAVENOUS; SUBCUTANEOUS ONCE
Refills: 0 | Status: DISCONTINUED | OUTPATIENT
Start: 2022-01-01 | End: 2022-01-01

## 2022-01-01 RX ORDER — CEFEPIME 1 G/1
1000 INJECTION, POWDER, FOR SOLUTION INTRAMUSCULAR; INTRAVENOUS EVERY 24 HOURS
Refills: 0 | Status: DISCONTINUED | OUTPATIENT
Start: 2022-01-01 | End: 2022-01-01

## 2022-01-01 RX ORDER — APIXABAN 2.5 MG/1
1 TABLET, FILM COATED ORAL
Qty: 0 | Refills: 0 | DISCHARGE
Start: 2022-01-01

## 2022-01-01 RX ORDER — PANTOPRAZOLE SODIUM 20 MG/1
1 TABLET, DELAYED RELEASE ORAL
Qty: 0 | Refills: 0 | DISCHARGE
Start: 2022-01-01

## 2022-01-01 RX ORDER — CEFEPIME 1 G/1
2000 INJECTION, POWDER, FOR SOLUTION INTRAMUSCULAR; INTRAVENOUS EVERY 8 HOURS
Refills: 0 | Status: DISCONTINUED | OUTPATIENT
Start: 2022-01-01 | End: 2022-01-01

## 2022-01-01 RX ORDER — ERYTHROPOIETIN 10000 [IU]/ML
14000 INJECTION, SOLUTION INTRAVENOUS; SUBCUTANEOUS
Refills: 0 | Status: DISCONTINUED | OUTPATIENT
Start: 2022-01-01 | End: 2022-01-01

## 2022-01-01 RX ORDER — ONDANSETRON 8 MG/1
4 TABLET, FILM COATED ORAL EVERY 8 HOURS
Refills: 0 | Status: DISCONTINUED | OUTPATIENT
Start: 2022-01-01 | End: 2022-01-01

## 2022-01-01 RX ORDER — INSULIN GLARGINE 100 [IU]/ML
10 INJECTION, SOLUTION SUBCUTANEOUS
Qty: 0 | Refills: 0 | DISCHARGE
Start: 2022-01-01

## 2022-01-01 RX ORDER — SODIUM HYPOCHLORITE 0.125 %
1 SOLUTION, NON-ORAL MISCELLANEOUS
Refills: 0 | Status: DISCONTINUED | OUTPATIENT
Start: 2022-01-01 | End: 2022-01-01

## 2022-01-01 RX ORDER — DEXTROSE 50 % IN WATER 50 %
25 SYRINGE (ML) INTRAVENOUS ONCE
Refills: 0 | Status: DISCONTINUED | OUTPATIENT
Start: 2022-01-01 | End: 2022-01-01

## 2022-01-01 RX ORDER — HYDROMORPHONE HYDROCHLORIDE 2 MG/ML
0.5 INJECTION INTRAMUSCULAR; INTRAVENOUS; SUBCUTANEOUS
Refills: 0 | Status: DISCONTINUED | OUTPATIENT
Start: 2022-01-01 | End: 2022-01-01

## 2022-01-01 RX ORDER — OXYCODONE HYDROCHLORIDE 5 MG/1
10 TABLET ORAL EVERY 12 HOURS
Refills: 0 | Status: COMPLETED | OUTPATIENT
Start: 2022-01-01 | End: 2022-01-01

## 2022-01-01 RX ORDER — SERTRALINE 25 MG/1
1 TABLET, FILM COATED ORAL
Qty: 0 | Refills: 0 | DISCHARGE

## 2022-01-01 RX ORDER — TRAZODONE HCL 50 MG
1 TABLET ORAL
Qty: 0 | Refills: 0 | DISCHARGE

## 2022-01-01 RX ORDER — HYDROXYZINE HCL 10 MG
1 TABLET ORAL
Qty: 0 | Refills: 0 | DISCHARGE

## 2022-01-01 RX ORDER — HYDROMORPHONE HYDROCHLORIDE 2 MG/ML
1 INJECTION INTRAMUSCULAR; INTRAVENOUS; SUBCUTANEOUS ONCE
Refills: 0 | Status: DISCONTINUED | OUTPATIENT
Start: 2022-01-01 | End: 2022-01-01

## 2022-01-01 RX ORDER — OXYCODONE HYDROCHLORIDE 5 MG/1
10 TABLET ORAL ONCE
Refills: 0 | Status: DISCONTINUED | OUTPATIENT
Start: 2022-01-01 | End: 2022-01-01

## 2022-01-01 RX ORDER — INSULIN LISPRO 100/ML
4 VIAL (ML) SUBCUTANEOUS ONCE
Refills: 0 | Status: COMPLETED | OUTPATIENT
Start: 2022-01-01 | End: 2022-01-01

## 2022-01-01 RX ORDER — LIDOCAINE 4 G/100G
1 CREAM TOPICAL
Qty: 0 | Refills: 0 | DISCHARGE

## 2022-01-01 RX ORDER — CYCLOSPORINE 100 MG/1
125 CAPSULE ORAL EVERY 12 HOURS
Refills: 0 | Status: DISCONTINUED | OUTPATIENT
Start: 2022-01-01 | End: 2022-01-01

## 2022-01-01 RX ORDER — INSULIN GLARGINE 100 [IU]/ML
15 INJECTION, SOLUTION SUBCUTANEOUS AT BEDTIME
Refills: 0 | Status: DISCONTINUED | OUTPATIENT
Start: 2022-01-01 | End: 2022-01-01

## 2022-01-01 RX ORDER — SODIUM THIOSULFATE
25 CRYSTALS MISCELLANEOUS
Refills: 0 | Status: DISCONTINUED | OUTPATIENT
Start: 2022-01-01 | End: 2022-01-01

## 2022-01-01 RX ORDER — SITAGLIPTIN 50 MG/1
1 TABLET, FILM COATED ORAL
Qty: 30 | Refills: 0
Start: 2022-01-01 | End: 2022-01-01

## 2022-01-01 RX ORDER — VASOPRESSIN 20 [USP'U]/ML
0.02 INJECTION INTRAVENOUS
Qty: 50 | Refills: 0 | Status: DISCONTINUED | OUTPATIENT
Start: 2022-01-01 | End: 2022-01-01

## 2022-01-01 RX ORDER — HEPARIN SODIUM 5000 [USP'U]/ML
1900 INJECTION INTRAVENOUS; SUBCUTANEOUS
Qty: 25000 | Refills: 0 | Status: DISCONTINUED | OUTPATIENT
Start: 2022-01-01 | End: 2022-01-01

## 2022-01-01 RX ORDER — TIOTROPIUM BROMIDE 18 UG/1
1 CAPSULE ORAL; RESPIRATORY (INHALATION)
Qty: 0 | Refills: 0 | DISCHARGE

## 2022-01-01 RX ORDER — ENOXAPARIN SODIUM 100 MG/ML
30 INJECTION SUBCUTANEOUS EVERY 24 HOURS
Refills: 0 | Status: DISCONTINUED | OUTPATIENT
Start: 2022-01-01 | End: 2022-01-01

## 2022-01-01 RX ORDER — ERYTHROPOIETIN 10000 [IU]/ML
12000 INJECTION, SOLUTION INTRAVENOUS; SUBCUTANEOUS
Refills: 0 | Status: DISCONTINUED | OUTPATIENT
Start: 2022-01-01 | End: 2022-01-01

## 2022-01-01 RX ORDER — OXYCODONE HYDROCHLORIDE 5 MG/1
7.5 TABLET ORAL EVERY 6 HOURS
Refills: 0 | Status: DISCONTINUED | OUTPATIENT
Start: 2022-01-01 | End: 2022-01-01

## 2022-01-01 RX ORDER — DILTIAZEM HCL 120 MG
5 CAPSULE, EXT RELEASE 24 HR ORAL
Qty: 125 | Refills: 0 | Status: DISCONTINUED | OUTPATIENT
Start: 2022-01-01 | End: 2022-01-01

## 2022-01-01 RX ORDER — INSULIN GLARGINE 100 [IU]/ML
12 INJECTION, SOLUTION SUBCUTANEOUS AT BEDTIME
Refills: 0 | Status: DISCONTINUED | OUTPATIENT
Start: 2022-01-01 | End: 2022-01-01

## 2022-01-01 RX ORDER — SODIUM CHLORIDE 9 MG/ML
1000 INJECTION INTRAMUSCULAR; INTRAVENOUS; SUBCUTANEOUS ONCE
Refills: 0 | Status: COMPLETED | OUTPATIENT
Start: 2022-01-01 | End: 2022-01-01

## 2022-01-01 RX ORDER — HEPARIN SODIUM 5000 [USP'U]/ML
2400 INJECTION INTRAVENOUS; SUBCUTANEOUS
Qty: 25000 | Refills: 0 | Status: DISCONTINUED | OUTPATIENT
Start: 2022-01-01 | End: 2022-01-01

## 2022-01-01 RX ORDER — HEPARIN SODIUM 5000 [USP'U]/ML
1000 INJECTION INTRAVENOUS; SUBCUTANEOUS ONCE
Refills: 0 | Status: COMPLETED | OUTPATIENT
Start: 2022-01-01 | End: 2022-01-01

## 2022-01-01 RX ORDER — GABAPENTIN 400 MG/1
300 CAPSULE ORAL DAILY
Refills: 0 | Status: DISCONTINUED | OUTPATIENT
Start: 2022-01-01 | End: 2022-01-01

## 2022-01-01 RX ORDER — SEVELAMER CARBONATE 2400 MG/1
2 POWDER, FOR SUSPENSION ORAL
Qty: 0 | Refills: 0 | DISCHARGE

## 2022-01-01 RX ORDER — PHYTONADIONE (VIT K1) 5 MG
10 TABLET ORAL ONCE
Refills: 0 | Status: COMPLETED | OUTPATIENT
Start: 2022-01-01 | End: 2022-01-01

## 2022-01-01 RX ORDER — DIPHENHYDRAMINE HCL 50 MG
25 CAPSULE ORAL
Qty: 0 | Refills: 0 | DISCHARGE
Start: 2022-01-01

## 2022-01-01 RX ORDER — SEVELAMER CARBONATE 2400 MG/1
1600 POWDER, FOR SUSPENSION ORAL
Refills: 0 | Status: DISCONTINUED | OUTPATIENT
Start: 2022-01-01 | End: 2022-01-01

## 2022-01-01 RX ORDER — MONTELUKAST 4 MG/1
1 TABLET, CHEWABLE ORAL
Qty: 0 | Refills: 0 | DISCHARGE

## 2022-01-01 RX ORDER — INSULIN LISPRO 100/ML
1 VIAL (ML) SUBCUTANEOUS
Qty: 0 | Refills: 0 | DISCHARGE
Start: 2022-01-01

## 2022-01-01 RX ORDER — CINACALCET 30 MG/1
60 TABLET, FILM COATED ORAL DAILY
Refills: 0 | Status: DISCONTINUED | OUTPATIENT
Start: 2022-01-01 | End: 2022-01-01

## 2022-01-01 RX ORDER — LINEZOLID 600 MG/300ML
INJECTION, SOLUTION INTRAVENOUS
Refills: 0 | Status: DISCONTINUED | OUTPATIENT
Start: 2022-01-01 | End: 2022-01-01

## 2022-01-01 RX ORDER — LANTHANUM CARBONATE 750 MG/1
2 TABLET, CHEWABLE ORAL
Qty: 0 | Refills: 0 | DISCHARGE

## 2022-01-01 RX ORDER — POLYETHYLENE GLYCOL 3350 17 G/17G
17 POWDER, FOR SOLUTION ORAL
Qty: 0 | Refills: 0 | DISCHARGE

## 2022-01-01 RX ORDER — ENOXAPARIN SODIUM 100 MG/ML
80 INJECTION SUBCUTANEOUS
Qty: 1 | Refills: 0
Start: 2022-01-01 | End: 2022-01-01

## 2022-01-01 RX ORDER — DOXERCALCIFEROL 2.5 UG/1
5 CAPSULE ORAL
Refills: 0 | Status: DISCONTINUED | OUTPATIENT
Start: 2022-01-01 | End: 2022-01-01

## 2022-01-01 RX ORDER — HEPARIN SODIUM 5000 [USP'U]/ML
5000 INJECTION INTRAVENOUS; SUBCUTANEOUS EVERY 8 HOURS
Refills: 0 | Status: DISCONTINUED | OUTPATIENT
Start: 2022-01-01 | End: 2022-01-01

## 2022-01-01 RX ORDER — INSULIN LISPRO 100/ML
8 VIAL (ML) SUBCUTANEOUS
Refills: 0 | Status: DISCONTINUED | OUTPATIENT
Start: 2022-01-01 | End: 2022-01-01

## 2022-01-01 RX ORDER — POLYETHYLENE GLYCOL 3350 17 G/17G
17 POWDER, FOR SOLUTION ORAL
Refills: 0 | Status: DISCONTINUED | OUTPATIENT
Start: 2022-01-01 | End: 2022-01-01

## 2022-01-01 RX ORDER — METRONIDAZOLE 500 MG
500 TABLET ORAL EVERY 8 HOURS
Refills: 0 | Status: DISCONTINUED | OUTPATIENT
Start: 2022-01-01 | End: 2022-01-01

## 2022-01-01 RX ORDER — DULAGLUTIDE 4.5 MG/.5ML
1 INJECTION, SOLUTION SUBCUTANEOUS
Qty: 0 | Refills: 0 | DISCHARGE

## 2022-01-01 RX ORDER — NYSTATIN CREAM 100000 [USP'U]/G
1 CREAM TOPICAL EVERY 12 HOURS
Refills: 0 | Status: DISCONTINUED | OUTPATIENT
Start: 2022-01-01 | End: 2022-01-01

## 2022-01-01 RX ORDER — ALBUMIN HUMAN 25 %
250 VIAL (ML) INTRAVENOUS ONCE
Refills: 0 | Status: COMPLETED | OUTPATIENT
Start: 2022-01-01 | End: 2022-01-01

## 2022-01-01 RX ORDER — AMIODARONE HYDROCHLORIDE 400 MG/1
150 TABLET ORAL ONCE
Refills: 0 | Status: COMPLETED | OUTPATIENT
Start: 2022-01-01 | End: 2022-01-01

## 2022-01-01 RX ORDER — ACETAMINOPHEN 500 MG
2 TABLET ORAL
Qty: 0 | Refills: 0 | DISCHARGE
Start: 2022-01-01

## 2022-01-01 RX ORDER — MORPHINE SULFATE 50 MG/1
2 CAPSULE, EXTENDED RELEASE ORAL ONCE
Refills: 0 | Status: DISCONTINUED | OUTPATIENT
Start: 2022-01-01 | End: 2022-01-01

## 2022-01-01 RX ORDER — TACROLIMUS/VEHICLE BASE NO.238 0.1 %
1 CREAM (GRAM) TOPICAL DAILY
Refills: 0 | Status: DISCONTINUED | OUTPATIENT
Start: 2022-01-01 | End: 2022-01-01

## 2022-01-01 RX ORDER — SODIUM CHLORIDE 9 MG/ML
500 INJECTION INTRAMUSCULAR; INTRAVENOUS; SUBCUTANEOUS ONCE
Refills: 0 | Status: COMPLETED | OUTPATIENT
Start: 2022-01-01 | End: 2022-01-01

## 2022-01-01 RX ORDER — ERYTHROPOIETIN 10000 [IU]/ML
10000 INJECTION, SOLUTION INTRAVENOUS; SUBCUTANEOUS
Refills: 0 | Status: DISCONTINUED | OUTPATIENT
Start: 2022-01-01 | End: 2022-01-01

## 2022-01-01 RX ORDER — SIMETHICONE 80 MG/1
1 TABLET, CHEWABLE ORAL
Qty: 0 | Refills: 0 | DISCHARGE

## 2022-01-01 RX ORDER — ROBINUL 0.2 MG/ML
0.4 INJECTION INTRAMUSCULAR; INTRAVENOUS EVERY 4 HOURS
Refills: 0 | Status: DISCONTINUED | OUTPATIENT
Start: 2022-01-01 | End: 2022-01-01

## 2022-01-01 RX ORDER — SODIUM HYPOCHLORITE 0.125 %
1 SOLUTION, NON-ORAL MISCELLANEOUS
Qty: 0 | Refills: 0 | DISCHARGE
Start: 2022-01-01

## 2022-01-01 RX ORDER — CEFEPIME 1 G/1
2000 INJECTION, POWDER, FOR SOLUTION INTRAMUSCULAR; INTRAVENOUS ONCE
Refills: 0 | Status: COMPLETED | OUTPATIENT
Start: 2022-01-01 | End: 2022-01-01

## 2022-01-01 RX ORDER — APIXABAN 2.5 MG/1
1 TABLET, FILM COATED ORAL
Qty: 0 | Refills: 0 | DISCHARGE

## 2022-01-01 RX ORDER — SERTRALINE 25 MG/1
50 TABLET, FILM COATED ORAL DAILY
Refills: 0 | Status: DISCONTINUED | OUTPATIENT
Start: 2022-01-01 | End: 2022-01-01

## 2022-01-01 RX ORDER — PROPOFOL 10 MG/ML
10 INJECTION, EMULSION INTRAVENOUS
Qty: 1000 | Refills: 0 | Status: DISCONTINUED | OUTPATIENT
Start: 2022-01-01 | End: 2022-01-01

## 2022-01-01 RX ORDER — INSULIN GLARGINE 100 [IU]/ML
64 INJECTION, SOLUTION SUBCUTANEOUS EVERY MORNING
Refills: 0 | Status: DISCONTINUED | OUTPATIENT
Start: 2022-01-01 | End: 2022-01-01

## 2022-01-01 RX ORDER — HEPARIN SODIUM 5000 [USP'U]/ML
2000 INJECTION INTRAVENOUS; SUBCUTANEOUS
Qty: 25000 | Refills: 0 | Status: DISCONTINUED | OUTPATIENT
Start: 2022-01-01 | End: 2022-01-01

## 2022-01-01 RX ORDER — OXYCODONE HYDROCHLORIDE 5 MG/1
5 TABLET ORAL ONCE
Refills: 0 | Status: DISCONTINUED | OUTPATIENT
Start: 2022-01-01 | End: 2022-01-01

## 2022-01-01 RX ORDER — GABAPENTIN 400 MG/1
1 CAPSULE ORAL
Qty: 0 | Refills: 0 | DISCHARGE

## 2022-01-01 RX ORDER — MINERAL OIL
133 OIL (ML) MISCELLANEOUS ONCE
Refills: 0 | Status: COMPLETED | OUTPATIENT
Start: 2022-01-01 | End: 2022-01-01

## 2022-01-01 RX ORDER — IPRATROPIUM BROMIDE 0.2 MG/ML
2.5 SOLUTION, NON-ORAL INHALATION
Qty: 0 | Refills: 0 | DISCHARGE

## 2022-01-01 RX ORDER — MIDODRINE HYDROCHLORIDE 2.5 MG/1
5 TABLET ORAL EVERY 8 HOURS
Refills: 0 | Status: DISCONTINUED | OUTPATIENT
Start: 2022-01-01 | End: 2022-01-01

## 2022-01-01 RX ORDER — MONTELUKAST 4 MG/1
1 TABLET, CHEWABLE ORAL
Qty: 0 | Refills: 0 | DISCHARGE
Start: 2022-01-01

## 2022-01-01 RX ORDER — ACETAMINOPHEN 500 MG
1 TABLET ORAL ONCE
Refills: 0 | Status: DISCONTINUED | OUTPATIENT
Start: 2022-01-01 | End: 2022-01-01

## 2022-01-01 RX ORDER — COLLAGENASE SANTYL 250 [ARB'U]/G
250 OINTMENT TOPICAL DAILY
Qty: 1 | Refills: 2 | Status: ACTIVE | COMMUNITY
Start: 2022-01-01 | End: 1900-01-01

## 2022-01-01 RX ORDER — MUPIROCIN 20 MG/G
1 OINTMENT TOPICAL
Refills: 0 | Status: DISCONTINUED | OUTPATIENT
Start: 2022-01-01 | End: 2022-01-01

## 2022-01-01 RX ORDER — ATORVASTATIN CALCIUM 80 MG/1
80 TABLET, FILM COATED ORAL AT BEDTIME
Refills: 0 | Status: DISCONTINUED | OUTPATIENT
Start: 2022-01-01 | End: 2022-01-01

## 2022-01-01 RX ORDER — CYCLOSPORINE 100 MG/1
200 CAPSULE ORAL AT BEDTIME
Refills: 0 | Status: DISCONTINUED | OUTPATIENT
Start: 2022-01-01 | End: 2022-01-01

## 2022-01-01 RX ORDER — TRAZODONE HCL 50 MG
1 TABLET ORAL
Qty: 0 | Refills: 0 | DISCHARGE
Start: 2022-01-01

## 2022-01-01 RX ORDER — SEVELAMER CARBONATE 2400 MG/1
1 POWDER, FOR SUSPENSION ORAL
Qty: 0 | Refills: 0 | DISCHARGE
Start: 2022-01-01

## 2022-01-01 RX ORDER — PANTOPRAZOLE SODIUM 20 MG/1
40 TABLET, DELAYED RELEASE ORAL
Refills: 0 | Status: COMPLETED | OUTPATIENT
Start: 2022-01-01 | End: 2022-01-01

## 2022-01-01 RX ORDER — SIMETHICONE 80 MG/1
80 TABLET, CHEWABLE ORAL EVERY 6 HOURS
Refills: 0 | Status: DISCONTINUED | OUTPATIENT
Start: 2022-01-01 | End: 2022-01-01

## 2022-01-01 RX ORDER — POLYETHYLENE GLYCOL 3350 17 G/17G
17 POWDER, FOR SOLUTION ORAL ONCE
Refills: 0 | Status: DISCONTINUED | OUTPATIENT
Start: 2022-01-01 | End: 2022-01-01

## 2022-01-01 RX ORDER — INSULIN GLARGINE 100 [IU]/ML
4 INJECTION, SOLUTION SUBCUTANEOUS AT BEDTIME
Refills: 0 | Status: DISCONTINUED | OUTPATIENT
Start: 2022-01-01 | End: 2022-01-01

## 2022-01-01 RX ORDER — LORATADINE 10 MG/1
1 TABLET ORAL
Qty: 0 | Refills: 0 | DISCHARGE
Start: 2022-01-01

## 2022-01-01 RX ORDER — AMIODARONE HYDROCHLORIDE 400 MG/1
1 TABLET ORAL
Qty: 450 | Refills: 0 | Status: DISCONTINUED | OUTPATIENT
Start: 2022-01-01 | End: 2022-01-01

## 2022-01-01 RX ORDER — FENTANYL CITRATE 50 UG/ML
50 INJECTION INTRAVENOUS ONCE
Refills: 0 | Status: DISCONTINUED | OUTPATIENT
Start: 2022-01-01 | End: 2022-01-01

## 2022-01-01 RX ORDER — CHLORHEXIDINE GLUCONATE 213 G/1000ML
15 SOLUTION TOPICAL EVERY 12 HOURS
Refills: 0 | Status: DISCONTINUED | OUTPATIENT
Start: 2022-01-01 | End: 2022-01-01

## 2022-01-01 RX ORDER — INSULIN DETEMIR 100/ML (3)
80 INSULIN PEN (ML) SUBCUTANEOUS
Qty: 1 | Refills: 0
Start: 2022-01-01 | End: 2022-01-01

## 2022-01-01 RX ORDER — INSULIN LISPRO 100/ML
2 VIAL (ML) SUBCUTANEOUS
Qty: 0 | Refills: 0 | DISCHARGE

## 2022-01-01 RX ORDER — HEPARIN SODIUM 5000 [USP'U]/ML
7500 INJECTION INTRAVENOUS; SUBCUTANEOUS EVERY 12 HOURS
Refills: 0 | Status: DISCONTINUED | OUTPATIENT
Start: 2022-01-01 | End: 2022-01-01

## 2022-01-01 RX ORDER — NYSTATIN CREAM 100000 [USP'U]/G
1 CREAM TOPICAL
Qty: 0 | Refills: 0 | DISCHARGE
Start: 2022-01-01

## 2022-01-01 RX ORDER — ALBUTEROL 90 UG/1
3 AEROSOL, METERED ORAL
Qty: 0 | Refills: 0 | DISCHARGE

## 2022-01-01 RX ORDER — LORATADINE 10 MG/1
10 TABLET ORAL DAILY
Refills: 0 | Status: DISCONTINUED | OUTPATIENT
Start: 2022-01-01 | End: 2022-01-01

## 2022-01-01 RX ORDER — CINACALCET 30 MG/1
1 TABLET, FILM COATED ORAL
Qty: 0 | Refills: 0 | DISCHARGE

## 2022-01-01 RX ORDER — BUPROPION HYDROCHLORIDE 150 MG/1
150 TABLET, EXTENDED RELEASE ORAL DAILY
Refills: 0 | Status: DISCONTINUED | OUTPATIENT
Start: 2022-01-01 | End: 2022-01-01

## 2022-01-01 RX ORDER — LACTOBACILLUS ACIDOPHILUS 100MM CELL
1 CAPSULE ORAL
Qty: 0 | Refills: 0 | DISCHARGE

## 2022-01-01 RX ORDER — MUPIROCIN 20 MG/G
1 OINTMENT TOPICAL
Qty: 0 | Refills: 0 | DISCHARGE

## 2022-01-01 RX ORDER — BUPROPION HYDROCHLORIDE 150 MG/1
1 TABLET, EXTENDED RELEASE ORAL
Qty: 0 | Refills: 0 | DISCHARGE
Start: 2022-01-01

## 2022-01-01 RX ORDER — EPINEPHRINE 0.3 MG/.3ML
0.1 INJECTION INTRAMUSCULAR; SUBCUTANEOUS
Qty: 4 | Refills: 0 | Status: DISCONTINUED | OUTPATIENT
Start: 2022-01-01 | End: 2022-01-01

## 2022-01-01 RX ORDER — DOXEPIN HCL 100 MG
1 CAPSULE ORAL
Qty: 0 | Refills: 0 | DISCHARGE

## 2022-01-01 RX ORDER — PROTHROMBIN COMPLEX CONCENTRATE (HUMAN) 25.5; 16.5; 24; 22; 22; 26 [IU]/ML; [IU]/ML; [IU]/ML; [IU]/ML; [IU]/ML; [IU]/ML
2000 POWDER, FOR SOLUTION INTRAVENOUS ONCE
Refills: 0 | Status: COMPLETED | OUTPATIENT
Start: 2022-01-01 | End: 2022-01-01

## 2022-01-01 RX ORDER — INSULIN GLARGINE 100 [IU]/ML
17 INJECTION, SOLUTION SUBCUTANEOUS AT BEDTIME
Refills: 0 | Status: DISCONTINUED | OUTPATIENT
Start: 2022-01-01 | End: 2022-01-01

## 2022-01-01 RX ORDER — CINACALCET 30 MG/1
60 TABLET, FILM COATED ORAL AT BEDTIME
Refills: 0 | Status: DISCONTINUED | OUTPATIENT
Start: 2022-01-01 | End: 2022-01-01

## 2022-01-01 RX ORDER — HEPARIN SODIUM 5000 [USP'U]/ML
2200 INJECTION INTRAVENOUS; SUBCUTANEOUS
Qty: 25000 | Refills: 0 | Status: DISCONTINUED | OUTPATIENT
Start: 2022-01-01 | End: 2022-01-01

## 2022-01-01 RX ORDER — MUPIROCIN 20 MG/G
1 OINTMENT TOPICAL
Refills: 0 | Status: COMPLETED | OUTPATIENT
Start: 2022-01-01 | End: 2022-01-01

## 2022-01-01 RX ORDER — MIRTAZAPINE 45 MG/1
1 TABLET, ORALLY DISINTEGRATING ORAL
Qty: 0 | Refills: 0 | DISCHARGE

## 2022-01-01 RX ORDER — HYDROMORPHONE HYDROCHLORIDE 2 MG/ML
0.5 INJECTION INTRAMUSCULAR; INTRAVENOUS; SUBCUTANEOUS ONCE
Refills: 0 | Status: DISCONTINUED | OUTPATIENT
Start: 2022-01-01 | End: 2022-01-01

## 2022-01-01 RX ORDER — CYCLOSPORINE 100 MG/1
1 CAPSULE ORAL
Qty: 0 | Refills: 0 | DISCHARGE
Start: 2022-01-01

## 2022-01-01 RX ORDER — LIDOCAINE AND PRILOCAINE CREAM 25; 25 MG/G; MG/G
1 CREAM TOPICAL
Qty: 0 | Refills: 0 | DISCHARGE
Start: 2022-01-01

## 2022-01-01 RX ORDER — INSULIN LISPRO 100/ML
2 VIAL (ML) SUBCUTANEOUS
Qty: 0 | Refills: 0 | DISCHARGE
Start: 2022-01-01

## 2022-01-01 RX ORDER — SODIUM,POTASSIUM PHOSPHATES 278-250MG
1 POWDER IN PACKET (EA) ORAL THREE TIMES A DAY
Refills: 0 | Status: DISCONTINUED | OUTPATIENT
Start: 2022-01-01 | End: 2022-01-01

## 2022-01-01 RX ORDER — INSULIN LISPRO 100/ML
5 VIAL (ML) SUBCUTANEOUS
Refills: 0 | Status: DISCONTINUED | OUTPATIENT
Start: 2022-01-01 | End: 2022-01-01

## 2022-01-01 RX ORDER — INSULIN LISPRO 100/ML
5 VIAL (ML) SUBCUTANEOUS
Qty: 0 | Refills: 0 | DISCHARGE
Start: 2022-01-01

## 2022-01-01 RX ORDER — INSULIN LISPRO 100/ML
9 VIAL (ML) SUBCUTANEOUS
Refills: 0 | Status: DISCONTINUED | OUTPATIENT
Start: 2022-01-01 | End: 2022-01-01

## 2022-01-01 RX ORDER — TRAMADOL HYDROCHLORIDE 50 MG/1
0.5 TABLET ORAL
Qty: 0 | Refills: 0 | DISCHARGE

## 2022-01-01 RX ORDER — FENTANYL CITRATE 50 UG/ML
0.5 INJECTION INTRAVENOUS
Qty: 2500 | Refills: 0 | Status: DISCONTINUED | OUTPATIENT
Start: 2022-01-01 | End: 2022-01-01

## 2022-01-01 RX ORDER — LACTOBACILLUS ACIDOPHILUS 100MM CELL
1 CAPSULE ORAL
Qty: 0 | Refills: 0 | DISCHARGE
Start: 2022-01-01

## 2022-01-01 RX ORDER — ERYTHROPOIETIN 10000 [IU]/ML
8000 INJECTION, SOLUTION INTRAVENOUS; SUBCUTANEOUS
Refills: 0 | Status: DISCONTINUED | OUTPATIENT
Start: 2022-01-01 | End: 2022-01-01

## 2022-01-01 RX ORDER — LIDOCAINE AND PRILOCAINE CREAM 25; 25 MG/G; MG/G
1 CREAM TOPICAL
Refills: 0 | Status: DISCONTINUED | OUTPATIENT
Start: 2022-01-01 | End: 2022-01-01

## 2022-01-01 RX ORDER — MIDODRINE HYDROCHLORIDE 2.5 MG/1
10 TABLET ORAL
Refills: 0 | Status: DISCONTINUED | OUTPATIENT
Start: 2022-01-01 | End: 2022-01-01

## 2022-01-01 RX ORDER — POLYETHYLENE GLYCOL 3350 17 G/17G
17 POWDER, FOR SOLUTION ORAL AT BEDTIME
Refills: 0 | Status: DISCONTINUED | OUTPATIENT
Start: 2022-01-01 | End: 2022-01-01

## 2022-01-01 RX ORDER — HYDROCORTISONE 20 MG
50 TABLET ORAL EVERY 8 HOURS
Refills: 0 | Status: DISCONTINUED | OUTPATIENT
Start: 2022-01-01 | End: 2022-01-01

## 2022-01-01 RX ORDER — INSULIN GLARGINE 100 [IU]/ML
9 INJECTION, SOLUTION SUBCUTANEOUS AT BEDTIME
Refills: 0 | Status: COMPLETED | OUTPATIENT
Start: 2022-01-01 | End: 2022-01-01

## 2022-01-01 RX ORDER — CYCLOSPORINE 100 MG/1
150 CAPSULE ORAL EVERY 12 HOURS
Refills: 0 | Status: DISCONTINUED | OUTPATIENT
Start: 2022-01-01 | End: 2022-01-01

## 2022-01-01 RX ORDER — VASOPRESSIN 20 [USP'U]/ML
0.03 INJECTION INTRAVENOUS
Qty: 50 | Refills: 0 | Status: DISCONTINUED | OUTPATIENT
Start: 2022-01-01 | End: 2022-01-01

## 2022-01-01 RX ORDER — INSULIN LISPRO 100/ML
6 VIAL (ML) SUBCUTANEOUS
Refills: 0 | Status: DISCONTINUED | OUTPATIENT
Start: 2022-01-01 | End: 2022-01-01

## 2022-01-01 RX ORDER — SEVELAMER CARBONATE 2400 MG/1
2400 POWDER, FOR SUSPENSION ORAL
Refills: 0 | Status: DISCONTINUED | OUTPATIENT
Start: 2022-01-01 | End: 2022-01-01

## 2022-01-01 RX ORDER — MINERAL OIL
30 OIL (ML) MISCELLANEOUS ONCE
Refills: 0 | Status: DISCONTINUED | OUTPATIENT
Start: 2022-01-01 | End: 2022-01-01

## 2022-01-01 RX ORDER — VANCOMYCIN HCL 1 G
1000 VIAL (EA) INTRAVENOUS ONCE
Refills: 0 | Status: DISCONTINUED | OUTPATIENT
Start: 2022-01-01 | End: 2022-01-01

## 2022-01-01 RX ORDER — OXYCODONE AND ACETAMINOPHEN 5; 325 MG/1; MG/1
1 TABLET ORAL ONCE
Refills: 0 | Status: DISCONTINUED | OUTPATIENT
Start: 2022-01-01 | End: 2022-01-01

## 2022-01-01 RX ORDER — KETOROLAC TROMETHAMINE 30 MG/ML
15 SYRINGE (ML) INJECTION ONCE
Refills: 0 | Status: DISCONTINUED | OUTPATIENT
Start: 2022-01-01 | End: 2022-01-01

## 2022-01-01 RX ORDER — INSULIN GLARGINE 100 [IU]/ML
5 INJECTION, SOLUTION SUBCUTANEOUS AT BEDTIME
Refills: 0 | Status: DISCONTINUED | OUTPATIENT
Start: 2022-01-01 | End: 2022-01-01

## 2022-01-01 RX ADMIN — HEPARIN SODIUM 19 UNIT(S)/HR: 5000 INJECTION INTRAVENOUS; SUBCUTANEOUS at 19:22

## 2022-01-01 RX ADMIN — OXYCODONE HYDROCHLORIDE 10 MILLIGRAM(S): 5 TABLET ORAL at 06:59

## 2022-01-01 RX ADMIN — SERTRALINE 50 MILLIGRAM(S): 25 TABLET, FILM COATED ORAL at 18:04

## 2022-01-01 RX ADMIN — SIMETHICONE 80 MILLIGRAM(S): 80 TABLET, CHEWABLE ORAL at 13:05

## 2022-01-01 RX ADMIN — APIXABAN 2.5 MILLIGRAM(S): 2.5 TABLET, FILM COATED ORAL at 18:25

## 2022-01-01 RX ADMIN — SERTRALINE 50 MILLIGRAM(S): 25 TABLET, FILM COATED ORAL at 15:14

## 2022-01-01 RX ADMIN — LORATADINE 10 MILLIGRAM(S): 10 TABLET ORAL at 11:11

## 2022-01-01 RX ADMIN — OXYCODONE HYDROCHLORIDE 10 MILLIGRAM(S): 5 TABLET ORAL at 05:57

## 2022-01-01 RX ADMIN — Medication 6 UNIT(S): at 17:52

## 2022-01-01 RX ADMIN — LIDOCAINE 1 PATCH: 4 CREAM TOPICAL at 21:44

## 2022-01-01 RX ADMIN — SIMETHICONE 80 MILLIGRAM(S): 80 TABLET, CHEWABLE ORAL at 21:34

## 2022-01-01 RX ADMIN — Medication 1: at 18:13

## 2022-01-01 RX ADMIN — OXYCODONE HYDROCHLORIDE 10 MILLIGRAM(S): 5 TABLET ORAL at 19:49

## 2022-01-01 RX ADMIN — Medication 1: at 12:48

## 2022-01-01 RX ADMIN — SENNA PLUS 2 TABLET(S): 8.6 TABLET ORAL at 22:49

## 2022-01-01 RX ADMIN — LIDOCAINE 1 PATCH: 4 CREAM TOPICAL at 23:07

## 2022-01-01 RX ADMIN — Medication 120 MILLIGRAM(S): at 10:07

## 2022-01-01 RX ADMIN — Medication 1 TABLET(S): at 13:02

## 2022-01-01 RX ADMIN — Medication 1 APPLICATION(S): at 06:02

## 2022-01-01 RX ADMIN — Medication 1: at 08:50

## 2022-01-01 RX ADMIN — CYCLOSPORINE 125 MILLIGRAM(S): 100 CAPSULE ORAL at 12:23

## 2022-01-01 RX ADMIN — Medication 650 MILLIGRAM(S): at 16:21

## 2022-01-01 RX ADMIN — Medication 1 APPLICATION(S): at 14:33

## 2022-01-01 RX ADMIN — LIDOCAINE 1 PATCH: 4 CREAM TOPICAL at 20:22

## 2022-01-01 RX ADMIN — Medication 6 MILLIGRAM(S): at 23:04

## 2022-01-01 RX ADMIN — CYCLOSPORINE 200 MILLIGRAM(S): 100 CAPSULE ORAL at 18:19

## 2022-01-01 RX ADMIN — Medication 1: at 08:38

## 2022-01-01 RX ADMIN — OXYCODONE HYDROCHLORIDE 7.5 MILLIGRAM(S): 5 TABLET ORAL at 20:07

## 2022-01-01 RX ADMIN — LIDOCAINE 1 PATCH: 4 CREAM TOPICAL at 11:48

## 2022-01-01 RX ADMIN — INSULIN GLARGINE 12 UNIT(S): 100 INJECTION, SOLUTION SUBCUTANEOUS at 21:31

## 2022-01-01 RX ADMIN — CYCLOSPORINE 200 MILLIGRAM(S): 100 CAPSULE ORAL at 22:50

## 2022-01-01 RX ADMIN — PANTOPRAZOLE SODIUM 40 MILLIGRAM(S): 20 TABLET, DELAYED RELEASE ORAL at 05:36

## 2022-01-01 RX ADMIN — Medication 1: at 13:17

## 2022-01-01 RX ADMIN — OXYCODONE HYDROCHLORIDE 7.5 MILLIGRAM(S): 5 TABLET ORAL at 21:05

## 2022-01-01 RX ADMIN — MIRTAZAPINE 7.5 MILLIGRAM(S): 45 TABLET, ORALLY DISINTEGRATING ORAL at 22:07

## 2022-01-01 RX ADMIN — OXYCODONE HYDROCHLORIDE 10 MILLIGRAM(S): 5 TABLET ORAL at 17:53

## 2022-01-01 RX ADMIN — LIDOCAINE 1 PATCH: 4 CREAM TOPICAL at 23:39

## 2022-01-01 RX ADMIN — LIDOCAINE 1 PATCH: 4 CREAM TOPICAL at 12:09

## 2022-01-01 RX ADMIN — Medication 1 APPLICATION(S): at 19:15

## 2022-01-01 RX ADMIN — OXYCODONE HYDROCHLORIDE 7.5 MILLIGRAM(S): 5 TABLET ORAL at 10:44

## 2022-01-01 RX ADMIN — Medication 100 MILLIGRAM(S): at 07:09

## 2022-01-01 RX ADMIN — OXYCODONE HYDROCHLORIDE 5 MILLIGRAM(S): 5 TABLET ORAL at 12:16

## 2022-01-01 RX ADMIN — PANTOPRAZOLE SODIUM 40 MILLIGRAM(S): 20 TABLET, DELAYED RELEASE ORAL at 06:20

## 2022-01-01 RX ADMIN — Medication 100 MILLIGRAM(S): at 23:55

## 2022-01-01 RX ADMIN — PANTOPRAZOLE SODIUM 40 MILLIGRAM(S): 20 TABLET, DELAYED RELEASE ORAL at 11:51

## 2022-01-01 RX ADMIN — DOXERCALCIFEROL 5 MICROGRAM(S): 2.5 CAPSULE ORAL at 10:33

## 2022-01-01 RX ADMIN — GABAPENTIN 300 MILLIGRAM(S): 400 CAPSULE ORAL at 12:12

## 2022-01-01 RX ADMIN — Medication 1: at 17:59

## 2022-01-01 RX ADMIN — Medication 1: at 13:46

## 2022-01-01 RX ADMIN — Medication 100 MILLIGRAM(S): at 02:45

## 2022-01-01 RX ADMIN — PANTOPRAZOLE SODIUM 40 MILLIGRAM(S): 20 TABLET, DELAYED RELEASE ORAL at 06:09

## 2022-01-01 RX ADMIN — SERTRALINE 50 MILLIGRAM(S): 25 TABLET, FILM COATED ORAL at 11:31

## 2022-01-01 RX ADMIN — Medication 10 UNIT(S): at 08:46

## 2022-01-01 RX ADMIN — CYCLOSPORINE 200 MILLIGRAM(S): 100 CAPSULE ORAL at 22:06

## 2022-01-01 RX ADMIN — SERTRALINE 50 MILLIGRAM(S): 25 TABLET, FILM COATED ORAL at 13:12

## 2022-01-01 RX ADMIN — HYDROMORPHONE HYDROCHLORIDE 0.25 MILLIGRAM(S): 2 INJECTION INTRAMUSCULAR; INTRAVENOUS; SUBCUTANEOUS at 12:35

## 2022-01-01 RX ADMIN — INSULIN GLARGINE 10 UNIT(S): 100 INJECTION, SOLUTION SUBCUTANEOUS at 21:31

## 2022-01-01 RX ADMIN — MIRTAZAPINE 7.5 MILLIGRAM(S): 45 TABLET, ORALLY DISINTEGRATING ORAL at 21:20

## 2022-01-01 RX ADMIN — Medication 6 MILLIGRAM(S): at 21:48

## 2022-01-01 RX ADMIN — OXYCODONE HYDROCHLORIDE 10 MILLIGRAM(S): 5 TABLET ORAL at 12:35

## 2022-01-01 RX ADMIN — HYDROMORPHONE HYDROCHLORIDE 0.25 MILLIGRAM(S): 2 INJECTION INTRAMUSCULAR; INTRAVENOUS; SUBCUTANEOUS at 12:45

## 2022-01-01 RX ADMIN — APIXABAN 5 MILLIGRAM(S): 2.5 TABLET, FILM COATED ORAL at 18:17

## 2022-01-01 RX ADMIN — SIMETHICONE 80 MILLIGRAM(S): 80 TABLET, CHEWABLE ORAL at 12:22

## 2022-01-01 RX ADMIN — OXYCODONE HYDROCHLORIDE 5 MILLIGRAM(S): 5 TABLET ORAL at 04:50

## 2022-01-01 RX ADMIN — OXYCODONE HYDROCHLORIDE 10 MILLIGRAM(S): 5 TABLET ORAL at 05:08

## 2022-01-01 RX ADMIN — CYCLOSPORINE 200 MILLIGRAM(S): 100 CAPSULE ORAL at 09:05

## 2022-01-01 RX ADMIN — ATORVASTATIN CALCIUM 80 MILLIGRAM(S): 80 TABLET, FILM COATED ORAL at 21:43

## 2022-01-01 RX ADMIN — LIDOCAINE 1 PATCH: 4 CREAM TOPICAL at 12:52

## 2022-01-01 RX ADMIN — Medication 100 MILLIGRAM(S): at 21:39

## 2022-01-01 RX ADMIN — Medication 1 APPLICATION(S): at 18:03

## 2022-01-01 RX ADMIN — Medication 5 MG/HR: at 21:29

## 2022-01-01 RX ADMIN — Medication 6 MILLIGRAM(S): at 22:00

## 2022-01-01 RX ADMIN — Medication 4.69 MICROGRAM(S)/KG/MIN: at 18:18

## 2022-01-01 RX ADMIN — Medication 100 MILLIGRAM(S): at 23:42

## 2022-01-01 RX ADMIN — Medication 650 MILLIGRAM(S): at 15:35

## 2022-01-01 RX ADMIN — Medication 1: at 13:12

## 2022-01-01 RX ADMIN — APIXABAN 2.5 MILLIGRAM(S): 2.5 TABLET, FILM COATED ORAL at 17:49

## 2022-01-01 RX ADMIN — Medication 1 APPLICATION(S): at 05:23

## 2022-01-01 RX ADMIN — GABAPENTIN 300 MILLIGRAM(S): 400 CAPSULE ORAL at 18:07

## 2022-01-01 RX ADMIN — CYCLOSPORINE 200 MILLIGRAM(S): 100 CAPSULE ORAL at 22:23

## 2022-01-01 RX ADMIN — HYDROMORPHONE HYDROCHLORIDE 1 MILLIGRAM(S): 2 INJECTION INTRAMUSCULAR; INTRAVENOUS; SUBCUTANEOUS at 08:47

## 2022-01-01 RX ADMIN — Medication 7 UNIT(S): at 12:17

## 2022-01-01 RX ADMIN — CYCLOSPORINE 125 MILLIGRAM(S): 100 CAPSULE ORAL at 21:38

## 2022-01-01 RX ADMIN — Medication 650 MILLIGRAM(S): at 14:40

## 2022-01-01 RX ADMIN — SERTRALINE 50 MILLIGRAM(S): 25 TABLET, FILM COATED ORAL at 12:26

## 2022-01-01 RX ADMIN — INSULIN GLARGINE 64 UNIT(S): 100 INJECTION, SOLUTION SUBCUTANEOUS at 10:05

## 2022-01-01 RX ADMIN — LIDOCAINE 1 PATCH: 4 CREAM TOPICAL at 18:39

## 2022-01-01 RX ADMIN — Medication 2: at 13:17

## 2022-01-01 RX ADMIN — Medication 1: at 17:21

## 2022-01-01 RX ADMIN — OXYCODONE HYDROCHLORIDE 5 MILLIGRAM(S): 5 TABLET ORAL at 23:26

## 2022-01-01 RX ADMIN — CHLORHEXIDINE GLUCONATE 1 APPLICATION(S): 213 SOLUTION TOPICAL at 12:30

## 2022-01-01 RX ADMIN — OXYCODONE HYDROCHLORIDE 10 MILLIGRAM(S): 5 TABLET ORAL at 13:50

## 2022-01-01 RX ADMIN — CINACALCET 60 MILLIGRAM(S): 30 TABLET, FILM COATED ORAL at 21:44

## 2022-01-01 RX ADMIN — Medication 2: at 12:49

## 2022-01-01 RX ADMIN — HYDROMORPHONE HYDROCHLORIDE 1 MILLIGRAM(S): 2 INJECTION INTRAMUSCULAR; INTRAVENOUS; SUBCUTANEOUS at 22:45

## 2022-01-01 RX ADMIN — CINACALCET 60 MILLIGRAM(S): 30 TABLET, FILM COATED ORAL at 11:29

## 2022-01-01 RX ADMIN — OXYCODONE HYDROCHLORIDE 10 MILLIGRAM(S): 5 TABLET ORAL at 18:46

## 2022-01-01 RX ADMIN — POLYETHYLENE GLYCOL 3350 17 GRAM(S): 17 POWDER, FOR SOLUTION ORAL at 17:09

## 2022-01-01 RX ADMIN — ERYTHROPOIETIN 8000 UNIT(S): 10000 INJECTION, SOLUTION INTRAVENOUS; SUBCUTANEOUS at 22:03

## 2022-01-01 RX ADMIN — OXYCODONE HYDROCHLORIDE 7.5 MILLIGRAM(S): 5 TABLET ORAL at 12:30

## 2022-01-01 RX ADMIN — LIDOCAINE 1 PATCH: 4 CREAM TOPICAL at 18:40

## 2022-01-01 RX ADMIN — SERTRALINE 50 MILLIGRAM(S): 25 TABLET, FILM COATED ORAL at 12:49

## 2022-01-01 RX ADMIN — LIDOCAINE 1 PATCH: 4 CREAM TOPICAL at 19:43

## 2022-01-01 RX ADMIN — ATORVASTATIN CALCIUM 80 MILLIGRAM(S): 80 TABLET, FILM COATED ORAL at 23:11

## 2022-01-01 RX ADMIN — MIDODRINE HYDROCHLORIDE 5 MILLIGRAM(S): 2.5 TABLET ORAL at 05:42

## 2022-01-01 RX ADMIN — CINACALCET 60 MILLIGRAM(S): 30 TABLET, FILM COATED ORAL at 12:39

## 2022-01-01 RX ADMIN — Medication 125 MILLILITER(S): at 05:02

## 2022-01-01 RX ADMIN — Medication 6 MILLIGRAM(S): at 23:24

## 2022-01-01 RX ADMIN — Medication 81 MILLIGRAM(S): at 15:35

## 2022-01-01 RX ADMIN — Medication 6 MILLIGRAM(S): at 23:34

## 2022-01-01 RX ADMIN — GABAPENTIN 300 MILLIGRAM(S): 400 CAPSULE ORAL at 11:45

## 2022-01-01 RX ADMIN — Medication 30 MILLILITER(S): at 15:06

## 2022-01-01 RX ADMIN — POLYETHYLENE GLYCOL 3350 17 GRAM(S): 17 POWDER, FOR SOLUTION ORAL at 05:23

## 2022-01-01 RX ADMIN — OXYCODONE HYDROCHLORIDE 7.5 MILLIGRAM(S): 5 TABLET ORAL at 11:19

## 2022-01-01 RX ADMIN — HYDROMORPHONE HYDROCHLORIDE 1 MILLIGRAM(S): 2 INJECTION INTRAMUSCULAR; INTRAVENOUS; SUBCUTANEOUS at 03:21

## 2022-01-01 RX ADMIN — SERTRALINE 50 MILLIGRAM(S): 25 TABLET, FILM COATED ORAL at 13:14

## 2022-01-01 RX ADMIN — Medication 6 MILLIGRAM(S): at 21:25

## 2022-01-01 RX ADMIN — SEVELAMER CARBONATE 800 MILLIGRAM(S): 2400 POWDER, FOR SUSPENSION ORAL at 18:31

## 2022-01-01 RX ADMIN — Medication 120 MILLIGRAM(S): at 06:44

## 2022-01-01 RX ADMIN — Medication 3 UNIT(S): at 18:31

## 2022-01-01 RX ADMIN — LORATADINE 10 MILLIGRAM(S): 10 TABLET ORAL at 12:12

## 2022-01-01 RX ADMIN — GABAPENTIN 300 MILLIGRAM(S): 400 CAPSULE ORAL at 14:16

## 2022-01-01 RX ADMIN — OXYCODONE HYDROCHLORIDE 10 MILLIGRAM(S): 5 TABLET ORAL at 19:15

## 2022-01-01 RX ADMIN — OXYCODONE HYDROCHLORIDE 10 MILLIGRAM(S): 5 TABLET ORAL at 03:50

## 2022-01-01 RX ADMIN — MIRTAZAPINE 7.5 MILLIGRAM(S): 45 TABLET, ORALLY DISINTEGRATING ORAL at 01:30

## 2022-01-01 RX ADMIN — MONTELUKAST 10 MILLIGRAM(S): 4 TABLET, CHEWABLE ORAL at 23:09

## 2022-01-01 RX ADMIN — INSULIN GLARGINE 9 UNIT(S): 100 INJECTION, SOLUTION SUBCUTANEOUS at 22:15

## 2022-01-01 RX ADMIN — POLYETHYLENE GLYCOL 3350 17 GRAM(S): 17 POWDER, FOR SOLUTION ORAL at 21:06

## 2022-01-01 RX ADMIN — OXYCODONE HYDROCHLORIDE 7.5 MILLIGRAM(S): 5 TABLET ORAL at 15:18

## 2022-01-01 RX ADMIN — OXYCODONE HYDROCHLORIDE 10 MILLIGRAM(S): 5 TABLET ORAL at 15:38

## 2022-01-01 RX ADMIN — OXYCODONE HYDROCHLORIDE 10 MILLIGRAM(S): 5 TABLET ORAL at 23:36

## 2022-01-01 RX ADMIN — Medication 1 APPLICATION(S): at 15:26

## 2022-01-01 RX ADMIN — SIMETHICONE 80 MILLIGRAM(S): 80 TABLET, CHEWABLE ORAL at 05:35

## 2022-01-01 RX ADMIN — Medication 400 MILLIGRAM(S): at 04:25

## 2022-01-01 RX ADMIN — OXYCODONE HYDROCHLORIDE 7.5 MILLIGRAM(S): 5 TABLET ORAL at 12:09

## 2022-01-01 RX ADMIN — GABAPENTIN 300 MILLIGRAM(S): 400 CAPSULE ORAL at 11:11

## 2022-01-01 RX ADMIN — AMIODARONE HYDROCHLORIDE 33.3 MG/MIN: 400 TABLET ORAL at 04:46

## 2022-01-01 RX ADMIN — GABAPENTIN 300 MILLIGRAM(S): 400 CAPSULE ORAL at 13:03

## 2022-01-01 RX ADMIN — Medication 6 MILLIGRAM(S): at 21:29

## 2022-01-01 RX ADMIN — SEVELAMER CARBONATE 2400 MILLIGRAM(S): 2400 POWDER, FOR SUSPENSION ORAL at 13:11

## 2022-01-01 RX ADMIN — SEVELAMER CARBONATE 800 MILLIGRAM(S): 2400 POWDER, FOR SUSPENSION ORAL at 08:38

## 2022-01-01 RX ADMIN — LIDOCAINE 1 PATCH: 4 CREAM TOPICAL at 18:28

## 2022-01-01 RX ADMIN — MONTELUKAST 10 MILLIGRAM(S): 4 TABLET, CHEWABLE ORAL at 21:19

## 2022-01-01 RX ADMIN — INSULIN GLARGINE 17 UNIT(S): 100 INJECTION, SOLUTION SUBCUTANEOUS at 21:26

## 2022-01-01 RX ADMIN — SEVELAMER CARBONATE 800 MILLIGRAM(S): 2400 POWDER, FOR SUSPENSION ORAL at 16:58

## 2022-01-01 RX ADMIN — OXYCODONE HYDROCHLORIDE 10 MILLIGRAM(S): 5 TABLET ORAL at 07:12

## 2022-01-01 RX ADMIN — CINACALCET 60 MILLIGRAM(S): 30 TABLET, FILM COATED ORAL at 02:17

## 2022-01-01 RX ADMIN — SIMETHICONE 80 MILLIGRAM(S): 80 TABLET, CHEWABLE ORAL at 13:56

## 2022-01-01 RX ADMIN — SIMETHICONE 80 MILLIGRAM(S): 80 TABLET, CHEWABLE ORAL at 21:17

## 2022-01-01 RX ADMIN — Medication 3: at 18:10

## 2022-01-01 RX ADMIN — APIXABAN 5 MILLIGRAM(S): 2.5 TABLET, FILM COATED ORAL at 06:29

## 2022-01-01 RX ADMIN — PANTOPRAZOLE SODIUM 40 MILLIGRAM(S): 20 TABLET, DELAYED RELEASE ORAL at 06:02

## 2022-01-01 RX ADMIN — VASOPRESSIN 1.8 UNIT(S)/MIN: 20 INJECTION INTRAVENOUS at 21:30

## 2022-01-01 RX ADMIN — Medication 25 GRAM(S): at 18:01

## 2022-01-01 RX ADMIN — Medication 100 MILLIGRAM(S): at 21:57

## 2022-01-01 RX ADMIN — SIMETHICONE 80 MILLIGRAM(S): 80 TABLET, CHEWABLE ORAL at 18:21

## 2022-01-01 RX ADMIN — Medication 4: at 12:24

## 2022-01-01 RX ADMIN — MIRTAZAPINE 7.5 MILLIGRAM(S): 45 TABLET, ORALLY DISINTEGRATING ORAL at 22:32

## 2022-01-01 RX ADMIN — Medication 2: at 13:07

## 2022-01-01 RX ADMIN — LIDOCAINE 1 PATCH: 4 CREAM TOPICAL at 01:21

## 2022-01-01 RX ADMIN — PANTOPRAZOLE SODIUM 40 MILLIGRAM(S): 20 TABLET, DELAYED RELEASE ORAL at 06:36

## 2022-01-01 RX ADMIN — PANTOPRAZOLE SODIUM 40 MILLIGRAM(S): 20 TABLET, DELAYED RELEASE ORAL at 06:39

## 2022-01-01 RX ADMIN — GABAPENTIN 300 MILLIGRAM(S): 400 CAPSULE ORAL at 11:56

## 2022-01-01 RX ADMIN — SEVELAMER CARBONATE 800 MILLIGRAM(S): 2400 POWDER, FOR SUSPENSION ORAL at 12:44

## 2022-01-01 RX ADMIN — LIDOCAINE 1 PATCH: 4 CREAM TOPICAL at 00:06

## 2022-01-01 RX ADMIN — MONTELUKAST 10 MILLIGRAM(S): 4 TABLET, CHEWABLE ORAL at 21:29

## 2022-01-01 RX ADMIN — OXYCODONE HYDROCHLORIDE 10 MILLIGRAM(S): 5 TABLET ORAL at 22:19

## 2022-01-01 RX ADMIN — Medication 650 MILLIGRAM(S): at 02:07

## 2022-01-01 RX ADMIN — OXYCODONE HYDROCHLORIDE 10 MILLIGRAM(S): 5 TABLET ORAL at 12:06

## 2022-01-01 RX ADMIN — Medication 120 MILLIGRAM(S): at 05:06

## 2022-01-01 RX ADMIN — SERTRALINE 50 MILLIGRAM(S): 25 TABLET, FILM COATED ORAL at 12:39

## 2022-01-01 RX ADMIN — Medication 1 TABLET(S): at 12:59

## 2022-01-01 RX ADMIN — MONTELUKAST 10 MILLIGRAM(S): 4 TABLET, CHEWABLE ORAL at 21:20

## 2022-01-01 RX ADMIN — PANTOPRAZOLE SODIUM 40 MILLIGRAM(S): 20 TABLET, DELAYED RELEASE ORAL at 17:58

## 2022-01-01 RX ADMIN — OXYCODONE HYDROCHLORIDE 10 MILLIGRAM(S): 5 TABLET ORAL at 06:14

## 2022-01-01 RX ADMIN — BUPROPION HYDROCHLORIDE 150 MILLIGRAM(S): 150 TABLET, EXTENDED RELEASE ORAL at 16:18

## 2022-01-01 RX ADMIN — OXYCODONE HYDROCHLORIDE 7.5 MILLIGRAM(S): 5 TABLET ORAL at 22:00

## 2022-01-01 RX ADMIN — OXYCODONE HYDROCHLORIDE 7.5 MILLIGRAM(S): 5 TABLET ORAL at 20:06

## 2022-01-01 RX ADMIN — Medication 1: at 13:15

## 2022-01-01 RX ADMIN — APIXABAN 5 MILLIGRAM(S): 2.5 TABLET, FILM COATED ORAL at 06:09

## 2022-01-01 RX ADMIN — Medication 1 APPLICATION(S): at 18:35

## 2022-01-01 RX ADMIN — Medication 650 MILLIGRAM(S): at 04:55

## 2022-01-01 RX ADMIN — SIMETHICONE 80 MILLIGRAM(S): 80 TABLET, CHEWABLE ORAL at 06:07

## 2022-01-01 RX ADMIN — LIDOCAINE 1 PATCH: 4 CREAM TOPICAL at 20:03

## 2022-01-01 RX ADMIN — Medication 5 UNIT(S): at 17:53

## 2022-01-01 RX ADMIN — OXYCODONE HYDROCHLORIDE 7.5 MILLIGRAM(S): 5 TABLET ORAL at 23:45

## 2022-01-01 RX ADMIN — Medication 650 MILLIGRAM(S): at 08:49

## 2022-01-01 RX ADMIN — BUPROPION HYDROCHLORIDE 150 MILLIGRAM(S): 150 TABLET, EXTENDED RELEASE ORAL at 13:47

## 2022-01-01 RX ADMIN — OXYCODONE HYDROCHLORIDE 10 MILLIGRAM(S): 5 TABLET ORAL at 18:17

## 2022-01-01 RX ADMIN — CHLORHEXIDINE GLUCONATE 1 APPLICATION(S): 213 SOLUTION TOPICAL at 12:24

## 2022-01-01 RX ADMIN — INSULIN GLARGINE 12 UNIT(S): 100 INJECTION, SOLUTION SUBCUTANEOUS at 21:33

## 2022-01-01 RX ADMIN — OXYCODONE HYDROCHLORIDE 7.5 MILLIGRAM(S): 5 TABLET ORAL at 10:00

## 2022-01-01 RX ADMIN — Medication 100 MILLIGRAM(S): at 20:58

## 2022-01-01 RX ADMIN — SENNA PLUS 2 TABLET(S): 8.6 TABLET ORAL at 22:00

## 2022-01-01 RX ADMIN — CINACALCET 30 MILLIGRAM(S): 30 TABLET, FILM COATED ORAL at 12:11

## 2022-01-01 RX ADMIN — SEVELAMER CARBONATE 800 MILLIGRAM(S): 2400 POWDER, FOR SUSPENSION ORAL at 13:39

## 2022-01-01 RX ADMIN — LIDOCAINE 1 PATCH: 4 CREAM TOPICAL at 13:35

## 2022-01-01 RX ADMIN — Medication 10 UNIT(S): at 17:42

## 2022-01-01 RX ADMIN — POLYETHYLENE GLYCOL 3350 17 GRAM(S): 17 POWDER, FOR SOLUTION ORAL at 17:56

## 2022-01-01 RX ADMIN — VASOPRESSIN 1.8 UNIT(S)/MIN: 20 INJECTION INTRAVENOUS at 08:02

## 2022-01-01 RX ADMIN — OXYCODONE HYDROCHLORIDE 7.5 MILLIGRAM(S): 5 TABLET ORAL at 13:10

## 2022-01-01 RX ADMIN — LIDOCAINE 1 PATCH: 4 CREAM TOPICAL at 12:15

## 2022-01-01 RX ADMIN — LIDOCAINE 1 PATCH: 4 CREAM TOPICAL at 12:13

## 2022-01-01 RX ADMIN — Medication 81 MILLIGRAM(S): at 12:25

## 2022-01-01 RX ADMIN — SIMETHICONE 80 MILLIGRAM(S): 80 TABLET, CHEWABLE ORAL at 06:05

## 2022-01-01 RX ADMIN — PANTOPRAZOLE SODIUM 40 MILLIGRAM(S): 20 TABLET, DELAYED RELEASE ORAL at 06:19

## 2022-01-01 RX ADMIN — MONTELUKAST 10 MILLIGRAM(S): 4 TABLET, CHEWABLE ORAL at 22:10

## 2022-01-01 RX ADMIN — PROPOFOL 6 MICROGRAM(S)/KG/MIN: 10 INJECTION, EMULSION INTRAVENOUS at 13:27

## 2022-01-01 RX ADMIN — SEVELAMER CARBONATE 800 MILLIGRAM(S): 2400 POWDER, FOR SUSPENSION ORAL at 18:10

## 2022-01-01 RX ADMIN — LIDOCAINE 1 PATCH: 4 CREAM TOPICAL at 11:38

## 2022-01-01 RX ADMIN — CYCLOSPORINE 125 MILLIGRAM(S): 100 CAPSULE ORAL at 22:05

## 2022-01-01 RX ADMIN — Medication 2 UNIT(S): at 18:39

## 2022-01-01 RX ADMIN — POLYETHYLENE GLYCOL 3350 17 GRAM(S): 17 POWDER, FOR SOLUTION ORAL at 18:25

## 2022-01-01 RX ADMIN — SEVELAMER CARBONATE 800 MILLIGRAM(S): 2400 POWDER, FOR SUSPENSION ORAL at 17:53

## 2022-01-01 RX ADMIN — MONTELUKAST 10 MILLIGRAM(S): 4 TABLET, CHEWABLE ORAL at 21:56

## 2022-01-01 RX ADMIN — HEPARIN SODIUM 19 UNIT(S)/HR: 5000 INJECTION INTRAVENOUS; SUBCUTANEOUS at 01:29

## 2022-01-01 RX ADMIN — LIDOCAINE 1 PATCH: 4 CREAM TOPICAL at 12:17

## 2022-01-01 RX ADMIN — CINACALCET 30 MILLIGRAM(S): 30 TABLET, FILM COATED ORAL at 13:42

## 2022-01-01 RX ADMIN — MONTELUKAST 10 MILLIGRAM(S): 4 TABLET, CHEWABLE ORAL at 21:43

## 2022-01-01 RX ADMIN — MONTELUKAST 10 MILLIGRAM(S): 4 TABLET, CHEWABLE ORAL at 21:55

## 2022-01-01 RX ADMIN — OXYCODONE HYDROCHLORIDE 5 MILLIGRAM(S): 5 TABLET ORAL at 08:42

## 2022-01-01 RX ADMIN — SEVELAMER CARBONATE 800 MILLIGRAM(S): 2400 POWDER, FOR SUSPENSION ORAL at 11:37

## 2022-01-01 RX ADMIN — CYCLOSPORINE 200 MILLIGRAM(S): 100 CAPSULE ORAL at 10:40

## 2022-01-01 RX ADMIN — MIDODRINE HYDROCHLORIDE 5 MILLIGRAM(S): 2.5 TABLET ORAL at 10:49

## 2022-01-01 RX ADMIN — Medication 3 UNIT(S): at 12:15

## 2022-01-01 RX ADMIN — APIXABAN 5 MILLIGRAM(S): 2.5 TABLET, FILM COATED ORAL at 18:22

## 2022-01-01 RX ADMIN — MONTELUKAST 10 MILLIGRAM(S): 4 TABLET, CHEWABLE ORAL at 21:49

## 2022-01-01 RX ADMIN — BUPROPION HYDROCHLORIDE 150 MILLIGRAM(S): 150 TABLET, EXTENDED RELEASE ORAL at 15:25

## 2022-01-01 RX ADMIN — HYDROMORPHONE HYDROCHLORIDE 1 MILLIGRAM(S): 2 INJECTION INTRAMUSCULAR; INTRAVENOUS; SUBCUTANEOUS at 20:00

## 2022-01-01 RX ADMIN — OXYCODONE HYDROCHLORIDE 10 MILLIGRAM(S): 5 TABLET ORAL at 22:20

## 2022-01-01 RX ADMIN — Medication 100 MILLIGRAM(S): at 20:43

## 2022-01-01 RX ADMIN — SIMETHICONE 80 MILLIGRAM(S): 80 TABLET, CHEWABLE ORAL at 05:43

## 2022-01-01 RX ADMIN — Medication 1: at 08:27

## 2022-01-01 RX ADMIN — Medication 1 APPLICATION(S): at 12:58

## 2022-01-01 RX ADMIN — APIXABAN 5 MILLIGRAM(S): 2.5 TABLET, FILM COATED ORAL at 17:10

## 2022-01-01 RX ADMIN — BUPROPION HYDROCHLORIDE 150 MILLIGRAM(S): 150 TABLET, EXTENDED RELEASE ORAL at 11:41

## 2022-01-01 RX ADMIN — Medication 2: at 21:29

## 2022-01-01 RX ADMIN — OXYCODONE HYDROCHLORIDE 7.5 MILLIGRAM(S): 5 TABLET ORAL at 08:43

## 2022-01-01 RX ADMIN — OXYCODONE HYDROCHLORIDE 7.5 MILLIGRAM(S): 5 TABLET ORAL at 22:13

## 2022-01-01 RX ADMIN — OXYCODONE HYDROCHLORIDE 10 MILLIGRAM(S): 5 TABLET ORAL at 23:40

## 2022-01-01 RX ADMIN — SENNA PLUS 2 TABLET(S): 8.6 TABLET ORAL at 22:32

## 2022-01-01 RX ADMIN — CINACALCET 30 MILLIGRAM(S): 30 TABLET, FILM COATED ORAL at 13:02

## 2022-01-01 RX ADMIN — APIXABAN 5 MILLIGRAM(S): 2.5 TABLET, FILM COATED ORAL at 17:36

## 2022-01-01 RX ADMIN — SEVELAMER CARBONATE 800 MILLIGRAM(S): 2400 POWDER, FOR SUSPENSION ORAL at 13:29

## 2022-01-01 RX ADMIN — BUPROPION HYDROCHLORIDE 150 MILLIGRAM(S): 150 TABLET, EXTENDED RELEASE ORAL at 12:25

## 2022-01-01 RX ADMIN — APIXABAN 5 MILLIGRAM(S): 2.5 TABLET, FILM COATED ORAL at 18:13

## 2022-01-01 RX ADMIN — SEVELAMER CARBONATE 800 MILLIGRAM(S): 2400 POWDER, FOR SUSPENSION ORAL at 17:36

## 2022-01-01 RX ADMIN — OXYCODONE HYDROCHLORIDE 7.5 MILLIGRAM(S): 5 TABLET ORAL at 08:42

## 2022-01-01 RX ADMIN — LIDOCAINE 1 PATCH: 4 CREAM TOPICAL at 23:00

## 2022-01-01 RX ADMIN — SEVELAMER CARBONATE 800 MILLIGRAM(S): 2400 POWDER, FOR SUSPENSION ORAL at 08:44

## 2022-01-01 RX ADMIN — OXYCODONE HYDROCHLORIDE 10 MILLIGRAM(S): 5 TABLET ORAL at 08:55

## 2022-01-01 RX ADMIN — GABAPENTIN 300 MILLIGRAM(S): 400 CAPSULE ORAL at 13:36

## 2022-01-01 RX ADMIN — MONTELUKAST 10 MILLIGRAM(S): 4 TABLET, CHEWABLE ORAL at 21:36

## 2022-01-01 RX ADMIN — GABAPENTIN 300 MILLIGRAM(S): 400 CAPSULE ORAL at 12:48

## 2022-01-01 RX ADMIN — BUPROPION HYDROCHLORIDE 150 MILLIGRAM(S): 150 TABLET, EXTENDED RELEASE ORAL at 11:37

## 2022-01-01 RX ADMIN — CINACALCET 60 MILLIGRAM(S): 30 TABLET, FILM COATED ORAL at 13:49

## 2022-01-01 RX ADMIN — Medication 81 MILLIGRAM(S): at 12:39

## 2022-01-01 RX ADMIN — Medication 4: at 08:51

## 2022-01-01 RX ADMIN — OXYCODONE HYDROCHLORIDE 10 MILLIGRAM(S): 5 TABLET ORAL at 16:55

## 2022-01-01 RX ADMIN — CHLORHEXIDINE GLUCONATE 1 APPLICATION(S): 213 SOLUTION TOPICAL at 11:40

## 2022-01-01 RX ADMIN — OXYCODONE HYDROCHLORIDE 5 MILLIGRAM(S): 5 TABLET ORAL at 12:29

## 2022-01-01 RX ADMIN — SEVELAMER CARBONATE 800 MILLIGRAM(S): 2400 POWDER, FOR SUSPENSION ORAL at 11:49

## 2022-01-01 RX ADMIN — MONTELUKAST 10 MILLIGRAM(S): 4 TABLET, CHEWABLE ORAL at 21:53

## 2022-01-01 RX ADMIN — Medication 100 MILLIGRAM(S): at 22:19

## 2022-01-01 RX ADMIN — ERYTHROPOIETIN 16000 UNIT(S): 10000 INJECTION, SOLUTION INTRAVENOUS; SUBCUTANEOUS at 08:36

## 2022-01-01 RX ADMIN — OXYCODONE HYDROCHLORIDE 5 MILLIGRAM(S): 5 TABLET ORAL at 17:09

## 2022-01-01 RX ADMIN — Medication 100 MILLIGRAM(S): at 22:47

## 2022-01-01 RX ADMIN — LIDOCAINE 1 PATCH: 4 CREAM TOPICAL at 11:40

## 2022-01-01 RX ADMIN — Medication 650 MILLIGRAM(S): at 09:20

## 2022-01-01 RX ADMIN — OXYCODONE HYDROCHLORIDE 5 MILLIGRAM(S): 5 TABLET ORAL at 14:21

## 2022-01-01 RX ADMIN — Medication 3 UNIT(S): at 17:30

## 2022-01-01 RX ADMIN — Medication 650 MILLIGRAM(S): at 16:47

## 2022-01-01 RX ADMIN — MIRTAZAPINE 7.5 MILLIGRAM(S): 45 TABLET, ORALLY DISINTEGRATING ORAL at 20:56

## 2022-01-01 RX ADMIN — CHLORHEXIDINE GLUCONATE 1 APPLICATION(S): 213 SOLUTION TOPICAL at 11:35

## 2022-01-01 RX ADMIN — OXYCODONE HYDROCHLORIDE 10 MILLIGRAM(S): 5 TABLET ORAL at 08:50

## 2022-01-01 RX ADMIN — ATORVASTATIN CALCIUM 80 MILLIGRAM(S): 80 TABLET, FILM COATED ORAL at 21:58

## 2022-01-01 RX ADMIN — ERYTHROPOIETIN 18000 UNIT(S): 10000 INJECTION, SOLUTION INTRAVENOUS; SUBCUTANEOUS at 00:00

## 2022-01-01 RX ADMIN — HYDROMORPHONE HYDROCHLORIDE 1 MILLIGRAM(S): 2 INJECTION INTRAMUSCULAR; INTRAVENOUS; SUBCUTANEOUS at 05:40

## 2022-01-01 RX ADMIN — OXYCODONE HYDROCHLORIDE 5 MILLIGRAM(S): 5 TABLET ORAL at 00:30

## 2022-01-01 RX ADMIN — Medication 1000 MILLIGRAM(S): at 22:28

## 2022-01-01 RX ADMIN — Medication 6: at 17:27

## 2022-01-01 RX ADMIN — LIDOCAINE 1 PATCH: 4 CREAM TOPICAL at 00:39

## 2022-01-01 RX ADMIN — SENNA PLUS 2 TABLET(S): 8.6 TABLET ORAL at 22:02

## 2022-01-01 RX ADMIN — APIXABAN 5 MILLIGRAM(S): 2.5 TABLET, FILM COATED ORAL at 18:20

## 2022-01-01 RX ADMIN — Medication 25 GRAM(S): at 12:02

## 2022-01-01 RX ADMIN — MIDODRINE HYDROCHLORIDE 5 MILLIGRAM(S): 2.5 TABLET ORAL at 06:15

## 2022-01-01 RX ADMIN — PANTOPRAZOLE SODIUM 40 MILLIGRAM(S): 20 TABLET, DELAYED RELEASE ORAL at 06:31

## 2022-01-01 RX ADMIN — MIDODRINE HYDROCHLORIDE 5 MILLIGRAM(S): 2.5 TABLET ORAL at 15:01

## 2022-01-01 RX ADMIN — HEPARIN SODIUM 0.6 UNIT(S)/HR: 5000 INJECTION INTRAVENOUS; SUBCUTANEOUS at 08:03

## 2022-01-01 RX ADMIN — Medication 1: at 12:39

## 2022-01-01 RX ADMIN — Medication 3 UNIT(S): at 09:36

## 2022-01-01 RX ADMIN — Medication 2: at 09:25

## 2022-01-01 RX ADMIN — Medication 6 MILLIGRAM(S): at 00:55

## 2022-01-01 RX ADMIN — APIXABAN 2.5 MILLIGRAM(S): 2.5 TABLET, FILM COATED ORAL at 05:06

## 2022-01-01 RX ADMIN — SEVELAMER CARBONATE 800 MILLIGRAM(S): 2400 POWDER, FOR SUSPENSION ORAL at 12:22

## 2022-01-01 RX ADMIN — OXYCODONE HYDROCHLORIDE 7.5 MILLIGRAM(S): 5 TABLET ORAL at 00:55

## 2022-01-01 RX ADMIN — POLYETHYLENE GLYCOL 3350 17 GRAM(S): 17 POWDER, FOR SOLUTION ORAL at 21:24

## 2022-01-01 RX ADMIN — GABAPENTIN 300 MILLIGRAM(S): 400 CAPSULE ORAL at 11:42

## 2022-01-01 RX ADMIN — Medication 650 MILLIGRAM(S): at 01:47

## 2022-01-01 RX ADMIN — SEVELAMER CARBONATE 1600 MILLIGRAM(S): 2400 POWDER, FOR SUSPENSION ORAL at 12:39

## 2022-01-01 RX ADMIN — HYDROMORPHONE HYDROCHLORIDE 1 MILLIGRAM(S): 2 INJECTION INTRAMUSCULAR; INTRAVENOUS; SUBCUTANEOUS at 08:49

## 2022-01-01 RX ADMIN — BUPROPION HYDROCHLORIDE 150 MILLIGRAM(S): 150 TABLET, EXTENDED RELEASE ORAL at 12:51

## 2022-01-01 RX ADMIN — ERYTHROPOIETIN 14000 UNIT(S): 10000 INJECTION, SOLUTION INTRAVENOUS; SUBCUTANEOUS at 14:28

## 2022-01-01 RX ADMIN — SENNA PLUS 2 TABLET(S): 8.6 TABLET ORAL at 21:52

## 2022-01-01 RX ADMIN — Medication 1 APPLICATION(S): at 12:57

## 2022-01-01 RX ADMIN — HYDROMORPHONE HYDROCHLORIDE 1 MILLIGRAM(S): 2 INJECTION INTRAMUSCULAR; INTRAVENOUS; SUBCUTANEOUS at 06:00

## 2022-01-01 RX ADMIN — LIDOCAINE 1 PATCH: 4 CREAM TOPICAL at 18:01

## 2022-01-01 RX ADMIN — Medication 7 UNIT(S): at 09:29

## 2022-01-01 RX ADMIN — HYDROMORPHONE HYDROCHLORIDE 0.5 MILLIGRAM(S): 2 INJECTION INTRAMUSCULAR; INTRAVENOUS; SUBCUTANEOUS at 14:44

## 2022-01-01 RX ADMIN — Medication 120 MILLIGRAM(S): at 06:01

## 2022-01-01 RX ADMIN — BUPROPION HYDROCHLORIDE 150 MILLIGRAM(S): 150 TABLET, EXTENDED RELEASE ORAL at 13:01

## 2022-01-01 RX ADMIN — Medication 1: at 14:26

## 2022-01-01 RX ADMIN — Medication 3: at 18:18

## 2022-01-01 RX ADMIN — HYDROMORPHONE HYDROCHLORIDE 1 MILLIGRAM(S): 2 INJECTION INTRAMUSCULAR; INTRAVENOUS; SUBCUTANEOUS at 19:45

## 2022-01-01 RX ADMIN — ATORVASTATIN CALCIUM 80 MILLIGRAM(S): 80 TABLET, FILM COATED ORAL at 21:31

## 2022-01-01 RX ADMIN — SEVELAMER CARBONATE 800 MILLIGRAM(S): 2400 POWDER, FOR SUSPENSION ORAL at 08:37

## 2022-01-01 RX ADMIN — MIRTAZAPINE 7.5 MILLIGRAM(S): 45 TABLET, ORALLY DISINTEGRATING ORAL at 21:46

## 2022-01-01 RX ADMIN — HEPARIN SODIUM 500 UNIT(S): 5000 INJECTION INTRAVENOUS; SUBCUTANEOUS at 13:30

## 2022-01-01 RX ADMIN — Medication 100 MILLIGRAM(S): at 21:49

## 2022-01-01 RX ADMIN — OXYCODONE HYDROCHLORIDE 10 MILLIGRAM(S): 5 TABLET ORAL at 12:12

## 2022-01-01 RX ADMIN — Medication 1 APPLICATION(S): at 14:56

## 2022-01-01 RX ADMIN — OXYCODONE HYDROCHLORIDE 10 MILLIGRAM(S): 5 TABLET ORAL at 18:48

## 2022-01-01 RX ADMIN — Medication 6 MILLIGRAM(S): at 22:02

## 2022-01-01 RX ADMIN — MIRTAZAPINE 7.5 MILLIGRAM(S): 45 TABLET, ORALLY DISINTEGRATING ORAL at 22:02

## 2022-01-01 RX ADMIN — Medication 10 UNIT(S): at 17:34

## 2022-01-01 RX ADMIN — Medication 650 MILLIGRAM(S): at 02:12

## 2022-01-01 RX ADMIN — Medication 1 APPLICATION(S): at 11:13

## 2022-01-01 RX ADMIN — Medication 5 UNIT(S): at 12:39

## 2022-01-01 RX ADMIN — LIDOCAINE 1 PATCH: 4 CREAM TOPICAL at 19:37

## 2022-01-01 RX ADMIN — OXYCODONE HYDROCHLORIDE 5 MILLIGRAM(S): 5 TABLET ORAL at 22:28

## 2022-01-01 RX ADMIN — LIDOCAINE 1 PATCH: 4 CREAM TOPICAL at 12:10

## 2022-01-01 RX ADMIN — Medication 1 APPLICATION(S): at 13:00

## 2022-01-01 RX ADMIN — OXYCODONE HYDROCHLORIDE 7.5 MILLIGRAM(S): 5 TABLET ORAL at 22:29

## 2022-01-01 RX ADMIN — Medication 6 UNIT(S): at 09:24

## 2022-01-01 RX ADMIN — Medication 25 GRAM(S): at 21:56

## 2022-01-01 RX ADMIN — HEPARIN SODIUM 21 UNIT(S)/HR: 5000 INJECTION INTRAVENOUS; SUBCUTANEOUS at 23:07

## 2022-01-01 RX ADMIN — ATORVASTATIN CALCIUM 80 MILLIGRAM(S): 80 TABLET, FILM COATED ORAL at 21:22

## 2022-01-01 RX ADMIN — CYCLOSPORINE 200 MILLIGRAM(S): 100 CAPSULE ORAL at 20:33

## 2022-01-01 RX ADMIN — Medication 3 UNIT(S): at 08:59

## 2022-01-01 RX ADMIN — GABAPENTIN 300 MILLIGRAM(S): 400 CAPSULE ORAL at 12:09

## 2022-01-01 RX ADMIN — Medication 400 MILLIGRAM(S): at 11:36

## 2022-01-01 RX ADMIN — APIXABAN 2.5 MILLIGRAM(S): 2.5 TABLET, FILM COATED ORAL at 06:08

## 2022-01-01 RX ADMIN — CYCLOSPORINE 125 MILLIGRAM(S): 100 CAPSULE ORAL at 09:34

## 2022-01-01 RX ADMIN — ERYTHROPOIETIN 16000 UNIT(S): 10000 INJECTION, SOLUTION INTRAVENOUS; SUBCUTANEOUS at 07:45

## 2022-01-01 RX ADMIN — SIMETHICONE 80 MILLIGRAM(S): 80 TABLET, CHEWABLE ORAL at 12:37

## 2022-01-01 RX ADMIN — OXYCODONE HYDROCHLORIDE 10 MILLIGRAM(S): 5 TABLET ORAL at 10:01

## 2022-01-01 RX ADMIN — Medication 81 MILLIGRAM(S): at 11:46

## 2022-01-01 RX ADMIN — HYDROMORPHONE HYDROCHLORIDE 1 MILLIGRAM(S): 2 INJECTION INTRAMUSCULAR; INTRAVENOUS; SUBCUTANEOUS at 02:14

## 2022-01-01 RX ADMIN — SIMETHICONE 80 MILLIGRAM(S): 80 TABLET, CHEWABLE ORAL at 21:22

## 2022-01-01 RX ADMIN — Medication 1 APPLICATION(S): at 19:19

## 2022-01-01 RX ADMIN — SERTRALINE 50 MILLIGRAM(S): 25 TABLET, FILM COATED ORAL at 11:10

## 2022-01-01 RX ADMIN — SENNA PLUS 2 TABLET(S): 8.6 TABLET ORAL at 21:28

## 2022-01-01 RX ADMIN — OXYCODONE HYDROCHLORIDE 10 MILLIGRAM(S): 5 TABLET ORAL at 08:58

## 2022-01-01 RX ADMIN — OXYCODONE HYDROCHLORIDE 7.5 MILLIGRAM(S): 5 TABLET ORAL at 22:40

## 2022-01-01 RX ADMIN — SERTRALINE 50 MILLIGRAM(S): 25 TABLET, FILM COATED ORAL at 13:02

## 2022-01-01 RX ADMIN — SENNA PLUS 2 TABLET(S): 8.6 TABLET ORAL at 21:19

## 2022-01-01 RX ADMIN — GABAPENTIN 300 MILLIGRAM(S): 400 CAPSULE ORAL at 17:11

## 2022-01-01 RX ADMIN — MIRTAZAPINE 7.5 MILLIGRAM(S): 45 TABLET, ORALLY DISINTEGRATING ORAL at 22:50

## 2022-01-01 RX ADMIN — PANTOPRAZOLE SODIUM 40 MILLIGRAM(S): 20 TABLET, DELAYED RELEASE ORAL at 05:00

## 2022-01-01 RX ADMIN — SIMETHICONE 80 MILLIGRAM(S): 80 TABLET, CHEWABLE ORAL at 23:05

## 2022-01-01 RX ADMIN — SENNA PLUS 2 TABLET(S): 8.6 TABLET ORAL at 21:50

## 2022-01-01 RX ADMIN — Medication 3 MILLIGRAM(S): at 22:12

## 2022-01-01 RX ADMIN — OXYCODONE HYDROCHLORIDE 10 MILLIGRAM(S): 5 TABLET ORAL at 08:59

## 2022-01-01 RX ADMIN — APIXABAN 2.5 MILLIGRAM(S): 2.5 TABLET, FILM COATED ORAL at 23:42

## 2022-01-01 RX ADMIN — MONTELUKAST 10 MILLIGRAM(S): 4 TABLET, CHEWABLE ORAL at 21:39

## 2022-01-01 RX ADMIN — LIDOCAINE 1 PATCH: 4 CREAM TOPICAL at 23:38

## 2022-01-01 RX ADMIN — Medication 3 MILLIGRAM(S): at 22:56

## 2022-01-01 RX ADMIN — LIDOCAINE 1 PATCH: 4 CREAM TOPICAL at 01:30

## 2022-01-01 RX ADMIN — CYCLOSPORINE 125 MILLIGRAM(S): 100 CAPSULE ORAL at 21:27

## 2022-01-01 RX ADMIN — SEVELAMER CARBONATE 800 MILLIGRAM(S): 2400 POWDER, FOR SUSPENSION ORAL at 09:44

## 2022-01-01 RX ADMIN — LIDOCAINE 1 PATCH: 4 CREAM TOPICAL at 06:38

## 2022-01-01 RX ADMIN — Medication 1: at 21:58

## 2022-01-01 RX ADMIN — Medication 100 MILLIGRAM(S): at 23:09

## 2022-01-01 RX ADMIN — Medication 1: at 12:41

## 2022-01-01 RX ADMIN — Medication 100 MILLIGRAM(S): at 22:03

## 2022-01-01 RX ADMIN — SODIUM CHLORIDE 100 MILLILITER(S): 9 INJECTION, SOLUTION INTRAVENOUS at 05:23

## 2022-01-01 RX ADMIN — SIMETHICONE 80 MILLIGRAM(S): 80 TABLET, CHEWABLE ORAL at 21:37

## 2022-01-01 RX ADMIN — SENNA PLUS 2 TABLET(S): 8.6 TABLET ORAL at 21:35

## 2022-01-01 RX ADMIN — LIDOCAINE 1 PATCH: 4 CREAM TOPICAL at 13:34

## 2022-01-01 RX ADMIN — Medication 100 MILLIGRAM(S): at 21:37

## 2022-01-01 RX ADMIN — SENNA PLUS 2 TABLET(S): 8.6 TABLET ORAL at 21:23

## 2022-01-01 RX ADMIN — Medication 100 MILLIGRAM(S): at 04:13

## 2022-01-01 RX ADMIN — CYCLOSPORINE 200 MILLIGRAM(S): 100 CAPSULE ORAL at 18:15

## 2022-01-01 RX ADMIN — Medication 35 MILLIGRAM(S): at 05:10

## 2022-01-01 RX ADMIN — BUPROPION HYDROCHLORIDE 150 MILLIGRAM(S): 150 TABLET, EXTENDED RELEASE ORAL at 12:23

## 2022-01-01 RX ADMIN — Medication 81 MILLIGRAM(S): at 12:37

## 2022-01-01 RX ADMIN — Medication 120 MILLIGRAM(S): at 05:11

## 2022-01-01 RX ADMIN — OXYCODONE HYDROCHLORIDE 10 MILLIGRAM(S): 5 TABLET ORAL at 12:15

## 2022-01-01 RX ADMIN — LIDOCAINE 1 PATCH: 4 CREAM TOPICAL at 01:18

## 2022-01-01 RX ADMIN — LORATADINE 10 MILLIGRAM(S): 10 TABLET ORAL at 12:52

## 2022-01-01 RX ADMIN — BUPROPION HYDROCHLORIDE 150 MILLIGRAM(S): 150 TABLET, EXTENDED RELEASE ORAL at 14:20

## 2022-01-01 RX ADMIN — OXYCODONE HYDROCHLORIDE 10 MILLIGRAM(S): 5 TABLET ORAL at 22:50

## 2022-01-01 RX ADMIN — MUPIROCIN 1 APPLICATION(S): 20 OINTMENT TOPICAL at 22:13

## 2022-01-01 RX ADMIN — Medication 25 GRAM(S): at 18:30

## 2022-01-01 RX ADMIN — APIXABAN 5 MILLIGRAM(S): 2.5 TABLET, FILM COATED ORAL at 18:26

## 2022-01-01 RX ADMIN — Medication 4: at 22:44

## 2022-01-01 RX ADMIN — SEVELAMER CARBONATE 800 MILLIGRAM(S): 2400 POWDER, FOR SUSPENSION ORAL at 08:59

## 2022-01-01 RX ADMIN — CINACALCET 60 MILLIGRAM(S): 30 TABLET, FILM COATED ORAL at 21:26

## 2022-01-01 RX ADMIN — CYCLOSPORINE 125 MILLIGRAM(S): 100 CAPSULE ORAL at 11:09

## 2022-01-01 RX ADMIN — Medication 81 MILLIGRAM(S): at 11:12

## 2022-01-01 RX ADMIN — MIRTAZAPINE 7.5 MILLIGRAM(S): 45 TABLET, ORALLY DISINTEGRATING ORAL at 21:54

## 2022-01-01 RX ADMIN — Medication 2: at 18:38

## 2022-01-01 RX ADMIN — Medication 75 MEQ/KG/HR: at 20:23

## 2022-01-01 RX ADMIN — Medication 1 APPLICATION(S): at 17:08

## 2022-01-01 RX ADMIN — OXYCODONE HYDROCHLORIDE 5 MILLIGRAM(S): 5 TABLET ORAL at 13:33

## 2022-01-01 RX ADMIN — OXYCODONE HYDROCHLORIDE 10 MILLIGRAM(S): 5 TABLET ORAL at 06:03

## 2022-01-01 RX ADMIN — MIRTAZAPINE 7.5 MILLIGRAM(S): 45 TABLET, ORALLY DISINTEGRATING ORAL at 21:53

## 2022-01-01 RX ADMIN — LIDOCAINE 1 PATCH: 4 CREAM TOPICAL at 11:42

## 2022-01-01 RX ADMIN — CEFEPIME 100 MILLIGRAM(S): 1 INJECTION, POWDER, FOR SOLUTION INTRAMUSCULAR; INTRAVENOUS at 05:05

## 2022-01-01 RX ADMIN — LIDOCAINE 1 PATCH: 4 CREAM TOPICAL at 23:57

## 2022-01-01 RX ADMIN — Medication 200 GRAM(S): at 04:15

## 2022-01-01 RX ADMIN — CYCLOSPORINE 200 MILLIGRAM(S): 100 CAPSULE ORAL at 21:55

## 2022-01-01 RX ADMIN — Medication 1 APPLICATION(S): at 22:03

## 2022-01-01 RX ADMIN — SEVELAMER CARBONATE 2400 MILLIGRAM(S): 2400 POWDER, FOR SUSPENSION ORAL at 08:52

## 2022-01-01 RX ADMIN — Medication 6 MILLIGRAM(S): at 21:44

## 2022-01-01 RX ADMIN — Medication 100 MILLIGRAM(S): at 21:56

## 2022-01-01 RX ADMIN — LIDOCAINE 1 PATCH: 4 CREAM TOPICAL at 02:36

## 2022-01-01 RX ADMIN — OXYCODONE HYDROCHLORIDE 10 MILLIGRAM(S): 5 TABLET ORAL at 17:55

## 2022-01-01 RX ADMIN — SEVELAMER CARBONATE 800 MILLIGRAM(S): 2400 POWDER, FOR SUSPENSION ORAL at 12:36

## 2022-01-01 RX ADMIN — SERTRALINE 50 MILLIGRAM(S): 25 TABLET, FILM COATED ORAL at 13:28

## 2022-01-01 RX ADMIN — CYCLOSPORINE 125 MILLIGRAM(S): 100 CAPSULE ORAL at 12:27

## 2022-01-01 RX ADMIN — Medication 1 TABLET(S): at 18:07

## 2022-01-01 RX ADMIN — OXYCODONE HYDROCHLORIDE 10 MILLIGRAM(S): 5 TABLET ORAL at 08:44

## 2022-01-01 RX ADMIN — BUPROPION HYDROCHLORIDE 150 MILLIGRAM(S): 150 TABLET, EXTENDED RELEASE ORAL at 11:52

## 2022-01-01 RX ADMIN — APIXABAN 5 MILLIGRAM(S): 2.5 TABLET, FILM COATED ORAL at 17:53

## 2022-01-01 RX ADMIN — GABAPENTIN 300 MILLIGRAM(S): 400 CAPSULE ORAL at 11:10

## 2022-01-01 RX ADMIN — SEVELAMER CARBONATE 800 MILLIGRAM(S): 2400 POWDER, FOR SUSPENSION ORAL at 17:22

## 2022-01-01 RX ADMIN — Medication 650 MILLIGRAM(S): at 14:26

## 2022-01-01 RX ADMIN — Medication 30 MILLILITER(S): at 12:56

## 2022-01-01 RX ADMIN — LIDOCAINE 1 PATCH: 4 CREAM TOPICAL at 18:23

## 2022-01-01 RX ADMIN — SERTRALINE 50 MILLIGRAM(S): 25 TABLET, FILM COATED ORAL at 15:25

## 2022-01-01 RX ADMIN — Medication 1 APPLICATION(S): at 05:29

## 2022-01-01 RX ADMIN — Medication 100 MILLIGRAM(S): at 21:17

## 2022-01-01 RX ADMIN — LORATADINE 10 MILLIGRAM(S): 10 TABLET ORAL at 12:21

## 2022-01-01 RX ADMIN — CYCLOSPORINE 200 MILLIGRAM(S): 100 CAPSULE ORAL at 11:15

## 2022-01-01 RX ADMIN — SERTRALINE 50 MILLIGRAM(S): 25 TABLET, FILM COATED ORAL at 12:23

## 2022-01-01 RX ADMIN — HEPARIN SODIUM 5000 UNIT(S): 5000 INJECTION INTRAVENOUS; SUBCUTANEOUS at 07:16

## 2022-01-01 RX ADMIN — CINACALCET 60 MILLIGRAM(S): 30 TABLET, FILM COATED ORAL at 21:42

## 2022-01-01 RX ADMIN — OXYCODONE HYDROCHLORIDE 7.5 MILLIGRAM(S): 5 TABLET ORAL at 12:13

## 2022-01-01 RX ADMIN — Medication 1 APPLICATION(S): at 16:48

## 2022-01-01 RX ADMIN — OXYCODONE HYDROCHLORIDE 10 MILLIGRAM(S): 5 TABLET ORAL at 00:15

## 2022-01-01 RX ADMIN — Medication 100 MILLIGRAM(S): at 23:12

## 2022-01-01 RX ADMIN — Medication 25 GRAM(S): at 18:03

## 2022-01-01 RX ADMIN — OXYCODONE HYDROCHLORIDE 10 MILLIGRAM(S): 5 TABLET ORAL at 17:36

## 2022-01-01 RX ADMIN — SEVELAMER CARBONATE 800 MILLIGRAM(S): 2400 POWDER, FOR SUSPENSION ORAL at 17:51

## 2022-01-01 RX ADMIN — LIDOCAINE 1 PATCH: 4 CREAM TOPICAL at 14:04

## 2022-01-01 RX ADMIN — LIDOCAINE 1 PATCH: 4 CREAM TOPICAL at 11:41

## 2022-01-01 RX ADMIN — APIXABAN 2.5 MILLIGRAM(S): 2.5 TABLET, FILM COATED ORAL at 09:39

## 2022-01-01 RX ADMIN — SERTRALINE 50 MILLIGRAM(S): 25 TABLET, FILM COATED ORAL at 14:12

## 2022-01-01 RX ADMIN — Medication 6 MILLIGRAM(S): at 22:07

## 2022-01-01 RX ADMIN — Medication 1 APPLICATION(S): at 21:02

## 2022-01-01 RX ADMIN — HYDROMORPHONE HYDROCHLORIDE 1 MILLIGRAM(S): 2 INJECTION INTRAMUSCULAR; INTRAVENOUS; SUBCUTANEOUS at 09:28

## 2022-01-01 RX ADMIN — LIDOCAINE 1 PATCH: 4 CREAM TOPICAL at 19:19

## 2022-01-01 RX ADMIN — Medication 1 APPLICATION(S): at 12:25

## 2022-01-01 RX ADMIN — SEVELAMER CARBONATE 1600 MILLIGRAM(S): 2400 POWDER, FOR SUSPENSION ORAL at 18:25

## 2022-01-01 RX ADMIN — POLYETHYLENE GLYCOL 3350 17 GRAM(S): 17 POWDER, FOR SOLUTION ORAL at 18:24

## 2022-01-01 RX ADMIN — HYDROMORPHONE HYDROCHLORIDE 1 MILLIGRAM(S): 2 INJECTION INTRAMUSCULAR; INTRAVENOUS; SUBCUTANEOUS at 16:00

## 2022-01-01 RX ADMIN — LIDOCAINE 1 PATCH: 4 CREAM TOPICAL at 18:45

## 2022-01-01 RX ADMIN — MONTELUKAST 10 MILLIGRAM(S): 4 TABLET, CHEWABLE ORAL at 22:05

## 2022-01-01 RX ADMIN — OXYCODONE HYDROCHLORIDE 5 MILLIGRAM(S): 5 TABLET ORAL at 10:54

## 2022-01-01 RX ADMIN — OXYCODONE HYDROCHLORIDE 10 MILLIGRAM(S): 5 TABLET ORAL at 17:35

## 2022-01-01 RX ADMIN — Medication 8 UNIT(S): at 08:50

## 2022-01-01 RX ADMIN — MIRTAZAPINE 7.5 MILLIGRAM(S): 45 TABLET, ORALLY DISINTEGRATING ORAL at 21:34

## 2022-01-01 RX ADMIN — CYCLOSPORINE 200 MILLIGRAM(S): 100 CAPSULE ORAL at 21:19

## 2022-01-01 RX ADMIN — CHLORHEXIDINE GLUCONATE 1 APPLICATION(S): 213 SOLUTION TOPICAL at 13:44

## 2022-01-01 RX ADMIN — INSULIN GLARGINE 12 UNIT(S): 100 INJECTION, SOLUTION SUBCUTANEOUS at 23:33

## 2022-01-01 RX ADMIN — MIRTAZAPINE 7.5 MILLIGRAM(S): 45 TABLET, ORALLY DISINTEGRATING ORAL at 21:47

## 2022-01-01 RX ADMIN — LORATADINE 10 MILLIGRAM(S): 10 TABLET ORAL at 16:19

## 2022-01-01 RX ADMIN — CYCLOSPORINE 200 MILLIGRAM(S): 100 CAPSULE ORAL at 06:26

## 2022-01-01 RX ADMIN — OXYCODONE HYDROCHLORIDE 5 MILLIGRAM(S): 5 TABLET ORAL at 12:33

## 2022-01-01 RX ADMIN — OXYCODONE HYDROCHLORIDE 10 MILLIGRAM(S): 5 TABLET ORAL at 21:26

## 2022-01-01 RX ADMIN — SEVELAMER CARBONATE 800 MILLIGRAM(S): 2400 POWDER, FOR SUSPENSION ORAL at 19:00

## 2022-01-01 RX ADMIN — APIXABAN 5 MILLIGRAM(S): 2.5 TABLET, FILM COATED ORAL at 05:34

## 2022-01-01 RX ADMIN — APIXABAN 5 MILLIGRAM(S): 2.5 TABLET, FILM COATED ORAL at 06:45

## 2022-01-01 RX ADMIN — HYDROMORPHONE HYDROCHLORIDE 1 MILLIGRAM(S): 2 INJECTION INTRAMUSCULAR; INTRAVENOUS; SUBCUTANEOUS at 12:05

## 2022-01-01 RX ADMIN — APIXABAN 2.5 MILLIGRAM(S): 2.5 TABLET, FILM COATED ORAL at 18:17

## 2022-01-01 RX ADMIN — Medication 1 APPLICATION(S): at 12:05

## 2022-01-01 RX ADMIN — Medication 9 UNIT(S): at 12:45

## 2022-01-01 RX ADMIN — SEVELAMER CARBONATE 800 MILLIGRAM(S): 2400 POWDER, FOR SUSPENSION ORAL at 17:45

## 2022-01-01 RX ADMIN — Medication 650 MILLIGRAM(S): at 19:05

## 2022-01-01 RX ADMIN — SEVELAMER CARBONATE 800 MILLIGRAM(S): 2400 POWDER, FOR SUSPENSION ORAL at 12:51

## 2022-01-01 RX ADMIN — APIXABAN 2.5 MILLIGRAM(S): 2.5 TABLET, FILM COATED ORAL at 05:49

## 2022-01-01 RX ADMIN — OXYCODONE HYDROCHLORIDE 10 MILLIGRAM(S): 5 TABLET ORAL at 09:30

## 2022-01-01 RX ADMIN — LIDOCAINE 1 PATCH: 4 CREAM TOPICAL at 17:34

## 2022-01-01 RX ADMIN — MONTELUKAST 10 MILLIGRAM(S): 4 TABLET, CHEWABLE ORAL at 22:36

## 2022-01-01 RX ADMIN — BUPROPION HYDROCHLORIDE 150 MILLIGRAM(S): 150 TABLET, EXTENDED RELEASE ORAL at 13:29

## 2022-01-01 RX ADMIN — Medication 6 MILLIGRAM(S): at 21:50

## 2022-01-01 RX ADMIN — CYCLOSPORINE 125 MILLIGRAM(S): 100 CAPSULE ORAL at 22:01

## 2022-01-01 RX ADMIN — Medication 1 APPLICATION(S): at 13:43

## 2022-01-01 RX ADMIN — Medication 81 MILLIGRAM(S): at 11:34

## 2022-01-01 RX ADMIN — CHLORHEXIDINE GLUCONATE 1 APPLICATION(S): 213 SOLUTION TOPICAL at 13:48

## 2022-01-01 RX ADMIN — LORATADINE 10 MILLIGRAM(S): 10 TABLET ORAL at 13:02

## 2022-01-01 RX ADMIN — CHLORHEXIDINE GLUCONATE 1 APPLICATION(S): 213 SOLUTION TOPICAL at 12:12

## 2022-01-01 RX ADMIN — Medication 1: at 12:59

## 2022-01-01 RX ADMIN — OXYCODONE HYDROCHLORIDE 7.5 MILLIGRAM(S): 5 TABLET ORAL at 11:14

## 2022-01-01 RX ADMIN — CINACALCET 60 MILLIGRAM(S): 30 TABLET, FILM COATED ORAL at 12:51

## 2022-01-01 RX ADMIN — ATORVASTATIN CALCIUM 80 MILLIGRAM(S): 80 TABLET, FILM COATED ORAL at 02:18

## 2022-01-01 RX ADMIN — SEVELAMER CARBONATE 800 MILLIGRAM(S): 2400 POWDER, FOR SUSPENSION ORAL at 09:23

## 2022-01-01 RX ADMIN — Medication 120 MILLIGRAM(S): at 06:24

## 2022-01-01 RX ADMIN — HYDROMORPHONE HYDROCHLORIDE 1 MILLIGRAM(S): 2 INJECTION INTRAMUSCULAR; INTRAVENOUS; SUBCUTANEOUS at 23:17

## 2022-01-01 RX ADMIN — OXYCODONE HYDROCHLORIDE 5 MILLIGRAM(S): 5 TABLET ORAL at 02:25

## 2022-01-01 RX ADMIN — SEVELAMER CARBONATE 800 MILLIGRAM(S): 2400 POWDER, FOR SUSPENSION ORAL at 18:08

## 2022-01-01 RX ADMIN — SEVELAMER CARBONATE 800 MILLIGRAM(S): 2400 POWDER, FOR SUSPENSION ORAL at 10:40

## 2022-01-01 RX ADMIN — Medication 1 TABLET(S): at 18:25

## 2022-01-01 RX ADMIN — Medication 100 MILLIGRAM(S): at 22:36

## 2022-01-01 RX ADMIN — Medication 100 MILLIGRAM(S): at 00:50

## 2022-01-01 RX ADMIN — INSULIN GLARGINE 10 UNIT(S): 100 INJECTION, SOLUTION SUBCUTANEOUS at 23:17

## 2022-01-01 RX ADMIN — MONTELUKAST 10 MILLIGRAM(S): 4 TABLET, CHEWABLE ORAL at 02:10

## 2022-01-01 RX ADMIN — SIMETHICONE 80 MILLIGRAM(S): 80 TABLET, CHEWABLE ORAL at 21:23

## 2022-01-01 RX ADMIN — Medication 1000 MILLIGRAM(S): at 06:20

## 2022-01-01 RX ADMIN — CYCLOSPORINE 150 MILLIGRAM(S): 100 CAPSULE ORAL at 06:54

## 2022-01-01 RX ADMIN — APIXABAN 2.5 MILLIGRAM(S): 2.5 TABLET, FILM COATED ORAL at 20:23

## 2022-01-01 RX ADMIN — OXYCODONE HYDROCHLORIDE 7.5 MILLIGRAM(S): 5 TABLET ORAL at 23:43

## 2022-01-01 RX ADMIN — Medication 25 GRAM(S): at 23:40

## 2022-01-01 RX ADMIN — OXYCODONE HYDROCHLORIDE 7.5 MILLIGRAM(S): 5 TABLET ORAL at 17:28

## 2022-01-01 RX ADMIN — CINACALCET 60 MILLIGRAM(S): 30 TABLET, FILM COATED ORAL at 13:03

## 2022-01-01 RX ADMIN — Medication 1 APPLICATION(S): at 12:46

## 2022-01-01 RX ADMIN — APIXABAN 2.5 MILLIGRAM(S): 2.5 TABLET, FILM COATED ORAL at 06:33

## 2022-01-01 RX ADMIN — PANTOPRAZOLE SODIUM 40 MILLIGRAM(S): 20 TABLET, DELAYED RELEASE ORAL at 05:29

## 2022-01-01 RX ADMIN — LIDOCAINE 1 PATCH: 4 CREAM TOPICAL at 19:53

## 2022-01-01 RX ADMIN — INSULIN GLARGINE 5 UNIT(S): 100 INJECTION, SOLUTION SUBCUTANEOUS at 23:23

## 2022-01-01 RX ADMIN — MIRTAZAPINE 7.5 MILLIGRAM(S): 45 TABLET, ORALLY DISINTEGRATING ORAL at 22:04

## 2022-01-01 RX ADMIN — Medication 1 APPLICATION(S): at 06:24

## 2022-01-01 RX ADMIN — APIXABAN 2.5 MILLIGRAM(S): 2.5 TABLET, FILM COATED ORAL at 06:43

## 2022-01-01 RX ADMIN — GABAPENTIN 300 MILLIGRAM(S): 400 CAPSULE ORAL at 16:17

## 2022-01-01 RX ADMIN — CHLORHEXIDINE GLUCONATE 1 APPLICATION(S): 213 SOLUTION TOPICAL at 12:41

## 2022-01-01 RX ADMIN — SEVELAMER CARBONATE 800 MILLIGRAM(S): 2400 POWDER, FOR SUSPENSION ORAL at 11:06

## 2022-01-01 RX ADMIN — CYCLOSPORINE 150 MILLIGRAM(S): 100 CAPSULE ORAL at 09:56

## 2022-01-01 RX ADMIN — OXYCODONE HYDROCHLORIDE 10 MILLIGRAM(S): 5 TABLET ORAL at 13:30

## 2022-01-01 RX ADMIN — Medication 1 TABLET(S): at 11:49

## 2022-01-01 RX ADMIN — OXYCODONE HYDROCHLORIDE 10 MILLIGRAM(S): 5 TABLET ORAL at 06:00

## 2022-01-01 RX ADMIN — SIMETHICONE 80 MILLIGRAM(S): 80 TABLET, CHEWABLE ORAL at 06:29

## 2022-01-01 RX ADMIN — MIRTAZAPINE 7.5 MILLIGRAM(S): 45 TABLET, ORALLY DISINTEGRATING ORAL at 21:36

## 2022-01-01 RX ADMIN — SEVELAMER CARBONATE 800 MILLIGRAM(S): 2400 POWDER, FOR SUSPENSION ORAL at 08:52

## 2022-01-01 RX ADMIN — CINACALCET 60 MILLIGRAM(S): 30 TABLET, FILM COATED ORAL at 22:17

## 2022-01-01 RX ADMIN — Medication 1 APPLICATION(S): at 21:53

## 2022-01-01 RX ADMIN — Medication 1 APPLICATION(S): at 12:51

## 2022-01-01 RX ADMIN — OXYCODONE HYDROCHLORIDE 10 MILLIGRAM(S): 5 TABLET ORAL at 13:27

## 2022-01-01 RX ADMIN — LIDOCAINE 1 PATCH: 4 CREAM TOPICAL at 19:15

## 2022-01-01 RX ADMIN — Medication 1 APPLICATION(S): at 17:18

## 2022-01-01 RX ADMIN — DOXERCALCIFEROL 5 MICROGRAM(S): 2.5 CAPSULE ORAL at 17:35

## 2022-01-01 RX ADMIN — MIRTAZAPINE 7.5 MILLIGRAM(S): 45 TABLET, ORALLY DISINTEGRATING ORAL at 22:18

## 2022-01-01 RX ADMIN — Medication 1: at 09:31

## 2022-01-01 RX ADMIN — Medication 1: at 18:30

## 2022-01-01 RX ADMIN — Medication 3 UNIT(S): at 12:40

## 2022-01-01 RX ADMIN — OXYCODONE HYDROCHLORIDE 10 MILLIGRAM(S): 5 TABLET ORAL at 16:50

## 2022-01-01 RX ADMIN — SERTRALINE 50 MILLIGRAM(S): 25 TABLET, FILM COATED ORAL at 12:59

## 2022-01-01 RX ADMIN — MORPHINE SULFATE 4 MILLIGRAM(S): 50 CAPSULE, EXTENDED RELEASE ORAL at 01:49

## 2022-01-01 RX ADMIN — Medication 1 APPLICATION(S): at 20:53

## 2022-01-01 RX ADMIN — Medication 10 UNIT(S): at 17:37

## 2022-01-01 RX ADMIN — ONDANSETRON 4 MILLIGRAM(S): 8 TABLET, FILM COATED ORAL at 12:44

## 2022-01-01 RX ADMIN — BUPROPION HYDROCHLORIDE 150 MILLIGRAM(S): 150 TABLET, EXTENDED RELEASE ORAL at 13:02

## 2022-01-01 RX ADMIN — PROPOFOL 6 MICROGRAM(S)/KG/MIN: 10 INJECTION, EMULSION INTRAVENOUS at 21:30

## 2022-01-01 RX ADMIN — MIRTAZAPINE 7.5 MILLIGRAM(S): 45 TABLET, ORALLY DISINTEGRATING ORAL at 03:30

## 2022-01-01 RX ADMIN — POLYETHYLENE GLYCOL 3350 17 GRAM(S): 17 POWDER, FOR SOLUTION ORAL at 05:11

## 2022-01-01 RX ADMIN — Medication 100 MILLIGRAM(S): at 21:25

## 2022-01-01 RX ADMIN — Medication 100 MILLIGRAM(S): at 23:07

## 2022-01-01 RX ADMIN — Medication 100 MILLIGRAM(S): at 22:32

## 2022-01-01 RX ADMIN — GABAPENTIN 300 MILLIGRAM(S): 400 CAPSULE ORAL at 16:04

## 2022-01-01 RX ADMIN — OXYCODONE HYDROCHLORIDE 10 MILLIGRAM(S): 5 TABLET ORAL at 22:08

## 2022-01-01 RX ADMIN — LORATADINE 10 MILLIGRAM(S): 10 TABLET ORAL at 12:14

## 2022-01-01 RX ADMIN — Medication 1 APPLICATION(S): at 13:39

## 2022-01-01 RX ADMIN — Medication 1 APPLICATION(S): at 12:14

## 2022-01-01 RX ADMIN — SIMETHICONE 80 MILLIGRAM(S): 80 TABLET, CHEWABLE ORAL at 21:54

## 2022-01-01 RX ADMIN — Medication 6 MILLIGRAM(S): at 23:11

## 2022-01-01 RX ADMIN — Medication 120 MILLIGRAM(S): at 06:45

## 2022-01-01 RX ADMIN — Medication 2: at 09:31

## 2022-01-01 RX ADMIN — APIXABAN 2.5 MILLIGRAM(S): 2.5 TABLET, FILM COATED ORAL at 06:20

## 2022-01-01 RX ADMIN — BUPROPION HYDROCHLORIDE 150 MILLIGRAM(S): 150 TABLET, EXTENDED RELEASE ORAL at 12:41

## 2022-01-01 RX ADMIN — Medication 25 GRAM(S): at 01:01

## 2022-01-01 RX ADMIN — LIDOCAINE 1 PATCH: 4 CREAM TOPICAL at 14:23

## 2022-01-01 RX ADMIN — POLYETHYLENE GLYCOL 3350 17 GRAM(S): 17 POWDER, FOR SOLUTION ORAL at 21:40

## 2022-01-01 RX ADMIN — Medication 5 UNIT(S): at 16:38

## 2022-01-01 RX ADMIN — LIDOCAINE 1 PATCH: 4 CREAM TOPICAL at 21:22

## 2022-01-01 RX ADMIN — LIDOCAINE 1 PATCH: 4 CREAM TOPICAL at 23:40

## 2022-01-01 RX ADMIN — Medication 120 MILLIGRAM(S): at 05:19

## 2022-01-01 RX ADMIN — Medication 1 APPLICATION(S): at 13:03

## 2022-01-01 RX ADMIN — SENNA PLUS 2 TABLET(S): 8.6 TABLET ORAL at 23:09

## 2022-01-01 RX ADMIN — MIRTAZAPINE 7.5 MILLIGRAM(S): 45 TABLET, ORALLY DISINTEGRATING ORAL at 21:41

## 2022-01-01 RX ADMIN — SEVELAMER CARBONATE 800 MILLIGRAM(S): 2400 POWDER, FOR SUSPENSION ORAL at 09:19

## 2022-01-01 RX ADMIN — Medication 6 MILLIGRAM(S): at 21:58

## 2022-01-01 RX ADMIN — Medication 10 UNIT(S): at 12:47

## 2022-01-01 RX ADMIN — LIDOCAINE 1 PATCH: 4 CREAM TOPICAL at 16:37

## 2022-01-01 RX ADMIN — CYCLOSPORINE 125 MILLIGRAM(S): 100 CAPSULE ORAL at 10:25

## 2022-01-01 RX ADMIN — OXYCODONE HYDROCHLORIDE 10 MILLIGRAM(S): 5 TABLET ORAL at 03:48

## 2022-01-01 RX ADMIN — MIRTAZAPINE 7.5 MILLIGRAM(S): 45 TABLET, ORALLY DISINTEGRATING ORAL at 22:03

## 2022-01-01 RX ADMIN — LIDOCAINE 1 PATCH: 4 CREAM TOPICAL at 12:43

## 2022-01-01 RX ADMIN — OXYCODONE HYDROCHLORIDE 10 MILLIGRAM(S): 5 TABLET ORAL at 06:45

## 2022-01-01 RX ADMIN — MIRTAZAPINE 7.5 MILLIGRAM(S): 45 TABLET, ORALLY DISINTEGRATING ORAL at 23:09

## 2022-01-01 RX ADMIN — OXYCODONE HYDROCHLORIDE 10 MILLIGRAM(S): 5 TABLET ORAL at 06:21

## 2022-01-01 RX ADMIN — CHLORHEXIDINE GLUCONATE 1 APPLICATION(S): 213 SOLUTION TOPICAL at 13:03

## 2022-01-01 RX ADMIN — PANTOPRAZOLE SODIUM 40 MILLIGRAM(S): 20 TABLET, DELAYED RELEASE ORAL at 06:12

## 2022-01-01 RX ADMIN — POLYETHYLENE GLYCOL 3350 17 GRAM(S): 17 POWDER, FOR SOLUTION ORAL at 22:42

## 2022-01-01 RX ADMIN — SERTRALINE 50 MILLIGRAM(S): 25 TABLET, FILM COATED ORAL at 11:11

## 2022-01-01 RX ADMIN — Medication 1 APPLICATION(S): at 06:03

## 2022-01-01 RX ADMIN — Medication 2 UNIT(S): at 12:59

## 2022-01-01 RX ADMIN — OXYCODONE HYDROCHLORIDE 10 MILLIGRAM(S): 5 TABLET ORAL at 02:20

## 2022-01-01 RX ADMIN — SEVELAMER CARBONATE 800 MILLIGRAM(S): 2400 POWDER, FOR SUSPENSION ORAL at 17:56

## 2022-01-01 RX ADMIN — PANTOPRAZOLE SODIUM 40 MILLIGRAM(S): 20 TABLET, DELAYED RELEASE ORAL at 06:24

## 2022-01-01 RX ADMIN — SEVELAMER CARBONATE 800 MILLIGRAM(S): 2400 POWDER, FOR SUSPENSION ORAL at 17:35

## 2022-01-01 RX ADMIN — Medication 1: at 16:37

## 2022-01-01 RX ADMIN — Medication 30 MILLILITER(S): at 18:04

## 2022-01-01 RX ADMIN — SEVELAMER CARBONATE 800 MILLIGRAM(S): 2400 POWDER, FOR SUSPENSION ORAL at 18:17

## 2022-01-01 RX ADMIN — LIDOCAINE 1 PATCH: 4 CREAM TOPICAL at 19:16

## 2022-01-01 RX ADMIN — LIDOCAINE 1 PATCH: 4 CREAM TOPICAL at 13:02

## 2022-01-01 RX ADMIN — OXYCODONE HYDROCHLORIDE 10 MILLIGRAM(S): 5 TABLET ORAL at 09:03

## 2022-01-01 RX ADMIN — OXYCODONE HYDROCHLORIDE 7.5 MILLIGRAM(S): 5 TABLET ORAL at 10:34

## 2022-01-01 RX ADMIN — Medication 100 MILLIGRAM(S): at 23:04

## 2022-01-01 RX ADMIN — POLYETHYLENE GLYCOL 3350 17 GRAM(S): 17 POWDER, FOR SOLUTION ORAL at 22:55

## 2022-01-01 RX ADMIN — PANTOPRAZOLE SODIUM 40 MILLIGRAM(S): 20 TABLET, DELAYED RELEASE ORAL at 06:42

## 2022-01-01 RX ADMIN — OXYCODONE HYDROCHLORIDE 10 MILLIGRAM(S): 5 TABLET ORAL at 22:39

## 2022-01-01 RX ADMIN — SERTRALINE 50 MILLIGRAM(S): 25 TABLET, FILM COATED ORAL at 11:41

## 2022-01-01 RX ADMIN — APIXABAN 2.5 MILLIGRAM(S): 2.5 TABLET, FILM COATED ORAL at 18:08

## 2022-01-01 RX ADMIN — Medication 100 MILLIGRAM(S): at 21:42

## 2022-01-01 RX ADMIN — BUPROPION HYDROCHLORIDE 150 MILLIGRAM(S): 150 TABLET, EXTENDED RELEASE ORAL at 17:47

## 2022-01-01 RX ADMIN — INSULIN GLARGINE 7 UNIT(S): 100 INJECTION, SOLUTION SUBCUTANEOUS at 22:27

## 2022-01-01 RX ADMIN — CINACALCET 30 MILLIGRAM(S): 30 TABLET, FILM COATED ORAL at 11:40

## 2022-01-01 RX ADMIN — HEPARIN SODIUM 7500 UNIT(S): 5000 INJECTION INTRAVENOUS; SUBCUTANEOUS at 06:07

## 2022-01-01 RX ADMIN — SEVELAMER CARBONATE 800 MILLIGRAM(S): 2400 POWDER, FOR SUSPENSION ORAL at 18:19

## 2022-01-01 RX ADMIN — CYCLOSPORINE 200 MILLIGRAM(S): 100 CAPSULE ORAL at 22:07

## 2022-01-01 RX ADMIN — ONDANSETRON 4 MILLIGRAM(S): 8 TABLET, FILM COATED ORAL at 01:29

## 2022-01-01 RX ADMIN — Medication 1 APPLICATION(S): at 13:40

## 2022-01-01 RX ADMIN — SEVELAMER CARBONATE 800 MILLIGRAM(S): 2400 POWDER, FOR SUSPENSION ORAL at 13:02

## 2022-01-01 RX ADMIN — Medication 3 MILLIGRAM(S): at 03:13

## 2022-01-01 RX ADMIN — Medication 1 APPLICATION(S): at 03:49

## 2022-01-01 RX ADMIN — SIMETHICONE 80 MILLIGRAM(S): 80 TABLET, CHEWABLE ORAL at 06:03

## 2022-01-01 RX ADMIN — Medication 120 MILLIGRAM(S): at 03:50

## 2022-01-01 RX ADMIN — Medication 1 APPLICATION(S): at 12:08

## 2022-01-01 RX ADMIN — OXYCODONE HYDROCHLORIDE 7.5 MILLIGRAM(S): 5 TABLET ORAL at 12:16

## 2022-01-01 RX ADMIN — CYCLOSPORINE 125 MILLIGRAM(S): 100 CAPSULE ORAL at 10:31

## 2022-01-01 RX ADMIN — MONTELUKAST 10 MILLIGRAM(S): 4 TABLET, CHEWABLE ORAL at 21:25

## 2022-01-01 RX ADMIN — APIXABAN 5 MILLIGRAM(S): 2.5 TABLET, FILM COATED ORAL at 05:45

## 2022-01-01 RX ADMIN — LORATADINE 10 MILLIGRAM(S): 10 TABLET ORAL at 16:03

## 2022-01-01 RX ADMIN — OXYCODONE HYDROCHLORIDE 10 MILLIGRAM(S): 5 TABLET ORAL at 05:02

## 2022-01-01 RX ADMIN — Medication 1 APPLICATION(S): at 06:43

## 2022-01-01 RX ADMIN — GABAPENTIN 300 MILLIGRAM(S): 400 CAPSULE ORAL at 12:50

## 2022-01-01 RX ADMIN — LIDOCAINE 1 PATCH: 4 CREAM TOPICAL at 19:20

## 2022-01-01 RX ADMIN — HEPARIN SODIUM 500 UNIT(S): 5000 INJECTION INTRAVENOUS; SUBCUTANEOUS at 21:10

## 2022-01-01 RX ADMIN — Medication 1 APPLICATION(S): at 18:54

## 2022-01-01 RX ADMIN — BUPROPION HYDROCHLORIDE 150 MILLIGRAM(S): 150 TABLET, EXTENDED RELEASE ORAL at 14:14

## 2022-01-01 RX ADMIN — Medication 5 MILLIGRAM(S): at 21:34

## 2022-01-01 RX ADMIN — SEVELAMER CARBONATE 800 MILLIGRAM(S): 2400 POWDER, FOR SUSPENSION ORAL at 13:16

## 2022-01-01 RX ADMIN — Medication 30 MILLILITER(S): at 13:24

## 2022-01-01 RX ADMIN — APIXABAN 2.5 MILLIGRAM(S): 2.5 TABLET, FILM COATED ORAL at 17:38

## 2022-01-01 RX ADMIN — APIXABAN 2.5 MILLIGRAM(S): 2.5 TABLET, FILM COATED ORAL at 17:50

## 2022-01-01 RX ADMIN — MONTELUKAST 10 MILLIGRAM(S): 4 TABLET, CHEWABLE ORAL at 22:18

## 2022-01-01 RX ADMIN — LIDOCAINE 1 PATCH: 4 CREAM TOPICAL at 15:19

## 2022-01-01 RX ADMIN — OXYCODONE HYDROCHLORIDE 10 MILLIGRAM(S): 5 TABLET ORAL at 23:15

## 2022-01-01 RX ADMIN — SENNA PLUS 2 TABLET(S): 8.6 TABLET ORAL at 01:24

## 2022-01-01 RX ADMIN — LIDOCAINE 1 PATCH: 4 CREAM TOPICAL at 11:44

## 2022-01-01 RX ADMIN — PANTOPRAZOLE SODIUM 40 MILLIGRAM(S): 20 TABLET, DELAYED RELEASE ORAL at 05:31

## 2022-01-01 RX ADMIN — MIRTAZAPINE 7.5 MILLIGRAM(S): 45 TABLET, ORALLY DISINTEGRATING ORAL at 22:08

## 2022-01-01 RX ADMIN — CINACALCET 30 MILLIGRAM(S): 30 TABLET, FILM COATED ORAL at 13:15

## 2022-01-01 RX ADMIN — POLYETHYLENE GLYCOL 3350 17 GRAM(S): 17 POWDER, FOR SOLUTION ORAL at 21:47

## 2022-01-01 RX ADMIN — CINACALCET 60 MILLIGRAM(S): 30 TABLET, FILM COATED ORAL at 12:13

## 2022-01-01 RX ADMIN — OXYCODONE HYDROCHLORIDE 5 MILLIGRAM(S): 5 TABLET ORAL at 04:25

## 2022-01-01 RX ADMIN — Medication 81 MILLIGRAM(S): at 12:23

## 2022-01-01 RX ADMIN — SIMETHICONE 80 MILLIGRAM(S): 80 TABLET, CHEWABLE ORAL at 21:57

## 2022-01-01 RX ADMIN — APIXABAN 5 MILLIGRAM(S): 2.5 TABLET, FILM COATED ORAL at 18:10

## 2022-01-01 RX ADMIN — SERTRALINE 50 MILLIGRAM(S): 25 TABLET, FILM COATED ORAL at 13:36

## 2022-01-01 RX ADMIN — LIDOCAINE 1 PATCH: 4 CREAM TOPICAL at 13:47

## 2022-01-01 RX ADMIN — CYCLOSPORINE 200 MILLIGRAM(S): 100 CAPSULE ORAL at 21:56

## 2022-01-01 RX ADMIN — POLYETHYLENE GLYCOL 3350 17 GRAM(S): 17 POWDER, FOR SOLUTION ORAL at 01:26

## 2022-01-01 RX ADMIN — OXYCODONE HYDROCHLORIDE 10 MILLIGRAM(S): 5 TABLET ORAL at 23:00

## 2022-01-01 RX ADMIN — OXYCODONE HYDROCHLORIDE 7.5 MILLIGRAM(S): 5 TABLET ORAL at 02:08

## 2022-01-01 RX ADMIN — CHLORHEXIDINE GLUCONATE 1 APPLICATION(S): 213 SOLUTION TOPICAL at 11:59

## 2022-01-01 RX ADMIN — Medication 1: at 09:01

## 2022-01-01 RX ADMIN — Medication 1 APPLICATION(S): at 21:08

## 2022-01-01 RX ADMIN — SEVELAMER CARBONATE 800 MILLIGRAM(S): 2400 POWDER, FOR SUSPENSION ORAL at 08:54

## 2022-01-01 RX ADMIN — LIDOCAINE 1 PATCH: 4 CREAM TOPICAL at 02:33

## 2022-01-01 RX ADMIN — Medication 1: at 18:16

## 2022-01-01 RX ADMIN — GABAPENTIN 300 MILLIGRAM(S): 400 CAPSULE ORAL at 05:18

## 2022-01-01 RX ADMIN — Medication 100 MILLIGRAM(S): at 22:00

## 2022-01-01 RX ADMIN — Medication 3 MILLIGRAM(S): at 23:09

## 2022-01-01 RX ADMIN — SEVELAMER CARBONATE 800 MILLIGRAM(S): 2400 POWDER, FOR SUSPENSION ORAL at 12:48

## 2022-01-01 RX ADMIN — LORATADINE 10 MILLIGRAM(S): 10 TABLET ORAL at 11:40

## 2022-01-01 RX ADMIN — CHLORHEXIDINE GLUCONATE 1 APPLICATION(S): 213 SOLUTION TOPICAL at 12:39

## 2022-01-01 RX ADMIN — PANTOPRAZOLE SODIUM 40 MILLIGRAM(S): 20 TABLET, DELAYED RELEASE ORAL at 08:30

## 2022-01-01 RX ADMIN — LIDOCAINE 1 PATCH: 4 CREAM TOPICAL at 13:36

## 2022-01-01 RX ADMIN — MONTELUKAST 10 MILLIGRAM(S): 4 TABLET, CHEWABLE ORAL at 02:18

## 2022-01-01 RX ADMIN — APIXABAN 5 MILLIGRAM(S): 2.5 TABLET, FILM COATED ORAL at 06:04

## 2022-01-01 RX ADMIN — Medication 1: at 17:48

## 2022-01-01 RX ADMIN — SERTRALINE 50 MILLIGRAM(S): 25 TABLET, FILM COATED ORAL at 12:10

## 2022-01-01 RX ADMIN — PANTOPRAZOLE SODIUM 40 MILLIGRAM(S): 20 TABLET, DELAYED RELEASE ORAL at 17:34

## 2022-01-01 RX ADMIN — Medication 1 APPLICATION(S): at 06:23

## 2022-01-01 RX ADMIN — INSULIN GLARGINE 10 UNIT(S): 100 INJECTION, SOLUTION SUBCUTANEOUS at 21:55

## 2022-01-01 RX ADMIN — BUPROPION HYDROCHLORIDE 150 MILLIGRAM(S): 150 TABLET, EXTENDED RELEASE ORAL at 12:39

## 2022-01-01 RX ADMIN — BUPROPION HYDROCHLORIDE 150 MILLIGRAM(S): 150 TABLET, EXTENDED RELEASE ORAL at 12:16

## 2022-01-01 RX ADMIN — POLYETHYLENE GLYCOL 3350 17 GRAM(S): 17 POWDER, FOR SOLUTION ORAL at 06:23

## 2022-01-01 RX ADMIN — CINACALCET 60 MILLIGRAM(S): 30 TABLET, FILM COATED ORAL at 12:47

## 2022-01-01 RX ADMIN — Medication 30 MILLIGRAM(S): at 02:18

## 2022-01-01 RX ADMIN — Medication 650 MILLIGRAM(S): at 13:02

## 2022-01-01 RX ADMIN — BUPROPION HYDROCHLORIDE 150 MILLIGRAM(S): 150 TABLET, EXTENDED RELEASE ORAL at 13:49

## 2022-01-01 RX ADMIN — APIXABAN 2.5 MILLIGRAM(S): 2.5 TABLET, FILM COATED ORAL at 18:39

## 2022-01-01 RX ADMIN — GABAPENTIN 300 MILLIGRAM(S): 400 CAPSULE ORAL at 13:02

## 2022-01-01 RX ADMIN — OXYCODONE HYDROCHLORIDE 5 MILLIGRAM(S): 5 TABLET ORAL at 10:17

## 2022-01-01 RX ADMIN — SEVELAMER CARBONATE 800 MILLIGRAM(S): 2400 POWDER, FOR SUSPENSION ORAL at 11:41

## 2022-01-01 RX ADMIN — SEVELAMER CARBONATE 800 MILLIGRAM(S): 2400 POWDER, FOR SUSPENSION ORAL at 08:33

## 2022-01-01 RX ADMIN — APIXABAN 5 MILLIGRAM(S): 2.5 TABLET, FILM COATED ORAL at 19:30

## 2022-01-01 RX ADMIN — Medication 25 MILLIGRAM(S): at 21:10

## 2022-01-01 RX ADMIN — SEVELAMER CARBONATE 800 MILLIGRAM(S): 2400 POWDER, FOR SUSPENSION ORAL at 13:06

## 2022-01-01 RX ADMIN — OXYCODONE HYDROCHLORIDE 10 MILLIGRAM(S): 5 TABLET ORAL at 20:25

## 2022-01-01 RX ADMIN — HEPARIN SODIUM 5000 UNIT(S): 5000 INJECTION INTRAVENOUS; SUBCUTANEOUS at 20:58

## 2022-01-01 RX ADMIN — HYDROMORPHONE HYDROCHLORIDE 1 MILLIGRAM(S): 2 INJECTION INTRAMUSCULAR; INTRAVENOUS; SUBCUTANEOUS at 06:15

## 2022-01-01 RX ADMIN — PANTOPRAZOLE SODIUM 40 MILLIGRAM(S): 20 TABLET, DELAYED RELEASE ORAL at 17:03

## 2022-01-01 RX ADMIN — BUPROPION HYDROCHLORIDE 150 MILLIGRAM(S): 150 TABLET, EXTENDED RELEASE ORAL at 13:05

## 2022-01-01 RX ADMIN — APIXABAN 2.5 MILLIGRAM(S): 2.5 TABLET, FILM COATED ORAL at 06:11

## 2022-01-01 RX ADMIN — Medication 100 MILLIGRAM(S): at 22:15

## 2022-01-01 RX ADMIN — OXYCODONE HYDROCHLORIDE 10 MILLIGRAM(S): 5 TABLET ORAL at 05:35

## 2022-01-01 RX ADMIN — INSULIN GLARGINE 7 UNIT(S): 100 INJECTION, SOLUTION SUBCUTANEOUS at 21:43

## 2022-01-01 RX ADMIN — CINACALCET 30 MILLIGRAM(S): 30 TABLET, FILM COATED ORAL at 12:16

## 2022-01-01 RX ADMIN — CHLORHEXIDINE GLUCONATE 1 APPLICATION(S): 213 SOLUTION TOPICAL at 12:31

## 2022-01-01 RX ADMIN — APIXABAN 2.5 MILLIGRAM(S): 2.5 TABLET, FILM COATED ORAL at 17:35

## 2022-01-01 RX ADMIN — Medication 650 MILLIGRAM(S): at 05:25

## 2022-01-01 RX ADMIN — SEVELAMER CARBONATE 800 MILLIGRAM(S): 2400 POWDER, FOR SUSPENSION ORAL at 09:41

## 2022-01-01 RX ADMIN — LORATADINE 10 MILLIGRAM(S): 10 TABLET ORAL at 11:32

## 2022-01-01 RX ADMIN — APIXABAN 2.5 MILLIGRAM(S): 2.5 TABLET, FILM COATED ORAL at 05:59

## 2022-01-01 RX ADMIN — Medication 1 PACKET(S): at 17:02

## 2022-01-01 RX ADMIN — BUPROPION HYDROCHLORIDE 150 MILLIGRAM(S): 150 TABLET, EXTENDED RELEASE ORAL at 11:11

## 2022-01-01 RX ADMIN — Medication 1 APPLICATION(S): at 12:19

## 2022-01-01 RX ADMIN — Medication 11 UNIT(S): at 12:59

## 2022-01-01 RX ADMIN — Medication 5 MILLIGRAM(S): at 21:18

## 2022-01-01 RX ADMIN — HYDROMORPHONE HYDROCHLORIDE 1 MILLIGRAM(S): 2 INJECTION INTRAMUSCULAR; INTRAVENOUS; SUBCUTANEOUS at 21:50

## 2022-01-01 RX ADMIN — CYCLOSPORINE 200 MILLIGRAM(S): 100 CAPSULE ORAL at 09:21

## 2022-01-01 RX ADMIN — BUPROPION HYDROCHLORIDE 150 MILLIGRAM(S): 150 TABLET, EXTENDED RELEASE ORAL at 12:20

## 2022-01-01 RX ADMIN — LIDOCAINE 1 PATCH: 4 CREAM TOPICAL at 18:13

## 2022-01-01 RX ADMIN — LIDOCAINE 1 PATCH: 4 CREAM TOPICAL at 19:45

## 2022-01-01 RX ADMIN — HYDROMORPHONE HYDROCHLORIDE 1 MILLIGRAM(S): 2 INJECTION INTRAMUSCULAR; INTRAVENOUS; SUBCUTANEOUS at 13:45

## 2022-01-01 RX ADMIN — SERTRALINE 50 MILLIGRAM(S): 25 TABLET, FILM COATED ORAL at 11:37

## 2022-01-01 RX ADMIN — GABAPENTIN 300 MILLIGRAM(S): 400 CAPSULE ORAL at 05:23

## 2022-01-01 RX ADMIN — INSULIN GLARGINE 4 UNIT(S): 100 INJECTION, SOLUTION SUBCUTANEOUS at 21:28

## 2022-01-01 RX ADMIN — Medication 1 APPLICATION(S): at 05:44

## 2022-01-01 RX ADMIN — MONTELUKAST 10 MILLIGRAM(S): 4 TABLET, CHEWABLE ORAL at 21:42

## 2022-01-01 RX ADMIN — Medication 3: at 13:08

## 2022-01-01 RX ADMIN — APIXABAN 2.5 MILLIGRAM(S): 2.5 TABLET, FILM COATED ORAL at 17:29

## 2022-01-01 RX ADMIN — Medication 2 UNIT(S): at 17:56

## 2022-01-01 RX ADMIN — OXYCODONE HYDROCHLORIDE 10 MILLIGRAM(S): 5 TABLET ORAL at 23:41

## 2022-01-01 RX ADMIN — GABAPENTIN 300 MILLIGRAM(S): 400 CAPSULE ORAL at 12:58

## 2022-01-01 RX ADMIN — Medication 650 MILLIGRAM(S): at 22:14

## 2022-01-01 RX ADMIN — OXYCODONE HYDROCHLORIDE 7.5 MILLIGRAM(S): 5 TABLET ORAL at 17:30

## 2022-01-01 RX ADMIN — BUPROPION HYDROCHLORIDE 150 MILLIGRAM(S): 150 TABLET, EXTENDED RELEASE ORAL at 12:19

## 2022-01-01 RX ADMIN — CEFEPIME 100 MILLIGRAM(S): 1 INJECTION, POWDER, FOR SOLUTION INTRAMUSCULAR; INTRAVENOUS at 05:02

## 2022-01-01 RX ADMIN — GABAPENTIN 300 MILLIGRAM(S): 400 CAPSULE ORAL at 11:46

## 2022-01-01 RX ADMIN — LIDOCAINE 1 PATCH: 4 CREAM TOPICAL at 18:55

## 2022-01-01 RX ADMIN — HYDROMORPHONE HYDROCHLORIDE 1 MILLIGRAM(S): 2 INJECTION INTRAMUSCULAR; INTRAVENOUS; SUBCUTANEOUS at 15:35

## 2022-01-01 RX ADMIN — MIRTAZAPINE 7.5 MILLIGRAM(S): 45 TABLET, ORALLY DISINTEGRATING ORAL at 21:43

## 2022-01-01 RX ADMIN — Medication 1 APPLICATION(S): at 12:54

## 2022-01-01 RX ADMIN — PANTOPRAZOLE SODIUM 40 MILLIGRAM(S): 20 TABLET, DELAYED RELEASE ORAL at 05:23

## 2022-01-01 RX ADMIN — Medication 1 APPLICATION(S): at 05:04

## 2022-01-01 RX ADMIN — LIDOCAINE 1 PATCH: 4 CREAM TOPICAL at 01:20

## 2022-01-01 RX ADMIN — SENNA PLUS 2 TABLET(S): 8.6 TABLET ORAL at 21:06

## 2022-01-01 RX ADMIN — MONTELUKAST 10 MILLIGRAM(S): 4 TABLET, CHEWABLE ORAL at 21:40

## 2022-01-01 RX ADMIN — OXYCODONE HYDROCHLORIDE 10 MILLIGRAM(S): 5 TABLET ORAL at 06:34

## 2022-01-01 RX ADMIN — MONTELUKAST 10 MILLIGRAM(S): 4 TABLET, CHEWABLE ORAL at 23:02

## 2022-01-01 RX ADMIN — APIXABAN 2.5 MILLIGRAM(S): 2.5 TABLET, FILM COATED ORAL at 06:15

## 2022-01-01 RX ADMIN — SEVELAMER CARBONATE 800 MILLIGRAM(S): 2400 POWDER, FOR SUSPENSION ORAL at 12:59

## 2022-01-01 RX ADMIN — OXYCODONE HYDROCHLORIDE 10 MILLIGRAM(S): 5 TABLET ORAL at 18:00

## 2022-01-01 RX ADMIN — OXYCODONE HYDROCHLORIDE 7.5 MILLIGRAM(S): 5 TABLET ORAL at 22:59

## 2022-01-01 RX ADMIN — Medication 11 UNIT(S): at 17:40

## 2022-01-01 RX ADMIN — HEPARIN SODIUM 19 UNIT(S)/HR: 5000 INJECTION INTRAVENOUS; SUBCUTANEOUS at 07:29

## 2022-01-01 RX ADMIN — SEVELAMER CARBONATE 800 MILLIGRAM(S): 2400 POWDER, FOR SUSPENSION ORAL at 12:50

## 2022-01-01 RX ADMIN — HYDROMORPHONE HYDROCHLORIDE 1 MILLIGRAM(S): 2 INJECTION INTRAMUSCULAR; INTRAVENOUS; SUBCUTANEOUS at 14:15

## 2022-01-01 RX ADMIN — CHLORHEXIDINE GLUCONATE 1 APPLICATION(S): 213 SOLUTION TOPICAL at 13:27

## 2022-01-01 RX ADMIN — BUPROPION HYDROCHLORIDE 150 MILLIGRAM(S): 150 TABLET, EXTENDED RELEASE ORAL at 12:28

## 2022-01-01 RX ADMIN — LIDOCAINE 1 PATCH: 4 CREAM TOPICAL at 12:47

## 2022-01-01 RX ADMIN — CHLORHEXIDINE GLUCONATE 1 APPLICATION(S): 213 SOLUTION TOPICAL at 14:09

## 2022-01-01 RX ADMIN — LIDOCAINE 1 PATCH: 4 CREAM TOPICAL at 13:10

## 2022-01-01 RX ADMIN — CINACALCET 30 MILLIGRAM(S): 30 TABLET, FILM COATED ORAL at 12:26

## 2022-01-01 RX ADMIN — MIDODRINE HYDROCHLORIDE 10 MILLIGRAM(S): 2.5 TABLET ORAL at 05:31

## 2022-01-01 RX ADMIN — Medication 133 MILLILITER(S): at 15:04

## 2022-01-01 RX ADMIN — LORATADINE 10 MILLIGRAM(S): 10 TABLET ORAL at 11:45

## 2022-01-01 RX ADMIN — INSULIN GLARGINE 12 UNIT(S): 100 INJECTION, SOLUTION SUBCUTANEOUS at 21:53

## 2022-01-01 RX ADMIN — SEVELAMER CARBONATE 800 MILLIGRAM(S): 2400 POWDER, FOR SUSPENSION ORAL at 08:47

## 2022-01-01 RX ADMIN — CYCLOSPORINE 200 MILLIGRAM(S): 100 CAPSULE ORAL at 10:09

## 2022-01-01 RX ADMIN — Medication 1: at 09:52

## 2022-01-01 RX ADMIN — Medication 81 MILLIGRAM(S): at 12:45

## 2022-01-01 RX ADMIN — SENNA PLUS 2 TABLET(S): 8.6 TABLET ORAL at 22:18

## 2022-01-01 RX ADMIN — SEVELAMER CARBONATE 800 MILLIGRAM(S): 2400 POWDER, FOR SUSPENSION ORAL at 18:03

## 2022-01-01 RX ADMIN — Medication 5 MG/HR: at 20:23

## 2022-01-01 RX ADMIN — LIDOCAINE 1 PATCH: 4 CREAM TOPICAL at 01:17

## 2022-01-01 RX ADMIN — MONTELUKAST 10 MILLIGRAM(S): 4 TABLET, CHEWABLE ORAL at 22:02

## 2022-01-01 RX ADMIN — CYCLOSPORINE 200 MILLIGRAM(S): 100 CAPSULE ORAL at 10:28

## 2022-01-01 RX ADMIN — SEVELAMER CARBONATE 800 MILLIGRAM(S): 2400 POWDER, FOR SUSPENSION ORAL at 09:07

## 2022-01-01 RX ADMIN — SEVELAMER CARBONATE 1600 MILLIGRAM(S): 2400 POWDER, FOR SUSPENSION ORAL at 09:26

## 2022-01-01 RX ADMIN — CEFEPIME 100 MILLIGRAM(S): 1 INJECTION, POWDER, FOR SOLUTION INTRAMUSCULAR; INTRAVENOUS at 04:32

## 2022-01-01 RX ADMIN — OXYCODONE HYDROCHLORIDE 5 MILLIGRAM(S): 5 TABLET ORAL at 21:58

## 2022-01-01 RX ADMIN — OXYCODONE HYDROCHLORIDE 7.5 MILLIGRAM(S): 5 TABLET ORAL at 14:36

## 2022-01-01 RX ADMIN — CHLORHEXIDINE GLUCONATE 1 APPLICATION(S): 213 SOLUTION TOPICAL at 18:27

## 2022-01-01 RX ADMIN — LIDOCAINE 1 PATCH: 4 CREAM TOPICAL at 19:30

## 2022-01-01 RX ADMIN — HYDROMORPHONE HYDROCHLORIDE 1 MILLIGRAM(S): 2 INJECTION INTRAMUSCULAR; INTRAVENOUS; SUBCUTANEOUS at 03:45

## 2022-01-01 RX ADMIN — CHLORHEXIDINE GLUCONATE 1 APPLICATION(S): 213 SOLUTION TOPICAL at 12:53

## 2022-01-01 RX ADMIN — POLYETHYLENE GLYCOL 3350 17 GRAM(S): 17 POWDER, FOR SOLUTION ORAL at 12:24

## 2022-01-01 RX ADMIN — Medication 81 MILLIGRAM(S): at 11:21

## 2022-01-01 RX ADMIN — Medication 400 MILLIGRAM(S): at 18:10

## 2022-01-01 RX ADMIN — Medication 0: at 22:49

## 2022-01-01 RX ADMIN — Medication 6 MILLIGRAM(S): at 21:42

## 2022-01-01 RX ADMIN — OXYCODONE HYDROCHLORIDE 7.5 MILLIGRAM(S): 5 TABLET ORAL at 11:33

## 2022-01-01 RX ADMIN — HEPARIN SODIUM 21 UNIT(S)/HR: 5000 INJECTION INTRAVENOUS; SUBCUTANEOUS at 18:45

## 2022-01-01 RX ADMIN — HYDROMORPHONE HYDROCHLORIDE 1 MILLIGRAM(S): 2 INJECTION INTRAMUSCULAR; INTRAVENOUS; SUBCUTANEOUS at 01:44

## 2022-01-01 RX ADMIN — BUPROPION HYDROCHLORIDE 150 MILLIGRAM(S): 150 TABLET, EXTENDED RELEASE ORAL at 12:42

## 2022-01-01 RX ADMIN — OXYCODONE HYDROCHLORIDE 5 MILLIGRAM(S): 5 TABLET ORAL at 14:04

## 2022-01-01 RX ADMIN — Medication 1 APPLICATION(S): at 06:37

## 2022-01-01 RX ADMIN — CYCLOSPORINE 200 MILLIGRAM(S): 100 CAPSULE ORAL at 21:07

## 2022-01-01 RX ADMIN — HYDROMORPHONE HYDROCHLORIDE 1 MILLIGRAM(S): 2 INJECTION INTRAMUSCULAR; INTRAVENOUS; SUBCUTANEOUS at 18:22

## 2022-01-01 RX ADMIN — OXYCODONE HYDROCHLORIDE 7.5 MILLIGRAM(S): 5 TABLET ORAL at 23:03

## 2022-01-01 RX ADMIN — POLYETHYLENE GLYCOL 3350 17 GRAM(S): 17 POWDER, FOR SOLUTION ORAL at 09:35

## 2022-01-01 RX ADMIN — Medication 3 UNIT(S): at 13:12

## 2022-01-01 RX ADMIN — Medication 120 MILLIGRAM(S): at 06:42

## 2022-01-01 RX ADMIN — SERTRALINE 50 MILLIGRAM(S): 25 TABLET, FILM COATED ORAL at 12:31

## 2022-01-01 RX ADMIN — OXYCODONE HYDROCHLORIDE 10 MILLIGRAM(S): 5 TABLET ORAL at 03:20

## 2022-01-01 RX ADMIN — MIDODRINE HYDROCHLORIDE 5 MILLIGRAM(S): 2.5 TABLET ORAL at 06:07

## 2022-01-01 RX ADMIN — INSULIN GLARGINE 12 UNIT(S): 100 INJECTION, SOLUTION SUBCUTANEOUS at 23:10

## 2022-01-01 RX ADMIN — INSULIN GLARGINE 5 UNIT(S): 100 INJECTION, SOLUTION SUBCUTANEOUS at 23:05

## 2022-01-01 RX ADMIN — POLYETHYLENE GLYCOL 3350 17 GRAM(S): 17 POWDER, FOR SOLUTION ORAL at 05:27

## 2022-01-01 RX ADMIN — Medication 1 APPLICATION(S): at 06:15

## 2022-01-01 RX ADMIN — BUPROPION HYDROCHLORIDE 150 MILLIGRAM(S): 150 TABLET, EXTENDED RELEASE ORAL at 12:13

## 2022-01-01 RX ADMIN — OXYCODONE HYDROCHLORIDE 5 MILLIGRAM(S): 5 TABLET ORAL at 19:35

## 2022-01-01 RX ADMIN — MIDODRINE HYDROCHLORIDE 5 MILLIGRAM(S): 2.5 TABLET ORAL at 05:46

## 2022-01-01 RX ADMIN — ERYTHROPOIETIN 16000 UNIT(S): 10000 INJECTION, SOLUTION INTRAVENOUS; SUBCUTANEOUS at 07:40

## 2022-01-01 RX ADMIN — HYDROMORPHONE HYDROCHLORIDE 1 MILLIGRAM(S): 2 INJECTION INTRAMUSCULAR; INTRAVENOUS; SUBCUTANEOUS at 00:41

## 2022-01-01 RX ADMIN — BUPROPION HYDROCHLORIDE 150 MILLIGRAM(S): 150 TABLET, EXTENDED RELEASE ORAL at 14:17

## 2022-01-01 RX ADMIN — Medication 5 UNIT(S): at 08:49

## 2022-01-01 RX ADMIN — BUPROPION HYDROCHLORIDE 150 MILLIGRAM(S): 150 TABLET, EXTENDED RELEASE ORAL at 14:05

## 2022-01-01 RX ADMIN — Medication 3 MILLIGRAM(S): at 23:15

## 2022-01-01 RX ADMIN — CINACALCET 60 MILLIGRAM(S): 30 TABLET, FILM COATED ORAL at 12:22

## 2022-01-01 RX ADMIN — OXYCODONE HYDROCHLORIDE 10 MILLIGRAM(S): 5 TABLET ORAL at 23:12

## 2022-01-01 RX ADMIN — CINACALCET 30 MILLIGRAM(S): 30 TABLET, FILM COATED ORAL at 11:49

## 2022-01-01 RX ADMIN — OXYCODONE HYDROCHLORIDE 10 MILLIGRAM(S): 5 TABLET ORAL at 08:47

## 2022-01-01 RX ADMIN — OXYCODONE HYDROCHLORIDE 5 MILLIGRAM(S): 5 TABLET ORAL at 15:21

## 2022-01-01 RX ADMIN — BUPROPION HYDROCHLORIDE 150 MILLIGRAM(S): 150 TABLET, EXTENDED RELEASE ORAL at 11:25

## 2022-01-01 RX ADMIN — OXYCODONE HYDROCHLORIDE 10 MILLIGRAM(S): 5 TABLET ORAL at 21:37

## 2022-01-01 RX ADMIN — ERYTHROPOIETIN 18000 UNIT(S): 10000 INJECTION, SOLUTION INTRAVENOUS; SUBCUTANEOUS at 18:27

## 2022-01-01 RX ADMIN — LIDOCAINE 1 PATCH: 4 CREAM TOPICAL at 13:39

## 2022-01-01 RX ADMIN — Medication 1 APPLICATION(S): at 13:17

## 2022-01-01 RX ADMIN — OXYCODONE HYDROCHLORIDE 10 MILLIGRAM(S): 5 TABLET ORAL at 06:20

## 2022-01-01 RX ADMIN — CINACALCET 60 MILLIGRAM(S): 30 TABLET, FILM COATED ORAL at 11:41

## 2022-01-01 RX ADMIN — Medication 1000 MILLIGRAM(S): at 20:39

## 2022-01-01 RX ADMIN — SIMETHICONE 80 MILLIGRAM(S): 80 TABLET, CHEWABLE ORAL at 05:37

## 2022-01-01 RX ADMIN — MIDODRINE HYDROCHLORIDE 5 MILLIGRAM(S): 2.5 TABLET ORAL at 18:18

## 2022-01-01 RX ADMIN — SEVELAMER CARBONATE 800 MILLIGRAM(S): 2400 POWDER, FOR SUSPENSION ORAL at 12:13

## 2022-01-01 RX ADMIN — OXYCODONE HYDROCHLORIDE 10 MILLIGRAM(S): 5 TABLET ORAL at 01:53

## 2022-01-01 RX ADMIN — POLYETHYLENE GLYCOL 3350 17 GRAM(S): 17 POWDER, FOR SOLUTION ORAL at 16:18

## 2022-01-01 RX ADMIN — LORATADINE 10 MILLIGRAM(S): 10 TABLET ORAL at 12:51

## 2022-01-01 RX ADMIN — SENNA PLUS 2 TABLET(S): 8.6 TABLET ORAL at 21:58

## 2022-01-01 RX ADMIN — Medication 6 MILLIGRAM(S): at 21:54

## 2022-01-01 RX ADMIN — PANTOPRAZOLE SODIUM 40 MILLIGRAM(S): 20 TABLET, DELAYED RELEASE ORAL at 17:57

## 2022-01-01 RX ADMIN — SERTRALINE 50 MILLIGRAM(S): 25 TABLET, FILM COATED ORAL at 11:45

## 2022-01-01 RX ADMIN — LORATADINE 10 MILLIGRAM(S): 10 TABLET ORAL at 12:47

## 2022-01-01 RX ADMIN — Medication 1: at 09:12

## 2022-01-01 RX ADMIN — PANTOPRAZOLE SODIUM 40 MILLIGRAM(S): 20 TABLET, DELAYED RELEASE ORAL at 12:39

## 2022-01-01 RX ADMIN — SEVELAMER CARBONATE 800 MILLIGRAM(S): 2400 POWDER, FOR SUSPENSION ORAL at 11:34

## 2022-01-01 RX ADMIN — Medication 2: at 18:39

## 2022-01-01 RX ADMIN — MIDODRINE HYDROCHLORIDE 10 MILLIGRAM(S): 2.5 TABLET ORAL at 06:33

## 2022-01-01 RX ADMIN — HEPARIN SODIUM 24 UNIT(S)/HR: 5000 INJECTION INTRAVENOUS; SUBCUTANEOUS at 08:18

## 2022-01-01 RX ADMIN — SENNA PLUS 2 TABLET(S): 8.6 TABLET ORAL at 20:40

## 2022-01-01 RX ADMIN — LIDOCAINE 1 PATCH: 4 CREAM TOPICAL at 11:37

## 2022-01-01 RX ADMIN — Medication 650 MILLIGRAM(S): at 03:31

## 2022-01-01 RX ADMIN — POLYETHYLENE GLYCOL 3350 17 GRAM(S): 17 POWDER, FOR SOLUTION ORAL at 18:05

## 2022-01-01 RX ADMIN — MIRTAZAPINE 7.5 MILLIGRAM(S): 45 TABLET, ORALLY DISINTEGRATING ORAL at 02:09

## 2022-01-01 RX ADMIN — MIDODRINE HYDROCHLORIDE 5 MILLIGRAM(S): 2.5 TABLET ORAL at 06:13

## 2022-01-01 RX ADMIN — SENNA PLUS 2 TABLET(S): 8.6 TABLET ORAL at 23:44

## 2022-01-01 RX ADMIN — OXYCODONE HYDROCHLORIDE 5 MILLIGRAM(S): 5 TABLET ORAL at 16:30

## 2022-01-01 RX ADMIN — SIMETHICONE 80 MILLIGRAM(S): 80 TABLET, CHEWABLE ORAL at 22:03

## 2022-01-01 RX ADMIN — HEPARIN SODIUM 5000 UNIT(S): 5000 INJECTION INTRAVENOUS; SUBCUTANEOUS at 13:59

## 2022-01-01 RX ADMIN — OXYCODONE HYDROCHLORIDE 10 MILLIGRAM(S): 5 TABLET ORAL at 20:00

## 2022-01-01 RX ADMIN — CHLORHEXIDINE GLUCONATE 1 APPLICATION(S): 213 SOLUTION TOPICAL at 11:46

## 2022-01-01 RX ADMIN — Medication 1 APPLICATION(S): at 12:03

## 2022-01-01 RX ADMIN — LIDOCAINE 1 PATCH: 4 CREAM TOPICAL at 21:23

## 2022-01-01 RX ADMIN — OXYCODONE HYDROCHLORIDE 10 MILLIGRAM(S): 5 TABLET ORAL at 05:30

## 2022-01-01 RX ADMIN — Medication 100 MILLIGRAM(S): at 13:59

## 2022-01-01 RX ADMIN — LIDOCAINE 1 PATCH: 4 CREAM TOPICAL at 12:44

## 2022-01-01 RX ADMIN — INSULIN GLARGINE 12 UNIT(S): 100 INJECTION, SOLUTION SUBCUTANEOUS at 21:42

## 2022-01-01 RX ADMIN — PANTOPRAZOLE SODIUM 40 MILLIGRAM(S): 20 TABLET, DELAYED RELEASE ORAL at 06:26

## 2022-01-01 RX ADMIN — LIDOCAINE 1 PATCH: 4 CREAM TOPICAL at 18:24

## 2022-01-01 RX ADMIN — VASOPRESSIN 1.8 UNIT(S)/MIN: 20 INJECTION INTRAVENOUS at 20:23

## 2022-01-01 RX ADMIN — APIXABAN 2.5 MILLIGRAM(S): 2.5 TABLET, FILM COATED ORAL at 22:17

## 2022-01-01 RX ADMIN — CYCLOSPORINE 200 MILLIGRAM(S): 100 CAPSULE ORAL at 17:44

## 2022-01-01 RX ADMIN — APIXABAN 2.5 MILLIGRAM(S): 2.5 TABLET, FILM COATED ORAL at 06:05

## 2022-01-01 RX ADMIN — OXYCODONE HYDROCHLORIDE 10 MILLIGRAM(S): 5 TABLET ORAL at 08:41

## 2022-01-01 RX ADMIN — APIXABAN 2.5 MILLIGRAM(S): 2.5 TABLET, FILM COATED ORAL at 05:31

## 2022-01-01 RX ADMIN — OXYCODONE HYDROCHLORIDE 5 MILLIGRAM(S): 5 TABLET ORAL at 23:00

## 2022-01-01 RX ADMIN — Medication 1: at 09:04

## 2022-01-01 RX ADMIN — OXYCODONE HYDROCHLORIDE 10 MILLIGRAM(S): 5 TABLET ORAL at 05:23

## 2022-01-01 RX ADMIN — MIDODRINE HYDROCHLORIDE 10 MILLIGRAM(S): 2.5 TABLET ORAL at 06:29

## 2022-01-01 RX ADMIN — Medication 1 APPLICATION(S): at 22:24

## 2022-01-01 RX ADMIN — CHLORHEXIDINE GLUCONATE 1 APPLICATION(S): 213 SOLUTION TOPICAL at 12:15

## 2022-01-01 RX ADMIN — BUPROPION HYDROCHLORIDE 150 MILLIGRAM(S): 150 TABLET, EXTENDED RELEASE ORAL at 14:08

## 2022-01-01 RX ADMIN — ERYTHROPOIETIN 18000 UNIT(S): 10000 INJECTION, SOLUTION INTRAVENOUS; SUBCUTANEOUS at 17:16

## 2022-01-01 RX ADMIN — Medication 81 MILLIGRAM(S): at 11:45

## 2022-01-01 RX ADMIN — CHLORHEXIDINE GLUCONATE 1 APPLICATION(S): 213 SOLUTION TOPICAL at 12:13

## 2022-01-01 RX ADMIN — CYCLOSPORINE 200 MILLIGRAM(S): 100 CAPSULE ORAL at 21:38

## 2022-01-01 RX ADMIN — LIDOCAINE 1 PATCH: 4 CREAM TOPICAL at 13:01

## 2022-01-01 RX ADMIN — Medication 3 UNIT(S): at 17:59

## 2022-01-01 RX ADMIN — LORATADINE 10 MILLIGRAM(S): 10 TABLET ORAL at 12:25

## 2022-01-01 RX ADMIN — CHLORHEXIDINE GLUCONATE 1 APPLICATION(S): 213 SOLUTION TOPICAL at 11:19

## 2022-01-01 RX ADMIN — SEVELAMER CARBONATE 2400 MILLIGRAM(S): 2400 POWDER, FOR SUSPENSION ORAL at 18:07

## 2022-01-01 RX ADMIN — MIRTAZAPINE 7.5 MILLIGRAM(S): 45 TABLET, ORALLY DISINTEGRATING ORAL at 21:32

## 2022-01-01 RX ADMIN — SERTRALINE 50 MILLIGRAM(S): 25 TABLET, FILM COATED ORAL at 14:17

## 2022-01-01 RX ADMIN — HYDROMORPHONE HYDROCHLORIDE 1 MILLIGRAM(S): 2 INJECTION INTRAMUSCULAR; INTRAVENOUS; SUBCUTANEOUS at 18:53

## 2022-01-01 RX ADMIN — BUPROPION HYDROCHLORIDE 150 MILLIGRAM(S): 150 TABLET, EXTENDED RELEASE ORAL at 12:27

## 2022-01-01 RX ADMIN — MIRTAZAPINE 7.5 MILLIGRAM(S): 45 TABLET, ORALLY DISINTEGRATING ORAL at 22:12

## 2022-01-01 RX ADMIN — Medication 120 MILLIGRAM(S): at 09:17

## 2022-01-01 RX ADMIN — DOXERCALCIFEROL 5 MICROGRAM(S): 2.5 CAPSULE ORAL at 00:07

## 2022-01-01 RX ADMIN — BUPROPION HYDROCHLORIDE 150 MILLIGRAM(S): 150 TABLET, EXTENDED RELEASE ORAL at 13:15

## 2022-01-01 RX ADMIN — CYCLOSPORINE 200 MILLIGRAM(S): 100 CAPSULE ORAL at 17:46

## 2022-01-01 RX ADMIN — GABAPENTIN 300 MILLIGRAM(S): 400 CAPSULE ORAL at 12:39

## 2022-01-01 RX ADMIN — CHLORHEXIDINE GLUCONATE 1 APPLICATION(S): 213 SOLUTION TOPICAL at 13:17

## 2022-01-01 RX ADMIN — Medication 120 MILLIGRAM(S): at 05:09

## 2022-01-01 RX ADMIN — Medication 120 MILLIGRAM(S): at 05:51

## 2022-01-01 RX ADMIN — Medication 1 APPLICATION(S): at 17:52

## 2022-01-01 RX ADMIN — MIDODRINE HYDROCHLORIDE 5 MILLIGRAM(S): 2.5 TABLET ORAL at 06:49

## 2022-01-01 RX ADMIN — Medication 1: at 17:39

## 2022-01-01 RX ADMIN — INSULIN GLARGINE 12 UNIT(S): 100 INJECTION, SOLUTION SUBCUTANEOUS at 21:57

## 2022-01-01 RX ADMIN — OXYCODONE HYDROCHLORIDE 10 MILLIGRAM(S): 5 TABLET ORAL at 17:08

## 2022-01-01 RX ADMIN — CYCLOSPORINE 125 MILLIGRAM(S): 100 CAPSULE ORAL at 22:59

## 2022-01-01 RX ADMIN — MIRTAZAPINE 7.5 MILLIGRAM(S): 45 TABLET, ORALLY DISINTEGRATING ORAL at 23:44

## 2022-01-01 RX ADMIN — Medication 120 MILLIGRAM(S): at 05:23

## 2022-01-01 RX ADMIN — INSULIN GLARGINE 16 UNIT(S): 100 INJECTION, SOLUTION SUBCUTANEOUS at 21:49

## 2022-01-01 RX ADMIN — OXYCODONE HYDROCHLORIDE 10 MILLIGRAM(S): 5 TABLET ORAL at 00:07

## 2022-01-01 RX ADMIN — BUPROPION HYDROCHLORIDE 150 MILLIGRAM(S): 150 TABLET, EXTENDED RELEASE ORAL at 15:14

## 2022-01-01 RX ADMIN — Medication 3: at 08:27

## 2022-01-01 RX ADMIN — INSULIN GLARGINE 12 UNIT(S): 100 INJECTION, SOLUTION SUBCUTANEOUS at 21:40

## 2022-01-01 RX ADMIN — ERYTHROPOIETIN 20000 UNIT(S): 10000 INJECTION, SOLUTION INTRAVENOUS; SUBCUTANEOUS at 08:03

## 2022-01-01 RX ADMIN — BUPROPION HYDROCHLORIDE 150 MILLIGRAM(S): 150 TABLET, EXTENDED RELEASE ORAL at 13:03

## 2022-01-01 RX ADMIN — OXYCODONE HYDROCHLORIDE 10 MILLIGRAM(S): 5 TABLET ORAL at 21:30

## 2022-01-01 RX ADMIN — OXYCODONE HYDROCHLORIDE 5 MILLIGRAM(S): 5 TABLET ORAL at 21:25

## 2022-01-01 RX ADMIN — HYDROMORPHONE HYDROCHLORIDE 1 MILLIGRAM(S): 2 INJECTION INTRAMUSCULAR; INTRAVENOUS; SUBCUTANEOUS at 23:56

## 2022-01-01 RX ADMIN — Medication 1 APPLICATION(S): at 05:52

## 2022-01-01 RX ADMIN — APIXABAN 2.5 MILLIGRAM(S): 2.5 TABLET, FILM COATED ORAL at 19:47

## 2022-01-01 RX ADMIN — PROPOFOL 6 MICROGRAM(S)/KG/MIN: 10 INJECTION, EMULSION INTRAVENOUS at 08:02

## 2022-01-01 RX ADMIN — OXYCODONE HYDROCHLORIDE 10 MILLIGRAM(S): 5 TABLET ORAL at 11:13

## 2022-01-01 RX ADMIN — HYDROMORPHONE HYDROCHLORIDE 1 MILLIGRAM(S): 2 INJECTION INTRAMUSCULAR; INTRAVENOUS; SUBCUTANEOUS at 11:43

## 2022-01-01 RX ADMIN — CHLORHEXIDINE GLUCONATE 1 APPLICATION(S): 213 SOLUTION TOPICAL at 12:18

## 2022-01-01 RX ADMIN — Medication 1 APPLICATION(S): at 13:50

## 2022-01-01 RX ADMIN — ERYTHROPOIETIN 16000 UNIT(S): 10000 INJECTION, SOLUTION INTRAVENOUS; SUBCUTANEOUS at 23:17

## 2022-01-01 RX ADMIN — Medication 1 APPLICATION(S): at 12:17

## 2022-01-01 RX ADMIN — CINACALCET 30 MILLIGRAM(S): 30 TABLET, FILM COATED ORAL at 12:28

## 2022-01-01 RX ADMIN — CYCLOSPORINE 125 MILLIGRAM(S): 100 CAPSULE ORAL at 00:57

## 2022-01-01 RX ADMIN — SEVELAMER CARBONATE 800 MILLIGRAM(S): 2400 POWDER, FOR SUSPENSION ORAL at 20:33

## 2022-01-01 RX ADMIN — GABAPENTIN 300 MILLIGRAM(S): 400 CAPSULE ORAL at 12:15

## 2022-01-01 RX ADMIN — OXYCODONE HYDROCHLORIDE 10 MILLIGRAM(S): 5 TABLET ORAL at 16:36

## 2022-01-01 RX ADMIN — Medication 1 APPLICATION(S): at 11:29

## 2022-01-01 RX ADMIN — Medication 25 MILLIGRAM(S): at 17:19

## 2022-01-01 RX ADMIN — GABAPENTIN 300 MILLIGRAM(S): 400 CAPSULE ORAL at 13:40

## 2022-01-01 RX ADMIN — LIDOCAINE 1 PATCH: 4 CREAM TOPICAL at 13:37

## 2022-01-01 RX ADMIN — MONTELUKAST 10 MILLIGRAM(S): 4 TABLET, CHEWABLE ORAL at 21:57

## 2022-01-01 RX ADMIN — OXYCODONE HYDROCHLORIDE 7.5 MILLIGRAM(S): 5 TABLET ORAL at 20:39

## 2022-01-01 RX ADMIN — Medication 6 MILLIGRAM(S): at 22:12

## 2022-01-01 RX ADMIN — Medication 81 MILLIGRAM(S): at 13:02

## 2022-01-01 RX ADMIN — INSULIN GLARGINE 10 UNIT(S): 100 INJECTION, SOLUTION SUBCUTANEOUS at 21:57

## 2022-01-01 RX ADMIN — Medication 100 MILLIGRAM(S): at 21:55

## 2022-01-01 RX ADMIN — APIXABAN 2.5 MILLIGRAM(S): 2.5 TABLET, FILM COATED ORAL at 05:05

## 2022-01-01 RX ADMIN — Medication 4.69 MICROGRAM(S)/KG/MIN: at 21:30

## 2022-01-01 RX ADMIN — OXYCODONE HYDROCHLORIDE 10 MILLIGRAM(S): 5 TABLET ORAL at 06:22

## 2022-01-01 RX ADMIN — SEVELAMER CARBONATE 1600 MILLIGRAM(S): 2400 POWDER, FOR SUSPENSION ORAL at 11:45

## 2022-01-01 RX ADMIN — CINACALCET 60 MILLIGRAM(S): 30 TABLET, FILM COATED ORAL at 21:37

## 2022-01-01 RX ADMIN — HYDROMORPHONE HYDROCHLORIDE 1 MILLIGRAM(S): 2 INJECTION INTRAMUSCULAR; INTRAVENOUS; SUBCUTANEOUS at 19:33

## 2022-01-01 RX ADMIN — MONTELUKAST 10 MILLIGRAM(S): 4 TABLET, CHEWABLE ORAL at 22:20

## 2022-01-01 RX ADMIN — CINACALCET 30 MILLIGRAM(S): 30 TABLET, FILM COATED ORAL at 11:55

## 2022-01-01 RX ADMIN — SEVELAMER CARBONATE 800 MILLIGRAM(S): 2400 POWDER, FOR SUSPENSION ORAL at 18:04

## 2022-01-01 RX ADMIN — Medication 1: at 18:18

## 2022-01-01 RX ADMIN — Medication 7 UNIT(S): at 13:10

## 2022-01-01 RX ADMIN — APIXABAN 2.5 MILLIGRAM(S): 2.5 TABLET, FILM COATED ORAL at 05:26

## 2022-01-01 RX ADMIN — OXYCODONE HYDROCHLORIDE 7.5 MILLIGRAM(S): 5 TABLET ORAL at 13:46

## 2022-01-01 RX ADMIN — SERTRALINE 50 MILLIGRAM(S): 25 TABLET, FILM COATED ORAL at 12:09

## 2022-01-01 RX ADMIN — HYDROMORPHONE HYDROCHLORIDE 0.5 MILLIGRAM(S): 2 INJECTION INTRAMUSCULAR; INTRAVENOUS; SUBCUTANEOUS at 14:14

## 2022-01-01 RX ADMIN — OXYCODONE HYDROCHLORIDE 7.5 MILLIGRAM(S): 5 TABLET ORAL at 09:09

## 2022-01-01 RX ADMIN — GABAPENTIN 300 MILLIGRAM(S): 400 CAPSULE ORAL at 13:13

## 2022-01-01 RX ADMIN — Medication 50 MILLIGRAM(S): at 04:30

## 2022-01-01 RX ADMIN — APIXABAN 5 MILLIGRAM(S): 2.5 TABLET, FILM COATED ORAL at 09:42

## 2022-01-01 RX ADMIN — MIRTAZAPINE 7.5 MILLIGRAM(S): 45 TABLET, ORALLY DISINTEGRATING ORAL at 21:59

## 2022-01-01 RX ADMIN — MONTELUKAST 10 MILLIGRAM(S): 4 TABLET, CHEWABLE ORAL at 23:31

## 2022-01-01 RX ADMIN — SENNA PLUS 2 TABLET(S): 8.6 TABLET ORAL at 22:14

## 2022-01-01 RX ADMIN — OXYCODONE HYDROCHLORIDE 10 MILLIGRAM(S): 5 TABLET ORAL at 07:00

## 2022-01-01 RX ADMIN — HYDROMORPHONE HYDROCHLORIDE 1 MILLIGRAM(S): 2 INJECTION INTRAMUSCULAR; INTRAVENOUS; SUBCUTANEOUS at 19:38

## 2022-01-01 RX ADMIN — CHLORHEXIDINE GLUCONATE 1 APPLICATION(S): 213 SOLUTION TOPICAL at 13:25

## 2022-01-01 RX ADMIN — LIDOCAINE 1 PATCH: 4 CREAM TOPICAL at 00:19

## 2022-01-01 RX ADMIN — SENNA PLUS 2 TABLET(S): 8.6 TABLET ORAL at 22:28

## 2022-01-01 RX ADMIN — LORATADINE 10 MILLIGRAM(S): 10 TABLET ORAL at 11:37

## 2022-01-01 RX ADMIN — CINACALCET 60 MILLIGRAM(S): 30 TABLET, FILM COATED ORAL at 11:12

## 2022-01-01 RX ADMIN — Medication 10 UNIT(S): at 08:41

## 2022-01-01 RX ADMIN — INSULIN GLARGINE 12 UNIT(S): 100 INJECTION, SOLUTION SUBCUTANEOUS at 21:37

## 2022-01-01 RX ADMIN — SEVELAMER CARBONATE 800 MILLIGRAM(S): 2400 POWDER, FOR SUSPENSION ORAL at 10:08

## 2022-01-01 RX ADMIN — Medication 1 TABLET(S): at 12:16

## 2022-01-01 RX ADMIN — GABAPENTIN 300 MILLIGRAM(S): 400 CAPSULE ORAL at 14:09

## 2022-01-01 RX ADMIN — LIDOCAINE 1 PATCH: 4 CREAM TOPICAL at 19:48

## 2022-01-01 RX ADMIN — Medication 1: at 13:35

## 2022-01-01 RX ADMIN — OXYCODONE HYDROCHLORIDE 10 MILLIGRAM(S): 5 TABLET ORAL at 06:29

## 2022-01-01 RX ADMIN — OXYCODONE HYDROCHLORIDE 5 MILLIGRAM(S): 5 TABLET ORAL at 23:05

## 2022-01-01 RX ADMIN — OXYCODONE HYDROCHLORIDE 7.5 MILLIGRAM(S): 5 TABLET ORAL at 17:29

## 2022-01-01 RX ADMIN — Medication 20 MILLIEQUIVALENT(S): at 10:12

## 2022-01-01 RX ADMIN — CHLORHEXIDINE GLUCONATE 1 APPLICATION(S): 213 SOLUTION TOPICAL at 11:54

## 2022-01-01 RX ADMIN — AMIODARONE HYDROCHLORIDE 33.3 MG/MIN: 400 TABLET ORAL at 08:02

## 2022-01-01 RX ADMIN — Medication 250 MILLIGRAM(S): at 20:55

## 2022-01-01 RX ADMIN — LIDOCAINE 1 PATCH: 4 CREAM TOPICAL at 20:31

## 2022-01-01 RX ADMIN — Medication 100 MILLIGRAM(S): at 21:44

## 2022-01-01 RX ADMIN — MIRTAZAPINE 7.5 MILLIGRAM(S): 45 TABLET, ORALLY DISINTEGRATING ORAL at 21:56

## 2022-01-01 RX ADMIN — Medication 120 MILLIGRAM(S): at 06:12

## 2022-01-01 RX ADMIN — INSULIN GLARGINE 16 UNIT(S): 100 INJECTION, SOLUTION SUBCUTANEOUS at 22:14

## 2022-01-01 RX ADMIN — GABAPENTIN 300 MILLIGRAM(S): 400 CAPSULE ORAL at 14:21

## 2022-01-01 RX ADMIN — Medication 1 APPLICATION(S): at 13:41

## 2022-01-01 RX ADMIN — SODIUM CHLORIDE 500 MILLILITER(S): 9 INJECTION INTRAMUSCULAR; INTRAVENOUS; SUBCUTANEOUS at 04:13

## 2022-01-01 RX ADMIN — OXYCODONE HYDROCHLORIDE 7.5 MILLIGRAM(S): 5 TABLET ORAL at 14:10

## 2022-01-01 RX ADMIN — OXYCODONE HYDROCHLORIDE 10 MILLIGRAM(S): 5 TABLET ORAL at 06:10

## 2022-01-01 RX ADMIN — HYDROMORPHONE HYDROCHLORIDE 1 MILLIGRAM(S): 2 INJECTION INTRAMUSCULAR; INTRAVENOUS; SUBCUTANEOUS at 01:05

## 2022-01-01 RX ADMIN — BUPROPION HYDROCHLORIDE 150 MILLIGRAM(S): 150 TABLET, EXTENDED RELEASE ORAL at 13:39

## 2022-01-01 RX ADMIN — Medication 1 APPLICATION(S): at 18:53

## 2022-01-01 RX ADMIN — INSULIN GLARGINE 12 UNIT(S): 100 INJECTION, SOLUTION SUBCUTANEOUS at 23:41

## 2022-01-01 RX ADMIN — LIDOCAINE 1 PATCH: 4 CREAM TOPICAL at 19:00

## 2022-01-01 RX ADMIN — Medication 120 MILLIGRAM(S): at 08:37

## 2022-01-01 RX ADMIN — SERTRALINE 50 MILLIGRAM(S): 25 TABLET, FILM COATED ORAL at 12:11

## 2022-01-01 RX ADMIN — Medication 15 MILLIGRAM(S): at 18:26

## 2022-01-01 RX ADMIN — Medication 11 UNIT(S): at 18:05

## 2022-01-01 RX ADMIN — Medication 81 MILLIGRAM(S): at 13:17

## 2022-01-01 RX ADMIN — ERYTHROPOIETIN 16000 UNIT(S): 10000 INJECTION, SOLUTION INTRAVENOUS; SUBCUTANEOUS at 18:42

## 2022-01-01 RX ADMIN — APIXABAN 2.5 MILLIGRAM(S): 2.5 TABLET, FILM COATED ORAL at 18:31

## 2022-01-01 RX ADMIN — Medication 120 MILLIGRAM(S): at 06:22

## 2022-01-01 RX ADMIN — OXYCODONE HYDROCHLORIDE 5 MILLIGRAM(S): 5 TABLET ORAL at 08:44

## 2022-01-01 RX ADMIN — INSULIN GLARGINE 12 UNIT(S): 100 INJECTION, SOLUTION SUBCUTANEOUS at 21:29

## 2022-01-01 RX ADMIN — MONTELUKAST 10 MILLIGRAM(S): 4 TABLET, CHEWABLE ORAL at 21:27

## 2022-01-01 RX ADMIN — LIDOCAINE 1 PATCH: 4 CREAM TOPICAL at 18:51

## 2022-01-01 RX ADMIN — Medication 120 MILLIGRAM(S): at 23:01

## 2022-01-01 RX ADMIN — MONTELUKAST 10 MILLIGRAM(S): 4 TABLET, CHEWABLE ORAL at 22:04

## 2022-01-01 RX ADMIN — Medication 120 MILLIGRAM(S): at 06:11

## 2022-01-01 RX ADMIN — OXYCODONE HYDROCHLORIDE 10 MILLIGRAM(S): 5 TABLET ORAL at 19:20

## 2022-01-01 RX ADMIN — CYCLOSPORINE 200 MILLIGRAM(S): 100 CAPSULE ORAL at 05:23

## 2022-01-01 RX ADMIN — Medication 75 MEQ/KG/HR: at 08:02

## 2022-01-01 RX ADMIN — OXYCODONE HYDROCHLORIDE 5 MILLIGRAM(S): 5 TABLET ORAL at 18:31

## 2022-01-01 RX ADMIN — CYCLOSPORINE 125 MILLIGRAM(S): 100 CAPSULE ORAL at 13:30

## 2022-01-01 RX ADMIN — APIXABAN 5 MILLIGRAM(S): 2.5 TABLET, FILM COATED ORAL at 06:00

## 2022-01-01 RX ADMIN — CHLORHEXIDINE GLUCONATE 1 APPLICATION(S): 213 SOLUTION TOPICAL at 11:47

## 2022-01-01 RX ADMIN — HYDROMORPHONE HYDROCHLORIDE 1 MILLIGRAM(S): 2 INJECTION INTRAMUSCULAR; INTRAVENOUS; SUBCUTANEOUS at 01:20

## 2022-01-01 RX ADMIN — SEVELAMER CARBONATE 800 MILLIGRAM(S): 2400 POWDER, FOR SUSPENSION ORAL at 09:18

## 2022-01-01 RX ADMIN — CYCLOSPORINE 200 MILLIGRAM(S): 100 CAPSULE ORAL at 05:06

## 2022-01-01 RX ADMIN — Medication 6 MILLIGRAM(S): at 21:37

## 2022-01-01 RX ADMIN — Medication 1 APPLICATION(S): at 11:11

## 2022-01-01 RX ADMIN — OXYCODONE HYDROCHLORIDE 7.5 MILLIGRAM(S): 5 TABLET ORAL at 11:22

## 2022-01-01 RX ADMIN — FENTANYL CITRATE 5 MICROGRAM(S)/KG/HR: 50 INJECTION INTRAVENOUS at 13:27

## 2022-01-01 RX ADMIN — APIXABAN 2.5 MILLIGRAM(S): 2.5 TABLET, FILM COATED ORAL at 05:39

## 2022-01-01 RX ADMIN — LIDOCAINE 1 PATCH: 4 CREAM TOPICAL at 13:49

## 2022-01-01 RX ADMIN — HYDROMORPHONE HYDROCHLORIDE 1 MILLIGRAM(S): 2 INJECTION INTRAMUSCULAR; INTRAVENOUS; SUBCUTANEOUS at 10:45

## 2022-01-01 RX ADMIN — OXYCODONE HYDROCHLORIDE 7.5 MILLIGRAM(S): 5 TABLET ORAL at 22:35

## 2022-01-01 RX ADMIN — CYCLOSPORINE 200 MILLIGRAM(S): 100 CAPSULE ORAL at 10:07

## 2022-01-01 RX ADMIN — Medication 6 MILLIGRAM(S): at 21:47

## 2022-01-01 RX ADMIN — PANTOPRAZOLE SODIUM 40 MILLIGRAM(S): 20 TABLET, DELAYED RELEASE ORAL at 06:25

## 2022-01-01 RX ADMIN — MIRTAZAPINE 7.5 MILLIGRAM(S): 45 TABLET, ORALLY DISINTEGRATING ORAL at 21:58

## 2022-01-01 RX ADMIN — Medication 10 UNIT(S): at 09:02

## 2022-01-01 RX ADMIN — CHLORHEXIDINE GLUCONATE 1 APPLICATION(S): 213 SOLUTION TOPICAL at 13:47

## 2022-01-01 RX ADMIN — LORATADINE 10 MILLIGRAM(S): 10 TABLET ORAL at 12:45

## 2022-01-01 RX ADMIN — SEVELAMER CARBONATE 800 MILLIGRAM(S): 2400 POWDER, FOR SUSPENSION ORAL at 11:30

## 2022-01-01 RX ADMIN — Medication 2: at 12:18

## 2022-01-01 RX ADMIN — POLYETHYLENE GLYCOL 3350 17 GRAM(S): 17 POWDER, FOR SOLUTION ORAL at 05:05

## 2022-01-01 RX ADMIN — OXYCODONE HYDROCHLORIDE 7.5 MILLIGRAM(S): 5 TABLET ORAL at 15:45

## 2022-01-01 RX ADMIN — SIMETHICONE 80 MILLIGRAM(S): 80 TABLET, CHEWABLE ORAL at 23:09

## 2022-01-01 RX ADMIN — POLYETHYLENE GLYCOL 3350 17 GRAM(S): 17 POWDER, FOR SOLUTION ORAL at 18:10

## 2022-01-01 RX ADMIN — Medication 1 APPLICATION(S): at 19:14

## 2022-01-01 RX ADMIN — SEVELAMER CARBONATE 800 MILLIGRAM(S): 2400 POWDER, FOR SUSPENSION ORAL at 10:55

## 2022-01-01 RX ADMIN — CYCLOSPORINE 125 MILLIGRAM(S): 100 CAPSULE ORAL at 21:49

## 2022-01-01 RX ADMIN — Medication 120 MILLIGRAM(S): at 08:46

## 2022-01-01 RX ADMIN — Medication 1 APPLICATION(S): at 13:02

## 2022-01-01 RX ADMIN — APIXABAN 5 MILLIGRAM(S): 2.5 TABLET, FILM COATED ORAL at 05:17

## 2022-01-01 RX ADMIN — Medication 81 MILLIGRAM(S): at 15:26

## 2022-01-01 RX ADMIN — HYDROMORPHONE HYDROCHLORIDE 1 MILLIGRAM(S): 2 INJECTION INTRAMUSCULAR; INTRAVENOUS; SUBCUTANEOUS at 05:30

## 2022-01-01 RX ADMIN — ERYTHROPOIETIN 20000 UNIT(S): 10000 INJECTION, SOLUTION INTRAVENOUS; SUBCUTANEOUS at 07:27

## 2022-01-01 RX ADMIN — APIXABAN 5 MILLIGRAM(S): 2.5 TABLET, FILM COATED ORAL at 17:32

## 2022-01-01 RX ADMIN — PANTOPRAZOLE SODIUM 40 MILLIGRAM(S): 20 TABLET, DELAYED RELEASE ORAL at 18:30

## 2022-01-01 RX ADMIN — CINACALCET 60 MILLIGRAM(S): 30 TABLET, FILM COATED ORAL at 21:59

## 2022-01-01 RX ADMIN — Medication 30 MILLILITER(S): at 14:11

## 2022-01-01 RX ADMIN — Medication 81 MILLIGRAM(S): at 12:46

## 2022-01-01 RX ADMIN — OXYCODONE HYDROCHLORIDE 10 MILLIGRAM(S): 5 TABLET ORAL at 05:53

## 2022-01-01 RX ADMIN — ERYTHROPOIETIN 20000 UNIT(S): 10000 INJECTION, SOLUTION INTRAVENOUS; SUBCUTANEOUS at 20:06

## 2022-01-01 RX ADMIN — BUPROPION HYDROCHLORIDE 150 MILLIGRAM(S): 150 TABLET, EXTENDED RELEASE ORAL at 11:42

## 2022-01-01 RX ADMIN — BUPROPION HYDROCHLORIDE 150 MILLIGRAM(S): 150 TABLET, EXTENDED RELEASE ORAL at 11:58

## 2022-01-01 RX ADMIN — SEVELAMER CARBONATE 800 MILLIGRAM(S): 2400 POWDER, FOR SUSPENSION ORAL at 17:46

## 2022-01-01 RX ADMIN — LIDOCAINE 1 PATCH: 4 CREAM TOPICAL at 13:48

## 2022-01-01 RX ADMIN — OXYCODONE HYDROCHLORIDE 7.5 MILLIGRAM(S): 5 TABLET ORAL at 16:10

## 2022-01-01 RX ADMIN — OXYCODONE HYDROCHLORIDE 5 MILLIGRAM(S): 5 TABLET ORAL at 05:20

## 2022-01-01 RX ADMIN — LIDOCAINE 1 PATCH: 4 CREAM TOPICAL at 19:36

## 2022-01-01 RX ADMIN — PANTOPRAZOLE SODIUM 40 MILLIGRAM(S): 20 TABLET, DELAYED RELEASE ORAL at 05:42

## 2022-01-01 RX ADMIN — CINACALCET 30 MILLIGRAM(S): 30 TABLET, FILM COATED ORAL at 14:16

## 2022-01-01 RX ADMIN — OXYCODONE HYDROCHLORIDE 10 MILLIGRAM(S): 5 TABLET ORAL at 09:56

## 2022-01-01 RX ADMIN — SERTRALINE 50 MILLIGRAM(S): 25 TABLET, FILM COATED ORAL at 13:47

## 2022-01-01 RX ADMIN — FENTANYL CITRATE 25 MICROGRAM(S): 50 INJECTION INTRAVENOUS at 22:41

## 2022-01-01 RX ADMIN — MIRTAZAPINE 7.5 MILLIGRAM(S): 45 TABLET, ORALLY DISINTEGRATING ORAL at 22:06

## 2022-01-01 RX ADMIN — CINACALCET 60 MILLIGRAM(S): 30 TABLET, FILM COATED ORAL at 22:14

## 2022-01-01 RX ADMIN — Medication 81 MILLIGRAM(S): at 12:51

## 2022-01-01 RX ADMIN — Medication 81 MILLIGRAM(S): at 12:12

## 2022-01-01 RX ADMIN — CHLORHEXIDINE GLUCONATE 1 APPLICATION(S): 213 SOLUTION TOPICAL at 12:26

## 2022-01-01 RX ADMIN — ATORVASTATIN CALCIUM 80 MILLIGRAM(S): 80 TABLET, FILM COATED ORAL at 21:59

## 2022-01-01 RX ADMIN — CYCLOSPORINE 200 MILLIGRAM(S): 100 CAPSULE ORAL at 05:37

## 2022-01-01 RX ADMIN — BUPROPION HYDROCHLORIDE 150 MILLIGRAM(S): 150 TABLET, EXTENDED RELEASE ORAL at 18:49

## 2022-01-01 RX ADMIN — Medication 120 MILLIGRAM(S): at 12:32

## 2022-01-01 RX ADMIN — SEVELAMER CARBONATE 800 MILLIGRAM(S): 2400 POWDER, FOR SUSPENSION ORAL at 13:44

## 2022-01-01 RX ADMIN — Medication 100 MILLIGRAM(S): at 21:31

## 2022-01-01 RX ADMIN — HYDROMORPHONE HYDROCHLORIDE 1 MILLIGRAM(S): 2 INJECTION INTRAMUSCULAR; INTRAVENOUS; SUBCUTANEOUS at 00:56

## 2022-01-01 RX ADMIN — ERYTHROPOIETIN 20000 UNIT(S): 10000 INJECTION, SOLUTION INTRAVENOUS; SUBCUTANEOUS at 08:25

## 2022-01-01 RX ADMIN — SERTRALINE 50 MILLIGRAM(S): 25 TABLET, FILM COATED ORAL at 14:07

## 2022-01-01 RX ADMIN — OXYCODONE HYDROCHLORIDE 10 MILLIGRAM(S): 5 TABLET ORAL at 05:11

## 2022-01-01 RX ADMIN — Medication 6 MILLIGRAM(S): at 21:36

## 2022-01-01 RX ADMIN — Medication 1 APPLICATION(S): at 18:37

## 2022-01-01 RX ADMIN — OXYCODONE HYDROCHLORIDE 7.5 MILLIGRAM(S): 5 TABLET ORAL at 23:22

## 2022-01-01 RX ADMIN — LIDOCAINE 1 PATCH: 4 CREAM TOPICAL at 13:33

## 2022-01-01 RX ADMIN — BUPROPION HYDROCHLORIDE 150 MILLIGRAM(S): 150 TABLET, EXTENDED RELEASE ORAL at 11:44

## 2022-01-01 RX ADMIN — Medication 2 UNIT(S): at 08:46

## 2022-01-01 RX ADMIN — CYCLOSPORINE 125 MILLIGRAM(S): 100 CAPSULE ORAL at 10:20

## 2022-01-01 RX ADMIN — CHLORHEXIDINE GLUCONATE 1 APPLICATION(S): 213 SOLUTION TOPICAL at 15:47

## 2022-01-01 RX ADMIN — SEVELAMER CARBONATE 800 MILLIGRAM(S): 2400 POWDER, FOR SUSPENSION ORAL at 12:52

## 2022-01-01 RX ADMIN — SEVELAMER CARBONATE 800 MILLIGRAM(S): 2400 POWDER, FOR SUSPENSION ORAL at 13:28

## 2022-01-01 RX ADMIN — Medication 650 MILLIGRAM(S): at 08:41

## 2022-01-01 RX ADMIN — SERTRALINE 50 MILLIGRAM(S): 25 TABLET, FILM COATED ORAL at 13:04

## 2022-01-01 RX ADMIN — Medication 120 MILLIGRAM(S): at 13:26

## 2022-01-01 RX ADMIN — LIDOCAINE AND PRILOCAINE CREAM 1 APPLICATION(S): 25; 25 CREAM TOPICAL at 06:37

## 2022-01-01 RX ADMIN — MONTELUKAST 10 MILLIGRAM(S): 4 TABLET, CHEWABLE ORAL at 21:35

## 2022-01-01 RX ADMIN — ATORVASTATIN CALCIUM 80 MILLIGRAM(S): 80 TABLET, FILM COATED ORAL at 22:09

## 2022-01-01 RX ADMIN — GABAPENTIN 300 MILLIGRAM(S): 400 CAPSULE ORAL at 12:29

## 2022-01-01 RX ADMIN — Medication 1 APPLICATION(S): at 15:16

## 2022-01-01 RX ADMIN — OXYCODONE HYDROCHLORIDE 7.5 MILLIGRAM(S): 5 TABLET ORAL at 02:44

## 2022-01-01 RX ADMIN — APIXABAN 2.5 MILLIGRAM(S): 2.5 TABLET, FILM COATED ORAL at 06:39

## 2022-01-01 RX ADMIN — OXYCODONE HYDROCHLORIDE 10 MILLIGRAM(S): 5 TABLET ORAL at 18:03

## 2022-01-01 RX ADMIN — Medication 81 MILLIGRAM(S): at 11:37

## 2022-01-01 RX ADMIN — VASOPRESSIN 1.8 UNIT(S)/MIN: 20 INJECTION INTRAVENOUS at 08:03

## 2022-01-01 RX ADMIN — MIRTAZAPINE 7.5 MILLIGRAM(S): 45 TABLET, ORALLY DISINTEGRATING ORAL at 21:29

## 2022-01-01 RX ADMIN — SEVELAMER CARBONATE 800 MILLIGRAM(S): 2400 POWDER, FOR SUSPENSION ORAL at 09:08

## 2022-01-01 RX ADMIN — HYDROMORPHONE HYDROCHLORIDE 0.25 MILLIGRAM(S): 2 INJECTION INTRAMUSCULAR; INTRAVENOUS; SUBCUTANEOUS at 15:39

## 2022-01-01 RX ADMIN — CYCLOSPORINE 200 MILLIGRAM(S): 100 CAPSULE ORAL at 22:14

## 2022-01-01 RX ADMIN — HYDROMORPHONE HYDROCHLORIDE 1 MILLIGRAM(S): 2 INJECTION INTRAMUSCULAR; INTRAVENOUS; SUBCUTANEOUS at 12:17

## 2022-01-01 RX ADMIN — SENNA PLUS 2 TABLET(S): 8.6 TABLET ORAL at 21:30

## 2022-01-01 RX ADMIN — OXYCODONE HYDROCHLORIDE 10 MILLIGRAM(S): 5 TABLET ORAL at 09:19

## 2022-01-01 RX ADMIN — CHLORHEXIDINE GLUCONATE 1 APPLICATION(S): 213 SOLUTION TOPICAL at 15:15

## 2022-01-01 RX ADMIN — CHLORHEXIDINE GLUCONATE 1 APPLICATION(S): 213 SOLUTION TOPICAL at 12:58

## 2022-01-01 RX ADMIN — SEVELAMER CARBONATE 800 MILLIGRAM(S): 2400 POWDER, FOR SUSPENSION ORAL at 12:09

## 2022-01-01 RX ADMIN — MONTELUKAST 10 MILLIGRAM(S): 4 TABLET, CHEWABLE ORAL at 21:50

## 2022-01-01 RX ADMIN — LORATADINE 10 MILLIGRAM(S): 10 TABLET ORAL at 11:46

## 2022-01-01 RX ADMIN — Medication 35 MILLIGRAM(S): at 17:09

## 2022-01-01 RX ADMIN — Medication 1 APPLICATION(S): at 17:10

## 2022-01-01 RX ADMIN — Medication 650 MILLIGRAM(S): at 18:22

## 2022-01-01 RX ADMIN — APIXABAN 2.5 MILLIGRAM(S): 2.5 TABLET, FILM COATED ORAL at 06:22

## 2022-01-01 RX ADMIN — CYCLOSPORINE 125 MILLIGRAM(S): 100 CAPSULE ORAL at 10:42

## 2022-01-01 RX ADMIN — SERTRALINE 50 MILLIGRAM(S): 25 TABLET, FILM COATED ORAL at 16:19

## 2022-01-01 RX ADMIN — Medication 1: at 08:55

## 2022-01-01 RX ADMIN — Medication 10 MILLIGRAM(S): at 10:39

## 2022-01-01 RX ADMIN — Medication 50 MILLILITER(S): at 02:14

## 2022-01-01 RX ADMIN — SIMETHICONE 80 MILLIGRAM(S): 80 TABLET, CHEWABLE ORAL at 06:11

## 2022-01-01 RX ADMIN — PROTHROMBIN COMPLEX CONCENTRATE (HUMAN) 400 INTERNATIONAL UNIT(S): 25.5; 16.5; 24; 22; 22; 26 POWDER, FOR SOLUTION INTRAVENOUS at 06:18

## 2022-01-01 RX ADMIN — Medication 10 UNIT(S): at 12:25

## 2022-01-01 RX ADMIN — Medication 6 MILLIGRAM(S): at 02:17

## 2022-01-01 RX ADMIN — NYSTATIN CREAM 1 APPLICATION(S): 100000 CREAM TOPICAL at 21:42

## 2022-01-01 RX ADMIN — Medication 4: at 13:09

## 2022-01-01 RX ADMIN — Medication 400 MILLIGRAM(S): at 15:28

## 2022-01-01 RX ADMIN — APIXABAN 2.5 MILLIGRAM(S): 2.5 TABLET, FILM COATED ORAL at 18:28

## 2022-01-01 RX ADMIN — CHLORHEXIDINE GLUCONATE 15 MILLILITER(S): 213 SOLUTION TOPICAL at 07:09

## 2022-01-01 RX ADMIN — CYCLOSPORINE 125 MILLIGRAM(S): 100 CAPSULE ORAL at 10:01

## 2022-01-01 RX ADMIN — SEVELAMER CARBONATE 800 MILLIGRAM(S): 2400 POWDER, FOR SUSPENSION ORAL at 09:26

## 2022-01-01 RX ADMIN — Medication 30 MILLILITER(S): at 16:29

## 2022-01-01 RX ADMIN — OXYCODONE HYDROCHLORIDE 7.5 MILLIGRAM(S): 5 TABLET ORAL at 22:55

## 2022-01-01 RX ADMIN — CYCLOSPORINE 200 MILLIGRAM(S): 100 CAPSULE ORAL at 22:13

## 2022-01-01 RX ADMIN — APIXABAN 5 MILLIGRAM(S): 2.5 TABLET, FILM COATED ORAL at 19:00

## 2022-01-01 RX ADMIN — OXYCODONE HYDROCHLORIDE 7.5 MILLIGRAM(S): 5 TABLET ORAL at 08:50

## 2022-01-01 RX ADMIN — ATORVASTATIN CALCIUM 80 MILLIGRAM(S): 80 TABLET, FILM COATED ORAL at 01:34

## 2022-01-01 RX ADMIN — Medication 100 MILLIGRAM(S): at 21:54

## 2022-01-01 RX ADMIN — SEVELAMER CARBONATE 2400 MILLIGRAM(S): 2400 POWDER, FOR SUSPENSION ORAL at 13:16

## 2022-01-01 RX ADMIN — PANTOPRAZOLE SODIUM 40 MILLIGRAM(S): 20 TABLET, DELAYED RELEASE ORAL at 19:56

## 2022-01-01 RX ADMIN — CYCLOSPORINE 200 MILLIGRAM(S): 100 CAPSULE ORAL at 18:03

## 2022-01-01 RX ADMIN — Medication 11 UNIT(S): at 08:31

## 2022-01-01 RX ADMIN — POLYETHYLENE GLYCOL 3350 17 GRAM(S): 17 POWDER, FOR SOLUTION ORAL at 23:04

## 2022-01-01 RX ADMIN — HYDROMORPHONE HYDROCHLORIDE 1 MILLIGRAM(S): 2 INJECTION INTRAMUSCULAR; INTRAVENOUS; SUBCUTANEOUS at 20:44

## 2022-01-01 RX ADMIN — SIMETHICONE 80 MILLIGRAM(S): 80 TABLET, CHEWABLE ORAL at 22:49

## 2022-01-01 RX ADMIN — MIDODRINE HYDROCHLORIDE 5 MILLIGRAM(S): 2.5 TABLET ORAL at 08:37

## 2022-01-01 RX ADMIN — SENNA PLUS 2 TABLET(S): 8.6 TABLET ORAL at 22:55

## 2022-01-01 RX ADMIN — GABAPENTIN 300 MILLIGRAM(S): 400 CAPSULE ORAL at 12:13

## 2022-01-01 RX ADMIN — OXYCODONE HYDROCHLORIDE 5 MILLIGRAM(S): 5 TABLET ORAL at 13:30

## 2022-01-01 RX ADMIN — MONTELUKAST 10 MILLIGRAM(S): 4 TABLET, CHEWABLE ORAL at 22:07

## 2022-01-01 RX ADMIN — OXYCODONE HYDROCHLORIDE 10 MILLIGRAM(S): 5 TABLET ORAL at 20:41

## 2022-01-01 RX ADMIN — Medication 30 MILLILITER(S): at 06:37

## 2022-01-01 RX ADMIN — Medication 3 MILLIGRAM(S): at 21:23

## 2022-01-01 RX ADMIN — Medication 3: at 09:50

## 2022-01-01 RX ADMIN — Medication 1: at 08:30

## 2022-01-01 RX ADMIN — OXYCODONE HYDROCHLORIDE 10 MILLIGRAM(S): 5 TABLET ORAL at 11:06

## 2022-01-01 RX ADMIN — Medication 10 UNIT(S): at 08:51

## 2022-01-01 RX ADMIN — OXYCODONE HYDROCHLORIDE 7.5 MILLIGRAM(S): 5 TABLET ORAL at 00:00

## 2022-01-01 RX ADMIN — SEVELAMER CARBONATE 2400 MILLIGRAM(S): 2400 POWDER, FOR SUSPENSION ORAL at 18:53

## 2022-01-01 RX ADMIN — Medication 9 UNIT(S): at 08:27

## 2022-01-01 RX ADMIN — LIDOCAINE 1 PATCH: 4 CREAM TOPICAL at 19:59

## 2022-01-01 RX ADMIN — SIMETHICONE 80 MILLIGRAM(S): 80 TABLET, CHEWABLE ORAL at 14:25

## 2022-01-01 RX ADMIN — Medication 1000 MILLIGRAM(S): at 18:26

## 2022-01-01 RX ADMIN — OXYCODONE HYDROCHLORIDE 5 MILLIGRAM(S): 5 TABLET ORAL at 12:53

## 2022-01-01 RX ADMIN — Medication 120 MILLIGRAM(S): at 05:05

## 2022-01-01 RX ADMIN — Medication 30 MILLILITER(S): at 22:05

## 2022-01-01 RX ADMIN — OXYCODONE HYDROCHLORIDE 10 MILLIGRAM(S): 5 TABLET ORAL at 06:27

## 2022-01-01 RX ADMIN — APIXABAN 2.5 MILLIGRAM(S): 2.5 TABLET, FILM COATED ORAL at 05:23

## 2022-01-01 RX ADMIN — OXYCODONE HYDROCHLORIDE 10 MILLIGRAM(S): 5 TABLET ORAL at 17:46

## 2022-01-01 RX ADMIN — APIXABAN 2.5 MILLIGRAM(S): 2.5 TABLET, FILM COATED ORAL at 18:24

## 2022-01-01 RX ADMIN — MIDODRINE HYDROCHLORIDE 5 MILLIGRAM(S): 2.5 TABLET ORAL at 13:51

## 2022-01-01 RX ADMIN — CYCLOSPORINE 200 MILLIGRAM(S): 100 CAPSULE ORAL at 12:24

## 2022-01-01 RX ADMIN — Medication 1000 MILLIGRAM(S): at 18:57

## 2022-01-01 RX ADMIN — SIMETHICONE 80 MILLIGRAM(S): 80 TABLET, CHEWABLE ORAL at 13:43

## 2022-01-01 RX ADMIN — CYCLOSPORINE 200 MILLIGRAM(S): 100 CAPSULE ORAL at 11:00

## 2022-01-01 RX ADMIN — SERTRALINE 50 MILLIGRAM(S): 25 TABLET, FILM COATED ORAL at 12:50

## 2022-01-01 RX ADMIN — Medication 1 APPLICATION(S): at 11:52

## 2022-01-01 RX ADMIN — OXYCODONE HYDROCHLORIDE 5 MILLIGRAM(S): 5 TABLET ORAL at 18:29

## 2022-01-01 RX ADMIN — Medication 1 APPLICATION(S): at 17:13

## 2022-01-01 RX ADMIN — Medication 3 UNIT(S): at 09:51

## 2022-01-01 RX ADMIN — CHLORHEXIDINE GLUCONATE 1 APPLICATION(S): 213 SOLUTION TOPICAL at 13:12

## 2022-01-01 RX ADMIN — LIDOCAINE 1 PATCH: 4 CREAM TOPICAL at 00:59

## 2022-01-01 RX ADMIN — GABAPENTIN 300 MILLIGRAM(S): 400 CAPSULE ORAL at 12:19

## 2022-01-01 RX ADMIN — BUPROPION HYDROCHLORIDE 150 MILLIGRAM(S): 150 TABLET, EXTENDED RELEASE ORAL at 12:10

## 2022-01-01 RX ADMIN — POLYETHYLENE GLYCOL 3350 17 GRAM(S): 17 POWDER, FOR SOLUTION ORAL at 17:50

## 2022-01-01 RX ADMIN — Medication 3: at 18:08

## 2022-01-01 RX ADMIN — LIDOCAINE 1 PATCH: 4 CREAM TOPICAL at 11:58

## 2022-01-01 RX ADMIN — OXYCODONE HYDROCHLORIDE 5 MILLIGRAM(S): 5 TABLET ORAL at 23:30

## 2022-01-01 RX ADMIN — HEPARIN SODIUM 1000 UNIT(S): 5000 INJECTION INTRAVENOUS; SUBCUTANEOUS at 09:00

## 2022-01-01 RX ADMIN — LIDOCAINE 1 PATCH: 4 CREAM TOPICAL at 19:50

## 2022-01-01 RX ADMIN — ATORVASTATIN CALCIUM 80 MILLIGRAM(S): 80 TABLET, FILM COATED ORAL at 21:38

## 2022-01-01 RX ADMIN — Medication 6 MILLIGRAM(S): at 01:32

## 2022-01-01 RX ADMIN — CYCLOSPORINE 125 MILLIGRAM(S): 100 CAPSULE ORAL at 10:34

## 2022-01-01 RX ADMIN — Medication 1 APPLICATION(S): at 13:07

## 2022-01-01 RX ADMIN — LIDOCAINE 1 PATCH: 4 CREAM TOPICAL at 01:07

## 2022-01-01 RX ADMIN — INSULIN GLARGINE 12 UNIT(S): 100 INJECTION, SOLUTION SUBCUTANEOUS at 22:33

## 2022-01-01 RX ADMIN — SIMETHICONE 80 MILLIGRAM(S): 80 TABLET, CHEWABLE ORAL at 21:27

## 2022-01-01 RX ADMIN — SEVELAMER CARBONATE 2400 MILLIGRAM(S): 2400 POWDER, FOR SUSPENSION ORAL at 12:53

## 2022-01-01 RX ADMIN — BUPROPION HYDROCHLORIDE 150 MILLIGRAM(S): 150 TABLET, EXTENDED RELEASE ORAL at 12:49

## 2022-01-01 RX ADMIN — INSULIN GLARGINE 6 UNIT(S): 100 INJECTION, SOLUTION SUBCUTANEOUS at 21:34

## 2022-01-01 RX ADMIN — PANTOPRAZOLE SODIUM 40 MILLIGRAM(S): 20 TABLET, DELAYED RELEASE ORAL at 17:52

## 2022-01-01 RX ADMIN — Medication 120 MILLIGRAM(S): at 06:50

## 2022-01-01 RX ADMIN — Medication 1 APPLICATION(S): at 11:43

## 2022-01-01 RX ADMIN — APIXABAN 2.5 MILLIGRAM(S): 2.5 TABLET, FILM COATED ORAL at 06:45

## 2022-01-01 RX ADMIN — Medication 3 UNIT(S): at 13:45

## 2022-01-01 RX ADMIN — PANTOPRAZOLE SODIUM 40 MILLIGRAM(S): 20 TABLET, DELAYED RELEASE ORAL at 09:17

## 2022-01-01 RX ADMIN — BUPROPION HYDROCHLORIDE 150 MILLIGRAM(S): 150 TABLET, EXTENDED RELEASE ORAL at 13:44

## 2022-01-01 RX ADMIN — Medication 1 APPLICATION(S): at 17:17

## 2022-01-01 RX ADMIN — Medication 120 MILLIGRAM(S): at 06:18

## 2022-01-01 RX ADMIN — Medication 10 UNIT(S): at 12:46

## 2022-01-01 RX ADMIN — MONTELUKAST 10 MILLIGRAM(S): 4 TABLET, CHEWABLE ORAL at 01:24

## 2022-01-01 RX ADMIN — Medication 1: at 09:46

## 2022-01-01 RX ADMIN — OXYCODONE HYDROCHLORIDE 10 MILLIGRAM(S): 5 TABLET ORAL at 06:13

## 2022-01-01 RX ADMIN — Medication 100 MILLIGRAM(S): at 21:29

## 2022-01-01 RX ADMIN — OXYCODONE HYDROCHLORIDE 10 MILLIGRAM(S): 5 TABLET ORAL at 14:00

## 2022-01-01 RX ADMIN — SIMETHICONE 80 MILLIGRAM(S): 80 TABLET, CHEWABLE ORAL at 14:03

## 2022-01-01 RX ADMIN — LORATADINE 10 MILLIGRAM(S): 10 TABLET ORAL at 13:14

## 2022-01-01 RX ADMIN — APIXABAN 2.5 MILLIGRAM(S): 2.5 TABLET, FILM COATED ORAL at 06:36

## 2022-01-01 RX ADMIN — APIXABAN 5 MILLIGRAM(S): 2.5 TABLET, FILM COATED ORAL at 05:42

## 2022-01-01 RX ADMIN — Medication 1000 MILLIGRAM(S): at 15:45

## 2022-01-01 RX ADMIN — CINACALCET 30 MILLIGRAM(S): 30 TABLET, FILM COATED ORAL at 13:39

## 2022-01-01 RX ADMIN — FENTANYL CITRATE 50 MICROGRAM(S): 50 INJECTION INTRAVENOUS at 06:20

## 2022-01-01 RX ADMIN — ONDANSETRON 4 MILLIGRAM(S): 8 TABLET, FILM COATED ORAL at 07:17

## 2022-01-01 RX ADMIN — PANTOPRAZOLE SODIUM 40 MILLIGRAM(S): 20 TABLET, DELAYED RELEASE ORAL at 05:17

## 2022-01-01 RX ADMIN — CINACALCET 30 MILLIGRAM(S): 30 TABLET, FILM COATED ORAL at 12:20

## 2022-01-01 RX ADMIN — Medication 2: at 18:18

## 2022-01-01 RX ADMIN — PANTOPRAZOLE SODIUM 40 MILLIGRAM(S): 20 TABLET, DELAYED RELEASE ORAL at 06:23

## 2022-01-01 RX ADMIN — Medication 120 MILLIGRAM(S): at 06:04

## 2022-01-01 RX ADMIN — OXYCODONE HYDROCHLORIDE 5 MILLIGRAM(S): 5 TABLET ORAL at 01:25

## 2022-01-01 RX ADMIN — CYCLOSPORINE 125 MILLIGRAM(S): 100 CAPSULE ORAL at 22:33

## 2022-01-01 RX ADMIN — SIMETHICONE 80 MILLIGRAM(S): 80 TABLET, CHEWABLE ORAL at 13:22

## 2022-01-01 RX ADMIN — Medication 650 MILLIGRAM(S): at 16:00

## 2022-01-01 RX ADMIN — INSULIN GLARGINE 16 UNIT(S): 100 INJECTION, SOLUTION SUBCUTANEOUS at 22:56

## 2022-01-01 RX ADMIN — LORATADINE 10 MILLIGRAM(S): 10 TABLET ORAL at 13:11

## 2022-01-01 RX ADMIN — SENNA PLUS 2 TABLET(S): 8.6 TABLET ORAL at 22:08

## 2022-01-01 RX ADMIN — ONDANSETRON 4 MILLIGRAM(S): 8 TABLET, FILM COATED ORAL at 21:31

## 2022-01-01 RX ADMIN — MIRTAZAPINE 7.5 MILLIGRAM(S): 45 TABLET, ORALLY DISINTEGRATING ORAL at 23:06

## 2022-01-01 RX ADMIN — GABAPENTIN 300 MILLIGRAM(S): 400 CAPSULE ORAL at 12:41

## 2022-01-01 RX ADMIN — Medication 400 MILLIGRAM(S): at 21:28

## 2022-01-01 RX ADMIN — INSULIN GLARGINE 10 UNIT(S): 100 INJECTION, SOLUTION SUBCUTANEOUS at 22:43

## 2022-01-01 RX ADMIN — OXYCODONE HYDROCHLORIDE 10 MILLIGRAM(S): 5 TABLET ORAL at 18:31

## 2022-01-01 RX ADMIN — APIXABAN 2.5 MILLIGRAM(S): 2.5 TABLET, FILM COATED ORAL at 18:10

## 2022-01-01 RX ADMIN — LIDOCAINE AND PRILOCAINE CREAM 1 APPLICATION(S): 25; 25 CREAM TOPICAL at 06:52

## 2022-01-01 RX ADMIN — MONTELUKAST 10 MILLIGRAM(S): 4 TABLET, CHEWABLE ORAL at 21:06

## 2022-01-01 RX ADMIN — HEPARIN SODIUM 18 UNIT(S)/HR: 5000 INJECTION INTRAVENOUS; SUBCUTANEOUS at 15:29

## 2022-01-01 RX ADMIN — Medication 1 TABLET(S): at 13:49

## 2022-01-01 RX ADMIN — SODIUM CHLORIDE 1000 MILLILITER(S): 9 INJECTION INTRAMUSCULAR; INTRAVENOUS; SUBCUTANEOUS at 21:49

## 2022-01-01 RX ADMIN — LIDOCAINE 1 PATCH: 4 CREAM TOPICAL at 13:20

## 2022-01-01 RX ADMIN — MONTELUKAST 10 MILLIGRAM(S): 4 TABLET, CHEWABLE ORAL at 23:04

## 2022-01-01 RX ADMIN — Medication 6 MILLIGRAM(S): at 21:21

## 2022-01-01 RX ADMIN — Medication 1 APPLICATION(S): at 18:18

## 2022-01-01 RX ADMIN — PANTOPRAZOLE SODIUM 40 MILLIGRAM(S): 20 TABLET, DELAYED RELEASE ORAL at 05:39

## 2022-01-01 RX ADMIN — SENNA PLUS 2 TABLET(S): 8.6 TABLET ORAL at 21:56

## 2022-01-01 RX ADMIN — MORPHINE SULFATE 4 MILLIGRAM(S): 50 CAPSULE, EXTENDED RELEASE ORAL at 22:46

## 2022-01-01 RX ADMIN — OXYCODONE HYDROCHLORIDE 10 MILLIGRAM(S): 5 TABLET ORAL at 06:33

## 2022-01-01 RX ADMIN — CHLORHEXIDINE GLUCONATE 1 APPLICATION(S): 213 SOLUTION TOPICAL at 11:39

## 2022-01-01 RX ADMIN — APIXABAN 2.5 MILLIGRAM(S): 2.5 TABLET, FILM COATED ORAL at 06:37

## 2022-01-01 RX ADMIN — POLYETHYLENE GLYCOL 3350 17 GRAM(S): 17 POWDER, FOR SOLUTION ORAL at 22:21

## 2022-01-01 RX ADMIN — SENNA PLUS 2 TABLET(S): 8.6 TABLET ORAL at 22:03

## 2022-01-01 RX ADMIN — Medication 1 APPLICATION(S): at 13:15

## 2022-01-01 RX ADMIN — MUPIROCIN 1 APPLICATION(S): 20 OINTMENT TOPICAL at 10:56

## 2022-01-01 RX ADMIN — Medication 25 GRAM(S): at 18:26

## 2022-01-01 RX ADMIN — Medication 100 MILLIGRAM(S): at 22:04

## 2022-01-01 RX ADMIN — Medication 1: at 13:54

## 2022-01-01 RX ADMIN — SEVELAMER CARBONATE 800 MILLIGRAM(S): 2400 POWDER, FOR SUSPENSION ORAL at 18:11

## 2022-01-01 RX ADMIN — Medication 100 MILLIGRAM(S): at 22:07

## 2022-01-01 RX ADMIN — LORATADINE 10 MILLIGRAM(S): 10 TABLET ORAL at 13:29

## 2022-01-01 RX ADMIN — CEFEPIME 100 MILLIGRAM(S): 1 INJECTION, POWDER, FOR SOLUTION INTRAMUSCULAR; INTRAVENOUS at 20:34

## 2022-01-01 RX ADMIN — OXYCODONE HYDROCHLORIDE 5 MILLIGRAM(S): 5 TABLET ORAL at 13:51

## 2022-01-01 RX ADMIN — Medication 4: at 12:59

## 2022-01-01 RX ADMIN — CYCLOSPORINE 200 MILLIGRAM(S): 100 CAPSULE ORAL at 18:09

## 2022-01-01 RX ADMIN — LORATADINE 10 MILLIGRAM(S): 10 TABLET ORAL at 16:10

## 2022-01-01 RX ADMIN — OXYCODONE HYDROCHLORIDE 10 MILLIGRAM(S): 5 TABLET ORAL at 18:27

## 2022-01-01 RX ADMIN — OXYCODONE HYDROCHLORIDE 5 MILLIGRAM(S): 5 TABLET ORAL at 00:45

## 2022-01-01 RX ADMIN — MIRTAZAPINE 7.5 MILLIGRAM(S): 45 TABLET, ORALLY DISINTEGRATING ORAL at 23:04

## 2022-01-01 RX ADMIN — MONTELUKAST 10 MILLIGRAM(S): 4 TABLET, CHEWABLE ORAL at 22:45

## 2022-01-01 RX ADMIN — MUPIROCIN 1 APPLICATION(S): 20 OINTMENT TOPICAL at 18:18

## 2022-01-01 RX ADMIN — Medication 650 MILLIGRAM(S): at 06:48

## 2022-01-01 RX ADMIN — SERTRALINE 50 MILLIGRAM(S): 25 TABLET, FILM COATED ORAL at 12:16

## 2022-01-01 RX ADMIN — CHLORHEXIDINE GLUCONATE 1 APPLICATION(S): 213 SOLUTION TOPICAL at 11:23

## 2022-01-01 RX ADMIN — LIDOCAINE AND PRILOCAINE CREAM 1 APPLICATION(S): 25; 25 CREAM TOPICAL at 07:23

## 2022-01-01 RX ADMIN — APIXABAN 5 MILLIGRAM(S): 2.5 TABLET, FILM COATED ORAL at 17:38

## 2022-01-01 RX ADMIN — Medication 3: at 12:25

## 2022-01-01 RX ADMIN — Medication 100 MILLIGRAM(S): at 22:14

## 2022-01-01 RX ADMIN — Medication 100 MILLIGRAM(S): at 21:50

## 2022-01-01 RX ADMIN — APIXABAN 2.5 MILLIGRAM(S): 2.5 TABLET, FILM COATED ORAL at 17:10

## 2022-01-01 RX ADMIN — Medication 400 MILLIGRAM(S): at 10:15

## 2022-01-01 RX ADMIN — APIXABAN 5 MILLIGRAM(S): 2.5 TABLET, FILM COATED ORAL at 17:04

## 2022-01-01 RX ADMIN — SERTRALINE 50 MILLIGRAM(S): 25 TABLET, FILM COATED ORAL at 11:52

## 2022-01-01 RX ADMIN — SENNA PLUS 2 TABLET(S): 8.6 TABLET ORAL at 21:46

## 2022-01-01 RX ADMIN — POLYETHYLENE GLYCOL 3350 17 GRAM(S): 17 POWDER, FOR SOLUTION ORAL at 18:14

## 2022-01-01 RX ADMIN — Medication 10 UNIT(S): at 12:14

## 2022-01-01 RX ADMIN — SEVELAMER CARBONATE 800 MILLIGRAM(S): 2400 POWDER, FOR SUSPENSION ORAL at 18:23

## 2022-01-01 RX ADMIN — Medication 6 MILLIGRAM(S): at 21:27

## 2022-01-01 RX ADMIN — Medication 100 MILLIGRAM(S): at 21:45

## 2022-01-01 RX ADMIN — Medication 6 MILLIGRAM(S): at 20:54

## 2022-01-01 RX ADMIN — Medication 1 APPLICATION(S): at 11:24

## 2022-01-01 RX ADMIN — OXYCODONE HYDROCHLORIDE 10 MILLIGRAM(S): 5 TABLET ORAL at 11:51

## 2022-01-01 RX ADMIN — MIRTAZAPINE 7.5 MILLIGRAM(S): 45 TABLET, ORALLY DISINTEGRATING ORAL at 21:55

## 2022-01-01 RX ADMIN — Medication 100 MILLIGRAM(S): at 03:30

## 2022-01-01 RX ADMIN — Medication 1 APPLICATION(S): at 13:55

## 2022-01-01 RX ADMIN — CINACALCET 30 MILLIGRAM(S): 30 TABLET, FILM COATED ORAL at 11:36

## 2022-01-01 RX ADMIN — INSULIN GLARGINE 15 UNIT(S): 100 INJECTION, SOLUTION SUBCUTANEOUS at 21:51

## 2022-01-01 RX ADMIN — POLYETHYLENE GLYCOL 3350 17 GRAM(S): 17 POWDER, FOR SOLUTION ORAL at 21:56

## 2022-01-01 RX ADMIN — LORATADINE 10 MILLIGRAM(S): 10 TABLET ORAL at 12:10

## 2022-01-01 RX ADMIN — APIXABAN 2.5 MILLIGRAM(S): 2.5 TABLET, FILM COATED ORAL at 18:03

## 2022-01-01 RX ADMIN — SERTRALINE 50 MILLIGRAM(S): 25 TABLET, FILM COATED ORAL at 12:14

## 2022-01-01 RX ADMIN — Medication 50 MILLIEQUIVALENT(S): at 14:54

## 2022-01-01 RX ADMIN — PANTOPRAZOLE SODIUM 40 MILLIGRAM(S): 20 TABLET, DELAYED RELEASE ORAL at 06:40

## 2022-01-01 RX ADMIN — Medication 100 MILLIGRAM(S): at 11:39

## 2022-01-01 RX ADMIN — Medication 81 MILLIGRAM(S): at 13:13

## 2022-01-01 RX ADMIN — OXYCODONE HYDROCHLORIDE 10 MILLIGRAM(S): 5 TABLET ORAL at 05:51

## 2022-01-01 RX ADMIN — INSULIN GLARGINE 16 UNIT(S): 100 INJECTION, SOLUTION SUBCUTANEOUS at 22:23

## 2022-01-01 RX ADMIN — LORATADINE 10 MILLIGRAM(S): 10 TABLET ORAL at 12:44

## 2022-01-01 RX ADMIN — OXYCODONE HYDROCHLORIDE 5 MILLIGRAM(S): 5 TABLET ORAL at 08:43

## 2022-01-01 RX ADMIN — SEVELAMER CARBONATE 800 MILLIGRAM(S): 2400 POWDER, FOR SUSPENSION ORAL at 08:58

## 2022-01-01 RX ADMIN — SEVELAMER CARBONATE 800 MILLIGRAM(S): 2400 POWDER, FOR SUSPENSION ORAL at 17:28

## 2022-01-01 RX ADMIN — Medication 4.69 MICROGRAM(S)/KG/MIN: at 20:23

## 2022-01-01 RX ADMIN — SENNA PLUS 2 TABLET(S): 8.6 TABLET ORAL at 21:27

## 2022-01-01 RX ADMIN — Medication 1: at 12:54

## 2022-01-01 RX ADMIN — LIDOCAINE 1 PATCH: 4 CREAM TOPICAL at 19:49

## 2022-01-01 RX ADMIN — LIDOCAINE 1 PATCH: 4 CREAM TOPICAL at 12:59

## 2022-01-01 RX ADMIN — OXYCODONE HYDROCHLORIDE 5 MILLIGRAM(S): 5 TABLET ORAL at 22:43

## 2022-01-01 RX ADMIN — PANTOPRAZOLE SODIUM 40 MILLIGRAM(S): 20 TABLET, DELAYED RELEASE ORAL at 06:52

## 2022-01-01 RX ADMIN — HYDROMORPHONE HYDROCHLORIDE 1 MILLIGRAM(S): 2 INJECTION INTRAMUSCULAR; INTRAVENOUS; SUBCUTANEOUS at 13:18

## 2022-01-01 RX ADMIN — ERYTHROPOIETIN 10000 UNIT(S): 10000 INJECTION, SOLUTION INTRAVENOUS; SUBCUTANEOUS at 10:10

## 2022-01-01 RX ADMIN — CEFEPIME 100 MILLIGRAM(S): 1 INJECTION, POWDER, FOR SOLUTION INTRAMUSCULAR; INTRAVENOUS at 05:53

## 2022-01-01 RX ADMIN — Medication 1 APPLICATION(S): at 11:54

## 2022-01-01 RX ADMIN — OXYCODONE HYDROCHLORIDE 7.5 MILLIGRAM(S): 5 TABLET ORAL at 06:38

## 2022-01-01 RX ADMIN — Medication 1 APPLICATION(S): at 06:06

## 2022-01-01 RX ADMIN — Medication 100 MILLIGRAM(S): at 22:41

## 2022-01-01 RX ADMIN — LIDOCAINE 1 PATCH: 4 CREAM TOPICAL at 12:56

## 2022-01-01 RX ADMIN — OXYCODONE HYDROCHLORIDE 7.5 MILLIGRAM(S): 5 TABLET ORAL at 12:52

## 2022-01-01 RX ADMIN — POLYETHYLENE GLYCOL 3350 17 GRAM(S): 17 POWDER, FOR SOLUTION ORAL at 17:48

## 2022-01-01 RX ADMIN — Medication 2: at 12:14

## 2022-01-01 RX ADMIN — Medication 1: at 23:12

## 2022-01-01 RX ADMIN — SENNA PLUS 2 TABLET(S): 8.6 TABLET ORAL at 03:29

## 2022-01-01 RX ADMIN — Medication 120 MILLIGRAM(S): at 05:27

## 2022-01-01 RX ADMIN — CYCLOSPORINE 200 MILLIGRAM(S): 100 CAPSULE ORAL at 06:24

## 2022-01-01 RX ADMIN — HYDROMORPHONE HYDROCHLORIDE 1 MILLIGRAM(S): 2 INJECTION INTRAMUSCULAR; INTRAVENOUS; SUBCUTANEOUS at 15:01

## 2022-01-01 RX ADMIN — SEVELAMER CARBONATE 800 MILLIGRAM(S): 2400 POWDER, FOR SUSPENSION ORAL at 13:38

## 2022-01-01 RX ADMIN — Medication 3: at 17:54

## 2022-01-01 RX ADMIN — CINACALCET 60 MILLIGRAM(S): 30 TABLET, FILM COATED ORAL at 21:55

## 2022-01-01 RX ADMIN — Medication 1: at 14:14

## 2022-01-01 RX ADMIN — LIDOCAINE 1 PATCH: 4 CREAM TOPICAL at 14:13

## 2022-01-01 RX ADMIN — Medication 30 MILLILITER(S): at 21:38

## 2022-01-01 RX ADMIN — Medication 1: at 08:59

## 2022-01-01 RX ADMIN — Medication 2: at 08:46

## 2022-01-01 RX ADMIN — SEVELAMER CARBONATE 800 MILLIGRAM(S): 2400 POWDER, FOR SUSPENSION ORAL at 12:49

## 2022-01-01 RX ADMIN — INSULIN GLARGINE 6 UNIT(S): 100 INJECTION, SOLUTION SUBCUTANEOUS at 22:31

## 2022-01-01 RX ADMIN — OXYCODONE HYDROCHLORIDE 10 MILLIGRAM(S): 5 TABLET ORAL at 20:28

## 2022-01-01 RX ADMIN — OXYCODONE HYDROCHLORIDE 10 MILLIGRAM(S): 5 TABLET ORAL at 18:28

## 2022-01-01 RX ADMIN — OXYCODONE HYDROCHLORIDE 7.5 MILLIGRAM(S): 5 TABLET ORAL at 11:08

## 2022-01-01 RX ADMIN — LIDOCAINE 1 PATCH: 4 CREAM TOPICAL at 11:53

## 2022-01-01 RX ADMIN — MIDODRINE HYDROCHLORIDE 5 MILLIGRAM(S): 2.5 TABLET ORAL at 11:20

## 2022-01-01 RX ADMIN — CYCLOSPORINE 200 MILLIGRAM(S): 100 CAPSULE ORAL at 23:07

## 2022-01-01 RX ADMIN — INSULIN GLARGINE 7 UNIT(S): 100 INJECTION, SOLUTION SUBCUTANEOUS at 22:21

## 2022-01-01 RX ADMIN — OXYCODONE HYDROCHLORIDE 10 MILLIGRAM(S): 5 TABLET ORAL at 06:18

## 2022-01-01 RX ADMIN — Medication 1000 MILLIGRAM(S): at 10:15

## 2022-01-01 RX ADMIN — MIRTAZAPINE 7.5 MILLIGRAM(S): 45 TABLET, ORALLY DISINTEGRATING ORAL at 01:25

## 2022-01-01 RX ADMIN — Medication 3 UNIT(S): at 09:50

## 2022-01-01 RX ADMIN — INSULIN GLARGINE 10 UNIT(S): 100 INJECTION, SOLUTION SUBCUTANEOUS at 22:38

## 2022-01-01 RX ADMIN — SEVELAMER CARBONATE 800 MILLIGRAM(S): 2400 POWDER, FOR SUSPENSION ORAL at 18:26

## 2022-01-01 RX ADMIN — SIMETHICONE 80 MILLIGRAM(S): 80 TABLET, CHEWABLE ORAL at 21:32

## 2022-01-01 RX ADMIN — SERTRALINE 50 MILLIGRAM(S): 25 TABLET, FILM COATED ORAL at 12:51

## 2022-01-01 RX ADMIN — SEVELAMER CARBONATE 800 MILLIGRAM(S): 2400 POWDER, FOR SUSPENSION ORAL at 12:32

## 2022-01-01 RX ADMIN — APIXABAN 5 MILLIGRAM(S): 2.5 TABLET, FILM COATED ORAL at 17:46

## 2022-01-01 RX ADMIN — OXYCODONE HYDROCHLORIDE 10 MILLIGRAM(S): 5 TABLET ORAL at 17:20

## 2022-01-01 RX ADMIN — MUPIROCIN 1 APPLICATION(S): 20 OINTMENT TOPICAL at 06:31

## 2022-01-01 RX ADMIN — APIXABAN 2.5 MILLIGRAM(S): 2.5 TABLET, FILM COATED ORAL at 05:27

## 2022-01-01 RX ADMIN — SEVELAMER CARBONATE 800 MILLIGRAM(S): 2400 POWDER, FOR SUSPENSION ORAL at 12:11

## 2022-01-01 RX ADMIN — OXYCODONE HYDROCHLORIDE 10 MILLIGRAM(S): 5 TABLET ORAL at 11:44

## 2022-01-01 RX ADMIN — SODIUM CHLORIDE 1000 MILLILITER(S): 9 INJECTION INTRAMUSCULAR; INTRAVENOUS; SUBCUTANEOUS at 19:29

## 2022-01-01 RX ADMIN — MIRTAZAPINE 7.5 MILLIGRAM(S): 45 TABLET, ORALLY DISINTEGRATING ORAL at 00:56

## 2022-01-01 RX ADMIN — CINACALCET 60 MILLIGRAM(S): 30 TABLET, FILM COATED ORAL at 12:20

## 2022-01-01 RX ADMIN — LIDOCAINE 1 PATCH: 4 CREAM TOPICAL at 12:25

## 2022-01-01 RX ADMIN — LIDOCAINE 1 PATCH: 4 CREAM TOPICAL at 18:00

## 2022-01-01 RX ADMIN — Medication 1 APPLICATION(S): at 17:49

## 2022-01-01 RX ADMIN — Medication 1: at 13:06

## 2022-01-01 RX ADMIN — SEVELAMER CARBONATE 800 MILLIGRAM(S): 2400 POWDER, FOR SUSPENSION ORAL at 08:51

## 2022-01-01 RX ADMIN — MIRTAZAPINE 7.5 MILLIGRAM(S): 45 TABLET, ORALLY DISINTEGRATING ORAL at 21:38

## 2022-01-01 RX ADMIN — Medication 1: at 12:51

## 2022-01-01 RX ADMIN — Medication 2 UNIT(S): at 18:07

## 2022-01-01 RX ADMIN — Medication 1 APPLICATION(S): at 10:00

## 2022-01-01 RX ADMIN — LIDOCAINE 1 PATCH: 4 CREAM TOPICAL at 14:11

## 2022-01-01 RX ADMIN — MONTELUKAST 10 MILLIGRAM(S): 4 TABLET, CHEWABLE ORAL at 21:32

## 2022-01-01 RX ADMIN — SIMETHICONE 80 MILLIGRAM(S): 80 TABLET, CHEWABLE ORAL at 06:25

## 2022-01-01 RX ADMIN — Medication 650 MILLIGRAM(S): at 10:33

## 2022-01-01 RX ADMIN — SIMETHICONE 80 MILLIGRAM(S): 80 TABLET, CHEWABLE ORAL at 21:31

## 2022-01-01 RX ADMIN — Medication 35 MILLIGRAM(S): at 06:15

## 2022-01-01 RX ADMIN — SIMETHICONE 80 MILLIGRAM(S): 80 TABLET, CHEWABLE ORAL at 02:34

## 2022-01-01 RX ADMIN — CINACALCET 60 MILLIGRAM(S): 30 TABLET, FILM COATED ORAL at 11:11

## 2022-01-01 RX ADMIN — CHLORHEXIDINE GLUCONATE 1 APPLICATION(S): 213 SOLUTION TOPICAL at 12:27

## 2022-01-01 RX ADMIN — CINACALCET 60 MILLIGRAM(S): 30 TABLET, FILM COATED ORAL at 21:19

## 2022-01-01 RX ADMIN — POLYETHYLENE GLYCOL 3350 17 GRAM(S): 17 POWDER, FOR SOLUTION ORAL at 22:02

## 2022-01-01 RX ADMIN — OXYCODONE HYDROCHLORIDE 7.5 MILLIGRAM(S): 5 TABLET ORAL at 22:02

## 2022-01-01 RX ADMIN — SEVELAMER CARBONATE 2400 MILLIGRAM(S): 2400 POWDER, FOR SUSPENSION ORAL at 11:43

## 2022-01-01 RX ADMIN — Medication 75 MEQ/KG/HR: at 04:46

## 2022-01-01 RX ADMIN — SEVELAMER CARBONATE 800 MILLIGRAM(S): 2400 POWDER, FOR SUSPENSION ORAL at 12:27

## 2022-01-01 RX ADMIN — APIXABAN 2.5 MILLIGRAM(S): 2.5 TABLET, FILM COATED ORAL at 17:47

## 2022-01-01 RX ADMIN — SENNA PLUS 2 TABLET(S): 8.6 TABLET ORAL at 21:55

## 2022-01-01 RX ADMIN — SERTRALINE 50 MILLIGRAM(S): 25 TABLET, FILM COATED ORAL at 12:25

## 2022-01-01 RX ADMIN — OXYCODONE HYDROCHLORIDE 7.5 MILLIGRAM(S): 5 TABLET ORAL at 15:39

## 2022-01-01 RX ADMIN — ERYTHROPOIETIN 14000 UNIT(S): 10000 INJECTION, SOLUTION INTRAVENOUS; SUBCUTANEOUS at 11:22

## 2022-01-01 RX ADMIN — Medication 120 MILLIGRAM(S): at 05:31

## 2022-01-01 RX ADMIN — Medication 120 MILLIGRAM(S): at 06:06

## 2022-01-01 RX ADMIN — Medication 1 APPLICATION(S): at 13:38

## 2022-01-01 RX ADMIN — Medication 12.5 GRAM(S): at 15:26

## 2022-01-01 RX ADMIN — OXYCODONE HYDROCHLORIDE 10 MILLIGRAM(S): 5 TABLET ORAL at 05:59

## 2022-01-01 RX ADMIN — Medication 81 MILLIGRAM(S): at 13:14

## 2022-01-01 RX ADMIN — OXYCODONE HYDROCHLORIDE 10 MILLIGRAM(S): 5 TABLET ORAL at 00:32

## 2022-01-01 RX ADMIN — Medication 5 UNIT(S): at 18:16

## 2022-01-01 RX ADMIN — Medication 3: at 13:15

## 2022-01-01 RX ADMIN — HYDROMORPHONE HYDROCHLORIDE 0.25 MILLIGRAM(S): 2 INJECTION INTRAMUSCULAR; INTRAVENOUS; SUBCUTANEOUS at 05:02

## 2022-01-01 RX ADMIN — APIXABAN 5 MILLIGRAM(S): 2.5 TABLET, FILM COATED ORAL at 18:36

## 2022-01-01 RX ADMIN — Medication 81 MILLIGRAM(S): at 12:49

## 2022-01-01 RX ADMIN — OXYCODONE HYDROCHLORIDE 5 MILLIGRAM(S): 5 TABLET ORAL at 01:31

## 2022-01-01 RX ADMIN — Medication 100 MILLIGRAM(S): at 01:32

## 2022-01-01 RX ADMIN — Medication 50 MILLIEQUIVALENT(S): at 00:35

## 2022-01-01 RX ADMIN — Medication 9.38 MICROGRAM(S)/KG/MIN: at 05:19

## 2022-01-01 RX ADMIN — MIRTAZAPINE 7.5 MILLIGRAM(S): 45 TABLET, ORALLY DISINTEGRATING ORAL at 22:23

## 2022-01-01 RX ADMIN — SERTRALINE 50 MILLIGRAM(S): 25 TABLET, FILM COATED ORAL at 15:36

## 2022-01-01 RX ADMIN — OXYCODONE HYDROCHLORIDE 10 MILLIGRAM(S): 5 TABLET ORAL at 06:07

## 2022-01-01 RX ADMIN — APIXABAN 2.5 MILLIGRAM(S): 2.5 TABLET, FILM COATED ORAL at 18:53

## 2022-01-01 RX ADMIN — Medication 1 APPLICATION(S): at 18:27

## 2022-01-01 RX ADMIN — SERTRALINE 50 MILLIGRAM(S): 25 TABLET, FILM COATED ORAL at 13:40

## 2022-01-01 RX ADMIN — MONTELUKAST 10 MILLIGRAM(S): 4 TABLET, CHEWABLE ORAL at 22:00

## 2022-01-01 RX ADMIN — LIDOCAINE 1 PATCH: 4 CREAM TOPICAL at 12:46

## 2022-01-01 RX ADMIN — Medication 650 MILLIGRAM(S): at 05:47

## 2022-01-01 RX ADMIN — SIMETHICONE 80 MILLIGRAM(S): 80 TABLET, CHEWABLE ORAL at 13:45

## 2022-01-01 RX ADMIN — Medication 25 GRAM(S): at 18:29

## 2022-01-01 RX ADMIN — Medication 650 MILLIGRAM(S): at 07:18

## 2022-01-01 RX ADMIN — Medication 1 APPLICATION(S): at 16:59

## 2022-01-01 RX ADMIN — LIDOCAINE 1 PATCH: 4 CREAM TOPICAL at 13:52

## 2022-01-01 RX ADMIN — OXYCODONE HYDROCHLORIDE 10 MILLIGRAM(S): 5 TABLET ORAL at 10:50

## 2022-01-01 RX ADMIN — SENNA PLUS 2 TABLET(S): 8.6 TABLET ORAL at 21:53

## 2022-01-01 RX ADMIN — LORATADINE 10 MILLIGRAM(S): 10 TABLET ORAL at 15:15

## 2022-01-01 RX ADMIN — APIXABAN 2.5 MILLIGRAM(S): 2.5 TABLET, FILM COATED ORAL at 09:17

## 2022-01-01 RX ADMIN — SEVELAMER CARBONATE 800 MILLIGRAM(S): 2400 POWDER, FOR SUSPENSION ORAL at 18:12

## 2022-01-01 RX ADMIN — Medication 5: at 12:48

## 2022-01-01 RX ADMIN — Medication 650 MILLIGRAM(S): at 11:21

## 2022-01-01 RX ADMIN — MONTELUKAST 10 MILLIGRAM(S): 4 TABLET, CHEWABLE ORAL at 22:12

## 2022-01-01 RX ADMIN — SEVELAMER CARBONATE 800 MILLIGRAM(S): 2400 POWDER, FOR SUSPENSION ORAL at 17:30

## 2022-01-01 RX ADMIN — CHLORHEXIDINE GLUCONATE 1 APPLICATION(S): 213 SOLUTION TOPICAL at 12:22

## 2022-01-01 RX ADMIN — CINACALCET 30 MILLIGRAM(S): 30 TABLET, FILM COATED ORAL at 11:54

## 2022-01-01 RX ADMIN — SIMETHICONE 80 MILLIGRAM(S): 80 TABLET, CHEWABLE ORAL at 13:29

## 2022-01-01 RX ADMIN — CINACALCET 30 MILLIGRAM(S): 30 TABLET, FILM COATED ORAL at 13:48

## 2022-01-01 RX ADMIN — Medication 1000 MILLIGRAM(S): at 22:08

## 2022-01-01 RX ADMIN — MUPIROCIN 1 APPLICATION(S): 20 OINTMENT TOPICAL at 18:19

## 2022-01-01 RX ADMIN — GABAPENTIN 300 MILLIGRAM(S): 400 CAPSULE ORAL at 12:51

## 2022-01-01 RX ADMIN — CHLORHEXIDINE GLUCONATE 1 APPLICATION(S): 213 SOLUTION TOPICAL at 11:29

## 2022-01-01 RX ADMIN — Medication 81 MILLIGRAM(S): at 12:28

## 2022-01-01 RX ADMIN — OXYCODONE HYDROCHLORIDE 7.5 MILLIGRAM(S): 5 TABLET ORAL at 22:25

## 2022-01-01 RX ADMIN — CHLORHEXIDINE GLUCONATE 1 APPLICATION(S): 213 SOLUTION TOPICAL at 11:45

## 2022-01-01 RX ADMIN — SERTRALINE 50 MILLIGRAM(S): 25 TABLET, FILM COATED ORAL at 12:12

## 2022-01-01 RX ADMIN — SIMETHICONE 80 MILLIGRAM(S): 80 TABLET, CHEWABLE ORAL at 05:15

## 2022-01-01 RX ADMIN — Medication 120 MILLIGRAM(S): at 06:09

## 2022-01-01 RX ADMIN — PANTOPRAZOLE SODIUM 40 MILLIGRAM(S): 20 TABLET, DELAYED RELEASE ORAL at 09:35

## 2022-01-01 RX ADMIN — OXYCODONE HYDROCHLORIDE 7.5 MILLIGRAM(S): 5 TABLET ORAL at 10:05

## 2022-01-01 RX ADMIN — OXYCODONE HYDROCHLORIDE 5 MILLIGRAM(S): 5 TABLET ORAL at 13:17

## 2022-01-01 RX ADMIN — BUPROPION HYDROCHLORIDE 150 MILLIGRAM(S): 150 TABLET, EXTENDED RELEASE ORAL at 13:42

## 2022-01-01 RX ADMIN — LIDOCAINE 1 PATCH: 4 CREAM TOPICAL at 14:10

## 2022-01-01 RX ADMIN — SENNA PLUS 2 TABLET(S): 8.6 TABLET ORAL at 23:43

## 2022-01-01 RX ADMIN — GABAPENTIN 300 MILLIGRAM(S): 400 CAPSULE ORAL at 13:43

## 2022-01-01 RX ADMIN — OXYCODONE HYDROCHLORIDE 7.5 MILLIGRAM(S): 5 TABLET ORAL at 23:00

## 2022-01-01 RX ADMIN — SIMETHICONE 80 MILLIGRAM(S): 80 TABLET, CHEWABLE ORAL at 15:33

## 2022-01-01 RX ADMIN — OXYCODONE HYDROCHLORIDE 7.5 MILLIGRAM(S): 5 TABLET ORAL at 15:46

## 2022-01-01 RX ADMIN — INSULIN GLARGINE 12 UNIT(S): 100 INJECTION, SOLUTION SUBCUTANEOUS at 22:02

## 2022-01-01 RX ADMIN — Medication 81 MILLIGRAM(S): at 13:07

## 2022-01-01 RX ADMIN — Medication 1000 MILLIGRAM(S): at 12:06

## 2022-01-01 RX ADMIN — CYCLOSPORINE 200 MILLIGRAM(S): 100 CAPSULE ORAL at 17:53

## 2022-01-01 RX ADMIN — Medication 1 APPLICATION(S): at 14:11

## 2022-01-01 RX ADMIN — LIDOCAINE 1 PATCH: 4 CREAM TOPICAL at 01:14

## 2022-01-01 RX ADMIN — HYDROMORPHONE HYDROCHLORIDE 1 MILLIGRAM(S): 2 INJECTION INTRAMUSCULAR; INTRAVENOUS; SUBCUTANEOUS at 13:11

## 2022-01-01 RX ADMIN — Medication 1 APPLICATION(S): at 06:34

## 2022-01-01 RX ADMIN — LORATADINE 10 MILLIGRAM(S): 10 TABLET ORAL at 12:49

## 2022-01-01 RX ADMIN — CHLORHEXIDINE GLUCONATE 1 APPLICATION(S): 213 SOLUTION TOPICAL at 17:51

## 2022-01-01 RX ADMIN — CYCLOSPORINE 150 MILLIGRAM(S): 100 CAPSULE ORAL at 05:58

## 2022-01-01 RX ADMIN — CYCLOSPORINE 125 MILLIGRAM(S): 100 CAPSULE ORAL at 11:39

## 2022-01-01 RX ADMIN — OXYCODONE HYDROCHLORIDE 5 MILLIGRAM(S): 5 TABLET ORAL at 10:47

## 2022-01-01 RX ADMIN — SERTRALINE 50 MILLIGRAM(S): 25 TABLET, FILM COATED ORAL at 13:37

## 2022-01-01 RX ADMIN — APIXABAN 2.5 MILLIGRAM(S): 2.5 TABLET, FILM COATED ORAL at 18:02

## 2022-01-01 RX ADMIN — Medication 8: at 18:16

## 2022-01-01 RX ADMIN — Medication 2: at 18:25

## 2022-01-01 RX ADMIN — CYCLOSPORINE 200 MILLIGRAM(S): 100 CAPSULE ORAL at 06:06

## 2022-01-01 RX ADMIN — CHLORHEXIDINE GLUCONATE 1 APPLICATION(S): 213 SOLUTION TOPICAL at 14:15

## 2022-01-01 RX ADMIN — Medication 100 MILLIGRAM(S): at 13:37

## 2022-01-01 RX ADMIN — OXYCODONE HYDROCHLORIDE 10 MILLIGRAM(S): 5 TABLET ORAL at 21:01

## 2022-01-01 RX ADMIN — SERTRALINE 50 MILLIGRAM(S): 25 TABLET, FILM COATED ORAL at 11:43

## 2022-01-01 RX ADMIN — Medication 1 APPLICATION(S): at 05:01

## 2022-01-01 RX ADMIN — Medication 120 MILLIGRAM(S): at 05:22

## 2022-01-01 RX ADMIN — MIRTAZAPINE 7.5 MILLIGRAM(S): 45 TABLET, ORALLY DISINTEGRATING ORAL at 01:32

## 2022-01-01 RX ADMIN — SEVELAMER CARBONATE 2400 MILLIGRAM(S): 2400 POWDER, FOR SUSPENSION ORAL at 12:24

## 2022-01-01 RX ADMIN — ERYTHROPOIETIN 8000 UNIT(S): 10000 INJECTION, SOLUTION INTRAVENOUS; SUBCUTANEOUS at 17:34

## 2022-01-01 RX ADMIN — Medication 5 MILLIGRAM(S): at 01:45

## 2022-01-01 RX ADMIN — CYCLOSPORINE 200 MILLIGRAM(S): 100 CAPSULE ORAL at 21:41

## 2022-01-01 RX ADMIN — Medication 5 MILLIGRAM(S): at 21:41

## 2022-01-01 RX ADMIN — SEVELAMER CARBONATE 800 MILLIGRAM(S): 2400 POWDER, FOR SUSPENSION ORAL at 09:37

## 2022-01-01 RX ADMIN — BUPROPION HYDROCHLORIDE 150 MILLIGRAM(S): 150 TABLET, EXTENDED RELEASE ORAL at 18:05

## 2022-01-01 RX ADMIN — BUPROPION HYDROCHLORIDE 150 MILLIGRAM(S): 150 TABLET, EXTENDED RELEASE ORAL at 12:40

## 2022-01-01 RX ADMIN — CHLORHEXIDINE GLUCONATE 15 MILLILITER(S): 213 SOLUTION TOPICAL at 05:52

## 2022-01-01 RX ADMIN — CHLORHEXIDINE GLUCONATE 1 APPLICATION(S): 213 SOLUTION TOPICAL at 12:16

## 2022-01-01 RX ADMIN — Medication 120 MILLIGRAM(S): at 06:39

## 2022-01-01 RX ADMIN — APIXABAN 2.5 MILLIGRAM(S): 2.5 TABLET, FILM COATED ORAL at 06:26

## 2022-01-01 RX ADMIN — SIMETHICONE 80 MILLIGRAM(S): 80 TABLET, CHEWABLE ORAL at 08:23

## 2022-01-01 RX ADMIN — Medication 1: at 17:53

## 2022-01-01 RX ADMIN — Medication 1 APPLICATION(S): at 12:01

## 2022-01-01 RX ADMIN — Medication 5 MG/HR: at 08:02

## 2022-01-01 RX ADMIN — Medication 6 MILLIGRAM(S): at 22:04

## 2022-01-01 RX ADMIN — CYCLOSPORINE 125 MILLIGRAM(S): 100 CAPSULE ORAL at 21:24

## 2022-01-01 RX ADMIN — POLYETHYLENE GLYCOL 3350 17 GRAM(S): 17 POWDER, FOR SOLUTION ORAL at 05:09

## 2022-01-01 RX ADMIN — OXYCODONE HYDROCHLORIDE 10 MILLIGRAM(S): 5 TABLET ORAL at 18:29

## 2022-01-01 RX ADMIN — Medication 100 MILLIGRAM(S): at 21:32

## 2022-01-01 RX ADMIN — POLYETHYLENE GLYCOL 3350 17 GRAM(S): 17 POWDER, FOR SOLUTION ORAL at 21:28

## 2022-01-01 RX ADMIN — SERTRALINE 50 MILLIGRAM(S): 25 TABLET, FILM COATED ORAL at 12:27

## 2022-01-01 RX ADMIN — Medication 120 MILLIGRAM(S): at 05:55

## 2022-01-01 RX ADMIN — HYDROMORPHONE HYDROCHLORIDE 1 MILLIGRAM(S): 2 INJECTION INTRAMUSCULAR; INTRAVENOUS; SUBCUTANEOUS at 22:58

## 2022-01-01 RX ADMIN — INSULIN GLARGINE 12 UNIT(S): 100 INJECTION, SOLUTION SUBCUTANEOUS at 22:21

## 2022-01-01 RX ADMIN — Medication 650 MILLIGRAM(S): at 08:46

## 2022-01-01 RX ADMIN — Medication 3: at 17:59

## 2022-01-01 RX ADMIN — OXYCODONE HYDROCHLORIDE 10 MILLIGRAM(S): 5 TABLET ORAL at 20:42

## 2022-01-01 RX ADMIN — Medication 4: at 17:53

## 2022-01-01 RX ADMIN — OXYCODONE HYDROCHLORIDE 7.5 MILLIGRAM(S): 5 TABLET ORAL at 09:59

## 2022-01-01 RX ADMIN — SEVELAMER CARBONATE 800 MILLIGRAM(S): 2400 POWDER, FOR SUSPENSION ORAL at 08:56

## 2022-01-01 RX ADMIN — CINACALCET 60 MILLIGRAM(S): 30 TABLET, FILM COATED ORAL at 13:13

## 2022-01-01 RX ADMIN — Medication 1 APPLICATION(S): at 11:35

## 2022-01-01 RX ADMIN — INSULIN GLARGINE 5 UNIT(S): 100 INJECTION, SOLUTION SUBCUTANEOUS at 23:43

## 2022-01-01 RX ADMIN — SIMETHICONE 80 MILLIGRAM(S): 80 TABLET, CHEWABLE ORAL at 13:15

## 2022-01-01 RX ADMIN — Medication 250 MILLIGRAM(S): at 08:45

## 2022-01-01 RX ADMIN — Medication 4.69 MICROGRAM(S)/KG/MIN: at 08:03

## 2022-01-01 RX ADMIN — Medication 1: at 10:05

## 2022-01-01 RX ADMIN — OXYCODONE HYDROCHLORIDE 7.5 MILLIGRAM(S): 5 TABLET ORAL at 15:23

## 2022-01-01 RX ADMIN — CEFEPIME 100 MILLIGRAM(S): 1 INJECTION, POWDER, FOR SOLUTION INTRAMUSCULAR; INTRAVENOUS at 04:50

## 2022-01-01 RX ADMIN — LIDOCAINE 1 PATCH: 4 CREAM TOPICAL at 01:00

## 2022-01-01 RX ADMIN — SERTRALINE 50 MILLIGRAM(S): 25 TABLET, FILM COATED ORAL at 12:42

## 2022-01-01 RX ADMIN — Medication 100 MILLIGRAM(S): at 21:58

## 2022-01-01 RX ADMIN — LIDOCAINE 1 PATCH: 4 CREAM TOPICAL at 13:31

## 2022-01-01 RX ADMIN — Medication 1 APPLICATION(S): at 02:00

## 2022-01-01 RX ADMIN — INSULIN GLARGINE 10 UNIT(S): 100 INJECTION, SOLUTION SUBCUTANEOUS at 21:42

## 2022-01-01 RX ADMIN — OXYCODONE HYDROCHLORIDE 10 MILLIGRAM(S): 5 TABLET ORAL at 17:51

## 2022-01-01 RX ADMIN — Medication 2: at 17:51

## 2022-01-01 RX ADMIN — SENNA PLUS 2 TABLET(S): 8.6 TABLET ORAL at 22:10

## 2022-01-01 RX ADMIN — Medication 400 MILLIGRAM(S): at 18:27

## 2022-01-01 RX ADMIN — Medication 1000 MILLIGRAM(S): at 11:20

## 2022-01-01 RX ADMIN — LIDOCAINE 1 PATCH: 4 CREAM TOPICAL at 19:42

## 2022-01-01 RX ADMIN — Medication 3 UNIT(S): at 13:01

## 2022-01-01 RX ADMIN — Medication 120 MILLIGRAM(S): at 05:28

## 2022-01-01 RX ADMIN — Medication 650 MILLIGRAM(S): at 13:01

## 2022-01-01 RX ADMIN — SIMETHICONE 80 MILLIGRAM(S): 80 TABLET, CHEWABLE ORAL at 05:19

## 2022-01-01 RX ADMIN — MIRTAZAPINE 7.5 MILLIGRAM(S): 45 TABLET, ORALLY DISINTEGRATING ORAL at 21:16

## 2022-01-01 RX ADMIN — OXYCODONE HYDROCHLORIDE 10 MILLIGRAM(S): 5 TABLET ORAL at 10:13

## 2022-01-01 RX ADMIN — APIXABAN 2.5 MILLIGRAM(S): 2.5 TABLET, FILM COATED ORAL at 05:51

## 2022-01-01 RX ADMIN — LIDOCAINE 1 PATCH: 4 CREAM TOPICAL at 21:00

## 2022-01-01 RX ADMIN — Medication 1 APPLICATION(S): at 12:52

## 2022-01-01 RX ADMIN — OXYCODONE HYDROCHLORIDE 10 MILLIGRAM(S): 5 TABLET ORAL at 23:07

## 2022-01-01 RX ADMIN — OXYCODONE HYDROCHLORIDE 10 MILLIGRAM(S): 5 TABLET ORAL at 17:43

## 2022-01-01 RX ADMIN — Medication 3 MILLIGRAM(S): at 21:28

## 2022-01-01 RX ADMIN — Medication 3 UNIT(S): at 18:36

## 2022-01-01 RX ADMIN — MIRTAZAPINE 7.5 MILLIGRAM(S): 45 TABLET, ORALLY DISINTEGRATING ORAL at 21:50

## 2022-01-01 RX ADMIN — CINACALCET 30 MILLIGRAM(S): 30 TABLET, FILM COATED ORAL at 13:47

## 2022-01-01 RX ADMIN — INSULIN GLARGINE 5 UNIT(S): 100 INJECTION, SOLUTION SUBCUTANEOUS at 21:34

## 2022-01-01 RX ADMIN — INSULIN GLARGINE 15 UNIT(S): 100 INJECTION, SOLUTION SUBCUTANEOUS at 22:15

## 2022-01-01 RX ADMIN — Medication 400 MILLIGRAM(S): at 23:52

## 2022-01-01 RX ADMIN — MORPHINE SULFATE 4 MILLIGRAM(S): 50 CAPSULE, EXTENDED RELEASE ORAL at 20:45

## 2022-01-01 RX ADMIN — GABAPENTIN 300 MILLIGRAM(S): 400 CAPSULE ORAL at 12:38

## 2022-01-01 RX ADMIN — OXYCODONE HYDROCHLORIDE 10 MILLIGRAM(S): 5 TABLET ORAL at 17:37

## 2022-01-01 RX ADMIN — OXYCODONE HYDROCHLORIDE 10 MILLIGRAM(S): 5 TABLET ORAL at 06:39

## 2022-01-01 RX ADMIN — POLYETHYLENE GLYCOL 3350 17 GRAM(S): 17 POWDER, FOR SOLUTION ORAL at 18:07

## 2022-01-01 RX ADMIN — GABAPENTIN 300 MILLIGRAM(S): 400 CAPSULE ORAL at 12:45

## 2022-01-01 RX ADMIN — APIXABAN 2.5 MILLIGRAM(S): 2.5 TABLET, FILM COATED ORAL at 18:22

## 2022-01-01 RX ADMIN — Medication 6 MILLIGRAM(S): at 21:41

## 2022-01-01 RX ADMIN — OXYCODONE HYDROCHLORIDE 10 MILLIGRAM(S): 5 TABLET ORAL at 07:31

## 2022-01-01 RX ADMIN — OXYCODONE HYDROCHLORIDE 7.5 MILLIGRAM(S): 5 TABLET ORAL at 21:30

## 2022-01-01 RX ADMIN — OXYCODONE HYDROCHLORIDE 7.5 MILLIGRAM(S): 5 TABLET ORAL at 08:59

## 2022-01-01 RX ADMIN — Medication 1: at 09:37

## 2022-01-01 RX ADMIN — OXYCODONE HYDROCHLORIDE 5 MILLIGRAM(S): 5 TABLET ORAL at 18:37

## 2022-01-01 RX ADMIN — MONTELUKAST 10 MILLIGRAM(S): 4 TABLET, CHEWABLE ORAL at 21:48

## 2022-01-01 RX ADMIN — OXYCODONE HYDROCHLORIDE 10 MILLIGRAM(S): 5 TABLET ORAL at 15:15

## 2022-01-01 RX ADMIN — SENNA PLUS 2 TABLET(S): 8.6 TABLET ORAL at 22:04

## 2022-01-01 RX ADMIN — Medication 100 MILLIGRAM(S): at 22:50

## 2022-01-01 RX ADMIN — BUPROPION HYDROCHLORIDE 150 MILLIGRAM(S): 150 TABLET, EXTENDED RELEASE ORAL at 12:15

## 2022-01-01 RX ADMIN — CYCLOSPORINE 125 MILLIGRAM(S): 100 CAPSULE ORAL at 20:39

## 2022-01-01 RX ADMIN — OXYCODONE HYDROCHLORIDE 5 MILLIGRAM(S): 5 TABLET ORAL at 13:34

## 2022-01-01 RX ADMIN — CHLORHEXIDINE GLUCONATE 1 APPLICATION(S): 213 SOLUTION TOPICAL at 14:20

## 2022-01-01 RX ADMIN — APIXABAN 2.5 MILLIGRAM(S): 2.5 TABLET, FILM COATED ORAL at 05:08

## 2022-01-01 RX ADMIN — HYDROMORPHONE HYDROCHLORIDE 1 MILLIGRAM(S): 2 INJECTION INTRAMUSCULAR; INTRAVENOUS; SUBCUTANEOUS at 17:50

## 2022-01-01 RX ADMIN — Medication 10 UNIT(S): at 08:22

## 2022-01-01 RX ADMIN — SENNA PLUS 2 TABLET(S): 8.6 TABLET ORAL at 22:20

## 2022-01-01 RX ADMIN — SIMETHICONE 80 MILLIGRAM(S): 80 TABLET, CHEWABLE ORAL at 13:00

## 2022-01-01 RX ADMIN — Medication 81 MILLIGRAM(S): at 11:08

## 2022-01-01 RX ADMIN — OXYCODONE HYDROCHLORIDE 5 MILLIGRAM(S): 5 TABLET ORAL at 23:33

## 2022-01-01 RX ADMIN — Medication 5 MILLIGRAM(S): at 23:02

## 2022-01-01 RX ADMIN — GABAPENTIN 300 MILLIGRAM(S): 400 CAPSULE ORAL at 15:14

## 2022-01-01 RX ADMIN — Medication 1: at 09:35

## 2022-01-01 RX ADMIN — HYDROMORPHONE HYDROCHLORIDE 1 MILLIGRAM(S): 2 INJECTION INTRAMUSCULAR; INTRAVENOUS; SUBCUTANEOUS at 18:50

## 2022-01-01 RX ADMIN — ERYTHROPOIETIN 20000 UNIT(S): 10000 INJECTION, SOLUTION INTRAVENOUS; SUBCUTANEOUS at 11:29

## 2022-01-01 RX ADMIN — SERTRALINE 50 MILLIGRAM(S): 25 TABLET, FILM COATED ORAL at 12:52

## 2022-01-01 RX ADMIN — APIXABAN 2.5 MILLIGRAM(S): 2.5 TABLET, FILM COATED ORAL at 17:53

## 2022-01-01 RX ADMIN — Medication 120 MILLIGRAM(S): at 06:26

## 2022-01-01 RX ADMIN — SENNA PLUS 2 TABLET(S): 8.6 TABLET ORAL at 21:24

## 2022-01-01 RX ADMIN — OXYCODONE HYDROCHLORIDE 10 MILLIGRAM(S): 5 TABLET ORAL at 18:50

## 2022-01-01 RX ADMIN — GABAPENTIN 300 MILLIGRAM(S): 400 CAPSULE ORAL at 12:47

## 2022-01-01 RX ADMIN — PANTOPRAZOLE SODIUM 40 MILLIGRAM(S): 20 TABLET, DELAYED RELEASE ORAL at 10:54

## 2022-01-01 RX ADMIN — SEVELAMER CARBONATE 800 MILLIGRAM(S): 2400 POWDER, FOR SUSPENSION ORAL at 08:30

## 2022-01-01 RX ADMIN — SENNA PLUS 2 TABLET(S): 8.6 TABLET ORAL at 21:39

## 2022-01-01 RX ADMIN — CHLORHEXIDINE GLUCONATE 1 APPLICATION(S): 213 SOLUTION TOPICAL at 12:07

## 2022-01-01 RX ADMIN — MUPIROCIN 1 APPLICATION(S): 20 OINTMENT TOPICAL at 17:45

## 2022-01-01 RX ADMIN — OXYCODONE HYDROCHLORIDE 5 MILLIGRAM(S): 5 TABLET ORAL at 17:50

## 2022-01-01 RX ADMIN — ATORVASTATIN CALCIUM 80 MILLIGRAM(S): 80 TABLET, FILM COATED ORAL at 21:42

## 2022-01-01 RX ADMIN — CYCLOSPORINE 125 MILLIGRAM(S): 100 CAPSULE ORAL at 22:50

## 2022-01-01 RX ADMIN — ERYTHROPOIETIN 20000 UNIT(S): 10000 INJECTION, SOLUTION INTRAVENOUS; SUBCUTANEOUS at 07:09

## 2022-01-01 RX ADMIN — HYDROMORPHONE HYDROCHLORIDE 0.25 MILLIGRAM(S): 2 INJECTION INTRAMUSCULAR; INTRAVENOUS; SUBCUTANEOUS at 05:10

## 2022-01-01 RX ADMIN — INSULIN GLARGINE 12 UNIT(S): 100 INJECTION, SOLUTION SUBCUTANEOUS at 21:55

## 2022-01-01 RX ADMIN — SEVELAMER CARBONATE 800 MILLIGRAM(S): 2400 POWDER, FOR SUSPENSION ORAL at 18:18

## 2022-01-01 RX ADMIN — Medication 81 MILLIGRAM(S): at 11:44

## 2022-01-01 RX ADMIN — PANTOPRAZOLE SODIUM 40 MILLIGRAM(S): 20 TABLET, DELAYED RELEASE ORAL at 05:40

## 2022-01-01 RX ADMIN — Medication 25 GRAM(S): at 03:14

## 2022-01-01 RX ADMIN — OXYCODONE HYDROCHLORIDE 7.5 MILLIGRAM(S): 5 TABLET ORAL at 06:00

## 2022-01-01 RX ADMIN — Medication 120 MILLIGRAM(S): at 06:17

## 2022-01-01 RX ADMIN — OXYCODONE HYDROCHLORIDE 7.5 MILLIGRAM(S): 5 TABLET ORAL at 07:04

## 2022-01-01 RX ADMIN — OXYCODONE HYDROCHLORIDE 10 MILLIGRAM(S): 5 TABLET ORAL at 17:19

## 2022-01-01 RX ADMIN — OXYCODONE HYDROCHLORIDE 7.5 MILLIGRAM(S): 5 TABLET ORAL at 05:10

## 2022-01-01 RX ADMIN — APIXABAN 2.5 MILLIGRAM(S): 2.5 TABLET, FILM COATED ORAL at 17:09

## 2022-01-01 RX ADMIN — INSULIN GLARGINE 7 UNIT(S): 100 INJECTION, SOLUTION SUBCUTANEOUS at 22:20

## 2022-01-01 RX ADMIN — HYDROMORPHONE HYDROCHLORIDE 1 MILLIGRAM(S): 2 INJECTION INTRAMUSCULAR; INTRAVENOUS; SUBCUTANEOUS at 12:15

## 2022-01-01 RX ADMIN — OXYCODONE HYDROCHLORIDE 5 MILLIGRAM(S): 5 TABLET ORAL at 11:29

## 2022-01-01 RX ADMIN — CYCLOSPORINE 125 MILLIGRAM(S): 100 CAPSULE ORAL at 22:10

## 2022-01-01 RX ADMIN — Medication 30 MILLILITER(S): at 06:55

## 2022-01-01 RX ADMIN — Medication 2 UNIT(S): at 14:06

## 2022-01-01 RX ADMIN — MONTELUKAST 10 MILLIGRAM(S): 4 TABLET, CHEWABLE ORAL at 23:10

## 2022-01-01 RX ADMIN — ONDANSETRON 4 MILLIGRAM(S): 8 TABLET, FILM COATED ORAL at 21:51

## 2022-01-01 RX ADMIN — SEVELAMER CARBONATE 800 MILLIGRAM(S): 2400 POWDER, FOR SUSPENSION ORAL at 17:03

## 2022-01-01 RX ADMIN — MONTELUKAST 10 MILLIGRAM(S): 4 TABLET, CHEWABLE ORAL at 20:39

## 2022-01-01 RX ADMIN — Medication 3 MILLIGRAM(S): at 21:55

## 2022-01-01 RX ADMIN — Medication 3 UNIT(S): at 12:47

## 2022-01-01 RX ADMIN — Medication 3 MILLIGRAM(S): at 21:42

## 2022-01-01 RX ADMIN — SEVELAMER CARBONATE 800 MILLIGRAM(S): 2400 POWDER, FOR SUSPENSION ORAL at 11:45

## 2022-01-01 RX ADMIN — HEPARIN SODIUM 500 UNIT(S): 5000 INJECTION INTRAVENOUS; SUBCUTANEOUS at 14:30

## 2022-01-01 RX ADMIN — CINACALCET 60 MILLIGRAM(S): 30 TABLET, FILM COATED ORAL at 12:28

## 2022-01-01 RX ADMIN — MIRTAZAPINE 7.5 MILLIGRAM(S): 45 TABLET, ORALLY DISINTEGRATING ORAL at 21:22

## 2022-01-01 RX ADMIN — SEVELAMER CARBONATE 800 MILLIGRAM(S): 2400 POWDER, FOR SUSPENSION ORAL at 12:47

## 2022-01-01 RX ADMIN — APIXABAN 5 MILLIGRAM(S): 2.5 TABLET, FILM COATED ORAL at 05:00

## 2022-01-01 RX ADMIN — APIXABAN 2.5 MILLIGRAM(S): 2.5 TABLET, FILM COATED ORAL at 06:07

## 2022-01-01 RX ADMIN — SIMETHICONE 80 MILLIGRAM(S): 80 TABLET, CHEWABLE ORAL at 21:56

## 2022-01-01 RX ADMIN — CHLORHEXIDINE GLUCONATE 1 APPLICATION(S): 213 SOLUTION TOPICAL at 12:21

## 2022-01-01 RX ADMIN — Medication 650 MILLIGRAM(S): at 00:08

## 2022-01-01 RX ADMIN — LORATADINE 10 MILLIGRAM(S): 10 TABLET ORAL at 12:13

## 2022-01-01 RX ADMIN — Medication 650 MILLIGRAM(S): at 19:08

## 2022-01-01 RX ADMIN — OXYCODONE HYDROCHLORIDE 7.5 MILLIGRAM(S): 5 TABLET ORAL at 03:09

## 2022-01-01 RX ADMIN — CYCLOSPORINE 100 MILLIGRAM(S): 100 CAPSULE ORAL at 09:58

## 2022-01-01 RX ADMIN — PANTOPRAZOLE SODIUM 40 MILLIGRAM(S): 20 TABLET, DELAYED RELEASE ORAL at 06:07

## 2022-01-01 RX ADMIN — OXYCODONE HYDROCHLORIDE 5 MILLIGRAM(S): 5 TABLET ORAL at 17:48

## 2022-01-01 RX ADMIN — LIDOCAINE 1 PATCH: 4 CREAM TOPICAL at 11:09

## 2022-01-01 RX ADMIN — GABAPENTIN 300 MILLIGRAM(S): 400 CAPSULE ORAL at 12:27

## 2022-01-01 RX ADMIN — Medication 50 MILLIGRAM(S): at 11:51

## 2022-01-01 RX ADMIN — Medication 1 APPLICATION(S): at 18:17

## 2022-01-01 RX ADMIN — MONTELUKAST 10 MILLIGRAM(S): 4 TABLET, CHEWABLE ORAL at 21:31

## 2022-01-01 RX ADMIN — OXYCODONE HYDROCHLORIDE 10 MILLIGRAM(S): 5 TABLET ORAL at 18:01

## 2022-01-01 RX ADMIN — APIXABAN 2.5 MILLIGRAM(S): 2.5 TABLET, FILM COATED ORAL at 17:28

## 2022-01-01 RX ADMIN — Medication 1 APPLICATION(S): at 12:11

## 2022-01-01 RX ADMIN — GABAPENTIN 300 MILLIGRAM(S): 400 CAPSULE ORAL at 11:58

## 2022-01-01 RX ADMIN — SEVELAMER CARBONATE 2400 MILLIGRAM(S): 2400 POWDER, FOR SUSPENSION ORAL at 17:17

## 2022-01-01 RX ADMIN — ERYTHROPOIETIN 14000 UNIT(S): 10000 INJECTION, SOLUTION INTRAVENOUS; SUBCUTANEOUS at 18:03

## 2022-01-01 RX ADMIN — OXYCODONE HYDROCHLORIDE 10 MILLIGRAM(S): 5 TABLET ORAL at 22:47

## 2022-01-01 RX ADMIN — OXYCODONE HYDROCHLORIDE 7.5 MILLIGRAM(S): 5 TABLET ORAL at 14:24

## 2022-01-01 RX ADMIN — HYDROMORPHONE HYDROCHLORIDE 1 MILLIGRAM(S): 2 INJECTION INTRAMUSCULAR; INTRAVENOUS; SUBCUTANEOUS at 16:05

## 2022-01-01 RX ADMIN — Medication 1 TABLET(S): at 11:43

## 2022-01-01 RX ADMIN — Medication 120 MILLIGRAM(S): at 09:34

## 2022-01-01 RX ADMIN — PANTOPRAZOLE SODIUM 40 MILLIGRAM(S): 20 TABLET, DELAYED RELEASE ORAL at 05:41

## 2022-01-01 RX ADMIN — SIMETHICONE 80 MILLIGRAM(S): 80 TABLET, CHEWABLE ORAL at 14:16

## 2022-01-01 RX ADMIN — Medication 30 MILLILITER(S): at 18:47

## 2022-01-01 RX ADMIN — Medication 5 MILLIGRAM(S): at 21:23

## 2022-01-01 RX ADMIN — INSULIN GLARGINE 12 UNIT(S): 100 INJECTION, SOLUTION SUBCUTANEOUS at 22:52

## 2022-01-01 RX ADMIN — Medication 81 MILLIGRAM(S): at 12:38

## 2022-01-01 RX ADMIN — CHLORHEXIDINE GLUCONATE 1 APPLICATION(S): 213 SOLUTION TOPICAL at 12:29

## 2022-01-01 RX ADMIN — Medication 81 MILLIGRAM(S): at 12:48

## 2022-01-01 RX ADMIN — OXYCODONE HYDROCHLORIDE 7.5 MILLIGRAM(S): 5 TABLET ORAL at 13:52

## 2022-01-01 RX ADMIN — MONTELUKAST 10 MILLIGRAM(S): 4 TABLET, CHEWABLE ORAL at 21:24

## 2022-01-01 RX ADMIN — SIMETHICONE 80 MILLIGRAM(S): 80 TABLET, CHEWABLE ORAL at 23:01

## 2022-01-01 RX ADMIN — OXYCODONE HYDROCHLORIDE 7.5 MILLIGRAM(S): 5 TABLET ORAL at 17:13

## 2022-01-01 RX ADMIN — APIXABAN 2.5 MILLIGRAM(S): 2.5 TABLET, FILM COATED ORAL at 06:23

## 2022-01-01 RX ADMIN — Medication 10 UNIT(S): at 08:57

## 2022-01-01 RX ADMIN — SEVELAMER CARBONATE 800 MILLIGRAM(S): 2400 POWDER, FOR SUSPENSION ORAL at 14:13

## 2022-01-01 RX ADMIN — Medication 25 GRAM(S): at 18:54

## 2022-01-01 RX ADMIN — OXYCODONE HYDROCHLORIDE 10 MILLIGRAM(S): 5 TABLET ORAL at 05:48

## 2022-01-01 RX ADMIN — SEVELAMER CARBONATE 800 MILLIGRAM(S): 2400 POWDER, FOR SUSPENSION ORAL at 17:38

## 2022-01-01 RX ADMIN — LINEZOLID 300 MILLIGRAM(S): 600 INJECTION, SOLUTION INTRAVENOUS at 07:09

## 2022-01-01 RX ADMIN — OXYCODONE HYDROCHLORIDE 10 MILLIGRAM(S): 5 TABLET ORAL at 21:58

## 2022-01-01 RX ADMIN — MONTELUKAST 10 MILLIGRAM(S): 4 TABLET, CHEWABLE ORAL at 22:43

## 2022-01-01 RX ADMIN — Medication 650 MILLIGRAM(S): at 13:43

## 2022-01-01 RX ADMIN — Medication 6 UNIT(S): at 12:48

## 2022-01-01 RX ADMIN — BUPROPION HYDROCHLORIDE 150 MILLIGRAM(S): 150 TABLET, EXTENDED RELEASE ORAL at 11:48

## 2022-01-01 RX ADMIN — Medication 25 MILLIGRAM(S): at 11:28

## 2022-01-01 RX ADMIN — PANTOPRAZOLE SODIUM 40 MILLIGRAM(S): 20 TABLET, DELAYED RELEASE ORAL at 06:30

## 2022-01-01 RX ADMIN — Medication 81 MILLIGRAM(S): at 16:04

## 2022-01-01 RX ADMIN — MONTELUKAST 10 MILLIGRAM(S): 4 TABLET, CHEWABLE ORAL at 21:59

## 2022-01-01 RX ADMIN — OXYCODONE HYDROCHLORIDE 10 MILLIGRAM(S): 5 TABLET ORAL at 14:30

## 2022-01-01 RX ADMIN — Medication 2: at 08:39

## 2022-01-01 RX ADMIN — Medication 100 MILLIGRAM(S): at 22:06

## 2022-01-01 RX ADMIN — GABAPENTIN 300 MILLIGRAM(S): 400 CAPSULE ORAL at 13:15

## 2022-01-01 RX ADMIN — Medication 1 APPLICATION(S): at 12:23

## 2022-01-01 RX ADMIN — MONTELUKAST 10 MILLIGRAM(S): 4 TABLET, CHEWABLE ORAL at 23:06

## 2022-01-01 RX ADMIN — SEVELAMER CARBONATE 2400 MILLIGRAM(S): 2400 POWDER, FOR SUSPENSION ORAL at 18:05

## 2022-01-01 RX ADMIN — OXYCODONE HYDROCHLORIDE 7.5 MILLIGRAM(S): 5 TABLET ORAL at 10:56

## 2022-01-01 RX ADMIN — CYCLOSPORINE 200 MILLIGRAM(S): 100 CAPSULE ORAL at 06:07

## 2022-01-01 RX ADMIN — Medication 11 UNIT(S): at 12:50

## 2022-01-01 RX ADMIN — OXYCODONE HYDROCHLORIDE 10 MILLIGRAM(S): 5 TABLET ORAL at 19:09

## 2022-01-01 RX ADMIN — OXYCODONE HYDROCHLORIDE 10 MILLIGRAM(S): 5 TABLET ORAL at 23:06

## 2022-01-01 RX ADMIN — OXYCODONE HYDROCHLORIDE 10 MILLIGRAM(S): 5 TABLET ORAL at 05:45

## 2022-01-01 RX ADMIN — Medication 81 MILLIGRAM(S): at 13:04

## 2022-01-01 RX ADMIN — Medication 1: at 18:26

## 2022-01-01 RX ADMIN — MIRTAZAPINE 7.5 MILLIGRAM(S): 45 TABLET, ORALLY DISINTEGRATING ORAL at 21:24

## 2022-01-01 RX ADMIN — SEVELAMER CARBONATE 800 MILLIGRAM(S): 2400 POWDER, FOR SUSPENSION ORAL at 13:54

## 2022-01-01 RX ADMIN — Medication 81 MILLIGRAM(S): at 11:10

## 2022-01-01 RX ADMIN — CYCLOSPORINE 125 MILLIGRAM(S): 100 CAPSULE ORAL at 23:45

## 2022-01-01 RX ADMIN — OXYCODONE HYDROCHLORIDE 7.5 MILLIGRAM(S): 5 TABLET ORAL at 13:54

## 2022-01-01 RX ADMIN — Medication 81 MILLIGRAM(S): at 11:16

## 2022-01-01 RX ADMIN — PANTOPRAZOLE SODIUM 40 MILLIGRAM(S): 20 TABLET, DELAYED RELEASE ORAL at 08:26

## 2022-01-01 RX ADMIN — SEVELAMER CARBONATE 800 MILLIGRAM(S): 2400 POWDER, FOR SUSPENSION ORAL at 18:28

## 2022-01-01 RX ADMIN — CINACALCET 60 MILLIGRAM(S): 30 TABLET, FILM COATED ORAL at 03:29

## 2022-01-01 RX ADMIN — LIDOCAINE AND PRILOCAINE CREAM 1 APPLICATION(S): 25; 25 CREAM TOPICAL at 06:48

## 2022-01-01 RX ADMIN — HYDROMORPHONE HYDROCHLORIDE 1 MILLIGRAM(S): 2 INJECTION INTRAMUSCULAR; INTRAVENOUS; SUBCUTANEOUS at 15:30

## 2022-01-01 RX ADMIN — MONTELUKAST 10 MILLIGRAM(S): 4 TABLET, CHEWABLE ORAL at 23:11

## 2022-01-01 RX ADMIN — Medication 50 MILLILITER(S): at 02:15

## 2022-01-01 RX ADMIN — LIDOCAINE 1 PATCH: 4 CREAM TOPICAL at 00:07

## 2022-01-01 RX ADMIN — SIMETHICONE 80 MILLIGRAM(S): 80 TABLET, CHEWABLE ORAL at 06:45

## 2022-01-01 RX ADMIN — OXYCODONE HYDROCHLORIDE 7.5 MILLIGRAM(S): 5 TABLET ORAL at 09:04

## 2022-01-01 RX ADMIN — CINACALCET 60 MILLIGRAM(S): 30 TABLET, FILM COATED ORAL at 14:13

## 2022-01-01 RX ADMIN — CHLORHEXIDINE GLUCONATE 1 APPLICATION(S): 213 SOLUTION TOPICAL at 11:32

## 2022-01-01 RX ADMIN — CHLORHEXIDINE GLUCONATE 1 APPLICATION(S): 213 SOLUTION TOPICAL at 13:37

## 2022-01-01 RX ADMIN — LIDOCAINE 1 PATCH: 4 CREAM TOPICAL at 19:26

## 2022-01-01 RX ADMIN — Medication 1: at 18:39

## 2022-01-01 RX ADMIN — CINACALCET 60 MILLIGRAM(S): 30 TABLET, FILM COATED ORAL at 15:25

## 2022-01-01 RX ADMIN — BUPROPION HYDROCHLORIDE 150 MILLIGRAM(S): 150 TABLET, EXTENDED RELEASE ORAL at 11:45

## 2022-01-01 RX ADMIN — INSULIN GLARGINE 10 UNIT(S): 100 INJECTION, SOLUTION SUBCUTANEOUS at 21:51

## 2022-01-01 RX ADMIN — GABAPENTIN 300 MILLIGRAM(S): 400 CAPSULE ORAL at 15:36

## 2022-01-01 RX ADMIN — SERTRALINE 50 MILLIGRAM(S): 25 TABLET, FILM COATED ORAL at 12:55

## 2022-01-01 RX ADMIN — CHLORHEXIDINE GLUCONATE 1 APPLICATION(S): 213 SOLUTION TOPICAL at 12:55

## 2022-01-01 RX ADMIN — HYDROMORPHONE HYDROCHLORIDE 1 MILLIGRAM(S): 2 INJECTION INTRAMUSCULAR; INTRAVENOUS; SUBCUTANEOUS at 04:10

## 2022-01-01 RX ADMIN — CHLORHEXIDINE GLUCONATE 1 APPLICATION(S): 213 SOLUTION TOPICAL at 11:57

## 2022-01-01 RX ADMIN — Medication 100 MILLIGRAM(S): at 21:34

## 2022-01-01 RX ADMIN — POLYETHYLENE GLYCOL 3350 17 GRAM(S): 17 POWDER, FOR SOLUTION ORAL at 23:01

## 2022-01-01 RX ADMIN — POLYETHYLENE GLYCOL 3350 17 GRAM(S): 17 POWDER, FOR SOLUTION ORAL at 05:04

## 2022-01-01 RX ADMIN — CYCLOSPORINE 150 MILLIGRAM(S): 100 CAPSULE ORAL at 07:00

## 2022-01-01 RX ADMIN — Medication 1 APPLICATION(S): at 13:20

## 2022-01-01 RX ADMIN — APIXABAN 2.5 MILLIGRAM(S): 2.5 TABLET, FILM COATED ORAL at 16:17

## 2022-01-01 RX ADMIN — CINACALCET 30 MILLIGRAM(S): 30 TABLET, FILM COATED ORAL at 11:45

## 2022-01-01 RX ADMIN — Medication 25 MILLIGRAM(S): at 22:53

## 2022-01-01 RX ADMIN — Medication 650 MILLIGRAM(S): at 01:00

## 2022-01-01 RX ADMIN — CINACALCET 30 MILLIGRAM(S): 30 TABLET, FILM COATED ORAL at 13:40

## 2022-01-01 RX ADMIN — PANTOPRAZOLE SODIUM 40 MILLIGRAM(S): 20 TABLET, DELAYED RELEASE ORAL at 05:34

## 2022-01-01 RX ADMIN — POLYETHYLENE GLYCOL 3350 17 GRAM(S): 17 POWDER, FOR SOLUTION ORAL at 06:38

## 2022-01-01 RX ADMIN — CINACALCET 60 MILLIGRAM(S): 30 TABLET, FILM COATED ORAL at 20:56

## 2022-01-01 RX ADMIN — Medication 1 APPLICATION(S): at 13:30

## 2022-01-01 RX ADMIN — OXYCODONE HYDROCHLORIDE 7.5 MILLIGRAM(S): 5 TABLET ORAL at 21:40

## 2022-01-01 RX ADMIN — FENTANYL CITRATE 50 MICROGRAM(S): 50 INJECTION INTRAVENOUS at 05:18

## 2022-01-01 RX ADMIN — HYDROMORPHONE HYDROCHLORIDE 0.25 MILLIGRAM(S): 2 INJECTION INTRAMUSCULAR; INTRAVENOUS; SUBCUTANEOUS at 10:50

## 2022-01-01 RX ADMIN — SENNA PLUS 2 TABLET(S): 8.6 TABLET ORAL at 23:01

## 2022-01-01 RX ADMIN — Medication 1 APPLICATION(S): at 09:45

## 2022-01-01 RX ADMIN — Medication 10 UNIT(S): at 12:42

## 2022-01-01 RX ADMIN — MONTELUKAST 10 MILLIGRAM(S): 4 TABLET, CHEWABLE ORAL at 22:56

## 2022-01-01 RX ADMIN — OXYCODONE HYDROCHLORIDE 5 MILLIGRAM(S): 5 TABLET ORAL at 14:03

## 2022-01-01 RX ADMIN — PANTOPRAZOLE SODIUM 40 MILLIGRAM(S): 20 TABLET, DELAYED RELEASE ORAL at 17:35

## 2022-01-01 RX ADMIN — LIDOCAINE 1 PATCH: 4 CREAM TOPICAL at 11:52

## 2022-01-01 RX ADMIN — OXYCODONE HYDROCHLORIDE 10 MILLIGRAM(S): 5 TABLET ORAL at 18:41

## 2022-01-01 RX ADMIN — OXYCODONE HYDROCHLORIDE 5 MILLIGRAM(S): 5 TABLET ORAL at 23:13

## 2022-01-01 RX ADMIN — HYDROMORPHONE HYDROCHLORIDE 1 MILLIGRAM(S): 2 INJECTION INTRAMUSCULAR; INTRAVENOUS; SUBCUTANEOUS at 12:43

## 2022-01-01 RX ADMIN — OXYCODONE HYDROCHLORIDE 5 MILLIGRAM(S): 5 TABLET ORAL at 16:02

## 2022-01-01 RX ADMIN — LIDOCAINE 1 PATCH: 4 CREAM TOPICAL at 11:56

## 2022-01-01 RX ADMIN — Medication 1 APPLICATION(S): at 05:06

## 2022-01-01 RX ADMIN — CINACALCET 90 MILLIGRAM(S): 30 TABLET, FILM COATED ORAL at 23:32

## 2022-01-01 RX ADMIN — APIXABAN 2.5 MILLIGRAM(S): 2.5 TABLET, FILM COATED ORAL at 16:47

## 2022-01-01 RX ADMIN — APIXABAN 2.5 MILLIGRAM(S): 2.5 TABLET, FILM COATED ORAL at 17:54

## 2022-01-01 RX ADMIN — Medication 1: at 08:57

## 2022-01-01 RX ADMIN — CINACALCET 30 MILLIGRAM(S): 30 TABLET, FILM COATED ORAL at 14:08

## 2022-01-01 RX ADMIN — APIXABAN 2.5 MILLIGRAM(S): 2.5 TABLET, FILM COATED ORAL at 06:04

## 2022-01-01 RX ADMIN — Medication 100 MILLIGRAM(S): at 22:57

## 2022-01-01 RX ADMIN — Medication 35 MILLIGRAM(S): at 17:29

## 2022-01-01 RX ADMIN — SERTRALINE 50 MILLIGRAM(S): 25 TABLET, FILM COATED ORAL at 12:21

## 2022-01-01 RX ADMIN — OXYCODONE HYDROCHLORIDE 7.5 MILLIGRAM(S): 5 TABLET ORAL at 09:41

## 2022-01-01 RX ADMIN — SIMETHICONE 80 MILLIGRAM(S): 80 TABLET, CHEWABLE ORAL at 13:47

## 2022-01-01 RX ADMIN — APIXABAN 2.5 MILLIGRAM(S): 2.5 TABLET, FILM COATED ORAL at 06:21

## 2022-01-01 RX ADMIN — OXYCODONE HYDROCHLORIDE 7.5 MILLIGRAM(S): 5 TABLET ORAL at 16:28

## 2022-01-01 RX ADMIN — CYCLOSPORINE 150 MILLIGRAM(S): 100 CAPSULE ORAL at 17:55

## 2022-01-01 RX ADMIN — Medication 81 MILLIGRAM(S): at 15:15

## 2022-01-01 RX ADMIN — LIDOCAINE 1 PATCH: 4 CREAM TOPICAL at 18:56

## 2022-01-01 RX ADMIN — Medication 25 MILLIGRAM(S): at 14:56

## 2022-01-01 RX ADMIN — Medication 1 APPLICATION(S): at 18:15

## 2022-01-01 RX ADMIN — Medication 400 MILLIGRAM(S): at 19:29

## 2022-01-01 RX ADMIN — CYCLOSPORINE 200 MILLIGRAM(S): 100 CAPSULE ORAL at 21:50

## 2022-01-01 RX ADMIN — Medication 3 UNIT(S): at 18:23

## 2022-01-01 RX ADMIN — SERTRALINE 50 MILLIGRAM(S): 25 TABLET, FILM COATED ORAL at 14:16

## 2022-01-01 RX ADMIN — LIDOCAINE 1 PATCH: 4 CREAM TOPICAL at 23:17

## 2022-01-01 RX ADMIN — HEPARIN SODIUM 24 UNIT(S)/HR: 5000 INJECTION INTRAVENOUS; SUBCUTANEOUS at 02:43

## 2022-01-01 RX ADMIN — CYCLOSPORINE 125 MILLIGRAM(S): 100 CAPSULE ORAL at 11:23

## 2022-01-01 RX ADMIN — CHLORHEXIDINE GLUCONATE 1 APPLICATION(S): 213 SOLUTION TOPICAL at 11:42

## 2022-01-01 RX ADMIN — Medication 120 MILLIGRAM(S): at 06:32

## 2022-01-01 RX ADMIN — LIDOCAINE 1 PATCH: 4 CREAM TOPICAL at 12:05

## 2022-01-01 RX ADMIN — ERYTHROPOIETIN 12000 UNIT(S): 10000 INJECTION, SOLUTION INTRAVENOUS; SUBCUTANEOUS at 23:05

## 2022-01-01 RX ADMIN — Medication 10: at 06:45

## 2022-01-01 RX ADMIN — PANTOPRAZOLE SODIUM 40 MILLIGRAM(S): 20 TABLET, DELAYED RELEASE ORAL at 05:35

## 2022-01-01 RX ADMIN — POLYETHYLENE GLYCOL 3350 17 GRAM(S): 17 POWDER, FOR SOLUTION ORAL at 17:39

## 2022-01-01 RX ADMIN — MIRTAZAPINE 7.5 MILLIGRAM(S): 45 TABLET, ORALLY DISINTEGRATING ORAL at 21:35

## 2022-01-01 RX ADMIN — SEVELAMER CARBONATE 800 MILLIGRAM(S): 2400 POWDER, FOR SUSPENSION ORAL at 18:14

## 2022-01-01 RX ADMIN — CEFEPIME 100 MILLIGRAM(S): 1 INJECTION, POWDER, FOR SOLUTION INTRAMUSCULAR; INTRAVENOUS at 14:58

## 2022-01-01 RX ADMIN — Medication 50 MILLIGRAM(S): at 11:57

## 2022-01-01 RX ADMIN — CHLORHEXIDINE GLUCONATE 1 APPLICATION(S): 213 SOLUTION TOPICAL at 14:52

## 2022-01-01 RX ADMIN — Medication 2: at 12:40

## 2022-01-01 RX ADMIN — APIXABAN 5 MILLIGRAM(S): 2.5 TABLET, FILM COATED ORAL at 06:24

## 2022-01-01 RX ADMIN — Medication 50 MILLIEQUIVALENT(S): at 03:00

## 2022-01-01 RX ADMIN — LIDOCAINE 1 PATCH: 4 CREAM TOPICAL at 14:05

## 2022-01-01 RX ADMIN — MIDODRINE HYDROCHLORIDE 10 MILLIGRAM(S): 2.5 TABLET ORAL at 16:00

## 2022-01-01 RX ADMIN — OXYCODONE HYDROCHLORIDE 5 MILLIGRAM(S): 5 TABLET ORAL at 22:06

## 2022-01-01 RX ADMIN — SIMETHICONE 80 MILLIGRAM(S): 80 TABLET, CHEWABLE ORAL at 05:06

## 2022-01-01 RX ADMIN — OXYCODONE HYDROCHLORIDE 10 MILLIGRAM(S): 5 TABLET ORAL at 05:21

## 2022-01-01 RX ADMIN — OXYCODONE HYDROCHLORIDE 5 MILLIGRAM(S): 5 TABLET ORAL at 15:41

## 2022-01-01 RX ADMIN — Medication 650 MILLIGRAM(S): at 09:06

## 2022-01-01 RX ADMIN — Medication 2: at 09:21

## 2022-01-01 RX ADMIN — NYSTATIN CREAM 1 APPLICATION(S): 100000 CREAM TOPICAL at 06:30

## 2022-01-01 RX ADMIN — SERTRALINE 50 MILLIGRAM(S): 25 TABLET, FILM COATED ORAL at 12:19

## 2022-01-01 RX ADMIN — Medication 35 MILLIGRAM(S): at 18:31

## 2022-01-01 RX ADMIN — ATORVASTATIN CALCIUM 80 MILLIGRAM(S): 80 TABLET, FILM COATED ORAL at 22:20

## 2022-01-01 RX ADMIN — CHLORHEXIDINE GLUCONATE 1 APPLICATION(S): 213 SOLUTION TOPICAL at 13:14

## 2022-01-01 RX ADMIN — OXYCODONE HYDROCHLORIDE 7.5 MILLIGRAM(S): 5 TABLET ORAL at 15:00

## 2022-01-01 RX ADMIN — ROBINUL 0.4 MILLIGRAM(S): 0.2 INJECTION INTRAMUSCULAR; INTRAVENOUS at 04:15

## 2022-01-01 RX ADMIN — Medication 2: at 13:00

## 2022-01-01 RX ADMIN — Medication 1 APPLICATION(S): at 18:11

## 2022-01-01 RX ADMIN — LORATADINE 10 MILLIGRAM(S): 10 TABLET ORAL at 11:34

## 2022-01-01 RX ADMIN — SIMETHICONE 80 MILLIGRAM(S): 80 TABLET, CHEWABLE ORAL at 05:42

## 2022-01-01 RX ADMIN — OXYCODONE HYDROCHLORIDE 10 MILLIGRAM(S): 5 TABLET ORAL at 18:33

## 2022-01-01 RX ADMIN — HYDROMORPHONE HYDROCHLORIDE 1 MILLIGRAM(S): 2 INJECTION INTRAMUSCULAR; INTRAVENOUS; SUBCUTANEOUS at 11:00

## 2022-01-01 RX ADMIN — Medication 10 UNIT(S): at 18:29

## 2022-01-01 RX ADMIN — SEVELAMER CARBONATE 2400 MILLIGRAM(S): 2400 POWDER, FOR SUSPENSION ORAL at 08:51

## 2022-01-01 RX ADMIN — SERTRALINE 50 MILLIGRAM(S): 25 TABLET, FILM COATED ORAL at 12:45

## 2022-01-01 RX ADMIN — MIDODRINE HYDROCHLORIDE 5 MILLIGRAM(S): 2.5 TABLET ORAL at 11:05

## 2022-01-01 RX ADMIN — Medication 1: at 08:58

## 2022-01-01 RX ADMIN — Medication 6 MILLIGRAM(S): at 22:46

## 2022-01-01 RX ADMIN — CINACALCET 30 MILLIGRAM(S): 30 TABLET, FILM COATED ORAL at 12:55

## 2022-01-01 RX ADMIN — MIRTAZAPINE 7.5 MILLIGRAM(S): 45 TABLET, ORALLY DISINTEGRATING ORAL at 21:42

## 2022-01-01 RX ADMIN — OXYCODONE HYDROCHLORIDE 7.5 MILLIGRAM(S): 5 TABLET ORAL at 11:15

## 2022-01-01 RX ADMIN — BUPROPION HYDROCHLORIDE 150 MILLIGRAM(S): 150 TABLET, EXTENDED RELEASE ORAL at 15:21

## 2022-01-01 RX ADMIN — Medication 3 UNIT(S): at 18:25

## 2022-01-01 RX ADMIN — SIMETHICONE 80 MILLIGRAM(S): 80 TABLET, CHEWABLE ORAL at 06:01

## 2022-01-01 RX ADMIN — MIRTAZAPINE 7.5 MILLIGRAM(S): 45 TABLET, ORALLY DISINTEGRATING ORAL at 21:57

## 2022-01-01 RX ADMIN — ATORVASTATIN CALCIUM 80 MILLIGRAM(S): 80 TABLET, FILM COATED ORAL at 22:24

## 2022-01-01 RX ADMIN — MIDODRINE HYDROCHLORIDE 5 MILLIGRAM(S): 2.5 TABLET ORAL at 15:30

## 2022-01-01 RX ADMIN — LIDOCAINE 1 PATCH: 4 CREAM TOPICAL at 12:24

## 2022-01-01 RX ADMIN — BUPROPION HYDROCHLORIDE 150 MILLIGRAM(S): 150 TABLET, EXTENDED RELEASE ORAL at 12:11

## 2022-01-01 RX ADMIN — CINACALCET 60 MILLIGRAM(S): 30 TABLET, FILM COATED ORAL at 22:09

## 2022-01-01 RX ADMIN — Medication 10 UNIT(S): at 18:16

## 2022-01-01 RX ADMIN — Medication 10 UNIT(S): at 08:37

## 2022-01-01 RX ADMIN — Medication 2: at 13:22

## 2022-01-01 RX ADMIN — CYCLOSPORINE 200 MILLIGRAM(S): 100 CAPSULE ORAL at 22:26

## 2022-01-01 RX ADMIN — INSULIN GLARGINE 16 UNIT(S): 100 INJECTION, SOLUTION SUBCUTANEOUS at 22:58

## 2022-01-01 RX ADMIN — Medication 6 MILLIGRAM(S): at 22:10

## 2022-01-01 RX ADMIN — Medication 650 MILLIGRAM(S): at 09:32

## 2022-01-01 RX ADMIN — Medication 102 MILLIGRAM(S): at 20:22

## 2022-01-01 RX ADMIN — LORATADINE 10 MILLIGRAM(S): 10 TABLET ORAL at 12:23

## 2022-01-01 RX ADMIN — OXYCODONE HYDROCHLORIDE 10 MILLIGRAM(S): 5 TABLET ORAL at 13:23

## 2022-01-01 RX ADMIN — CINACALCET 60 MILLIGRAM(S): 30 TABLET, FILM COATED ORAL at 17:56

## 2022-01-01 RX ADMIN — Medication 1 TABLET(S): at 13:37

## 2022-01-01 RX ADMIN — APIXABAN 2.5 MILLIGRAM(S): 2.5 TABLET, FILM COATED ORAL at 18:30

## 2022-01-01 RX ADMIN — Medication 100 MILLIGRAM(S): at 23:05

## 2022-01-01 RX ADMIN — Medication 81 MILLIGRAM(S): at 12:18

## 2022-01-01 RX ADMIN — Medication 1 APPLICATION(S): at 06:19

## 2022-01-01 RX ADMIN — APIXABAN 2.5 MILLIGRAM(S): 2.5 TABLET, FILM COATED ORAL at 12:40

## 2022-01-01 RX ADMIN — SEVELAMER CARBONATE 800 MILLIGRAM(S): 2400 POWDER, FOR SUSPENSION ORAL at 18:32

## 2022-01-01 RX ADMIN — HYDROMORPHONE HYDROCHLORIDE 1 MILLIGRAM(S): 2 INJECTION INTRAMUSCULAR; INTRAVENOUS; SUBCUTANEOUS at 21:00

## 2022-01-01 RX ADMIN — Medication 1 APPLICATION(S): at 17:56

## 2022-01-01 RX ADMIN — MONTELUKAST 10 MILLIGRAM(S): 4 TABLET, CHEWABLE ORAL at 22:33

## 2022-01-01 RX ADMIN — LIDOCAINE 1 PATCH: 4 CREAM TOPICAL at 12:54

## 2022-01-01 RX ADMIN — PANTOPRAZOLE SODIUM 40 MILLIGRAM(S): 20 TABLET, DELAYED RELEASE ORAL at 06:43

## 2022-01-01 RX ADMIN — Medication 15 MILLIGRAM(S): at 18:10

## 2022-01-01 RX ADMIN — OXYCODONE HYDROCHLORIDE 7.5 MILLIGRAM(S): 5 TABLET ORAL at 12:00

## 2022-01-01 RX ADMIN — APIXABAN 2.5 MILLIGRAM(S): 2.5 TABLET, FILM COATED ORAL at 05:52

## 2022-01-01 RX ADMIN — GABAPENTIN 300 MILLIGRAM(S): 400 CAPSULE ORAL at 11:34

## 2022-01-01 RX ADMIN — HYDROMORPHONE HYDROCHLORIDE 0.25 MILLIGRAM(S): 2 INJECTION INTRAMUSCULAR; INTRAVENOUS; SUBCUTANEOUS at 05:25

## 2022-01-01 RX ADMIN — Medication 1000 MILLIGRAM(S): at 10:30

## 2022-01-01 RX ADMIN — Medication 1 APPLICATION(S): at 14:13

## 2022-01-01 RX ADMIN — Medication 100 MILLIGRAM(S): at 13:30

## 2022-01-01 RX ADMIN — DOXERCALCIFEROL 5 MICROGRAM(S): 2.5 CAPSULE ORAL at 23:38

## 2022-01-01 RX ADMIN — Medication 1: at 17:33

## 2022-01-01 RX ADMIN — PANTOPRAZOLE SODIUM 40 MILLIGRAM(S): 20 TABLET, DELAYED RELEASE ORAL at 06:04

## 2022-01-01 RX ADMIN — APIXABAN 5 MILLIGRAM(S): 2.5 TABLET, FILM COATED ORAL at 05:41

## 2022-01-01 RX ADMIN — Medication 81 MILLIGRAM(S): at 11:42

## 2022-01-01 RX ADMIN — SEVELAMER CARBONATE 800 MILLIGRAM(S): 2400 POWDER, FOR SUSPENSION ORAL at 18:41

## 2022-01-01 RX ADMIN — Medication 650 MILLIGRAM(S): at 06:07

## 2022-01-01 RX ADMIN — Medication 100 MILLIGRAM(S): at 21:38

## 2022-01-01 RX ADMIN — CYCLOSPORINE 200 MILLIGRAM(S): 100 CAPSULE ORAL at 09:41

## 2022-01-01 RX ADMIN — BUPROPION HYDROCHLORIDE 150 MILLIGRAM(S): 150 TABLET, EXTENDED RELEASE ORAL at 11:10

## 2022-01-01 RX ADMIN — HEPARIN SODIUM 22 UNIT(S)/HR: 5000 INJECTION INTRAVENOUS; SUBCUTANEOUS at 11:28

## 2022-01-01 RX ADMIN — Medication 650 MILLIGRAM(S): at 08:42

## 2022-01-01 RX ADMIN — INSULIN GLARGINE 10 UNIT(S): 100 INJECTION, SOLUTION SUBCUTANEOUS at 21:24

## 2022-01-01 RX ADMIN — INSULIN GLARGINE 5 UNIT(S): 100 INJECTION, SOLUTION SUBCUTANEOUS at 22:08

## 2022-01-01 RX ADMIN — SEVELAMER CARBONATE 800 MILLIGRAM(S): 2400 POWDER, FOR SUSPENSION ORAL at 13:14

## 2022-01-01 RX ADMIN — CINACALCET 60 MILLIGRAM(S): 30 TABLET, FILM COATED ORAL at 16:05

## 2022-01-01 RX ADMIN — Medication 3 MILLIGRAM(S): at 21:44

## 2022-01-01 RX ADMIN — SEVELAMER CARBONATE 800 MILLIGRAM(S): 2400 POWDER, FOR SUSPENSION ORAL at 11:43

## 2022-01-01 RX ADMIN — Medication 400 MILLIGRAM(S): at 09:31

## 2022-01-01 RX ADMIN — APIXABAN 2.5 MILLIGRAM(S): 2.5 TABLET, FILM COATED ORAL at 21:50

## 2022-01-01 RX ADMIN — HYDROMORPHONE HYDROCHLORIDE 1 MILLIGRAM(S): 2 INJECTION INTRAMUSCULAR; INTRAVENOUS; SUBCUTANEOUS at 16:17

## 2022-01-01 RX ADMIN — Medication 4: at 13:04

## 2022-01-01 RX ADMIN — MONTELUKAST 10 MILLIGRAM(S): 4 TABLET, CHEWABLE ORAL at 21:38

## 2022-01-01 RX ADMIN — SIMETHICONE 80 MILLIGRAM(S): 80 TABLET, CHEWABLE ORAL at 10:55

## 2022-01-01 RX ADMIN — SIMETHICONE 80 MILLIGRAM(S): 80 TABLET, CHEWABLE ORAL at 01:25

## 2022-01-01 RX ADMIN — BUPROPION HYDROCHLORIDE 150 MILLIGRAM(S): 150 TABLET, EXTENDED RELEASE ORAL at 12:48

## 2022-01-01 RX ADMIN — OXYCODONE HYDROCHLORIDE 10 MILLIGRAM(S): 5 TABLET ORAL at 18:19

## 2022-01-01 RX ADMIN — PANTOPRAZOLE SODIUM 40 MILLIGRAM(S): 20 TABLET, DELAYED RELEASE ORAL at 05:15

## 2022-01-01 RX ADMIN — GABAPENTIN 300 MILLIGRAM(S): 400 CAPSULE ORAL at 13:47

## 2022-01-01 RX ADMIN — MIRTAZAPINE 7.5 MILLIGRAM(S): 45 TABLET, ORALLY DISINTEGRATING ORAL at 22:56

## 2022-01-01 RX ADMIN — SEVELAMER CARBONATE 800 MILLIGRAM(S): 2400 POWDER, FOR SUSPENSION ORAL at 11:52

## 2022-01-01 RX ADMIN — APIXABAN 2.5 MILLIGRAM(S): 2.5 TABLET, FILM COATED ORAL at 17:34

## 2022-01-01 RX ADMIN — MIRTAZAPINE 7.5 MILLIGRAM(S): 45 TABLET, ORALLY DISINTEGRATING ORAL at 22:36

## 2022-01-01 RX ADMIN — Medication 6 MILLIGRAM(S): at 21:38

## 2022-01-01 RX ADMIN — MIDODRINE HYDROCHLORIDE 5 MILLIGRAM(S): 2.5 TABLET ORAL at 20:33

## 2022-01-01 RX ADMIN — Medication 1 APPLICATION(S): at 17:14

## 2022-01-01 RX ADMIN — Medication 100 MILLIGRAM(S): at 22:02

## 2022-01-01 RX ADMIN — OXYCODONE HYDROCHLORIDE 10 MILLIGRAM(S): 5 TABLET ORAL at 06:30

## 2022-01-01 RX ADMIN — Medication 10 UNIT(S): at 12:59

## 2022-01-01 RX ADMIN — Medication 1 TABLET(S): at 13:15

## 2022-01-01 RX ADMIN — OXYCODONE HYDROCHLORIDE 7.5 MILLIGRAM(S): 5 TABLET ORAL at 12:58

## 2022-01-01 RX ADMIN — LIDOCAINE 1 PATCH: 4 CREAM TOPICAL at 13:09

## 2022-01-01 RX ADMIN — HEPARIN SODIUM 0.6 UNIT(S)/HR: 5000 INJECTION INTRAVENOUS; SUBCUTANEOUS at 17:29

## 2022-01-01 RX ADMIN — NYSTATIN CREAM 1 APPLICATION(S): 100000 CREAM TOPICAL at 17:43

## 2022-01-01 RX ADMIN — POLYETHYLENE GLYCOL 3350 17 GRAM(S): 17 POWDER, FOR SOLUTION ORAL at 06:15

## 2022-01-01 RX ADMIN — POLYETHYLENE GLYCOL 3350 17 GRAM(S): 17 POWDER, FOR SOLUTION ORAL at 06:19

## 2022-01-01 RX ADMIN — HYDROMORPHONE HYDROCHLORIDE 1 MILLIGRAM(S): 2 INJECTION INTRAMUSCULAR; INTRAVENOUS; SUBCUTANEOUS at 12:02

## 2022-01-01 RX ADMIN — Medication 1 APPLICATION(S): at 12:38

## 2022-01-01 RX ADMIN — Medication 81 MILLIGRAM(S): at 11:11

## 2022-01-01 RX ADMIN — Medication 100 MILLIGRAM(S): at 22:09

## 2022-01-01 RX ADMIN — OXYCODONE HYDROCHLORIDE 5 MILLIGRAM(S): 5 TABLET ORAL at 06:10

## 2022-01-01 RX ADMIN — Medication 1: at 13:37

## 2022-01-01 RX ADMIN — PROPOFOL 6 MICROGRAM(S)/KG/MIN: 10 INJECTION, EMULSION INTRAVENOUS at 20:32

## 2022-01-01 RX ADMIN — HYDROMORPHONE HYDROCHLORIDE 1 MILLIGRAM(S): 2 INJECTION INTRAMUSCULAR; INTRAVENOUS; SUBCUTANEOUS at 22:47

## 2022-01-01 RX ADMIN — OXYCODONE HYDROCHLORIDE 7.5 MILLIGRAM(S): 5 TABLET ORAL at 22:18

## 2022-01-01 RX ADMIN — Medication 120 MILLIGRAM(S): at 06:03

## 2022-01-01 RX ADMIN — Medication 100 MILLIGRAM(S): at 04:41

## 2022-01-01 RX ADMIN — OXYCODONE HYDROCHLORIDE 5 MILLIGRAM(S): 5 TABLET ORAL at 16:06

## 2022-01-01 RX ADMIN — Medication 1 APPLICATION(S): at 12:24

## 2022-01-01 RX ADMIN — SIMETHICONE 80 MILLIGRAM(S): 80 TABLET, CHEWABLE ORAL at 13:02

## 2022-01-01 RX ADMIN — GABAPENTIN 300 MILLIGRAM(S): 400 CAPSULE ORAL at 11:25

## 2022-01-01 RX ADMIN — GABAPENTIN 300 MILLIGRAM(S): 400 CAPSULE ORAL at 14:17

## 2022-01-01 RX ADMIN — SEVELAMER CARBONATE 800 MILLIGRAM(S): 2400 POWDER, FOR SUSPENSION ORAL at 18:24

## 2022-01-01 RX ADMIN — MONTELUKAST 10 MILLIGRAM(S): 4 TABLET, CHEWABLE ORAL at 01:32

## 2022-01-01 RX ADMIN — CHLORHEXIDINE GLUCONATE 1 APPLICATION(S): 213 SOLUTION TOPICAL at 11:13

## 2022-01-01 RX ADMIN — INSULIN GLARGINE 10 UNIT(S): 100 INJECTION, SOLUTION SUBCUTANEOUS at 22:07

## 2022-01-01 RX ADMIN — HEPARIN SODIUM 7500 UNIT(S): 5000 INJECTION INTRAVENOUS; SUBCUTANEOUS at 18:17

## 2022-01-01 RX ADMIN — ATORVASTATIN CALCIUM 80 MILLIGRAM(S): 80 TABLET, FILM COATED ORAL at 22:08

## 2022-01-01 RX ADMIN — Medication 1 APPLICATION(S): at 10:05

## 2022-01-01 RX ADMIN — Medication 50 MILLIGRAM(S): at 20:27

## 2022-01-01 RX ADMIN — APIXABAN 5 MILLIGRAM(S): 2.5 TABLET, FILM COATED ORAL at 06:02

## 2022-01-01 RX ADMIN — MONTELUKAST 10 MILLIGRAM(S): 4 TABLET, CHEWABLE ORAL at 21:54

## 2022-01-01 RX ADMIN — Medication 1 APPLICATION(S): at 05:36

## 2022-01-01 RX ADMIN — GABAPENTIN 300 MILLIGRAM(S): 400 CAPSULE ORAL at 11:55

## 2022-01-01 RX ADMIN — OXYCODONE HYDROCHLORIDE 5 MILLIGRAM(S): 5 TABLET ORAL at 13:03

## 2022-01-01 RX ADMIN — NYSTATIN CREAM 1 APPLICATION(S): 100000 CREAM TOPICAL at 17:11

## 2022-01-01 RX ADMIN — Medication 100 MILLIGRAM(S): at 22:44

## 2022-01-01 RX ADMIN — OXYCODONE HYDROCHLORIDE 7.5 MILLIGRAM(S): 5 TABLET ORAL at 04:28

## 2022-01-01 RX ADMIN — CINACALCET 60 MILLIGRAM(S): 30 TABLET, FILM COATED ORAL at 12:10

## 2022-01-01 RX ADMIN — LORATADINE 10 MILLIGRAM(S): 10 TABLET ORAL at 15:23

## 2022-01-01 RX ADMIN — OXYCODONE HYDROCHLORIDE 7.5 MILLIGRAM(S): 5 TABLET ORAL at 01:58

## 2022-01-01 RX ADMIN — INSULIN GLARGINE 16 UNIT(S): 100 INJECTION, SOLUTION SUBCUTANEOUS at 21:54

## 2022-01-01 RX ADMIN — Medication 400 MILLIGRAM(S): at 21:58

## 2022-01-01 RX ADMIN — Medication 4: at 15:46

## 2022-01-01 RX ADMIN — HYDROMORPHONE HYDROCHLORIDE 1 MILLIGRAM(S): 2 INJECTION INTRAMUSCULAR; INTRAVENOUS; SUBCUTANEOUS at 00:47

## 2022-01-01 RX ADMIN — Medication 100 MILLIGRAM(S): at 13:17

## 2022-01-01 RX ADMIN — Medication 1: at 17:58

## 2022-01-01 RX ADMIN — CYCLOSPORINE 200 MILLIGRAM(S): 100 CAPSULE ORAL at 09:06

## 2022-01-01 RX ADMIN — INSULIN GLARGINE 6 UNIT(S): 100 INJECTION, SOLUTION SUBCUTANEOUS at 23:06

## 2022-01-01 RX ADMIN — LIDOCAINE 1 PATCH: 4 CREAM TOPICAL at 12:06

## 2022-01-01 RX ADMIN — OXYCODONE HYDROCHLORIDE 10 MILLIGRAM(S): 5 TABLET ORAL at 21:43

## 2022-01-01 RX ADMIN — Medication 1 TABLET(S): at 13:47

## 2022-01-01 RX ADMIN — SEVELAMER CARBONATE 2400 MILLIGRAM(S): 2400 POWDER, FOR SUSPENSION ORAL at 12:28

## 2022-01-01 RX ADMIN — SEVELAMER CARBONATE 800 MILLIGRAM(S): 2400 POWDER, FOR SUSPENSION ORAL at 09:35

## 2022-01-01 RX ADMIN — MIRTAZAPINE 7.5 MILLIGRAM(S): 45 TABLET, ORALLY DISINTEGRATING ORAL at 21:31

## 2022-01-01 RX ADMIN — CHLORHEXIDINE GLUCONATE 1 APPLICATION(S): 213 SOLUTION TOPICAL at 12:40

## 2022-01-01 RX ADMIN — Medication 100 MILLIGRAM(S): at 23:32

## 2022-01-01 RX ADMIN — Medication 1000 MILLIGRAM(S): at 18:41

## 2022-01-01 RX ADMIN — Medication 100 MILLIGRAM(S): at 20:53

## 2022-01-01 RX ADMIN — Medication 1 APPLICATION(S): at 20:52

## 2022-01-01 RX ADMIN — OXYCODONE HYDROCHLORIDE 7.5 MILLIGRAM(S): 5 TABLET ORAL at 16:40

## 2022-01-01 RX ADMIN — SERTRALINE 50 MILLIGRAM(S): 25 TABLET, FILM COATED ORAL at 11:34

## 2022-01-01 RX ADMIN — CHLORHEXIDINE GLUCONATE 1 APPLICATION(S): 213 SOLUTION TOPICAL at 12:46

## 2022-01-01 RX ADMIN — HEPARIN SODIUM 2000 UNIT(S)/HR: 5000 INJECTION INTRAVENOUS; SUBCUTANEOUS at 08:54

## 2022-01-01 RX ADMIN — SIMETHICONE 80 MILLIGRAM(S): 80 TABLET, CHEWABLE ORAL at 21:06

## 2022-01-01 RX ADMIN — LIDOCAINE 1 PATCH: 4 CREAM TOPICAL at 20:30

## 2022-01-01 RX ADMIN — FENTANYL CITRATE 5 MICROGRAM(S)/KG/HR: 50 INJECTION INTRAVENOUS at 20:32

## 2022-01-01 RX ADMIN — SEVELAMER CARBONATE 2400 MILLIGRAM(S): 2400 POWDER, FOR SUSPENSION ORAL at 09:11

## 2022-01-01 RX ADMIN — HYDROMORPHONE HYDROCHLORIDE 1 MILLIGRAM(S): 2 INJECTION INTRAMUSCULAR; INTRAVENOUS; SUBCUTANEOUS at 15:29

## 2022-01-01 RX ADMIN — INSULIN GLARGINE 10 UNIT(S): 100 INJECTION, SOLUTION SUBCUTANEOUS at 22:48

## 2022-01-01 RX ADMIN — INSULIN GLARGINE 10 UNIT(S): 100 INJECTION, SOLUTION SUBCUTANEOUS at 21:37

## 2022-01-01 RX ADMIN — INSULIN GLARGINE 10 UNIT(S): 100 INJECTION, SOLUTION SUBCUTANEOUS at 01:52

## 2022-01-01 RX ADMIN — HEPARIN SODIUM 24 UNIT(S)/HR: 5000 INJECTION INTRAVENOUS; SUBCUTANEOUS at 07:16

## 2022-01-01 RX ADMIN — PANTOPRAZOLE SODIUM 40 MILLIGRAM(S): 20 TABLET, DELAYED RELEASE ORAL at 18:06

## 2022-01-01 RX ADMIN — ERYTHROPOIETIN 20000 UNIT(S): 10000 INJECTION, SOLUTION INTRAVENOUS; SUBCUTANEOUS at 18:18

## 2022-01-01 RX ADMIN — Medication 2 UNIT(S): at 09:00

## 2022-01-01 RX ADMIN — Medication 1 APPLICATION(S): at 12:47

## 2022-01-01 RX ADMIN — Medication 100 MILLIGRAM(S): at 00:56

## 2022-01-01 RX ADMIN — CINACALCET 60 MILLIGRAM(S): 30 TABLET, FILM COATED ORAL at 22:31

## 2022-01-01 RX ADMIN — Medication 1 APPLICATION(S): at 14:19

## 2022-01-01 RX ADMIN — MUPIROCIN 1 APPLICATION(S): 20 OINTMENT TOPICAL at 17:22

## 2022-01-01 RX ADMIN — Medication 400 MILLIGRAM(S): at 17:41

## 2022-01-01 RX ADMIN — SENNA PLUS 2 TABLET(S): 8.6 TABLET ORAL at 23:07

## 2022-01-01 RX ADMIN — CYCLOSPORINE 200 MILLIGRAM(S): 100 CAPSULE ORAL at 09:30

## 2022-01-01 RX ADMIN — HYDROMORPHONE HYDROCHLORIDE 1 MILLIGRAM(S): 2 INJECTION INTRAMUSCULAR; INTRAVENOUS; SUBCUTANEOUS at 05:10

## 2022-01-01 RX ADMIN — MIRTAZAPINE 7.5 MILLIGRAM(S): 45 TABLET, ORALLY DISINTEGRATING ORAL at 22:15

## 2022-01-01 RX ADMIN — Medication 120 MILLIGRAM(S): at 05:38

## 2022-01-01 RX ADMIN — CHLORHEXIDINE GLUCONATE 1 APPLICATION(S): 213 SOLUTION TOPICAL at 17:08

## 2022-01-01 RX ADMIN — Medication 6 MILLIGRAM(S): at 21:33

## 2022-01-01 RX ADMIN — CYCLOSPORINE 125 MILLIGRAM(S): 100 CAPSULE ORAL at 23:44

## 2022-01-01 RX ADMIN — OXYCODONE HYDROCHLORIDE 10 MILLIGRAM(S): 5 TABLET ORAL at 21:42

## 2022-01-01 RX ADMIN — MIRTAZAPINE 7.5 MILLIGRAM(S): 45 TABLET, ORALLY DISINTEGRATING ORAL at 21:26

## 2022-01-01 RX ADMIN — CHLORHEXIDINE GLUCONATE 1 APPLICATION(S): 213 SOLUTION TOPICAL at 11:50

## 2022-01-01 RX ADMIN — CYCLOSPORINE 125 MILLIGRAM(S): 100 CAPSULE ORAL at 22:28

## 2022-01-01 RX ADMIN — Medication 1000 MILLIGRAM(S): at 14:30

## 2022-01-01 RX ADMIN — MONTELUKAST 10 MILLIGRAM(S): 4 TABLET, CHEWABLE ORAL at 23:43

## 2022-01-01 RX ADMIN — HEPARIN SODIUM 7500 UNIT(S): 5000 INJECTION INTRAVENOUS; SUBCUTANEOUS at 21:28

## 2022-01-01 RX ADMIN — OXYCODONE HYDROCHLORIDE 7.5 MILLIGRAM(S): 5 TABLET ORAL at 13:08

## 2022-01-01 RX ADMIN — INSULIN GLARGINE 16 UNIT(S): 100 INJECTION, SOLUTION SUBCUTANEOUS at 22:19

## 2022-01-01 RX ADMIN — PANTOPRAZOLE SODIUM 40 MILLIGRAM(S): 20 TABLET, DELAYED RELEASE ORAL at 21:27

## 2022-01-01 RX ADMIN — OXYCODONE HYDROCHLORIDE 5 MILLIGRAM(S): 5 TABLET ORAL at 13:27

## 2022-01-01 RX ADMIN — Medication 50 MILLIEQUIVALENT(S): at 23:30

## 2022-01-01 RX ADMIN — OXYCODONE HYDROCHLORIDE 10 MILLIGRAM(S): 5 TABLET ORAL at 07:36

## 2022-01-01 RX ADMIN — Medication 120 MILLIGRAM(S): at 05:45

## 2022-01-01 RX ADMIN — LORATADINE 10 MILLIGRAM(S): 10 TABLET ORAL at 12:22

## 2022-01-01 RX ADMIN — SIMETHICONE 80 MILLIGRAM(S): 80 TABLET, CHEWABLE ORAL at 18:25

## 2022-01-01 RX ADMIN — APIXABAN 2.5 MILLIGRAM(S): 2.5 TABLET, FILM COATED ORAL at 05:00

## 2022-01-01 RX ADMIN — OXYCODONE HYDROCHLORIDE 10 MILLIGRAM(S): 5 TABLET ORAL at 17:11

## 2022-01-01 RX ADMIN — INSULIN GLARGINE 16 UNIT(S): 100 INJECTION, SOLUTION SUBCUTANEOUS at 21:19

## 2022-01-01 RX ADMIN — OXYCODONE HYDROCHLORIDE 10 MILLIGRAM(S): 5 TABLET ORAL at 18:09

## 2022-01-01 RX ADMIN — GABAPENTIN 300 MILLIGRAM(S): 400 CAPSULE ORAL at 13:48

## 2022-01-01 RX ADMIN — Medication 2: at 09:27

## 2022-01-01 RX ADMIN — LIDOCAINE 1 PATCH: 4 CREAM TOPICAL at 11:46

## 2022-01-01 RX ADMIN — GABAPENTIN 300 MILLIGRAM(S): 400 CAPSULE ORAL at 13:04

## 2022-01-01 RX ADMIN — SEVELAMER CARBONATE 800 MILLIGRAM(S): 2400 POWDER, FOR SUSPENSION ORAL at 10:13

## 2022-01-01 RX ADMIN — CYCLOSPORINE 150 MILLIGRAM(S): 100 CAPSULE ORAL at 09:26

## 2022-01-01 RX ADMIN — Medication 1 APPLICATION(S): at 13:47

## 2022-01-01 RX ADMIN — HEPARIN SODIUM 1000 UNIT(S): 5000 INJECTION INTRAVENOUS; SUBCUTANEOUS at 13:23

## 2022-01-01 RX ADMIN — SODIUM CHLORIDE 100 MILLILITER(S): 9 INJECTION, SOLUTION INTRAVENOUS at 06:18

## 2022-01-01 RX ADMIN — CINACALCET 30 MILLIGRAM(S): 30 TABLET, FILM COATED ORAL at 18:08

## 2022-01-01 RX ADMIN — ERYTHROPOIETIN 12000 UNIT(S): 10000 INJECTION, SOLUTION INTRAVENOUS; SUBCUTANEOUS at 22:58

## 2022-01-01 RX ADMIN — CINACALCET 60 MILLIGRAM(S): 30 TABLET, FILM COATED ORAL at 13:16

## 2022-01-01 RX ADMIN — GABAPENTIN 300 MILLIGRAM(S): 400 CAPSULE ORAL at 12:21

## 2022-01-01 RX ADMIN — OXYCODONE HYDROCHLORIDE 7.5 MILLIGRAM(S): 5 TABLET ORAL at 14:48

## 2022-01-01 RX ADMIN — CYCLOSPORINE 200 MILLIGRAM(S): 100 CAPSULE ORAL at 17:21

## 2022-01-01 RX ADMIN — LIDOCAINE 1 PATCH: 4 CREAM TOPICAL at 20:32

## 2022-01-01 RX ADMIN — Medication 3: at 12:14

## 2022-01-01 RX ADMIN — SEVELAMER CARBONATE 800 MILLIGRAM(S): 2400 POWDER, FOR SUSPENSION ORAL at 12:54

## 2022-01-01 RX ADMIN — Medication 120 MILLIGRAM(S): at 05:00

## 2022-01-01 RX ADMIN — SEVELAMER CARBONATE 800 MILLIGRAM(S): 2400 POWDER, FOR SUSPENSION ORAL at 08:32

## 2022-01-01 RX ADMIN — Medication 133 MILLILITER(S): at 11:52

## 2022-01-01 RX ADMIN — SIMETHICONE 80 MILLIGRAM(S): 80 TABLET, CHEWABLE ORAL at 13:28

## 2022-01-01 RX ADMIN — Medication 5 MILLIGRAM(S): at 21:32

## 2022-01-01 RX ADMIN — Medication 1 APPLICATION(S): at 13:44

## 2022-01-01 RX ADMIN — APIXABAN 2.5 MILLIGRAM(S): 2.5 TABLET, FILM COATED ORAL at 05:47

## 2022-01-01 RX ADMIN — CINACALCET 30 MILLIGRAM(S): 30 TABLET, FILM COATED ORAL at 12:10

## 2022-01-01 RX ADMIN — MIRTAZAPINE 7.5 MILLIGRAM(S): 45 TABLET, ORALLY DISINTEGRATING ORAL at 21:27

## 2022-01-01 RX ADMIN — BUPROPION HYDROCHLORIDE 150 MILLIGRAM(S): 150 TABLET, EXTENDED RELEASE ORAL at 11:39

## 2022-01-01 RX ADMIN — HYDROMORPHONE HYDROCHLORIDE 1 MILLIGRAM(S): 2 INJECTION INTRAMUSCULAR; INTRAVENOUS; SUBCUTANEOUS at 17:17

## 2022-01-01 RX ADMIN — HYDROMORPHONE HYDROCHLORIDE 1 MILLIGRAM(S): 2 INJECTION INTRAMUSCULAR; INTRAVENOUS; SUBCUTANEOUS at 16:06

## 2022-01-01 RX ADMIN — FENTANYL CITRATE 5 MICROGRAM(S)/KG/HR: 50 INJECTION INTRAVENOUS at 08:02

## 2022-01-01 RX ADMIN — CHLORHEXIDINE GLUCONATE 1 APPLICATION(S): 213 SOLUTION TOPICAL at 11:15

## 2022-01-01 RX ADMIN — APIXABAN 5 MILLIGRAM(S): 2.5 TABLET, FILM COATED ORAL at 18:05

## 2022-01-01 RX ADMIN — Medication 30 MILLILITER(S): at 06:52

## 2022-01-01 RX ADMIN — ERYTHROPOIETIN 14000 UNIT(S): 10000 INJECTION, SOLUTION INTRAVENOUS; SUBCUTANEOUS at 18:41

## 2022-01-01 RX ADMIN — LINEZOLID 300 MILLIGRAM(S): 600 INJECTION, SOLUTION INTRAVENOUS at 17:11

## 2022-01-01 RX ADMIN — LIDOCAINE 1 PATCH: 4 CREAM TOPICAL at 05:42

## 2022-01-01 RX ADMIN — APIXABAN 5 MILLIGRAM(S): 2.5 TABLET, FILM COATED ORAL at 05:28

## 2022-01-01 RX ADMIN — LIDOCAINE 1 PATCH: 4 CREAM TOPICAL at 16:21

## 2022-01-01 RX ADMIN — Medication 1 APPLICATION(S): at 11:17

## 2022-01-01 RX ADMIN — Medication 1 TABLET(S): at 14:10

## 2022-01-01 RX ADMIN — OXYCODONE HYDROCHLORIDE 10 MILLIGRAM(S): 5 TABLET ORAL at 17:41

## 2022-01-01 RX ADMIN — OXYCODONE HYDROCHLORIDE 10 MILLIGRAM(S): 5 TABLET ORAL at 18:22

## 2022-01-01 RX ADMIN — CHLORHEXIDINE GLUCONATE 15 MILLILITER(S): 213 SOLUTION TOPICAL at 17:11

## 2022-01-01 RX ADMIN — Medication 10 UNIT(S): at 08:44

## 2022-01-01 RX ADMIN — Medication 0: at 08:58

## 2022-01-01 RX ADMIN — SIMETHICONE 80 MILLIGRAM(S): 80 TABLET, CHEWABLE ORAL at 22:55

## 2022-01-01 RX ADMIN — OXYCODONE HYDROCHLORIDE 5 MILLIGRAM(S): 5 TABLET ORAL at 14:17

## 2022-01-01 RX ADMIN — Medication 1 APPLICATION(S): at 14:18

## 2022-01-01 RX ADMIN — HEPARIN SODIUM 21 UNIT(S)/HR: 5000 INJECTION INTRAVENOUS; SUBCUTANEOUS at 07:15

## 2022-01-01 RX ADMIN — LIDOCAINE 1 PATCH: 4 CREAM TOPICAL at 00:05

## 2022-01-01 RX ADMIN — Medication 25 MILLILITER(S): at 17:11

## 2022-01-01 RX ADMIN — OXYCODONE HYDROCHLORIDE 10 MILLIGRAM(S): 5 TABLET ORAL at 19:00

## 2022-01-01 RX ADMIN — Medication 400 MILLIGRAM(S): at 13:45

## 2022-01-01 RX ADMIN — Medication 100 MILLIGRAM(S): at 21:06

## 2022-01-01 RX ADMIN — Medication 3: at 18:03

## 2022-01-01 RX ADMIN — HYDROMORPHONE HYDROCHLORIDE 1 MILLIGRAM(S): 2 INJECTION INTRAMUSCULAR; INTRAVENOUS; SUBCUTANEOUS at 23:15

## 2022-01-01 RX ADMIN — Medication 3 UNIT(S): at 16:47

## 2022-01-01 RX ADMIN — Medication 9 UNIT(S): at 18:02

## 2022-01-01 RX ADMIN — CEFEPIME 100 MILLIGRAM(S): 1 INJECTION, POWDER, FOR SOLUTION INTRAMUSCULAR; INTRAVENOUS at 02:26

## 2022-01-01 RX ADMIN — CYCLOSPORINE 200 MILLIGRAM(S): 100 CAPSULE ORAL at 06:45

## 2022-01-01 RX ADMIN — LIDOCAINE 1 PATCH: 4 CREAM TOPICAL at 12:12

## 2022-01-01 RX ADMIN — Medication 3 MILLIGRAM(S): at 02:36

## 2022-01-01 RX ADMIN — Medication 7 UNIT(S): at 17:28

## 2022-01-01 RX ADMIN — Medication 100 MILLIGRAM(S): at 05:24

## 2022-01-01 RX ADMIN — SENNA PLUS 2 TABLET(S): 8.6 TABLET ORAL at 21:57

## 2022-01-01 RX ADMIN — Medication 0: at 21:37

## 2022-01-01 RX ADMIN — Medication 120 MILLIGRAM(S): at 06:38

## 2022-01-01 RX ADMIN — Medication 25 MILLIGRAM(S): at 06:35

## 2022-01-01 RX ADMIN — Medication 2: at 13:01

## 2022-01-01 RX ADMIN — MONTELUKAST 10 MILLIGRAM(S): 4 TABLET, CHEWABLE ORAL at 00:55

## 2022-01-01 RX ADMIN — Medication 2: at 18:17

## 2022-01-01 RX ADMIN — BUPROPION HYDROCHLORIDE 150 MILLIGRAM(S): 150 TABLET, EXTENDED RELEASE ORAL at 12:58

## 2022-01-01 RX ADMIN — OXYCODONE HYDROCHLORIDE 7.5 MILLIGRAM(S): 5 TABLET ORAL at 11:40

## 2022-01-01 RX ADMIN — SEVELAMER CARBONATE 800 MILLIGRAM(S): 2400 POWDER, FOR SUSPENSION ORAL at 09:11

## 2022-01-01 RX ADMIN — Medication 1: at 12:13

## 2022-01-01 RX ADMIN — Medication 1: at 21:34

## 2022-01-01 RX ADMIN — SEVELAMER CARBONATE 800 MILLIGRAM(S): 2400 POWDER, FOR SUSPENSION ORAL at 08:53

## 2022-01-01 RX ADMIN — CHLORHEXIDINE GLUCONATE 1 APPLICATION(S): 213 SOLUTION TOPICAL at 13:16

## 2022-01-01 RX ADMIN — CINACALCET 60 MILLIGRAM(S): 30 TABLET, FILM COATED ORAL at 11:45

## 2022-01-01 RX ADMIN — SERTRALINE 50 MILLIGRAM(S): 25 TABLET, FILM COATED ORAL at 11:23

## 2022-01-01 RX ADMIN — Medication 25 MILLIGRAM(S): at 10:44

## 2022-01-01 RX ADMIN — Medication 5 UNIT(S): at 12:19

## 2022-01-01 RX ADMIN — SEVELAMER CARBONATE 800 MILLIGRAM(S): 2400 POWDER, FOR SUSPENSION ORAL at 18:20

## 2022-01-01 RX ADMIN — INSULIN GLARGINE 12 UNIT(S): 100 INJECTION, SOLUTION SUBCUTANEOUS at 21:22

## 2022-01-01 RX ADMIN — LIDOCAINE 1 PATCH: 4 CREAM TOPICAL at 12:55

## 2022-01-01 RX ADMIN — CYCLOSPORINE 150 MILLIGRAM(S): 100 CAPSULE ORAL at 19:58

## 2022-01-01 RX ADMIN — OXYCODONE HYDROCHLORIDE 7.5 MILLIGRAM(S): 5 TABLET ORAL at 11:45

## 2022-01-01 RX ADMIN — Medication 12.5 GRAM(S): at 08:25

## 2022-01-01 RX ADMIN — Medication 8 UNIT(S): at 12:48

## 2022-01-01 RX ADMIN — Medication 650 MILLIGRAM(S): at 03:52

## 2022-01-01 RX ADMIN — LINEZOLID 300 MILLIGRAM(S): 600 INJECTION, SOLUTION INTRAVENOUS at 14:50

## 2022-01-01 RX ADMIN — OXYCODONE HYDROCHLORIDE 5 MILLIGRAM(S): 5 TABLET ORAL at 03:55

## 2022-01-01 RX ADMIN — Medication 5 UNIT(S): at 12:48

## 2022-01-01 RX ADMIN — Medication 4: at 18:31

## 2022-01-01 RX ADMIN — SEVELAMER CARBONATE 800 MILLIGRAM(S): 2400 POWDER, FOR SUSPENSION ORAL at 09:47

## 2022-01-01 RX ADMIN — BUPROPION HYDROCHLORIDE 150 MILLIGRAM(S): 150 TABLET, EXTENDED RELEASE ORAL at 12:44

## 2022-01-01 RX ADMIN — Medication 1: at 13:28

## 2022-01-01 RX ADMIN — OXYCODONE HYDROCHLORIDE 10 MILLIGRAM(S): 5 TABLET ORAL at 15:50

## 2022-01-01 RX ADMIN — SEVELAMER CARBONATE 2400 MILLIGRAM(S): 2400 POWDER, FOR SUSPENSION ORAL at 18:37

## 2022-01-01 RX ADMIN — Medication 1 APPLICATION(S): at 07:08

## 2022-01-01 RX ADMIN — SEVELAMER CARBONATE 2400 MILLIGRAM(S): 2400 POWDER, FOR SUSPENSION ORAL at 09:22

## 2022-01-01 RX ADMIN — MONTELUKAST 10 MILLIGRAM(S): 4 TABLET, CHEWABLE ORAL at 21:30

## 2022-01-01 RX ADMIN — Medication 4.69 MICROGRAM(S)/KG/MIN: at 20:32

## 2022-01-01 RX ADMIN — SEVELAMER CARBONATE 800 MILLIGRAM(S): 2400 POWDER, FOR SUSPENSION ORAL at 12:10

## 2022-01-01 RX ADMIN — CHLORHEXIDINE GLUCONATE 1 APPLICATION(S): 213 SOLUTION TOPICAL at 10:00

## 2022-01-01 RX ADMIN — Medication 3 MILLIGRAM(S): at 00:26

## 2022-01-01 RX ADMIN — APIXABAN 5 MILLIGRAM(S): 2.5 TABLET, FILM COATED ORAL at 05:29

## 2022-01-01 RX ADMIN — HYDROMORPHONE HYDROCHLORIDE 1 MILLIGRAM(S): 2 INJECTION INTRAMUSCULAR; INTRAVENOUS; SUBCUTANEOUS at 15:40

## 2022-01-01 RX ADMIN — OXYCODONE HYDROCHLORIDE 7.5 MILLIGRAM(S): 5 TABLET ORAL at 21:35

## 2022-01-01 RX ADMIN — Medication 1 APPLICATION(S): at 12:53

## 2022-01-01 RX ADMIN — Medication 6 MILLIGRAM(S): at 22:31

## 2022-01-01 RX ADMIN — INSULIN GLARGINE 16 UNIT(S): 100 INJECTION, SOLUTION SUBCUTANEOUS at 00:51

## 2022-01-01 RX ADMIN — NYSTATIN CREAM 1 APPLICATION(S): 100000 CREAM TOPICAL at 12:56

## 2022-01-01 RX ADMIN — Medication 10 UNIT(S): at 12:23

## 2022-01-01 RX ADMIN — Medication 1: at 08:41

## 2022-01-01 RX ADMIN — Medication 10 UNIT(S): at 17:22

## 2022-01-01 RX ADMIN — Medication 10 UNIT(S): at 08:38

## 2022-01-01 RX ADMIN — Medication 1: at 18:22

## 2022-01-01 RX ADMIN — MONTELUKAST 10 MILLIGRAM(S): 4 TABLET, CHEWABLE ORAL at 23:05

## 2022-01-01 RX ADMIN — Medication 650 MILLIGRAM(S): at 00:38

## 2022-01-01 RX ADMIN — Medication 30 MILLIGRAM(S): at 17:36

## 2022-01-01 RX ADMIN — Medication 3 MILLIGRAM(S): at 22:41

## 2022-01-01 RX ADMIN — OXYCODONE HYDROCHLORIDE 10 MILLIGRAM(S): 5 TABLET ORAL at 11:11

## 2022-01-01 RX ADMIN — APIXABAN 5 MILLIGRAM(S): 2.5 TABLET, FILM COATED ORAL at 17:11

## 2022-01-01 RX ADMIN — Medication 1 APPLICATION(S): at 21:52

## 2022-01-01 RX ADMIN — Medication 30 MILLILITER(S): at 15:43

## 2022-01-01 RX ADMIN — Medication 1 APPLICATION(S): at 13:21

## 2022-01-01 RX ADMIN — OXYCODONE HYDROCHLORIDE 7.5 MILLIGRAM(S): 5 TABLET ORAL at 10:30

## 2022-01-01 RX ADMIN — CINACALCET 30 MILLIGRAM(S): 30 TABLET, FILM COATED ORAL at 11:43

## 2022-01-01 RX ADMIN — Medication 6 MILLIGRAM(S): at 22:20

## 2022-01-01 RX ADMIN — SEVELAMER CARBONATE 800 MILLIGRAM(S): 2400 POWDER, FOR SUSPENSION ORAL at 09:09

## 2022-01-01 RX ADMIN — Medication 6 MILLIGRAM(S): at 22:42

## 2022-01-01 RX ADMIN — HYDROMORPHONE HYDROCHLORIDE 1 MILLIGRAM(S): 2 INJECTION INTRAMUSCULAR; INTRAVENOUS; SUBCUTANEOUS at 11:45

## 2022-01-01 RX ADMIN — OXYCODONE HYDROCHLORIDE 10 MILLIGRAM(S): 5 TABLET ORAL at 05:47

## 2022-01-01 RX ADMIN — Medication 650 MILLIGRAM(S): at 03:22

## 2022-01-01 RX ADMIN — Medication 1 APPLICATION(S): at 15:14

## 2022-01-01 RX ADMIN — SENNA PLUS 2 TABLET(S): 8.6 TABLET ORAL at 22:36

## 2022-01-01 RX ADMIN — Medication 1: at 18:21

## 2022-01-01 RX ADMIN — SIMETHICONE 80 MILLIGRAM(S): 80 TABLET, CHEWABLE ORAL at 06:42

## 2022-01-01 RX ADMIN — INSULIN GLARGINE 12 UNIT(S): 100 INJECTION, SOLUTION SUBCUTANEOUS at 23:11

## 2022-01-01 RX ADMIN — PANTOPRAZOLE SODIUM 40 MILLIGRAM(S): 20 TABLET, DELAYED RELEASE ORAL at 06:54

## 2022-01-01 RX ADMIN — Medication 25 MILLIGRAM(S): at 17:50

## 2022-01-01 RX ADMIN — CHLORHEXIDINE GLUCONATE 1 APPLICATION(S): 213 SOLUTION TOPICAL at 12:35

## 2022-01-01 RX ADMIN — CEFEPIME 100 MILLIGRAM(S): 1 INJECTION, POWDER, FOR SOLUTION INTRAMUSCULAR; INTRAVENOUS at 06:42

## 2022-01-01 RX ADMIN — Medication 120 MILLIGRAM(S): at 06:36

## 2022-01-01 RX ADMIN — MIRTAZAPINE 7.5 MILLIGRAM(S): 45 TABLET, ORALLY DISINTEGRATING ORAL at 21:28

## 2022-01-01 RX ADMIN — Medication 1: at 23:23

## 2022-01-01 RX ADMIN — LIDOCAINE AND PRILOCAINE CREAM 1 APPLICATION(S): 25; 25 CREAM TOPICAL at 05:43

## 2022-01-01 RX ADMIN — BUPROPION HYDROCHLORIDE 150 MILLIGRAM(S): 150 TABLET, EXTENDED RELEASE ORAL at 12:46

## 2022-01-01 RX ADMIN — Medication 1 APPLICATION(S): at 11:33

## 2022-01-01 RX ADMIN — LIDOCAINE 1 PATCH: 4 CREAM TOPICAL at 11:45

## 2022-01-01 RX ADMIN — SENNA PLUS 2 TABLET(S): 8.6 TABLET ORAL at 23:32

## 2022-01-01 RX ADMIN — OXYCODONE HYDROCHLORIDE 7.5 MILLIGRAM(S): 5 TABLET ORAL at 23:30

## 2022-01-01 RX ADMIN — Medication 1: at 09:26

## 2022-01-01 RX ADMIN — BUPROPION HYDROCHLORIDE 150 MILLIGRAM(S): 150 TABLET, EXTENDED RELEASE ORAL at 11:34

## 2022-01-01 RX ADMIN — SEVELAMER CARBONATE 800 MILLIGRAM(S): 2400 POWDER, FOR SUSPENSION ORAL at 12:20

## 2022-01-01 RX ADMIN — OXYCODONE HYDROCHLORIDE 5 MILLIGRAM(S): 5 TABLET ORAL at 22:01

## 2022-01-01 RX ADMIN — POLYETHYLENE GLYCOL 3350 17 GRAM(S): 17 POWDER, FOR SOLUTION ORAL at 21:57

## 2022-01-01 RX ADMIN — BUPROPION HYDROCHLORIDE 150 MILLIGRAM(S): 150 TABLET, EXTENDED RELEASE ORAL at 12:53

## 2022-01-01 RX ADMIN — ATORVASTATIN CALCIUM 80 MILLIGRAM(S): 80 TABLET, FILM COATED ORAL at 21:55

## 2022-01-01 RX ADMIN — OXYCODONE HYDROCHLORIDE 7.5 MILLIGRAM(S): 5 TABLET ORAL at 14:39

## 2022-01-01 RX ADMIN — GABAPENTIN 300 MILLIGRAM(S): 400 CAPSULE ORAL at 12:16

## 2022-01-01 RX ADMIN — CYCLOSPORINE 200 MILLIGRAM(S): 100 CAPSULE ORAL at 14:06

## 2022-01-01 RX ADMIN — OXYCODONE HYDROCHLORIDE 5 MILLIGRAM(S): 5 TABLET ORAL at 17:20

## 2022-01-01 RX ADMIN — Medication 50 MILLIGRAM(S): at 21:31

## 2022-01-01 RX ADMIN — Medication 100 MILLIGRAM(S): at 23:18

## 2022-01-01 RX ADMIN — SIMETHICONE 80 MILLIGRAM(S): 80 TABLET, CHEWABLE ORAL at 23:29

## 2022-01-01 RX ADMIN — HYDROMORPHONE HYDROCHLORIDE 1 MILLIGRAM(S): 2 INJECTION INTRAMUSCULAR; INTRAVENOUS; SUBCUTANEOUS at 10:49

## 2022-01-01 RX ADMIN — Medication 1 APPLICATION(S): at 16:07

## 2022-01-01 RX ADMIN — Medication 120 MILLIGRAM(S): at 06:02

## 2022-01-01 RX ADMIN — OXYCODONE HYDROCHLORIDE 7.5 MILLIGRAM(S): 5 TABLET ORAL at 05:39

## 2022-01-01 RX ADMIN — Medication 2: at 12:29

## 2022-01-01 RX ADMIN — LORATADINE 10 MILLIGRAM(S): 10 TABLET ORAL at 15:36

## 2022-01-01 RX ADMIN — ATORVASTATIN CALCIUM 80 MILLIGRAM(S): 80 TABLET, FILM COATED ORAL at 03:29

## 2022-01-01 RX ADMIN — Medication 3 UNIT(S): at 18:17

## 2022-01-01 RX ADMIN — Medication 8 UNIT(S): at 18:09

## 2022-01-01 RX ADMIN — Medication 5 MG/HR: at 09:48

## 2022-01-01 RX ADMIN — Medication 2: at 17:16

## 2022-01-01 RX ADMIN — Medication 35 MILLIGRAM(S): at 06:06

## 2022-01-01 RX ADMIN — PANTOPRAZOLE SODIUM 40 MILLIGRAM(S): 20 TABLET, DELAYED RELEASE ORAL at 11:57

## 2022-01-01 RX ADMIN — Medication 25 MILLIGRAM(S): at 10:08

## 2022-01-01 RX ADMIN — HYDROMORPHONE HYDROCHLORIDE 1 MILLIGRAM(S): 2 INJECTION INTRAMUSCULAR; INTRAVENOUS; SUBCUTANEOUS at 01:45

## 2022-01-01 RX ADMIN — OXYCODONE HYDROCHLORIDE 5 MILLIGRAM(S): 5 TABLET ORAL at 21:55

## 2022-01-01 RX ADMIN — OXYCODONE HYDROCHLORIDE 10 MILLIGRAM(S): 5 TABLET ORAL at 09:33

## 2022-01-01 RX ADMIN — OXYCODONE HYDROCHLORIDE 5 MILLIGRAM(S): 5 TABLET ORAL at 09:29

## 2022-01-01 RX ADMIN — SEVELAMER CARBONATE 800 MILLIGRAM(S): 2400 POWDER, FOR SUSPENSION ORAL at 08:22

## 2022-01-01 RX ADMIN — Medication 10 UNIT(S): at 08:33

## 2022-01-01 RX ADMIN — MIRTAZAPINE 7.5 MILLIGRAM(S): 45 TABLET, ORALLY DISINTEGRATING ORAL at 00:51

## 2022-01-01 RX ADMIN — OXYCODONE HYDROCHLORIDE 7.5 MILLIGRAM(S): 5 TABLET ORAL at 15:24

## 2022-01-01 RX ADMIN — CYCLOSPORINE 200 MILLIGRAM(S): 100 CAPSULE ORAL at 22:41

## 2022-01-01 RX ADMIN — ERYTHROPOIETIN 16000 UNIT(S): 10000 INJECTION, SOLUTION INTRAVENOUS; SUBCUTANEOUS at 07:41

## 2022-01-01 RX ADMIN — MIRTAZAPINE 7.5 MILLIGRAM(S): 45 TABLET, ORALLY DISINTEGRATING ORAL at 22:47

## 2022-01-01 RX ADMIN — FENTANYL CITRATE 25 MICROGRAM(S): 50 INJECTION INTRAVENOUS at 02:28

## 2022-01-01 RX ADMIN — Medication 1 APPLICATION(S): at 12:00

## 2022-01-01 RX ADMIN — CHLORHEXIDINE GLUCONATE 1 APPLICATION(S): 213 SOLUTION TOPICAL at 11:09

## 2022-01-01 RX ADMIN — Medication 6 MILLIGRAM(S): at 21:55

## 2022-01-01 RX ADMIN — Medication 1: at 18:24

## 2022-01-01 RX ADMIN — PANTOPRAZOLE SODIUM 40 MILLIGRAM(S): 20 TABLET, DELAYED RELEASE ORAL at 06:45

## 2022-01-01 RX ADMIN — LIDOCAINE 1 PATCH: 4 CREAM TOPICAL at 18:29

## 2022-01-01 RX ADMIN — POLYETHYLENE GLYCOL 3350 17 GRAM(S): 17 POWDER, FOR SOLUTION ORAL at 21:50

## 2022-01-01 RX ADMIN — LIDOCAINE 1 PATCH: 4 CREAM TOPICAL at 13:16

## 2022-01-01 RX ADMIN — CEFEPIME 100 MILLIGRAM(S): 1 INJECTION, POWDER, FOR SOLUTION INTRAMUSCULAR; INTRAVENOUS at 20:59

## 2022-01-01 RX ADMIN — APIXABAN 5 MILLIGRAM(S): 2.5 TABLET, FILM COATED ORAL at 05:39

## 2022-01-01 RX ADMIN — Medication 25 MILLIGRAM(S): at 11:48

## 2022-01-01 RX ADMIN — PANTOPRAZOLE SODIUM 40 MILLIGRAM(S): 20 TABLET, DELAYED RELEASE ORAL at 06:15

## 2022-01-01 RX ADMIN — INSULIN GLARGINE 12 UNIT(S): 100 INJECTION, SOLUTION SUBCUTANEOUS at 22:09

## 2022-01-01 RX ADMIN — Medication 9 UNIT(S): at 12:13

## 2022-01-01 RX ADMIN — APIXABAN 2.5 MILLIGRAM(S): 2.5 TABLET, FILM COATED ORAL at 17:57

## 2022-01-01 RX ADMIN — CINACALCET 60 MILLIGRAM(S): 30 TABLET, FILM COATED ORAL at 13:27

## 2022-01-01 RX ADMIN — LIDOCAINE 1 PATCH: 4 CREAM TOPICAL at 19:47

## 2022-01-01 RX ADMIN — SIMETHICONE 80 MILLIGRAM(S): 80 TABLET, CHEWABLE ORAL at 21:50

## 2022-01-01 RX ADMIN — LIDOCAINE 1 PATCH: 4 CREAM TOPICAL at 12:53

## 2022-01-01 RX ADMIN — MUPIROCIN 1 APPLICATION(S): 20 OINTMENT TOPICAL at 05:05

## 2022-01-01 RX ADMIN — Medication 400 MILLIGRAM(S): at 15:31

## 2022-01-01 RX ADMIN — SERTRALINE 50 MILLIGRAM(S): 25 TABLET, FILM COATED ORAL at 11:48

## 2022-01-01 RX ADMIN — Medication 1: at 13:45

## 2022-01-01 RX ADMIN — Medication 30 MILLILITER(S): at 21:28

## 2022-01-01 RX ADMIN — CHLORHEXIDINE GLUCONATE 1 APPLICATION(S): 213 SOLUTION TOPICAL at 13:11

## 2022-01-01 RX ADMIN — APIXABAN 2.5 MILLIGRAM(S): 2.5 TABLET, FILM COATED ORAL at 17:17

## 2022-01-01 RX ADMIN — OXYCODONE HYDROCHLORIDE 7.5 MILLIGRAM(S): 5 TABLET ORAL at 01:04

## 2022-01-01 RX ADMIN — ERYTHROPOIETIN 10000 UNIT(S): 10000 INJECTION, SOLUTION INTRAVENOUS; SUBCUTANEOUS at 08:48

## 2022-01-01 RX ADMIN — POLYETHYLENE GLYCOL 3350 17 GRAM(S): 17 POWDER, FOR SOLUTION ORAL at 06:03

## 2022-01-01 RX ADMIN — CHLORHEXIDINE GLUCONATE 1 APPLICATION(S): 213 SOLUTION TOPICAL at 12:56

## 2022-01-01 RX ADMIN — PANTOPRAZOLE SODIUM 40 MILLIGRAM(S): 20 TABLET, DELAYED RELEASE ORAL at 05:46

## 2022-01-01 RX ADMIN — CINACALCET 60 MILLIGRAM(S): 30 TABLET, FILM COATED ORAL at 23:10

## 2022-01-01 RX ADMIN — OXYCODONE HYDROCHLORIDE 7.5 MILLIGRAM(S): 5 TABLET ORAL at 02:51

## 2022-01-01 RX ADMIN — SEVELAMER CARBONATE 2400 MILLIGRAM(S): 2400 POWDER, FOR SUSPENSION ORAL at 17:48

## 2022-01-01 RX ADMIN — MONTELUKAST 10 MILLIGRAM(S): 4 TABLET, CHEWABLE ORAL at 20:55

## 2022-01-01 RX ADMIN — Medication 1: at 17:42

## 2022-01-01 RX ADMIN — OXYCODONE HYDROCHLORIDE 10 MILLIGRAM(S): 5 TABLET ORAL at 19:19

## 2022-01-01 RX ADMIN — OXYCODONE HYDROCHLORIDE 10 MILLIGRAM(S): 5 TABLET ORAL at 05:17

## 2022-01-01 RX ADMIN — OXYCODONE HYDROCHLORIDE 10 MILLIGRAM(S): 5 TABLET ORAL at 02:50

## 2022-01-01 RX ADMIN — LIDOCAINE 1 PATCH: 4 CREAM TOPICAL at 00:58

## 2022-01-01 RX ADMIN — Medication 650 MILLIGRAM(S): at 18:38

## 2022-01-01 RX ADMIN — OXYCODONE HYDROCHLORIDE 10 MILLIGRAM(S): 5 TABLET ORAL at 12:23

## 2022-01-01 RX ADMIN — ERYTHROPOIETIN 16000 UNIT(S): 10000 INJECTION, SOLUTION INTRAVENOUS; SUBCUTANEOUS at 17:19

## 2022-01-01 RX ADMIN — LIDOCAINE AND PRILOCAINE CREAM 1 APPLICATION(S): 25; 25 CREAM TOPICAL at 16:22

## 2022-01-01 RX ADMIN — SIMETHICONE 80 MILLIGRAM(S): 80 TABLET, CHEWABLE ORAL at 16:02

## 2022-01-01 RX ADMIN — CINACALCET 60 MILLIGRAM(S): 30 TABLET, FILM COATED ORAL at 15:14

## 2022-01-01 RX ADMIN — Medication 1: at 09:06

## 2022-01-01 RX ADMIN — OXYCODONE HYDROCHLORIDE 10 MILLIGRAM(S): 5 TABLET ORAL at 05:40

## 2022-01-01 RX ADMIN — Medication 100 MILLIGRAM(S): at 21:22

## 2022-01-01 RX ADMIN — Medication 25 MILLIGRAM(S): at 21:52

## 2022-01-01 RX ADMIN — GABAPENTIN 300 MILLIGRAM(S): 400 CAPSULE ORAL at 12:56

## 2022-01-01 RX ADMIN — Medication 650 MILLIGRAM(S): at 18:35

## 2022-01-01 RX ADMIN — SEVELAMER CARBONATE 800 MILLIGRAM(S): 2400 POWDER, FOR SUSPENSION ORAL at 17:54

## 2022-01-01 RX ADMIN — OXYCODONE HYDROCHLORIDE 5 MILLIGRAM(S): 5 TABLET ORAL at 14:33

## 2022-01-01 RX ADMIN — Medication 10 UNIT(S): at 17:09

## 2022-01-01 RX ADMIN — ATORVASTATIN CALCIUM 80 MILLIGRAM(S): 80 TABLET, FILM COATED ORAL at 23:04

## 2022-01-01 RX ADMIN — SERTRALINE 50 MILLIGRAM(S): 25 TABLET, FILM COATED ORAL at 11:25

## 2022-01-01 RX ADMIN — Medication 100 MILLIGRAM(S): at 22:10

## 2022-01-01 RX ADMIN — CYCLOSPORINE 150 MILLIGRAM(S): 100 CAPSULE ORAL at 13:15

## 2022-01-01 RX ADMIN — LORATADINE 10 MILLIGRAM(S): 10 TABLET ORAL at 12:39

## 2022-01-01 RX ADMIN — Medication 25 MILLIGRAM(S): at 17:03

## 2022-01-01 RX ADMIN — OXYCODONE HYDROCHLORIDE 10 MILLIGRAM(S): 5 TABLET ORAL at 20:58

## 2022-01-01 RX ADMIN — OXYCODONE HYDROCHLORIDE 7.5 MILLIGRAM(S): 5 TABLET ORAL at 16:58

## 2022-01-01 RX ADMIN — SIMETHICONE 80 MILLIGRAM(S): 80 TABLET, CHEWABLE ORAL at 22:09

## 2022-01-01 RX ADMIN — ERYTHROPOIETIN 10000 UNIT(S): 10000 INJECTION, SOLUTION INTRAVENOUS; SUBCUTANEOUS at 11:53

## 2022-01-01 RX ADMIN — GABAPENTIN 300 MILLIGRAM(S): 400 CAPSULE ORAL at 13:12

## 2022-01-01 RX ADMIN — Medication 100 MILLIGRAM(S): at 13:12

## 2022-01-01 RX ADMIN — SEVELAMER CARBONATE 800 MILLIGRAM(S): 2400 POWDER, FOR SUSPENSION ORAL at 12:26

## 2022-01-01 RX ADMIN — ATORVASTATIN CALCIUM 80 MILLIGRAM(S): 80 TABLET, FILM COATED ORAL at 23:33

## 2022-01-01 RX ADMIN — Medication 100 MILLIGRAM(S): at 21:28

## 2022-01-01 RX ADMIN — GABAPENTIN 300 MILLIGRAM(S): 400 CAPSULE ORAL at 12:24

## 2022-01-01 RX ADMIN — SERTRALINE 50 MILLIGRAM(S): 25 TABLET, FILM COATED ORAL at 12:47

## 2022-01-01 RX ADMIN — PANTOPRAZOLE SODIUM 40 MILLIGRAM(S): 20 TABLET, DELAYED RELEASE ORAL at 06:35

## 2022-01-01 RX ADMIN — OXYCODONE HYDROCHLORIDE 5 MILLIGRAM(S): 5 TABLET ORAL at 22:58

## 2022-01-01 RX ADMIN — OXYCODONE HYDROCHLORIDE 10 MILLIGRAM(S): 5 TABLET ORAL at 21:48

## 2022-01-01 RX ADMIN — Medication 6 MILLIGRAM(S): at 21:53

## 2022-01-01 RX ADMIN — Medication 3 MILLIGRAM(S): at 00:00

## 2022-01-01 RX ADMIN — GABAPENTIN 300 MILLIGRAM(S): 400 CAPSULE ORAL at 21:01

## 2022-01-01 RX ADMIN — HYDROMORPHONE HYDROCHLORIDE 1 MILLIGRAM(S): 2 INJECTION INTRAMUSCULAR; INTRAVENOUS; SUBCUTANEOUS at 06:48

## 2022-01-01 RX ADMIN — Medication 10 UNIT(S): at 18:04

## 2022-01-01 RX ADMIN — SERTRALINE 50 MILLIGRAM(S): 25 TABLET, FILM COATED ORAL at 13:15

## 2022-01-01 RX ADMIN — CYCLOSPORINE 200 MILLIGRAM(S): 100 CAPSULE ORAL at 18:26

## 2022-01-01 RX ADMIN — Medication 6 MILLIGRAM(S): at 23:44

## 2022-01-01 RX ADMIN — Medication 81 MILLIGRAM(S): at 14:14

## 2022-01-01 RX ADMIN — CHLORHEXIDINE GLUCONATE 15 MILLILITER(S): 213 SOLUTION TOPICAL at 19:35

## 2022-01-01 RX ADMIN — POLYETHYLENE GLYCOL 3350 17 GRAM(S): 17 POWDER, FOR SOLUTION ORAL at 09:17

## 2022-01-01 RX ADMIN — PANTOPRAZOLE SODIUM 40 MILLIGRAM(S): 20 TABLET, DELAYED RELEASE ORAL at 17:39

## 2022-01-01 RX ADMIN — HYDROMORPHONE HYDROCHLORIDE 1 MILLIGRAM(S): 2 INJECTION INTRAMUSCULAR; INTRAVENOUS; SUBCUTANEOUS at 09:00

## 2022-01-01 RX ADMIN — Medication 120 MILLIGRAM(S): at 05:17

## 2022-01-01 RX ADMIN — ATORVASTATIN CALCIUM 80 MILLIGRAM(S): 80 TABLET, FILM COATED ORAL at 21:45

## 2022-01-01 RX ADMIN — INSULIN GLARGINE 7 UNIT(S): 100 INJECTION, SOLUTION SUBCUTANEOUS at 22:51

## 2022-01-01 RX ADMIN — OXYCODONE HYDROCHLORIDE 7.5 MILLIGRAM(S): 5 TABLET ORAL at 23:13

## 2022-01-01 RX ADMIN — Medication 2: at 18:15

## 2022-01-01 RX ADMIN — OXYCODONE HYDROCHLORIDE 10 MILLIGRAM(S): 5 TABLET ORAL at 05:18

## 2022-01-01 RX ADMIN — PROPOFOL 6 MICROGRAM(S)/KG/MIN: 10 INJECTION, EMULSION INTRAVENOUS at 08:03

## 2022-01-01 RX ADMIN — Medication 100 MILLIGRAM(S): at 21:20

## 2022-01-01 RX ADMIN — OXYCODONE HYDROCHLORIDE 10 MILLIGRAM(S): 5 TABLET ORAL at 19:30

## 2022-01-01 RX ADMIN — BUPROPION HYDROCHLORIDE 150 MILLIGRAM(S): 150 TABLET, EXTENDED RELEASE ORAL at 16:03

## 2022-01-01 RX ADMIN — Medication 3 UNIT(S): at 13:17

## 2022-01-01 RX ADMIN — OXYCODONE HYDROCHLORIDE 10 MILLIGRAM(S): 5 TABLET ORAL at 22:45

## 2022-01-01 RX ADMIN — Medication 6 UNIT(S): at 08:59

## 2022-01-01 RX ADMIN — INSULIN GLARGINE 17 UNIT(S): 100 INJECTION, SOLUTION SUBCUTANEOUS at 21:55

## 2022-01-01 RX ADMIN — MONTELUKAST 10 MILLIGRAM(S): 4 TABLET, CHEWABLE ORAL at 22:15

## 2022-01-01 RX ADMIN — POLYETHYLENE GLYCOL 3350 17 GRAM(S): 17 POWDER, FOR SOLUTION ORAL at 18:41

## 2022-01-01 RX ADMIN — Medication 10 UNIT(S): at 09:26

## 2022-01-01 RX ADMIN — GABAPENTIN 300 MILLIGRAM(S): 400 CAPSULE ORAL at 12:33

## 2022-01-01 RX ADMIN — HYDROMORPHONE HYDROCHLORIDE 1 MILLIGRAM(S): 2 INJECTION INTRAMUSCULAR; INTRAVENOUS; SUBCUTANEOUS at 08:53

## 2022-01-01 RX ADMIN — INSULIN GLARGINE 10 UNIT(S): 100 INJECTION, SOLUTION SUBCUTANEOUS at 22:04

## 2022-01-01 RX ADMIN — CHLORHEXIDINE GLUCONATE 1 APPLICATION(S): 213 SOLUTION TOPICAL at 11:18

## 2022-01-01 RX ADMIN — MONTELUKAST 10 MILLIGRAM(S): 4 TABLET, CHEWABLE ORAL at 21:58

## 2022-01-01 RX ADMIN — APIXABAN 2.5 MILLIGRAM(S): 2.5 TABLET, FILM COATED ORAL at 17:52

## 2022-01-01 RX ADMIN — Medication 1 APPLICATION(S): at 06:35

## 2022-01-01 RX ADMIN — CYCLOSPORINE 200 MILLIGRAM(S): 100 CAPSULE ORAL at 23:22

## 2022-01-01 RX ADMIN — OXYCODONE HYDROCHLORIDE 5 MILLIGRAM(S): 5 TABLET ORAL at 08:25

## 2022-01-01 RX ADMIN — MONTELUKAST 10 MILLIGRAM(S): 4 TABLET, CHEWABLE ORAL at 22:22

## 2022-01-01 RX ADMIN — FENTANYL CITRATE 5 MICROGRAM(S)/KG/HR: 50 INJECTION INTRAVENOUS at 21:30

## 2022-01-01 RX ADMIN — Medication 7 UNIT(S): at 12:49

## 2022-01-01 RX ADMIN — CYCLOSPORINE 125 MILLIGRAM(S): 100 CAPSULE ORAL at 09:08

## 2022-01-01 RX ADMIN — OXYCODONE HYDROCHLORIDE 10 MILLIGRAM(S): 5 TABLET ORAL at 21:49

## 2022-01-01 RX ADMIN — CYCLOSPORINE 200 MILLIGRAM(S): 100 CAPSULE ORAL at 18:18

## 2022-01-01 RX ADMIN — Medication 35 MILLIGRAM(S): at 09:35

## 2022-01-01 RX ADMIN — OXYCODONE HYDROCHLORIDE 10 MILLIGRAM(S): 5 TABLET ORAL at 22:37

## 2022-01-01 RX ADMIN — LIDOCAINE 1 PATCH: 4 CREAM TOPICAL at 12:20

## 2022-01-01 RX ADMIN — MONTELUKAST 10 MILLIGRAM(S): 4 TABLET, CHEWABLE ORAL at 22:24

## 2022-01-01 RX ADMIN — SIMETHICONE 80 MILLIGRAM(S): 80 TABLET, CHEWABLE ORAL at 05:22

## 2022-01-01 RX ADMIN — OXYCODONE HYDROCHLORIDE 5 MILLIGRAM(S): 5 TABLET ORAL at 21:28

## 2022-01-01 RX ADMIN — HYDROMORPHONE HYDROCHLORIDE 1 MILLIGRAM(S): 2 INJECTION INTRAMUSCULAR; INTRAVENOUS; SUBCUTANEOUS at 15:45

## 2022-01-01 RX ADMIN — Medication 100 MILLIGRAM(S): at 21:48

## 2022-01-01 RX ADMIN — OXYCODONE HYDROCHLORIDE 10 MILLIGRAM(S): 5 TABLET ORAL at 18:24

## 2022-01-01 RX ADMIN — OXYCODONE HYDROCHLORIDE 10 MILLIGRAM(S): 5 TABLET ORAL at 22:26

## 2022-01-01 RX ADMIN — ATORVASTATIN CALCIUM 80 MILLIGRAM(S): 80 TABLET, FILM COATED ORAL at 02:10

## 2022-01-01 RX ADMIN — Medication 81 MILLIGRAM(S): at 11:32

## 2022-01-01 RX ADMIN — POLYETHYLENE GLYCOL 3350 17 GRAM(S): 17 POWDER, FOR SOLUTION ORAL at 18:31

## 2022-01-01 RX ADMIN — Medication 120 MILLIGRAM(S): at 05:36

## 2022-01-01 RX ADMIN — GABAPENTIN 300 MILLIGRAM(S): 400 CAPSULE ORAL at 12:26

## 2022-01-01 RX ADMIN — Medication 2 UNIT(S): at 17:53

## 2022-01-01 RX ADMIN — OXYCODONE HYDROCHLORIDE 10 MILLIGRAM(S): 5 TABLET ORAL at 08:42

## 2022-01-01 RX ADMIN — APIXABAN 5 MILLIGRAM(S): 2.5 TABLET, FILM COATED ORAL at 06:30

## 2022-01-01 RX ADMIN — MONTELUKAST 10 MILLIGRAM(S): 4 TABLET, CHEWABLE ORAL at 22:31

## 2022-01-01 RX ADMIN — CHLORHEXIDINE GLUCONATE 1 APPLICATION(S): 213 SOLUTION TOPICAL at 14:14

## 2022-01-01 RX ADMIN — CYCLOSPORINE 125 MILLIGRAM(S): 100 CAPSULE ORAL at 22:02

## 2022-01-01 RX ADMIN — SENNA PLUS 2 TABLET(S): 8.6 TABLET ORAL at 21:59

## 2022-01-01 RX ADMIN — CYCLOSPORINE 200 MILLIGRAM(S): 100 CAPSULE ORAL at 12:53

## 2022-01-01 RX ADMIN — SIMETHICONE 80 MILLIGRAM(S): 80 TABLET, CHEWABLE ORAL at 05:40

## 2022-01-01 RX ADMIN — CHLORHEXIDINE GLUCONATE 1 APPLICATION(S): 213 SOLUTION TOPICAL at 16:07

## 2022-01-01 RX ADMIN — Medication 1 APPLICATION(S): at 17:15

## 2022-01-01 RX ADMIN — PANTOPRAZOLE SODIUM 40 MILLIGRAM(S): 20 TABLET, DELAYED RELEASE ORAL at 17:54

## 2022-01-01 RX ADMIN — LIDOCAINE 1 PATCH: 4 CREAM TOPICAL at 14:22

## 2022-01-01 RX ADMIN — Medication 10 UNIT(S): at 13:00

## 2022-01-01 RX ADMIN — Medication 25 GRAM(S): at 18:17

## 2022-01-01 RX ADMIN — SEVELAMER CARBONATE 2400 MILLIGRAM(S): 2400 POWDER, FOR SUSPENSION ORAL at 09:24

## 2022-01-01 RX ADMIN — ERYTHROPOIETIN 20000 UNIT(S): 10000 INJECTION, SOLUTION INTRAVENOUS; SUBCUTANEOUS at 06:59

## 2022-01-01 RX ADMIN — Medication 70 MILLIGRAM(S): at 18:48

## 2022-01-01 RX ADMIN — Medication 100 MILLIGRAM(S): at 21:51

## 2022-01-01 RX ADMIN — INSULIN GLARGINE 17 UNIT(S): 100 INJECTION, SOLUTION SUBCUTANEOUS at 21:51

## 2022-01-01 RX ADMIN — Medication 5 UNIT(S): at 15:48

## 2022-01-01 RX ADMIN — CHLORHEXIDINE GLUCONATE 1 APPLICATION(S): 213 SOLUTION TOPICAL at 12:09

## 2022-01-01 RX ADMIN — INSULIN GLARGINE 16 UNIT(S): 100 INJECTION, SOLUTION SUBCUTANEOUS at 01:05

## 2022-01-01 RX ADMIN — OXYCODONE HYDROCHLORIDE 5 MILLIGRAM(S): 5 TABLET ORAL at 22:56

## 2022-01-01 RX ADMIN — ATORVASTATIN CALCIUM 80 MILLIGRAM(S): 80 TABLET, FILM COATED ORAL at 20:54

## 2022-01-01 RX ADMIN — Medication 2: at 13:05

## 2022-01-01 RX ADMIN — MIDODRINE HYDROCHLORIDE 5 MILLIGRAM(S): 2.5 TABLET ORAL at 18:09

## 2022-01-01 RX ADMIN — Medication 1 APPLICATION(S): at 05:50

## 2022-01-01 RX ADMIN — Medication 6 MILLIGRAM(S): at 22:18

## 2022-01-01 RX ADMIN — APIXABAN 2.5 MILLIGRAM(S): 2.5 TABLET, FILM COATED ORAL at 17:56

## 2022-01-01 RX ADMIN — SENNA PLUS 2 TABLET(S): 8.6 TABLET ORAL at 22:13

## 2022-01-01 RX ADMIN — SEVELAMER CARBONATE 800 MILLIGRAM(S): 2400 POWDER, FOR SUSPENSION ORAL at 19:19

## 2022-01-01 RX ADMIN — POLYETHYLENE GLYCOL 3350 17 GRAM(S): 17 POWDER, FOR SOLUTION ORAL at 18:03

## 2022-01-01 RX ADMIN — Medication 35 MILLIGRAM(S): at 18:17

## 2022-01-01 RX ADMIN — Medication 1 APPLICATION(S): at 18:38

## 2022-01-01 RX ADMIN — Medication 10 UNIT(S): at 17:26

## 2022-01-01 RX ADMIN — CHLORHEXIDINE GLUCONATE 1 APPLICATION(S): 213 SOLUTION TOPICAL at 13:42

## 2022-01-01 RX ADMIN — INSULIN GLARGINE 15 UNIT(S): 100 INJECTION, SOLUTION SUBCUTANEOUS at 22:06

## 2022-01-01 RX ADMIN — CINACALCET 30 MILLIGRAM(S): 30 TABLET, FILM COATED ORAL at 13:03

## 2022-01-01 RX ADMIN — INSULIN GLARGINE 16 UNIT(S): 100 INJECTION, SOLUTION SUBCUTANEOUS at 22:27

## 2022-01-01 RX ADMIN — INSULIN GLARGINE 5 UNIT(S): 100 INJECTION, SOLUTION SUBCUTANEOUS at 23:39

## 2022-01-01 RX ADMIN — Medication 10 UNIT(S): at 17:36

## 2022-01-01 RX ADMIN — Medication 1 APPLICATION(S): at 14:35

## 2022-01-01 RX ADMIN — POLYETHYLENE GLYCOL 3350 17 GRAM(S): 17 POWDER, FOR SOLUTION ORAL at 05:53

## 2022-01-01 RX ADMIN — CYCLOSPORINE 100 MILLIGRAM(S): 100 CAPSULE ORAL at 21:28

## 2022-01-01 RX ADMIN — OXYCODONE HYDROCHLORIDE 7.5 MILLIGRAM(S): 5 TABLET ORAL at 20:36

## 2022-01-01 RX ADMIN — Medication 100 MILLIGRAM(S): at 22:56

## 2022-01-01 RX ADMIN — SODIUM ZIRCONIUM CYCLOSILICATE 10 GRAM(S): 10 POWDER, FOR SUSPENSION ORAL at 23:55

## 2022-01-01 RX ADMIN — Medication 100 MILLIGRAM(S): at 21:24

## 2022-01-01 RX ADMIN — SEVELAMER CARBONATE 800 MILLIGRAM(S): 2400 POWDER, FOR SUSPENSION ORAL at 16:47

## 2022-01-01 RX ADMIN — Medication 1: at 22:26

## 2022-01-01 RX ADMIN — HEPARIN SODIUM 500 UNIT(S): 5000 INJECTION INTRAVENOUS; SUBCUTANEOUS at 12:25

## 2022-01-01 RX ADMIN — Medication 10 UNIT(S): at 17:48

## 2022-01-01 RX ADMIN — Medication 6 MILLIGRAM(S): at 22:58

## 2022-01-01 RX ADMIN — OXYCODONE HYDROCHLORIDE 5 MILLIGRAM(S): 5 TABLET ORAL at 22:50

## 2022-01-01 RX ADMIN — Medication 1 APPLICATION(S): at 13:04

## 2022-01-01 RX ADMIN — OXYCODONE HYDROCHLORIDE 5 MILLIGRAM(S): 5 TABLET ORAL at 14:01

## 2022-01-01 RX ADMIN — Medication 1 APPLICATION(S): at 14:02

## 2022-01-01 RX ADMIN — CYCLOSPORINE 200 MILLIGRAM(S): 100 CAPSULE ORAL at 06:02

## 2022-01-01 RX ADMIN — HYDROMORPHONE HYDROCHLORIDE 1 MILLIGRAM(S): 2 INJECTION INTRAMUSCULAR; INTRAVENOUS; SUBCUTANEOUS at 06:33

## 2022-01-01 RX ADMIN — OXYCODONE HYDROCHLORIDE 7.5 MILLIGRAM(S): 5 TABLET ORAL at 22:30

## 2022-01-01 RX ADMIN — Medication 81 MILLIGRAM(S): at 12:21

## 2022-01-01 RX ADMIN — Medication 81 MILLIGRAM(S): at 16:18

## 2022-01-01 RX ADMIN — OXYCODONE HYDROCHLORIDE 10 MILLIGRAM(S): 5 TABLET ORAL at 06:24

## 2022-01-01 RX ADMIN — Medication 1: at 13:30

## 2022-01-01 RX ADMIN — SENNA PLUS 2 TABLET(S): 8.6 TABLET ORAL at 23:29

## 2022-01-01 RX ADMIN — Medication 400 MILLIGRAM(S): at 10:34

## 2022-01-01 RX ADMIN — LIDOCAINE AND PRILOCAINE CREAM 1 APPLICATION(S): 25; 25 CREAM TOPICAL at 16:10

## 2022-01-01 RX ADMIN — CYCLOSPORINE 200 MILLIGRAM(S): 100 CAPSULE ORAL at 21:51

## 2022-01-01 RX ADMIN — Medication 2: at 12:25

## 2022-01-01 RX ADMIN — Medication 3: at 13:12

## 2022-01-01 RX ADMIN — GABAPENTIN 300 MILLIGRAM(S): 400 CAPSULE ORAL at 11:39

## 2022-01-01 RX ADMIN — SENNA PLUS 2 TABLET(S): 8.6 TABLET ORAL at 00:56

## 2022-01-01 RX ADMIN — APIXABAN 5 MILLIGRAM(S): 2.5 TABLET, FILM COATED ORAL at 18:28

## 2022-01-01 RX ADMIN — ERYTHROPOIETIN 12000 UNIT(S): 10000 INJECTION, SOLUTION INTRAVENOUS; SUBCUTANEOUS at 18:54

## 2022-01-01 RX ADMIN — OXYCODONE HYDROCHLORIDE 10 MILLIGRAM(S): 5 TABLET ORAL at 22:42

## 2022-01-01 RX ADMIN — SEVELAMER CARBONATE 2400 MILLIGRAM(S): 2400 POWDER, FOR SUSPENSION ORAL at 17:53

## 2022-01-01 RX ADMIN — Medication 6 MILLIGRAM(S): at 03:31

## 2022-01-01 RX ADMIN — SEVELAMER CARBONATE 800 MILLIGRAM(S): 2400 POWDER, FOR SUSPENSION ORAL at 12:15

## 2022-01-01 RX ADMIN — Medication 650 MILLIGRAM(S): at 17:25

## 2022-01-01 RX ADMIN — APIXABAN 2.5 MILLIGRAM(S): 2.5 TABLET, FILM COATED ORAL at 06:14

## 2022-01-01 RX ADMIN — SENNA PLUS 2 TABLET(S): 8.6 TABLET ORAL at 20:56

## 2022-01-01 RX ADMIN — OXYCODONE HYDROCHLORIDE 10 MILLIGRAM(S): 5 TABLET ORAL at 19:14

## 2022-01-01 RX ADMIN — POLYETHYLENE GLYCOL 3350 17 GRAM(S): 17 POWDER, FOR SOLUTION ORAL at 05:00

## 2022-01-01 RX ADMIN — INSULIN GLARGINE 16 UNIT(S): 100 INJECTION, SOLUTION SUBCUTANEOUS at 21:56

## 2022-01-01 RX ADMIN — APIXABAN 5 MILLIGRAM(S): 2.5 TABLET, FILM COATED ORAL at 06:42

## 2022-01-01 RX ADMIN — SEVELAMER CARBONATE 800 MILLIGRAM(S): 2400 POWDER, FOR SUSPENSION ORAL at 17:23

## 2022-01-01 RX ADMIN — Medication 1 APPLICATION(S): at 05:05

## 2022-01-01 RX ADMIN — LORATADINE 10 MILLIGRAM(S): 10 TABLET ORAL at 12:26

## 2022-01-01 RX ADMIN — OXYCODONE HYDROCHLORIDE 5 MILLIGRAM(S): 5 TABLET ORAL at 15:11

## 2022-01-01 RX ADMIN — OXYCODONE HYDROCHLORIDE 7.5 MILLIGRAM(S): 5 TABLET ORAL at 17:22

## 2022-01-01 RX ADMIN — PANTOPRAZOLE SODIUM 40 MILLIGRAM(S): 20 TABLET, DELAYED RELEASE ORAL at 06:11

## 2022-01-01 RX ADMIN — OXYCODONE HYDROCHLORIDE 7.5 MILLIGRAM(S): 5 TABLET ORAL at 11:52

## 2022-01-01 RX ADMIN — Medication 75 MEQ/KG/HR: at 21:29

## 2022-01-01 RX ADMIN — BUPROPION HYDROCHLORIDE 150 MILLIGRAM(S): 150 TABLET, EXTENDED RELEASE ORAL at 15:35

## 2022-01-01 RX ADMIN — APIXABAN 2.5 MILLIGRAM(S): 2.5 TABLET, FILM COATED ORAL at 05:11

## 2022-01-01 RX ADMIN — MIRTAZAPINE 7.5 MILLIGRAM(S): 45 TABLET, ORALLY DISINTEGRATING ORAL at 21:25

## 2022-01-01 RX ADMIN — HYDROMORPHONE HYDROCHLORIDE 1 MILLIGRAM(S): 2 INJECTION INTRAMUSCULAR; INTRAVENOUS; SUBCUTANEOUS at 05:49

## 2022-01-01 RX ADMIN — SEVELAMER CARBONATE 800 MILLIGRAM(S): 2400 POWDER, FOR SUSPENSION ORAL at 07:00

## 2022-01-01 RX ADMIN — SIMETHICONE 80 MILLIGRAM(S): 80 TABLET, CHEWABLE ORAL at 05:23

## 2022-01-01 RX ADMIN — Medication 1 APPLICATION(S): at 11:39

## 2022-01-01 RX ADMIN — Medication 6 MILLIGRAM(S): at 22:22

## 2022-01-01 RX ADMIN — Medication 25 MILLIGRAM(S): at 07:04

## 2022-01-01 RX ADMIN — SENNA PLUS 2 TABLET(S): 8.6 TABLET ORAL at 21:38

## 2022-01-01 RX ADMIN — SEVELAMER CARBONATE 800 MILLIGRAM(S): 2400 POWDER, FOR SUSPENSION ORAL at 09:00

## 2022-01-01 RX ADMIN — SENNA PLUS 2 TABLET(S): 8.6 TABLET ORAL at 23:13

## 2022-01-01 RX ADMIN — LIDOCAINE 1 PATCH: 4 CREAM TOPICAL at 11:39

## 2022-01-01 RX ADMIN — Medication 1 TABLET(S): at 11:36

## 2022-01-01 RX ADMIN — HYDROMORPHONE HYDROCHLORIDE 0.25 MILLIGRAM(S): 2 INJECTION INTRAMUSCULAR; INTRAVENOUS; SUBCUTANEOUS at 04:40

## 2022-01-01 RX ADMIN — OXYCODONE HYDROCHLORIDE 7.5 MILLIGRAM(S): 5 TABLET ORAL at 18:27

## 2022-01-01 RX ADMIN — CHLORHEXIDINE GLUCONATE 1 APPLICATION(S): 213 SOLUTION TOPICAL at 12:23

## 2022-01-01 RX ADMIN — CYCLOSPORINE 200 MILLIGRAM(S): 100 CAPSULE ORAL at 14:10

## 2022-01-01 RX ADMIN — Medication 81 MILLIGRAM(S): at 12:32

## 2022-01-01 RX ADMIN — PANTOPRAZOLE SODIUM 40 MILLIGRAM(S): 20 TABLET, DELAYED RELEASE ORAL at 06:01

## 2022-01-01 RX ADMIN — CHLORHEXIDINE GLUCONATE 15 MILLILITER(S): 213 SOLUTION TOPICAL at 17:18

## 2022-01-01 RX ADMIN — BUPROPION HYDROCHLORIDE 150 MILLIGRAM(S): 150 TABLET, EXTENDED RELEASE ORAL at 13:13

## 2022-01-01 RX ADMIN — Medication 10 UNIT(S): at 12:31

## 2022-01-01 RX ADMIN — SEVELAMER CARBONATE 800 MILLIGRAM(S): 2400 POWDER, FOR SUSPENSION ORAL at 18:05

## 2022-01-01 RX ADMIN — INSULIN GLARGINE 10 UNIT(S): 100 INJECTION, SOLUTION SUBCUTANEOUS at 21:30

## 2022-01-01 RX ADMIN — OXYCODONE HYDROCHLORIDE 5 MILLIGRAM(S): 5 TABLET ORAL at 08:52

## 2022-01-01 RX ADMIN — Medication 81 MILLIGRAM(S): at 12:14

## 2022-01-01 RX ADMIN — INSULIN GLARGINE 10 UNIT(S): 100 INJECTION, SOLUTION SUBCUTANEOUS at 22:11

## 2022-01-01 RX ADMIN — Medication 11 UNIT(S): at 08:39

## 2022-01-01 RX ADMIN — APIXABAN 5 MILLIGRAM(S): 2.5 TABLET, FILM COATED ORAL at 17:42

## 2022-01-01 RX ADMIN — Medication 100 MILLIGRAM(S): at 12:59

## 2022-01-01 RX ADMIN — Medication 400 MILLIGRAM(S): at 11:37

## 2022-01-01 RX ADMIN — SEVELAMER CARBONATE 800 MILLIGRAM(S): 2400 POWDER, FOR SUSPENSION ORAL at 12:46

## 2022-01-01 RX ADMIN — Medication 30 MILLILITER(S): at 05:27

## 2022-01-01 RX ADMIN — CEFEPIME 100 MILLIGRAM(S): 1 INJECTION, POWDER, FOR SOLUTION INTRAMUSCULAR; INTRAVENOUS at 04:56

## 2022-01-01 RX ADMIN — BUPROPION HYDROCHLORIDE 150 MILLIGRAM(S): 150 TABLET, EXTENDED RELEASE ORAL at 11:47

## 2022-01-01 RX ADMIN — POLYETHYLENE GLYCOL 3350 17 GRAM(S): 17 POWDER, FOR SOLUTION ORAL at 22:53

## 2022-01-01 RX ADMIN — CINACALCET 30 MILLIGRAM(S): 30 TABLET, FILM COATED ORAL at 12:40

## 2022-01-01 RX ADMIN — ERYTHROPOIETIN 16000 UNIT(S): 10000 INJECTION, SOLUTION INTRAVENOUS; SUBCUTANEOUS at 14:56

## 2022-01-01 RX ADMIN — OXYCODONE HYDROCHLORIDE 5 MILLIGRAM(S): 5 TABLET ORAL at 07:55

## 2022-01-01 RX ADMIN — APIXABAN 2.5 MILLIGRAM(S): 2.5 TABLET, FILM COATED ORAL at 05:45

## 2022-01-01 RX ADMIN — MONTELUKAST 10 MILLIGRAM(S): 4 TABLET, CHEWABLE ORAL at 03:30

## 2022-01-01 RX ADMIN — SODIUM CHLORIDE 100 MILLILITER(S): 9 INJECTION, SOLUTION INTRAVENOUS at 08:02

## 2022-01-01 RX ADMIN — Medication 1: at 08:46

## 2022-01-01 RX ADMIN — OXYCODONE HYDROCHLORIDE 10 MILLIGRAM(S): 5 TABLET ORAL at 17:34

## 2022-01-01 RX ADMIN — Medication 1: at 09:16

## 2022-01-01 RX ADMIN — Medication 2: at 12:45

## 2022-01-01 RX ADMIN — SIMETHICONE 80 MILLIGRAM(S): 80 TABLET, CHEWABLE ORAL at 05:34

## 2022-01-01 RX ADMIN — Medication 6 MILLIGRAM(S): at 23:22

## 2022-01-01 RX ADMIN — SIMETHICONE 80 MILLIGRAM(S): 80 TABLET, CHEWABLE ORAL at 14:08

## 2022-01-01 RX ADMIN — SIMETHICONE 80 MILLIGRAM(S): 80 TABLET, CHEWABLE ORAL at 14:05

## 2022-01-01 RX ADMIN — APIXABAN 2.5 MILLIGRAM(S): 2.5 TABLET, FILM COATED ORAL at 06:30

## 2022-01-01 RX ADMIN — Medication 30 MILLIGRAM(S): at 17:10

## 2022-01-01 RX ADMIN — CYCLOSPORINE 200 MILLIGRAM(S): 100 CAPSULE ORAL at 13:49

## 2022-01-01 RX ADMIN — APIXABAN 5 MILLIGRAM(S): 2.5 TABLET, FILM COATED ORAL at 15:14

## 2022-01-01 RX ADMIN — POLYETHYLENE GLYCOL 3350 17 GRAM(S): 17 POWDER, FOR SOLUTION ORAL at 06:04

## 2022-01-01 RX ADMIN — ATORVASTATIN CALCIUM 80 MILLIGRAM(S): 80 TABLET, FILM COATED ORAL at 22:32

## 2022-01-01 RX ADMIN — PANTOPRAZOLE SODIUM 40 MILLIGRAM(S): 20 TABLET, DELAYED RELEASE ORAL at 05:22

## 2022-01-01 RX ADMIN — Medication 0: at 21:40

## 2022-01-01 RX ADMIN — Medication 1: at 10:23

## 2022-01-01 RX ADMIN — Medication 1 TABLET(S): at 12:12

## 2022-01-01 RX ADMIN — Medication 1 APPLICATION(S): at 16:02

## 2022-01-01 RX ADMIN — CYCLOSPORINE 200 MILLIGRAM(S): 100 CAPSULE ORAL at 21:33

## 2022-01-01 RX ADMIN — HYDROMORPHONE HYDROCHLORIDE 1 MILLIGRAM(S): 2 INJECTION INTRAMUSCULAR; INTRAVENOUS; SUBCUTANEOUS at 21:20

## 2022-01-01 RX ADMIN — HYDROMORPHONE HYDROCHLORIDE 1 MILLIGRAM(S): 2 INJECTION INTRAMUSCULAR; INTRAVENOUS; SUBCUTANEOUS at 21:58

## 2022-01-01 RX ADMIN — INSULIN GLARGINE 16 UNIT(S): 100 INJECTION, SOLUTION SUBCUTANEOUS at 22:39

## 2022-01-01 RX ADMIN — SEVELAMER CARBONATE 800 MILLIGRAM(S): 2400 POWDER, FOR SUSPENSION ORAL at 21:01

## 2022-01-01 RX ADMIN — Medication 1000 MILLIGRAM(S): at 04:40

## 2022-01-01 RX ADMIN — MIRTAZAPINE 7.5 MILLIGRAM(S): 45 TABLET, ORALLY DISINTEGRATING ORAL at 23:08

## 2022-01-01 RX ADMIN — Medication 120 MILLIGRAM(S): at 13:47

## 2022-01-01 RX ADMIN — Medication 25 GRAM(S): at 17:56

## 2022-01-01 RX ADMIN — Medication 1000 MILLIGRAM(S): at 12:15

## 2022-01-01 RX ADMIN — HYDROMORPHONE HYDROCHLORIDE 1 MILLIGRAM(S): 2 INJECTION INTRAMUSCULAR; INTRAVENOUS; SUBCUTANEOUS at 19:53

## 2022-01-01 RX ADMIN — CINACALCET 30 MILLIGRAM(S): 30 TABLET, FILM COATED ORAL at 11:10

## 2022-01-01 RX ADMIN — CYCLOSPORINE 200 MILLIGRAM(S): 100 CAPSULE ORAL at 05:44

## 2022-01-01 RX ADMIN — GABAPENTIN 300 MILLIGRAM(S): 400 CAPSULE ORAL at 12:10

## 2022-01-01 RX ADMIN — OXYCODONE HYDROCHLORIDE 7.5 MILLIGRAM(S): 5 TABLET ORAL at 09:00

## 2022-01-01 RX ADMIN — Medication 650 MILLIGRAM(S): at 05:37

## 2022-01-01 RX ADMIN — GABAPENTIN 300 MILLIGRAM(S): 400 CAPSULE ORAL at 15:24

## 2022-01-01 RX ADMIN — Medication 10 UNIT(S): at 09:05

## 2022-01-01 RX ADMIN — HYDROMORPHONE HYDROCHLORIDE 1 MILLIGRAM(S): 2 INJECTION INTRAMUSCULAR; INTRAVENOUS; SUBCUTANEOUS at 11:54

## 2022-01-01 RX ADMIN — Medication 8: at 12:49

## 2022-01-01 RX ADMIN — APIXABAN 2.5 MILLIGRAM(S): 2.5 TABLET, FILM COATED ORAL at 05:28

## 2022-01-01 RX ADMIN — Medication 6 MILLIGRAM(S): at 22:56

## 2022-01-01 RX ADMIN — OXYCODONE HYDROCHLORIDE 7.5 MILLIGRAM(S): 5 TABLET ORAL at 02:39

## 2022-01-01 RX ADMIN — Medication 100 MILLIGRAM(S): at 02:11

## 2022-01-01 RX ADMIN — AMIODARONE HYDROCHLORIDE 618 MILLIGRAM(S): 400 TABLET ORAL at 04:00

## 2022-01-01 RX ADMIN — POLYETHYLENE GLYCOL 3350 17 GRAM(S): 17 POWDER, FOR SOLUTION ORAL at 06:18

## 2022-01-01 RX ADMIN — CHLORHEXIDINE GLUCONATE 1 APPLICATION(S): 213 SOLUTION TOPICAL at 13:40

## 2022-01-01 RX ADMIN — Medication 1: at 17:50

## 2022-01-01 RX ADMIN — Medication 1 APPLICATION(S): at 16:09

## 2022-01-01 RX ADMIN — Medication 3 UNIT(S): at 08:40

## 2022-01-01 RX ADMIN — CINACALCET 30 MILLIGRAM(S): 30 TABLET, FILM COATED ORAL at 11:58

## 2022-01-01 RX ADMIN — HYDROMORPHONE HYDROCHLORIDE 1 MILLIGRAM(S): 2 INJECTION INTRAMUSCULAR; INTRAVENOUS; SUBCUTANEOUS at 22:00

## 2022-01-01 RX ADMIN — SEVELAMER CARBONATE 800 MILLIGRAM(S): 2400 POWDER, FOR SUSPENSION ORAL at 11:26

## 2022-01-01 RX ADMIN — APIXABAN 2.5 MILLIGRAM(S): 2.5 TABLET, FILM COATED ORAL at 06:03

## 2022-01-01 RX ADMIN — PANTOPRAZOLE SODIUM 40 MILLIGRAM(S): 20 TABLET, DELAYED RELEASE ORAL at 06:14

## 2022-01-01 RX ADMIN — CYCLOSPORINE 100 MILLIGRAM(S): 100 CAPSULE ORAL at 10:24

## 2022-01-01 RX ADMIN — Medication 81 MILLIGRAM(S): at 11:27

## 2022-01-01 RX ADMIN — MIRTAZAPINE 7.5 MILLIGRAM(S): 45 TABLET, ORALLY DISINTEGRATING ORAL at 21:06

## 2022-01-01 RX ADMIN — OXYCODONE HYDROCHLORIDE 10 MILLIGRAM(S): 5 TABLET ORAL at 00:00

## 2022-01-01 RX ADMIN — INSULIN GLARGINE 10 UNIT(S): 100 INJECTION, SOLUTION SUBCUTANEOUS at 22:45

## 2022-01-01 RX ADMIN — SERTRALINE 50 MILLIGRAM(S): 25 TABLET, FILM COATED ORAL at 16:03

## 2022-01-01 RX ADMIN — CYCLOSPORINE 200 MILLIGRAM(S): 100 CAPSULE ORAL at 22:05

## 2022-01-01 RX ADMIN — CEFEPIME 100 MILLIGRAM(S): 1 INJECTION, POWDER, FOR SOLUTION INTRAMUSCULAR; INTRAVENOUS at 05:24

## 2022-01-01 RX ADMIN — Medication 650 MILLIGRAM(S): at 11:50

## 2022-01-01 RX ADMIN — HYDROMORPHONE HYDROCHLORIDE 0.5 MILLIGRAM(S): 2 INJECTION INTRAMUSCULAR; INTRAVENOUS; SUBCUTANEOUS at 04:15

## 2022-01-01 RX ADMIN — Medication 4: at 12:53

## 2022-01-01 RX ADMIN — CINACALCET 60 MILLIGRAM(S): 30 TABLET, FILM COATED ORAL at 12:44

## 2022-01-01 RX ADMIN — BUPROPION HYDROCHLORIDE 150 MILLIGRAM(S): 150 TABLET, EXTENDED RELEASE ORAL at 12:09

## 2022-01-01 RX ADMIN — Medication 75 MEQ/KG/HR: at 04:48

## 2022-01-01 RX ADMIN — Medication 120 MILLIGRAM(S): at 05:44

## 2022-01-01 RX ADMIN — GABAPENTIN 300 MILLIGRAM(S): 400 CAPSULE ORAL at 11:43

## 2022-01-01 RX ADMIN — Medication 10 UNIT(S): at 17:47

## 2022-01-01 RX ADMIN — CYCLOSPORINE 125 MILLIGRAM(S): 100 CAPSULE ORAL at 22:00

## 2022-01-01 RX ADMIN — MIRTAZAPINE 7.5 MILLIGRAM(S): 45 TABLET, ORALLY DISINTEGRATING ORAL at 21:39

## 2022-01-01 RX ADMIN — POLYETHYLENE GLYCOL 3350 17 GRAM(S): 17 POWDER, FOR SOLUTION ORAL at 21:32

## 2022-01-01 RX ADMIN — Medication 1 APPLICATION(S): at 13:25

## 2022-01-01 RX ADMIN — APIXABAN 2.5 MILLIGRAM(S): 2.5 TABLET, FILM COATED ORAL at 06:18

## 2022-01-01 RX ADMIN — Medication 150 MILLIGRAM(S): at 04:30

## 2022-01-01 RX ADMIN — POLYETHYLENE GLYCOL 3350 17 GRAM(S): 17 POWDER, FOR SOLUTION ORAL at 17:03

## 2022-01-01 RX ADMIN — INSULIN GLARGINE 12 UNIT(S): 100 INJECTION, SOLUTION SUBCUTANEOUS at 21:41

## 2022-01-01 RX ADMIN — FENTANYL CITRATE 5 MICROGRAM(S)/KG/HR: 50 INJECTION INTRAVENOUS at 20:23

## 2022-01-01 RX ADMIN — Medication 6 MILLIGRAM(S): at 00:50

## 2022-01-01 RX ADMIN — APIXABAN 5 MILLIGRAM(S): 2.5 TABLET, FILM COATED ORAL at 06:11

## 2022-01-01 RX ADMIN — INSULIN GLARGINE 12 UNIT(S): 100 INJECTION, SOLUTION SUBCUTANEOUS at 21:54

## 2022-01-01 RX ADMIN — Medication 120 MILLIGRAM(S): at 05:40

## 2022-01-01 RX ADMIN — INSULIN GLARGINE 12 UNIT(S): 100 INJECTION, SOLUTION SUBCUTANEOUS at 21:28

## 2022-01-01 RX ADMIN — OXYCODONE HYDROCHLORIDE 10 MILLIGRAM(S): 5 TABLET ORAL at 19:11

## 2022-01-01 RX ADMIN — OXYCODONE HYDROCHLORIDE 7.5 MILLIGRAM(S): 5 TABLET ORAL at 17:25

## 2022-01-01 RX ADMIN — OXYCODONE HYDROCHLORIDE 10 MILLIGRAM(S): 5 TABLET ORAL at 16:25

## 2022-01-01 RX ADMIN — APIXABAN 2.5 MILLIGRAM(S): 2.5 TABLET, FILM COATED ORAL at 17:03

## 2022-01-01 RX ADMIN — OXYCODONE HYDROCHLORIDE 10 MILLIGRAM(S): 5 TABLET ORAL at 13:22

## 2022-01-01 RX ADMIN — GABAPENTIN 300 MILLIGRAM(S): 400 CAPSULE ORAL at 11:32

## 2022-01-01 RX ADMIN — ATORVASTATIN CALCIUM 80 MILLIGRAM(S): 80 TABLET, FILM COATED ORAL at 22:47

## 2022-01-01 RX ADMIN — CYCLOSPORINE 125 MILLIGRAM(S): 100 CAPSULE ORAL at 22:11

## 2022-01-01 RX ADMIN — CINACALCET 60 MILLIGRAM(S): 30 TABLET, FILM COATED ORAL at 22:23

## 2022-01-01 RX ADMIN — SERTRALINE 50 MILLIGRAM(S): 25 TABLET, FILM COATED ORAL at 11:39

## 2022-01-01 RX ADMIN — ERYTHROPOIETIN 8000 UNIT(S): 10000 INJECTION, SOLUTION INTRAVENOUS; SUBCUTANEOUS at 10:33

## 2022-01-01 RX ADMIN — APIXABAN 5 MILLIGRAM(S): 2.5 TABLET, FILM COATED ORAL at 21:56

## 2022-01-01 RX ADMIN — MONTELUKAST 10 MILLIGRAM(S): 4 TABLET, CHEWABLE ORAL at 22:03

## 2022-01-01 RX ADMIN — SIMETHICONE 80 MILLIGRAM(S): 80 TABLET, CHEWABLE ORAL at 05:41

## 2022-01-01 RX ADMIN — CEFEPIME 100 MILLIGRAM(S): 1 INJECTION, POWDER, FOR SOLUTION INTRAMUSCULAR; INTRAVENOUS at 03:53

## 2022-01-01 RX ADMIN — SENNA PLUS 2 TABLET(S): 8.6 TABLET ORAL at 21:49

## 2022-01-01 RX ADMIN — Medication 1 APPLICATION(S): at 05:10

## 2022-01-01 RX ADMIN — SENNA PLUS 2 TABLET(S): 8.6 TABLET ORAL at 23:11

## 2022-01-01 RX ADMIN — SEVELAMER CARBONATE 2400 MILLIGRAM(S): 2400 POWDER, FOR SUSPENSION ORAL at 08:25

## 2022-01-01 RX ADMIN — Medication 1: at 13:51

## 2022-01-01 RX ADMIN — Medication 1 APPLICATION(S): at 05:26

## 2022-01-01 RX ADMIN — Medication 2: at 18:10

## 2022-01-01 RX ADMIN — LORATADINE 10 MILLIGRAM(S): 10 TABLET ORAL at 12:28

## 2022-01-01 RX ADMIN — Medication 81 MILLIGRAM(S): at 12:09

## 2022-01-01 RX ADMIN — HYDROMORPHONE HYDROCHLORIDE 1 MILLIGRAM(S): 2 INJECTION INTRAMUSCULAR; INTRAVENOUS; SUBCUTANEOUS at 10:13

## 2022-01-01 RX ADMIN — HEPARIN SODIUM 21 UNIT(S)/HR: 5000 INJECTION INTRAVENOUS; SUBCUTANEOUS at 19:29

## 2022-01-01 RX ADMIN — CHLORHEXIDINE GLUCONATE 1 APPLICATION(S): 213 SOLUTION TOPICAL at 13:51

## 2022-01-01 RX ADMIN — SERTRALINE 50 MILLIGRAM(S): 25 TABLET, FILM COATED ORAL at 11:57

## 2022-01-01 RX ADMIN — MIRTAZAPINE 7.5 MILLIGRAM(S): 45 TABLET, ORALLY DISINTEGRATING ORAL at 23:33

## 2022-01-01 RX ADMIN — CINACALCET 30 MILLIGRAM(S): 30 TABLET, FILM COATED ORAL at 12:52

## 2022-01-01 RX ADMIN — PANTOPRAZOLE SODIUM 40 MILLIGRAM(S): 20 TABLET, DELAYED RELEASE ORAL at 06:08

## 2022-01-01 RX ADMIN — Medication 1 APPLICATION(S): at 12:26

## 2022-01-01 RX ADMIN — CHLORHEXIDINE GLUCONATE 1 APPLICATION(S): 213 SOLUTION TOPICAL at 11:44

## 2022-01-01 RX ADMIN — Medication 1: at 08:49

## 2022-01-01 RX ADMIN — APIXABAN 2.5 MILLIGRAM(S): 2.5 TABLET, FILM COATED ORAL at 05:32

## 2022-01-01 RX ADMIN — ERYTHROPOIETIN 8000 UNIT(S): 10000 INJECTION, SOLUTION INTRAVENOUS; SUBCUTANEOUS at 23:37

## 2022-01-01 RX ADMIN — Medication 35 MILLIGRAM(S): at 08:44

## 2022-01-01 RX ADMIN — SENNA PLUS 2 TABLET(S): 8.6 TABLET ORAL at 22:21

## 2022-01-01 RX ADMIN — OXYCODONE HYDROCHLORIDE 10 MILLIGRAM(S): 5 TABLET ORAL at 09:44

## 2022-01-01 RX ADMIN — HYDROMORPHONE HYDROCHLORIDE 1 MILLIGRAM(S): 2 INJECTION INTRAMUSCULAR; INTRAVENOUS; SUBCUTANEOUS at 10:03

## 2022-01-01 RX ADMIN — SIMETHICONE 80 MILLIGRAM(S): 80 TABLET, CHEWABLE ORAL at 13:16

## 2022-01-01 RX ADMIN — Medication 1000 MILLIGRAM(S): at 18:28

## 2022-01-01 RX ADMIN — Medication 1 APPLICATION(S): at 01:59

## 2022-01-01 RX ADMIN — Medication 1 APPLICATION(S): at 14:15

## 2022-01-01 RX ADMIN — CYCLOSPORINE 200 MILLIGRAM(S): 100 CAPSULE ORAL at 05:15

## 2022-01-01 RX ADMIN — SIMETHICONE 80 MILLIGRAM(S): 80 TABLET, CHEWABLE ORAL at 05:00

## 2022-01-01 RX ADMIN — GABAPENTIN 300 MILLIGRAM(S): 400 CAPSULE ORAL at 11:36

## 2022-01-01 RX ADMIN — SODIUM CHLORIDE 30 MILLILITER(S): 9 INJECTION INTRAMUSCULAR; INTRAVENOUS; SUBCUTANEOUS at 04:41

## 2022-01-01 RX ADMIN — SENNA PLUS 2 TABLET(S): 8.6 TABLET ORAL at 02:45

## 2022-01-01 RX ADMIN — SEVELAMER CARBONATE 2400 MILLIGRAM(S): 2400 POWDER, FOR SUSPENSION ORAL at 14:20

## 2022-01-01 RX ADMIN — Medication 1 APPLICATION(S): at 07:09

## 2022-01-01 RX ADMIN — OXYCODONE HYDROCHLORIDE 10 MILLIGRAM(S): 5 TABLET ORAL at 22:55

## 2022-01-01 RX ADMIN — Medication 100 MILLIGRAM(S): at 21:53

## 2022-01-01 RX ADMIN — MIRTAZAPINE 7.5 MILLIGRAM(S): 45 TABLET, ORALLY DISINTEGRATING ORAL at 22:44

## 2022-01-01 RX ADMIN — HYDROMORPHONE HYDROCHLORIDE 1 MILLIGRAM(S): 2 INJECTION INTRAMUSCULAR; INTRAVENOUS; SUBCUTANEOUS at 15:25

## 2022-01-01 RX ADMIN — LIDOCAINE 1 PATCH: 4 CREAM TOPICAL at 11:57

## 2022-01-01 RX ADMIN — Medication 400 MILLIGRAM(S): at 05:33

## 2022-01-01 RX ADMIN — SERTRALINE 50 MILLIGRAM(S): 25 TABLET, FILM COATED ORAL at 12:43

## 2022-01-01 RX ADMIN — BUPROPION HYDROCHLORIDE 150 MILLIGRAM(S): 150 TABLET, EXTENDED RELEASE ORAL at 12:50

## 2022-01-01 RX ADMIN — Medication 6 MILLIGRAM(S): at 22:09

## 2022-01-01 RX ADMIN — INSULIN GLARGINE 10 UNIT(S): 100 INJECTION, SOLUTION SUBCUTANEOUS at 21:58

## 2022-01-01 RX ADMIN — SIMETHICONE 80 MILLIGRAM(S): 80 TABLET, CHEWABLE ORAL at 22:36

## 2022-01-01 RX ADMIN — SEVELAMER CARBONATE 800 MILLIGRAM(S): 2400 POWDER, FOR SUSPENSION ORAL at 11:54

## 2022-01-01 RX ADMIN — LORATADINE 10 MILLIGRAM(S): 10 TABLET ORAL at 11:43

## 2022-01-01 RX ADMIN — CYCLOSPORINE 200 MILLIGRAM(S): 100 CAPSULE ORAL at 18:12

## 2022-01-01 RX ADMIN — HEPARIN SODIUM 18 UNIT(S)/HR: 5000 INJECTION INTRAVENOUS; SUBCUTANEOUS at 19:10

## 2022-01-01 RX ADMIN — OXYCODONE HYDROCHLORIDE 10 MILLIGRAM(S): 5 TABLET ORAL at 06:15

## 2022-01-01 RX ADMIN — CYCLOSPORINE 125 MILLIGRAM(S): 100 CAPSULE ORAL at 09:24

## 2022-01-01 RX ADMIN — HEPARIN SODIUM 5000 UNIT(S): 5000 INJECTION INTRAVENOUS; SUBCUTANEOUS at 21:31

## 2022-01-01 RX ADMIN — SEVELAMER CARBONATE 800 MILLIGRAM(S): 2400 POWDER, FOR SUSPENSION ORAL at 17:48

## 2022-01-01 RX ADMIN — Medication 1 APPLICATION(S): at 12:02

## 2022-01-01 RX ADMIN — OXYCODONE HYDROCHLORIDE 5 MILLIGRAM(S): 5 TABLET ORAL at 21:51

## 2022-01-01 RX ADMIN — Medication 1 APPLICATION(S): at 11:26

## 2022-01-01 RX ADMIN — Medication 6 MILLIGRAM(S): at 21:56

## 2022-01-01 RX ADMIN — Medication 25 MILLIGRAM(S): at 23:50

## 2022-01-01 RX ADMIN — SEVELAMER CARBONATE 800 MILLIGRAM(S): 2400 POWDER, FOR SUSPENSION ORAL at 11:39

## 2022-01-01 RX ADMIN — APIXABAN 5 MILLIGRAM(S): 2.5 TABLET, FILM COATED ORAL at 08:26

## 2022-01-01 RX ADMIN — Medication 120 MILLIGRAM(S): at 05:46

## 2022-01-01 RX ADMIN — APIXABAN 2.5 MILLIGRAM(S): 2.5 TABLET, FILM COATED ORAL at 17:36

## 2022-01-01 RX ADMIN — OXYCODONE HYDROCHLORIDE 10 MILLIGRAM(S): 5 TABLET ORAL at 17:40

## 2022-01-01 RX ADMIN — Medication 1: at 12:46

## 2022-01-01 RX ADMIN — CINACALCET 60 MILLIGRAM(S): 30 TABLET, FILM COATED ORAL at 21:41

## 2022-01-01 RX ADMIN — SIMETHICONE 80 MILLIGRAM(S): 80 TABLET, CHEWABLE ORAL at 15:50

## 2022-01-01 RX ADMIN — Medication 35 MILLIGRAM(S): at 06:19

## 2022-01-01 RX ADMIN — HYDROMORPHONE HYDROCHLORIDE 1 MILLIGRAM(S): 2 INJECTION INTRAMUSCULAR; INTRAVENOUS; SUBCUTANEOUS at 16:25

## 2022-01-01 RX ADMIN — MUPIROCIN 1 APPLICATION(S): 20 OINTMENT TOPICAL at 05:23

## 2022-01-01 RX ADMIN — MIDODRINE HYDROCHLORIDE 10 MILLIGRAM(S): 2.5 TABLET ORAL at 15:24

## 2022-01-01 RX ADMIN — HYDROMORPHONE HYDROCHLORIDE 1 MILLIGRAM(S): 2 INJECTION INTRAMUSCULAR; INTRAVENOUS; SUBCUTANEOUS at 12:36

## 2022-01-01 RX ADMIN — Medication 120 MILLIGRAM(S): at 05:42

## 2022-01-01 RX ADMIN — OXYCODONE HYDROCHLORIDE 7.5 MILLIGRAM(S): 5 TABLET ORAL at 16:41

## 2022-01-01 RX ADMIN — CINACALCET 60 MILLIGRAM(S): 30 TABLET, FILM COATED ORAL at 12:31

## 2022-01-01 RX ADMIN — GABAPENTIN 300 MILLIGRAM(S): 400 CAPSULE ORAL at 14:14

## 2022-01-01 RX ADMIN — Medication 100 MILLIGRAM(S): at 01:26

## 2022-01-01 RX ADMIN — SEVELAMER CARBONATE 2400 MILLIGRAM(S): 2400 POWDER, FOR SUSPENSION ORAL at 09:54

## 2022-01-01 RX ADMIN — Medication 1 APPLICATION(S): at 17:28

## 2022-01-01 RX ADMIN — Medication 12.5 MILLILITER(S): at 14:15

## 2022-01-01 RX ADMIN — OXYCODONE HYDROCHLORIDE 10 MILLIGRAM(S): 5 TABLET ORAL at 07:05

## 2022-01-01 RX ADMIN — Medication 3: at 17:57

## 2022-01-01 RX ADMIN — SERTRALINE 50 MILLIGRAM(S): 25 TABLET, FILM COATED ORAL at 13:48

## 2022-01-01 RX ADMIN — Medication 30 MILLILITER(S): at 18:23

## 2022-01-01 RX ADMIN — OXYCODONE HYDROCHLORIDE 7.5 MILLIGRAM(S): 5 TABLET ORAL at 16:23

## 2022-01-01 RX ADMIN — OXYCODONE HYDROCHLORIDE 10 MILLIGRAM(S): 5 TABLET ORAL at 13:11

## 2022-01-01 RX ADMIN — OXYCODONE HYDROCHLORIDE 7.5 MILLIGRAM(S): 5 TABLET ORAL at 03:08

## 2022-01-01 RX ADMIN — PANTOPRAZOLE SODIUM 40 MILLIGRAM(S): 20 TABLET, DELAYED RELEASE ORAL at 05:37

## 2022-01-01 RX ADMIN — Medication 30 MILLILITER(S): at 14:26

## 2022-01-01 RX ADMIN — CYCLOSPORINE 125 MILLIGRAM(S): 100 CAPSULE ORAL at 09:35

## 2022-01-01 RX ADMIN — MIRTAZAPINE 7.5 MILLIGRAM(S): 45 TABLET, ORALLY DISINTEGRATING ORAL at 22:21

## 2022-01-01 RX ADMIN — PANTOPRAZOLE SODIUM 40 MILLIGRAM(S): 20 TABLET, DELAYED RELEASE ORAL at 05:59

## 2022-01-01 RX ADMIN — OXYCODONE HYDROCHLORIDE 10 MILLIGRAM(S): 5 TABLET ORAL at 21:31

## 2022-01-01 RX ADMIN — APIXABAN 5 MILLIGRAM(S): 2.5 TABLET, FILM COATED ORAL at 17:17

## 2022-01-01 RX ADMIN — APIXABAN 5 MILLIGRAM(S): 2.5 TABLET, FILM COATED ORAL at 06:07

## 2022-01-01 RX ADMIN — Medication 1 APPLICATION(S): at 18:33

## 2022-01-01 RX ADMIN — SERTRALINE 50 MILLIGRAM(S): 25 TABLET, FILM COATED ORAL at 12:48

## 2022-01-01 RX ADMIN — CEFEPIME 100 MILLIGRAM(S): 1 INJECTION, POWDER, FOR SOLUTION INTRAMUSCULAR; INTRAVENOUS at 03:55

## 2022-01-01 RX ADMIN — SEVELAMER CARBONATE 800 MILLIGRAM(S): 2400 POWDER, FOR SUSPENSION ORAL at 16:46

## 2022-01-01 RX ADMIN — OXYCODONE HYDROCHLORIDE 7.5 MILLIGRAM(S): 5 TABLET ORAL at 06:15

## 2022-01-01 RX ADMIN — PANTOPRAZOLE SODIUM 40 MILLIGRAM(S): 20 TABLET, DELAYED RELEASE ORAL at 05:08

## 2022-01-01 RX ADMIN — POLYETHYLENE GLYCOL 3350 17 GRAM(S): 17 POWDER, FOR SOLUTION ORAL at 06:31

## 2022-01-01 RX ADMIN — APIXABAN 2.5 MILLIGRAM(S): 2.5 TABLET, FILM COATED ORAL at 18:05

## 2022-01-01 RX ADMIN — CEFEPIME 100 MILLIGRAM(S): 1 INJECTION, POWDER, FOR SOLUTION INTRAMUSCULAR; INTRAVENOUS at 04:00

## 2022-01-01 RX ADMIN — SEVELAMER CARBONATE 800 MILLIGRAM(S): 2400 POWDER, FOR SUSPENSION ORAL at 17:10

## 2022-01-01 RX ADMIN — Medication 120 MILLIGRAM(S): at 05:33

## 2022-01-01 RX ADMIN — CINACALCET 60 MILLIGRAM(S): 30 TABLET, FILM COATED ORAL at 12:23

## 2022-01-01 RX ADMIN — CYCLOSPORINE 200 MILLIGRAM(S): 100 CAPSULE ORAL at 12:19

## 2022-01-01 RX ADMIN — SEVELAMER CARBONATE 2400 MILLIGRAM(S): 2400 POWDER, FOR SUSPENSION ORAL at 09:06

## 2022-01-01 RX ADMIN — APIXABAN 2.5 MILLIGRAM(S): 2.5 TABLET, FILM COATED ORAL at 23:18

## 2022-01-01 RX ADMIN — Medication 1 TABLET(S): at 13:48

## 2022-01-01 RX ADMIN — CYCLOSPORINE 200 MILLIGRAM(S): 100 CAPSULE ORAL at 10:54

## 2022-01-01 RX ADMIN — SEVELAMER CARBONATE 2400 MILLIGRAM(S): 2400 POWDER, FOR SUSPENSION ORAL at 17:28

## 2022-01-01 RX ADMIN — SERTRALINE 50 MILLIGRAM(S): 25 TABLET, FILM COATED ORAL at 13:38

## 2022-01-01 RX ADMIN — Medication 1: at 09:24

## 2022-01-01 RX ADMIN — SEVELAMER CARBONATE 800 MILLIGRAM(S): 2400 POWDER, FOR SUSPENSION ORAL at 09:21

## 2022-01-01 RX ADMIN — OXYCODONE HYDROCHLORIDE 10 MILLIGRAM(S): 5 TABLET ORAL at 11:50

## 2022-01-01 RX ADMIN — OXYCODONE HYDROCHLORIDE 7.5 MILLIGRAM(S): 5 TABLET ORAL at 13:58

## 2022-01-01 RX ADMIN — SEVELAMER CARBONATE 800 MILLIGRAM(S): 2400 POWDER, FOR SUSPENSION ORAL at 16:48

## 2022-01-01 RX ADMIN — Medication 2: at 17:02

## 2022-01-01 RX ADMIN — APIXABAN 2.5 MILLIGRAM(S): 2.5 TABLET, FILM COATED ORAL at 18:07

## 2022-01-01 RX ADMIN — Medication 3 UNIT(S): at 09:16

## 2022-01-01 RX ADMIN — OXYCODONE HYDROCHLORIDE 10 MILLIGRAM(S): 5 TABLET ORAL at 07:06

## 2022-01-01 RX ADMIN — SEVELAMER CARBONATE 800 MILLIGRAM(S): 2400 POWDER, FOR SUSPENSION ORAL at 08:57

## 2022-01-01 RX ADMIN — Medication 2: at 12:47

## 2022-01-01 RX ADMIN — Medication 3 UNIT(S): at 12:51

## 2022-01-01 RX ADMIN — ATORVASTATIN CALCIUM 80 MILLIGRAM(S): 80 TABLET, FILM COATED ORAL at 22:14

## 2022-01-01 RX ADMIN — OXYCODONE HYDROCHLORIDE 7.5 MILLIGRAM(S): 5 TABLET ORAL at 10:04

## 2022-01-01 RX ADMIN — APIXABAN 2.5 MILLIGRAM(S): 2.5 TABLET, FILM COATED ORAL at 05:09

## 2022-01-01 RX ADMIN — LIDOCAINE 1 PATCH: 4 CREAM TOPICAL at 05:00

## 2022-01-01 RX ADMIN — Medication 4 UNIT(S): at 02:12

## 2022-01-01 RX ADMIN — OXYCODONE HYDROCHLORIDE 10 MILLIGRAM(S): 5 TABLET ORAL at 18:07

## 2022-01-01 RX ADMIN — OXYCODONE HYDROCHLORIDE 5 MILLIGRAM(S): 5 TABLET ORAL at 09:04

## 2022-01-01 RX ADMIN — OXYCODONE HYDROCHLORIDE 10 MILLIGRAM(S): 5 TABLET ORAL at 09:28

## 2022-01-01 RX ADMIN — BUPROPION HYDROCHLORIDE 150 MILLIGRAM(S): 150 TABLET, EXTENDED RELEASE ORAL at 12:37

## 2022-01-01 RX ADMIN — OXYCODONE HYDROCHLORIDE 7.5 MILLIGRAM(S): 5 TABLET ORAL at 09:21

## 2022-01-01 RX ADMIN — Medication 650 MILLIGRAM(S): at 03:01

## 2022-01-01 RX ADMIN — OXYCODONE HYDROCHLORIDE 10 MILLIGRAM(S): 5 TABLET ORAL at 06:50

## 2022-01-01 RX ADMIN — Medication 120 MILLIGRAM(S): at 05:26

## 2022-01-01 RX ADMIN — OXYCODONE HYDROCHLORIDE 10 MILLIGRAM(S): 5 TABLET ORAL at 08:52

## 2022-01-01 RX ADMIN — GABAPENTIN 300 MILLIGRAM(S): 400 CAPSULE ORAL at 13:28

## 2022-01-01 RX ADMIN — Medication 25 GRAM(S): at 23:33

## 2022-01-01 RX ADMIN — HYDROMORPHONE HYDROCHLORIDE 1 MILLIGRAM(S): 2 INJECTION INTRAMUSCULAR; INTRAVENOUS; SUBCUTANEOUS at 18:30

## 2022-01-01 RX ADMIN — APIXABAN 2.5 MILLIGRAM(S): 2.5 TABLET, FILM COATED ORAL at 17:23

## 2022-01-01 RX ADMIN — OXYCODONE HYDROCHLORIDE 10 MILLIGRAM(S): 5 TABLET ORAL at 02:53

## 2022-01-01 RX ADMIN — CYCLOSPORINE 200 MILLIGRAM(S): 100 CAPSULE ORAL at 09:08

## 2022-01-01 RX ADMIN — ERYTHROPOIETIN 18000 UNIT(S): 10000 INJECTION, SOLUTION INTRAVENOUS; SUBCUTANEOUS at 21:37

## 2022-01-01 RX ADMIN — FENTANYL CITRATE 5 MICROGRAM(S)/KG/HR: 50 INJECTION INTRAVENOUS at 08:03

## 2022-01-01 RX ADMIN — BUPROPION HYDROCHLORIDE 150 MILLIGRAM(S): 150 TABLET, EXTENDED RELEASE ORAL at 13:14

## 2022-01-01 RX ADMIN — OXYCODONE HYDROCHLORIDE 10 MILLIGRAM(S): 5 TABLET ORAL at 17:24

## 2022-01-01 RX ADMIN — OXYCODONE HYDROCHLORIDE 7.5 MILLIGRAM(S): 5 TABLET ORAL at 09:02

## 2022-01-01 RX ADMIN — Medication 1 APPLICATION(S): at 13:51

## 2022-01-01 RX ADMIN — OXYCODONE HYDROCHLORIDE 7.5 MILLIGRAM(S): 5 TABLET ORAL at 14:34

## 2022-01-01 RX ADMIN — HEPARIN SODIUM 7500 UNIT(S): 5000 INJECTION INTRAVENOUS; SUBCUTANEOUS at 06:04

## 2022-01-01 RX ADMIN — Medication 1 APPLICATION(S): at 12:59

## 2022-01-01 RX ADMIN — Medication 650 MILLIGRAM(S): at 12:20

## 2022-01-01 RX ADMIN — PANTOPRAZOLE SODIUM 40 MILLIGRAM(S): 20 TABLET, DELAYED RELEASE ORAL at 05:44

## 2022-01-01 RX ADMIN — Medication 0.5 MILLIGRAM(S): at 22:00

## 2022-01-01 RX ADMIN — MORPHINE SULFATE 4 MILLIGRAM(S): 50 CAPSULE, EXTENDED RELEASE ORAL at 21:15

## 2022-01-01 RX ADMIN — ERYTHROPOIETIN 10000 UNIT(S): 10000 INJECTION, SOLUTION INTRAVENOUS; SUBCUTANEOUS at 13:17

## 2022-01-01 RX ADMIN — CHLORHEXIDINE GLUCONATE 1 APPLICATION(S): 213 SOLUTION TOPICAL at 12:20

## 2022-01-01 RX ADMIN — MIRTAZAPINE 7.5 MILLIGRAM(S): 45 TABLET, ORALLY DISINTEGRATING ORAL at 21:49

## 2022-01-01 RX ADMIN — Medication 1: at 10:04

## 2022-01-01 RX ADMIN — HYDROMORPHONE HYDROCHLORIDE 1 MILLIGRAM(S): 2 INJECTION INTRAMUSCULAR; INTRAVENOUS; SUBCUTANEOUS at 22:30

## 2022-01-01 RX ADMIN — OXYCODONE HYDROCHLORIDE 10 MILLIGRAM(S): 5 TABLET ORAL at 18:02

## 2022-01-01 RX ADMIN — APIXABAN 2.5 MILLIGRAM(S): 2.5 TABLET, FILM COATED ORAL at 19:55

## 2022-01-01 RX ADMIN — SEVELAMER CARBONATE 800 MILLIGRAM(S): 2400 POWDER, FOR SUSPENSION ORAL at 17:09

## 2022-01-01 RX ADMIN — Medication 2: at 12:56

## 2022-01-01 RX ADMIN — CINACALCET 30 MILLIGRAM(S): 30 TABLET, FILM COATED ORAL at 12:51

## 2022-01-01 RX ADMIN — CINACALCET 60 MILLIGRAM(S): 30 TABLET, FILM COATED ORAL at 12:09

## 2022-01-01 RX ADMIN — CHLORHEXIDINE GLUCONATE 1 APPLICATION(S): 213 SOLUTION TOPICAL at 12:47

## 2022-01-01 RX ADMIN — Medication 2: at 18:37

## 2022-01-01 RX ADMIN — Medication 3 UNIT(S): at 08:44

## 2022-01-01 RX ADMIN — HYDROMORPHONE HYDROCHLORIDE 1 MILLIGRAM(S): 2 INJECTION INTRAMUSCULAR; INTRAVENOUS; SUBCUTANEOUS at 04:13

## 2022-01-01 RX ADMIN — Medication 25 MILLIGRAM(S): at 09:24

## 2022-01-01 RX ADMIN — VASOPRESSIN 1.8 UNIT(S)/MIN: 20 INJECTION INTRAVENOUS at 20:39

## 2022-01-01 RX ADMIN — Medication 2: at 13:58

## 2022-01-01 RX ADMIN — HYDROMORPHONE HYDROCHLORIDE 1 MILLIGRAM(S): 2 INJECTION INTRAMUSCULAR; INTRAVENOUS; SUBCUTANEOUS at 21:43

## 2022-01-01 RX ADMIN — APIXABAN 2.5 MILLIGRAM(S): 2.5 TABLET, FILM COATED ORAL at 06:35

## 2022-01-01 RX ADMIN — MIRTAZAPINE 7.5 MILLIGRAM(S): 45 TABLET, ORALLY DISINTEGRATING ORAL at 02:45

## 2022-01-01 RX ADMIN — Medication 100 MILLIGRAM(S): at 21:18

## 2022-01-01 RX ADMIN — APIXABAN 5 MILLIGRAM(S): 2.5 TABLET, FILM COATED ORAL at 21:06

## 2022-01-01 RX ADMIN — SEVELAMER CARBONATE 800 MILLIGRAM(S): 2400 POWDER, FOR SUSPENSION ORAL at 12:38

## 2022-01-01 RX ADMIN — GABAPENTIN 300 MILLIGRAM(S): 400 CAPSULE ORAL at 11:49

## 2022-01-01 RX ADMIN — HEPARIN SODIUM 5000 UNIT(S): 5000 INJECTION INTRAVENOUS; SUBCUTANEOUS at 13:20

## 2022-01-01 RX ADMIN — Medication 1 APPLICATION(S): at 17:04

## 2022-01-01 RX ADMIN — OXYCODONE HYDROCHLORIDE 5 MILLIGRAM(S): 5 TABLET ORAL at 10:11

## 2022-01-01 RX ADMIN — Medication 1 APPLICATION(S): at 17:27

## 2022-01-01 RX ADMIN — APIXABAN 5 MILLIGRAM(S): 2.5 TABLET, FILM COATED ORAL at 05:36

## 2022-01-01 RX ADMIN — Medication 25 MILLIGRAM(S): at 11:26

## 2022-01-01 RX ADMIN — LORATADINE 10 MILLIGRAM(S): 10 TABLET ORAL at 12:38

## 2022-01-01 RX ADMIN — APIXABAN 5 MILLIGRAM(S): 2.5 TABLET, FILM COATED ORAL at 05:37

## 2022-01-01 RX ADMIN — BUPROPION HYDROCHLORIDE 150 MILLIGRAM(S): 150 TABLET, EXTENDED RELEASE ORAL at 11:31

## 2022-01-01 RX ADMIN — SIMETHICONE 80 MILLIGRAM(S): 80 TABLET, CHEWABLE ORAL at 12:29

## 2022-01-01 RX ADMIN — Medication 81 MILLIGRAM(S): at 13:28

## 2022-01-01 RX ADMIN — CINACALCET 30 MILLIGRAM(S): 30 TABLET, FILM COATED ORAL at 12:58

## 2022-01-01 RX ADMIN — CINACALCET 60 MILLIGRAM(S): 30 TABLET, FILM COATED ORAL at 11:32

## 2022-01-01 RX ADMIN — POLYETHYLENE GLYCOL 3350 17 GRAM(S): 17 POWDER, FOR SOLUTION ORAL at 22:36

## 2022-01-01 RX ADMIN — LORATADINE 10 MILLIGRAM(S): 10 TABLET ORAL at 11:10

## 2022-01-01 RX ADMIN — PANTOPRAZOLE SODIUM 40 MILLIGRAM(S): 20 TABLET, DELAYED RELEASE ORAL at 09:21

## 2022-01-01 RX ADMIN — Medication 3 UNIT(S): at 18:33

## 2022-01-01 RX ADMIN — Medication 2: at 16:23

## 2022-01-01 RX ADMIN — HYDROMORPHONE HYDROCHLORIDE 1 MILLIGRAM(S): 2 INJECTION INTRAMUSCULAR; INTRAVENOUS; SUBCUTANEOUS at 20:41

## 2022-01-01 RX ADMIN — CYCLOSPORINE 200 MILLIGRAM(S): 100 CAPSULE ORAL at 05:40

## 2022-01-01 RX ADMIN — Medication 2: at 09:05

## 2022-01-01 RX ADMIN — Medication 5 UNIT(S): at 08:56

## 2022-01-01 RX ADMIN — Medication 650 MILLIGRAM(S): at 22:05

## 2022-01-01 RX ADMIN — DOXERCALCIFEROL 5 MICROGRAM(S): 2.5 CAPSULE ORAL at 13:18

## 2022-01-01 RX ADMIN — Medication 1 TABLET(S): at 14:19

## 2022-01-01 RX ADMIN — PANTOPRAZOLE SODIUM 40 MILLIGRAM(S): 20 TABLET, DELAYED RELEASE ORAL at 06:05

## 2022-01-01 RX ADMIN — Medication 650 MILLIGRAM(S): at 22:20

## 2022-01-01 RX ADMIN — OXYCODONE HYDROCHLORIDE 10 MILLIGRAM(S): 5 TABLET ORAL at 09:20

## 2022-01-01 RX ADMIN — HYDROMORPHONE HYDROCHLORIDE 1 MILLIGRAM(S): 2 INJECTION INTRAMUSCULAR; INTRAVENOUS; SUBCUTANEOUS at 05:00

## 2022-01-01 RX ADMIN — CYCLOSPORINE 200 MILLIGRAM(S): 100 CAPSULE ORAL at 22:32

## 2022-01-01 RX ADMIN — SIMETHICONE 80 MILLIGRAM(S): 80 TABLET, CHEWABLE ORAL at 05:45

## 2022-01-01 RX ADMIN — SIMETHICONE 80 MILLIGRAM(S): 80 TABLET, CHEWABLE ORAL at 13:38

## 2022-01-01 RX ADMIN — Medication 100 MILLIGRAM(S): at 06:32

## 2022-01-01 RX ADMIN — GABAPENTIN 300 MILLIGRAM(S): 400 CAPSULE ORAL at 17:56

## 2022-01-01 RX ADMIN — Medication 3: at 08:48

## 2022-01-01 RX ADMIN — POLYETHYLENE GLYCOL 3350 17 GRAM(S): 17 POWDER, FOR SOLUTION ORAL at 06:50

## 2022-01-01 RX ADMIN — Medication 0.5 MILLIGRAM(S): at 04:15

## 2022-01-01 RX ADMIN — SEVELAMER CARBONATE 2400 MILLIGRAM(S): 2400 POWDER, FOR SUSPENSION ORAL at 12:49

## 2022-01-01 RX ADMIN — HYDROMORPHONE HYDROCHLORIDE 0.25 MILLIGRAM(S): 2 INJECTION INTRAMUSCULAR; INTRAVENOUS; SUBCUTANEOUS at 10:32

## 2022-01-01 RX ADMIN — INSULIN GLARGINE 4 UNIT(S): 100 INJECTION, SOLUTION SUBCUTANEOUS at 21:37

## 2022-01-01 RX ADMIN — Medication 6 MILLIGRAM(S): at 22:15

## 2022-01-01 RX ADMIN — CYCLOSPORINE 125 MILLIGRAM(S): 100 CAPSULE ORAL at 09:07

## 2022-01-01 RX ADMIN — Medication 120 MILLIGRAM(S): at 05:39

## 2022-01-01 RX ADMIN — APIXABAN 2.5 MILLIGRAM(S): 2.5 TABLET, FILM COATED ORAL at 05:17

## 2022-01-01 RX ADMIN — SIMETHICONE 80 MILLIGRAM(S): 80 TABLET, CHEWABLE ORAL at 21:48

## 2022-01-01 RX ADMIN — LORATADINE 10 MILLIGRAM(S): 10 TABLET ORAL at 11:25

## 2022-01-01 RX ADMIN — SIMETHICONE 80 MILLIGRAM(S): 80 TABLET, CHEWABLE ORAL at 17:54

## 2022-01-01 RX ADMIN — ATORVASTATIN CALCIUM 80 MILLIGRAM(S): 80 TABLET, FILM COATED ORAL at 21:29

## 2022-01-01 RX ADMIN — ERYTHROPOIETIN 14000 UNIT(S): 10000 INJECTION, SOLUTION INTRAVENOUS; SUBCUTANEOUS at 13:31

## 2022-01-01 RX ADMIN — Medication 81 MILLIGRAM(S): at 11:15

## 2022-01-01 RX ADMIN — SEVELAMER CARBONATE 800 MILLIGRAM(S): 2400 POWDER, FOR SUSPENSION ORAL at 18:22

## 2022-01-01 RX ADMIN — PANTOPRAZOLE SODIUM 40 MILLIGRAM(S): 20 TABLET, DELAYED RELEASE ORAL at 05:19

## 2022-01-01 RX ADMIN — ERYTHROPOIETIN 20000 UNIT(S): 10000 INJECTION, SOLUTION INTRAVENOUS; SUBCUTANEOUS at 09:12

## 2022-01-01 RX ADMIN — CEFEPIME 100 MILLIGRAM(S): 1 INJECTION, POWDER, FOR SOLUTION INTRAMUSCULAR; INTRAVENOUS at 07:09

## 2022-01-01 RX ADMIN — CINACALCET 60 MILLIGRAM(S): 30 TABLET, FILM COATED ORAL at 12:50

## 2022-01-01 RX ADMIN — CYCLOSPORINE 125 MILLIGRAM(S): 100 CAPSULE ORAL at 23:10

## 2022-01-01 RX ADMIN — OXYCODONE HYDROCHLORIDE 7.5 MILLIGRAM(S): 5 TABLET ORAL at 01:50

## 2022-01-01 RX ADMIN — CYCLOSPORINE 200 MILLIGRAM(S): 100 CAPSULE ORAL at 02:37

## 2022-01-01 RX ADMIN — Medication 1: at 13:36

## 2022-01-11 NOTE — ED PROVIDER NOTE - PROGRESS NOTE DETAILS
Amish, PGY-3: signout received. Surgery consulted to evaluate wound--they state wound needs further debridement, patient should be medically admitted with wound care team following, IV ax and pain control. prior wound cultures grew pseudomonas--cefepime given.

## 2022-01-11 NOTE — ED ADULT TRIAGE NOTE - CHIEF COMPLAINT QUOTE
pt c/o weakness, worsening wound on lower abdomen, packed by home care nurse today, and diarrhea x1 week. PMHx COPD, HD MWF (left AVF), HTN, DMT2 insulin dependent.

## 2022-01-11 NOTE — ED ADULT TRIAGE NOTE - TEMPERATURE IN CELSIUS (DEGREES C)
[facility] EMERGENCY DEPT  [address] [address]  [phone]    Work/School Note    Date: 7/15/2021    To Whom It May concern:    Grant Omer was seen and treated today in the emergency room by the following provider(s):  Attending Provider: Junito Coe MD.      Grant Omer is excused from work/school on 07/15/21 and 07/16/21. She is medically clear to return to work/school on 7/17/2021.        Sincerely,          Cheko Daniel RN 36.1

## 2022-01-11 NOTE — ED PROVIDER NOTE - OBJECTIVE STATEMENT
56 year old female PMH ESRD, HTN, DM, COPD not on O2, AF on Eliquis, anemia, presents to ED for wound check. Patient states she has RLQ pannus wound since November that has been followed by wound care. Patient states pain at the site is worsening and she noted a foul smell, prompting her ED visit. Patient has been receiving IV abx during dialysis that have now given her diarrhea. She denies fever, chills, CP, sob, nausea, vomiting, or weakness.

## 2022-01-11 NOTE — ED ADULT NURSE NOTE - OBJECTIVE STATEMENT
57 yo F AAOx4 received to room 2, c/o worsening pain to wound on posterior pannus, malodorous + discharge, chronic since november, last packing changed today by wound care nurse, hx esrd with LAVF MWF, last full session yesterday, #20 placed to R arm, medicated for pain, will reassess

## 2022-01-11 NOTE — ED PROVIDER NOTE - ATTENDING CONTRIBUTION TO CARE
55yo F w/ PMH Afib s/p ILR, ESRD on HD MWF, DM2, HTN, HLD, COPD p/w wound in abdominal pannus. pt has chronic wound since novermber, follows with wound care and has visiting nurse for wound packing., pt states for last few days pain has been worsening and has foul discharge, denies fevers. pt states her visiting nurse said to go to ed  on exam large wound on right side under pannus, granulation tissue around edges, no surrounding erythema or warmth, foul smelling, no active discharge.   will check labs, pain contorl reassess  pt currently get abx with HD and has been having diarrhea, but no abd pain

## 2022-01-11 NOTE — ED PROVIDER NOTE - NS ED ROS FT
GENERAL: no fever, no chills  EYES: no change in vision, no irritation, no discharge, no redness, no pain  HEENT: no trouble swallowing or speaking, no throat pain, no ear pain  CARDIAC: no chest pain, no palpitations   PULMONARY: no cough, no shortness of breath, no wheezing  GI: +abdominal pain, no nausea, no vomiting, no diarrhea, no constipation, no melena, no hematochezia, no hematemesis  : no changes in urination, no dysuria, no frequency, no hematuria, no discharge  SKIN: no rashes, +wound  NEURO: no headache, no numbness, no weakness  MSK: no joint pain, no muscle pain, no back pain, no calf pain

## 2022-01-11 NOTE — ED PROVIDER NOTE - PHYSICAL EXAMINATION
GENERAL: A&Ox3, non-toxic appearing, no acute distress  HEENT: NCAT, EOMI, oral mucosa moist, normal conjunctiva  RESP: CTAB, no respiratory distress, no wheezes/rhonchi/rales, speaking in full sentences  CV: RRR, no murmurs/rubs/gallops  ABDOMEN: soft, non-tender, non-distended, no guarding, 10cm wound with indurated edges and yellow granulation tissue, no surrounding warmth or erythema  MSK: no visible deformities  NEURO: no focal sensory or motor deficits, CN 2-12 grossly intact  SKIN: warm, normal color, well perfused, no rash  PSYCH: normal affect

## 2022-01-11 NOTE — ED PROVIDER NOTE - CLINICAL SUMMARY MEDICAL DECISION MAKING FREE TEXT BOX
Wilfredo Callejas, PGY3: 56 year old female p/w non-healing abd wound. No systemic sx, follows w/ wound care. No signs of sepsis. Will control pain, labs, reassess. No indication for imaging at this time. If pain controlled, can d/c w/ wound care, otherwise admit for further.
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
## 2022-01-12 NOTE — CONSULT NOTE ADULT - ASSESSMENT
56y Female with history of ESRD on HD presents with vomiting and diarrhea found to be COVID-19 positive. Nephrology consulted for ESRD status.    1) ESRD: Last HD on 1/10 as an outpatient. Plan for next maintenance HD today. Monitor electrolytes.    2) HTN with ESRD: BP controlled. Continue with current medications. Monitor BP.    3) Anemia of renal disease: Hb low. Increase Epo to 8K with HD. Monitor Hb.    4) Secondary HPT of renal origin: Continue with sensipar 60 mg daily and resume hectorol 5 mcg with HD and renvela 3 tabs with meals. Monitor serum calcium and phosphorus.      [facility] NEPHROLOGY  Keaton Lopez M.D.  Juma Green D.O.  Myla Hoffmann M.D.  Julia Brewer, MSN, ANP-C    Telephone: [phone]  Facsimile: [phone]    [id number] [place] [address]

## 2022-01-12 NOTE — CONSULT NOTE ADULT - OPHTHALMOLOGIC
Pt is calling in to follow up on this xray request.  She has a lot of questions concerning why this is not covered and why it has been completed before with outside order but not this time.   She was also told that her insurance doesn't cover the xray at [facility].  Informed her that is information that the /registration does not have access to.      Please f/u with patient.  She continues to have questions regarding the xray.      [place]:  [phone]     negative
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
## 2022-01-12 NOTE — PATIENT PROFILE ADULT - FALL HARM RISK - HARM RISK INTERVENTIONS

## 2022-01-12 NOTE — PHARMACOTHERAPY INTERVENTION NOTE - COMMENTS
Medication history is incomplete. Medication list updated in Outpatient Medication Record (OMR) per [facility] and [facility]. Patient and emergency contact unable to verify medication list. Please call spectra [phone] if you have any questions.   Spoke with ACP provider to notify OMR updated.

## 2022-01-12 NOTE — H&P ADULT - NSHPLABSRESULTS_GEN_ALL_CORE
9.1    10.13 )-----------( 342      ( 11 Jan 2022 22:49 )             30.7     01-11    135  |  93<L>  |  32<H>  ----------------------------<  90  3.6   |  24  |  7.13<H>    Ca    8.7      11 Jan 2022 22:49    TPro  7.2  /  Alb  3.4  /  TBili  0.2  /  DBili  x   /  AST  7   /  ALT  8   /  AlkPhos  264<H>  01-11    CAPILLARY BLOOD GLUCOSE      POCT Blood Glucose.: 93 mg/dL (12 Jan 2022 03:42)  POCT Blood Glucose.: 74 mg/dL (12 Jan 2022 03:25)  POCT Blood Glucose.: 70 mg/dL (12 Jan 2022 03:12)  POCT Blood Glucose.: 96 mg/dL (11 Jan 2022 21:35)    Vital Signs Last 24 Hrs  T(C): 36.6 (12 Jan 2022 04:44), Max: 36.9 (11 Jan 2022 22:54)  T(F): 97.8 (12 Jan 2022 04:44), Max: 98.4 (11 Jan 2022 22:54)  HR: 74 (12 Jan 2022 05:52) (70 - 77)  BP: 128/78 (12 Jan 2022 05:52) (119/50 - 146/76)  BP(mean): --  RR: 20 (12 Jan 2022 05:52) (20 - 22)  SpO2: 100% (12 Jan 2022 05:52) (95% - 100%)

## 2022-01-12 NOTE — H&P ADULT - PROBLEM SELECTOR PLAN 1
-seen by vascular who recommended would care for debridement as well as to consider abx; will defer to ID  to be called by accepting physician   -pain management
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
## 2022-01-12 NOTE — CONSULT NOTE ADULT - SUBJECTIVE AND OBJECTIVE BOX
[facility] NEPHROLOGY- CONSULTATION NOTE    56y Female with history of below presents with vomiting and diarrhea found to be COVID-19 positive. Nephrology consulted for ESRD status.    Patient well known to our practice for h/o ESRD on HD MWF at [facility] where she follows with me. Last HD on 1/10 as an outpatient with high venous pressures?     REVIEW OF SYSTEMS:  Gen: no changes in weight  HEENT: no rhinorrhea  Neck: no sore throat  Cards: no chest pain  Resp: no dyspnea  GI: no nausea, + vomiting and diarrhea  : no dysuria or hematuria  Vascular: no LE edema  Derm: no rashes  Neuro: no numbness/tingling    latex (Rash)  penicillin (Nausea)      Home Medications Reviewed  Hospital Medications:   MEDICATIONS  (STANDING):      PAST MEDICAL & SURGICAL HISTORY:  COPD (chronic obstructive pulmonary disease)    DM (diabetes mellitus)    Atrial fibrillation  with loop recorder , battery most likely depleted, as per cardiac clearance, Dr. Reece Anesthesia aware, pt on Eliquis    HTN (hypertension)    Morbid obesity  BMI - 58.3    Chronic GERD    CHAMP (obstructive sleep apnea)  non compliance with CPAP, Anesthesia Dr. Reece aware, pt told to bring CPAP for sx, pr verbalized understanding    Potential difficult airway on pre-intubation assessment  airway class III, large neck, morbid obesity, hx of CHAMP, no compliance with CPAP- Dr. Reece, Anesthesia aware    End-stage renal disease    Anemia    H/O tubal ligation      Status post placement of implantable loop recorder  left chest-     History of vascular access device  s/p insertion right chest permacath 2019, removal 2019, insertion left chest permacath 2019    S/P arteriovenous (AV) fistula creation  left  arm 2019        FAMILY HISTORY:  Family history of diabetes mellitus  mother-     Family hx of hypertension  mother-         SOCIAL HISTORY:  Denies toxic substance use       VITALS:  T(F): 98.4 (22 @ 11:04), Max: 98.4 (22 @ 22:54)  HR: 72 (22 @ 11:04)  BP: 108/53 (22 @ 11:04)  RR: 19 (22 @ 11:04)  SpO2: 100% (22 @ 11:04)  Wt(kg): --    Height (cm): 157.5 (01-11 @ 21:27)      PHYSICAL EXAM:  Gen: NAD, calm  HEENT: MMM  Neck: no JVD  Cards: RRR, +S1/S2, no M/G/R  Resp: CTA B/L  GI: soft, NT/ND, NABS, + ab wound with dressing in place  : no CVA tenderness  Vascular: no LE edema B/L, LUE AVF + bruit/thrill  Derm: no rashes  Neuro: non-focal        LABS:      135  |  93<L>  |  32<H>  ----------------------------<  90  3.6   |  24  |  7.13<H>    Ca    8.7      2022 22:49    TPro  7.2  /  Alb  3.4  /  TBili  0.2  /  DBili      /  AST  7   /  ALT  8   /  AlkPhos  264<H>      Creatinine Trend: 7.13 <--                        9.0    9.92  )-----------( 345      ( 2022 08:18 )             30.9     Urine Studies:

## 2022-01-12 NOTE — CONSULT NOTE ADULT - ASSESSMENT
56F w/ hx of ESRD (HD MWF), HTN, DM, COPD, Afib, chronic R pannus wound p/w worsening pannus wound    Recommendation:  - no acute surgical intervention  - recommend consult wound care in AM for debridement/further management  - consider antibiotics for malodorous wound  - plan discussed with attending, Dr. Valarie PARHAM team surgery  [id number]

## 2022-01-12 NOTE — H&P ADULT - PROBLEM SELECTOR PLAN 6
will dose meds pt reports taking, but will email pharmacy for med rec to ensure all meds accounted for

## 2022-01-12 NOTE — H&P ADULT - NSHPPHYSICALEXAM_GEN_ALL_CORE
PHYSICAL EXAM:      Constitutional: NAD, well-groomed, well-developed  HEENT: EOMI, Normal Hearing  Neck: No LAD, No JVD  Back: Normal spine flexure, No CVA tenderness  Respiratory: CTAB  Cardiovascular: S1 and S2, RRR  Gastrointestinal: BS+, soft, dressing over pannus wound c/d/i  Extremities: No peripheral edema  Vascular: 2+ peripheral pulses  Neurological: A/O x 3, no focal deficits  Psychiatric: Normal mood, normal affect  Musculoskeletal: 4/5 strength b/l upper and 3/5 lower extremities (chronic)  Skin: No rashes

## 2022-01-12 NOTE — CONSULT NOTE ADULT - ASSESSMENT
56 year old female with hx of morbid obesity, CHAMP not on home O2, ESRD (HD MWF), HTN, DM, COPD, Afib no longer on AC, chronic R pannus wound, followed by Dr. Layne, sent by visiting RN for worsening wound.    # Chronic Pannus Wound  -IV abx per primary team  -surgery consult appreciated    #Afib  -stable  -cont eliquis  -cont cardizem    #Covid-19+  -trend inflamm markers  -pt saturating well on RA    #Hypertension  -cont current meds    #ESRD on HD  -HD per renal

## 2022-01-12 NOTE — H&P ADULT - NSHPREVIEWOFSYSTEMS_GEN_ALL_CORE
Review of Systems:   CONSTITUTIONAL: no weight loss, +fatigue  EYES: No eye pain, visual disturbances, or discharge  ENMT:  No difficulty hearing, tinnitus, vertigo; No sinus or throat pain  NECK: No pain or stiffness  RESPIRATORY: No cough, wheezing, chills or hemoptysis; No shortness of breath  CARDIOVASCULAR: No chest pain, palpitations, dizziness, or leg swelling  GASTROINTESTINAL: Lower abd pain. No nausea, vomiting, or hematemesis; + diarrhea no constipation. No melena or hematochezia.  GENITOURINARY: No dysuria, frequency, hematuria, or incontinence  NEUROLOGICAL: No headaches, memory loss, loss of strength, numbness, or tremors  SKIN: No itching, burning, rashes, or lesions   MUSCULOSKELETAL: No joint pain or swelling; No muscle, back, or extremity pain

## 2022-01-12 NOTE — CONSULT NOTE ADULT - ASSESSMENT
57 yo F with morbid obesity, CHAMP, ESRD--fistula, with known abd wound, presenting with worsening of wound  No fever, mild leukocytosis  COVID+  CXR clear  RA  Worsening of known abd wound  Culture in 12/2021 with pseudomonas and enterococcus  Overall,  1) Wound infection  - Chronic wound, possible superinfected  - Cefepime 1g q 24, post HD  - Wound care eval to site  - Monitor for systemic signs infection  2) COVID  - RA  - Supportive care  - O2 supplementation as needed per primary team  3) Leukocytosis  - Trend to normal    Se Leary MD  Pager [phone]  From 5pm-9am, and on weekends call [phone]

## 2022-01-13 NOTE — PROGRESS NOTE ADULT - ASSESSMENT
56y Female with history of ESRD on HD presents with vomiting and diarrhea found to be COVID-19 positive. Nephrology consulted for ESRD status.    1) ESRD: Last HD on 1/12 with intradialytic hypotension and 1.6L removed. Plan for next maintenance HD 1/14. Monitor electrolytes.    2) HTN with ESRD: BP controlled. Continue with current medications. Monitor BP.    3) Anemia of renal disease: Hb improving. Continue with Epo 8K with HD. Monitor Hb.    4) Secondary HPT of renal origin: Continue with sensipar 60 mg daily, hectorol 5 mcg with HD and renvela 3 tabs with meals. Monitor serum calcium and phosphorus.      [facility] NEPHROLOGY  Keaton Lopez M.D.  Juma Green D.O.  Myla Hoffmann M.D.  Julia Brewer, JASIEL, ANP-C    Telephone: [phone]  Facsimile: [phone]    [address] [place] [address]

## 2022-01-13 NOTE — PROGRESS NOTE ADULT - SUBJECTIVE AND OBJECTIVE BOX
CC: no events    TELEMETRY:     PHYSICAL EXAM:    T(C): 36.8 (01-13-22 @ 05:26), Max: 36.9 (01-12-22 @ 23:15)  HR: 81 (01-13-22 @ 05:26) (77 - 84)  BP: 146/69 (01-13-22 @ 05:26) (110/61 - 160/76)  RR: 19 (01-13-22 @ 05:26) (18 - 21)  SpO2: 98% (01-13-22 @ 05:26) (97% - 100%)  Wt(kg): --  I&O's Summary    12 Jan 2022 07:01  -  13 Jan 2022 07:00  --------------------------------------------------------  IN: 800 mL / OUT: 2400 mL / NET: -1600 mL        Appearance: Normal	  Cardiovascular: Normal S1 S2,RRR, No JVD, No murmurs  Respiratory: Lungs clear to auscultation	  Gastrointestinal:  Soft, Non-tender, + BS	  Extremities: Normal range of motion, No clubbing, cyanosis or edema  Vascular: Peripheral pulses palpable 2+ bilaterally     LABS:	 	                          9.7    14.03 )-----------( 344      ( 13 Jan 2022 08:21 )             33.9     01-13    135  |  92<L>  |  25<H>  ----------------------------<  67<L>  3.8   |  26  |  6.23<H>    Ca    9.1      13 Jan 2022 08:21    TPro  7.5  /  Alb  3.6  /  TBili  0.3  /  DBili  x   /  AST  6   /  ALT  5   /  AlkPhos  284<H>  01-13          CARDIAC MARKERS:        MEDICATIONS  (STANDING):  apixaban 2.5 milliGRAM(s) Oral two times a day  aspirin enteric coated 81 milliGRAM(s) Oral daily  atorvastatin 80 milliGRAM(s) Oral at bedtime  cefepime   IVPB 1000 milliGRAM(s) IV Intermittent every 24 hours  chlorhexidine 2% Cloths 1 Application(s) Topical daily  cinacalcet 60 milliGRAM(s) Oral at bedtime  dextrose 40% Gel 15 Gram(s) Oral once  dextrose 5%. 1000 milliLiter(s) (50 mL/Hr) IV Continuous <Continuous>  dextrose 5%. 1000 milliLiter(s) (100 mL/Hr) IV Continuous <Continuous>  dextrose 50% Injectable 25 Gram(s) IV Push once  dextrose 50% Injectable 12.5 Gram(s) IV Push once  dextrose 50% Injectable 25 Gram(s) IV Push once  diltiazem    milliGRAM(s) Oral daily  doxercalciferol Injectable 5 MICROGram(s) IV Push <User Schedule>  epoetin anabela-epbx (RETACRIT) Injectable 8000 Unit(s) IV Push <User Schedule>  glucagon  Injectable 1 milliGRAM(s) IntraMuscular once  insulin glargine Injectable (LANTUS) 64 Unit(s) SubCutaneous every morning  insulin lispro (ADMELOG) corrective regimen sliding scale   SubCutaneous three times a day before meals  montelukast 10 milliGRAM(s) Oral at bedtime  sevelamer carbonate 2400 milliGRAM(s) Oral three times a day with meals

## 2022-01-13 NOTE — PROGRESS NOTE ADULT - SUBJECTIVE AND OBJECTIVE BOX
SUBJECTIVE / OVERNIGHT EVENTS:pt seen and examined  01-13-22     MEDICATIONS  (STANDING):  apixaban 2.5 milliGRAM(s) Oral two times a day  aspirin enteric coated 81 milliGRAM(s) Oral daily  atorvastatin 80 milliGRAM(s) Oral at bedtime  cefepime   IVPB 1000 milliGRAM(s) IV Intermittent every 24 hours  chlorhexidine 2% Cloths 1 Application(s) Topical daily  cinacalcet 60 milliGRAM(s) Oral at bedtime  dextrose 40% Gel 15 Gram(s) Oral once  dextrose 5%. 1000 milliLiter(s) (50 mL/Hr) IV Continuous <Continuous>  dextrose 5%. 1000 milliLiter(s) (100 mL/Hr) IV Continuous <Continuous>  dextrose 50% Injectable 25 Gram(s) IV Push once  dextrose 50% Injectable 12.5 Gram(s) IV Push once  dextrose 50% Injectable 25 Gram(s) IV Push once  diltiazem    milliGRAM(s) Oral daily  doxercalciferol Injectable 5 MICROGram(s) IV Push <User Schedule>  epoetin anabela-epbx (RETACRIT) Injectable 8000 Unit(s) IV Push <User Schedule>  glucagon  Injectable 1 milliGRAM(s) IntraMuscular once  insulin glargine Injectable (LANTUS) 64 Unit(s) SubCutaneous every morning  insulin lispro (ADMELOG) corrective regimen sliding scale   SubCutaneous three times a day before meals  montelukast 10 milliGRAM(s) Oral at bedtime  sevelamer carbonate 2400 milliGRAM(s) Oral three times a day with meals    MEDICATIONS  (PRN):  HYDROmorphone  Injectable 1 milliGRAM(s) IV Push every 6 hours PRN Severe Pain (7 - 10)    T(C): 37.1 (01-13-22 @ 08:45), Max: 37.1 (01-13-22 @ 08:45)  HR: 86 (01-13-22 @ 08:45) (77 - 86)  BP: 137/41 (01-13-22 @ 08:45) (137/41 - 160/76)  RR: 19 (01-13-22 @ 08:45) (19 - 20)  SpO2: 97% (01-13-22 @ 08:45) (97% - 98%)    CAPILLARY BLOOD GLUCOSE      POCT Blood Glucose.: 73 mg/dL (13 Jan 2022 18:04)  POCT Blood Glucose.: 77 mg/dL (13 Jan 2022 12:24)  POCT Blood Glucose.: 77 mg/dL (13 Jan 2022 09:57)    I&O's Summary    12 Jan 2022 07:01  -  13 Jan 2022 07:00  --------------------------------------------------------  IN: 800 mL / OUT: 2400 mL / NET: -1600 mL        Constitutional: No fever, fatigue  Skin: No rash.  Eyes: No recent vision problems or eye pain.  ENT: No congestion, ear pain, or sore throat.  Cardiovascular: No chest pain or palpation.  Respiratory: No cough, shortness of breath, congestion, or wheezing.  Gastrointestinal: No abdominal pain, nausea, vomiting, or diarrhea.  Genitourinary: No dysuria.  Musculoskeletal: No joint swelling.  Neurologic: No headache.    PHYSICAL EXAM:  GENERAL: NAD  EYES: EOMI, PERRLA  NECK: Supple, No JVD  CHEST/LUNG: dec breath sounds at bases  HEART:  S1 , S2 +  ABDOMEN: soft , bs+, abd wound +  EXTREMITIES:  edema+  NEUROLOGY:alert awake      LABS:                        9.7    14.03 )-----------( 344      ( 13 Jan 2022 08:21 )             33.9     01-13    135  |  92<L>  |  25<H>  ----------------------------<  67<L>  3.8   |  26  |  6.23<H>    Ca    9.1      13 Jan 2022 08:21    TPro  7.5  /  Alb  3.6  /  TBili  0.3  /  DBili  x   /  AST  6   /  ALT  5   /  AlkPhos  284<H>  01-13              RADIOLOGY & ADDITIONAL TESTS:    Imaging Personally Reviewed:    Consultant(s) Notes Reviewed:      Care Discussed with Consultants/Other Providers:

## 2022-01-13 NOTE — PROGRESS NOTE ADULT - ASSESSMENT
55 yo F with morbid obesity, CHAMP, ESRD--fistula, with known abd wound, presenting with worsening of wound  No fever, leukocytosis  COVID+  CXR clear  RA  Worsening of known abd wound  Culture in 12/2021 with pseudomonas and enterococcus  Overall,  1) Wound infection  - Chronic wound, possible superinfected  - Cefepime 1g q 24, post HD  - Wound care eval to site  - Monitor for systemic signs infection  2) COVID  - RA  - Supportive care  - O2 supplementation as needed per primary team  3) Leukocytosis  - Trend to normal  - Unclear why elevation, monitor closely  4) Diarrhea  - Resolved?  - Monitor for further, check C diff if uncontrolled diarrhea  5) Positive BCX  - Likely contam, 1/4 CoNS  - Repeat BCXs x 2  - Hold on Vanco unless clinical signs worsening/sepsis    Se Leary MD  Pager [phone]  From 5pm-9am, and on weekends call [phone]

## 2022-01-13 NOTE — PROGRESS NOTE ADULT - SUBJECTIVE AND OBJECTIVE BOX
[facility] NEPHROLOGY- PROGRESS NOTE    56y Female with history of ESRD on HD presents with vomiting and diarrhea found to be COVID-19 positive. Nephrology consulted for ESRD status.    REVIEW OF SYSTEMS:  Gen: no changes in weight  Cards: no chest pain  Resp: no dyspnea  GI: no nausea or vomiting or diarrhea  Vascular: no LE edema    caffeine (Nausea)  latex (Rash)  penicillin (Nausea)  strawberry (Rash)      Hospital Medications: Medications reviewed    VITALS:  T(F): 98.7 (01-13-22 @ 08:45), Max: 98.7 (01-13-22 @ 08:45)  HR: 86 (01-13-22 @ 08:45)  BP: 137/41 (01-13-22 @ 08:45)  RR: 19 (01-13-22 @ 08:45)  SpO2: 97% (01-13-22 @ 08:45)  Wt(kg): --  Height (cm): 157.5 (01-11 @ 21:27)    01-12 @ 07:01  -  01-13 @ 07:00  --------------------------------------------------------  IN: 800 mL / OUT: 2400 mL / NET: -1600 mL        PHYSICAL EXAM:    Gen: NAD, calm  Cards: RRR, +S1/S2, no M/G/R  Resp: CTA B/L  GI: soft, NT/ND, NABS  Vascular: no LE edema B/L, LUE AVF + bruit/thrill    LABS:  01-13    135  |  92<L>  |  25<H>  ----------------------------<  67<L>  3.8   |  26  |  6.23<H>    Ca    9.1      13 Jan 2022 08:21    TPro  7.5  /  Alb  3.6  /  TBili  0.3  /  DBili      /  AST  6   /  ALT  5   /  AlkPhos  284<H>  01-13    Creatinine Trend: 6.23 <--, 7.13 <--                        9.7    14.03 )-----------( 344      ( 13 Jan 2022 08:21 )             33.9     Urine Studies:        RADIOLOGY & ADDITIONAL STUDIES:

## 2022-01-13 NOTE — PROGRESS NOTE ADULT - SUBJECTIVE AND OBJECTIVE BOX
CC: F/U for leukocytosis    Saw/spoke to patient. Feels subjectively improved. No new complaints. Prior diarrhea resolved.    Allergies  latex (Rash)  penicillin (Nausea)  strawberry (Rash)    ANTIMICROBIALS:  cefepime   IVPB 1000 every 24 hours    PE:    Vital Signs Last 24 Hrs  T(C): 37.1 (13 Jan 2022 08:45), Max: 37.1 (13 Jan 2022 08:45)  T(F): 98.7 (13 Jan 2022 08:45), Max: 98.7 (13 Jan 2022 08:45)  HR: 86 (13 Jan 2022 08:45) (77 - 86)  BP: 137/41 (13 Jan 2022 08:45) (110/61 - 160/76)  RR: 19 (13 Jan 2022 08:45) (18 - 21)  SpO2: 97% (13 Jan 2022 08:45) (97% - 100%)    Gen: AOx3, NAD, non-toxic  CV: S1+S2 normal, nontachycardic  Resp: Clear bilat, no resp distress, no crackles/wheezes  Abd: Soft, nontender, +BS, abd wound  Ext: No LE edema, no wounds    LABS:                        9.7    14.03 )-----------( 344      ( 13 Jan 2022 08:21 )             33.9     01-13    135  |  92<L>  |  25<H>  ----------------------------<  67<L>  3.8   |  26  |  6.23<H>    Ca    9.1      13 Jan 2022 08:21    TPro  7.5  /  Alb  3.6  /  TBili  0.3  /  DBili  x   /  AST  6   /  ALT  5   /  AlkPhos  284<H>  01-13    MICROBIOLOGY:    .Blood Blood-Peripheral  01-12-22   Growth in anaerobic bottle: Gram Positive Cocci in Clusters  ***Blood Panel PCR results on this specimen are available  approximately 3 hours after the Gram stain result.***  Gram stain, PCR, and/or culture results may not always  correspond due todifference in methodologies.  ************************************************************  This PCR assay was performed by multiplex PCR. This  Assay tests for 66 bacterial and resistance gene targets.  Please refer to the [facility] testdirectory  at [email or web address] for details.  --  Blood Culture PCR    RADIOLOGY:    No new available

## 2022-01-14 NOTE — PROGRESS NOTE ADULT - ASSESSMENT
56y Female with history of ESRD on HD presents with vomiting and diarrhea found to be COVID-19 positive. Nephrology consulted for ESRD status.    1) ESRD: Last HD on 1/12 with intradialytic hypotension and 1.6L removed. Plan for next maintenance HD today. Monitor electrolytes.    2) HTN with ESRD: BP low normal. Continue with current medications. Monitor BP.    3) Anemia of renal disease: Hb low. Continue with Epo 8K with HD. Monitor Hb.    4) Secondary HPT of renal origin: Phosphorus acceptable. Continue with sensipar 60 mg daily, hectorol 5 mcg with HD and renvela 3 tabs with meals. Monitor serum calcium and phosphorus.      [facility] NEPHROLOGY  Keaton Lopez M.D.  Juma Green D.O.  Myla Hoffmann M.D.  Julia Brewer, MSN, ANP-C    Telephone: [phone]  Facsimile: [phone]    [address] [place] [address]

## 2022-01-14 NOTE — PROVIDER CONTACT NOTE (HYPOGLYCEMIA EVENT) - NS PROVIDER CONTACT NOTE-TREATMENT-HYPO
4 oz Fruit Juice (Specify quantity, date/time) 4 oz Fruit Juice (Specify quantity, date/time)/Dextrose 50% 12.5 Grams IV Push

## 2022-01-14 NOTE — PROGRESS NOTE ADULT - SUBJECTIVE AND OBJECTIVE BOX
CC: no events    TELEMETRY:     PHYSICAL EXAM:    T(C): 36.8 (01-14-22 @ 05:06), Max: 36.8 (01-14-22 @ 05:06)  HR: 80 (01-14-22 @ 08:22) (80 - 88)  BP: 105/50 (01-14-22 @ 08:22) (105/50 - 111/59)  RR: 19 (01-14-22 @ 08:22) (19 - 19)  SpO2: 98% (01-14-22 @ 08:22) (98% - 98%)  Wt(kg): --  I&O's Summary      Appearance: Normal	  Cardiovascular: Normal S1 S2,RRR, No JVD, No murmurs  Respiratory: Lungs clear to auscultation	  Gastrointestinal:  Soft, Non-tender, + BS	  Extremities: Normal range of motion, No clubbing, cyanosis or edema  Vascular: Peripheral pulses palpable 2+ bilaterally     LABS:	 	                          9.2    12.81 )-----------( 416      ( 14 Jan 2022 06:50 )             32.0     01-14    136  |  93<L>  |  32<H>  ----------------------------<  43<LL>  4.3   |  23  |  7.48<H>    Ca    8.2<L>      14 Jan 2022 06:50  Phos  5.3     01-14  Mg     1.90     01-14    TPro  7.3  /  Alb  3.3  /  TBili  0.3  /  DBili  x   /  AST  7   /  ALT  5   /  AlkPhos  274<H>  01-14          CARDIAC MARKERS:

## 2022-01-14 NOTE — CONSULT NOTE ADULT - ASSESSMENT
56 yr old F with morbid obesity, CHAMP not on home O2, ESRD (HD MWF), HTN, DM2 A1C 7.0, COPD, Afib no longer on AC, chronic R pannus wound here with worsening wound, diarrhea and found to be COVID+. Has very poor po intake at this time.

## 2022-01-14 NOTE — PROGRESS NOTE ADULT - SUBJECTIVE AND OBJECTIVE BOX
SUBJECTIVE / OVERNIGHT EVENTS:pt seen and examined  01-14-22     MEDICATIONS  (STANDING):  apixaban 2.5 milliGRAM(s) Oral two times a day  aspirin enteric coated 81 milliGRAM(s) Oral daily  atorvastatin 80 milliGRAM(s) Oral at bedtime  cefepime   IVPB 1000 milliGRAM(s) IV Intermittent every 24 hours  chlorhexidine 2% Cloths 1 Application(s) Topical daily  cinacalcet 60 milliGRAM(s) Oral at bedtime  collagenase Ointment 1 Application(s) Topical two times a day  Dakins Solution - 1/4 Strength 1 Application(s) Topical two times a day  dextrose 40% Gel 15 Gram(s) Oral once  dextrose 5%. 1000 milliLiter(s) (100 mL/Hr) IV Continuous <Continuous>  dextrose 5%. 1000 milliLiter(s) (50 mL/Hr) IV Continuous <Continuous>  dextrose 50% Injectable 25 Gram(s) IV Push once  dextrose 50% Injectable 12.5 Gram(s) IV Push once  dextrose 50% Injectable 25 Gram(s) IV Push once  diltiazem    milliGRAM(s) Oral daily  doxercalciferol Injectable 5 MICROGram(s) IV Push <User Schedule>  epoetin anabela-epbx (RETACRIT) Injectable 8000 Unit(s) IV Push <User Schedule>  glucagon  Injectable 1 milliGRAM(s) IntraMuscular once  insulin lispro (ADMELOG) corrective regimen sliding scale   SubCutaneous three times a day before meals  insulin lispro (ADMELOG) corrective regimen sliding scale   SubCutaneous at bedtime  montelukast 10 milliGRAM(s) Oral at bedtime  sevelamer carbonate 2400 milliGRAM(s) Oral three times a day with meals    MEDICATIONS  (PRN):  HYDROmorphone  Injectable 1 milliGRAM(s) IV Push every 6 hours PRN Severe Pain (7 - 10)    Vital Signs Last 24 Hrs  T(C): 36.6 (01-14-22 @ 20:00), Max: 36.8 (01-14-22 @ 05:06)  T(F): 97.9 (01-14-22 @ 20:00), Max: 98.2 (01-14-22 @ 05:06)  HR: 69 (01-14-22 @ 20:00) (69 - 88)  BP: 144/67 (01-14-22 @ 20:00) (105/50 - 145/77)  BP(mean): --  RR: 20 (01-14-22 @ 20:00) (19 - 20)  SpO2: 99% (01-14-22 @ 12:00) (98% - 99%)          Constitutional: No fever, fatigue  Skin: No rash.  Eyes: No recent vision problems or eye pain.  ENT: No congestion, ear pain, or sore throat.  Cardiovascular: No chest pain or palpation.  Respiratory: No cough, shortness of breath, congestion, or wheezing.  Gastrointestinal: No abdominal pain, nausea, vomiting, or diarrhea.  Genitourinary: No dysuria.  Musculoskeletal: No joint swelling.  Neurologic: No headache.    PHYSICAL EXAM:  GENERAL: NAD  EYES: EOMI, PERRLA  NECK: Supple, No JVD  CHEST/LUNG: dec breath sounds at bases  HEART:  S1 , S2 +  ABDOMEN: soft , bs+, abd wound +  EXTREMITIES:  edema+  NEUROLOGY:alert awake      LABS:  01-14    136  |  93<L>  |  32<H>  ----------------------------<  43<LL>  4.3   |  23  |  7.48<H>    Ca    8.2<L>      14 Jan 2022 06:50  Phos  5.3     01-14  Mg     1.90     01-14    TPro  7.3  /  Alb  3.3  /  TBili  0.3  /  DBili      /  AST  7   /  ALT  5   /  AlkPhos  274<H>  01-14    Creatinine Trend: 7.48 <--, 6.23 <--, 7.13 <--                        9.2    12.81 )-----------( 416      ( 14 Jan 2022 06:50 )             32.0     Urine Studies:            LIVER FUNCTIONS - ( 14 Jan 2022 06:50 )  Alb: 3.3 g/dL / Pro: 7.3 g/dL / ALK PHOS: 274 U/L / ALT: 5 U/L / AST: 7 U/L / GGT: x

## 2022-01-14 NOTE — CONSULT NOTE ADULT - ASSESSMENT
56y old  Female  ESRD with calcifilaxsis and open rlq wound pannicula   with hx of morbid obesity,   CHAMP not on home O2, ESRD (HD MWF), HTN, DM, COPD, Afib  ( on ac ) chronic R pannus wound,  not ready for vac, still >20% slough medial wound-  s/p pseudomonas and enterococcal facealis, was previously on abx with HD until 12/2021  s/p new-onset foul smelling non-bloody diarrhea.   newCOVID +, but non-hypoxic labs rising, no skin issues or toe ischemia on cefapeme  dakins wet to dry with santyl kerlex abd , rl abd wound    [id number]/kg, protein 1.2-1.5g/kg, DM , Obesity management  offload  moisture barrier   DVT prophylaxisis  established/ problem  stable  no new abscess, still with debris not ready for vac yet, will reevaluate next week     data reviewed lab, tests, records, image  complexity high   risk high -  due to dx, complexity data, risk  time 70 min 223

## 2022-01-14 NOTE — PROGRESS NOTE ADULT - ASSESSMENT
pt states diarrhea improving  await stool studies  can be antibiotic associated  conserervative gi magnemnt
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
## 2022-01-14 NOTE — PROVIDER CONTACT NOTE (HYPOGLYCEMIA EVENT) - NS PROVIDER CONTACT CONTRIBUTING FACTORS OF EPISODE
Poor oral intake within the last 24 hours
Poor oral intake within the last 24 hours/Previous finger stick less than 100 mg/dL

## 2022-01-14 NOTE — PROGRESS NOTE ADULT - SUBJECTIVE AND OBJECTIVE BOX
[facility] NEPHROLOGY- PROGRESS NOTE    56y Female with history of ESRD on HD presents with vomiting and diarrhea found to be COVID-19 positive. Nephrology consulted for ESRD status.    REVIEW OF SYSTEMS:  Gen: no changes in weight  Cards: no chest pain  Resp: no dyspnea  GI: no nausea or vomiting or diarrhea  Vascular: no LE edema    caffeine (Nausea)  latex (Rash)  penicillin (Nausea)  strawberry (Rash)      Hospital Medications: Medications reviewed      VITALS:  T(F): 98.2 (01-14-22 @ 05:06), Max: 98.2 (01-14-22 @ 05:06)  HR: 80 (01-14-22 @ 08:22)  BP: 105/50 (01-14-22 @ 08:22)  RR: 19 (01-14-22 @ 08:22)  SpO2: 98% (01-14-22 @ 08:22)  Wt(kg): --      PHYSICAL EXAM:    Gen: NAD, calm  Cards: RRR, +S1/S2, no M/G/R  Resp: CTA B/L  GI: soft, NT/ND, NABS  Vascular: no LE edema B/L, LUE AVF + bruit/thrill      LABS:  01-14    136  |  93<L>  |  32<H>  ----------------------------<  43<LL>  4.3   |  23  |  7.48<H>    Ca    8.2<L>      14 Jan 2022 06:50  Phos  5.3     01-14  Mg     1.90     01-14    TPro  7.3  /  Alb  3.3  /  TBili  0.3  /  DBili      /  AST  7   /  ALT  5   /  AlkPhos  274<H>  01-14    Creatinine Trend: 7.48 <--, 6.23 <--, 7.13 <--                        9.2    12.81 )-----------( 416      ( 14 Jan 2022 06:50 )             32.0     Urine Studies:
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
## 2022-01-14 NOTE — PROGRESS NOTE ADULT - ASSESSMENT
55 yo F with morbid obesity, CHAMP, ESRD--fistula, with known abd wound, presenting with worsening of wound  No fever, leukocytosis  COVID+  CXR clear  RA  Worsening of known abd wound  Culture in 12/2021 with pseudomonas and enterococcus  Overall,  1) Wound infection  - Chronic wound, possible superinfected  - Cefepime 1g q 24, post HD, plan for 10 day course as long as reassuring clinical status  - Wound care eval to site  - Monitor for systemic signs infection  2) COVID  - RA  - Supportive care  - O2 supplementation as needed per primary team  - Check CXR  3) Leukocytosis  - Trend to normal  - Unclear why elevation, monitor closely  4) Diarrhea  - Appears resolved, spontaneously, does not seems clinically consistent with C diff  - If no further diarrhea would DC isolation for C diff (as long as no objection from infection control)  5) Positive BCX  - Likely contam, 1/4 CoNS  - F/U BCXs  - Hold on Vanco unless clinical signs worsening/sepsis    Se Leary MD  Pager [phone]  From 5pm-9am, and on weekends call [phone]

## 2022-01-14 NOTE — CONSULT NOTE ADULT - PROBLEM SELECTOR RECOMMENDATION 9
While inpatient CHO diet and FS AC and HS  Goal Glucose 100-180  Recommend give Lantus 20 Units tonight only if FS at bedtime is >180  Otherwise please monitor on moderate correctional scale before meals and low scale at bedtime  For discharge: recommend discontinuation of Trulicity as pt is ESRD on HD  Discharge on basal/bolus doses TBD  She would benefit from use of Free Style Rosendo 2 with alarms  she can follow up at endocrine office [address] [place] [address] [place] [phone]

## 2022-01-14 NOTE — CHART NOTE - NSCHARTNOTEFT_GEN_A_CORE
Pt noted w/ FS 44 in AM BMP. Repeat FS was done STAT that confirmed hypoglycemia FS 43. Pt was seen and examined at bedside- She was asx. Pt was given juice and Dextrose 50% injectable was given. FS improved. Pt states she takes Tresiba 80 units at night at home however pt is a poor historian. She states she does not check her FS at home, does not have an endocrinologist. She has house calls that manages her insulin but doses have not been adjusted in a while. On 1/12, pt did not receive any Lantus and FS have been borderline. Yesterday morning pt received 64 units of Lantus  and now pt w/ FS 44. Lantus Dc'ed since FS have been borderline low off insulin. House endo consulted will f/u recs. Discussed with Dr. Maurice.

## 2022-01-14 NOTE — CONSULT NOTE ADULT - PROBLEM SELECTOR RECOMMENDATION 3
c/w statin    Gina Dunham (pager [phone])  On evenings and weekends, please call [phone] or page endocrine fellow on call.   Please note that this patient may be followed by different provider tomorrow. If no answer, contact endocrine fellow on call.
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
## 2022-01-14 NOTE — PROGRESS NOTE ADULT - SUBJECTIVE AND OBJECTIVE BOX
CC: F/U for Abd wound    Saw/spoke to patient. No fevers, no chills. No new complaints. Feels improved. Diarrhea resolved.    Allergies  latex (Rash)  penicillin (Nausea)  strawberry (Rash)    ANTIMICROBIALS:  cefepime   IVPB 1000 every 24 hours    PE:    Vital Signs Last 24 Hrs  T(C): 36.8 (14 Jan 2022 05:06), Max: 36.8 (14 Jan 2022 05:06)  T(F): 98.2 (14 Jan 2022 05:06), Max: 98.2 (14 Jan 2022 05:06)  HR: 72 (14 Jan 2022 12:00) (72 - 88)  BP: 110/57 (14 Jan 2022 12:00) (105/50 - 111/59)  RR: 19 (14 Jan 2022 12:00) (19 - 19)  SpO2: 99% (14 Jan 2022 12:00) (98% - 99%)    Gen: AOx3, NAD, non-toxic  CV: S1+S2 normal, nontachycardic  Resp: Clear bilat, no resp distress, no crackles/wheezes  Abd: Soft, nontender, +BS, bandaged abd wound  Ext: No LE edema, no wounds    LABS:                        9.2    12.81 )-----------( 416      ( 14 Jan 2022 06:50 )             32.0     01-14    136  |  93<L>  |  32<H>  ----------------------------<  43<LL>  4.3   |  23  |  7.48<H>    Ca    8.2<L>      14 Jan 2022 06:50  Phos  5.3     01-14  Mg     1.90     01-14    TPro  7.3  /  Alb  3.3  /  TBili  0.3  /  DBili  x   /  AST  7   /  ALT  5   /  AlkPhos  274<H>  01-14    MICROBIOLOGY:    .Blood Blood-Peripheral  01-12-22   Growth in aerobic bottle: Gram Positive Cocci in Clusters  Growth in anaerobic bottle: Staphylococcus epidermidis  Coag Negative Staphylococcus  Single set isolate, possible contaminant. Contact  Microbiology if susceptibility testing clinically  indicated.  ***Blood Panel PCR results on this specimen are available  approximately 3 hours after the Gram stain result.***  Gram stain, PCR, and/or culture results may not always  correspond due to difference in methodologies.  ************************************************************  This PCR assay was performed by multiplex PCR. This  Assay tests for 66 bacterial and resistance gene targets.  Please refer to the [facility] test directory  at [email or web address] for details.  --  Blood Culture PCR    RADIOLOGY:    No new available

## 2022-01-15 NOTE — PROGRESS NOTE ADULT - SUBJECTIVE AND OBJECTIVE BOX
[facility] NEPHROLOGY- PROGRESS NOTE    56y Female with history of ESRD on HD presents with vomiting and diarrhea found to be COVID-19 positive. Nephrology consulted for ESRD status.    REVIEW OF SYSTEMS:  Gen: no changes in weight  Cards: no chest pain  Resp: no dyspnea  GI: no nausea or vomiting or diarrhea +abd wound pain  Vascular: no LE edema    caffeine (Nausea)  latex (Rash)  penicillin (Nausea)  strawberry (Rash)      Hospital Medications: Medications reviewed      VITALS:  T(F): 98.8 (01-15-22 @ 10:00), Max: 98.8 (01-15-22 @ 10:00)  HR: 80 (01-15-22 @ 10:00)  BP: 126/60 (01-15-22 @ 10:00)  RR: 19 (01-15-22 @ 10:00)  SpO2: 98% (01-15-22 @ 10:00)  Wt(kg): --    01-14 @ 07:01  -  01-15 @ 07:00  --------------------------------------------------------  IN: 600 mL / OUT: 2500 mL / NET: -1900 mL        PHYSICAL EXAM:    Gen: NAD, calm  Cards: RRR, +S1/S2, no M/G/R  Resp: CTA B/L  GI: soft, RLQ bandage/ tender  Vascular: no LE edema B/L, LUE AVF + bruit/thrill      LABS:  01-15    135  |  93<L>  |  22  ----------------------------<  107<H>  3.8   |  27  |  6.10<H>    Ca    8.9      15 Jose 2022 07:20  Phos  4.2     01-15  Mg     1.90     01-15    TPro  7.9  /  Alb  3.9  /  TBili  0.2  /  DBili      /  AST  6   /  ALT  <5  /  AlkPhos  290<H>  01-15    Creatinine Trend: 6.10 <--, 7.48 <--, 6.23 <--, 7.13 <--                        10.0   13.49 )-----------( 401      ( 15 Jose 2022 07:20 )             36.3     Urine Studies:

## 2022-01-15 NOTE — PROGRESS NOTE ADULT - SUBJECTIVE AND OBJECTIVE BOX
SUBJECTIVE / OVERNIGHT EVENTS:pt seen and examined  01-15-22     MEDICATIONS  (STANDING):  apixaban 2.5 milliGRAM(s) Oral two times a day  aspirin enteric coated 81 milliGRAM(s) Oral daily  atorvastatin 80 milliGRAM(s) Oral at bedtime  cefepime   IVPB 1000 milliGRAM(s) IV Intermittent every 24 hours  chlorhexidine 2% Cloths 1 Application(s) Topical daily  cinacalcet 60 milliGRAM(s) Oral at bedtime  collagenase Ointment 1 Application(s) Topical two times a day  Dakins Solution - 1/4 Strength 1 Application(s) Topical two times a day  dextrose 40% Gel 15 Gram(s) Oral once  dextrose 5%. 1000 milliLiter(s) (50 mL/Hr) IV Continuous <Continuous>  dextrose 5%. 1000 milliLiter(s) (100 mL/Hr) IV Continuous <Continuous>  dextrose 50% Injectable 25 Gram(s) IV Push once  dextrose 50% Injectable 12.5 Gram(s) IV Push once  dextrose 50% Injectable 25 Gram(s) IV Push once  diltiazem    milliGRAM(s) Oral daily  doxercalciferol Injectable 5 MICROGram(s) IV Push <User Schedule>  epoetin anabela-epbx (RETACRIT) Injectable 8000 Unit(s) IV Push <User Schedule>  glucagon  Injectable 1 milliGRAM(s) IntraMuscular once  insulin lispro (ADMELOG) corrective regimen sliding scale   SubCutaneous three times a day before meals  insulin lispro (ADMELOG) corrective regimen sliding scale   SubCutaneous at bedtime  melatonin 6 milliGRAM(s) Oral at bedtime  montelukast 10 milliGRAM(s) Oral at bedtime  sevelamer carbonate 2400 milliGRAM(s) Oral three times a day with meals    MEDICATIONS  (PRN):  HYDROmorphone  Injectable 1 milliGRAM(s) IV Push every 6 hours PRN Severe Pain (7 - 10)    Vital Signs Last 24 Hrs  T(C): 36.9 (01-15-22 @ 21:40), Max: 37.1 (01-15-22 @ 10:00)  T(F): 98.4 (01-15-22 @ 21:40), Max: 98.8 (01-15-22 @ 10:00)  HR: 73 (01-15-22 @ 21:40) (73 - 82)  BP: 112/50 (01-15-22 @ 21:40) (112/50 - 131/55)  BP(mean): --  RR: 18 (01-15-22 @ 21:40) (18 - 20)  SpO2: 96% (01-15-22 @ 21:40) (95% - 98%)          Constitutional: No fever, fatigue  Skin: No rash.  Eyes: No recent vision problems or eye pain.  ENT: No congestion, ear pain, or sore throat.  Cardiovascular: No chest pain or palpation.  Respiratory: No cough, shortness of breath, congestion, or wheezing.  Gastrointestinal: No abdominal pain, nausea, vomiting, or diarrhea.  Genitourinary: No dysuria.  Musculoskeletal: No joint swelling.  Neurologic: No headache.    PHYSICAL EXAM:  GENERAL: NAD  EYES: EOMI, PERRLA  NECK: Supple, No JVD  CHEST/LUNG: dec breath sounds at bases  HEART:  S1 , S2 +  ABDOMEN: soft , bs+, abd wound +  EXTREMITIES:  edema+  NEUROLOGY:alert awake    LABS:  01-15    135  |  93<L>  |  22  ----------------------------<  107<H>  3.8   |  27  |  6.10<H>    Ca    8.9      15 Jose 2022 07:20  Phos  4.2     01-15  Mg     1.90     01-15    TPro  7.9  /  Alb  3.9  /  TBili  0.2  /  DBili      /  AST  6   /  ALT  <5  /  AlkPhos  290<H>  01-15    Creatinine Trend: 6.10 <--, 7.48 <--, 6.23 <--, 7.13 <--                        10.0   13.49 )-----------( 401      ( 15 Jose 2022 07:20 )             36.3     Urine Studies:            LIVER FUNCTIONS - ( 15 Jose 2022 07:20 )  Alb: 3.9 g/dL / Pro: 7.9 g/dL / ALK PHOS: 290 U/L / ALT: <5 U/L / AST: 6 U/L / GGT: x
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
## 2022-01-15 NOTE — PROGRESS NOTE ADULT - ASSESSMENT
56y Female with history of ESRD on HD presents with vomiting and diarrhea found to be COVID-19 positive. Nephrology consulted for ESRD status.    1) ESRD: Last HD on 1/14 , tolerated well with 1.6L removed. Plan for next maintenance HD 1/17. Monitor electrolytes.    2) HTN with ESRD: BP acceptable. Continue with current medications. Monitor BP.    3) Anemia of renal disease: Hb acceptable. Continue with Epo 8K with HD. Monitor Hb.    4) Secondary HPT of renal origin: Phosphorus acceptable. Continue with sensipar 60 mg daily, hectorol 5 mcg with HD and renvela 3 tabs with meals. Monitor serum calcium and phosphorus.      [facility] NEPHROLOGY  Keaton Lopez M.D.  Juma Green D.O.  Myla Hoffmann M.D.  Julia Brewer, MSN, ANP-C    Telephone: [phone]  Facsimile: [phone]    [address] [place] [address]

## 2022-01-16 NOTE — PROGRESS NOTE ADULT - SUBJECTIVE AND OBJECTIVE BOX
CC: no events    TELEMETRY:     PHYSICAL EXAM:    T(C): 36.8 (01-16-22 @ 11:10), Max: 37.1 (01-16-22 @ 06:20)  HR: 75 (01-16-22 @ 11:10) (67 - 75)  BP: 117/61 (01-16-22 @ 11:10) (112/50 - 129/58)  RR: 17 (01-16-22 @ 11:10) (17 - 19)  SpO2: 96% (01-16-22 @ 11:10) (95% - 97%)  Wt(kg): --  I&O's Summary    15 Jose 2022 07:01  -  16 Jan 2022 07:00  --------------------------------------------------------  IN: 300 mL / OUT: 125 mL / NET: 175 mL        Appearance: Normal	  Cardiovascular: Normal S1 S2,RRR, No JVD, No murmurs  Respiratory: Lungs clear to auscultation	  Gastrointestinal:  Soft, Non-tender, + BS	  Extremities: Normal range of motion, No clubbing, cyanosis or edema  Vascular: Peripheral pulses palpable 2+ bilaterally     LABS:	 	                          9.2    12.62 )-----------( 429      ( 16 Jan 2022 06:57 )             33.3     01-16    135  |  92<L>  |  32<H>  ----------------------------<  122<H>  4.4   |  29  |  7.74<H>    Ca    8.3<L>      16 Jan 2022 06:57  Phos  5.0     01-16  Mg     1.90     01-16    TPro  7.4  /  Alb  3.4  /  TBili  0.2  /  DBili  x   /  AST  <5<L>  /  ALT  <5<L>  /  AlkPhos  263<H>  01-16          CARDIAC MARKERS:

## 2022-01-16 NOTE — PROGRESS NOTE ADULT - SUBJECTIVE AND OBJECTIVE BOX
SUBJECTIVE / OVERNIGHT EVENTS:pt seen and examined  01-16-22     MEDICATIONS  (STANDING):  apixaban 2.5 milliGRAM(s) Oral two times a day  aspirin enteric coated 81 milliGRAM(s) Oral daily  atorvastatin 80 milliGRAM(s) Oral at bedtime  cefepime   IVPB 1000 milliGRAM(s) IV Intermittent every 24 hours  chlorhexidine 2% Cloths 1 Application(s) Topical daily  cinacalcet 60 milliGRAM(s) Oral at bedtime  collagenase Ointment 1 Application(s) Topical two times a day  Dakins Solution - 1/4 Strength 1 Application(s) Topical two times a day  dextrose 40% Gel 15 Gram(s) Oral once  dextrose 5%. 1000 milliLiter(s) (100 mL/Hr) IV Continuous <Continuous>  dextrose 5%. 1000 milliLiter(s) (50 mL/Hr) IV Continuous <Continuous>  dextrose 50% Injectable 25 Gram(s) IV Push once  dextrose 50% Injectable 12.5 Gram(s) IV Push once  dextrose 50% Injectable 25 Gram(s) IV Push once  diltiazem    milliGRAM(s) Oral daily  doxercalciferol Injectable 5 MICROGram(s) IV Push <User Schedule>  epoetin anabela-epbx (RETACRIT) Injectable 8000 Unit(s) IV Push <User Schedule>  glucagon  Injectable 1 milliGRAM(s) IntraMuscular once  insulin lispro (ADMELOG) corrective regimen sliding scale   SubCutaneous three times a day before meals  insulin lispro (ADMELOG) corrective regimen sliding scale   SubCutaneous at bedtime  melatonin 6 milliGRAM(s) Oral at bedtime  montelukast 10 milliGRAM(s) Oral at bedtime  sevelamer carbonate 2400 milliGRAM(s) Oral three times a day with meals    MEDICATIONS  (PRN):  HYDROmorphone  Injectable 1 milliGRAM(s) IV Push every 6 hours PRN Severe Pain (7 - 10)    Vital Signs Last 24 Hrs  T(C): 37.1 (16 Jan 2022 06:20), Max: 37.1 (16 Jan 2022 06:20)  T(F): 98.8 (16 Jan 2022 06:20), Max: 98.8 (16 Jan 2022 06:20)  HR: 67 (16 Jan 2022 06:20) (67 - 73)  BP: 114/49 (16 Jan 2022 06:20) (112/50 - 129/58)  BP(mean): --  RR: 17 (16 Jan 2022 06:20) (17 - 19)  SpO2: 97% (16 Jan 2022 06:20) (95% - 97%)      Constitutional: No fever, fatigue  Skin: No rash.  Eyes: No recent vision problems or eye pain.  ENT: No congestion, ear pain, or sore throat.  Cardiovascular: No chest pain or palpation.  Respiratory: No cough, shortness of breath, congestion, or wheezing.  Gastrointestinal: No abdominal pain, nausea, vomiting, or diarrhea.  Genitourinary: No dysuria.  Musculoskeletal: No joint swelling.  Neurologic: No headache.    PHYSICAL EXAM:  GENERAL: NAD  EYES: EOMI, PERRLA  NECK: Supple, No JVD  CHEST/LUNG: dec breath sounds at bases  HEART:  S1 , S2 +  ABDOMEN: soft , bs+, abd wound +  EXTREMITIES:  edema+  NEUROLOGY:alert awake    LABS:  01-16    135  |  92<L>  |  32<H>  ----------------------------<  122<H>  4.4   |  29  |  7.74<H>    Ca    8.3<L>      16 Jan 2022 06:57  Phos  5.0     01-16  Mg     1.90     01-16    TPro  7.4  /  Alb  3.4  /  TBili  0.2  /  DBili      /  AST  <5<L>  /  ALT  <5<L>  /  AlkPhos  263<H>  01-16    Creatinine Trend: 7.74 <--, 6.10 <--, 7.48 <--, 6.23 <--, 7.13 <--                        9.2    12.62 )-----------( 429      ( 16 Jan 2022 06:57 )             33.3     Urine Studies:            LIVER FUNCTIONS - ( 16 Jan 2022 06:57 )  Alb: 3.4 g/dL / Pro: 7.4 g/dL / ALK PHOS: 263 U/L / ALT: <5 U/L / AST: <5 U/L / GGT: x             Alb: 3.9 g/dL / Pro: 7.9 g/dL / ALK PHOS: 290 U/L / ALT: <5 U/L / AST: 6 U/L / GGT: x

## 2022-01-16 NOTE — PROGRESS NOTE ADULT - SUBJECTIVE AND OBJECTIVE BOX
[facility] NEPHROLOGY- PROGRESS NOTE    56y Female with history of ESRD on HD presents with vomiting and diarrhea found to be COVID-19 positive. Nephrology consulted for ESRD status.    REVIEW OF SYSTEMS:  Gen: no changes in weight  Cards: no chest pain  Resp: no dyspnea  GI: no nausea or vomiting or diarrhea +abd wound pain  Vascular: no LE edema    caffeine (Nausea)  latex (Rash)  penicillin (Nausea)  strawberry (Rash)      Hospital Medications: Medications reviewed    VITALS:  T(F): 98.3 (01-16-22 @ 11:10), Max: 98.8 (01-16-22 @ 06:20)  HR: 75 (01-16-22 @ 11:10)  BP: 117/61 (01-16-22 @ 11:10)  RR: 17 (01-16-22 @ 11:10)  SpO2: 96% (01-16-22 @ 11:10)  Wt(kg): --    01-15 @ 07:01  -  01-16 @ 07:00  --------------------------------------------------------  IN: 300 mL / OUT: 125 mL / NET: 175 mL      PHYSICAL EXAM:    Gen: NAD, calm  Cards: RRR, +S1/S2, no M/G/R  Resp: CTA B/L  GI: soft, RLQ bandage/ tender  Vascular: no LE edema B/L, LUE AVF + bruit/thrill      LABS:  01-16    135  |  92<L>  |  32<H>  ----------------------------<  122<H>  4.4   |  29  |  7.74<H>    Ca    8.3<L>      16 Jan 2022 06:57  Phos  5.0     01-16  Mg     1.90     01-16    TPro  7.4  /  Alb  3.4  /  TBili  0.2  /  DBili      /  AST  <5<L>  /  ALT  <5<L>  /  AlkPhos  263<H>  01-16    Creatinine Trend: 7.74 <--, 6.10 <--, 7.48 <--, 6.23 <--, 7.13 <--                        9.2    12.62 )-----------( 429      ( 16 Jan 2022 06:57 )             33.3     Urine Studies:

## 2022-01-16 NOTE — PROGRESS NOTE ADULT - ASSESSMENT
56y Female with history of ESRD on HD presents with vomiting and diarrhea found to be COVID-19 positive. Nephrology consulted for ESRD status.    1) ESRD: Last HD on 1/14 , tolerated well with 1.6L removed. Plan for next maintenance HD 1/17. Monitor electrolytes.    2) HTN with ESRD: BP acceptable. Continue with current medications. Monitor BP.    3) Anemia of renal disease: Hb low. Continue with Epo 8K with HD. Monitor Hb.    4) Secondary HPT of renal origin: Phosphorus acceptable. Continue with sensipar 60 mg daily, hectorol 5 mcg with HD and renvela 3 tabs with meals. Monitor serum calcium and phosphorus.      [facility] NEPHROLOGY  Keaton Lopez M.D.  Juma Green D.O.  Myla Hoffmann M.D.  Julia Brewer, MSN, ANP-C    Telephone: [phone]  Facsimile: [phone]    [address] [place] [address]
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
## 2022-01-17 NOTE — PROGRESS NOTE ADULT - ASSESSMENT
56 yr old F with morbid obesity, CHAMP not on home O2, ESRD (HD MWF), HTN, DM2 A1C 7.0, COPD, Afib no longer on AC, chronic R pannus wound here with worsening wound, diarrhea and found to be COVID+. Has very poor po intake at this time.        Problem/Recommendation - 1:  ·  Problem: DM (diabetes mellitus).   ·  Recommendation: While inpatient CHO diet and FS AC and HS  Goal Glucose 100-180  Has been off of basal bolus insulin regimen  Continue moderate correctional scale before meals and low scale at bedtime    For discharge: recommend discontinuation of Trulicity as pt is ESRD on HD  Discharge: likely basal bolus vs basal only vs PO - patient was on tresiba at home. Dc plan depends on clinical progress and BG's while inpatient.   She would benefit from use of Free Style Rosendo 2 with alarms  she can follow up at endocrine office [address] [place] [address] [phone].     Problem/Recommendation - 2:  ·  Problem: Hypertension, unspecified type.   ·  Recommendation: Mgmt per primary team.     Problem/Recommendation - 3:  ·  Problem: Hyperlipidemia.   ·  Recommendation: c/w statin    Tamela Ruiz  Nurse Practitioner  Division of Endocrinology & Diabetes  Pager # [phone]      If after 6PM or before 9AM, or on weekends/holidays, please call endocrine answering service for assistance ([phone]).  For nonurgent matters email [email or web address] for assistance.

## 2022-01-17 NOTE — CHART NOTE - NSCHARTNOTEFT_GEN_A_CORE
s/p RRT for Acute mental status changes  56F morbid obesity, DM2, ESRD on HD, COVID, afib not previously on a/c (but on Eliquis for COVID here), here for worsening pannus wound. RRT called as patient was straining on bathroom and became hyporesponsive. Able to state what happened, states "I was pushing on the toilet and became weak and dizzy" RN was at side whole time, no LOC, no trauma, no hx fo seizure. He stabilized her posture, and she did not fall. VSS, POCT glucose 160s. Her mental status rapidly improved to form hypophonia and disorientation to full orientation within 10 minutes. Denies headache or pains. CN2-12 intact, neuro exam intact, sensation intact. Meds include serotonergic agents as well as Eliquis for COVID, and diltiazem for afib, in addition to Dilaudid prn. EKG non-ischemic, QTc < 500. RRT ended due to rapid improvement. Suspect vasovagal episode from straining on toilet vs less likely seizure/neuro, vs poss cardiogenic/arrhythmia given afib (was not on tele at time of syncope)  -  monitor on remote tele   -  CT head  -  obtain full set of labs - cbc, cmp, vbg w/ lytes, troponin (trend if elevated), prolactin  - abx per ID (ensure appropriate dosing of cefepime)  -EKG  -EEG  -Neuro cs  -Cards f/u  -TTE  -orthostatics    Continue to monitor and follow up above work up  -D/W attending

## 2022-01-17 NOTE — CONSULT NOTE ADULT - ASSESSMENT
56 y.o. 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 Neurology was consulted for evaluation of presyncopal episode after RRT on 1/17 at 11:19 hr. No witnessed seizure but was confused few minutes after the vasovagal episode. Current exam limited due to patient in extreme pain in rectum pending enema. Neuro exam at this time grossly negative for focal deficits. Team ordered CTH and rEEG.     Impression: Presyncope most likely related to vasovagal in the setting of constipation. Localization would be cardiogenic vs diffuse cerebral encephalopathy. Other ddx include infectious vs toxic metabolic vs lower suspicion for seizure    Recommendations:  [] Would do cardiac work up first; EKG, TTE, tele monitoring   [] f/u CTH no cont  [] f/u rEEG  [] No need for AED at this time  [] C/w anticoagulation per COVID protocol and for afib. F/u cardio recs  [] C/w HD per nephrology  [] C/w abx per ID  [] Rest of care per primary team    Case to be discussed with general neurology attending 56 y.o. 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 Neurology was consulted for evaluation of presyncopal episode after RRT on 1/17 at 11:19 hr. No witnessed seizure but was confused few minutes after the vasovagal episode. Current exam limited due to patient in extreme pain in rectum pending enema. Neuro exam at this time grossly negative for focal deficits. Team ordered CTH and rEEG.     Impression: Presyncope most likely related to vasovagal in the setting of constipation. Localization would be cardiogenic vs diffuse cerebral encephalopathy. Other ddx include infectious vs toxic metabolic vs lower suspicion for seizure    Recommendations:  [] Would do cardiac work up first; EKG, TTE, tele monitoring   [] f/u CTH no cont  [] Would defer rEEG at this time likely due to low suspicion for seizure  [] No need for AED at this time  [] C/w anticoagulation per COVID protocol and for afib. F/u cardio recs  [] C/w HD per nephrology  [] C/w abx per ID  [] Rest of care per primary team    Case to be discussed with general neurology attending 56 y.o. 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 Neurology was consulted for evaluation of presyncopal episode after RRT on 1/17 at 11:19 hr. No witnessed seizure but was confused few minutes after the vasovagal episode. Current exam limited due to patient in extreme pain in rectum pending enema. Neuro exam at this time grossly negative for focal deficits. Team ordered CTH and rEEG.     Impression: Presyncope most likely related to vasovagal in the setting of constipation. Localization would be cardiogenic vs diffuse cerebral encephalopathy. Other ddx include infectious vs toxic metabolic vs lower suspicion for seizure    Recommendations:  [] Would do cardiac work up first; EKG, TTE, tele monitoring   [] f/u CTH no cont  [] Would defer rEEG at this time likely due to low suspicion for seizure. Spoken to epilepsy fellow Dr. Sargent and confirmed the plan. Informed ACP Anaar regarding discontinuation of EEG order.   [] No need for AED at this time  [] C/w anticoagulation per COVID protocol and for afib. F/u cardio recs  [] C/w HD per nephrology  [] C/w abx per ID  [] Rest of care per primary team    Case to be discussed with general neurology attending

## 2022-01-17 NOTE — RAPID RESPONSE TEAM SUMMARY - NSOTHERINTERVENTIONSRRT_GEN_ALL_CORE
- rec monitor on remote tele   - rec CT head  - primary team to obtain full set of labs - cbc, cmp, vbg w/ lytes, troponin (trend if elevated), prolactin  - abx per ID (ensure appropriate dosing of cefepime)  - rest of workup per primary team - rec monitor on remote tele   - rec CT head  - primary team to obtain full set of labs - cbc, cmp, vbg w/ lytes, troponin (trend if elevated), prolactin  - abx per ID (ensure appropriate dosing of cefepime)  - rest of workup per primary team  - consider titration of sedating meds

## 2022-01-17 NOTE — PROGRESS NOTE ADULT - ASSESSMENT
56y Female with history of ESRD on HD presents with vomiting and diarrhea found to be COVID-19 positive. Nephrology consulted for ESRD status.    1) ESRD: Last HD on 1/14 , tolerated well with 1.6L removed. Plan for next maintenance HD today. Monitor electrolytes.    2) HTN with ESRD: BP acceptable. Continue with current medications. Monitor BP.    3) Anemia of renal disease: Hb acceptable. Continue with Epo 8K with HD. Monitor Hb.    4) Secondary HPT of renal origin: Phosphorus acceptable. Continue with sensipar 60 mg daily, hectorol 5 mcg with HD and renvela 3 tabs with meals. Monitor serum calcium and phosphorus.      [facility] NEPHROLOGY  Keaton Lopez M.D.  Juma Green D.O.  Myla Hoffmann M.D.  Julia Brewer, JASIEL, ANP-C    Telephone: [phone]  Facsimile: [phone]    [id number] [id number]

## 2022-01-17 NOTE — CONSULT NOTE ADULT - SUBJECTIVE AND OBJECTIVE BOX
HPI:  56 y.o. 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 Neurology was consulted for evaluation of presyncopal episode after RRT on  at 11:19 hr. Per collateral, pt went to the bathroom to defecate. She stayed in the bathroom for 10 minutes and suddenly was altered. Was not responding to questions, exhibited confusion. Per RRT note, pt commented that she was training and became dizzy. At this time, pt unable to provide history as she is tearful and in pain at the rectum. Pt has constipation likely from pain medication usage and is awaiting enema at this time. Did not hit her head as she was sitting in the toilet. No witnessed urinary or fecal incontinence. Provided limited ROS: denies any pain/numbness/tingling elsewhere in the body other than rectum. Wheelchair bound at home.    REVIEW OF SYSTEMS: limited due to pt's current pain status    PAST MEDICAL & SURGICAL HISTORY:  COPD (chronic obstructive pulmonary disease)    DM (diabetes mellitus)    Atrial fibrillation  with loop recorder , battery most likely depleted, as per cardiac clearance, Dr. Reece Anesthesia aware, pt on Eliquis    HTN (hypertension)    Morbid obesity  BMI - 58.3    Chronic GERD    CHAMP (obstructive sleep apnea)  non compliance with CPAP, Anesthesia Dr. Reece aware, pt told to bring CPAP for sx, pr verbalized understanding    Potential difficult airway on pre-intubation assessment  airway class III, large neck, morbid obesity, hx of CHAMP, no compliance with CPAP- Dr. Reece, Anesthesia aware    End-stage renal disease    Anemia    Medication management    H/O tubal ligation      Status post placement of implantable loop recorder  left chest-     History of vascular access device  s/p insertion right chest permacath 2019, removal 2019, insertion left chest permacath 2019    S/P arteriovenous (AV) fistula creation  left  arm 2019      FAMILY HISTORY:  Family history of diabetes mellitus  mother-     Family hx of hypertension  mother-       SOCIAL HISTORY: as noted in HPI    MEDICATIONS (HOME):  Home Medications:  aspirin 81 mg oral delayed release tablet: 1 tab(s) orally once a day (2022 17:49)  buPROPion 150 mg/24 hours (XL) oral tablet, extended release: 1 tab(s) orally once a day (in the morning) (2022 17:49)  cetirizine 10 mg oral tablet: 1 tab(s) orally once a day (2022 17:49)  dilTIAZem 120 mg/24 hours oral tablet, extended release: 1 tab(s) orally once a day (2022 17:49)  doxepin 25 mg oral capsule: 1 cap(s) orally once a day (at bedtime) (2022 17:49)  Eliquis 2.5 mg oral tablet: 1 tab(s) orally 2 times a day    Pharmacy states patient no longer wants to fill this medication (2022 17:49)  furosemide 80 mg oral tablet: 1 tab(s) orally once a day    Pharmacy states patient no longer wants to fill this medication (2022 17:49)  gabapentin 300 mg oral capsule: 1 cap(s) orally 2 times a day (2022 17:49)  hydrOXYzine hydrochloride 25 mg oral tablet: 1 tab(s) orally 2 times a day, As Needed (2022 17:49)  lanthanum 1000 mg oral tablet, chewable: 2 tab(s) orally with meals and 1 tab(s) orally with snacks    Pharmacy states patient no longer wants to fill this medication (2022 17:49)  mirtazapine 7.5 mg oral tablet: 1 tab(s) orally once a day (at bedtime) (2022 17:49)  montelukast 10 mg oral tablet: 1 tab(s) orally once a day (in the evening) (2022 17:49)  mupirocin 2% topical ointment: Apply sparingly to affected area 2 times a day as directed (2022 17:49)  nystatin 100,000 units/mL oral suspension: 5 milliliter(s) orally 4 times a day, as directed (2022 17:49)  omeprazole 20 mg oral delayed release capsule: 2 cap(s) orally once a day before breakfast (2022 17:49)  Pennsaid 2% topical solution: Apply topically to affected area 2 times a day (2022 17:49)  rosuvastatin 40 mg oral tablet: 1 tab(s) orally once a day (2022 17:49)  Santyl 250 units/g topical ointment: Apply topically to affected area once a day as directed (2022 17:49)  sertraline 50 mg oral tablet: 1 tab(s) orally once a day (2022 17:49)  Spiriva HandiHaler 18 mcg inhalation capsule: 1 cap(s) inhaled once a day (2022 17:49)  traZODone 100 mg oral tablet: 1 tab(s) orally once a day (at bedtime) (2022 17:49)  Tresiba FlexTouch 100 units/mL subcutaneous solution: 80 unit(s) subcutaneous once a day (at bedtime) (2022 17:49)  Trulicity Pen 1.5 mg/0.5 mL subcutaneous solution: 1 dose(s) subcutaneous once a week (2022 17:49)    MEDICATIONS  (STANDING):  apixaban 2.5 milliGRAM(s) Oral two times a day  aspirin enteric coated 81 milliGRAM(s) Oral daily  atorvastatin 80 milliGRAM(s) Oral at bedtime  buPROPion XL (24-Hour) . 150 milliGRAM(s) Oral daily  cefepime   IVPB 1000 milliGRAM(s) IV Intermittent every 24 hours  chlorhexidine 2% Cloths 1 Application(s) Topical daily  cinacalcet 60 milliGRAM(s) Oral at bedtime  collagenase Ointment 1 Application(s) Topical two times a day  Dakins Solution - 1/4 Strength 1 Application(s) Topical two times a day  dextrose 40% Gel 15 Gram(s) Oral once  dextrose 5%. 1000 milliLiter(s) (100 mL/Hr) IV Continuous <Continuous>  dextrose 5%. 1000 milliLiter(s) (50 mL/Hr) IV Continuous <Continuous>  dextrose 50% Injectable 25 Gram(s) IV Push once  dextrose 50% Injectable 12.5 Gram(s) IV Push once  dextrose 50% Injectable 25 Gram(s) IV Push once  diltiazem    milliGRAM(s) Oral daily  doxercalciferol Injectable 5 MICROGram(s) IV Push <User Schedule>  epoetin anabela-epbx (RETACRIT) Injectable 8000 Unit(s) IV Push <User Schedule>  gabapentin 300 milliGRAM(s) Oral daily  glucagon  Injectable 1 milliGRAM(s) IntraMuscular once  insulin lispro (ADMELOG) corrective regimen sliding scale   SubCutaneous three times a day before meals  insulin lispro (ADMELOG) corrective regimen sliding scale   SubCutaneous at bedtime  loratadine 10 milliGRAM(s) Oral daily  melatonin 6 milliGRAM(s) Oral at bedtime  mineral oil enema 133 milliLiter(s) Rectal once  mirtazapine 7.5 milliGRAM(s) Oral at bedtime  montelukast 10 milliGRAM(s) Oral at bedtime  polyethylene glycol 3350 17 Gram(s) Oral two times a day  senna 2 Tablet(s) Oral at bedtime  sertraline 50 milliGRAM(s) Oral daily  sevelamer carbonate 2400 milliGRAM(s) Oral three times a day with meals  traZODone 100 milliGRAM(s) Oral at bedtime    MEDICATIONS  (PRN):  HYDROmorphone  Injectable 1 milliGRAM(s) IV Push every 6 hours PRN Severe Pain (7 - 10)    ALLERGIES/INTOLERANCES:  Allergies  latex (Rash)  penicillin (Nausea)  strawberry (Rash)    Intolerances  caffeine (Nausea)    VITALS & EXAMINATION:  Vital Signs Last 24 Hrs  T(C): 36.6 (2022 05:01), Max: 37 (2022 17:10)  T(F): 97.9 (2022 05:01), Max: 98.6 (2022 17:10)  HR: 70 (2022 05:01) (70 - 79)  BP: 126/55 (2022 05:01) (116/50 - 126/55)  BP(mean): --  RR: 18 (2022 05:01) (17 - 18)  SpO2: 97% (2022 05:01) (97% - 97%)    General:  Constitutional: Obese Female, appears stated age, in much distress and tearful due to rectal pain  Head: Normocephalic & atraumatic.    Neurological (>12):  MS: Eyes open. Responds to verbal stimuli. Intermittently follows commands due to pain. Alert and oriented to self, place, and unable to answer other questions.     Language: Speech is clear, fluent. Crying out loud "It's so painful" without any dysarthria    CNs: PERRL (R = 3mm, L = 3mm). VFF. EOMI. V1-3 intact to LT, well developed masseter muscles b/l. No facial asymmetry b/l, full eye closure strength b/l. Hearing grossly normal (rubbing fingers) b/l. Symmetric palate elevation in midline. Head turning & shoulder shrug intact b/l.     Motor: Normal muscle bulk & tone. No noticeable tremor. Able to lift b/l UE up above the bed and bring it down to bed spontaneously. Did not wish to move b/l LE due to pain.      Sensation: Intact to LT/PP/Temp/Vibration/Position b/l throughout.     Cortical: Extinction on DSS (neglect): none    Reflexes:              Biceps(C5)       BR(C6)     Patellar(L4)    Achilles(S1)    Plantar Resp  R	2	          2	             Mute		    2		Down   L	2	          2	              Mute		    2		Down     Gait: Deferred due to pain     LABORATORY:  CBC                       10.2   13.88 )-----------( 449      ( 2022 07:27 )             36.8     Chem     133<L>  |  91<L>  |  41<H>  ----------------------------<  115<H>  4.6   |  25  |  9.22<H>    Ca    8.6      2022 07:27  Phos  5.0       Mg     2.10         TPro  8.3  /  Alb  3.8  /  TBili  0.3  /  DBili  x   /  AST  <5<L>  /  ALT  <5<L>  /  AlkPhos  294<H>      LFTs LIVER FUNCTIONS - ( 2022 07:27 )  Alb: 3.8 g/dL / Pro: 8.3 g/dL / ALK PHOS: 294 U/L / ALT: <5 U/L / AST: <5 U/L / GGT: x           Coagulopathy   Lipid Panel  Chol -- LDL -- HDL -- Trig 147  A1c   Cardiac enzymes     U/A   CSF  Immunological  Other    STUDIES & IMAGING:  Studies (EKG, EEG, EMG, etc):     Radiology (XR, CT, MR, U/S, TTE/SHELLY): HPI:  56 y.o. 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 Neurology was consulted for evaluation of presyncopal episode after RRT on  at 11:19 hr. Per collateral, pt went to the bathroom to defecate. She stayed in the bathroom for 10 minutes and suddenly was altered. Was not responding to questions, exhibited confusion. Per RRT note, pt commented that she was straining and became dizzy. At this time, pt unable to provide history as she is tearful and in pain at the rectum. Pt has constipation likely from pain medication usage and is awaiting enema at this time. Did not hit her head as she was sitting in the toilet. No witnessed urinary or fecal incontinence. Provided limited ROS: denies any pain/numbness/tingling elsewhere in the body other than rectum. Wheelchair bound at home.    REVIEW OF SYSTEMS: limited due to pt's current pain status    PAST MEDICAL & SURGICAL HISTORY:  COPD (chronic obstructive pulmonary disease)    DM (diabetes mellitus)    Atrial fibrillation  with loop recorder , battery most likely depleted, as per cardiac clearance, Dr. Reece Anesthesia aware, pt on Eliquis    HTN (hypertension)    Morbid obesity  BMI - 58.3    Chronic GERD    CHAMP (obstructive sleep apnea)  non compliance with CPAP, Anesthesia Dr. Reece aware, pt told to bring CPAP for sx, pr verbalized understanding    Potential difficult airway on pre-intubation assessment  airway class III, large neck, morbid obesity, hx of CHAMP, no compliance with CPAP- Dr. Reece, Anesthesia aware    End-stage renal disease    Anemia    Medication management    H/O tubal ligation      Status post placement of implantable loop recorder  left chest-     History of vascular access device  s/p insertion right chest permacath 2019, removal 2019, insertion left chest permacath 2019    S/P arteriovenous (AV) fistula creation  left  arm 2019      FAMILY HISTORY:  Family history of diabetes mellitus  mother-     Family hx of hypertension  mother-       SOCIAL HISTORY: as noted in HPI    MEDICATIONS (HOME):  Home Medications:  aspirin 81 mg oral delayed release tablet: 1 tab(s) orally once a day (2022 17:49)  buPROPion 150 mg/24 hours (XL) oral tablet, extended release: 1 tab(s) orally once a day (in the morning) (2022 17:49)  cetirizine 10 mg oral tablet: 1 tab(s) orally once a day (2022 17:49)  dilTIAZem 120 mg/24 hours oral tablet, extended release: 1 tab(s) orally once a day (2022 17:49)  doxepin 25 mg oral capsule: 1 cap(s) orally once a day (at bedtime) (2022 17:49)  Eliquis 2.5 mg oral tablet: 1 tab(s) orally 2 times a day    Pharmacy states patient no longer wants to fill this medication (2022 17:49)  furosemide 80 mg oral tablet: 1 tab(s) orally once a day    Pharmacy states patient no longer wants to fill this medication (2022 17:49)  gabapentin 300 mg oral capsule: 1 cap(s) orally 2 times a day (2022 17:49)  hydrOXYzine hydrochloride 25 mg oral tablet: 1 tab(s) orally 2 times a day, As Needed (2022 17:49)  lanthanum 1000 mg oral tablet, chewable: 2 tab(s) orally with meals and 1 tab(s) orally with snacks    Pharmacy states patient no longer wants to fill this medication (2022 17:49)  mirtazapine 7.5 mg oral tablet: 1 tab(s) orally once a day (at bedtime) (2022 17:49)  montelukast 10 mg oral tablet: 1 tab(s) orally once a day (in the evening) (2022 17:49)  mupirocin 2% topical ointment: Apply sparingly to affected area 2 times a day as directed (2022 17:49)  nystatin 100,000 units/mL oral suspension: 5 milliliter(s) orally 4 times a day, as directed (2022 17:49)  omeprazole 20 mg oral delayed release capsule: 2 cap(s) orally once a day before breakfast (2022 17:49)  Pennsaid 2% topical solution: Apply topically to affected area 2 times a day (2022 17:49)  rosuvastatin 40 mg oral tablet: 1 tab(s) orally once a day (2022 17:49)  Santyl 250 units/g topical ointment: Apply topically to affected area once a day as directed (2022 17:49)  sertraline 50 mg oral tablet: 1 tab(s) orally once a day (2022 17:49)  Spiriva HandiHaler 18 mcg inhalation capsule: 1 cap(s) inhaled once a day (2022 17:49)  traZODone 100 mg oral tablet: 1 tab(s) orally once a day (at bedtime) (2022 17:49)  Tresiba FlexTouch 100 units/mL subcutaneous solution: 80 unit(s) subcutaneous once a day (at bedtime) (2022 17:49)  Trulicity Pen 1.5 mg/0.5 mL subcutaneous solution: 1 dose(s) subcutaneous once a week (2022 17:49)    MEDICATIONS  (STANDING):  apixaban 2.5 milliGRAM(s) Oral two times a day  aspirin enteric coated 81 milliGRAM(s) Oral daily  atorvastatin 80 milliGRAM(s) Oral at bedtime  buPROPion XL (24-Hour) . 150 milliGRAM(s) Oral daily  cefepime   IVPB 1000 milliGRAM(s) IV Intermittent every 24 hours  chlorhexidine 2% Cloths 1 Application(s) Topical daily  cinacalcet 60 milliGRAM(s) Oral at bedtime  collagenase Ointment 1 Application(s) Topical two times a day  Dakins Solution - 1/4 Strength 1 Application(s) Topical two times a day  dextrose 40% Gel 15 Gram(s) Oral once  dextrose 5%. 1000 milliLiter(s) (100 mL/Hr) IV Continuous <Continuous>  dextrose 5%. 1000 milliLiter(s) (50 mL/Hr) IV Continuous <Continuous>  dextrose 50% Injectable 25 Gram(s) IV Push once  dextrose 50% Injectable 12.5 Gram(s) IV Push once  dextrose 50% Injectable 25 Gram(s) IV Push once  diltiazem    milliGRAM(s) Oral daily  doxercalciferol Injectable 5 MICROGram(s) IV Push <User Schedule>  epoetin anabela-epbx (RETACRIT) Injectable 8000 Unit(s) IV Push <User Schedule>  gabapentin 300 milliGRAM(s) Oral daily  glucagon  Injectable 1 milliGRAM(s) IntraMuscular once  insulin lispro (ADMELOG) corrective regimen sliding scale   SubCutaneous three times a day before meals  insulin lispro (ADMELOG) corrective regimen sliding scale   SubCutaneous at bedtime  loratadine 10 milliGRAM(s) Oral daily  melatonin 6 milliGRAM(s) Oral at bedtime  mineral oil enema 133 milliLiter(s) Rectal once  mirtazapine 7.5 milliGRAM(s) Oral at bedtime  montelukast 10 milliGRAM(s) Oral at bedtime  polyethylene glycol 3350 17 Gram(s) Oral two times a day  senna 2 Tablet(s) Oral at bedtime  sertraline 50 milliGRAM(s) Oral daily  sevelamer carbonate 2400 milliGRAM(s) Oral three times a day with meals  traZODone 100 milliGRAM(s) Oral at bedtime    MEDICATIONS  (PRN):  HYDROmorphone  Injectable 1 milliGRAM(s) IV Push every 6 hours PRN Severe Pain (7 - 10)    ALLERGIES/INTOLERANCES:  Allergies  latex (Rash)  penicillin (Nausea)  strawberry (Rash)    Intolerances  caffeine (Nausea)    VITALS & EXAMINATION:  Vital Signs Last 24 Hrs  T(C): 36.6 (2022 05:01), Max: 37 (2022 17:10)  T(F): 97.9 (2022 05:01), Max: 98.6 (2022 17:10)  HR: 70 (2022 05:01) (70 - 79)  BP: 126/55 (2022 05:01) (116/50 - 126/55)  BP(mean): --  RR: 18 (2022 05:01) (17 - 18)  SpO2: 97% (2022 05:01) (97% - 97%)    General:  Constitutional: Obese Female, appears stated age, in much distress and tearful due to rectal pain  Head: Normocephalic & atraumatic.    Neurological (>12):  MS: Eyes open. Responds to verbal stimuli. Intermittently follows commands due to pain. Alert and oriented to self, place, and unable to answer other questions.     Language: Speech is clear, fluent. Crying out loud "It's so painful" without any dysarthria    CNs: PERRL (R = 3mm, L = 3mm). VFF. EOMI. V1-3 intact to LT, well developed masseter muscles b/l. No facial asymmetry b/l, full eye closure strength b/l. Hearing grossly normal (rubbing fingers) b/l. Symmetric palate elevation in midline. Head turning & shoulder shrug intact b/l.     Motor: Normal muscle bulk & tone. No noticeable tremor. Able to lift b/l UE up above the bed and bring it down to bed spontaneously. Did not wish to move b/l LE due to pain.      Sensation: Intact to LT/PP/Temp/Vibration/Position b/l throughout.     Cortical: Extinction on DSS (neglect): none    Reflexes:              Biceps(C5)       BR(C6)     Patellar(L4)    Achilles(S1)    Plantar Resp  R	2	          2	             Mute		    2		Down   L	2	          2	              Mute		    2		Down     Gait: Deferred due to pain     LABORATORY:  CBC                       10.2   13.88 )-----------( 449      ( 2022 07:27 )             36.8     Chem     133<L>  |  91<L>  |  41<H>  ----------------------------<  115<H>  4.6   |  25  |  9.22<H>    Ca    8.6      2022 07:27  Phos  5.0       Mg     2.10         TPro  8.3  /  Alb  3.8  /  TBili  0.3  /  DBili  x   /  AST  <5<L>  /  ALT  <5<L>  /  AlkPhos  294<H>      LFTs LIVER FUNCTIONS - ( 2022 07:27 )  Alb: 3.8 g/dL / Pro: 8.3 g/dL / ALK PHOS: 294 U/L / ALT: <5 U/L / AST: <5 U/L / GGT: x           Coagulopathy   Lipid Panel  Chol -- LDL -- HDL -- Trig 147  A1c   Cardiac enzymes     U/A   CSF  Immunological  Other    STUDIES & IMAGING:  Studies (EKG, EEG, EMG, etc):     Radiology (XR, CT, MR, U/S, TTE/SHELLY):

## 2022-01-17 NOTE — RAPID RESPONSE TEAM SUMMARY - NSSITUATIONBACKGROUNDRRT_GEN_ALL_CORE
56F morbid obesity, DM2, ESRD on HD, COVID, afib not previously on a/c (but on Eliquis for COVID here), here for worsening pannus wound. RRT called as patient was straining on bathroom and became hyporesponsive. Able to state what happened, states "I was pushing on the toilet and became weak and dizzy" RN was at side whole time, no LOC, no trauma, no hx fo seizure. He stabilized her posture, and she did not fall. VSS, POCT glucose 160s. Her mental status rapidly improved to form hypophonia and disorientation to full orientation within 10 minutes. Denies headache or pains. CN2-12 intact, neuro exam intact, sensation intact. Meds include serotonergic agents as well as Eliquis for COVID, and diltiazem for afib, in addition to Dilaudid prn. EKG non-ischemic, QTc < 500. RRT ended due to rapid improvement. Suspect vasovagal episode from straining on toilet vs less likely seizure/neuro, vs poss cardiogenic/arrhythmia given afib (was not on tele at time of syncope)

## 2022-01-17 NOTE — PROGRESS NOTE ADULT - SUBJECTIVE AND OBJECTIVE BOX
SUBJECTIVE / OVERNIGHT EVENTS:pt seen and examined  01-17-22     MEDICATIONS  (STANDING):  apixaban 2.5 milliGRAM(s) Oral two times a day  aspirin enteric coated 81 milliGRAM(s) Oral daily  atorvastatin 80 milliGRAM(s) Oral at bedtime  buPROPion XL (24-Hour) . 150 milliGRAM(s) Oral daily  cefepime   IVPB 1000 milliGRAM(s) IV Intermittent every 24 hours  chlorhexidine 2% Cloths 1 Application(s) Topical daily  cinacalcet 60 milliGRAM(s) Oral at bedtime  collagenase Ointment 1 Application(s) Topical two times a day  Dakins Solution - 1/4 Strength 1 Application(s) Topical two times a day  dextrose 40% Gel 15 Gram(s) Oral once  dextrose 5%. 1000 milliLiter(s) (50 mL/Hr) IV Continuous <Continuous>  dextrose 5%. 1000 milliLiter(s) (100 mL/Hr) IV Continuous <Continuous>  dextrose 50% Injectable 25 Gram(s) IV Push once  dextrose 50% Injectable 12.5 Gram(s) IV Push once  dextrose 50% Injectable 25 Gram(s) IV Push once  diltiazem    milliGRAM(s) Oral daily  doxercalciferol Injectable 5 MICROGram(s) IV Push <User Schedule>  epoetin anabela-epbx (RETACRIT) Injectable 8000 Unit(s) IV Push <User Schedule>  gabapentin 300 milliGRAM(s) Oral daily  glucagon  Injectable 1 milliGRAM(s) IntraMuscular once  insulin lispro (ADMELOG) corrective regimen sliding scale   SubCutaneous three times a day before meals  insulin lispro (ADMELOG) corrective regimen sliding scale   SubCutaneous at bedtime  loratadine 10 milliGRAM(s) Oral daily  melatonin 6 milliGRAM(s) Oral at bedtime  mirtazapine 7.5 milliGRAM(s) Oral at bedtime  montelukast 10 milliGRAM(s) Oral at bedtime  polyethylene glycol 3350 17 Gram(s) Oral two times a day  senna 2 Tablet(s) Oral at bedtime  sertraline 50 milliGRAM(s) Oral daily  sevelamer carbonate 2400 milliGRAM(s) Oral three times a day with meals  traZODone 100 milliGRAM(s) Oral at bedtime    MEDICATIONS  (PRN):  HYDROmorphone  Injectable 1 milliGRAM(s) IV Push every 6 hours PRN Severe Pain (7 - 10)    Vital Signs Last 24 Hrs  T(C): 37.1 (01-17-22 @ 21:34), Max: 37.1 (01-17-22 @ 21:34)  T(F): 98.7 (01-17-22 @ 21:34), Max: 98.7 (01-17-22 @ 21:34)  HR: 76 (01-17-22 @ 21:34) (70 - 77)  BP: 119/63 (01-17-22 @ 21:34) (119/63 - 143/58)  BP(mean): --  RR: 17 (01-17-22 @ 21:34) (17 - 18)  SpO2: 98% (01-17-22 @ 11:00) (97% - 98%)          Constitutional: No fever, fatigue  Skin: No rash.  Eyes: No recent vision problems or eye pain.  ENT: No congestion, ear pain, or sore throat.  Cardiovascular: No chest pain or palpation.  Respiratory: No cough, shortness of breath, congestion, or wheezing.  Gastrointestinal: No abdominal pain, nausea, vomiting, or diarrhea.  Genitourinary: No dysuria.  Musculoskeletal: No joint swelling.  Neurologic: No headache.    PHYSICAL EXAM:  GENERAL: NAD  EYES: EOMI, PERRLA  NECK: Supple, No JVD  CHEST/LUNG: dec breath sounds at bases  HEART:  S1 , S2 +  ABDOMEN: soft , bs+, abd wound +  EXTREMITIES:  edema+  NEUROLOGY:alert awake    LABS:  01-17    133<L>  |  92<L>  |  40<H>  ----------------------------<  142<H>  3.9   |  26  |  8.24<H>    Ca    8.5      17 Jan 2022 21:29  Phos  5.0     01-17  Mg     2.10     01-17    TPro  6.7  /  Alb  3.4  /  TBili  0.2  /  DBili      /  AST  <5  /  ALT  <5<L>  /  AlkPhos  255<H>  01-17    Creatinine Trend: 8.24 <--, 9.22 <--, 7.74 <--, 6.10 <--, 7.48 <--, 6.23 <--, 7.13 <--                        10.2   13.88 )-----------( 449      ( 17 Jan 2022 07:27 )             36.8     Urine Studies:            LIVER FUNCTIONS - ( 17 Jan 2022 21:29 )  Alb: 3.4 g/dL / Pro: 6.7 g/dL / ALK PHOS: 255 U/L / ALT: <5 U/L / AST: <5 U/L / GGT: x               Alb: 3.9 g/dL / Pro: 7.9 g/dL / ALK PHOS: 290 U/L / ALT: <5 U/L / AST: 6 U/L / GGT: x

## 2022-01-17 NOTE — PROGRESS NOTE ADULT - SUBJECTIVE AND OBJECTIVE BOX
Chief Complaint: uncontrolled dm2    History:  denies n/v.  fair appetite.  had rrt today for vasovagal response while in bathroom.  No hypoglycemia    MEDICATIONS  (STANDING):  apixaban 2.5 milliGRAM(s) Oral two times a day  aspirin enteric coated 81 milliGRAM(s) Oral daily  atorvastatin 80 milliGRAM(s) Oral at bedtime  buPROPion XL (24-Hour) . 150 milliGRAM(s) Oral daily  cefepime   IVPB 1000 milliGRAM(s) IV Intermittent every 24 hours  chlorhexidine 2% Cloths 1 Application(s) Topical daily  cinacalcet 60 milliGRAM(s) Oral at bedtime  collagenase Ointment 1 Application(s) Topical two times a day  Dakins Solution - 1/4 Strength 1 Application(s) Topical two times a day  dextrose 40% Gel 15 Gram(s) Oral once  dextrose 5%. 1000 milliLiter(s) (50 mL/Hr) IV Continuous <Continuous>  dextrose 5%. 1000 milliLiter(s) (100 mL/Hr) IV Continuous <Continuous>  dextrose 50% Injectable 25 Gram(s) IV Push once  dextrose 50% Injectable 12.5 Gram(s) IV Push once  dextrose 50% Injectable 25 Gram(s) IV Push once  diltiazem    milliGRAM(s) Oral daily  doxercalciferol Injectable 5 MICROGram(s) IV Push <User Schedule>  epoetin anabela-epbx (RETACRIT) Injectable 8000 Unit(s) IV Push <User Schedule>  gabapentin 300 milliGRAM(s) Oral daily  glucagon  Injectable 1 milliGRAM(s) IntraMuscular once  insulin lispro (ADMELOG) corrective regimen sliding scale   SubCutaneous three times a day before meals  insulin lispro (ADMELOG) corrective regimen sliding scale   SubCutaneous at bedtime  loratadine 10 milliGRAM(s) Oral daily  melatonin 6 milliGRAM(s) Oral at bedtime  mirtazapine 7.5 milliGRAM(s) Oral at bedtime  montelukast 10 milliGRAM(s) Oral at bedtime  polyethylene glycol 3350 17 Gram(s) Oral two times a day  senna 2 Tablet(s) Oral at bedtime  sertraline 50 milliGRAM(s) Oral daily  sevelamer carbonate 2400 milliGRAM(s) Oral three times a day with meals  traZODone 100 milliGRAM(s) Oral at bedtime    MEDICATIONS  (PRN):  HYDROmorphone  Injectable 1 milliGRAM(s) IV Push every 6 hours PRN Severe Pain (7 - 10)      Allergies    latex (Rash)  penicillin (Nausea)  strawberry (Rash)    Intolerances    caffeine (Nausea)    Review of Systems:  Constitutional: No fever  ALL OTHER SYSTEMS REVIEWED AND NEGATIVE    PHYSICAL EXAM:  VITALS: T(C): 36.5 (01-17-22 @ 11:00)  T(F): 97.7 (01-17-22 @ 11:00), Max: 98.6 (01-16-22 @ 17:10)  HR: 77 (01-17-22 @ 11:00) (70 - 79)  BP: 143/58 (01-17-22 @ 11:00) (116/50 - 143/58)  RR:  (17 - 18)  SpO2:  (97% - 98%)  Wt(kg): --  GENERAL: NAD,  well-developed  GI: Soft, nontender, non distended  SKIN: Dry, intact, No rashes or lesions  PSYCH: Alert and oriented x 3, normal affect, normal mood      CAPILLARY BLOOD GLUCOSE  POCT Blood Glucose.: 167 mg/dL (17 Jan 2022 12:10)  POCT Blood Glucose.: 163 mg/dL (17 Jan 2022 10:46)  POCT Blood Glucose.: 118 mg/dL (17 Jan 2022 08:47)  POCT Blood Glucose.: 139 mg/dL (16 Jan 2022 23:01)  POCT Blood Glucose.: 136 mg/dL (16 Jan 2022 18:18)      01-17    133<L>  |  91<L>  |  41<H>  ----------------------------<  115<H>  4.6   |  25  |  9.22<H>    EGFR if : 5<L>  EGFR if non : 4<L>    Ca    8.6      01-17  Mg     2.10     01-17  Phos  5.0     01-17    TPro  8.3  /  Alb  3.8  /  TBili  0.3  /  DBili  x   /  AST  <5<L>  /  ALT  <5<L>  /  AlkPhos  294<H>  01-17        A1C with Estimated Average Glucose (01.13.22 @ 08:21)    A1C with Estimated Average Glucose Result: 7.0 %    Estimated Average Glucose: 154

## 2022-01-17 NOTE — PROGRESS NOTE ADULT - SUBJECTIVE AND OBJECTIVE BOX
CC: events noted, vasovagal episode during bowel movt now back to baseline    TELEMETRY:     PHYSICAL EXAM:    T(C): 36.6 (01-17-22 @ 05:01), Max: 37 (01-16-22 @ 17:10)  HR: 70 (01-17-22 @ 05:01) (70 - 79)  BP: 126/55 (01-17-22 @ 05:01) (116/50 - 126/55)  RR: 18 (01-17-22 @ 05:01) (17 - 18)  SpO2: 97% (01-17-22 @ 05:01) (97% - 97%)  Wt(kg): --  I&O's Summary      Appearance: Normal	  Cardiovascular: Normal S1 S2,RRR, No JVD, No murmurs  Respiratory: Lungs clear to auscultation	  Gastrointestinal:  Soft, Non-tender, + BS	  Extremities: Normal range of motion, No clubbing, cyanosis or edema  Vascular: Peripheral pulses palpable 2+ bilaterally     LABS:	 	                          10.2   13.88 )-----------( 449      ( 17 Jan 2022 07:27 )             36.8     01-17    133<L>  |  91<L>  |  41<H>  ----------------------------<  115<H>  4.6   |  25  |  9.22<H>    Ca    8.6      17 Jan 2022 07:27  Phos  5.0     01-17  Mg     2.10     01-17    TPro  8.3  /  Alb  3.8  /  TBili  0.3  /  DBili  x   /  AST  <5<L>  /  ALT  <5<L>  /  AlkPhos  294<H>  01-17          CARDIAC MARKERS:

## 2022-01-17 NOTE — PROGRESS NOTE ADULT - SUBJECTIVE AND OBJECTIVE BOX
[facility] NEPHROLOGY- PROGRESS NOTE    56y Female with history of ESRD on HD presents with vomiting and diarrhea found to be COVID-19 positive. Nephrology consulted for ESRD status.  s/p RRT this am for AMS while straining in the bathroom; vasovagal response; now back to baseline    REVIEW OF SYSTEMS:  Gen: no changes in weight +dizziness resolved  Cards: no chest pain  Resp: no dyspnea  GI: no nausea or vomiting or diarrhea +abd wound pain  Vascular: no LE edema    caffeine (Nausea)  latex (Rash)  penicillin (Nausea)  strawberry (Rash)      Hospital Medications: Medications reviewed    VITALS:  T(F): 97.9 (01-17-22 @ 05:01), Max: 98.6 (01-16-22 @ 17:10)  HR: 70 (01-17-22 @ 05:01)  BP: 126/55 (01-17-22 @ 05:01)  RR: 18 (01-17-22 @ 05:01)  SpO2: 97% (01-17-22 @ 05:01)  Wt(kg): --        PHYSICAL EXAM:    Gen: NAD, calm  Cards: RRR, +S1/S2, no M/G/R  Resp: CTA B/L  GI: soft, RLQ bandage/ tender  Vascular: no LE edema B/L, LUE AVF + bruit/thrill      LABS:  01-17    133<L>  |  91<L>  |  41<H>  ----------------------------<  115<H>  4.6   |  25  |  9.22<H>    Ca    8.6      17 Jan 2022 07:27  Phos  5.0     01-17  Mg     2.10     01-17    TPro  8.3  /  Alb  3.8  /  TBili  0.3  /  DBili      /  AST  <5<L>  /  ALT  <5<L>  /  AlkPhos  294<H>  01-17    Creatinine Trend: 9.22 <--, 7.74 <--, 6.10 <--, 7.48 <--, 6.23 <--, 7.13 <--                        10.2   13.88 )-----------( 449      ( 17 Jan 2022 07:27 )             36.8     Urine Studies:

## 2022-01-17 NOTE — PROGRESS NOTE ADULT - ASSESSMENT
56 year old female with hx of morbid obesity, CHAMP not on home O2, ESRD (HD MWF), HTN, DM, COPD, Afib no longer on AC, chronic R pannus wound, followed by Dr. Layne, sent by visiting RN for worsening wound.    # Chronic Pannus Wound  -IV abx per primary team  -surgery consult appreciated    #Afib  -stable  -cont eliquis  -cont cardizem    #Covid-19+  -trend inflamm markers  -pt saturating well on RA    #Hypertension  -cont current meds    #ESRD on HD  -HD per renal    # Near Syncope  -tele  -check orthostatics

## 2022-01-18 NOTE — PROGRESS NOTE ADULT - SUBJECTIVE AND OBJECTIVE BOX
[facility] NEPHROLOGY- PROGRESS NOTE    56y Female with history of ESRD on HD presents with vomiting and diarrhea found to be COVID-19 positive. Nephrology consulted for ESRD status.    REVIEW OF SYSTEMS:  Gen: no changes in weight  Cards: no chest pain  Resp: no dyspnea  GI: no nausea or vomiting or diarrhea + ab wound pain  Vascular: no LE edema    caffeine (Nausea)  latex (Rash)  penicillin (Nausea)  strawberry (Rash)      Hospital Medications: Medications reviewed      VITALS:  T(F): 98.2 (01-18-22 @ 04:56), Max: 98.7 (01-17-22 @ 21:34)  HR: 81 (01-18-22 @ 04:56)  BP: 150/55 (01-18-22 @ 04:56)  RR: 18 (01-18-22 @ 04:56)  SpO2: 95% (01-18-22 @ 04:56)  Wt(kg): --    01-17 @ 07:01  -  01-18 @ 07:00  --------------------------------------------------------  IN: 600 mL / OUT: 2900 mL / NET: -2300 mL        PHYSICAL EXAM:    Gen: NAD, calm  Cards: RRR, +S1/S2, no M/G/R  Resp: CTA B/L  GI: soft, RLQ bandage/tender  Vascular: no LE edema B/L, LUE AVF + bruit/thrill      LABS:  01-17    133<L>  |  92<L>  |  40<H>  ----------------------------<  142<H>  3.9   |  26  |  8.24<H>    Ca    8.5      17 Jan 2022 21:29  Phos  5.0     01-17  Mg     2.10     01-17    TPro  6.7  /  Alb  3.4  /  TBili  0.2  /  DBili      /  AST  <5  /  ALT  <5<L>  /  AlkPhos  255<H>  01-17    Creatinine Trend: 8.24 <--, 9.22 <--, 7.74 <--, 6.10 <--, 7.48 <--, 6.23 <--, 7.13 <--                        8.2    9.39  )-----------( 432      ( 17 Jan 2022 23:47 )             28.4     Urine Studies:
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
## 2022-01-18 NOTE — PROGRESS NOTE ADULT - SUBJECTIVE AND OBJECTIVE BOX
CC: no events    TELEMETRY:     PHYSICAL EXAM:    T(C): 36.8 (01-18-22 @ 04:56), Max: 37.1 (01-17-22 @ 21:34)  HR: 81 (01-18-22 @ 04:56) (69 - 81)  BP: 150/55 (01-18-22 @ 04:56) (119/63 - 150/55)  RR: 18 (01-18-22 @ 04:56) (17 - 18)  SpO2: 95% (01-18-22 @ 04:56) (95% - 95%)  Wt(kg): --  I&O's Summary    17 Jan 2022 07:01  -  18 Jan 2022 07:00  --------------------------------------------------------  IN: 600 mL / OUT: 2900 mL / NET: -2300 mL        Appearance: Normal	  Cardiovascular: Normal S1 S2,RRR, No JVD, No murmurs  Respiratory: Lungs clear to auscultation	  Gastrointestinal:  Soft, Non-tender, + BS	  Extremities: Normal range of motion, No clubbing, cyanosis or edema  Vascular: Peripheral pulses palpable 2+ bilaterally     LABS:	 	                          8.2    9.39  )-----------( 432      ( 17 Jan 2022 23:47 )             28.4     01-17    133<L>  |  92<L>  |  40<H>  ----------------------------<  142<H>  3.9   |  26  |  8.24<H>    Ca    8.5      17 Jan 2022 21:29  Phos  5.0     01-17  Mg     2.10     01-17    TPro  6.7  /  Alb  3.4  /  TBili  0.2  /  DBili  x   /  AST  <5  /  ALT  <5<L>  /  AlkPhos  255<H>  01-17          CARDIAC MARKERS:

## 2022-01-18 NOTE — PROGRESS NOTE ADULT - SUBJECTIVE AND OBJECTIVE BOX
CC: F/U for Wound infection    Saw/spoke to patient. No fevers, no chills. No new complaints.    Allergies  latex (Rash)  penicillin (Nausea)  strawberry (Rash)    ANTIMICROBIALS:  cefepime   IVPB 1000 every 24 hours    PE:    Vital Signs Last 24 Hrs  T(C): 36.8 (18 Jan 2022 04:56), Max: 37.1 (17 Jan 2022 21:34)  T(F): 98.2 (18 Jan 2022 04:56), Max: 98.7 (17 Jan 2022 21:34)  HR: 81 (18 Jan 2022 04:56) (69 - 81)  BP: 150/55 (18 Jan 2022 04:56) (119/63 - 150/55)  RR: 18 (18 Jan 2022 04:56) (17 - 18)  SpO2: 95% (18 Jan 2022 04:56) (95% - 95%)    Gen: AOx3, NAD, non-toxic  CV: S1+S2 normal, nontachycardic  Resp: Clear bilat, no resp distress, no crackles/wheezes  Abd: Soft, nontender, +BS, bandaged wound  Ext: No LE edema, no wounds    LABS:                        8.2    9.39  )-----------( 432      ( 17 Jan 2022 23:47 )             28.4     01-17    133<L>  |  92<L>  |  40<H>  ----------------------------<  142<H>  3.9   |  26  |  8.24<H>    Ca    8.5      17 Jan 2022 21:29  Phos  5.0     01-17  Mg     2.10     01-17    TPro  6.7  /  Alb  3.4  /  TBili  0.2  /  DBili  x   /  AST  <5  /  ALT  <5<L>  /  AlkPhos  255<H>  01-17    MICROBIOLOGY:    .Blood Blood  01-14-22   No growth to date.  --  --    .Blood Blood-Peripheral  01-11-22   Growth in aerobic and anaerobic bottles: Staphylococcus epidermidis  "Susceptibilities not performed"  ***Blood Panel PCR results on this specimen are available  approximately 3 hours after the Gram stain result.***  Gram stain, PCR, and/or cultureresults may not always  correspond due to difference in methodologies.  ************************************************************  This PCR assay was performed by multiplex PCR. This  Assay tests for 66 bacterial and resistance gene targets.  Please refer to the [facility] test directory  at [email or web address] for details.  --  Blood Culture PCR    .Blood Blood-Peripheral  01-11-22   Growth in aerobic and anaerobic bottles: Staphylococcus epidermidis  --  Staphylococcus epidermidis    (otherwise reviewed)    RADIOLOGY:    1/18 CT:    FINDINGS:  There is no acute intracranial hemorrhage, parenchymal mass, mass effect   or midline shift. There is no acute territorial infarct. There is no   hydrocephalus. Punctate calcification left fuentes. Partial empty sella    The cranium is intact. Mucosal thickening and nasal sinuses.    IMPRESSION:  No acute intracranial hemorrhage or acute territorial infarct.  If   symptoms persist, follow-up MRI exam recommended.

## 2022-01-18 NOTE — PROGRESS NOTE ADULT - ASSESSMENT
56y Female with history of ESRD on HD presents with vomiting and diarrhea found to be COVID-19 positive. Nephrology consulted for ESRD status.    1) ESRD: Last HD on 1/17 with mild intradialytic hypotension and 2.3L removed with clotting. Plan for next maintenance HD on 1/19 with heparin. Monitor electrolytes.    2) HTN with ESRD: BP acceptable. Continue with current medications. Monitor BP.    3) Anemia of renal disease: Hb low. Continue with Epo 8K with HD. Monitor Hb.    4) Secondary HPT of renal origin: Phosphorus acceptable. Continue with sensipar 60 mg daily, hectorol 5 mcg with HD and renvela 3 tabs with meals. Monitor serum calcium and phosphorus.      [facility] NEPHROLOGY  Keaton Lopez M.D.  Juma Green D.O.  Myla Hoffmann M.D.  Julia Brewer, MSN, ANP-C    Telephone: [phone]  Facsimile: [phone]    [address] [place] [address]

## 2022-01-18 NOTE — PROGRESS NOTE ADULT - SUBJECTIVE AND OBJECTIVE BOX
SUBJECTIVE / OVERNIGHT EVENTS:pt seen and examined  01-18-22     MEDICATIONS  (STANDING):  apixaban 2.5 milliGRAM(s) Oral two times a day  aspirin enteric coated 81 milliGRAM(s) Oral daily  atorvastatin 80 milliGRAM(s) Oral at bedtime  buPROPion XL (24-Hour) . 150 milliGRAM(s) Oral daily  cefepime   IVPB 1000 milliGRAM(s) IV Intermittent every 24 hours  chlorhexidine 2% Cloths 1 Application(s) Topical daily  cinacalcet 60 milliGRAM(s) Oral at bedtime  collagenase Ointment 1 Application(s) Topical two times a day  Dakins Solution - 1/4 Strength 1 Application(s) Topical two times a day  dextrose 40% Gel 15 Gram(s) Oral once  dextrose 5%. 1000 milliLiter(s) (100 mL/Hr) IV Continuous <Continuous>  dextrose 5%. 1000 milliLiter(s) (50 mL/Hr) IV Continuous <Continuous>  dextrose 50% Injectable 25 Gram(s) IV Push once  dextrose 50% Injectable 12.5 Gram(s) IV Push once  dextrose 50% Injectable 25 Gram(s) IV Push once  diltiazem    milliGRAM(s) Oral daily  doxercalciferol Injectable 5 MICROGram(s) IV Push <User Schedule>  epoetin anabela-epbx (RETACRIT) Injectable 8000 Unit(s) IV Push <User Schedule>  gabapentin 300 milliGRAM(s) Oral daily  glucagon  Injectable 1 milliGRAM(s) IntraMuscular once  insulin lispro (ADMELOG) corrective regimen sliding scale   SubCutaneous three times a day before meals  insulin lispro (ADMELOG) corrective regimen sliding scale   SubCutaneous at bedtime  loratadine 10 milliGRAM(s) Oral daily  melatonin 6 milliGRAM(s) Oral at bedtime  mirtazapine 7.5 milliGRAM(s) Oral at bedtime  montelukast 10 milliGRAM(s) Oral at bedtime  polyethylene glycol 3350 17 Gram(s) Oral two times a day  senna 2 Tablet(s) Oral at bedtime  sertraline 50 milliGRAM(s) Oral daily  sevelamer carbonate 2400 milliGRAM(s) Oral three times a day with meals  traZODone 100 milliGRAM(s) Oral at bedtime    MEDICATIONS  (PRN):  aluminum hydroxide/magnesium hydroxide/simethicone Suspension 30 milliLiter(s) Oral every 6 hours PRN Dyspepsia  HYDROmorphone  Injectable 1 milliGRAM(s) IV Push every 6 hours PRN Severe Pain (7 - 10)    Vital Signs Last 24 Hrs  T(C): 37 (01-18-22 @ 21:43), Max: 37 (01-18-22 @ 21:43)  T(F): 98.6 (01-18-22 @ 21:43), Max: 98.6 (01-18-22 @ 21:43)  HR: 87 (01-18-22 @ 21:43) (69 - 87)  BP: 140/68 (01-18-22 @ 21:43) (128/55 - 150/55)  BP(mean): --  RR: 18 (01-18-22 @ 21:43) (17 - 18)  SpO2: 97% (01-18-22 @ 21:43) (95% - 97%)        Constitutional: No fever, fatigue  Skin: No rash.  Eyes: No recent vision problems or eye pain.  ENT: No congestion, ear pain, or sore throat.  Cardiovascular: No chest pain or palpation.  Respiratory: No cough, shortness of breath, congestion, or wheezing.  Gastrointestinal: No abdominal pain, nausea, vomiting, or diarrhea.  Genitourinary: No dysuria.  Musculoskeletal: No joint swelling.  Neurologic: No headache.    PHYSICAL EXAM:  GENERAL: NAD  EYES: EOMI, PERRLA  NECK: Supple, No JVD  CHEST/LUNG: dec breath sounds at bases  HEART:  S1 , S2 +  ABDOMEN: soft , bs+, abd wound +  EXTREMITIES:  edema+  NEUROLOGY:alert awake    LABS:  01-17    133<L>  |  92<L>  |  40<H>  ----------------------------<  142<H>  3.9   |  26  |  8.24<H>    Ca    8.5      17 Jan 2022 21:29  Phos  5.0     01-17  Mg     2.10     01-17    TPro  6.7  /  Alb  3.4  /  TBili  0.2  /  DBili      /  AST  <5  /  ALT  <5<L>  /  AlkPhos  255<H>  01-17    Creatinine Trend: 8.24 <--, 9.22 <--, 7.74 <--, 6.10 <--, 7.48 <--, 6.23 <--                        8.2    9.39  )-----------( 432      ( 17 Jan 2022 23:47 )             28.4     Urine Studies:            LIVER FUNCTIONS - ( 17 Jan 2022 21:29 )  Alb: 3.4 g/dL / Pro: 6.7 g/dL / ALK PHOS: 255 U/L / ALT: <5 U/L / AST: <5 U/L / GGT: x

## 2022-01-18 NOTE — PROGRESS NOTE ADULT - ASSESSMENT
55 yo F with morbid obesity, CHAMP, ESRD--fistula, with known abd wound, presenting with worsening of wound  No fever, leukocytosis  COVID+  CXR clear  RA  Worsening of known abd wound  Culture in 12/2021 with pseudomonas and enterococcus  Overall,  1) Wound infection  - Chronic wound, possible superinfected  - Cefepime 1g q 24, post HD, plan for 10 day course as long as reassuring clinical status (through 1/21/22, if DC planning, can switch to Cefepime 2g post HD to complete course)  - Wound care eval to site  - Monitor for systemic signs infection  2) COVID  - RA  - Supportive care  - O2 supplementation as needed per primary team  - Check CXR if any respiratory worsening  3) Leukocytosis  - Trend to normal  4) Diarrhea  - Appears resolved, spontaneously, does not seems clinically consistent with C diff  5) Positive BCX  - F/U BCXs--NGTD  - Hold on Vanco unless clinical signs worsening/sepsis    My colleagues will be covering this patient on 1/19/22, I will return 1/20. Please call [phone] or on call fellow with any questions or change in status.     Se Leary MD  Pager [phone]  From 5pm-9am, and on weekends call [phone]

## 2022-01-19 NOTE — PROGRESS NOTE ADULT - SUBJECTIVE AND OBJECTIVE BOX
[facility] NEPHROLOGY- PROGRESS NOTE    56y Female with history of ESRD on HD presents with vomiting and diarrhea found to be COVID-19 positive. Nephrology consulted for ESRD status.    REVIEW OF SYSTEMS:  Gen: no changes in weight  Cards: no chest pain  Resp: no dyspnea  GI: no nausea or vomiting or diarrhea + ab wound pain  Vascular: no LE edema    caffeine (Nausea)  latex (Rash)  penicillin (Nausea)  strawberry (Rash)      Hospital Medications: Medications reviewed      VITALS:  T(F): 98.1 (01-19-22 @ 11:40), Max: 98.6 (01-18-22 @ 21:43)  HR: 85 (01-19-22 @ 11:40)  BP: 132/55 (01-19-22 @ 11:40)  RR: 19 (01-19-22 @ 11:40)  SpO2: 99% (01-19-22 @ 11:40)  Wt(kg): --        PHYSICAL EXAM:    Gen: NAD, calm  Cards: RRR, +S1/S2, no M/G/R  Resp: CTA B/L  GI: soft, RLQ bandage/tender  Vascular: no LE edema B/L, LUE AVF + bruit/thrill      LABS:  01-19    133<L>  |  89<L>  |  31<H>  ----------------------------<  140<H>  4.3   |  26  |  7.68<H>    Ca    8.4      19 Jan 2022 08:29    TPro  6.7  /  Alb  3.4  /  TBili  0.2  /  DBili      /  AST  <5  /  ALT  <5<L>  /  AlkPhos  255<H>  01-17    Creatinine Trend: 7.68 <--, 8.24 <--, 9.22 <--, 7.74 <--, 6.10 <--, 7.48 <--, 6.23 <--                        9.0    13.70 )-----------( 450      ( 19 Jan 2022 08:24 )             31.2     Urine Studies:

## 2022-01-19 NOTE — PROGRESS NOTE ADULT - SUBJECTIVE AND OBJECTIVE BOX
CC: no events    TELEMETRY:     PHYSICAL EXAM:    T(C): 36.7 (01-19-22 @ 11:40), Max: 37 (01-18-22 @ 21:43)  HR: 85 (01-19-22 @ 11:40) (78 - 87)  BP: 132/55 (01-19-22 @ 11:40) (132/55 - 140/68)  RR: 19 (01-19-22 @ 11:40) (18 - 19)  SpO2: 99% (01-19-22 @ 11:40) (97% - 100%)  Wt(kg): --  I&O's Summary      Appearance: Normal	  Cardiovascular: Normal S1 S2,RRR, No JVD, No murmurs  Respiratory: Lungs clear to auscultation	  Gastrointestinal:  Soft, Non-tender, + BS	  Extremities: Normal range of motion, No clubbing, cyanosis or edema  Vascular: Peripheral pulses palpable 2+ bilaterally     LABS:	 	                          9.0    13.70 )-----------( 450      ( 19 Jan 2022 08:24 )             31.2     01-19    133<L>  |  89<L>  |  31<H>  ----------------------------<  140<H>  4.3   |  26  |  7.68<H>    Ca    8.4      19 Jan 2022 08:29    TPro  6.7  /  Alb  3.4  /  TBili  0.2  /  DBili  x   /  AST  <5  /  ALT  <5<L>  /  AlkPhos  255<H>  01-17          CARDIAC MARKERS:

## 2022-01-19 NOTE — PROGRESS NOTE ADULT - SUBJECTIVE AND OBJECTIVE BOX
Chief Complaint: uncontrolled dm2    History:  denies n/v.  fair appetite.   No hypoglycemia    MEDICATIONS  (STANDING):  apixaban 2.5 milliGRAM(s) Oral two times a day  aspirin enteric coated 81 milliGRAM(s) Oral daily  atorvastatin 80 milliGRAM(s) Oral at bedtime  buPROPion XL (24-Hour) . 150 milliGRAM(s) Oral daily  cefepime   IVPB 1000 milliGRAM(s) IV Intermittent every 24 hours  chlorhexidine 2% Cloths 1 Application(s) Topical daily  cinacalcet 60 milliGRAM(s) Oral at bedtime  collagenase Ointment 1 Application(s) Topical two times a day  Dakins Solution - 1/4 Strength 1 Application(s) Topical two times a day  dextrose 40% Gel 15 Gram(s) Oral once  dextrose 5%. 1000 milliLiter(s) (50 mL/Hr) IV Continuous <Continuous>  dextrose 5%. 1000 milliLiter(s) (100 mL/Hr) IV Continuous <Continuous>  dextrose 50% Injectable 25 Gram(s) IV Push once  dextrose 50% Injectable 12.5 Gram(s) IV Push once  dextrose 50% Injectable 25 Gram(s) IV Push once  diltiazem    milliGRAM(s) Oral daily  doxercalciferol Injectable 5 MICROGram(s) IV Push <User Schedule>  epoetin anabela-epbx (RETACRIT) Injectable 8000 Unit(s) IV Push <User Schedule>  gabapentin 300 milliGRAM(s) Oral daily  glucagon  Injectable 1 milliGRAM(s) IntraMuscular once  insulin lispro (ADMELOG) corrective regimen sliding scale   SubCutaneous three times a day before meals  insulin lispro (ADMELOG) corrective regimen sliding scale   SubCutaneous at bedtime  loratadine 10 milliGRAM(s) Oral daily  melatonin 6 milliGRAM(s) Oral at bedtime  mirtazapine 7.5 milliGRAM(s) Oral at bedtime  montelukast 10 milliGRAM(s) Oral at bedtime  polyethylene glycol 3350 17 Gram(s) Oral two times a day  senna 2 Tablet(s) Oral at bedtime  sertraline 50 milliGRAM(s) Oral daily  sevelamer carbonate 2400 milliGRAM(s) Oral three times a day with meals  traZODone 100 milliGRAM(s) Oral at bedtime    MEDICATIONS  (PRN):  HYDROmorphone  Injectable 1 milliGRAM(s) IV Push every 6 hours PRN Severe Pain (7 - 10)      Allergies    latex (Rash)  penicillin (Nausea)  strawberry (Rash)    Intolerances    caffeine (Nausea)    Review of Systems:  Constitutional: No fever  ALL OTHER SYSTEMS REVIEWED AND NEGATIVE    PHYSICAL EXAM:  Vital Signs Last 24 Hrs  T(C): 36.7 (19 Jan 2022 11:40), Max: 37 (18 Jan 2022 21:43)  T(F): 98.1 (19 Jan 2022 11:40), Max: 98.6 (18 Jan 2022 21:43)  HR: 85 (19 Jan 2022 11:40) (78 - 87)  BP: 132/55 (19 Jan 2022 11:40) (132/55 - 140/68)  BP(mean): --  RR: 19 (19 Jan 2022 11:40) (18 - 19)  SpO2: 99% (19 Jan 2022 11:40) (97% - 100%)  GENERAL: NAD,  well-developed  GI: Soft, nontender, non distended  SKIN: Dry, intact, No rashes or lesions  PSYCH: Alert and oriented x 3, normal affect, normal mood      CAPILLARY BLOOD GLUCOSE    POCT Blood Glucose.: 157 mg/dL (19 Jan 2022 12:50)  POCT Blood Glucose.: 145 mg/dL (19 Jan 2022 09:10)  POCT Blood Glucose.: 127 mg/dL (18 Jan 2022 21:27)  POCT Blood Glucose.: 137 mg/dL (18 Jan 2022 17:54)  POCT Blood Glucose.: 167 mg/dL (17 Jan 2022 12:10)  POCT Blood Glucose.: 163 mg/dL (17 Jan 2022 10:46)  POCT Blood Glucose.: 118 mg/dL (17 Jan 2022 08:47)  POCT Blood Glucose.: 139 mg/dL (16 Jan 2022 23:01)  POCT Blood Glucose.: 136 mg/dL (16 Jan 2022 18:18)      01-19    133<L>  |  89<L>  |  31<H>  ----------------------------<  140<H>  4.3   |  26  |  7.68<H>    Ca    8.4      19 Jan 2022 08:29    TPro  6.7  /  Alb  3.4  /  TBili  0.2  /  DBili  x   /  AST  <5  /  ALT  <5<L>  /  AlkPhos  255<H>  01-17        A1C with Estimated Average Glucose (01.13.22 @ 08:21)    A1C with Estimated Average Glucose Result: 7.0 %    Estimated Average Glucose: 154

## 2022-01-19 NOTE — PROGRESS NOTE ADULT - ASSESSMENT
56y Female with history of ESRD on HD presents with vomiting and diarrhea found to be COVID-19 positive. Nephrology consulted for ESRD status.    1) ESRD: Last HD on 1/17 with mild intradialytic hypotension and 2.3L removed with clotting. Plan for next maintenance today with heparin. Monitor electrolytes.    2) HTN with ESRD: BP acceptable. Continue with current medications. Monitor BP.    3) Anemia of renal disease: Hb improving. Continue with Epo 8K with HD. Monitor Hb.    4) Secondary HPT of renal origin: Phosphorus acceptable. Continue with sensipar 60 mg daily, hectorol 5 mcg with HD and renvela 3 tabs with meals. Monitor serum calcium and phosphorus.      [facility] NEPHROLOGY  Keaton Lopez M.D.  Juma Green D.O.  Myla Hoffmann M.D.  Julia Brewer, MSN, ANP-C    Telephone: [phone]  Facsimile: [phone]    [address] [place] [address]

## 2022-01-19 NOTE — PROGRESS NOTE ADULT - ASSESSMENT
56 yr old F with morbid obesity, CHAMP not on home O2, ESRD (HD MWF), HTN, DM2 A1C 7.0, COPD, Afib no longer on AC, chronic R pannus wound here with worsening wound, diarrhea and found to be COVID+. Has very poor po intake at this time.        Problem/Recommendation - 1:  ·  Problem: DM (diabetes mellitus).   ·  Recommendation: While inpatient CHO diet and FS AC and HS  Goal Glucose 100-180  Has been off of basal bolus insulin regimen  Continue moderate correctional scale before meals and low scale at bedtime    For discharge: recommend discontinuation of Trulicity as pt is ESRD on HD  Discharge: likely basal bolus vs basal only vs PO - patient was on tresiba at home. Dc plan depends on clinical progress and BG's while inpatient.   She would benefit from use of Free Style Rosendo 2 with alarms  she can follow up at endocrine office [address] [place] [address] [phone].     Problem/Recommendation - 2:  ·  Problem: Hypertension, unspecified type.   ·  Recommendation: Mgmt per primary team.     Problem/Recommendation - 3:  ·  Problem: Hyperlipidemia.   ·  Recommendation: c/w statin    Tamela Ruiz  Nurse Practitioner  Division of Endocrinology & Diabetes  Pager # [phone]      If after 6PM or before 9AM, or on weekends/holidays, please call endocrine answering service for assistance ([phone]).  For nonurgent matters email [email or web address] for assistance.

## 2022-01-19 NOTE — CONSULT NOTE ADULT - ASSESSMENT
56 y.o. F with PMH of morbid obesity, CHAMP, ESRD (HD MWF), HTN, DM, COPD, Afib currently on AC, COVID +, chronic R pannus wound in abdomen presented to [facility] on 1/12 due to worsening abdominal pain, RRt called. On Neuro exam no focality CT head negative    Impression: presyncope    supportive care  -No further inpatient Neurologic workup at this time

## 2022-01-19 NOTE — PROGRESS NOTE ADULT - SUBJECTIVE AND OBJECTIVE BOX
SUBJECTIVE / OVERNIGHT EVENTS:pt seen and examined  01-19-22     MEDICATIONS  (STANDING):  apixaban 2.5 milliGRAM(s) Oral two times a day  aspirin enteric coated 81 milliGRAM(s) Oral daily  atorvastatin 80 milliGRAM(s) Oral at bedtime  buPROPion XL (24-Hour) . 150 milliGRAM(s) Oral daily  cefepime   IVPB 1000 milliGRAM(s) IV Intermittent every 24 hours  chlorhexidine 2% Cloths 1 Application(s) Topical daily  cinacalcet 60 milliGRAM(s) Oral at bedtime  collagenase Ointment 1 Application(s) Topical two times a day  Dakins Solution - 1/4 Strength 1 Application(s) Topical two times a day  dextrose 40% Gel 15 Gram(s) Oral once  dextrose 5%. 1000 milliLiter(s) (50 mL/Hr) IV Continuous <Continuous>  dextrose 5%. 1000 milliLiter(s) (100 mL/Hr) IV Continuous <Continuous>  dextrose 50% Injectable 25 Gram(s) IV Push once  dextrose 50% Injectable 12.5 Gram(s) IV Push once  dextrose 50% Injectable 25 Gram(s) IV Push once  diltiazem    milliGRAM(s) Oral daily  doxercalciferol Injectable 5 MICROGram(s) IV Push <User Schedule>  epoetin anabela-epbx (RETACRIT) Injectable 8000 Unit(s) IV Push <User Schedule>  gabapentin 300 milliGRAM(s) Oral daily  glucagon  Injectable 1 milliGRAM(s) IntraMuscular once  heparin   Injectable. 1000 Unit(s) Dialysis. once  heparin   Injectable. 500 Unit(s) Dialysis. every 1 hour  insulin lispro (ADMELOG) corrective regimen sliding scale   SubCutaneous three times a day before meals  insulin lispro (ADMELOG) corrective regimen sliding scale   SubCutaneous at bedtime  loratadine 10 milliGRAM(s) Oral daily  melatonin 6 milliGRAM(s) Oral at bedtime  mirtazapine 7.5 milliGRAM(s) Oral at bedtime  montelukast 10 milliGRAM(s) Oral at bedtime  polyethylene glycol 3350 17 Gram(s) Oral two times a day  senna 2 Tablet(s) Oral at bedtime  sertraline 50 milliGRAM(s) Oral daily  sevelamer carbonate 2400 milliGRAM(s) Oral three times a day with meals  traZODone 100 milliGRAM(s) Oral at bedtime    MEDICATIONS  (PRN):  HYDROmorphone  Injectable 1 milliGRAM(s) IV Push every 6 hours PRN Severe Pain (7 - 10)    Vital Signs Last 24 Hrs  T(C): 36.9 (01-19-22 @ 17:50), Max: 37 (01-18-22 @ 21:43)  T(F): 98.4 (01-19-22 @ 17:50), Max: 98.6 (01-18-22 @ 21:43)  HR: 80 (01-19-22 @ 17:50) (78 - 87)  BP: 129/54 (01-19-22 @ 17:50) (129/54 - 140/68)  BP(mean): --  RR: 18 (01-19-22 @ 17:50) (18 - 19)  SpO2: 100% (01-19-22 @ 17:50) (97% - 100%)      Constitutional: No fever, fatigue  Skin: No rash.  Eyes: No recent vision problems or eye pain.  ENT: No congestion, ear pain, or sore throat.  Cardiovascular: No chest pain or palpation.  Respiratory: No cough, shortness of breath, congestion, or wheezing.  Gastrointestinal: No abdominal pain, nausea, vomiting, or diarrhea.  Genitourinary: No dysuria.  Musculoskeletal: No joint swelling.  Neurologic: No headache.    PHYSICAL EXAM:  GENERAL: NAD  EYES: EOMI, PERRLA  NECK: Supple, No JVD  CHEST/LUNG: dec breath sounds at bases  HEART:  S1 , S2 +  ABDOMEN: soft , bs+, abd wound +  EXTREMITIES:  edema+  NEUROLOGY:alert awake    LABS:  01-19    133<L>  |  89<L>  |  31<H>  ----------------------------<  140<H>  4.3   |  26  |  7.68<H>    Ca    8.4      19 Jan 2022 08:29    TPro  6.7  /  Alb  3.4  /  TBili  0.2  /  DBili      /  AST  <5  /  ALT  <5<L>  /  AlkPhos  255<H>  01-17    Creatinine Trend: 7.68 <--, 8.24 <--, 9.22 <--, 7.74 <--, 6.10 <--, 7.48 <--, 6.23 <--                        9.0    13.70 )-----------( 450      ( 19 Jan 2022 08:24 )             31.2     Urine Studies:            LIVER FUNCTIONS - ( 17 Jan 2022 21:29 )  Alb: 3.4 g/dL / Pro: 6.7 g/dL / ALK PHOS: 255 U/L / ALT: <5 U/L / AST: <5 U/L / GGT: x                 LIVER FUNCTIONS - ( 17 Jan 2022 21:29 )  Alb: 3.4 g/dL / Pro: 6.7 g/dL / ALK PHOS: 255 U/L / ALT: <5 U/L / AST: <5 U/L / GGT: x
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
## 2022-01-20 NOTE — PROGRESS NOTE ADULT - ASSESSMENT
56y Female with history of ESRD on HD presents with vomiting and diarrhea found to be COVID-19 positive. Nephrology consulted for ESRD status.    1) ESRD: Last HD on 1/19 tolerated well with 2.5L removed. Plan for next maintenance on 1/21 with heparin. Labs this morning not in steady state as drawn post-HD. Monitor electrolytes.    2) HTN with ESRD: BP acceptable. Continue with current medications. Monitor BP.    3) Anemia of renal disease: Hb low. Continue with Epo 8K with HD. Monitor Hb.    4) Secondary HPT of renal origin: Phosphorus acceptable. Continue with sensipar 60 mg daily, hectorol 5 mcg with HD and renvela 3 tabs with meals. Monitor serum calcium and phosphorus.      [facility] NEPHROLOGY  Keaton Lopez M.D.  Juma Green D.O.  Myla Hoffmann M.D.  Julia Brewer, JASIEL, ANP-C    Telephone: [phone]  Facsimile: [phone]    [id number] [id number]

## 2022-01-20 NOTE — PROGRESS NOTE ADULT - SUBJECTIVE AND OBJECTIVE BOX
CC: F/U for COVID    Saw/spoke to patient. Unchanged. No new events.    Allergies  latex (Rash)  penicillin (Nausea)  strawberry (Rash)    ANTIMICROBIALS:  cefepime   IVPB 1000 every 24 hours    PE:    Vital Signs Last 24 Hrs  T(C): 36.9 (20 Jan 2022 12:47), Max: 36.9 (19 Jan 2022 17:50)  T(F): 98.5 (20 Jan 2022 12:47), Max: 98.5 (20 Jan 2022 12:47)  HR: 86 (20 Jan 2022 12:47) (76 - 86)  BP: 124/60 (20 Jan 2022 12:47) (124/60 - 132/55)  RR: 18 (20 Jan 2022 12:47) (17 - 18)  SpO2: 96% (20 Jan 2022 12:47) (96% - 100%)    Gen: AOx3, NAD, non-toxic  CV: S1+S2 normal, nontachycardic  Resp: Clear bilat, no resp distress, no crackles/wheezes  Abd: Soft, nontender, +BS  Ext: No LE edema, no wounds    LABS:                        9.3    13.84 )-----------( 400      ( 20 Jan 2022 08:08 )             32.6     01-20    130<L>  |  87<L>  |  19  ----------------------------<  148<H>  3.3<L>   |  28  |  5.05<H>    Ca    9.3      20 Jan 2022 08:04  Phos  3.0     01-20  Mg     2.10     01-20    MICROBIOLOGY:    .Blood Blood  01-14-22   No Growth Final    .Blood Blood  01-14-22   No Growth Final     .Blood Blood-Peripheral  01-11-22   Growth in aerobic and anaerobic bottles: Staphylococcus epidermidis  "Susceptibilities not performed"  ***Blood Panel PCR results on this specimen are available  approximately 3 hours after the Gram stain result.***  Gram stain, PCR, and/or cultureresults may not always  correspond due to difference in methodologies.  ************************************************************  This PCR assay was performed by multiplex PCR. This  Assay tests for 66 bacterial and resistance gene targets.  Please refer to the [facility] test directory  at [email or web address] for details.  --  Blood Culture PCR    .Blood Blood-Peripheral  01-11-22   Growth in aerobic and anaerobic bottles: Staphylococcus epidermidis  --  Staphylococcus epidermidis    RADIOLOGY:    1/18 CTH:    FINDINGS:  There is no acute intracranial hemorrhage, parenchymal mass, mass effect   or midline shift. There is no acute territorial infarct. There is no   hydrocephalus. Punctate calcification left fuentes. Partial empty sella    The cranium is intact. Mucosal thickening and nasal sinuses.    IMPRESSION:  No acute intracranial hemorrhage or acute territorial infarct.  If   symptoms persist, follow-up MRI exam recommended.

## 2022-01-20 NOTE — PROGRESS NOTE ADULT - SUBJECTIVE AND OBJECTIVE BOX
[facility] NEPHROLOGY- PROGRESS NOTE    56y Female with history of ESRD on HD presents with vomiting and diarrhea found to be COVID-19 positive. Nephrology consulted for ESRD status.    REVIEW OF SYSTEMS:  Gen: no changes in weight  Cards: no chest pain  Resp: no dyspnea  GI: no nausea or vomiting or diarrhea + ab wound pain  Vascular: no LE edema    caffeine (Nausea)  latex (Rash)  penicillin (Nausea)  strawberry (Rash)      Hospital Medications: Medications reviewed      VITALS:  T(F): 98.5 (01-20-22 @ 12:47), Max: 98.5 (01-20-22 @ 12:47)  HR: 86 (01-20-22 @ 12:47)  BP: 124/60 (01-20-22 @ 12:47)  RR: 18 (01-20-22 @ 12:47)  SpO2: 96% (01-20-22 @ 12:47)  Wt(kg): --        PHYSICAL EXAM:    Gen: NAD, calm  Cards: RRR, +S1/S2, no M/G/R  Resp: CTA B/L  GI: soft, RLQ bandage/tender  Vascular: no LE edema B/L, LUE AVF + bruit/thrill      LABS:  01-20    130<L>  |  87<L>  |  19  ----------------------------<  148<H>  3.3<L>   |  28  |  5.05<H>    Ca    9.3      20 Jan 2022 08:04  Phos  3.0     01-20  Mg     2.10     01-20      Creatinine Trend: 5.05 <--, 7.68 <--, 8.24 <--, 9.22 <--, 7.74 <--, 6.10 <--, 7.48 <--                        9.3    13.84 )-----------( 400      ( 20 Jan 2022 08:08 )             32.6     Urine Studies:

## 2022-01-20 NOTE — PROGRESS NOTE ADULT - SUBJECTIVE AND OBJECTIVE BOX
SUBJECTIVE / OVERNIGHT EVENTS:pt seen and examined  01-20-22     MEDICATIONS  (STANDING):  apixaban 2.5 milliGRAM(s) Oral two times a day  aspirin enteric coated 81 milliGRAM(s) Oral daily  atorvastatin 80 milliGRAM(s) Oral at bedtime  buPROPion XL (24-Hour) . 150 milliGRAM(s) Oral daily  cefepime   IVPB 1000 milliGRAM(s) IV Intermittent every 24 hours  chlorhexidine 2% Cloths 1 Application(s) Topical daily  cinacalcet 60 milliGRAM(s) Oral at bedtime  collagenase Ointment 1 Application(s) Topical two times a day  Dakins Solution - 1/4 Strength 1 Application(s) Topical two times a day  dextrose 40% Gel 15 Gram(s) Oral once  dextrose 5%. 1000 milliLiter(s) (50 mL/Hr) IV Continuous <Continuous>  dextrose 5%. 1000 milliLiter(s) (100 mL/Hr) IV Continuous <Continuous>  dextrose 50% Injectable 25 Gram(s) IV Push once  dextrose 50% Injectable 12.5 Gram(s) IV Push once  dextrose 50% Injectable 25 Gram(s) IV Push once  diltiazem    milliGRAM(s) Oral daily  doxercalciferol Injectable 5 MICROGram(s) IV Push <User Schedule>  epoetin anabela-epbx (RETACRIT) Injectable 8000 Unit(s) IV Push <User Schedule>  gabapentin 300 milliGRAM(s) Oral daily  glucagon  Injectable 1 milliGRAM(s) IntraMuscular once  heparin   Injectable. 1000 Unit(s) Dialysis. once  heparin   Injectable. 500 Unit(s) Dialysis. every 1 hour  insulin lispro (ADMELOG) corrective regimen sliding scale   SubCutaneous three times a day before meals  insulin lispro (ADMELOG) corrective regimen sliding scale   SubCutaneous at bedtime  loratadine 10 milliGRAM(s) Oral daily  melatonin 6 milliGRAM(s) Oral at bedtime  mirtazapine 7.5 milliGRAM(s) Oral at bedtime  montelukast 10 milliGRAM(s) Oral at bedtime  polyethylene glycol 3350 17 Gram(s) Oral two times a day  senna 2 Tablet(s) Oral at bedtime  sertraline 50 milliGRAM(s) Oral daily  sevelamer carbonate 2400 milliGRAM(s) Oral three times a day with meals  traZODone 100 milliGRAM(s) Oral at bedtime    MEDICATIONS  (PRN):  HYDROmorphone  Injectable 1 milliGRAM(s) IV Push every 6 hours PRN Severe Pain (7 - 10)    Vital Signs Last 24 Hrs  T(C): 36.9 (01-20-22 @ 12:47), Max: 36.9 (01-20-22 @ 12:47)  T(F): 98.5 (01-20-22 @ 12:47), Max: 98.5 (01-20-22 @ 12:47)  HR: 86 (01-20-22 @ 12:47) (76 - 86)  BP: 124/60 (01-20-22 @ 12:47) (124/60 - 130/80)  BP(mean): --  RR: 18 (01-20-22 @ 12:47) (17 - 18)  SpO2: 96% (01-20-22 @ 12:47) (96% - 99%)        Constitutional: No fever, fatigue  Skin: No rash.  Eyes: No recent vision problems or eye pain.  ENT: No congestion, ear pain, or sore throat.  Cardiovascular: No chest pain or palpation.  Respiratory: No cough, shortness of breath, congestion, or wheezing.  Gastrointestinal: No abdominal pain, nausea, vomiting, or diarrhea.  Genitourinary: No dysuria.  Musculoskeletal: No joint swelling.  Neurologic: No headache.    PHYSICAL EXAM:  GENERAL: NAD  EYES: EOMI, PERRLA  NECK: Supple, No JVD  CHEST/LUNG: dec breath sounds at bases  HEART:  S1 , S2 +  ABDOMEN: soft , bs+, abd wound +  EXTREMITIES:  edema+  NEUROLOGY:alert awake  LABS:  01-20    130<L>  |  87<L>  |  19  ----------------------------<  148<H>  3.3<L>   |  28  |  5.05<H>    Ca    9.3      20 Jan 2022 08:04  Phos  3.0     01-20  Mg     2.10     01-20      Creatinine Trend: 5.05 <--, 7.68 <--, 8.24 <--, 9.22 <--, 7.74 <--, 6.10 <--, 7.48 <--                        9.3    13.84 )-----------( 400      ( 20 Jan 2022 08:08 )             32.6     Urine Studies:

## 2022-01-20 NOTE — CHART NOTE - NSCHARTNOTEFT_GEN_A_CORE
Spoke to Dr Orozco regarding abdominal wound follow up. She is unable to see pt today or tomorrow. Her NP ( Laura Dobson)  will see in am.

## 2022-01-20 NOTE — PROGRESS NOTE ADULT - SUBJECTIVE AND OBJECTIVE BOX
CC: no events    TELEMETRY:     PHYSICAL EXAM:    T(C): 36.8 (01-20-22 @ 05:01), Max: 36.9 (01-19-22 @ 17:50)  HR: 84 (01-20-22 @ 05:01) (76 - 85)  BP: 125/60 (01-20-22 @ 05:01) (125/60 - 132/55)  RR: 18 (01-20-22 @ 05:01) (17 - 19)  SpO2: 99% (01-20-22 @ 05:01) (99% - 100%)  Wt(kg): --  I&O's Summary      Appearance: Normal	  Cardiovascular: Normal S1 S2,RRR, No JVD, No murmurs  Respiratory: Lungs clear to auscultation	  Gastrointestinal:  Soft, Non-tender, + BS	  Extremities: Normal range of motion, No clubbing, cyanosis or edema  Vascular: Peripheral pulses palpable 2+ bilaterally     LABS:	 	                          9.3    13.84 )-----------( 400      ( 20 Jan 2022 08:08 )             32.6     01-20    130<L>  |  87<L>  |  19  ----------------------------<  148<H>  3.3<L>   |  28  |  5.05<H>    Ca    9.3      20 Jan 2022 08:04  Phos  3.0     01-20  Mg     2.10     01-20            CARDIAC MARKERS:

## 2022-01-20 NOTE — PROGRESS NOTE ADULT - ASSESSMENT
55 yo F with morbid obesity, CHAMP, ESRD--fistula, with known abd wound, presenting with worsening of wound  No fever, leukocytosis  COVID+  CXR clear  RA  Worsening of known abd wound  Culture on 12/2021 with pseudomonas and enterococcus  Overall,  1) Wound infection  - Chronic wound, possible superinfected  - Cefepime 1g q 24, post HD, plan for 10 day course as long as reassuring clinical status (through 1/21/22, if DC planning, can switch to Cefepime 2g post HD to complete course)  - Wound care eval to site  - Monitor for systemic signs infection  2) COVID  - RA  - Supportive care  - O2 supplementation as needed per primary team  - Check CXR if any respiratory worsening  3) Leukocytosis  - Trend to normal  4) Diarrhea  - Appears resolved, spontaneously, does not seems clinically consistent with C diff  5) Positive BCX  - F/U BCXs--NGTD  - Hold on Vanco unless clinical signs worsening/sepsis    Se Leary MD  Pager [phone]  From 5pm-9am, and on weekends call [phone]

## 2022-01-21 NOTE — DIETITIAN INITIAL EVALUATION ADULT. - OTHER INFO
56 year old female with a PMH of morbid obesity, ESRD on HD, HTN, DM, COPD sent by visiting RN for worsening wound, continues on cefepime per chart.     Patient reports fair appetite in house, noted with some intakes per RN flow sheet indicating variable PO. Reports consuming Nepro supplement daily to help meet nutritional needs. Food preferences reviewed. Reports no GI distress or chewing/swallowing difficulties. Reports not having a food allergy to strawberries, but doesn't consuming caffeine products. Patient was unsure of usual dry weight. ABW is 136.5 kg (1/20), 140.7 kg (1/18), 138.3 kg (1/14) and 138.4 kg (1/13) *post HD per chart, weight fluctuations likely in setting of HD and fluid shifts. Noted with +1 generalized edema and no pressure injuries per RN flow sheet. POC blood glucose and A1c noted per chart.    Patient declined nutrition education at this time, notified patient RD remains available as needed.
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
## 2022-01-21 NOTE — PROGRESS NOTE ADULT - SUBJECTIVE AND OBJECTIVE BOX
CC: F/U wound infection    Saw/spoke to patient. No fevers no chills. No new complaints    Allergies  latex (Rash)  penicillin (Nausea)  strawberry (Rash)    ANTIMICROBIALS:  cefepime   IVPB 1000 every 24 hours    PE:    Vital Signs Last 24 Hrs  T(C): 36.9 (21 Jan 2022 13:19), Max: 37.2 (20 Jan 2022 22:15)  T(F): 98.5 (21 Jan 2022 13:19), Max: 98.9 (20 Jan 2022 22:15)  HR: 96 (21 Jan 2022 13:19) (76 - 96)  BP: 136/47 (21 Jan 2022 13:19) (126/60 - 153/69)  RR: 18 (21 Jan 2022 13:19) (17 - 19)  SpO2: 99% (21 Jan 2022 13:19) (98% - 100%)    Gen: AOx3, NAD, non-toxic  CV: S1+S2 normal, nontachycardic  Resp: Clear bilat, no resp distress, no crackles/wheezes  Abd: Soft, nontender, +BS  Ext: No LE edema, no wounds    LABS:                        9.0    13.90 )-----------( 418      ( 21 Jan 2022 07:28 )             32.9     01-21    129<L>  |  88<L>  |  28<H>  ----------------------------<  163<H>  3.9   |  29  |  6.31<H>    Ca    8.9      21 Jan 2022 07:28  Phos  3.3     01-21  Mg     2.30     01-21    MICROBIOLOGY:    .Blood Blood  01-14-22   No Growth Final     .Blood Blood  01-14-22   No Growth Final     .Blood Blood-Peripheral  01-11-22   Growth in aerobic and anaerobic bottles: Staphylococcus epidermidis  "Susceptibilities not performed"  ***Blood Panel PCR results on this specimen are available  approximately 3 hours after the Gram stain result.***  Gram stain, PCR, and/or cultureresults may not always  correspond due to difference in methodologies.  ************************************************************  This PCR assay was performed by multiplex PCR. This  Assay tests for 66 bacterial and resistance gene targets.  Please refer to the [facility] test directory  at [email or web address] for details.  --  Blood Culture PCR    .Blood Blood-Peripheral  01-11-22   Growth in aerobic and anaerobic bottles: Staphylococcus epidermidis  --  Staphylococcus epidermidis    RADIOLOGY:    1/18 CT:    FINDINGS:  There is no acute intracranial hemorrhage, parenchymal mass, mass effect   or midline shift. There is no acute territorial infarct. There is no   hydrocephalus. Punctate calcification left fuentes. Partial empty sella    The cranium is intact. Mucosal thickening and nasal sinuses.    IMPRESSION:  No acute intracranial hemorrhage or acute territorial infarct.  If   symptoms persist, follow-up MRI exam recommended.

## 2022-01-21 NOTE — DIETITIAN INITIAL EVALUATION ADULT. - PERTINENT LABORATORY DATA
01-21 Na 129 mmol/L<L> Glu 163 mg/dL<H> K+ 3.9 mmol/L Cr 6.31 mg/dL<H> BUN 28 mg/dL<H> Phos 3.3 mg/dL  01-21 @ 12:23 POCT 164 mg/dL  01-21 @ 09:11 POCT 157 mg/dL  01-20 @ 21:14 POCT 210 mg/dL  01-20 @ 17:42 POCT 224 mg/dL  A1C with Estimated Average Glucose (01.13.22 @ 08:21), A1C with Estimated Average Glucose Result: 7.0

## 2022-01-21 NOTE — DIETITIAN INITIAL EVALUATION ADULT. - PERTINENT MEDS FT
MEDICATIONS  (STANDING):  apixaban 2.5 milliGRAM(s) Oral two times a day  aspirin enteric coated 81 milliGRAM(s) Oral daily  atorvastatin 80 milliGRAM(s) Oral at bedtime  buPROPion XL (24-Hour) . 150 milliGRAM(s) Oral daily  cefepime   IVPB 1000 milliGRAM(s) IV Intermittent every 24 hours  chlorhexidine 2% Cloths 1 Application(s) Topical daily  cinacalcet 60 milliGRAM(s) Oral at bedtime  collagenase Ointment 1 Application(s) Topical two times a day  Dakins Solution - 1/4 Strength 1 Application(s) Topical two times a day  dextrose 40% Gel 15 Gram(s) Oral once  dextrose 5%. 1000 milliLiter(s) (50 mL/Hr) IV Continuous <Continuous>  dextrose 5%. 1000 milliLiter(s) (100 mL/Hr) IV Continuous <Continuous>  dextrose 50% Injectable 25 Gram(s) IV Push once  dextrose 50% Injectable 12.5 Gram(s) IV Push once  dextrose 50% Injectable 25 Gram(s) IV Push once  diltiazem    milliGRAM(s) Oral daily  doxercalciferol Injectable 5 MICROGram(s) IV Push <User Schedule>  epoetin anabela-epbx (RETACRIT) Injectable 8000 Unit(s) IV Push <User Schedule>  gabapentin 300 milliGRAM(s) Oral daily  glucagon  Injectable 1 milliGRAM(s) IntraMuscular once  heparin   Injectable. 500 Unit(s) Dialysis. every 1 hour  insulin lispro (ADMELOG) corrective regimen sliding scale   SubCutaneous three times a day before meals  insulin lispro (ADMELOG) corrective regimen sliding scale   SubCutaneous at bedtime  loratadine 10 milliGRAM(s) Oral daily  melatonin 6 milliGRAM(s) Oral at bedtime  mirtazapine 7.5 milliGRAM(s) Oral at bedtime  montelukast 10 milliGRAM(s) Oral at bedtime  polyethylene glycol 3350 17 Gram(s) Oral two times a day  senna 2 Tablet(s) Oral at bedtime  sertraline 50 milliGRAM(s) Oral daily  sevelamer carbonate 2400 milliGRAM(s) Oral three times a day with meals  traZODone 100 milliGRAM(s) Oral at bedtime

## 2022-01-21 NOTE — PROGRESS NOTE ADULT - SUBJECTIVE AND OBJECTIVE BOX
SUBJECTIVE / OVERNIGHT EVENTS:pt seen and examined  01-21-22     MEDICATIONS  (STANDING):  apixaban 2.5 milliGRAM(s) Oral two times a day  aspirin enteric coated 81 milliGRAM(s) Oral daily  atorvastatin 80 milliGRAM(s) Oral at bedtime  buPROPion XL (24-Hour) . 150 milliGRAM(s) Oral daily  chlorhexidine 2% Cloths 1 Application(s) Topical daily  cinacalcet 60 milliGRAM(s) Oral at bedtime  collagenase Ointment 1 Application(s) Topical two times a day  Dakins Solution - 1/4 Strength 1 Application(s) Topical two times a day  dextrose 40% Gel 15 Gram(s) Oral once  dextrose 5%. 1000 milliLiter(s) (50 mL/Hr) IV Continuous <Continuous>  dextrose 5%. 1000 milliLiter(s) (100 mL/Hr) IV Continuous <Continuous>  dextrose 50% Injectable 25 Gram(s) IV Push once  dextrose 50% Injectable 12.5 Gram(s) IV Push once  dextrose 50% Injectable 25 Gram(s) IV Push once  diltiazem    milliGRAM(s) Oral daily  doxercalciferol Injectable 5 MICROGram(s) IV Push <User Schedule>  epoetin anabela-epbx (RETACRIT) Injectable 8000 Unit(s) IV Push <User Schedule>  gabapentin 300 milliGRAM(s) Oral daily  glucagon  Injectable 1 milliGRAM(s) IntraMuscular once  heparin   Injectable. 500 Unit(s) Dialysis. every 1 hour  insulin glargine Injectable (LANTUS) 4 Unit(s) SubCutaneous at bedtime  insulin lispro (ADMELOG) corrective regimen sliding scale   SubCutaneous three times a day before meals  insulin lispro (ADMELOG) corrective regimen sliding scale   SubCutaneous at bedtime  loratadine 10 milliGRAM(s) Oral daily  melatonin 6 milliGRAM(s) Oral at bedtime  mirtazapine 7.5 milliGRAM(s) Oral at bedtime  montelukast 10 milliGRAM(s) Oral at bedtime  polyethylene glycol 3350 17 Gram(s) Oral two times a day  senna 2 Tablet(s) Oral at bedtime  sertraline 50 milliGRAM(s) Oral daily  sevelamer carbonate 2400 milliGRAM(s) Oral three times a day with meals  traZODone 100 milliGRAM(s) Oral at bedtime    MEDICATIONS  (PRN):  HYDROmorphone  Injectable 1 milliGRAM(s) IV Push every 6 hours PRN Severe Pain (7 - 10)    Vital Signs Last 24 Hrs  T(C): 36.8 (01-21-22 @ 18:11), Max: 36.9 (01-21-22 @ 13:19)  T(F): 98.3 (01-21-22 @ 18:11), Max: 98.5 (01-21-22 @ 13:19)  HR: 94 (01-21-22 @ 18:11) (76 - 96)  BP: 133/78 (01-21-22 @ 18:11) (128/68 - 153/69)  BP(mean): --  RR: 18 (01-21-22 @ 18:11) (17 - 18)  SpO2: 99% (01-21-22 @ 13:19) (98% - 99%)          Constitutional: No fever, fatigue  Skin: No rash.  Eyes: No recent vision problems or eye pain.  ENT: No congestion, ear pain, or sore throat.  Cardiovascular: No chest pain or palpation.  Respiratory: No cough, shortness of breath, congestion, or wheezing.  Gastrointestinal: No abdominal pain, nausea, vomiting, or diarrhea.  Genitourinary: No dysuria.  Musculoskeletal: No joint swelling.  Neurologic: No headache.    PHYSICAL EXAM:  GENERAL: NAD  EYES: EOMI, PERRLA  NECK: Supple, No JVD  CHEST/LUNG: dec breath sounds at bases  HEART:  S1 , S2 +  ABDOMEN: soft , bs+, abd wound +  EXTREMITIES:  edema+  NEUROLOGY:alert awake    LABS:  01-21    129<L>  |  88<L>  |  28<H>  ----------------------------<  163<H>  3.9   |  29  |  6.31<H>    Ca    8.9      21 Jan 2022 07:28  Phos  3.3     01-21  Mg     2.30     01-21      Creatinine Trend: 6.31 <--, 5.05 <--, 7.68 <--, 8.24 <--, 9.22 <--, 7.74 <--, 6.10 <--                        9.0    13.90 )-----------( 418      ( 21 Jan 2022 07:28 )             32.9     Urine Studies:

## 2022-01-21 NOTE — DIETITIAN INITIAL EVALUATION ADULT. - ADD RECOMMEND
Continue diet as ordered. Follow up on nutrition education if patient amenable. Obtain weights and document PO intake to monitor trend.

## 2022-01-21 NOTE — PHYSICAL THERAPY INITIAL EVALUATION ADULT - GROSSLY INTACT, SENSORY
Bilateral distal LE light touch sensation grossly intact. Except pt. reports some tingling in right shin.

## 2022-01-21 NOTE — PROGRESS NOTE ADULT - ASSESSMENT
56y Female with history of ESRD on HD presents with vomiting and diarrhea found to be COVID-19 positive. Nephrology consulted for ESRD status.    1) ESRD: Last HD on 1/19 tolerated well with 2.5L removed. Plan for next maintenance this morning with heparin given clotting of lines off of circuit A/C. Monitor electrolytes.    2) HTN with ESRD: BP acceptable. Continue with current medications. Monitor BP.    3) Anemia of renal disease: Hb low. Continue with Epo 8K with HD. Monitor Hb.    4) Secondary HPT of renal origin: Phosphorus acceptable. Continue with sensipar 60 mg daily, hectorol 5 mcg with HD and renvela 3 tabs with meals. Monitor serum calcium and phosphorus.      [facility] NEPHROLOGY  Keaton Lopez M.D.  Juma Green D.O.  Myla Hoffmann M.D.  Julia Brewer, MSN, ANP-C    Telephone: [phone]  Facsimile: [phone]    [address] [place] [address]

## 2022-01-21 NOTE — PROGRESS NOTE ADULT - ASSESSMENT
55 yo F with morbid obesity, CHAMP, ESRD--fistula, with known abd wound, presenting with worsening of wound  No fever, leukocytosis  COVID+  CXR clear  RA  Worsening of known abd wound  Culture on 12/2021 with pseudomonas and enterococcus  Overall,  1) Wound infection  - Chronic wound, possible superinfected  - Cefepime 1g q 24, post HD, through today, 1/21/22 then discontinue  - Wound care follow up to site  - Monitor for systemic signs infection  2) COVID  - RA  - Supportive care  - O2 supplementation as needed per primary team  - Check CXR if any respiratory worsening  3) Leukocytosis  - Trend to normal  4) Diarrhea  - Appears resolved, spontaneously, does not seems clinically consistent with C diff  5) Positive BCX  - F/U BCXs--NGTD; CONS in BCX, thought to be contam, repeat BCXs NGTD  - Hold on Vanco unless clinical signs worsening/sepsis    Signing off. Please call with further questions or change in status.    Se Leary MD  Pager [phone]  From 5pm-9am, and on weekends call [phone]

## 2022-01-21 NOTE — PROGRESS NOTE ADULT - SUBJECTIVE AND OBJECTIVE BOX
CC: no events    TELEMETRY:     PHYSICAL EXAM:    T(C): 36.9 (01-21-22 @ 13:19), Max: 37.2 (01-20-22 @ 22:15)  HR: 96 (01-21-22 @ 13:19) (76 - 96)  BP: 136/47 (01-21-22 @ 13:19) (126/60 - 153/69)  RR: 18 (01-21-22 @ 13:19) (17 - 19)  SpO2: 99% (01-21-22 @ 13:19) (98% - 100%)  Wt(kg): --  I&O's Summary    21 Jan 2022 07:01  -  21 Jan 2022 13:56  --------------------------------------------------------  IN: 400 mL / OUT: 2400 mL / NET: -2000 mL        Appearance: Normal	  Cardiovascular: Normal S1 S2,RRR, No JVD, No murmurs  Respiratory: Lungs clear to auscultation	  Gastrointestinal:  Soft, Non-tender, + BS	  Extremities: Normal range of motion, No clubbing, cyanosis or edema  Vascular: Peripheral pulses palpable 2+ bilaterally     LABS:	 	                          9.0    13.90 )-----------( 418      ( 21 Jan 2022 07:28 )             32.9     01-21    129<L>  |  88<L>  |  28<H>  ----------------------------<  163<H>  3.9   |  29  |  6.31<H>    Ca    8.9      21 Jan 2022 07:28  Phos  3.3     01-21  Mg     2.30     01-21            CARDIAC MARKERS:

## 2022-01-21 NOTE — PROGRESS NOTE ADULT - SUBJECTIVE AND OBJECTIVE BOX
[facility] NEPHROLOGY- PROGRESS NOTE    56y Female with history of ESRD on HD presents with vomiting and diarrhea found to be COVID-19 positive. Nephrology consulted for ESRD status.    REVIEW OF SYSTEMS:  Gen: no changes in weight  Cards: no chest pain  Resp: no dyspnea  GI: no nausea or vomiting or diarrhea + ab wound pain  Vascular: no LE edema    caffeine (Nausea)  latex (Rash)  penicillin (Nausea)  strawberry (Rash)      Hospital Medications: Medications reviewed      VITALS:  T(F): 97.9 (01-21-22 @ 08:55), Max: 98.9 (01-20-22 @ 22:15)  HR: 81 (01-21-22 @ 08:55)  BP: 132/60 (01-21-22 @ 08:55)  RR: 18 (01-21-22 @ 08:55)  SpO2: 98% (01-21-22 @ 04:45)  Wt(kg): --        PHYSICAL EXAM:    Gen: NAD, calm  Cards: RRR, +S1/S2, no M/G/R  Resp: CTA B/L  GI: soft, RLQ bandage/tender  Vascular: no LE edema B/L, LUE AVF + bruit/thrill      LABS:  01-21    129<L>  |  88<L>  |  28<H>  ----------------------------<  163<H>  3.9   |  29  |  6.31<H>    Ca    8.9      21 Jan 2022 07:28  Phos  3.3     01-21  Mg     2.30     01-21      Creatinine Trend: 6.31 <--, 5.05 <--, 7.68 <--, 8.24 <--, 9.22 <--, 7.74 <--, 6.10 <--                        9.0    13.90 )-----------( 418      ( 21 Jan 2022 07:28 )             32.9     Urine Studies:

## 2022-01-21 NOTE — PHYSICAL THERAPY INITIAL EVALUATION ADULT - ADDITIONAL COMMENTS
Pt. reports owning a rolling walker and wheelchair. Pt. has [facility] 7 days/week for 30 hours/week. Pt. reports not being interested in rehab, would prefer to go home with Home PT.     Pt. was left in bed post PT Evaluation, no apparent distress, all lines intact, call bell within reach.

## 2022-01-21 NOTE — PROGRESS NOTE ADULT - SUBJECTIVE AND OBJECTIVE BOX
Chief Complaint: DM 2    History: Patient seen and evaluated. Receiving bedside dialysis treatment at time of visit. Reports she had English toast for breakfast. No nausea/vomiting, no s/s of hypoglycemia    MEDICATIONS  (STANDING):  apixaban 2.5 milliGRAM(s) Oral two times a day  aspirin enteric coated 81 milliGRAM(s) Oral daily  atorvastatin 80 milliGRAM(s) Oral at bedtime  buPROPion XL (24-Hour) . 150 milliGRAM(s) Oral daily  cefepime   IVPB 1000 milliGRAM(s) IV Intermittent every 24 hours  chlorhexidine 2% Cloths 1 Application(s) Topical daily  cinacalcet 60 milliGRAM(s) Oral at bedtime  collagenase Ointment 1 Application(s) Topical two times a day  Dakins Solution - 1/4 Strength 1 Application(s) Topical two times a day  dextrose 40% Gel 15 Gram(s) Oral once  dextrose 5%. 1000 milliLiter(s) (50 mL/Hr) IV Continuous <Continuous>  dextrose 5%. 1000 milliLiter(s) (100 mL/Hr) IV Continuous <Continuous>  dextrose 50% Injectable 25 Gram(s) IV Push once  dextrose 50% Injectable 12.5 Gram(s) IV Push once  dextrose 50% Injectable 25 Gram(s) IV Push once  diltiazem    milliGRAM(s) Oral daily  doxercalciferol Injectable 5 MICROGram(s) IV Push <User Schedule>  epoetin anabela-epbx (RETACRIT) Injectable 8000 Unit(s) IV Push <User Schedule>  gabapentin 300 milliGRAM(s) Oral daily  glucagon  Injectable 1 milliGRAM(s) IntraMuscular once  heparin   Injectable. 500 Unit(s) Dialysis. every 1 hour  insulin lispro (ADMELOG) corrective regimen sliding scale   SubCutaneous three times a day before meals  insulin lispro (ADMELOG) corrective regimen sliding scale   SubCutaneous at bedtime  loratadine 10 milliGRAM(s) Oral daily  melatonin 6 milliGRAM(s) Oral at bedtime  mirtazapine 7.5 milliGRAM(s) Oral at bedtime  montelukast 10 milliGRAM(s) Oral at bedtime  polyethylene glycol 3350 17 Gram(s) Oral two times a day  senna 2 Tablet(s) Oral at bedtime  sertraline 50 milliGRAM(s) Oral daily  sevelamer carbonate 2400 milliGRAM(s) Oral three times a day with meals  traZODone 100 milliGRAM(s) Oral at bedtime    MEDICATIONS  (PRN):  HYDROmorphone  Injectable 1 milliGRAM(s) IV Push every 6 hours PRN Severe Pain (7 - 10)      Allergies  latex (Rash)  penicillin (Nausea)  strawberry (Rash)    Intolerances  caffeine (Nausea)    Review of Systems:  Cardiovascular: No chest pain  Respiratory: No SOB  GI: No nausea, vomiting  Endocrine: no hypoglycemia     PHYSICAL EXAM:  VITALS: T(C): 36.9 (01-21-22 @ 13:19)  T(F): 98.5 (01-21-22 @ 13:19), Max: 98.9 (01-20-22 @ 22:15)  HR: 96 (01-21-22 @ 13:19) (76 - 96)  BP: 136/47 (01-21-22 @ 13:19) (126/60 - 153/69)  RR:  (17 - 19)  SpO2:  (98% - 100%)  Wt(kg): --  GENERAL: NAD  EYES: No proptosis, no lid lag, anicteric  HEENT:  Atraumatic, Normocephalic, moist mucous membranes  RESPIRATORY: unlabored respirations   PSYCH: Alert and oriented x 3    CAPILLARY BLOOD GLUCOSE    POCT Blood Glucose.: 164 mg/dL (21 Jan 2022 12:23)  POCT Blood Glucose.: 157 mg/dL (21 Jan 2022 09:11)  POCT Blood Glucose.: 210 mg/dL (20 Jan 2022 21:14)  POCT Blood Glucose.: 224 mg/dL (20 Jan 2022 17:42)      01-21    129<L>  |  88<L>  |  28<H>  ----------------------------<  163<H>  3.9   |  29  |  6.31<H>    EGFR if : 8<L>  EGFR if non : 7<L>    Ca    8.9      01-21  Mg     2.30     01-21  Phos  3.3     01-21      A1C with Estimated Average Glucose Result: 7.0 % (01-13-22 @ 08:21)  A1C with Estimated Average Glucose Result: 6.7 % (08-31-21 @ 09:30)  A1C with Estimated Average Glucose Result: 7.2 % (04-28-21 @ 07:04)    Diet, Consistent Carbohydrate Renal w/Evening Snack:   Supplement Feeding Modality:  Oral  Nepro Cans or Servings Per Day:  1       Frequency:  Three Times a day (01-17-22 @ 18:07)

## 2022-01-21 NOTE — DIETITIAN INITIAL EVALUATION ADULT. - ORAL INTAKE PTA/DIET HISTORY
Patient seen for assessment. Reports good appetite/PO intake at home. Reports not following a specialized diet and usually consumes 1 meal/day.

## 2022-01-21 NOTE — PHYSICAL THERAPY INITIAL EVALUATION ADULT - RANGE OF MOTION EXAMINATION, REHAB EVAL
except right shoulder ROM limited/bilateral upper extremity ROM was WFL (within functional limits)/bilateral lower extremity ROM was WFL (within functional limits)

## 2022-01-21 NOTE — PROGRESS NOTE ADULT - ASSESSMENT
56 yr old F with morbid obesity, CHAMP not on home O2, ESRD (HD MWF), HTN, DM2 A1C 7.0, COPD, Afib no longer on AC, chronic R pannus wound here with worsening wound, diarrhea and found to be COVID+. Has poor po intake at this time.     1. Type 2 diabetes mellitus   A1c 7.0% (may be inaccurate in setting of ESRD)  Home Regimen: Tresiba 80 units HS and Trulicity 1.5mg subq weekly    While inpatient:  BG target 100-180 mg/dl   Currently not on basal insulin - total daily dose of insulin in last 24h = 8 units   Recommend to start Lantus 6 units SQ qHS tonight  Reduce Admelog correctional scale to LOW dose before meals, continue low dose at bedtime   Check BG before meals and bedtime  Hypoglycemia protocol     Discharge Plan:  STOP Trulicity (patient ESRD on HD)  Likely dc plan is basal/oral regimen. For oral agent, depends on inpatient requirements but can consider renally dosed DPP4 (Januvia 25 mg or Tradjenta 5 mg daily) VS Prandin before meals   Patient may benefit from switching to 22-24h acting basal insulin eg Levemir or Lantus, instead of Tresiba  Consider CGM (such as Freestyle Libre2 outpatient)  If desiring to followup with [facility]: [address] [place] [address], [address], [facility] [address], [phone]    2. HTN  Management per primary team     3. Hyperlipidemia  Continue home dose of Atorvastatin 80 mg  Followup lipid panel as outpatient    Priscilla Patel  Nurse Practitioner  Division of Endocrinology & Diabetes  In house pager #[phone]/long range pager #[phone]    If before 9AM or after 6PM, or on weekends/holidays, please call endocrine answering service for assistance ([phone]).  For nonurgent matters email [email or web address] for assistance.  56 yr old F with morbid obesity, CHAMP not on home O2, ESRD (HD MWF), HTN, DM2 A1C 7.0, COPD, Afib no longer on AC, chronic R pannus wound here with worsening wound, diarrhea and found to be COVID+. Has poor po intake at this time.     1. Type 2 diabetes mellitus   A1c 7.0% (may be inaccurate in setting of ESRD)  Home Regimen: Tresiba 80 units HS and Trulicity 1.5mg subq weekly    While inpatient:  BG target 100-180 mg/dl   Currently not on basal insulin - total daily dose of insulin in last 24h = 8 units   Recommend to start Lantus 4 units SQ qHS tonight  Reduce Admelog correctional scale to LOW dose before meals, continue low dose at bedtime   Check BG before meals and bedtime  Hypoglycemia protocol     Discharge Plan:  STOP Trulicity (patient ESRD on HD)  Likely dc plan is basal/oral regimen. For oral agent, depends on inpatient requirements but can consider renally dosed DPP4 (Januvia 25 mg or Tradjenta 5 mg daily) VS Prandin before meals   Patient may benefit from switching to 22-24h acting basal insulin eg Levemir or Lantus, instead of Tresiba  Consider CGM (such as Freestyle Libre2 outpatient)  If desiring to followup with [facility]: [address] [place] [address], [address], [facility] [address], [phone]    2. HTN  Management per primary team     3. Hyperlipidemia  Continue home dose of Atorvastatin 80 mg  Followup lipid panel as outpatient    Priscilla Patel  Nurse Practitioner  Division of Endocrinology & Diabetes  In house pager #[phone]/long range pager #[phone]    If before 9AM or after 6PM, or on weekends/holidays, please call endocrine answering service for assistance ([phone]).  For nonurgent matters email [email or web address] for assistance.
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
## 2022-01-22 NOTE — DISCHARGE NOTE PROVIDER - NSDCFUADDAPPT_GEN_ALL_CORE_FT
Follow up with gastroenterology within 3-4 weeks of discharge. You may follow up with will bariatric surgeon Dr. Brown as outpatient.  Follow up with gastroenterology within 3-4 weeks of discharge. You may follow up with will bariatric surgeon Dr. Brown as outpatient.     Upon discharge follow up at outpatient [facility]. [address] [place] [address]. [phone].     Follow up with gastroenterology within 3-4 weeks of discharge. You may follow up with will bariatric surgeon Dr. Brown as outpatient.     Upon discharge follow up at outpatient [facility]. [address] [place] [address]. [phone].    Follow up with pain management, Dr. Mcgrath for pain medication refills.

## 2022-01-22 NOTE — DISCHARGE NOTE PROVIDER - NSDCCPCAREPLAN_GEN_ALL_CORE_FT
PRINCIPAL DISCHARGE DIAGNOSIS  Diagnosis: Open abdominal wall wound  Assessment and Plan of Treatment: You were admitted for abdominal wound. You were seen and evaluated by wound care and dermatology. Continue with  cyclosporine upon discharge.      SECONDARY DISCHARGE DIAGNOSES  Diagnosis: Afib  Assessment and Plan of Treatment: Please take your medications as prescribed.  Continue to take your blood thinner as prescribed and follow with your physician to monitor your levels.  Low fat diet, reduce caffeine intake, and exercise at least 30 minutes daily.      Diagnosis: Hypertension, unspecified type  Assessment and Plan of Treatment: Low sodium and fat diet, continue anti-hypertensive medications, and follow up with primary care physician.      Diagnosis: DM (diabetes mellitus)  Assessment and Plan of Treatment: Monitor finger sticks pre-meal and bedtime, low salt, fat and carbohydrate diet, minimize glucose intake.  Exercise daily for at least 30 minutes and weight loss.  Follow up with primary care physician and endocrinologist for routine Hemoglobin A1C checks and management.  Follow up with your ophthalmologist for routine yearly vision exams.      Diagnosis: ESRD on dialysis  Assessment and Plan of Treatment: Please follow up with your nephrologist for management, and continue you schedule hemodialysis.       PRINCIPAL DISCHARGE DIAGNOSIS  Diagnosis: Open abdominal wall wound  Assessment and Plan of Treatment: You were admitted for abdominal wound. You were seen and evaluated by wound care and dermatology. Continue with  cyclosporine upon discharge. Follow up with wound care and dermatology as outpatient.      SECONDARY DISCHARGE DIAGNOSES  Diagnosis: Afib  Assessment and Plan of Treatment: Please take your medications as prescribed.  Continue to take your blood thinner as prescribed and follow with your physician to monitor your levels.  Low fat diet, reduce caffeine intake, and exercise at least 30 minutes daily.      Diagnosis: Hypertension, unspecified type  Assessment and Plan of Treatment: Low sodium and fat diet, continue anti-hypertensive medications, and follow up with primary care physician.      Diagnosis: DM (diabetes mellitus)  Assessment and Plan of Treatment: Monitor finger sticks pre-meal and bedtime, low salt, fat and carbohydrate diet, minimize glucose intake.  Exercise daily for at least 30 minutes and weight loss.  Follow up with primary care physician and endocrinologist for routine Hemoglobin A1C checks and management.  Follow up with your ophthalmologist for routine yearly vision exams.      Diagnosis: ESRD on dialysis  Assessment and Plan of Treatment: Please follow up with your nephrologist for management, and continue you schedule hemodialysis.

## 2022-01-22 NOTE — PROGRESS NOTE ADULT - SUBJECTIVE AND OBJECTIVE BOX
SUBJECTIVE / OVERNIGHT EVENTS:pt seen and examined  01-22-22     MEDICATIONS  (STANDING):  apixaban 2.5 milliGRAM(s) Oral two times a day  aspirin enteric coated 81 milliGRAM(s) Oral daily  atorvastatin 80 milliGRAM(s) Oral at bedtime  buPROPion XL (24-Hour) . 150 milliGRAM(s) Oral daily  chlorhexidine 2% Cloths 1 Application(s) Topical daily  cinacalcet 60 milliGRAM(s) Oral at bedtime  collagenase Ointment 1 Application(s) Topical two times a day  Dakins Solution - 1/4 Strength 1 Application(s) Topical two times a day  dextrose 40% Gel 15 Gram(s) Oral once  dextrose 5%. 1000 milliLiter(s) (50 mL/Hr) IV Continuous <Continuous>  dextrose 5%. 1000 milliLiter(s) (100 mL/Hr) IV Continuous <Continuous>  dextrose 50% Injectable 25 Gram(s) IV Push once  dextrose 50% Injectable 12.5 Gram(s) IV Push once  dextrose 50% Injectable 25 Gram(s) IV Push once  diltiazem    milliGRAM(s) Oral daily  doxercalciferol Injectable 5 MICROGram(s) IV Push <User Schedule>  epoetin anabela-epbx (RETACRIT) Injectable 8000 Unit(s) IV Push <User Schedule>  gabapentin 300 milliGRAM(s) Oral daily  glucagon  Injectable 1 milliGRAM(s) IntraMuscular once  heparin   Injectable. 500 Unit(s) Dialysis. every 1 hour  insulin glargine Injectable (LANTUS) 4 Unit(s) SubCutaneous at bedtime  insulin lispro (ADMELOG) corrective regimen sliding scale   SubCutaneous three times a day before meals  insulin lispro (ADMELOG) corrective regimen sliding scale   SubCutaneous at bedtime  loratadine 10 milliGRAM(s) Oral daily  melatonin 6 milliGRAM(s) Oral at bedtime  mirtazapine 7.5 milliGRAM(s) Oral at bedtime  montelukast 10 milliGRAM(s) Oral at bedtime  polyethylene glycol 3350 17 Gram(s) Oral two times a day  senna 2 Tablet(s) Oral at bedtime  sertraline 50 milliGRAM(s) Oral daily  sevelamer carbonate 1600 milliGRAM(s) Oral three times a day with meals  traZODone 100 milliGRAM(s) Oral at bedtime    MEDICATIONS  (PRN):  acetaminophen     Tablet .. 650 milliGRAM(s) Oral every 6 hours PRN Mild Pain (1 - 3), Moderate Pain (4 - 6)  aluminum hydroxide/magnesium hydroxide/simethicone Suspension 30 milliLiter(s) Oral every 4 hours PRN Dyspepsia  HYDROmorphone  Injectable 1 milliGRAM(s) IV Push every 6 hours PRN Severe Pain (7 - 10)    Vital Signs Last 24 Hrs  T(C): 36.7 (01-22-22 @ 21:28), Max: 37 (01-22-22 @ 11:41)  T(F): 98 (01-22-22 @ 21:28), Max: 98.6 (01-22-22 @ 11:41)  HR: 77 (01-22-22 @ 21:28) (77 - 88)  BP: 115/54 (01-22-22 @ 21:28) (111/50 - 128/66)  BP(mean): --  RR: 19 (01-22-22 @ 21:28) (18 - 19)  SpO2: 100% (01-22-22 @ 21:28) (97% - 100%)          Constitutional: No fever, fatigue  Skin: No rash.  Eyes: No recent vision problems or eye pain.  ENT: No congestion, ear pain, or sore throat.  Cardiovascular: No chest pain or palpation.  Respiratory: No cough, shortness of breath, congestion, or wheezing.  Gastrointestinal: No abdominal pain, nausea, vomiting, or diarrhea.  Genitourinary: No dysuria.  Musculoskeletal: No joint swelling.  Neurologic: No headache.    PHYSICAL EXAM:  GENERAL: NAD  EYES: EOMI, PERRLA  NECK: Supple, No JVD  CHEST/LUNG: dec breath sounds at bases  HEART:  S1 , S2 +  ABDOMEN: soft , bs+, abd wound +  EXTREMITIES:  edema+  NEUROLOGY:alert awake    LABS:  01-22    133<L>  |  93<L>  |  26<H>  ----------------------------<  208<H>  5.0   |  26  |  4.80<H>    Ca    9.6      22 Jan 2022 06:32  Phos  2.1     01-22  Mg     2.50     01-22      Creatinine Trend: 4.80 <--, 6.31 <--, 5.05 <--, 7.68 <--, 8.24 <--, 9.22 <--, 7.74 <--                        9.6    17.43 )-----------( 422      ( 22 Jan 2022 06:32 )             35.6     Urine Studies:

## 2022-01-22 NOTE — CHART NOTE - NSCHARTNOTEFT_GEN_A_CORE
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 Derm on call requested consult be re-called in on Monday. Will sign out to have provider team call back on Monday.

## 2022-01-22 NOTE — DISCHARGE NOTE PROVIDER - NSFOLLOWUPCLINICS_GEN_ALL_ED_FT
[facility] - [place]  Dermatology  [address], [address]  [place] [address]  Phone: [phone]  Fax: [phone]     [facility] - [place]  Dermatology  [address], [address]  [place] [address]  Phone: [phone]  Fax: [phone]    [facility]  Endocrinology  [address] [place] [id number]  Phone: [phone]  Fax:

## 2022-01-22 NOTE — PROGRESS NOTE ADULT - SUBJECTIVE AND OBJECTIVE BOX
[facility] NEPHROLOGY- PROGRESS NOTE    56y Female with history of ESRD on HD presents with vomiting and diarrhea found to be COVID-19 positive. Nephrology consulted for ESRD status.    REVIEW OF SYSTEMS:  Gen: no changes in weight  Cards: no chest pain  Resp: no dyspnea  GI: no nausea or vomiting or diarrhea + ab wound pain  Vascular: no LE edema    caffeine (Nausea)  latex (Rash)  penicillin (Nausea)  strawberry (Rash)      Hospital Medications: Medications reviewed      VITALS:  T(F): 98.5 (01-22-22 @ 08:45), Max: 98.5 (01-21-22 @ 13:19)  HR: 88 (01-22-22 @ 08:45)  BP: 128/66 (01-22-22 @ 08:45)  RR: 18 (01-22-22 @ 08:45)  SpO2: 97% (01-22-22 @ 08:45)  Wt(kg): --    01-21 @ 07:01  - 01-22 @ 07:00  --------------------------------------------------------  IN: 400 mL / OUT: 2400 mL / NET: -2000 mL        Weight (kg): 136.1 (01-22 @ 08:23)        PHYSICAL EXAM:    Gen: NAD, calm  Cards: RRR, +S1/S2, no M/G/R  Resp: CTA B/L  GI: soft, RLQ bandage/tender  Vascular: no LE edema B/L, LUE AVF + bruit/thrill      LABS:  01-22    133<L>  |  93<L>  |  26<H>  ----------------------------<  208<H>  5.0   |  26  |  4.80<H>    Ca    9.6      22 Jan 2022 06:32  Phos  2.1     01-22  Mg     2.50     01-22      Creatinine Trend: 4.80 <--, 6.31 <--, 5.05 <--, 7.68 <--, 8.24 <--, 9.22 <--, 7.74 <--                        9.6    17.43 )-----------( 422      ( 22 Jan 2022 06:32 )             35.6     Urine Studies:

## 2022-01-22 NOTE — DISCHARGE NOTE PROVIDER - HOSPITAL COURSE
55 yo f with worsening pannus wound    Chronic abdominal wound infection.   - BCX - gram + cocci in clusters  - Vascular c/s following - recommended would care for debridement as well as to consider abx  - ID c/s Dr. Leary following - s/p IV Cefepime post HD  - Derm c/s following - pt may benefit from wound vac, avoid debridement given possible pyoderma gangrenosum, d/w renal regarding empiric therapy for calciphylaxis while awaiting bx results, ensure pt not on any meds w/ calcium (ca-based phosphate binders)  - Wound care Dr. Layne following   - Pain Management following  - Wound re-culture w/ psuedomonas. Per ID re-eval: maintain off abx      Afib. CHADSVasc: 3  - Was not on AC, Started Eliquis here  - Cardio c/s Dr. Bullock following  -  cardizem, aspirin continued   - Pt to follow up with cardiology as outpatient    ESRD on dialysis.   - Renal following   - Pt continued on HD during admission  - sevelamer, sensipar continued   - Pt to follow up with nephrology and continue with HD as outpatient.     2019 novel coronavirus disease (COVID-19).   ·  Plan: no hypoxia.    DM (diabetes mellitus).   ·  Plan: reduce 80 Tresiba to 64 lantus, place on mod ISS  on Trulicity q weekly.    Diarrhea.   ·  Plan: improved    derm evaluated pt - poss calciphylaxis, stop solumedrol as per derm,  Erosive changes involving the sacroiliac joints are new may represent renal osteodystrophy.    Right upper lobe nodular opacities of uncertain etiology but may be infectious.    Fibroid uterus. Air and debris noted in the endometrial canal.    On_________, discussed with __________, patient is medically cleared and optimized for discharge today. All medications were reviewed with attending, and sent to mutually agreed upon pharmacy.   57 yo f with worsening pannus wound    Chronic abdominal wound infection.   - BCX - gram + cocci in clusters  - Vascular c/s following - recommended would care for debridement as well as to consider abx  - ID c/s Dr. Leary following - s/p IV Cefepime post HD  - Derm c/s following - pt may benefit from wound vac, avoid debridement given possible pyoderma gangrenosum, d/w renal regarding empiric therapy for calciphylaxis while awaiting bx results, ensure pt not on any meds w/ calcium (ca-based phosphate binders)  - Wound care Dr. Layne following   - Pain Management following  - Wound re-culture w/ psuedomonas. Per ID re-eval: maintain off abx      Afib. CHADSVasc: 3  - Was not on AC, Started Eliquis here  - Cardio c/s Dr. Bullock following  -  cardizem, aspirin continued   - Pt to follow up with cardiology as outpatient    ESRD on dialysis.   - Renal following   - Pt continued on HD during admission  - sevelamer, sensipar continued   - Pt to follow up with nephrology and continue with HD as outpatient.     2019 novel coronavirus disease (COVID-19).   ·  Plan: no hypoxia.    DM (diabetes mellitus).   ·  Plan: reduce 80 Tresiba to 64 lantus, place on mod ISS  on Trulicity q weekly.    Diarrhea.   ·  Plan: improved    derm evaluated pt - poss calciphylaxis, stop solumedrol as per derm,  Erosive changes involving the sacroiliac joints are new may represent renal osteodystrophy.    Right upper lobe nodular opacities of uncertain etiology but may be infectious.    Fibroid uterus  -  Air and debris noted in the endometrial canal.  - Pt to follow up with gyn as outpatient     On_________, discussed with __________, patient is medically cleared and optimized for discharge today. All medications were reviewed with attending, and sent to mutually agreed upon pharmacy.   56F with PMHx Morbid obesity, CHAMP not on home O2, ESRD (HD MWF), HTN, DM, COPD, Afib (no longer on AC), chronic R pannus wound, followed by Dr. Layne, sent by visiting RN for worsening wound.    Chronic abdominal wound infection.   - BCX - gram + cocci in clusters  - Vascular c/s following - recommended would care for debridement as well as to consider abx  - ID c/s Dr. Leary following - s/p IV Cefepime post HD  - Derm c/s following - pt may benefit from wound vac, avoid debridement given possible pyoderma gangrenosum, d/w renal regarding empiric therapy for calciphylaxis while awaiting bx results, ensure pt not on any meds w/ calcium (ca-based phosphate binders)  - Wound care Dr. Layne following   - Pain Management following  - Wound re-culture w/ psuedomonas. Per ID re-eval: maintain off abx     Pyoderma gangrenosum   - Derm consulted   - Improving with cyclosporine 125mg BID since 2/8, pt tolerating well, no hypertension, labs reviewed.  - discontinued sodium thiosulfate  - increased cyclosporine to 150 mg BID (~3.5mg/kg dosing weight divided BID)     Afib. CHADSVasc: 3  - Was not on AC, Started Eliquis here  - Cardio c/s Dr. Bullock following  -  cardizem, aspirin continued   - Pt to follow up with cardiology as outpatient    ESRD on dialysis.   - Renal following   - Pt continued on HD during admission  - sevelamer, sensipar continued   - Pt to follow up with nephrology and continue with HD as outpatient.     2019 novel coronavirus disease (COVID-19).   -  no hypoxia.    DM (diabetes mellitus).   - A1c 7.0% (may be inaccurate in setting of ESRD)  - Home Regimen: Tresiba 80 units HS and Trulicity 1.5mg subq weekly  - Consistent carbohydrate diet  - Discharge Plan: STOP Trulicity (patient ESRD on HD)  For dc to rehab- recommend to continue current insulin management as outlined above  For dc to home- Recommend basal plus PO regimen  Patient was on Tresiba at home may benefit from a different Long acting insulin such as Lantus or Levemir given ESRD.  Tresiba is ultra-Long acting insulin  Please assess insurance coverage for basal insulin pens:  Levemir, Lantus, Basaglar, Toujeo or Semglee.  Can d/c on current basal dose.  Rehab will titrate dosing for d/c  Recommend Tradjenta 5 mg po daily, if not covered can see if Januvia 25 mg po daily is covered.  Please obtain prior auth if needed as patient is ESRD and DPP4i class are indicated for patients with ESRD  Also for d/c from rehab can start Prandin 1 mg po TID AC- hold if skips meal  Consider CGM (such as Freestyle Libre2 outpatient)  - If desiring to followup with [facility] Endocr[facility]ology: [address], [address], [place] [address], [phone].    Diarrhea.   - improved    Back Pain   - CT L spine:  Diffuse bony sclerosis. Correlate with possible metabolic   etiology such as renal osteodystrophy.  Multiple well marginated lucencies adjacent to the vertebral endplates   stable from the most recent exam but increased since 8/30/2021. While   these likely represent Schmorl's nodes, the possibility of an   infiltrative process such as metastatic disease is not entirely excluded.  Spondylitic changes with disc bulges as described above.  - derm evaluated pt - poss calciphylaxis, stop solumedrol as per derm,  - Erosive changes involving the sacroiliac joints are new may represent renal osteodystrophy.  - Pt to follow up with nephrology as outpt     Fibroid uterus  -  Air and debris noted in the endometrial canal.  - Pt to follow up with gyn as outpatient     On 3/2/2022, discussed with Dr. Maurice, patient is medically cleared and optimized for discharge today. All medications were reviewed with attending, and sent to mutually agreed upon pharmacy.   56F with PMHx Morbid obesity, CHAMP not on home O2, ESRD (HD MWF), HTN, DM, COPD, Afib (no longer on AC), chronic R pannus wound, followed by Dr. Layne, sent by visiting RN for worsening wound.    Chronic abdominal wound infection.   - BCX - gram + cocci in clusters  - Vascular c/s following - recommended would care for debridement as well as to consider abx  - ID c/s Dr. Leary following - s/p IV Cefepime post HD  - Derm c/s following - pt may benefit from wound vac, avoid debridement given possible pyoderma gangrenosum, d/w renal regarding empiric therapy for calciphylaxis while awaiting bx results, ensure pt not on any meds w/ calcium (ca-based phosphate binders)  - Wound care Dr. Layne following   - Pain Management following, Pt will need to follow up with pain management upon discharge.   - Wound re-culture w/ psuedomonas. Per ID re-eval: maintain off abx   At this time:  - c/w cyclosporine 150mg BID (~3 mg/kg based on dosing weight), continue to monitor blood pressure, electrolytes  - c/w wound care: c/w topical tacrolimus 0.01% ointment 1-2x daily to wound edges and aquacel Ag dressing  -Topical dressing: Cleanse with Dakins 1/4 strength. Apply Liquid barrier film to periwound skin. Adaptic touch to wound base for atraumatic dressing application and removal. Apply Topical Tacrolimus 0.1% ointment to wound base, cover with Aquacel AG, and abdominal pad. Change daily.  -Interdry textile sheeting beneath abdominal pannus leaving 2" out at end to wick.    Pyoderma gangrenosum   - Derm consulted   - Improving with cyclosporine 125mg BID since 2/8, pt tolerating well, no hypertension, labs reviewed.  - discontinued sodium thiosulfate  - increased cyclosporine to 150 mg BID (~3.5mg/kg dosing weight divided BID)     Afib. CHADSVasc: 3  - Was not on AC, Started Eliquis here  - Cardio c/s Dr. Bullock following  -  cardizem, aspirin continued   - Pt to follow up with cardiology as outpatient    ESRD on dialysis.   - Renal following   - Pt continued on HD during admission  - sevelamer, sensipar continued   - Pt to follow up with nephrology and continue with HD as outpatient.     2019 novel coronavirus disease (COVID-19).   -  no hypoxia.    DM (diabetes mellitus).   - A1c 7.0% (may be inaccurate in setting of ESRD)  - Home Regimen: Tresiba 80 units HS and Trulicity 1.5mg subq weekly  - Consistent carbohydrate diet  - Discharge Plan: STOP Trulicity (patient ESRD on HD)  For dc to rehab- recommend to continue current insulin management as outlined above  For dc to home- Recommend basal plus PO regimen  Patient was on Tresiba at home may benefit from a different Long acting insulin such as Lantus or Levemir given ESRD.  Tresiba is ultra-Long acting insulin  Please assess insurance coverage for basal insulin pens:  Levemir, Lantus, Basaglar, Toujeo or Semglee.  Can d/c on current basal dose.  Rehab will titrate dosing for d/c  Recommend Tradjenta 5 mg po daily, if not covered can see if Januvia 25 mg po daily is covered.  Please obtain prior auth if needed as patient is ESRD and DPP4i class are indicated for patients with ESRD  Also for d/c from rehab can start Prandin 1 mg po TID AC- hold if skips meal  Consider CGM (such as Freestyle Libre2 outpatient)  - If desiring to followup with [facility] [place] [facility]: [address], [address], [place] [address], [phone].    Diarrhea.   - improved    Back Pain   - CT L spine:  Diffuse bony sclerosis. Correlate with possible metabolic   etiology such as renal osteodystrophy.  Multiple well marginated lucencies adjacent to the vertebral endplates   stable from the most recent exam but increased since 8/30/2021. While   these likely represent Schmorl's nodes, the possibility of an   infiltrative process such as metastatic disease is not entirely excluded.  Spondylitic changes with disc bulges as described above.  - derm evaluated pt - poss calciphylaxis, stop solumedrol as per derm,  - Erosive changes involving the sacroiliac joints are new may represent renal osteodystrophy.  - Pt to follow up with nephrology as outpt     Fibroid uterus  -  Air and debris noted in the endometrial canal.  - Pt to follow up with gyn as outpatient     On 3/3/2022, discussed with Dr. Maurice, patient is medically cleared and optimized for discharge today. All medications were reviewed with attending, and sent to mutually agreed upon pharmacy.

## 2022-01-22 NOTE — PROGRESS NOTE ADULT - SUBJECTIVE AND OBJECTIVE BOX
CC: no events    TELEMETRY:     PHYSICAL EXAM:    T(C): 36.9 (01-22-22 @ 08:45), Max: 36.9 (01-21-22 @ 13:19)  HR: 88 (01-22-22 @ 08:45) (79 - 96)  BP: 128/66 (01-22-22 @ 08:45) (117/60 - 136/47)  RR: 18 (01-22-22 @ 08:45) (18 - 18)  SpO2: 97% (01-22-22 @ 08:45) (97% - 100%)  Wt(kg): --  I&O's Summary    21 Jan 2022 07:01  -  22 Jan 2022 07:00  --------------------------------------------------------  IN: 400 mL / OUT: 2400 mL / NET: -2000 mL        Appearance: Normal	  Cardiovascular: Normal S1 S2,RRR, No JVD, No murmurs  Respiratory: Lungs clear to auscultation	  Gastrointestinal:  Soft, Non-tender, + BS	  Extremities: Normal range of motion, No clubbing, cyanosis or edema  Vascular: Peripheral pulses palpable 2+ bilaterally     LABS:	 	                          9.6    17.43 )-----------( 422      ( 22 Jan 2022 06:32 )             35.6     01-22    133<L>  |  93<L>  |  26<H>  ----------------------------<  208<H>  5.0   |  26  |  4.80<H>    Ca    9.6      22 Jan 2022 06:32  Phos  2.1     01-22  Mg     2.50     01-22            CARDIAC MARKERS:

## 2022-01-22 NOTE — PROGRESS NOTE ADULT - ASSESSMENT
56y Female with history of ESRD on HD presents with vomiting and diarrhea found to be COVID-19 positive. Nephrology consulted for ESRD status.    1) ESRD: Last HD on 1/21 tolerated well with 2L removed. Plan for next maintenance HD on 1/24 with heparin. Monitor electrolytes.    2) HTN with ESRD: BP acceptable. Continue with current medications. Monitor BP.    3) Anemia of renal disease: Hb low. Continue with Epo 8K with HD. Monitor Hb.    4) Secondary HPT of renal origin: Phosphorus low for which will decrease renvela to 2 tabs with meals. Continue with sensipar 60 mg daily and hectorol 5 mcg with HD. Monitor serum calcium and phosphorus.    5) Ab wound: Given concern for calciphylaxis as per wound care, would call Derm evaluation to help aid in confirmation in diagnosis. Check iPTH.      [facility]  Keaton Lopez M.D.  Juma Green D.O.  Myla Hoffmann M.D.  Julia Brewer, MSN, ANP-C    Telephone: [phone]  Facsimile: [phone]    [address] [place] [address]

## 2022-01-22 NOTE — CHART NOTE - NSCHARTNOTEFT_GEN_A_CORE
Spoke to infectious disease regarding leukocytosis. Recommended trending for one more day and recalling tomorrow if continues to uptrend. Patient finished Cefepime course yesterday. Continue to monitor off of abx thru tomorrow unless clinical status changes.

## 2022-01-22 NOTE — DISCHARGE NOTE PROVIDER - NSDCFUADDINST_GEN_ALL_CORE_FT
-Topical dressing: Cleanse with Dakins 1/4 strength. Apply Liquid barrier film to periwound skin. Adaptic touch to wound base for atraumatic dressing application and removal. Apply Topical Tacrolimus 0.1% ointment to wound base, cover with Aquacel AG, and abdominal pad. Change daily.

## 2022-01-22 NOTE — DISCHARGE NOTE PROVIDER - NSDCMRMEDTOKEN_GEN_ALL_CORE_FT
aspirin 81 mg oral delayed release tablet: 1 tab(s) orally once a day  buPROPion 150 mg/24 hours (XL) oral tablet, extended release: 1 tab(s) orally once a day (in the morning)  cetirizine 10 mg oral tablet: 1 tab(s) orally once a day  dilTIAZem 120 mg/24 hours oral tablet, extended release: 1 tab(s) orally once a day  doxepin 25 mg oral capsule: 1 cap(s) orally once a day (at bedtime)  Eliquis 2.5 mg oral tablet: 1 tab(s) orally 2 times a day    Pharmacy states patient no longer wants to fill this medication  furosemide 80 mg oral tablet: 1 tab(s) orally once a day    Pharmacy states patient no longer wants to fill this medication  gabapentin 300 mg oral capsule: 1 cap(s) orally 2 times a day  hydrOXYzine hydrochloride 25 mg oral tablet: 1 tab(s) orally 2 times a day, As Needed  lanthanum 1000 mg oral tablet, chewable: 2 tab(s) orally with meals and 1 tab(s) orally with snacks    Pharmacy states patient no longer wants to fill this medication  mirtazapine 7.5 mg oral tablet: 1 tab(s) orally once a day (at bedtime)  montelukast 10 mg oral tablet: 1 tab(s) orally once a day (in the evening)  mupirocin 2% topical ointment: Apply sparingly to affected area 2 times a day as directed  nystatin 100,000 units/mL oral suspension: 5 milliliter(s) orally 4 times a day, as directed  omeprazole 20 mg oral delayed release capsule: 2 cap(s) orally once a day before breakfast  Pennsaid 2% topical solution: Apply topically to affected area 2 times a day  rosuvastatin 40 mg oral tablet: 1 tab(s) orally once a day  Santyl 250 units/g topical ointment: Apply topically to affected area once a day as directed  sertraline 50 mg oral tablet: 1 tab(s) orally once a day  Spiriva HandiHaler 18 mcg inhalation capsule: 1 cap(s) inhaled once a day  traZODone 100 mg oral tablet: 1 tab(s) orally once a day (at bedtime)  Tresiba FlexTouch 100 units/mL subcutaneous solution: 80 unit(s) subcutaneous once a day (at bedtime)  Trulicity Pen 1.5 mg/0.5 mL subcutaneous solution: 1 dose(s) subcutaneous once a week   aspirin 81 mg oral delayed release tablet: 1 tab(s) orally once a day  buPROPion 150 mg/24 hours (XL) oral tablet, extended release: 1 tab(s) orally once a day (in the morning)  cetirizine 10 mg oral tablet: 1 tab(s) orally once a day  dilTIAZem 120 mg/24 hours oral tablet, extended release: 1 tab(s) orally once a day  doxepin 25 mg oral capsule: 1 cap(s) orally once a day (at bedtime)  Eliquis 2.5 mg oral tablet: 1 tab(s) orally 2 times a day    Pharmacy states patient no longer wants to fill this medication  furosemide 80 mg oral tablet: 1 tab(s) orally once a day    Pharmacy states patient no longer wants to fill this medication  gabapentin 300 mg oral capsule: 1 cap(s) orally 2 times a day  hydrOXYzine hydrochloride 25 mg oral tablet: 1 tab(s) orally 2 times a day, As Needed  lanthanum 1000 mg oral tablet, chewable: 2 tab(s) orally with meals and 1 tab(s) orally with snacks    Pharmacy states patient no longer wants to fill this medication  Lantus Solostar Pen 100 units/mL subcutaneous solution: 80 unit(s) subcutaneous once a day (at bedtime)   Levemir FlexTouch 100 units/mL subcutaneous solution: 80 unit(s) subcutaneous once a day (at bedtime)   mirtazapine 7.5 mg oral tablet: 1 tab(s) orally once a day (at bedtime)  montelukast 10 mg oral tablet: 1 tab(s) orally once a day (in the evening)  mupirocin 2% topical ointment: Apply sparingly to affected area 2 times a day as directed  nystatin 100,000 units/mL oral suspension: 5 milliliter(s) orally 4 times a day, as directed  omeprazole 20 mg oral delayed release capsule: 2 cap(s) orally once a day before breakfast  Pennsaid 2% topical solution: Apply topically to affected area 2 times a day  rosuvastatin 40 mg oral tablet: 1 tab(s) orally once a day  Santyl 250 units/g topical ointment: Apply topically to affected area once a day as directed  sertraline 50 mg oral tablet: 1 tab(s) orally once a day  Spiriva HandiHaler 18 mcg inhalation capsule: 1 cap(s) inhaled once a day  traZODone 100 mg oral tablet: 1 tab(s) orally once a day (at bedtime)  Tresiba FlexTouch 100 units/mL subcutaneous solution: 80 unit(s) subcutaneous once a day (at bedtime)  Trulicity Pen 1.5 mg/0.5 mL subcutaneous solution: 1 dose(s) subcutaneous once a week   aspirin 81 mg oral delayed release tablet: 1 tab(s) orally once a day  buPROPion 150 mg/24 hours (XL) oral tablet, extended release: 1 tab(s) orally once a day (in the morning)  cetirizine 10 mg oral tablet: 1 tab(s) orally once a day  dilTIAZem 120 mg/24 hours oral tablet, extended release: 1 tab(s) orally once a day  doxepin 25 mg oral capsule: 1 cap(s) orally once a day (at bedtime)  Eliquis 2.5 mg oral tablet: 1 tab(s) orally 2 times a day    Pharmacy states patient no longer wants to fill this medication  furosemide 80 mg oral tablet: 1 tab(s) orally once a day    Pharmacy states patient no longer wants to fill this medication  gabapentin 300 mg oral capsule: 1 cap(s) orally 2 times a day  hydrOXYzine hydrochloride 25 mg oral tablet: 1 tab(s) orally 2 times a day, As Needed  lanthanum 1000 mg oral tablet, chewable: 2 tab(s) orally with meals and 1 tab(s) orally with snacks    Pharmacy states patient no longer wants to fill this medication  Levemir FlexTouch 100 units/mL subcutaneous solution: 80 unit(s) subcutaneous once a day (at bedtime)   mirtazapine 7.5 mg oral tablet: 1 tab(s) orally once a day (at bedtime)  montelukast 10 mg oral tablet: 1 tab(s) orally once a day (in the evening)  mupirocin 2% topical ointment: Apply sparingly to affected area 2 times a day as directed  nystatin 100,000 units/mL oral suspension: 5 milliliter(s) orally 4 times a day, as directed  omeprazole 20 mg oral delayed release capsule: 2 cap(s) orally once a day before breakfast  Pennsaid 2% topical solution: Apply topically to affected area 2 times a day  rosuvastatin 40 mg oral tablet: 1 tab(s) orally once a day  Santyl 250 units/g topical ointment: Apply topically to affected area once a day as directed  sertraline 50 mg oral tablet: 1 tab(s) orally once a day  Spiriva HandiHaler 18 mcg inhalation capsule: 1 cap(s) inhaled once a day  Tradjenta 5 mg oral tablet: 1 tab(s) orally once a day     Please test for coverage  veronica [id number]  traZODone 100 mg oral tablet: 1 tab(s) orally once a day (at bedtime)  Tresiba FlexTouch 100 units/mL subcutaneous solution: 80 unit(s) subcutaneous once a day (at bedtime)  Trulicity Pen 1.5 mg/0.5 mL subcutaneous solution: 1 dose(s) subcutaneous once a week   aspirin 81 mg oral delayed release tablet: 1 tab(s) orally once a day  buPROPion 150 mg/24 hours (XL) oral tablet, extended release: 1 tab(s) orally once a day (in the morning)  cycloSPORINE modified 50 mg oral capsule: 3 cap(s) orally every 12 hours  dilTIAZem 120 mg/24 hours oral tablet, extended release: 1 tab(s) orally once a day  Eliquis 2.5 mg oral tablet: 1 tab(s) orally 2 times a day    Pharmacy states patient no longer wants to fill this medication  gabapentin 300 mg oral capsule: 1 cap(s) orally 2 times a day  insulin glargine: 10 unit(s) subcutaneous once a day (at bedtime)  insulin lispro 100 units/mL injectable solution: 5 unit(s) injectable 3 times a day (before meals)  insulin lispro 100 units/mL injectable solution: 0 Unit(s) if Glucose 61 - 250  1 Unit(s) if Glucose 251 - 300  2 Unit(s) if Glucose 301 - 350  3 Unit(s) if Glucose 351 - 400  4 Unit(s) if Glucose Greater Than 400  insulin lispro 100 units/mL injectable solution: 1 Unit(s) if Glucose 151 - 200  2 Unit(s) if Glucose 201 - 250  3 Unit(s) if Glucose 251 - 300  4 Unit(s) if Glucose 301 - 350  5 Unit(s) if Glucose 351 - 400  6 Unit(s) if Glucose Greater Than 400  Levemir FlexTouch 100 units/mL subcutaneous solution: 80 unit(s) subcutaneous once a day (at bedtime)   loratadine 10 mg oral tablet: 1 tab(s) orally once a day  mirtazapine 7.5 mg oral tablet: 1 tab(s) orally once a day (at bedtime)  montelukast 10 mg oral tablet: 1 tab(s) orally once a day (in the evening)  oxyCODONE 10 mg oral tablet, extended release: 1 tab(s) orally every 12 hours  oxyCODONE 7.5 mg oral tablet: 1 tab(s) orally every 4 hours, As needed, Severe Pain (7 - 10)  pantoprazole 40 mg oral delayed release tablet: 1 tab(s) orally 2 times a day  polyethylene glycol 3350 oral powder for reconstitution: 17 gram(s) orally 2 times a day  senna oral tablet: 2 tab(s) orally once a day (at bedtime)  sertraline 50 mg oral tablet: 1 tab(s) orally once a day  sevelamer carbonate 800 mg oral tablet: 1 tab(s) orally 3 times a day (with meals)  Tradjenta 5 mg oral tablet: 1 tab(s) orally once a day     Please test for coverage  veronica [id number]  traZODone 100 mg oral tablet: 1 tab(s) orally once a day (at bedtime)  Tresiba FlexTouch 100 units/mL subcutaneous solution: 80 unit(s) subcutaneous once a day (at bedtime)  Trulicity Pen 1.5 mg/0.5 mL subcutaneous solution: 1 dose(s) subcutaneous once a week   aspirin 81 mg oral delayed release tablet: 1 tab(s) orally once a day  buPROPion 150 mg/24 hours (XL) oral tablet, extended release: 1 tab(s) orally once a day (in the morning)  cinacalcet 60 mg oral tablet: 1 tab(s) orally once a day  cycloSPORINE modified 50 mg oral capsule: 3 cap(s) orally every 12 hours  dilTIAZem 120 mg/24 hours oral capsule, extended release: 1 cap(s) orally once a day  Eliquis 2.5 mg oral tablet: 1 tab(s) orally 2 times a day    Pharmacy states patient no longer wants to fill this medication  gabapentin 300 mg oral capsule: 1 cap(s) orally 2 times a day  insulin glargine: 10 unit(s) subcutaneous once a day (at bedtime)  insulin lispro 100 units/mL injectable solution: 5 unit(s) injectable 3 times a day (before meals)  insulin lispro 100 units/mL injectable solution: 0 Unit(s) if Glucose 61 - 250  1 Unit(s) if Glucose 251 - 300  2 Unit(s) if Glucose 301 - 350  3 Unit(s) if Glucose 351 - 400  4 Unit(s) if Glucose Greater Than 400  insulin lispro 100 units/mL injectable solution: 1 Unit(s) if Glucose 151 - 200  2 Unit(s) if Glucose 201 - 250  3 Unit(s) if Glucose 251 - 300  4 Unit(s) if Glucose 301 - 350  5 Unit(s) if Glucose 351 - 400  6 Unit(s) if Glucose Greater Than 400  loratadine 10 mg oral tablet: 1 tab(s) orally once a day  midodrine 5 mg oral tablet: 1 tab(s) orally 3 times a week, 30 minute prior to dialysis sessions  mirtazapine 7.5 mg oral tablet: 1 tab(s) orally once a day (at bedtime)  montelukast 10 mg oral tablet: 1 tab(s) orally once a day (in the evening)  oxyCODONE 10 mg oral tablet, extended release: 1 tab(s) orally every 12 hours  oxyCODONE 7.5 mg oral tablet: 1 tab(s) orally every 4 hours, As needed, Severe Pain (7 - 10)  pantoprazole 40 mg oral delayed release tablet: 1 tab(s) orally 2 times a day  polyethylene glycol 3350 oral powder for reconstitution: 17 gram(s) orally 2 times a day  senna oral tablet: 2 tab(s) orally once a day (at bedtime)  sertraline 50 mg oral tablet: 1 tab(s) orally once a day  sevelamer carbonate 800 mg oral tablet: 1 tab(s) orally 3 times a day (with meals)  sodium hypochlorite 0.25% topical solution: 1 application topically once a day  tacrolimus 0.1% topical ointment: 1 application topically once a day  traZODone 100 mg oral tablet: 1 tab(s) orally once a day (at bedtime)   aspirin 81 mg oral delayed release tablet: 1 tab(s) orally once a day  buPROPion 150 mg/24 hours (XL) oral tablet, extended release: 1 tab(s) orally once a day (in the morning)  cinacalcet 60 mg oral tablet: 1 tab(s) orally once a day  cycloSPORINE modified 100 mg oral capsule: 2 cap(s) orally at night daily  cycloSPORINE modified 50 mg oral capsule: 3 cap(s) orally once a day in the morning   dilTIAZem 120 mg/24 hours oral capsule, extended release: 1 cap(s) orally once a day  Eliquis 2.5 mg oral tablet: 1 tab(s) orally 2 times a day    Pharmacy states patient no longer wants to fill this medication  gabapentin 300 mg oral capsule: 1 cap(s) orally 2 times a day  insulin glargine: 10 unit(s) subcutaneous once a day (at bedtime)  insulin lispro 100 units/mL injectable solution: 5 unit(s) injectable 3 times a day (before meals)  insulin lispro 100 units/mL injectable solution: 0 Unit(s) if Glucose 61 - 250  1 Unit(s) if Glucose 251 - 300  2 Unit(s) if Glucose 301 - 350  3 Unit(s) if Glucose 351 - 400  4 Unit(s) if Glucose Greater Than 400  insulin lispro 100 units/mL injectable solution: 1 Unit(s) if Glucose 151 - 200  2 Unit(s) if Glucose 201 - 250  3 Unit(s) if Glucose 251 - 300  4 Unit(s) if Glucose 301 - 350  5 Unit(s) if Glucose 351 - 400  6 Unit(s) if Glucose Greater Than 400  lidocaine 4% topical film: Apply topically to affected area once a day, only keep on for 12 hrs in 24 hr period  apply adjacent to affected area  loratadine 10 mg oral tablet: 1 tab(s) orally once a day  midodrine 5 mg oral tablet: 1 tab(s) orally 3 times a week, 30 minute prior to dialysis sessions  mirtazapine 7.5 mg oral tablet: 1 tab(s) orally once a day (at bedtime)  montelukast 10 mg oral tablet: 1 tab(s) orally once a day (in the evening)  oxyCODONE 10 mg oral tablet, extended release: 1 tab(s) orally every 12 hours  oxyCODONE 7.5 mg oral tablet: 1 tab(s) orally every 4 hours, As needed, Severe Pain (7 - 10)  pantoprazole 40 mg oral delayed release tablet: 1 tab(s) orally 2 times a day  polyethylene glycol 3350 oral powder for reconstitution: 17 gram(s) orally 2 times a day  senna oral tablet: 2 tab(s) orally once a day (at bedtime)  sertraline 50 mg oral tablet: 1 tab(s) orally once a day  sevelamer carbonate 800 mg oral tablet: 1 tab(s) orally 3 times a day (with meals)  sodium hypochlorite 0.25% topical solution: 1 application topically once a day  tacrolimus 0.1% topical ointment: 1 application topically once a day  traZODone 100 mg oral tablet: 1 tab(s) orally once a day (at bedtime)
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
## 2022-01-22 NOTE — DISCHARGE NOTE PROVIDER - CARE PROVIDER_API CALL
Eduardo Mcgrath)  PhysicalRehab Medicine  [address], [address]  [place] [address]  Phone: [phone]  Fax: [phone]  Follow Up Time:

## 2022-01-23 NOTE — PROGRESS NOTE ADULT - SUBJECTIVE AND OBJECTIVE BOX
SUBJECTIVE / OVERNIGHT EVENTS:pt seen and examined  01-23-22     MEDICATIONS  (STANDING):  apixaban 2.5 milliGRAM(s) Oral two times a day  aspirin enteric coated 81 milliGRAM(s) Oral daily  atorvastatin 80 milliGRAM(s) Oral at bedtime  buPROPion XL (24-Hour) . 150 milliGRAM(s) Oral daily  chlorhexidine 2% Cloths 1 Application(s) Topical daily  cinacalcet 60 milliGRAM(s) Oral at bedtime  collagenase Ointment 1 Application(s) Topical two times a day  Dakins Solution - 1/4 Strength 1 Application(s) Topical two times a day  dextrose 40% Gel 15 Gram(s) Oral once  dextrose 5%. 1000 milliLiter(s) (50 mL/Hr) IV Continuous <Continuous>  dextrose 5%. 1000 milliLiter(s) (100 mL/Hr) IV Continuous <Continuous>  dextrose 50% Injectable 25 Gram(s) IV Push once  dextrose 50% Injectable 12.5 Gram(s) IV Push once  dextrose 50% Injectable 25 Gram(s) IV Push once  diltiazem    milliGRAM(s) Oral daily  doxercalciferol Injectable 5 MICROGram(s) IV Push <User Schedule>  epoetin anabela-epbx (RETACRIT) Injectable 90889 Unit(s) IV Push <User Schedule>  gabapentin 300 milliGRAM(s) Oral daily  glucagon  Injectable 1 milliGRAM(s) IntraMuscular once  heparin   Injectable. 500 Unit(s) Dialysis. every 1 hour  insulin glargine Injectable (LANTUS) 6 Unit(s) SubCutaneous at bedtime  insulin lispro (ADMELOG) corrective regimen sliding scale   SubCutaneous three times a day before meals  insulin lispro (ADMELOG) corrective regimen sliding scale   SubCutaneous at bedtime  insulin lispro Injectable (ADMELOG) 2 Unit(s) SubCutaneous three times a day before meals  loratadine 10 milliGRAM(s) Oral daily  melatonin 6 milliGRAM(s) Oral at bedtime  mirtazapine 7.5 milliGRAM(s) Oral at bedtime  montelukast 10 milliGRAM(s) Oral at bedtime  oxyCODONE  ER Tablet 10 milliGRAM(s) Oral every 12 hours  polyethylene glycol 3350 17 Gram(s) Oral two times a day  senna 2 Tablet(s) Oral at bedtime  sertraline 50 milliGRAM(s) Oral daily  sevelamer carbonate 800 milliGRAM(s) Oral three times a day with meals  traZODone 100 milliGRAM(s) Oral at bedtime    MEDICATIONS  (PRN):  acetaminophen     Tablet .. 650 milliGRAM(s) Oral every 6 hours PRN Mild Pain (1 - 3), Moderate Pain (4 - 6)  HYDROmorphone  Injectable 1 milliGRAM(s) IV Push every 6 hours PRN Severe Pain (7 - 10)    Vital Signs Last 24 Hrs  T(C): 36.8 (01-23-22 @ 18:00), Max: 36.9 (01-23-22 @ 06:30)  T(F): 98.2 (01-23-22 @ 18:00), Max: 98.4 (01-23-22 @ 06:30)  HR: 83 (01-23-22 @ 18:00) (74 - 83)  BP: 122/68 (01-23-22 @ 18:00) (122/65 - 134/71)  BP(mean): --  RR: 17 (01-23-22 @ 18:00) (17 - 18)  SpO2: 97% (01-23-22 @ 18:00) (97% - 100%)        Constitutional: No fever, fatigue  Skin: No rash.  Eyes: No recent vision problems or eye pain.  ENT: No congestion, ear pain, or sore throat.  Cardiovascular: No chest pain or palpation.  Respiratory: No cough, shortness of breath, congestion, or wheezing.  Gastrointestinal: No abdominal pain, nausea, vomiting, or diarrhea.  Genitourinary: No dysuria.  Musculoskeletal: No joint swelling.  Neurologic: No headache.    PHYSICAL EXAM:  GENERAL: NAD  EYES: EOMI, PERRLA  NECK: Supple, No JVD  CHEST/LUNG: dec breath sounds at bases  HEART:  S1 , S2 +  ABDOMEN: soft , bs+, abd wound +  EXTREMITIES:  edema+  NEUROLOGY:alert awake    LABS:  01-23    132<L>  |  92<L>  |  41<H>  ----------------------------<  237<H>  4.8   |  26  |  6.37<H>    Ca    9.1      23 Jan 2022 07:51  Phos  2.2     01-23  Mg     2.80     01-23      Creatinine Trend: 6.37 <--, 4.80 <--, 6.31 <--, 5.05 <--, 7.68 <--, 8.24 <--, 9.22 <--                        8.3    14.30 )-----------( 407      ( 23 Jan 2022 07:51 )             30.3     Urine Studies:

## 2022-01-23 NOTE — PROGRESS NOTE ADULT - ASSESSMENT
56 yr old F with morbid obesity, CHAMP not on home O2, ESRD (HD MWF), HTN, DM2 A1C 7.0, COPD, Afib no longer on AC, chronic R pannus wound here with worsening wound, diarrhea and found to be COVID+. Has poor po intake at this time.     1. Type 2 diabetes mellitus   A1c 7.0% (may be inaccurate in setting of ESRD)  Home Regimen: Tresiba 80 units HS and Trulicity 1.5mg subq weekly    While inpatient:  BG target 100-180 mg/dl   Recommend to increase Lantus to 6 units SQ qHS tonight  start admelog 2/2/2- hold if skips meal/NPO  Reduce Admelog correctional scale to LOW dose before meals, continue low dose at bedtime   Check BG before meals and bedtime  Hypoglycemia protocol     Discharge Plan:  STOP Trulicity (patient ESRD on HD)  Likely dc plan is basal/oral regimen. For oral agent, depends on inpatient requirements but can consider renally dosed DPP4 (Januvia 25 mg or Tradjenta 5 mg daily) VS Prandin before meals   Patient may benefit from switching to 22-24h acting basal insulin eg Levemir or Lantus, instead of Tresiba  Consider CGM (such as Freestyle Libre2 outpatient)  If desiring to followup with [facility]: [address] [place] [address], [address], [facility] [address], [phone]    2. HTN  Management per primary team     3. Hyperlipidemia  Continue home dose of Atorvastatin 80 mg  Followup lipid panel as outpatient    Tamela Ruiz  Nurse Practitioner  Division of Endocrinology & Diabetes  Pager # [phone]      If after 6PM or before 9AM, or on weekends/holidays, please call endocrine answering service for assistance ([phone]).  For nonurgent matters email [email or web address] for assistance.

## 2022-01-23 NOTE — PROGRESS NOTE ADULT - SUBJECTIVE AND OBJECTIVE BOX
Chief Complaint: DM 2    History: Patient seen and evaluated. No nausea/vomiting, no s/s of hypoglycemia. Patient reports making menu selections and not receiving food ordered.  Does not want eggs.  Keeps getting eggs for breakfast    MEDICATIONS  (STANDING):  apixaban 2.5 milliGRAM(s) Oral two times a day  aspirin enteric coated 81 milliGRAM(s) Oral daily  atorvastatin 80 milliGRAM(s) Oral at bedtime  buPROPion XL (24-Hour) . 150 milliGRAM(s) Oral daily  cefepime   IVPB 1000 milliGRAM(s) IV Intermittent every 24 hours  chlorhexidine 2% Cloths 1 Application(s) Topical daily  cinacalcet 60 milliGRAM(s) Oral at bedtime  collagenase Ointment 1 Application(s) Topical two times a day  Dakins Solution - 1/4 Strength 1 Application(s) Topical two times a day  dextrose 40% Gel 15 Gram(s) Oral once  dextrose 5%. 1000 milliLiter(s) (50 mL/Hr) IV Continuous <Continuous>  dextrose 5%. 1000 milliLiter(s) (100 mL/Hr) IV Continuous <Continuous>  dextrose 50% Injectable 25 Gram(s) IV Push once  dextrose 50% Injectable 12.5 Gram(s) IV Push once  dextrose 50% Injectable 25 Gram(s) IV Push once  diltiazem    milliGRAM(s) Oral daily  doxercalciferol Injectable 5 MICROGram(s) IV Push <User Schedule>  epoetin anabela-epbx (RETACRIT) Injectable 8000 Unit(s) IV Push <User Schedule>  gabapentin 300 milliGRAM(s) Oral daily  glucagon  Injectable 1 milliGRAM(s) IntraMuscular once  heparin   Injectable. 500 Unit(s) Dialysis. every 1 hour  insulin lispro (ADMELOG) corrective regimen sliding scale   SubCutaneous three times a day before meals  insulin lispro (ADMELOG) corrective regimen sliding scale   SubCutaneous at bedtime  loratadine 10 milliGRAM(s) Oral daily  melatonin 6 milliGRAM(s) Oral at bedtime  mirtazapine 7.5 milliGRAM(s) Oral at bedtime  montelukast 10 milliGRAM(s) Oral at bedtime  polyethylene glycol 3350 17 Gram(s) Oral two times a day  senna 2 Tablet(s) Oral at bedtime  sertraline 50 milliGRAM(s) Oral daily  sevelamer carbonate 2400 milliGRAM(s) Oral three times a day with meals  traZODone 100 milliGRAM(s) Oral at bedtime    MEDICATIONS  (PRN):  HYDROmorphone  Injectable 1 milliGRAM(s) IV Push every 6 hours PRN Severe Pain (7 - 10)      Allergies  latex (Rash)  penicillin (Nausea)  strawberry (Rash)    Intolerances  caffeine (Nausea)    Review of Systems:  Cardiovascular: No chest pain  Respiratory: No SOB  GI: No nausea, vomiting  Endocrine: no hypoglycemia     PHYSICAL EXAM:  Vital Signs Last 24 Hrs  T(C): 36.8 (23 Jan 2022 12:19), Max: 36.9 (22 Jan 2022 18:23)  T(F): 98.3 (23 Jan 2022 12:19), Max: 98.5 (22 Jan 2022 18:23)  HR: 82 (23 Jan 2022 12:19) (74 - 83)  BP: 122/65 (23 Jan 2022 12:19) (114/50 - 134/71)  BP(mean): --  RR: 17 (23 Jan 2022 12:19) (17 - 19)  SpO2: 100% (23 Jan 2022 12:19) (97% - 100%)  GENERAL: NAD  EYES: No proptosis, no lid lag, anicteric  HEENT:  Atraumatic, Normocephalic, moist mucous membranes  RESPIRATORY: unlabored respirations   PSYCH: Alert and oriented x 3        CAPILLARY BLOOD GLUCOSE    POCT Blood Glucose.: 251 mg/dL (23 Jan 2022 12:31)  POCT Blood Glucose.: 216 mg/dL (23 Jan 2022 09:08)  POCT Blood Glucose.: 239 mg/dL (22 Jan 2022 21:04)  POCT Blood Glucose.: 275 mg/dL (22 Jan 2022 17:36)  POCT Blood Glucose.: 164 mg/dL (21 Jan 2022 12:23)  POCT Blood Glucose.: 157 mg/dL (21 Jan 2022 09:11)  POCT Blood Glucose.: 210 mg/dL (20 Jan 2022 21:14)  POCT Blood Glucose.: 224 mg/dL (20 Jan 2022 17:42)    01-23    132<L>  |  92<L>  |  41<H>  ----------------------------<  237<H>  4.8   |  26  |  6.37<H>    Ca    9.1      23 Jan 2022 07:51  Phos  2.2     01-23  Mg     2.80     01-23        A1C with Estimated Average Glucose Result: 7.0 % (01-13-22 @ 08:21)  A1C with Estimated Average Glucose Result: 6.7 % (08-31-21 @ 09:30)  A1C with Estimated Average Glucose Result: 7.2 % (04-28-21 @ 07:04)    Diet, Consistent Carbohydrate Renal w/Evening Snack:   Supplement Feeding Modality:  Oral  Nepro Cans or Servings Per Day:  1       Frequency:  Three Times a day (01-17-22 @ 18:07)
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
## 2022-01-23 NOTE — PROGRESS NOTE ADULT - ASSESSMENT
56y Female with history of ESRD on HD presents with vomiting and diarrhea found to be COVID-19 positive. Nephrology consulted for ESRD status.    1) ESRD: Last HD on 1/21 tolerated well with 2L removed. Plan for next maintenance HD on 1/24. Monitor electrolytes.    2) HTN with ESRD: BP acceptable. Continue with current medications. Monitor BP.    3) Anemia of renal disease: Hb low. Increase Epo to 10K with HD. Monitor Hb.    4) Secondary HPT of renal origin: Phosphorus low for which will decrease renvela to 1 tab with meals. Continue with sensipar 60 mg daily and hectorol 5 mcg with HD. Check iPTH. Monitor serum calcium and phosphorus.    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      [facility] NEPHROLOGY  Keaton Lopez M.D.  Juma Green D.O.  Myla Hoffmann M.D.  Julia Brewer, MSN, ANP-C    Telephone: [phone]  Facsimile: [phone]    [address] [place] [address]

## 2022-01-23 NOTE — PROGRESS NOTE ADULT - SUBJECTIVE AND OBJECTIVE BOX
CC: no events    TELEMETRY:     PHYSICAL EXAM:    T(C): 36.9 (01-23-22 @ 06:30), Max: 37 (01-22-22 @ 11:41)  HR: 74 (01-23-22 @ 06:30) (74 - 81)  BP: 134/61 (01-23-22 @ 06:30) (111/50 - 134/61)  RR: 17 (01-23-22 @ 06:30) (17 - 19)  SpO2: 100% (01-23-22 @ 06:30) (97% - 100%)  Wt(kg): --  I&O's Summary      Appearance: Normal	  Cardiovascular: Normal S1 S2,RRR, No JVD, No murmurs  Respiratory: Lungs clear to auscultation	  Gastrointestinal:  Soft, Non-tender, + BS	  Extremities: Normal range of motion, No clubbing, cyanosis or edema  Vascular: Peripheral pulses palpable 2+ bilaterally     LABS:	 	                          8.3    14.30 )-----------( 407      ( 23 Jan 2022 07:51 )             30.3     01-23    132<L>  |  92<L>  |  41<H>  ----------------------------<  237<H>  4.8   |  26  |  6.37<H>    Ca    9.1      23 Jan 2022 07:51  Phos  2.2     01-23  Mg     2.80     01-23            CARDIAC MARKERS:

## 2022-01-23 NOTE — PROGRESS NOTE ADULT - SUBJECTIVE AND OBJECTIVE BOX
[facility] NEPHROLOGY- PROGRESS NOTE    56y Female with history of ESRD on HD presents with vomiting and diarrhea found to be COVID-19 positive. Nephrology consulted for ESRD status.    REVIEW OF SYSTEMS:  Gen: no changes in weight  Cards: no chest pain  Resp: no dyspnea  GI: no nausea or vomiting or diarrhea + ab wound pain  Vascular: no LE edema    caffeine (Nausea)  latex (Rash)  penicillin (Nausea)  strawberry (Rash)      Hospital Medications: Medications reviewed      VITALS:  T(F): 98.3 (01-23-22 @ 12:19), Max: 98.5 (01-22-22 @ 18:23)  HR: 82 (01-23-22 @ 12:19)  BP: 122/65 (01-23-22 @ 12:19)  RR: 17 (01-23-22 @ 12:19)  SpO2: 100% (01-23-22 @ 12:19)  Wt(kg): --      PHYSICAL EXAM:    Gen: NAD, calm  Cards: RRR, +S1/S2, no M/G/R  Resp: CTA B/L  GI: soft, RLQ bandage/tender  Vascular: no LE edema B/L, LUE AVF + bruit/thrill      LABS:  01-23    132<L>  |  92<L>  |  41<H>  ----------------------------<  237<H>  4.8   |  26  |  6.37<H>    Ca    9.1      23 Jan 2022 07:51  Phos  2.2     01-23  Mg     2.80     01-23      Creatinine Trend: 6.37 <--, 4.80 <--, 6.31 <--, 5.05 <--, 7.68 <--, 8.24 <--, 9.22 <--                        8.3    14.30 )-----------( 407      ( 23 Jan 2022 07:51 )             30.3     Urine Studies:

## 2022-01-24 NOTE — PROGRESS NOTE ADULT - ASSESSMENT
56 y.o. F with PMH of morbid obesity, CHAMP, ESRD (HD MWF), HTN, DM, COPD, Afib currently on AC, COVID +, chronic R pannus wound in abdomen presented to [facility] on 1/12 due to worsening abdominal pain, RRt called. On Neuro exam no focality CT head negative    Impression: presyncope    supportive care  -No further inpatient Neurologic workup at this time
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
## 2022-01-24 NOTE — CONSULT NOTE ADULT - ASSESSMENT
Deep, non-healing ulcer, differential diagnosis includes calciphylaxis vs pyoderma gangrenosum vs other. Patient with ESRD on HD, with non-healing, extremely tender ulcer with surrounding firm nodules in pannus, raising suspicion for calciphylaxis. Calciphylaxis is a microvascular occlusion syndrome thought to be due to diffuse deposition of insoluble calcium salts in cutaneous blood vessels with associated thrombosis. Early lesions are extremely painful, violaceous retiform patches and plaques, classically on fat-bearing areas such as the thighs, buttocks, or abdomen. This is followed by necrosis, ulcers, eschar formation, and possibly gangrene. Induration of the surrounding tissues may be present. There are also features of the ulcer, including worsening and enlargement of ulcer after surgical debridement (pathergy) and inflammatory, undermined borders, which are suggestive of pyoderma gangrenosum, an inflammatory, noninfectious, ulcerative neutrophilic skin disease of uncertain etiology. However, pyoderma gangrenosum is a diagnosis of exclusion.   - punch bx performed today for H&E (2) and TC; please see procedure note below   - recommend pain management consultation as these conditions can cause exquisite pain which patient currently endorses  - please discuss with nephrology regarding consideration for empiric therapy for calciphylaxis while awaiting bx results  - please ensure patient is not on any medications with calcium, for example calcium-based phosphate binders    - avoid debridement to the area including collagenase ointment   - continue local wound care to ulcer – please AVOID debridement to the ulcer given possible pyoderma gangrenosum (including collagenase)     The patient's chart was reviewed in addition to being seen and examined at bedside with the dermatology attending Dr. Lary Bo.   Recommendations were communicated with the primary team.  Please page [phone] for further related questions (please leave 10 digit call back number because we cover several facilities).    Amy Jackman MD  Resident Physician, PGY3  [facility] Dermat[facility]     ==========================================================================================================    Dermatology Punch Biopsy Procedure Note    After risks and benefits of procedure including bleeding, infection and scar were reviewed (consents including photo consent reviewed, signed and in chart), allergies were reviewed and time out performed. Written consent given by the patient.    Area cleaned with rubbing alcohol and anesthetized with lidocaine and epinephrine.  6mm and 4mm punch biopsies were performed to the right pannus with pressure dressing placed.  Deep, non-healing ulcer, differential diagnosis includes calciphylaxis vs pyoderma gangrenosum vs other. Patient with ESRD on HD, with non-healing, extremely tender ulcer with surrounding firm nodules in pannus, raising suspicion for calciphylaxis. Calciphylaxis is a microvascular occlusion syndrome thought to be due to diffuse deposition of insoluble calcium salts in cutaneous blood vessels with associated thrombosis. Early lesions are extremely painful, violaceous retiform patches and plaques, classically on fat-bearing areas such as the thighs, buttocks, or abdomen. This is followed by necrosis, ulcers, eschar formation, and possibly gangrene. Induration of the surrounding tissues may be present. There are also features of the ulcer, including worsening and enlargement of ulcer after surgical debridement (pathergy) and inflammatory, undermined borders, which are suggestive of pyoderma gangrenosum, an inflammatory, noninfectious, ulcerative neutrophilic skin disease of uncertain etiology. However, pyoderma gangrenosum is a diagnosis of exclusion.   - punch bx performed today for H&E (2) and TC; please see procedure note below  - recommend wound care consultation – patient may benefit from wound vac.  please AVOID debridement to the ulcer given possible pyoderma gangrenosum (i.e STOP collagenase)    - recommend pain management consultation as these conditions can cause exquisite pain which patient currently endorses  - please discuss with nephrology regarding consideration for empiric therapy for calciphylaxis while awaiting bx results  - please ensure patient is not on any medications with calcium, for example calcium-based phosphate binders      The patient's chart was reviewed in addition to being seen and examined at bedside with the dermatology attending Dr. Lary Bo.   Recommendations were communicated with the primary team.  Please page [phone] for further related questions (please leave 10 digit call back number because we cover several facilities).    Amy Jackman MD  Resident Physician, PGY3  [facility] Dermatology     ==========================================================================================================    Dermatology Punch Biopsy Procedure Note    After risks and benefits of procedure including bleeding, infection and scar were reviewed (consents including photo consent reviewed, signed and in chart), allergies were reviewed and time out performed. Written consent given by the patient.    Area cleaned with rubbing alcohol and anesthetized with lidocaine and epinephrine.  6mm and 4mm punch biopsies were performed to the right pannus with pressure dressing placed.

## 2022-01-24 NOTE — PROGRESS NOTE ADULT - ASSESSMENT
56y Female with history of ESRD on HD presents with vomiting and diarrhea found to be COVID-19 positive. Nephrology consulted for ESRD status.    1) ESRD: Last HD on 1/21 tolerated well with 2L removed. Plan for next maintenance HD today. Monitor electrolytes.    2) HTN with ESRD: BP acceptable. Continue with current medications. Monitor BP.    3) Anemia of renal disease: Hb low. Continue with Epo 10K with HD. Monitor Hb.    4) Secondary HPT of renal origin: Phosphorus low for which renvela decreased to 1 tab with meals. Continue with sensipar 60 mg daily and hectorol 5 mcg with HD. F/U iPTH. Monitor serum calcium and phosphorus.    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      [facility] NEPHROLOGY  Keaton Lopez M.D.  Juma Green D.O.  Myla Hoffmann M.D.  Julia Brewer, MSN, ANP-C    Telephone: [phone]  Facsimile: [phone]    [address] [place] [address]

## 2022-01-24 NOTE — PROGRESS NOTE ADULT - ASSESSMENT
56y old  Female  ESRD with calcifilaxsis and open rlq wound pannicula   with hx of morbid obesity,   CHAMP not on home O2, ESRD (HD MWF), HTN, DM, COPD, Afib  ( on ac ) chronic R pannus wound,  not ready for vac, still >20% slough medial wound-  s/p pseudomonas and enterococcal facealis, was previously on abx with HD until 12/2021  s/p new-onset foul smelling non-bloody diarrhea.   newCOVID +, but non-hypoxic labs rising, no skin issues or toe ischemia on cefapeme  dakins wet to dry with santyl kerlex abd , rl abd wound    [id number]/kg, protein 1.2-1.5g/kg, DM , Obesity management  offload  moisture barrier   DVT prophylaxisis  established/ problem  stable  no new abscess, still with debris not ready for vac yet, will reevaluate next week     data reviewed lab, tests, records, image  complexity high   risk high -  due to dx, complexity data, risk  time 70 min 223        56y old  Female  ESRD with calcifilaxsis and open rlq wound pannicula   with hx of morbid obesity,   CHAMP not on home O2, ESRD (HD MWF), HTN, DM, COPD, Afib  ( on ac ) chronic R pannus wound,  not ready for vac, still >20% slough medial wound- wbc high  off covid precautions, slightly largerm no adjoining abscess, mechanically debrided partially  could be sharply debrided, if inr /PT ok, if biopsy for calciphylaxis needed; could add vac at hat time  off antibiotics  s/p pseudomonas and enterococcal facealis, was previously on abx with HD until 12/2021  s/p new-onset foul smelling non-bloody diarrhea. - improved  s/pCOVID +, but non-hypoxic labs rising, no skin issues or toe ischemia on cefapeme  dakins wet to dry with santyl kerlex abd , rl abd wound  will follow  [id number]/kg, protein 1.2-1.5g/kg, DM , Obesity management  offload  moisture barrier   DVT prophylaxisis  established/ problem  stable  no new abscess, still with debris not ready for vac yet, will reevaluate       data reviewed lab, tests, records, image  complexity high   risk high -  due to dx, complexity data, risk  time 70 min 223

## 2022-01-24 NOTE — PROGRESS NOTE ADULT - SUBJECTIVE AND OBJECTIVE BOX
SUBJECTIVE / OVERNIGHT EVENTS:pt seen and examined  01-24-22     MEDICATIONS  (STANDING):  apixaban 2.5 milliGRAM(s) Oral two times a day  aspirin enteric coated 81 milliGRAM(s) Oral daily  atorvastatin 80 milliGRAM(s) Oral at bedtime  buPROPion XL (24-Hour) . 150 milliGRAM(s) Oral daily  chlorhexidine 2% Cloths 1 Application(s) Topical daily  cinacalcet 60 milliGRAM(s) Oral at bedtime  collagenase Ointment 1 Application(s) Topical two times a day  Dakins Solution - 1/4 Strength 1 Application(s) Topical two times a day  dextrose 40% Gel 15 Gram(s) Oral once  dextrose 5%. 1000 milliLiter(s) (100 mL/Hr) IV Continuous <Continuous>  dextrose 5%. 1000 milliLiter(s) (50 mL/Hr) IV Continuous <Continuous>  dextrose 50% Injectable 25 Gram(s) IV Push once  dextrose 50% Injectable 12.5 Gram(s) IV Push once  dextrose 50% Injectable 25 Gram(s) IV Push once  diltiazem    milliGRAM(s) Oral daily  doxercalciferol Injectable 5 MICROGram(s) IV Push <User Schedule>  epoetin anabela-epbx (RETACRIT) Injectable 21244 Unit(s) IV Push <User Schedule>  gabapentin 300 milliGRAM(s) Oral daily  glucagon  Injectable 1 milliGRAM(s) IntraMuscular once  heparin   Injectable. 500 Unit(s) Dialysis. every 1 hour  insulin glargine Injectable (LANTUS) 6 Unit(s) SubCutaneous at bedtime  insulin lispro (ADMELOG) corrective regimen sliding scale   SubCutaneous at bedtime  insulin lispro (ADMELOG) corrective regimen sliding scale   SubCutaneous three times a day before meals  insulin lispro Injectable (ADMELOG) 2 Unit(s) SubCutaneous three times a day before meals  loratadine 10 milliGRAM(s) Oral daily  melatonin 6 milliGRAM(s) Oral at bedtime  mirtazapine 7.5 milliGRAM(s) Oral at bedtime  montelukast 10 milliGRAM(s) Oral at bedtime  oxyCODONE  ER Tablet 10 milliGRAM(s) Oral every 12 hours  polyethylene glycol 3350 17 Gram(s) Oral two times a day  senna 2 Tablet(s) Oral at bedtime  sertraline 50 milliGRAM(s) Oral daily  sevelamer carbonate 800 milliGRAM(s) Oral three times a day with meals  traZODone 100 milliGRAM(s) Oral at bedtime    MEDICATIONS  (PRN):  acetaminophen     Tablet .. 650 milliGRAM(s) Oral every 6 hours PRN Mild Pain (1 - 3), Moderate Pain (4 - 6)  HYDROmorphone  Injectable 1 milliGRAM(s) IV Push every 6 hours PRN Severe Pain (7 - 10)    Vital Signs Last 24 Hrs  T(C): 37.6 (01-24-22 @ 19:02), Max: 37.6 (01-24-22 @ 19:02)  T(F): 99.6 (01-24-22 @ 19:02), Max: 99.6 (01-24-22 @ 19:02)  HR: 90 (01-24-22 @ 19:02) (74 - 90)  BP: 129/62 (01-24-22 @ 19:02) (106/64 - 132/68)  BP(mean): --  RR: 20 (01-24-22 @ 19:02) (18 - 20)  SpO2: 96% (01-24-22 @ 05:45) (96% - 98%)          Constitutional: No fever, fatigue  Skin: No rash.  Eyes: No recent vision problems or eye pain.  ENT: No congestion, ear pain, or sore throat.  Cardiovascular: No chest pain or palpation.  Respiratory: No cough, shortness of breath, congestion, or wheezing.  Gastrointestinal: No abdominal pain, nausea, vomiting, or diarrhea.  Genitourinary: No dysuria.  Musculoskeletal: No joint swelling.  Neurologic: No headache.    PHYSICAL EXAM:  GENERAL: NAD  EYES: EOMI, PERRLA  NECK: Supple, No JVD  CHEST/LUNG: dec breath sounds at bases  HEART:  S1 , S2 +  ABDOMEN: soft , bs+, abd wound +  EXTREMITIES:  edema+  NEUROLOGY:alert awake    LABS:  01-24    131<L>  |  91<L>  |  51<H>  ----------------------------<  161<H>  4.9   |  27  |  7.24<H>    Ca    9.3      24 Jan 2022 12:00  Phos  2.4     01-24  Mg     2.90     01-24      Creatinine Trend: 7.24 <--, 6.37 <--, 4.80 <--, 6.31 <--, 5.05 <--, 7.68 <--, 8.24 <--                        7.8    9.34  )-----------( 424      ( 24 Jan 2022 12:00 )             28.3     Urine Studies:
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
## 2022-01-24 NOTE — PROGRESS NOTE ADULT - SUBJECTIVE AND OBJECTIVE BOX
[facility] NEPHROLOGY- PROGRESS NOTE    56y Female with history of ESRD on HD presents with vomiting and diarrhea found to be COVID-19 positive. Nephrology consulted for ESRD status.    REVIEW OF SYSTEMS:  Gen: no changes in weight  Cards: no chest pain  Resp: no dyspnea  GI: no nausea or vomiting or diarrhea + ab wound pain  Vascular: no LE edema    caffeine (Nausea)  latex (Rash)  penicillin (Nausea)  strawberry (Rash)      Hospital Medications: Medications reviewed      VITALS:  T(F): 98.2 (01-24-22 @ 05:45), Max: 98.8 (01-23-22 @ 19:23)  HR: 78 (01-24-22 @ 05:45)  BP: 119/60 (01-24-22 @ 05:45)  RR: 18 (01-24-22 @ 05:45)  SpO2: 96% (01-24-22 @ 05:45)  Wt(kg): --    01-23 @ 07:01 - 01-24 @ 07:00  --------------------------------------------------------  IN: 150 mL / OUT: 0 mL / NET: 150 mL        PHYSICAL EXAM:    Gen: NAD, calm  Cards: RRR, +S1/S2, no M/G/R  Resp: CTA B/L  GI: soft, RLQ bandage/tender  Vascular: no LE edema B/L, LUE AVF + bruit/thrill      LABS:  01-23    132<L>  |  92<L>  |  41<H>  ----------------------------<  237<H>  4.8   |  26  |  6.37<H>    Ca    9.1      23 Jan 2022 07:51  Phos  2.2     01-23  Mg     2.80     01-23      Creatinine Trend: 6.37 <--, 4.80 <--, 6.31 <--, 5.05 <--, 7.68 <--, 8.24 <--                        8.3    14.30 )-----------( 407      ( 23 Jan 2022 07:51 )             30.3     Urine Studies:
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
## 2022-01-24 NOTE — CONSULT NOTE ADULT - ATTENDING COMMENTS
date of service 1/12/22    pt seen and examined  pt chart and imaging reviewed  agree with note above  56F w/ hx of ESRD (HD MWF), HTN, DM, COPD, Afib, with chronic R pannus wound known to Dr Layne (Wound care service) p/w worsening pannus wound.  no acute gen sx intervention at this time  continue supportive care per med/ID  f/u wound care service in am   will sign off, reconsult prn.
Deep necrotic ulcer on infra-pannus fold. Calciphylaxis remains a strong consideration especially given the distribution of ulcer, surrounding induration and bacterial superinfection. However pyoderma gangrenosum also possible as the ulcer exhibits significant undermining (unusual with calciphylaxis), surrounding erythema and seems to have worsened with serial debridements and treatment with antibiotics.     Skin biopsy with tissue culture performed today to further elucidate diagnosis, however calciphylaxis may not always be detected on skin biopsy and pyoderma gangrenosum remains a diagnosis of exclusion. Given association of PG with specific comorbidities, would recommend checking an SPEP/UPEP with immunofixation (total immunoglobulins checked recently and WNL). Would also check ANCA serologies. If suspicion for calciphylaxis persists and biopsy unrevealing, would recommend further imaging to detect net like calcifications on soft tissue (plain films vs CT vs high res u/s). Could also consider empirically treating with sodium thiosulfate during dialysis, as a significant improvement in pain after a few weeks of tx would suggest calciphylaxis as a diagnosis.     Control of bacterial superinfection will remain a challenge, especially if patient requires immunosuppression. She would benefit from a wound vac. Would avoid collagenase for now pending biopsy results. Please obtain pain consult.
She remembers feeling lightheaded after straining.    MS: Mild lethargy.  Easily arousable to entering room and voice.  Ox4.  Able to give detailed history.   Motor: Limited movement in proximal arms due to chronic shoulder pain - R>L.  Normal strength distally in arms.  Minimal effort in legs - normal, symmetric strength in distal legs.   Asterixis.    A/P  Vasovagal presyncopal episode.  Not consistent with seizure.   Toxic-metabolic encephalopathy.  Please call us for any further questions.    Thank you

## 2022-01-24 NOTE — PROGRESS NOTE ADULT - SUBJECTIVE AND OBJECTIVE BOX
CC: no events    TELEMETRY:     PHYSICAL EXAM:    T(C): 36.5 (01-24-22 @ 10:50), Max: 37.1 (01-23-22 @ 19:23)  HR: 74 (01-24-22 @ 10:50) (74 - 83)  BP: 116/64 (01-24-22 @ 10:50) (116/64 - 138/56)  RR: 20 (01-24-22 @ 10:50) (17 - 20)  SpO2: 96% (01-24-22 @ 05:45) (96% - 98%)  Wt(kg): --  I&O's Summary    23 Jan 2022 07:01  -  24 Jan 2022 07:00  --------------------------------------------------------  IN: 150 mL / OUT: 0 mL / NET: 150 mL        Appearance: Normal	  Cardiovascular: Normal S1 S2,RRR, No JVD, No murmurs  Respiratory: Lungs clear to auscultation	  Gastrointestinal:  Soft, Non-tender, + BS	  Extremities: Normal range of motion, No clubbing, cyanosis or edema  Vascular: Peripheral pulses palpable 2+ bilaterally     LABS:	 	                          7.8    9.34  )-----------( 424      ( 24 Jan 2022 12:00 )             28.3     01-24    131<L>  |  91<L>  |  51<H>  ----------------------------<  161<H>  4.9   |  27  |  7.24<H>    Ca    9.3      24 Jan 2022 12:00  Phos  2.4     01-24  Mg     2.90     01-24            CARDIAC MARKERS:

## 2022-01-25 NOTE — PROGRESS NOTE ADULT - ASSESSMENT
56y Female with history of ESRD on HD presents with vomiting and diarrhea found to be COVID-19 positive. Nephrology consulted for ESRD status.    1) ESRD: Last HD on 1/24 tolerated well with 2L removed. Plan for next maintenance HD on 1/26. Monitor electrolytes.    2) HTN with ESRD: BP acceptable. Continue with current medications. Monitor BP.    3) Anemia of renal disease: Hb low. Continue with Epo 10K with HD. Monitor Hb.    4) Secondary HPT of renal origin: Phosphorus acceptable. Continue with sensipar 60 mg daily and renvela 1 tab with meals (goal < 3.5 given concerns for calciphylaxis). iPTH low with high normal serum calcium suggestive of oversuppression for which will discontinue hectorol with dialysis. Monitor serum calcium and phosphorus.    5) Ab wound: Appreciate dermatology consultation. S/P biopsy. Will start empiric sodium thiosulfate with HD and monitor response. F/U biopsy results.      [facility]PHR[facility]  Keaton Lopez M.D.  Juma Green D.O.  Myla Hoffmann M.D.  Julia Brewer, JASEIL, ANP-C    Telephone: [phone]  Facsimile: [phone]    [address] [place] [address]

## 2022-01-25 NOTE — PROGRESS NOTE ADULT - SUBJECTIVE AND OBJECTIVE BOX
Chief Complaint: DM 2    History: Patient seen and evaluated. No nausea/vomiting, no s/s of hypoglycemia.     MEDICATIONS  (STANDING):  apixaban 2.5 milliGRAM(s) Oral two times a day  aspirin enteric coated 81 milliGRAM(s) Oral daily  atorvastatin 80 milliGRAM(s) Oral at bedtime  buPROPion XL (24-Hour) . 150 milliGRAM(s) Oral daily  cefepime   IVPB 1000 milliGRAM(s) IV Intermittent every 24 hours  chlorhexidine 2% Cloths 1 Application(s) Topical daily  cinacalcet 60 milliGRAM(s) Oral at bedtime  collagenase Ointment 1 Application(s) Topical two times a day  Dakins Solution - 1/4 Strength 1 Application(s) Topical two times a day  dextrose 40% Gel 15 Gram(s) Oral once  dextrose 5%. 1000 milliLiter(s) (50 mL/Hr) IV Continuous <Continuous>  dextrose 5%. 1000 milliLiter(s) (100 mL/Hr) IV Continuous <Continuous>  dextrose 50% Injectable 25 Gram(s) IV Push once  dextrose 50% Injectable 12.5 Gram(s) IV Push once  dextrose 50% Injectable 25 Gram(s) IV Push once  diltiazem    milliGRAM(s) Oral daily  doxercalciferol Injectable 5 MICROGram(s) IV Push <User Schedule>  epoetin anabela-epbx (RETACRIT) Injectable 8000 Unit(s) IV Push <User Schedule>  gabapentin 300 milliGRAM(s) Oral daily  glucagon  Injectable 1 milliGRAM(s) IntraMuscular once  heparin   Injectable. 500 Unit(s) Dialysis. every 1 hour  insulin lispro (ADMELOG) corrective regimen sliding scale   SubCutaneous three times a day before meals  insulin lispro (ADMELOG) corrective regimen sliding scale   SubCutaneous at bedtime  loratadine 10 milliGRAM(s) Oral daily  melatonin 6 milliGRAM(s) Oral at bedtime  mirtazapine 7.5 milliGRAM(s) Oral at bedtime  montelukast 10 milliGRAM(s) Oral at bedtime  polyethylene glycol 3350 17 Gram(s) Oral two times a day  senna 2 Tablet(s) Oral at bedtime  sertraline 50 milliGRAM(s) Oral daily  sevelamer carbonate 2400 milliGRAM(s) Oral three times a day with meals  traZODone 100 milliGRAM(s) Oral at bedtime    MEDICATIONS  (PRN):  HYDROmorphone  Injectable 1 milliGRAM(s) IV Push every 6 hours PRN Severe Pain (7 - 10)      Allergies  latex (Rash)  penicillin (Nausea)  strawberry (Rash)    Intolerances  caffeine (Nausea)    Review of Systems:  Cardiovascular: No chest pain  Respiratory: No SOB  GI: No nausea, vomiting  Endocrine: no hypoglycemia     PHYSICAL EXAM:  Vital Signs Last 24 Hrs  T(C): 36.8 (25 Jan 2022 18:00), Max: 37.6 (24 Jan 2022 19:02)  T(F): 98.3 (25 Jan 2022 18:00), Max: 99.6 (24 Jan 2022 19:02)  HR: 77 (25 Jan 2022 18:00) (77 - 90)  BP: 125/52 (25 Jan 2022 18:00) (125/52 - 142/60)  BP(mean): --  RR: 18 (25 Jan 2022 18:00) (18 - 20)  SpO2: 94% (25 Jan 2022 18:00) (93% - 96%)  GENERAL: NAD  EYES: No proptosis, no lid lag, anicteric  HEENT:  Atraumatic, Normocephalic, moist mucous membranes  RESPIRATORY: unlabored respirations   PSYCH: Alert and oriented x 3        CAPILLARY BLOOD GLUCOSE  POCT Blood Glucose.: 237 mg/dL (25 Jan 2022 17:33)  POCT Blood Glucose.: 338 mg/dL (25 Jan 2022 12:28)  POCT Blood Glucose.: 189 mg/dL (25 Jan 2022 08:43)  POCT Blood Glucose.: 223 mg/dL (24 Jan 2022 21:39)  POCT Blood Glucose.: 251 mg/dL (23 Jan 2022 12:31)  POCT Blood Glucose.: 216 mg/dL (23 Jan 2022 09:08)  POCT Blood Glucose.: 239 mg/dL (22 Jan 2022 21:04)  POCT Blood Glucose.: 275 mg/dL (22 Jan 2022 17:36)  POCT Blood Glucose.: 164 mg/dL (21 Jan 2022 12:23)  POCT Blood Glucose.: 157 mg/dL (21 Jan 2022 09:11)  POCT Blood Glucose.: 210 mg/dL (20 Jan 2022 21:14)  POCT Blood Glucose.: 224 mg/dL (20 Jan 2022 17:42)    01-25    133<L>  |  91<L>  |  35<H>  ----------------------------<  178<H>  4.5   |  28  |  5.48<H>    Ca    9.6      25 Jan 2022 07:28  Phos  3.1     01-25  Mg     2.70     01-25    TPro  7.7  /  Alb  x   /  TBili  x   /  DBili  x   /  AST  x   /  ALT  x   /  AlkPhos  x   01-25        A1C with Estimated Average Glucose Result: 7.0 % (01-13-22 @ 08:21)  A1C with Estimated Average Glucose Result: 6.7 % (08-31-21 @ 09:30)  A1C with Estimated Average Glucose Result: 7.2 % (04-28-21 @ 07:04)    Diet, Consistent Carbohydrate Renal w/Evening Snack:   Supplement Feeding Modality:  Oral  Nepro Cans or Servings Per Day:  1       Frequency:  Three Times a day (01-17-22 @ 18:07)
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
## 2022-01-25 NOTE — PROGRESS NOTE ADULT - SUBJECTIVE AND OBJECTIVE BOX
SUBJECTIVE / OVERNIGHT EVENTS:pt seen and examined  01-25-22     MEDICATIONS  (STANDING):  apixaban 2.5 milliGRAM(s) Oral two times a day  aspirin enteric coated 81 milliGRAM(s) Oral daily  atorvastatin 80 milliGRAM(s) Oral at bedtime  buPROPion XL (24-Hour) . 150 milliGRAM(s) Oral daily  chlorhexidine 2% Cloths 1 Application(s) Topical daily  cinacalcet 60 milliGRAM(s) Oral at bedtime  Dakins Solution - 1/4 Strength 1 Application(s) Topical two times a day  dextrose 40% Gel 15 Gram(s) Oral once  dextrose 5%. 1000 milliLiter(s) (50 mL/Hr) IV Continuous <Continuous>  dextrose 5%. 1000 milliLiter(s) (100 mL/Hr) IV Continuous <Continuous>  dextrose 50% Injectable 25 Gram(s) IV Push once  dextrose 50% Injectable 25 Gram(s) IV Push once  dextrose 50% Injectable 12.5 Gram(s) IV Push once  diltiazem    milliGRAM(s) Oral daily  epoetin anabela-epbx (RETACRIT) Injectable 03193 Unit(s) IV Push <User Schedule>  gabapentin 300 milliGRAM(s) Oral daily  glucagon  Injectable 1 milliGRAM(s) IntraMuscular once  heparin   Injectable. 500 Unit(s) Dialysis. every 1 hour  insulin glargine Injectable (LANTUS) 7 Unit(s) SubCutaneous at bedtime  insulin lispro (ADMELOG) corrective regimen sliding scale   SubCutaneous three times a day before meals  insulin lispro (ADMELOG) corrective regimen sliding scale   SubCutaneous at bedtime  insulin lispro Injectable (ADMELOG) 3 Unit(s) SubCutaneous three times a day before meals  loratadine 10 milliGRAM(s) Oral daily  melatonin 6 milliGRAM(s) Oral at bedtime  mirtazapine 7.5 milliGRAM(s) Oral at bedtime  montelukast 10 milliGRAM(s) Oral at bedtime  oxyCODONE  ER Tablet 10 milliGRAM(s) Oral every 12 hours  polyethylene glycol 3350 17 Gram(s) Oral two times a day  senna 2 Tablet(s) Oral at bedtime  sertraline 50 milliGRAM(s) Oral daily  sevelamer carbonate 800 milliGRAM(s) Oral three times a day with meals  sodium thiosulfate IVPB 25 Gram(s) IV Intermittent <User Schedule>  traZODone 100 milliGRAM(s) Oral at bedtime    MEDICATIONS  (PRN):  acetaminophen     Tablet .. 650 milliGRAM(s) Oral every 6 hours PRN Mild Pain (1 - 3), Moderate Pain (4 - 6)  HYDROmorphone  Injectable 1 milliGRAM(s) IV Push every 6 hours PRN Severe Pain (7 - 10)    Vital Signs Last 24 Hrs  T(C): 36.8 (01-25-22 @ 18:00), Max: 36.8 (01-25-22 @ 18:00)  T(F): 98.3 (01-25-22 @ 18:00), Max: 98.3 (01-25-22 @ 18:00)  HR: 77 (01-25-22 @ 18:00) (77 - 86)  BP: 125/52 (01-25-22 @ 18:00) (125/52 - 142/60)  BP(mean): --  RR: 18 (01-25-22 @ 18:00) (18 - 19)  SpO2: 94% (01-25-22 @ 18:00) (93% - 96%)        Constitutional: No fever, fatigue  Skin: No rash.  Eyes: No recent vision problems or eye pain.  ENT: No congestion, ear pain, or sore throat.  Cardiovascular: No chest pain or palpation.  Respiratory: No cough, shortness of breath, congestion, or wheezing.  Gastrointestinal: No abdominal pain, nausea, vomiting, or diarrhea.  Genitourinary: No dysuria.  Musculoskeletal: No joint swelling.  Neurologic: No headache.    PHYSICAL EXAM:  GENERAL: NAD  EYES: EOMI, PERRLA  NECK: Supple, No JVD  CHEST/LUNG: dec breath sounds at bases  HEART:  S1 , S2 +  ABDOMEN: soft , bs+, abd wound +  EXTREMITIES:  edema+  NEUROLOGY:alert awake    LABS:  01-25    133<L>  |  91<L>  |  35<H>  ----------------------------<  178<H>  4.5   |  28  |  5.48<H>    Ca    9.6      25 Jan 2022 07:28  Phos  3.1     01-25  Mg     2.70     01-25    TPro  7.7  /  Alb      /  TBili      /  DBili      /  AST      /  ALT      /  AlkPhos      01-25    Creatinine Trend: 5.48 <--, 7.24 <--, 6.37 <--, 4.80 <--, 6.31 <--, 5.05 <--, 7.68 <--                        8.1    12.56 )-----------( 409      ( 25 Jan 2022 07:28 )             29.1     Urine Studies:            LIVER FUNCTIONS - ( 25 Jan 2022 17:36 )  Alb: x     / Pro: 7.7 g/dL / ALK PHOS: x     / ALT: x     / AST: x     / GGT: x

## 2022-01-25 NOTE — CHART NOTE - NSCHARTNOTEFT_GEN_A_CORE
Consulted wound care RN Natty in  regarding wound vac placement. Plan to be discussed with MD Layne for final recommendations.    Evelyn Hernández NP  [id number]

## 2022-01-25 NOTE — PROGRESS NOTE ADULT - SUBJECTIVE AND OBJECTIVE BOX
[facility] NEPHROLOGY- PROGRESS NOTE    56y Female with history of ESRD on HD presents with vomiting and diarrhea found to be COVID-19 positive. Nephrology consulted for ESRD status.    REVIEW OF SYSTEMS:  Gen: no changes in weight  Cards: no chest pain  Resp: no dyspnea  GI: no nausea or vomiting or diarrhea + ab wound pain  Vascular: no LE edema    caffeine (Nausea)  latex (Rash)  penicillin (Nausea)  strawberry (Rash)      Hospital Medications: Medications reviewed      VITALS:  T(F): 97.9 (01-25-22 @ 05:02), Max: 99.6 (01-24-22 @ 19:02)  HR: 79 (01-25-22 @ 05:02)  BP: 142/60 (01-25-22 @ 05:02)  RR: 18 (01-25-22 @ 05:02)  SpO2: 96% (01-25-22 @ 05:02)  Wt(kg): --    01-24 @ 07:01  -  01-25 @ 07:00  --------------------------------------------------------  IN: 400 mL / OUT: 2350 mL / NET: -1950 mL      PHYSICAL EXAM:    Gen: NAD, calm  Cards: RRR, +S1/S2, no M/G/R  Resp: CTA B/L  GI: soft, RLQ bandage/tender  Vascular: no LE edema B/L, LUE AVF + bruit/thrill      LABS:  01-25    133<L>  |  91<L>  |  35<H>  ----------------------------<  178<H>  4.5   |  28  |  5.48<H>    Ca    9.6      25 Jan 2022 07:28  Phos  3.1     01-25  Mg     2.70     01-25    TPro  7.5  /  Alb  3.6  /  TBili  0.2  /  DBili      /  AST  8   /  ALT  7   /  AlkPhos  400<H>  01-25    Creatinine Trend: 5.48 <--, 7.24 <--, 6.37 <--, 4.80 <--, 6.31 <--, 5.05 <--, 7.68 <--                        8.1    12.56 )-----------( 409      ( 25 Jan 2022 07:28 )             29.1     Urine Studies:

## 2022-01-25 NOTE — PROGRESS NOTE ADULT - ASSESSMENT
56 yr old F with morbid obesity, CHAMP not on home O2, ESRD (HD MWF), HTN, DM2 A1C 7.0, COPD, Afib no longer on AC, chronic R pannus wound here with worsening wound, diarrhea and found to be COVID+. Has poor po intake at this time.     1. Type 2 diabetes mellitus   A1c 7.0% (may be inaccurate in setting of ESRD)  Home Regimen: Tresiba 80 units HS and Trulicity 1.5mg subq weekly    While inpatient:  BG target 100-180 mg/dl   Recommend to increase Lantus to 7 units SQ qHS tonight  Increase admelog to 3/3/3- hold if skips meal/NPO  Continue Admelog correctional scale to LOW dose before meals, continue low dose at bedtime   Check BG before meals and bedtime  Hypoglycemia protocol     Discharge Plan:  STOP Trulicity (patient ESRD on HD)  Likely dc plan is basal/oral regimen. For oral agent, depends on inpatient requirements but can consider renally dosed DPP4 (Januvia 25 mg or Tradjenta 5 mg daily) VS Prandin before meals   Patient may benefit from switching to 22-24h acting basal insulin eg Levemir or Lantus, instead of Tresiba  Consider CGM (such as Freestyle Libre2 outpatient)  If desiring to followup with [facility]: [address] [place] [address], [address], [facility] [address], [phone]    2. HTN  Management per primary team     3. Hyperlipidemia  Continue home dose of Atorvastatin 80 mg  Followup lipid panel as outpatient    Tamela Ruiz  Nurse Practitioner  Division of Endocrinology & Diabetes  Pager # [phone]      If after 6PM or before 9AM, or on weekends/holidays, please call endocrine answering service for assistance ([phone]).  For nonurgent matters email [email or web address] for assistance.

## 2022-01-25 NOTE — PROGRESS NOTE ADULT - SUBJECTIVE AND OBJECTIVE BOX
CARDIOLOGY FOLLOW UP - Dr. Bullock  DATE OF SERVICE: 1/25/22    CC no cp or sob  c/o dizziness when being positioned       REVIEW OF SYSTEMS:  CONSTITUTIONAL: No fever, weight loss, or fatigue  RESPIRATORY: No cough, wheezing, chills or hemoptysis; No Shortness of Breath  CARDIOVASCULAR: No chest pain, palpitations, passing out,, or leg swelling  GASTROINTESTINAL: No abdominal or epigastric pain. No nausea, vomiting, or hematemesis; No diarrhea or constipation. No melena or hematochezia.      PHYSICAL EXAM:  T(C): 36.6 (01-25-22 @ 05:02), Max: 37.6 (01-24-22 @ 19:02)  HR: 79 (01-25-22 @ 05:02) (78 - 90)  BP: 142/60 (01-25-22 @ 05:02) (106/64 - 142/60)  RR: 18 (01-25-22 @ 05:02) (18 - 20)  SpO2: 96% (01-25-22 @ 05:02) (93% - 96%)  Wt(kg): --  I&O's Summary    24 Jan 2022 07:01  -  25 Jan 2022 07:00  --------------------------------------------------------  IN: 400 mL / OUT: 2350 mL / NET: -1950 mL        Appearance: Normal	  Cardiovascular: Normal S1 S2,RRR, No JVD, No murmurs  Respiratory: Lungs clear to auscultation	  Gastrointestinal:  Soft, Non-tender, + BS	  Extremities:  le edema       Home Medications:  aspirin 81 mg oral delayed release tablet: 1 tab(s) orally once a day (12 Jan 2022 17:49)  buPROPion 150 mg/24 hours (XL) oral tablet, extended release: 1 tab(s) orally once a day (in the morning) (12 Jan 2022 17:49)  cetirizine 10 mg oral tablet: 1 tab(s) orally once a day (12 Jan 2022 17:49)  dilTIAZem 120 mg/24 hours oral tablet, extended release: 1 tab(s) orally once a day (12 Jan 2022 17:49)  doxepin 25 mg oral capsule: 1 cap(s) orally once a day (at bedtime) (12 Jan 2022 17:49)  Eliquis 2.5 mg oral tablet: 1 tab(s) orally 2 times a day    Pharmacy states patient no longer wants to fill this medication (12 Jan 2022 17:49)  furosemide 80 mg oral tablet: 1 tab(s) orally once a day    Pharmacy states patient no longer wants to fill this medication (12 Jan 2022 17:49)  gabapentin 300 mg oral capsule: 1 cap(s) orally 2 times a day (12 Jan 2022 17:49)  hydrOXYzine hydrochloride 25 mg oral tablet: 1 tab(s) orally 2 times a day, As Needed (12 Jan 2022 17:49)  lanthanum 1000 mg oral tablet, chewable: 2 tab(s) orally with meals and 1 tab(s) orally with snacks    Pharmacy states patient no longer wants to fill this medication (12 Jan 2022 17:49)  mirtazapine 7.5 mg oral tablet: 1 tab(s) orally once a day (at bedtime) (12 Jan 2022 17:49)  montelukast 10 mg oral tablet: 1 tab(s) orally once a day (in the evening) (12 Jan 2022 17:49)  mupirocin 2% topical ointment: Apply sparingly to affected area 2 times a day as directed (12 Jan 2022 17:49)  nystatin 100,000 units/mL oral suspension: 5 milliliter(s) orally 4 times a day, as directed (12 Jan 2022 17:49)  omeprazole 20 mg oral delayed release capsule: 2 cap(s) orally once a day before breakfast (12 Jan 2022 17:49)  Pennsaid 2% topical solution: Apply topically to affected area 2 times a day (12 Jan 2022 17:49)  rosuvastatin 40 mg oral tablet: 1 tab(s) orally once a day (12 Jan 2022 17:49)  Santyl 250 units/g topical ointment: Apply topically to affected area once a day as directed (12 Jan 2022 17:49)  sertraline 50 mg oral tablet: 1 tab(s) orally once a day (12 Jan 2022 17:49)  Spiriva HandiHaler 18 mcg inhalation capsule: 1 cap(s) inhaled once a day (12 Jan 2022 17:49)  traZODone 100 mg oral tablet: 1 tab(s) orally once a day (at bedtime) (12 Jan 2022 17:49)  Tresiba FlexTouch 100 units/mL subcutaneous solution: 80 unit(s) subcutaneous once a day (at bedtime) (12 Jan 2022 17:49)  Trulicity Pen 1.5 mg/0.5 mL subcutaneous solution: 1 dose(s) subcutaneous once a week (12 Jan 2022 17:49)      MEDICATIONS  (STANDING):  apixaban 2.5 milliGRAM(s) Oral two times a day  aspirin enteric coated 81 milliGRAM(s) Oral daily  atorvastatin 80 milliGRAM(s) Oral at bedtime  buPROPion XL (24-Hour) . 150 milliGRAM(s) Oral daily  chlorhexidine 2% Cloths 1 Application(s) Topical daily  cinacalcet 60 milliGRAM(s) Oral at bedtime  Dakins Solution - 1/4 Strength 1 Application(s) Topical two times a day  dextrose 40% Gel 15 Gram(s) Oral once  dextrose 5%. 1000 milliLiter(s) (50 mL/Hr) IV Continuous <Continuous>  dextrose 5%. 1000 milliLiter(s) (100 mL/Hr) IV Continuous <Continuous>  dextrose 50% Injectable 25 Gram(s) IV Push once  dextrose 50% Injectable 12.5 Gram(s) IV Push once  dextrose 50% Injectable 25 Gram(s) IV Push once  diltiazem    milliGRAM(s) Oral daily  epoetin anabela-epbx (RETACRIT) Injectable 60050 Unit(s) IV Push <User Schedule>  gabapentin 300 milliGRAM(s) Oral daily  glucagon  Injectable 1 milliGRAM(s) IntraMuscular once  heparin   Injectable. 500 Unit(s) Dialysis. every 1 hour  insulin glargine Injectable (LANTUS) 6 Unit(s) SubCutaneous at bedtime  insulin lispro (ADMELOG) corrective regimen sliding scale   SubCutaneous three times a day before meals  insulin lispro (ADMELOG) corrective regimen sliding scale   SubCutaneous at bedtime  insulin lispro Injectable (ADMELOG) 2 Unit(s) SubCutaneous three times a day before meals  loratadine 10 milliGRAM(s) Oral daily  melatonin 6 milliGRAM(s) Oral at bedtime  mirtazapine 7.5 milliGRAM(s) Oral at bedtime  montelukast 10 milliGRAM(s) Oral at bedtime  oxyCODONE  ER Tablet 10 milliGRAM(s) Oral every 12 hours  polyethylene glycol 3350 17 Gram(s) Oral two times a day  senna 2 Tablet(s) Oral at bedtime  sertraline 50 milliGRAM(s) Oral daily  sevelamer carbonate 800 milliGRAM(s) Oral three times a day with meals  sodium thiosulfate IVPB 25 Gram(s) IV Intermittent <User Schedule>  traZODone 100 milliGRAM(s) Oral at bedtime      TELEMETRY: nsr	    ECG:  	  RADIOLOGY:   DIAGNOSTIC TESTING:  [ ] Echocardiogram:  [ ]  Catheterization:  [ ] Stress Test:    OTHER: 	    LABS:	 	    Troponin T, High Sensitivity Result: 55 ng/L (01-21 @ 07:28)                          8.1    12.56 )-----------( 409      ( 25 Jan 2022 07:28 )             29.1     01-25    133<L>  |  91<L>  |  35<H>  ----------------------------<  178<H>  4.5   |  28  |  5.48<H>    Ca    9.6      25 Jan 2022 07:28  Phos  3.1     01-25  Mg     2.70     01-25    TPro  7.5  /  Alb  3.6  /  TBili  0.2  /  DBili  x   /  AST  8   /  ALT  7   /  AlkPhos  400<H>  01-25

## 2022-01-25 NOTE — PROGRESS NOTE ADULT - ASSESSMENT
56 y.o. F with PMH of morbid obesity, CHAMP, ESRD (HD MWF), HTN, DM, COPD, Afib currently on AC, COVID +, chronic R pannus wound in abdomen presented to [facility] on 1/12 due to worsening abdominal pain, RRt called. On Neuro exam no focality CT head negative    Impression: presyncope    supportive care  -No further inpatient Neurologic workup at this time
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
## 2022-01-25 NOTE — PROGRESS NOTE ADULT - ASSESSMENT
56 year old female with hx of morbid obesity, CHAMP not on home O2, ESRD (HD MWF), HTN, DM, COPD, Afib no longer on AC, chronic R pannus wound, followed by Dr. Layne, sent by visiting RN for worsening wound.    # Chronic Pannus Wound  -IV abx per primary team  -surgery /derm consult appreciated    #Afib  -stable, in NSR   -cont eliquis  -cont cardizem    #Covid-19+  -trend inflamm markers  -pt saturating well on RA    #Hypertension  -cont current meds    #ESRD on HD  -HD per renal    # Near Syncope  -tele no events   -check orthostatics
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
## 2022-01-26 NOTE — PROGRESS NOTE ADULT - ASSESSMENT
56y Female with history of ESRD on HD presents with vomiting and diarrhea found to be COVID-19 positive. Nephrology consulted for ESRD status.    1) ESRD: Last HD on 1/24 tolerated well with 2L removed. Plan for next maintenance HD today. Monitor electrolytes.    2) HTN with ESRD: BP acceptable. Continue with current medications. Monitor BP.    3) Anemia of renal disease: Hb low. Continue with Epo 10K with HD. Monitor Hb.    4) Secondary HPT of renal origin: Phosphorus acceptable. Continue with sensipar 60 mg daily and renvela 1 tab with meals (goal < 3.5 given concerns for calciphylaxis). iPTH low with high normal serum calcium suggestive of oversuppression for which hectorol discontinued. Monitor serum calcium and phosphorus.    5) Ab wound: Appreciate dermatology consultation. S/P biopsy. Continue with empiric sodium thiosulfate with HD and monitor response. F/U biopsy results.      [facility] NEPHROLOGY  Keaton Lopez M.D.  Juma Green D.O.  Myla Hoffmann M.D.  Julia Breewr, JASIEL, ANP-C    Telephone: [phone]  Facsimile: [phone]    [address] [place] [address]
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
## 2022-01-26 NOTE — PROGRESS NOTE ADULT - ASSESSMENT
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 - improved  s/pCOVID +, but non-hypoxic labs rising, no skin issues or toe ischemia on cefapeme     will follow  [id number]/kg, protein 1.2-1.5g/kg, DM , Obesity management  offload  moisture barrier   DVT prophylaxisis  established/ problem  stable  no new abscess, still with debris irrigating vac ordered     data reviewed lab, tests, records, image  complexity high   risk high -  due to dx, complexity data, risk  time 70 min 233

## 2022-01-26 NOTE — PROGRESS NOTE ADULT - ASSESSMENT
56 year old female with hx of morbid obesity, CHAMP not on home O2, ESRD (HD MWF), HTN, DM, COPD, Afib no longer on AC, chronic R pannus wound, followed by Dr. Layne, sent by visiting RN for worsening wound.    # Chronic Pannus Wound  -IV abx per primary team  -surgery /derm consult appreciated    #Afib  -stable, in NSR   -cont eliquis for now, elevated inflamm markers given covid,  h/h noted- gi /renal following   -cont cardizem    #Covid-19+  -trend inflamm markers  -med f/u     #Hypertension  -cont current meds    #ESRD on HD  -HD per renal    # Near Syncope  -tele no events   -check orthostatics

## 2022-01-26 NOTE — CHART NOTE - NSCHARTNOTEFT_GEN_A_CORE
Contacted by RN regarding itching/pain at wound vac site. Patient was given her Dilaudid 1mg Iv as ordered. I evaluated the patient by bedside. No erythema at the wound vac site. She states it feels a bit uncomfortable since placement. I notified that slight discomfort with wound vac is normal. Currently patient admits that pain has ceased. Patient mentioned concern of L. sided breast mass, denies pain associated with it. As per patient recollection she noticed it approximately a week ago. FH of breast cancer however she does not recall when her last mammogram was. 3-4 cm palpable mass noted at 4 o'clock position. Mass was not mobile. Will convey to day team for further management and workup.

## 2022-01-26 NOTE — PROGRESS NOTE ADULT - SUBJECTIVE AND OBJECTIVE BOX
[facility] NEPHROLOGY- PROGRESS NOTE    56y Female with history of ESRD on HD presents with vomiting and diarrhea found to be COVID-19 positive. Nephrology consulted for ESRD status.    REVIEW OF SYSTEMS:  Gen: no changes in weight  Cards: no chest pain  Resp: no dyspnea  GI: no nausea or vomiting or diarrhea + ab wound pain  Vascular: no LE edema    caffeine (Nausea)  latex (Rash)  penicillin (Nausea)  strawberry (Rash)      Hospital Medications: Medications reviewed      VITALS:  T(F): 98.1 (01-26-22 @ 05:30), Max: 98.3 (01-25-22 @ 18:00)  HR: 82 (01-26-22 @ 05:30)  BP: 137/50 (01-26-22 @ 05:30)  RR: 17 (01-26-22 @ 05:30)  SpO2: 95% (01-26-22 @ 05:30)  Wt(kg): --        PHYSICAL EXAM:    Gen: NAD, calm  Cards: RRR, +S1/S2, no M/G/R  Resp: CTA B/L  GI: soft, RLQ bandage/tender  Vascular: no LE edema B/L, LUE AVF + bruit/thrill      LABS:  01-25    133<L>  |  91<L>  |  35<H>  ----------------------------<  178<H>  4.5   |  28  |  5.48<H>    Ca    9.6      25 Jan 2022 07:28  Phos  3.1     01-25  Mg     2.70     01-25    TPro  7.7  /  Alb      /  TBili      /  DBili      /  AST      /  ALT      /  AlkPhos      01-25    Creatinine Trend: 5.48 <--, 7.24 <--, 6.37 <--, 4.80 <--, 6.31 <--, 5.05 <--                        8.1    12.56 )-----------( 409      ( 25 Jan 2022 07:28 )             29.1     Urine Studies:

## 2022-01-26 NOTE — PROGRESS NOTE ADULT - SUBJECTIVE AND OBJECTIVE BOX
SUBJECTIVE / OVERNIGHT EVENTS:pt seen and examined  01-26-22     MEDICATIONS  (STANDING):  acetaminophen     Tablet .. 650 milliGRAM(s) Oral every 8 hours  apixaban 2.5 milliGRAM(s) Oral two times a day  aspirin enteric coated 81 milliGRAM(s) Oral daily  atorvastatin 80 milliGRAM(s) Oral at bedtime  buPROPion XL (24-Hour) . 150 milliGRAM(s) Oral daily  chlorhexidine 2% Cloths 1 Application(s) Topical daily  cinacalcet 60 milliGRAM(s) Oral at bedtime  Dakins Solution - 1/4 Strength 1 Application(s) Topical two times a day  dextrose 40% Gel 15 Gram(s) Oral once  dextrose 5%. 1000 milliLiter(s) (50 mL/Hr) IV Continuous <Continuous>  dextrose 5%. 1000 milliLiter(s) (100 mL/Hr) IV Continuous <Continuous>  dextrose 50% Injectable 12.5 Gram(s) IV Push once  dextrose 50% Injectable 25 Gram(s) IV Push once  dextrose 50% Injectable 25 Gram(s) IV Push once  diltiazem    milliGRAM(s) Oral daily  epoetin anabela-epbx (RETACRIT) Injectable 01470 Unit(s) IV Push <User Schedule>  gabapentin 300 milliGRAM(s) Oral daily  glucagon  Injectable 1 milliGRAM(s) IntraMuscular once  heparin   Injectable. 500 Unit(s) Dialysis. every 1 hour  insulin glargine Injectable (LANTUS) 7 Unit(s) SubCutaneous at bedtime  insulin lispro (ADMELOG) corrective regimen sliding scale   SubCutaneous at bedtime  insulin lispro (ADMELOG) corrective regimen sliding scale   SubCutaneous three times a day before meals  insulin lispro Injectable (ADMELOG) 3 Unit(s) SubCutaneous three times a day before meals  loratadine 10 milliGRAM(s) Oral daily  melatonin 6 milliGRAM(s) Oral at bedtime  mirtazapine 7.5 milliGRAM(s) Oral at bedtime  montelukast 10 milliGRAM(s) Oral at bedtime  oxyCODONE  ER Tablet 10 milliGRAM(s) Oral every 12 hours  polyethylene glycol 3350 17 Gram(s) Oral two times a day  senna 2 Tablet(s) Oral at bedtime  sertraline 50 milliGRAM(s) Oral daily  sevelamer carbonate 800 milliGRAM(s) Oral three times a day with meals  sodium thiosulfate IVPB 25 Gram(s) IV Intermittent <User Schedule>  traZODone 100 milliGRAM(s) Oral at bedtime    MEDICATIONS  (PRN):  HYDROmorphone  Injectable 1 milliGRAM(s) IV Push every 6 hours PRN Severe Pain (7 - 10)  oxyCODONE    IR 7.5 milliGRAM(s) Oral every 4 hours PRN Moderate Pain (4 - 6)      Constitutional: No fever, fatigue  Skin: No rash.  Eyes: No recent vision problems or eye pain.  ENT: No congestion, ear pain, or sore throat.  Cardiovascular: No chest pain or palpation.  Respiratory: No cough, shortness of breath, congestion, or wheezing.  Gastrointestinal: No abdominal pain, nausea, vomiting, or diarrhea.  Genitourinary: No dysuria.  Musculoskeletal: No joint swelling.  Neurologic: No headache.    PHYSICAL EXAM:  GENERAL: NAD  EYES: EOMI, PERRLA  NECK: Supple, No JVD  CHEST/LUNG: dec breath sounds at bases  HEART:  S1 , S2 +  ABDOMEN: soft , bs+, abd wound +  EXTREMITIES:  edema+  NEUROLOGY:alert awake    LABS:  01-26    131<L>  |  92<L>  |  48<H>  ----------------------------<  179<H>  4.9   |  27  |  6.88<H>    Ca    9.3      26 Jan 2022 11:29  Phos  3.9     01-26  Mg     2.80     01-26    TPro  7.6  /  Alb  3.4  /  TBili  0.2  /  DBili      /  AST  9   /  ALT  8   /  AlkPhos  407<H>  01-26    Creatinine Trend: 6.88 <--, 5.48 <--, 7.24 <--, 6.37 <--, 4.80 <--, 6.31 <--, 5.05 <--                        7.9    12.45 )-----------( 440      ( 26 Jan 2022 11:29 )             27.5     Urine Studies:            LIVER FUNCTIONS - ( 26 Jan 2022 11:29 )  Alb: 3.4 g/dL / Pro: 7.6 g/dL / ALK PHOS: 407 U/L / ALT: 8 U/L / AST: 9 U/L / GGT: x

## 2022-01-26 NOTE — CONSULT NOTE ADULT - CONSULT REASON
Wound check
abdominal wound
AMS
unrelieved abdominal wound pain
abdominal wound
concern for calciphylaxis
pain management recommendations
presyncopal episode
cardiac mgmt
ESRD status
DM2
COVID

## 2022-01-26 NOTE — CONSULT NOTE ADULT - CONSULT REQUESTED BY NAME
ACP Team
team
ACP team
team
Dr. Maurice
Dr. Maurice
ED
cardiac mgmt
Dr. Maurice
primary team
Dr Maurice
Josafat

## 2022-01-26 NOTE — CONSULT NOTE ADULT - CONSULT REQUESTED DATE/TIME
26-Jan-2022 16:29
26-Jan-2022 18:11
17-Jan-2022 11:52
24-Jan-2022 17:33
12-Jan-2022 12:09
21-Jan-2022
12-Jan-2022 02:05
19-Jan-2022 11:30
14-Jan-2022 15:07
12-Jan-2022 12:17
14-Jan-2022 18:01
12-Jan-2022 12:28

## 2022-01-26 NOTE — PROGRESS NOTE ADULT - SUBJECTIVE AND OBJECTIVE BOX
CARDIOLOGY FOLLOW UP - Dr. Bullock  DATE OF SERVICE: 1/26/22    CC no cp or sob       REVIEW OF SYSTEMS:  CONSTITUTIONAL: No fever, weight loss, or fatigue  RESPIRATORY: No cough, wheezing, chills or hemoptysis; No Shortness of Breath  CARDIOVASCULAR: No chest pain, palpitations, passing out, dizziness, or leg swelling  GASTROINTESTINAL: No abdominal or epigastric pain. No nausea, vomiting, or hematemesis; No diarrhea or constipation. No melena or hematochezia.      PHYSICAL EXAM:  T(C): 37.1 (01-26-22 @ 10:30), Max: 37.1 (01-26-22 @ 10:30)  HR: 75 (01-26-22 @ 10:30) (75 - 84)  BP: 116/55 (01-26-22 @ 10:30) (116/55 - 151/84)  RR: 18 (01-26-22 @ 10:30) (16 - 18)  SpO2: 95% (01-26-22 @ 05:30) (94% - 100%)  Wt(kg): --  I&O's Summary      Appearance: Normal	  Cardiovascular: Normal S1 S2,RRR, No JVD, No murmurs  Respiratory: diminsihed 	  Gastrointestinal:  Soft, Non-tender, + BS	  Extremities: le edema       Home Medications:  aspirin 81 mg oral delayed release tablet: 1 tab(s) orally once a day (12 Jan 2022 17:49)  buPROPion 150 mg/24 hours (XL) oral tablet, extended release: 1 tab(s) orally once a day (in the morning) (12 Jan 2022 17:49)  cetirizine 10 mg oral tablet: 1 tab(s) orally once a day (12 Jan 2022 17:49)  dilTIAZem 120 mg/24 hours oral tablet, extended release: 1 tab(s) orally once a day (12 Jan 2022 17:49)  doxepin 25 mg oral capsule: 1 cap(s) orally once a day (at bedtime) (12 Jan 2022 17:49)  Eliquis 2.5 mg oral tablet: 1 tab(s) orally 2 times a day    Pharmacy states patient no longer wants to fill this medication (12 Jan 2022 17:49)  furosemide 80 mg oral tablet: 1 tab(s) orally once a day    Pharmacy states patient no longer wants to fill this medication (12 Jan 2022 17:49)  gabapentin 300 mg oral capsule: 1 cap(s) orally 2 times a day (12 Jan 2022 17:49)  hydrOXYzine hydrochloride 25 mg oral tablet: 1 tab(s) orally 2 times a day, As Needed (12 Jan 2022 17:49)  lanthanum 1000 mg oral tablet, chewable: 2 tab(s) orally with meals and 1 tab(s) orally with snacks    Pharmacy states patient no longer wants to fill this medication (12 Jan 2022 17:49)  mirtazapine 7.5 mg oral tablet: 1 tab(s) orally once a day (at bedtime) (12 Jan 2022 17:49)  montelukast 10 mg oral tablet: 1 tab(s) orally once a day (in the evening) (12 Jan 2022 17:49)  mupirocin 2% topical ointment: Apply sparingly to affected area 2 times a day as directed (12 Jan 2022 17:49)  nystatin 100,000 units/mL oral suspension: 5 milliliter(s) orally 4 times a day, as directed (12 Jan 2022 17:49)  omeprazole 20 mg oral delayed release capsule: 2 cap(s) orally once a day before breakfast (12 Jan 2022 17:49)  Pennsaid 2% topical solution: Apply topically to affected area 2 times a day (12 Jan 2022 17:49)  rosuvastatin 40 mg oral tablet: 1 tab(s) orally once a day (12 Jan 2022 17:49)  Santyl 250 units/g topical ointment: Apply topically to affected area once a day as directed (12 Jan 2022 17:49)  sertraline 50 mg oral tablet: 1 tab(s) orally once a day (12 Jan 2022 17:49)  Spiriva HandiHaler 18 mcg inhalation capsule: 1 cap(s) inhaled once a day (12 Jan 2022 17:49)  traZODone 100 mg oral tablet: 1 tab(s) orally once a day (at bedtime) (12 Jan 2022 17:49)  Tresiba FlexTouch 100 units/mL subcutaneous solution: 80 unit(s) subcutaneous once a day (at bedtime) (12 Jan 2022 17:49)  Trulicity Pen 1.5 mg/0.5 mL subcutaneous solution: 1 dose(s) subcutaneous once a week (12 Jan 2022 17:49)      MEDICATIONS  (STANDING):  apixaban 2.5 milliGRAM(s) Oral two times a day  aspirin enteric coated 81 milliGRAM(s) Oral daily  atorvastatin 80 milliGRAM(s) Oral at bedtime  buPROPion XL (24-Hour) . 150 milliGRAM(s) Oral daily  chlorhexidine 2% Cloths 1 Application(s) Topical daily  cinacalcet 60 milliGRAM(s) Oral at bedtime  Dakins Solution - 1/4 Strength 1 Application(s) Topical two times a day  dextrose 40% Gel 15 Gram(s) Oral once  dextrose 5%. 1000 milliLiter(s) (100 mL/Hr) IV Continuous <Continuous>  dextrose 5%. 1000 milliLiter(s) (50 mL/Hr) IV Continuous <Continuous>  dextrose 50% Injectable 25 Gram(s) IV Push once  dextrose 50% Injectable 12.5 Gram(s) IV Push once  dextrose 50% Injectable 25 Gram(s) IV Push once  diltiazem    milliGRAM(s) Oral daily  epoetin anabela-epbx (RETACRIT) Injectable 18844 Unit(s) IV Push <User Schedule>  gabapentin 300 milliGRAM(s) Oral daily  glucagon  Injectable 1 milliGRAM(s) IntraMuscular once  heparin   Injectable. 500 Unit(s) Dialysis. every 1 hour  insulin glargine Injectable (LANTUS) 7 Unit(s) SubCutaneous at bedtime  insulin lispro (ADMELOG) corrective regimen sliding scale   SubCutaneous three times a day before meals  insulin lispro (ADMELOG) corrective regimen sliding scale   SubCutaneous at bedtime  insulin lispro Injectable (ADMELOG) 3 Unit(s) SubCutaneous three times a day before meals  loratadine 10 milliGRAM(s) Oral daily  melatonin 6 milliGRAM(s) Oral at bedtime  mirtazapine 7.5 milliGRAM(s) Oral at bedtime  montelukast 10 milliGRAM(s) Oral at bedtime  oxyCODONE  ER Tablet 10 milliGRAM(s) Oral every 12 hours  polyethylene glycol 3350 17 Gram(s) Oral two times a day  senna 2 Tablet(s) Oral at bedtime  sertraline 50 milliGRAM(s) Oral daily  sevelamer carbonate 800 milliGRAM(s) Oral three times a day with meals  sodium thiosulfate IVPB 25 Gram(s) IV Intermittent <User Schedule>  traZODone 100 milliGRAM(s) Oral at bedtime      TELEMETRY: nsr	    ECG:  	  RADIOLOGY:   DIAGNOSTIC TESTING:  [ ] Echocardiogram:  [ ]  Catheterization:  [ ] Stress Test:    OTHER: 	    LABS:	 	    Troponin T, High Sensitivity Result: 55 ng/L (01-21 @ 07:28)                          7.9    12.45 )-----------( 440      ( 26 Jan 2022 11:29 )             27.5     01-25    133<L>  |  91<L>  |  35<H>  ----------------------------<  178<H>  4.5   |  28  |  5.48<H>    Ca    9.6      25 Jan 2022 07:28  Phos  3.1     01-25  Mg     2.70     01-25    TPro  7.7  /  Alb  x   /  TBili  x   /  DBili  x   /  AST  x   /  ALT  x   /  AlkPhos  x   01-25

## 2022-01-26 NOTE — PROGRESS NOTE ADULT - SUBJECTIVE AND OBJECTIVE BOX
Chief Complaint: DM 2    History: Patient seen in dialysis unit. Patient reports she is not liking the food and is eating very little. Skipped breakfast today. No nausea/vomiting, no s/s of hypoglycemia     MEDICATIONS  (STANDING):  apixaban 2.5 milliGRAM(s) Oral two times a day  aspirin enteric coated 81 milliGRAM(s) Oral daily  atorvastatin 80 milliGRAM(s) Oral at bedtime  buPROPion XL (24-Hour) . 150 milliGRAM(s) Oral daily  chlorhexidine 2% Cloths 1 Application(s) Topical daily  cinacalcet 60 milliGRAM(s) Oral at bedtime  Dakins Solution - 1/4 Strength 1 Application(s) Topical two times a day  dextrose 40% Gel 15 Gram(s) Oral once  dextrose 5%. 1000 milliLiter(s) (100 mL/Hr) IV Continuous <Continuous>  dextrose 5%. 1000 milliLiter(s) (50 mL/Hr) IV Continuous <Continuous>  dextrose 50% Injectable 25 Gram(s) IV Push once  dextrose 50% Injectable 12.5 Gram(s) IV Push once  dextrose 50% Injectable 25 Gram(s) IV Push once  diltiazem    milliGRAM(s) Oral daily  epoetin anabela-epbx (RETACRIT) Injectable 59818 Unit(s) IV Push <User Schedule>  gabapentin 300 milliGRAM(s) Oral daily  glucagon  Injectable 1 milliGRAM(s) IntraMuscular once  heparin   Injectable. 500 Unit(s) Dialysis. every 1 hour  insulin glargine Injectable (LANTUS) 7 Unit(s) SubCutaneous at bedtime  insulin lispro (ADMELOG) corrective regimen sliding scale   SubCutaneous three times a day before meals  insulin lispro (ADMELOG) corrective regimen sliding scale   SubCutaneous at bedtime  insulin lispro Injectable (ADMELOG) 3 Unit(s) SubCutaneous three times a day before meals  loratadine 10 milliGRAM(s) Oral daily  melatonin 6 milliGRAM(s) Oral at bedtime  mirtazapine 7.5 milliGRAM(s) Oral at bedtime  montelukast 10 milliGRAM(s) Oral at bedtime  oxyCODONE  ER Tablet 10 milliGRAM(s) Oral every 12 hours  polyethylene glycol 3350 17 Gram(s) Oral two times a day  senna 2 Tablet(s) Oral at bedtime  sertraline 50 milliGRAM(s) Oral daily  sevelamer carbonate 800 milliGRAM(s) Oral three times a day with meals  sodium thiosulfate IVPB 25 Gram(s) IV Intermittent <User Schedule>  traZODone 100 milliGRAM(s) Oral at bedtime    MEDICATIONS  (PRN):  acetaminophen     Tablet .. 650 milliGRAM(s) Oral every 6 hours PRN Mild Pain (1 - 3), Moderate Pain (4 - 6)  HYDROmorphone  Injectable 1 milliGRAM(s) IV Push every 6 hours PRN Severe Pain (7 - 10)      Allergies  latex (Rash)  penicillin (Nausea)  strawberry (Rash)    Intolerances  caffeine (Nausea)    Review of Systems:  Cardiovascular: No chest pain  Respiratory: No SOB  GI: No nausea, vomiting  Endocrine: no hypoglycemia     PHYSICAL EXAM:  VITALS: T(C): 36.9 (01-26-22 @ 14:40)  T(F): 98.4 (01-26-22 @ 14:40), Max: 98.8 (01-26-22 @ 10:30)  HR: 80 (01-26-22 @ 14:40) (75 - 82)  BP: 124/62 (01-26-22 @ 14:40) (116/55 - 151/84)  RR:  (16 - 18)  SpO2:  (94% - 100%)  Wt(kg): --  GENERAL: NAD  EYES: No proptosis, no lid lag, anicteric  HEENT:  Atraumatic, Normocephalic, moist mucous membranes  RESPIRATORY: unlabored respirations     CAPILLARY BLOOD GLUCOSE    POCT Blood Glucose.: 135 mg/dL (26 Jan 2022 13:04)  POCT Blood Glucose.: 156 mg/dL (26 Jan 2022 09:17)  POCT Blood Glucose.: 278 mg/dL (25 Jan 2022 22:19)  POCT Blood Glucose.: 237 mg/dL (25 Jan 2022 17:33)      01-26    131<L>  |  92<L>  |  48<H>  ----------------------------<  179<H>  4.9   |  27  |  6.88<H>    EGFR if : 7<L>  EGFR if non : 6<L>    Ca    9.3      01-26  Mg     2.80     01-26  Phos  3.9     01-26    TPro  7.6  /  Alb  3.4  /  TBili  0.2  /  DBili  x   /  AST  9   /  ALT  8   /  AlkPhos  407<H>  01-26      A1C with Estimated Average Glucose Result: 7.0 % (01-13-22 @ 08:21)  A1C with Estimated Average Glucose Result: 6.7 % (08-31-21 @ 09:30)  A1C with Estimated Average Glucose Result: 7.2 % (04-28-21 @ 07:04)    Diet, Consistent Carbohydrate Renal w/Evening Snack:   Supplement Feeding Modality:  Oral  Nepro Cans or Servings Per Day:  1       Frequency:  Three Times a day (01-17-22 @ 18:07)

## 2022-01-26 NOTE — CONSULT NOTE ADULT - REASON FOR ADMISSION
worsening abdominal wound
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
## 2022-01-26 NOTE — PROGRESS NOTE ADULT - ASSESSMENT
56 yr old F with morbid obesity, CHAMP not on home O2, ESRD (HD MWF), HTN, DM2 A1C 7.0, COPD, Afib no longer on AC, chronic R pannus wound here with worsening wound, diarrhea and found to be COVID+. Has poor po intake at this time.     1. Type 2 diabetes mellitus   A1c 7.0% (may be inaccurate in setting of ESRD)  Home Regimen: Tresiba 80 units HS and Trulicity 1.5mg subq weekly    While inpatient:  BG target 100-180 mg/dl   Continue Lantus 7 units SQ qHS   Continue Admelog 3 units SQ TID before meals (Hold if NPO/not eating meal)    Continue Admelog correctional scale to LOW dose before meals, continue low dose at bedtime   Check BG before meals and bedtime  Hypoglycemia protocol     Discharge Plan:  STOP Trulicity (patient ESRD on HD)  Likely dc plan is basal/oral regimen. For oral agent, depends on inpatient requirements but can consider renally dosed DPP4 (Januvia 25 mg or Tradjenta 5 mg daily) VS Prandin before meals   Patient may benefit from switching to 22-24h acting basal insulin eg Levemir or Lantus, instead of Tresiba  Consider CGM (such as Freestyle Libre2 outpatient)  If desiring to followup with [facility]: [address] [place] [address], [address], [facility] [address], [phone]    2. HTN  Management per primary team     3. Hyperlipidemia  Continue home dose of Atorvastatin 80 mg  Note, limited benefit in ESRD. Followup lipid panel as outpatient    Priscilla Patel  Nurse Practitioner  Division of Endocrinology & Diabetes  In house pager #[phone]/long range pager #[phone]    If before 9AM or after 6PM, or on weekends/holidays, please call endocrine answering service for assistance ([phone]).  For nonurgent matters email [email or web address] for assistance.

## 2022-01-26 NOTE — CHART NOTE - NSCHARTNOTEFT_GEN_A_CORE
Went to patient's room this morning and patient was at procedure.  Will return this afternoon to see patient.
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
## 2022-01-26 NOTE — CONSULT NOTE ADULT - PROVIDER SPECIALTY LIST ADULT
Neurology
Pain Medicine
Surgery
Dermatology
Endocrinology
Wound Care
Nephrology
Pain Medicine
Wound Care
Neurology
Cardiology
Infectious Disease

## 2022-01-27 NOTE — DISCHARGE NOTE NURSING/CASE MANAGEMENT/SOCIAL WORK - PATIENT PORTAL LINK FT
You can access the FollowMyHealth Patient Portal offered by [facility] by registering at the following website: [email or web address]. By joining [facility] FollowMyHealth portal, you will also be able to view your health information using other applications (apps) compatible with our system.

## 2022-01-27 NOTE — DISCHARGE NOTE NURSING/CASE MANAGEMENT/SOCIAL WORK - NSDCPNINST_GEN_ALL_CORE
Call MD for fever greater than 100.4, nausea, vomiting, increased pain, pus drainage from incision, or go to ER.

## 2022-01-27 NOTE — PROGRESS NOTE ADULT - ASSESSMENT
56 year old female with hx of morbid obesity, CHAMP not on home O2, ESRD (HD MWF), HTN, DM, COPD, Afib no longer on AC, chronic R pannus wound, followed by Dr. Layne, sent by visiting RN for worsening wound.    # Chronic Pannus Wound  -IV abx per primary team  -surgery /derm consult appreciated    #Afib  -stable, in NSR   -cont eliquis for now, elevated inflamm markers/ddimer given covid,  h/h noted- gi /renal following   -cont cardizem    #Covid-19+  -trend inflamm markers/ ddimer   -med f/u     #Hypertension  -cont current meds    #ESRD on HD  -HD per renal    # Near Syncope  -tele no events   -orthostatics neg

## 2022-01-27 NOTE — PROGRESS NOTE ADULT - SUBJECTIVE AND OBJECTIVE BOX
[facility] NEPHROLOGY- PROGRESS NOTE    56y Female with history of ESRD on HD presents with vomiting and diarrhea found to be COVID-19 positive. Nephrology consulted for ESRD status.    REVIEW OF SYSTEMS:  Gen: no changes in weight  Cards: no chest pain  Resp: no dyspnea  GI: no nausea or vomiting or diarrhea + ab wound pain  Vascular: no LE edema    caffeine (Nausea)  latex (Rash)  penicillin (Nausea)  strawberry (Rash)      Hospital Medications: Medications reviewed      VITALS:  T(F): 98 (01-27-22 @ 05:45), Max: 99.3 (01-26-22 @ 21:19)  HR: 77 (01-27-22 @ 05:45)  BP: 104/57 (01-27-22 @ 05:45)  RR: 18 (01-27-22 @ 05:45)  SpO2: 97% (01-27-22 @ 05:45)  Wt(kg): --    01-26 @ 07:01  -  01-27 @ 07:00  --------------------------------------------------------  IN: 400 mL / OUT: 2650 mL / NET: -2250 mL        PHYSICAL EXAM:    Gen: NAD, calm  Cards: RRR, +S1/S2, no M/G/R  Resp: CTA B/L  GI: soft, RLQ bandage/tender  Vascular: no LE edema B/L, LUE AVF + bruit/thrill      LABS:  01-26    131<L>  |  92<L>  |  48<H>  ----------------------------<  179<H>  4.9   |  27  |  6.88<H>    Ca    9.3      26 Jan 2022 11:29  Phos  3.9     01-26  Mg     2.80     01-26    TPro  7.6  /  Alb  3.4  /  TBili  0.2  /  DBili      /  AST  9   /  ALT  8   /  AlkPhos  407<H>  01-26    Creatinine Trend: 6.88 <--, 5.48 <--, 7.24 <--, 6.37 <--, 4.80 <--, 6.31 <--                        7.9    12.45 )-----------( 440      ( 26 Jan 2022 11:29 )             27.5     Urine Studies:

## 2022-01-27 NOTE — PROGRESS NOTE ADULT - ASSESSMENT
56y Female with history of ESRD on HD presents with vomiting and diarrhea found to be COVID-19 positive. Nephrology consulted for ESRD status.    1) ESRD: Last HD on 1/26 tolerated well with 2.1L removed. Plan for next maintenance HD on 1/28. Monitor electrolytes.    2) HTN with ESRD: BP acceptable with negative orthostatics. Continue with current medications. Monitor BP.    3) Anemia of renal disease: Hb low. Continue with Epo 10K with HD. Monitor Hb.    4) Secondary HPT of renal origin: Phosphorus acceptable. Continue with sensipar 60 mg daily and renvela 1 tab with meals (goal < 4.5 given concerns for calciphylaxis). iPTH low with high normal serum calcium suggestive of oversuppression for which hectorol discontinued. Monitor serum calcium and phosphorus.    5) Ab wound: Appreciate dermatology consultation. S/P biopsy. Continue with empiric sodium thiosulfate with HD and monitor response. F/U biopsy results.      [facility] NEPHROLOGY  Keaton Lopez M.D.  Juma Green D.O.  Myla Hoffmann M.D.  Julia Brewer, MSN, ANP-C    Telephone: [phone]  Facsimile: [phone]    [address] [place] [address]
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
## 2022-01-27 NOTE — PROGRESS NOTE ADULT - ASSESSMENT
Deep, non-healing ulcer, differential diagnosis includes calciphylaxis vs pyoderma gangrenosum vs other. Patient with ESRD on HD, with non-healing, extremely tender ulcer with surrounding firm nodules in pannus, raising suspicion for calciphylaxis. Calciphylaxis is a microvascular occlusion syndrome thought to be due to diffuse deposition of insoluble calcium salts in cutaneous blood vessels with associated thrombosis. Early lesions are extremely painful, violaceous retiform patches and plaques, classically on fat-bearing areas such as the thighs, buttocks, or abdomen. This is followed by necrosis, ulcers, eschar formation, and possibly gangrene. Induration of the surrounding tissues may be present. There are also features of the ulcer, including worsening and enlargement of ulcer after surgical debridement (pathergy) and inflammatory, undermined borders, which are suggestive of pyoderma gangrenosum, an inflammatory, noninfectious, ulcerative neutrophilic skin disease of uncertain etiology. However, pyoderma gangrenosum is a diagnosis of exclusion. Overall improvement in pain and in induration of skin surrounding ulcer. Bacterial tissue culture with Pseudomonas, Staph epidermidis, Enterococcus faecalis; reportedly same as previous bacterial culture.   - fu biopsy and fungal/AFB TC results; still pending   - fu serum DOUG, UPEP with immunofixation, ANCA serologies  - continue wound vac per wound care. discussed with patient and with wound care team to page dermatology to attempt to coordinate wound vac change for visualization of ulcer. requested photos on either patient's own cell phone or to be sent via teams if in-person coordination not possible. avoid debridement to wound   - continue with pain management per pain medicine recs   - continue with empiric STS per nephrology     The patient's chart was reviewed in addition to being seen and examined at bedside with the dermatology attending Dr. Lary Bo.   Please page [phone] for further related questions (please leave 10 digit call back number because we cover several facilities).    Amy Jackman MD  Resident Physician, PGY3  [facility] Dermatology

## 2022-01-27 NOTE — DISCHARGE NOTE NURSING/CASE MANAGEMENT/SOCIAL WORK - NSDCPEFALRISK_GEN_ALL_CORE
For information on Fall & Injury Prevention, visit: [email or web address] OR  [email or web address] OR  https://www.cdc.gov/steadi/patient.html

## 2022-01-27 NOTE — PROGRESS NOTE ADULT - SUBJECTIVE AND OBJECTIVE BOX
CARDIOLOGY FOLLOW UP - Dr. Bullock  DATE OF SERVICE: 1/27/22    CC no cp or sob   mild dizziness when changing positions       REVIEW OF SYSTEMS:  CONSTITUTIONAL: No fever, weight loss, or fatigue  RESPIRATORY: No cough, wheezing, chills or hemoptysis; No Shortness of Breath  CARDIOVASCULAR: No chest pain, palpitations, passing out, dizziness, or leg swelling  GASTROINTESTINAL: No abdominal or epigastric pain. No nausea, vomiting, or hematemesis; No diarrhea or constipation. No melena or hematochezia.      PHYSICAL EXAM:  T(C): 36.8 (01-27-22 @ 11:48), Max: 37.4 (01-26-22 @ 21:19)  HR: 77 (01-27-22 @ 11:48) (77 - 89)  BP: 108/61 (01-27-22 @ 11:48) (104/57 - 124/62)  RR: 18 (01-27-22 @ 11:48) (16 - 18)  SpO2: 99% (01-27-22 @ 11:48) (95% - 99%)  Wt(kg): --  I&O's Summary    26 Jan 2022 07:01  -  27 Jan 2022 07:00  --------------------------------------------------------  IN: 400 mL / OUT: 2650 mL / NET: -2250 mL        Appearance: Normal	  Cardiovascular: Normal S1 S2,RRR, No JVD, No murmurs  Respiratory: Lungs clear to auscultation	  Gastrointestinal:  Soft, Non-tender, + BS	  Extremities: Normal range of motion, No clubbing, cyanosis or edema      Home Medications:  aspirin 81 mg oral delayed release tablet: 1 tab(s) orally once a day (12 Jan 2022 17:49)  buPROPion 150 mg/24 hours (XL) oral tablet, extended release: 1 tab(s) orally once a day (in the morning) (12 Jan 2022 17:49)  cetirizine 10 mg oral tablet: 1 tab(s) orally once a day (12 Jan 2022 17:49)  dilTIAZem 120 mg/24 hours oral tablet, extended release: 1 tab(s) orally once a day (12 Jan 2022 17:49)  doxepin 25 mg oral capsule: 1 cap(s) orally once a day (at bedtime) (12 Jan 2022 17:49)  Eliquis 2.5 mg oral tablet: 1 tab(s) orally 2 times a day    Pharmacy states patient no longer wants to fill this medication (12 Jan 2022 17:49)  furosemide 80 mg oral tablet: 1 tab(s) orally once a day    Pharmacy states patient no longer wants to fill this medication (12 Jan 2022 17:49)  gabapentin 300 mg oral capsule: 1 cap(s) orally 2 times a day (12 Jan 2022 17:49)  hydrOXYzine hydrochloride 25 mg oral tablet: 1 tab(s) orally 2 times a day, As Needed (12 Jan 2022 17:49)  lanthanum 1000 mg oral tablet, chewable: 2 tab(s) orally with meals and 1 tab(s) orally with snacks    Pharmacy states patient no longer wants to fill this medication (12 Jan 2022 17:49)  mirtazapine 7.5 mg oral tablet: 1 tab(s) orally once a day (at bedtime) (12 Jan 2022 17:49)  montelukast 10 mg oral tablet: 1 tab(s) orally once a day (in the evening) (12 Jan 2022 17:49)  mupirocin 2% topical ointment: Apply sparingly to affected area 2 times a day as directed (12 Jan 2022 17:49)  nystatin 100,000 units/mL oral suspension: 5 milliliter(s) orally 4 times a day, as directed (12 Jan 2022 17:49)  omeprazole 20 mg oral delayed release capsule: 2 cap(s) orally once a day before breakfast (12 Jan 2022 17:49)  Pennsaid 2% topical solution: Apply topically to affected area 2 times a day (12 Jan 2022 17:49)  rosuvastatin 40 mg oral tablet: 1 tab(s) orally once a day (12 Jan 2022 17:49)  Santyl 250 units/g topical ointment: Apply topically to affected area once a day as directed (12 Jan 2022 17:49)  sertraline 50 mg oral tablet: 1 tab(s) orally once a day (12 Jan 2022 17:49)  Spiriva HandiHaler 18 mcg inhalation capsule: 1 cap(s) inhaled once a day (12 Jan 2022 17:49)  traZODone 100 mg oral tablet: 1 tab(s) orally once a day (at bedtime) (12 Jan 2022 17:49)  Tresiba FlexTouch 100 units/mL subcutaneous solution: 80 unit(s) subcutaneous once a day (at bedtime) (12 Jan 2022 17:49)  Trulicity Pen 1.5 mg/0.5 mL subcutaneous solution: 1 dose(s) subcutaneous once a week (12 Jan 2022 17:49)      MEDICATIONS  (STANDING):  acetaminophen     Tablet .. 650 milliGRAM(s) Oral every 8 hours  apixaban 2.5 milliGRAM(s) Oral two times a day  aspirin enteric coated 81 milliGRAM(s) Oral daily  atorvastatin 80 milliGRAM(s) Oral at bedtime  buPROPion XL (24-Hour) . 150 milliGRAM(s) Oral daily  chlorhexidine 2% Cloths 1 Application(s) Topical daily  cinacalcet 60 milliGRAM(s) Oral at bedtime  Dakins Solution - 1/4 Strength 1 Application(s) Topical two times a day  dextrose 40% Gel 15 Gram(s) Oral once  dextrose 5%. 1000 milliLiter(s) (100 mL/Hr) IV Continuous <Continuous>  dextrose 5%. 1000 milliLiter(s) (50 mL/Hr) IV Continuous <Continuous>  dextrose 50% Injectable 25 Gram(s) IV Push once  dextrose 50% Injectable 12.5 Gram(s) IV Push once  dextrose 50% Injectable 25 Gram(s) IV Push once  diltiazem    milliGRAM(s) Oral daily  epoetin anabela-epbx (RETACRIT) Injectable 84556 Unit(s) IV Push <User Schedule>  gabapentin 300 milliGRAM(s) Oral daily  glucagon  Injectable 1 milliGRAM(s) IntraMuscular once  heparin   Injectable. 500 Unit(s) Dialysis. every 1 hour  insulin glargine Injectable (LANTUS) 7 Unit(s) SubCutaneous at bedtime  insulin lispro (ADMELOG) corrective regimen sliding scale   SubCutaneous three times a day before meals  insulin lispro (ADMELOG) corrective regimen sliding scale   SubCutaneous at bedtime  insulin lispro Injectable (ADMELOG) 3 Unit(s) SubCutaneous three times a day before meals  loratadine 10 milliGRAM(s) Oral daily  melatonin 6 milliGRAM(s) Oral at bedtime  mirtazapine 7.5 milliGRAM(s) Oral at bedtime  montelukast 10 milliGRAM(s) Oral at bedtime  oxyCODONE  ER Tablet 10 milliGRAM(s) Oral every 12 hours  polyethylene glycol 3350 17 Gram(s) Oral two times a day  senna 2 Tablet(s) Oral at bedtime  sertraline 50 milliGRAM(s) Oral daily  sevelamer carbonate 800 milliGRAM(s) Oral three times a day with meals  sodium thiosulfate IVPB 25 Gram(s) IV Intermittent <User Schedule>  traZODone 100 milliGRAM(s) Oral at bedtime      TELEMETRY: 	    ECG:  	  RADIOLOGY:   DIAGNOSTIC TESTING:  [ ] Echocardiogram:  [ ]  Catheterization:  [ ] Stress Test:    OTHER: 	    LABS:	 	    Troponin T, High Sensitivity Result: 55 ng/L (01-21 @ 07:28)                          7.9    12.94 )-----------( 411      ( 27 Jan 2022 11:35 )             27.4     01-26    131<L>  |  92<L>  |  48<H>  ----------------------------<  179<H>  4.9   |  27  |  6.88<H>    Ca    9.3      26 Jan 2022 11:29  Phos  3.9     01-26  Mg     2.80     01-26    TPro  7.6  /  Alb  3.4  /  TBili  0.2  /  DBili  x   /  AST  9   /  ALT  8   /  AlkPhos  407<H>  01-26

## 2022-01-27 NOTE — PROGRESS NOTE ADULT - SUBJECTIVE AND OBJECTIVE BOX
SUBJECTIVE / OVERNIGHT EVENTS:pt seen and examined  01-27-22     MEDICATIONS  (STANDING):  acetaminophen     Tablet .. 650 milliGRAM(s) Oral every 8 hours  apixaban 2.5 milliGRAM(s) Oral two times a day  aspirin enteric coated 81 milliGRAM(s) Oral daily  atorvastatin 80 milliGRAM(s) Oral at bedtime  buPROPion XL (24-Hour) . 150 milliGRAM(s) Oral daily  chlorhexidine 2% Cloths 1 Application(s) Topical daily  cinacalcet 60 milliGRAM(s) Oral at bedtime  Dakins Solution - 1/4 Strength 1 Application(s) Topical two times a day  dextrose 40% Gel 15 Gram(s) Oral once  dextrose 5%. 1000 milliLiter(s) (50 mL/Hr) IV Continuous <Continuous>  dextrose 5%. 1000 milliLiter(s) (100 mL/Hr) IV Continuous <Continuous>  dextrose 50% Injectable 25 Gram(s) IV Push once  dextrose 50% Injectable 12.5 Gram(s) IV Push once  dextrose 50% Injectable 25 Gram(s) IV Push once  diltiazem    milliGRAM(s) Oral daily  epoetin anabela-epbx (RETACRIT) Injectable 49775 Unit(s) IV Push <User Schedule>  gabapentin 300 milliGRAM(s) Oral daily  glucagon  Injectable 1 milliGRAM(s) IntraMuscular once  heparin   Injectable. 500 Unit(s) Dialysis. every 1 hour  insulin glargine Injectable (LANTUS) 7 Unit(s) SubCutaneous at bedtime  insulin lispro (ADMELOG) corrective regimen sliding scale   SubCutaneous at bedtime  insulin lispro (ADMELOG) corrective regimen sliding scale   SubCutaneous three times a day before meals  insulin lispro Injectable (ADMELOG) 3 Unit(s) SubCutaneous three times a day before meals  loratadine 10 milliGRAM(s) Oral daily  melatonin 6 milliGRAM(s) Oral at bedtime  mirtazapine 7.5 milliGRAM(s) Oral at bedtime  montelukast 10 milliGRAM(s) Oral at bedtime  oxyCODONE  ER Tablet 10 milliGRAM(s) Oral every 12 hours  pantoprazole    Tablet 40 milliGRAM(s) Oral before breakfast  polyethylene glycol 3350 17 Gram(s) Oral two times a day  senna 2 Tablet(s) Oral at bedtime  sertraline 50 milliGRAM(s) Oral daily  sevelamer carbonate 800 milliGRAM(s) Oral three times a day with meals  sodium thiosulfate IVPB 25 Gram(s) IV Intermittent <User Schedule>  traZODone 100 milliGRAM(s) Oral at bedtime    MEDICATIONS  (PRN):  HYDROmorphone  Injectable 1 milliGRAM(s) IV Push every 8 hours PRN Severe Pain (7 - 10)  oxyCODONE    IR 7.5 milliGRAM(s) Oral every 4 hours PRN Moderate Pain (4 - 6)    Vital Signs Last 24 Hrs  T(C): 36.7 (01-27-22 @ 22:06), Max: 36.8 (01-27-22 @ 11:48)  T(F): 98 (01-27-22 @ 22:06), Max: 98.3 (01-27-22 @ 11:48)  HR: 72 (01-27-22 @ 22:06) (72 - 77)  BP: 130/55 (01-27-22 @ 22:06) (104/57 - 130/55)  BP(mean): --  RR: 18 (01-27-22 @ 22:06) (18 - 18)  SpO2: 100% (01-27-22 @ 22:06) (93% - 100%)    Orthostatic VS  01-27-22 @ 11:48  Lying BP: 108/61 HR: --  Sitting BP: 134/81 HR: 99  Standing BP: --/-- HR: --  Site: --  Mode: --  Orthostatic VS  01-26-22 @ 16:52  Lying BP: 127/68 HR: 81  Sitting BP: 132/72 HR: 85  Standing BP: --/-- HR: --  Site: --  Mode: --    Constitutional: No fever, fatigue  Skin: No rash.  Eyes: No recent vision problems or eye pain.  ENT: No congestion, ear pain, or sore throat.  Cardiovascular: No chest pain or palpation.  Respiratory: No cough, shortness of breath, congestion, or wheezing.  Gastrointestinal: No abdominal pain, nausea, vomiting, or diarrhea.  Genitourinary: No dysuria.  Musculoskeletal: No joint swelling.  Neurologic: No headache.    PHYSICAL EXAM:  GENERAL: NAD  EYES: EOMI, PERRLA  NECK: Supple, No JVD  CHEST/LUNG: dec breath sounds at bases  HEART:  S1 , S2 +  ABDOMEN: soft , bs+, abd wound +  EXTREMITIES:  edema+  NEUROLOGY:alert awake    LABS:  01-27    139  |  93<L>  |  36<H>  ----------------------------<  192<H>  4.9   |  24  |  5.42<H>    Ca    10.1      27 Jan 2022 11:35  Phos  2.3     01-27  Mg     2.70     01-27    TPro  7.5  /  Alb  3.4  /  TBili  <0.2  /  DBili      /  AST  12  /  ALT  8   /  AlkPhos  429<H>  01-27    Creatinine Trend: 5.42 <--, 6.88 <--, 5.48 <--, 7.24 <--, 6.37 <--, 4.80 <--, 6.31 <--                        7.9    12.94 )-----------( 411      ( 27 Jan 2022 11:35 )             27.4     Urine Studies:            LIVER FUNCTIONS - ( 27 Jan 2022 11:35 )  Alb: 3.4 g/dL / Pro: 7.5 g/dL / ALK PHOS: 429 U/L / ALT: 8 U/L / AST: 12 U/L / GGT: x

## 2022-01-27 NOTE — PROGRESS NOTE ADULT - SUBJECTIVE AND OBJECTIVE BOX
INTERVAL HPI/OVERNIGHT EVENTS:    1/26: wound vac placed; started STS with HD, pain management saw patient     Patient endorsing overall improvement in pain on modified regimen and with wound vac placement     MEDICATIONS  (STANDING):  acetaminophen     Tablet .. 650 milliGRAM(s) Oral every 8 hours  apixaban 2.5 milliGRAM(s) Oral two times a day  aspirin enteric coated 81 milliGRAM(s) Oral daily  atorvastatin 80 milliGRAM(s) Oral at bedtime  buPROPion XL (24-Hour) . 150 milliGRAM(s) Oral daily  chlorhexidine 2% Cloths 1 Application(s) Topical daily  cinacalcet 60 milliGRAM(s) Oral at bedtime  Dakins Solution - 1/4 Strength 1 Application(s) Topical two times a day  dextrose 40% Gel 15 Gram(s) Oral once  dextrose 5%. 1000 milliLiter(s) (100 mL/Hr) IV Continuous <Continuous>  dextrose 5%. 1000 milliLiter(s) (50 mL/Hr) IV Continuous <Continuous>  dextrose 50% Injectable 25 Gram(s) IV Push once  dextrose 50% Injectable 12.5 Gram(s) IV Push once  dextrose 50% Injectable 25 Gram(s) IV Push once  diltiazem    milliGRAM(s) Oral daily  epoetin anabela-epbx (RETACRIT) Injectable 45766 Unit(s) IV Push <User Schedule>  gabapentin 300 milliGRAM(s) Oral daily  glucagon  Injectable 1 milliGRAM(s) IntraMuscular once  heparin   Injectable. 500 Unit(s) Dialysis. every 1 hour  insulin glargine Injectable (LANTUS) 7 Unit(s) SubCutaneous at bedtime  insulin lispro (ADMELOG) corrective regimen sliding scale   SubCutaneous at bedtime  insulin lispro (ADMELOG) corrective regimen sliding scale   SubCutaneous three times a day before meals  insulin lispro Injectable (ADMELOG) 3 Unit(s) SubCutaneous three times a day before meals  loratadine 10 milliGRAM(s) Oral daily  melatonin 6 milliGRAM(s) Oral at bedtime  mirtazapine 7.5 milliGRAM(s) Oral at bedtime  montelukast 10 milliGRAM(s) Oral at bedtime  oxyCODONE  ER Tablet 10 milliGRAM(s) Oral every 12 hours  pantoprazole    Tablet 40 milliGRAM(s) Oral before breakfast  polyethylene glycol 3350 17 Gram(s) Oral two times a day  senna 2 Tablet(s) Oral at bedtime  sertraline 50 milliGRAM(s) Oral daily  sevelamer carbonate 800 milliGRAM(s) Oral three times a day with meals  sodium thiosulfate IVPB 25 Gram(s) IV Intermittent <User Schedule>  traZODone 100 milliGRAM(s) Oral at bedtime    MEDICATIONS  (PRN):  HYDROmorphone  Injectable 1 milliGRAM(s) IV Push every 8 hours PRN Severe Pain (7 - 10)  oxyCODONE    IR 7.5 milliGRAM(s) Oral every 4 hours PRN Moderate Pain (4 - 6)      Allergies    latex (Rash)  penicillin (Nausea)  strawberry (Rash)    Intolerances    caffeine (Nausea)      REVIEW OF SYSTEMS    General: no fevers/chills, no NS	  Skin: see HPI  Ophthalmologic: no eye pain or change in vision  Genitourinary: no dysuria or hematuria  Musculoskeletal: no joint pains or weakness	  Neurological:no weakness or tingling      Vital Signs Last 24 Hrs  T(C): 36.8 (27 Jan 2022 11:48), Max: 37.4 (26 Jan 2022 21:19)  T(F): 98.3 (27 Jan 2022 11:48), Max: 99.3 (26 Jan 2022 21:19)  HR: 77 (27 Jan 2022 11:48) (77 - 89)  BP: 108/61 (27 Jan 2022 11:48) (104/57 - 116/50)  BP(mean): --  RR: 18 (27 Jan 2022 11:48) (18 - 18)  SpO2: 99% (27 Jan 2022 11:48) (95% - 99%)    PHYSICAL EXAM:   The patient was alert and oriented X 3, well nourished, and in no apparent distress.  There was no visible lymphadenopathy.  Conjunctiva were non injected  There was no clubbing or edema of extremities.    Of note on skin exam:   - right pannus with large round, deep ulcer, wound vac in place, with less induration in skin surrounding ulcer     LABS:                        7.9    12.94 )-----------( 411      ( 27 Jan 2022 11:35 )             27.4     01-27    139  |  93<L>  |  36<H>  ----------------------------<  192<H>  4.9   |  24  |  5.42<H>    Ca    10.1      27 Jan 2022 11:35  Phos  2.3     01-27  Mg     2.70     01-27    TPro  7.5  /  Alb  3.4  /  TBili  <0.2  /  DBili  x   /  AST  12  /  ALT  8   /  AlkPhos  429<H>  01-27      RADIOLOGY & ADDITIONAL TESTS:  Total immunoglobulins 1/12/22  - quantitative IgG, IgA, IgM, kappa/lambda FLC ratio all wnl     SPEP 01-25-22   7.7 wnl     Tissue skin gram stain 1-25-22  - few PMNs seen per low power field  - few GNRs seen per oil power field     Prelim TC results 01-25-22  Few Pseudomonas aeruginosa  Rare gram positive cocci in pairs: Staph. Epidermidis and Enterococcus faecalis     Staph epi R to ampicillin/sulbactam, Keflex, clindamycin, erythromycin, oxacillin, penicillin, Bactrim; S to gentamicin (<=1 should not be used as monotherapy); S to rifampin (<=1 should not be used as monotherapy), S vancomycin, S tetracycline/doxycycyline     Enterococcus faecalis, S<=2 ampicillin (predicts results to ampicillin/sulbactam, amoxacillin-clavulanate, zosyn), R to tetra/doxycycline, S2 to vancomycin

## 2022-01-28 NOTE — PROGRESS NOTE ADULT - SUBJECTIVE AND OBJECTIVE BOX
[facility] NEPHROLOGY- PROGRESS NOTE    56y Female with history of ESRD on HD presents with vomiting and diarrhea found to be COVID-19 positive. Nephrology consulted for ESRD status.    REVIEW OF SYSTEMS:  Gen: no changes in weight  Cards: no chest pain  Resp: no dyspnea  GI: no nausea or vomiting or diarrhea + ab wound pain  Vascular: no LE edema    caffeine (Nausea)  latex (Rash)  penicillin (Nausea)  strawberry (Rash)      Hospital Medications: Medications reviewed      VITALS:  T(F): 98.1 (01-28-22 @ 06:30), Max: 98.3 (01-27-22 @ 11:48)  HR: 72 (01-28-22 @ 06:30)  BP: 140/65 (01-28-22 @ 06:30)  RR: 18 (01-28-22 @ 06:30)  SpO2: 96% (01-28-22 @ 04:45)  Wt(kg): --        PHYSICAL EXAM:    Gen: NAD, calm  Cards: RRR, +S1/S2, no M/G/R  Resp: CTA B/L  GI: soft, RLQ bandage/tender  Vascular: no LE edema B/L, LUE AVF + bruit/thrill      LABS:  01-27    139  |  93<L>  |  36<H>  ----------------------------<  192<H>  4.9   |  24  |  5.42<H>    Ca    10.1      27 Jan 2022 11:35  Phos  3.6     01-28  Mg     2.70     01-27    TPro  7.5  /  Alb  3.4  /  TBili  <0.2  /  DBili      /  AST  12  /  ALT  8   /  AlkPhos  429<H>  01-27    Creatinine Trend: 5.42 <--, 6.88 <--, 5.48 <--, 7.24 <--, 6.37 <--, 4.80 <--                        7.9    12.94 )-----------( 411      ( 27 Jan 2022 11:35 )             27.4     Urine Studies:
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
## 2022-01-28 NOTE — PROGRESS NOTE ADULT - ASSESSMENT
56 yr old F with morbid obesity, CHAMP not on home O2, ESRD (HD MWF), HTN, DM2 A1C 7.0, COPD, Afib no longer on AC, chronic R pannus wound here with worsening wound, diarrhea and found to be COVID+. Has poor po intake at this time.     1. Type 2 diabetes mellitus   A1c 7.0% (may be inaccurate in setting of ESRD)  Home Regimen: Tresiba 80 units HS and Trulicity 1.5mg subq weekly    While inpatient:  BG target 100-180 mg/dl   Continue Lantus 7 units SQ qHS   Continue Admelog 3 units SQ TID before meals (Hold if NPO/not eating meal)    Continue Admelog correctional scale to LOW dose before meals, continue low dose at bedtime   Check BG before meals and bedtime  Hypoglycemia protocol     Discharge Plan:  STOP Trulicity (patient ESRD on HD)  Likely dc plan is basal/oral regimen. For oral agent, depends on inpatient requirements but can consider renally dosed DPP4 (Januvia 25 mg or Tradjenta 5 mg daily) VS Prandin before meals   Patient may benefit from switching to 22-24h acting basal insulin eg Levemir or Lantus, instead of Tresiba  Consider CGM (such as Freestyle Libre2 outpatient)  If desiring to followup with [facility]: [address] [place] [address], [address], [facility] [address], [phone]    2. HTN  Management per primary team     3. Hyperlipidemia  Continue home dose of Atorvastatin 80 mg  Note, limited benefit in ESRD. Followup lipid panel as outpatient    Priscilla Patel  Nurse Practitioner  Division of Endocrinology & Diabetes  In house pager #[phone]/long range pager #[phone]    If before 9AM or after 6PM, or on weekends/holidays, please call endocrine answering service for assistance ([phone]).  For nonurgent matters email [email or web address] for assistance.

## 2022-01-28 NOTE — PROGRESS NOTE ADULT - ASSESSMENT
56y Female with history of ESRD on HD presents with vomiting and diarrhea found to be COVID-19 positive. Nephrology consulted for ESRD status.    1) ESRD: Last HD on 1/26 tolerated well with 2.1L removed. Plan for next maintenance HD this morning. Monitor electrolytes.    2) HTN with ESRD: BP acceptable with negative orthostatics. Continue with current medications. Monitor BP.    3) Anemia of renal disease: Hb low. Continue with Epo 10K with HD. Monitor Hb.    4) Secondary HPT of renal origin: Phosphorus acceptable. Continue with sensipar 60 mg daily and renvela 1 tab with meals (goal < 4.5 given concerns for calciphylaxis). iPTH low with high normal serum calcium suggestive of oversuppression for which hectorol discontinued. Monitor serum calcium and phosphorus.    5) Ab wound: Appreciate dermatology consultation. S/P biopsy. Continue with empiric sodium thiosulfate with HD and monitor response. F/U biopsy results.      [facility] NEPHROLOGY  Keaton Lopez M.D.  Juma Green D.O.  Myla Hoffmann M.D.  Julia Brewer, JASIEL, ANP-C    Telephone: [phone]  Facsimile: [phone]    [address] [place] [address]

## 2022-01-28 NOTE — PROGRESS NOTE ADULT - ASSESSMENT
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 Patient followed by derm as well, s/p punch biopsy by Derm pending results. Irrigation VAC was initiated earlier this week. After removal today wound slightly larger, malodorous, slough 50% of wound bed, deterioration since previous assessment. Patient on  empiric sodium thiosulfate with HD per nephrology, calcyphlaxis? pending biopsy results. Discussion with Dermatology team Dr. Jackman and wound Attending Dr. Layne.     Wound deteriorated since previous assessment will hold irrigation vac and continue with enzymatic debridement with santyl and Dakin 1/4 strength, polymicrobial wound culture.  no sharp debridement  planned, pathergy.    off covid precautions, now on contact precautions + wound culture   Follow up with ID, abx management     will follow  [id number]/kg, protein 1.2-1.5g/kg, DM , Obesity management  offload  moisture barrier   DVT prophylaxisis  established/ problem  stable  no new abscess, still with debris irrigating vac d/c'd collagenase dakins reordered.     data reviewed lab, tests, records, image  complexity high   risk high -  due to dx, complexity data, risk  time 70 min 233

## 2022-01-28 NOTE — PROGRESS NOTE ADULT - SUBJECTIVE AND OBJECTIVE BOX
Chief Complaint: DM 2    History: Patient seen at bedside. Reports she is eating meals, had late breakfast due to being in dialysis unit in AM, but had only hard boiled eggs (no carbohydrates). No nausea/vomiting, no hypoglycemia symptoms     MEDICATIONS  (STANDING):  apixaban 2.5 milliGRAM(s) Oral two times a day  aspirin enteric coated 81 milliGRAM(s) Oral daily  atorvastatin 80 milliGRAM(s) Oral at bedtime  buPROPion XL (24-Hour) . 150 milliGRAM(s) Oral daily  chlorhexidine 2% Cloths 1 Application(s) Topical daily  cinacalcet 60 milliGRAM(s) Oral at bedtime  Dakins Solution - 1/4 Strength 1 Application(s) Topical two times a day  dextrose 40% Gel 15 Gram(s) Oral once  dextrose 5%. 1000 milliLiter(s) (100 mL/Hr) IV Continuous <Continuous>  dextrose 5%. 1000 milliLiter(s) (50 mL/Hr) IV Continuous <Continuous>  dextrose 50% Injectable 25 Gram(s) IV Push once  dextrose 50% Injectable 12.5 Gram(s) IV Push once  dextrose 50% Injectable 25 Gram(s) IV Push once  diltiazem    milliGRAM(s) Oral daily  epoetin anabela-epbx (RETACRIT) Injectable 54825 Unit(s) IV Push <User Schedule>  gabapentin 300 milliGRAM(s) Oral daily  glucagon  Injectable 1 milliGRAM(s) IntraMuscular once  heparin   Injectable. 500 Unit(s) Dialysis. every 1 hour  insulin glargine Injectable (LANTUS) 7 Unit(s) SubCutaneous at bedtime  insulin lispro (ADMELOG) corrective regimen sliding scale   SubCutaneous at bedtime  insulin lispro (ADMELOG) corrective regimen sliding scale   SubCutaneous three times a day before meals  insulin lispro Injectable (ADMELOG) 3 Unit(s) SubCutaneous three times a day before meals  loratadine 10 milliGRAM(s) Oral daily  melatonin 6 milliGRAM(s) Oral at bedtime  mirtazapine 7.5 milliGRAM(s) Oral at bedtime  montelukast 10 milliGRAM(s) Oral at bedtime  oxyCODONE  ER Tablet 10 milliGRAM(s) Oral every 12 hours  pantoprazole    Tablet 40 milliGRAM(s) Oral before breakfast  polyethylene glycol 3350 17 Gram(s) Oral two times a day  senna 2 Tablet(s) Oral at bedtime  sertraline 50 milliGRAM(s) Oral daily  sevelamer carbonate 800 milliGRAM(s) Oral three times a day with meals  sodium thiosulfate IVPB 25 Gram(s) IV Intermittent <User Schedule>  traZODone 100 milliGRAM(s) Oral at bedtime    MEDICATIONS  (PRN):  aluminum hydroxide/magnesium hydroxide/simethicone Suspension 30 milliLiter(s) Oral once PRN Dyspepsia  oxyCODONE    IR 7.5 milliGRAM(s) Oral every 4 hours PRN Moderate Pain (4 - 6)      Allergies  latex (Rash)  penicillin (Nausea)  strawberry (Rash)    Intolerances  caffeine (Nausea)    Review of Systems:  HEENT: No pain  Cardiovascular: No chest pain  Respiratory: No SOB  GI: No nausea, vomiting    PHYSICAL EXAM:  VITALS: T(C): 37.2 (01-28-22 @ 11:45)  T(F): 98.9 (01-28-22 @ 11:45), Max: 98.9 (01-28-22 @ 11:45)  HR: 87 (01-28-22 @ 11:45) (72 - 94)  BP: 120/50 (01-28-22 @ 11:45) (106/66 - 140/65)  RR:  (18 - 18)  SpO2:  (93% - 100%)  Wt(kg): --  GENERAL: NAD  EYES: No proptosis, no lid lag, anicteric  HEENT:  Atraumatic, Normocephalic, moist mucous membranes  RESPIRATORY: unlabored respirations     CAPILLARY BLOOD GLUCOSE    POCT Blood Glucose.: 163 mg/dL (28 Jan 2022 13:31)  POCT Blood Glucose.: 125 mg/dL (28 Jan 2022 10:18)  POCT Blood Glucose.: 111 mg/dL (28 Jan 2022 08:40)  POCT Blood Glucose.: 134 mg/dL (28 Jan 2022 04:46)  POCT Blood Glucose.: 165 mg/dL (27 Jan 2022 22:01)  POCT Blood Glucose.: 251 mg/dL (27 Jan 2022 17:57)      01-27    139  |  93<L>  |  36<H>  ----------------------------<  192<H>  4.9   |  24  |  5.42<H>    EGFR if : 9<L>  EGFR if non : 8<L>    Ca    10.1      01-27  Mg     2.70     01-27  Phos  3.6     01-28    TPro  7.5  /  Alb  3.4  /  TBili  <0.2  /  DBili  x   /  AST  12  /  ALT  8   /  AlkPhos  429<H>  01-27      A1C with Estimated Average Glucose Result: 7.0 % (01-13-22 @ 08:21)  A1C with Estimated Average Glucose Result: 6.7 % (08-31-21 @ 09:30)  A1C with Estimated Average Glucose Result: 7.2 % (04-28-21 @ 07:04)    Diet, Consistent Carbohydrate Renal w/Evening Snack:   Supplement Feeding Modality:  Oral  Nepro Cans or Servings Per Day:  1       Frequency:  Three Times a day (01-17-22 @ 18:07)

## 2022-01-28 NOTE — PROGRESS NOTE ADULT - SUBJECTIVE AND OBJECTIVE BOX
SUBJECTIVE / OVERNIGHT EVENTS:pt seen and examined  01-28-22     MEDICATIONS  (STANDING):  apixaban 2.5 milliGRAM(s) Oral two times a day  aspirin enteric coated 81 milliGRAM(s) Oral daily  atorvastatin 80 milliGRAM(s) Oral at bedtime  buPROPion XL (24-Hour) . 150 milliGRAM(s) Oral daily  chlorhexidine 2% Cloths 1 Application(s) Topical daily  cinacalcet 60 milliGRAM(s) Oral at bedtime  collagenase Ointment 1 Application(s) Topical two times a day  Dakins Solution - 1/4 Strength 1 Application(s) Topical two times a day  dextrose 40% Gel 15 Gram(s) Oral once  dextrose 5%. 1000 milliLiter(s) (100 mL/Hr) IV Continuous <Continuous>  dextrose 5%. 1000 milliLiter(s) (50 mL/Hr) IV Continuous <Continuous>  dextrose 50% Injectable 25 Gram(s) IV Push once  dextrose 50% Injectable 12.5 Gram(s) IV Push once  dextrose 50% Injectable 25 Gram(s) IV Push once  diltiazem    milliGRAM(s) Oral daily  epoetin anabela-epbx (RETACRIT) Injectable 81872 Unit(s) IV Push <User Schedule>  gabapentin 300 milliGRAM(s) Oral daily  glucagon  Injectable 1 milliGRAM(s) IntraMuscular once  heparin   Injectable. 500 Unit(s) Dialysis. every 1 hour  insulin glargine Injectable (LANTUS) 7 Unit(s) SubCutaneous at bedtime  insulin lispro (ADMELOG) corrective regimen sliding scale   SubCutaneous at bedtime  insulin lispro (ADMELOG) corrective regimen sliding scale   SubCutaneous three times a day before meals  insulin lispro Injectable (ADMELOG) 3 Unit(s) SubCutaneous three times a day before meals  loratadine 10 milliGRAM(s) Oral daily  melatonin 6 milliGRAM(s) Oral at bedtime  mirtazapine 7.5 milliGRAM(s) Oral at bedtime  montelukast 10 milliGRAM(s) Oral at bedtime  oxyCODONE  ER Tablet 10 milliGRAM(s) Oral every 12 hours  pantoprazole    Tablet 40 milliGRAM(s) Oral before breakfast  polyethylene glycol 3350 17 Gram(s) Oral two times a day  senna 2 Tablet(s) Oral at bedtime  sertraline 50 milliGRAM(s) Oral daily  sevelamer carbonate 800 milliGRAM(s) Oral three times a day with meals  sodium thiosulfate IVPB 25 Gram(s) IV Intermittent <User Schedule>  traZODone 100 milliGRAM(s) Oral at bedtime    MEDICATIONS  (PRN):  oxyCODONE    IR 7.5 milliGRAM(s) Oral every 4 hours PRN Moderate Pain (4 - 6)    Vital Signs Last 24 Hrs  T(C): 37.2 (01-28-22 @ 11:45), Max: 37.2 (01-28-22 @ 11:45)  T(F): 98.9 (01-28-22 @ 11:45), Max: 98.9 (01-28-22 @ 11:45)  HR: 87 (01-28-22 @ 11:45) (72 - 94)  BP: 120/50 (01-28-22 @ 11:45) (120/50 - 140/65)  BP(mean): --  RR: 18 (01-28-22 @ 11:45) (18 - 18)  SpO2: 94% (01-28-22 @ 11:45) (94% - 96%)    Orthostatic VS  01-27-22 @ 11:48  Lying BP: 108/61 HR: --  Sitting BP: 134/81 HR: 99  Standing BP: --/-- HR: --  Site: --  Mode: --    Orthostatic VS  01-27-22 @ 11:48  Lying BP: 108/61 HR: --  Sitting BP: 134/81 HR: 99  Standing BP: --/-- HR: --  Site: --  Mode: --  Orthostatic VS  01-26-22 @ 16:52  Lying BP: 127/68 HR: 81  Sitting BP: 132/72 HR: 85  Standing BP: --/-- HR: --  Site: --  Mode: --    Constitutional: No fever, fatigue  Skin: No rash.  Eyes: No recent vision problems or eye pain.  ENT: No congestion, ear pain, or sore throat.  Cardiovascular: No chest pain or palpation.  Respiratory: No cough, shortness of breath, congestion, or wheezing.  Gastrointestinal: No abdominal pain, nausea, vomiting, or diarrhea.  Genitourinary: No dysuria.  Musculoskeletal: No joint swelling.  Neurologic: No headache.    PHYSICAL EXAM:  GENERAL: NAD  EYES: EOMI, PERRLA  NECK: Supple, No JVD  CHEST/LUNG: dec breath sounds at bases  HEART:  S1 , S2 +  ABDOMEN: soft , bs+, abd wound +  EXTREMITIES:  edema+  NEUROLOGY:alert awake    LABS:  01-27    139  |  93<L>  |  36<H>  ----------------------------<  192<H>  4.9   |  24  |  5.42<H>    Ca    10.1      27 Jan 2022 11:35  Phos  3.6     01-28  Mg     2.70     01-27    TPro  7.5  /  Alb  3.4  /  TBili  <0.2  /  DBili      /  AST  12  /  ALT  8   /  AlkPhos  429<H>  01-27    Creatinine Trend: 5.42 <--, 6.88 <--, 5.48 <--, 7.24 <--, 6.37 <--, 4.80 <--                        7.9    12.94 )-----------( 411      ( 27 Jan 2022 11:35 )             27.4     Urine Studies:            LIVER FUNCTIONS - ( 27 Jan 2022 11:35 )  Alb: 3.4 g/dL / Pro: 7.5 g/dL / ALK PHOS: 429 U/L / ALT: 8 U/L / AST: 12 U/L / GGT: x

## 2022-01-28 NOTE — PROGRESS NOTE ADULT - ASSESSMENT
56 y.o. F with PMH of morbid obesity, CHAMP, ESRD (HD MWF), HTN, DM, COPD, Afib currently on AC, COVID +, chronic R pannus wound in abdomen presented to [facility] on 1/12 due to worsening abdominal pain, RRt called. On Neuro exam no focality CT head negative    Impression: presyncope    supportive care  -No further inpatient Neurologic workup at this time  mild tremor with asterixis likely partly due to toxic-metabolic state and multiple medications Given mild and essentially benign no further adjustments at this time

## 2022-01-28 NOTE — PROGRESS NOTE ADULT - ASSESSMENT
56 year old female with hx of morbid obesity, CHAMP not on home O2, ESRD (HD MWF), HTN, DM, COPD, Afib no longer on AC, chronic R pannus wound, followed by Dr. Layne, sent by visiting RN for worsening wound.    # Chronic Pannus Wound  -IV abx per primary team  -surgery /derm consult appreciated    #Afib  -stable, in NSR   -cont eliquis for now, elevated inflamm markers/ddimer given covid,  h/h noted- gi /renal following   -cont cardizem    #Covid-19+  -trend inflamm markers/ ddimer   -med f/u     #Hypertension  -cont current meds    #ESRD on HD  -HD per renal    # Near Syncope  -tele no events   -orthostatics neg    35 minutes spent on total encounter; more than 50% of the visit was spent counseling and/or coordinating care by the attending physician.

## 2022-01-28 NOTE — PROGRESS NOTE ADULT - SUBJECTIVE AND OBJECTIVE BOX
CARDIOLOGY FOLLOW UP NOTE - DR. JAQUEZ    Patient Name: ANTONIA OTRIZ  Date of Service: 22       Patient seen and examined    no new complaints    Subjective:    cv: denies chest pain, dyspnea, palpitations, dizziness  pulmonary: denies cough  GI: denies abdominal pain, nausea, vomiting  vascular/legs: no edema   skin: no rash  ROS: otherwise negative   overnight events:      PHYSICAL EXAM:  T(C): 37.2 (22 @ 11:45), Max: 37.2 (22 @ 11:45)  HR: 87 (22 @ 11:45) (72 - 94)  BP: 120/50 (22 @ 11:45) (106/66 - 140/65)  RR: 18 (22 @ 11:45) (18 - 18)  SpO2: 94% (22 @ 11:45) (93% - 100%)  Wt(kg): --  I&O's Summary    2022 07:01  -  2022 12:04  --------------------------------------------------------  IN: 1100 mL / OUT: 3100 mL / NET: -2000 mL      Daily     Daily Weight in k.5 (2022 10:30)    Appearance: Normal	  Cardiovascular: Normal S1 S2,RRR, No JVD, No murmurs  Respiratory: Lungs clear to auscultation	  Gastrointestinal:  Soft, Non-tender, + BS	  Extremities: Normal range of motion, No clubbing, cyanosis or edema      Home Medications:  aspirin 81 mg oral delayed release tablet: 1 tab(s) orally once a day (2022 17:49)  buPROPion 150 mg/24 hours (XL) oral tablet, extended release: 1 tab(s) orally once a day (in the morning) (2022 17:49)  cetirizine 10 mg oral tablet: 1 tab(s) orally once a day (2022 17:49)  dilTIAZem 120 mg/24 hours oral tablet, extended release: 1 tab(s) orally once a day (2022 17:49)  doxepin 25 mg oral capsule: 1 cap(s) orally once a day (at bedtime) (2022 17:49)  Eliquis 2.5 mg oral tablet: 1 tab(s) orally 2 times a day    Pharmacy states patient no longer wants to fill this medication (2022 17:49)  furosemide 80 mg oral tablet: 1 tab(s) orally once a day    Pharmacy states patient no longer wants to fill this medication (2022 17:49)  gabapentin 300 mg oral capsule: 1 cap(s) orally 2 times a day (2022 17:49)  hydrOXYzine hydrochloride 25 mg oral tablet: 1 tab(s) orally 2 times a day, As Needed (2022 17:49)  lanthanum 1000 mg oral tablet, chewable: 2 tab(s) orally with meals and 1 tab(s) orally with snacks    Pharmacy states patient no longer wants to fill this medication (2022 17:49)  mirtazapine 7.5 mg oral tablet: 1 tab(s) orally once a day (at bedtime) (2022 17:49)  montelukast 10 mg oral tablet: 1 tab(s) orally once a day (in the evening) (2022 17:49)  mupirocin 2% topical ointment: Apply sparingly to affected area 2 times a day as directed (2022 17:49)  nystatin 100,000 units/mL oral suspension: 5 milliliter(s) orally 4 times a day, as directed (2022 17:49)  omeprazole 20 mg oral delayed release capsule: 2 cap(s) orally once a day before breakfast (2022 17:49)  Pennsaid 2% topical solution: Apply topically to affected area 2 times a day (2022 17:49)  rosuvastatin 40 mg oral tablet: 1 tab(s) orally once a day (2022 17:49)  Santyl 250 units/g topical ointment: Apply topically to affected area once a day as directed (2022 17:49)  sertraline 50 mg oral tablet: 1 tab(s) orally once a day (2022 17:49)  Spiriva HandiHaler 18 mcg inhalation capsule: 1 cap(s) inhaled once a day (2022 17:49)  traZODone 100 mg oral tablet: 1 tab(s) orally once a day (at bedtime) (2022 17:49)  Tresiba FlexTouch 100 units/mL subcutaneous solution: 80 unit(s) subcutaneous once a day (at bedtime) (2022 17:49)  Trulicity Pen 1.5 mg/0.5 mL subcutaneous solution: 1 dose(s) subcutaneous once a week (2022 17:49)      MEDICATIONS  (STANDING):  acetaminophen     Tablet .. 650 milliGRAM(s) Oral every 8 hours  apixaban 2.5 milliGRAM(s) Oral two times a day  aspirin enteric coated 81 milliGRAM(s) Oral daily  atorvastatin 80 milliGRAM(s) Oral at bedtime  buPROPion XL (24-Hour) . 150 milliGRAM(s) Oral daily  chlorhexidine 2% Cloths 1 Application(s) Topical daily  cinacalcet 60 milliGRAM(s) Oral at bedtime  Dakins Solution - 1/4 Strength 1 Application(s) Topical two times a day  dextrose 40% Gel 15 Gram(s) Oral once  dextrose 5%. 1000 milliLiter(s) (100 mL/Hr) IV Continuous <Continuous>  dextrose 5%. 1000 milliLiter(s) (50 mL/Hr) IV Continuous <Continuous>  dextrose 50% Injectable 25 Gram(s) IV Push once  dextrose 50% Injectable 12.5 Gram(s) IV Push once  dextrose 50% Injectable 25 Gram(s) IV Push once  diltiazem    milliGRAM(s) Oral daily  epoetin anabela-epbx (RETACRIT) Injectable 06057 Unit(s) IV Push <User Schedule>  gabapentin 300 milliGRAM(s) Oral daily  glucagon  Injectable 1 milliGRAM(s) IntraMuscular once  heparin   Injectable. 500 Unit(s) Dialysis. every 1 hour  insulin glargine Injectable (LANTUS) 7 Unit(s) SubCutaneous at bedtime  insulin lispro (ADMELOG) corrective regimen sliding scale   SubCutaneous three times a day before meals  insulin lispro (ADMELOG) corrective regimen sliding scale   SubCutaneous at bedtime  insulin lispro Injectable (ADMELOG) 3 Unit(s) SubCutaneous three times a day before meals  loratadine 10 milliGRAM(s) Oral daily  melatonin 6 milliGRAM(s) Oral at bedtime  mirtazapine 7.5 milliGRAM(s) Oral at bedtime  montelukast 10 milliGRAM(s) Oral at bedtime  oxyCODONE  ER Tablet 10 milliGRAM(s) Oral every 12 hours  pantoprazole    Tablet 40 milliGRAM(s) Oral before breakfast  polyethylene glycol 3350 17 Gram(s) Oral two times a day  senna 2 Tablet(s) Oral at bedtime  sertraline 50 milliGRAM(s) Oral daily  sevelamer carbonate 800 milliGRAM(s) Oral three times a day with meals  sodium thiosulfate IVPB 25 Gram(s) IV Intermittent <User Schedule>  traZODone 100 milliGRAM(s) Oral at bedtime      TELEMETRY: 	    ECG:  	  RADIOLOGY:   DIAGNOSTIC TESTING:  [ ] Echocardiogram:  [ ] Catheterization:  [ ] Stress Test:    OTHER: 	    LABS:	 	    CARDIAC MARKERS:        Troponin T, High Sensitivity Result: 58 ng/L ( @ 11:35)                                7.9    12.94 )-----------( 411      ( 2022 11:35 )             27.4         139  |  93<L>  |  36<H>  ----------------------------<  192<H>  4.9   |  24  |  5.42<H>    Ca    10.1      2022 11:35  Phos  3.6       Mg     2.70         TPro  7.5  /  Alb  3.4  /  TBili  <0.2  /  DBili  x   /  AST  12  /  ALT  8   /  AlkPhos  429<H>      proBNP:     Lipid Profile:   HgA1c:     Creatinine, Serum: 5.42 mg/dL (22 @ 11:35)  Creatinine, Serum: 6.88 mg/dL (22 @ 11:29)
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
## 2022-01-29 NOTE — PROGRESS NOTE ADULT - SUBJECTIVE AND OBJECTIVE BOX
CARDIOLOGY FOLLOW UP - Dr. Bullock  DATE OF SERVICE: 1/29/22     CC no cp or sob        REVIEW OF SYSTEMS:  CONSTITUTIONAL: No fever, weight loss, or fatigue  RESPIRATORY: No cough, wheezing, chills or hemoptysis; No Shortness of Breath  CARDIOVASCULAR: No chest pain, palpitations, passing out, dizziness, or leg swelling  GASTROINTESTINAL: No abdominal or epigastric pain. No nausea, vomiting, or hematemesis; No diarrhea or constipation. No melena or hematochezia.  VASCULAR: No edema     PHYSICAL EXAM:  T(C): 37 (01-29-22 @ 12:13), Max: 37 (01-29-22 @ 12:13)  HR: 78 (01-29-22 @ 12:13) (78 - 85)  BP: 112/44 (01-29-22 @ 12:13) (107/61 - 117/53)  RR: 18 (01-29-22 @ 12:13) (18 - 18)  SpO2: 90% (01-29-22 @ 12:13) (90% - 98%)  Wt(kg): --  I&O's Summary    28 Jan 2022 07:01  -  29 Jan 2022 07:00  --------------------------------------------------------  IN: 1100 mL / OUT: 3100 mL / NET: -2000 mL        Appearance: Normal	  Cardiovascular: Normal S1 S2,RRR, No JVD, No murmurs  Respiratory: Lungs clear to auscultation	  Gastrointestinal:  Soft, Non-tender, + BS	  Extremities: Normal range of motion, No clubbing, cyanosis or edema      Home Medications:  aspirin 81 mg oral delayed release tablet: 1 tab(s) orally once a day (12 Jan 2022 17:49)  buPROPion 150 mg/24 hours (XL) oral tablet, extended release: 1 tab(s) orally once a day (in the morning) (12 Jan 2022 17:49)  cetirizine 10 mg oral tablet: 1 tab(s) orally once a day (12 Jan 2022 17:49)  dilTIAZem 120 mg/24 hours oral tablet, extended release: 1 tab(s) orally once a day (12 Jan 2022 17:49)  doxepin 25 mg oral capsule: 1 cap(s) orally once a day (at bedtime) (12 Jan 2022 17:49)  Eliquis 2.5 mg oral tablet: 1 tab(s) orally 2 times a day    Pharmacy states patient no longer wants to fill this medication (12 Jan 2022 17:49)  furosemide 80 mg oral tablet: 1 tab(s) orally once a day    Pharmacy states patient no longer wants to fill this medication (12 Jan 2022 17:49)  gabapentin 300 mg oral capsule: 1 cap(s) orally 2 times a day (12 Jan 2022 17:49)  hydrOXYzine hydrochloride 25 mg oral tablet: 1 tab(s) orally 2 times a day, As Needed (12 Jan 2022 17:49)  lanthanum 1000 mg oral tablet, chewable: 2 tab(s) orally with meals and 1 tab(s) orally with snacks    Pharmacy states patient no longer wants to fill this medication (12 Jan 2022 17:49)  mirtazapine 7.5 mg oral tablet: 1 tab(s) orally once a day (at bedtime) (12 Jan 2022 17:49)  montelukast 10 mg oral tablet: 1 tab(s) orally once a day (in the evening) (12 Jan 2022 17:49)  mupirocin 2% topical ointment: Apply sparingly to affected area 2 times a day as directed (12 Jan 2022 17:49)  nystatin 100,000 units/mL oral suspension: 5 milliliter(s) orally 4 times a day, as directed (12 Jan 2022 17:49)  omeprazole 20 mg oral delayed release capsule: 2 cap(s) orally once a day before breakfast (12 Jan 2022 17:49)  Pennsaid 2% topical solution: Apply topically to affected area 2 times a day (12 Jan 2022 17:49)  rosuvastatin 40 mg oral tablet: 1 tab(s) orally once a day (12 Jan 2022 17:49)  Santyl 250 units/g topical ointment: Apply topically to affected area once a day as directed (12 Jan 2022 17:49)  sertraline 50 mg oral tablet: 1 tab(s) orally once a day (12 Jan 2022 17:49)  Spiriva HandiHaler 18 mcg inhalation capsule: 1 cap(s) inhaled once a day (12 Jan 2022 17:49)  traZODone 100 mg oral tablet: 1 tab(s) orally once a day (at bedtime) (12 Jan 2022 17:49)  Tresiba FlexTouch 100 units/mL subcutaneous solution: 80 unit(s) subcutaneous once a day (at bedtime) (12 Jan 2022 17:49)  Trulicity Pen 1.5 mg/0.5 mL subcutaneous solution: 1 dose(s) subcutaneous once a week (12 Jan 2022 17:49)      MEDICATIONS  (STANDING):  apixaban 2.5 milliGRAM(s) Oral two times a day  aspirin enteric coated 81 milliGRAM(s) Oral daily  atorvastatin 80 milliGRAM(s) Oral at bedtime  buPROPion XL (24-Hour) . 150 milliGRAM(s) Oral daily  chlorhexidine 2% Cloths 1 Application(s) Topical daily  cinacalcet 60 milliGRAM(s) Oral at bedtime  collagenase Ointment 1 Application(s) Topical two times a day  Dakins Solution - 1/4 Strength 1 Application(s) Topical two times a day  dextrose 40% Gel 15 Gram(s) Oral once  dextrose 5%. 1000 milliLiter(s) (50 mL/Hr) IV Continuous <Continuous>  dextrose 5%. 1000 milliLiter(s) (100 mL/Hr) IV Continuous <Continuous>  dextrose 50% Injectable 25 Gram(s) IV Push once  dextrose 50% Injectable 12.5 Gram(s) IV Push once  dextrose 50% Injectable 25 Gram(s) IV Push once  diltiazem    milliGRAM(s) Oral daily  epoetin anabela-epbx (RETACRIT) Injectable 78499 Unit(s) IV Push <User Schedule>  gabapentin 300 milliGRAM(s) Oral daily  glucagon  Injectable 1 milliGRAM(s) IntraMuscular once  heparin   Injectable. 500 Unit(s) Dialysis. every 1 hour  insulin glargine Injectable (LANTUS) 7 Unit(s) SubCutaneous at bedtime  insulin lispro (ADMELOG) corrective regimen sliding scale   SubCutaneous at bedtime  insulin lispro (ADMELOG) corrective regimen sliding scale   SubCutaneous three times a day before meals  insulin lispro Injectable (ADMELOG) 3 Unit(s) SubCutaneous three times a day before meals  loratadine 10 milliGRAM(s) Oral daily  melatonin 6 milliGRAM(s) Oral at bedtime  mirtazapine 7.5 milliGRAM(s) Oral at bedtime  montelukast 10 milliGRAM(s) Oral at bedtime  oxyCODONE  ER Tablet 10 milliGRAM(s) Oral every 12 hours  pantoprazole    Tablet 40 milliGRAM(s) Oral before breakfast  polyethylene glycol 3350 17 Gram(s) Oral two times a day  senna 2 Tablet(s) Oral at bedtime  sertraline 50 milliGRAM(s) Oral daily  sevelamer carbonate 800 milliGRAM(s) Oral three times a day with meals  sodium thiosulfate IVPB 25 Gram(s) IV Intermittent <User Schedule>  traZODone 100 milliGRAM(s) Oral at bedtime      TELEMETRY: 	    ECG:  	  RADIOLOGY:   DIAGNOSTIC TESTING:  [ ] Echocardiogram:  [ ]  Catheterization:  [ ] Stress Test:    OTHER: 	    LABS:	 	    Troponin T, High Sensitivity Result: 58 ng/L (01-27 @ 11:35)                          8.3    12.51 )-----------( 465      ( 29 Jan 2022 07:35 )             28.8     01-29    138  |  90<L>  |  28<H>  ----------------------------<  157<H>  4.6   |  23  |  4.89<H>    Ca    10.8<H>      29 Jan 2022 07:35  Phos  3.0     01-29  Mg     2.70     01-29

## 2022-01-29 NOTE — CHART NOTE - NSCHARTNOTEFT_GEN_A_CORE
56 yr old F with morbid obesity, CHAMP not on home O2, ESRD (HD MWF), HTN, DM2 A1C 7.0, COPD, Afib no longer on AC, chronic R pannus wound here with worsening wound, diarrhea and found to be COVID+. Has poor po intake at this time.     BG, insulin administration and chart reviewed  Noted with isolated hyperglycemia at lunch today - 259 mg/dl, otherwise FS values trending within goal   Recommend to continue monitoring on current insulin dosing. Will follow   Continue Lantus 7 units SQ qHS   Continue Admelog 3 units SQ TID before meals (Hold if NPO/not eating meal)    Continue Admelog correctional scale to LOW dose before meals, continue low dose at bedtime   Check BG before meals and bedtime  Hypoglycemia protocol     Discharge Plan:  STOP Trulicity (patient ESRD on HD)  Likely dc plan is basal/oral regimen. For oral agent, depends on inpatient requirements but can consider renally dosed DPP4 (Januvia 25 mg or Tradjenta 5 mg daily) VS Prandin before meals   Patient may benefit from switching to 22-24h acting basal insulin eg Levemir or Lantus, instead of Tresiba  Consider CGM (such as Freestyle Libre2 outpatient)  If desiring to followup with [facility]: [address], [address], [facility] [address], [phone]    CAPILLARY BLOOD GLUCOSE    POCT Blood Glucose.: 259 mg/dL (29 Jan 2022 11:35)  POCT Blood Glucose.: 167 mg/dL (29 Jan 2022 08:57)  POCT Blood Glucose.: 180 mg/dL (28 Jan 2022 22:04)  POCT Blood Glucose.: 142 mg/dL (28 Jan 2022 17:55)  POCT Blood Glucose.: 163 mg/dL (28 Jan 2022 13:31)      01-29    138  |  90<L>  |  28<H>  ----------------------------<  157<H>  4.6   |  23  |  4.89<H>    Ca    10.8<H>      29 Jan 2022 07:35  Phos  3.0     01-29  Mg     2.70     01-29      MEDICATIONS  (STANDING):  apixaban 2.5 milliGRAM(s) Oral two times a day  aspirin enteric coated 81 milliGRAM(s) Oral daily  atorvastatin 80 milliGRAM(s) Oral at bedtime  buPROPion XL (24-Hour) . 150 milliGRAM(s) Oral daily  chlorhexidine 2% Cloths 1 Application(s) Topical daily  cinacalcet 60 milliGRAM(s) Oral at bedtime  collagenase Ointment 1 Application(s) Topical two times a day  Dakins Solution - 1/4 Strength 1 Application(s) Topical two times a day  dextrose 40% Gel 15 Gram(s) Oral once  dextrose 5%. 1000 milliLiter(s) (50 mL/Hr) IV Continuous <Continuous>  dextrose 5%. 1000 milliLiter(s) (100 mL/Hr) IV Continuous <Continuous>  dextrose 50% Injectable 25 Gram(s) IV Push once  dextrose 50% Injectable 12.5 Gram(s) IV Push once  dextrose 50% Injectable 25 Gram(s) IV Push once  diltiazem    milliGRAM(s) Oral daily  epoetin anabela-epbx (RETACRIT) Injectable 21215 Unit(s) IV Push <User Schedule>  gabapentin 300 milliGRAM(s) Oral daily  glucagon  Injectable 1 milliGRAM(s) IntraMuscular once  heparin   Injectable. 500 Unit(s) Dialysis. every 1 hour  insulin glargine Injectable (LANTUS) 7 Unit(s) SubCutaneous at bedtime  insulin lispro (ADMELOG) corrective regimen sliding scale   SubCutaneous at bedtime  insulin lispro (ADMELOG) corrective regimen sliding scale   SubCutaneous three times a day before meals  insulin lispro Injectable (ADMELOG) 3 Unit(s) SubCutaneous three times a day before meals  loratadine 10 milliGRAM(s) Oral daily  melatonin 6 milliGRAM(s) Oral at bedtime  mirtazapine 7.5 milliGRAM(s) Oral at bedtime  montelukast 10 milliGRAM(s) Oral at bedtime  oxyCODONE  ER Tablet 10 milliGRAM(s) Oral every 12 hours  pantoprazole    Tablet 40 milliGRAM(s) Oral before breakfast  polyethylene glycol 3350 17 Gram(s) Oral two times a day  senna 2 Tablet(s) Oral at bedtime  sertraline 50 milliGRAM(s) Oral daily  sevelamer carbonate 800 milliGRAM(s) Oral three times a day with meals  sodium thiosulfate IVPB 25 Gram(s) IV Intermittent <User Schedule>  traZODone 100 milliGRAM(s) Oral at bedtime      Diet, Consistent Carbohydrate Renal w/Evening Snack:   Supplement Feeding Modality:  Oral  Nepro Cans or Servings Per Day:  1       Frequency:  Three Times a day (01-17-22 @ 18:07)      A1C with Estimated Average Glucose Result: 7.0 % (01-13-22 @ 08:21)  A1C with Estimated Average Glucose Result: 6.7 % (08-31-21 @ 09:30)  A1C with Estimated Average Glucose Result: 7.2 % (04-28-21 @ 07:04)      Priscilla Patel  Nurse Practitioner  Division of Endocrinology & Diabetes  In house pager #[phone]/long range pager #[phone]    If before 9AM or after 6PM, or on weekends/holidays, please call endocrine answering service for assistance ([phone]).  For nonurgent matters email [email or web address] for [email or web address].

## 2022-01-29 NOTE — PROGRESS NOTE ADULT - SUBJECTIVE AND OBJECTIVE BOX
SUBJECTIVE / OVERNIGHT EVENTS:pt seen and examined  01-29-22     MEDICATIONS  (STANDING):  apixaban 2.5 milliGRAM(s) Oral two times a day  aspirin enteric coated 81 milliGRAM(s) Oral daily  atorvastatin 80 milliGRAM(s) Oral at bedtime  buPROPion XL (24-Hour) . 150 milliGRAM(s) Oral daily  chlorhexidine 2% Cloths 1 Application(s) Topical daily  cinacalcet 60 milliGRAM(s) Oral at bedtime  collagenase Ointment 1 Application(s) Topical two times a day  Dakins Solution - 1/4 Strength 1 Application(s) Topical two times a day  dextrose 40% Gel 15 Gram(s) Oral once  dextrose 5%. 1000 milliLiter(s) (50 mL/Hr) IV Continuous <Continuous>  dextrose 5%. 1000 milliLiter(s) (100 mL/Hr) IV Continuous <Continuous>  dextrose 50% Injectable 25 Gram(s) IV Push once  dextrose 50% Injectable 12.5 Gram(s) IV Push once  dextrose 50% Injectable 25 Gram(s) IV Push once  diltiazem    milliGRAM(s) Oral daily  epoetin anabela-epbx (RETACRIT) Injectable 65305 Unit(s) IV Push <User Schedule>  gabapentin 300 milliGRAM(s) Oral daily  glucagon  Injectable 1 milliGRAM(s) IntraMuscular once  heparin   Injectable. 500 Unit(s) Dialysis. every 1 hour  insulin glargine Injectable (LANTUS) 7 Unit(s) SubCutaneous at bedtime  insulin lispro (ADMELOG) corrective regimen sliding scale   SubCutaneous at bedtime  insulin lispro (ADMELOG) corrective regimen sliding scale   SubCutaneous three times a day before meals  insulin lispro Injectable (ADMELOG) 3 Unit(s) SubCutaneous three times a day before meals  loratadine 10 milliGRAM(s) Oral daily  melatonin 6 milliGRAM(s) Oral at bedtime  mirtazapine 7.5 milliGRAM(s) Oral at bedtime  montelukast 10 milliGRAM(s) Oral at bedtime  oxyCODONE  ER Tablet 10 milliGRAM(s) Oral every 12 hours  pantoprazole    Tablet 40 milliGRAM(s) Oral before breakfast  polyethylene glycol 3350 17 Gram(s) Oral two times a day  senna 2 Tablet(s) Oral at bedtime  sertraline 50 milliGRAM(s) Oral daily  sevelamer carbonate 800 milliGRAM(s) Oral three times a day with meals  sodium thiosulfate IVPB 25 Gram(s) IV Intermittent <User Schedule>  traZODone 100 milliGRAM(s) Oral at bedtime    MEDICATIONS  (PRN):  oxyCODONE    IR 7.5 milliGRAM(s) Oral every 4 hours PRN Moderate Pain (4 - 6)    Vital Signs Last 24 Hrs  T(C): 36.4 (01-29-22 @ 22:00), Max: 37 (01-29-22 @ 12:13)  T(F): 97.6 (01-29-22 @ 22:00), Max: 98.6 (01-29-22 @ 12:13)  HR: 76 (01-29-22 @ 22:00) (76 - 85)  BP: 111/52 (01-29-22 @ 22:00) (110/50 - 117/53)  BP(mean): --  RR: 18 (01-29-22 @ 22:00) (18 - 18)  SpO2: 96% (01-29-22 @ 22:00) (90% - 98%)      Orthostatic VS  01-27-22 @ 11:48  Lying BP: 108/61 HR: --  Sitting BP: 134/81 HR: 99  Standing BP: --/-- HR: --  Site: --  Mode: --    Orthostatic VS  01-27-22 @ 11:48  Lying BP: 108/61 HR: --  Sitting BP: 134/81 HR: 99  Standing BP: --/-- HR: --  Site: --  Mode: --  Orthostatic VS  01-26-22 @ 16:52  Lying BP: 127/68 HR: 81  Sitting BP: 132/72 HR: 85  Standing BP: --/-- HR: --  Site: --  Mode: --    Constitutional: No fever, fatigue  Skin: No rash.  Eyes: No recent vision problems or eye pain.  ENT: No congestion, ear pain, or sore throat.  Cardiovascular: No chest pain or palpation.  Respiratory: No cough, shortness of breath, congestion, or wheezing.  Gastrointestinal: No abdominal pain, nausea, vomiting, or diarrhea.  Genitourinary: No dysuria.  Musculoskeletal: No joint swelling.  Neurologic: No headache.    PHYSICAL EXAM:  GENERAL: NAD  EYES: EOMI, PERRLA  NECK: Supple, No JVD  CHEST/LUNG: dec breath sounds at bases  HEART:  S1 , S2 +  ABDOMEN: soft , bs+, abd wound +  EXTREMITIES:  edema+  NEUROLOGY:alert awake    LABS:  01-29    138  |  90<L>  |  28<H>  ----------------------------<  157<H>  4.6   |  23  |  4.89<H>    Ca    10.8<H>      29 Jan 2022 07:35  Phos  3.0     01-29  Mg     2.70     01-29      Creatinine Trend: 4.89 <--, 5.42 <--, 6.88 <--, 5.48 <--, 7.24 <--, 6.37 <--                        8.3    12.51 )-----------( 465      ( 29 Jan 2022 07:35 )             28.8     Urine Studies:                LIVER FUNCTIONS - ( 27 Jan 2022 11:35 )  Alb: 3.4 g/dL / Pro: 7.5 g/dL / ALK PHOS: 429 U/L / ALT: 8 U/L / AST: 12 U/L / GGT: x

## 2022-01-29 NOTE — PROGRESS NOTE ADULT - SUBJECTIVE AND OBJECTIVE BOX
Pt examined. Full note to follow. [facility] NEPHROLOGY- PROGRESS NOTE    56y Female with history of ESRD on HD presents with vomiting and diarrhea found to be COVID-19 positive. Nephrology consulted for ESRD status.    REVIEW OF SYSTEMS:  Gen: no changes in weight  Cards: no chest pain  Resp: no dyspnea  GI: no nausea or vomiting or diarrhea + ab wound pain  Vascular: no LE edema    caffeine (Nausea)  latex (Rash)  penicillin (Nausea)  strawberry (Rash)      Hospital Medications: Medications reviewed      VITALS:  T(F): 98.6 (01-29-22 @ 12:13), Max: 98.6 (01-29-22 @ 12:13)  HR: 78 (01-29-22 @ 12:13)  BP: 112/44 (01-29-22 @ 12:13)  RR: 18 (01-29-22 @ 12:13)  SpO2: 90% (01-29-22 @ 12:13)  Wt(kg): --    01-28 @ 07:01  -  01-29 @ 07:00  --------------------------------------------------------  IN: 1100 mL / OUT: 3100 mL / NET: -2000 mL        PHYSICAL EXAM:  Gen: NAD, calm  Cards: RRR, +S1/S2, no M/G/R  Resp: CTA B/L  GI: soft, RLQ bandage/tender  Vascular: no LE edema B/L, LUE AVF + bruit/thrill      LABS:  01-29    138  |  90<L>  |  28<H>  ----------------------------<  157<H>  4.6   |  23  |  4.89<H>    Ca    10.8<H>      29 Jan 2022 07:35  Phos  3.0     01-29  Mg     2.70     01-29      Creatinine Trend: 4.89 <--, 5.42 <--, 6.88 <--, 5.48 <--, 7.24 <--, 6.37 <--                        8.3    12.51 )-----------( 465      ( 29 Jan 2022 07:35 )             28.8

## 2022-01-29 NOTE — PROGRESS NOTE ADULT - ASSESSMENT
56y Female with history of ESRD on HD presents with vomiting and diarrhea found to be COVID-19 positive. Nephrology consulted for ESRD status.    1) ESRD: Last HD on 1/28 tolerated well. Plan for next maintenance HD 1/31. Monitor electrolytes.    2) HTN with ESRD: BP acceptable with negative orthostatics. Continue with current medications. Monitor BP.    3) Anemia of renal disease: Hb low. Continue with Epo 10K with HD. Monitor Hb.    4) Secondary HPT of renal origin: Phosphorus acceptable. Continue with sensipar 60 mg daily and renvela 1 tab with meals (goal < 4.5 given concerns for calciphylaxis). iPTH low with high normal serum calcium suggestive of oversuppression for which hectorol discontinued. Monitor serum calcium and phosphorus.    5) Ab wound: Appreciate dermatology consultation. S/P biopsy. Continue with empiric sodium thiosulfate with HD and monitor response. F/U biopsy results.      [facility] NEPHROLOGY  Keaton Lopez M.D.  Juma Green D.O.  Myla Hoffmann M.D.  Julia Brewer, MSN, ANP-C    Telephone: [phone]  Facsimile: [phone]    [address] [place] [address]

## 2022-01-30 NOTE — PROGRESS NOTE ADULT - SUBJECTIVE AND OBJECTIVE BOX
SUBJECTIVE / OVERNIGHT EVENTS:pt seen and examined  01-30-22     MEDICATIONS  (STANDING):  apixaban 2.5 milliGRAM(s) Oral two times a day  aspirin enteric coated 81 milliGRAM(s) Oral daily  atorvastatin 80 milliGRAM(s) Oral at bedtime  buPROPion XL (24-Hour) . 150 milliGRAM(s) Oral daily  chlorhexidine 2% Cloths 1 Application(s) Topical daily  cinacalcet 60 milliGRAM(s) Oral at bedtime  collagenase Ointment 1 Application(s) Topical two times a day  Dakins Solution - 1/4 Strength 1 Application(s) Topical two times a day  dextrose 40% Gel 15 Gram(s) Oral once  dextrose 5%. 1000 milliLiter(s) (50 mL/Hr) IV Continuous <Continuous>  dextrose 5%. 1000 milliLiter(s) (100 mL/Hr) IV Continuous <Continuous>  dextrose 50% Injectable 25 Gram(s) IV Push once  dextrose 50% Injectable 12.5 Gram(s) IV Push once  dextrose 50% Injectable 25 Gram(s) IV Push once  diltiazem    milliGRAM(s) Oral daily  epoetin anabela-epbx (RETACRIT) Injectable 37775 Unit(s) IV Push <User Schedule>  gabapentin 300 milliGRAM(s) Oral daily  glucagon  Injectable 1 milliGRAM(s) IntraMuscular once  heparin   Injectable. 500 Unit(s) Dialysis. every 1 hour  insulin glargine Injectable (LANTUS) 6 Unit(s) SubCutaneous at bedtime  insulin lispro (ADMELOG) corrective regimen sliding scale   SubCutaneous three times a day before meals  insulin lispro (ADMELOG) corrective regimen sliding scale   SubCutaneous at bedtime  insulin lispro Injectable (ADMELOG) 3 Unit(s) SubCutaneous three times a day before meals  loratadine 10 milliGRAM(s) Oral daily  melatonin 6 milliGRAM(s) Oral at bedtime  mirtazapine 7.5 milliGRAM(s) Oral at bedtime  montelukast 10 milliGRAM(s) Oral at bedtime  oxyCODONE  ER Tablet 10 milliGRAM(s) Oral every 12 hours  pantoprazole    Tablet 40 milliGRAM(s) Oral before breakfast  polyethylene glycol 3350 17 Gram(s) Oral two times a day  senna 2 Tablet(s) Oral at bedtime  sertraline 50 milliGRAM(s) Oral daily  sevelamer carbonate 800 milliGRAM(s) Oral three times a day with meals  sodium thiosulfate IVPB 25 Gram(s) IV Intermittent <User Schedule>  traZODone 100 milliGRAM(s) Oral at bedtime    MEDICATIONS  (PRN):  oxyCODONE    IR 5 milliGRAM(s) Oral every 4 hours PRN Moderate Pain (4 - 6)    Vital Signs Last 24 Hrs  T(C): 36.8 (01-30-22 @ 09:02), Max: 36.8 (01-30-22 @ 09:02)  T(F): 98.2 (01-30-22 @ 09:02), Max: 98.2 (01-30-22 @ 09:02)  HR: 80 (01-30-22 @ 09:02) (76 - 80)  BP: 116/54 (01-30-22 @ 09:02) (111/52 - 116/54)  BP(mean): --  RR: 18 (01-30-22 @ 09:02) (18 - 18)  SpO2: 97% (01-30-22 @ 09:02) (96% - 97%)    Constitutional: No fever, fatigue  Skin: No rash.  Eyes: No recent vision problems or eye pain.  ENT: No congestion, ear pain, or sore throat.  Cardiovascular: No chest pain or palpation.  Respiratory: No cough, shortness of breath, congestion, or wheezing.  Gastrointestinal: No abdominal pain, nausea, vomiting, or diarrhea.  Genitourinary: No dysuria.  Musculoskeletal: No joint swelling.  Neurologic: No headache.    PHYSICAL EXAM:  GENERAL: NAD  EYES: EOMI, PERRLA  NECK: Supple, No JVD  CHEST/LUNG: dec breath sounds at bases  HEART:  S1 , S2 +  ABDOMEN: soft , bs+, abd wound +  EXTREMITIES:  edema+  NEUROLOGY:alert awake    LABS:  01-30    135  |  88<L>  |  36<H>  ----------------------------<  76  4.7   |  24  |  6.01<H>    Ca    10.7<H>      30 Jan 2022 08:20  Phos  3.9     01-30  Mg     2.90     01-30      Creatinine Trend: 6.01 <--, 4.89 <--, 5.42 <--, 6.88 <--, 5.48 <--, 7.24 <--                        8.8    13.07 )-----------( 476      ( 30 Jan 2022 08:20 )             30.5     Urine Studies:

## 2022-01-30 NOTE — PROGRESS NOTE ADULT - SUBJECTIVE AND OBJECTIVE BOX
CARDIOLOGY FOLLOW UP - Dr. Bullock  DATE OF SERVICE: 1/30/22      no acute events       REVIEW OF SYSTEMS:  CONSTITUTIONAL: No fever, weight loss, or fatigue  RESPIRATORY: No cough, wheezing, chills or hemoptysis; No Shortness of Breath  CARDIOVASCULAR: No chest pain, palpitations, passing out, dizziness, or leg swelling  GASTROINTESTINAL: No abdominal or epigastric pain. No nausea, vomiting, or hematemesis; No diarrhea or constipation. No melena or hematochezia.  VASCULAR: No edema     PHYSICAL EXAM:  T(C): 36.8 (01-30-22 @ 09:02), Max: 36.8 (01-30-22 @ 09:02)  HR: 80 (01-30-22 @ 09:02) (76 - 80)  BP: 116/54 (01-30-22 @ 09:02) (111/52 - 116/54)  RR: 18 (01-30-22 @ 09:02) (18 - 18)  SpO2: 97% (01-30-22 @ 09:02) (96% - 97%)  Wt(kg): --  I&O's Summary      Appearance: Normal	  Cardiovascular: Normal S1 S2,RRR, No JVD, No murmurs  Respiratory: Lungs clear to auscultation	  Gastrointestinal:  Soft, Non-tender, + BS	  Extremities: Normal range of motion, No clubbing, cyanosis or edema      Home Medications:  aspirin 81 mg oral delayed release tablet: 1 tab(s) orally once a day (12 Jan 2022 17:49)  buPROPion 150 mg/24 hours (XL) oral tablet, extended release: 1 tab(s) orally once a day (in the morning) (12 Jan 2022 17:49)  cetirizine 10 mg oral tablet: 1 tab(s) orally once a day (12 Jan 2022 17:49)  dilTIAZem 120 mg/24 hours oral tablet, extended release: 1 tab(s) orally once a day (12 Jan 2022 17:49)  doxepin 25 mg oral capsule: 1 cap(s) orally once a day (at bedtime) (12 Jan 2022 17:49)  Eliquis 2.5 mg oral tablet: 1 tab(s) orally 2 times a day    Pharmacy states patient no longer wants to fill this medication (12 Jan 2022 17:49)  furosemide 80 mg oral tablet: 1 tab(s) orally once a day    Pharmacy states patient no longer wants to fill this medication (12 Jan 2022 17:49)  gabapentin 300 mg oral capsule: 1 cap(s) orally 2 times a day (12 Jan 2022 17:49)  hydrOXYzine hydrochloride 25 mg oral tablet: 1 tab(s) orally 2 times a day, As Needed (12 Jan 2022 17:49)  lanthanum 1000 mg oral tablet, chewable: 2 tab(s) orally with meals and 1 tab(s) orally with snacks    Pharmacy states patient no longer wants to fill this medication (12 Jan 2022 17:49)  mirtazapine 7.5 mg oral tablet: 1 tab(s) orally once a day (at bedtime) (12 Jan 2022 17:49)  montelukast 10 mg oral tablet: 1 tab(s) orally once a day (in the evening) (12 Jan 2022 17:49)  mupirocin 2% topical ointment: Apply sparingly to affected area 2 times a day as directed (12 Jan 2022 17:49)  nystatin 100,000 units/mL oral suspension: 5 milliliter(s) orally 4 times a day, as directed (12 Jan 2022 17:49)  omeprazole 20 mg oral delayed release capsule: 2 cap(s) orally once a day before breakfast (12 Jan 2022 17:49)  Pennsaid 2% topical solution: Apply topically to affected area 2 times a day (12 Jan 2022 17:49)  rosuvastatin 40 mg oral tablet: 1 tab(s) orally once a day (12 Jan 2022 17:49)  Santyl 250 units/g topical ointment: Apply topically to affected area once a day as directed (12 Jan 2022 17:49)  sertraline 50 mg oral tablet: 1 tab(s) orally once a day (12 Jan 2022 17:49)  Spiriva HandiHaler 18 mcg inhalation capsule: 1 cap(s) inhaled once a day (12 Jan 2022 17:49)  traZODone 100 mg oral tablet: 1 tab(s) orally once a day (at bedtime) (12 Jan 2022 17:49)  Tresiba FlexTouch 100 units/mL subcutaneous solution: 80 unit(s) subcutaneous once a day (at bedtime) (12 Jan 2022 17:49)  Trulicity Pen 1.5 mg/0.5 mL subcutaneous solution: 1 dose(s) subcutaneous once a week (12 Jan 2022 17:49)      MEDICATIONS  (STANDING):  apixaban 2.5 milliGRAM(s) Oral two times a day  aspirin enteric coated 81 milliGRAM(s) Oral daily  atorvastatin 80 milliGRAM(s) Oral at bedtime  buPROPion XL (24-Hour) . 150 milliGRAM(s) Oral daily  chlorhexidine 2% Cloths 1 Application(s) Topical daily  cinacalcet 60 milliGRAM(s) Oral at bedtime  collagenase Ointment 1 Application(s) Topical two times a day  Dakins Solution - 1/4 Strength 1 Application(s) Topical two times a day  dextrose 40% Gel 15 Gram(s) Oral once  dextrose 5%. 1000 milliLiter(s) (100 mL/Hr) IV Continuous <Continuous>  dextrose 5%. 1000 milliLiter(s) (50 mL/Hr) IV Continuous <Continuous>  dextrose 50% Injectable 25 Gram(s) IV Push once  dextrose 50% Injectable 12.5 Gram(s) IV Push once  dextrose 50% Injectable 25 Gram(s) IV Push once  diltiazem    milliGRAM(s) Oral daily  epoetin anabela-epbx (RETACRIT) Injectable 29992 Unit(s) IV Push <User Schedule>  gabapentin 300 milliGRAM(s) Oral daily  glucagon  Injectable 1 milliGRAM(s) IntraMuscular once  heparin   Injectable. 500 Unit(s) Dialysis. every 1 hour  insulin glargine Injectable (LANTUS) 7 Unit(s) SubCutaneous at bedtime  insulin lispro (ADMELOG) corrective regimen sliding scale   SubCutaneous at bedtime  insulin lispro (ADMELOG) corrective regimen sliding scale   SubCutaneous three times a day before meals  insulin lispro Injectable (ADMELOG) 3 Unit(s) SubCutaneous three times a day before meals  loratadine 10 milliGRAM(s) Oral daily  melatonin 6 milliGRAM(s) Oral at bedtime  mirtazapine 7.5 milliGRAM(s) Oral at bedtime  montelukast 10 milliGRAM(s) Oral at bedtime  oxyCODONE  ER Tablet 10 milliGRAM(s) Oral every 12 hours  pantoprazole    Tablet 40 milliGRAM(s) Oral before breakfast  polyethylene glycol 3350 17 Gram(s) Oral two times a day  senna 2 Tablet(s) Oral at bedtime  sertraline 50 milliGRAM(s) Oral daily  sevelamer carbonate 800 milliGRAM(s) Oral three times a day with meals  sodium thiosulfate IVPB 25 Gram(s) IV Intermittent <User Schedule>  traZODone 100 milliGRAM(s) Oral at bedtime      TELEMETRY: 	    ECG:  	  RADIOLOGY:   DIAGNOSTIC TESTING:  [ ] Echocardiogram:  [ ]  Catheterization:  [ ] Stress Test:    OTHER: 	    LABS:	 	    Troponin T, High Sensitivity Result: 58 ng/L (01-27 @ 11:35)                          8.8    13.07 )-----------( 476      ( 30 Jan 2022 08:20 )             30.5     01-30    135  |  88<L>  |  36<H>  ----------------------------<  76  4.7   |  24  |  6.01<H>    Ca    10.7<H>      30 Jan 2022 08:20  Phos  3.9     01-30  Mg     2.90     01-30

## 2022-01-30 NOTE — PROGRESS NOTE ADULT - SUBJECTIVE AND OBJECTIVE BOX
Full note to follow. [facility] NEPHROLOGY- PROGRESS NOTE    56y Female with history of ESRD on HD presents with vomiting and diarrhea found to be COVID-19 positive. Nephrology consulted for ESRD status.    REVIEW OF SYSTEMS:  Gen: no changes in weight  Cards: no chest pain  Resp: no dyspnea  GI: no nausea or vomiting or diarrhea + ab wound pain  Vascular: no LE edema    caffeine (Nausea)  latex (Rash)  penicillin (Nausea)  strawberry (Rash)      Hospital Medications: Medications reviewed    VITALS:  T(F): 98.3 (01-30-22 @ 21:30), Max: 98.3 (01-30-22 @ 21:30)  HR: 74 (01-30-22 @ 21:30)  BP: 123/50 (01-30-22 @ 21:30)  RR: 17 (01-30-22 @ 21:30)  SpO2: 98% (01-30-22 @ 21:30)  Wt(kg): --      PHYSICAL EXAM:  Gen: NAD, calm  Cards: RRR, +S1/S2, no M/G/R  Resp: CTA B/L  GI: soft, RLQ bandage/tender  Vascular: no LE edema B/L, LUE AVF + bruit/thrill    LABS:  01-30    135  |  88<L>  |  36<H>  ----------------------------<  76  4.7   |  24  |  6.01<H>    Ca    10.7<H>      30 Jan 2022 08:20  Phos  3.9     01-30  Mg     2.90     01-30      Creatinine Trend: 6.01 <--, 4.89 <--, 5.42 <--, 6.88 <--, 5.48 <--, 7.24 <--                        8.8    13.07 )-----------( 476      ( 30 Jan 2022 08:20 )             30.5

## 2022-01-30 NOTE — PROGRESS NOTE ADULT - ASSESSMENT
56 yr old F with morbid obesity, CHAMP not on home O2, ESRD (HD MWF), HTN, DM2 A1C 7.0, COPD, Afib no longer on AC, chronic R pannus wound here with worsening wound, diarrhea and found to be COVID+. Has poor po intake at this time.     1. Type 2 diabetes mellitus   A1c 7.0% (may be inaccurate in setting of ESRD)  Home Regimen: Tresiba 80 units HS and Trulicity 1.5mg subq weekly    While inpatient:  BG target 100-180 mg/dL  Below goal this AM  Decreased Lantus to 6 units SQ qHS   Continue Admelog 3 units SQ TID before meals (Hold if NPO/not eating meal)    Continue Admelog correctional scale to LOW dose before meals, continue low dose at bedtime   Check BG before meals and bedtime  Hypoglycemia protocol     Discharge Plan:  STOP Trulicity (patient ESRD on HD)  Likely dc plan is basal/oral regimen. For oral agent, depends on inpatient requirements but can consider renally dosed DPP4 (Januvia 25 mg or Tradjenta 5 mg daily) VS Prandin before meals   Patient may benefit from switching to 22-24h acting basal insulin eg Levemir or Lantus, instead of Tresiba  Please assess insulin coverage for Levemir or Lantus, instead of Tresiba pens  Consider CGM (such as Freestyle Libre2 outpatient)  If desiring to followup with [facility] Endocrinology: [address], [address], [place] [address], [phone]    2. HTN  Management per primary team     3. Hyperlipidemia  Continue home dose of Atorvastatin 80 mg  Note, limited benefit in ESRD. Followup lipid panel as outpatient    PIERRE Cade-BC  Nurse Practitioner   Division of Endocrinology  Pager: [facility]ARMA pager [phone]    If out of hospital/unavailable when paged, please note: patient will be cared for by another provider on the endocrine service.  Please call the endocrine answering service for assistance to reach covering provider ([phone]). For non-urgent matters, please email [email or web address] for assistance.

## 2022-01-30 NOTE — PROGRESS NOTE ADULT - SUBJECTIVE AND OBJECTIVE BOX
Chief Complaint: DM 2    History: Patient seen at bedside. Reports she is eating meals, serum glucose with borderline hypoglycemia.     MEDICATIONS  (STANDING):  apixaban 2.5 milliGRAM(s) Oral two times a day  aspirin enteric coated 81 milliGRAM(s) Oral daily  atorvastatin 80 milliGRAM(s) Oral at bedtime  buPROPion XL (24-Hour) . 150 milliGRAM(s) Oral daily  chlorhexidine 2% Cloths 1 Application(s) Topical daily  cinacalcet 60 milliGRAM(s) Oral at bedtime  collagenase Ointment 1 Application(s) Topical two times a day  Dakins Solution - 1/4 Strength 1 Application(s) Topical two times a day  dextrose 40% Gel 15 Gram(s) Oral once  dextrose 5%. 1000 milliLiter(s) (50 mL/Hr) IV Continuous <Continuous>  dextrose 5%. 1000 milliLiter(s) (100 mL/Hr) IV Continuous <Continuous>  dextrose 50% Injectable 25 Gram(s) IV Push once  dextrose 50% Injectable 12.5 Gram(s) IV Push once  dextrose 50% Injectable 25 Gram(s) IV Push once  diltiazem    milliGRAM(s) Oral daily  epoetin anabela-epbx (RETACRIT) Injectable 95543 Unit(s) IV Push <User Schedule>  gabapentin 300 milliGRAM(s) Oral daily  glucagon  Injectable 1 milliGRAM(s) IntraMuscular once  heparin   Injectable. 500 Unit(s) Dialysis. every 1 hour  insulin glargine Injectable (LANTUS) 7 Unit(s) SubCutaneous at bedtime  insulin lispro (ADMELOG) corrective regimen sliding scale   SubCutaneous three times a day before meals  insulin lispro (ADMELOG) corrective regimen sliding scale   SubCutaneous at bedtime  insulin lispro Injectable (ADMELOG) 3 Unit(s) SubCutaneous three times a day before meals  loratadine 10 milliGRAM(s) Oral daily  melatonin 6 milliGRAM(s) Oral at bedtime  mirtazapine 7.5 milliGRAM(s) Oral at bedtime  montelukast 10 milliGRAM(s) Oral at bedtime  oxyCODONE  ER Tablet 10 milliGRAM(s) Oral every 12 hours  pantoprazole    Tablet 40 milliGRAM(s) Oral before breakfast  polyethylene glycol 3350 17 Gram(s) Oral two times a day  senna 2 Tablet(s) Oral at bedtime  sertraline 50 milliGRAM(s) Oral daily  sevelamer carbonate 800 milliGRAM(s) Oral three times a day with meals  sodium thiosulfate IVPB 25 Gram(s) IV Intermittent <User Schedule>  traZODone 100 milliGRAM(s) Oral at bedtime      Allergies  latex (Rash)  penicillin (Nausea)  strawberry (Rash)    Intolerances  caffeine (Nausea)    Review of Systems:  HEENT: No pain  Cardiovascular: No chest pain  Respiratory: No SOB  GI: No nausea, vomiting    PHYSICAL EXAM:  Vital Signs Last 24 Hrs  T(C): 36.8 (30 Jan 2022 09:02), Max: 36.8 (30 Jan 2022 09:02)  T(F): 98.2 (30 Jan 2022 09:02), Max: 98.2 (30 Jan 2022 09:02)  HR: 80 (30 Jan 2022 09:02) (76 - 80)  BP: 116/54 (30 Jan 2022 09:02) (111/52 - 116/54)  BP(mean): --  RR: 18 (30 Jan 2022 09:02) (18 - 18)  SpO2: 97% (30 Jan 2022 09:02) (96% - 97%)  GENERAL: NAD  EYES: No proptosis, no lid lag, anicteric  HEENT:  Atraumatic, Normocephalic, moist mucous membranes  RESPIRATORY: unlabored respirations     CAPILLARY BLOOD GLUCOSE      POCT Blood Glucose.: 138 mg/dL (30 Jan 2022 12:43)  POCT Blood Glucose.: 90 mg/dL (30 Jan 2022 08:33)  POCT Blood Glucose.: 88 mg/dL (29 Jan 2022 21:33)  POCT Blood Glucose.: 117 mg/dL (29 Jan 2022 16:45)    01-30    135  |  88<L>  |  36<H>  ----------------------------<  76  4.7   |  24  |  6.01<H>    Ca    10.7<H>      30 Jan 2022 08:20  Phos  3.9     01-30  Mg     2.90     01-30      A1C with Estimated Average Glucose Result: 7.0 % (01-13-22 @ 08:21)  A1C with Estimated Average Glucose Result: 6.7 % (08-31-21 @ 09:30)  A1C with Estimated Average Glucose Result: 7.2 % (04-28-21 @ 07:04)    Diet, Consistent Carbohydrate Renal w/Evening Snack:   Supplement Feeding Modality:  Oral  Nepro Cans or Servings Per Day:  1       Frequency:  Three Times a day (01-17-22 @ 18:07)

## 2022-01-30 NOTE — PROGRESS NOTE ADULT - SUBJECTIVE AND OBJECTIVE BOX
ANTONIA [id number]  56yFemale  T(C): 36.8 (01-30-22 @ 09:02), Max: 37 (01-29-22 @ 12:13)  HR: 80 (01-30-22 @ 09:02) (76 - 80)  BP: 116/54 (01-30-22 @ 09:02) (111/52 - 116/54)  RR: 18 (01-30-22 @ 09:02) (18 - 18)  SpO2: 97% (01-30-22 @ 09:02) (90% - 97%)  Wt(kg): --  normal cephalic atraumatic  s1s2   clear to ascultation bilaterally  soft, non tender, non distended no guarding or rebound  no clubbing cyanosis or edema

## 2022-01-30 NOTE — PROGRESS NOTE ADULT - ASSESSMENT
56y Female with history of ESRD on HD presents with vomiting and diarrhea found to be COVID-19 positive. Nephrology consulted for ESRD status.    1) ESRD: Last HD on 1/28 tolerated well. Plan for next maintenance HD 1/31. Monitor electrolytes.    2) HTN with ESRD: BP acceptable with negative orthostatics. Continue with current medications. Monitor BP.    3) Anemia of renal disease: Hb low. Continue with Epo 10K with HD. Monitor Hb.    4) Secondary HPT of renal origin: Phosphorus acceptable. Continue with sensipar 60 mg daily and renvela 1 tab with meals (goal < 4.5 given concerns for calciphylaxis). iPTH low with high normal serum calcium suggestive of oversuppression for which hectorol discontinued. Monitor serum calcium and phosphorus.    5) Ab wound: Appreciate dermatology consultation. S/P biopsy. Continue with empiric sodium thiosulfate with HD and monitor response. F/U biopsy results.      [facility] NEPHROLOGY  Keaton Lopez M.D.  Juma Green D.O.  Myla Hoffmann M.D.  Julia Brewer, MSN, ANP-C    Telephone: [phone]  Facsimile: [phone]    [address] [place] [address]

## 2022-01-31 NOTE — PROGRESS NOTE ADULT - SUBJECTIVE AND OBJECTIVE BOX
CARDIOLOGY FOLLOW UP - Dr. Bullock  Date of Service: 1/31/22  CC: no cp/sob     Review of Systems:  Constitutional: No fever, weight loss, or fatigue  Respiratory: No cough, wheezing, or hemoptysis, no shortness of breath  Cardiovascular: No chest pain, palpitations, passing out, dizziness, or leg swelling  Gastrointestinal: No abd or epigastric pain.  No nausea, vomiting, or hematemesis; no diarrhea or constipation, no melena or hematochezia  Vascular: no edema       PHYSICAL EXAM:  T(C): 36.8 (01-31-22 @ 11:00), Max: 37.2 (01-31-22 @ 06:50)  HR: 81 (01-31-22 @ 11:00) (74 - 81)  BP: 146/68 (01-31-22 @ 11:00) (115/62 - 146/68)  RR: 16 (01-31-22 @ 11:00) (16 - 18)  SpO2: 100% (01-31-22 @ 06:00) (98% - 100%)  Wt(kg): --  I&O's Summary    31 Jan 2022 07:01  -  31 Jan 2022 12:03  --------------------------------------------------------  IN: 500 mL / OUT: 2300 mL / NET: -1800 mL        Appearance: Normal	  Cardiovascular: Normal S1 S2,RRR, No JVD, No murmurs  Respiratory: Lungs clear to auscultation	  Gastrointestinal:  Soft, Non-tender, + BS	  Extremities: Normal range of motion, No clubbing, cyanosis or edema      Home Medications:  aspirin 81 mg oral delayed release tablet: 1 tab(s) orally once a day (12 Jan 2022 17:49)  buPROPion 150 mg/24 hours (XL) oral tablet, extended release: 1 tab(s) orally once a day (in the morning) (12 Jan 2022 17:49)  cetirizine 10 mg oral tablet: 1 tab(s) orally once a day (12 Jan 2022 17:49)  dilTIAZem 120 mg/24 hours oral tablet, extended release: 1 tab(s) orally once a day (12 Jan 2022 17:49)  doxepin 25 mg oral capsule: 1 cap(s) orally once a day (at bedtime) (12 Jan 2022 17:49)  Eliquis 2.5 mg oral tablet: 1 tab(s) orally 2 times a day    Pharmacy states patient no longer wants to fill this medication (12 Jan 2022 17:49)  furosemide 80 mg oral tablet: 1 tab(s) orally once a day    Pharmacy states patient no longer wants to fill this medication (12 Jan 2022 17:49)  gabapentin 300 mg oral capsule: 1 cap(s) orally 2 times a day (12 Jan 2022 17:49)  hydrOXYzine hydrochloride 25 mg oral tablet: 1 tab(s) orally 2 times a day, As Needed (12 Jan 2022 17:49)  lanthanum 1000 mg oral tablet, chewable: 2 tab(s) orally with meals and 1 tab(s) orally with snacks    Pharmacy states patient no longer wants to fill this medication (12 Jan 2022 17:49)  mirtazapine 7.5 mg oral tablet: 1 tab(s) orally once a day (at bedtime) (12 Jan 2022 17:49)  montelukast 10 mg oral tablet: 1 tab(s) orally once a day (in the evening) (12 Jan 2022 17:49)  mupirocin 2% topical ointment: Apply sparingly to affected area 2 times a day as directed (12 Jan 2022 17:49)  nystatin 100,000 units/mL oral suspension: 5 milliliter(s) orally 4 times a day, as directed (12 Jan 2022 17:49)  omeprazole 20 mg oral delayed release capsule: 2 cap(s) orally once a day before breakfast (12 Jan 2022 17:49)  Pennsaid 2% topical solution: Apply topically to affected area 2 times a day (12 Jan 2022 17:49)  rosuvastatin 40 mg oral tablet: 1 tab(s) orally once a day (12 Jan 2022 17:49)  Santyl 250 units/g topical ointment: Apply topically to affected area once a day as directed (12 Jan 2022 17:49)  sertraline 50 mg oral tablet: 1 tab(s) orally once a day (12 Jan 2022 17:49)  Spiriva HandiHaler 18 mcg inhalation capsule: 1 cap(s) inhaled once a day (12 Jan 2022 17:49)  traZODone 100 mg oral tablet: 1 tab(s) orally once a day (at bedtime) (12 Jan 2022 17:49)  Tresiba FlexTouch 100 units/mL subcutaneous solution: 80 unit(s) subcutaneous once a day (at bedtime) (12 Jan 2022 17:49)  Trulicity Pen 1.5 mg/0.5 mL subcutaneous solution: 1 dose(s) subcutaneous once a week (12 Jan 2022 17:49)      MEDICATIONS  (STANDING):  apixaban 2.5 milliGRAM(s) Oral two times a day  aspirin enteric coated 81 milliGRAM(s) Oral daily  atorvastatin 80 milliGRAM(s) Oral at bedtime  buPROPion XL (24-Hour) . 150 milliGRAM(s) Oral daily  chlorhexidine 2% Cloths 1 Application(s) Topical daily  cinacalcet 60 milliGRAM(s) Oral at bedtime  collagenase Ointment 1 Application(s) Topical two times a day  Dakins Solution - 1/4 Strength 1 Application(s) Topical two times a day  dextrose 40% Gel 15 Gram(s) Oral once  dextrose 5%. 1000 milliLiter(s) (100 mL/Hr) IV Continuous <Continuous>  dextrose 5%. 1000 milliLiter(s) (50 mL/Hr) IV Continuous <Continuous>  dextrose 50% Injectable 25 Gram(s) IV Push once  dextrose 50% Injectable 12.5 Gram(s) IV Push once  dextrose 50% Injectable 25 Gram(s) IV Push once  diltiazem    milliGRAM(s) Oral daily  epoetin anabela-epbx (RETACRIT) Injectable 15377 Unit(s) IV Push <User Schedule>  gabapentin 300 milliGRAM(s) Oral daily  glucagon  Injectable 1 milliGRAM(s) IntraMuscular once  heparin   Injectable. 500 Unit(s) Dialysis. every 1 hour  insulin glargine Injectable (LANTUS) 6 Unit(s) SubCutaneous at bedtime  insulin lispro (ADMELOG) corrective regimen sliding scale   SubCutaneous at bedtime  insulin lispro (ADMELOG) corrective regimen sliding scale   SubCutaneous three times a day before meals  insulin lispro Injectable (ADMELOG) 3 Unit(s) SubCutaneous three times a day before meals  loratadine 10 milliGRAM(s) Oral daily  melatonin 6 milliGRAM(s) Oral at bedtime  mirtazapine 7.5 milliGRAM(s) Oral at bedtime  montelukast 10 milliGRAM(s) Oral at bedtime  oxyCODONE  ER Tablet 10 milliGRAM(s) Oral every 12 hours  pantoprazole    Tablet 40 milliGRAM(s) Oral before breakfast  polyethylene glycol 3350 17 Gram(s) Oral two times a day  senna 2 Tablet(s) Oral at bedtime  sertraline 50 milliGRAM(s) Oral daily  sevelamer carbonate 800 milliGRAM(s) Oral three times a day with meals  sodium thiosulfate IVPB 25 Gram(s) IV Intermittent <User Schedule>  traZODone 100 milliGRAM(s) Oral at bedtime      TELEMETRY: 	    ECG:  	  RADIOLOGY:   DIAGNOSTIC TESTING:  [ ] Echocardiogram:  [ ]  Catheterization:  [ ] Stress Test:    OTHER: 	    LABS:	 	    Troponin T, High Sensitivity Result: 58 ng/L (01-27 @ 11:35)                          7.7    12.97 )-----------( 494      ( 31 Jan 2022 07:38 )             28.1     01-31    131<L>  |  85<L>  |  47<H>  ----------------------------<  90  5.5<H>   |  24  |  7.54<H>    Ca    10.0      31 Jan 2022 07:38  Phos  4.7     01-31  Mg     3.10     01-31

## 2022-01-31 NOTE — PROGRESS NOTE ADULT - ASSESSMENT
56 year old female with hx of morbid obesity, CHAMP not on home O2, ESRD (HD MWF), HTN, DM, COPD, Afib no longer on AC, chronic R pannus wound, followed by Dr. Layne, sent by visiting RN for worsening wound.    # Chronic Pannus Wound  -s/p IV abx per primary team  -surgery /derm consult appreciated  -med f/u     #Afib  -stable, in NSR   -cont eliquis for now, elevated inflamm markers/ddimer given covid,  h/h noted- gi /renal following   -cont cardizem    #Covid-19+  -trend inflamm markers/ ddimer   -med f/u     #Hypertension  -cont current meds    #ESRD on HD  -HD per renal    # Near Syncope  -tele no events   -orthostatics neg  -echo w grossly nl lv sys fx
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
## 2022-01-31 NOTE — CHART NOTE - NSCHARTNOTEFT_GEN_A_CORE
NUTRITION FOLLOW-UP: Pt seen for follow up care. Pt states she has not been eating well in house. She dislike the hospital's food. She also complained that her choices are too limited. Explained that the food choices are based on her therapeutic diet. Offered alternate items within her dietary restriction but Pt was not receptive. Pt states she drinks Nepro supplements sometimes.  Instructed Pt on Consistent Carb, Renal diet.     Weight: 133.5 kg    Labs: POCT-76, 94, 85, 134, Na-131, K-5.5, BUN-47, Cr-7.54, Mg-3.1, Phos-4.7, Hb A1c-7 (1/13/22)    Current Diet: Diet, Consistent Carbohydrate Renal w/Evening Snack:   Supplement Feeding Modality:  Oral  Nepro Cans or Servings Per Day:  1       Frequency:  Three Times a day (01-17-22 @ 18:07)    Edema: 1+ generalized    Skin: Wound on right lower quadrant under abdominal pannus; MAD of sacral/gluteal region    RD to Remain Available: Josefa Davies MS, RDN, CDN pager [phone]     Additional Recommendations:   1) Monitor weight, labs, po intake and skin integrity.

## 2022-01-31 NOTE — PROGRESS NOTE ADULT - ASSESSMENT
56y Female with history of ESRD on HD presents with vomiting and diarrhea found to be COVID-19 positive. Nephrology consulted for ESRD status.    1) ESRD: Last HD earlier this morning tolerated well with 1.8L removed. Plan for next maintenance HD on 2/2. Monitor electrolytes.    2) HTN with ESRD: BP acceptable. Continue with current medications. Monitor BP.    3) Anemia of renal disease: Hb low. Check AM iron stores. Increase Epo to 12K with HD. Monitor Hb.    4) Secondary HPT of renal origin: Phosphorus acceptable with hign normal serum calcium. Increase sensipar to 90 mg daily and continue with renvela 1 tab with meals (goal < 4.5 given concerns for calciphylaxis). Repeat iPTH. Monitor serum calcium and phosphorus.    5) Ab wound: Appreciate dermatology consultation. S/P biopsy. Continue with empiric sodium thiosulfate with HD and monitor response. F/U biopsy results.      [facility]PHR[facility]  Keaton Lopez M.D.  Juma Green D.O.  Myla Hoffmann M.D.  Julia Brewer, JASIEL, ANP-C    Telephone: [phone]  Facsimile: [phone]    [address] [place] [address]

## 2022-01-31 NOTE — PROGRESS NOTE ADULT - SUBJECTIVE AND OBJECTIVE BOX
Chief Complaint: DM 2    History: Patient seen at bedside. Reports she is eating meals, however has been having alot of pain today and does not have an appetite. serum glucose with borderline hypoglycemia.     MEDICATIONS  (STANDING):  apixaban 2.5 milliGRAM(s) Oral two times a day  aspirin enteric coated 81 milliGRAM(s) Oral daily  atorvastatin 80 milliGRAM(s) Oral at bedtime  buPROPion XL (24-Hour) . 150 milliGRAM(s) Oral daily  chlorhexidine 2% Cloths 1 Application(s) Topical daily  cinacalcet 60 milliGRAM(s) Oral at bedtime  collagenase Ointment 1 Application(s) Topical two times a day  Dakins Solution - 1/4 Strength 1 Application(s) Topical two times a day  dextrose 40% Gel 15 Gram(s) Oral once  dextrose 5%. 1000 milliLiter(s) (50 mL/Hr) IV Continuous <Continuous>  dextrose 5%. 1000 milliLiter(s) (100 mL/Hr) IV Continuous <Continuous>  dextrose 50% Injectable 25 Gram(s) IV Push once  dextrose 50% Injectable 12.5 Gram(s) IV Push once  dextrose 50% Injectable 25 Gram(s) IV Push once  diltiazem    milliGRAM(s) Oral daily  epoetin anaebla-epbx (RETACRIT) Injectable 81021 Unit(s) IV Push <User Schedule>  gabapentin 300 milliGRAM(s) Oral daily  glucagon  Injectable 1 milliGRAM(s) IntraMuscular once  heparin   Injectable. 500 Unit(s) Dialysis. every 1 hour  insulin glargine Injectable (LANTUS) 7 Unit(s) SubCutaneous at bedtime  insulin lispro (ADMELOG) corrective regimen sliding scale   SubCutaneous three times a day before meals  insulin lispro (ADMELOG) corrective regimen sliding scale   SubCutaneous at bedtime  insulin lispro Injectable (ADMELOG) 3 Unit(s) SubCutaneous three times a day before meals  loratadine 10 milliGRAM(s) Oral daily  melatonin 6 milliGRAM(s) Oral at bedtime  mirtazapine 7.5 milliGRAM(s) Oral at bedtime  montelukast 10 milliGRAM(s) Oral at bedtime  oxyCODONE  ER Tablet 10 milliGRAM(s) Oral every 12 hours  pantoprazole    Tablet 40 milliGRAM(s) Oral before breakfast  polyethylene glycol 3350 17 Gram(s) Oral two times a day  senna 2 Tablet(s) Oral at bedtime  sertraline 50 milliGRAM(s) Oral daily  sevelamer carbonate 800 milliGRAM(s) Oral three times a day with meals  sodium thiosulfate IVPB 25 Gram(s) IV Intermittent <User Schedule>  traZODone 100 milliGRAM(s) Oral at bedtime      Allergies  latex (Rash)  penicillin (Nausea)  strawberry (Rash)    Intolerances  caffeine (Nausea)    Review of Systems:  HEENT: No pain  Cardiovascular: No chest pain  Respiratory: No SOB  GI: No nausea, vomiting    PHYSICAL EXAM:  Vital Signs Last 24 Hrs  T(C): 36.8 (31 Jan 2022 14:20), Max: 37.2 (31 Jan 2022 06:50)  T(F): 98.2 (31 Jan 2022 14:20), Max: 98.9 (31 Jan 2022 06:50)  HR: 76 (31 Jan 2022 14:20) (74 - 81)  BP: 137/67 (31 Jan 2022 14:20) (115/62 - 146/68)  BP(mean): --  RR: 18 (31 Jan 2022 14:20) (16 - 18)  SpO2: 100% (31 Jan 2022 06:00) (98% - 100%)  GENERAL: NAD  EYES: No proptosis, no lid lag, anicteric  HEENT:  Atraumatic, Normocephalic, moist mucous membranes  RESPIRATORY: unlabored respirations     CAPILLARY BLOOD GLUCOSE  POCT Blood Glucose.: 76 mg/dL (31 Jan 2022 11:22)  POCT Blood Glucose.: 94 mg/dL (31 Jan 2022 09:22)  POCT Blood Glucose.: 85 mg/dL (31 Jan 2022 06:18)  POCT Blood Glucose.: 134 mg/dL (30 Jan 2022 22:49)  POCT Blood Glucose.: 114 mg/dL (30 Jan 2022 18:18)  POCT Blood Glucose.: 138 mg/dL (30 Jan 2022 12:43)  POCT Blood Glucose.: 90 mg/dL (30 Jan 2022 08:33)  POCT Blood Glucose.: 88 mg/dL (29 Jan 2022 21:33)  POCT Blood Glucose.: 117 mg/dL (29 Jan 2022 16:45)    01-31    131<L>  |  85<L>  |  47<H>  ----------------------------<  90  5.5<H>   |  24  |  7.54<H>    Ca    10.0      31 Jan 2022 07:38  Phos  4.7     01-31  Mg     3.10     01-31      A1C with Estimated Average Glucose Result: 7.0 % (01-13-22 @ 08:21)  A1C with Estimated Average Glucose Result: 6.7 % (08-31-21 @ 09:30)  A1C with Estimated Average Glucose Result: 7.2 % (04-28-21 @ 07:04)    Diet, Consistent Carbohydrate Renal w/Evening Snack:   Supplement Feeding Modality:  Oral  Nepro Cans or Servings Per Day:  1       Frequency:  Three Times a day (01-17-22 @ 18:07)

## 2022-01-31 NOTE — PROGRESS NOTE ADULT - ASSESSMENT
56 yr old F with morbid obesity, CHAMP not on home O2, ESRD (HD MWF), HTN, DM2 A1C 7.0, COPD, Afib no longer on AC, chronic R pannus wound here with worsening wound, diarrhea and found to be COVID+. Has poor po intake at this time.     1. Type 2 diabetes mellitus   A1c 7.0% (may be inaccurate in setting of ESRD)  Home Regimen: Tresiba 80 units HS and Trulicity 1.5mg subq weekly    While inpatient:  BG target 100-180 mg/dL  Below goal this AM  Decreased Lantus to 5 units SQ qHS   Continue Admelog 3 units SQ TID before meals (Hold if NPO/not eating meal)    Continue Admelog correctional scale to LOW dose before meals, continue low dose at bedtime   Check BG before meals and bedtime  Hypoglycemia protocol     Discharge Plan:  STOP Trulicity (patient ESRD on HD)  Likely dc plan is basal/oral regimen. For oral agent, depends on inpatient requirements but can consider renally dosed DPP4 (Januvia 25 mg or Tradjenta 5 mg daily) VS Prandin before meals   Patient may benefit from switching to 22-24h acting basal insulin eg Levemir or Lantus, instead of Tresiba  Please assess insulin coverage for Levemir or Lantus, instead of Tresiba pens  Consider CGM (such as Freestyle Libre2 outpatient)  If desiring to followup with [facility]: [address] [place] [address], [address], [facility] [address], [phone]    2. HTN  Management per primary team     3. Hyperlipidemia  Continue home dose of Atorvastatin 80 mg  Note, limited benefit in ESRD. Followup lipid panel as outpatient    Tamela Ruiz  Nurse Practitioner  Division of Endocrinology & Diabetes  Pager # [phone]      If after 6PM or before 9AM, or on weekends/holidays, please call endocrine answering service for assistance ([phone]).  For nonurgent matters email [email or web address] for assistance.

## 2022-01-31 NOTE — CHART NOTE - NSCHARTNOTEFT_GEN_A_CORE
Alerted by dermatology team that patients case more suspicious for pyoderma gangrenosum and they are recommending starting IV steroids.   Per derm recs, solumedrol 70mg IV x 1 now, followed by 35mg IV BID for total of 5 days.   Will also ask for ID follow up given high dose steroids and concern for any worsening infection on steroids.   Pain management follow up also requested. Patient did not have oxy IR from 10pm lastnight to 2pm today. Pain service recommended to encourage patient to take it closer to 4 hour intervals and if still in pain despite adequate dosing they will see patient tomorrow.   Case and plan discussed with Dr. Maurice who is in agreement.

## 2022-01-31 NOTE — PROGRESS NOTE ADULT - ASSESSMENT
Deep, non-healing ulcer, with inflammatory borders and extensive undermining and interval worsening over days to week, suggestive of pyoderma gangrenosum, an inflammatory, noninfectious, ulcerative neutrophilic skin disease of uncertain etiology. However, pyoderma gangrenosum is a diagnosis of exclusion. Bacterial tissue culture with Pseudomonas, Staph epidermidis, Enterococcus faecalis; reportedly same as previous bacterial culture. Preliminary biopsy findings show extensive suppurative inflammation and necrosis. No obvious intravascular calcium deposits although limited sample of subcutaneous tissue. These findings along with extensive undermining on exam raise possibility of pyoderma gangrenosum. Awaiting von Kossa staining (for calcium), special organismal staining and deeper sections from original biopsy. SPEP, serum DOUG, ANCAs negative. Less likely differential diagnosis includes calciphylaxis.   - fu final H&E punch biopsy and fungal/AFB TC results; still pending   - fu UPEP with immunofixation  - start IV solumedrol 70mg now (then divided in BID dosing for next 5 days)   - please discuss with ID regarding consideration for antibiotics while patient on steroids given bacterial superinfection of ulcer   - please ensure close monitoring of glucose levels while patient receiving high dose corticosteroids  - patient’s pain is not currently adequately controlled. Recommend pain management re-consultation / close adherence to pain regimen schedule given significant amount of pain that patient is experiencing from ulcer  - discussed with wound care RN: stop collagenase, start topical tacrolimus 0.01% ointment 1-2x daily to wound edges and aquacel Ag dressing    The patient's chart was reviewed in addition to being seen and examined at bedside with the dermatology attending Dr. Lary Bo.  Recommendations were communicated with the primary team.  Please page [phone] for further related questions (please leave 10 digit call back number because we cover several facilities).    Amy Jackman MD  Resident Physician, PGY3  [facility] Dermatology

## 2022-01-31 NOTE — PROGRESS NOTE ADULT - SUBJECTIVE AND OBJECTIVE BOX
SUBJECTIVE / OVERNIGHT EVENTS:pt seen and examined, c/o pain at the wound site  01-31-22     MEDICATIONS  (STANDING):  apixaban 2.5 milliGRAM(s) Oral two times a day  aspirin enteric coated 81 milliGRAM(s) Oral daily  atorvastatin 80 milliGRAM(s) Oral at bedtime  buPROPion XL (24-Hour) . 150 milliGRAM(s) Oral daily  chlorhexidine 2% Cloths 1 Application(s) Topical daily  cinacalcet 90 milliGRAM(s) Oral daily  Dakins Solution - 1/4 Strength 1 Application(s) Topical two times a day  dextrose 40% Gel 15 Gram(s) Oral once  dextrose 5%. 1000 milliLiter(s) (50 mL/Hr) IV Continuous <Continuous>  dextrose 5%. 1000 milliLiter(s) (100 mL/Hr) IV Continuous <Continuous>  dextrose 50% Injectable 25 Gram(s) IV Push once  dextrose 50% Injectable 12.5 Gram(s) IV Push once  dextrose 50% Injectable 25 Gram(s) IV Push once  diltiazem    milliGRAM(s) Oral daily  epoetin anabela-epbx (RETACRIT) Injectable 35440 Unit(s) IV Push <User Schedule>  gabapentin 300 milliGRAM(s) Oral daily  glucagon  Injectable 1 milliGRAM(s) IntraMuscular once  heparin   Injectable. 500 Unit(s) Dialysis. every 1 hour  insulin glargine Injectable (LANTUS) 5 Unit(s) SubCutaneous at bedtime  insulin lispro (ADMELOG) corrective regimen sliding scale   SubCutaneous three times a day before meals  insulin lispro (ADMELOG) corrective regimen sliding scale   SubCutaneous at bedtime  insulin lispro Injectable (ADMELOG) 3 Unit(s) SubCutaneous three times a day before meals  loratadine 10 milliGRAM(s) Oral daily  melatonin 6 milliGRAM(s) Oral at bedtime  mirtazapine 7.5 milliGRAM(s) Oral at bedtime  montelukast 10 milliGRAM(s) Oral at bedtime  oxyCODONE  ER Tablet 10 milliGRAM(s) Oral every 12 hours  pantoprazole    Tablet 40 milliGRAM(s) Oral before breakfast  polyethylene glycol 3350 17 Gram(s) Oral two times a day  senna 2 Tablet(s) Oral at bedtime  sertraline 50 milliGRAM(s) Oral daily  sevelamer carbonate 800 milliGRAM(s) Oral three times a day with meals  sodium thiosulfate IVPB 25 Gram(s) IV Intermittent <User Schedule>  tacrolimus   0.1% Ointment 1 Application(s) Topical daily  traZODone 100 milliGRAM(s) Oral at bedtime    MEDICATIONS  (PRN):  oxyCODONE    IR 5 milliGRAM(s) Oral every 4 hours PRN Moderate Pain (4 - 6)    Vital Signs Last 24 Hrs  T(C): 36.8 (01-31-22 @ 14:20), Max: 37.2 (01-31-22 @ 06:50)  T(F): 98.2 (01-31-22 @ 14:20), Max: 98.9 (01-31-22 @ 06:50)  HR: 76 (01-31-22 @ 14:20) (74 - 81)  BP: 137/67 (01-31-22 @ 14:20) (115/62 - 146/68)  BP(mean): --  RR: 18 (01-31-22 @ 14:20) (16 - 18)  SpO2: 100% (01-31-22 @ 06:00) (98% - 100%)    Constitutional: No fever, fatigue  Skin: No rash.  Eyes: No recent vision problems or eye pain.  ENT: No congestion, ear pain, or sore throat.  Cardiovascular: No chest pain or palpation.  Respiratory: No cough, shortness of breath, congestion, or wheezing.  Gastrointestinal: No abdominal pain, nausea, vomiting, or diarrhea.  Genitourinary: No dysuria.  Musculoskeletal: No joint swelling.  Neurologic: No headache.    PHYSICAL EXAM:  GENERAL: NAD  EYES: EOMI, PERRLA  NECK: Supple, No JVD  CHEST/LUNG: dec breath sounds at bases  HEART:  S1 , S2 +  ABDOMEN: soft , bs+, abd wound +  EXTREMITIES:  edema+  NEUROLOGY:alert awake    LABS:  01-31    131<L>  |  85<L>  |  47<H>  ----------------------------<  90  5.5<H>   |  24  |  7.54<H>    Ca    10.0      31 Jan 2022 07:38  Phos  4.7     01-31  Mg     3.10     01-31      Creatinine Trend: 7.54 <--, 6.01 <--, 4.89 <--, 5.42 <--, 6.88 <--, 5.48 <--                        7.7    12.97 )-----------( 494      ( 31 Jan 2022 07:38 )             28.1     Urine Studies:

## 2022-01-31 NOTE — PROGRESS NOTE ADULT - SUBJECTIVE AND OBJECTIVE BOX
INTERVAL HPI/OVERNIGHT EVENTS:    Patient continues to endorse significant pain associated with pannus ulcer    MEDICATIONS  (STANDING):  apixaban 2.5 milliGRAM(s) Oral two times a day  aspirin enteric coated 81 milliGRAM(s) Oral daily  atorvastatin 80 milliGRAM(s) Oral at bedtime  buPROPion XL (24-Hour) . 150 milliGRAM(s) Oral daily  chlorhexidine 2% Cloths 1 Application(s) Topical daily  cinacalcet 90 milliGRAM(s) Oral daily  Dakins Solution - 1/4 Strength 1 Application(s) Topical two times a day  dextrose 40% Gel 15 Gram(s) Oral once  dextrose 5%. 1000 milliLiter(s) (50 mL/Hr) IV Continuous <Continuous>  dextrose 5%. 1000 milliLiter(s) (100 mL/Hr) IV Continuous <Continuous>  dextrose 50% Injectable 25 Gram(s) IV Push once  dextrose 50% Injectable 12.5 Gram(s) IV Push once  dextrose 50% Injectable 25 Gram(s) IV Push once  diltiazem    milliGRAM(s) Oral daily  epoetin anabela-epbx (RETACRIT) Injectable 19713 Unit(s) IV Push <User Schedule>  gabapentin 300 milliGRAM(s) Oral daily  glucagon  Injectable 1 milliGRAM(s) IntraMuscular once  heparin   Injectable. 500 Unit(s) Dialysis. every 1 hour  insulin glargine Injectable (LANTUS) 5 Unit(s) SubCutaneous at bedtime  insulin lispro (ADMELOG) corrective regimen sliding scale   SubCutaneous three times a day before meals  insulin lispro (ADMELOG) corrective regimen sliding scale   SubCutaneous at bedtime  insulin lispro Injectable (ADMELOG) 3 Unit(s) SubCutaneous three times a day before meals  loratadine 10 milliGRAM(s) Oral daily  melatonin 6 milliGRAM(s) Oral at bedtime  methylPREDNISolone sodium succinate Injectable 70 milliGRAM(s) IV Push once  mirtazapine 7.5 milliGRAM(s) Oral at bedtime  montelukast 10 milliGRAM(s) Oral at bedtime  oxyCODONE  ER Tablet 10 milliGRAM(s) Oral every 12 hours  pantoprazole    Tablet 40 milliGRAM(s) Oral before breakfast  polyethylene glycol 3350 17 Gram(s) Oral two times a day  senna 2 Tablet(s) Oral at bedtime  sertraline 50 milliGRAM(s) Oral daily  sevelamer carbonate 800 milliGRAM(s) Oral three times a day with meals  sodium thiosulfate IVPB 25 Gram(s) IV Intermittent <User Schedule>  tacrolimus   0.1% Ointment 1 Application(s) Topical daily  traZODone 100 milliGRAM(s) Oral at bedtime    MEDICATIONS  (PRN):  oxyCODONE    IR 5 milliGRAM(s) Oral every 4 hours PRN Moderate Pain (4 - 6)      Allergies    latex (Rash)  penicillin (Nausea)  strawberry (Rash)    Intolerances    caffeine (Nausea)      REVIEW OF SYSTEMS    General: no fevers/chills, no NS	  Skin: see HPI  Ophthalmologic: no eye pain or change in vision  Genitourinary: no dysuria or hematuria  Musculoskeletal: no joint pains or weakness	  Neurological:no weakness or tingling    Vital Signs Last 24 Hrs  T(C): 36.8 (31 Jan 2022 14:20), Max: 37.2 (31 Jan 2022 06:50)  T(F): 98.2 (31 Jan 2022 14:20), Max: 98.9 (31 Jan 2022 06:50)  HR: 76 (31 Jan 2022 14:20) (74 - 81)  BP: 137/67 (31 Jan 2022 14:20) (115/62 - 146/68)  BP(mean): --  RR: 18 (31 Jan 2022 14:20) (16 - 18)  SpO2: 100% (31 Jan 2022 06:00) (98% - 100%)    PHYSICAL EXAM:   The patient was alert and oriented X 3, well nourished, and appeared uncomfortable.   There was no visible lymphadenopathy.  Conjunctiva were non injected  There was no clubbing or edema of extremities.    Of note on skin exam:   - right pannus with large round, deep ulcer with violaceous, undermined borders with surrounding areas of induration and firm subcutaneous nodules measuring ~ 15.5 cm x 6.0 cm     LABS:                        7.7    12.97 )-----------( 494      ( 31 Jan 2022 07:38 )             28.1     01-31    131<L>  |  85<L>  |  47<H>  ----------------------------<  90  5.5<H>   |  24  |  7.54<H>    Ca    10.0      31 Jan 2022 07:38  Phos  4.7     01-31  Mg     3.10     01-31        RADIOLOGY & ADDITIONAL TESTS:    Preliminary H&E punch biopsy read:  “Histology from pannus biopsy shows abscess, diffuse neutrophilic infiltrate and necrosis. Special stains and Von Kossa are pending. No calcification is seen.”   – pending deepers, von Kossa stain, special organism stains
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
## 2022-02-01 NOTE — PROGRESS NOTE ADULT - ASSESSMENT
56y Female with history of ESRD on HD presents with vomiting and diarrhea found to be COVID-19 positive. Nephrology consulted for ESRD status.    1) ESRD: Last HD on 1/31 with mild intradialytic hypotension and 1.8L removed. Plan for next maintenance HD on 2/2. Monitor electrolytes.    2) HTN with ESRD: BP acceptable. Continue with current medications. Monitor BP.    3) Anemia of renal disease: Hb low with elevated ferritin. Continue with Epo 12K with HD. Monitor Hb.    4) Secondary HPT of renal origin: Phosphorus acceptable with high normal serum calcium and low iPTH. Decrease sensipar to 60 mg PO daily and continue with renvela 1 tab with meals (goal < 4.5 given concerns for calciphylaxis). Monitor serum calcium and phosphorus.    5) Ab wound: Appreciate dermatology consultation. S/P biopsy. Continue with empiric sodium thiosulfate with HD and monitor response. F/U biopsy results.      [facility] NEPHROLOGY  Keaton Lopez M.D.  Juma Green D.O.  Myla Hoffmann M.D.  Julia Brewer, MSN, ANP-C    Telephone: [phone]  Facsimile: [phone]    [address] [place] [address]

## 2022-02-01 NOTE — OCCUPATIONAL THERAPY INITIAL EVALUATION ADULT - PERTINENT HX OF CURRENT PROBLEM, REHAB EVAL
Pt is a 56 year old female with hx of morbid obesity, CHAMP not on home O2, ESRD on HD, HTN, DM, COPD, Afib, & chronic Right pannus wound, who presented to [facility] on 1/12/22 for worsening wound. Pt with Calciphylaxis vs pyoderma gangrenosum.

## 2022-02-01 NOTE — OCCUPATIONAL THERAPY INITIAL EVALUATION ADULT - RANGE OF MOTION EXAMINATION, UPPER EXTREMITY
except Right Shoulder Flexion Active Assistive ROM 0-15 degrees, Elbow Flexion/Extension Active Assistive ROM WFL/bilateral UE Active ROM was WFL  (within functional limits)

## 2022-02-01 NOTE — PROGRESS NOTE ADULT - ASSESSMENT
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 Patient followed by derm as well, s/p punch biopsy by Derm. Preliminary biopsy findings show extensive suppurative inflammation and necrosis. No obvious intravascular calcium deposits although limited sample of subcutaneous tissue. Current working diagnosis of Pyoderma Gangrenosum although Calciphylaxis remains in differential. As per derm recommendations patient started on systemic IV steroid. Topical dressing was changed to: Cleanse with Dakins 1/4 strength, adaptic touch (non-adherent silicone contact layer) to wound base for atraumatic dressing application and removal. Topical Tacrolimus 0.1% to wound base, cover with Aquacel AG , and abdominal pad.   Recommendations made to consider Cyclosporin, consider reconsult with ID and nephrology patient on HD. Additionally patient receiving Sodium Thiosulfate post dialysis as Calciphylaxis remains in differential.  Of note attempted Irrigation VAC last week, noted deterioration of wound with NPWt/VAc therapy, likely pathergy. Will continue to avoid surgical debridement. Collagenase d/c'd to avoid enzymatic debridement as well.  +violaceous borders, odor slightly improved, depth increased.      -Topical dressing: Cleanse with Dakins 1/4 strength. Apply Liquid barrier film to periwound skin. Adaptic touch to wound base for atraumatic dressing application and removal. Apply Topical Tacrolimus 0.1% ointment to wound base, cover with Aquacel AG , and abdominal pad. Change daily.  -Interdry textile sheeting beneath abdominal pannus leaving 2" out at end to wick.  -Agree with systemic steroid; Solumedrol per ID.  -Glucose control per primary team  -Consider Cyclosporin, differ to primary team/ID patient with possible PG and polymicrobial wound cultures on HD.  -Agree to continue with Sodium Thiosulfate post dialysis per nephrology, as Calciphylaxis remains in differential  -Continue to follow nutrition/RD recommendations   -Follow pain management recommendations for proper pain control and reconsult as needed  -Continue to offload pressure  -DVT prophylaxis    Patient seen with Dr. Dunaway  Findings and plan discussed with Dermatology Dr. Bo and primary team.    Upon discharge follow up at outpatient [facility]. [address] [facility]. [phone].    Will continue to follow while inpatient.  Thank you.    CARMELA William-BC, [facility]    pager #[phone]    If after 4PM or before 7:30AM on Mon-Friday or weekend/holiday please contact general surgery for urgent matters.   Team A- [id number]   Team B- [id number]  For non-urgent matters e-mail [email or web address]    We spent 35 minutes face-to-face with this patient of which more than 50% of the time was spent counseling/coordinating care of this patient.

## 2022-02-01 NOTE — OCCUPATIONAL THERAPY INITIAL EVALUATION ADULT - RUE MMT, REHAB EVAL
Shoulder Flexion Grossly 1/5, Elbow Flexion/Extension Grossly 2/5, Wrist/Hand Flexion/Extension Grossly 3/5

## 2022-02-01 NOTE — OCCUPATIONAL THERAPY INITIAL EVALUATION ADULT - LIVES WITH, PROFILE
Pt. reports she lives with her grandson in an apartment building with no steps to enter. Once inside, pt. reports she has an elevator available to reach the 7th floor where apartment is located.
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
## 2022-02-01 NOTE — PROGRESS NOTE ADULT - ASSESSMENT
56 y.o. F with PMH of morbid obesity, CHAMP, ESRD (HD MWF), HTN, DM, COPD, Afib currently on AC, COVID +, chronic R pannus wound in abdomen presented to [facility] on 1/12 due to worsening abdominal pain, RRt called. On Neuro exam no focality CT head negative    Impression: presyncope    supportive care  -No further inpatient Neurologic workup at this time  mild tremor with asterixis likely partly due to toxic-metabolic state and multiple medications Given mild and essentially benign no further adjustments at this time

## 2022-02-01 NOTE — PROGRESS NOTE ADULT - ASSESSMENT
56 year old female with hx of morbid obesity, CHAMP not on home O2, ESRD (HD MWF), HTN, DM, COPD, Afib no longer on AC, chronic R pannus wound, followed by Dr. Layne, sent by visiting RN for worsening wound.    # Chronic Pannus Wound  -s/p IV abx per primary team  -surgery /derm consult appreciated  -med f/u     #Afib  -stable, in NSR   -cont eliquis for now, elevated inflamm markers/ddimer given covid,  h/h noted- gi /renal following   -cont cardizem    #Covid-19+  -resolved   -med f/u     #Hypertension  -stable   -cont current meds    #ESRD on HD  -HD per renal    # Near Syncope  -tele no events   -orthostatics neg  -echo w grossly nl lv sys fx

## 2022-02-01 NOTE — OCCUPATIONAL THERAPY INITIAL EVALUATION ADULT - MD ORDER
Occupational Therapy (OT) to evaluate and treat. Per IVANNA De La Cruz, pt is okay to participate in OT evaluation and perform activity as tolerated.
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
## 2022-02-01 NOTE — PROGRESS NOTE ADULT - SUBJECTIVE AND OBJECTIVE BOX
Called by ACP team for reconsult regarding patient's abdominal pannus pain. After looking at EMR, patient is ordered for Oxycodone 5mg q 4 PRN for moderate to severe pain and it was only given on 1/31/22 @ 1401, then at 2333, and finally today 2/1/22 @ 0844.  ACP team did talk to nursing staff yesterday to assess and give pain medications as ordered.  Also, looking back at original consult note, patient was recommended Oxycodone 7.5mg q 4 PRN for moderate to severe pain.  ACP informed and will investigate why it was changed.  If patient is continuing to have pain after receiving pain medications regularly as needed and ordered, then may reconsult pain regarding new pain regimen recommendations.

## 2022-02-01 NOTE — PROGRESS NOTE ADULT - SUBJECTIVE AND OBJECTIVE BOX
CARDIOLOGY FOLLOW UP - Dr. Bullock  Date of Service: 2/1/22  CC: no cp/sob     Review of Systems:  Constitutional: No fever, weight loss, or fatigue  Respiratory: No cough, wheezing, or hemoptysis, no shortness of breath  Cardiovascular: No chest pain, palpitations, passing out, dizziness, or leg swelling  Gastrointestinal: No abd or epigastric pain.  No nausea, vomiting, or hematemesis; no diarrhea or constipation, no melena or hematochezia  Vascular: no edema       PHYSICAL EXAM:  T(C): 37.1 (02-01-22 @ 06:50), Max: 37.1 (02-01-22 @ 06:50)  HR: 90 (02-01-22 @ 06:50) (76 - 90)  BP: 142/69 (02-01-22 @ 06:50) (126/79 - 142/69)  RR: 18 (02-01-22 @ 06:50) (18 - 18)  SpO2: 100% (02-01-22 @ 06:50) (100% - 100%)  Wt(kg): --  I&O's Summary    31 Jan 2022 07:01  -  01 Feb 2022 07:00  --------------------------------------------------------  IN: 500 mL / OUT: 2300 mL / NET: -1800 mL        Appearance: Normal	  Cardiovascular: Normal S1 S2,RRR, No JVD, No murmurs  Respiratory: Lungs clear to auscultation	  Gastrointestinal:  Soft, Non-tender, + BS	  Extremities: Normal range of motion, No clubbing, cyanosis or edema      Home Medications:  aspirin 81 mg oral delayed release tablet: 1 tab(s) orally once a day (12 Jan 2022 17:49)  buPROPion 150 mg/24 hours (XL) oral tablet, extended release: 1 tab(s) orally once a day (in the morning) (12 Jan 2022 17:49)  cetirizine 10 mg oral tablet: 1 tab(s) orally once a day (12 Jan 2022 17:49)  dilTIAZem 120 mg/24 hours oral tablet, extended release: 1 tab(s) orally once a day (12 Jan 2022 17:49)  doxepin 25 mg oral capsule: 1 cap(s) orally once a day (at bedtime) (12 Jan 2022 17:49)  Eliquis 2.5 mg oral tablet: 1 tab(s) orally 2 times a day    Pharmacy states patient no longer wants to fill this medication (12 Jan 2022 17:49)  furosemide 80 mg oral tablet: 1 tab(s) orally once a day    Pharmacy states patient no longer wants to fill this medication (12 Jan 2022 17:49)  gabapentin 300 mg oral capsule: 1 cap(s) orally 2 times a day (12 Jan 2022 17:49)  hydrOXYzine hydrochloride 25 mg oral tablet: 1 tab(s) orally 2 times a day, As Needed (12 Jan 2022 17:49)  lanthanum 1000 mg oral tablet, chewable: 2 tab(s) orally with meals and 1 tab(s) orally with snacks    Pharmacy states patient no longer wants to fill this medication (12 Jan 2022 17:49)  mirtazapine 7.5 mg oral tablet: 1 tab(s) orally once a day (at bedtime) (12 Jan 2022 17:49)  montelukast 10 mg oral tablet: 1 tab(s) orally once a day (in the evening) (12 Jan 2022 17:49)  mupirocin 2% topical ointment: Apply sparingly to affected area 2 times a day as directed (12 Jan 2022 17:49)  nystatin 100,000 units/mL oral suspension: 5 milliliter(s) orally 4 times a day, as directed (12 Jan 2022 17:49)  omeprazole 20 mg oral delayed release capsule: 2 cap(s) orally once a day before breakfast (12 Jan 2022 17:49)  Pennsaid 2% topical solution: Apply topically to affected area 2 times a day (12 Jan 2022 17:49)  rosuvastatin 40 mg oral tablet: 1 tab(s) orally once a day (12 Jan 2022 17:49)  Santyl 250 units/g topical ointment: Apply topically to affected area once a day as directed (12 Jan 2022 17:49)  sertraline 50 mg oral tablet: 1 tab(s) orally once a day (12 Jan 2022 17:49)  Spiriva HandiHaler 18 mcg inhalation capsule: 1 cap(s) inhaled once a day (12 Jan 2022 17:49)  traZODone 100 mg oral tablet: 1 tab(s) orally once a day (at bedtime) (12 Jan 2022 17:49)  Tresiba FlexTouch 100 units/mL subcutaneous solution: 80 unit(s) subcutaneous once a day (at bedtime) (12 Jan 2022 17:49)  Trulicity Pen 1.5 mg/0.5 mL subcutaneous solution: 1 dose(s) subcutaneous once a week (12 Jan 2022 17:49)      MEDICATIONS  (STANDING):  apixaban 2.5 milliGRAM(s) Oral two times a day  aspirin enteric coated 81 milliGRAM(s) Oral daily  atorvastatin 80 milliGRAM(s) Oral at bedtime  buPROPion XL (24-Hour) . 150 milliGRAM(s) Oral daily  chlorhexidine 2% Cloths 1 Application(s) Topical daily  Dakins Solution - 1/4 Strength 1 Application(s) Topical two times a day  dextrose 40% Gel 15 Gram(s) Oral once  dextrose 5%. 1000 milliLiter(s) (50 mL/Hr) IV Continuous <Continuous>  dextrose 5%. 1000 milliLiter(s) (100 mL/Hr) IV Continuous <Continuous>  dextrose 50% Injectable 25 Gram(s) IV Push once  dextrose 50% Injectable 12.5 Gram(s) IV Push once  dextrose 50% Injectable 25 Gram(s) IV Push once  diltiazem    milliGRAM(s) Oral daily  epoetin anabela-epbx (RETACRIT) Injectable 89581 Unit(s) IV Push <User Schedule>  gabapentin 300 milliGRAM(s) Oral daily  glucagon  Injectable 1 milliGRAM(s) IntraMuscular once  heparin   Injectable. 500 Unit(s) Dialysis. every 1 hour  insulin glargine Injectable (LANTUS) 5 Unit(s) SubCutaneous at bedtime  insulin lispro (ADMELOG) corrective regimen sliding scale   SubCutaneous three times a day before meals  insulin lispro (ADMELOG) corrective regimen sliding scale   SubCutaneous at bedtime  insulin lispro Injectable (ADMELOG) 3 Unit(s) SubCutaneous three times a day before meals  loratadine 10 milliGRAM(s) Oral daily  melatonin 6 milliGRAM(s) Oral at bedtime  methylPREDNISolone sodium succinate Injectable 35 milliGRAM(s) IV Push every 12 hours  mirtazapine 7.5 milliGRAM(s) Oral at bedtime  montelukast 10 milliGRAM(s) Oral at bedtime  oxyCODONE  ER Tablet 10 milliGRAM(s) Oral every 12 hours  pantoprazole    Tablet 40 milliGRAM(s) Oral before breakfast  polyethylene glycol 3350 17 Gram(s) Oral two times a day  senna 2 Tablet(s) Oral at bedtime  sertraline 50 milliGRAM(s) Oral daily  sevelamer carbonate 800 milliGRAM(s) Oral three times a day with meals  sodium thiosulfate IVPB 25 Gram(s) IV Intermittent <User Schedule>  tacrolimus   0.1% Ointment 1 Application(s) Topical daily  traZODone 100 milliGRAM(s) Oral at bedtime      TELEMETRY: 	    ECG:  	  RADIOLOGY:   DIAGNOSTIC TESTING:  [ ] Echocardiogram:  [ ]  Catheterization:  [ ] Stress Test:    OTHER: 	    LABS:	 	    Troponin T, High Sensitivity Result: 58 ng/L (01-27 @ 11:35)                          7.9    12.98 )-----------( 476      ( 01 Feb 2022 08:10 )             28.4     02-01    136  |  87<L>  |  28<H>  ----------------------------<  86  5.2   |  20<L>  |  5.34<H>    Ca    9.8      01 Feb 2022 08:10  Phos  4.0     02-01  Mg     2.70     02-01

## 2022-02-01 NOTE — OCCUPATIONAL THERAPY INITIAL EVALUATION ADULT - GENERAL OBSERVATIONS, REHAB EVAL
Pt. received semisupine in bed. No acute distress. Patient reporting pain; however, agreed to evaluation from Occupational Therapist as tolerated. +IV, +Tele.

## 2022-02-01 NOTE — PROGRESS NOTE ADULT - SUBJECTIVE AND OBJECTIVE BOX
SUBJECTIVE / OVERNIGHT EVENTS:pt seen and examined, c/o pain at the wound site  2-1-22    MEDICATIONS  (STANDING):  apixaban 2.5 milliGRAM(s) Oral two times a day  aspirin enteric coated 81 milliGRAM(s) Oral daily  atorvastatin 80 milliGRAM(s) Oral at bedtime  buPROPion XL (24-Hour) . 150 milliGRAM(s) Oral daily  chlorhexidine 2% Cloths 1 Application(s) Topical daily  Dakins Solution - 1/4 Strength 1 Application(s) Topical two times a day  dextrose 40% Gel 15 Gram(s) Oral once  dextrose 5%. 1000 milliLiter(s) (100 mL/Hr) IV Continuous <Continuous>  dextrose 5%. 1000 milliLiter(s) (50 mL/Hr) IV Continuous <Continuous>  dextrose 50% Injectable 25 Gram(s) IV Push once  dextrose 50% Injectable 12.5 Gram(s) IV Push once  dextrose 50% Injectable 25 Gram(s) IV Push once  diltiazem    milliGRAM(s) Oral daily  epoetin anabela-epbx (RETACRIT) Injectable 14153 Unit(s) IV Push <User Schedule>  gabapentin 300 milliGRAM(s) Oral daily  glucagon  Injectable 1 milliGRAM(s) IntraMuscular once  heparin   Injectable. 500 Unit(s) Dialysis. every 1 hour  insulin glargine Injectable (LANTUS) 5 Unit(s) SubCutaneous at bedtime  insulin lispro (ADMELOG) corrective regimen sliding scale   SubCutaneous three times a day before meals  insulin lispro (ADMELOG) corrective regimen sliding scale   SubCutaneous at bedtime  insulin lispro Injectable (ADMELOG) 3 Unit(s) SubCutaneous three times a day before meals  loratadine 10 milliGRAM(s) Oral daily  melatonin 6 milliGRAM(s) Oral at bedtime  mirtazapine 7.5 milliGRAM(s) Oral at bedtime  montelukast 10 milliGRAM(s) Oral at bedtime  oxyCODONE  ER Tablet 10 milliGRAM(s) Oral every 12 hours  pantoprazole    Tablet 40 milliGRAM(s) Oral before breakfast  polyethylene glycol 3350 17 Gram(s) Oral two times a day  senna 2 Tablet(s) Oral at bedtime  sertraline 50 milliGRAM(s) Oral daily  sevelamer carbonate 800 milliGRAM(s) Oral three times a day with meals  sodium thiosulfate IVPB 25 Gram(s) IV Intermittent <User Schedule>  tacrolimus   0.1% Ointment 1 Application(s) Topical daily  traZODone 100 milliGRAM(s) Oral at bedtime    MEDICATIONS  (PRN):  acetaminophen     Tablet .. 650 milliGRAM(s) Oral every 8 hours PRN Mild Pain  oxyCODONE    IR 7.5 milliGRAM(s) Oral every 4 hours PRN Moderate to Severe Pain (4 - 10)    Vital Signs Last 24 Hrs  T(C): 36.7 (02-01-22 @ 18:47), Max: 37.1 (02-01-22 @ 06:50)  T(F): 98.1 (02-01-22 @ 18:47), Max: 98.7 (02-01-22 @ 06:50)  HR: 70 (02-01-22 @ 18:47) (70 - 90)  BP: 120/77 (02-01-22 @ 18:47) (120/77 - 142/69)  BP(mean): --  RR: 17 (02-01-22 @ 18:47) (17 - 18)  SpO2: 97% (02-01-22 @ 18:47) (93% - 100%)    Constitutional: No fever, fatigue  Skin: No rash.  Eyes: No recent vision problems or eye pain.  ENT: No congestion, ear pain, or sore throat.  Cardiovascular: No chest pain or palpation.  Respiratory: No cough, shortness of breath, congestion, or wheezing.  Gastrointestinal: No abdominal pain, nausea, vomiting, or diarrhea.  Genitourinary: No dysuria.  Musculoskeletal: No joint swelling.  Neurologic: No headache.    PHYSICAL EXAM:  GENERAL: NAD  EYES: EOMI, PERRLA  NECK: Supple, No JVD  CHEST/LUNG: dec breath sounds at bases  HEART:  S1 , S2 +  ABDOMEN: soft , bs+, abd wound +  EXTREMITIES:  edema+  NEUROLOGY:alert awake    LABS:  02-01    136  |  87<L>  |  28<H>  ----------------------------<  86  5.2   |  20<L>  |  5.34<H>    Ca    9.8      01 Feb 2022 08:10  Phos  4.0     02-01  Mg     2.70     02-01      Creatinine Trend: 5.34 <--, 7.54 <--, 6.01 <--, 4.89 <--, 5.42 <--, 6.88 <--                        7.9    12.98 )-----------( 476      ( 01 Feb 2022 08:10 )             28.4     Urine Studies:

## 2022-02-01 NOTE — PROGRESS NOTE ADULT - SUBJECTIVE AND OBJECTIVE BOX
[facility] NEPHROLOGY- PROGRESS NOTE    56y Female with history of ESRD on HD presents with vomiting and diarrhea found to be COVID-19 positive. Nephrology consulted for ESRD status.    REVIEW OF SYSTEMS:  Gen: no changes in weight  Cards: no chest pain  Resp: no dyspnea  GI: no nausea or vomiting or diarrhea + ab wound pain  Vascular: no LE edema    caffeine (Nausea)  latex (Rash)  penicillin (Nausea)  strawberry (Rash)      Hospital Medications: Medications reviewed      VITALS:  T(F): 98.7 (02-01-22 @ 06:50), Max: 98.7 (02-01-22 @ 06:50)  HR: 90 (02-01-22 @ 06:50)  BP: 142/69 (02-01-22 @ 06:50)  RR: 18 (02-01-22 @ 06:50)  SpO2: 100% (02-01-22 @ 06:50)  Wt(kg): --    01-31 @ 07:01  -  02-01 @ 07:00  --------------------------------------------------------  IN: 500 mL / OUT: 2300 mL / NET: -1800 mL        PHYSICAL EXAM:    Gen: NAD, calm  Cards: RRR, +S1/S2, no M/G/R  Resp: CTA B/L  GI: soft, RLQ bandage/tender  Vascular: no LE edema B/L, LUE AVF + bruit/thrill      LABS:  02-01    136  |  87<L>  |  28<H>  ----------------------------<  86  5.2   |  20<L>  |  5.34<H>    Ca    9.8      01 Feb 2022 08:10  Phos  4.0     02-01  Mg     2.70     02-01      Creatinine Trend: 5.34 <--, 7.54 <--, 6.01 <--, 4.89 <--, 5.42 <--, 6.88 <--                        7.9    12.98 )-----------( 476      ( 01 Feb 2022 08:10 )             28.4     Urine Studies:

## 2022-02-01 NOTE — OCCUPATIONAL THERAPY INITIAL EVALUATION ADULT - LEVEL OF INDEPENDENCE, REHAB EVAL
Not assessed. Pt deferred secondary to pain. IVANNA De La Cruz made aware and acknowledged. To be assessed at later date/time if and when safe and appropriate.

## 2022-02-01 NOTE — PROGRESS NOTE ADULT - SUBJECTIVE AND OBJECTIVE BOX
INTERVAL HPI/OVERNIGHT EVENTS:    MEDICATIONS  (STANDING):  apixaban 2.5 milliGRAM(s) Oral two times a day  aspirin enteric coated 81 milliGRAM(s) Oral daily  atorvastatin 80 milliGRAM(s) Oral at bedtime  buPROPion XL (24-Hour) . 150 milliGRAM(s) Oral daily  chlorhexidine 2% Cloths 1 Application(s) Topical daily  Dakins Solution - 1/4 Strength 1 Application(s) Topical two times a day  dextrose 40% Gel 15 Gram(s) Oral once  dextrose 5%. 1000 milliLiter(s) (100 mL/Hr) IV Continuous <Continuous>  dextrose 5%. 1000 milliLiter(s) (50 mL/Hr) IV Continuous <Continuous>  dextrose 50% Injectable 25 Gram(s) IV Push once  dextrose 50% Injectable 12.5 Gram(s) IV Push once  dextrose 50% Injectable 25 Gram(s) IV Push once  diltiazem    milliGRAM(s) Oral daily  epoetin anabela-epbx (RETACRIT) Injectable 72151 Unit(s) IV Push <User Schedule>  gabapentin 300 milliGRAM(s) Oral daily  glucagon  Injectable 1 milliGRAM(s) IntraMuscular once  heparin   Injectable. 500 Unit(s) Dialysis. every 1 hour  insulin glargine Injectable (LANTUS) 5 Unit(s) SubCutaneous at bedtime  insulin lispro (ADMELOG) corrective regimen sliding scale   SubCutaneous at bedtime  insulin lispro (ADMELOG) corrective regimen sliding scale   SubCutaneous three times a day before meals  insulin lispro Injectable (ADMELOG) 3 Unit(s) SubCutaneous three times a day before meals  loratadine 10 milliGRAM(s) Oral daily  melatonin 6 milliGRAM(s) Oral at bedtime  methylPREDNISolone sodium succinate Injectable 35 milliGRAM(s) IV Push every 12 hours  mirtazapine 7.5 milliGRAM(s) Oral at bedtime  montelukast 10 milliGRAM(s) Oral at bedtime  oxyCODONE  ER Tablet 10 milliGRAM(s) Oral every 12 hours  pantoprazole    Tablet 40 milliGRAM(s) Oral before breakfast  polyethylene glycol 3350 17 Gram(s) Oral two times a day  senna 2 Tablet(s) Oral at bedtime  sertraline 50 milliGRAM(s) Oral daily  sevelamer carbonate 800 milliGRAM(s) Oral three times a day with meals  sodium thiosulfate IVPB 25 Gram(s) IV Intermittent <User Schedule>  tacrolimus   0.1% Ointment 1 Application(s) Topical daily  traZODone 100 milliGRAM(s) Oral at bedtime    MEDICATIONS  (PRN):  acetaminophen     Tablet .. 650 milliGRAM(s) Oral every 8 hours PRN Mild Pain  oxyCODONE    IR 7.5 milliGRAM(s) Oral every 4 hours PRN Moderate to Severe Pain (4 - 10)      Allergies    latex (Rash)  penicillin (Nausea)  strawberry (Rash)    Intolerances    caffeine (Nausea)      REVIEW OF SYSTEMS      General: no fevers/chills, no NS	    Skin: see HPI  	  Ophthalmologic: no eye pain or change in vision    Genitourinary: no dysuria or hematuria    Musculoskeletal: no joint pains or weakness	    Neurological:no weakness or tingling          Vital Signs Last 24 Hrs  T(C): 37.1 (01 Feb 2022 12:02), Max: 37.1 (01 Feb 2022 06:50)  T(F): 98.7 (01 Feb 2022 12:02), Max: 98.7 (01 Feb 2022 06:50)  HR: 80 (01 Feb 2022 12:02) (78 - 90)  BP: 126/61 (01 Feb 2022 12:02) (126/61 - 142/69)  BP(mean): --  RR: 18 (01 Feb 2022 12:02) (18 - 18)  SpO2: 93% (01 Feb 2022 12:02) (93% - 100%)    PHYSICAL EXAM:   The patient was alert and oriented X 3, well nourished, and in no  apparent distress.  There was no visible lymphadenopathy.  Conjunctiva were non injected  There was no clubbing or edema of extremities.  There was no hyperhidrosis or bromhidrosis.    Of note on skin exam:       LABS:                        7.9    12.98 )-----------( 476      ( 01 Feb 2022 08:10 )             28.4     02-01    136  |  87<L>  |  28<H>  ----------------------------<  86  5.2   |  20<L>  |  5.34<H>    Ca    9.8      01 Feb 2022 08:10  Phos  4.0     02-01  Mg     2.70     02-01           INTERVAL HPI/OVERNIGHT EVENTS:  pt on adjusted pain regimen, still in pain from pannus ulcer  per wound care wound is less malodorous today    MEDICATIONS  (STANDING):  apixaban 2.5 milliGRAM(s) Oral two times a day  aspirin enteric coated 81 milliGRAM(s) Oral daily  atorvastatin 80 milliGRAM(s) Oral at bedtime  buPROPion XL (24-Hour) . 150 milliGRAM(s) Oral daily  chlorhexidine 2% Cloths 1 Application(s) Topical daily  Dakins Solution - 1/4 Strength 1 Application(s) Topical two times a day  dextrose 40% Gel 15 Gram(s) Oral once  dextrose 5%. 1000 milliLiter(s) (100 mL/Hr) IV Continuous <Continuous>  dextrose 5%. 1000 milliLiter(s) (50 mL/Hr) IV Continuous <Continuous>  dextrose 50% Injectable 25 Gram(s) IV Push once  dextrose 50% Injectable 12.5 Gram(s) IV Push once  dextrose 50% Injectable 25 Gram(s) IV Push once  diltiazem    milliGRAM(s) Oral daily  epoetin anabela-epbx (RETACRIT) Injectable 70958 Unit(s) IV Push <User Schedule>  gabapentin 300 milliGRAM(s) Oral daily  glucagon  Injectable 1 milliGRAM(s) IntraMuscular once  heparin   Injectable. 500 Unit(s) Dialysis. every 1 hour  insulin glargine Injectable (LANTUS) 5 Unit(s) SubCutaneous at bedtime  insulin lispro (ADMELOG) corrective regimen sliding scale   SubCutaneous at bedtime  insulin lispro (ADMELOG) corrective regimen sliding scale   SubCutaneous three times a day before meals  insulin lispro Injectable (ADMELOG) 3 Unit(s) SubCutaneous three times a day before meals  loratadine 10 milliGRAM(s) Oral daily  melatonin 6 milliGRAM(s) Oral at bedtime  methylPREDNISolone sodium succinate Injectable 35 milliGRAM(s) IV Push every 12 hours  mirtazapine 7.5 milliGRAM(s) Oral at bedtime  montelukast 10 milliGRAM(s) Oral at bedtime  oxyCODONE  ER Tablet 10 milliGRAM(s) Oral every 12 hours  pantoprazole    Tablet 40 milliGRAM(s) Oral before breakfast  polyethylene glycol 3350 17 Gram(s) Oral two times a day  senna 2 Tablet(s) Oral at bedtime  sertraline 50 milliGRAM(s) Oral daily  sevelamer carbonate 800 milliGRAM(s) Oral three times a day with meals  sodium thiosulfate IVPB 25 Gram(s) IV Intermittent <User Schedule>  tacrolimus   0.1% Ointment 1 Application(s) Topical daily  traZODone 100 milliGRAM(s) Oral at bedtime    MEDICATIONS  (PRN):  acetaminophen     Tablet .. 650 milliGRAM(s) Oral every 8 hours PRN Mild Pain  oxyCODONE    IR 7.5 milliGRAM(s) Oral every 4 hours PRN Moderate to Severe Pain (4 - 10)      Allergies    latex (Rash)  penicillin (Nausea)  strawberry (Rash)    Intolerances    caffeine (Nausea)      REVIEW OF SYSTEMS      General: no fevers/chills, no NS	    Skin: see HPI  	  Ophthalmologic: no eye pain or change in vision    Genitourinary: no dysuria or hematuria    Musculoskeletal: no joint pains or weakness	    Neurological:no weakness or tingling          Vital Signs Last 24 Hrs  T(C): 37.1 (01 Feb 2022 12:02), Max: 37.1 (01 Feb 2022 06:50)  T(F): 98.7 (01 Feb 2022 12:02), Max: 98.7 (01 Feb 2022 06:50)  HR: 80 (01 Feb 2022 12:02) (78 - 90)  BP: 126/61 (01 Feb 2022 12:02) (126/61 - 142/69)  BP(mean): --  RR: 18 (01 Feb 2022 12:02) (18 - 18)  SpO2: 93% (01 Feb 2022 12:02) (93% - 100%)    PHYSICAL EXAM:   The patient was alert and oriented X 3, well nourished, and in no  apparent distress.  There was no visible lymphadenopathy.  Conjunctiva were non injected  There was no clubbing or edema of extremities.  There was no hyperhidrosis or bromhidrosis.    Of note on skin exam:   - right pannus with  ~ 15.5 cm x 6.0 cm round, deep ulcer with violaceous, undermined borders - borders with increased erythema and retiform purpura  surrounding areas of induration and firm subcutaneous nodules         LABS:                        7.9    12.98 )-----------( 476      ( 01 Feb 2022 08:10 )             28.4     02-01    136  |  87<L>  |  28<H>  ----------------------------<  86  5.2   |  20<L>  |  5.34<H>    Ca    9.8      01 Feb 2022 08:10  Phos  4.0     02-01  Mg     2.70     02-01

## 2022-02-01 NOTE — OCCUPATIONAL THERAPY INITIAL EVALUATION ADULT - ADDITIONAL COMMENTS
Prior to hospitalization, pt reports she had a HHA 30 hours/day x 7 day/week who provided varying levels of 1 person assistance with ADL's and functional mobility, +rolling walker. Pt. reports her grandson assisted her to transfer out of bed as needed.

## 2022-02-01 NOTE — OCCUPATIONAL THERAPY INITIAL EVALUATION ADULT - DIAGNOSIS, OT EVAL
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

## 2022-02-01 NOTE — PROGRESS NOTE ADULT - ASSESSMENT
#Deep, non-healing ulcer, with inflammatory borders, extensive undermining now progressing with erythematous borders and retiform purpura- suggestive of calciphylaxis. Pyoderma gangrenosum is another consideration. Bacterial tissue culture 1/25/21 with carbapenem-resistent Pseudomonas, Staph epidermidis, Enterococcus faecalis; reportedly same as previous bacterial culture. Patient is s/p 10-day course of IV cefepime completed 1/21/22 and other previously other outpatient abx. Biopsy findings non specific. No obvious intravascular calcium deposits although limited sample of subcutaneous tissue. Awaiting von Kossa staining (for calcium), and deeper sections from original biopsy. SPEP, serum DOUG, ANCAs negative.   - fu final fungal/AFB TC results; still pending   -final H&E biopsy pannus 1/25/21: "Abscess and granulation tissue in deep dermis and superficial subcutis  Note: PAS, GMS, AFB and Gram stains fail to reveal fungal, mycobacterial and bacterial microorganisms.   Von Kossa stain reveals calcified degenerated elastic materials. No calcium deposits in vessel walls are seen.  Pyoderma gangrenosum remains a diagnosis of exclusion. Correlation with culture result is necessary. Additional deeper levels and Von Kossa stain are pending."    At this time:  - fu UPEP with immunofixation  - STOP solumedrol (would have been helpful for pyoderma gangrenosum but the evolving appearance of the lesion today is more suggestive of calciphylaxis, which could worsen with steroids. Patient also with extensive comorbidities and relatively immunosuppressed state and the risks are not worth the potential benefits at this time)  - recommend STAT CT abdomen/pelvis to evaluate for potential calcification in abdominal pannus ulcer  - c/w pain management  - for wound care: c/w topical tacrolimus 0.01% ointment 1-2x daily to wound edges and aquacel Ag dressing    The patient's chart was reviewed in addition to being seen and examined at bedside with the dermatology attending Dr. Lary Bo.  Recommendations were communicated with the primary team.  Please page [phone] for further related questions (please leave 10 digit call back number because we cover several facilities).    Alicia Queen MD  Resident Physician, PGY3  [facility]        #Deep, non-healing ulcer, with inflammatory borders, extensive undermining now progressing with erythematous borders and retiform purpura- suggestive of calciphylaxis. Pyoderma gangrenosum is another consideration. Bacterial tissue culture 1/25/21 with carbapenem-resistent Pseudomonas, Staph epidermidis, Enterococcus faecalis; reportedly same as previous bacterial culture. Patient is s/p 10-day course of IV cefepime completed 1/21/22 and other previously other outpatient abx. Biopsy findings non specific. No obvious intravascular calcium deposits although limited sample of subcutaneous tissue. Awaiting von Kossa staining (for calcium), and deeper sections from original biopsy. SPEP, serum DOUG, ANCAs negative.   - fu final fungal/AFB TC results; still pending   -final H&E biopsy pannus 1/25/21: "Abscess and granulation tissue in deep dermis and superficial subcutis  Note: PAS, GMS, AFB and Gram stains fail to reveal fungal, mycobacterial and bacterial microorganisms.   Von Kossa stain reveals calcified degenerated elastic materials. No calcium deposits in vessel walls are seen.  Pyoderma gangrenosum remains a diagnosis of exclusion. Correlation with culture result is necessary. Additional deeper levels and Von Kossa stain are pending."    At this time:  - fu UPEP with immunofixation  - HOLD AM dose solumedrol pending eval tomorrow (would have been helpful for pyoderma gangrenosum but the evolving appearance of the lesion today is more suggestive of calciphylaxis, which could worsen with steroids. Patient also with extensive comorbidities and relatively immunosuppressed state and the risks are not worth the potential benefits at this time)  - recommend  CT abdomen/pelvis to evaluate for potential calcification in abdominal pannus ulcer  - c/w pain management  - for wound care: c/w topical tacrolimus 0.01% ointment 1-2x daily to wound edges and aquacel Ag dressing    The patient's chart was reviewed in addition to being seen and examined at bedside with the dermatology attending Dr. Lary Bo.  Recommendations were communicated with the primary team.  Please page [phone] for further related questions (please leave 10 digit call back number because we cover several facilities).    Alicia Queen MD  Resident Physician, PGY3  [facility]

## 2022-02-02 NOTE — PROGRESS NOTE ADULT - SUBJECTIVE AND OBJECTIVE BOX
Chief Complaint: DM 2    History: Patient seen at bedside. Per RN, patient ordered [employer] earlier today.  Did not eat breakfast that was brought but did order more food from dietary.  No vomiting, diarrhea.  No hypoglycemia s/s      MEDICATIONS  (STANDING):  apixaban 2.5 milliGRAM(s) Oral two times a day  aspirin enteric coated 81 milliGRAM(s) Oral daily  atorvastatin 80 milliGRAM(s) Oral at bedtime  buPROPion XL (24-Hour) . 150 milliGRAM(s) Oral daily  chlorhexidine 2% Cloths 1 Application(s) Topical daily  cinacalcet 60 milliGRAM(s) Oral at bedtime  collagenase Ointment 1 Application(s) Topical two times a day  Dakins Solution - 1/4 Strength 1 Application(s) Topical two times a day  dextrose 40% Gel 15 Gram(s) Oral once  dextrose 5%. 1000 milliLiter(s) (50 mL/Hr) IV Continuous <Continuous>  dextrose 5%. 1000 milliLiter(s) (100 mL/Hr) IV Continuous <Continuous>  dextrose 50% Injectable 25 Gram(s) IV Push once  dextrose 50% Injectable 12.5 Gram(s) IV Push once  dextrose 50% Injectable 25 Gram(s) IV Push once  diltiazem    milliGRAM(s) Oral daily  epoetin anabela-epbx (RETACRIT) Injectable 09100 Unit(s) IV Push <User Schedule>  gabapentin 300 milliGRAM(s) Oral daily  glucagon  Injectable 1 milliGRAM(s) IntraMuscular once  heparin   Injectable. 500 Unit(s) Dialysis. every 1 hour  insulin glargine Injectable (LANTUS) 7 Unit(s) SubCutaneous at bedtime  insulin lispro (ADMELOG) corrective regimen sliding scale   SubCutaneous three times a day before meals  insulin lispro (ADMELOG) corrective regimen sliding scale   SubCutaneous at bedtime  insulin lispro Injectable (ADMELOG) 3 Unit(s) SubCutaneous three times a day before meals  loratadine 10 milliGRAM(s) Oral daily  melatonin 6 milliGRAM(s) Oral at bedtime  mirtazapine 7.5 milliGRAM(s) Oral at bedtime  montelukast 10 milliGRAM(s) Oral at bedtime  oxyCODONE  ER Tablet 10 milliGRAM(s) Oral every 12 hours  pantoprazole    Tablet 40 milliGRAM(s) Oral before breakfast  polyethylene glycol 3350 17 Gram(s) Oral two times a day  senna 2 Tablet(s) Oral at bedtime  sertraline 50 milliGRAM(s) Oral daily  sevelamer carbonate 800 milliGRAM(s) Oral three times a day with meals  sodium thiosulfate IVPB 25 Gram(s) IV Intermittent <User Schedule>  traZODone 100 milliGRAM(s) Oral at bedtime      Allergies  latex (Rash)  penicillin (Nausea)  strawberry (Rash)    Intolerances  caffeine (Nausea)    Review of Systems:  HEENT: No pain  Cardiovascular: No chest pain  Respiratory: No SOB  GI: No nausea, vomiting    PHYSICAL EXAM:  Vital Signs Last 24 Hrs  T(C): 36.6 (02 Feb 2022 05:45), Max: 36.8 (01 Feb 2022 20:15)  T(F): 97.8 (02 Feb 2022 05:45), Max: 98.3 (01 Feb 2022 20:15)  HR: 75 (02 Feb 2022 05:45) (70 - 75)  BP: 120/56 (02 Feb 2022 05:45) (120/56 - 132/56)  BP(mean): --  RR: 16 (02 Feb 2022 05:45) (16 - 17)  SpO2: 98% (02 Feb 2022 05:45) (97% - 99%)  GENERAL: NAD  EYES: No proptosis, no lid lag, anicteric  HEENT:  Atraumatic, Normocephalic, moist mucous membranes  RESPIRATORY: unlabored respirations     CAPILLARY BLOOD GLUCOSE  POCT Blood Glucose.: 257 mg/dL (02 Feb 2022 12:14)  POCT Blood Glucose.: 252 mg/dL (02 Feb 2022 09:37)  POCT Blood Glucose.: 255 mg/dL (02 Feb 2022 08:23)  POCT Blood Glucose.: 168 mg/dL (02 Feb 2022 06:06)  POCT Blood Glucose.: 182 mg/dL (01 Feb 2022 21:46)  POCT Blood Glucose.: 206 mg/dL (01 Feb 2022 18:07)  POCT Blood Glucose.: 76 mg/dL (31 Jan 2022 11:22)  POCT Blood Glucose.: 94 mg/dL (31 Jan 2022 09:22)  POCT Blood Glucose.: 85 mg/dL (31 Jan 2022 06:18)  POCT Blood Glucose.: 134 mg/dL (30 Jan 2022 22:49)  POCT Blood Glucose.: 114 mg/dL (30 Jan 2022 18:18)  POCT Blood Glucose.: 138 mg/dL (30 Jan 2022 12:43)  POCT Blood Glucose.: 90 mg/dL (30 Jan 2022 08:33)  POCT Blood Glucose.: 88 mg/dL (29 Jan 2022 21:33)  POCT Blood Glucose.: 117 mg/dL (29 Jan 2022 16:45)    02-01    136  |  87<L>  |  28<H>  ----------------------------<  86  5.2   |  20<L>  |  5.34<H>    Ca    9.8      01 Feb 2022 08:10  Phos  4.0     02-01  Mg     2.70     02-01      A1C with Estimated Average Glucose Result: 7.0 % (01-13-22 @ 08:21)  A1C with Estimated Average Glucose Result: 6.7 % (08-31-21 @ 09:30)  A1C with Estimated Average Glucose Result: 7.2 % (04-28-21 @ 07:04)    Diet, Consistent Carbohydrate Renal w/Evening Snack:   Supplement Feeding Modality:  Oral  Nepro Cans or Servings Per Day:  1       Frequency:  Three Times a day (01-17-22 @ 18:07)

## 2022-02-02 NOTE — PROGRESS NOTE ADULT - ASSESSMENT
56 year old female with hx of morbid obesity, CHAMP not on home O2, ESRD (HD MWF), HTN, DM, COPD, Afib no longer on AC, chronic R pannus wound, followed by Dr. Layne, sent by visiting RN for worsening wound.    # Chronic Pannus Wound  -s/p IV abx per primary team  -pending CT a/p  -steriods  -med / derm  f/u   -sx recs appreciated     #Afib  -stable, in NSR   -cont eliquis for now, elevated inflamm markers/ddimer given covid,  h/h noted- gi /renal following   -cont cardizem    #Covid-19+  -resolved   -med f/u     #Hypertension  -stable   -cont current meds    #ESRD on HD  -HD per renal    # Near Syncope  -tele no events   -orthostatics neg  -echo w grossly nl lv sys fx

## 2022-02-02 NOTE — PROGRESS NOTE ADULT - SUBJECTIVE AND OBJECTIVE BOX
INTERVAL HPI/OVERNIGHT EVENTS:    MEDICATIONS  (STANDING):  apixaban 2.5 milliGRAM(s) Oral two times a day  aspirin enteric coated 81 milliGRAM(s) Oral daily  atorvastatin 80 milliGRAM(s) Oral at bedtime  buPROPion XL (24-Hour) . 150 milliGRAM(s) Oral daily  chlorhexidine 2% Cloths 1 Application(s) Topical daily  cinacalcet 60 milliGRAM(s) Oral daily  Dakins Solution - 1/4 Strength 1 Application(s) Topical two times a day  dextrose 40% Gel 15 Gram(s) Oral once  dextrose 5%. 1000 milliLiter(s) (50 mL/Hr) IV Continuous <Continuous>  dextrose 5%. 1000 milliLiter(s) (100 mL/Hr) IV Continuous <Continuous>  dextrose 50% Injectable 25 Gram(s) IV Push once  dextrose 50% Injectable 12.5 Gram(s) IV Push once  dextrose 50% Injectable 25 Gram(s) IV Push once  diltiazem    milliGRAM(s) Oral daily  epoetin anabela-epbx (RETACRIT) Injectable 83287 Unit(s) IV Push <User Schedule>  gabapentin 300 milliGRAM(s) Oral daily  glucagon  Injectable 1 milliGRAM(s) IntraMuscular once  heparin   Injectable. 500 Unit(s) Dialysis. every 1 hour  insulin glargine Injectable (LANTUS) 5 Unit(s) SubCutaneous at bedtime  insulin lispro (ADMELOG) corrective regimen sliding scale   SubCutaneous three times a day before meals  insulin lispro (ADMELOG) corrective regimen sliding scale   SubCutaneous at bedtime  insulin lispro Injectable (ADMELOG) 3 Unit(s) SubCutaneous three times a day before meals  loratadine 10 milliGRAM(s) Oral daily  melatonin 6 milliGRAM(s) Oral at bedtime  mirtazapine 7.5 milliGRAM(s) Oral at bedtime  montelukast 10 milliGRAM(s) Oral at bedtime  oxyCODONE  ER Tablet 10 milliGRAM(s) Oral every 12 hours  pantoprazole    Tablet 40 milliGRAM(s) Oral before breakfast  polyethylene glycol 3350 17 Gram(s) Oral two times a day  senna 2 Tablet(s) Oral at bedtime  sertraline 50 milliGRAM(s) Oral daily  sevelamer carbonate 800 milliGRAM(s) Oral three times a day with meals  sodium thiosulfate IVPB 25 Gram(s) IV Intermittent <User Schedule>  tacrolimus   0.1% Ointment 1 Application(s) Topical daily  traZODone 100 milliGRAM(s) Oral at bedtime    MEDICATIONS  (PRN):  acetaminophen     Tablet .. 650 milliGRAM(s) Oral every 8 hours PRN Mild Pain  oxyCODONE    IR 7.5 milliGRAM(s) Oral every 4 hours PRN Moderate to Severe Pain (4 - 10)      Allergies    latex (Rash)  penicillin (Nausea)  strawberry (Rash)    Intolerances    caffeine (Nausea)      REVIEW OF SYSTEMS      General: no fevers/chills, no NS	    Skin: see HPI  	  Ophthalmologic: no eye pain or change in vision    Genitourinary: no dysuria or hematuria    Musculoskeletal: no joint pains or weakness	    Neurological:no weakness or tingling          Vital Signs Last 24 Hrs  T(C): 36.6 (02 Feb 2022 05:45), Max: 36.8 (01 Feb 2022 20:15)  T(F): 97.8 (02 Feb 2022 05:45), Max: 98.3 (01 Feb 2022 20:15)  HR: 75 (02 Feb 2022 05:45) (70 - 75)  BP: 120/56 (02 Feb 2022 05:45) (120/56 - 132/56)  BP(mean): --  RR: 16 (02 Feb 2022 05:45) (16 - 17)  SpO2: 98% (02 Feb 2022 05:45) (97% - 99%)    PHYSICAL EXAM:   The patient was alert and oriented X 3, well nourished, and in no  apparent distress.  There was no visible lymphadenopathy.  Conjunctiva were non injected  There was no clubbing or edema of extremities.  There was no hyperhidrosis or bromhidrosis.    Of note on skin exam:       LABS:                        7.9    12.98 )-----------( 476      ( 01 Feb 2022 08:10 )             28.4     02-01    136  |  87<L>  |  28<H>  ----------------------------<  86  5.2   |  20<L>  |  5.34<H>    Ca    9.8      01 Feb 2022 08:10  Phos  4.0     02-01  Mg     2.70     02-01           INTERVAL HPI/OVERNIGHT EVENTS:  pt denies any pain at rest    MEDICATIONS  (STANDING):  apixaban 2.5 milliGRAM(s) Oral two times a day  aspirin enteric coated 81 milliGRAM(s) Oral daily  atorvastatin 80 milliGRAM(s) Oral at bedtime  buPROPion XL (24-Hour) . 150 milliGRAM(s) Oral daily  chlorhexidine 2% Cloths 1 Application(s) Topical daily  cinacalcet 60 milliGRAM(s) Oral daily  Dakins Solution - 1/4 Strength 1 Application(s) Topical two times a day  dextrose 40% Gel 15 Gram(s) Oral once  dextrose 5%. 1000 milliLiter(s) (50 mL/Hr) IV Continuous <Continuous>  dextrose 5%. 1000 milliLiter(s) (100 mL/Hr) IV Continuous <Continuous>  dextrose 50% Injectable 25 Gram(s) IV Push once  dextrose 50% Injectable 12.5 Gram(s) IV Push once  dextrose 50% Injectable 25 Gram(s) IV Push once  diltiazem    milliGRAM(s) Oral daily  epoetin anabela-epbx (RETACRIT) Injectable 64595 Unit(s) IV Push <User Schedule>  gabapentin 300 milliGRAM(s) Oral daily  glucagon  Injectable 1 milliGRAM(s) IntraMuscular once  heparin   Injectable. 500 Unit(s) Dialysis. every 1 hour  insulin glargine Injectable (LANTUS) 5 Unit(s) SubCutaneous at bedtime  insulin lispro (ADMELOG) corrective regimen sliding scale   SubCutaneous three times a day before meals  insulin lispro (ADMELOG) corrective regimen sliding scale   SubCutaneous at bedtime  insulin lispro Injectable (ADMELOG) 3 Unit(s) SubCutaneous three times a day before meals  loratadine 10 milliGRAM(s) Oral daily  melatonin 6 milliGRAM(s) Oral at bedtime  mirtazapine 7.5 milliGRAM(s) Oral at bedtime  montelukast 10 milliGRAM(s) Oral at bedtime  oxyCODONE  ER Tablet 10 milliGRAM(s) Oral every 12 hours  pantoprazole    Tablet 40 milliGRAM(s) Oral before breakfast  polyethylene glycol 3350 17 Gram(s) Oral two times a day  senna 2 Tablet(s) Oral at bedtime  sertraline 50 milliGRAM(s) Oral daily  sevelamer carbonate 800 milliGRAM(s) Oral three times a day with meals  sodium thiosulfate IVPB 25 Gram(s) IV Intermittent <User Schedule>  tacrolimus   0.1% Ointment 1 Application(s) Topical daily  traZODone 100 milliGRAM(s) Oral at bedtime    MEDICATIONS  (PRN):  acetaminophen     Tablet .. 650 milliGRAM(s) Oral every 8 hours PRN Mild Pain  oxyCODONE    IR 7.5 milliGRAM(s) Oral every 4 hours PRN Moderate to Severe Pain (4 - 10)      Allergies    latex (Rash)  penicillin (Nausea)  strawberry (Rash)    Intolerances    caffeine (Nausea)      REVIEW OF SYSTEMS      General: no fevers/chills, no NS	    Skin: see HPI  	  Ophthalmologic: no eye pain or change in vision    Genitourinary: no dysuria or hematuria    Musculoskeletal: no joint pains or weakness	    Neurological:no weakness or tingling          Vital Signs Last 24 Hrs  T(C): 36.6 (02 Feb 2022 05:45), Max: 36.8 (01 Feb 2022 20:15)  T(F): 97.8 (02 Feb 2022 05:45), Max: 98.3 (01 Feb 2022 20:15)  HR: 75 (02 Feb 2022 05:45) (70 - 75)  BP: 120/56 (02 Feb 2022 05:45) (120/56 - 132/56)  BP(mean): --  RR: 16 (02 Feb 2022 05:45) (16 - 17)  SpO2: 98% (02 Feb 2022 05:45) (97% - 99%)    PHYSICAL EXAM:   The patient was alert and oriented X 3, well nourished, and in no  apparent distress.  There was no visible lymphadenopathy.  Conjunctiva were non injected  There was no clubbing or edema of extremities.  There was no hyperhidrosis or bromhidrosis.    Of note on skin exam:   - right pannus with  ~ 15.5 cm x 6.0 cm round, deep ulcer with erythematous undermined borders with surrounding retiform purpura - increased purulent drainage and malodor today  surrounding areas of induration and firm subcutaneous nodules       LABS:                        7.9    12.98 )-----------( 476      ( 01 Feb 2022 08:10 )             28.4     02-01    136  |  87<L>  |  28<H>  ----------------------------<  86  5.2   |  20<L>  |  5.34<H>    Ca    9.8      01 Feb 2022 08:10  Phos  4.0     02-01  Mg     2.70     02-01

## 2022-02-02 NOTE — PROGRESS NOTE ADULT - ASSESSMENT
56y Female with history of ESRD on HD presents with vomiting and diarrhea found to be COVID-19 positive. Nephrology consulted for ESRD status.    1) ESRD: Last HD on 1/31 with mild intradialytic hypotension and 1.8L removed. Plan for next maintenance HD today. Monitor electrolytes.    2) HTN with ESRD: BP acceptable. Continue with current medications. Monitor BP.    3) Anemia of renal disease: Hb low with elevated ferritin. Continue with Epo 12K with HD. Monitor Hb.    4) Secondary HPT of renal origin: Phosphorus acceptable with high normal serum calcium and low iPTH. Sensipar decreased to 60 mg PO daily. Continue with renvela 1 tab with meals (goal < 4.5 given concerns for calciphylaxis). Monitor serum calcium and phosphorus.    5) Ab wound: Appreciate dermatology follow up. Ddx includes calciphylaxis versus pyoderma gangrenosum. S/P biopsy. Continue with empiric sodium thiosulfate with HD.      [facility] NEPHROLOGY  Keaton Lopez M.D.  Juma Green D.O.  Myla Hoffmann M.D.  Julia Brewer, JASIEL, ANP-C    Telephone: [phone]  Facsimile: [phone]    [address] [place] [address]

## 2022-02-02 NOTE — PROGRESS NOTE ADULT - SUBJECTIVE AND OBJECTIVE BOX
CARDIOLOGY FOLLOW UP - Dr. Bullock  Date of Service: 2/2/22  CC: denies cp, sob, and palpitations  c/o abd wound pain     Review of Systems:  Constitutional: No fever, weight loss, or fatigue  Respiratory: No cough, wheezing, or hemoptysis, no shortness of breath  Cardiovascular: No chest pain, palpitations, passing out, dizziness, or leg swelling  Gastrointestinal: No abd or epigastric pain.  No nausea, vomiting, or hematemesis; no diarrhea or constipation, no melena or hematochezia  Vascular: no edema       PHYSICAL EXAM:  T(C): 36.6 (02-02-22 @ 05:45), Max: 37.1 (02-01-22 @ 12:02)  HR: 75 (02-02-22 @ 05:45) (70 - 80)  BP: 120/56 (02-02-22 @ 05:45) (120/56 - 132/56)  RR: 16 (02-02-22 @ 05:45) (16 - 18)  SpO2: 98% (02-02-22 @ 05:45) (93% - 99%)  Wt(kg): --  I&O's Summary      Appearance: Normal	  Cardiovascular: Normal S1 S2,RRR, No JVD, No murmurs  Respiratory: Lungs clear to auscultation	  Gastrointestinal:  Soft, Non-tender, + BS	  Extremities: Normal range of motion, No clubbing, cyanosis or edema      Home Medications:  aspirin 81 mg oral delayed release tablet: 1 tab(s) orally once a day (12 Jan 2022 17:49)  buPROPion 150 mg/24 hours (XL) oral tablet, extended release: 1 tab(s) orally once a day (in the morning) (12 Jan 2022 17:49)  cetirizine 10 mg oral tablet: 1 tab(s) orally once a day (12 Jan 2022 17:49)  dilTIAZem 120 mg/24 hours oral tablet, extended release: 1 tab(s) orally once a day (12 Jan 2022 17:49)  doxepin 25 mg oral capsule: 1 cap(s) orally once a day (at bedtime) (12 Jan 2022 17:49)  Eliquis 2.5 mg oral tablet: 1 tab(s) orally 2 times a day    Pharmacy states patient no longer wants to fill this medication (12 Jan 2022 17:49)  furosemide 80 mg oral tablet: 1 tab(s) orally once a day    Pharmacy states patient no longer wants to fill this medication (12 Jan 2022 17:49)  gabapentin 300 mg oral capsule: 1 cap(s) orally 2 times a day (12 Jan 2022 17:49)  hydrOXYzine hydrochloride 25 mg oral tablet: 1 tab(s) orally 2 times a day, As Needed (12 Jan 2022 17:49)  lanthanum 1000 mg oral tablet, chewable: 2 tab(s) orally with meals and 1 tab(s) orally with snacks    Pharmacy states patient no longer wants to fill this medication (12 Jan 2022 17:49)  mirtazapine 7.5 mg oral tablet: 1 tab(s) orally once a day (at bedtime) (12 Jan 2022 17:49)  montelukast 10 mg oral tablet: 1 tab(s) orally once a day (in the evening) (12 Jan 2022 17:49)  mupirocin 2% topical ointment: Apply sparingly to affected area 2 times a day as directed (12 Jan 2022 17:49)  nystatin 100,000 units/mL oral suspension: 5 milliliter(s) orally 4 times a day, as directed (12 Jan 2022 17:49)  omeprazole 20 mg oral delayed release capsule: 2 cap(s) orally once a day before breakfast (12 Jan 2022 17:49)  Pennsaid 2% topical solution: Apply topically to affected area 2 times a day (12 Jan 2022 17:49)  rosuvastatin 40 mg oral tablet: 1 tab(s) orally once a day (12 Jan 2022 17:49)  Santyl 250 units/g topical ointment: Apply topically to affected area once a day as directed (12 Jan 2022 17:49)  sertraline 50 mg oral tablet: 1 tab(s) orally once a day (12 Jan 2022 17:49)  Spiriva HandiHaler 18 mcg inhalation capsule: 1 cap(s) inhaled once a day (12 Jan 2022 17:49)  traZODone 100 mg oral tablet: 1 tab(s) orally once a day (at bedtime) (12 Jan 2022 17:49)  Tresiba FlexTouch 100 units/mL subcutaneous solution: 80 unit(s) subcutaneous once a day (at bedtime) (12 Jan 2022 17:49)  Trulicity Pen 1.5 mg/0.5 mL subcutaneous solution: 1 dose(s) subcutaneous once a week (12 Jan 2022 17:49)      MEDICATIONS  (STANDING):  apixaban 2.5 milliGRAM(s) Oral two times a day  aspirin enteric coated 81 milliGRAM(s) Oral daily  atorvastatin 80 milliGRAM(s) Oral at bedtime  buPROPion XL (24-Hour) . 150 milliGRAM(s) Oral daily  chlorhexidine 2% Cloths 1 Application(s) Topical daily  cinacalcet 60 milliGRAM(s) Oral daily  Dakins Solution - 1/4 Strength 1 Application(s) Topical two times a day  dextrose 40% Gel 15 Gram(s) Oral once  dextrose 5%. 1000 milliLiter(s) (50 mL/Hr) IV Continuous <Continuous>  dextrose 5%. 1000 milliLiter(s) (100 mL/Hr) IV Continuous <Continuous>  dextrose 50% Injectable 25 Gram(s) IV Push once  dextrose 50% Injectable 12.5 Gram(s) IV Push once  dextrose 50% Injectable 25 Gram(s) IV Push once  diltiazem    milliGRAM(s) Oral daily  epoetin anabela-epbx (RETACRIT) Injectable 24520 Unit(s) IV Push <User Schedule>  gabapentin 300 milliGRAM(s) Oral daily  glucagon  Injectable 1 milliGRAM(s) IntraMuscular once  heparin   Injectable. 500 Unit(s) Dialysis. every 1 hour  insulin glargine Injectable (LANTUS) 5 Unit(s) SubCutaneous at bedtime  insulin lispro (ADMELOG) corrective regimen sliding scale   SubCutaneous at bedtime  insulin lispro (ADMELOG) corrective regimen sliding scale   SubCutaneous three times a day before meals  insulin lispro Injectable (ADMELOG) 3 Unit(s) SubCutaneous three times a day before meals  loratadine 10 milliGRAM(s) Oral daily  melatonin 6 milliGRAM(s) Oral at bedtime  mirtazapine 7.5 milliGRAM(s) Oral at bedtime  montelukast 10 milliGRAM(s) Oral at bedtime  oxyCODONE  ER Tablet 10 milliGRAM(s) Oral every 12 hours  pantoprazole    Tablet 40 milliGRAM(s) Oral before breakfast  polyethylene glycol 3350 17 Gram(s) Oral two times a day  senna 2 Tablet(s) Oral at bedtime  sertraline 50 milliGRAM(s) Oral daily  sevelamer carbonate 800 milliGRAM(s) Oral three times a day with meals  sodium thiosulfate IVPB 25 Gram(s) IV Intermittent <User Schedule>  tacrolimus   0.1% Ointment 1 Application(s) Topical daily  traZODone 100 milliGRAM(s) Oral at bedtime      TELEMETRY: 	    ECG:  	  RADIOLOGY:   DIAGNOSTIC TESTING:  [ ] Echocardiogram:  [ ]  Catheterization:  [ ] Stress Test:    OTHER: 	    LABS:	 	    Troponin T, High Sensitivity Result: 58 ng/L (01-27 @ 11:35)                          7.9    12.98 )-----------( 476      ( 01 Feb 2022 08:10 )             28.4     02-01    136  |  87<L>  |  28<H>  ----------------------------<  86  5.2   |  20<L>  |  5.34<H>    Ca    9.8      01 Feb 2022 08:10  Phos  4.0     02-01  Mg     2.70     02-01

## 2022-02-02 NOTE — PROGRESS NOTE ADULT - ASSESSMENT
56 yr old F with morbid obesity, CHAMP not on home O2, ESRD (HD MWF), HTN, DM2 A1C 7.0, COPD, Afib no longer on AC, chronic R pannus wound here with worsening wound, diarrhea and found to be COVID+. Has poor po intake at this time.     1. Type 2 diabetes mellitus   A1c 7.0% (may be inaccurate in setting of ESRD)  Home Regimen: Tresiba 80 units HS and Trulicity 1.5mg subq weekly    While inpatient:  BG target 100-180 mg/dL  Below goal this AM  For now will continue Lantus 5 units SQ qHS and monitor trends before adjusting doses as patient was low the last few AM's.  This could have been from reduced PO intake.  Will monitor  Continue Admelog 3 units SQ TID before meals (Hold if NPO/not eating meal)    Continue Admelog correctional scale to LOW dose before meals, continue low dose at bedtime   Check BG before meals and bedtime  Hypoglycemia protocol     Discharge Plan:  STOP Trulicity (patient ESRD on HD)  Likely dc plan is basal/oral regimen. For oral agent, depends on inpatient requirements but can consider renally dosed DPP4 (Januvia 25 mg or Tradjenta 5 mg daily) VS Prandin before meals   Patient may benefit from switching to 22-24h acting basal insulin eg Levemir or Lantus, instead of Tresiba  Please assess insulin coverage for Levemir or Lantus, instead of Tresiba pens  Consider CGM (such as Freestyle Libre2 outpatient)  If desiring to followup with [facility]ocr[facility]: [address] [place] [address], [address], [facility] [address], [phone]    2. HTN  Management per primary team     3. Hyperlipidemia  Continue home dose of Atorvastatin 80 mg  Note, limited benefit in ESRD. Followup lipid panel as outpatient    Tamela Ruiz  Nurse Practitioner  Division of Endocrinology & Diabetes  Pager # [phone]      If after 6PM or before 9AM, or on weekends/holidays, please call endocrine answering service for assistance ([phone]).  For nonurgent matters email [email or web address] for [email or web address].

## 2022-02-02 NOTE — PROGRESS NOTE ADULT - SUBJECTIVE AND OBJECTIVE BOX
SUBJECTIVE / OVERNIGHT EVENTS:pt seen and examined, c/o pain at the wound site  2-1-22    MEDICATIONS  (STANDING):  apixaban 2.5 milliGRAM(s) Oral two times a day  aspirin enteric coated 81 milliGRAM(s) Oral daily  atorvastatin 80 milliGRAM(s) Oral at bedtime  buPROPion XL (24-Hour) . 150 milliGRAM(s) Oral daily  chlorhexidine 2% Cloths 1 Application(s) Topical daily  cinacalcet 60 milliGRAM(s) Oral daily  Dakins Solution - 1/4 Strength 1 Application(s) Topical two times a day  dextrose 40% Gel 15 Gram(s) Oral once  dextrose 5%. 1000 milliLiter(s) (50 mL/Hr) IV Continuous <Continuous>  dextrose 5%. 1000 milliLiter(s) (100 mL/Hr) IV Continuous <Continuous>  dextrose 50% Injectable 25 Gram(s) IV Push once  dextrose 50% Injectable 12.5 Gram(s) IV Push once  dextrose 50% Injectable 25 Gram(s) IV Push once  diltiazem    milliGRAM(s) Oral daily  epoetin anabela-epbx (RETACRIT) Injectable 22827 Unit(s) IV Push <User Schedule>  gabapentin 300 milliGRAM(s) Oral daily  glucagon  Injectable 1 milliGRAM(s) IntraMuscular once  heparin   Injectable. 500 Unit(s) Dialysis. every 1 hour  insulin glargine Injectable (LANTUS) 5 Unit(s) SubCutaneous at bedtime  insulin lispro (ADMELOG) corrective regimen sliding scale   SubCutaneous at bedtime  insulin lispro (ADMELOG) corrective regimen sliding scale   SubCutaneous three times a day before meals  insulin lispro Injectable (ADMELOG) 3 Unit(s) SubCutaneous three times a day before meals  loratadine 10 milliGRAM(s) Oral daily  melatonin 6 milliGRAM(s) Oral at bedtime  mirtazapine 7.5 milliGRAM(s) Oral at bedtime  montelukast 10 milliGRAM(s) Oral at bedtime  oxyCODONE  ER Tablet 10 milliGRAM(s) Oral every 12 hours  pantoprazole    Tablet 40 milliGRAM(s) Oral before breakfast  polyethylene glycol 3350 17 Gram(s) Oral two times a day  senna 2 Tablet(s) Oral at bedtime  sertraline 50 milliGRAM(s) Oral daily  sevelamer carbonate 800 milliGRAM(s) Oral three times a day with meals  sodium thiosulfate IVPB 25 Gram(s) IV Intermittent <User Schedule>  tacrolimus   0.1% Ointment 1 Application(s) Topical daily  traZODone 100 milliGRAM(s) Oral at bedtime    MEDICATIONS  (PRN):  acetaminophen     Tablet .. 650 milliGRAM(s) Oral every 8 hours PRN Mild Pain  oxyCODONE    IR 7.5 milliGRAM(s) Oral every 4 hours PRN Moderate to Severe Pain (4 - 10)    Vital Signs Last 24 Hrs  T(C): 36.8 (02-02-22 @ 21:11), Max: 36.8 (02-02-22 @ 21:11)  T(F): 98.2 (02-02-22 @ 21:11), Max: 98.2 (02-02-22 @ 21:11)  HR: 88 (02-02-22 @ 21:11) (75 - 88)  BP: 135/68 (02-02-22 @ 21:11) (120/56 - 135/68)  BP(mean): --  RR: 18 (02-02-22 @ 21:11) (16 - 18)  SpO2: 99% (02-02-22 @ 21:11) (97% - 99%)      Constitutional: No fever, fatigue  Skin: No rash.  Eyes: No recent vision problems or eye pain.  ENT: No congestion, ear pain, or sore throat.  Cardiovascular: No chest pain or palpation.  Respiratory: No cough, shortness of breath, congestion, or wheezing.  Gastrointestinal: No abdominal pain, nausea, vomiting, or diarrhea.  Genitourinary: No dysuria.  Musculoskeletal: No joint swelling.  Neurologic: No headache.    PHYSICAL EXAM:  GENERAL: NAD  EYES: EOMI, PERRLA  NECK: Supple, No JVD  CHEST/LUNG: dec breath sounds at bases  HEART:  S1 , S2 +  ABDOMEN: soft , bs+, abd wound +  EXTREMITIES:  edema+  NEUROLOGY:alert awake    LABS:  02-01    136  |  87<L>  |  28<H>  ----------------------------<  86  5.2   |  20<L>  |  5.34<H>    Ca    9.8      01 Feb 2022 08:10  Phos  4.0     02-01  Mg     2.70     02-01      Creatinine Trend: 5.34 <--, 7.54 <--, 6.01 <--, 4.89 <--, 5.42 <--                        7.9    12.98 )-----------( 476      ( 01 Feb 2022 08:10 )             28.4     Urine Studies:
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
## 2022-02-02 NOTE — PROGRESS NOTE ADULT - SUBJECTIVE AND OBJECTIVE BOX
[facility] [place]    56y Female with history of ESRD on HD presents with vomiting and diarrhea found to be COVID-19 positive. Nephrology consulted for ESRD status.    REVIEW OF SYSTEMS:  Gen: no changes in weight  Cards: no chest pain  Resp: no dyspnea  GI: no nausea or vomiting or diarrhea + ab wound pain  Vascular: no LE edema    caffeine (Nausea)  latex (Rash)  penicillin (Nausea)  strawberry (Rash)      Hospital Medications: Medications reviewed      VITALS:  T(F): 97.8 (02-02-22 @ 05:45), Max: 98.7 (02-01-22 @ 12:02)  HR: 75 (02-02-22 @ 05:45)  BP: 120/56 (02-02-22 @ 05:45)  RR: 16 (02-02-22 @ 05:45)  SpO2: 98% (02-02-22 @ 05:45)  Wt(kg): --        PHYSICAL EXAM:    Gen: NAD, calm  Cards: RRR, +S1/S2, no M/G/R  Resp: CTA B/L  GI: soft, RLQ bandage/tender  Vascular: no LE edema B/L, LUE AVF + bruit/thrill      LABS:  02-01    136  |  87<L>  |  28<H>  ----------------------------<  86  5.2   |  20<L>  |  5.34<H>    Ca    9.8      01 Feb 2022 08:10  Phos  4.0     02-01  Mg     2.70     02-01      Creatinine Trend: 5.34 <--, 7.54 <--, 6.01 <--, 4.89 <--, 5.42 <--, 6.88 <--                        7.9    12.98 )-----------( 476      ( 01 Feb 2022 08:10 )             28.4     Urine Studies:

## 2022-02-03 NOTE — PROGRESS NOTE ADULT - ASSESSMENT
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 less purulent drainage and malodor today.  - Bacterial tissue culture 1/25/21 with carbapenem-resistent Pseudomonas, Staph epidermidis, Enterococcus faecalis; reportedly same as previous bacterial culture.   - s/p 10-day course of IV cefepime completed 1/21/22 and other previously other outpatient abx.   - Biopsy 1/25/22 findings non specific. No obvious intravascular calcium deposits although limited sample of subcutaneous tissue. Awaiting von Kossa staining (for calcium), and deeper sections from original biopsy. SPEP, serum DOUG, ANCAs negative.     At this time:  - fu UPEP with immunofixation  - fu final fungal/AFB TC results; still pending  - continue with solumedrol 35mg BID    - recommend CT abdomen/pelvis to evaluate for potential calcification in abdominal pannus ulcer   - c/w pain management  - for wound care: c/w topical tacrolimus 0.01% ointment 1-2x daily to wound edges and aquacel Ag dressing    The patient's chart was reviewed in addition to being seen and examined at bedside with the dermatology attending Dr. Lary Bo.  Recommendations were communicated with the primary team.  Please page [phone] for further related questions (please leave 10 digit call back number because we cover several facilities).    Alicia Queen MD  Resident Physician, PGY3  [facility]

## 2022-02-03 NOTE — PROGRESS NOTE ADULT - ASSESSMENT
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 Patient followed by derm as well, s/p punch biopsy by Derm. Preliminary biopsy findings show extensive suppurative inflammation and necrosis. No obvious intravascular calcium deposits although limited sample of subcutaneous tissue. Current working diagnosis of Pyoderma Gangrenosum although Calciphylaxis remains in differential. As per derm recommendations patient started on systemic IV steroid. Topical dressing was changed to: Cleanse with Dakins 1/4 strength, adaptic touch (non-adherent silicone contact layer) to wound base for atraumatic dressing application and removal. Topical Tacrolimus 0.1% to wound base, cover with Aquacel AG , and abdominal pad.   Recommendations made to consider Cyclosporin, consider reconsult with ID and nephrology patient on HD. Additionally patient receiving Sodium Thiosulfate post dialysis as Calciphylaxis remains in differential.  Of note attempted Irrigation VAC 1/26 - 1 /28, noted deterioration of wound with NPWt/VAc therapy, therapy d/c'd, likely pathergy. Will continue to avoid surgical debridement. Collagenase d/c'd to avoid enzymatic debridement as well.  +violaceous borders, odor slightly improved, dimensions stable with new undermining at 12 o'clock extending 2cm.      -Topical dressing: Cleanse with Dakins 1/4 strength. Apply Liquid barrier film to periwound skin. Adaptic touch to wound base for atraumatic dressing application and removal. Apply Topical Tacrolimus 0.1% ointment to wound base, cover with Aquacel AG , and abdominal pad. Change daily.  -Interdry textile sheeting beneath abdominal pannus leaving 2" out at end to wick.  -Agree with systemic steroid; Solumedrol per Derm.  -Glucose control per primary team/endocrinology  -Consider Cyclosporin, differ to primary team/ID patient with possible PG and polymicrobial wound cultures on HD.  -Agree to continue with Sodium Thiosulfate post dialysis per nephrology, as Calciphylaxis remains in differential  -Continue to follow nutrition/RD recommendations   -Follow pain management recommendations for proper pain control and reconsult as needed  -Continue to offload pressure  -DVT prophylaxis    Findings and plan discussed with Dermatology Dr. Bo and primary team.    Upon discharge follow up at outpatient [facility]. [address] [place] [address]. [phone].    Will continue to follow while inpatient.  Thank you.    CARMELA William-BC, [facility]    pager #[phone]    If after 4PM or before 7:30AM on Mon-Friday or weekend/holiday please contact general surgery for urgent matters.   Team A- [id number]   Team B- [id number]  For non-urgent matters e-mail [email or web address]    I spent 35 minutes face-to-face with this patient of which more than 50% of the time was spent counseling/coordinating care of this patient.

## 2022-02-03 NOTE — PROGRESS NOTE ADULT - SUBJECTIVE AND OBJECTIVE BOX
Chief Complaint: DM 2    History: Patient reports fair appetite.  Depends on pain control.  Does not eat when in pain  No vomiting, diarrhea.  No hypoglycemia s/s      MEDICATIONS  (STANDING):  apixaban 2.5 milliGRAM(s) Oral two times a day  aspirin enteric coated 81 milliGRAM(s) Oral daily  atorvastatin 80 milliGRAM(s) Oral at bedtime  buPROPion XL (24-Hour) . 150 milliGRAM(s) Oral daily  chlorhexidine 2% Cloths 1 Application(s) Topical daily  cinacalcet 60 milliGRAM(s) Oral at bedtime  collagenase Ointment 1 Application(s) Topical two times a day  Dakins Solution - 1/4 Strength 1 Application(s) Topical two times a day  dextrose 40% Gel 15 Gram(s) Oral once  dextrose 5%. 1000 milliLiter(s) (50 mL/Hr) IV Continuous <Continuous>  dextrose 5%. 1000 milliLiter(s) (100 mL/Hr) IV Continuous <Continuous>  dextrose 50% Injectable 25 Gram(s) IV Push once  dextrose 50% Injectable 12.5 Gram(s) IV Push once  dextrose 50% Injectable 25 Gram(s) IV Push once  diltiazem    milliGRAM(s) Oral daily  epoetin anabela-epbx (RETACRIT) Injectable 68142 Unit(s) IV Push <User Schedule>  gabapentin 300 milliGRAM(s) Oral daily  glucagon  Injectable 1 milliGRAM(s) IntraMuscular once  heparin   Injectable. 500 Unit(s) Dialysis. every 1 hour  insulin glargine Injectable (LANTUS) 7 Unit(s) SubCutaneous at bedtime  insulin lispro (ADMELOG) corrective regimen sliding scale   SubCutaneous three times a day before meals  insulin lispro (ADMELOG) corrective regimen sliding scale   SubCutaneous at bedtime  insulin lispro Injectable (ADMELOG) 3 Unit(s) SubCutaneous three times a day before meals  loratadine 10 milliGRAM(s) Oral daily  melatonin 6 milliGRAM(s) Oral at bedtime  mirtazapine 7.5 milliGRAM(s) Oral at bedtime  montelukast 10 milliGRAM(s) Oral at bedtime  oxyCODONE  ER Tablet 10 milliGRAM(s) Oral every 12 hours  pantoprazole    Tablet 40 milliGRAM(s) Oral before breakfast  polyethylene glycol 3350 17 Gram(s) Oral two times a day  senna 2 Tablet(s) Oral at bedtime  sertraline 50 milliGRAM(s) Oral daily  sevelamer carbonate 800 milliGRAM(s) Oral three times a day with meals  sodium thiosulfate IVPB 25 Gram(s) IV Intermittent <User Schedule>  traZODone 100 milliGRAM(s) Oral at bedtime      Allergies  latex (Rash)  penicillin (Nausea)  strawberry (Rash)    Intolerances  caffeine (Nausea)    Review of Systems:  HEENT: No pain  Cardiovascular: No chest pain  Respiratory: No SOB  GI: No nausea, vomiting    PHYSICAL EXAM:  Vital Signs Last 24 Hrs  T(C): 36.6 (02 Feb 2022 05:45), Max: 36.8 (01 Feb 2022 20:15)  T(F): 97.8 (02 Feb 2022 05:45), Max: 98.3 (01 Feb 2022 20:15)  HR: 75 (02 Feb 2022 05:45) (70 - 75)  BP: 120/56 (02 Feb 2022 05:45) (120/56 - 132/56)  BP(mean): --  RR: 16 (02 Feb 2022 05:45) (16 - 17)  SpO2: 98% (02 Feb 2022 05:45) (97% - 99%)  GENERAL: NAD  EYES: No proptosis, no lid lag, anicteric  HEENT:  Atraumatic, Normocephalic, moist mucous membranes  RESPIRATORY: unlabored respirations     CAPILLARY BLOOD GLUCOSE  POCT Blood Glucose.: 257 mg/dL (02 Feb 2022 12:14)  POCT Blood Glucose.: 252 mg/dL (02 Feb 2022 09:37)  POCT Blood Glucose.: 255 mg/dL (02 Feb 2022 08:23)  POCT Blood Glucose.: 168 mg/dL (02 Feb 2022 06:06)  POCT Blood Glucose.: 182 mg/dL (01 Feb 2022 21:46)  POCT Blood Glucose.: 206 mg/dL (01 Feb 2022 18:07)  POCT Blood Glucose.: 76 mg/dL (31 Jan 2022 11:22)  POCT Blood Glucose.: 94 mg/dL (31 Jan 2022 09:22)  POCT Blood Glucose.: 85 mg/dL (31 Jan 2022 06:18)  POCT Blood Glucose.: 134 mg/dL (30 Jan 2022 22:49)  POCT Blood Glucose.: 114 mg/dL (30 Jan 2022 18:18)  POCT Blood Glucose.: 138 mg/dL (30 Jan 2022 12:43)  POCT Blood Glucose.: 90 mg/dL (30 Jan 2022 08:33)  POCT Blood Glucose.: 88 mg/dL (29 Jan 2022 21:33)  POCT Blood Glucose.: 117 mg/dL (29 Jan 2022 16:45)    02-03    132<L>  |  89<L>  |  25<H>  ----------------------------<  134<H>  3.5   |  24  |  3.95<H>    Ca    9.5      03 Feb 2022 06:33  Phos  2.9     02-03  Mg     2.40     02-03      A1C with Estimated Average Glucose Result: 7.0 % (01-13-22 @ 08:21)  A1C with Estimated Average Glucose Result: 6.7 % (08-31-21 @ 09:30)  A1C with Estimated Average Glucose Result: 7.2 % (04-28-21 @ 07:04)    Diet, Consistent Carbohydrate Renal w/Evening Snack:   Supplement Feeding Modality:  Oral  Nepro Cans or Servings Per Day:  1       Frequency:  Three Times a day (01-17-22 @ 18:07)
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
## 2022-02-03 NOTE — PROGRESS NOTE ADULT - SUBJECTIVE AND OBJECTIVE BOX
[facility] NEPHROLOGY- PROGRESS NOTE    56y Female with history of ESRD on HD presents with vomiting and diarrhea found to be COVID-19 positive. Nephrology consulted for ESRD status.    REVIEW OF SYSTEMS:  Gen: no changes in weight  Cards: no chest pain  Resp: no dyspnea  GI: no nausea or vomiting or diarrhea + ab wound pain  Vascular: no LE edema    caffeine (Nausea)  latex (Rash)  penicillin (Nausea)  strawberry (Rash)      Hospital Medications: Medications reviewed      VITALS:  T(F): 98 (02-03-22 @ 12:20), Max: 99 (02-03-22 @ 04:43)  HR: 83 (02-03-22 @ 12:20)  BP: 135/49 (02-03-22 @ 12:20)  RR: 18 (02-03-22 @ 12:20)  SpO2: 100% (02-03-22 @ 12:20)  Wt(kg): --    02-02 @ 07:01  -  02-03 @ 07:00  --------------------------------------------------------  IN: 800 mL / OUT: 2400 mL / NET: -1600 mL        PHYSICAL EXAM:    Gen: NAD, calm  Cards: RRR, +S1/S2, no M/G/R  Resp: CTA B/L  GI: soft, RLQ bandage/tender  Vascular: no LE edema B/L, LUE AVF + bruit/thrill      LABS:  02-03    132<L>  |  89<L>  |  25<H>  ----------------------------<  134<H>  3.5   |  24  |  3.95<H>    Ca    9.5      03 Feb 2022 06:33  Phos  2.9     02-03  Mg     2.40     02-03      Creatinine Trend: 3.95 <--, 7.34 <--, 5.34 <--, 7.54 <--, 6.01 <--, 4.89 <--                        7.3    11.63 )-----------( 446      ( 03 Feb 2022 06:33 )             25.9     Urine Studies:
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
## 2022-02-03 NOTE — PROGRESS NOTE ADULT - ASSESSMENT
56 yr old F with morbid obesity, CHAMP not on home O2, ESRD (HD MWF), HTN, DM2 A1C 7.0, COPD, Afib no longer on AC, chronic R pannus wound here with worsening wound, diarrhea and found to be COVID+. Has poor po intake at this time.     1. Type 2 diabetes mellitus   A1c 7.0% (may be inaccurate in setting of ESRD)  Home Regimen: Tresiba 80 units HS and Trulicity 1.5mg subq weekly    While inpatient:  BG target 100-180 mg/dL  Below goal this AM  For now will continue Lantus 5 units SQ qHS   Increase Admelog to 4 units SQ TID before meals (Hold if NPO/not eating meal)    Continue Admelog correctional scale to LOW dose before meals, continue low dose at bedtime   Check BG before meals and bedtime  Hypoglycemia protocol     Discharge Plan:  STOP Trulicity (patient ESRD on HD)  Likely dc plan is basal/oral regimen. For oral agent, depends on inpatient requirements but can consider renally dosed DPP4 (Januvia 25 mg or Tradjenta 5 mg daily) VS Prandin before meals   Patient may benefit from switching to 22-24h acting basal insulin eg Levemir or Lantus, instead of Tresiba  Please assess insulin coverage for Levemir or Lantus, instead of Tresiba pens  Consider CGM (such as Freestyle Libre2 outpatient)  If desiring to followup with [facility] Endocrinology: [address], [address], [place] [address], [phone]    2. HTN  Management per primary team     3. Hyperlipidemia  Continue home dose of Atorvastatin 80 mg  Note, limited benefit in ESRD. Followup lipid panel as outpatient    Tamela Ruiz  Nurse Practitioner  Division of Endocrinology & Diabetes  Pager # [phone]      If after 6PM or before 9AM, or on weekends/holidays, please call endocrine answering service for assistance ([phone]).  For nonurgent matters email [email or web address] for assistance.

## 2022-02-03 NOTE — PROGRESS NOTE ADULT - SUBJECTIVE AND OBJECTIVE BOX
SUBJECTIVE / OVERNIGHT EVENTS:pt seen and examined, c/o pain at the wound site  2-3-22    MEDICATIONS  (STANDING):  apixaban 2.5 milliGRAM(s) Oral two times a day  aspirin enteric coated 81 milliGRAM(s) Oral daily  atorvastatin 80 milliGRAM(s) Oral at bedtime  buPROPion XL (24-Hour) . 150 milliGRAM(s) Oral daily  chlorhexidine 2% Cloths 1 Application(s) Topical daily  cinacalcet 60 milliGRAM(s) Oral daily  Dakins Solution - 1/4 Strength 1 Application(s) Topical two times a day  dextrose 40% Gel 15 Gram(s) Oral once  dextrose 5%. 1000 milliLiter(s) (50 mL/Hr) IV Continuous <Continuous>  dextrose 5%. 1000 milliLiter(s) (100 mL/Hr) IV Continuous <Continuous>  dextrose 50% Injectable 25 Gram(s) IV Push once  dextrose 50% Injectable 12.5 Gram(s) IV Push once  dextrose 50% Injectable 25 Gram(s) IV Push once  diltiazem    milliGRAM(s) Oral daily  epoetin anabela-epbx (RETACRIT) Injectable 23278 Unit(s) IV Push <User Schedule>  gabapentin 300 milliGRAM(s) Oral daily  glucagon  Injectable 1 milliGRAM(s) IntraMuscular once  heparin   Injectable. 500 Unit(s) Dialysis. every 1 hour  insulin glargine Injectable (LANTUS) 5 Unit(s) SubCutaneous at bedtime  insulin lispro (ADMELOG) corrective regimen sliding scale   SubCutaneous at bedtime  insulin lispro (ADMELOG) corrective regimen sliding scale   SubCutaneous three times a day before meals  insulin lispro Injectable (ADMELOG) 3 Unit(s) SubCutaneous three times a day before meals  loratadine 10 milliGRAM(s) Oral daily  melatonin 6 milliGRAM(s) Oral at bedtime  methylPREDNISolone sodium succinate Injectable 35 milliGRAM(s) IV Push two times a day  mirtazapine 7.5 milliGRAM(s) Oral at bedtime  montelukast 10 milliGRAM(s) Oral at bedtime  oxyCODONE  ER Tablet 10 milliGRAM(s) Oral every 12 hours  pantoprazole    Tablet 40 milliGRAM(s) Oral before breakfast  polyethylene glycol 3350 17 Gram(s) Oral two times a day  senna 2 Tablet(s) Oral at bedtime  sertraline 50 milliGRAM(s) Oral daily  sevelamer carbonate 800 milliGRAM(s) Oral three times a day with meals  sodium thiosulfate IVPB 25 Gram(s) IV Intermittent <User Schedule>  tacrolimus   0.1% Ointment 1 Application(s) Topical daily  traZODone 100 milliGRAM(s) Oral at bedtime    MEDICATIONS  (PRN):  acetaminophen     Tablet .. 650 milliGRAM(s) Oral every 8 hours PRN Mild Pain  oxyCODONE    IR 7.5 milliGRAM(s) Oral every 4 hours PRN Severe Pain (7 - 10)  oxyCODONE    IR 5 milliGRAM(s) Oral every 4 hours PRN Moderate Pain (4 - 6)    Vital Signs Last 24 Hrs  T(C): 36.6 (02-03-22 @ 17:13), Max: 37.2 (02-03-22 @ 04:43)  T(F): 97.8 (02-03-22 @ 17:13), Max: 99 (02-03-22 @ 04:43)  HR: 76 (02-03-22 @ 17:13) (76 - 88)  BP: 155/55 (02-03-22 @ 17:13) (118/50 - 155/55)  BP(mean): --  RR: 18 (02-03-22 @ 17:13) (18 - 18)  SpO2: 100% (02-03-22 @ 17:13) (97% - 100%)      Constitutional: No fever, fatigue  Skin: No rash.  Eyes: No recent vision problems or eye pain.  ENT: No congestion, ear pain, or sore throat.  Cardiovascular: No chest pain or palpation.  Respiratory: No cough, shortness of breath, congestion, or wheezing.  Gastrointestinal: No abdominal pain, nausea, vomiting, or diarrhea.  Genitourinary: No dysuria.  Musculoskeletal: No joint swelling.  Neurologic: No headache.    PHYSICAL EXAM:  GENERAL: NAD  EYES: EOMI, PERRLA  NECK: Supple, No JVD  CHEST/LUNG: dec breath sounds at bases  HEART:  S1 , S2 +  ABDOMEN: soft , bs+, abd wound +  EXTREMITIES:  edema+  NEUROLOGY:alert awake    LABS:  02-03    132<L>  |  89<L>  |  25<H>  ----------------------------<  134<H>  3.5   |  24  |  3.95<H>    Ca    9.5      03 Feb 2022 06:33  Phos  2.9     02-03  Mg     2.40     02-03      Creatinine Trend: 3.95 <--, 7.34 <--, 5.34 <--, 7.54 <--, 6.01 <--, 4.89 <--                        7.3    11.63 )-----------( 446      ( 03 Feb 2022 06:33 )             25.9     Urine Studies:

## 2022-02-03 NOTE — PROGRESS NOTE ADULT - ASSESSMENT
56y Female with history of ESRD on HD presents with vomiting and diarrhea found to be COVID-19 positive. Nephrology consulted for ESRD status.    1) ESRD: Last HD on 2/2 with mild intradialytic hypotension and 1.6L removed. Plan for next maintenance HD on 2/4. Monitor electrolytes.    2) HTN with ESRD: BP acceptable. Continue with current medications. Monitor BP.    3) Anemia of renal disease: Hb low with elevated ferritin. Continue with Epo 12K with HD. Monitor Hb.    4) Secondary HPT of renal origin: Phosphorus acceptable with low iPTH. Sensipar decreased to 60 mg PO daily. Continue with renvela 1 tab with meals (goal < 4.5 given concerns for calciphylaxis). Monitor serum calcium and phosphorus.    5) Ab wound: Appreciate dermatology follow up. Ddx includes calciphylaxis versus pyoderma gangrenosum. S/P biopsy. Continue with empiric sodium thiosulfate with HD. CT A/P pending (no need to coordinate with HD).      [facility] NEPHROLOGY  Keaton Lopez M.D.  Jmua Green D.O.  Myla Hoffmann M.D.  Julia Brewer, MSN, ANP-C    Telephone: [phone]  Facsimile: [phone]    [address] [place] [address]

## 2022-02-03 NOTE — PROGRESS NOTE ADULT - SUBJECTIVE AND OBJECTIVE BOX
CARDIOLOGY FOLLOW UP - Dr. Bullock  Date of Service: 2/3/22  CC: no cp/sob     Review of Systems:  Constitutional: No fever, weight loss, or fatigue  Respiratory: No cough, wheezing, or hemoptysis, no shortness of breath  Cardiovascular: No chest pain, palpitations, passing out, dizziness, or leg swelling  Gastrointestinal: No abd or epigastric pain.  No nausea, vomiting, or hematemesis; no diarrhea or constipation, no melena or hematochezia  Vascular: no edema       PHYSICAL EXAM:  T(C): 37.2 (02-03-22 @ 04:43), Max: 37.2 (02-03-22 @ 04:43)  HR: 84 (02-03-22 @ 04:43) (77 - 88)  BP: 135/49 (02-03-22 @ 04:43) (118/50 - 135/68)  RR: 18 (02-03-22 @ 04:43) (18 - 18)  SpO2: 100% (02-03-22 @ 04:43) (97% - 100%)  Wt(kg): --  I&O's Summary    02 Feb 2022 07:01  -  03 Feb 2022 07:00  --------------------------------------------------------  IN: 800 mL / OUT: 2400 mL / NET: -1600 mL        Appearance: Normal	  Cardiovascular: Normal S1 S2,RRR, No JVD, No murmurs  Respiratory: Lungs clear to auscultation	  Gastrointestinal:  Soft, Non-tender, + BS	  Extremities: Normal range of motion, No clubbing, cyanosis or edema      Home Medications:  aspirin 81 mg oral delayed release tablet: 1 tab(s) orally once a day (12 Jan 2022 17:49)  buPROPion 150 mg/24 hours (XL) oral tablet, extended release: 1 tab(s) orally once a day (in the morning) (12 Jan 2022 17:49)  cetirizine 10 mg oral tablet: 1 tab(s) orally once a day (12 Jan 2022 17:49)  dilTIAZem 120 mg/24 hours oral tablet, extended release: 1 tab(s) orally once a day (12 Jan 2022 17:49)  doxepin 25 mg oral capsule: 1 cap(s) orally once a day (at bedtime) (12 Jan 2022 17:49)  Eliquis 2.5 mg oral tablet: 1 tab(s) orally 2 times a day    Pharmacy states patient no longer wants to fill this medication (12 Jan 2022 17:49)  furosemide 80 mg oral tablet: 1 tab(s) orally once a day    Pharmacy states patient no longer wants to fill this medication (12 Jan 2022 17:49)  gabapentin 300 mg oral capsule: 1 cap(s) orally 2 times a day (12 Jan 2022 17:49)  hydrOXYzine hydrochloride 25 mg oral tablet: 1 tab(s) orally 2 times a day, As Needed (12 Jan 2022 17:49)  lanthanum 1000 mg oral tablet, chewable: 2 tab(s) orally with meals and 1 tab(s) orally with snacks    Pharmacy states patient no longer wants to fill this medication (12 Jan 2022 17:49)  mirtazapine 7.5 mg oral tablet: 1 tab(s) orally once a day (at bedtime) (12 Jan 2022 17:49)  montelukast 10 mg oral tablet: 1 tab(s) orally once a day (in the evening) (12 Jan 2022 17:49)  mupirocin 2% topical ointment: Apply sparingly to affected area 2 times a day as directed (12 Jan 2022 17:49)  nystatin 100,000 units/mL oral suspension: 5 milliliter(s) orally 4 times a day, as directed (12 Jan 2022 17:49)  omeprazole 20 mg oral delayed release capsule: 2 cap(s) orally once a day before breakfast (12 Jan 2022 17:49)  Pennsaid 2% topical solution: Apply topically to affected area 2 times a day (12 Jan 2022 17:49)  rosuvastatin 40 mg oral tablet: 1 tab(s) orally once a day (12 Jan 2022 17:49)  Santyl 250 units/g topical ointment: Apply topically to affected area once a day as directed (12 Jan 2022 17:49)  sertraline 50 mg oral tablet: 1 tab(s) orally once a day (12 Jan 2022 17:49)  Spiriva HandiHaler 18 mcg inhalation capsule: 1 cap(s) inhaled once a day (12 Jan 2022 17:49)  traZODone 100 mg oral tablet: 1 tab(s) orally once a day (at bedtime) (12 Jan 2022 17:49)  Tresiba FlexTouch 100 units/mL subcutaneous solution: 80 unit(s) subcutaneous once a day (at bedtime) (12 Jan 2022 17:49)  Trulicity Pen 1.5 mg/0.5 mL subcutaneous solution: 1 dose(s) subcutaneous once a week (12 Jan 2022 17:49)      MEDICATIONS  (STANDING):  apixaban 2.5 milliGRAM(s) Oral two times a day  aspirin enteric coated 81 milliGRAM(s) Oral daily  atorvastatin 80 milliGRAM(s) Oral at bedtime  buPROPion XL (24-Hour) . 150 milliGRAM(s) Oral daily  chlorhexidine 2% Cloths 1 Application(s) Topical daily  cinacalcet 60 milliGRAM(s) Oral daily  Dakins Solution - 1/4 Strength 1 Application(s) Topical two times a day  dextrose 40% Gel 15 Gram(s) Oral once  dextrose 5%. 1000 milliLiter(s) (50 mL/Hr) IV Continuous <Continuous>  dextrose 5%. 1000 milliLiter(s) (100 mL/Hr) IV Continuous <Continuous>  dextrose 50% Injectable 25 Gram(s) IV Push once  dextrose 50% Injectable 12.5 Gram(s) IV Push once  dextrose 50% Injectable 25 Gram(s) IV Push once  diltiazem    milliGRAM(s) Oral daily  epoetin anabela-epbx (RETACRIT) Injectable 58654 Unit(s) IV Push <User Schedule>  gabapentin 300 milliGRAM(s) Oral daily  glucagon  Injectable 1 milliGRAM(s) IntraMuscular once  heparin   Injectable. 500 Unit(s) Dialysis. every 1 hour  insulin glargine Injectable (LANTUS) 5 Unit(s) SubCutaneous at bedtime  insulin lispro (ADMELOG) corrective regimen sliding scale   SubCutaneous at bedtime  insulin lispro (ADMELOG) corrective regimen sliding scale   SubCutaneous three times a day before meals  insulin lispro Injectable (ADMELOG) 3 Unit(s) SubCutaneous three times a day before meals  loratadine 10 milliGRAM(s) Oral daily  melatonin 6 milliGRAM(s) Oral at bedtime  methylPREDNISolone sodium succinate Injectable 35 milliGRAM(s) IV Push two times a day  mirtazapine 7.5 milliGRAM(s) Oral at bedtime  montelukast 10 milliGRAM(s) Oral at bedtime  oxyCODONE  ER Tablet 10 milliGRAM(s) Oral every 12 hours  pantoprazole    Tablet 40 milliGRAM(s) Oral before breakfast  polyethylene glycol 3350 17 Gram(s) Oral two times a day  senna 2 Tablet(s) Oral at bedtime  sertraline 50 milliGRAM(s) Oral daily  sevelamer carbonate 800 milliGRAM(s) Oral three times a day with meals  sodium thiosulfate IVPB 25 Gram(s) IV Intermittent <User Schedule>  tacrolimus   0.1% Ointment 1 Application(s) Topical daily  traZODone 100 milliGRAM(s) Oral at bedtime      TELEMETRY: 	    ECG:  	  RADIOLOGY:   DIAGNOSTIC TESTING:  [ ] Echocardiogram:  [ ]  Catheterization:  [ ] Stress Test:    OTHER: 	    LABS:	 	                            7.3    11.63 )-----------( 446      ( 03 Feb 2022 06:33 )             25.9     02-03    132<L>  |  89<L>  |  25<H>  ----------------------------<  134<H>  3.5   |  24  |  3.95<H>    Ca    9.5      03 Feb 2022 06:33  Phos  2.9     02-03  Mg     2.40     02-03

## 2022-02-03 NOTE — PROGRESS NOTE ADULT - ASSESSMENT
56 year old female with hx of morbid obesity, CHAMP not on home O2, ESRD (HD MWF), HTN, DM, COPD, Afib no longer on AC, chronic R pannus wound, followed by Dr. Layne, sent by visiting RN for worsening wound.    # Chronic Pannus Wound  -s/p IV abx per primary team  -pending CT a/p  -s/p steriods  -med / derm  f/u   -sx recs appreciated     #Afib  -stable, in NSR   -cont eliquis for now, elevated inflamm markers/ddimer given covid,  h/h noted- gi /renal following   -cont cardizem    #Covid-19+  -resolved   -med f/u     #Hypertension  -stable   -cont current meds    #ESRD on HD  -HD per renal    # Near Syncope  -tele no events   -orthostatics neg  -echo w grossly nl lv sys fx

## 2022-02-03 NOTE — PROGRESS NOTE ADULT - SUBJECTIVE AND OBJECTIVE BOX
INTERVAL HPI/OVERNIGHT EVENTS:    MEDICATIONS  (STANDING):  apixaban 2.5 milliGRAM(s) Oral two times a day  aspirin enteric coated 81 milliGRAM(s) Oral daily  atorvastatin 80 milliGRAM(s) Oral at bedtime  buPROPion XL (24-Hour) . 150 milliGRAM(s) Oral daily  chlorhexidine 2% Cloths 1 Application(s) Topical daily  cinacalcet 60 milliGRAM(s) Oral daily  Dakins Solution - 1/4 Strength 1 Application(s) Topical two times a day  dextrose 40% Gel 15 Gram(s) Oral once  dextrose 5%. 1000 milliLiter(s) (50 mL/Hr) IV Continuous <Continuous>  dextrose 5%. 1000 milliLiter(s) (100 mL/Hr) IV Continuous <Continuous>  dextrose 50% Injectable 25 Gram(s) IV Push once  dextrose 50% Injectable 12.5 Gram(s) IV Push once  dextrose 50% Injectable 25 Gram(s) IV Push once  diltiazem    milliGRAM(s) Oral daily  epoetin anabela-epbx (RETACRIT) Injectable 53910 Unit(s) IV Push <User Schedule>  gabapentin 300 milliGRAM(s) Oral daily  glucagon  Injectable 1 milliGRAM(s) IntraMuscular once  heparin   Injectable. 500 Unit(s) Dialysis. every 1 hour  insulin glargine Injectable (LANTUS) 5 Unit(s) SubCutaneous at bedtime  insulin lispro (ADMELOG) corrective regimen sliding scale   SubCutaneous at bedtime  insulin lispro (ADMELOG) corrective regimen sliding scale   SubCutaneous three times a day before meals  insulin lispro Injectable (ADMELOG) 3 Unit(s) SubCutaneous three times a day before meals  loratadine 10 milliGRAM(s) Oral daily  melatonin 6 milliGRAM(s) Oral at bedtime  methylPREDNISolone sodium succinate Injectable 35 milliGRAM(s) IV Push two times a day  mirtazapine 7.5 milliGRAM(s) Oral at bedtime  montelukast 10 milliGRAM(s) Oral at bedtime  oxyCODONE  ER Tablet 10 milliGRAM(s) Oral every 12 hours  pantoprazole    Tablet 40 milliGRAM(s) Oral before breakfast  polyethylene glycol 3350 17 Gram(s) Oral two times a day  senna 2 Tablet(s) Oral at bedtime  sertraline 50 milliGRAM(s) Oral daily  sevelamer carbonate 800 milliGRAM(s) Oral three times a day with meals  sodium thiosulfate IVPB 25 Gram(s) IV Intermittent <User Schedule>  tacrolimus   0.1% Ointment 1 Application(s) Topical daily  traZODone 100 milliGRAM(s) Oral at bedtime    MEDICATIONS  (PRN):  acetaminophen     Tablet .. 650 milliGRAM(s) Oral every 8 hours PRN Mild Pain  oxyCODONE    IR 7.5 milliGRAM(s) Oral every 4 hours PRN Moderate to Severe Pain (4 - 10)      Allergies    latex (Rash)  penicillin (Nausea)  strawberry (Rash)    Intolerances    caffeine (Nausea)      REVIEW OF SYSTEMS      General: no fevers/chills, no NS	    Skin: see HPI  	  Ophthalmologic: no eye pain or change in vision    Genitourinary: no dysuria or hematuria    Musculoskeletal: no joint pains or weakness	    Neurological:no weakness or tingling          Vital Signs Last 24 Hrs  T(C): 36.7 (03 Feb 2022 12:20), Max: 37.2 (03 Feb 2022 04:43)  T(F): 98 (03 Feb 2022 12:20), Max: 99 (03 Feb 2022 04:43)  HR: 83 (03 Feb 2022 12:20) (77 - 88)  BP: 135/49 (03 Feb 2022 12:20) (118/50 - 135/68)  BP(mean): --  RR: 18 (03 Feb 2022 12:20) (18 - 18)  SpO2: 100% (03 Feb 2022 12:20) (97% - 100%)    PHYSICAL EXAM:   The patient was alert and oriented X 3, well nourished, and in no  apparent distress.  There was no visible lymphadenopathy.  Conjunctiva were non injected  There was no clubbing or edema of extremities.  There was no hyperhidrosis or bromhidrosis.    Of note on skin exam:       LABS:                        7.3    11.63 )-----------( 446      ( 03 Feb 2022 06:33 )             25.9     02-03    132<L>  |  89<L>  |  25<H>  ----------------------------<  134<H>  3.5   |  24  |  3.95<H>    Ca    9.5      03 Feb 2022 06:33  Phos  2.9     02-03  Mg     2.40     02-03           INTERVAL HPI/OVERNIGHT EVENTS:  stable  states pain is better controlled    MEDICATIONS  (STANDING):  apixaban 2.5 milliGRAM(s) Oral two times a day  aspirin enteric coated 81 milliGRAM(s) Oral daily  atorvastatin 80 milliGRAM(s) Oral at bedtime  buPROPion XL (24-Hour) . 150 milliGRAM(s) Oral daily  chlorhexidine 2% Cloths 1 Application(s) Topical daily  cinacalcet 60 milliGRAM(s) Oral daily  Dakins Solution - 1/4 Strength 1 Application(s) Topical two times a day  dextrose 40% Gel 15 Gram(s) Oral once  dextrose 5%. 1000 milliLiter(s) (50 mL/Hr) IV Continuous <Continuous>  dextrose 5%. 1000 milliLiter(s) (100 mL/Hr) IV Continuous <Continuous>  dextrose 50% Injectable 25 Gram(s) IV Push once  dextrose 50% Injectable 12.5 Gram(s) IV Push once  dextrose 50% Injectable 25 Gram(s) IV Push once  diltiazem    milliGRAM(s) Oral daily  epoetin anabela-epbx (RETACRIT) Injectable 68452 Unit(s) IV Push <User Schedule>  gabapentin 300 milliGRAM(s) Oral daily  glucagon  Injectable 1 milliGRAM(s) IntraMuscular once  heparin   Injectable. 500 Unit(s) Dialysis. every 1 hour  insulin glargine Injectable (LANTUS) 5 Unit(s) SubCutaneous at bedtime  insulin lispro (ADMELOG) corrective regimen sliding scale   SubCutaneous at bedtime  insulin lispro (ADMELOG) corrective regimen sliding scale   SubCutaneous three times a day before meals  insulin lispro Injectable (ADMELOG) 3 Unit(s) SubCutaneous three times a day before meals  loratadine 10 milliGRAM(s) Oral daily  melatonin 6 milliGRAM(s) Oral at bedtime  methylPREDNISolone sodium succinate Injectable 35 milliGRAM(s) IV Push two times a day  mirtazapine 7.5 milliGRAM(s) Oral at bedtime  montelukast 10 milliGRAM(s) Oral at bedtime  oxyCODONE  ER Tablet 10 milliGRAM(s) Oral every 12 hours  pantoprazole    Tablet 40 milliGRAM(s) Oral before breakfast  polyethylene glycol 3350 17 Gram(s) Oral two times a day  senna 2 Tablet(s) Oral at bedtime  sertraline 50 milliGRAM(s) Oral daily  sevelamer carbonate 800 milliGRAM(s) Oral three times a day with meals  sodium thiosulfate IVPB 25 Gram(s) IV Intermittent <User Schedule>  tacrolimus   0.1% Ointment 1 Application(s) Topical daily  traZODone 100 milliGRAM(s) Oral at bedtime    MEDICATIONS  (PRN):  acetaminophen     Tablet .. 650 milliGRAM(s) Oral every 8 hours PRN Mild Pain  oxyCODONE    IR 7.5 milliGRAM(s) Oral every 4 hours PRN Moderate to Severe Pain (4 - 10)      Allergies    latex (Rash)  penicillin (Nausea)  strawberry (Rash)    Intolerances    caffeine (Nausea)      REVIEW OF SYSTEMS      General: no fevers/chills, no NS	    Skin: see HPI  	  Ophthalmologic: no eye pain or change in vision    Genitourinary: no dysuria or hematuria    Musculoskeletal: no joint pains or weakness	    Neurological:no weakness or tingling          Vital Signs Last 24 Hrs  T(C): 36.7 (03 Feb 2022 12:20), Max: 37.2 (03 Feb 2022 04:43)  T(F): 98 (03 Feb 2022 12:20), Max: 99 (03 Feb 2022 04:43)  HR: 83 (03 Feb 2022 12:20) (77 - 88)  BP: 135/49 (03 Feb 2022 12:20) (118/50 - 135/68)  BP(mean): --  RR: 18 (03 Feb 2022 12:20) (18 - 18)  SpO2: 100% (03 Feb 2022 12:20) (97% - 100%)    PHYSICAL EXAM:   The patient was alert and oriented X 3, well nourished, and in no  apparent distress.  There was no visible lymphadenopathy.  Conjunctiva were non injected  There was no clubbing or edema of extremities.  There was no hyperhidrosis or bromhidrosis.    Of note on skin exam:   - right pannus with  ~ 15.5 cm x 6.0 cm round, deep ulcer with erythematous undermined borders with surrounding retiform purpura - increased purulent drainage and malodor today  surrounding areas of induration and firm subcutaneous nodules       LABS:                        7.3    11.63 )-----------( 446      ( 03 Feb 2022 06:33 )             25.9     02-03    132<L>  |  89<L>  |  25<H>  ----------------------------<  134<H>  3.5   |  24  |  3.95<H>    Ca    9.5      03 Feb 2022 06:33  Phos  2.9     02-03  Mg     2.40     02-03

## 2022-02-04 NOTE — PROGRESS NOTE ADULT - SUBJECTIVE AND OBJECTIVE BOX
CARDIOLOGY FOLLOW UP - Dr. Bullock  Date of Service: 2/4/22  CC: borderline bp noted - pt asymptomatic   no cp/sob   c/o abd pain from wound     Review of Systems:  Constitutional: No fever, weight loss, or fatigue  Respiratory: No cough, wheezing, or hemoptysis, no shortness of breath  Cardiovascular: No chest pain, palpitations, passing out, dizziness, or leg swelling  Gastrointestinal: No abd or epigastric pain.  No nausea, vomiting, or hematemesis; no diarrhea or constipation, no melena or hematochezia  Vascular: no edema       PHYSICAL EXAM:  T(C): 36.6 (02-04-22 @ 09:04), Max: 36.9 (02-03-22 @ 22:14)  HR: 60 (02-04-22 @ 09:04) (58 - 83)  BP: 118/56 (02-04-22 @ 09:04) (97/43 - 155/55)  RR: 18 (02-04-22 @ 09:04) (18 - 18)  SpO2: 100% (02-04-22 @ 09:04) (100% - 100%)  Wt(kg): --  I&O's Summary    03 Feb 2022 07:01  -  04 Feb 2022 07:00  --------------------------------------------------------  IN: 440 mL / OUT: 0 mL / NET: 440 mL        Appearance: Normal	  Cardiovascular: Normal S1 S2,RRR, No JVD, No murmurs  Respiratory: Lungs clear to auscultation	  Gastrointestinal:  Soft, Non-tender, + BS	  Extremities: Normal range of motion, No clubbing, cyanosis or edema      Home Medications:  aspirin 81 mg oral delayed release tablet: 1 tab(s) orally once a day (12 Jan 2022 17:49)  buPROPion 150 mg/24 hours (XL) oral tablet, extended release: 1 tab(s) orally once a day (in the morning) (12 Jan 2022 17:49)  cetirizine 10 mg oral tablet: 1 tab(s) orally once a day (12 Jan 2022 17:49)  dilTIAZem 120 mg/24 hours oral tablet, extended release: 1 tab(s) orally once a day (12 Jan 2022 17:49)  doxepin 25 mg oral capsule: 1 cap(s) orally once a day (at bedtime) (12 Jan 2022 17:49)  Eliquis 2.5 mg oral tablet: 1 tab(s) orally 2 times a day    Pharmacy states patient no longer wants to fill this medication (12 Jan 2022 17:49)  furosemide 80 mg oral tablet: 1 tab(s) orally once a day    Pharmacy states patient no longer wants to fill this medication (12 Jan 2022 17:49)  gabapentin 300 mg oral capsule: 1 cap(s) orally 2 times a day (12 Jan 2022 17:49)  hydrOXYzine hydrochloride 25 mg oral tablet: 1 tab(s) orally 2 times a day, As Needed (12 Jan 2022 17:49)  lanthanum 1000 mg oral tablet, chewable: 2 tab(s) orally with meals and 1 tab(s) orally with snacks    Pharmacy states patient no longer wants to fill this medication (12 Jan 2022 17:49)  mirtazapine 7.5 mg oral tablet: 1 tab(s) orally once a day (at bedtime) (12 Jan 2022 17:49)  montelukast 10 mg oral tablet: 1 tab(s) orally once a day (in the evening) (12 Jan 2022 17:49)  mupirocin 2% topical ointment: Apply sparingly to affected area 2 times a day as directed (12 Jan 2022 17:49)  nystatin 100,000 units/mL oral suspension: 5 milliliter(s) orally 4 times a day, as directed (12 Jan 2022 17:49)  omeprazole 20 mg oral delayed release capsule: 2 cap(s) orally once a day before breakfast (12 Jan 2022 17:49)  Pennsaid 2% topical solution: Apply topically to affected area 2 times a day (12 Jan 2022 17:49)  rosuvastatin 40 mg oral tablet: 1 tab(s) orally once a day (12 Jan 2022 17:49)  Santyl 250 units/g topical ointment: Apply topically to affected area once a day as directed (12 Jan 2022 17:49)  sertraline 50 mg oral tablet: 1 tab(s) orally once a day (12 Jan 2022 17:49)  Spiriva HandiHaler 18 mcg inhalation capsule: 1 cap(s) inhaled once a day (12 Jan 2022 17:49)  traZODone 100 mg oral tablet: 1 tab(s) orally once a day (at bedtime) (12 Jan 2022 17:49)  Tresiba FlexTouch 100 units/mL subcutaneous solution: 80 unit(s) subcutaneous once a day (at bedtime) (12 Jan 2022 17:49)  Trulicity Pen 1.5 mg/0.5 mL subcutaneous solution: 1 dose(s) subcutaneous once a week (12 Jan 2022 17:49)      MEDICATIONS  (STANDING):  apixaban 2.5 milliGRAM(s) Oral two times a day  aspirin enteric coated 81 milliGRAM(s) Oral daily  atorvastatin 80 milliGRAM(s) Oral at bedtime  buPROPion XL (24-Hour) . 150 milliGRAM(s) Oral daily  chlorhexidine 2% Cloths 1 Application(s) Topical daily  cinacalcet 60 milliGRAM(s) Oral daily  Dakins Solution - 1/4 Strength 1 Application(s) Topical two times a day  dextrose 40% Gel 15 Gram(s) Oral once  dextrose 5%. 1000 milliLiter(s) (50 mL/Hr) IV Continuous <Continuous>  dextrose 5%. 1000 milliLiter(s) (100 mL/Hr) IV Continuous <Continuous>  dextrose 50% Injectable 25 Gram(s) IV Push once  dextrose 50% Injectable 12.5 Gram(s) IV Push once  dextrose 50% Injectable 25 Gram(s) IV Push once  diltiazem    milliGRAM(s) Oral daily  epoetin anabela-epbx (RETACRIT) Injectable 27786 Unit(s) IV Push <User Schedule>  gabapentin 300 milliGRAM(s) Oral daily  glucagon  Injectable 1 milliGRAM(s) IntraMuscular once  heparin   Injectable. 500 Unit(s) Dialysis. every 1 hour  insulin glargine Injectable (LANTUS) 5 Unit(s) SubCutaneous at bedtime  insulin lispro (ADMELOG) corrective regimen sliding scale   SubCutaneous at bedtime  insulin lispro (ADMELOG) corrective regimen sliding scale   SubCutaneous three times a day before meals  insulin lispro Injectable (ADMELOG) 3 Unit(s) SubCutaneous three times a day before meals  loratadine 10 milliGRAM(s) Oral daily  melatonin 6 milliGRAM(s) Oral at bedtime  methylPREDNISolone sodium succinate Injectable 35 milliGRAM(s) IV Push two times a day  mirtazapine 7.5 milliGRAM(s) Oral at bedtime  montelukast 10 milliGRAM(s) Oral at bedtime  oxyCODONE  ER Tablet 10 milliGRAM(s) Oral every 12 hours  pantoprazole    Tablet 40 milliGRAM(s) Oral before breakfast  polyethylene glycol 3350 17 Gram(s) Oral two times a day  senna 2 Tablet(s) Oral at bedtime  sertraline 50 milliGRAM(s) Oral daily  sevelamer carbonate 800 milliGRAM(s) Oral three times a day with meals  sodium thiosulfate IVPB 25 Gram(s) IV Intermittent <User Schedule>  tacrolimus   0.1% Ointment 1 Application(s) Topical daily  traZODone 100 milliGRAM(s) Oral at bedtime      TELEMETRY:  	    ECG:  	  RADIOLOGY:   DIAGNOSTIC TESTING:  [ ] Echocardiogram:  [ ]  Catheterization:  [ ] Stress Test:    OTHER: 	    LABS:	 	                            7.3    11.63 )-----------( 446      ( 03 Feb 2022 06:33 )             25.9     02-03    132<L>  |  89<L>  |  25<H>  ----------------------------<  134<H>  3.5   |  24  |  3.95<H>    Ca    9.5      03 Feb 2022 06:33  Phos  2.9     02-03  Mg     2.40     02-03

## 2022-02-04 NOTE — PROGRESS NOTE ADULT - ASSESSMENT
a/p  56 year old female with hx of morbid obesity, CHAMP not on home O2, ESRD (HD MWF), HTN, DM, COPD, Afib no longer on AC, chronic R pannus wound, followed by Dr. Layne, sent by visiting RN for worsening wound.    #Chronic Pannus Wound  -s/p IV abx per primary team  -pending CT a/p  -iv steriods  -med / derm  f/u   -sx recs appreciated     #Afib  -stable, in NSR   -cont eliquis for now, elevated inflamm markers/ ddimer given covid, h/h noted- gi /renal following   -cont cardizem    #Covid-19+  -resolved   -med f/u     #Hypertension  -overall stable   -cont current meds    #ESRD on HD  -HD per renal    # Near Syncope  -tele no events   -orthostatics neg  -echo w grossly nl lv sys fx

## 2022-02-04 NOTE — PROGRESS NOTE ADULT - SUBJECTIVE AND OBJECTIVE BOX
SUBJECTIVE / OVERNIGHT EVENTS:pt seen and examined, c/o pain at the wound site  2-4-22    MEDICATIONS  (STANDING):  apixaban 2.5 milliGRAM(s) Oral two times a day  aspirin enteric coated 81 milliGRAM(s) Oral daily  atorvastatin 80 milliGRAM(s) Oral at bedtime  buPROPion XL (24-Hour) . 150 milliGRAM(s) Oral daily  chlorhexidine 2% Cloths 1 Application(s) Topical daily  cinacalcet 60 milliGRAM(s) Oral daily  Dakins Solution - 1/4 Strength 1 Application(s) Topical two times a day  dextrose 40% Gel 15 Gram(s) Oral once  dextrose 5%. 1000 milliLiter(s) (50 mL/Hr) IV Continuous <Continuous>  dextrose 5%. 1000 milliLiter(s) (100 mL/Hr) IV Continuous <Continuous>  dextrose 50% Injectable 25 Gram(s) IV Push once  dextrose 50% Injectable 12.5 Gram(s) IV Push once  dextrose 50% Injectable 25 Gram(s) IV Push once  diltiazem    milliGRAM(s) Oral daily  epoetin anabela-epbx (RETACRIT) Injectable 64045 Unit(s) IV Push <User Schedule>  gabapentin 300 milliGRAM(s) Oral daily  glucagon  Injectable 1 milliGRAM(s) IntraMuscular once  heparin   Injectable. 500 Unit(s) Dialysis. every 1 hour  insulin glargine Injectable (LANTUS) 5 Unit(s) SubCutaneous at bedtime  insulin lispro (ADMELOG) corrective regimen sliding scale   SubCutaneous at bedtime  insulin lispro (ADMELOG) corrective regimen sliding scale   SubCutaneous three times a day before meals  insulin lispro Injectable (ADMELOG) 5 Unit(s) SubCutaneous three times a day before meals  loratadine 10 milliGRAM(s) Oral daily  melatonin 6 milliGRAM(s) Oral at bedtime  methylPREDNISolone sodium succinate Injectable 35 milliGRAM(s) IV Push two times a day  mirtazapine 7.5 milliGRAM(s) Oral at bedtime  montelukast 10 milliGRAM(s) Oral at bedtime  oxyCODONE  ER Tablet 10 milliGRAM(s) Oral every 12 hours  pantoprazole    Tablet 40 milliGRAM(s) Oral before breakfast  polyethylene glycol 3350 17 Gram(s) Oral two times a day  senna 2 Tablet(s) Oral at bedtime  sertraline 50 milliGRAM(s) Oral daily  sevelamer carbonate 800 milliGRAM(s) Oral three times a day with meals  sodium thiosulfate IVPB 25 Gram(s) IV Intermittent <User Schedule>  tacrolimus   0.1% Ointment 1 Application(s) Topical daily  traZODone 100 milliGRAM(s) Oral at bedtime    MEDICATIONS  (PRN):  acetaminophen     Tablet .. 650 milliGRAM(s) Oral every 8 hours PRN Mild Pain  aluminum hydroxide/magnesium hydroxide/simethicone Suspension 30 milliLiter(s) Oral every 4 hours PRN Dyspepsia  oxyCODONE    IR 5 milliGRAM(s) Oral every 4 hours PRN Moderate Pain (4 - 6)  oxyCODONE    IR 7.5 milliGRAM(s) Oral every 4 hours PRN Severe Pain (7 - 10)    Vital Signs Last 24 Hrs  T(C): 37 (02-04-22 @ 18:11), Max: 37 (02-04-22 @ 18:11)  T(F): 98.6 (02-04-22 @ 18:11), Max: 98.6 (02-04-22 @ 18:11)  HR: 63 (02-04-22 @ 18:11) (58 - 71)  BP: 122/65 (02-04-22 @ 18:11) (97/43 - 122/65)  BP(mean): --  RR: 20 (02-04-22 @ 18:11) (18 - 20)  SpO2: 100% (02-04-22 @ 18:11) (100% - 100%)      Constitutional: No fever, fatigue  Skin: No rash.  Eyes: No recent vision problems or eye pain.  ENT: No congestion, ear pain, or sore throat.  Cardiovascular: No chest pain or palpation.  Respiratory: No cough, shortness of breath, congestion, or wheezing.  Gastrointestinal: No abdominal pain, nausea, vomiting, or diarrhea.  Genitourinary: No dysuria.  Musculoskeletal: No joint swelling.  Neurologic: No headache.    PHYSICAL EXAM:  GENERAL: NAD  EYES: EOMI, PERRLA  NECK: Supple, No JVD  CHEST/LUNG: dec breath sounds at bases  HEART:  S1 , S2 +  ABDOMEN: soft , bs+, abd wound +  EXTREMITIES:  edema+  NEUROLOGY:alert awake    LABS:  02-03    132<L>  |  89<L>  |  25<H>  ----------------------------<  134<H>  3.5   |  24  |  3.95<H>    Ca    9.5      03 Feb 2022 06:33  Phos  2.9     02-03  Mg     2.40     02-03      Creatinine Trend: 3.95 <--, 7.34 <--, 5.34 <--, 7.54 <--, 6.01 <--, 4.89 <--                        7.3    11.63 )-----------( 446      ( 03 Feb 2022 06:33 )             25.9     Urine Studies:

## 2022-02-04 NOTE — CHART NOTE - NSCHARTNOTEFT_GEN_A_CORE
Dermatology Chart Note    Interval History:  stable, overall pain is controlled    Physical Exam:  VSS  - right pannus with large round, deep ulcer with erythematous undermined borders with surrounding retiform purpura - DECREASED purulent drainage and malodor today  surrounding areas of induration and firm subcutaneous nodules     Assessment and Plan:  #Deep, non-healing ulcer in patient with DM and ESRD on HD. 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 less purulent drainage and malodor today.  - Bacterial tissue culture 1/25/21 with carbapenem-resistent Pseudomonas, Staph epidermidis, Enterococcus faecalis; reportedly same as previous bacterial culture.   - s/p 10-day course of IV cefepime completed 1/21/22 and other previously other outpatient abx.   - Biopsy 1/25/22 findings non specific. No obvious intravascular calcium deposits although limited sample of subcutaneous tissue. Awaiting von Kossa staining (for calcium), and deeper sections from original biopsy. SPEP, serum DOUG, ANCAs negative.     At this time:  - fu UPEP with immunofixation  - fu final fungal/AFB TC results; still pending  - continue with solumedrol 35mg BID    - continue with sodium thiosulfate  - recommend CT abdomen/pelvis to evaluate for potential calcification in abdominal pannus ulcer   - c/w pain management  - for wound care: c/w topical tacrolimus 0.01% ointment 1-2x daily to wound edges and aquacel Ag dressing    The patient's chart was reviewed in addition to being discussed with the dermatology attending Dr. Lary Bo.  Recommendations were communicated with the primary team.  Please page [phone] for further related questions (please leave 10 digit call back number because we cover several facilities).    Alicia Queen MD  Resident Physician, PGY3  [facility]
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
## 2022-02-04 NOTE — PROGRESS NOTE ADULT - ASSESSMENT
56 yr old F with morbid obesity, CHAMP not on home O2, ESRD (HD MWF), HTN, DM2 A1C 7.0, COPD, Afib no longer on AC, chronic R pannus wound here with worsening wound, diarrhea and found to be COVID+. Has poor po intake at this time.     1. Type 2 diabetes mellitus   A1c 7.0% (may be inaccurate in setting of ESRD)  Home Regimen: Tresiba 80 units HS and Trulicity 1.5mg subq weekly    While inpatient:  BG target 100-180 mg/dL  Continue Lantus 5 units SQ qHS   Increase Admelog to 5 units SQ TID before meals (Hold if NPO/not eating meal)    Continue Admelog correctional scale to LOW dose before meals, continue low dose at bedtime   Check BG before meals and bedtime  Hypoglycemia protocol   Consistent carbohydrate diet, will reach out to dietary team re: food preferences    Discharge Plan:  STOP Trulicity (patient ESRD on HD)  Likely dc plan is basal/oral regimen. For oral agent, depends on inpatient requirements but can consider renally dosed DPP4 (Januvia 25 mg or Tradjenta 5 mg daily) VS Prandin before meals   Patient may benefit from switching to 22-24h acting basal insulin eg Levemir or Lantus, instead of Tresiba  Please assess insulin coverage for Levemir or Lantus, instead of Tresiba pens  Consider CGM (such as Freestyle Libre2 outpatient)  If desiring to followup with [facility]: [address], [address], [place] [address], [phone]    2. HTN  Management per primary team     3. Hyperlipidemia  Continue home dose of Atorvastatin 80 mg  Note, limited benefit in ESRD. Followup lipid panel as outpatient    Priscilla Patel  Nurse Practitioner  Division of Endocrinology & Diabetes  In house pager #[phone]/long range pager #[phone]    If before 9AM or after 6PM, or on weekends/holidays, please call endocrine answering service for assistance ([phone]).  For nonurgent matters email [email or web address] for assistance.

## 2022-02-04 NOTE — PROGRESS NOTE ADULT - ASSESSMENT
56y Female with history of ESRD on HD presents with vomiting and diarrhea found to be COVID-19 positive. Nephrology consulted for ESRD status.    1) ESRD: Last HD on 2/2 with mild intradialytic hypotension and 1.6L removed. Plan for next maintenance HD today. Monitor electrolytes.    2) HTN with ESRD: BP low normal. Continue with current medications. Monitor BP.    3) Anemia of renal disease: Hb low with elevated ferritin. Continue with Epo 12K with HD. Monitor Hb.    4) Secondary HPT of renal origin: Phosphorus acceptable with low iPTH. Sensipar decreased to 60 mg PO daily. Continue with renvela 1 tab with meals (goal < 4.5 given concerns for calciphylaxis). Monitor serum calcium and phosphorus.    5) Ab wound: Appreciate dermatology follow up. Ddx includes calciphylaxis versus pyoderma gangrenosum. S/P biopsy. Continue with empiric sodium thiosulfate with HD. CT A/P pending (no need to coordinate with HD).      [facility]  Keaton Lopez M.D.  Juma Green D.O.  Myla Hoffmann M.D.  Julia Brewer, JASIEL, ANP-C    Telephone: [phone]  Facsimile: [phone]    [address] [id number]

## 2022-02-04 NOTE — PROGRESS NOTE ADULT - SUBJECTIVE AND OBJECTIVE BOX
Chief Complaint: DM 2    History: Patient seen at bedside. Reports she is having difficulty eating hospital food - requesting several modifications to diet based on preferences  Endorses drinking Nepro shakes. Hyperglycemia noted today, most recent  mg/dl     MEDICATIONS  (STANDING):  apixaban 2.5 milliGRAM(s) Oral two times a day  aspirin enteric coated 81 milliGRAM(s) Oral daily  atorvastatin 80 milliGRAM(s) Oral at bedtime  buPROPion XL (24-Hour) . 150 milliGRAM(s) Oral daily  chlorhexidine 2% Cloths 1 Application(s) Topical daily  cinacalcet 60 milliGRAM(s) Oral daily  Dakins Solution - 1/4 Strength 1 Application(s) Topical two times a day  dextrose 40% Gel 15 Gram(s) Oral once  dextrose 5%. 1000 milliLiter(s) (50 mL/Hr) IV Continuous <Continuous>  dextrose 5%. 1000 milliLiter(s) (100 mL/Hr) IV Continuous <Continuous>  dextrose 50% Injectable 25 Gram(s) IV Push once  dextrose 50% Injectable 12.5 Gram(s) IV Push once  dextrose 50% Injectable 25 Gram(s) IV Push once  diltiazem    milliGRAM(s) Oral daily  epoetin anabela-epbx (RETACRIT) Injectable 10331 Unit(s) IV Push <User Schedule>  gabapentin 300 milliGRAM(s) Oral daily  glucagon  Injectable 1 milliGRAM(s) IntraMuscular once  heparin   Injectable. 500 Unit(s) Dialysis. every 1 hour  insulin glargine Injectable (LANTUS) 5 Unit(s) SubCutaneous at bedtime  insulin lispro (ADMELOG) corrective regimen sliding scale   SubCutaneous at bedtime  insulin lispro (ADMELOG) corrective regimen sliding scale   SubCutaneous three times a day before meals  insulin lispro Injectable (ADMELOG) 3 Unit(s) SubCutaneous three times a day before meals  loratadine 10 milliGRAM(s) Oral daily  melatonin 6 milliGRAM(s) Oral at bedtime  methylPREDNISolone sodium succinate Injectable 35 milliGRAM(s) IV Push two times a day  mirtazapine 7.5 milliGRAM(s) Oral at bedtime  montelukast 10 milliGRAM(s) Oral at bedtime  oxyCODONE  ER Tablet 10 milliGRAM(s) Oral every 12 hours  pantoprazole    Tablet 40 milliGRAM(s) Oral before breakfast  polyethylene glycol 3350 17 Gram(s) Oral two times a day  senna 2 Tablet(s) Oral at bedtime  sertraline 50 milliGRAM(s) Oral daily  sevelamer carbonate 800 milliGRAM(s) Oral three times a day with meals  sodium thiosulfate IVPB 25 Gram(s) IV Intermittent <User Schedule>  tacrolimus   0.1% Ointment 1 Application(s) Topical daily  traZODone 100 milliGRAM(s) Oral at bedtime    MEDICATIONS  (PRN):  acetaminophen     Tablet .. 650 milliGRAM(s) Oral every 8 hours PRN Mild Pain  oxyCODONE    IR 5 milliGRAM(s) Oral every 4 hours PRN Moderate Pain (4 - 6)  oxyCODONE    IR 7.5 milliGRAM(s) Oral every 4 hours PRN Severe Pain (7 - 10)      Allergies  latex (Rash)  penicillin (Nausea)  strawberry (Rash)    Intolerances  caffeine (Nausea)    Review of Systems:  HEENT: No pain  Cardiovascular: No chest pain  Respiratory: No SOB  GI: No nausea, vomiting    PHYSICAL EXAM:  VITALS: T(C): 36.6 (02-04-22 @ 09:04)  T(F): 97.8 (02-04-22 @ 09:04), Max: 98.5 (02-03-22 @ 22:14)  HR: 60 (02-04-22 @ 09:04) (58 - 76)  BP: 118/56 (02-04-22 @ 09:04) (97/43 - 155/55)  RR:  (18 - 18)  SpO2:  (100% - 100%)  Wt(kg): --  GENERAL: NAD  EYES: No proptosis, no lid lag, anicteric  HEENT:  Atraumatic, Normocephalic, moist mucous membranes  RESPIRATORY: unlabored respirations     CAPILLARY BLOOD GLUCOSE    POCT Blood Glucose.: 273 mg/dL (04 Feb 2022 12:04)  POCT Blood Glucose.: 205 mg/dL (04 Feb 2022 08:35)  POCT Blood Glucose.: 264 mg/dL (03 Feb 2022 23:00)  POCT Blood Glucose.: 309 mg/dL (03 Feb 2022 18:27)      02-03    132<L>  |  89<L>  |  25<H>  ----------------------------<  134<H>  3.5   |  24  |  3.95<H>    EGFR if : 14<L>  EGFR if non : 12<L>    Ca    9.5      02-03  Mg     2.40     02-03  Phos  2.9     02-03      A1C with Estimated Average Glucose Result: 7.0 % (01-13-22 @ 08:21)  A1C with Estimated Average Glucose Result: 6.7 % (08-31-21 @ 09:30)  A1C with Estimated Average Glucose Result: 7.2 % (04-28-21 @ 07:04)    Diet, Consistent Carbohydrate Renal w/Evening Snack:   Supplement Feeding Modality:  Oral  Nepro Cans or Servings Per Day:  1       Frequency:  Three Times a day (01-17-22 @ 18:07)

## 2022-02-04 NOTE — PROGRESS NOTE ADULT - SUBJECTIVE AND OBJECTIVE BOX
[facility] NEPHROLOGY- PROGRESS NOTE    56y Female with history of ESRD on HD presents with vomiting and diarrhea found to be COVID-19 positive. Nephrology consulted for ESRD status.    REVIEW OF SYSTEMS:  Gen: no changes in weight  Cards: no chest pain  Resp: no dyspnea  GI: no nausea or vomiting or diarrhea + ab wound pain  Vascular: no LE edema    caffeine (Nausea)  latex (Rash)  penicillin (Nausea)  strawberry (Rash)      Hospital Medications: Medications reviewed      VITALS:  T(F): 97.8 (02-04-22 @ 09:04), Max: 98.5 (02-03-22 @ 22:14)  HR: 60 (02-04-22 @ 09:04)  BP: 118/56 (02-04-22 @ 09:04)  RR: 18 (02-04-22 @ 09:04)  SpO2: 100% (02-04-22 @ 09:04)  Wt(kg): --    02-03 @ 07:01  -  02-04 @ 07:00  --------------------------------------------------------  IN: 440 mL / OUT: 0 mL / NET: 440 mL        PHYSICAL EXAM:    Gen: NAD, calm  Cards: RRR, +S1/S2, no M/G/R  Resp: CTA B/L  GI: soft, RLQ bandage/tender  Vascular: no LE edema B/L, LUE AVF + bruit/thrill      LABS:  02-03    132<L>  |  89<L>  |  25<H>  ----------------------------<  134<H>  3.5   |  24  |  3.95<H>    Ca    9.5      03 Feb 2022 06:33  Phos  2.9     02-03  Mg     2.40     02-03      Creatinine Trend: 3.95 <--, 7.34 <--, 5.34 <--, 7.54 <--, 6.01 <--, 4.89 <--                        7.3    11.63 )-----------( 446      ( 03 Feb 2022 06:33 )             25.9     Urine Studies:
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
## 2022-02-05 NOTE — PROGRESS NOTE ADULT - SUBJECTIVE AND OBJECTIVE BOX
[facility] NEPHROLOGY- PROGRESS NOTE    56y Female with history of ESRD on HD presents with vomiting and diarrhea found to be COVID-19 positive. Nephrology consulted for ESRD status.    REVIEW OF SYSTEMS:  Gen: no changes in weight  Cards: no chest pain  Resp: no dyspnea  GI: no nausea or vomiting or diarrhea + ab wound pain  Vascular: no LE edema    caffeine (Nausea)  latex (Rash)  penicillin (Nausea)  strawberry (Rash)      Hospital Medications: Medications reviewed      VITALS:  T(F): 98.1 (02-05-22 @ 06:02), Max: 98.6 (02-04-22 @ 18:11)  HR: 67 (02-05-22 @ 06:02)  BP: 129/61 (02-05-22 @ 06:02)  RR: 17 (02-05-22 @ 06:02)  SpO2: 100% (02-05-22 @ 06:02)  Wt(kg): --    02-04 @ 07:01  -  02-05 @ 07:00  --------------------------------------------------------  IN: 800 mL / OUT: 2200 mL / NET: -1400 mL        PHYSICAL EXAM:    Gen: NAD, calm  Cards: RRR, +S1/S2, no M/G/R  Resp: CTA B/L  GI: soft, RLQ bandage/tender  Vascular: no LE edema B/L, LUE AVF + bruit/thrill      LABS:  02-04    133<L>  |  89<L>  |  51<H>  ----------------------------<  210<H>  4.7   |  22  |  5.29<H>    Ca    9.4      04 Feb 2022 21:48  Phos  3.0     02-04  Mg     2.60     02-04    TPro  7.9  /  Alb  3.7  /  TBili  <0.2  /  DBili      /  AST  10  /  ALT  11  /  AlkPhos  419<H>  02-04    Creatinine Trend: 5.29 <--, 3.95 <--, 7.34 <--, 5.34 <--, 7.54 <--, 6.01 <--                        7.9    11.05 )-----------( 499      ( 04 Feb 2022 21:48 )             27.7     Urine Studies:

## 2022-02-05 NOTE — PROGRESS NOTE ADULT - ASSESSMENT
56y Female with history of ESRD on HD presents with vomiting and diarrhea found to be COVID-19 positive. Nephrology consulted for ESRD status.    1) ESRD: Last HD on 2/4 with mild intradialytic hypotension and 1.8L removed. Plan for next maintenance HD 2/7. Monitor electrolytes.    2) HTN with ESRD: BP acceptable. Continue with current medications. Monitor BP.    3) Anemia of renal disease: Hb low with elevated ferritin. Continue with Epo 12K with HD. Monitor Hb.    4) Secondary HPT of renal origin: Phosphorus acceptable with low iPTH. Sensipar decreased to 60 mg PO daily. Continue with renvela 1 tab with meals (goal < 4.5 given concerns for calciphylaxis). Monitor serum calcium and phosphorus.    5) Ab wound: Appreciate dermatology follow up. Ddx includes calciphylaxis versus pyoderma gangrenosum. S/P biopsy. Continue with empiric sodium thiosulfate with HD. Follow up Derm.      [facility] NEPHROLOGY  Keaton Lopez M.D.  Juma Green D.O.  Myla Hoffmann M.D.  Julia Brewer, JASIEL, ANP-C    Telephone: [phone]  Facsimile: [phone]    [address] [place] [address]

## 2022-02-05 NOTE — PROGRESS NOTE ADULT - SUBJECTIVE AND OBJECTIVE BOX
SUBJECTIVE / OVERNIGHT EVENTS:pt seen and examined, c/o pain at the wound site  2-5-22    MEDICATIONS  (STANDING):  apixaban 2.5 milliGRAM(s) Oral two times a day  aspirin enteric coated 81 milliGRAM(s) Oral daily  atorvastatin 80 milliGRAM(s) Oral at bedtime  buPROPion XL (24-Hour) . 150 milliGRAM(s) Oral daily  chlorhexidine 2% Cloths 1 Application(s) Topical daily  cinacalcet 60 milliGRAM(s) Oral daily  Dakins Solution - 1/4 Strength 1 Application(s) Topical two times a day  dextrose 40% Gel 15 Gram(s) Oral once  dextrose 5%. 1000 milliLiter(s) (50 mL/Hr) IV Continuous <Continuous>  dextrose 5%. 1000 milliLiter(s) (100 mL/Hr) IV Continuous <Continuous>  dextrose 50% Injectable 25 Gram(s) IV Push once  dextrose 50% Injectable 12.5 Gram(s) IV Push once  dextrose 50% Injectable 25 Gram(s) IV Push once  diltiazem    milliGRAM(s) Oral daily  epoetin anabela-epbx (RETACRIT) Injectable 99666 Unit(s) IV Push <User Schedule>  gabapentin 300 milliGRAM(s) Oral daily  glucagon  Injectable 1 milliGRAM(s) IntraMuscular once  heparin   Injectable. 500 Unit(s) Dialysis. every 1 hour  insulin glargine Injectable (LANTUS) 5 Unit(s) SubCutaneous at bedtime  insulin lispro (ADMELOG) corrective regimen sliding scale   SubCutaneous three times a day before meals  insulin lispro (ADMELOG) corrective regimen sliding scale   SubCutaneous at bedtime  insulin lispro Injectable (ADMELOG) 5 Unit(s) SubCutaneous three times a day before meals  loratadine 10 milliGRAM(s) Oral daily  melatonin 6 milliGRAM(s) Oral at bedtime  methylPREDNISolone sodium succinate Injectable 35 milliGRAM(s) IV Push two times a day  mirtazapine 7.5 milliGRAM(s) Oral at bedtime  montelukast 10 milliGRAM(s) Oral at bedtime  oxyCODONE  ER Tablet 10 milliGRAM(s) Oral every 12 hours  pantoprazole    Tablet 40 milliGRAM(s) Oral before breakfast  polyethylene glycol 3350 17 Gram(s) Oral two times a day  senna 2 Tablet(s) Oral at bedtime  sertraline 50 milliGRAM(s) Oral daily  sevelamer carbonate 800 milliGRAM(s) Oral three times a day with meals  sodium thiosulfate IVPB 25 Gram(s) IV Intermittent <User Schedule>  tacrolimus   0.1% Ointment 1 Application(s) Topical daily  traZODone 100 milliGRAM(s) Oral at bedtime    MEDICATIONS  (PRN):  acetaminophen     Tablet .. 650 milliGRAM(s) Oral every 8 hours PRN Mild Pain  oxyCODONE    IR 5 milliGRAM(s) Oral every 4 hours PRN Moderate Pain (4 - 6)  oxyCODONE    IR 7.5 milliGRAM(s) Oral every 4 hours PRN Severe Pain (7 - 10)    Vital Signs Last 24 Hrs  T(C): 36.6 (02-05-22 @ 09:00), Max: 36.9 (02-05-22 @ 01:25)  T(F): 97.9 (02-05-22 @ 09:00), Max: 98.5 (02-05-22 @ 01:25)  HR: 70 (02-05-22 @ 09:00) (66 - 73)  BP: 145/76 (02-05-22 @ 09:00) (114/81 - 145/76)  BP(mean): --  RR: 17 (02-05-22 @ 09:00) (17 - 18)  SpO2: 100% (02-05-22 @ 09:00) (100% - 100%)      Constitutional: No fever, fatigue  Skin: No rash.  Eyes: No recent vision problems or eye pain.  ENT: No congestion, ear pain, or sore throat.  Cardiovascular: No chest pain or palpation.  Respiratory: No cough, shortness of breath, congestion, or wheezing.  Gastrointestinal: No abdominal pain, nausea, vomiting, or diarrhea.  Genitourinary: No dysuria.  Musculoskeletal: No joint swelling.  Neurologic: No headache.    PHYSICAL EXAM:  GENERAL: NAD  EYES: EOMI, PERRLA  NECK: Supple, No JVD  CHEST/LUNG: dec breath sounds at bases  HEART:  S1 , S2 +  ABDOMEN: soft , bs+, abd wound +  EXTREMITIES:  edema+  NEUROLOGY:alert awake    LABS:  02-04    133<L>  |  89<L>  |  51<H>  ----------------------------<  210<H>  4.7   |  22  |  5.29<H>    Ca    9.4      04 Feb 2022 21:48  Phos  3.0     02-04  Mg     2.60     02-04    TPro  7.9  /  Alb  3.7  /  TBili  <0.2  /  DBili      /  AST  10  /  ALT  11  /  AlkPhos  419<H>  02-04    Creatinine Trend: 5.29 <--, 3.95 <--, 7.34 <--, 5.34 <--, 7.54 <--, 6.01 <--                        7.9    11.05 )-----------( 499      ( 04 Feb 2022 21:48 )             27.7     Urine Studies:            LIVER FUNCTIONS - ( 04 Feb 2022 21:48 )  Alb: 3.7 g/dL / Pro: 7.9 g/dL / ALK PHOS: 419 U/L / ALT: 11 U/L / AST: 10 U/L / GGT: x

## 2022-02-05 NOTE — PROGRESS NOTE ADULT - ASSESSMENT
a/p  56 year old female with hx of morbid obesity, CHAMP not on home O2, ESRD (HD MWF), HTN, DM, COPD, Afib no longer on AC, chronic R pannus wound, followed by Dr. Layne, sent by visiting RN for worsening wound.    #Chronic Pannus Wound  -s/p IV abx per primary team  -med/derm  f/u   -sx recs appreciated     #Afib  -stable, in NSR   -cont eliquis for now, elevated inflamm markers/ ddimer given covid, h/h noted- gi /renal following   -cont cardizem    #Covid-19+  -resolved   -med f/u     #Hypertension  -overall stable   -cont current meds    #ESRD on HD  -HD per renal    # Near Syncope  -tele no events   -orthostatics neg  -echo w grossly nl lv sys fxn      35 minutes spent on total encounter; more than 50% of the visit was spent counseling and/or coordinating care by the attending physician.

## 2022-02-05 NOTE — PROGRESS NOTE ADULT - SUBJECTIVE AND OBJECTIVE BOX
CARDIOLOGY FOLLOW UP NOTE - DR. JAQUEZ    Patient Name: ANTONIA ORTIZ  Date of Service: 02-05-22    no new events    Subjective:    cv: denies chest pain, dyspnea, palpitations, dizziness  pulmonary: denies cough  GI: denies abdominal pain, nausea, vomiting  vascular/legs: no edema   skin: no rash  ROS: otherwise negative   overnight events:      PHYSICAL EXAM:  T(C): 36.6 (02-05-22 @ 09:00), Max: 37 (02-04-22 @ 18:11)  HR: 70 (02-05-22 @ 09:00) (63 - 73)  BP: 145/76 (02-05-22 @ 09:00) (114/81 - 145/76)  RR: 17 (02-05-22 @ 09:00) (17 - 20)  SpO2: 100% (02-05-22 @ 09:00) (100% - 100%)  Wt(kg): --  I&O's Summary    04 Feb 2022 07:01  -  05 Feb 2022 07:00  --------------------------------------------------------  IN: 800 mL / OUT: 2200 mL / NET: -1400 mL      Daily     Daily     Appearance: Normal	  Cardiovascular: Normal S1 S2,RRR, No JVD, No murmurs  Respiratory: Lungs clear to auscultation	  Gastrointestinal:  Soft, Non-tender, + BS	  Extremities: Normal range of motion, No clubbing, cyanosis or edema      Home Medications:  aspirin 81 mg oral delayed release tablet: 1 tab(s) orally once a day (12 Jan 2022 17:49)  buPROPion 150 mg/24 hours (XL) oral tablet, extended release: 1 tab(s) orally once a day (in the morning) (12 Jan 2022 17:49)  cetirizine 10 mg oral tablet: 1 tab(s) orally once a day (12 Jan 2022 17:49)  dilTIAZem 120 mg/24 hours oral tablet, extended release: 1 tab(s) orally once a day (12 Jan 2022 17:49)  doxepin 25 mg oral capsule: 1 cap(s) orally once a day (at bedtime) (12 Jan 2022 17:49)  Eliquis 2.5 mg oral tablet: 1 tab(s) orally 2 times a day    Pharmacy states patient no longer wants to fill this medication (12 Jan 2022 17:49)  furosemide 80 mg oral tablet: 1 tab(s) orally once a day    Pharmacy states patient no longer wants to fill this medication (12 Jan 2022 17:49)  gabapentin 300 mg oral capsule: 1 cap(s) orally 2 times a day (12 Jan 2022 17:49)  hydrOXYzine hydrochloride 25 mg oral tablet: 1 tab(s) orally 2 times a day, As Needed (12 Jan 2022 17:49)  lanthanum 1000 mg oral tablet, chewable: 2 tab(s) orally with meals and 1 tab(s) orally with snacks    Pharmacy states patient no longer wants to fill this medication (12 Jan 2022 17:49)  mirtazapine 7.5 mg oral tablet: 1 tab(s) orally once a day (at bedtime) (12 Jan 2022 17:49)  montelukast 10 mg oral tablet: 1 tab(s) orally once a day (in the evening) (12 Jan 2022 17:49)  mupirocin 2% topical ointment: Apply sparingly to affected area 2 times a day as directed (12 Jan 2022 17:49)  nystatin 100,000 units/mL oral suspension: 5 milliliter(s) orally 4 times a day, as directed (12 Jan 2022 17:49)  omeprazole 20 mg oral delayed release capsule: 2 cap(s) orally once a day before breakfast (12 Jan 2022 17:49)  Pennsaid 2% topical solution: Apply topically to affected area 2 times a day (12 Jan 2022 17:49)  rosuvastatin 40 mg oral tablet: 1 tab(s) orally once a day (12 Jan 2022 17:49)  Santyl 250 units/g topical ointment: Apply topically to affected area once a day as directed (12 Jan 2022 17:49)  sertraline 50 mg oral tablet: 1 tab(s) orally once a day (12 Jan 2022 17:49)  Spiriva HandiHaler 18 mcg inhalation capsule: 1 cap(s) inhaled once a day (12 Jan 2022 17:49)  traZODone 100 mg oral tablet: 1 tab(s) orally once a day (at bedtime) (12 Jan 2022 17:49)  Tresiba FlexTouch 100 units/mL subcutaneous solution: 80 unit(s) subcutaneous once a day (at bedtime) (12 Jan 2022 17:49)  Trulicity Pen 1.5 mg/0.5 mL subcutaneous solution: 1 dose(s) subcutaneous once a week (12 Jan 2022 17:49)      MEDICATIONS  (STANDING):  apixaban 2.5 milliGRAM(s) Oral two times a day  aspirin enteric coated 81 milliGRAM(s) Oral daily  atorvastatin 80 milliGRAM(s) Oral at bedtime  buPROPion XL (24-Hour) . 150 milliGRAM(s) Oral daily  chlorhexidine 2% Cloths 1 Application(s) Topical daily  cinacalcet 60 milliGRAM(s) Oral daily  Dakins Solution - 1/4 Strength 1 Application(s) Topical two times a day  dextrose 40% Gel 15 Gram(s) Oral once  dextrose 5%. 1000 milliLiter(s) (50 mL/Hr) IV Continuous <Continuous>  dextrose 5%. 1000 milliLiter(s) (100 mL/Hr) IV Continuous <Continuous>  dextrose 50% Injectable 25 Gram(s) IV Push once  dextrose 50% Injectable 12.5 Gram(s) IV Push once  dextrose 50% Injectable 25 Gram(s) IV Push once  diltiazem    milliGRAM(s) Oral daily  epoetin anabela-epbx (RETACRIT) Injectable 12231 Unit(s) IV Push <User Schedule>  gabapentin 300 milliGRAM(s) Oral daily  glucagon  Injectable 1 milliGRAM(s) IntraMuscular once  heparin   Injectable. 500 Unit(s) Dialysis. every 1 hour  insulin glargine Injectable (LANTUS) 5 Unit(s) SubCutaneous at bedtime  insulin lispro (ADMELOG) corrective regimen sliding scale   SubCutaneous at bedtime  insulin lispro (ADMELOG) corrective regimen sliding scale   SubCutaneous three times a day before meals  insulin lispro Injectable (ADMELOG) 5 Unit(s) SubCutaneous three times a day before meals  loratadine 10 milliGRAM(s) Oral daily  melatonin 6 milliGRAM(s) Oral at bedtime  methylPREDNISolone sodium succinate Injectable 35 milliGRAM(s) IV Push two times a day  mirtazapine 7.5 milliGRAM(s) Oral at bedtime  montelukast 10 milliGRAM(s) Oral at bedtime  oxyCODONE  ER Tablet 10 milliGRAM(s) Oral every 12 hours  pantoprazole    Tablet 40 milliGRAM(s) Oral before breakfast  polyethylene glycol 3350 17 Gram(s) Oral two times a day  senna 2 Tablet(s) Oral at bedtime  sertraline 50 milliGRAM(s) Oral daily  sevelamer carbonate 800 milliGRAM(s) Oral three times a day with meals  sodium thiosulfate IVPB 25 Gram(s) IV Intermittent <User Schedule>  tacrolimus   0.1% Ointment 1 Application(s) Topical daily  traZODone 100 milliGRAM(s) Oral at bedtime      TELEMETRY: 	    ECG:  	  RADIOLOGY:   DIAGNOSTIC TESTING:  [ ] Echocardiogram:  [ ] Catheterization:  [ ] Stress Test:    OTHER: 	    LABS:	 	    CARDIAC MARKERS:                                      7.9    11.05 )-----------( 499      ( 04 Feb 2022 21:48 )             27.7     02-04    133<L>  |  89<L>  |  51<H>  ----------------------------<  210<H>  4.7   |  22  |  5.29<H>    Ca    9.4      04 Feb 2022 21:48  Phos  3.0     02-04  Mg     2.60     02-04    TPro  7.9  /  Alb  3.7  /  TBili  <0.2  /  DBili  x   /  AST  10  /  ALT  11  /  AlkPhos  419<H>  02-04    proBNP:     Lipid Profile:   HgA1c:     Creatinine, Serum: 5.29 mg/dL (02-04-22 @ 21:48)  Creatinine, Serum: 3.95 mg/dL (02-03-22 @ 06:33)  Creatinine, Serum: 7.34 mg/dL (02-02-22 @ 22:21)

## 2022-02-05 NOTE — CHART NOTE - NSCHARTNOTESELECT_GEN_ALL_CORE
Reminder call placed for telehealth appointment tomorrow. Patient confirmed appointment. Chart Note/Nutrition Services

## 2022-02-05 NOTE — CHART NOTE - NSCHARTNOTEFT_GEN_A_CORE
It was communicated to RD from Endocrinology that patient has several  / meal preference requests.  RD updated food preferences in [facility] per patient request.

## 2022-02-06 NOTE — PROGRESS NOTE ADULT - SUBJECTIVE AND OBJECTIVE BOX
SUBJECTIVE / OVERNIGHT EVENTS:pt seen and examined, c/o pain at the wound site  2-6-22    MEDICATIONS  (STANDING):  apixaban 2.5 milliGRAM(s) Oral two times a day  aspirin enteric coated 81 milliGRAM(s) Oral daily  atorvastatin 80 milliGRAM(s) Oral at bedtime  buPROPion XL (24-Hour) . 150 milliGRAM(s) Oral daily  chlorhexidine 2% Cloths 1 Application(s) Topical daily  cinacalcet 60 milliGRAM(s) Oral daily  Dakins Solution - 1/4 Strength 1 Application(s) Topical two times a day  dextrose 40% Gel 15 Gram(s) Oral once  dextrose 5%. 1000 milliLiter(s) (50 mL/Hr) IV Continuous <Continuous>  dextrose 5%. 1000 milliLiter(s) (100 mL/Hr) IV Continuous <Continuous>  dextrose 50% Injectable 25 Gram(s) IV Push once  dextrose 50% Injectable 12.5 Gram(s) IV Push once  dextrose 50% Injectable 25 Gram(s) IV Push once  diltiazem    milliGRAM(s) Oral daily  epoetin anabela-epbx (RETACRIT) Injectable 38982 Unit(s) IV Push <User Schedule>  gabapentin 300 milliGRAM(s) Oral daily  glucagon  Injectable 1 milliGRAM(s) IntraMuscular once  heparin   Injectable. 500 Unit(s) Dialysis. every 1 hour  insulin glargine Injectable (LANTUS) 10 Unit(s) SubCutaneous at bedtime  insulin lispro (ADMELOG) corrective regimen sliding scale   SubCutaneous at bedtime  insulin lispro (ADMELOG) corrective regimen sliding scale   SubCutaneous three times a day before meals  insulin lispro Injectable (ADMELOG) 7 Unit(s) SubCutaneous three times a day before meals  loratadine 10 milliGRAM(s) Oral daily  melatonin 6 milliGRAM(s) Oral at bedtime  methylPREDNISolone sodium succinate Injectable 35 milliGRAM(s) IV Push two times a day  mirtazapine 7.5 milliGRAM(s) Oral at bedtime  montelukast 10 milliGRAM(s) Oral at bedtime  oxyCODONE  ER Tablet 10 milliGRAM(s) Oral every 12 hours  pantoprazole    Tablet 40 milliGRAM(s) Oral before breakfast  polyethylene glycol 3350 17 Gram(s) Oral two times a day  senna 2 Tablet(s) Oral at bedtime  sertraline 50 milliGRAM(s) Oral daily  sevelamer carbonate 800 milliGRAM(s) Oral three times a day with meals  sodium thiosulfate IVPB 25 Gram(s) IV Intermittent <User Schedule>  tacrolimus   0.1% Ointment 1 Application(s) Topical daily  traZODone 100 milliGRAM(s) Oral at bedtime    MEDICATIONS  (PRN):  acetaminophen     Tablet .. 650 milliGRAM(s) Oral every 8 hours PRN Mild Pain  diphenhydrAMINE Injectable 25 milliGRAM(s) IV Push <User Schedule> PRN Itching  oxyCODONE    IR 5 milliGRAM(s) Oral every 4 hours PRN Moderate Pain (4 - 6)  oxyCODONE    IR 7.5 milliGRAM(s) Oral every 4 hours PRN Severe Pain (7 - 10)    Vital Signs Last 24 Hrs  T(C): 36.4 (02-06-22 @ 13:13), Max: 36.5 (02-05-22 @ 21:56)  T(F): 97.6 (02-06-22 @ 13:13), Max: 97.7 (02-05-22 @ 21:56)  HR: 62 (02-06-22 @ 13:13) (62 - 67)  BP: 131/60 (02-06-22 @ 13:13) (131/60 - 140/86)  BP(mean): --  RR: 18 (02-06-22 @ 13:13) (18 - 18)  SpO2: 100% (02-06-22 @ 13:13) (100% - 100%)        Constitutional: No fever, fatigue  Skin: No rash.  Eyes: No recent vision problems or eye pain.  ENT: No congestion, ear pain, or sore throat.  Cardiovascular: No chest pain or palpation.  Respiratory: No cough, shortness of breath, congestion, or wheezing.  Gastrointestinal: No abdominal pain, nausea, vomiting, or diarrhea.  Genitourinary: No dysuria.  Musculoskeletal: No joint swelling.  Neurologic: No headache.    PHYSICAL EXAM:  GENERAL: NAD  EYES: EOMI, PERRLA  NECK: Supple, No JVD  CHEST/LUNG: dec breath sounds at bases  HEART:  S1 , S2 +  ABDOMEN: soft , bs+, abd wound +  EXTREMITIES:  edema+  NEUROLOGY:alert awake    LABS:  02-04    133<L>  |  89<L>  |  51<H>  ----------------------------<  210<H>  4.7   |  22  |  5.29<H>    Ca    9.4      04 Feb 2022 21:48  Phos  3.0     02-04  Mg     2.60     02-04    TPro  7.9  /  Alb  3.7  /  TBili  <0.2  /  DBili      /  AST  10  /  ALT  11  /  AlkPhos  419<H>  02-04    Creatinine Trend: 5.29 <--, 3.95 <--, 7.34 <--, 5.34 <--, 7.54 <--                        7.9    11.05 )-----------( 499      ( 04 Feb 2022 21:48 )             27.7     Urine Studies:            LIVER FUNCTIONS - ( 04 Feb 2022 21:48 )  Alb: 3.7 g/dL / Pro: 7.9 g/dL / ALK PHOS: 419 U/L / ALT: 11 U/L / AST: 10 U/L / GGT: x                       LIVER FUNCTIONS - ( 04 Feb 2022 21:48 )  Alb: 3.7 g/dL / Pro: 7.9 g/dL / ALK PHOS: 419 U/L / ALT: 11 U/L / AST: 10 U/L / GGT: x

## 2022-02-06 NOTE — PROGRESS NOTE ADULT - ASSESSMENT
a/p  56 year old female with hx of morbid obesity, CHAMP not on home O2, ESRD (HD MWF), HTN, DM, COPD, Afib no longer on AC, chronic R pannus wound, followed by Dr. Layne, sent by visiting RN for worsening wound.    #Chronic Pannus Wound  -s/p IV abx per primary team  -med/derm  f/u   -sx recs appreciated     #Afib  -stable, in NSR   -cont eliquis   -cont cardizem    #Covid-19+  -resolved   -med f/u     #Hypertension  -overall stable   -cont current meds    #ESRD on HD  -HD per renal    # Near Syncope  -tele no events   -orthostatics neg  -echo w grossly nl lv sys fxn      35 minutes spent on total encounter; more than 50% of the visit was spent counseling and/or coordinating care by the attending physician.

## 2022-02-06 NOTE — PROGRESS NOTE ADULT - ASSESSMENT
56 yr old F with morbid obesity, CHAMP not on home O2, ESRD (HD MWF), HTN, DM2 A1C 7.0, COPD, Afib no longer on AC, chronic R pannus wound here with worsening wound, diarrhea and found to be COVID+. Has poor po intake at this time.     1. Type 2 diabetes mellitus   A1c 7.0% (may be inaccurate in setting of ESRD)  Home Regimen: Tresiba 80 units HS and Trulicity 1.5mg subq weekly    While inpatient:  BG target 100-180 mg/dL  Now on steroids with severe hyperglycemia. Most recent  mg/dl at 17:10, patient was given increased pre-meal insulin and correction. Please recheck in 2h (19:30) to ensure it is coming down.   Note, If FS above 400 mg/dl again, would do immediate FS repeat to confirm, prior to treating  Increase Lantus to 10 units SQ qHS   Increase Admelog to 7 units SQ TID before meals (Hold if NPO/not eating meal)    Increase Admelog correctional scale to moderate dose before meals, can continue low dose at bedtime  Check BG before meals and bedtime  Hypoglycemia protocol   Consistent carbohydrate diet    Discharge Plan:  STOP Trulicity (patient ESRD on HD)  Depends on steroid plan  If no steroids, likely dc plan is basal/oral regimen. For oral agent, depends on inpatient requirements but can consider renally dosed DPP4 (Januvia 25 mg or Tradjenta 5 mg daily) VS Prandin before meals   Patient may benefit from switching to 22-24h acting basal insulin eg Levemir or Lantus, instead of Tresiba  Please assess insulin coverage for Levemir or Lantus, instead of Tresiba pens  Consider CGM (such as Freestyle Libre2 outpatient)  If desiring to followup with [facility]: [address], [address], [place] [address], [phone]    2. HTN  Management per primary team     3. Hyperlipidemia  Continue home dose of Atorvastatin 80 mg  Note, limited benefit in ESRD. Followup lipid panel as outpatient    Priscilla Patel  Nurse Practitioner  Division of Endocrinology & Diabetes  In house pager #[phone]/long range pager #[phone]    If before 9AM or after 6PM, or on weekends/holidays, please call endocrine answering service for assistance ([phone]).  For nonurgent matters email [email or web address] for assistance.
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
## 2022-02-06 NOTE — PROGRESS NOTE ADULT - ASSESSMENT
56y Female with history of ESRD on HD presents with vomiting and diarrhea found to be COVID-19 positive. Nephrology consulted for ESRD status.    1) ESRD: Last HD on 2/4 with mild intradialytic hypotension and 1.8L removed. Plan for next maintenance HD on 2/7. Monitor electrolytes.    2) HTN with ESRD: BP acceptable. Continue with current medications. Monitor BP.    3) Anemia of renal disease: Hb low with elevated ferritin. Continue with Epo 12K with HD. Monitor Hb.    4) Secondary HPT of renal origin: Phosphorus acceptable with low iPTH. Sensipar decreased to 60 mg PO daily. Continue with renvela 1 tab with meals (goal < 4.5 given concerns for calciphylaxis). Monitor serum calcium and phosphorus.    5) Ab wound: Appreciate dermatology follow up. Ddx includes calciphylaxis versus pyoderma gangrenosum. S/P biopsy. Continue with empiric sodium thiosulfate with HD. Follow up Derm.      [facility]  Keaton Lopez M.D.  Juma Green D.O.  Myla Hoffmann M.D.  Julia Brewer, JASIEL, ANP-C    Telephone: [phone]  Facsimile: [phone]    [address] [place] [address]

## 2022-02-06 NOTE — PROGRESS NOTE ADULT - SUBJECTIVE AND OBJECTIVE BOX
[facility] NEPHROLOGY- PROGRESS NOTE    56y Female with history of ESRD on HD presents with vomiting and diarrhea found to be COVID-19 positive. Nephrology consulted for ESRD status.    REVIEW OF SYSTEMS:  Gen: no changes in weight  Cards: no chest pain  Resp: no dyspnea  GI: no nausea or vomiting or diarrhea + ab wound pain  Vascular: no LE edema    caffeine (Nausea)  latex (Rash)  penicillin (Nausea)  strawberry (Rash)      Hospital Medications: Medications reviewed      VITALS:  T(F): 97.6 (02-06-22 @ 06:30), Max: 97.7 (02-05-22 @ 21:56)  HR: 66 (02-06-22 @ 06:30)  BP: 140/86 (02-06-22 @ 06:30)  RR: 18 (02-06-22 @ 06:30)  SpO2: 100% (02-06-22 @ 06:30)  Wt(kg): --    02-05 @ 07:01  -  02-06 @ 07:00  --------------------------------------------------------  IN: 170 mL / OUT: 0 mL / NET: 170 mL        PHYSICAL EXAM:    Gen: NAD, calm  Cards: RRR, +S1/S2, no M/G/R  Resp: CTA B/L  GI: soft, RLQ bandage/tender  Vascular: no LE edema B/L, LUE AVF + bruit/thrill      LABS:  02-04    133<L>  |  89<L>  |  51<H>  ----------------------------<  210<H>  4.7   |  22  |  5.29<H>    Ca    9.4      04 Feb 2022 21:48  Phos  3.0     02-04  Mg     2.60     02-04    TPro  7.9  /  Alb  3.7  /  TBili  <0.2  /  DBili      /  AST  10  /  ALT  11  /  AlkPhos  419<H>  02-04    Creatinine Trend: 5.29 <--, 3.95 <--, 7.34 <--, 5.34 <--, 7.54 <--                        7.9    11.05 )-----------( 499      ( 04 Feb 2022 21:48 )             27.7     Urine Studies:

## 2022-02-06 NOTE — PROGRESS NOTE ADULT - SUBJECTIVE AND OBJECTIVE BOX
Chief Complaint: DM 2 with hyperglycemia     History: Patient seen at bedside. Reports she is eating partial meals. Patient now on steroids - Solumedrol 35 mg BID - with severe hyperglycemia noted    MEDICATIONS  (STANDING):  apixaban 2.5 milliGRAM(s) Oral two times a day  aspirin enteric coated 81 milliGRAM(s) Oral daily  atorvastatin 80 milliGRAM(s) Oral at bedtime  buPROPion XL (24-Hour) . 150 milliGRAM(s) Oral daily  chlorhexidine 2% Cloths 1 Application(s) Topical daily  cinacalcet 60 milliGRAM(s) Oral daily  Dakins Solution - 1/4 Strength 1 Application(s) Topical two times a day  dextrose 40% Gel 15 Gram(s) Oral once  dextrose 5%. 1000 milliLiter(s) (50 mL/Hr) IV Continuous <Continuous>  dextrose 5%. 1000 milliLiter(s) (100 mL/Hr) IV Continuous <Continuous>  dextrose 50% Injectable 25 Gram(s) IV Push once  dextrose 50% Injectable 12.5 Gram(s) IV Push once  dextrose 50% Injectable 25 Gram(s) IV Push once  diltiazem    milliGRAM(s) Oral daily  epoetin anabela-epbx (RETACRIT) Injectable 62017 Unit(s) IV Push <User Schedule>  gabapentin 300 milliGRAM(s) Oral daily  glucagon  Injectable 1 milliGRAM(s) IntraMuscular once  heparin   Injectable. 500 Unit(s) Dialysis. every 1 hour  insulin glargine Injectable (LANTUS) 5 Unit(s) SubCutaneous at bedtime  insulin lispro (ADMELOG) corrective regimen sliding scale   SubCutaneous at bedtime  insulin lispro (ADMELOG) corrective regimen sliding scale   SubCutaneous three times a day before meals  insulin lispro Injectable (ADMELOG) 7 Unit(s) SubCutaneous three times a day before meals  loratadine 10 milliGRAM(s) Oral daily  melatonin 6 milliGRAM(s) Oral at bedtime  methylPREDNISolone sodium succinate Injectable 35 milliGRAM(s) IV Push two times a day  mirtazapine 7.5 milliGRAM(s) Oral at bedtime  montelukast 10 milliGRAM(s) Oral at bedtime  oxyCODONE  ER Tablet 10 milliGRAM(s) Oral every 12 hours  pantoprazole    Tablet 40 milliGRAM(s) Oral before breakfast  polyethylene glycol 3350 17 Gram(s) Oral two times a day  senna 2 Tablet(s) Oral at bedtime  sertraline 50 milliGRAM(s) Oral daily  sevelamer carbonate 800 milliGRAM(s) Oral three times a day with meals  sodium thiosulfate IVPB 25 Gram(s) IV Intermittent <User Schedule>  tacrolimus   0.1% Ointment 1 Application(s) Topical daily  traZODone 100 milliGRAM(s) Oral at bedtime    MEDICATIONS  (PRN):  acetaminophen     Tablet .. 650 milliGRAM(s) Oral every 8 hours PRN Mild Pain  diphenhydrAMINE Injectable 25 milliGRAM(s) IV Push <User Schedule> PRN Itching  oxyCODONE    IR 5 milliGRAM(s) Oral every 4 hours PRN Moderate Pain (4 - 6)  oxyCODONE    IR 7.5 milliGRAM(s) Oral every 4 hours PRN Severe Pain (7 - 10)      Allergies  latex (Rash)  penicillin (Nausea)  strawberry (Rash)    Intolerances  caffeine (Nausea)    Review of Systems:  Cardiovascular: No chest pain  Respiratory: No SOB  GI: No nausea, vomiting  Endocrine: no hypoglycemia     PHYSICAL EXAM:  VITALS: T(C): 36.4 (02-06-22 @ 13:13)  T(F): 97.6 (02-06-22 @ 13:13), Max: 97.7 (02-05-22 @ 21:56)  HR: 62 (02-06-22 @ 13:13) (62 - 67)  BP: 131/60 (02-06-22 @ 13:13) (131/60 - 140/86)  RR:  (18 - 18)  SpO2:  (100% - 100%)  Wt(kg): --  GENERAL: NAD  EYES: No proptosis, no lid lag, anicteric  HEENT:  Atraumatic, Normocephalic, moist mucous membranes  RESPIRATORY: unlabored respirations     CAPILLARY BLOOD GLUCOSE    POCT Blood Glucose.: 511 mg/dL (06 Feb 2022 17:10)  POCT Blood Glucose.: 373 mg/dL (06 Feb 2022 11:55)  POCT Blood Glucose.: 293 mg/dL (06 Feb 2022 07:51)  POCT Blood Glucose.: 225 mg/dL (05 Feb 2022 21:25)      02-04    133<L>  |  89<L>  |  51<H>  ----------------------------<  210<H>  4.7   |  22  |  5.29<H>    EGFR if : 10<L>  EGFR if non : 8<L>    Ca    9.4      02-04  Mg     2.60     02-04  Phos  3.0     02-04    TPro  7.9  /  Alb  3.7  /  TBili  <0.2  /  DBili  x   /  AST  10  /  ALT  11  /  AlkPhos  419<H>  02-04      A1C with Estimated Average Glucose Result: 7.0 % (01-13-22 @ 08:21)  A1C with Estimated Average Glucose Result: 6.7 % (08-31-21 @ 09:30)  A1C with Estimated Average Glucose Result: 7.2 % (04-28-21 @ 07:04)    Diet, Consistent Carbohydrate Renal w/Evening Snack:   Supplement Feeding Modality:  Oral  Nepro Cans or Servings Per Day:  1       Frequency:  Three Times a day (01-17-22 @ 18:07)

## 2022-02-06 NOTE — PROGRESS NOTE ADULT - SUBJECTIVE AND OBJECTIVE BOX
CARDIOLOGY FOLLOW UP NOTE - DR. JAQUEZ    Patient Name: ANTONIA ORTIZ  Date of Service: 02-06-22    no new events    Subjective:    cv: denies chest pain, dyspnea, palpitations, dizziness  pulmonary: denies cough  GI: denies abdominal pain, nausea, vomiting  vascular/legs: no edema   skin: no rash  ROS: otherwise negative   overnight events:      PHYSICAL EXAM:  T(C): 36.4 (02-06-22 @ 06:30), Max: 36.5 (02-05-22 @ 21:56)  HR: 66 (02-06-22 @ 06:30) (66 - 67)  BP: 140/86 (02-06-22 @ 06:30) (138/62 - 140/86)  RR: 18 (02-06-22 @ 06:30) (18 - 18)  SpO2: 100% (02-06-22 @ 06:30) (100% - 100%)  Wt(kg): --  I&O's Summary    05 Feb 2022 07:01  -  06 Feb 2022 07:00  --------------------------------------------------------  IN: 170 mL / OUT: 0 mL / NET: 170 mL      Daily     Daily     Appearance: Normal	  Cardiovascular: Normal S1 S2,RRR, No JVD, No murmurs  Respiratory: Lungs clear to auscultation	  Gastrointestinal:  Soft, Non-tender, + BS	  Extremities: Normal range of motion, No clubbing, cyanosis or edema      Home Medications:  aspirin 81 mg oral delayed release tablet: 1 tab(s) orally once a day (12 Jan 2022 17:49)  buPROPion 150 mg/24 hours (XL) oral tablet, extended release: 1 tab(s) orally once a day (in the morning) (12 Jan 2022 17:49)  cetirizine 10 mg oral tablet: 1 tab(s) orally once a day (12 Jan 2022 17:49)  dilTIAZem 120 mg/24 hours oral tablet, extended release: 1 tab(s) orally once a day (12 Jan 2022 17:49)  doxepin 25 mg oral capsule: 1 cap(s) orally once a day (at bedtime) (12 Jan 2022 17:49)  Eliquis 2.5 mg oral tablet: 1 tab(s) orally 2 times a day    Pharmacy states patient no longer wants to fill this medication (12 Jan 2022 17:49)  furosemide 80 mg oral tablet: 1 tab(s) orally once a day    Pharmacy states patient no longer wants to fill this medication (12 Jan 2022 17:49)  gabapentin 300 mg oral capsule: 1 cap(s) orally 2 times a day (12 Jan 2022 17:49)  hydrOXYzine hydrochloride 25 mg oral tablet: 1 tab(s) orally 2 times a day, As Needed (12 Jan 2022 17:49)  lanthanum 1000 mg oral tablet, chewable: 2 tab(s) orally with meals and 1 tab(s) orally with snacks    Pharmacy states patient no longer wants to fill this medication (12 Jan 2022 17:49)  mirtazapine 7.5 mg oral tablet: 1 tab(s) orally once a day (at bedtime) (12 Jan 2022 17:49)  montelukast 10 mg oral tablet: 1 tab(s) orally once a day (in the evening) (12 Jan 2022 17:49)  mupirocin 2% topical ointment: Apply sparingly to affected area 2 times a day as directed (12 Jan 2022 17:49)  nystatin 100,000 units/mL oral suspension: 5 milliliter(s) orally 4 times a day, as directed (12 Jan 2022 17:49)  omeprazole 20 mg oral delayed release capsule: 2 cap(s) orally once a day before breakfast (12 Jan 2022 17:49)  Pennsaid 2% topical solution: Apply topically to affected area 2 times a day (12 Jan 2022 17:49)  rosuvastatin 40 mg oral tablet: 1 tab(s) orally once a day (12 Jan 2022 17:49)  Santyl 250 units/g topical ointment: Apply topically to affected area once a day as directed (12 Jan 2022 17:49)  sertraline 50 mg oral tablet: 1 tab(s) orally once a day (12 Jan 2022 17:49)  Spiriva HandiHaler 18 mcg inhalation capsule: 1 cap(s) inhaled once a day (12 Jan 2022 17:49)  traZODone 100 mg oral tablet: 1 tab(s) orally once a day (at bedtime) (12 Jan 2022 17:49)  Tresiba FlexTouch 100 units/mL subcutaneous solution: 80 unit(s) subcutaneous once a day (at bedtime) (12 Jan 2022 17:49)  Trulicity Pen 1.5 mg/0.5 mL subcutaneous solution: 1 dose(s) subcutaneous once a week (12 Jan 2022 17:49)      MEDICATIONS  (STANDING):  apixaban 2.5 milliGRAM(s) Oral two times a day  aspirin enteric coated 81 milliGRAM(s) Oral daily  atorvastatin 80 milliGRAM(s) Oral at bedtime  buPROPion XL (24-Hour) . 150 milliGRAM(s) Oral daily  chlorhexidine 2% Cloths 1 Application(s) Topical daily  cinacalcet 60 milliGRAM(s) Oral daily  Dakins Solution - 1/4 Strength 1 Application(s) Topical two times a day  dextrose 40% Gel 15 Gram(s) Oral once  dextrose 5%. 1000 milliLiter(s) (50 mL/Hr) IV Continuous <Continuous>  dextrose 5%. 1000 milliLiter(s) (100 mL/Hr) IV Continuous <Continuous>  dextrose 50% Injectable 25 Gram(s) IV Push once  dextrose 50% Injectable 12.5 Gram(s) IV Push once  dextrose 50% Injectable 25 Gram(s) IV Push once  diltiazem    milliGRAM(s) Oral daily  epoetin anabela-epbx (RETACRIT) Injectable 24557 Unit(s) IV Push <User Schedule>  gabapentin 300 milliGRAM(s) Oral daily  glucagon  Injectable 1 milliGRAM(s) IntraMuscular once  heparin   Injectable. 500 Unit(s) Dialysis. every 1 hour  insulin glargine Injectable (LANTUS) 5 Unit(s) SubCutaneous at bedtime  insulin lispro (ADMELOG) corrective regimen sliding scale   SubCutaneous three times a day before meals  insulin lispro (ADMELOG) corrective regimen sliding scale   SubCutaneous at bedtime  insulin lispro Injectable (ADMELOG) 7 Unit(s) SubCutaneous three times a day before meals  loratadine 10 milliGRAM(s) Oral daily  melatonin 6 milliGRAM(s) Oral at bedtime  methylPREDNISolone sodium succinate Injectable 35 milliGRAM(s) IV Push two times a day  mirtazapine 7.5 milliGRAM(s) Oral at bedtime  montelukast 10 milliGRAM(s) Oral at bedtime  oxyCODONE  ER Tablet 10 milliGRAM(s) Oral every 12 hours  pantoprazole    Tablet 40 milliGRAM(s) Oral before breakfast  polyethylene glycol 3350 17 Gram(s) Oral two times a day  senna 2 Tablet(s) Oral at bedtime  sertraline 50 milliGRAM(s) Oral daily  sevelamer carbonate 800 milliGRAM(s) Oral three times a day with meals  sodium thiosulfate IVPB 25 Gram(s) IV Intermittent <User Schedule>  tacrolimus   0.1% Ointment 1 Application(s) Topical daily  traZODone 100 milliGRAM(s) Oral at bedtime      TELEMETRY: 	    ECG:  	  RADIOLOGY:   DIAGNOSTIC TESTING:  [ ] Echocardiogram:  [ ] Catheterization:  [ ] Stress Test:    OTHER: 	    LABS:	 	    CARDIAC MARKERS:                                      7.9    11.05 )-----------( 499      ( 04 Feb 2022 21:48 )             27.7     02-04    133<L>  |  89<L>  |  51<H>  ----------------------------<  210<H>  4.7   |  22  |  5.29<H>    Ca    9.4      04 Feb 2022 21:48  Phos  3.0     02-04  Mg     2.60     02-04    TPro  7.9  /  Alb  3.7  /  TBili  <0.2  /  DBili  x   /  AST  10  /  ALT  11  /  AlkPhos  419<H>  02-04    proBNP:     Lipid Profile:   HgA1c:     Creatinine, Serum: 5.29 mg/dL (02-04-22 @ 21:48)

## 2022-02-06 NOTE — CHART NOTE - NSCHARTNOTEFT_GEN_A_CORE
Dermatology Chart Note    Interim History:  Unsure how progressing, feels that pain can be controlled with IV medications.     Exam: right pannus with large round, deep ulcer with erythematous undermined borders with surrounding retiform purpura - stable purulent drainage and malodor today  surrounding areas of induration and firm subcutaneous nodules     Assessment and Plan:  #Deep, non-healing ulcer in patient with DM and ESRD on HD. 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 stable purulent drainage and malodor today.  - Bacterial tissue culture 1/25/21 with carbapenem-resistent Pseudomonas, Staph epidermidis, Enterococcus faecalis; reportedly same as previous bacterial culture.   - s/p 10-day course of IV cefepime completed 1/21/22 and other previously other outpatient abx.   - Biopsy 1/25/22 findings non specific. No obvious intravascular calcium deposits although limited sample of subcutaneous tissue. Repeat von Kossa staining (for calcium), and deeper sections from original biopsy were reviewed with initial diagnosis unchanged.   -SPEP, serum DOUG, ANCAs negative.     At this time:  - fu UPEP with immunofixation  - fu final fungal/AFB TC results; still pending  - continue with solumedrol 35mg BID    - continue with sodium thiosulfate  - recommend CT abdomen/pelvis to evaluate for potential calcification in abdominal pannus ulcer   - c/w pain management  - for wound care: c/w topical tacrolimus 0.01% ointment 1-2x daily to wound edges and aquacel Ag dressing    The patient's chart was reviewed in addition to photos of the rash taken by the primary team with the permission of the patient.  Patient was seen at bedside and discussed remotely with the dermatology attending Dr. Bo  Recommendations were communicated with the primary team.  Please page [phone] for further related questions.    Kerry Galloway MD  Resident Physician, PGY2  [facility]  Pager: [phone]  Office: [phone] Dermatology Chart Note    Interim History:  Unsure how progressing, feels that pain can be controlled with IV medications.     Vital Signs Last 24 Hrs  T(C): 36.4 (06 Feb 2022 13:13), Max: 36.5 (05 Feb 2022 21:56)  T(F): 97.6 (06 Feb 2022 13:13), Max: 97.7 (05 Feb 2022 21:56)  HR: 62 (06 Feb 2022 13:13) (62 - 67)  BP: 131/60 (06 Feb 2022 13:13) (131/60 - 140/86)  BP(mean): --  RR: 18 (06 Feb 2022 13:13) (18 - 18)  SpO2: 100% (06 Feb 2022 13:13) (100% - 100%)    Exam: right pannus with large round, deep ulcer with erythematous undermined borders with surrounding retiform purpura - stable purulent drainage and malodor today  surrounding areas of induration and firm subcutaneous nodules     Assessment and Plan:  #Deep, non-healing ulcer in patient with DM and ESRD on HD. 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 stable purulent drainage and malodor today.  - Bacterial tissue culture 1/25/21 with carbapenem-resistent Pseudomonas, Staph epidermidis, Enterococcus faecalis; reportedly same as previous bacterial culture.   - s/p 10-day course of IV cefepime completed 1/21/22 and other previously other outpatient abx.   - Biopsy 1/25/22 findings non specific. No obvious intravascular calcium deposits although limited sample of subcutaneous tissue. Repeat von Kossa staining (for calcium), and deeper sections from original biopsy were reviewed with initial diagnosis unchanged.   -SPEP, serum DOUG, ANCAs negative.  -net-like calcifications not noted on CT A/P      At this time:  - fu UPEP with immunofixation  - fu final fungal/AFB TC results; still pending  - continue with solumedrol 35mg BID    - continue with sodium thiosulfate  - c/w pain management  - for wound care: c/w topical tacrolimus 0.01% ointment 1-2x daily to wound edges and aquacel Ag dressing    The patient's chart was reviewed in addition to photos of the rash taken by the primary team with the permission of the patient.  Patient was seen at bedside and discussed remotely with the dermatology attending Dr. Bo  Recommendations were communicated with the primary team.  Please page [phone] for further related questions.    Kerry Galloway MD  Resident Physician, PGY2  [facility]  Pager: [phone]  Office: [phone]

## 2022-02-07 NOTE — PROGRESS NOTE ADULT - SUBJECTIVE AND OBJECTIVE BOX
SUBJECTIVE / OVERNIGHT EVENTS:pt seen and examined, c/o pain at the wound site  2-7-22    MEDICATIONS  (STANDING):  apixaban 2.5 milliGRAM(s) Oral two times a day  aspirin enteric coated 81 milliGRAM(s) Oral daily  atorvastatin 80 milliGRAM(s) Oral at bedtime  buPROPion XL (24-Hour) . 150 milliGRAM(s) Oral daily  chlorhexidine 2% Cloths 1 Application(s) Topical daily  cinacalcet 60 milliGRAM(s) Oral daily  Dakins Solution - 1/4 Strength 1 Application(s) Topical two times a day  dextrose 40% Gel 15 Gram(s) Oral once  dextrose 5%. 1000 milliLiter(s) (50 mL/Hr) IV Continuous <Continuous>  dextrose 5%. 1000 milliLiter(s) (100 mL/Hr) IV Continuous <Continuous>  dextrose 50% Injectable 25 Gram(s) IV Push once  dextrose 50% Injectable 12.5 Gram(s) IV Push once  dextrose 50% Injectable 25 Gram(s) IV Push once  diltiazem    milliGRAM(s) Oral daily  epoetin anabela-epbx (RETACRIT) Injectable 36097 Unit(s) IV Push <User Schedule>  gabapentin 300 milliGRAM(s) Oral daily  glucagon  Injectable 1 milliGRAM(s) IntraMuscular once  heparin   Injectable. 500 Unit(s) Dialysis. every 1 hour  insulin glargine Injectable (LANTUS) 16 Unit(s) SubCutaneous at bedtime  insulin lispro (ADMELOG) corrective regimen sliding scale   SubCutaneous three times a day before meals  insulin lispro (ADMELOG) corrective regimen sliding scale   SubCutaneous at bedtime  insulin lispro Injectable (ADMELOG) 10 Unit(s) SubCutaneous three times a day before meals  loratadine 10 milliGRAM(s) Oral daily  melatonin 6 milliGRAM(s) Oral at bedtime  methylPREDNISolone sodium succinate Injectable 35 milliGRAM(s) IV Push two times a day  mirtazapine 7.5 milliGRAM(s) Oral at bedtime  montelukast 10 milliGRAM(s) Oral at bedtime  oxyCODONE  ER Tablet 10 milliGRAM(s) Oral every 12 hours  pantoprazole    Tablet 40 milliGRAM(s) Oral before breakfast  polyethylene glycol 3350 17 Gram(s) Oral two times a day  senna 2 Tablet(s) Oral at bedtime  sertraline 50 milliGRAM(s) Oral daily  sevelamer carbonate 800 milliGRAM(s) Oral three times a day with meals  sodium thiosulfate IVPB 25 Gram(s) IV Intermittent <User Schedule>  tacrolimus   0.1% Ointment 1 Application(s) Topical daily  traZODone 100 milliGRAM(s) Oral at bedtime    MEDICATIONS  (PRN):  acetaminophen     Tablet .. 650 milliGRAM(s) Oral every 8 hours PRN Mild Pain  diphenhydrAMINE Injectable 25 milliGRAM(s) IV Push <User Schedule> PRN Itching  oxyCODONE    IR 5 milliGRAM(s) Oral every 4 hours PRN Moderate Pain (4 - 6)  oxyCODONE    IR 7.5 milliGRAM(s) Oral every 4 hours PRN Severe Pain (7 - 10)    Vital Signs Last 24 Hrs  T(C): 36.5 (02-07-22 @ 20:58), Max: 36.6 (02-07-22 @ 06:00)  T(F): 97.7 (02-07-22 @ 20:58), Max: 97.8 (02-07-22 @ 06:00)  HR: 78 (02-07-22 @ 20:58) (60 - 78)  BP: 111/51 (02-07-22 @ 20:58) (111/51 - 151/71)  BP(mean): --  RR: 18 (02-07-22 @ 20:58) (16 - 18)  SpO2: 100% (02-07-22 @ 20:58) (100% - 100%)          Constitutional: No fever, fatigue  Skin: No rash.  Eyes: No recent vision problems or eye pain.  ENT: No congestion, ear pain, or sore throat.  Cardiovascular: No chest pain or palpation.  Respiratory: No cough, shortness of breath, congestion, or wheezing.  Gastrointestinal: No abdominal pain, nausea, vomiting, or diarrhea.  Genitourinary: No dysuria.  Musculoskeletal: No joint swelling.  Neurologic: No headache.    PHYSICAL EXAM:  GENERAL: NAD  EYES: EOMI, PERRLA  NECK: Supple, No JVD  CHEST/LUNG: dec breath sounds at bases  HEART:  S1 , S2 +  ABDOMEN: soft , bs+, abd wound +  EXTREMITIES:  edema+  NEUROLOGY:alert awake    LABS:  02-07    135  |  88<L>  |  87<H>  ----------------------------<  131<H>  5.1   |  22  |  7.12<H>    Ca    10.1      07 Feb 2022 16:47  Phos  4.1     02-07  Mg     3.10     02-07      Creatinine Trend: 7.12 <--, 5.29 <--, 3.95 <--, 7.34 <--, 5.34 <--                        8.4    18.74 )-----------( 561      ( 07 Feb 2022 16:47 )             28.6     Urine Studies:                      LIVER FUNCTIONS - ( 04 Feb 2022 21:48 )  Alb: 3.7 g/dL / Pro: 7.9 g/dL / ALK PHOS: 419 U/L / ALT: 11 U/L / AST: 10 U/L / GGT: x                       LIVER FUNCTIONS - ( 04 Feb 2022 21:48 )  Alb: 3.7 g/dL / Pro: 7.9 g/dL / ALK PHOS: 419 U/L / ALT: 11 U/L / AST: 10 U/L / GGT: x

## 2022-02-07 NOTE — PROGRESS NOTE ADULT - SUBJECTIVE AND OBJECTIVE BOX
CARDIOLOGY FOLLOW UP NOTE - DR. JAQUEZ    Patient Name: ANTONIA ORTIZ  Date of Service: 02-07-22    no new events        Subjective:    cv: denies chest pain, dyspnea, palpitations, dizziness  pulmonary: denies cough  GI: denies abdominal pain, nausea, vomiting  vascular/legs: no edema   skin: no rash  ROS: otherwise negative   overnight events:      PHYSICAL EXAM:  T(C): 36.6 (02-07-22 @ 06:00), Max: 36.8 (02-06-22 @ 22:45)  HR: 64 (02-07-22 @ 06:00) (64 - 68)  BP: 146/55 (02-07-22 @ 06:00) (136/62 - 146/55)  RR: 16 (02-07-22 @ 06:00) (16 - 18)  SpO2: 100% (02-07-22 @ 06:00) (100% - 100%)  Wt(kg): --  I&O's Summary    06 Feb 2022 07:01  -  07 Feb 2022 07:00  --------------------------------------------------------  IN: 240 mL / OUT: 0 mL / NET: 240 mL      Daily     Daily     Appearance: Normal	  Cardiovascular: Normal S1 S2,RRR, No JVD, No murmurs  Respiratory: Lungs clear to auscultation	  Gastrointestinal:  Soft, Non-tender, + BS	  Extremities: Normal range of motion, No clubbing, cyanosis or edema      Home Medications:  aspirin 81 mg oral delayed release tablet: 1 tab(s) orally once a day (12 Jan 2022 17:49)  buPROPion 150 mg/24 hours (XL) oral tablet, extended release: 1 tab(s) orally once a day (in the morning) (12 Jan 2022 17:49)  cetirizine 10 mg oral tablet: 1 tab(s) orally once a day (12 Jan 2022 17:49)  dilTIAZem 120 mg/24 hours oral tablet, extended release: 1 tab(s) orally once a day (12 Jan 2022 17:49)  doxepin 25 mg oral capsule: 1 cap(s) orally once a day (at bedtime) (12 Jan 2022 17:49)  Eliquis 2.5 mg oral tablet: 1 tab(s) orally 2 times a day    Pharmacy states patient no longer wants to fill this medication (12 Jan 2022 17:49)  furosemide 80 mg oral tablet: 1 tab(s) orally once a day    Pharmacy states patient no longer wants to fill this medication (12 Jan 2022 17:49)  gabapentin 300 mg oral capsule: 1 cap(s) orally 2 times a day (12 Jan 2022 17:49)  hydrOXYzine hydrochloride 25 mg oral tablet: 1 tab(s) orally 2 times a day, As Needed (12 Jan 2022 17:49)  lanthanum 1000 mg oral tablet, chewable: 2 tab(s) orally with meals and 1 tab(s) orally with snacks    Pharmacy states patient no longer wants to fill this medication (12 Jan 2022 17:49)  mirtazapine 7.5 mg oral tablet: 1 tab(s) orally once a day (at bedtime) (12 Jan 2022 17:49)  montelukast 10 mg oral tablet: 1 tab(s) orally once a day (in the evening) (12 Jan 2022 17:49)  mupirocin 2% topical ointment: Apply sparingly to affected area 2 times a day as directed (12 Jan 2022 17:49)  nystatin 100,000 units/mL oral suspension: 5 milliliter(s) orally 4 times a day, as directed (12 Jan 2022 17:49)  omeprazole 20 mg oral delayed release capsule: 2 cap(s) orally once a day before breakfast (12 Jan 2022 17:49)  Pennsaid 2% topical solution: Apply topically to affected area 2 times a day (12 Jan 2022 17:49)  rosuvastatin 40 mg oral tablet: 1 tab(s) orally once a day (12 Jan 2022 17:49)  Santyl 250 units/g topical ointment: Apply topically to affected area once a day as directed (12 Jan 2022 17:49)  sertraline 50 mg oral tablet: 1 tab(s) orally once a day (12 Jan 2022 17:49)  Spiriva HandiHaler 18 mcg inhalation capsule: 1 cap(s) inhaled once a day (12 Jan 2022 17:49)  traZODone 100 mg oral tablet: 1 tab(s) orally once a day (at bedtime) (12 Jan 2022 17:49)  Tresiba FlexTouch 100 units/mL subcutaneous solution: 80 unit(s) subcutaneous once a day (at bedtime) (12 Jan 2022 17:49)  Trulicity Pen 1.5 mg/0.5 mL subcutaneous solution: 1 dose(s) subcutaneous once a week (12 Jan 2022 17:49)      MEDICATIONS  (STANDING):  apixaban 2.5 milliGRAM(s) Oral two times a day  aspirin enteric coated 81 milliGRAM(s) Oral daily  atorvastatin 80 milliGRAM(s) Oral at bedtime  buPROPion XL (24-Hour) . 150 milliGRAM(s) Oral daily  chlorhexidine 2% Cloths 1 Application(s) Topical daily  cinacalcet 60 milliGRAM(s) Oral daily  Dakins Solution - 1/4 Strength 1 Application(s) Topical two times a day  dextrose 40% Gel 15 Gram(s) Oral once  dextrose 5%. 1000 milliLiter(s) (50 mL/Hr) IV Continuous <Continuous>  dextrose 5%. 1000 milliLiter(s) (100 mL/Hr) IV Continuous <Continuous>  dextrose 50% Injectable 25 Gram(s) IV Push once  dextrose 50% Injectable 12.5 Gram(s) IV Push once  dextrose 50% Injectable 25 Gram(s) IV Push once  diltiazem    milliGRAM(s) Oral daily  epoetin anabela-epbx (RETACRIT) Injectable 75375 Unit(s) IV Push <User Schedule>  gabapentin 300 milliGRAM(s) Oral daily  glucagon  Injectable 1 milliGRAM(s) IntraMuscular once  heparin   Injectable. 500 Unit(s) Dialysis. every 1 hour  insulin glargine Injectable (LANTUS) 16 Unit(s) SubCutaneous at bedtime  insulin lispro (ADMELOG) corrective regimen sliding scale   SubCutaneous three times a day before meals  insulin lispro (ADMELOG) corrective regimen sliding scale   SubCutaneous at bedtime  insulin lispro Injectable (ADMELOG) 10 Unit(s) SubCutaneous three times a day before meals  loratadine 10 milliGRAM(s) Oral daily  melatonin 6 milliGRAM(s) Oral at bedtime  methylPREDNISolone sodium succinate Injectable 35 milliGRAM(s) IV Push two times a day  mirtazapine 7.5 milliGRAM(s) Oral at bedtime  montelukast 10 milliGRAM(s) Oral at bedtime  oxyCODONE  ER Tablet 10 milliGRAM(s) Oral every 12 hours  pantoprazole    Tablet 40 milliGRAM(s) Oral before breakfast  polyethylene glycol 3350 17 Gram(s) Oral two times a day  senna 2 Tablet(s) Oral at bedtime  sertraline 50 milliGRAM(s) Oral daily  sevelamer carbonate 800 milliGRAM(s) Oral three times a day with meals  sodium thiosulfate IVPB 25 Gram(s) IV Intermittent <User Schedule>  tacrolimus   0.1% Ointment 1 Application(s) Topical daily  traZODone 100 milliGRAM(s) Oral at bedtime      TELEMETRY: 	    ECG:  	  RADIOLOGY:   DIAGNOSTIC TESTING:  [ ] Echocardiogram:  [ ] Catheterization:  [ ] Stress Test:    OTHER: 	    LABS:	 	    CARDIAC MARKERS:                        proBNP:     Lipid Profile:   HgA1c:     Creatinine, Serum: 5.29 mg/dL (02-04-22 @ 21:48)

## 2022-02-07 NOTE — PROGRESS NOTE ADULT - ASSESSMENT
56 yr old F with morbid obesity, CHAMP not on home O2, ESRD (HD MWF), HTN, DM2 A1C 7.0, COPD, Afib no longer on AC, chronic R pannus wound here with worsening wound, diarrhea and found to be COVID+. Has poor po intake at this time.     1. Type 2 diabetes mellitus   A1c 7.0% (may be inaccurate in setting of ESRD)  Home Regimen: Tresiba 80 units HS and Trulicity 1.5mg subq weekly    While inpatient:  BG target 100-180 mg/dL  Now on steroids with severe hyperglycemia.   Note, If FS above 400 mg/dl again, would do immediate FS repeat to confirm, prior to treating  Increased Lantus to 16 units SQ qHS   Increased Admelog to 10 units SQ TID before meals (Hold if NPO/not eating meal)    Increase Admelog correctional scale to moderate dose before meals, can continue low dose at bedtime  Check BG before meals and bedtime  Hypoglycemia protocol   Consistent carbohydrate diet    Discharge Plan:  STOP Trulicity (patient ESRD on HD)  Depends on steroid plan  If no steroids, likely dc plan is basal/oral regimen. For oral agent, depends on inpatient requirements but can consider renally dosed DPP4 (Januvia 25 mg or Tradjenta 5 mg daily) VS Prandin before meals   Patient may benefit from switching to 22-24h acting basal insulin eg Levemir or Lantus, instead of Tresiba  Please assess insulin coverage for Levemir or Lantus, instead of Tresiba pens  Consider CGM (such as Freestyle Libre2 outpatient)  If desiring to followup with [facility]: [address] [place] [address], [address], [facility] [address], [phone]    2. HTN  Management per primary team     3. Hyperlipidemia  Continue home dose of Atorvastatin 80 mg  Note, limited benefit in ESRD. Followup lipid panel as outpatient    Tamela Ruiz  Nurse Practitioner  Division of Endocrinology & Diabetes  Pager # [phone]      If after 6PM or before 9AM, or on weekends/holidays, please call endocrine answering service for assistance ([phone]).  For nonurgent matters email [email or web address] for assistance.

## 2022-02-07 NOTE — PROGRESS NOTE ADULT - ASSESSMENT
#Deep, non-healing ulcer in patient with DM and ESRD on HD. Ulcer with inflammatory borders, extensive undermining with erythematous borders and retiform purpura- suggestive of pyoderma gangrenosum vs calciphylaxis. Both are challenging diagnoses with no definitive diagnostic tests. Biopsy and imaging non diagnostic. Favor Pyoderma Gangrenosum based on clinical appearance of deep ulceration with extensive undermining and worsening when trial off of steroids. stable purulent drainage and malodor today.  - Bacterial tissue culture 1/25/21 with carbapenem-resistent Pseudomonas, Staph epidermidis, Enterococcus faecalis; reportedly same as previous bacterial culture.   - s/p 10-day course of IV cefepime completed 1/21/22 and other previously other outpatient abx.   - Biopsy 1/25/22 findings non specific. No obvious intravascular calcium deposits although limited sample of subcutaneous tissue. Repeat von Kossa staining (for calcium), and deeper sections from original biopsy were reviewed with initial diagnosis unchanged.   -SPEP, serum DOUG, ANCAs negative.  -no calcifications noted on CT A/P      At this time:  - fu UPEP with immunofixation  - fu final fungal/AFB TC results; still pending  - we will consider alternate treatment options - for now, CONTINUE with solumedrol 35mg BID AND sodium thiosulfate  - c/w pain management  - recommend palliative care consult to discuss symptomatic care for chronic painful wound, we have had multiple extensive discussions with the patient and her daughters on this topic, including 2/7, pt amenable  - c/w wound care: c/w topical tacrolimus 0.01% ointment 1-2x daily to wound edges and aquacel Ag dressing    The patient's chart was reviewed in addition to photos of the rash taken by the primary team with the permission of the patient.  Patient was seen at bedside with the dermatology attending Dr. Song.  Recommendations were communicated with the primary team.  Please page [phone] for further related questions.    Alicia Queen MD  Resident Physician, PGY3  [facility]  Pager: [phone]  Office: [phone]. #Deep, non-healing ulcer in patient with DM and ESRD on HD. Ulcer with inflammatory borders, extensive undermining with erythematous borders and retiform purpura- suggestive of pyoderma gangrenosum vs calciphylaxis. Both are challenging diagnoses with no definitive diagnostic tests. Biopsy and imaging non diagnostic. Favor Pyoderma Gangrenosum based on clinical appearance of deep ulceration with extensive undermining and worsening when trial off of steroids. stable purulent drainage and malodor today.  - Bacterial tissue culture 1/25/21 with carbapenem-resistent Pseudomonas, Staph epidermidis, Enterococcus faecalis; reportedly same as previous bacterial culture.   - s/p 10-day course of IV cefepime completed 1/21/22 and other previously other outpatient abx.   - Biopsy 1/25/22 findings non specific. No obvious intravascular calcium deposits although limited sample of subcutaneous tissue. Repeat von Kossa staining (for calcium), and deeper sections from original biopsy were reviewed with initial diagnosis unchanged.   -SPEP, serum DOUG, ANCAs negative.  -no calcifications noted on CT A/P      At this time:  - fu UPEP with immunofixation  - fu final fungal/AFB TC results; still pending  - we will consider alternate treatment options for suspected PG (such as cyclosporine) - for now, CONTINUE with solumedrol 35mg BID AND sodium thiosulfate  - c/w pain management  - recommend palliative care consult to discuss symptomatic care for chronic painful wound, we have had multiple extensive discussions with the patient and her daughters on this topic, including 2/7, pt amenable  - c/w wound care: c/w topical tacrolimus 0.01% ointment 1-2x daily to wound edges and aquacel Ag dressing    The patient's chart was reviewed in addition to photos of the rash taken by the primary team with the permission of the patient.  Patient was seen at bedside with the dermatology attending Dr. Song.  Recommendations were communicated with the primary team.  Please page [phone] for further related questions.    Alicia Queen MD  Resident Physician, PGY3  [facility] D[facility]at[facility]  Pager: [phone]  Office: [phone].

## 2022-02-07 NOTE — PROGRESS NOTE ADULT - SUBJECTIVE AND OBJECTIVE BOX
INTERVAL HPI/OVERNIGHT EVENTS:    MEDICATIONS  (STANDING):  apixaban 2.5 milliGRAM(s) Oral two times a day  aspirin enteric coated 81 milliGRAM(s) Oral daily  atorvastatin 80 milliGRAM(s) Oral at bedtime  buPROPion XL (24-Hour) . 150 milliGRAM(s) Oral daily  chlorhexidine 2% Cloths 1 Application(s) Topical daily  cinacalcet 60 milliGRAM(s) Oral daily  Dakins Solution - 1/4 Strength 1 Application(s) Topical two times a day  dextrose 40% Gel 15 Gram(s) Oral once  dextrose 5%. 1000 milliLiter(s) (50 mL/Hr) IV Continuous <Continuous>  dextrose 5%. 1000 milliLiter(s) (100 mL/Hr) IV Continuous <Continuous>  dextrose 50% Injectable 25 Gram(s) IV Push once  dextrose 50% Injectable 12.5 Gram(s) IV Push once  dextrose 50% Injectable 25 Gram(s) IV Push once  diltiazem    milliGRAM(s) Oral daily  epoetin anabela-epbx (RETACRIT) Injectable 35713 Unit(s) IV Push <User Schedule>  gabapentin 300 milliGRAM(s) Oral daily  glucagon  Injectable 1 milliGRAM(s) IntraMuscular once  heparin   Injectable. 500 Unit(s) Dialysis. every 1 hour  insulin glargine Injectable (LANTUS) 16 Unit(s) SubCutaneous at bedtime  insulin lispro (ADMELOG) corrective regimen sliding scale   SubCutaneous three times a day before meals  insulin lispro (ADMELOG) corrective regimen sliding scale   SubCutaneous at bedtime  insulin lispro Injectable (ADMELOG) 10 Unit(s) SubCutaneous three times a day before meals  loratadine 10 milliGRAM(s) Oral daily  melatonin 6 milliGRAM(s) Oral at bedtime  methylPREDNISolone sodium succinate Injectable 35 milliGRAM(s) IV Push two times a day  mirtazapine 7.5 milliGRAM(s) Oral at bedtime  montelukast 10 milliGRAM(s) Oral at bedtime  oxyCODONE  ER Tablet 10 milliGRAM(s) Oral every 12 hours  pantoprazole    Tablet 40 milliGRAM(s) Oral before breakfast  polyethylene glycol 3350 17 Gram(s) Oral two times a day  senna 2 Tablet(s) Oral at bedtime  sertraline 50 milliGRAM(s) Oral daily  sevelamer carbonate 800 milliGRAM(s) Oral three times a day with meals  sodium thiosulfate IVPB 25 Gram(s) IV Intermittent <User Schedule>  tacrolimus   0.1% Ointment 1 Application(s) Topical daily  traZODone 100 milliGRAM(s) Oral at bedtime    MEDICATIONS  (PRN):  acetaminophen     Tablet .. 650 milliGRAM(s) Oral every 8 hours PRN Mild Pain  diphenhydrAMINE Injectable 25 milliGRAM(s) IV Push <User Schedule> PRN Itching  oxyCODONE    IR 5 milliGRAM(s) Oral every 4 hours PRN Moderate Pain (4 - 6)  oxyCODONE    IR 7.5 milliGRAM(s) Oral every 4 hours PRN Severe Pain (7 - 10)      Allergies    latex (Rash)  penicillin (Nausea)  strawberry (Rash)    Intolerances    caffeine (Nausea)      REVIEW OF SYSTEMS      General: no fevers/chills, no NS	    Skin: see HPI  	  Ophthalmologic: no eye pain or change in vision    Genitourinary: no dysuria or hematuria    Musculoskeletal: no joint pains or weakness	    Neurological:no weakness or tingling          Vital Signs Last 24 Hrs  T(C): 36.6 (07 Feb 2022 06:00), Max: 36.8 (06 Feb 2022 22:45)  T(F): 97.8 (07 Feb 2022 06:00), Max: 98.2 (06 Feb 2022 22:45)  HR: 64 (07 Feb 2022 06:00) (64 - 68)  BP: 146/55 (07 Feb 2022 06:00) (136/62 - 146/55)  BP(mean): --  RR: 16 (07 Feb 2022 06:00) (16 - 18)  SpO2: 100% (07 Feb 2022 06:00) (100% - 100%)    PHYSICAL EXAM:   The patient was alert and oriented X 3, well nourished, and in no  apparent distress.  There was no visible lymphadenopathy.  Conjunctiva were non injected  There was no clubbing or edema of extremities.  There was no hyperhidrosis or bromhidrosis.    Of note on skin exam:       LABS:               INTERVAL HPI/OVERNIGHT EVENTS:  pt complaining of significant pain  no overnight events    MEDICATIONS  (STANDING):  apixaban 2.5 milliGRAM(s) Oral two times a day  aspirin enteric coated 81 milliGRAM(s) Oral daily  atorvastatin 80 milliGRAM(s) Oral at bedtime  buPROPion XL (24-Hour) . 150 milliGRAM(s) Oral daily  chlorhexidine 2% Cloths 1 Application(s) Topical daily  cinacalcet 60 milliGRAM(s) Oral daily  Dakins Solution - 1/4 Strength 1 Application(s) Topical two times a day  dextrose 40% Gel 15 Gram(s) Oral once  dextrose 5%. 1000 milliLiter(s) (50 mL/Hr) IV Continuous <Continuous>  dextrose 5%. 1000 milliLiter(s) (100 mL/Hr) IV Continuous <Continuous>  dextrose 50% Injectable 25 Gram(s) IV Push once  dextrose 50% Injectable 12.5 Gram(s) IV Push once  dextrose 50% Injectable 25 Gram(s) IV Push once  diltiazem    milliGRAM(s) Oral daily  epoetin anabela-epbx (RETACRIT) Injectable 87449 Unit(s) IV Push <User Schedule>  gabapentin 300 milliGRAM(s) Oral daily  glucagon  Injectable 1 milliGRAM(s) IntraMuscular once  heparin   Injectable. 500 Unit(s) Dialysis. every 1 hour  insulin glargine Injectable (LANTUS) 16 Unit(s) SubCutaneous at bedtime  insulin lispro (ADMELOG) corrective regimen sliding scale   SubCutaneous three times a day before meals  insulin lispro (ADMELOG) corrective regimen sliding scale   SubCutaneous at bedtime  insulin lispro Injectable (ADMELOG) 10 Unit(s) SubCutaneous three times a day before meals  loratadine 10 milliGRAM(s) Oral daily  melatonin 6 milliGRAM(s) Oral at bedtime  methylPREDNISolone sodium succinate Injectable 35 milliGRAM(s) IV Push two times a day  mirtazapine 7.5 milliGRAM(s) Oral at bedtime  montelukast 10 milliGRAM(s) Oral at bedtime  oxyCODONE  ER Tablet 10 milliGRAM(s) Oral every 12 hours  pantoprazole    Tablet 40 milliGRAM(s) Oral before breakfast  polyethylene glycol 3350 17 Gram(s) Oral two times a day  senna 2 Tablet(s) Oral at bedtime  sertraline 50 milliGRAM(s) Oral daily  sevelamer carbonate 800 milliGRAM(s) Oral three times a day with meals  sodium thiosulfate IVPB 25 Gram(s) IV Intermittent <User Schedule>  tacrolimus   0.1% Ointment 1 Application(s) Topical daily  traZODone 100 milliGRAM(s) Oral at bedtime    MEDICATIONS  (PRN):  acetaminophen     Tablet .. 650 milliGRAM(s) Oral every 8 hours PRN Mild Pain  diphenhydrAMINE Injectable 25 milliGRAM(s) IV Push <User Schedule> PRN Itching  oxyCODONE    IR 5 milliGRAM(s) Oral every 4 hours PRN Moderate Pain (4 - 6)  oxyCODONE    IR 7.5 milliGRAM(s) Oral every 4 hours PRN Severe Pain (7 - 10)      Allergies    latex (Rash)  penicillin (Nausea)  strawberry (Rash)    Intolerances    caffeine (Nausea)      REVIEW OF SYSTEMS      General: no fevers/chills, no NS	    Skin: see HPI  	  Ophthalmologic: no eye pain or change in vision    Genitourinary: no dysuria or hematuria    Musculoskeletal: no joint pains or weakness	    Neurological:no weakness or tingling          Vital Signs Last 24 Hrs  T(C): 36.6 (07 Feb 2022 06:00), Max: 36.8 (06 Feb 2022 22:45)  T(F): 97.8 (07 Feb 2022 06:00), Max: 98.2 (06 Feb 2022 22:45)  HR: 64 (07 Feb 2022 06:00) (64 - 68)  BP: 146/55 (07 Feb 2022 06:00) (136/62 - 146/55)  BP(mean): --  RR: 16 (07 Feb 2022 06:00) (16 - 18)  SpO2: 100% (07 Feb 2022 06:00) (100% - 100%)    PHYSICAL EXAM:   The patient was alert and oriented X 3, well nourished, and in no  apparent distress.  There was no visible lymphadenopathy.  Conjunctiva were non injected  There was no clubbing or edema of extremities.  There was no hyperhidrosis or bromhidrosis.    Of note on skin exam:   - right pannus with large round, deep ulcer with erythematous undermined borders with surrounding retiform purpura - stable purulent drainage and malodor today  surrounding areas of induration and firm subcutaneous nodules       LABS:  reviewed  fs elevated, endo following      CT A/P 2/4:  addendum: There are no subcutaneous tissue calcifications. Specifically no   subcutaneous tissue calcifications in the patient's abdominal wall pannus.

## 2022-02-07 NOTE — PROGRESS NOTE ADULT - ASSESSMENT
56y Female with history of ESRD on HD presents with vomiting and diarrhea found to be COVID-19 positive. Nephrology consulted for ESRD status.    1) ESRD: Last HD on 2/4 with mild intradialytic hypotension and 1.8L removed. Plan for next maintenance HD today. Monitor electrolytes.    2) HTN with ESRD: BP acceptable. Continue with current medications. Monitor BP.    3) Anemia of renal disease: Hb low with elevated ferritin. Continue with Epo 12K with HD. Monitor Hb.    4) Secondary HPT of renal origin: Phosphorus acceptable with low iPTH. Sensipar decreased to 60 mg PO daily. Continue with renvela 1 tab with meals (goal < 4.5 given concerns for calciphylaxis). Monitor serum calcium and phosphorus.    5) Ab wound: Appreciate dermatology follow up. Ddx includes calciphylaxis versus pyoderma gangrenosum. S/P biopsy. Continue with empiric sodium thiosulfate with HD. Follow up Derm.      [facility] NEPHROLOG[facility]  Keaton Lopez M.D.  Juma Green D.O.  Myla Hoffmann M.D.  Julia Brewer, JASIEL, ANP-C    Telephone: [phone]  Facsimile: [phone]    [address] [place] [address]
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
## 2022-02-07 NOTE — PROGRESS NOTE ADULT - SUBJECTIVE AND OBJECTIVE BOX
Chief Complaint: DM 2 with hyperglycemia     History: Patient seen at bedside. Reports she is eating partial meals. She does not like most of the food brought to her. Patient now on steroids - Solumedrol 35 mg BID - with severe hyperglycemia noted    MEDICATIONS  (STANDING):  apixaban 2.5 milliGRAM(s) Oral two times a day  aspirin enteric coated 81 milliGRAM(s) Oral daily  atorvastatin 80 milliGRAM(s) Oral at bedtime  buPROPion XL (24-Hour) . 150 milliGRAM(s) Oral daily  chlorhexidine 2% Cloths 1 Application(s) Topical daily  cinacalcet 60 milliGRAM(s) Oral daily  Dakins Solution - 1/4 Strength 1 Application(s) Topical two times a day  dextrose 40% Gel 15 Gram(s) Oral once  dextrose 5%. 1000 milliLiter(s) (50 mL/Hr) IV Continuous <Continuous>  dextrose 5%. 1000 milliLiter(s) (100 mL/Hr) IV Continuous <Continuous>  dextrose 50% Injectable 25 Gram(s) IV Push once  dextrose 50% Injectable 12.5 Gram(s) IV Push once  dextrose 50% Injectable 25 Gram(s) IV Push once  diltiazem    milliGRAM(s) Oral daily  epoetin anabela-epbx (RETACRIT) Injectable 62848 Unit(s) IV Push <User Schedule>  gabapentin 300 milliGRAM(s) Oral daily  glucagon  Injectable 1 milliGRAM(s) IntraMuscular once  heparin   Injectable. 500 Unit(s) Dialysis. every 1 hour  insulin glargine Injectable (LANTUS) 5 Unit(s) SubCutaneous at bedtime  insulin lispro (ADMELOG) corrective regimen sliding scale   SubCutaneous at bedtime  insulin lispro (ADMELOG) corrective regimen sliding scale   SubCutaneous three times a day before meals  insulin lispro Injectable (ADMELOG) 7 Unit(s) SubCutaneous three times a day before meals  loratadine 10 milliGRAM(s) Oral daily  melatonin 6 milliGRAM(s) Oral at bedtime  methylPREDNISolone sodium succinate Injectable 35 milliGRAM(s) IV Push two times a day  mirtazapine 7.5 milliGRAM(s) Oral at bedtime  montelukast 10 milliGRAM(s) Oral at bedtime  oxyCODONE  ER Tablet 10 milliGRAM(s) Oral every 12 hours  pantoprazole    Tablet 40 milliGRAM(s) Oral before breakfast  polyethylene glycol 3350 17 Gram(s) Oral two times a day  senna 2 Tablet(s) Oral at bedtime  sertraline 50 milliGRAM(s) Oral daily  sevelamer carbonate 800 milliGRAM(s) Oral three times a day with meals  sodium thiosulfate IVPB 25 Gram(s) IV Intermittent <User Schedule>  tacrolimus   0.1% Ointment 1 Application(s) Topical daily  traZODone 100 milliGRAM(s) Oral at bedtime    MEDICATIONS  (PRN):  acetaminophen     Tablet .. 650 milliGRAM(s) Oral every 8 hours PRN Mild Pain  diphenhydrAMINE Injectable 25 milliGRAM(s) IV Push <User Schedule> PRN Itching  oxyCODONE    IR 5 milliGRAM(s) Oral every 4 hours PRN Moderate Pain (4 - 6)  oxyCODONE    IR 7.5 milliGRAM(s) Oral every 4 hours PRN Severe Pain (7 - 10)      Allergies  latex (Rash)  penicillin (Nausea)  strawberry (Rash)    Intolerances  caffeine (Nausea)    Review of Systems:  Cardiovascular: No chest pain  Respiratory: No SOB  GI: No nausea, vomiting  Endocrine: no hypoglycemia     PHYSICAL EXAM:  Vital Signs Last 24 Hrs  T(C): 36.6 (07 Feb 2022 06:00), Max: 36.8 (06 Feb 2022 22:45)  T(F): 97.8 (07 Feb 2022 06:00), Max: 98.2 (06 Feb 2022 22:45)  HR: 64 (07 Feb 2022 06:00) (64 - 68)  BP: 146/55 (07 Feb 2022 06:00) (136/62 - 146/55)  BP(mean): --  RR: 16 (07 Feb 2022 06:00) (16 - 18)  SpO2: 100% (07 Feb 2022 06:00) (100% - 100%)  GENERAL: NAD  EYES: No proptosis, no lid lag, anicteric  HEENT:  Atraumatic, Normocephalic, moist mucous membranes  RESPIRATORY: unlabored respirations     CAPILLARY BLOOD GLUCOSE    POCT Blood Glucose.: 149 mg/dL (07 Feb 2022 15:59)  POCT Blood Glucose.: 294 mg/dL (07 Feb 2022 11:56)  POCT Blood Glucose.: 386 mg/dL (07 Feb 2022 08:19)  POCT Blood Glucose.: 391 mg/dL (07 Feb 2022 05:54)  POCT Blood Glucose.: 431 mg/dL (07 Feb 2022 01:53)  POCT Blood Glucose.: 428 mg/dL (07 Feb 2022 01:51)  POCT Blood Glucose.: 428 mg/dL (06 Feb 2022 22:37)  POCT Blood Glucose.: 411 mg/dL (06 Feb 2022 19:35)  POCT Blood Glucose.: 511 mg/dL (06 Feb 2022 17:10)  POCT Blood Glucose.: 373 mg/dL (06 Feb 2022 11:55)  POCT Blood Glucose.: 293 mg/dL (06 Feb 2022 07:51)  POCT Blood Glucose.: 225 mg/dL (05 Feb 2022 21:25)            A1C with Estimated Average Glucose Result: 7.0 % (01-13-22 @ 08:21)  A1C with Estimated Average Glucose Result: 6.7 % (08-31-21 @ 09:30)  A1C with Estimated Average Glucose Result: 7.2 % (04-28-21 @ 07:04)    Diet, Consistent Carbohydrate Renal w/Evening Snack:   Supplement Feeding Modality:  Oral  Nepro Cans or Servings Per Day:  1       Frequency:  Three Times a day (01-17-22 @ 18:07)

## 2022-02-07 NOTE — PROGRESS NOTE ADULT - SUBJECTIVE AND OBJECTIVE BOX
[facility] NEPHROLOGY- PROGRESS NOTE    56y Female with history of ESRD on HD presents with vomiting and diarrhea found to be COVID-19 positive. Nephrology consulted for ESRD status.    REVIEW OF SYSTEMS:  Gen: no changes in weight  Cards: no chest pain  Resp: no dyspnea  GI: no nausea or vomiting or diarrhea + ab wound pain  Vascular: no LE edema    caffeine (Nausea)  latex (Rash)  penicillin (Nausea)  strawberry (Rash)      Hospital Medications: Medications reviewed      VITALS:  T(F): 97.8 (02-07-22 @ 06:00), Max: 98.2 (02-06-22 @ 22:45)  HR: 64 (02-07-22 @ 06:00)  BP: 146/55 (02-07-22 @ 06:00)  RR: 16 (02-07-22 @ 06:00)  SpO2: 100% (02-07-22 @ 06:00)  Wt(kg): --    02-06 @ 07:01  -  02-07 @ 07:00  --------------------------------------------------------  IN: 240 mL / OUT: 0 mL / NET: 240 mL        PHYSICAL EXAM:    Gen: NAD, calm  Cards: RRR, +S1/S2, no M/G/R  Resp: CTA B/L  GI: soft, RLQ bandage/tender  Vascular: no LE edema B/L, LUE AVF + bruit/thrill      LABS:        Creatinine Trend: 5.29 <--, 3.95 <--, 7.34 <--, 5.34 <--    Urine Studies:

## 2022-02-08 NOTE — PROGRESS NOTE ADULT - SUBJECTIVE AND OBJECTIVE BOX
[facility] NEPHROLOGY- PROGRESS NOTE    56y Female with history of ESRD on HD presents with vomiting and diarrhea found to be COVID-19 positive. Nephrology consulted for ESRD status.    REVIEW OF SYSTEMS:  Gen: no changes in weight  Cards: no chest pain  Resp: no dyspnea  GI: no nausea or vomiting or diarrhea + ab wound pain  Vascular: no LE edema    caffeine (Nausea)  latex (Rash)  penicillin (Nausea)  strawberry (Rash)      Hospital Medications: Medications reviewed      VITALS:  T(F): 98 (02-08-22 @ 14:51), Max: 98.4 (02-08-22 @ 05:04)  HR: 72 (02-08-22 @ 14:51)  BP: 132/60 (02-08-22 @ 14:51)  RR: 18 (02-08-22 @ 14:51)  SpO2: 91% (02-08-22 @ 14:51)  Wt(kg): --    02-07 @ 07:01  -  02-08 @ 07:00  --------------------------------------------------------  IN: 760 mL / OUT: 2200 mL / NET: -1440 mL        PHYSICAL EXAM:    Gen: NAD, calm  Cards: RRR, +S1/S2, no M/G/R  Resp: CTA B/L  GI: soft, RLQ bandage/tender  Vascular: no LE edema B/L, LUE AVF + bruit/thrill      LABS:  02-08    137  |  91<L>  |  59<H>  ----------------------------<  388<H>  5.7<H>   |  21<L>  |  5.55<H>    Ca    9.8      08 Feb 2022 13:39  Phos  3.6     02-08  Mg     2.40     02-08    TPro  6.7  /  Alb  3.6  /  TBili  <0.2  /  DBili      /  AST  11  /  ALT  11  /  AlkPhos  394<H>  02-08    Creatinine Trend: 5.55 <--, 7.12 <--, 5.29 <--, 3.95 <--, 7.34 <--                        8.1    19.95 )-----------( 527      ( 08 Feb 2022 13:39 )             27.5     Urine Studies:

## 2022-02-08 NOTE — CHART NOTE - NSCHARTNOTEFT_GEN_A_CORE
We have reviewed various treatment options for suspected pyoderma gangrenosum and recommend switching from solumedrol to cyclosporine. Discussed with Nephrologist Dr. Green regarding r/b/a to cyclosporine in this patient with ESRD and HD - he believes the benefits of treating her wound are worth the risk of affecting any potential residual renal function. We have discussed the r/b/a to cyclosporine at length with the patient and her daughter Ashley (via phone at bedside). They are both amenable to initiating treatment with cyclosporine.     - current medications reviewed - patient taking atorvastatin - would discuss with cardiology whether patient can stop taking atorvastatin while on cyclosporine (may be for a few months)  - pending cardiology clearance, would recommend initiating cyclosporine modified (neoral) at approximately 130mg BID (~ 3mg/kg BID based on dosing weight )  - can stop solumedrol once cyclosporine is initiated  - continue to monitor blood pressure as this medication can cause hypertension  - electrolytes reviewed (including K, Mg from 2/8/22)  - please obtain HIV testing, quantiferon, lipid panel, uric acid level    The patient's chart was reviewed in addition to being discussed with the dermatology attending Dr. Romeo Song.  Recommendations were communicated with the primary team.  Please page [phone] for further related questions.    Alicia Queen MD  Resident Physician, PGY3  [facility] Dermatology  Pager: [phone]  Office: [phone] We have reviewed various treatment options for suspected pyoderma gangrenosum and recommend switching from solumedrol to cyclosporine. Discussed with Nephrologist Dr. Green regarding r/b/a to cyclosporine in this patient with ESRD and HD - he believes the benefits of treating her wound are worth the risk of affecting any potential residual renal function. We have discussed the r/b/a to cyclosporine at length with the patient and her daughter Ashley (via phone at bedside). They are both amenable to initiating treatment with cyclosporine.     - current medications reviewed - patient taking atorvastatin - would discuss with cardiology whether patient can stop taking atorvastatin while on cyclosporine (may be for a few months)  - pending cardiology clearance, would recommend initiating cyclosporine modified (neoral) 125mg BID (~ 3mg/kg BID based on dosing weight )  - can stop solumedrol once cyclosporine is initiated  - continue to monitor blood pressure as this medication can cause hypertension  - electrolytes reviewed (including K, Mg from 2/8/22)  - please obtain HIV testing, quantiferon, lipid panel, uric acid level    The patient's chart was reviewed in addition to being discussed with the dermatology attending Dr. Romeo Song.  Recommendations were communicated with the primary team.  Please page [phone] for further related questions.    Alicia Queen MD  Resident Physician, PGY3  [facility] Dermatology  Pager: [phone]  Office: [phone] We have reviewed various treatment options for suspected pyoderma gangrenosum and recommend switching from solumedrol to cyclosporine. Discussed with Nephrologist Dr. Green regarding r/b/a to cyclosporine in this patient with ESRD and HD - he believes the benefits of treating her wound are worth the risk of affecting any potential residual renal function. We have discussed the r/b/a to cyclosporine at length with the patient and her daughter Ashley (via phone at bedside). They are both amenable to initiating treatment with cyclosporine.     - current medications reviewed         - patient taking atorvastatin - would discuss with cardiology whether patient can stop taking atorvastatin while on cyclosporine (may be for a few months)        - another interaction to consider is the use of diltiazem and cyclosporine in combination with apixaban, may lead to increased risk of bleeding  - pending consideration of above, would recommend initiating cyclosporine modified (neoral) 125mg BID (~ 3mg/kg BID based on dosing weight )  - can stop solumedrol once cyclosporine is initiated  - continue to monitor blood pressure as this medication can cause hypertension  - electrolytes reviewed (including K, Mg from 2/8/22)  - please obtain HIV testing, quantiferon, lipid panel, uric acid level    The patient's chart was reviewed in addition to being discussed with the dermatology attending Dr. Romeo Song.  Recommendations were communicated with the primary team.  Please page [phone] for further related questions.    Alicia Queen MD  Resident Physician, PGY3  [facility] D[facility]at[facility]  Pager: [phone]  Office: [phone]
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
## 2022-02-08 NOTE — PROGRESS NOTE ADULT - ASSESSMENT
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 Patient followed by derm as well, s/p punch biopsy by Derm. Derm biopsy non-specific findings, no obvious intravascular calcium deposits although limited sample of subcutaneous tissue. Ct- abdomen "There are no subcutaneous tissue calcifications. Specifically no subcutaneous tissue calcifications in the patient's abdominal wall pannus." Current working diagnosis of Pyoderma Gangrenosum although Calciphylaxis remains in differential. Patient remains on systemic IV steroid, on empiric sodium thiosulfate. Per nephrology, not opposed to Cyclosporin at this time. Per dermatology chart note extensive conversation had regarding risk/benefit of Cyclosporin with patient and daughter, both are amenable to Cyclosporin treatement.   Topical dressing: Cleanse with Dakins 1/4 strength, adaptic touch (non-adherent silicone contact layer) to wound base for atraumatic dressing application and removal. Topical Tacrolimus 0.1% to wound base, cover with Aquacel AG , and abdominal pad. Avoid tape to promote patient comfort.  Of note attempted Irrigation VAC 1/26 - 1 /28, noted deterioration of wound with NPWt/VAc therapy, therapy d/c'd, likely pathergy. Will continue to avoid surgical debridement. Collagenase d/c'd to avoid enzymatic debridement as well.  +violaceous borders, odor slightly improved, dimensions stable.      -Topical dressing: Cleanse with Dakins 1/4 strength. Apply Liquid barrier film to periwound skin. Adaptic touch to wound base for atraumatic dressing application and removal. Apply Topical Tacrolimus 0.1% ointment to wound base, cover with Aquacel AG , and abdominal pad. Change daily.  -Interdry textile sheeting beneath abdominal pannus leaving 2" out at end to wick.  -Agree with systemic steroid; Solumedrol per Derm.  -Agree with Cyclosporin; nephrology not opposed, patient and daughter amenable.   -Glucose control per primary team/endocrinology  -Differ empiric Sodium Thiosulfate to primary team/derm/nephrology  -Continue to follow nutrition/RD recommendations   -Consider reconsult with pain management for better pain control  -Continue to offload pressure  -DVT prophylaxis    Of note lengthy discussion had with patient at bedside. Phone call made to daughter per patient's request. Described at length the differences between PG and calcphalxis, treatment differences, and wound symptomatology based on available evidence. Discussed benefit of Palliative care/pain management involvement to promote comfort as wound will likely be chronic accompanied by pain and discomfort. All questions and concerns were addressed to patient and family's satisfaction. Emotional support and encouragement were provided. Both patient and family expressed understanding of all that was discussed via verbal teach back.     Patient seen with Dr. Dunaway   Findings and plan discussed with Dermatology Dr. Bo and primary team.    Upon discharge follow up at outpatient [facility]. [address] [place] [address]. [phone].    Will continue to follow while inpatient.  Thank you.    CARMELA William-BC, [facility]    pager #[phone]    If after 4PM or before 7:30AM on Mon-Friday or weekend/holiday please contact general surgery for urgent matters.   Team A- [id number]   Team B- [id number]  For non-urgent matters e-mail [email or web address]    We spent 35 minutes face-to-face with this patient of which more than 50% of the time was spent counseling/coordinating care of this patient.

## 2022-02-08 NOTE — PROGRESS NOTE ADULT - SUBJECTIVE AND OBJECTIVE BOX
Chief Complaint: DM 2 with hyperglycemia   Pt is a 52 y/o female with hx of DM2, AFIB, CHAMP, Obesity, COPD, HTN, ESRD on HD MWF and chronic R. Pannus wound.  Pt initially sought emergent medical attention for worsening right pannus wound and associated diarrhea.      Patient seen at bedside reports she does not like the food however Pt appears to be eating at least 75% of meal.  Pt is on Methylprednisolone 35mg IV push bid and is exhibiting hyperglycemia.  However BG was in target range on 2/7 when Pt did not receive steroid d/t no IV access.       MEDICATIONS  (STANDING):  apixaban 2.5 milliGRAM(s) Oral two times a day  aspirin enteric coated 81 milliGRAM(s) Oral daily  buPROPion XL (24-Hour) . 150 milliGRAM(s) Oral daily  chlorhexidine 2% Cloths 1 Application(s) Topical daily  cinacalcet 60 milliGRAM(s) Oral daily  Dakins Solution - 1/4 Strength 1 Application(s) Topical two times a day  dextrose 40% Gel 15 Gram(s) Oral once  dextrose 5%. 1000 milliLiter(s) (50 mL/Hr) IV Continuous <Continuous>  dextrose 5%. 1000 milliLiter(s) (100 mL/Hr) IV Continuous <Continuous>  dextrose 50% Injectable 25 Gram(s) IV Push once  dextrose 50% Injectable 12.5 Gram(s) IV Push once  dextrose 50% Injectable 25 Gram(s) IV Push once  diltiazem    milliGRAM(s) Oral daily  epoetin anabela-epbx (RETACRIT) Injectable 87996 Unit(s) IV Push <User Schedule>  gabapentin 300 milliGRAM(s) Oral daily  glucagon  Injectable 1 milliGRAM(s) IntraMuscular once  heparin   Injectable. 500 Unit(s) Dialysis. every 1 hour  insulin glargine Injectable (LANTUS) 16 Unit(s) SubCutaneous at bedtime  insulin lispro (ADMELOG) corrective regimen sliding scale   SubCutaneous three times a day before meals  insulin lispro (ADMELOG) corrective regimen sliding scale   SubCutaneous at bedtime  insulin lispro Injectable (ADMELOG) 10 Unit(s) SubCutaneous three times a day before meals  loratadine 10 milliGRAM(s) Oral daily  melatonin 6 milliGRAM(s) Oral at bedtime  methylPREDNISolone sodium succinate Injectable 35 milliGRAM(s) IV Push two times a day  mirtazapine 7.5 milliGRAM(s) Oral at bedtime  montelukast 10 milliGRAM(s) Oral at bedtime  oxyCODONE  ER Tablet 10 milliGRAM(s) Oral every 12 hours  pantoprazole    Tablet 40 milliGRAM(s) Oral before breakfast  polyethylene glycol 3350 17 Gram(s) Oral two times a day  senna 2 Tablet(s) Oral at bedtime  sertraline 50 milliGRAM(s) Oral daily  sevelamer carbonate 800 milliGRAM(s) Oral three times a day with meals  sodium thiosulfate IVPB 25 Gram(s) IV Intermittent <User Schedule>  sodium zirconium cyclosilicate 10 Gram(s) Oral once  tacrolimus   0.1% Ointment 1 Application(s) Topical daily  traZODone 100 milliGRAM(s) Oral at bedtime    MEDICATIONS  (PRN):  acetaminophen     Tablet .. 650 milliGRAM(s) Oral every 8 hours PRN Mild Pain  diphenhydrAMINE Injectable 25 milliGRAM(s) IV Push <User Schedule> PRN Itching  oxyCODONE    IR 5 milliGRAM(s) Oral every 4 hours PRN Moderate Pain (4 - 6)  oxyCODONE    IR 7.5 milliGRAM(s) Oral every 4 hours PRN Severe Pain (7 - 10)    ALLERGIES  Latex (Rash)  Penicillin (Nausea)  Strawberry (Rash)    Intolerances  caffeine (Nausea)    REVIEW OF SYSTEMS:  General: Pt denies fevers chills.    Cardiovascular: Pt denies chest pain, palpitations.  GI: Pt denies nausea, vomiting, diarrhea.  Endocrine: Pt denies symptoms of hypoglycemia, denies increased thirst, hunger.   All other Review of Symptoms negative except where noted in HPI.       PHYSICAL EXAM:  Vital Signs Last 24 Hrs  T(C): 36.7 (08 Feb 2022 14:51), Max: 36.9 (08 Feb 2022 05:04)  T(F): 98 (08 Feb 2022 14:51), Max: 98.4 (08 Feb 2022 05:04)  HR: 72 (08 Feb 2022 14:51) (72 - 79)  BP: 132/60 (08 Feb 2022 14:51) (111/51 - 132/60)  BP(mean): --  RR: 18 (08 Feb 2022 14:51) (18 - 18)  SpO2: 91% (08 Feb 2022 14:51) (91% - 100%)  GENERAL: NAD  EYES: No proptosis, no lid lag, anicteric  HEENT:  Atraumatic, Normocephalic,  CARDIOVASCULAR Normal S1S2 auscultated, No murmurs noted.  RESPIRATORY: Breath sounds even unlabored clear to auscultation bilaterally    02-08    137  |  91<L>  |  59<H>  ----------------------------<  388<H>  5.7<H>   |  21<L>  |  5.55<H>    Ca    9.8      08 Feb 2022 13:39  Phos  3.6     02-08  Mg     2.40     02-08    TPro  6.7  /  Alb  3.6  /  TBili  <0.2  /  DBili  x   /  AST  11  /  ALT  11  /  AlkPhos  394<H>  02-08      CAPILLARY BLOOD GLUCOSE  POCT Blood Glucose.: 341 mg/dL (08 Feb 2022 11:58)  POCT Blood Glucose.: 240 mg/dL (08 Feb 2022 08:19)  POCT Blood Glucose.: 120 mg/dL (07 Feb 2022 22:41)  POCT Blood Glucose.: 102 mg/dL (07 Feb 2022 21:00)  POCT Blood Glucose.: 132 mg/dL (07 Feb 2022 17:31)    A1C with Estimated Average Glucose Result: 7.0 % (01-13-22 @ 08:21)  A1C with Estimated Average Glucose Result: 6.7 % (08-31-21 @ 09:30)  A1C with Estimated Average Glucose Result: 7.2 % (04-28-21 @ 07:04)    Diet, Consistent Carbohydrate Renal w/Evening Snack:   Supplement Feeding Modality:  Oral  Nepro Cans or Servings Per Day:  1       Frequency:  Three Times a day (01-17-22 @ 18:07)

## 2022-02-08 NOTE — PROGRESS NOTE ADULT - SUBJECTIVE AND OBJECTIVE BOX
SUBJECTIVE / OVERNIGHT EVENTS:pt seen and examined, c/o pain at the wound site  2-8-22      MEDICATIONS  (STANDING):  apixaban 2.5 milliGRAM(s) Oral two times a day  aspirin enteric coated 81 milliGRAM(s) Oral daily  buPROPion XL (24-Hour) . 150 milliGRAM(s) Oral daily  chlorhexidine 2% Cloths 1 Application(s) Topical daily  cinacalcet 60 milliGRAM(s) Oral daily  cycloSPORINE  , modified (NEORAL) 125 milliGRAM(s) Oral every 12 hours  Dakins Solution - 1/4 Strength 1 Application(s) Topical two times a day  dextrose 40% Gel 15 Gram(s) Oral once  dextrose 5%. 1000 milliLiter(s) (50 mL/Hr) IV Continuous <Continuous>  dextrose 5%. 1000 milliLiter(s) (100 mL/Hr) IV Continuous <Continuous>  dextrose 50% Injectable 25 Gram(s) IV Push once  dextrose 50% Injectable 12.5 Gram(s) IV Push once  dextrose 50% Injectable 25 Gram(s) IV Push once  diltiazem    milliGRAM(s) Oral daily  epoetin anabela-epbx (RETACRIT) Injectable 38340 Unit(s) IV Push <User Schedule>  gabapentin 300 milliGRAM(s) Oral daily  glucagon  Injectable 1 milliGRAM(s) IntraMuscular once  heparin   Injectable. 500 Unit(s) Dialysis. every 1 hour  insulin glargine Injectable (LANTUS) 16 Unit(s) SubCutaneous at bedtime  insulin lispro (ADMELOG) corrective regimen sliding scale   SubCutaneous three times a day before meals  insulin lispro (ADMELOG) corrective regimen sliding scale   SubCutaneous at bedtime  insulin lispro Injectable (ADMELOG) 10 Unit(s) SubCutaneous three times a day before meals  loratadine 10 milliGRAM(s) Oral daily  melatonin 6 milliGRAM(s) Oral at bedtime  mirtazapine 7.5 milliGRAM(s) Oral at bedtime  montelukast 10 milliGRAM(s) Oral at bedtime  oxyCODONE  ER Tablet 10 milliGRAM(s) Oral every 12 hours  pantoprazole    Tablet 40 milliGRAM(s) Oral before breakfast  polyethylene glycol 3350 17 Gram(s) Oral two times a day  senna 2 Tablet(s) Oral at bedtime  sertraline 50 milliGRAM(s) Oral daily  sevelamer carbonate 800 milliGRAM(s) Oral three times a day with meals  sodium thiosulfate IVPB 25 Gram(s) IV Intermittent <User Schedule>  sodium zirconium cyclosilicate 10 Gram(s) Oral once  tacrolimus   0.1% Ointment 1 Application(s) Topical daily  traZODone 100 milliGRAM(s) Oral at bedtime    MEDICATIONS  (PRN):  acetaminophen     Tablet .. 650 milliGRAM(s) Oral every 8 hours PRN Mild Pain  diphenhydrAMINE Injectable 25 milliGRAM(s) IV Push <User Schedule> PRN Itching  oxyCODONE    IR 5 milliGRAM(s) Oral every 4 hours PRN Moderate Pain (4 - 6)  oxyCODONE    IR 7.5 milliGRAM(s) Oral every 4 hours PRN Severe Pain (7 - 10)    Vital Signs Last 24 Hrs  T(C): 36.7 (02-08-22 @ 21:08), Max: 36.9 (02-08-22 @ 05:04)  T(F): 98 (02-08-22 @ 21:08), Max: 98.4 (02-08-22 @ 05:04)  HR: 68 (02-08-22 @ 21:08) (68 - 79)  BP: 130/61 (02-08-22 @ 21:08) (122/49 - 132/60)  BP(mean): --  RR: 18 (02-08-22 @ 21:08) (18 - 18)  SpO2: 100% (02-08-22 @ 21:08) (91% - 100%)            Constitutional: No fever, fatigue  Skin: No rash.  Eyes: No recent vision problems or eye pain.  ENT: No congestion, ear pain, or sore throat.  Cardiovascular: No chest pain or palpation.  Respiratory: No cough, shortness of breath, congestion, or wheezing.  Gastrointestinal: No abdominal pain, nausea, vomiting, or diarrhea.  Genitourinary: No dysuria.  Musculoskeletal: No joint swelling.  Neurologic: No headache.    PHYSICAL EXAM:  GENERAL: NAD  EYES: EOMI, PERRLA  NECK: Supple, No JVD  CHEST/LUNG: dec breath sounds at bases  HEART:  S1 , S2 +  ABDOMEN: soft , bs+, abd wound +  EXTREMITIES:  edema+  NEUROLOGY:alert awake    LABS:  02-08    137  |  91<L>  |  59<H>  ----------------------------<  388<H>  5.7<H>   |  21<L>  |  5.55<H>    Ca    9.8      08 Feb 2022 13:39  Phos  3.6     02-08  Mg     2.40     02-08    TPro  6.7  /  Alb  3.6  /  TBili  <0.2  /  DBili      /  AST  11  /  ALT  11  /  AlkPhos  394<H>  02-08    Creatinine Trend: 5.55 <--, 7.12 <--, 5.29 <--, 3.95 <--, 7.34 <--                        8.1    19.95 )-----------( 527      ( 08 Feb 2022 13:39 )             27.5     Urine Studies:            LIVER FUNCTIONS - ( 08 Feb 2022 13:39 )  Alb: 3.6 g/dL / Pro: 6.7 g/dL / ALK PHOS: 394 U/L / ALT: 11 U/L / AST: 11 U/L / GGT: x

## 2022-02-08 NOTE — PROGRESS NOTE ADULT - SUBJECTIVE AND OBJECTIVE BOX
CARDIOLOGY FOLLOW UP - Dr. Bullock  Date of Service: 2/8/22  CC: no acute events     Review of Systems:  Constitutional: No fever, weight loss, or fatigue  Respiratory: No cough, wheezing, or hemoptysis, no shortness of breath  Cardiovascular: No chest pain, palpitations, passing out, dizziness, or leg swelling  Gastrointestinal: No abd or epigastric pain.  No nausea, vomiting, or hematemesis; no diarrhea or constipation, no melena or hematochezia  Vascular: no edema       PHYSICAL EXAM:  T(C): 36.7 (02-08-22 @ 14:51), Max: 36.9 (02-08-22 @ 05:04)  HR: 72 (02-08-22 @ 14:51) (60 - 79)  BP: 132/60 (02-08-22 @ 14:51) (111/51 - 151/71)  RR: 18 (02-08-22 @ 14:51) (18 - 18)  SpO2: 91% (02-08-22 @ 14:51) (91% - 100%)  Wt(kg): --  I&O's Summary    07 Feb 2022 07:01  -  08 Feb 2022 07:00  --------------------------------------------------------  IN: 760 mL / OUT: 2200 mL / NET: -1440 mL        Appearance: Normal	  Cardiovascular: Normal S1 S2,RRR, No JVD, No murmurs  Respiratory: Lungs clear to auscultation	  Gastrointestinal:  Soft, Non-tender, + BS	  Extremities: Normal range of motion, No clubbing, cyanosis or edema      Home Medications:  aspirin 81 mg oral delayed release tablet: 1 tab(s) orally once a day (12 Jan 2022 17:49)  buPROPion 150 mg/24 hours (XL) oral tablet, extended release: 1 tab(s) orally once a day (in the morning) (12 Jan 2022 17:49)  cetirizine 10 mg oral tablet: 1 tab(s) orally once a day (12 Jan 2022 17:49)  dilTIAZem 120 mg/24 hours oral tablet, extended release: 1 tab(s) orally once a day (12 Jan 2022 17:49)  doxepin 25 mg oral capsule: 1 cap(s) orally once a day (at bedtime) (12 Jan 2022 17:49)  Eliquis 2.5 mg oral tablet: 1 tab(s) orally 2 times a day    Pharmacy states patient no longer wants to fill this medication (12 Jan 2022 17:49)  furosemide 80 mg oral tablet: 1 tab(s) orally once a day    Pharmacy states patient no longer wants to fill this medication (12 Jan 2022 17:49)  gabapentin 300 mg oral capsule: 1 cap(s) orally 2 times a day (12 Jan 2022 17:49)  hydrOXYzine hydrochloride 25 mg oral tablet: 1 tab(s) orally 2 times a day, As Needed (12 Jan 2022 17:49)  lanthanum 1000 mg oral tablet, chewable: 2 tab(s) orally with meals and 1 tab(s) orally with snacks    Pharmacy states patient no longer wants to fill this medication (12 Jan 2022 17:49)  mirtazapine 7.5 mg oral tablet: 1 tab(s) orally once a day (at bedtime) (12 Jan 2022 17:49)  montelukast 10 mg oral tablet: 1 tab(s) orally once a day (in the evening) (12 Jan 2022 17:49)  mupirocin 2% topical ointment: Apply sparingly to affected area 2 times a day as directed (12 Jan 2022 17:49)  nystatin 100,000 units/mL oral suspension: 5 milliliter(s) orally 4 times a day, as directed (12 Jan 2022 17:49)  omeprazole 20 mg oral delayed release capsule: 2 cap(s) orally once a day before breakfast (12 Jan 2022 17:49)  Pennsaid 2% topical solution: Apply topically to affected area 2 times a day (12 Jan 2022 17:49)  rosuvastatin 40 mg oral tablet: 1 tab(s) orally once a day (12 Jan 2022 17:49)  Santyl 250 units/g topical ointment: Apply topically to affected area once a day as directed (12 Jan 2022 17:49)  sertraline 50 mg oral tablet: 1 tab(s) orally once a day (12 Jan 2022 17:49)  Spiriva HandiHaler 18 mcg inhalation capsule: 1 cap(s) inhaled once a day (12 Jan 2022 17:49)  traZODone 100 mg oral tablet: 1 tab(s) orally once a day (at bedtime) (12 Jan 2022 17:49)  Tresiba FlexTouch 100 units/mL subcutaneous solution: 80 unit(s) subcutaneous once a day (at bedtime) (12 Jan 2022 17:49)  Trulicity Pen 1.5 mg/0.5 mL subcutaneous solution: 1 dose(s) subcutaneous once a week (12 Jan 2022 17:49)      MEDICATIONS  (STANDING):  apixaban 2.5 milliGRAM(s) Oral two times a day  aspirin enteric coated 81 milliGRAM(s) Oral daily  atorvastatin 80 milliGRAM(s) Oral at bedtime  buPROPion XL (24-Hour) . 150 milliGRAM(s) Oral daily  chlorhexidine 2% Cloths 1 Application(s) Topical daily  cinacalcet 60 milliGRAM(s) Oral daily  Dakins Solution - 1/4 Strength 1 Application(s) Topical two times a day  dextrose 40% Gel 15 Gram(s) Oral once  dextrose 5%. 1000 milliLiter(s) (50 mL/Hr) IV Continuous <Continuous>  dextrose 5%. 1000 milliLiter(s) (100 mL/Hr) IV Continuous <Continuous>  dextrose 50% Injectable 25 Gram(s) IV Push once  dextrose 50% Injectable 12.5 Gram(s) IV Push once  dextrose 50% Injectable 25 Gram(s) IV Push once  diltiazem    milliGRAM(s) Oral daily  epoetin anabela-epbx (RETACRIT) Injectable 91622 Unit(s) IV Push <User Schedule>  gabapentin 300 milliGRAM(s) Oral daily  glucagon  Injectable 1 milliGRAM(s) IntraMuscular once  heparin   Injectable. 500 Unit(s) Dialysis. every 1 hour  insulin glargine Injectable (LANTUS) 16 Unit(s) SubCutaneous at bedtime  insulin lispro (ADMELOG) corrective regimen sliding scale   SubCutaneous three times a day before meals  insulin lispro (ADMELOG) corrective regimen sliding scale   SubCutaneous at bedtime  insulin lispro Injectable (ADMELOG) 10 Unit(s) SubCutaneous three times a day before meals  loratadine 10 milliGRAM(s) Oral daily  melatonin 6 milliGRAM(s) Oral at bedtime  methylPREDNISolone sodium succinate Injectable 35 milliGRAM(s) IV Push two times a day  mirtazapine 7.5 milliGRAM(s) Oral at bedtime  montelukast 10 milliGRAM(s) Oral at bedtime  oxyCODONE  ER Tablet 10 milliGRAM(s) Oral every 12 hours  pantoprazole    Tablet 40 milliGRAM(s) Oral before breakfast  polyethylene glycol 3350 17 Gram(s) Oral two times a day  senna 2 Tablet(s) Oral at bedtime  sertraline 50 milliGRAM(s) Oral daily  sevelamer carbonate 800 milliGRAM(s) Oral three times a day with meals  sodium thiosulfate IVPB 25 Gram(s) IV Intermittent <User Schedule>  sodium zirconium cyclosilicate 10 Gram(s) Oral once  tacrolimus   0.1% Ointment 1 Application(s) Topical daily  traZODone 100 milliGRAM(s) Oral at bedtime      TELEMETRY: 	    ECG:  	  RADIOLOGY:   DIAGNOSTIC TESTING:  [ ] Echocardiogram:  [ ]  Catheterization:  [ ] Stress Test:    OTHER: 	    LABS:	 	    Troponin T, High Sensitivity Result: 44 ng/L (02-08 @ 13:39)                          8.1    19.95 )-----------( 527      ( 08 Feb 2022 13:39 )             27.5     02-08    137  |  91<L>  |  59<H>  ----------------------------<  388<H>  5.7<H>   |  21<L>  |  5.55<H>    Ca    9.8      08 Feb 2022 13:39  Phos  3.6     02-08  Mg     2.40     02-08    TPro  6.7  /  Alb  3.6  /  TBili  <0.2  /  DBili  x   /  AST  11  /  ALT  11  /  AlkPhos  394<H>  02-08

## 2022-02-08 NOTE — PROGRESS NOTE ADULT - ASSESSMENT
56 yr old F with morbid obesity, CHAMP not on home O2, ESRD (HD MWF), HTN, DM2 A1C 7.0, COPD, Afib no longer on AC, chronic R pannus wound here with worsening wound, diarrhea.  Pt exhibits persistent steroid induced hyperglycemia despite increased insulin regimen.         1. Type 2 diabetes mellitus uncontrolled  A1c 7.0% (may be inaccurate in setting of ESRD)  Home Regimen: Tresiba 80 units HS and Trulicity 1.5mg subq weekly  While inpatient:  BG target 100-180 mg/dL  Severe steroid induced hyperglycemia persists.   Increased Lantus to 20 units SQ qHS   Increased Admelog to 12 units SQ TID before meals (Hold if NPO/not eating meal)    Continue Admelog correctional scale to moderate dose before meals, can continue mod dose at bedtime  Check BG before meals and bedtime  Hypoglycemia protocol   Consistent carbohydrate diet    Discharge Plan:  STOP Trulicity (patient ESRD on HD)  Depends on steroid plan  If no steroids, likely dc plan is basal/oral regimen. For oral agent, depends on inpatient requirements but can consider renally dosed DPP4 (Januvia 25 mg or Tradjenta 5 mg daily) VS Prandin before meals   Patient may benefit from switching to 22-24h acting basal insulin eg Levemir or Lantus, instead of Tresiba  Please assess insulin coverage for Levemir or Lantus, instead of Tresiba pens  Consider CGM (such as Freestyle Libre2 outpatient)  If desiring to followup with [facility]: [address] [place] [address], [address], [facility] [address], [phone]    2. HTN  Management per primary team     3. Hyperlipidemia  Continue home dose of Atorvastatin 80 mg  Note, limited benefit in ESRD. Followup lipid panel as outpatient    Tamela Ruiz  Nurse Practitioner  Division of Endocrinology & Diabetes  Pager # [phone]      If after 6PM or before 9AM, or on weekends/holidays, please call endocrine answering service for assistance ([phone]).  For nonurgent matters email [email or web address] for assistance.
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
## 2022-02-08 NOTE — PROGRESS NOTE ADULT - ASSESSMENT
a/p  56 year old female with hx of morbid obesity, CHAMP not on home O2, ESRD (HD MWF), HTN, DM, COPD, Afib no longer on AC, chronic R pannus wound, followed by Dr. Layne, sent by visiting RN for worsening wound.    #Chronic Pannus Wound  -s/p IV abx per primary team  -med/derm  f/u   -sx recs appreciated     #Afib  -stable, in NSR   -cont eliquis   -cont cardizem    #Hypertension  -overall stable   -cont current meds    #ESRD on HD  -HD per renal    # Near Syncope  -tele no events   -orthostatics neg  -echo w grossly nl lv sys fxn    #Covid-19+  -resolved   -med f/u

## 2022-02-09 NOTE — PROGRESS NOTE ADULT - SUBJECTIVE AND OBJECTIVE BOX
[facility] NEPHROLOGY- PROGRESS NOTE    56y Female with history of ESRD on HD presents with vomiting and diarrhea found to be COVID-19 positive. Nephrology consulted for ESRD status.    REVIEW OF SYSTEMS:  Gen: no changes in weight  Cards: no chest pain  Resp: no dyspnea  GI: no nausea or vomiting or diarrhea + ab wound pain  Vascular: no LE edema    caffeine (Nausea)  latex (Rash)  penicillin (Nausea)  strawberry (Rash)      Hospital Medications: Medications reviewed      VITALS:  T(F): 98 (02-09-22 @ 08:00), Max: 98 (02-08-22 @ 14:51)  HR: 68 (02-09-22 @ 08:00)  BP: 132/60 (02-09-22 @ 08:00)  RR: 18 (02-09-22 @ 08:00)  SpO2: 94% (02-09-22 @ 08:00)  Wt(kg): --    02-08 @ 07:01  -  02-09 @ 07:00  --------------------------------------------------------  IN: 120 mL / OUT: 0 mL / NET: 120 mL      PHYSICAL EXAM:    Gen: NAD, calm  Cards: RRR, +S1/S2, no M/G/R  Resp: CTA B/L  GI: soft, RLQ bandage/tender  Vascular: no LE edema B/L, LUE AVF + bruit/thrill      LABS:  02-08    137  |  91<L>  |  59<H>  ----------------------------<  388<H>  5.7<H>   |  21<L>  |  5.55<H>    Ca    9.8      08 Feb 2022 13:39  Phos  3.6     02-08  Mg     2.40     02-08    TPro  6.7  /  Alb  3.6  /  TBili  <0.2  /  DBili      /  AST  11  /  ALT  11  /  AlkPhos  394<H>  02-08    Creatinine Trend: 5.55 <--, 7.12 <--, 5.29 <--, 3.95 <--, 7.34 <--                        8.1    19.95 )-----------( 527      ( 08 Feb 2022 13:39 )             27.5     Urine Studies:

## 2022-02-09 NOTE — PROGRESS NOTE ADULT - ASSESSMENT
56y Female with history of ESRD on HD presents with vomiting and diarrhea found to be COVID-19 positive. Nephrology consulted for ESRD status.    1) ESRD: Last HD on 2/7 tolerated well with 2L removed. Plan for next maintenance HD today. S/P lokelma 10 grams PO X 1 dose on 2/8 for hyperkalemia (with partial hemolysis). Monitor electrolytes.    2) HTN with ESRD: BP acceptable. Continue with current medications. Monitor BP.    3) Anemia of renal disease: Hb low with elevated ferritin. Continue with Epo 14K with HD. Monitor Hb.    4) Secondary HPT of renal origin: Phosphorus acceptable with low iPTH. Sensipar decreased to 60 mg PO daily. Continue with renvela 1 tab with meals (goal < 4.5 given concerns for calciphylaxis). Monitor serum calcium and phosphorus.    5) Ab wound: Appreciate dermatology follow up. Ddx includes calciphylaxis versus pyoderma gangrenosum. S/P biopsy. Continue with empiric sodium thiosulfate with HD. On CSA as per Derm as benefits outweigh risks to residual renal function (which is minimal at this point).      [facility] NEPHROLOGY  Keaton Lopez M.D.  Juma Green D.O.  Myla Hoffmann M.D.  Julia Brewer, MSN, ANP-C    Telephone: [phone]  Facsimile: [phone]    [address] [place] [address]

## 2022-02-09 NOTE — PROGRESS NOTE ADULT - SUBJECTIVE AND OBJECTIVE BOX
INTERVAL HPI/OVERNIGHT EVENTS:  patient reports pain not well controlled at present  cyclosporine started 2/8 , solumedrol and atorvastatin discontinued      MEDICATIONS  (STANDING):  apixaban 2.5 milliGRAM(s) Oral two times a day  aspirin enteric coated 81 milliGRAM(s) Oral daily  buPROPion XL (24-Hour) . 150 milliGRAM(s) Oral daily  chlorhexidine 2% Cloths 1 Application(s) Topical daily  cinacalcet 60 milliGRAM(s) Oral daily  cycloSPORINE  , modified (NEORAL) 125 milliGRAM(s) Oral every 12 hours  Dakins Solution - 1/4 Strength 1 Application(s) Topical two times a day  dextrose 40% Gel 15 Gram(s) Oral once  dextrose 5%. 1000 milliLiter(s) (50 mL/Hr) IV Continuous <Continuous>  dextrose 5%. 1000 milliLiter(s) (100 mL/Hr) IV Continuous <Continuous>  dextrose 50% Injectable 25 Gram(s) IV Push once  dextrose 50% Injectable 12.5 Gram(s) IV Push once  dextrose 50% Injectable 25 Gram(s) IV Push once  diltiazem    milliGRAM(s) Oral daily  epoetin anabela-epbx (RETACRIT) Injectable 05385 Unit(s) IV Push <User Schedule>  gabapentin 300 milliGRAM(s) Oral daily  glucagon  Injectable 1 milliGRAM(s) IntraMuscular once  heparin   Injectable. 500 Unit(s) Dialysis. every 1 hour  insulin glargine Injectable (LANTUS) 12 Unit(s) SubCutaneous at bedtime  insulin lispro (ADMELOG) corrective regimen sliding scale   SubCutaneous three times a day before meals  insulin lispro (ADMELOG) corrective regimen sliding scale   SubCutaneous at bedtime  insulin lispro Injectable (ADMELOG) 7 Unit(s) SubCutaneous three times a day before meals  loratadine 10 milliGRAM(s) Oral daily  melatonin 6 milliGRAM(s) Oral at bedtime  mirtazapine 7.5 milliGRAM(s) Oral at bedtime  montelukast 10 milliGRAM(s) Oral at bedtime  oxyCODONE  ER Tablet 10 milliGRAM(s) Oral every 12 hours  pantoprazole    Tablet 40 milliGRAM(s) Oral before breakfast  polyethylene glycol 3350 17 Gram(s) Oral two times a day  senna 2 Tablet(s) Oral at bedtime  sertraline 50 milliGRAM(s) Oral daily  sevelamer carbonate 800 milliGRAM(s) Oral three times a day with meals  sodium thiosulfate IVPB 25 Gram(s) IV Intermittent <User Schedule>  tacrolimus   0.1% Ointment 1 Application(s) Topical daily  traZODone 100 milliGRAM(s) Oral at bedtime    MEDICATIONS  (PRN):  acetaminophen     Tablet .. 650 milliGRAM(s) Oral every 8 hours PRN Mild Pain  diphenhydrAMINE Injectable 25 milliGRAM(s) IV Push <User Schedule> PRN Itching  oxyCODONE    IR 5 milliGRAM(s) Oral every 4 hours PRN Moderate Pain (4 - 6)  oxyCODONE    IR 7.5 milliGRAM(s) Oral every 4 hours PRN Severe Pain (7 - 10)      Allergies    latex (Rash)  penicillin (Nausea)  strawberry (Rash)    Intolerances    caffeine (Nausea)      REVIEW OF SYSTEMS      General: no fevers/chills, no NS	    Skin: see HPI  	  Ophthalmologic: no eye pain or change in vision    Genitourinary: no dysuria or hematuria    Musculoskeletal: no joint pains or weakness	    Neurological:no weakness or tingling          Vital Signs Last 24 Hrs  T(C): 36.7 (09 Feb 2022 13:00), Max: 36.7 (08 Feb 2022 21:08)  T(F): 98 (09 Feb 2022 13:00), Max: 98 (08 Feb 2022 21:08)  HR: 64 (09 Feb 2022 13:00) (63 - 68)  BP: 128/60 (09 Feb 2022 13:00) (128/60 - 132/60)  BP(mean): --  RR: 18 (09 Feb 2022 13:00) (18 - 18)  SpO2: 96% (09 Feb 2022 13:00) (94% - 100%)    PHYSICAL EXAM:   The patient was alert and oriented X 3, well nourished, and in no  apparent distress.  There was no visible lymphadenopathy.  Conjunctiva were non injected  There was no clubbing or edema of extremities.  There was no hyperhidrosis or bromhidrosis.    Of note on skin exam:   - right pannus with large round, deep ulcer with erythematous undermined borders with surrounding retiform purpura - stable purulent drainage and malodor today (bandage not in place)  surrounding areas of induration and firm subcutaneous nodules     LABS:                        8.1    19.95 )-----------( 527      ( 08 Feb 2022 13:39 )             27.5     02-08    137  |  91<L>  |  59<H>  ----------------------------<  388<H>  5.7<H>   |  21<L>  |  5.55<H>    Ca    9.8      08 Feb 2022 13:39  Phos  3.6     02-08  Mg     2.40     02-08    TPro  6.7  /  Alb  3.6  /  TBili  <0.2  /  DBili  x   /  AST  11  /  ALT  11  /  AlkPhos  394<H>  02-08
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
## 2022-02-09 NOTE — PROGRESS NOTE ADULT - ASSESSMENT
56 yr old F with morbid obesity, CHAMP not on home O2, ESRD (HD MWF), HTN, DM2 A1C 7.0, COPD, Afib no longer on AC, chronic R pannus wound here with worsening wound, diarrhea.  Pt exhibits persistent steroid induced hyperglycemia despite increased insulin regimen.         1. Type 2 diabetes mellitus uncontrolled  A1c 7.0% (may be inaccurate in setting of ESRD)  Home Regimen: Tresiba 80 units HS and Trulicity 1.5mg subq weekly  While inpatient:  BG target 100-180 mg/dL  Severe steroid induced hyperglycemia persists.   Decreased Lantus to 12 units SQ qHS   Decreased Admelog to 7 units SQ TID before meals (Hold if NPO/not eating meal)    Continue Admelog correctional scale to moderate dose before meals, can continue mod dose at bedtime  Check BG before meals and bedtime  Hypoglycemia protocol   Consistent carbohydrate diet    Discharge Plan:  STOP Trulicity (patient ESRD on HD)  Depends on steroid plan  If no steroids, likely dc plan is basal/oral regimen. For oral agent, depends on inpatient requirements but can consider renally dosed DPP4 (Januvia 25 mg or Tradjenta 5 mg daily) VS Prandin before meals   Patient may benefit from switching to 22-24h acting basal insulin eg Levemir or Lantus, instead of Tresiba  Please assess insulin coverage for Levemir or Lantus, instead of Tresiba pens  Consider CGM (such as Freestyle Libre2 outpatient)  If desiring to followup with [facility] Endocr[facility]: [address] [place] [address], [address], [facility] [address], [phone]    2. HTN  Management per primary team     3. Hyperlipidemia  Continue home dose of Atorvastatin 80 mg  Note, limited benefit in ESRD. Followup lipid panel as outpatient    Tamela Ruiz  Nurse Practitioner  Division of Endocrinology & Diabetes  Pager # [phone]      If after 6PM or before 9AM, or on weekends/holidays, please call endocrine answering service for assistance ([phone]).  For nonurgent matters email [email or web address] for assistance.

## 2022-02-09 NOTE — PROGRESS NOTE ADULT - SUBJECTIVE AND OBJECTIVE BOX
SUBJECTIVE / OVERNIGHT EVENTS:pt seen and examined, c/o pain at the wound site  2-9-22    MEDICATIONS  (STANDING):  apixaban 2.5 milliGRAM(s) Oral two times a day  aspirin enteric coated 81 milliGRAM(s) Oral daily  buPROPion XL (24-Hour) . 150 milliGRAM(s) Oral daily  chlorhexidine 2% Cloths 1 Application(s) Topical daily  cinacalcet 60 milliGRAM(s) Oral daily  cycloSPORINE  , modified (NEORAL) 125 milliGRAM(s) Oral every 12 hours  Dakins Solution - 1/4 Strength 1 Application(s) Topical two times a day  dextrose 40% Gel 15 Gram(s) Oral once  dextrose 5%. 1000 milliLiter(s) (50 mL/Hr) IV Continuous <Continuous>  dextrose 5%. 1000 milliLiter(s) (100 mL/Hr) IV Continuous <Continuous>  dextrose 50% Injectable 25 Gram(s) IV Push once  dextrose 50% Injectable 12.5 Gram(s) IV Push once  dextrose 50% Injectable 25 Gram(s) IV Push once  diltiazem    milliGRAM(s) Oral daily  epoetin anabela-epbx (RETACRIT) Injectable 97358 Unit(s) IV Push <User Schedule>  gabapentin 300 milliGRAM(s) Oral daily  glucagon  Injectable 1 milliGRAM(s) IntraMuscular once  heparin   Injectable. 500 Unit(s) Dialysis. every 1 hour  insulin glargine Injectable (LANTUS) 12 Unit(s) SubCutaneous at bedtime  insulin lispro (ADMELOG) corrective regimen sliding scale   SubCutaneous three times a day before meals  insulin lispro (ADMELOG) corrective regimen sliding scale   SubCutaneous at bedtime  insulin lispro Injectable (ADMELOG) 7 Unit(s) SubCutaneous three times a day before meals  loratadine 10 milliGRAM(s) Oral daily  melatonin 6 milliGRAM(s) Oral at bedtime  mirtazapine 7.5 milliGRAM(s) Oral at bedtime  montelukast 10 milliGRAM(s) Oral at bedtime  oxyCODONE  ER Tablet 10 milliGRAM(s) Oral every 12 hours  pantoprazole    Tablet 40 milliGRAM(s) Oral before breakfast  polyethylene glycol 3350 17 Gram(s) Oral two times a day  senna 2 Tablet(s) Oral at bedtime  sertraline 50 milliGRAM(s) Oral daily  sevelamer carbonate 800 milliGRAM(s) Oral three times a day with meals  sodium thiosulfate IVPB 25 Gram(s) IV Intermittent <User Schedule>  tacrolimus   0.1% Ointment 1 Application(s) Topical daily  traZODone 100 milliGRAM(s) Oral at bedtime    MEDICATIONS  (PRN):  acetaminophen     Tablet .. 650 milliGRAM(s) Oral every 8 hours PRN Mild Pain  diphenhydrAMINE Injectable 25 milliGRAM(s) IV Push <User Schedule> PRN Itching  oxyCODONE    IR 5 milliGRAM(s) Oral every 4 hours PRN Moderate Pain (4 - 6)  oxyCODONE    IR 7.5 milliGRAM(s) Oral every 4 hours PRN Severe Pain (7 - 10)    Vital Signs Last 24 Hrs  T(C): 37 (02-09-22 @ 21:20), Max: 37 (02-09-22 @ 17:30)  T(F): 98.6 (02-09-22 @ 21:20), Max: 98.6 (02-09-22 @ 17:30)  HR: 72 (02-09-22 @ 21:20) (63 - 73)  BP: 120/57 (02-09-22 @ 21:20) (120/57 - 132/60)  BP(mean): --  RR: 18 (02-09-22 @ 21:20) (18 - 18)  SpO2: 96% (02-09-22 @ 13:00) (94% - 100%)            Constitutional: No fever, fatigue  Skin: No rash.  Eyes: No recent vision problems or eye pain.  ENT: No congestion, ear pain, or sore throat.  Cardiovascular: No chest pain or palpation.  Respiratory: No cough, shortness of breath, congestion, or wheezing.  Gastrointestinal: No abdominal pain, nausea, vomiting, or diarrhea.  Genitourinary: No dysuria.  Musculoskeletal: No joint swelling.  Neurologic: No headache.    PHYSICAL EXAM:  GENERAL: NAD  EYES: EOMI, PERRLA  NECK: Supple, No JVD  CHEST/LUNG: dec breath sounds at bases  HEART:  S1 , S2 +  ABDOMEN: soft , bs+, abd wound +  EXTREMITIES:  edema+  NEUROLOGY:alert awake    LABS:  02-09    137  |  89<L>  |  79<H>  ----------------------------<  180<H>  5.0   |  19<L>  |  7.18<H>    Ca    9.8      09 Feb 2022 19:56  Phos  4.0     02-09  Mg     2.40     02-09    TPro  6.7  /  Alb  3.6  /  TBili  <0.2  /  DBili      /  AST  11  /  ALT  11  /  AlkPhos  394<H>  02-08    Creatinine Trend: 7.18 <--, 5.55 <--, 7.12 <--, 5.29 <--, 3.95 <--                        7.9    14.83 )-----------( 499      ( 09 Feb 2022 19:56 )             26.9     Urine Studies:            LIVER FUNCTIONS - ( 08 Feb 2022 13:39 )  Alb: 3.6 g/dL / Pro: 6.7 g/dL / ALK PHOS: 394 U/L / ALT: 11 U/L / AST: 11 U/L / GGT: x             394 U/L / ALT: 11 U/L / AST: 11 U/L / GGT: x

## 2022-02-09 NOTE — PROGRESS NOTE ADULT - SUBJECTIVE AND OBJECTIVE BOX
Chief Complaint: DM 2 with hyperglycemia   Pt is a 54 y/o female with hx of DM2, AFIB, CHAMP, Obesity, COPD, HTN, ESRD on HD MWF and chronic R. Pannus wound.  Pt initially sought emergent medical attention for worsening right pannus wound and associated diarrhea.      Patient ate all lunch including salmon and dialyzed potato.  Last steroid dose yesterday AM.       MEDICATIONS  (STANDING):  apixaban 2.5 milliGRAM(s) Oral two times a day  aspirin enteric coated 81 milliGRAM(s) Oral daily  buPROPion XL (24-Hour) . 150 milliGRAM(s) Oral daily  chlorhexidine 2% Cloths 1 Application(s) Topical daily  cinacalcet 60 milliGRAM(s) Oral daily  Dakins Solution - 1/4 Strength 1 Application(s) Topical two times a day  dextrose 40% Gel 15 Gram(s) Oral once  dextrose 5%. 1000 milliLiter(s) (50 mL/Hr) IV Continuous <Continuous>  dextrose 5%. 1000 milliLiter(s) (100 mL/Hr) IV Continuous <Continuous>  dextrose 50% Injectable 25 Gram(s) IV Push once  dextrose 50% Injectable 12.5 Gram(s) IV Push once  dextrose 50% Injectable 25 Gram(s) IV Push once  diltiazem    milliGRAM(s) Oral daily  epoetin anabela-epbx (RETACRIT) Injectable 61030 Unit(s) IV Push <User Schedule>  gabapentin 300 milliGRAM(s) Oral daily  glucagon  Injectable 1 milliGRAM(s) IntraMuscular once  heparin   Injectable. 500 Unit(s) Dialysis. every 1 hour  insulin glargine Injectable (LANTUS) 16 Unit(s) SubCutaneous at bedtime  insulin lispro (ADMELOG) corrective regimen sliding scale   SubCutaneous three times a day before meals  insulin lispro (ADMELOG) corrective regimen sliding scale   SubCutaneous at bedtime  insulin lispro Injectable (ADMELOG) 10 Unit(s) SubCutaneous three times a day before meals  loratadine 10 milliGRAM(s) Oral daily  melatonin 6 milliGRAM(s) Oral at bedtime  methylPREDNISolone sodium succinate Injectable 35 milliGRAM(s) IV Push two times a day  mirtazapine 7.5 milliGRAM(s) Oral at bedtime  montelukast 10 milliGRAM(s) Oral at bedtime  oxyCODONE  ER Tablet 10 milliGRAM(s) Oral every 12 hours  pantoprazole    Tablet 40 milliGRAM(s) Oral before breakfast  polyethylene glycol 3350 17 Gram(s) Oral two times a day  senna 2 Tablet(s) Oral at bedtime  sertraline 50 milliGRAM(s) Oral daily  sevelamer carbonate 800 milliGRAM(s) Oral three times a day with meals  sodium thiosulfate IVPB 25 Gram(s) IV Intermittent <User Schedule>  sodium zirconium cyclosilicate 10 Gram(s) Oral once  tacrolimus   0.1% Ointment 1 Application(s) Topical daily  traZODone 100 milliGRAM(s) Oral at bedtime    MEDICATIONS  (PRN):  acetaminophen     Tablet .. 650 milliGRAM(s) Oral every 8 hours PRN Mild Pain  diphenhydrAMINE Injectable 25 milliGRAM(s) IV Push <User Schedule> PRN Itching  oxyCODONE    IR 5 milliGRAM(s) Oral every 4 hours PRN Moderate Pain (4 - 6)  oxyCODONE    IR 7.5 milliGRAM(s) Oral every 4 hours PRN Severe Pain (7 - 10)    ALLERGIES  Latex (Rash)  Penicillin (Nausea)  Strawberry (Rash)    Intolerances  caffeine (Nausea)    REVIEW OF SYSTEMS:  General: Pt denies fevers chills.    Cardiovascular: Pt denies chest pain, palpitations.  GI: Pt denies nausea, vomiting, diarrhea.  Endocrine: Pt denies symptoms of hypoglycemia, denies increased thirst, hunger.   All other Review of Symptoms negative except where noted in HPI.       PHYSICAL EXAM:  Vital Signs Last 24 Hrs  T(C): 36.7 (09 Feb 2022 13:00), Max: 36.7 (08 Feb 2022 21:08)  T(F): 98 (09 Feb 2022 13:00), Max: 98 (08 Feb 2022 21:08)  HR: 64 (09 Feb 2022 13:00) (63 - 68)  BP: 128/60 (09 Feb 2022 13:00) (128/60 - 132/60)  BP(mean): --  RR: 18 (09 Feb 2022 13:00) (18 - 18)  SpO2: 96% (09 Feb 2022 13:00) (94% - 100%)  GENERAL: NAD  EYES: No proptosis, no lid lag, anicteric  HEENT:  Atraumatic, Normocephalic,  CARDIOVASCULAR Normal S1S2 auscultated, No murmurs noted.  RESPIRATORY: Breath sounds even unlabored clear to auscultation bilaterally    02-08    137  |  91<L>  |  59<H>  ----------------------------<  388<H>  5.7<H>   |  21<L>  |  5.55<H>    Ca    9.8      08 Feb 2022 13:39  Phos  3.6     02-08  Mg     2.40     02-08    TPro  6.7  /  Alb  3.6  /  TBili  <0.2  /  DBili  x   /  AST  11  /  ALT  11  /  AlkPhos  394<H>  02-08      CAPILLARY BLOOD GLUCOSE  POCT Blood Glucose.: 201 mg/dL (09 Feb 2022 11:47)  POCT Blood Glucose.: 144 mg/dL (09 Feb 2022 07:53)  POCT Blood Glucose.: 235 mg/dL (08 Feb 2022 21:31)  POCT Blood Glucose.: 312 mg/dL (08 Feb 2022 17:38)  POCT Blood Glucose.: 341 mg/dL (08 Feb 2022 11:58)  POCT Blood Glucose.: 240 mg/dL (08 Feb 2022 08:19)  POCT Blood Glucose.: 120 mg/dL (07 Feb 2022 22:41)  POCT Blood Glucose.: 102 mg/dL (07 Feb 2022 21:00)  POCT Blood Glucose.: 132 mg/dL (07 Feb 2022 17:31)      A1C with Estimated Average Glucose Result: 7.0 % (01-13-22 @ 08:21)  A1C with Estimated Average Glucose Result: 6.7 % (08-31-21 @ 09:30)  A1C with Estimated Average Glucose Result: 7.2 % (04-28-21 @ 07:04)    Diet, Consistent Carbohydrate Renal w/Evening Snack:   Supplement Feeding Modality:  Oral  Nepro Cans or Servings Per Day:  1       Frequency:  Three Times a day (01-17-22 @ 18:07)

## 2022-02-09 NOTE — PROGRESS NOTE ADULT - SUBJECTIVE AND OBJECTIVE BOX
CARDIOLOGY FOLLOW UP - Dr. Bullock  DATE OF SERVICE: 2/9/22    CC no cp or sob      REVIEW OF SYSTEMS:  CONSTITUTIONAL: No fever, weight loss, or fatigue  RESPIRATORY: No cough, wheezing, chills or hemoptysis; No Shortness of Breath  CARDIOVASCULAR: No chest pain, palpitations, passing out, dizziness, or leg swelling  GASTROINTESTINAL: No abdominal or epigastric pain. No nausea, vomiting, or hematemesis; No diarrhea or constipation. No melena or hematochezia.  VASCULAR: No edema     PHYSICAL EXAM:  T(C): 36.7 (02-09-22 @ 08:00), Max: 36.7 (02-08-22 @ 14:51)  HR: 68 (02-09-22 @ 08:00) (63 - 72)  BP: 132/60 (02-09-22 @ 08:00) (130/61 - 132/60)  RR: 18 (02-09-22 @ 08:00) (18 - 18)  SpO2: 94% (02-09-22 @ 08:00) (91% - 100%)  Wt(kg): --  I&O's Summary    08 Feb 2022 07:01  -  09 Feb 2022 07:00  --------------------------------------------------------  IN: 120 mL / OUT: 0 mL / NET: 120 mL        Appearance: Normal	  Cardiovascular: Normal S1 S2,RRR, No JVD, No murmurs  Respiratory: Lungs clear to auscultation	  Gastrointestinal:  Soft, Non-tender, + BS	  Extremities: Normal range of motion, No clubbing, cyanosis or edema      Home Medications:  aspirin 81 mg oral delayed release tablet: 1 tab(s) orally once a day (12 Jan 2022 17:49)  buPROPion 150 mg/24 hours (XL) oral tablet, extended release: 1 tab(s) orally once a day (in the morning) (12 Jan 2022 17:49)  cetirizine 10 mg oral tablet: 1 tab(s) orally once a day (12 Jan 2022 17:49)  dilTIAZem 120 mg/24 hours oral tablet, extended release: 1 tab(s) orally once a day (12 Jan 2022 17:49)  doxepin 25 mg oral capsule: 1 cap(s) orally once a day (at bedtime) (12 Jan 2022 17:49)  Eliquis 2.5 mg oral tablet: 1 tab(s) orally 2 times a day    Pharmacy states patient no longer wants to fill this medication (12 Jan 2022 17:49)  furosemide 80 mg oral tablet: 1 tab(s) orally once a day    Pharmacy states patient no longer wants to fill this medication (12 Jan 2022 17:49)  gabapentin 300 mg oral capsule: 1 cap(s) orally 2 times a day (12 Jan 2022 17:49)  hydrOXYzine hydrochloride 25 mg oral tablet: 1 tab(s) orally 2 times a day, As Needed (12 Jan 2022 17:49)  lanthanum 1000 mg oral tablet, chewable: 2 tab(s) orally with meals and 1 tab(s) orally with snacks    Pharmacy states patient no longer wants to fill this medication (12 Jan 2022 17:49)  mirtazapine 7.5 mg oral tablet: 1 tab(s) orally once a day (at bedtime) (12 Jan 2022 17:49)  montelukast 10 mg oral tablet: 1 tab(s) orally once a day (in the evening) (12 Jan 2022 17:49)  mupirocin 2% topical ointment: Apply sparingly to affected area 2 times a day as directed (12 Jan 2022 17:49)  nystatin 100,000 units/mL oral suspension: 5 milliliter(s) orally 4 times a day, as directed (12 Jan 2022 17:49)  omeprazole 20 mg oral delayed release capsule: 2 cap(s) orally once a day before breakfast (12 Jan 2022 17:49)  Pennsaid 2% topical solution: Apply topically to affected area 2 times a day (12 Jan 2022 17:49)  rosuvastatin 40 mg oral tablet: 1 tab(s) orally once a day (12 Jan 2022 17:49)  Santyl 250 units/g topical ointment: Apply topically to affected area once a day as directed (12 Jan 2022 17:49)  sertraline 50 mg oral tablet: 1 tab(s) orally once a day (12 Jan 2022 17:49)  Spiriva HandiHaler 18 mcg inhalation capsule: 1 cap(s) inhaled once a day (12 Jan 2022 17:49)  traZODone 100 mg oral tablet: 1 tab(s) orally once a day (at bedtime) (12 Jan 2022 17:49)  Tresiba FlexTouch 100 units/mL subcutaneous solution: 80 unit(s) subcutaneous once a day (at bedtime) (12 Jan 2022 17:49)  Trulicity Pen 1.5 mg/0.5 mL subcutaneous solution: 1 dose(s) subcutaneous once a week (12 Jan 2022 17:49)      MEDICATIONS  (STANDING):  apixaban 2.5 milliGRAM(s) Oral two times a day  aspirin enteric coated 81 milliGRAM(s) Oral daily  buPROPion XL (24-Hour) . 150 milliGRAM(s) Oral daily  chlorhexidine 2% Cloths 1 Application(s) Topical daily  cinacalcet 60 milliGRAM(s) Oral daily  cycloSPORINE  , modified (NEORAL) 125 milliGRAM(s) Oral every 12 hours  Dakins Solution - 1/4 Strength 1 Application(s) Topical two times a day  dextrose 40% Gel 15 Gram(s) Oral once  dextrose 5%. 1000 milliLiter(s) (50 mL/Hr) IV Continuous <Continuous>  dextrose 5%. 1000 milliLiter(s) (100 mL/Hr) IV Continuous <Continuous>  dextrose 50% Injectable 25 Gram(s) IV Push once  dextrose 50% Injectable 12.5 Gram(s) IV Push once  dextrose 50% Injectable 25 Gram(s) IV Push once  diltiazem    milliGRAM(s) Oral daily  epoetin anabela-epbx (RETACRIT) Injectable 58058 Unit(s) IV Push <User Schedule>  gabapentin 300 milliGRAM(s) Oral daily  glucagon  Injectable 1 milliGRAM(s) IntraMuscular once  heparin   Injectable. 500 Unit(s) Dialysis. every 1 hour  insulin glargine Injectable (LANTUS) 16 Unit(s) SubCutaneous at bedtime  insulin lispro (ADMELOG) corrective regimen sliding scale   SubCutaneous three times a day before meals  insulin lispro (ADMELOG) corrective regimen sliding scale   SubCutaneous at bedtime  insulin lispro Injectable (ADMELOG) 10 Unit(s) SubCutaneous three times a day before meals  loratadine 10 milliGRAM(s) Oral daily  melatonin 6 milliGRAM(s) Oral at bedtime  mirtazapine 7.5 milliGRAM(s) Oral at bedtime  montelukast 10 milliGRAM(s) Oral at bedtime  oxyCODONE  ER Tablet 10 milliGRAM(s) Oral every 12 hours  pantoprazole    Tablet 40 milliGRAM(s) Oral before breakfast  polyethylene glycol 3350 17 Gram(s) Oral two times a day  senna 2 Tablet(s) Oral at bedtime  sertraline 50 milliGRAM(s) Oral daily  sevelamer carbonate 800 milliGRAM(s) Oral three times a day with meals  sodium thiosulfate IVPB 25 Gram(s) IV Intermittent <User Schedule>  tacrolimus   0.1% Ointment 1 Application(s) Topical daily  traZODone 100 milliGRAM(s) Oral at bedtime      TELEMETRY: 	    ECG:  	  RADIOLOGY:   DIAGNOSTIC TESTING:  [ ] Echocardiogram:  [ ]  Catheterization:  [ ] Stress Test:    OTHER: 	    LABS:	 	    Troponin T, High Sensitivity Result: 44 ng/L (02-08 @ 13:39)                          8.1    19.95 )-----------( 527      ( 08 Feb 2022 13:39 )             27.5     02-08    137  |  91<L>  |  59<H>  ----------------------------<  388<H>  5.7<H>   |  21<L>  |  5.55<H>    Ca    9.8      08 Feb 2022 13:39  Phos  3.6     02-08  Mg     2.40     02-08    TPro  6.7  /  Alb  3.6  /  TBili  <0.2  /  DBili  x   /  AST  11  /  ALT  11  /  AlkPhos  394<H>  02-08

## 2022-02-09 NOTE — PROGRESS NOTE ADULT - ASSESSMENT
#Deep, non-healing ulcer in patient with DM and ESRD on HD. Ulcer with inflammatory borders, extensive undermining with erythematous borders and retiform purpura- suggestive of pyoderma gangrenosum vs calciphylaxis. Both are challenging diagnoses with no definitive diagnostic tests. Biopsy and imaging non diagnostic. Favor Pyoderma Gangrenosum based on clinical appearance of deep ulceration with extensive undermining and worsening when trial off of steroids. STABLE   - Bacterial tissue culture 1/25/21 with carbapenem-resistent Pseudomonas, Staph epidermidis, Enterococcus faecalis; reportedly same as previous bacterial culture.   - s/p 10-day course of IV cefepime completed 1/21/22 and other previously other outpatient abx.   - Biopsy 1/25/22 findings non specific. No obvious intravascular calcium deposits although limited sample of subcutaneous tissue. Repeat von Kossa staining (for calcium), and deeper sections from original biopsy were reviewed with initial diagnosis unchanged.   -SPEP, serum DOUG, ANCAs negative.  -no calcifications noted on CT A/P 2/4     At this time:  - fu UPEP with immunofixation  - fu final fungal/AFB TC results; still pending  - c/w cyclosporine 125mg BID (~3 mg/kg based on dosing weight), continue to monitor blood pressure, electrolytes  - f/u quantiferon and HIV testing   - c/w sodium thiosulfate  - c/w pain management  - recommend palliative care consult to discuss symptomatic care for chronic painful wound, we have had multiple extensive discussions with the patient and her daughters on this topic, including 2/7, pt amenable  - c/w wound care: c/w topical tacrolimus 0.01% ointment 1-2x daily to wound edges and aquacel Ag dressing    The patient's chart was reviewed in addition to photos of the rash taken by the primary team with the permission of the patient.  Patient was seen at bedside with the dermatology attending Dr. Song.  Please page [phone] for further related questions.    Alicia Queen MD  Resident Physician, PGY3  [facility]  Pager: [phone]  Office: [phone].

## 2022-02-09 NOTE — CHART NOTE - NSCHARTNOTEFT_GEN_A_CORE
NUTRITION FOLLOW-UP:    56 year old female with hx of morbid obesity, CHAMP not on home O2, ESRD (HD MWF), HTN, DM, COPD, Afib no longer on AC, chronic R pannus wound.    Pt f/u as per protocol, remains with fluctuating po intake of meals, but drinks supplement ~50%.  Encouraged pt to fill out menu to receive food likes. Kitchen aware of the Food preferences.    Weight: NO updated wts, (1/31/22) 290.3#    Labs:  02-08 Na 137 mmol/L Glu 388 mg/dL<H> K+ 5.7 mmol/L<H> Cr 5.55 mg/dL<H> BUN 59 mg/dL<H> Phos 3.6 mg/dL    02-09 @ 11:47 POCT 201 mg/dL  02-09 @ 07:53 POCT 144 mg/dL  02-08 @ 21:31 POCT 235 mg/dL  02-08 @ 17:38 POCT 312 mg/dL    Current Diet: Diet, Consistent Carbohydrate Renal w/Evening Snack:   Supplement Feeding Modality:  Oral  Nepro Cans or Servings Per Day:  1       Frequency:  Three Times a day (02-07-22 @ 12:23)    Skin- Rt lower quadrant under the abdominal pannus, +1 edema generalized as per RN flow sheet    Estimated needs:  1632- 1904 kcal (30-35 kcal/kg) & 76-87 gm protein (1.4-1.6gm/kg)     Nutrition Diagnosis:  Ongoing    RD to Remain Available:    Additional Recommendations:   Monitor PO intake, weight trends, nutrition related lab values, BMs/GI distress, hydration status, skin integrity.       Rosemarie Choi RDN #[id number]

## 2022-02-09 NOTE — PATIENT PROFILE ADULT - CONTRAINDICATIONS & PRECAUTIONS (SELECT ALL THAT APPLY)
Patient/surrogate refused vaccine... Drysol Pregnancy And Lactation Text: This medication is considered safe during pregnancy and breast feeding.

## 2022-02-10 NOTE — PROGRESS NOTE ADULT - ASSESSMENT
56 yr old F with morbid obesity, CHAMP not on home O2, ESRD (HD MWF), HTN, DM2 A1C 7.0, COPD, Afib no longer on AC, chronic R pannus wound here with worsening wound, diarrhea.  Pt exhibits persistent steroid induced hyperglycemia despite increased insulin regimen.         1. Type 2 diabetes mellitus uncontrolled  A1c 7.0% (may be inaccurate in setting of ESRD)  Home Regimen: Tresiba 80 units HS and Trulicity 1.5mg subq weekly  While inpatient:  BG target 100-180 mg/dL  Increased Lantus to 15 units SQ qHS   Increased Admelog to 8 units SQ TID before meals (Hold if NPO/not eating meal)    Continue Admelog correctional scale to moderate dose before meals, can continue mod dose at bedtime  Check BG before meals and bedtime  Hypoglycemia protocol   Consistent carbohydrate diet    Discharge Plan:  STOP Trulicity (patient ESRD on HD)  Depends on steroid plan  If no steroids, likely dc plan is basal/oral regimen. For oral agent, depends on inpatient requirements but can consider renally dosed DPP4 (Januvia 25 mg or Tradjenta 5 mg daily) VS Prandin before meals - Full dc plan to follow  Patient may benefit from switching to 22-24h acting basal insulin eg Levemir or Lantus, instead of Tresiba  Please assess insulin coverage for Levemir or Lantus, instead of Tresiba pens  Consider CGM (such as Freestyle Libre2 outpatient)  If desiring to followup with [facility]: [address] [place] [address], [address], [facility] [address], [phone]    2. HTN  Management per primary team     3. Hyperlipidemia  Continue home dose of Atorvastatin 80 mg  Note, limited benefit in ESRD. Followup lipid panel as outpatient    Tamela Ruiz  Nurse Practitioner  Division of Endocrinology & Diabetes  Pager # [phone]      If after 6PM or before 9AM, or on weekends/holidays, please call endocrine answering service for assistance ([phone]).  For nonurgent matters email [email or web address] for assistance.

## 2022-02-10 NOTE — PROGRESS NOTE ADULT - SUBJECTIVE AND OBJECTIVE BOX
SUBJECTIVE / OVERNIGHT EVENTS:pt seen and examined, c/o pain at the wound site  2-10-22    MEDICATIONS  (STANDING):  apixaban 2.5 milliGRAM(s) Oral two times a day  aspirin enteric coated 81 milliGRAM(s) Oral daily  buPROPion XL (24-Hour) . 150 milliGRAM(s) Oral daily  chlorhexidine 2% Cloths 1 Application(s) Topical daily  cinacalcet 60 milliGRAM(s) Oral daily  cycloSPORINE  , modified (NEORAL) 125 milliGRAM(s) Oral every 12 hours  Dakins Solution - 1/4 Strength 1 Application(s) Topical two times a day  dextrose 40% Gel 15 Gram(s) Oral once  dextrose 5%. 1000 milliLiter(s) (50 mL/Hr) IV Continuous <Continuous>  dextrose 5%. 1000 milliLiter(s) (100 mL/Hr) IV Continuous <Continuous>  dextrose 50% Injectable 25 Gram(s) IV Push once  dextrose 50% Injectable 12.5 Gram(s) IV Push once  dextrose 50% Injectable 25 Gram(s) IV Push once  diltiazem    milliGRAM(s) Oral daily  epoetin anabela-epbx (RETACRIT) Injectable 33705 Unit(s) IV Push <User Schedule>  gabapentin 300 milliGRAM(s) Oral daily  glucagon  Injectable 1 milliGRAM(s) IntraMuscular once  heparin   Injectable. 500 Unit(s) Dialysis. every 1 hour  insulin glargine Injectable (LANTUS) 12 Unit(s) SubCutaneous at bedtime  insulin lispro (ADMELOG) corrective regimen sliding scale   SubCutaneous three times a day before meals  insulin lispro (ADMELOG) corrective regimen sliding scale   SubCutaneous at bedtime  insulin lispro Injectable (ADMELOG) 7 Unit(s) SubCutaneous three times a day before meals  loratadine 10 milliGRAM(s) Oral daily  melatonin 6 milliGRAM(s) Oral at bedtime  mirtazapine 7.5 milliGRAM(s) Oral at bedtime  montelukast 10 milliGRAM(s) Oral at bedtime  oxyCODONE  ER Tablet 10 milliGRAM(s) Oral every 12 hours  pantoprazole    Tablet 40 milliGRAM(s) Oral before breakfast  polyethylene glycol 3350 17 Gram(s) Oral two times a day  senna 2 Tablet(s) Oral at bedtime  sertraline 50 milliGRAM(s) Oral daily  sevelamer carbonate 800 milliGRAM(s) Oral three times a day with meals  sodium thiosulfate IVPB 25 Gram(s) IV Intermittent <User Schedule>  tacrolimus   0.1% Ointment 1 Application(s) Topical daily  traZODone 100 milliGRAM(s) Oral at bedtime    MEDICATIONS  (PRN):  acetaminophen     Tablet .. 650 milliGRAM(s) Oral every 8 hours PRN Mild Pain  diphenhydrAMINE Injectable 25 milliGRAM(s) IV Push <User Schedule> PRN Itching  oxyCODONE    IR 5 milliGRAM(s) Oral every 4 hours PRN Moderate Pain (4 - 6)  oxyCODONE    IR 7.5 milliGRAM(s) Oral every 4 hours PRN Severe Pain (7 - 10)    Vital Signs Last 24 Hrs  T(C): 36.9 (02-10-22 @ 06:43), Max: 37.1 (02-10-22 @ 00:00)  T(F): 98.4 (02-10-22 @ 06:43), Max: 98.7 (02-10-22 @ 00:00)  HR: 79 (02-10-22 @ 06:43) (72 - 79)  BP: 148/57 (02-10-22 @ 06:43) (120/57 - 148/57)  BP(mean): --  RR: 18 (02-10-22 @ 06:43) (18 - 19)  SpO2: 97% (02-10-22 @ 06:43) (97% - 98%)              Constitutional: No fever, fatigue  Skin: No rash.  Eyes: No recent vision problems or eye pain.  ENT: No congestion, ear pain, or sore throat.  Cardiovascular: No chest pain or palpation.  Respiratory: No cough, shortness of breath, congestion, or wheezing.  Gastrointestinal: No abdominal pain, nausea, vomiting, or diarrhea.  Genitourinary: No dysuria.  Musculoskeletal: No joint swelling.  Neurologic: No headache.    PHYSICAL EXAM:  GENERAL: NAD  EYES: EOMI, PERRLA  NECK: Supple, No JVD  CHEST/LUNG: dec breath sounds at bases  HEART:  S1 , S2 +  ABDOMEN: soft , bs+, abd wound +  EXTREMITIES:  edema+  NEUROLOGY:alert awake    LABS:  02-10    140  |  93<L>  |  42<H>  ----------------------------<  177<H>  4.8   |  18<L>  |  4.46<H>    Ca    9.7      10 Feb 2022 07:16  Phos  4.0     02-09  Mg     2.40     02-09      Creatinine Trend: 4.46 <--, 7.18 <--, 5.55 <--, 7.12 <--, 5.29 <--                        8.5    18.45 )-----------( 447      ( 10 Feb 2022 07:16 )             29.8     Urine Studies:                  LIVER FUNCTIONS - ( 08 Feb 2022 13:39 )  Alb: 3.6 g/dL / Pro: 6.7 g/dL / ALK PHOS: 394 U/L / ALT: 11 U/L / AST: 11 U/L / GGT: x             394 U/L / ALT: 11 U/L / AST: 11 U/L / GGT: x

## 2022-02-10 NOTE — PROGRESS NOTE ADULT - SUBJECTIVE AND OBJECTIVE BOX
Chief Complaint: DM 2 with hyperglycemia   Pt is a 52 y/o female with hx of DM2, AFIB, CHAMP, Obesity, COPD, HTN, ESRD on HD MWF and chronic R. Pannus wound.  Pt initially sought emergent medical attention for worsening right pannus wound and associated diarrhea.      Last steroid dose 3/8 AM.  Denies n/v  Reports better appetite  Denies hypoglycemia      MEDICATIONS  (STANDING):  apixaban 2.5 milliGRAM(s) Oral two times a day  aspirin enteric coated 81 milliGRAM(s) Oral daily  buPROPion XL (24-Hour) . 150 milliGRAM(s) Oral daily  chlorhexidine 2% Cloths 1 Application(s) Topical daily  cinacalcet 60 milliGRAM(s) Oral daily  Dakins Solution - 1/4 Strength 1 Application(s) Topical two times a day  dextrose 40% Gel 15 Gram(s) Oral once  dextrose 5%. 1000 milliLiter(s) (50 mL/Hr) IV Continuous <Continuous>  dextrose 5%. 1000 milliLiter(s) (100 mL/Hr) IV Continuous <Continuous>  dextrose 50% Injectable 25 Gram(s) IV Push once  dextrose 50% Injectable 12.5 Gram(s) IV Push once  dextrose 50% Injectable 25 Gram(s) IV Push once  diltiazem    milliGRAM(s) Oral daily  epoetin anabela-epbx (RETACRIT) Injectable 18831 Unit(s) IV Push <User Schedule>  gabapentin 300 milliGRAM(s) Oral daily  glucagon  Injectable 1 milliGRAM(s) IntraMuscular once  heparin   Injectable. 500 Unit(s) Dialysis. every 1 hour  insulin glargine Injectable (LANTUS) 16 Unit(s) SubCutaneous at bedtime  insulin lispro (ADMELOG) corrective regimen sliding scale   SubCutaneous three times a day before meals  insulin lispro (ADMELOG) corrective regimen sliding scale   SubCutaneous at bedtime  insulin lispro Injectable (ADMELOG) 10 Unit(s) SubCutaneous three times a day before meals  loratadine 10 milliGRAM(s) Oral daily  melatonin 6 milliGRAM(s) Oral at bedtime  methylPREDNISolone sodium succinate Injectable 35 milliGRAM(s) IV Push two times a day  mirtazapine 7.5 milliGRAM(s) Oral at bedtime  montelukast 10 milliGRAM(s) Oral at bedtime  oxyCODONE  ER Tablet 10 milliGRAM(s) Oral every 12 hours  pantoprazole    Tablet 40 milliGRAM(s) Oral before breakfast  polyethylene glycol 3350 17 Gram(s) Oral two times a day  senna 2 Tablet(s) Oral at bedtime  sertraline 50 milliGRAM(s) Oral daily  sevelamer carbonate 800 milliGRAM(s) Oral three times a day with meals  sodium thiosulfate IVPB 25 Gram(s) IV Intermittent <User Schedule>  sodium zirconium cyclosilicate 10 Gram(s) Oral once  tacrolimus   0.1% Ointment 1 Application(s) Topical daily  traZODone 100 milliGRAM(s) Oral at bedtime    MEDICATIONS  (PRN):  acetaminophen     Tablet .. 650 milliGRAM(s) Oral every 8 hours PRN Mild Pain  diphenhydrAMINE Injectable 25 milliGRAM(s) IV Push <User Schedule> PRN Itching  oxyCODONE    IR 5 milliGRAM(s) Oral every 4 hours PRN Moderate Pain (4 - 6)  oxyCODONE    IR 7.5 milliGRAM(s) Oral every 4 hours PRN Severe Pain (7 - 10)    ALLERGIES  Latex (Rash)  Penicillin (Nausea)  Strawberry (Rash)    Intolerances  caffeine (Nausea)    REVIEW OF SYSTEMS:  General: No fevers    PHYSICAL EXAM:  Vital Signs Last 24 Hrs  T(C): 36.7 (09 Feb 2022 13:00), Max: 36.7 (08 Feb 2022 21:08)  T(F): 98 (09 Feb 2022 13:00), Max: 98 (08 Feb 2022 21:08)  HR: 64 (09 Feb 2022 13:00) (63 - 68)  BP: 128/60 (09 Feb 2022 13:00) (128/60 - 132/60)  BP(mean): --  RR: 18 (09 Feb 2022 13:00) (18 - 18)  SpO2: 96% (09 Feb 2022 13:00) (94% - 100%)  GENERAL: NAD  EYES: No proptosis, no lid lag, anicteric  HEENT:  Atraumatic, Normocephalic,  RESPIRATORY: Non labored    02-10    140  |  93<L>  |  42<H>  ----------------------------<  177<H>  4.8   |  18<L>  |  4.46<H>    Ca    9.7      10 Feb 2022 07:16  Phos  4.0     02-09  Mg     2.40     02-09        CAPILLARY BLOOD GLUCOSE  POCT Blood Glucose.: 317 mg/dL (10 Feb 2022 12:16)  POCT Blood Glucose.: 230 mg/dL (10 Feb 2022 08:14)  POCT Blood Glucose.: 146 mg/dL (09 Feb 2022 23:13)  POCT Blood Glucose.: 160 mg/dL (09 Feb 2022 20:53)  POCT Blood Glucose.: 171 mg/dL (09 Feb 2022 18:54)  POCT Blood Glucose.: 220 mg/dL (09 Feb 2022 17:10)  POCT Blood Glucose.: 201 mg/dL (09 Feb 2022 11:47)  POCT Blood Glucose.: 144 mg/dL (09 Feb 2022 07:53)  POCT Blood Glucose.: 235 mg/dL (08 Feb 2022 21:31)  POCT Blood Glucose.: 312 mg/dL (08 Feb 2022 17:38)  POCT Blood Glucose.: 341 mg/dL (08 Feb 2022 11:58)  POCT Blood Glucose.: 240 mg/dL (08 Feb 2022 08:19)  POCT Blood Glucose.: 120 mg/dL (07 Feb 2022 22:41)  POCT Blood Glucose.: 102 mg/dL (07 Feb 2022 21:00)  POCT Blood Glucose.: 132 mg/dL (07 Feb 2022 17:31)      A1C with Estimated Average Glucose Result: 7.0 % (01-13-22 @ 08:21)  A1C with Estimated Average Glucose Result: 6.7 % (08-31-21 @ 09:30)  A1C with Estimated Average Glucose Result: 7.2 % (04-28-21 @ 07:04)    Diet, Consistent Carbohydrate Renal w/Evening Snack:   Supplement Feeding Modality:  Oral  Nepro Cans or Servings Per Day:  1       Frequency:  Three Times a day (01-17-22 @ 18:07)

## 2022-02-10 NOTE — PROGRESS NOTE ADULT - ASSESSMENT
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 Patient followed by derm as well, s/p punch biopsy by Derm. Derm biopsy non-specific findings, no obvious intravascular calcium deposits although limited sample of subcutaneous tissue. Ct- abdomen "There are no subcutaneous tissue calcifications. Specifically no subcutaneous tissue calcifications in the patient's abdominal wall pannus." Current working diagnosis of Pyoderma Gangrenosum although Calciphylaxis remains in differential. Patient remains on systemic IV steroid, on empiric sodium thiosulfate, Cyclosporin has been added. Of note attempted Irrigation VAC 1/26 - 1 /28, noted deterioration of wound with NPWt/VAc therapy, therapy d/c'd, likely pathergy. Will continue to avoid surgical debridement. Collagenase d/c'd to avoid enzymatic debridement as well.      Patient seen with Dermatology today.  +violaceous borders improving, undermining slightly deeper from 2.5cm --> 3cm. Depth and width of wound slightly greater. Depth expected to increase as necrotic tissue begins to lift. Mild odor remains stable.     Topical dressing: Cleanse with Dakins 1/4 strength, adaptic touch (non-adherent silicone contact layer) to wound base for atraumatic dressing application and removal. Topical Tacrolimus 0.1% to wound base, cover with Aquacel AG , and abdominal pad. Avoid tape to promote patient comfort.      -Topical dressing: Cleanse with Dakins 1/4 strength. Apply Liquid barrier film to periwound skin. Adaptic touch to wound base for atraumatic dressing application and removal. Apply Topical Tacrolimus 0.1% ointment to wound base, cover with Aquacel AG , and abdominal pad. Change daily.  -Interdry textile sheeting beneath abdominal pannus leaving 2" out at end to wick.  -Agree with systemic steroid; Solumedrol per Derm.  -Agree with Cyclosporin; nephrology not opposed, patient and daughter amenable.   -Glucose control per primary team/endocrinology  -Differ empiric Sodium Thiosulfate to primary team/derm/nephrology  -Continue to follow nutrition/RD recommendations   -Consider reconsult with pain management for better pain control  -Continue to offload pressure  -DVT prophylaxis    Findings and plan discussed with Dermatology team at bedside. Findings and plan discussed with primary team.    Upon discharge follow up at outpatient [facility]. [address] [place] [address]. [phone].    Will continue to follow while inpatient.  Thank you.    CARMELA William-BC, [facility]    pager #[phone]    If after 4PM or before 7:30AM on Mon-Friday or weekend/holiday please contact general surgery for urgent matters.   Team A- [id number]   Team B- [id number]  For non-urgent matters e-mail [email or web address]    I spent 35 minutes face-to-face with this patient of which more than 50% of the time was spent counseling/coordinating care of this patient.

## 2022-02-10 NOTE — PROGRESS NOTE ADULT - SUBJECTIVE AND OBJECTIVE BOX
[facility] NEPHROLOGY- PROGRESS NOTE    56y Female with history of ESRD on HD presents with vomiting and diarrhea found to be COVID-19 positive. Nephrology consulted for ESRD status.    REVIEW OF SYSTEMS:  Gen: no changes in weight  Cards: no chest pain  Resp: no dyspnea  GI: no nausea or vomiting or diarrhea + ab wound pain  Vascular: no LE edema    caffeine (Nausea)  latex (Rash)  penicillin (Nausea)  strawberry (Rash)      Hospital Medications: Medications reviewed      VITALS:  T(F): 98.4 (02-10-22 @ 06:43), Max: 98.7 (02-10-22 @ 00:00)  HR: 79 (02-10-22 @ 06:43)  BP: 148/57 (02-10-22 @ 06:43)  RR: 18 (02-10-22 @ 06:43)  SpO2: 97% (02-10-22 @ 06:43)  Wt(kg): --    02-09 @ 07:01  -  02-10 @ 07:00  --------------------------------------------------------  IN: 600 mL / OUT: 2000 mL / NET: -1400 mL        PHYSICAL EXAM:    Gen: NAD, calm  Cards: RRR, +S1/S2, no M/G/R  Resp: CTA B/L  GI: soft, RLQ bandage/tender  Vascular: no LE edema B/L, LUE AVF + bruit/thrill      LABS:  02-10    140  |  93<L>  |  42<H>  ----------------------------<  177<H>  4.8   |  18<L>  |  4.46<H>    Ca    9.7      10 Feb 2022 07:16  Phos  4.0     02-09  Mg     2.40     02-09      Creatinine Trend: 4.46 <--, 7.18 <--, 5.55 <--, 7.12 <--, 5.29 <--                        8.5    18.45 )-----------( 447      ( 10 Feb 2022 07:16 )             29.8     Urine Studies:

## 2022-02-10 NOTE — CHART NOTE - NSCHARTNOTEFT_GEN_A_CORE
Dermatology Chart Note    Interval History:  Pt continues to report pain, although overall some improvement  examined at bedside with wound care    Physical Exam:  VSS  The patient was alert and oriented X 3, well nourished, and in no  apparent distress.  There was no visible lymphadenopathy.  Conjunctiva were non injected  There was no clubbing or edema of extremities.  There was no hyperhidrosis or bromhidrosis.    Labs: reviewed    Of note on skin exam:   - right pannus with large round, deep ulcer with undermined borders   - borders hyperpigmented, not erythematous or violaceous  - areas of surrounding stellate purpura/necrosis have progressed to ulceration, but no new areas of purpura/necrosis  - stable purulent drainage and malodor today  surrounding areas of induration and firm subcutaneous nodules     #Deep extensive pannus ulcer in patient with DM and ESRD on HD. Ulcer with extensive undermining - suggestive of pyoderma gangrenosum vs calciphylaxis. Both are challenging diagnoses with no definitive diagnostic tests. Biopsy and imaging non diagnostic. Favor Pyoderma Gangrenosum based on clinical appearance of deep ulceration with extensive undermining and worsening when trial off of steroids. STABLE   - Bacterial tissue culture 1/25/21 with carbapenem-resistent Pseudomonas, Staph epidermidis, Enterococcus faecalis; reportedly same as previous bacterial culture.   - s/p 10-day course of IV cefepime completed 1/21/22 and other previously other outpatient abx.   - Biopsy 1/25/22 findings non specific. No obvious intravascular calcium deposits although limited sample of subcutaneous tissue. Repeat von Kossa staining (for calcium), and deeper sections from original biopsy were reviewed with initial diagnosis unchanged.   -SPEP, serum DOUG, ANCAs negative.  -no calcifications noted on CT A/P 2/4     At this time:  - fu UPEP with immunofixation  - fu final fungal/AFB TC results; still pending  - c/w cyclosporine 125mg BID (~3 mg/kg based on dosing weight), continue to monitor blood pressure, electrolytes  - f/u quantiferon and HIV testing   - c/w sodium thiosulfate  - c/w pain management  - recommend palliative care consult to discuss symptomatic care for chronic painful wound, we have had multiple extensive discussions with the patient and her daughters on this topic, including 2/7, pt amenable  - c/w wound care: c/w topical tacrolimus 0.01% ointment 1-2x daily to wound edges and aquacel Ag dressing  - patient will need close f/u upon discharge    The patient's chart was reviewed in addition to photos of the rash taken by the primary team with the permission of the patient.  Patient was discussed with the dermatology attending Dr. Song.  Please page [phone] for further related questions.    Alicia Queen MD  Resident Physician, PGY3  [facility]  Pager: [phone]  Office: [phone].    The patient's chart was reviewed in addition to photos of the rash taken by the primary team with the permission of the patient.  Patient was seen at bedside and discussed remotely with the dermatology attending  ___.  Recommendations were communicated with the primary team.  Please page [phone] for further related questions.    Alicia Queen MD  Resident Physician, PGY3  [facility]  Pager: [phone]  Office: [phone]

## 2022-02-10 NOTE — PROGRESS NOTE ADULT - SUBJECTIVE AND OBJECTIVE BOX
CARDIOLOGY FOLLOW UP - Dr. Bullock  DATE OF SERVICE:    CC      REVIEW OF SYSTEMS:  CONSTITUTIONAL: No fever, weight loss, or fatigue  RESPIRATORY: No cough, wheezing, chills or hemoptysis; No Shortness of Breath  CARDIOVASCULAR: No chest pain, palpitations, passing out, dizziness, or leg swelling  GASTROINTESTINAL: No abdominal or epigastric pain. No nausea, vomiting, or hematemesis; No diarrhea or constipation. No melena or hematochezia.  VASCULAR: No edema     PHYSICAL EXAM:  T(C): 36.9 (02-10-22 @ 06:43), Max: 37.1 (02-10-22 @ 00:00)  HR: 79 (02-10-22 @ 06:43) (64 - 79)  BP: 148/57 (02-10-22 @ 06:43) (120/57 - 148/57)  RR: 18 (02-10-22 @ 06:43) (18 - 19)  SpO2: 97% (02-10-22 @ 06:43) (96% - 98%)  Wt(kg): --  I&O's Summary    09 Feb 2022 07:01  -  10 Feb 2022 07:00  --------------------------------------------------------  IN: 600 mL / OUT: 2000 mL / NET: -1400 mL        Appearance: Normal	  Cardiovascular: Normal S1 S2,RRR, No JVD, No murmurs  Respiratory: Lungs clear to auscultation	  Gastrointestinal:  Soft, Non-tender, + BS	  Extremities: Normal range of motion, No clubbing, cyanosis or edema      Home Medications:  aspirin 81 mg oral delayed release tablet: 1 tab(s) orally once a day (12 Jan 2022 17:49)  buPROPion 150 mg/24 hours (XL) oral tablet, extended release: 1 tab(s) orally once a day (in the morning) (12 Jan 2022 17:49)  cetirizine 10 mg oral tablet: 1 tab(s) orally once a day (12 Jan 2022 17:49)  dilTIAZem 120 mg/24 hours oral tablet, extended release: 1 tab(s) orally once a day (12 Jan 2022 17:49)  doxepin 25 mg oral capsule: 1 cap(s) orally once a day (at bedtime) (12 Jan 2022 17:49)  Eliquis 2.5 mg oral tablet: 1 tab(s) orally 2 times a day    Pharmacy states patient no longer wants to fill this medication (12 Jan 2022 17:49)  furosemide 80 mg oral tablet: 1 tab(s) orally once a day    Pharmacy states patient no longer wants to fill this medication (12 Jan 2022 17:49)  gabapentin 300 mg oral capsule: 1 cap(s) orally 2 times a day (12 Jan 2022 17:49)  hydrOXYzine hydrochloride 25 mg oral tablet: 1 tab(s) orally 2 times a day, As Needed (12 Jan 2022 17:49)  lanthanum 1000 mg oral tablet, chewable: 2 tab(s) orally with meals and 1 tab(s) orally with snacks    Pharmacy states patient no longer wants to fill this medication (12 Jan 2022 17:49)  mirtazapine 7.5 mg oral tablet: 1 tab(s) orally once a day (at bedtime) (12 Jan 2022 17:49)  montelukast 10 mg oral tablet: 1 tab(s) orally once a day (in the evening) (12 Jan 2022 17:49)  mupirocin 2% topical ointment: Apply sparingly to affected area 2 times a day as directed (12 Jan 2022 17:49)  nystatin 100,000 units/mL oral suspension: 5 milliliter(s) orally 4 times a day, as directed (12 Jan 2022 17:49)  omeprazole 20 mg oral delayed release capsule: 2 cap(s) orally once a day before breakfast (12 Jan 2022 17:49)  Pennsaid 2% topical solution: Apply topically to affected area 2 times a day (12 Jan 2022 17:49)  rosuvastatin 40 mg oral tablet: 1 tab(s) orally once a day (12 Jan 2022 17:49)  Santyl 250 units/g topical ointment: Apply topically to affected area once a day as directed (12 Jan 2022 17:49)  sertraline 50 mg oral tablet: 1 tab(s) orally once a day (12 Jan 2022 17:49)  Spiriva HandiHaler 18 mcg inhalation capsule: 1 cap(s) inhaled once a day (12 Jan 2022 17:49)  traZODone 100 mg oral tablet: 1 tab(s) orally once a day (at bedtime) (12 Jan 2022 17:49)  Tresiba FlexTouch 100 units/mL subcutaneous solution: 80 unit(s) subcutaneous once a day (at bedtime) (12 Jan 2022 17:49)  Trulicity Pen 1.5 mg/0.5 mL subcutaneous solution: 1 dose(s) subcutaneous once a week (12 Jan 2022 17:49)      MEDICATIONS  (STANDING):  apixaban 2.5 milliGRAM(s) Oral two times a day  aspirin enteric coated 81 milliGRAM(s) Oral daily  buPROPion XL (24-Hour) . 150 milliGRAM(s) Oral daily  chlorhexidine 2% Cloths 1 Application(s) Topical daily  cinacalcet 60 milliGRAM(s) Oral daily  cycloSPORINE  , modified (NEORAL) 125 milliGRAM(s) Oral every 12 hours  Dakins Solution - 1/4 Strength 1 Application(s) Topical two times a day  dextrose 40% Gel 15 Gram(s) Oral once  dextrose 5%. 1000 milliLiter(s) (50 mL/Hr) IV Continuous <Continuous>  dextrose 5%. 1000 milliLiter(s) (100 mL/Hr) IV Continuous <Continuous>  dextrose 50% Injectable 25 Gram(s) IV Push once  dextrose 50% Injectable 12.5 Gram(s) IV Push once  dextrose 50% Injectable 25 Gram(s) IV Push once  diltiazem    milliGRAM(s) Oral daily  epoetin anabela-epbx (RETACRIT) Injectable 73552 Unit(s) IV Push <User Schedule>  gabapentin 300 milliGRAM(s) Oral daily  glucagon  Injectable 1 milliGRAM(s) IntraMuscular once  heparin   Injectable. 500 Unit(s) Dialysis. every 1 hour  insulin glargine Injectable (LANTUS) 12 Unit(s) SubCutaneous at bedtime  insulin lispro (ADMELOG) corrective regimen sliding scale   SubCutaneous three times a day before meals  insulin lispro (ADMELOG) corrective regimen sliding scale   SubCutaneous at bedtime  insulin lispro Injectable (ADMELOG) 7 Unit(s) SubCutaneous three times a day before meals  loratadine 10 milliGRAM(s) Oral daily  melatonin 6 milliGRAM(s) Oral at bedtime  mirtazapine 7.5 milliGRAM(s) Oral at bedtime  montelukast 10 milliGRAM(s) Oral at bedtime  oxyCODONE  ER Tablet 10 milliGRAM(s) Oral every 12 hours  pantoprazole    Tablet 40 milliGRAM(s) Oral before breakfast  polyethylene glycol 3350 17 Gram(s) Oral two times a day  senna 2 Tablet(s) Oral at bedtime  sertraline 50 milliGRAM(s) Oral daily  sevelamer carbonate 800 milliGRAM(s) Oral three times a day with meals  sodium thiosulfate IVPB 25 Gram(s) IV Intermittent <User Schedule>  tacrolimus   0.1% Ointment 1 Application(s) Topical daily  traZODone 100 milliGRAM(s) Oral at bedtime      TELEMETRY: 	    ECG:  	  RADIOLOGY:   DIAGNOSTIC TESTING:  [ ] Echocardiogram:  [ ]  Catheterization:  [ ] Stress Test:    OTHER: 	    LABS:	 	    Troponin T, High Sensitivity Result: 44 ng/L (02-08 @ 13:39)                          8.5    18.45 )-----------( 447      ( 10 Feb 2022 07:16 )             29.8     02-10    140  |  93<L>  |  42<H>  ----------------------------<  177<H>  4.8   |  18<L>  |  4.46<H>    Ca    9.7      10 Feb 2022 07:16  Phos  4.0     02-09  Mg     2.40     02-09    TPro  6.7  /  Alb  3.6  /  TBili  <0.2  /  DBili  x   /  AST  11  /  ALT  11  /  AlkPhos  394<H>  02-08             CARDIOLOGY FOLLOW UP - Dr. Bullock  DATE OF SERVICE: 2/10/22    CC no cp or sob        REVIEW OF SYSTEMS:  CONSTITUTIONAL: No fever, weight loss, or fatigue  RESPIRATORY: No cough, wheezing, chills or hemoptysis; No Shortness of Breath  CARDIOVASCULAR: No chest pain, palpitations, passing out, dizziness, or leg swelling  GASTROINTESTINAL: No abdominal or epigastric pain. No nausea, vomiting, or hematemesis; No diarrhea or constipation. No melena or hematochezia.  VASCULAR: No edema     PHYSICAL EXAM:  T(C): 36.9 (02-10-22 @ 06:43), Max: 37.1 (02-10-22 @ 00:00)  HR: 79 (02-10-22 @ 06:43) (64 - 79)  BP: 148/57 (02-10-22 @ 06:43) (120/57 - 148/57)  RR: 18 (02-10-22 @ 06:43) (18 - 19)  SpO2: 97% (02-10-22 @ 06:43) (96% - 98%)  Wt(kg): --  I&O's Summary    09 Feb 2022 07:01  -  10 Feb 2022 07:00  --------------------------------------------------------  IN: 600 mL / OUT: 2000 mL / NET: -1400 mL        Appearance: Normal	  Cardiovascular: Normal S1 S2,RRR, No JVD, No murmurs  Respiratory: Lungs clear to auscultation	  Gastrointestinal:  Soft, Non-tender, + BS	  Extremities: Normal range of motion, No clubbing, cyanosis or edema      Home Medications:  aspirin 81 mg oral delayed release tablet: 1 tab(s) orally once a day (12 Jan 2022 17:49)  buPROPion 150 mg/24 hours (XL) oral tablet, extended release: 1 tab(s) orally once a day (in the morning) (12 Jan 2022 17:49)  cetirizine 10 mg oral tablet: 1 tab(s) orally once a day (12 Jan 2022 17:49)  dilTIAZem 120 mg/24 hours oral tablet, extended release: 1 tab(s) orally once a day (12 Jan 2022 17:49)  doxepin 25 mg oral capsule: 1 cap(s) orally once a day (at bedtime) (12 Jan 2022 17:49)  Eliquis 2.5 mg oral tablet: 1 tab(s) orally 2 times a day    Pharmacy states patient no longer wants to fill this medication (12 Jan 2022 17:49)  furosemide 80 mg oral tablet: 1 tab(s) orally once a day    Pharmacy states patient no longer wants to fill this medication (12 Jan 2022 17:49)  gabapentin 300 mg oral capsule: 1 cap(s) orally 2 times a day (12 Jan 2022 17:49)  hydrOXYzine hydrochloride 25 mg oral tablet: 1 tab(s) orally 2 times a day, As Needed (12 Jan 2022 17:49)  lanthanum 1000 mg oral tablet, chewable: 2 tab(s) orally with meals and 1 tab(s) orally with snacks    Pharmacy states patient no longer wants to fill this medication (12 Jan 2022 17:49)  mirtazapine 7.5 mg oral tablet: 1 tab(s) orally once a day (at bedtime) (12 Jan 2022 17:49)  montelukast 10 mg oral tablet: 1 tab(s) orally once a day (in the evening) (12 Jan 2022 17:49)  mupirocin 2% topical ointment: Apply sparingly to affected area 2 times a day as directed (12 Jan 2022 17:49)  nystatin 100,000 units/mL oral suspension: 5 milliliter(s) orally 4 times a day, as directed (12 Jan 2022 17:49)  omeprazole 20 mg oral delayed release capsule: 2 cap(s) orally once a day before breakfast (12 Jan 2022 17:49)  Pennsaid 2% topical solution: Apply topically to affected area 2 times a day (12 Jan 2022 17:49)  rosuvastatin 40 mg oral tablet: 1 tab(s) orally once a day (12 Jan 2022 17:49)  Santyl 250 units/g topical ointment: Apply topically to affected area once a day as directed (12 Jan 2022 17:49)  sertraline 50 mg oral tablet: 1 tab(s) orally once a day (12 Jan 2022 17:49)  Spiriva HandiHaler 18 mcg inhalation capsule: 1 cap(s) inhaled once a day (12 Jan 2022 17:49)  traZODone 100 mg oral tablet: 1 tab(s) orally once a day (at bedtime) (12 Jan 2022 17:49)  Tresiba FlexTouch 100 units/mL subcutaneous solution: 80 unit(s) subcutaneous once a day (at bedtime) (12 Jan 2022 17:49)  Trulicity Pen 1.5 mg/0.5 mL subcutaneous solution: 1 dose(s) subcutaneous once a week (12 Jan 2022 17:49)      MEDICATIONS  (STANDING):  apixaban 2.5 milliGRAM(s) Oral two times a day  aspirin enteric coated 81 milliGRAM(s) Oral daily  buPROPion XL (24-Hour) . 150 milliGRAM(s) Oral daily  chlorhexidine 2% Cloths 1 Application(s) Topical daily  cinacalcet 60 milliGRAM(s) Oral daily  cycloSPORINE  , modified (NEORAL) 125 milliGRAM(s) Oral every 12 hours  Dakins Solution - 1/4 Strength 1 Application(s) Topical two times a day  dextrose 40% Gel 15 Gram(s) Oral once  dextrose 5%. 1000 milliLiter(s) (50 mL/Hr) IV Continuous <Continuous>  dextrose 5%. 1000 milliLiter(s) (100 mL/Hr) IV Continuous <Continuous>  dextrose 50% Injectable 25 Gram(s) IV Push once  dextrose 50% Injectable 12.5 Gram(s) IV Push once  dextrose 50% Injectable 25 Gram(s) IV Push once  diltiazem    milliGRAM(s) Oral daily  epoetin anabela-epbx (RETACRIT) Injectable 33706 Unit(s) IV Push <User Schedule>  gabapentin 300 milliGRAM(s) Oral daily  glucagon  Injectable 1 milliGRAM(s) IntraMuscular once  heparin   Injectable. 500 Unit(s) Dialysis. every 1 hour  insulin glargine Injectable (LANTUS) 12 Unit(s) SubCutaneous at bedtime  insulin lispro (ADMELOG) corrective regimen sliding scale   SubCutaneous three times a day before meals  insulin lispro (ADMELOG) corrective regimen sliding scale   SubCutaneous at bedtime  insulin lispro Injectable (ADMELOG) 7 Unit(s) SubCutaneous three times a day before meals  loratadine 10 milliGRAM(s) Oral daily  melatonin 6 milliGRAM(s) Oral at bedtime  mirtazapine 7.5 milliGRAM(s) Oral at bedtime  montelukast 10 milliGRAM(s) Oral at bedtime  oxyCODONE  ER Tablet 10 milliGRAM(s) Oral every 12 hours  pantoprazole    Tablet 40 milliGRAM(s) Oral before breakfast  polyethylene glycol 3350 17 Gram(s) Oral two times a day  senna 2 Tablet(s) Oral at bedtime  sertraline 50 milliGRAM(s) Oral daily  sevelamer carbonate 800 milliGRAM(s) Oral three times a day with meals  sodium thiosulfate IVPB 25 Gram(s) IV Intermittent <User Schedule>  tacrolimus   0.1% Ointment 1 Application(s) Topical daily  traZODone 100 milliGRAM(s) Oral at bedtime      TELEMETRY: 	    ECG:  	  RADIOLOGY:   DIAGNOSTIC TESTING:  [ ] Echocardiogram:  [ ]  Catheterization:  [ ] Stress Test:    OTHER: 	    LABS:	 	    Troponin T, High Sensitivity Result: 44 ng/L (02-08 @ 13:39)                          8.5    18.45 )-----------( 447      ( 10 Feb 2022 07:16 )             29.8     02-10    140  |  93<L>  |  42<H>  ----------------------------<  177<H>  4.8   |  18<L>  |  4.46<H>    Ca    9.7      10 Feb 2022 07:16  Phos  4.0     02-09  Mg     2.40     02-09    TPro  6.7  /  Alb  3.6  /  TBili  <0.2  /  DBili  x   /  AST  11  /  ALT  11  /  AlkPhos  394<H>  02-08
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
## 2022-02-10 NOTE — PROGRESS NOTE ADULT - ASSESSMENT
56y Female with history of ESRD on HD presents with vomiting and diarrhea found to be COVID-19 positive. Nephrology consulted for ESRD status.    1) ESRD: Last HD on 2/9 with intradialytic hypotension and 1.6L removed. Plan for next maintenance HD on 2/11. Monitor electrolytes.    2) HTN with ESRD: BP acceptable. Continue with current medications. Monitor BP.    3) Anemia of renal disease: Hb low with elevated ferritin. Continue with Epo 14K with HD. Monitor Hb.    4) Secondary HPT of renal origin: Phosphorus acceptable with low iPTH. Sensipar decreased to 60 mg PO daily. Continue with renvela 1 tab with meals (goal < 4.5 given concerns for calciphylaxis). Monitor serum calcium and phosphorus.    5) Ab wound: Appreciate dermatology follow up. Ddx includes calciphylaxis versus pyoderma gangrenosum. S/P biopsy. Continue with empiric sodium thiosulfate with HD. On CSA as per Derm as benefits outweigh risks to residual renal function (which is minimal at this point).      [facility]  Keaton Lopez M.D.  Juma Green D.O.  Myla Hoffmann M.D.  Julia Brewer, JASIEL, ANP-C    Telephone: [phone]  Facsimile: [phone]    [address] [place] [address]
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
## 2022-02-11 NOTE — PROGRESS NOTE ADULT - SUBJECTIVE AND OBJECTIVE BOX
CARDIOLOGY FOLLOW UP - Dr. Bullock  DATE OF SERVICE: 2/11/22    CC no cp or sob        REVIEW OF SYSTEMS:  CONSTITUTIONAL: No fever, weight loss, or fatigue  RESPIRATORY: No cough, wheezing, chills or hemoptysis; No Shortness of Breath  CARDIOVASCULAR: No chest pain, palpitations, passing out, dizziness, or leg swelling  GASTROINTESTINAL: No abdominal or epigastric pain. No nausea, vomiting, or hematemesis; No diarrhea or constipation. No melena or hematochezia.  VASCULAR: No edema     PHYSICAL EXAM:  T(C): 37.1 (02-11-22 @ 06:00), Max: 37.1 (02-11-22 @ 06:00)  HR: 84 (02-11-22 @ 06:00) (80 - 84)  BP: 130/58 (02-11-22 @ 06:00) (130/58 - 145/56)  RR: 18 (02-11-22 @ 06:00) (18 - 18)  SpO2: 100% (02-11-22 @ 06:00) (95% - 100%)  Wt(kg): --  I&O's Summary      Appearance: Normal	  Cardiovascular: Normal S1 S2,RRR, No JVD, No murmurs  Respiratory: Lungs clear to auscultation	  Gastrointestinal:  Soft, Non-tender, + BS	  Extremities: Normal range of motion, No clubbing, cyanosis or edema      Home Medications:  aspirin 81 mg oral delayed release tablet: 1 tab(s) orally once a day (12 Jan 2022 17:49)  buPROPion 150 mg/24 hours (XL) oral tablet, extended release: 1 tab(s) orally once a day (in the morning) (12 Jan 2022 17:49)  cetirizine 10 mg oral tablet: 1 tab(s) orally once a day (12 Jan 2022 17:49)  dilTIAZem 120 mg/24 hours oral tablet, extended release: 1 tab(s) orally once a day (12 Jan 2022 17:49)  doxepin 25 mg oral capsule: 1 cap(s) orally once a day (at bedtime) (12 Jan 2022 17:49)  Eliquis 2.5 mg oral tablet: 1 tab(s) orally 2 times a day    Pharmacy states patient no longer wants to fill this medication (12 Jan 2022 17:49)  furosemide 80 mg oral tablet: 1 tab(s) orally once a day    Pharmacy states patient no longer wants to fill this medication (12 Jan 2022 17:49)  gabapentin 300 mg oral capsule: 1 cap(s) orally 2 times a day (12 Jan 2022 17:49)  hydrOXYzine hydrochloride 25 mg oral tablet: 1 tab(s) orally 2 times a day, As Needed (12 Jan 2022 17:49)  lanthanum 1000 mg oral tablet, chewable: 2 tab(s) orally with meals and 1 tab(s) orally with snacks    Pharmacy states patient no longer wants to fill this medication (12 Jan 2022 17:49)  mirtazapine 7.5 mg oral tablet: 1 tab(s) orally once a day (at bedtime) (12 Jan 2022 17:49)  montelukast 10 mg oral tablet: 1 tab(s) orally once a day (in the evening) (12 Jan 2022 17:49)  mupirocin 2% topical ointment: Apply sparingly to affected area 2 times a day as directed (12 Jan 2022 17:49)  nystatin 100,000 units/mL oral suspension: 5 milliliter(s) orally 4 times a day, as directed (12 Jan 2022 17:49)  omeprazole 20 mg oral delayed release capsule: 2 cap(s) orally once a day before breakfast (12 Jan 2022 17:49)  Pennsaid 2% topical solution: Apply topically to affected area 2 times a day (12 Jan 2022 17:49)  rosuvastatin 40 mg oral tablet: 1 tab(s) orally once a day (12 Jan 2022 17:49)  Santyl 250 units/g topical ointment: Apply topically to affected area once a day as directed (12 Jan 2022 17:49)  sertraline 50 mg oral tablet: 1 tab(s) orally once a day (12 Jan 2022 17:49)  Spiriva HandiHaler 18 mcg inhalation capsule: 1 cap(s) inhaled once a day (12 Jan 2022 17:49)  traZODone 100 mg oral tablet: 1 tab(s) orally once a day (at bedtime) (12 Jan 2022 17:49)  Tresiba FlexTouch 100 units/mL subcutaneous solution: 80 unit(s) subcutaneous once a day (at bedtime) (12 Jan 2022 17:49)  Trulicity Pen 1.5 mg/0.5 mL subcutaneous solution: 1 dose(s) subcutaneous once a week (12 Jan 2022 17:49)      MEDICATIONS  (STANDING):  apixaban 2.5 milliGRAM(s) Oral two times a day  aspirin enteric coated 81 milliGRAM(s) Oral daily  buPROPion XL (24-Hour) . 150 milliGRAM(s) Oral daily  chlorhexidine 2% Cloths 1 Application(s) Topical daily  cinacalcet 60 milliGRAM(s) Oral daily  cycloSPORINE  , modified (NEORAL) 125 milliGRAM(s) Oral every 12 hours  Dakins Solution - 1/4 Strength 1 Application(s) Topical two times a day  dextrose 40% Gel 15 Gram(s) Oral once  dextrose 5%. 1000 milliLiter(s) (50 mL/Hr) IV Continuous <Continuous>  dextrose 5%. 1000 milliLiter(s) (100 mL/Hr) IV Continuous <Continuous>  dextrose 50% Injectable 25 Gram(s) IV Push once  dextrose 50% Injectable 12.5 Gram(s) IV Push once  dextrose 50% Injectable 25 Gram(s) IV Push once  diltiazem    milliGRAM(s) Oral daily  epoetin anabela-epbx (RETACRIT) Injectable 71776 Unit(s) IV Push <User Schedule>  gabapentin 300 milliGRAM(s) Oral daily  glucagon  Injectable 1 milliGRAM(s) IntraMuscular once  heparin   Injectable. 500 Unit(s) Dialysis. every 1 hour  insulin glargine Injectable (LANTUS) 15 Unit(s) SubCutaneous at bedtime  insulin lispro (ADMELOG) corrective regimen sliding scale   SubCutaneous three times a day before meals  insulin lispro (ADMELOG) corrective regimen sliding scale   SubCutaneous at bedtime  insulin lispro Injectable (ADMELOG) 8 Unit(s) SubCutaneous three times a day before meals  loratadine 10 milliGRAM(s) Oral daily  melatonin 6 milliGRAM(s) Oral at bedtime  mirtazapine 7.5 milliGRAM(s) Oral at bedtime  montelukast 10 milliGRAM(s) Oral at bedtime  pantoprazole    Tablet 40 milliGRAM(s) Oral before breakfast  polyethylene glycol 3350 17 Gram(s) Oral two times a day  senna 2 Tablet(s) Oral at bedtime  sertraline 50 milliGRAM(s) Oral daily  sevelamer carbonate 800 milliGRAM(s) Oral three times a day with meals  sodium thiosulfate IVPB 25 Gram(s) IV Intermittent <User Schedule>  tacrolimus   0.1% Ointment 1 Application(s) Topical daily  traZODone 100 milliGRAM(s) Oral at bedtime      TELEMETRY: 	    ECG:  	  RADIOLOGY:   DIAGNOSTIC TESTING:  [ ] Echocardiogram:  [ ]  Catheterization:  [ ] Stress Test:    OTHER: 	    LABS:	 	    Troponin T, High Sensitivity Result: 44 ng/L (02-08 @ 13:39)                          8.5    18.45 )-----------( 447      ( 10 Feb 2022 07:16 )             29.8     02-10    140  |  93<L>  |  42<H>  ----------------------------<  177<H>  4.8   |  18<L>  |  4.46<H>    Ca    9.7      10 Feb 2022 07:16  Phos  4.0     02-09  Mg     2.40     02-09

## 2022-02-11 NOTE — PROGRESS NOTE ADULT - ASSESSMENT
56y Female with history of ESRD on HD presents with vomiting and diarrhea found to be COVID-19 positive. Nephrology consulted for ESRD status.    1) ESRD: Last HD on 2/9 with intradialytic hypotension and 1.6L removed. Plan for next maintenance HD today. Monitor electrolytes.    2) HTN with ESRD: BP acceptable. Continue with current medications. Monitor BP.    3) Anemia of renal disease: Hb low with elevated ferritin. Continue with Epo 14K with HD. Monitor Hb.    4) Secondary HPT of renal origin: Phosphorus acceptable with low iPTH. Sensipar decreased to 60 mg PO daily. Continue with renvela 1 tab with meals (goal < 4.5 given concerns for calciphylaxis). Monitor serum calcium and phosphorus.    5) Ab wound: Appreciate dermatology follow up. Ddx includes calciphylaxis versus pyoderma gangrenosum. S/P biopsy. Continue with empiric sodium thiosulfate with HD. On CSA as per Derm as benefits outweigh risks to residual renal function (which is minimal at this point).      [facility] NEPHROLOGY  Keaton Lopez M.D.  Juma Green D.O.  Myla Hoffmann M.D.  Julia Brewer, JASIEL, ANP-C    Telephone: [phone]  Facsimile: [phone]    [id number] [place] [address]

## 2022-02-11 NOTE — PROGRESS NOTE ADULT - SUBJECTIVE AND OBJECTIVE BOX
SUBJECTIVE / OVERNIGHT EVENTS:pt seen and examined, c/o pain at the wound site  2-11-22    MEDICATIONS  (STANDING):  apixaban 2.5 milliGRAM(s) Oral two times a day  aspirin enteric coated 81 milliGRAM(s) Oral daily  buPROPion XL (24-Hour) . 150 milliGRAM(s) Oral daily  chlorhexidine 2% Cloths 1 Application(s) Topical daily  cinacalcet 60 milliGRAM(s) Oral daily  cycloSPORINE  , modified (NEORAL) 125 milliGRAM(s) Oral every 12 hours  Dakins Solution - 1/4 Strength 1 Application(s) Topical two times a day  dextrose 40% Gel 15 Gram(s) Oral once  dextrose 5%. 1000 milliLiter(s) (50 mL/Hr) IV Continuous <Continuous>  dextrose 5%. 1000 milliLiter(s) (100 mL/Hr) IV Continuous <Continuous>  dextrose 50% Injectable 25 Gram(s) IV Push once  dextrose 50% Injectable 12.5 Gram(s) IV Push once  dextrose 50% Injectable 25 Gram(s) IV Push once  diltiazem    milliGRAM(s) Oral daily  epoetin anabela-epbx (RETACRIT) Injectable 52000 Unit(s) IV Push <User Schedule>  gabapentin 300 milliGRAM(s) Oral daily  glucagon  Injectable 1 milliGRAM(s) IntraMuscular once  heparin   Injectable. 500 Unit(s) Dialysis. every 1 hour  insulin glargine Injectable (LANTUS) 15 Unit(s) SubCutaneous at bedtime  insulin lispro (ADMELOG) corrective regimen sliding scale   SubCutaneous three times a day before meals  insulin lispro (ADMELOG) corrective regimen sliding scale   SubCutaneous at bedtime  insulin lispro Injectable (ADMELOG) 9 Unit(s) SubCutaneous three times a day before meals  loratadine 10 milliGRAM(s) Oral daily  melatonin 6 milliGRAM(s) Oral at bedtime  mirtazapine 7.5 milliGRAM(s) Oral at bedtime  montelukast 10 milliGRAM(s) Oral at bedtime  oxyCODONE  ER Tablet 10 milliGRAM(s) Oral every 12 hours  pantoprazole    Tablet 40 milliGRAM(s) Oral before breakfast  polyethylene glycol 3350 17 Gram(s) Oral two times a day  senna 2 Tablet(s) Oral at bedtime  sertraline 50 milliGRAM(s) Oral daily  sevelamer carbonate 800 milliGRAM(s) Oral three times a day with meals  sodium thiosulfate IVPB 25 Gram(s) IV Intermittent <User Schedule>  tacrolimus   0.1% Ointment 1 Application(s) Topical daily  traZODone 100 milliGRAM(s) Oral at bedtime    MEDICATIONS  (PRN):  acetaminophen     Tablet .. 650 milliGRAM(s) Oral every 8 hours PRN Mild Pain  aluminum hydroxide/magnesium hydroxide/simethicone Suspension 30 milliLiter(s) Oral every 4 hours PRN Dyspepsia  diphenhydrAMINE Injectable 25 milliGRAM(s) IV Push <User Schedule> PRN Itching    Vital Signs Last 24 Hrs  T(C): 36.8 (02-11-22 @ 21:00), Max: 37.2 (02-11-22 @ 16:40)  T(F): 98.3 (02-11-22 @ 21:00), Max: 98.9 (02-11-22 @ 16:40)  HR: 70 (02-11-22 @ 21:00) (70 - 84)  BP: 129/57 (02-11-22 @ 21:00) (106/69 - 143/59)  BP(mean): --  RR: 18 (02-11-22 @ 21:00) (17 - 18)  SpO2: 100% (02-11-22 @ 21:00) (99% - 100%)            Constitutional: No fever, fatigue  Skin: No rash.  Eyes: No recent vision problems or eye pain.  ENT: No congestion, ear pain, or sore throat.  Cardiovascular: No chest pain or palpation.  Respiratory: No cough, shortness of breath, congestion, or wheezing.  Gastrointestinal: No abdominal pain, nausea, vomiting, or diarrhea.  Genitourinary: No dysuria.  Musculoskeletal: No joint swelling.  Neurologic: No headache.    PHYSICAL EXAM:  GENERAL: NAD  EYES: EOMI, PERRLA  NECK: Supple, No JVD  CHEST/LUNG: dec breath sounds at bases  HEART:  S1 , S2 +  ABDOMEN: soft , bs+, abd wound +  EXTREMITIES:  edema+  NEUROLOGY:alert awake    LABS:  02-11    139  |  91<L>  |  47<H>  ----------------------------<  166<H>  4.1   |  21<L>  |  5.16<H>    Ca    9.6      11 Feb 2022 17:39  Phos  2.9     02-11  Mg     2.20     02-11      Creatinine Trend: 5.16 <--, 4.46 <--, 7.18 <--, 5.55 <--, 7.12 <--                        7.9    11.34 )-----------( 420      ( 11 Feb 2022 17:39 )             27.1     Urine Studies:                394 U/L / ALT: 11 U/L / AST: 11 U/L / GGT: x
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
## 2022-02-11 NOTE — PROGRESS NOTE ADULT - SUBJECTIVE AND OBJECTIVE BOX
[facility] NEPHROLOGY- PROGRESS NOTE    56y Female with history of ESRD on HD presents with vomiting and diarrhea found to be COVID-19 positive. Nephrology consulted for ESRD status.    REVIEW OF SYSTEMS:  Gen: no changes in weight  Cards: no chest pain  Resp: no dyspnea  GI: no nausea or vomiting or diarrhea + ab wound pain  Vascular: no LE edema    caffeine (Nausea)  latex (Rash)  penicillin (Nausea)  strawberry (Rash)      Hospital Medications: Medications reviewed      VITALS:  T(F): 98.7 (02-11-22 @ 06:00), Max: 98.7 (02-11-22 @ 06:00)  HR: 84 (02-11-22 @ 06:00)  BP: 130/58 (02-11-22 @ 06:00)  RR: 18 (02-11-22 @ 06:00)  SpO2: 100% (02-11-22 @ 06:00)  Wt(kg): --        PHYSICAL EXAM:    Gen: NAD, calm  Cards: RRR, +S1/S2, no M/G/R  Resp: CTA B/L  GI: soft, RLQ bandage/tender  Vascular: no LE edema B/L, LUE AVF + bruit/thrill      LABS:  02-10    140  |  93<L>  |  42<H>  ----------------------------<  177<H>  4.8   |  18<L>  |  4.46<H>    Ca    9.7      10 Feb 2022 07:16  Phos  4.0     02-09  Mg     2.40     02-09      Creatinine Trend: 4.46 <--, 7.18 <--, 5.55 <--, 7.12 <--, 5.29 <--                        8.5    18.45 )-----------( 447      ( 10 Feb 2022 07:16 )             29.8     Urine Studies:

## 2022-02-11 NOTE — PROGRESS NOTE ADULT - SUBJECTIVE AND OBJECTIVE BOX
CC: DM 2 uncontrolled with hyperglycemia     HPI: Patient reports decreased food intake due to dislike for food - she reports she is choosing options she wants from food menu but overall, does not like food. Breakfast tray at bedside and 80% consumed, Cam[place]onian toast etc.     MEDICATIONS  (STANDING):  apixaban 2.5 milliGRAM(s) Oral two times a day  aspirin enteric coated 81 milliGRAM(s) Oral daily  buPROPion XL (24-Hour) . 150 milliGRAM(s) Oral daily  chlorhexidine 2% Cloths 1 Application(s) Topical daily  cinacalcet 60 milliGRAM(s) Oral daily  cycloSPORINE  , modified (NEORAL) 125 milliGRAM(s) Oral every 12 hours  Dakins Solution - 1/4 Strength 1 Application(s) Topical two times a day  dextrose 40% Gel 15 Gram(s) Oral once  dextrose 5%. 1000 milliLiter(s) (100 mL/Hr) IV Continuous <Continuous>  dextrose 5%. 1000 milliLiter(s) (50 mL/Hr) IV Continuous <Continuous>  dextrose 50% Injectable 25 Gram(s) IV Push once  dextrose 50% Injectable 12.5 Gram(s) IV Push once  dextrose 50% Injectable 25 Gram(s) IV Push once  diltiazem    milliGRAM(s) Oral daily  epoetin anabela-epbx (RETACRIT) Injectable 39736 Unit(s) IV Push <User Schedule>  gabapentin 300 milliGRAM(s) Oral daily  glucagon  Injectable 1 milliGRAM(s) IntraMuscular once  heparin   Injectable. 500 Unit(s) Dialysis. every 1 hour  insulin glargine Injectable (LANTUS) 15 Unit(s) SubCutaneous at bedtime  insulin lispro (ADMELOG) corrective regimen sliding scale   SubCutaneous three times a day before meals  insulin lispro (ADMELOG) corrective regimen sliding scale   SubCutaneous at bedtime  insulin lispro Injectable (ADMELOG) 8 Unit(s) SubCutaneous three times a day before meals  loratadine 10 milliGRAM(s) Oral daily  melatonin 6 milliGRAM(s) Oral at bedtime  mirtazapine 7.5 milliGRAM(s) Oral at bedtime  montelukast 10 milliGRAM(s) Oral at bedtime  pantoprazole    Tablet 40 milliGRAM(s) Oral before breakfast  polyethylene glycol 3350 17 Gram(s) Oral two times a day  senna 2 Tablet(s) Oral at bedtime  sertraline 50 milliGRAM(s) Oral daily  sevelamer carbonate 800 milliGRAM(s) Oral three times a day with meals  sodium thiosulfate IVPB 25 Gram(s) IV Intermittent <User Schedule>  tacrolimus   0.1% Ointment 1 Application(s) Topical daily  traZODone 100 milliGRAM(s) Oral at bedtime      ALLERGIES  Latex (Rash)  Penicillin (Nausea)  Strawberry (Rash)    Intolerances  caffeine (Nausea)    REVIEW OF SYSTEMS:  General: No fevers  GI: denies N/V, see HPI    PHYSICAL EXAM:  Vital Signs Last 24 Hrs  T(C): 37.1 (11 Feb 2022 06:00), Max: 37.1 (11 Feb 2022 06:00)  T(F): 98.7 (11 Feb 2022 06:00), Max: 98.7 (11 Feb 2022 06:00)  HR: 84 (11 Feb 2022 06:00) (80 - 84)  BP: 130/58 (11 Feb 2022 06:00) (130/58 - 145/56)  BP(mean): --  RR: 18 (11 Feb 2022 06:00) (18 - 18)  SpO2: 100% (11 Feb 2022 06:00) (95% - 100%)  GENERAL: NAD  EYES: No proptosis, no lid lag, anicteric  HEENT:  Atraumatic, Normocephalic,  RESPIRATORY: Non labored    02-10    140  |  93<L>  |  42<H>  ----------------------------<  177<H>  4.8   |  18<L>  |  4.46<H>    Ca    9.7      10 Feb 2022 07:16  Phos  4.0     02-09  Mg     2.40     02-09    CAPILLARY BLOOD GLUCOSE      POCT Blood Glucose.: 207 mg/dL (11 Feb 2022 12:15)  POCT Blood Glucose.: 184 mg/dL (11 Feb 2022 08:15)  POCT Blood Glucose.: 266 mg/dL (10 Feb 2022 22:14)  POCT Blood Glucose.: 267 mg/dL (10 Feb 2022 17:25)    A1C with Estimated Average Glucose Result: 7.0 % (01-13-22 @ 08:21)  A1C with Estimated Average Glucose Result: 6.7 % (08-31-21 @ 09:30)  A1C with Estimated Average Glucose Result: 7.2 % (04-28-21 @ 07:04)    Diet, Consistent Carbohydrate Renal w/Evening Snack:   Supplement Feeding Modality:  Oral  Nepro Cans or Servings Per Day:  1       Frequency:  Three Times a day (01-17-22 @ 18:07)

## 2022-02-11 NOTE — PROGRESS NOTE ADULT - ASSESSMENT
56 yr old F with morbid obesity, CHAMP not on home O2, ESRD (HD MWF), HTN, DM2 A1C 7.0, COPD, Afib no longer on AC, chronic R pannus wound here with worsening wound, diarrhea.  Pt exhibits persistent steroid induced hyperglycemia despite increased insulin regimen.         1. Type 2 diabetes mellitus uncontrolled  A1c 7.0% (may be inaccurate in setting of ESRD)  Home Regimen: Tresiba 80 units HS and Trulicity 1.5mg subq weekly  While inpatient:  BG target 100-180 mg/dL  Continue Lantus to 15 units SQ qHS - glucose close to goal this AM  Increased Admelog to 9 units SQ TID before meals (Hold if NPO/not eating meal)    Continue Admelog correctional scale to moderate dose before meals, can continue mod dose at bedtime  Check BG before meals and bedtime  Hypoglycemia protocol   Consistent carbohydrate diet    Discharge Plan:  STOP Trulicity (patient ESRD on HD)  If discharged without steroids, likely dc plan is basal/oral regimen. For oral agent, depends on inpatient requirements but can consider renally dosed DPP4 (Januvia 25 mg or Tradjenta 5 mg daily) VS Prandin before meals - Full dc plan to follow  Patient may benefit from switching to 22-24h acting basal insulin eg Levemir or Lantus, instead of Tresiba  Please assess insulin coverage for Levemir or Lantus, instead of Tresiba pens  Consider CGM (such as Freestyle Libre2 outpatient)  If desiring to followup with [facility] Endocrinology: [address] [facility], [address], [place] [address], [phone]    2. HTN  Management per primary team     3. Hyperlipidemia  Can continue home dose of Atorvastatin 80 mg  Note, limited benefit in ESRD. Followup lipid panel as outpatient    PIERRE Cade-BC  Nurse Practitioner   Division of Endocrinology  Pager: KARMA pager [phone]    If out of hospital/unavailable when paged, please note: patient will be cared for by another provider on the endocrine service.  Please call the endocrine answering service for assistance to reach covering provider ([phone]). For non-urgent matters, please email [email or web address] for assistance.      56 yr old F with morbid obesity, CHAMP not on home O2, ESRD (HD MWF), HTN, DM2 A1C 7.0, COPD, Afib no longer on AC, chronic R pannus wound here with worsening wound, diarrhea.  Pt exhibits persistent steroid induced hyperglycemia despite increased insulin regimen.         1. Type 2 diabetes mellitus uncontrolled  A1c 7.0% (may be inaccurate in setting of ESRD)  Home Regimen: Tresiba 80 units HS and Trulicity 1.5mg subq weekly  While inpatient:  BG target 100-180 mg/dL  Continue Lantus to 15 units SQ qHS - glucose close to goal this AM  Increased Admelog to 9 units SQ TID before meals (Hold if NPO/not eating meal)    Continue Admelog correctional scale as LOW before meals and bedtime   Check BG before meals and bedtime  Hypoglycemia protocol   Consistent carbohydrate diet    Discharge Plan:  STOP Trulicity (patient ESRD on HD)  If discharged without steroids, likely dc plan is basal/oral regimen. For oral agent, depends on inpatient requirements but can consider renally dosed DPP4 (Januvia 25 mg or Tradjenta 5 mg daily) VS Prandin before meals - Full dc plan to follow  Patient may benefit from switching to 22-24h acting basal insulin eg Levemir or Lantus, instead of Tresiba  Please assess insulin coverage for Levemir or Lantus, instead of Tresiba pens  Consider CGM (such as Freestyle Libre2 outpatient)  If desiring to followup with [facility] Endocrinology: [address] [facility], [address], [place] [address], [phone]    2. HTN  Management per primary team     3. Hyperlipidemia  Can continue home dose of Atorvastatin 80 mg  Note, limited benefit in ESRD. Followup lipid panel as outpatient    PIERRE Cade-BC  Nurse Practitioner   Division of Endocrinology  Pager: KARMA pager [phone]    If out of hospital/unavailable when paged, please note: patient will be cared for by another provider on the endocrine service.  Please call the endocrine answering service for assistance to reach covering provider ([phone]). For non-urgent matters, please email [email or web address] for assistance.

## 2022-02-12 NOTE — PROGRESS NOTE ADULT - SUBJECTIVE AND OBJECTIVE BOX
CARDIOLOGY FOLLOW UP - Dr. Bullock  DATE OF SERVICE:    CC      REVIEW OF SYSTEMS:  CONSTITUTIONAL: No fever, weight loss, or fatigue  RESPIRATORY: No cough, wheezing, chills or hemoptysis; No Shortness of Breath  CARDIOVASCULAR: No chest pain, palpitations, passing out, dizziness, or leg swelling  GASTROINTESTINAL: No abdominal or epigastric pain. No nausea, vomiting, or hematemesis; No diarrhea or constipation. No melena or hematochezia.  VASCULAR: No edema     PHYSICAL EXAM:  T(C): 36.8 (02-12-22 @ 06:00), Max: 37.2 (02-11-22 @ 16:40)  HR: 77 (02-12-22 @ 06:00) (70 - 78)  BP: 128/57 (02-12-22 @ 06:00) (106/69 - 143/59)  RR: 18 (02-12-22 @ 06:00) (17 - 18)  SpO2: 100% (02-12-22 @ 06:00) (97% - 100%)  Wt(kg): --  I&O's Summary      Appearance: Normal	  Cardiovascular: Normal S1 S2,RRR, No JVD, No murmurs  Respiratory: Lungs clear to auscultation	  Gastrointestinal:  Soft, Non-tender, + BS	  Extremities: Normal range of motion, No clubbing, cyanosis or edema      Home Medications:  aspirin 81 mg oral delayed release tablet: 1 tab(s) orally once a day (12 Jan 2022 17:49)  buPROPion 150 mg/24 hours (XL) oral tablet, extended release: 1 tab(s) orally once a day (in the morning) (12 Jan 2022 17:49)  cetirizine 10 mg oral tablet: 1 tab(s) orally once a day (12 Jan 2022 17:49)  dilTIAZem 120 mg/24 hours oral tablet, extended release: 1 tab(s) orally once a day (12 Jan 2022 17:49)  doxepin 25 mg oral capsule: 1 cap(s) orally once a day (at bedtime) (12 Jan 2022 17:49)  Eliquis 2.5 mg oral tablet: 1 tab(s) orally 2 times a day    Pharmacy states patient no longer wants to fill this medication (12 Jan 2022 17:49)  furosemide 80 mg oral tablet: 1 tab(s) orally once a day    Pharmacy states patient no longer wants to fill this medication (12 Jan 2022 17:49)  gabapentin 300 mg oral capsule: 1 cap(s) orally 2 times a day (12 Jan 2022 17:49)  hydrOXYzine hydrochloride 25 mg oral tablet: 1 tab(s) orally 2 times a day, As Needed (12 Jan 2022 17:49)  lanthanum 1000 mg oral tablet, chewable: 2 tab(s) orally with meals and 1 tab(s) orally with snacks    Pharmacy states patient no longer wants to fill this medication (12 Jan 2022 17:49)  mirtazapine 7.5 mg oral tablet: 1 tab(s) orally once a day (at bedtime) (12 Jan 2022 17:49)  montelukast 10 mg oral tablet: 1 tab(s) orally once a day (in the evening) (12 Jan 2022 17:49)  mupirocin 2% topical ointment: Apply sparingly to affected area 2 times a day as directed (12 Jan 2022 17:49)  nystatin 100,000 units/mL oral suspension: 5 milliliter(s) orally 4 times a day, as directed (12 Jan 2022 17:49)  omeprazole 20 mg oral delayed release capsule: 2 cap(s) orally once a day before breakfast (12 Jan 2022 17:49)  Pennsaid 2% topical solution: Apply topically to affected area 2 times a day (12 Jan 2022 17:49)  rosuvastatin 40 mg oral tablet: 1 tab(s) orally once a day (12 Jan 2022 17:49)  Santyl 250 units/g topical ointment: Apply topically to affected area once a day as directed (12 Jan 2022 17:49)  sertraline 50 mg oral tablet: 1 tab(s) orally once a day (12 Jan 2022 17:49)  Spiriva HandiHaler 18 mcg inhalation capsule: 1 cap(s) inhaled once a day (12 Jan 2022 17:49)  traZODone 100 mg oral tablet: 1 tab(s) orally once a day (at bedtime) (12 Jan 2022 17:49)  Tresiba FlexTouch 100 units/mL subcutaneous solution: 80 unit(s) subcutaneous once a day (at bedtime) (12 Jan 2022 17:49)  Trulicity Pen 1.5 mg/0.5 mL subcutaneous solution: 1 dose(s) subcutaneous once a week (12 Jan 2022 17:49)      MEDICATIONS  (STANDING):  apixaban 2.5 milliGRAM(s) Oral two times a day  aspirin enteric coated 81 milliGRAM(s) Oral daily  buPROPion XL (24-Hour) . 150 milliGRAM(s) Oral daily  chlorhexidine 2% Cloths 1 Application(s) Topical daily  cinacalcet 60 milliGRAM(s) Oral daily  cycloSPORINE  , modified (NEORAL) 125 milliGRAM(s) Oral every 12 hours  Dakins Solution - 1/4 Strength 1 Application(s) Topical two times a day  dextrose 40% Gel 15 Gram(s) Oral once  dextrose 5%. 1000 milliLiter(s) (100 mL/Hr) IV Continuous <Continuous>  dextrose 5%. 1000 milliLiter(s) (50 mL/Hr) IV Continuous <Continuous>  dextrose 50% Injectable 25 Gram(s) IV Push once  dextrose 50% Injectable 12.5 Gram(s) IV Push once  dextrose 50% Injectable 25 Gram(s) IV Push once  diltiazem    milliGRAM(s) Oral daily  epoetin anabela-epbx (RETACRIT) Injectable 87180 Unit(s) IV Push <User Schedule>  gabapentin 300 milliGRAM(s) Oral daily  glucagon  Injectable 1 milliGRAM(s) IntraMuscular once  heparin   Injectable. 500 Unit(s) Dialysis. every 1 hour  insulin glargine Injectable (LANTUS) 15 Unit(s) SubCutaneous at bedtime  insulin lispro (ADMELOG) corrective regimen sliding scale   SubCutaneous three times a day before meals  insulin lispro (ADMELOG) corrective regimen sliding scale   SubCutaneous at bedtime  insulin lispro Injectable (ADMELOG) 9 Unit(s) SubCutaneous three times a day before meals  loratadine 10 milliGRAM(s) Oral daily  melatonin 6 milliGRAM(s) Oral at bedtime  mirtazapine 7.5 milliGRAM(s) Oral at bedtime  montelukast 10 milliGRAM(s) Oral at bedtime  oxyCODONE  ER Tablet 10 milliGRAM(s) Oral every 12 hours  pantoprazole    Tablet 40 milliGRAM(s) Oral before breakfast  polyethylene glycol 3350 17 Gram(s) Oral two times a day  senna 2 Tablet(s) Oral at bedtime  sertraline 50 milliGRAM(s) Oral daily  sevelamer carbonate 800 milliGRAM(s) Oral three times a day with meals  sodium thiosulfate IVPB 25 Gram(s) IV Intermittent <User Schedule>  tacrolimus   0.1% Ointment 1 Application(s) Topical daily  traZODone 100 milliGRAM(s) Oral at bedtime      TELEMETRY: 	    ECG:  	  RADIOLOGY:   DIAGNOSTIC TESTING:  [ ] Echocardiogram:  [ ]  Catheterization:  [ ] Stress Test:    OTHER: 	    LABS:	 	    Troponin T, High Sensitivity Result: 53 ng/L (02-11 @ 17:39)  Troponin T, High Sensitivity Result: 44 ng/L (02-08 @ 13:39)                          7.9    11.34 )-----------( 420      ( 11 Feb 2022 17:39 )             27.1     02-11    139  |  91<L>  |  47<H>  ----------------------------<  166<H>  4.1   |  21<L>  |  5.16<H>    Ca    9.6      11 Feb 2022 17:39  Phos  2.9     02-11  Mg     2.20     02-11
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
## 2022-02-12 NOTE — PROGRESS NOTE ADULT - SUBJECTIVE AND OBJECTIVE BOX
SUBJECTIVE / OVERNIGHT EVENTS:pt seen and examined, c/o pain at the wound site  2-12-22    MEDICATIONS  (STANDING):  apixaban 2.5 milliGRAM(s) Oral two times a day  aspirin enteric coated 81 milliGRAM(s) Oral daily  buPROPion XL (24-Hour) . 150 milliGRAM(s) Oral daily  chlorhexidine 2% Cloths 1 Application(s) Topical daily  cinacalcet 60 milliGRAM(s) Oral daily  cycloSPORINE  , modified (NEORAL) 125 milliGRAM(s) Oral every 12 hours  Dakins Solution - 1/4 Strength 1 Application(s) Topical daily  dextrose 40% Gel 15 Gram(s) Oral once  dextrose 5%. 1000 milliLiter(s) (50 mL/Hr) IV Continuous <Continuous>  dextrose 5%. 1000 milliLiter(s) (100 mL/Hr) IV Continuous <Continuous>  dextrose 50% Injectable 25 Gram(s) IV Push once  dextrose 50% Injectable 12.5 Gram(s) IV Push once  dextrose 50% Injectable 25 Gram(s) IV Push once  diltiazem    milliGRAM(s) Oral daily  epoetin anabela-epbx (RETACRIT) Injectable 79124 Unit(s) IV Push <User Schedule>  gabapentin 300 milliGRAM(s) Oral daily  glucagon  Injectable 1 milliGRAM(s) IntraMuscular once  heparin   Injectable. 500 Unit(s) Dialysis. every 1 hour  insulin glargine Injectable (LANTUS) 15 Unit(s) SubCutaneous at bedtime  insulin lispro (ADMELOG) corrective regimen sliding scale   SubCutaneous three times a day before meals  insulin lispro (ADMELOG) corrective regimen sliding scale   SubCutaneous at bedtime  insulin lispro Injectable (ADMELOG) 9 Unit(s) SubCutaneous three times a day before meals  loratadine 10 milliGRAM(s) Oral daily  melatonin 6 milliGRAM(s) Oral at bedtime  mirtazapine 7.5 milliGRAM(s) Oral at bedtime  montelukast 10 milliGRAM(s) Oral at bedtime  oxyCODONE  ER Tablet 10 milliGRAM(s) Oral every 12 hours  pantoprazole    Tablet 40 milliGRAM(s) Oral before breakfast  polyethylene glycol 3350 17 Gram(s) Oral two times a day  senna 2 Tablet(s) Oral at bedtime  sertraline 50 milliGRAM(s) Oral daily  sevelamer carbonate 800 milliGRAM(s) Oral three times a day with meals  sodium thiosulfate IVPB 25 Gram(s) IV Intermittent <User Schedule>  tacrolimus   0.1% Ointment 1 Application(s) Topical daily  traZODone 100 milliGRAM(s) Oral at bedtime    MEDICATIONS  (PRN):  acetaminophen     Tablet .. 650 milliGRAM(s) Oral every 8 hours PRN Mild Pain  diphenhydrAMINE Injectable 25 milliGRAM(s) IV Push <User Schedule> PRN Itching  oxyCODONE    IR 7.5 milliGRAM(s) Oral every 4 hours PRN Severe Pain (7 - 10)    Vital Signs Last 24 Hrs  T(C): 36.8 (02-12-22 @ 06:00), Max: 36.8 (02-12-22 @ 06:00)  T(F): 98.2 (02-12-22 @ 06:00), Max: 98.2 (02-12-22 @ 06:00)  HR: 77 (02-12-22 @ 06:00) (77 - 77)  BP: 128/57 (02-12-22 @ 06:00) (128/57 - 128/57)  BP(mean): --  RR: 18 (02-12-22 @ 06:00) (18 - 18)  SpO2: 100% (02-12-22 @ 06:00) (97% - 100%)    Constitutional: No fever, fatigue  Skin: No rash.  Eyes: No recent vision problems or eye pain.  ENT: No congestion, ear pain, or sore throat.  Cardiovascular: No chest pain or palpation.  Respiratory: No cough, shortness of breath, congestion, or wheezing.  Gastrointestinal: No abdominal pain, nausea, vomiting, or diarrhea.  Genitourinary: No dysuria.  Musculoskeletal: No joint swelling.  Neurologic: No headache.    PHYSICAL EXAM:  GENERAL: NAD  EYES: EOMI, PERRLA  NECK: Supple, No JVD  CHEST/LUNG: dec breath sounds at bases  HEART:  S1 , S2 +  ABDOMEN: soft , bs+, abd wound +  EXTREMITIES:  edema+  NEUROLOGY:alert awake    LABS:  02-12    137  |  89<L>  |  34<H>  ----------------------------<  185<H>  4.4   |  22  |  4.57<H>    Ca    9.5      12 Feb 2022 14:39  Phos  3.5     02-12  Mg     2.10     02-12      Creatinine Trend: 4.57 <--, 5.16 <--, 4.46 <--, 7.18 <--, 5.55 <--, 7.12 <--                        8.0    13.88 )-----------( 374      ( 12 Feb 2022 14:39 )             27.9     Urine Studies:                  394 U/L / ALT: 11 U/L / AST: 11 U/L / GGT: x

## 2022-02-13 NOTE — PROGRESS NOTE ADULT - SUBJECTIVE AND OBJECTIVE BOX
CC: DM 2 uncontrolled with hyperglycemia     HPI: Glucose trending above 200 mg/dL    MEDICATIONS  (STANDING):  apixaban 2.5 milliGRAM(s) Oral two times a day  aspirin enteric coated 81 milliGRAM(s) Oral daily  buPROPion XL (24-Hour) . 150 milliGRAM(s) Oral daily  chlorhexidine 2% Cloths 1 Application(s) Topical daily  cinacalcet 60 milliGRAM(s) Oral daily  cycloSPORINE  , modified (NEORAL) 125 milliGRAM(s) Oral every 12 hours  Dakins Solution - 1/4 Strength 1 Application(s) Topical daily  dextrose 40% Gel 15 Gram(s) Oral once  dextrose 5%. 1000 milliLiter(s) (50 mL/Hr) IV Continuous <Continuous>  dextrose 5%. 1000 milliLiter(s) (100 mL/Hr) IV Continuous <Continuous>  dextrose 50% Injectable 25 Gram(s) IV Push once  dextrose 50% Injectable 12.5 Gram(s) IV Push once  dextrose 50% Injectable 25 Gram(s) IV Push once  diltiazem    milliGRAM(s) Oral daily  epoetin anabela-epbx (RETACRIT) Injectable 84046 Unit(s) IV Push <User Schedule>  gabapentin 300 milliGRAM(s) Oral daily  glucagon  Injectable 1 milliGRAM(s) IntraMuscular once  heparin   Injectable. 500 Unit(s) Dialysis. every 1 hour  insulin glargine Injectable (LANTUS) 15 Unit(s) SubCutaneous at bedtime  insulin lispro (ADMELOG) corrective regimen sliding scale   SubCutaneous three times a day before meals  insulin lispro (ADMELOG) corrective regimen sliding scale   SubCutaneous at bedtime  insulin lispro Injectable (ADMELOG) 9 Unit(s) SubCutaneous three times a day before meals  loratadine 10 milliGRAM(s) Oral daily  melatonin 6 milliGRAM(s) Oral at bedtime  mirtazapine 7.5 milliGRAM(s) Oral at bedtime  montelukast 10 milliGRAM(s) Oral at bedtime  oxyCODONE  ER Tablet 10 milliGRAM(s) Oral every 12 hours  pantoprazole    Tablet 40 milliGRAM(s) Oral before breakfast  polyethylene glycol 3350 17 Gram(s) Oral two times a day  senna 2 Tablet(s) Oral at bedtime  sertraline 50 milliGRAM(s) Oral daily  sevelamer carbonate 800 milliGRAM(s) Oral three times a day with meals  sodium thiosulfate IVPB 25 Gram(s) IV Intermittent <User Schedule>  tacrolimus   0.1% Ointment 1 Application(s) Topical daily  traZODone 100 milliGRAM(s) Oral at bedtime        ALLERGIES  Latex (Rash)  Penicillin (Nausea)  Strawberry (Rash)    Intolerances  caffeine (Nausea)    REVIEW OF SYSTEMS:  General: No fevers  GI: denies N/V, see HPI    PHYSICAL EXAM:  Vital Signs Last 24 Hrs  T(C): 36.7 (13 Feb 2022 05:20), Max: 36.9 (12 Feb 2022 22:18)  T(F): 98 (13 Feb 2022 05:20), Max: 98.4 (12 Feb 2022 22:18)  HR: 73 (13 Feb 2022 05:20) (73 - 80)  BP: 117/46 (13 Feb 2022 05:20) (117/46 - 122/48)  BP(mean): --  RR: 17 (13 Feb 2022 05:20) (17 - 18)  SpO2: 96% (13 Feb 2022 05:20) (96% - 96%)  GENERAL: NAD  EYES: No proptosis, no lid lag, anicteric  HEENT:  Atraumatic, Normocephalic,  RESPIRATORY: Non labored    02-12    137  |  89<L>  |  34<H>  ----------------------------<  185<H>  4.4   |  22  |  4.57<H>    Ca    9.5      12 Feb 2022 14:39  Phos  3.5     02-12  Mg     2.10     02-12      CAPILLARY BLOOD GLUCOSE      POCT Blood Glucose.: 202 mg/dL (13 Feb 2022 11:58)  POCT Blood Glucose.: 252 mg/dL (13 Feb 2022 08:04)  POCT Blood Glucose.: 238 mg/dL (12 Feb 2022 21:35)  POCT Blood Glucose.: 149 mg/dL (12 Feb 2022 17:12)      A1C with Estimated Average Glucose Result: 7.0 % (01-13-22 @ 08:21)  A1C with Estimated Average Glucose Result: 6.7 % (08-31-21 @ 09:30)  A1C with Estimated Average Glucose Result: 7.2 % (04-28-21 @ 07:04)    Diet, Consistent Carbohydrate Renal w/Evening Snack:   Supplement Feeding Modality:  Oral  Nepro Cans or Servings Per Day:  1       Frequency:  Three Times a day (01-17-22 @ 18:07)

## 2022-02-13 NOTE — PROGRESS NOTE ADULT - SUBJECTIVE AND OBJECTIVE BOX
[facility] NEPHROLOGY- PROGRESS NOTE    56y Female with history of ESRD on HD presents with vomiting and diarrhea found to be COVID-19 positive. Nephrology consulted for ESRD status.    REVIEW OF SYSTEMS:  Gen: no changes in weight  Cards: no chest pain  Resp: no dyspnea  GI: no nausea or vomiting or diarrhea + ab wound pain  Vascular: no LE edema    caffeine (Nausea)  latex (Rash)  penicillin (Nausea)  strawberry (Rash)      Hospital Medications: Medications reviewed      VITALS:  T(F): 98 (02-13-22 @ 05:20), Max: 98.4 (02-12-22 @ 22:18)  HR: 73 (02-13-22 @ 05:20)  BP: 117/46 (02-13-22 @ 05:20)  RR: 17 (02-13-22 @ 05:20)  SpO2: 96% (02-13-22 @ 05:20)  Wt(kg): --    02-12 @ 07:01  -  02-13 @ 07:00  --------------------------------------------------------  IN: 360 mL / OUT: 0 mL / NET: 360 mL      PHYSICAL EXAM:    Gen: NAD, calm  Cards: RRR, +S1/S2, no M/G/R  Resp: CTA B/L  GI: soft, RLQ bandage/tender  Vascular: no LE edema B/L, LUE AVF + bruit/thrill      LABS:  02-12    137  |  89<L>  |  34<H>  ----------------------------<  185<H>  4.4   |  22  |  4.57<H>    Ca    9.5      12 Feb 2022 14:39  Phos  3.5     02-12  Mg     2.10     02-12      Creatinine Trend: 4.57 <--, 5.16 <--, 4.46 <--, 7.18 <--, 5.55 <--, 7.12 <--                        8.0    13.88 )-----------( 374      ( 12 Feb 2022 14:39 )             27.9     Urine Studies:

## 2022-02-13 NOTE — PROGRESS NOTE ADULT - SUBJECTIVE AND OBJECTIVE BOX
CARDIOLOGY FOLLOW UP - Dr. Bullock  Date of Service: 2/13/22  CC: no new complaints    Review of Systems:  Constitutional: No fever, weight loss, or fatigue  Respiratory: No cough, wheezing, or hemoptysis, no shortness of breath  Cardiovascular: No chest pain, palpitaitons, passing out, dizziness, or leg swelling  Gastrointestinal: No abd or epigastric pain. No nausea, vomitting, or hematemesis; no diarrhea or consiptaiton, no melena or hematochezia  Vascular: No edema     TELEMETRY:    PHYSICAL EXAM:  T(C): 36.7 (02-13-22 @ 05:20), Max: 36.9 (02-12-22 @ 22:18)  HR: 73 (02-13-22 @ 05:20) (73 - 80)  BP: 117/46 (02-13-22 @ 05:20) (117/46 - 122/48)  RR: 17 (02-13-22 @ 05:20) (17 - 18)  SpO2: 96% (02-13-22 @ 05:20) (96% - 96%)  Wt(kg): --  I&O's Summary    12 Feb 2022 07:01  -  13 Feb 2022 07:00  --------------------------------------------------------  IN: 360 mL / OUT: 0 mL / NET: 360 mL        Appearance: Normal	  Cardiovascular: Normal S1 S2,RRR, No JVD, No murmurs  Respiratory: Lungs clear to auscultation	  Gastrointestinal:  Soft, Non-tender, + BS	  Extremities: Normal range of motion, No clubbing, cyanosis or edema  Vascular: Peripheral pulses palpable 2+ bilaterally       Home Medications:  aspirin 81 mg oral delayed release tablet: 1 tab(s) orally once a day (12 Jan 2022 17:49)  buPROPion 150 mg/24 hours (XL) oral tablet, extended release: 1 tab(s) orally once a day (in the morning) (12 Jan 2022 17:49)  cetirizine 10 mg oral tablet: 1 tab(s) orally once a day (12 Jan 2022 17:49)  dilTIAZem 120 mg/24 hours oral tablet, extended release: 1 tab(s) orally once a day (12 Jan 2022 17:49)  doxepin 25 mg oral capsule: 1 cap(s) orally once a day (at bedtime) (12 Jan 2022 17:49)  Eliquis 2.5 mg oral tablet: 1 tab(s) orally 2 times a day    Pharmacy states patient no longer wants to fill this medication (12 Jan 2022 17:49)  furosemide 80 mg oral tablet: 1 tab(s) orally once a day    Pharmacy states patient no longer wants to fill this medication (12 Jan 2022 17:49)  gabapentin 300 mg oral capsule: 1 cap(s) orally 2 times a day (12 Jan 2022 17:49)  hydrOXYzine hydrochloride 25 mg oral tablet: 1 tab(s) orally 2 times a day, As Needed (12 Jan 2022 17:49)  lanthanum 1000 mg oral tablet, chewable: 2 tab(s) orally with meals and 1 tab(s) orally with snacks    Pharmacy states patient no longer wants to fill this medication (12 Jan 2022 17:49)  mirtazapine 7.5 mg oral tablet: 1 tab(s) orally once a day (at bedtime) (12 Jan 2022 17:49)  montelukast 10 mg oral tablet: 1 tab(s) orally once a day (in the evening) (12 Jan 2022 17:49)  mupirocin 2% topical ointment: Apply sparingly to affected area 2 times a day as directed (12 Jan 2022 17:49)  nystatin 100,000 units/mL oral suspension: 5 milliliter(s) orally 4 times a day, as directed (12 Jan 2022 17:49)  omeprazole 20 mg oral delayed release capsule: 2 cap(s) orally once a day before breakfast (12 Jan 2022 17:49)  Pennsaid 2% topical solution: Apply topically to affected area 2 times a day (12 Jan 2022 17:49)  rosuvastatin 40 mg oral tablet: 1 tab(s) orally once a day (12 Jan 2022 17:49)  Santyl 250 units/g topical ointment: Apply topically to affected area once a day as directed (12 Jan 2022 17:49)  sertraline 50 mg oral tablet: 1 tab(s) orally once a day (12 Jan 2022 17:49)  Spiriva HandiHaler 18 mcg inhalation capsule: 1 cap(s) inhaled once a day (12 Jan 2022 17:49)  traZODone 100 mg oral tablet: 1 tab(s) orally once a day (at bedtime) (12 Jan 2022 17:49)  Tresiba FlexTouch 100 units/mL subcutaneous solution: 80 unit(s) subcutaneous once a day (at bedtime) (12 Jan 2022 17:49)  Trulicity Pen 1.5 mg/0.5 mL subcutaneous solution: 1 dose(s) subcutaneous once a week (12 Jan 2022 17:49)        MEDICATIONS  (STANDING):  apixaban 2.5 milliGRAM(s) Oral two times a day  aspirin enteric coated 81 milliGRAM(s) Oral daily  buPROPion XL (24-Hour) . 150 milliGRAM(s) Oral daily  chlorhexidine 2% Cloths 1 Application(s) Topical daily  cinacalcet 60 milliGRAM(s) Oral daily  cycloSPORINE  , modified (NEORAL) 125 milliGRAM(s) Oral every 12 hours  Dakins Solution - 1/4 Strength 1 Application(s) Topical daily  dextrose 40% Gel 15 Gram(s) Oral once  dextrose 5%. 1000 milliLiter(s) (50 mL/Hr) IV Continuous <Continuous>  dextrose 5%. 1000 milliLiter(s) (100 mL/Hr) IV Continuous <Continuous>  dextrose 50% Injectable 25 Gram(s) IV Push once  dextrose 50% Injectable 12.5 Gram(s) IV Push once  dextrose 50% Injectable 25 Gram(s) IV Push once  diltiazem    milliGRAM(s) Oral daily  epoetin anabela-epbx (RETACRIT) Injectable 58486 Unit(s) IV Push <User Schedule>  gabapentin 300 milliGRAM(s) Oral daily  glucagon  Injectable 1 milliGRAM(s) IntraMuscular once  heparin   Injectable. 500 Unit(s) Dialysis. every 1 hour  insulin glargine Injectable (LANTUS) 15 Unit(s) SubCutaneous at bedtime  insulin lispro (ADMELOG) corrective regimen sliding scale   SubCutaneous three times a day before meals  insulin lispro (ADMELOG) corrective regimen sliding scale   SubCutaneous at bedtime  insulin lispro Injectable (ADMELOG) 9 Unit(s) SubCutaneous three times a day before meals  loratadine 10 milliGRAM(s) Oral daily  melatonin 6 milliGRAM(s) Oral at bedtime  mirtazapine 7.5 milliGRAM(s) Oral at bedtime  montelukast 10 milliGRAM(s) Oral at bedtime  oxyCODONE  ER Tablet 10 milliGRAM(s) Oral every 12 hours  pantoprazole    Tablet 40 milliGRAM(s) Oral before breakfast  polyethylene glycol 3350 17 Gram(s) Oral two times a day  senna 2 Tablet(s) Oral at bedtime  sertraline 50 milliGRAM(s) Oral daily  sevelamer carbonate 800 milliGRAM(s) Oral three times a day with meals  sodium thiosulfate IVPB 25 Gram(s) IV Intermittent <User Schedule>  tacrolimus   0.1% Ointment 1 Application(s) Topical daily  traZODone 100 milliGRAM(s) Oral at bedtime        EKG:  RADIOLOGY:  DIAGNOSTIC TESTING:  [ ] Echocardiogram:  [ ] Catherterization:  [ ] Stress Test:  OTHER:     LABS:	 	                          8.0    13.88 )-----------( 374      ( 12 Feb 2022 14:39 )             27.9     02-12    137  |  89<L>  |  34<H>  ----------------------------<  185<H>  4.4   |  22  |  4.57<H>    Ca    9.5      12 Feb 2022 14:39  Phos  3.5     02-12  Mg     2.10     02-12            CARDIAC MARKERS:

## 2022-02-13 NOTE — PROGRESS NOTE ADULT - SUBJECTIVE AND OBJECTIVE BOX
SUBJECTIVE / OVERNIGHT EVENTS:pt seen and examined, c/o pain at the wound site  2-13-22    MEDICATIONS  (STANDING):  apixaban 2.5 milliGRAM(s) Oral two times a day  aspirin enteric coated 81 milliGRAM(s) Oral daily  buPROPion XL (24-Hour) . 150 milliGRAM(s) Oral daily  chlorhexidine 2% Cloths 1 Application(s) Topical daily  cinacalcet 60 milliGRAM(s) Oral daily  cycloSPORINE  , modified (NEORAL) 125 milliGRAM(s) Oral every 12 hours  Dakins Solution - 1/4 Strength 1 Application(s) Topical daily  dextrose 40% Gel 15 Gram(s) Oral once  dextrose 5%. 1000 milliLiter(s) (50 mL/Hr) IV Continuous <Continuous>  dextrose 5%. 1000 milliLiter(s) (100 mL/Hr) IV Continuous <Continuous>  dextrose 50% Injectable 25 Gram(s) IV Push once  dextrose 50% Injectable 12.5 Gram(s) IV Push once  dextrose 50% Injectable 25 Gram(s) IV Push once  diltiazem    milliGRAM(s) Oral daily  epoetin anabela-epbx (RETACRIT) Injectable 95861 Unit(s) IV Push <User Schedule>  gabapentin 300 milliGRAM(s) Oral daily  glucagon  Injectable 1 milliGRAM(s) IntraMuscular once  heparin   Injectable. 500 Unit(s) Dialysis. every 1 hour  insulin glargine Injectable (LANTUS) 16 Unit(s) SubCutaneous at bedtime  insulin lispro (ADMELOG) corrective regimen sliding scale   SubCutaneous three times a day before meals  insulin lispro (ADMELOG) corrective regimen sliding scale   SubCutaneous at bedtime  insulin lispro Injectable (ADMELOG) 10 Unit(s) SubCutaneous three times a day before meals  loratadine 10 milliGRAM(s) Oral daily  melatonin 6 milliGRAM(s) Oral at bedtime  mirtazapine 7.5 milliGRAM(s) Oral at bedtime  montelukast 10 milliGRAM(s) Oral at bedtime  oxyCODONE  ER Tablet 10 milliGRAM(s) Oral every 12 hours  pantoprazole    Tablet 40 milliGRAM(s) Oral before breakfast  polyethylene glycol 3350 17 Gram(s) Oral two times a day  senna 2 Tablet(s) Oral at bedtime  sertraline 50 milliGRAM(s) Oral daily  sevelamer carbonate 800 milliGRAM(s) Oral three times a day with meals  sodium thiosulfate IVPB 25 Gram(s) IV Intermittent <User Schedule>  tacrolimus   0.1% Ointment 1 Application(s) Topical daily  traZODone 100 milliGRAM(s) Oral at bedtime    MEDICATIONS  (PRN):  acetaminophen     Tablet .. 650 milliGRAM(s) Oral every 8 hours PRN Mild Pain  diphenhydrAMINE Injectable 25 milliGRAM(s) IV Push <User Schedule> PRN Itching  oxyCODONE    IR 7.5 milliGRAM(s) Oral every 4 hours PRN Severe Pain (7 - 10)    Vital Signs Last 24 Hrs  T(C): 36.7 (02-13-22 @ 20:26), Max: 36.7 (02-13-22 @ 05:20)  T(F): 98.1 (02-13-22 @ 20:26), Max: 98.1 (02-13-22 @ 20:26)  HR: 75 (02-13-22 @ 23:05) (72 - 78)  BP: 110/55 (02-13-22 @ 20:26) (110/55 - 128/55)  BP(mean): --  RR: 18 (02-13-22 @ 20:26) (16 - 18)  SpO2: 98% (02-13-22 @ 23:05) (95% - 100%)      Constitutional: No fever, fatigue  Skin: No rash.  Eyes: No recent vision problems or eye pain.  ENT: No congestion, ear pain, or sore throat.  Cardiovascular: No chest pain or palpation.  Respiratory: No cough, shortness of breath, congestion, or wheezing.  Gastrointestinal: No abdominal pain, nausea, vomiting, or diarrhea.  Genitourinary: No dysuria.  Musculoskeletal: No joint swelling.  Neurologic: No headache.    PHYSICAL EXAM:  GENERAL: NAD  EYES: EOMI, PERRLA  NECK: Supple, No JVD  CHEST/LUNG: dec breath sounds at bases  HEART:  S1 , S2 +  ABDOMEN: soft , bs+, abd wound +  EXTREMITIES:  edema+  NEUROLOGY:alert awake    LABS:  02-12    137  |  89<L>  |  34<H>  ----------------------------<  185<H>  4.4   |  22  |  4.57<H>    Ca    9.5      12 Feb 2022 14:39  Phos  3.5     02-12  Mg     2.10     02-12      Creatinine Trend: 4.57 <--, 5.16 <--, 4.46 <--, 7.18 <--, 5.55 <--, 7.12 <--                        8.0    13.88 )-----------( 374      ( 12 Feb 2022 14:39 )             27.9     Urine Studies:
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
## 2022-02-13 NOTE — PROGRESS NOTE ADULT - ASSESSMENT
56 yr old F with morbid obesity, CHAMP not on home O2, ESRD (HD MWF), HTN, DM2 A1C 7.0, COPD, Afib no longer on AC, chronic R pannus wound here with worsening wound, diarrhea.  Pt exhibits persistent steroid induced hyperglycemia despite increased insulin regimen.         1. Type 2 diabetes mellitus uncontrolled  A1c 7.0% (may be inaccurate in setting of ESRD)  Home Regimen: Tresiba 80 units HS and Trulicity 1.5mg subq weekly  While inpatient:  BG target 100-180 mg/dL  Trending above goal   Will increase Lantus slightly to 16 units SQ qHS - glucose close to goal this AM  Will increase Admelog to 10 units SQ TID before meals (Hold if NPO/not eating meal)    Continue Admelog correctional scale as LOW before meals and bedtime   Check BG before meals and bedtime  Hypoglycemia protocol   Consistent carbohydrate diet    Discharge Plan:  STOP Trulicity (patient ESRD on HD)  If discharged without steroids, likely dc plan is basal/oral regimen. For oral agent, depends on inpatient requirements but can consider renally dosed DPP4 (Januvia 25 mg or Tradjenta 5 mg daily) VS Prandin before meals - Full dc plan to follow  Patient may benefit from switching to 22-24h acting basal insulin eg Levemir or Lantus, instead of Tresiba  Please assess insulin coverage for Levemir or Lantus, instead of Tresiba pens  Consider CGM (such as Freestyle Libre2 outpatient)  If desiring to followup with [facility]: [address], [address], [place] [address], [phone]    2. HTN  Management per primary team     3. Hyperlipidemia  Can continue home dose of Atorvastatin 80 mg  Note, limited benefit in ESRD. Followup lipid panel as outpatient    PIERRE Cade-BC  Nurse Practitioner   Division of Endocrinology  Pager: KARMA pager [phone]    If out of hospital/unavailable when paged, please note: patient will be cared for by another provider on the endocrine service.  Please call the endocrine answering service for assistance to reach covering provider ([phone]). For non-urgent matters, please email [email or web address] for assistance.

## 2022-02-13 NOTE — PROGRESS NOTE ADULT - ASSESSMENT
56y Female with history of ESRD on HD presents with vomiting and diarrhea found to be COVID-19 positive. Nephrology consulted for ESRD status.    1) ESRD: Last HD on 2/11, tolerated well with 2L removed. Plan for next maintenance HD 2/14. Monitor electrolytes.    2) HTN with ESRD: BP acceptable. Continue with current medications. Monitor BP.    3) Anemia of renal disease: Hb low with elevated ferritin. Continue with Epo 14K with HD. Monitor Hb.    4) Secondary HPT of renal origin: Phosphorus acceptable with low iPTH. Sensipar decreased to 60 mg PO daily. Continue with renvela 1 tab with meals (goal < 4.5 given concerns for calciphylaxis). Monitor serum calcium and phosphorus.    5) Ab wound: Appreciate dermatology follow up. Ddx includes calciphylaxis versus pyoderma gangrenosum. S/P biopsy. Continue with empiric sodium thiosulfate with HD. On CSA as per Derm as benefits outweigh risks to residual renal function (which is minimal at this point).      [facility] NEPHROLOGY  Keaton Lopez M.D.  Juma Green D.O.  Myla Hoffmann M.D.  Julia Brewer, MSN, ANP-C    Telephone: [phone]  Facsimile: [phone]    [address] [place] [address]

## 2022-02-14 NOTE — PROGRESS NOTE ADULT - ASSESSMENT
56y Female with history of ESRD on HD presents with vomiting and diarrhea found to be COVID-19 positive. Nephrology consulted for ESRD status.    1) ESRD: Last HD on 2/11 tolerated well with 2L removed. Plan for next maintenance HD today. Monitor electrolytes.    2) HTN with ESRD: BP acceptable. Continue with current medications. Monitor BP.    3) Anemia of renal disease: Hb low with elevated ferritin. Continue with Epo 14K with HD. Monitor Hb.    4) Secondary HPT of renal origin: Phosphorus acceptable with low iPTH. Sensipar decreased to 60 mg PO daily. Continue with renvela 1 tab with meals (goal < 4.5 given concerns for calciphylaxis). Monitor serum calcium and phosphorus.    5) Ab wound: Appreciate dermatology follow up. Ddx includes calciphylaxis versus pyoderma gangrenosum. S/P biopsy. Continue with empiric sodium thiosulfate with HD. On CSA as per Derm as benefits outweigh risks to residual renal function (which is minimal at this point).      [facility] NEPHROLOGY  Keaton Lopez M.D.  Juma Green D.O.  Myla Hoffmann M.D.  Julia Brewer, JASIEL, ANP-C    Telephone: [phone]  Facsimile: [phone]    [address] [place] [address]
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
## 2022-02-14 NOTE — PROGRESS NOTE ADULT - SUBJECTIVE AND OBJECTIVE BOX
[facility] NEPHROLOGY- PROGRESS NOTE    56y Female with history of ESRD on HD presents with vomiting and diarrhea found to be COVID-19 positive. Nephrology consulted for ESRD status.    REVIEW OF SYSTEMS:  Gen: no changes in weight  Cards: no chest pain  Resp: no dyspnea  GI: no nausea or vomiting or diarrhea + ab wound pain  Vascular: no LE edema    caffeine (Nausea)  latex (Rash)  penicillin (Nausea)  strawberry (Rash)      Hospital Medications: Medications reviewed      VITALS:  T(F): 98.8 (02-14-22 @ 11:20), Max: 98.8 (02-14-22 @ 11:20)  HR: 79 (02-14-22 @ 11:20)  BP: 121/62 (02-14-22 @ 11:20)  RR: 18 (02-14-22 @ 11:20)  SpO2: 100% (02-14-22 @ 05:13)  Wt(kg): --      PHYSICAL EXAM:    Gen: NAD, calm  Cards: RRR, +S1/S2, no M/G/R  Resp: CTA B/L  GI: soft, RLQ bandage/tender  Vascular: no LE edema B/L, LUE AVF + bruit/thrill      LABS:  02-14    138  |  89<L>  |  57<H>  ----------------------------<  126<H>  4.7   |  22  |  6.92<H>    Ca    9.3      14 Feb 2022 11:51  Phos  4.7     02-14  Mg     2.40     02-14      Creatinine Trend: 6.92 <--, 4.57 <--, 5.16 <--, 4.46 <--, 7.18 <--, 5.55 <--, 7.12 <--                        7.4    13.10 )-----------( 380      ( 14 Feb 2022 11:51 )             26.4     Urine Studies:

## 2022-02-14 NOTE — PROGRESS NOTE ADULT - SUBJECTIVE AND OBJECTIVE BOX
SUBJECTIVE / OVERNIGHT EVENTS:pt seen and examined, c/o pain at the wound site  2-14-22    MEDICATIONS  (STANDING):  apixaban 2.5 milliGRAM(s) Oral two times a day  aspirin enteric coated 81 milliGRAM(s) Oral daily  buPROPion XL (24-Hour) . 150 milliGRAM(s) Oral daily  chlorhexidine 2% Cloths 1 Application(s) Topical daily  cinacalcet 60 milliGRAM(s) Oral daily  cycloSPORINE  , modified (NEORAL) 125 milliGRAM(s) Oral every 12 hours  Dakins Solution - 1/4 Strength 1 Application(s) Topical daily  dextrose 40% Gel 15 Gram(s) Oral once  dextrose 5%. 1000 milliLiter(s) (50 mL/Hr) IV Continuous <Continuous>  dextrose 5%. 1000 milliLiter(s) (100 mL/Hr) IV Continuous <Continuous>  dextrose 50% Injectable 25 Gram(s) IV Push once  dextrose 50% Injectable 12.5 Gram(s) IV Push once  dextrose 50% Injectable 25 Gram(s) IV Push once  diltiazem    milliGRAM(s) Oral daily  diphenhydrAMINE 25 milliGRAM(s) Oral once  epoetin anabela-epbx (RETACRIT) Injectable 59405 Unit(s) IV Push <User Schedule>  gabapentin 300 milliGRAM(s) Oral daily  glucagon  Injectable 1 milliGRAM(s) IntraMuscular once  heparin   Injectable. 500 Unit(s) Dialysis. every 1 hour  insulin glargine Injectable (LANTUS) 16 Unit(s) SubCutaneous at bedtime  insulin lispro (ADMELOG) corrective regimen sliding scale   SubCutaneous three times a day before meals  insulin lispro (ADMELOG) corrective regimen sliding scale   SubCutaneous at bedtime  insulin lispro Injectable (ADMELOG) 10 Unit(s) SubCutaneous three times a day before meals  loratadine 10 milliGRAM(s) Oral daily  melatonin 6 milliGRAM(s) Oral at bedtime  mirtazapine 7.5 milliGRAM(s) Oral at bedtime  montelukast 10 milliGRAM(s) Oral at bedtime  oxyCODONE  ER Tablet 10 milliGRAM(s) Oral every 12 hours  pantoprazole    Tablet 40 milliGRAM(s) Oral before breakfast  polyethylene glycol 3350 17 Gram(s) Oral two times a day  senna 2 Tablet(s) Oral at bedtime  sertraline 50 milliGRAM(s) Oral daily  sevelamer carbonate 800 milliGRAM(s) Oral three times a day with meals  sodium thiosulfate IVPB 25 Gram(s) IV Intermittent <User Schedule>  tacrolimus   0.1% Ointment 1 Application(s) Topical daily  traZODone 100 milliGRAM(s) Oral at bedtime    MEDICATIONS  (PRN):  acetaminophen     Tablet .. 650 milliGRAM(s) Oral every 8 hours PRN Mild Pain  diphenhydrAMINE Injectable 25 milliGRAM(s) IV Push <User Schedule> PRN Itching  oxyCODONE    IR 7.5 milliGRAM(s) Oral every 4 hours PRN Severe Pain (7 - 10)    Vital Signs Last 24 Hrs  T(C): 36.9 (02-14-22 @ 21:30), Max: 37.1 (02-14-22 @ 11:20)  T(F): 98.4 (02-14-22 @ 21:30), Max: 98.8 (02-14-22 @ 11:20)  HR: 88 (02-14-22 @ 21:30) (70 - 88)  BP: 127/52 (02-14-22 @ 21:30) (111/56 - 127/52)  BP(mean): --  RR: 18 (02-14-22 @ 21:30) (18 - 18)  SpO2: 95% (02-14-22 @ 21:30) (95% - 100%)      Constitutional: No fever, fatigue  Skin: No rash.  Eyes: No recent vision problems or eye pain.  ENT: No congestion, ear pain, or sore throat.  Cardiovascular: No chest pain or palpation.  Respiratory: No cough, shortness of breath, congestion, or wheezing.  Gastrointestinal: No abdominal pain, nausea, vomiting, or diarrhea.  Genitourinary: No dysuria.  Musculoskeletal: No joint swelling.  Neurologic: No headache.    PHYSICAL EXAM:  GENERAL: NAD  EYES: EOMI, PERRLA  NECK: Supple, No JVD  CHEST/LUNG: dec breath sounds at bases  HEART:  S1 , S2 +  ABDOMEN: soft , bs+, abd wound +  EXTREMITIES:  edema+  NEUROLOGY:alert awake    LABS:  02-14    138  |  89<L>  |  57<H>  ----------------------------<  126<H>  4.7   |  22  |  6.92<H>    Ca    9.3      14 Feb 2022 11:51  Phos  4.7     02-14  Mg     2.40     02-14      Creatinine Trend: 6.92 <--, 4.57 <--, 5.16 <--, 4.46 <--, 7.18 <--, 5.55 <--                        7.4    13.10 )-----------( 380      ( 14 Feb 2022 11:51 )             26.4     Urine Studies:

## 2022-02-14 NOTE — PROGRESS NOTE ADULT - SUBJECTIVE AND OBJECTIVE BOX
CARDIOLOGY FOLLOW UP - Dr. Bullock  Date of Service: 2/14/22  CC: no cp/sob     Review of Systems:  Constitutional: No fever, weight loss, or fatigue  Respiratory: No cough, wheezing, or hemoptysis, no shortness of breath  Cardiovascular: No chest pain, palpitations, passing out, dizziness, or leg swelling  Gastrointestinal: No abd or epigastric pain.  No nausea, vomiting, or hematemesis; no diarrhea or constipation, no melena or hematochezia  Vascular: no edema       PHYSICAL EXAM:  T(C): 37.1 (02-14-22 @ 11:20), Max: 37.1 (02-14-22 @ 11:20)  HR: 79 (02-14-22 @ 11:20) (70 - 79)  BP: 121/62 (02-14-22 @ 11:20) (110/55 - 128/55)  RR: 18 (02-14-22 @ 11:20) (16 - 18)  SpO2: 100% (02-14-22 @ 05:13) (95% - 100%)  Wt(kg): --  I&O's Summary      Appearance: Normal	  Cardiovascular: Normal S1 S2,RRR, No JVD, No murmurs  Respiratory: Lungs clear to auscultation	  Gastrointestinal:  Soft, Non-tender, + BS	  Extremities: Normal range of motion, No clubbing, cyanosis or edema      Home Medications:  aspirin 81 mg oral delayed release tablet: 1 tab(s) orally once a day (12 Jan 2022 17:49)  buPROPion 150 mg/24 hours (XL) oral tablet, extended release: 1 tab(s) orally once a day (in the morning) (12 Jan 2022 17:49)  cetirizine 10 mg oral tablet: 1 tab(s) orally once a day (12 Jan 2022 17:49)  dilTIAZem 120 mg/24 hours oral tablet, extended release: 1 tab(s) orally once a day (12 Jan 2022 17:49)  doxepin 25 mg oral capsule: 1 cap(s) orally once a day (at bedtime) (12 Jan 2022 17:49)  Eliquis 2.5 mg oral tablet: 1 tab(s) orally 2 times a day    Pharmacy states patient no longer wants to fill this medication (12 Jan 2022 17:49)  furosemide 80 mg oral tablet: 1 tab(s) orally once a day    Pharmacy states patient no longer wants to fill this medication (12 Jan 2022 17:49)  gabapentin 300 mg oral capsule: 1 cap(s) orally 2 times a day (12 Jan 2022 17:49)  hydrOXYzine hydrochloride 25 mg oral tablet: 1 tab(s) orally 2 times a day, As Needed (12 Jan 2022 17:49)  lanthanum 1000 mg oral tablet, chewable: 2 tab(s) orally with meals and 1 tab(s) orally with snacks    Pharmacy states patient no longer wants to fill this medication (12 Jan 2022 17:49)  mirtazapine 7.5 mg oral tablet: 1 tab(s) orally once a day (at bedtime) (12 Jan 2022 17:49)  montelukast 10 mg oral tablet: 1 tab(s) orally once a day (in the evening) (12 Jan 2022 17:49)  mupirocin 2% topical ointment: Apply sparingly to affected area 2 times a day as directed (12 Jan 2022 17:49)  nystatin 100,000 units/mL oral suspension: 5 milliliter(s) orally 4 times a day, as directed (12 Jan 2022 17:49)  omeprazole 20 mg oral delayed release capsule: 2 cap(s) orally once a day before breakfast (12 Jan 2022 17:49)  Pennsaid 2% topical solution: Apply topically to affected area 2 times a day (12 Jan 2022 17:49)  rosuvastatin 40 mg oral tablet: 1 tab(s) orally once a day (12 Jan 2022 17:49)  Santyl 250 units/g topical ointment: Apply topically to affected area once a day as directed (12 Jan 2022 17:49)  sertraline 50 mg oral tablet: 1 tab(s) orally once a day (12 Jan 2022 17:49)  Spiriva HandiHaler 18 mcg inhalation capsule: 1 cap(s) inhaled once a day (12 Jan 2022 17:49)  traZODone 100 mg oral tablet: 1 tab(s) orally once a day (at bedtime) (12 Jan 2022 17:49)  Tresiba FlexTouch 100 units/mL subcutaneous solution: 80 unit(s) subcutaneous once a day (at bedtime) (12 Jan 2022 17:49)  Trulicity Pen 1.5 mg/0.5 mL subcutaneous solution: 1 dose(s) subcutaneous once a week (12 Jan 2022 17:49)      MEDICATIONS  (STANDING):  apixaban 2.5 milliGRAM(s) Oral two times a day  aspirin enteric coated 81 milliGRAM(s) Oral daily  buPROPion XL (24-Hour) . 150 milliGRAM(s) Oral daily  chlorhexidine 2% Cloths 1 Application(s) Topical daily  cinacalcet 60 milliGRAM(s) Oral daily  cycloSPORINE  , modified (NEORAL) 125 milliGRAM(s) Oral every 12 hours  Dakins Solution - 1/4 Strength 1 Application(s) Topical daily  dextrose 40% Gel 15 Gram(s) Oral once  dextrose 5%. 1000 milliLiter(s) (50 mL/Hr) IV Continuous <Continuous>  dextrose 5%. 1000 milliLiter(s) (100 mL/Hr) IV Continuous <Continuous>  dextrose 50% Injectable 25 Gram(s) IV Push once  dextrose 50% Injectable 12.5 Gram(s) IV Push once  dextrose 50% Injectable 25 Gram(s) IV Push once  diltiazem    milliGRAM(s) Oral daily  epoetin anabela-epbx (RETACRIT) Injectable 52726 Unit(s) IV Push <User Schedule>  gabapentin 300 milliGRAM(s) Oral daily  glucagon  Injectable 1 milliGRAM(s) IntraMuscular once  heparin   Injectable. 500 Unit(s) Dialysis. every 1 hour  insulin glargine Injectable (LANTUS) 16 Unit(s) SubCutaneous at bedtime  insulin lispro (ADMELOG) corrective regimen sliding scale   SubCutaneous three times a day before meals  insulin lispro (ADMELOG) corrective regimen sliding scale   SubCutaneous at bedtime  insulin lispro Injectable (ADMELOG) 10 Unit(s) SubCutaneous three times a day before meals  loratadine 10 milliGRAM(s) Oral daily  melatonin 6 milliGRAM(s) Oral at bedtime  mirtazapine 7.5 milliGRAM(s) Oral at bedtime  montelukast 10 milliGRAM(s) Oral at bedtime  oxyCODONE  ER Tablet 10 milliGRAM(s) Oral every 12 hours  pantoprazole    Tablet 40 milliGRAM(s) Oral before breakfast  polyethylene glycol 3350 17 Gram(s) Oral two times a day  senna 2 Tablet(s) Oral at bedtime  sertraline 50 milliGRAM(s) Oral daily  sevelamer carbonate 800 milliGRAM(s) Oral three times a day with meals  sodium thiosulfate IVPB 25 Gram(s) IV Intermittent <User Schedule>  tacrolimus   0.1% Ointment 1 Application(s) Topical daily  traZODone 100 milliGRAM(s) Oral at bedtime      TELEMETRY: 	    ECG:  	  RADIOLOGY:   DIAGNOSTIC TESTING:  [ ] Echocardiogram:  [ ]  Catheterization:  [ ] Stress Test:    OTHER: 	    LABS:	 	    Troponin T, High Sensitivity Result: 54 ng/L (02-14 @ 11:51)  Troponin T, High Sensitivity Result: 53 ng/L (02-11 @ 17:39)  Troponin T, High Sensitivity Result: 44 ng/L (02-08 @ 13:39)                          7.4    13.10 )-----------( 380      ( 14 Feb 2022 11:51 )             26.4     02-14    138  |  89<L>  |  57<H>  ----------------------------<  126<H>  4.7   |  22  |  6.92<H>    Ca    9.3      14 Feb 2022 11:51  Phos  4.7     02-14  Mg     2.40     02-14

## 2022-02-14 NOTE — PROGRESS NOTE ADULT - ASSESSMENT
a/p  56 year old female with hx of morbid obesity, CHAMP not on home O2, ESRD (HD MWF), HTN, DM, COPD, Afib no longer on AC, chronic R pannus wound, followed by Dr. Layne, sent by visiting RN for worsening wound.    #Chronic Pannus Wound  -s/p IV abx per primary team  -cycloSPORINE started - statin on hold   -med/derm  f/u   -sx recs appreciated     #Afib  -stable, in NSR   -cont eliquis   -cont cardizem    #Hypertension  -overall stable   -cont current meds    #ESRD on HD  -HD per renal    # Near Syncope  -tele no events   -orthostatics neg  -echo w grossly nl lv sys fxn    #Covid-19+  -resolved   -med f/u     #Elevated troponin  -demand ischemia in setting of esrd  -echo as above  -no cp/sob
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
## 2022-02-15 NOTE — PROGRESS NOTE ADULT - SUBJECTIVE AND OBJECTIVE BOX
[facility] NEPHROLOGY- PROGRESS NOTE    56y Female with history of ESRD on HD presents with vomiting and diarrhea found to be COVID-19 positive. Nephrology consulted for ESRD status.    REVIEW OF SYSTEMS:  Gen: no changes in weight  Cards: no chest pain  Resp: no dyspnea  GI: no nausea or vomiting or diarrhea + ab wound pain  Vascular: no LE edema    caffeine (Nausea)  latex (Rash)  penicillin (Nausea)  strawberry (Rash)      Hospital Medications: Medications reviewed      VITALS:  T(F): 98.6 (02-15-22 @ 06:30), Max: 98.6 (02-15-22 @ 06:30)  HR: 75 (02-15-22 @ 06:30)  BP: 112/59 (02-15-22 @ 06:30)  RR: 18 (02-15-22 @ 06:30)  SpO2: 100% (02-15-22 @ 06:30)  Wt(kg): --    02-14 @ 07:01  -  02-15 @ 07:00  --------------------------------------------------------  IN: 400 mL / OUT: 2400 mL / NET: -2000 mL        PHYSICAL EXAM:    Gen: NAD, calm  Cards: RRR, +S1/S2, no M/G/R  Resp: CTA B/L  GI: soft, RLQ bandage/tender  Vascular: no LE edema B/L, LUE AVF + bruit/thrill      LABS:  02-14    138  |  89<L>  |  57<H>  ----------------------------<  126<H>  4.7   |  22  |  6.92<H>    Ca    9.3      14 Feb 2022 11:51  Phos  4.7     02-14  Mg     2.40     02-14      Creatinine Trend: 6.92 <--, 4.57 <--, 5.16 <--, 4.46 <--, 7.18 <--, 5.55 <--                        7.4    13.10 )-----------( 380      ( 14 Feb 2022 11:51 )             26.4     Urine Studies:

## 2022-02-15 NOTE — PROGRESS NOTE ADULT - SUBJECTIVE AND OBJECTIVE BOX
SUBJECTIVE / OVERNIGHT EVENTS:pt seen and examined, c/o pain at the wound site  2-15-22    MEDICATIONS  (STANDING):  apixaban 2.5 milliGRAM(s) Oral two times a day  aspirin enteric coated 81 milliGRAM(s) Oral daily  buPROPion XL (24-Hour) . 150 milliGRAM(s) Oral daily  chlorhexidine 2% Cloths 1 Application(s) Topical daily  cinacalcet 60 milliGRAM(s) Oral daily  cycloSPORINE  , modified (NEORAL) 125 milliGRAM(s) Oral every 12 hours  Dakins Solution - 1/4 Strength 1 Application(s) Topical daily  dextrose 40% Gel 15 Gram(s) Oral once  dextrose 5%. 1000 milliLiter(s) (50 mL/Hr) IV Continuous <Continuous>  dextrose 5%. 1000 milliLiter(s) (100 mL/Hr) IV Continuous <Continuous>  dextrose 50% Injectable 25 Gram(s) IV Push once  dextrose 50% Injectable 12.5 Gram(s) IV Push once  dextrose 50% Injectable 25 Gram(s) IV Push once  diltiazem    milliGRAM(s) Oral daily  diphenhydrAMINE 25 milliGRAM(s) Oral once  epoetin anabela-epbx (RETACRIT) Injectable 77067 Unit(s) IV Push <User Schedule>  gabapentin 300 milliGRAM(s) Oral daily  glucagon  Injectable 1 milliGRAM(s) IntraMuscular once  heparin   Injectable. 500 Unit(s) Dialysis. every 1 hour  insulin glargine Injectable (LANTUS) 16 Unit(s) SubCutaneous at bedtime  insulin lispro (ADMELOG) corrective regimen sliding scale   SubCutaneous three times a day before meals  insulin lispro (ADMELOG) corrective regimen sliding scale   SubCutaneous at bedtime  insulin lispro Injectable (ADMELOG) 10 Unit(s) SubCutaneous three times a day before meals  loratadine 10 milliGRAM(s) Oral daily  melatonin 6 milliGRAM(s) Oral at bedtime  mirtazapine 7.5 milliGRAM(s) Oral at bedtime  montelukast 10 milliGRAM(s) Oral at bedtime  oxyCODONE  ER Tablet 10 milliGRAM(s) Oral every 12 hours  pantoprazole    Tablet 40 milliGRAM(s) Oral before breakfast  polyethylene glycol 3350 17 Gram(s) Oral two times a day  senna 2 Tablet(s) Oral at bedtime  sertraline 50 milliGRAM(s) Oral daily  sevelamer carbonate 800 milliGRAM(s) Oral three times a day with meals  sodium thiosulfate IVPB 25 Gram(s) IV Intermittent <User Schedule>  tacrolimus   0.1% Ointment 1 Application(s) Topical daily  traZODone 100 milliGRAM(s) Oral at bedtime    MEDICATIONS  (PRN):  acetaminophen     Tablet .. 650 milliGRAM(s) Oral every 8 hours PRN Mild Pain  diphenhydrAMINE Injectable 25 milliGRAM(s) IV Push <User Schedule> PRN Itching  oxyCODONE    IR 7.5 milliGRAM(s) Oral every 4 hours PRN Severe Pain (7 - 10)    Vital Signs Last 24 Hrs  T(C): 37 (02-15-22 @ 12:30), Max: 37 (02-15-22 @ 06:30)  T(F): 98.6 (02-15-22 @ 12:30), Max: 98.6 (02-15-22 @ 06:30)  HR: 74 (02-15-22 @ 12:30) (74 - 80)  BP: 112/56 (02-15-22 @ 12:30) (112/56 - 112/59)  BP(mean): --  RR: 18 (02-15-22 @ 12:30) (18 - 18)  SpO2: 98% (02-15-22 @ 19:50) (98% - 100%)    Constitutional: No fever, fatigue  Skin: No rash.  Eyes: No recent vision problems or eye pain.  ENT: No congestion, ear pain, or sore throat.  Cardiovascular: No chest pain or palpation.  Respiratory: No cough, shortness of breath, congestion, or wheezing.  Gastrointestinal: No abdominal pain, nausea, vomiting, or diarrhea.  Genitourinary: No dysuria.  Musculoskeletal: No joint swelling.  Neurologic: No headache.    PHYSICAL EXAM:  GENERAL: NAD  EYES: EOMI, PERRLA  NECK: Supple, No JVD  CHEST/LUNG: dec breath sounds at bases  HEART:  S1 , S2 +  ABDOMEN: soft , bs+, abd wound +  EXTREMITIES:  edema+  NEUROLOGY:alert awake    LABS:  02-14    138  |  89<L>  |  57<H>  ----------------------------<  126<H>  4.7   |  22  |  6.92<H>    Ca    9.3      14 Feb 2022 11:51  Phos  4.7     02-14  Mg     2.40     02-14      Creatinine Trend: 6.92 <--, 4.57 <--, 5.16 <--, 4.46 <--, 7.18 <--                        7.4    13.10 )-----------( 380      ( 14 Feb 2022 11:51 )             26.4     Urine Studies:

## 2022-02-15 NOTE — PROGRESS NOTE ADULT - ASSESSMENT
56y Female with history of ESRD on HD presents with vomiting and diarrhea found to be COVID-19 positive. Nephrology consulted for ESRD status.    1) ESRD: Last HD on 2/14 tolerated well with 2L removed. Plan for next maintenance HD on 2/16. Monitor electrolytes.    2) HTN with ESRD: BP acceptable. Continue with current medications. Monitor BP.    3) Anemia of renal disease: Hb low with elevated ferritin. Continue with Epo 14K with HD. Monitor Hb.    4) Secondary HPT of renal origin: Phosphorus acceptable with low iPTH. Sensipar decreased to 60 mg PO daily. Continue with renvela 1 tab with meals (goal < 4.5 given concerns for calciphylaxis). Monitor serum calcium and phosphorus.    5) Ab wound: Appreciate dermatology follow up. Ddx includes calciphylaxis versus pyoderma gangrenosum. S/P biopsy. Continue with empiric sodium thiosulfate with HD. On CSA as per Derm as benefits outweigh risks to residual renal function (which is minimal at this point).      [facility] NEPHROLOGY  Keaton Lopez M.D.  Juma Green D.O.  Myla Hoffmann M.D.  Julia Brewer, MSN, ANP-C    Telephone: [phone]  Facsimile: [phone]    [address] [place] [address]

## 2022-02-15 NOTE — PROGRESS NOTE ADULT - SUBJECTIVE AND OBJECTIVE BOX
CARDIOLOGY FOLLOW UP - Dr. Bullock  Date of Service: 2/15/22  CC: denies cp, sob, and palpitations       Review of Systems:  Constitutional: No fever, weight loss, or fatigue  Respiratory: No cough, wheezing, or hemoptysis, no shortness of breath  Cardiovascular: No chest pain, palpitations, passing out, dizziness, or leg swelling  Gastrointestinal: No abd or epigastric pain.  No nausea, vomiting, or hematemesis; no diarrhea or constipation, no melena or hematochezia  Vascular: no edema       PHYSICAL EXAM:  T(C): 37 (02-15-22 @ 06:30), Max: 37 (02-15-22 @ 06:30)  HR: 75 (02-15-22 @ 06:30) (75 - 88)  BP: 112/59 (02-15-22 @ 06:30) (112/59 - 127/52)  RR: 18 (02-15-22 @ 06:30) (18 - 18)  SpO2: 100% (02-15-22 @ 06:30) (95% - 100%)  Wt(kg): --  I&O's Summary    14 Feb 2022 07:01  -  15 Feb 2022 07:00  --------------------------------------------------------  IN: 400 mL / OUT: 2400 mL / NET: -2000 mL        Appearance: Normal	  Cardiovascular: Normal S1 S2,RRR, No JVD, No murmurs  Respiratory: Lungs clear to auscultation	  Gastrointestinal:  Soft, Non-tender, + BS	  Extremities: Normal range of motion, No clubbing, cyanosis or edema      Home Medications:  aspirin 81 mg oral delayed release tablet: 1 tab(s) orally once a day (12 Jan 2022 17:49)  buPROPion 150 mg/24 hours (XL) oral tablet, extended release: 1 tab(s) orally once a day (in the morning) (12 Jan 2022 17:49)  cetirizine 10 mg oral tablet: 1 tab(s) orally once a day (12 Jan 2022 17:49)  dilTIAZem 120 mg/24 hours oral tablet, extended release: 1 tab(s) orally once a day (12 Jan 2022 17:49)  doxepin 25 mg oral capsule: 1 cap(s) orally once a day (at bedtime) (12 Jan 2022 17:49)  Eliquis 2.5 mg oral tablet: 1 tab(s) orally 2 times a day    Pharmacy states patient no longer wants to fill this medication (12 Jan 2022 17:49)  furosemide 80 mg oral tablet: 1 tab(s) orally once a day    Pharmacy states patient no longer wants to fill this medication (12 Jan 2022 17:49)  gabapentin 300 mg oral capsule: 1 cap(s) orally 2 times a day (12 Jan 2022 17:49)  hydrOXYzine hydrochloride 25 mg oral tablet: 1 tab(s) orally 2 times a day, As Needed (12 Jan 2022 17:49)  lanthanum 1000 mg oral tablet, chewable: 2 tab(s) orally with meals and 1 tab(s) orally with snacks    Pharmacy states patient no longer wants to fill this medication (12 Jan 2022 17:49)  mirtazapine 7.5 mg oral tablet: 1 tab(s) orally once a day (at bedtime) (12 Jan 2022 17:49)  montelukast 10 mg oral tablet: 1 tab(s) orally once a day (in the evening) (12 Jan 2022 17:49)  mupirocin 2% topical ointment: Apply sparingly to affected area 2 times a day as directed (12 Jan 2022 17:49)  nystatin 100,000 units/mL oral suspension: 5 milliliter(s) orally 4 times a day, as directed (12 Jan 2022 17:49)  omeprazole 20 mg oral delayed release capsule: 2 cap(s) orally once a day before breakfast (12 Jan 2022 17:49)  Pennsaid 2% topical solution: Apply topically to affected area 2 times a day (12 Jan 2022 17:49)  rosuvastatin 40 mg oral tablet: 1 tab(s) orally once a day (12 Jan 2022 17:49)  Santyl 250 units/g topical ointment: Apply topically to affected area once a day as directed (12 Jan 2022 17:49)  sertraline 50 mg oral tablet: 1 tab(s) orally once a day (12 Jan 2022 17:49)  Spiriva HandiHaler 18 mcg inhalation capsule: 1 cap(s) inhaled once a day (12 Jan 2022 17:49)  traZODone 100 mg oral tablet: 1 tab(s) orally once a day (at bedtime) (12 Jan 2022 17:49)  Tresiba FlexTouch 100 units/mL subcutaneous solution: 80 unit(s) subcutaneous once a day (at bedtime) (12 Jan 2022 17:49)  Trulicity Pen 1.5 mg/0.5 mL subcutaneous solution: 1 dose(s) subcutaneous once a week (12 Jan 2022 17:49)      MEDICATIONS  (STANDING):  apixaban 2.5 milliGRAM(s) Oral two times a day  aspirin enteric coated 81 milliGRAM(s) Oral daily  buPROPion XL (24-Hour) . 150 milliGRAM(s) Oral daily  chlorhexidine 2% Cloths 1 Application(s) Topical daily  cinacalcet 60 milliGRAM(s) Oral daily  cycloSPORINE  , modified (NEORAL) 125 milliGRAM(s) Oral every 12 hours  Dakins Solution - 1/4 Strength 1 Application(s) Topical daily  dextrose 40% Gel 15 Gram(s) Oral once  dextrose 5%. 1000 milliLiter(s) (50 mL/Hr) IV Continuous <Continuous>  dextrose 5%. 1000 milliLiter(s) (100 mL/Hr) IV Continuous <Continuous>  dextrose 50% Injectable 25 Gram(s) IV Push once  dextrose 50% Injectable 12.5 Gram(s) IV Push once  dextrose 50% Injectable 25 Gram(s) IV Push once  diltiazem    milliGRAM(s) Oral daily  diphenhydrAMINE 25 milliGRAM(s) Oral once  epoetin anabela-epbx (RETACRIT) Injectable 57401 Unit(s) IV Push <User Schedule>  gabapentin 300 milliGRAM(s) Oral daily  glucagon  Injectable 1 milliGRAM(s) IntraMuscular once  heparin   Injectable. 500 Unit(s) Dialysis. every 1 hour  insulin glargine Injectable (LANTUS) 16 Unit(s) SubCutaneous at bedtime  insulin lispro (ADMELOG) corrective regimen sliding scale   SubCutaneous three times a day before meals  insulin lispro (ADMELOG) corrective regimen sliding scale   SubCutaneous at bedtime  insulin lispro Injectable (ADMELOG) 10 Unit(s) SubCutaneous three times a day before meals  loratadine 10 milliGRAM(s) Oral daily  melatonin 6 milliGRAM(s) Oral at bedtime  mirtazapine 7.5 milliGRAM(s) Oral at bedtime  montelukast 10 milliGRAM(s) Oral at bedtime  oxyCODONE  ER Tablet 10 milliGRAM(s) Oral every 12 hours  pantoprazole    Tablet 40 milliGRAM(s) Oral before breakfast  polyethylene glycol 3350 17 Gram(s) Oral two times a day  senna 2 Tablet(s) Oral at bedtime  sertraline 50 milliGRAM(s) Oral daily  sevelamer carbonate 800 milliGRAM(s) Oral three times a day with meals  sodium thiosulfate IVPB 25 Gram(s) IV Intermittent <User Schedule>  tacrolimus   0.1% Ointment 1 Application(s) Topical daily  traZODone 100 milliGRAM(s) Oral at bedtime      TELEMETRY: 	    ECG:  	  RADIOLOGY:   DIAGNOSTIC TESTING:  [ ] Echocardiogram:  [ ]  Catheterization:  [ ] Stress Test:    OTHER: 	    LABS:	 	    Troponin T, High Sensitivity Result: 54 ng/L (02-14 @ 11:51)  Troponin T, High Sensitivity Result: 53 ng/L (02-11 @ 17:39)  Troponin T, High Sensitivity Result: 44 ng/L (02-08 @ 13:39)                          7.4    13.10 )-----------( 380      ( 14 Feb 2022 11:51 )             26.4     02-14    138  |  89<L>  |  57<H>  ----------------------------<  126<H>  4.7   |  22  |  6.92<H>    Ca    9.3      14 Feb 2022 11:51  Phos  4.7     02-14  Mg     2.40     02-14

## 2022-02-15 NOTE — PROGRESS NOTE ADULT - SUBJECTIVE AND OBJECTIVE BOX
CC: DM 2 uncontrolled with hyperglycemia     HPI: Glucose trending above 200 mg/dL at lunch time today.  Otherwise mostly stable.  Reports poor to fair appetite.  Does not like the food.  Denies n/v.  Denies s/s of hypoglycemia and hyperglycemia    MEDICATIONS  (STANDING):  apixaban 2.5 milliGRAM(s) Oral two times a day  aspirin enteric coated 81 milliGRAM(s) Oral daily  buPROPion XL (24-Hour) . 150 milliGRAM(s) Oral daily  chlorhexidine 2% Cloths 1 Application(s) Topical daily  cinacalcet 60 milliGRAM(s) Oral daily  cycloSPORINE  , modified (NEORAL) 125 milliGRAM(s) Oral every 12 hours  Dakins Solution - 1/4 Strength 1 Application(s) Topical daily  dextrose 40% Gel 15 Gram(s) Oral once  dextrose 5%. 1000 milliLiter(s) (50 mL/Hr) IV Continuous <Continuous>  dextrose 5%. 1000 milliLiter(s) (100 mL/Hr) IV Continuous <Continuous>  dextrose 50% Injectable 25 Gram(s) IV Push once  dextrose 50% Injectable 12.5 Gram(s) IV Push once  dextrose 50% Injectable 25 Gram(s) IV Push once  diltiazem    milliGRAM(s) Oral daily  epoetin anabela-epbx (RETACRIT) Injectable 89551 Unit(s) IV Push <User Schedule>  gabapentin 300 milliGRAM(s) Oral daily  glucagon  Injectable 1 milliGRAM(s) IntraMuscular once  heparin   Injectable. 500 Unit(s) Dialysis. every 1 hour  insulin glargine Injectable (LANTUS) 15 Unit(s) SubCutaneous at bedtime  insulin lispro (ADMELOG) corrective regimen sliding scale   SubCutaneous three times a day before meals  insulin lispro (ADMELOG) corrective regimen sliding scale   SubCutaneous at bedtime  insulin lispro Injectable (ADMELOG) 9 Unit(s) SubCutaneous three times a day before meals  loratadine 10 milliGRAM(s) Oral daily  melatonin 6 milliGRAM(s) Oral at bedtime  mirtazapine 7.5 milliGRAM(s) Oral at bedtime  montelukast 10 milliGRAM(s) Oral at bedtime  oxyCODONE  ER Tablet 10 milliGRAM(s) Oral every 12 hours  pantoprazole    Tablet 40 milliGRAM(s) Oral before breakfast  polyethylene glycol 3350 17 Gram(s) Oral two times a day  senna 2 Tablet(s) Oral at bedtime  sertraline 50 milliGRAM(s) Oral daily  sevelamer carbonate 800 milliGRAM(s) Oral three times a day with meals  sodium thiosulfate IVPB 25 Gram(s) IV Intermittent <User Schedule>  tacrolimus   0.1% Ointment 1 Application(s) Topical daily  traZODone 100 milliGRAM(s) Oral at bedtime        ALLERGIES  Latex (Rash)  Penicillin (Nausea)  Strawberry (Rash)    Intolerances  caffeine (Nausea)    REVIEW OF SYSTEMS:  General: No fevers  GI: denies N/V, see HPI    PHYSICAL EXAM:  Vital Signs Last 24 Hrs  T(C): 37 (15 Feb 2022 06:30), Max: 37 (15 Feb 2022 06:30)  T(F): 98.6 (15 Feb 2022 06:30), Max: 98.6 (15 Feb 2022 06:30)  HR: 75 (15 Feb 2022 06:30) (75 - 88)  BP: 112/59 (15 Feb 2022 06:30) (112/59 - 127/52)  BP(mean): --  RR: 18 (15 Feb 2022 06:30) (18 - 18)  SpO2: 100% (15 Feb 2022 06:30) (95% - 100%)  GENERAL: NAD  EYES: No proptosis, no lid lag, anicteric  HEENT:  Atraumatic, Normocephalic,  RESPIRATORY: Non labored    02-14    138  |  89<L>  |  57<H>  ----------------------------<  126<H>  4.7   |  22  |  6.92<H>    Ca    9.3      14 Feb 2022 11:51  Phos  4.7     02-14  Mg     2.40     02-14      CAPILLARY BLOOD GLUCOSE  POCT Blood Glucose.: 257 mg/dL (15 Feb 2022 12:04)  POCT Blood Glucose.: 189 mg/dL (15 Feb 2022 08:03)  POCT Blood Glucose.: 170 mg/dL (14 Feb 2022 21:23)  POCT Blood Glucose.: 162 mg/dL (14 Feb 2022 16:51)  POCT Blood Glucose.: 202 mg/dL (13 Feb 2022 11:58)  POCT Blood Glucose.: 252 mg/dL (13 Feb 2022 08:04)  POCT Blood Glucose.: 238 mg/dL (12 Feb 2022 21:35)  POCT Blood Glucose.: 149 mg/dL (12 Feb 2022 17:12)      A1C with Estimated Average Glucose Result: 7.0 % (01-13-22 @ 08:21)  A1C with Estimated Average Glucose Result: 6.7 % (08-31-21 @ 09:30)  A1C with Estimated Average Glucose Result: 7.2 % (04-28-21 @ 07:04)    Diet, Consistent Carbohydrate Renal w/Evening Snack:   Supplement Feeding Modality:  Oral  Nepro Cans or Servings Per Day:  1       Frequency:  Three Times a day (01-17-22 @ 18:07)

## 2022-02-16 NOTE — PROGRESS NOTE ADULT - SUBJECTIVE AND OBJECTIVE BOX
CARDIOLOGY FOLLOW UP - Dr. Bullock  Date of Service: 2/16/22  CC: no cp/sob     Review of Systems:  Constitutional: No fever, weight loss, or fatigue  Respiratory: No cough, wheezing, or hemoptysis, no shortness of breath  Cardiovascular: No chest pain, palpitations, passing out, dizziness, or leg swelling  Gastrointestinal: No abd or epigastric pain.  No nausea, vomiting, or hematemesis; no diarrhea or constipation, no melena or hematochezia  Vascular: no edema       PHYSICAL EXAM:  T(C): 37.1 (02-16-22 @ 11:20), Max: 37.1 (02-16-22 @ 11:20)  HR: 70 (02-16-22 @ 11:20) (67 - 96)  BP: 108/56 (02-16-22 @ 11:20) (108/56 - 121/89)  RR: 17 (02-16-22 @ 11:20) (17 - 18)  SpO2: 98% (02-16-22 @ 11:20) (96% - 100%)  Wt(kg): --  I&O's Summary      Appearance: Normal	  Cardiovascular: Normal S1 S2,RRR, No JVD, No murmurs  Respiratory: Lungs clear to auscultation	  Gastrointestinal:  Soft, Non-tender, + BS	  Extremities: Normal range of motion, No clubbing, cyanosis or edema      Home Medications:  aspirin 81 mg oral delayed release tablet: 1 tab(s) orally once a day (12 Jan 2022 17:49)  buPROPion 150 mg/24 hours (XL) oral tablet, extended release: 1 tab(s) orally once a day (in the morning) (12 Jan 2022 17:49)  cetirizine 10 mg oral tablet: 1 tab(s) orally once a day (12 Jan 2022 17:49)  dilTIAZem 120 mg/24 hours oral tablet, extended release: 1 tab(s) orally once a day (12 Jan 2022 17:49)  doxepin 25 mg oral capsule: 1 cap(s) orally once a day (at bedtime) (12 Jan 2022 17:49)  Eliquis 2.5 mg oral tablet: 1 tab(s) orally 2 times a day    Pharmacy states patient no longer wants to fill this medication (12 Jan 2022 17:49)  furosemide 80 mg oral tablet: 1 tab(s) orally once a day    Pharmacy states patient no longer wants to fill this medication (12 Jan 2022 17:49)  gabapentin 300 mg oral capsule: 1 cap(s) orally 2 times a day (12 Jan 2022 17:49)  hydrOXYzine hydrochloride 25 mg oral tablet: 1 tab(s) orally 2 times a day, As Needed (12 Jan 2022 17:49)  lanthanum 1000 mg oral tablet, chewable: 2 tab(s) orally with meals and 1 tab(s) orally with snacks    Pharmacy states patient no longer wants to fill this medication (12 Jan 2022 17:49)  mirtazapine 7.5 mg oral tablet: 1 tab(s) orally once a day (at bedtime) (12 Jan 2022 17:49)  montelukast 10 mg oral tablet: 1 tab(s) orally once a day (in the evening) (12 Jan 2022 17:49)  mupirocin 2% topical ointment: Apply sparingly to affected area 2 times a day as directed (12 Jan 2022 17:49)  nystatin 100,000 units/mL oral suspension: 5 milliliter(s) orally 4 times a day, as directed (12 Jan 2022 17:49)  omeprazole 20 mg oral delayed release capsule: 2 cap(s) orally once a day before breakfast (12 Jan 2022 17:49)  Pennsaid 2% topical solution: Apply topically to affected area 2 times a day (12 Jan 2022 17:49)  rosuvastatin 40 mg oral tablet: 1 tab(s) orally once a day (12 Jan 2022 17:49)  Santyl 250 units/g topical ointment: Apply topically to affected area once a day as directed (12 Jan 2022 17:49)  sertraline 50 mg oral tablet: 1 tab(s) orally once a day (12 Jan 2022 17:49)  Spiriva HandiHaler 18 mcg inhalation capsule: 1 cap(s) inhaled once a day (12 Jan 2022 17:49)  traZODone 100 mg oral tablet: 1 tab(s) orally once a day (at bedtime) (12 Jan 2022 17:49)  Tresiba FlexTouch 100 units/mL subcutaneous solution: 80 unit(s) subcutaneous once a day (at bedtime) (12 Jan 2022 17:49)  Trulicity Pen 1.5 mg/0.5 mL subcutaneous solution: 1 dose(s) subcutaneous once a week (12 Jan 2022 17:49)      MEDICATIONS  (STANDING):  apixaban 2.5 milliGRAM(s) Oral two times a day  aspirin enteric coated 81 milliGRAM(s) Oral daily  buPROPion XL (24-Hour) . 150 milliGRAM(s) Oral daily  chlorhexidine 2% Cloths 1 Application(s) Topical daily  cinacalcet 60 milliGRAM(s) Oral daily  cycloSPORINE  , modified (NEORAL) 125 milliGRAM(s) Oral every 12 hours  Dakins Solution - 1/4 Strength 1 Application(s) Topical daily  dextrose 40% Gel 15 Gram(s) Oral once  dextrose 5%. 1000 milliLiter(s) (50 mL/Hr) IV Continuous <Continuous>  dextrose 5%. 1000 milliLiter(s) (100 mL/Hr) IV Continuous <Continuous>  dextrose 50% Injectable 25 Gram(s) IV Push once  dextrose 50% Injectable 12.5 Gram(s) IV Push once  dextrose 50% Injectable 25 Gram(s) IV Push once  diltiazem    milliGRAM(s) Oral daily  diphenhydrAMINE 25 milliGRAM(s) Oral once  epoetin anabela-epbx (RETACRIT) Injectable 12575 Unit(s) IV Push <User Schedule>  gabapentin 300 milliGRAM(s) Oral daily  glucagon  Injectable 1 milliGRAM(s) IntraMuscular once  heparin   Injectable. 500 Unit(s) Dialysis. every 1 hour  insulin glargine Injectable (LANTUS) 16 Unit(s) SubCutaneous at bedtime  insulin lispro (ADMELOG) corrective regimen sliding scale   SubCutaneous three times a day before meals  insulin lispro (ADMELOG) corrective regimen sliding scale   SubCutaneous at bedtime  insulin lispro Injectable (ADMELOG) 10 Unit(s) SubCutaneous three times a day before meals  loratadine 10 milliGRAM(s) Oral daily  melatonin 6 milliGRAM(s) Oral at bedtime  mirtazapine 7.5 milliGRAM(s) Oral at bedtime  montelukast 10 milliGRAM(s) Oral at bedtime  oxyCODONE  ER Tablet 10 milliGRAM(s) Oral every 12 hours  pantoprazole    Tablet 40 milliGRAM(s) Oral before breakfast  polyethylene glycol 3350 17 Gram(s) Oral two times a day  senna 2 Tablet(s) Oral at bedtime  sertraline 50 milliGRAM(s) Oral daily  sevelamer carbonate 800 milliGRAM(s) Oral three times a day with meals  sodium thiosulfate IVPB 25 Gram(s) IV Intermittent <User Schedule>  tacrolimus   0.1% Ointment 1 Application(s) Topical daily  traZODone 100 milliGRAM(s) Oral at bedtime      TELEMETRY: 	    ECG:  	  RADIOLOGY:   DIAGNOSTIC TESTING:  [ ] Echocardiogram:  [ ]  Catheterization:  [ ] Stress Test:    OTHER: 	    LABS:	 	    Troponin T, High Sensitivity Result: 54 ng/L (02-14 @ 11:51)  Troponin T, High Sensitivity Result: 53 ng/L (02-11 @ 17:39)

## 2022-02-16 NOTE — PROGRESS NOTE ADULT - ASSESSMENT
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 Ulcer with extensive undermining - suggestive of pyoderma gangrenosum vs calciphylaxis. Both are challenging diagnoses with no definitive diagnostic tests. Biopsy and imaging non diagnostic. Favor Pyoderma Gangrenosum based on clinical appearance of deep ulceration with extensive undermining and worsening when trial off of steroids. IMPROVING  - Bacterial tissue culture 1/25/21 with carbapenem-resistent Pseudomonas, Staph epidermidis, Enterococcus faecalis; reportedly same as previous bacterial culture.   - s/p 10-day course of IV cefepime completed 1/21/22 and other previously other outpatient abx.   - Biopsy 1/25/22 findings non specific. No obvious intravascular calcium deposits although limited sample of subcutaneous tissue. Repeat von Kossa staining (for calcium), and deeper sections from original biopsy were reviewed with initial diagnosis unchanged.   -SPEP, serum DOUG, ANCAs negative.  -no calcifications noted on CT A/P 2/4   -quantiferon indeterminate (2/11/22), HIV negative    At this time:  - please obtain UPEP with immunofixation  - fu final fungal/AFB TC results; still pending  - c/w cyclosporine 125mg BID (~3 mg/kg based on dosing weight), continue to monitor blood pressure, electrolytes  - c/w sodium thiosulfate  - c/w pain management  - recommend palliative care consult to discuss symptomatic care for chronic painful wound, we have had multiple extensive discussions with the patient and her daughters on this topic, including 2/7, pt amenable  - c/w wound care: c/w topical tacrolimus 0.01% ointment 1-2x daily to wound edges and aquacel Ag dressing  - patient will need close f/u upon discharge    The patient's chart was reviewed in addition to photos of the rash taken by the primary team with the permission of the patient.  Patient was seen at bedside and discussed with the dermatology attending Dr. Song.  Please page [phone] for further related questions.    Alicia Queen MD  Resident Physician, PGY3  [facility]  Pager: [phone]  Office: [phone].   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 Ulcer with extensive undermining - suggestive of pyoderma gangrenosum vs calciphylaxis. Both are challenging diagnoses with no definitive diagnostic tests. Biopsy and imaging non diagnostic. Favor Pyoderma Gangrenosum based on clinical appearance of deep ulceration with extensive undermining and worsening when trial off of steroids. IMPROVING  - Bacterial tissue culture 1/25/21 with carbapenem-resistent Pseudomonas, Staph epidermidis, Enterococcus faecalis; reportedly same as previous bacterial culture.   - s/p 10-day course of IV cefepime completed 1/21/22 and other previously other outpatient abx.   - Biopsy 1/25/22 findings non specific. No obvious intravascular calcium deposits although limited sample of subcutaneous tissue. Repeat von Kossa staining (for calcium), and deeper sections from original biopsy were reviewed with initial diagnosis unchanged.   -SPEP, serum DOUG, ANCAs negative.  -no calcifications noted on CT A/P 2/4   -quantiferon indeterminate (2/11/22), HIV negative    At this time:  - please obtain UPEP with immunofixation  - fu final fungal/AFB TC results; still pending  - c/w cyclosporine 125mg BID (~3 mg/kg based on dosing weight), continue to monitor blood pressure, electrolytes  - c/w sodium thiosulfate  - c/w pain management  - recommend palliative care consult to discuss symptomatic care for chronic painful wound, we have had multiple extensive discussions with the patient and her daughters on this topic, including 2/7, pt amenable  - c/w wound care: c/w topical tacrolimus 0.01% ointment 1-2x daily to wound edges and aquacel Ag dressing  - patient will need close f/u upon discharge    The patient's chart was reviewed in addition to photos of the rash taken by wound care with the permission of the patient.  Patient was seen at bedside and discussed with the dermatology attending Dr. Song.  Please page [phone] for further related questions.    Alicia Queen MD  Resident Physician, PGY3  [facility]  Pager: [phone]  Office: [phone].
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
## 2022-02-16 NOTE — PROGRESS NOTE ADULT - SUBJECTIVE AND OBJECTIVE BOX
[place] NEPHROLOGY- PROGRESS NOTE    56y Female with history of ESRD on HD presents with vomiting and diarrhea found to be COVID-19 positive. Nephrology consulted for ESRD status.    REVIEW OF SYSTEMS:  Gen: no changes in weight  Cards: no chest pain  Resp: no dyspnea  GI: no nausea or vomiting or diarrhea + ab wound pain  Vascular: no LE edema    caffeine (Nausea)  latex (Rash)  penicillin (Nausea)  strawberry (Rash)      Hospital Medications: Medications reviewed      VITALS:  T(F): 98.7 (02-16-22 @ 11:20), Max: 98.7 (02-16-22 @ 11:20)  HR: 70 (02-16-22 @ 11:20)  BP: 108/56 (02-16-22 @ 11:20)  RR: 17 (02-16-22 @ 11:20)  SpO2: 98% (02-16-22 @ 11:20)  Wt(kg): --        PHYSICAL EXAM:    Gen: NAD, calm  Cards: RRR, +S1/S2, no M/G/R  Resp: CTA B/L  GI: soft, RLQ bandage/tender  Vascular: no LE edema B/L, LUE AVF + bruit/thrill      LABS:        Creatinine Trend: 6.92 <--, 4.57 <--, 5.16 <--, 4.46 <--, 7.18 <--                        7.4    13.60 )-----------( 407      ( 16 Feb 2022 13:18 )             25.4     Urine Studies:

## 2022-02-16 NOTE — PROGRESS NOTE ADULT - ASSESSMENT
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 Patient followed by derm as well, s/p punch biopsy by Derm. Derm biopsy non-specific findings, no obvious intravascular calcium deposits although limited sample of subcutaneous tissue. Ct- abdomen "There are no subcutaneous tissue calcifications. Specifically no subcutaneous tissue calcifications in the patient's abdominal wall pannus." Current working diagnosis of Pyoderma Gangrenosum although Calciphylaxis remains in differential. Patient remains on systemic IV steroid, on empiric sodium thiosulfate, Cyclosporin has been added. Of note attempted Irrigation VAC 1/26 - 1 /28, noted deterioration of wound with NPWt/VAc therapy, therapy d/c'd, likely pathergy. Will continue to avoid surgical debridement. Collagenase d/c'd to avoid enzymatic debridement as well.      Patient seen with Dermatology today.  +violaceous borders improving, undermining slightly deeper from 2.5cm --> 3cm. Depth and width of wound slightly greater. Depth expected to increase as necrotic tissue begins to lift. Mild odor remains stable.     Topical dressing: Cleanse with Dakins 1/4 strength, adaptic touch (non-adherent silicone contact layer) to wound base for atraumatic dressing application and removal. Topical Tacrolimus 0.1% to wound base, cover with Aquacel AG , and abdominal pad. Avoid tape to promote patient comfort.      -Topical dressing: Cleanse with Dakins 1/4 strength. Apply Liquid barrier film to periwound skin. Adaptic touch to wound base for atraumatic dressing application and removal. Apply Topical Tacrolimus 0.1% ointment to wound base, cover with Aquacel AG , and abdominal pad. Change daily.  -Interdry textile sheeting beneath abdominal pannus leaving 2" out at end to wick.  -Agree with systemic steroid; Solumedrol per Derm.  -Agree with Cyclosporin; nephrology not opposed, patient and daughter amenable.   -Glucose control per primary team/endocrinology  -Differ empiric Sodium Thiosulfate to primary team/derm/nephrology  -Continue to follow nutrition/RD recommendations   -Consider reconsult with pain management for better pain control  -Continue to offload pressure  -DVT prophylaxis    Findings and plan discussed with Dermatology team at bedside. Findings and plan discussed with primary team.    Upon discharge follow up at outpatient [facility]. [address] [place] [address]. [phone].    Will continue to follow while inpatient.  Thank you.    CARMELA William-BC, [facility]    pager #[phone]    If after 4PM or before 7:30AM on Mon-Friday or weekend/holiday please contact general surgery for urgent matters.   Team A- [id number]   Team B- [id number]  For non-urgent matters e-mail [email or web address]    I spent 35 minutes face-to-face with this patient of which more than 50% of the time was spent counseling/coordinating care of this patient.     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 Patient followed by derm as well, s/p punch biopsy by Derm. Derm biopsy non-specific findings, no obvious intravascular calcium deposits although limited sample of subcutaneous tissue. Ct- abdomen "There are no subcutaneous tissue calcifications. Specifically no subcutaneous tissue calcifications in the patient's abdominal wall pannus." Current working diagnosis of Pyoderma Gangrenosum although Calciphylaxis remains in differential. Patient remains on systemic IV steroid, on empiric sodium thiosulfate, Cyclosporin has been added. Of note attempted Irrigation VAC 1/26 - 1 /28, noted deterioration of wound with NPWt/VAc therapy, therapy d/c'd, likely pathergy. Will continue to avoid surgical debridement. Collagenase d/c'd to avoid enzymatic debridement as well.       wound appears improved continue tacrolimus on neccrotic areas  +violaceous borders improving, undermining slightly deeper from 2.5cm --> 3cm. Depth and width of wound slightly greater. Depth expected to increase as necrotic tissue begins to lift. Mild odor remains stable.     Topical dressing: Cleanse with Dakins 1/4 strength, adaptic touch (non-adherent silicone contact layer) to wound base for atraumatic dressing application and removal. Topical Tacrolimus 0.1% to wound base, cover with Aquacel AG , and abdominal pad. Avoid tape to promote patient comfort.      -Topical dressing: Cleanse with Dakins 1/4 strength. Apply Liquid barrier film to periwound skin. Adaptic touch to wound base for atraumatic dressing application and removal. Apply Topical Tacrolimus 0.1% ointment to wound base, cover with Aquacel AG , and abdominal pad. Change daily.  -Interdry textile sheeting beneath abdominal pannus leaving 2" out at end to wick.  -Agree with systemic steroid; Solumedrol per Derm.  -Agree with Cyclosporin; nephrology not opposed, patient and daughter amenable.   -Glucose control per primary team/endocrinology  -Differ empiric Sodium Thiosulfate to primary team/derm/nephrology  -Continue to follow nutrition/RD recommendations   -Consider reconsult with pain management for better pain control  -Continue to offload pressure  -DVT prophylaxis

## 2022-02-16 NOTE — PROGRESS NOTE ADULT - SUBJECTIVE AND OBJECTIVE BOX
INTERVAL HPI/OVERNIGHT EVENTS:    MEDICATIONS  (STANDING):  apixaban 2.5 milliGRAM(s) Oral two times a day  aspirin enteric coated 81 milliGRAM(s) Oral daily  buPROPion XL (24-Hour) . 150 milliGRAM(s) Oral daily  chlorhexidine 2% Cloths 1 Application(s) Topical daily  cinacalcet 60 milliGRAM(s) Oral daily  cycloSPORINE  , modified (NEORAL) 125 milliGRAM(s) Oral every 12 hours  Dakins Solution - 1/4 Strength 1 Application(s) Topical daily  dextrose 40% Gel 15 Gram(s) Oral once  dextrose 5%. 1000 milliLiter(s) (50 mL/Hr) IV Continuous <Continuous>  dextrose 5%. 1000 milliLiter(s) (100 mL/Hr) IV Continuous <Continuous>  dextrose 50% Injectable 25 Gram(s) IV Push once  dextrose 50% Injectable 12.5 Gram(s) IV Push once  dextrose 50% Injectable 25 Gram(s) IV Push once  diltiazem    milliGRAM(s) Oral daily  diphenhydrAMINE 25 milliGRAM(s) Oral once  epoetin anabela-epbx (RETACRIT) Injectable 08163 Unit(s) IV Push <User Schedule>  gabapentin 300 milliGRAM(s) Oral daily  glucagon  Injectable 1 milliGRAM(s) IntraMuscular once  heparin   Injectable. 500 Unit(s) Dialysis. every 1 hour  insulin glargine Injectable (LANTUS) 16 Unit(s) SubCutaneous at bedtime  insulin lispro (ADMELOG) corrective regimen sliding scale   SubCutaneous three times a day before meals  insulin lispro (ADMELOG) corrective regimen sliding scale   SubCutaneous at bedtime  insulin lispro Injectable (ADMELOG) 10 Unit(s) SubCutaneous three times a day before meals  loratadine 10 milliGRAM(s) Oral daily  melatonin 6 milliGRAM(s) Oral at bedtime  mirtazapine 7.5 milliGRAM(s) Oral at bedtime  montelukast 10 milliGRAM(s) Oral at bedtime  oxyCODONE  ER Tablet 10 milliGRAM(s) Oral every 12 hours  pantoprazole    Tablet 40 milliGRAM(s) Oral before breakfast  polyethylene glycol 3350 17 Gram(s) Oral two times a day  senna 2 Tablet(s) Oral at bedtime  sertraline 50 milliGRAM(s) Oral daily  sevelamer carbonate 800 milliGRAM(s) Oral three times a day with meals  sodium thiosulfate IVPB 25 Gram(s) IV Intermittent <User Schedule>  tacrolimus   0.1% Ointment 1 Application(s) Topical daily  traZODone 100 milliGRAM(s) Oral at bedtime    MEDICATIONS  (PRN):  acetaminophen     Tablet .. 650 milliGRAM(s) Oral every 8 hours PRN Mild Pain  diphenhydrAMINE Injectable 25 milliGRAM(s) IV Push <User Schedule> PRN Itching  oxyCODONE    IR 7.5 milliGRAM(s) Oral every 4 hours PRN Severe Pain (7 - 10)      Allergies    latex (Rash)  penicillin (Nausea)  strawberry (Rash)    Intolerances    caffeine (Nausea)      REVIEW OF SYSTEMS      General: no fevers/chills, no NS	    Skin: see HPI  	  Ophthalmologic: no eye pain or change in vision    Genitourinary: no dysuria or hematuria    Musculoskeletal: no joint pains or weakness	    Neurological:no weakness or tingling          Vital Signs Last 24 Hrs  T(C): 36.9 (16 Feb 2022 15:00), Max: 37.1 (16 Feb 2022 11:20)  T(F): 98.5 (16 Feb 2022 15:00), Max: 98.7 (16 Feb 2022 11:20)  HR: 68 (16 Feb 2022 15:00) (67 - 96)  BP: 127/68 (16 Feb 2022 15:00) (108/56 - 127/68)  BP(mean): --  RR: 17 (16 Feb 2022 15:00) (17 - 18)  SpO2: 98% (16 Feb 2022 15:00) (96% - 100%)    PHYSICAL EXAM:   The patient was alert and oriented X 3, well nourished, and in no  apparent distress.  There was no visible lymphadenopathy.  Conjunctiva were non injected  There was no clubbing or edema of extremities.  There was no hyperhidrosis or bromhidrosis.    Of note on skin exam:       LABS:                        7.4    13.60 )-----------( 407      ( 16 Feb 2022 13:18 )             25.4     02-16    135  |  86<L>  |  45<H>  ----------------------------<  112<H>  4.3   |  20<L>  |  6.00<H>    Ca    9.8      16 Feb 2022 13:18  Phos  3.8     02-16  Mg     2.50     02-16           INTERVAL HPI/OVERNIGHT EVENTS:  pt complaining of significant pain from her wound, but reports that at times her pain is less severe    MEDICATIONS  (STANDING):  apixaban 2.5 milliGRAM(s) Oral two times a day  aspirin enteric coated 81 milliGRAM(s) Oral daily  buPROPion XL (24-Hour) . 150 milliGRAM(s) Oral daily  chlorhexidine 2% Cloths 1 Application(s) Topical daily  cinacalcet 60 milliGRAM(s) Oral daily  cycloSPORINE  , modified (NEORAL) 125 milliGRAM(s) Oral every 12 hours  Dakins Solution - 1/4 Strength 1 Application(s) Topical daily  dextrose 40% Gel 15 Gram(s) Oral once  dextrose 5%. 1000 milliLiter(s) (50 mL/Hr) IV Continuous <Continuous>  dextrose 5%. 1000 milliLiter(s) (100 mL/Hr) IV Continuous <Continuous>  dextrose 50% Injectable 25 Gram(s) IV Push once  dextrose 50% Injectable 12.5 Gram(s) IV Push once  dextrose 50% Injectable 25 Gram(s) IV Push once  diltiazem    milliGRAM(s) Oral daily  diphenhydrAMINE 25 milliGRAM(s) Oral once  epoetin anabela-epbx (RETACRIT) Injectable 92170 Unit(s) IV Push <User Schedule>  gabapentin 300 milliGRAM(s) Oral daily  glucagon  Injectable 1 milliGRAM(s) IntraMuscular once  heparin   Injectable. 500 Unit(s) Dialysis. every 1 hour  insulin glargine Injectable (LANTUS) 16 Unit(s) SubCutaneous at bedtime  insulin lispro (ADMELOG) corrective regimen sliding scale   SubCutaneous three times a day before meals  insulin lispro (ADMELOG) corrective regimen sliding scale   SubCutaneous at bedtime  insulin lispro Injectable (ADMELOG) 10 Unit(s) SubCutaneous three times a day before meals  loratadine 10 milliGRAM(s) Oral daily  melatonin 6 milliGRAM(s) Oral at bedtime  mirtazapine 7.5 milliGRAM(s) Oral at bedtime  montelukast 10 milliGRAM(s) Oral at bedtime  oxyCODONE  ER Tablet 10 milliGRAM(s) Oral every 12 hours  pantoprazole    Tablet 40 milliGRAM(s) Oral before breakfast  polyethylene glycol 3350 17 Gram(s) Oral two times a day  senna 2 Tablet(s) Oral at bedtime  sertraline 50 milliGRAM(s) Oral daily  sevelamer carbonate 800 milliGRAM(s) Oral three times a day with meals  sodium thiosulfate IVPB 25 Gram(s) IV Intermittent <User Schedule>  tacrolimus   0.1% Ointment 1 Application(s) Topical daily  traZODone 100 milliGRAM(s) Oral at bedtime    MEDICATIONS  (PRN):  acetaminophen     Tablet .. 650 milliGRAM(s) Oral every 8 hours PRN Mild Pain  diphenhydrAMINE Injectable 25 milliGRAM(s) IV Push <User Schedule> PRN Itching  oxyCODONE    IR 7.5 milliGRAM(s) Oral every 4 hours PRN Severe Pain (7 - 10)      Allergies    latex (Rash)  penicillin (Nausea)  strawberry (Rash)    Intolerances    caffeine (Nausea)      REVIEW OF SYSTEMS      General: no fevers/chills, no NS	    Skin: see HPI  	  Ophthalmologic: no eye pain or change in vision    Genitourinary: no dysuria or hematuria    Musculoskeletal: no joint pains or weakness	    Neurological:no weakness or tingling          Vital Signs Last 24 Hrs  T(C): 36.9 (16 Feb 2022 15:00), Max: 37.1 (16 Feb 2022 11:20)  T(F): 98.5 (16 Feb 2022 15:00), Max: 98.7 (16 Feb 2022 11:20)  HR: 68 (16 Feb 2022 15:00) (67 - 96)  BP: 127/68 (16 Feb 2022 15:00) (108/56 - 127/68)  BP(mean): --  RR: 17 (16 Feb 2022 15:00) (17 - 18)  SpO2: 98% (16 Feb 2022 15:00) (96% - 100%)    PHYSICAL EXAM:   The patient was alert and oriented X 3, well nourished, and in no  apparent distress.  There was no visible lymphadenopathy.  Conjunctiva were non injected  There was no clubbing or edema of extremities.  There was no hyperhidrosis or bromhidrosis.    Of note on skin exam:     - right pannus with large round, deep ulcer with undermined borders   - borders hyperpigmented, not erythematous or violaceous  - areas of surrounding stellate purpura/necrosis have progressed to ulceration, NO new areas of purpura/necrosis  - superior right portion of wound and inferior aspect with more healthy appearing granulation tissue  surrounding areas of induration and firm subcutaneous nodules    LABS:                        7.4    13.60 )-----------( 407      ( 16 Feb 2022 13:18 )             25.4     02-16    135  |  86<L>  |  45<H>  ----------------------------<  112<H>  4.3   |  20<L>  |  6.00<H>    Ca    9.8      16 Feb 2022 13:18  Phos  3.8     02-16  Mg     2.50     02-16           INTERVAL HPI/OVERNIGHT EVENTS:  pt complaining of significant pain from her wound, but reports that at times her pain is less severe at times    MEDICATIONS  (STANDING):  apixaban 2.5 milliGRAM(s) Oral two times a day  aspirin enteric coated 81 milliGRAM(s) Oral daily  buPROPion XL (24-Hour) . 150 milliGRAM(s) Oral daily  chlorhexidine 2% Cloths 1 Application(s) Topical daily  cinacalcet 60 milliGRAM(s) Oral daily  cycloSPORINE  , modified (NEORAL) 125 milliGRAM(s) Oral every 12 hours  Dakins Solution - 1/4 Strength 1 Application(s) Topical daily  dextrose 40% Gel 15 Gram(s) Oral once  dextrose 5%. 1000 milliLiter(s) (50 mL/Hr) IV Continuous <Continuous>  dextrose 5%. 1000 milliLiter(s) (100 mL/Hr) IV Continuous <Continuous>  dextrose 50% Injectable 25 Gram(s) IV Push once  dextrose 50% Injectable 12.5 Gram(s) IV Push once  dextrose 50% Injectable 25 Gram(s) IV Push once  diltiazem    milliGRAM(s) Oral daily  diphenhydrAMINE 25 milliGRAM(s) Oral once  epoetin anabela-epbx (RETACRIT) Injectable 32582 Unit(s) IV Push <User Schedule>  gabapentin 300 milliGRAM(s) Oral daily  glucagon  Injectable 1 milliGRAM(s) IntraMuscular once  heparin   Injectable. 500 Unit(s) Dialysis. every 1 hour  insulin glargine Injectable (LANTUS) 16 Unit(s) SubCutaneous at bedtime  insulin lispro (ADMELOG) corrective regimen sliding scale   SubCutaneous three times a day before meals  insulin lispro (ADMELOG) corrective regimen sliding scale   SubCutaneous at bedtime  insulin lispro Injectable (ADMELOG) 10 Unit(s) SubCutaneous three times a day before meals  loratadine 10 milliGRAM(s) Oral daily  melatonin 6 milliGRAM(s) Oral at bedtime  mirtazapine 7.5 milliGRAM(s) Oral at bedtime  montelukast 10 milliGRAM(s) Oral at bedtime  oxyCODONE  ER Tablet 10 milliGRAM(s) Oral every 12 hours  pantoprazole    Tablet 40 milliGRAM(s) Oral before breakfast  polyethylene glycol 3350 17 Gram(s) Oral two times a day  senna 2 Tablet(s) Oral at bedtime  sertraline 50 milliGRAM(s) Oral daily  sevelamer carbonate 800 milliGRAM(s) Oral three times a day with meals  sodium thiosulfate IVPB 25 Gram(s) IV Intermittent <User Schedule>  tacrolimus   0.1% Ointment 1 Application(s) Topical daily  traZODone 100 milliGRAM(s) Oral at bedtime    MEDICATIONS  (PRN):  acetaminophen     Tablet .. 650 milliGRAM(s) Oral every 8 hours PRN Mild Pain  diphenhydrAMINE Injectable 25 milliGRAM(s) IV Push <User Schedule> PRN Itching  oxyCODONE    IR 7.5 milliGRAM(s) Oral every 4 hours PRN Severe Pain (7 - 10)      Allergies    latex (Rash)  penicillin (Nausea)  strawberry (Rash)    Intolerances    caffeine (Nausea)      REVIEW OF SYSTEMS      General: no fevers/chills, no NS	    Skin: see HPI  	  Ophthalmologic: no eye pain or change in vision    Genitourinary: no dysuria or hematuria    Musculoskeletal: no joint pains or weakness	    Neurological:no weakness or tingling          Vital Signs Last 24 Hrs  T(C): 36.9 (16 Feb 2022 15:00), Max: 37.1 (16 Feb 2022 11:20)  T(F): 98.5 (16 Feb 2022 15:00), Max: 98.7 (16 Feb 2022 11:20)  HR: 68 (16 Feb 2022 15:00) (67 - 96)  BP: 127/68 (16 Feb 2022 15:00) (108/56 - 127/68)  BP(mean): --  RR: 17 (16 Feb 2022 15:00) (17 - 18)  SpO2: 98% (16 Feb 2022 15:00) (96% - 100%)    PHYSICAL EXAM:   The patient was alert and oriented X 3, well nourished, and in no  apparent distress.  There was no visible lymphadenopathy.  Conjunctiva were non injected  There was no clubbing or edema of extremities.  There was no hyperhidrosis or bromhidrosis.    Of note on skin exam:     - right pannus with large round, deep ulcer with undermined borders   - borders hyperpigmented, not erythematous or violaceous  - areas of surrounding stellate purpura/necrosis have progressed to ulceration, NO new areas of purpura/necrosis  - superior right portion of wound and inferior aspect with more healthy appearing granulation tissue  surrounding areas of induration and firm subcutaneous nodules    LABS:                        7.4    13.60 )-----------( 407      ( 16 Feb 2022 13:18 )             25.4     02-16    135  |  86<L>  |  45<H>  ----------------------------<  112<H>  4.3   |  20<L>  |  6.00<H>    Ca    9.8      16 Feb 2022 13:18  Phos  3.8     02-16  Mg     2.50     02-16
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
## 2022-02-16 NOTE — PROGRESS NOTE ADULT - SUBJECTIVE AND OBJECTIVE BOX
SUBJECTIVE / OVERNIGHT EVENTS:pt seen and examined, c/o pain at the wound site  2-16-22    MEDICATIONS  (STANDING):  apixaban 2.5 milliGRAM(s) Oral two times a day  aspirin enteric coated 81 milliGRAM(s) Oral daily  buPROPion XL (24-Hour) . 150 milliGRAM(s) Oral daily  chlorhexidine 2% Cloths 1 Application(s) Topical daily  cinacalcet 60 milliGRAM(s) Oral daily  cycloSPORINE  , modified (NEORAL) 125 milliGRAM(s) Oral every 12 hours  Dakins Solution - 1/4 Strength 1 Application(s) Topical daily  dextrose 40% Gel 15 Gram(s) Oral once  dextrose 5%. 1000 milliLiter(s) (50 mL/Hr) IV Continuous <Continuous>  dextrose 5%. 1000 milliLiter(s) (100 mL/Hr) IV Continuous <Continuous>  dextrose 50% Injectable 25 Gram(s) IV Push once  dextrose 50% Injectable 12.5 Gram(s) IV Push once  dextrose 50% Injectable 25 Gram(s) IV Push once  diltiazem    milliGRAM(s) Oral daily  diphenhydrAMINE 25 milliGRAM(s) Oral once  epoetin anabela-epbx (RETACRIT) Injectable 11637 Unit(s) IV Push <User Schedule>  gabapentin 300 milliGRAM(s) Oral daily  glucagon  Injectable 1 milliGRAM(s) IntraMuscular once  heparin   Injectable. 500 Unit(s) Dialysis. every 1 hour  insulin glargine Injectable (LANTUS) 16 Unit(s) SubCutaneous at bedtime  insulin lispro (ADMELOG) corrective regimen sliding scale   SubCutaneous three times a day before meals  insulin lispro (ADMELOG) corrective regimen sliding scale   SubCutaneous at bedtime  insulin lispro Injectable (ADMELOG) 10 Unit(s) SubCutaneous three times a day before meals  loratadine 10 milliGRAM(s) Oral daily  melatonin 6 milliGRAM(s) Oral at bedtime  mirtazapine 7.5 milliGRAM(s) Oral at bedtime  montelukast 10 milliGRAM(s) Oral at bedtime  oxyCODONE  ER Tablet 10 milliGRAM(s) Oral every 12 hours  pantoprazole    Tablet 40 milliGRAM(s) Oral before breakfast  polyethylene glycol 3350 17 Gram(s) Oral two times a day  senna 2 Tablet(s) Oral at bedtime  sertraline 50 milliGRAM(s) Oral daily  sevelamer carbonate 800 milliGRAM(s) Oral three times a day with meals  sodium thiosulfate IVPB 25 Gram(s) IV Intermittent <User Schedule>  tacrolimus   0.1% Ointment 1 Application(s) Topical daily  traZODone 100 milliGRAM(s) Oral at bedtime    MEDICATIONS  (PRN):  acetaminophen     Tablet .. 650 milliGRAM(s) Oral every 8 hours PRN Mild Pain  aluminum hydroxide/magnesium hydroxide/simethicone Suspension 30 milliLiter(s) Oral every 4 hours PRN Dyspepsia  diphenhydrAMINE Injectable 25 milliGRAM(s) IV Push <User Schedule> PRN Itching  oxyCODONE    IR 7.5 milliGRAM(s) Oral every 4 hours PRN Severe Pain (7 - 10)    Vital Signs Last 24 Hrs  T(C): 37.2 (02-16-22 @ 21:08), Max: 37.2 (02-16-22 @ 21:08)  T(F): 98.9 (02-16-22 @ 21:08), Max: 98.9 (02-16-22 @ 21:08)  HR: 80 (02-16-22 @ 21:08) (67 - 96)  BP: 118/54 (02-16-22 @ 21:08) (108/56 - 127/68)  BP(mean): --  RR: 17 (02-16-22 @ 21:08) (17 - 18)  SpO2: 98% (02-16-22 @ 21:08) (95% - 100%)    Constitutional: No fever, fatigue  Skin: No rash.  Eyes: No recent vision problems or eye pain.  ENT: No congestion, ear pain, or sore throat.  Cardiovascular: No chest pain or palpation.  Respiratory: No cough, shortness of breath, congestion, or wheezing.  Gastrointestinal: No abdominal pain, nausea, vomiting, or diarrhea.  Genitourinary: No dysuria.  Musculoskeletal: No joint swelling.  Neurologic: No headache.    PHYSICAL EXAM:  GENERAL: NAD  EYES: EOMI, PERRLA  NECK: Supple, No JVD  CHEST/LUNG: dec breath sounds at bases  HEART:  S1 , S2 +  ABDOMEN: soft , bs+, abd wound +  EXTREMITIES:  edema+  NEUROLOGY:alert awake    LABS:  02-16    135  |  86<L>  |  45<H>  ----------------------------<  112<H>  4.3   |  20<L>  |  6.00<H>    Ca    9.8      16 Feb 2022 13:18  Phos  3.8     02-16  Mg     2.50     02-16      Creatinine Trend: 6.00 <--, 6.92 <--, 4.57 <--, 5.16 <--, 4.46 <--                        7.4    13.60 )-----------( 407      ( 16 Feb 2022 13:18 )             25.4     Urine Studies:

## 2022-02-17 NOTE — PROGRESS NOTE ADULT - SUBJECTIVE AND OBJECTIVE BOX
SUBJECTIVE / OVERNIGHT EVENTS:pt seen and examined, c/o pain at the wound site  2-17-22    MEDICATIONS  (STANDING):  apixaban 2.5 milliGRAM(s) Oral two times a day  aspirin enteric coated 81 milliGRAM(s) Oral daily  buPROPion XL (24-Hour) . 150 milliGRAM(s) Oral daily  chlorhexidine 2% Cloths 1 Application(s) Topical daily  cinacalcet 60 milliGRAM(s) Oral daily  cycloSPORINE  , modified (NEORAL) 125 milliGRAM(s) Oral every 12 hours  Dakins Solution - 1/4 Strength 1 Application(s) Topical daily  dextrose 40% Gel 15 Gram(s) Oral once  dextrose 5%. 1000 milliLiter(s) (50 mL/Hr) IV Continuous <Continuous>  dextrose 5%. 1000 milliLiter(s) (100 mL/Hr) IV Continuous <Continuous>  dextrose 50% Injectable 25 Gram(s) IV Push once  dextrose 50% Injectable 12.5 Gram(s) IV Push once  dextrose 50% Injectable 25 Gram(s) IV Push once  diltiazem    milliGRAM(s) Oral daily  diphenhydrAMINE 25 milliGRAM(s) Oral once  epoetin anabela-epbx (RETACRIT) Injectable 58736 Unit(s) IV Push <User Schedule>  gabapentin 300 milliGRAM(s) Oral daily  glucagon  Injectable 1 milliGRAM(s) IntraMuscular once  heparin   Injectable. 500 Unit(s) Dialysis. every 1 hour  insulin glargine Injectable (LANTUS) 16 Unit(s) SubCutaneous at bedtime  insulin lispro (ADMELOG) corrective regimen sliding scale   SubCutaneous three times a day before meals  insulin lispro (ADMELOG) corrective regimen sliding scale   SubCutaneous at bedtime  insulin lispro Injectable (ADMELOG) 10 Unit(s) SubCutaneous three times a day before meals  loratadine 10 milliGRAM(s) Oral daily  melatonin 6 milliGRAM(s) Oral at bedtime  mirtazapine 7.5 milliGRAM(s) Oral at bedtime  montelukast 10 milliGRAM(s) Oral at bedtime  oxyCODONE  ER Tablet 10 milliGRAM(s) Oral every 12 hours  pantoprazole    Tablet 40 milliGRAM(s) Oral before breakfast  polyethylene glycol 3350 17 Gram(s) Oral two times a day  senna 2 Tablet(s) Oral at bedtime  sertraline 50 milliGRAM(s) Oral daily  sevelamer carbonate 800 milliGRAM(s) Oral three times a day with meals  sodium thiosulfate IVPB 25 Gram(s) IV Intermittent <User Schedule>  tacrolimus   0.1% Ointment 1 Application(s) Topical daily  traZODone 100 milliGRAM(s) Oral at bedtime    MEDICATIONS  (PRN):  acetaminophen     Tablet .. 650 milliGRAM(s) Oral every 8 hours PRN Mild Pain  diphenhydrAMINE Injectable 25 milliGRAM(s) IV Push <User Schedule> PRN Itching  oxyCODONE    IR 7.5 milliGRAM(s) Oral every 4 hours PRN Severe Pain (7 - 10)    Vital Signs Last 24 Hrs  T(C): 36.8 (02-17-22 @ 21:12), Max: 36.8 (02-17-22 @ 21:12)  T(F): 98.3 (02-17-22 @ 21:12), Max: 98.3 (02-17-22 @ 21:12)  HR: 74 (02-17-22 @ 21:12) (72 - 78)  BP: 111/45 (02-17-22 @ 21:12) (111/45 - 118/56)  BP(mean): --  RR: 17 (02-17-22 @ 21:12) (17 - 17)  SpO2: 97% (02-17-22 @ 21:12) (97% - 98%)      Constitutional: No fever, fatigue  Skin: No rash.  Eyes: No recent vision problems or eye pain.  ENT: No congestion, ear pain, or sore throat.  Cardiovascular: No chest pain or palpation.  Respiratory: No cough, shortness of breath, congestion, or wheezing.  Gastrointestinal: No abdominal pain, nausea, vomiting, or diarrhea.  Genitourinary: No dysuria.  Musculoskeletal: No joint swelling.  Neurologic: No headache.    PHYSICAL EXAM:  GENERAL: NAD  EYES: EOMI, PERRLA  NECK: Supple, No JVD  CHEST/LUNG: dec breath sounds at bases  HEART:  S1 , S2 +  ABDOMEN: soft , bs+, abd wound +  EXTREMITIES:  edema+  NEUROLOGY:alert awake    LABS:  02-16    135  |  86<L>  |  45<H>  ----------------------------<  112<H>  4.3   |  20<L>  |  6.00<H>    Ca    9.8      16 Feb 2022 13:18  Phos  3.8     02-16  Mg     2.50     02-16      Creatinine Trend: 6.00 <--, 6.92 <--, 4.57 <--, 5.16 <--                        7.4    13.60 )-----------( 407      ( 16 Feb 2022 13:18 )             25.4     Urine Studies:                                      7.4    13.60 )-----------( 407      ( 16 Feb 2022 13:18 )             25.4     Urine Studies:

## 2022-02-17 NOTE — PROGRESS NOTE ADULT - SUBJECTIVE AND OBJECTIVE BOX
CC: DM 2 uncontrolled with hyperglycemia     HPI: denies n/v.  ate hard boiled egg and a piece of toast for breakfast.  denies eating lunch.  No n/v.  no hypoglycemia    MEDICATIONS  (STANDING):  apixaban 2.5 milliGRAM(s) Oral two times a day  aspirin enteric coated 81 milliGRAM(s) Oral daily  buPROPion XL (24-Hour) . 150 milliGRAM(s) Oral daily  chlorhexidine 2% Cloths 1 Application(s) Topical daily  cinacalcet 60 milliGRAM(s) Oral daily  cycloSPORINE  , modified (NEORAL) 125 milliGRAM(s) Oral every 12 hours  Dakins Solution - 1/4 Strength 1 Application(s) Topical daily  dextrose 40% Gel 15 Gram(s) Oral once  dextrose 5%. 1000 milliLiter(s) (50 mL/Hr) IV Continuous <Continuous>  dextrose 5%. 1000 milliLiter(s) (100 mL/Hr) IV Continuous <Continuous>  dextrose 50% Injectable 25 Gram(s) IV Push once  dextrose 50% Injectable 12.5 Gram(s) IV Push once  dextrose 50% Injectable 25 Gram(s) IV Push once  diltiazem    milliGRAM(s) Oral daily  epoetin anabela-epbx (RETACRIT) Injectable 19057 Unit(s) IV Push <User Schedule>  gabapentin 300 milliGRAM(s) Oral daily  glucagon  Injectable 1 milliGRAM(s) IntraMuscular once  heparin   Injectable. 500 Unit(s) Dialysis. every 1 hour  insulin glargine Injectable (LANTUS) 15 Unit(s) SubCutaneous at bedtime  insulin lispro (ADMELOG) corrective regimen sliding scale   SubCutaneous three times a day before meals  insulin lispro (ADMELOG) corrective regimen sliding scale   SubCutaneous at bedtime  insulin lispro Injectable (ADMELOG) 9 Unit(s) SubCutaneous three times a day before meals  loratadine 10 milliGRAM(s) Oral daily  melatonin 6 milliGRAM(s) Oral at bedtime  mirtazapine 7.5 milliGRAM(s) Oral at bedtime  montelukast 10 milliGRAM(s) Oral at bedtime  oxyCODONE  ER Tablet 10 milliGRAM(s) Oral every 12 hours  pantoprazole    Tablet 40 milliGRAM(s) Oral before breakfast  polyethylene glycol 3350 17 Gram(s) Oral two times a day  senna 2 Tablet(s) Oral at bedtime  sertraline 50 milliGRAM(s) Oral daily  sevelamer carbonate 800 milliGRAM(s) Oral three times a day with meals  sodium thiosulfate IVPB 25 Gram(s) IV Intermittent <User Schedule>  tacrolimus   0.1% Ointment 1 Application(s) Topical daily  traZODone 100 milliGRAM(s) Oral at bedtime        ALLERGIES  Latex (Rash)  Penicillin (Nausea)  Strawberry (Rash)    Intolerances  caffeine (Nausea)    REVIEW OF SYSTEMS:  General: No fevers  GI: denies N/V, see HPI    PHYSICAL EXAM:  Vital Signs Last 24 Hrs  T(C): 36.6 (17 Feb 2022 05:15), Max: 37.2 (16 Feb 2022 21:08)  T(F): 97.8 (17 Feb 2022 05:15), Max: 98.9 (16 Feb 2022 21:08)  HR: 72 (17 Feb 2022 05:15) (72 - 80)  BP: 113/57 (17 Feb 2022 05:15) (113/57 - 118/54)  BP(mean): --  RR: 17 (17 Feb 2022 05:15) (17 - 17)  SpO2: 97% (17 Feb 2022 05:15) (95% - 98%)  GENERAL: NAD  EYES: No proptosis, no lid lag, anicteric  HEENT:  Atraumatic, Normocephalic,  RESPIRATORY: Non labored    02-16    135  |  86<L>  |  45<H>  ----------------------------<  112<H>  4.3   |  20<L>  |  6.00<H>    Ca    9.8      16 Feb 2022 13:18  Phos  3.8     02-16  Mg     2.50     02-16      CAPILLARY BLOOD GLUCOSE    POCT Blood Glucose.: 165 mg/dL (17 Feb 2022 12:00)  POCT Blood Glucose.: 155 mg/dL (17 Feb 2022 08:08)  POCT Blood Glucose.: 91 mg/dL (16 Feb 2022 21:01)  POCT Blood Glucose.: 141 mg/dL (16 Feb 2022 17:11)  POCT Blood Glucose.: 257 mg/dL (15 Feb 2022 12:04)  POCT Blood Glucose.: 189 mg/dL (15 Feb 2022 08:03)  POCT Blood Glucose.: 170 mg/dL (14 Feb 2022 21:23)  POCT Blood Glucose.: 162 mg/dL (14 Feb 2022 16:51)  POCT Blood Glucose.: 202 mg/dL (13 Feb 2022 11:58)  POCT Blood Glucose.: 252 mg/dL (13 Feb 2022 08:04)  POCT Blood Glucose.: 238 mg/dL (12 Feb 2022 21:35)  POCT Blood Glucose.: 149 mg/dL (12 Feb 2022 17:12)      A1C with Estimated Average Glucose Result: 7.0 % (01-13-22 @ 08:21)  A1C with Estimated Average Glucose Result: 6.7 % (08-31-21 @ 09:30)  A1C with Estimated Average Glucose Result: 7.2 % (04-28-21 @ 07:04)    Diet, Consistent Carbohydrate Renal w/Evening Snack:   Supplement Feeding Modality:  Oral  Nepro Cans or Servings Per Day:  1       Frequency:  Three Times a day (01-17-22 @ 18:07)

## 2022-02-17 NOTE — PROGRESS NOTE ADULT - SUBJECTIVE AND OBJECTIVE BOX
[facility] NEPHROLOGY- PROGRESS NOTE    56y Female with history of ESRD on HD presents with vomiting and diarrhea found to be COVID-19 positive. Nephrology consulted for ESRD status.    REVIEW OF SYSTEMS:  Gen: no changes in weight  Cards: no chest pain  Resp: no dyspnea  GI: no nausea or vomiting or diarrhea + ab wound pain  Vascular: no LE edema    caffeine (Nausea)  latex (Rash)  penicillin (Nausea)  strawberry (Rash)      Hospital Medications: Medications reviewed      VITALS:  T(F): 97.8 (02-17-22 @ 05:15), Max: 98.9 (02-16-22 @ 21:08)  HR: 72 (02-17-22 @ 05:15)  BP: 113/57 (02-17-22 @ 05:15)  RR: 17 (02-17-22 @ 05:15)  SpO2: 97% (02-17-22 @ 05:15)  Wt(kg): --    02-16 @ 07:01  -  02-17 @ 07:00  --------------------------------------------------------  IN: 400 mL / OUT: 2400 mL / NET: -2000 mL        PHYSICAL EXAM:    Gen: NAD, calm  Cards: RRR, +S1/S2, no M/G/R  Resp: CTA B/L  GI: soft, RLQ bandage/tender  Vascular: no LE edema B/L, LUE AVF + bruit/thrill      LABS:  02-16    135  |  86<L>  |  45<H>  ----------------------------<  112<H>  4.3   |  20<L>  |  6.00<H>    Ca    9.8      16 Feb 2022 13:18  Phos  3.8     02-16  Mg     2.50     02-16      Creatinine Trend: 6.00 <--, 6.92 <--, 4.57 <--, 5.16 <--                        7.4    13.60 )-----------( 407      ( 16 Feb 2022 13:18 )             25.4     Urine Studies:

## 2022-02-17 NOTE — PROGRESS NOTE ADULT - SUBJECTIVE AND OBJECTIVE BOX
CARDIOLOGY FOLLOW UP - Dr. Bullock  Date of Service: 2/17/22  CC: denies cp, sob, and palpitations  c/o abd wound pain     Review of Systems:  Constitutional: No fever, weight loss, or fatigue  Respiratory: No cough, wheezing, or hemoptysis, no shortness of breath  Cardiovascular: No chest pain, palpitations, passing out, dizziness, or leg swelling  Gastrointestinal: No abd or epigastric pain.  No nausea, vomiting, or hematemesis; no diarrhea or constipation, no melena or hematochezia  Vascular: no edema       PHYSICAL EXAM:  T(C): 36.6 (02-17-22 @ 05:15), Max: 37.2 (02-16-22 @ 21:08)  HR: 72 (02-17-22 @ 05:15) (68 - 80)  BP: 113/57 (02-17-22 @ 05:15) (113/57 - 127/68)  RR: 17 (02-17-22 @ 05:15) (17 - 17)  SpO2: 97% (02-17-22 @ 05:15) (95% - 98%)  Wt(kg): --  I&O's Summary    16 Feb 2022 07:01  -  17 Feb 2022 07:00  --------------------------------------------------------  IN: 400 mL / OUT: 2400 mL / NET: -2000 mL        Appearance: Normal	  Cardiovascular: Normal S1 S2,RRR, No JVD, No murmurs  Respiratory: Lungs clear to auscultation	  Gastrointestinal:  Soft, Non-tender, + BS	  Extremities: Normal range of motion, No clubbing, cyanosis or edema      Home Medications:  aspirin 81 mg oral delayed release tablet: 1 tab(s) orally once a day (12 Jan 2022 17:49)  buPROPion 150 mg/24 hours (XL) oral tablet, extended release: 1 tab(s) orally once a day (in the morning) (12 Jan 2022 17:49)  cetirizine 10 mg oral tablet: 1 tab(s) orally once a day (12 Jan 2022 17:49)  dilTIAZem 120 mg/24 hours oral tablet, extended release: 1 tab(s) orally once a day (12 Jan 2022 17:49)  doxepin 25 mg oral capsule: 1 cap(s) orally once a day (at bedtime) (12 Jan 2022 17:49)  Eliquis 2.5 mg oral tablet: 1 tab(s) orally 2 times a day    Pharmacy states patient no longer wants to fill this medication (12 Jan 2022 17:49)  furosemide 80 mg oral tablet: 1 tab(s) orally once a day    Pharmacy states patient no longer wants to fill this medication (12 Jan 2022 17:49)  gabapentin 300 mg oral capsule: 1 cap(s) orally 2 times a day (12 Jan 2022 17:49)  hydrOXYzine hydrochloride 25 mg oral tablet: 1 tab(s) orally 2 times a day, As Needed (12 Jan 2022 17:49)  lanthanum 1000 mg oral tablet, chewable: 2 tab(s) orally with meals and 1 tab(s) orally with snacks    Pharmacy states patient no longer wants to fill this medication (12 Jan 2022 17:49)  mirtazapine 7.5 mg oral tablet: 1 tab(s) orally once a day (at bedtime) (12 Jan 2022 17:49)  montelukast 10 mg oral tablet: 1 tab(s) orally once a day (in the evening) (12 Jan 2022 17:49)  mupirocin 2% topical ointment: Apply sparingly to affected area 2 times a day as directed (12 Jan 2022 17:49)  nystatin 100,000 units/mL oral suspension: 5 milliliter(s) orally 4 times a day, as directed (12 Jan 2022 17:49)  omeprazole 20 mg oral delayed release capsule: 2 cap(s) orally once a day before breakfast (12 Jan 2022 17:49)  Pennsaid 2% topical solution: Apply topically to affected area 2 times a day (12 Jan 2022 17:49)  rosuvastatin 40 mg oral tablet: 1 tab(s) orally once a day (12 Jan 2022 17:49)  Santyl 250 units/g topical ointment: Apply topically to affected area once a day as directed (12 Jan 2022 17:49)  sertraline 50 mg oral tablet: 1 tab(s) orally once a day (12 Jan 2022 17:49)  Spiriva HandiHaler 18 mcg inhalation capsule: 1 cap(s) inhaled once a day (12 Jan 2022 17:49)  traZODone 100 mg oral tablet: 1 tab(s) orally once a day (at bedtime) (12 Jan 2022 17:49)  Tresiba FlexTouch 100 units/mL subcutaneous solution: 80 unit(s) subcutaneous once a day (at bedtime) (12 Jan 2022 17:49)  Trulicity Pen 1.5 mg/0.5 mL subcutaneous solution: 1 dose(s) subcutaneous once a week (12 Jan 2022 17:49)      MEDICATIONS  (STANDING):  apixaban 2.5 milliGRAM(s) Oral two times a day  aspirin enteric coated 81 milliGRAM(s) Oral daily  buPROPion XL (24-Hour) . 150 milliGRAM(s) Oral daily  chlorhexidine 2% Cloths 1 Application(s) Topical daily  cinacalcet 60 milliGRAM(s) Oral daily  cycloSPORINE  , modified (NEORAL) 125 milliGRAM(s) Oral every 12 hours  Dakins Solution - 1/4 Strength 1 Application(s) Topical daily  dextrose 40% Gel 15 Gram(s) Oral once  dextrose 5%. 1000 milliLiter(s) (50 mL/Hr) IV Continuous <Continuous>  dextrose 5%. 1000 milliLiter(s) (100 mL/Hr) IV Continuous <Continuous>  dextrose 50% Injectable 25 Gram(s) IV Push once  dextrose 50% Injectable 12.5 Gram(s) IV Push once  dextrose 50% Injectable 25 Gram(s) IV Push once  diltiazem    milliGRAM(s) Oral daily  diphenhydrAMINE 25 milliGRAM(s) Oral once  epoetin anabela-epbx (RETACRIT) Injectable 47699 Unit(s) IV Push <User Schedule>  gabapentin 300 milliGRAM(s) Oral daily  glucagon  Injectable 1 milliGRAM(s) IntraMuscular once  heparin   Injectable. 500 Unit(s) Dialysis. every 1 hour  insulin glargine Injectable (LANTUS) 16 Unit(s) SubCutaneous at bedtime  insulin lispro (ADMELOG) corrective regimen sliding scale   SubCutaneous three times a day before meals  insulin lispro (ADMELOG) corrective regimen sliding scale   SubCutaneous at bedtime  insulin lispro Injectable (ADMELOG) 10 Unit(s) SubCutaneous three times a day before meals  loratadine 10 milliGRAM(s) Oral daily  melatonin 6 milliGRAM(s) Oral at bedtime  mirtazapine 7.5 milliGRAM(s) Oral at bedtime  montelukast 10 milliGRAM(s) Oral at bedtime  oxyCODONE  ER Tablet 10 milliGRAM(s) Oral every 12 hours  pantoprazole    Tablet 40 milliGRAM(s) Oral before breakfast  polyethylene glycol 3350 17 Gram(s) Oral two times a day  senna 2 Tablet(s) Oral at bedtime  sertraline 50 milliGRAM(s) Oral daily  sevelamer carbonate 800 milliGRAM(s) Oral three times a day with meals  sodium thiosulfate IVPB 25 Gram(s) IV Intermittent <User Schedule>  tacrolimus   0.1% Ointment 1 Application(s) Topical daily  traZODone 100 milliGRAM(s) Oral at bedtime      TELEMETRY: 	    ECG:  	  RADIOLOGY:   DIAGNOSTIC TESTING:  [ ] Echocardiogram:  [ ]  Catheterization:  [ ] Stress Test:    OTHER: 	    LABS:	 	    Troponin T, High Sensitivity Result: 54 ng/L (02-14 @ 11:51)  Troponin T, High Sensitivity Result: 53 ng/L (02-11 @ 17:39)                          7.4    13.60 )-----------( 407      ( 16 Feb 2022 13:18 )             25.4     02-16    135  |  86<L>  |  45<H>  ----------------------------<  112<H>  4.3   |  20<L>  |  6.00<H>    Ca    9.8      16 Feb 2022 13:18  Phos  3.8     02-16  Mg     2.50     02-16

## 2022-02-17 NOTE — PROGRESS NOTE ADULT - ASSESSMENT
a/p  56 year old female with hx of morbid obesity, CHAMP not on home O2, ESRD (HD MWF), HTN, DM, COPD, Afib no longer on AC, chronic R pannus wound, followed by Dr. Layne, sent by visiting RN for worsening wound.    #Chronic Pannus Wound  -s/p IV abx per primary team  -cycloSPORINE started - statin on hold   -med/derm/wound care  f/u     #Afib  -stable, in NSR   -cont eliquis   -cont cardizem    #Hypertension  -stable   -cont current meds    #ESRD on HD  -HD per renal    # Near Syncope  -tele no events   -orthostatics neg  -echo w grossly nl lv sys fxn    #Covid-19+  -resolved   -med f/u     #Elevated troponin  -demand ischemia in setting of esrd  -echo as above  -no cp/sob

## 2022-02-17 NOTE — PROGRESS NOTE ADULT - ASSESSMENT
56 yr old F with morbid obesity, CHAMP not on home O2, ESRD (HD MWF), HTN, DM2 A1C 7.0, COPD, Afib no longer on AC, chronic R pannus wound here with worsening wound, diarrhea.  Pt exhibits persistent steroid induced hyperglycemia despite increased insulin regimen.         1. Type 2 diabetes mellitus uncontrolled  A1c 7.0% (may be inaccurate in setting of ESRD)  Home Regimen: Tresiba 80 units HS and Trulicity 1.5mg subq weekly  While inpatient:  BG target 100-180 mg/dL  Trending above goal   Will continue Lantus 16 units SQ qHS - glucose close to goal this AM  Will continue Admelog 10 units SQ TID before meals (Hold if NPO/not eating meal)    Continue Admelog correctional scale as LOW before meals and bedtime   Check BG before meals and bedtime  Hypoglycemia protocol   Consistent carbohydrate diet    Discharge Plan:  STOP Trulicity (patient ESRD on HD)  Steroids have been dc'd,    likely dc plan is basal/oral regimen. For oral agent, depends on inpatient requirements but can consider renally dosed DPP4 (Januvia 25 mg or Tradjenta 5 mg daily) and Prandin 1 mg po tid before meals- hold if skips meal  Patient was on Tresiba at home may benefit from a different LA insulin such as Lantus or Levemir given ESRD.  Tresiba is ultra-LA insulin  Please assess insulin coverage for Levemir or Lantus, instead of Tresiba pens  Consider CGM (such as Freestyle Libre2 outpatient)  If desiring to followup with [facility] Endocr[facility]ology: [address] [place] [address], [address], [facility] [address], [phone]    2. HTN  Management per primary team     3. Hyperlipidemia  Can continue home dose of Atorvastatin 80 mg  Note, limited benefit in ESRD. Followup lipid panel as outpatient      Tamela Ruiz  Nurse Practitioner  Division of Endocrinology & Diabetes  Pager # [phone]      If after 6PM or before 9AM, or on weekends/holidays, please call endocrine answering service for assistance ([phone]).  For nonurgent matters email [email or web address] for assistance.
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
## 2022-02-17 NOTE — PROGRESS NOTE ADULT - ASSESSMENT
56y Female with history of ESRD on HD presents with vomiting and diarrhea found to be COVID-19 positive. Nephrology consulted for ESRD status.    1) ESRD: Last HD on 2/16 tolerated well with 2L removed. Plan for next maintenance HD on 2/18. Monitor electrolytes.    2) HTN with ESRD: BP low normal. Cardizem as per cardiology. Monitor BP.    3) Anemia of renal disease: Hb low with elevated ferritin. Continue with Epo 14K with HD. Transfuse prn. Monitor Hb.    4) Secondary HPT of renal origin: Phosphorus acceptable. Continue with sensipar 60 mg daily, renvela 1 tab with meals (goal < 4.5 given concerns for calciphylaxis). Monitor serum calcium and phosphorus.    5) Ab wound: Appreciate dermatology follow up. Ddx includes calciphylaxis versus pyoderma gangrenosum. S/P biopsy. Continue with empiric sodium thiosulfate with HD. On CSA as per Derm as benefits outweigh risks to residual renal function (which is minimal at this point).      [facility] NEPHROLOGY  Keaton Lopez M.D.  Juma Green D.O.  Myla Hoffmann M.D.  Julia Brewer, MSN, ANP-C    Telephone: [phone]  Facsimile: [phone]    [address] [place] [address] 56y Female with history of ESRD on HD presents with vomiting and diarrhea found to be COVID-19 positive. Nephrology consulted for ESRD status.    1) ESRD: Last HD on 2/16 tolerated well with 2L removed. Plan for next maintenance HD on 2/18. Monitor electrolytes.    2) HTN with ESRD: BP low normal. Cardizem as per cardiology. Monitor BP.    3) Anemia of renal disease: Hb low with elevated ferritin. Continue with Epo 14K with HD. Transfuse prn. Monitor Hb.    4) Secondary HPT of renal origin: Phosphorus acceptable. Continue with sensipar 60 mg daily, renvela 1 tab with meals (goal < 4.5 given concerns for calciphylaxis). Check iPTH given high normal serum calcium and will use low calcium bath with HD. Monitor serum calcium and phosphorus.    5) Ab wound: Appreciate dermatology follow up. Ddx includes calciphylaxis versus pyoderma gangrenosum. S/P biopsy. Continue with empiric sodium thiosulfate with HD. On CSA as per Derm as benefits outweigh risks to residual renal function (which is minimal at this point).      [facility] NEPHROLOGY  Keaton Lopez M.D.  Juma Green D.O.  Myla Hoffmann M.D.  Julia Brewer, MSN, ANP-C    Telephone: [phone]  Facsimile: [phone]    [address] [place] [address]

## 2022-02-18 NOTE — PROGRESS NOTE ADULT - SUBJECTIVE AND OBJECTIVE BOX
[facility] NEPHROLOGY- PROGRESS NOTE    56y Female with history of ESRD on HD presents with vomiting and diarrhea found to be COVID-19 positive. Nephrology consulted for ESRD status.    REVIEW OF SYSTEMS:  Gen: no changes in weight  Cards: no chest pain  Resp: no dyspnea  GI: no nausea or vomiting or diarrhea + ab wound pain  Vascular: no LE edema    caffeine (Nausea)  latex (Rash)  penicillin (Nausea)  strawberry (Rash)      Hospital Medications: Medications reviewed      VITALS:  T(F): 98.7 (02-18-22 @ 10:30), Max: 98.7 (02-18-22 @ 06:30)  HR: 75 (02-18-22 @ 10:30)  BP: 93/45 (02-18-22 @ 10:30)  RR: 18 (02-18-22 @ 10:30)  SpO2: 98% (02-18-22 @ 10:05)  Wt(kg): --    02-17 @ 07:01  -  02-18 @ 07:00  --------------------------------------------------------  IN: 540 mL / OUT: 0 mL / NET: 540 mL      PHYSICAL EXAM:    Gen: NAD, calm  Cards: RRR, +S1/S2, no M/G/R  Resp: CTA B/L  GI: soft, RLQ bandage/tender  Vascular: no LE edema B/L, LUE AVF + bruit/thrill      LABS:    Phos  4.0     02-18      Creatinine Trend: 6.00 <--, 6.92 <--, 4.57 <--, 5.16 <--                        7.2    12.61 )-----------( 451      ( 18 Feb 2022 12:21 )             26.4     Urine Studies:
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
## 2022-02-18 NOTE — PROGRESS NOTE ADULT - ASSESSMENT
56 yr old F with morbid obesity, CHAMP not on home O2, ESRD (HD MWF), HTN, DM2 A1C 7.0, COPD, Afib no longer on AC, chronic R pannus wound here with worsening wound, diarrhea.  Pt exhibits persistent steroid induced hyperglycemia despite increased insulin regimen.         1. Type 2 diabetes mellitus uncontrolled  A1c 7.0% (may be inaccurate in setting of ESRD)  Home Regimen: Tresiba 80 units HS and Trulicity 1.5mg subq weekly  While inpatient:  BG target 100-180 mg/dL  Trending at goal   Will continue Lantus 16 units SQ qHS - glucose close to goal this AM  Will continue Admelog 10 units SQ TID before meals (Hold if NPO/not eating meal)    Continue Admelog correctional scale as LOW before meals and bedtime   Check BG before meals and bedtime  Hypoglycemia protocol   Consistent carbohydrate diet    Discharge Plan:  STOP Trulicity (patient ESRD on HD)  Steroids have been dc'd  Likely dc plan is basal/oral regimen. For oral agent, depends on inpatient requirements but can consider renally dosed DPP4 (Januvia 25 mg or Tradjenta 5 mg daily) and Prandin 1 mg po tid before meals- hold if skips meal  Patient was on Tresiba at home may benefit from a different Long acting insulin such as Lantus or Levemir given ESRD.  Tresiba is ultra-Long acting insulin  Please assess insulin coverage for Levemir or Lantus, instead of Tresiba pens  Consider CGM (such as Freestyle Libre2 outpatient)  If desiring to followup with [facility] Endocrinology: [address] [place] [address], [address], [place] [address], [phone]    2. HTN  Management per primary team     3. Hyperlipidemia  Can continue home dose of Atorvastatin 80 mg  Note, limited benefit in ESRD. Followup lipid panel as outpatient    PIERRE Cade-BC  Nurse Practitioner   Division of Endocrinology  Pager: KARMA pager [phone]    If out of hospital/unavailable when paged, please note: patient will be cared for by another provider on the endocrine service.  Please call the endocrine answering service for assistance to reach covering provider ([phone]). For non-urgent matters, please email [email or web address] for assistance.

## 2022-02-18 NOTE — PROGRESS NOTE ADULT - SUBJECTIVE AND OBJECTIVE BOX
CC: DM 2 uncontrolled with hyperglycemia     HPI: Denies complaints, no hypoglycemia.     MEDICATIONS  (STANDING):  apixaban 2.5 milliGRAM(s) Oral two times a day  aspirin enteric coated 81 milliGRAM(s) Oral daily  buPROPion XL (24-Hour) . 150 milliGRAM(s) Oral daily  chlorhexidine 2% Cloths 1 Application(s) Topical daily  cinacalcet 60 milliGRAM(s) Oral daily  cycloSPORINE  , modified (NEORAL) 125 milliGRAM(s) Oral every 12 hours  Dakins Solution - 1/4 Strength 1 Application(s) Topical daily  dextrose 40% Gel 15 Gram(s) Oral once  dextrose 5%. 1000 milliLiter(s) (50 mL/Hr) IV Continuous <Continuous>  dextrose 5%. 1000 milliLiter(s) (100 mL/Hr) IV Continuous <Continuous>  dextrose 50% Injectable 25 Gram(s) IV Push once  dextrose 50% Injectable 12.5 Gram(s) IV Push once  dextrose 50% Injectable 25 Gram(s) IV Push once  diltiazem    milliGRAM(s) Oral daily  diphenhydrAMINE 25 milliGRAM(s) Oral once  epoetin anabela-epbx (RETACRIT) Injectable 53541 Unit(s) IV Push <User Schedule>  gabapentin 300 milliGRAM(s) Oral daily  glucagon  Injectable 1 milliGRAM(s) IntraMuscular once  heparin   Injectable. 500 Unit(s) Dialysis. every 1 hour  insulin glargine Injectable (LANTUS) 16 Unit(s) SubCutaneous at bedtime  insulin lispro (ADMELOG) corrective regimen sliding scale   SubCutaneous three times a day before meals  insulin lispro (ADMELOG) corrective regimen sliding scale   SubCutaneous at bedtime  insulin lispro Injectable (ADMELOG) 10 Unit(s) SubCutaneous three times a day before meals  loratadine 10 milliGRAM(s) Oral daily  melatonin 6 milliGRAM(s) Oral at bedtime  mirtazapine 7.5 milliGRAM(s) Oral at bedtime  montelukast 10 milliGRAM(s) Oral at bedtime  oxyCODONE  ER Tablet 10 milliGRAM(s) Oral every 12 hours  pantoprazole    Tablet 40 milliGRAM(s) Oral before breakfast  polyethylene glycol 3350 17 Gram(s) Oral two times a day  senna 2 Tablet(s) Oral at bedtime  sertraline 50 milliGRAM(s) Oral daily  sevelamer carbonate 800 milliGRAM(s) Oral three times a day with meals  sodium thiosulfate IVPB 25 Gram(s) IV Intermittent <User Schedule>  tacrolimus   0.1% Ointment 1 Application(s) Topical daily  traZODone 100 milliGRAM(s) Oral at bedtime    ALLERGIES  Latex (Rash)  Penicillin (Nausea)  Strawberry (Rash)    Intolerances  caffeine (Nausea)    REVIEW OF SYSTEMS:  General: No fevers  GI: denies N/V, see HPI    PHYSICAL EXAM:  Vital Signs Last 24 Hrs  T(C): 37.4 (18 Feb 2022 15:20), Max: 37.4 (18 Feb 2022 15:20)  T(F): 99.3 (18 Feb 2022 15:20), Max: 99.3 (18 Feb 2022 15:20)  HR: 88 (18 Feb 2022 15:20) (73 - 88)  BP: 118/40 (18 Feb 2022 15:20) (93/45 - 118/40)  BP(mean): --  RR: 22 (18 Feb 2022 15:20) (17 - 22)  SpO2: 100% (18 Feb 2022 15:20) (97% - 100%)  GENERAL: NAD  EYES: No proptosis, no lid lag, anicteric  HEENT:  Atraumatic, Normocephalic  RESPIRATORY: Non labored respirations     CAPILLARY BLOOD GLUCOSE      POCT Blood Glucose.: 113 mg/dL (18 Feb 2022 15:09)  POCT Blood Glucose.: 124 mg/dL (18 Feb 2022 14:08)  POCT Blood Glucose.: 139 mg/dL (18 Feb 2022 12:37)  POCT Blood Glucose.: 148 mg/dL (18 Feb 2022 09:55)  POCT Blood Glucose.: 128 mg/dL (18 Feb 2022 07:51)  POCT Blood Glucose.: 120 mg/dL (17 Feb 2022 21:00)  POCT Blood Glucose.: 171 mg/dL (17 Feb 2022 17:06)      A1C with Estimated Average Glucose Result: 7.0 % (01-13-22 @ 08:21)  A1C with Estimated Average Glucose Result: 6.7 % (08-31-21 @ 09:30)  A1C with Estimated Average Glucose Result: 7.2 % (04-28-21 @ 07:04)    Diet, Consistent Carbohydrate Renal w/Evening Snack:   Supplement Feeding Modality:  Oral  Nepro Cans or Servings Per Day:  1       Frequency:  Three Times a day (02-07-22 @ 12:23) [Active]

## 2022-02-18 NOTE — PROGRESS NOTE ADULT - ASSESSMENT
56y Female with history of ESRD on HD presents with vomiting and diarrhea found to be COVID-19 positive. Nephrology consulted for ESRD status.    1) ESRD: Last HD on 2/16 tolerated well with 2L removed. Plan for next maintenance HD today. Monitor electrolytes.    2) HTN with ESRD: BP low normal. Cardizem as per cardiology. Monitor BP.    3) Anemia of renal disease: Hb low with elevated ferritin. Continue with Epo 14K with HD. Transfuse as per primary team. Monitor Hb.    4) Secondary HPT of renal origin: Phosphorus acceptable. iPTH low normal however sensipar already decreased to 60 mg PO daily. Continue with renvela 1 tab with meals (goal < 4.5 given concerns for calciphylaxis). Low calcium bath with HD. Monitor serum calcium and phosphorus.    5) Ab wound: Appreciate dermatology follow up. Ddx includes calciphylaxis versus pyoderma gangrenosum. S/P biopsy. Continue with empiric sodium thiosulfate with HD. On CSA as per Derm as benefits outweigh risks to residual renal function (which is minimal at this point).      [facility] NEPHROLOGY  Keaton Lopez M.D.  Juma Green D.O.  Myla Hoffmann M.D.  Julia Brewer, MSN, ANP-C    Telephone: [phone]  Facsimile: [phone]    [address] [place] [address]

## 2022-02-18 NOTE — PROGRESS NOTE ADULT - SUBJECTIVE AND OBJECTIVE BOX
SUBJECTIVE / OVERNIGHT EVENTS:pt seen and examined, c/o pain at the wound site  2-18-22    MEDICATIONS  (STANDING):  apixaban 2.5 milliGRAM(s) Oral two times a day  aspirin enteric coated 81 milliGRAM(s) Oral daily  buPROPion XL (24-Hour) . 150 milliGRAM(s) Oral daily  chlorhexidine 2% Cloths 1 Application(s) Topical daily  cinacalcet 60 milliGRAM(s) Oral daily  cycloSPORINE  , modified (NEORAL) 125 milliGRAM(s) Oral every 12 hours  Dakins Solution - 1/4 Strength 1 Application(s) Topical daily  dextrose 40% Gel 15 Gram(s) Oral once  dextrose 5%. 1000 milliLiter(s) (50 mL/Hr) IV Continuous <Continuous>  dextrose 5%. 1000 milliLiter(s) (100 mL/Hr) IV Continuous <Continuous>  dextrose 50% Injectable 25 Gram(s) IV Push once  dextrose 50% Injectable 12.5 Gram(s) IV Push once  dextrose 50% Injectable 25 Gram(s) IV Push once  diltiazem    milliGRAM(s) Oral daily  diphenhydrAMINE 25 milliGRAM(s) Oral once  epoetin anabela-epbx (RETACRIT) Injectable 85626 Unit(s) IV Push <User Schedule>  gabapentin 300 milliGRAM(s) Oral daily  glucagon  Injectable 1 milliGRAM(s) IntraMuscular once  heparin   Injectable. 500 Unit(s) Dialysis. every 1 hour  insulin glargine Injectable (LANTUS) 16 Unit(s) SubCutaneous at bedtime  insulin lispro (ADMELOG) corrective regimen sliding scale   SubCutaneous three times a day before meals  insulin lispro (ADMELOG) corrective regimen sliding scale   SubCutaneous at bedtime  insulin lispro Injectable (ADMELOG) 10 Unit(s) SubCutaneous three times a day before meals  loratadine 10 milliGRAM(s) Oral daily  melatonin 6 milliGRAM(s) Oral at bedtime  mirtazapine 7.5 milliGRAM(s) Oral at bedtime  montelukast 10 milliGRAM(s) Oral at bedtime  oxyCODONE  ER Tablet 10 milliGRAM(s) Oral every 12 hours  pantoprazole    Tablet 40 milliGRAM(s) Oral before breakfast  polyethylene glycol 3350 17 Gram(s) Oral two times a day  senna 2 Tablet(s) Oral at bedtime  sertraline 50 milliGRAM(s) Oral daily  sevelamer carbonate 800 milliGRAM(s) Oral three times a day with meals  sodium thiosulfate IVPB 25 Gram(s) IV Intermittent <User Schedule>  tacrolimus   0.1% Ointment 1 Application(s) Topical daily  traZODone 100 milliGRAM(s) Oral at bedtime    MEDICATIONS  (PRN):  acetaminophen     Tablet .. 650 milliGRAM(s) Oral every 8 hours PRN Mild Pain  diphenhydrAMINE Injectable 25 milliGRAM(s) IV Push <User Schedule> PRN Itching  oxyCODONE    IR 7.5 milliGRAM(s) Oral every 4 hours PRN Severe Pain (7 - 10)    Vital Signs Last 24 Hrs  T(C): 37 (02-18-22 @ 21:27), Max: 37.4 (02-18-22 @ 15:20)  T(F): 98.6 (02-18-22 @ 21:27), Max: 99.3 (02-18-22 @ 15:20)  HR: 83 (02-18-22 @ 22:00) (73 - 88)  BP: 102/46 (02-18-22 @ 21:27) (93/45 - 118/40)  BP(mean): --  RR: 20 (02-18-22 @ 21:27) (17 - 22)  SpO2: 100% (02-18-22 @ 22:00) (98% - 100%)        Constitutional: No fever, fatigue  Skin: No rash.  Eyes: No recent vision problems or eye pain.  ENT: No congestion, ear pain, or sore throat.  Cardiovascular: No chest pain or palpation.  Respiratory: No cough, shortness of breath, congestion, or wheezing.  Gastrointestinal: No abdominal pain, nausea, vomiting, or diarrhea.  Genitourinary: No dysuria.  Musculoskeletal: No joint swelling.  Neurologic: No headache.    PHYSICAL EXAM:  GENERAL: NAD  EYES: EOMI, PERRLA  NECK: Supple, No JVD  CHEST/LUNG: dec breath sounds at bases  HEART:  S1 , S2 +  ABDOMEN: soft , bs+, abd wound +  EXTREMITIES:  edema+  NEUROLOGY:alert awake    LABS:    Phos  4.0     02-18      Creatinine Trend: 6.00 <--, 6.92 <--, 4.57 <--                        7.2    12.61 )-----------( 451      ( 18 Feb 2022 12:21 )             26.4     Urine Studies:                  Urine Studies:

## 2022-02-18 NOTE — PROGRESS NOTE ADULT - SUBJECTIVE AND OBJECTIVE BOX
CARDIOLOGY FOLLOW UP - Dr. Bullock  Date of Service: 2/18/22  CC: denies cp, sob, and palpitations     Review of Systems:  Constitutional: No fever, weight loss, or fatigue  Respiratory: No cough, wheezing, or hemoptysis, no shortness of breath  Cardiovascular: No chest pain, palpitations, passing out, dizziness, or leg swelling  Gastrointestinal: No abd or epigastric pain.  No nausea, vomiting, or hematemesis; no diarrhea or constipation, no melena or hematochezia  Vascular: no edema       PHYSICAL EXAM:  T(C): 37 (02-18-22 @ 10:05), Max: 37.1 (02-18-22 @ 06:30)  HR: 73 (02-18-22 @ 10:05) (73 - 78)  BP: 116/56 (02-18-22 @ 10:05) (108/53 - 118/56)  RR: 20 (02-18-22 @ 10:05) (17 - 20)  SpO2: 98% (02-18-22 @ 10:05) (97% - 100%)  Wt(kg): --  I&O's Summary    17 Feb 2022 07:01  -  18 Feb 2022 07:00  --------------------------------------------------------  IN: 540 mL / OUT: 0 mL / NET: 540 mL        Appearance: Normal	  Cardiovascular: Normal S1 S2,RRR, No JVD, No murmurs  Respiratory: Lungs clear to auscultation	  Gastrointestinal:  Soft, Non-tender, + BS	  Extremities: Normal range of motion, No clubbing, cyanosis or edema      Home Medications:  aspirin 81 mg oral delayed release tablet: 1 tab(s) orally once a day (12 Jan 2022 17:49)  buPROPion 150 mg/24 hours (XL) oral tablet, extended release: 1 tab(s) orally once a day (in the morning) (12 Jan 2022 17:49)  cetirizine 10 mg oral tablet: 1 tab(s) orally once a day (12 Jan 2022 17:49)  dilTIAZem 120 mg/24 hours oral tablet, extended release: 1 tab(s) orally once a day (12 Jan 2022 17:49)  doxepin 25 mg oral capsule: 1 cap(s) orally once a day (at bedtime) (12 Jan 2022 17:49)  Eliquis 2.5 mg oral tablet: 1 tab(s) orally 2 times a day    Pharmacy states patient no longer wants to fill this medication (12 Jan 2022 17:49)  furosemide 80 mg oral tablet: 1 tab(s) orally once a day    Pharmacy states patient no longer wants to fill this medication (12 Jan 2022 17:49)  gabapentin 300 mg oral capsule: 1 cap(s) orally 2 times a day (12 Jan 2022 17:49)  hydrOXYzine hydrochloride 25 mg oral tablet: 1 tab(s) orally 2 times a day, As Needed (12 Jan 2022 17:49)  lanthanum 1000 mg oral tablet, chewable: 2 tab(s) orally with meals and 1 tab(s) orally with snacks    Pharmacy states patient no longer wants to fill this medication (12 Jan 2022 17:49)  mirtazapine 7.5 mg oral tablet: 1 tab(s) orally once a day (at bedtime) (12 Jan 2022 17:49)  montelukast 10 mg oral tablet: 1 tab(s) orally once a day (in the evening) (12 Jan 2022 17:49)  mupirocin 2% topical ointment: Apply sparingly to affected area 2 times a day as directed (12 Jan 2022 17:49)  nystatin 100,000 units/mL oral suspension: 5 milliliter(s) orally 4 times a day, as directed (12 Jan 2022 17:49)  omeprazole 20 mg oral delayed release capsule: 2 cap(s) orally once a day before breakfast (12 Jan 2022 17:49)  Pennsaid 2% topical solution: Apply topically to affected area 2 times a day (12 Jan 2022 17:49)  rosuvastatin 40 mg oral tablet: 1 tab(s) orally once a day (12 Jan 2022 17:49)  Santyl 250 units/g topical ointment: Apply topically to affected area once a day as directed (12 Jan 2022 17:49)  sertraline 50 mg oral tablet: 1 tab(s) orally once a day (12 Jan 2022 17:49)  Spiriva HandiHaler 18 mcg inhalation capsule: 1 cap(s) inhaled once a day (12 Jan 2022 17:49)  traZODone 100 mg oral tablet: 1 tab(s) orally once a day (at bedtime) (12 Jan 2022 17:49)  Tresiba FlexTouch 100 units/mL subcutaneous solution: 80 unit(s) subcutaneous once a day (at bedtime) (12 Jan 2022 17:49)  Trulicity Pen 1.5 mg/0.5 mL subcutaneous solution: 1 dose(s) subcutaneous once a week (12 Jan 2022 17:49)      MEDICATIONS  (STANDING):  apixaban 2.5 milliGRAM(s) Oral two times a day  aspirin enteric coated 81 milliGRAM(s) Oral daily  buPROPion XL (24-Hour) . 150 milliGRAM(s) Oral daily  chlorhexidine 2% Cloths 1 Application(s) Topical daily  cinacalcet 60 milliGRAM(s) Oral daily  cycloSPORINE  , modified (NEORAL) 125 milliGRAM(s) Oral every 12 hours  Dakins Solution - 1/4 Strength 1 Application(s) Topical daily  dextrose 40% Gel 15 Gram(s) Oral once  dextrose 5%. 1000 milliLiter(s) (50 mL/Hr) IV Continuous <Continuous>  dextrose 5%. 1000 milliLiter(s) (100 mL/Hr) IV Continuous <Continuous>  dextrose 50% Injectable 25 Gram(s) IV Push once  dextrose 50% Injectable 12.5 Gram(s) IV Push once  dextrose 50% Injectable 25 Gram(s) IV Push once  diltiazem    milliGRAM(s) Oral daily  diphenhydrAMINE 25 milliGRAM(s) Oral once  epoetin anabela-epbx (RETACRIT) Injectable 30858 Unit(s) IV Push <User Schedule>  gabapentin 300 milliGRAM(s) Oral daily  glucagon  Injectable 1 milliGRAM(s) IntraMuscular once  heparin   Injectable. 500 Unit(s) Dialysis. every 1 hour  insulin glargine Injectable (LANTUS) 16 Unit(s) SubCutaneous at bedtime  insulin lispro (ADMELOG) corrective regimen sliding scale   SubCutaneous three times a day before meals  insulin lispro (ADMELOG) corrective regimen sliding scale   SubCutaneous at bedtime  insulin lispro Injectable (ADMELOG) 10 Unit(s) SubCutaneous three times a day before meals  loratadine 10 milliGRAM(s) Oral daily  melatonin 6 milliGRAM(s) Oral at bedtime  mirtazapine 7.5 milliGRAM(s) Oral at bedtime  montelukast 10 milliGRAM(s) Oral at bedtime  oxyCODONE  ER Tablet 10 milliGRAM(s) Oral every 12 hours  pantoprazole    Tablet 40 milliGRAM(s) Oral before breakfast  polyethylene glycol 3350 17 Gram(s) Oral two times a day  senna 2 Tablet(s) Oral at bedtime  sertraline 50 milliGRAM(s) Oral daily  sevelamer carbonate 800 milliGRAM(s) Oral three times a day with meals  sodium thiosulfate IVPB 25 Gram(s) IV Intermittent <User Schedule>  tacrolimus   0.1% Ointment 1 Application(s) Topical daily  traZODone 100 milliGRAM(s) Oral at bedtime      TELEMETRY: 	    ECG:  	  RADIOLOGY:   DIAGNOSTIC TESTING:  [ ] Echocardiogram:  [ ]  Catheterization:  [ ] Stress Test:    OTHER: 	    LABS:	 	    Troponin T, High Sensitivity Result: 54 ng/L (02-14 @ 11:51)  Troponin T, High Sensitivity Result: 53 ng/L (02-11 @ 17:39)                          7.4    13.60 )-----------( 407      ( 16 Feb 2022 13:18 )             25.4     02-16    135  |  86<L>  |  45<H>  ----------------------------<  112<H>  4.3   |  20<L>  |  6.00<H>    Ca    9.8      16 Feb 2022 13:18  Phos  3.8     02-16  Mg     2.50     02-16

## 2022-02-19 NOTE — PROGRESS NOTE ADULT - SUBJECTIVE AND OBJECTIVE BOX
[facility] NEPHROLOGY- PROGRESS NOTE    56y Female with history of ESRD on HD presents with vomiting and diarrhea found to be COVID-19 positive. Nephrology consulted for ESRD status.    REVIEW OF SYSTEMS:  Gen: no changes in weight  Cards: no chest pain  Resp: no dyspnea  GI: no nausea or vomiting or diarrhea + ab wound pain  Vascular: no LE edema    caffeine (Nausea)  latex (Rash)  penicillin (Nausea)  strawberry (Rash)      Hospital Medications: Medications reviewed      VITALS:  T(F): 98.1 (02-19-22 @ 06:18), Max: 99.3 (02-18-22 @ 15:20)  HR: 74 (02-19-22 @ 06:18)  BP: 120/55 (02-19-22 @ 06:18)  RR: 18 (02-19-22 @ 06:18)  SpO2: 100% (02-19-22 @ 06:18)  Wt(kg): --    02-18 @ 07:01  -  02-19 @ 07:00  --------------------------------------------------------  IN: 1080 mL / OUT: 1900 mL / NET: -820 mL        PHYSICAL EXAM:    Gen: NAD, calm  Cards: RRR, +S1/S2, no M/G/R  Resp: CTA B/L  GI: soft, RLQ bandage/tender  Vascular: no LE edema B/L, LUE AVF + bruit/thrill      LABS:    Phos  4.0     02-18      Creatinine Trend: 6.00 <--, 6.92 <--, 4.57 <--                        7.2    12.61 )-----------( 451      ( 18 Feb 2022 12:21 )             26.4     Urine Studies:

## 2022-02-19 NOTE — PROGRESS NOTE ADULT - SUBJECTIVE AND OBJECTIVE BOX
SUBJECTIVE / OVERNIGHT EVENTS:pt seen and examined, c/o pain at the wound site  2-19-22    MEDICATIONS  (STANDING):  apixaban 2.5 milliGRAM(s) Oral two times a day  aspirin enteric coated 81 milliGRAM(s) Oral daily  buPROPion XL (24-Hour) . 150 milliGRAM(s) Oral daily  chlorhexidine 2% Cloths 1 Application(s) Topical daily  cinacalcet 60 milliGRAM(s) Oral daily  cycloSPORINE  , modified (NEORAL) 125 milliGRAM(s) Oral every 12 hours  Dakins Solution - 1/4 Strength 1 Application(s) Topical daily  dextrose 40% Gel 15 Gram(s) Oral once  dextrose 5%. 1000 milliLiter(s) (50 mL/Hr) IV Continuous <Continuous>  dextrose 5%. 1000 milliLiter(s) (100 mL/Hr) IV Continuous <Continuous>  dextrose 50% Injectable 25 Gram(s) IV Push once  dextrose 50% Injectable 12.5 Gram(s) IV Push once  dextrose 50% Injectable 25 Gram(s) IV Push once  diltiazem    milliGRAM(s) Oral daily  diphenhydrAMINE 25 milliGRAM(s) Oral once  epoetin anabela-epbx (RETACRIT) Injectable 60733 Unit(s) IV Push <User Schedule>  gabapentin 300 milliGRAM(s) Oral daily  glucagon  Injectable 1 milliGRAM(s) IntraMuscular once  heparin   Injectable. 500 Unit(s) Dialysis. every 1 hour  insulin glargine Injectable (LANTUS) 16 Unit(s) SubCutaneous at bedtime  insulin lispro (ADMELOG) corrective regimen sliding scale   SubCutaneous three times a day before meals  insulin lispro (ADMELOG) corrective regimen sliding scale   SubCutaneous at bedtime  insulin lispro Injectable (ADMELOG) 10 Unit(s) SubCutaneous three times a day before meals  loratadine 10 milliGRAM(s) Oral daily  melatonin 6 milliGRAM(s) Oral at bedtime  mirtazapine 7.5 milliGRAM(s) Oral at bedtime  montelukast 10 milliGRAM(s) Oral at bedtime  oxyCODONE  ER Tablet 10 milliGRAM(s) Oral every 12 hours  pantoprazole    Tablet 40 milliGRAM(s) Oral before breakfast  polyethylene glycol 3350 17 Gram(s) Oral two times a day  senna 2 Tablet(s) Oral at bedtime  sertraline 50 milliGRAM(s) Oral daily  sevelamer carbonate 800 milliGRAM(s) Oral three times a day with meals  sodium thiosulfate IVPB 25 Gram(s) IV Intermittent <User Schedule>  tacrolimus   0.1% Ointment 1 Application(s) Topical daily  traZODone 100 milliGRAM(s) Oral at bedtime    MEDICATIONS  (PRN):  acetaminophen     Tablet .. 650 milliGRAM(s) Oral every 8 hours PRN Mild Pain  diphenhydrAMINE Injectable 25 milliGRAM(s) IV Push <User Schedule> PRN Itching  oxyCODONE    IR 7.5 milliGRAM(s) Oral every 4 hours PRN Severe Pain (7 - 10)    Vital Signs Last 24 Hrs  T(C): 36.9 (02-19-22 @ 13:00), Max: 36.9 (02-19-22 @ 13:00)  T(F): 98.4 (02-19-22 @ 13:00), Max: 98.4 (02-19-22 @ 13:00)  HR: 71 (02-19-22 @ 21:34) (66 - 74)  BP: 104/51 (02-19-22 @ 21:34) (104/51 - 120/55)  BP(mean): --  RR: 16 (02-19-22 @ 13:00) (16 - 18)  SpO2: 96% (02-19-22 @ 13:00) (96% - 100%)          Constitutional: No fever, fatigue  Skin: No rash.  Eyes: No recent vision problems or eye pain.  ENT: No congestion, ear pain, or sore throat.  Cardiovascular: No chest pain or palpation.  Respiratory: No cough, shortness of breath, congestion, or wheezing.  Gastrointestinal: No abdominal pain, nausea, vomiting, or diarrhea.  Genitourinary: No dysuria.  Musculoskeletal: No joint swelling.  Neurologic: No headache.    PHYSICAL EXAM:  GENERAL: NAD  EYES: EOMI, PERRLA  NECK: Supple, No JVD  CHEST/LUNG: dec breath sounds at bases  HEART:  S1 , S2 +  ABDOMEN: soft , bs+, abd wound +  EXTREMITIES:  edema+  NEUROLOGY:alert awake    LABS:    Phos  4.0     02-18      Creatinine Trend: 6.00 <--, 6.92 <--                        7.2    12.61 )-----------( 451      ( 18 Feb 2022 12:21 )             26.4     Urine Studies:

## 2022-02-19 NOTE — PROGRESS NOTE ADULT - SUBJECTIVE AND OBJECTIVE BOX
CARDIOLOGY FOLLOW UP NOTE - DR. JAQUEZ    Patient Name: ANTONIA ORTIZ  Date of Service: 22     no new events        Subjective:    cv: denies chest pain, dyspnea, palpitations, dizziness  pulmonary: denies cough  GI: denies abdominal pain, nausea, vomiting  vascular/legs: no edema   skin: no rash  ROS: otherwise negative   overnight events:      PHYSICAL EXAM:  T(C): 36.9 (22 @ 13:00), Max: 37.4 (22 @ 15:20)  HR: 66 (22 @ 13:00) (66 - 88)  BP: 104/62 (22 @ 13:00) (102/46 - 120/55)  RR: 16 (22 @ 13:00) (16 - 22)  SpO2: 96% (22 @ 13:00) (96% - 100%)  Wt(kg): --  I&O's Summary    2022 07:01  -  2022 07:00  --------------------------------------------------------  IN: 1080 mL / OUT: 1900 mL / NET: -820 mL      Daily     Daily Weight in k.5 (2022 14:23)    Appearance: Normal	  Cardiovascular: Normal S1 S2,RRR, No JVD, No murmurs  Respiratory: Lungs clear to auscultation	  Gastrointestinal:  Soft, Non-tender, + BS	  Extremities: Normal range of motion, No clubbing, cyanosis or edema      Home Medications:  aspirin 81 mg oral delayed release tablet: 1 tab(s) orally once a day (2022 17:49)  buPROPion 150 mg/24 hours (XL) oral tablet, extended release: 1 tab(s) orally once a day (in the morning) (2022 17:49)  cetirizine 10 mg oral tablet: 1 tab(s) orally once a day (2022 17:49)  dilTIAZem 120 mg/24 hours oral tablet, extended release: 1 tab(s) orally once a day (2022 17:49)  doxepin 25 mg oral capsule: 1 cap(s) orally once a day (at bedtime) (2022 17:49)  Eliquis 2.5 mg oral tablet: 1 tab(s) orally 2 times a day    Pharmacy states patient no longer wants to fill this medication (2022 17:49)  furosemide 80 mg oral tablet: 1 tab(s) orally once a day    Pharmacy states patient no longer wants to fill this medication (2022 17:49)  gabapentin 300 mg oral capsule: 1 cap(s) orally 2 times a day (2022 17:49)  hydrOXYzine hydrochloride 25 mg oral tablet: 1 tab(s) orally 2 times a day, As Needed (2022 17:49)  lanthanum 1000 mg oral tablet, chewable: 2 tab(s) orally with meals and 1 tab(s) orally with snacks    Pharmacy states patient no longer wants to fill this medication (2022 17:49)  mirtazapine 7.5 mg oral tablet: 1 tab(s) orally once a day (at bedtime) (2022 17:49)  montelukast 10 mg oral tablet: 1 tab(s) orally once a day (in the evening) (2022 17:49)  mupirocin 2% topical ointment: Apply sparingly to affected area 2 times a day as directed (2022 17:49)  nystatin 100,000 units/mL oral suspension: 5 milliliter(s) orally 4 times a day, as directed (2022 17:49)  omeprazole 20 mg oral delayed release capsule: 2 cap(s) orally once a day before breakfast (2022 17:49)  Pennsaid 2% topical solution: Apply topically to affected area 2 times a day (2022 17:49)  rosuvastatin 40 mg oral tablet: 1 tab(s) orally once a day (2022 17:49)  Santyl 250 units/g topical ointment: Apply topically to affected area once a day as directed (2022 17:49)  sertraline 50 mg oral tablet: 1 tab(s) orally once a day (2022 17:49)  Spiriva HandiHaler 18 mcg inhalation capsule: 1 cap(s) inhaled once a day (2022 17:49)  traZODone 100 mg oral tablet: 1 tab(s) orally once a day (at bedtime) (2022 17:49)  Tresiba FlexTouch 100 units/mL subcutaneous solution: 80 unit(s) subcutaneous once a day (at bedtime) (2022 17:49)  Trulicity Pen 1.5 mg/0.5 mL subcutaneous solution: 1 dose(s) subcutaneous once a week (2022 17:49)      MEDICATIONS  (STANDING):  apixaban 2.5 milliGRAM(s) Oral two times a day  aspirin enteric coated 81 milliGRAM(s) Oral daily  buPROPion XL (24-Hour) . 150 milliGRAM(s) Oral daily  chlorhexidine 2% Cloths 1 Application(s) Topical daily  cinacalcet 60 milliGRAM(s) Oral daily  cycloSPORINE  , modified (NEORAL) 125 milliGRAM(s) Oral every 12 hours  Dakins Solution - 1/4 Strength 1 Application(s) Topical daily  dextrose 40% Gel 15 Gram(s) Oral once  dextrose 5%. 1000 milliLiter(s) (50 mL/Hr) IV Continuous <Continuous>  dextrose 5%. 1000 milliLiter(s) (100 mL/Hr) IV Continuous <Continuous>  dextrose 50% Injectable 25 Gram(s) IV Push once  dextrose 50% Injectable 12.5 Gram(s) IV Push once  dextrose 50% Injectable 25 Gram(s) IV Push once  diltiazem    milliGRAM(s) Oral daily  diphenhydrAMINE 25 milliGRAM(s) Oral once  epoetin anabela-epbx (RETACRIT) Injectable 03612 Unit(s) IV Push <User Schedule>  gabapentin 300 milliGRAM(s) Oral daily  glucagon  Injectable 1 milliGRAM(s) IntraMuscular once  heparin   Injectable. 500 Unit(s) Dialysis. every 1 hour  insulin glargine Injectable (LANTUS) 16 Unit(s) SubCutaneous at bedtime  insulin lispro (ADMELOG) corrective regimen sliding scale   SubCutaneous three times a day before meals  insulin lispro (ADMELOG) corrective regimen sliding scale   SubCutaneous at bedtime  insulin lispro Injectable (ADMELOG) 10 Unit(s) SubCutaneous three times a day before meals  loratadine 10 milliGRAM(s) Oral daily  melatonin 6 milliGRAM(s) Oral at bedtime  mirtazapine 7.5 milliGRAM(s) Oral at bedtime  montelukast 10 milliGRAM(s) Oral at bedtime  oxyCODONE  ER Tablet 10 milliGRAM(s) Oral every 12 hours  pantoprazole    Tablet 40 milliGRAM(s) Oral before breakfast  polyethylene glycol 3350 17 Gram(s) Oral two times a day  senna 2 Tablet(s) Oral at bedtime  sertraline 50 milliGRAM(s) Oral daily  sevelamer carbonate 800 milliGRAM(s) Oral three times a day with meals  sodium thiosulfate IVPB 25 Gram(s) IV Intermittent <User Schedule>  tacrolimus   0.1% Ointment 1 Application(s) Topical daily  traZODone 100 milliGRAM(s) Oral at bedtime      TELEMETRY: 	    ECG:  	  RADIOLOGY:   DIAGNOSTIC TESTING:  [ ] Echocardiogram:  [ ] Catheterization:  [ ] Stress Test:    OTHER: 	    LABS:	 	    CARDIAC MARKERS:                                      7.2    12.61 )-----------( 451      ( 2022 12:21 )             26.4       Phos  4.0     02-18      proBNP:     Lipid Profile:   HgA1c:

## 2022-02-19 NOTE — PROGRESS NOTE ADULT - ASSESSMENT
a/p  56 year old female with hx of morbid obesity, CHAMP not on home O2, ESRD (HD MWF), HTN, DM, COPD, Afib no longer on AC, chronic R pannus wound, followed by Dr. Layne, sent by visiting RN for worsening wound.    #Chronic Pannus Wound  -s/p IV abx per primary team  -cycloSPORINE started - statin on hold   -med/derm/wound care  f/u     #Afib  -stable, in NSR   -cont eliquis   -cont cardizem    #Hypertension  -stable, cont current meds    #ESRD on HD  -HD per renal    # Near Syncope  -tele no events   -orthostatics neg  -echo w grossly nl lv sys fxn    #Covid-19+  -resolved   -med f/u     #Elevated troponin  -demand ischemia in setting of esrd  -echo as above  -no cp/sob   \  35 minutes spent on total encounter; more than 50% of the visit was spent counseling and/or coordinating care by the attending physician.

## 2022-02-19 NOTE — PROGRESS NOTE ADULT - ASSESSMENT
56y Female with history of ESRD on HD presents with vomiting and diarrhea found to be COVID-19 positive. Nephrology consulted for ESRD status.    1) ESRD: Last HD on 2/18 with intradialytic hypotension and 1.5L removed. Plan for next maintenance HD on 2/21. Monitor electrolytes.    2) HTN with ESRD: BP low normal. Cardizem as per cardiology. Monitor BP.    3) Anemia of renal disease: Hb low with elevated ferritin. Continue with Epo 14K with HD. Transfuse as per primary team. Monitor Hb.    4) Secondary HPT of renal origin: Phosphorus acceptable. iPTH low normal however sensipar already decreased to 60 mg PO daily. Continue with renvela 1 tab with meals (goal < 4.5 given concerns for calciphylaxis). Low calcium bath with HD. Monitor serum calcium and phosphorus.    5) Ab wound: Appreciate dermatology follow up. Ddx includes calciphylaxis versus pyoderma gangrenosum. S/P biopsy. Continue with empiric sodium thiosulfate with HD. On CSA as per Derm as benefits outweigh risks to residual renal function (which is minimal at this point).    Please D/C maalox and do not reorder given risks of aluminum toxicity in ESRD population. Can start PPI or H2 blocker as needed.      [facility] NEPHROLOGY  Keaton Lopez M.D.  Juma Green D.O.  Myal Hoffmann M.D.  Julia Brewer, MSN, ANP-C    Telephone: [phone]  Facsimile: [phone]    [id number] [place] [address]
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
## 2022-02-20 NOTE — PROGRESS NOTE ADULT - SUBJECTIVE AND OBJECTIVE BOX
[facility] NEPHROLOGY- PROGRESS NOTE    56y Female with history of ESRD on HD presents with vomiting and diarrhea found to be COVID-19 positive. Nephrology consulted for ESRD status.    REVIEW OF SYSTEMS:  Gen: no changes in weight  Cards: no chest pain  Resp: no dyspnea  GI: no nausea or vomiting or diarrhea + ab wound pain  Vascular: no LE edema    caffeine (Nausea)  latex (Rash)  penicillin (Nausea)  strawberry (Rash)      Hospital Medications: Medications reviewed      VITALS:  T(F): 98.3 (02-20-22 @ 06:25), Max: 98.4 (02-19-22 @ 13:00)  HR: 70 (02-20-22 @ 06:25)  BP: 99/54 (02-20-22 @ 06:25)  RR: 15 (02-20-22 @ 06:25)  SpO2: 100% (02-20-22 @ 06:25)  Wt(kg): --      PHYSICAL EXAM:    Gen: NAD, calm  Cards: RRR, +S1/S2, no M/G/R  Resp: CTA B/L  GI: soft, RLQ bandage/tender  Vascular: no LE edema B/L, LUE AVF + bruit/thrill      LABS:    Phos  4.0     02-18      Creatinine Trend: 6.00 <--, 6.92 <--                        7.2    12.61 )-----------( 451      ( 18 Feb 2022 12:21 )             26.4     Urine Studies:
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
## 2022-02-20 NOTE — CHART NOTE - NSCHARTNOTEFT_GEN_A_CORE
Notified by RN pt troponin 61. Per RN, no acute chest pain at this time. Added CK/CKMN onto lab as pt ESRD. EKG ordered. Sign out given to night team to f/u.

## 2022-02-20 NOTE — PROGRESS NOTE ADULT - SUBJECTIVE AND OBJECTIVE BOX
SUBJECTIVE / OVERNIGHT EVENTS:pt seen and examined, c/o pain at the wound site  2-21-22    MEDICATIONS  (STANDING):  apixaban 2.5 milliGRAM(s) Oral two times a day  aspirin enteric coated 81 milliGRAM(s) Oral daily  buPROPion XL (24-Hour) . 150 milliGRAM(s) Oral daily  chlorhexidine 2% Cloths 1 Application(s) Topical daily  cinacalcet 60 milliGRAM(s) Oral daily  cycloSPORINE  , modified (NEORAL) 125 milliGRAM(s) Oral every 12 hours  Dakins Solution - 1/4 Strength 1 Application(s) Topical daily  dextrose 40% Gel 15 Gram(s) Oral once  dextrose 5%. 1000 milliLiter(s) (50 mL/Hr) IV Continuous <Continuous>  dextrose 5%. 1000 milliLiter(s) (100 mL/Hr) IV Continuous <Continuous>  dextrose 50% Injectable 25 Gram(s) IV Push once  dextrose 50% Injectable 12.5 Gram(s) IV Push once  dextrose 50% Injectable 25 Gram(s) IV Push once  diltiazem    milliGRAM(s) Oral daily  diphenhydrAMINE 25 milliGRAM(s) Oral once  epoetin anabela-epbx (RETACRIT) Injectable 42122 Unit(s) IV Push <User Schedule>  gabapentin 300 milliGRAM(s) Oral daily  glucagon  Injectable 1 milliGRAM(s) IntraMuscular once  heparin   Injectable. 500 Unit(s) Dialysis. every 1 hour  insulin glargine Injectable (LANTUS) 16 Unit(s) SubCutaneous at bedtime  insulin lispro (ADMELOG) corrective regimen sliding scale   SubCutaneous three times a day before meals  insulin lispro (ADMELOG) corrective regimen sliding scale   SubCutaneous at bedtime  insulin lispro Injectable (ADMELOG) 10 Unit(s) SubCutaneous three times a day before meals  loratadine 10 milliGRAM(s) Oral daily  melatonin 6 milliGRAM(s) Oral at bedtime  midodrine. 5 milliGRAM(s) Oral <User Schedule>  mirtazapine 7.5 milliGRAM(s) Oral at bedtime  montelukast 10 milliGRAM(s) Oral at bedtime  oxyCODONE  ER Tablet 10 milliGRAM(s) Oral every 12 hours  pantoprazole    Tablet 40 milliGRAM(s) Oral two times a day  polyethylene glycol 3350 17 Gram(s) Oral two times a day  senna 2 Tablet(s) Oral at bedtime  sertraline 50 milliGRAM(s) Oral daily  sevelamer carbonate 800 milliGRAM(s) Oral three times a day with meals  sodium thiosulfate IVPB 25 Gram(s) IV Intermittent <User Schedule>  tacrolimus   0.1% Ointment 1 Application(s) Topical daily  traZODone 100 milliGRAM(s) Oral at bedtime    MEDICATIONS  (PRN):  acetaminophen     Tablet .. 650 milliGRAM(s) Oral every 8 hours PRN Mild Pain  diphenhydrAMINE Injectable 25 milliGRAM(s) IV Push <User Schedule> PRN Itching  oxyCODONE    IR 7.5 milliGRAM(s) Oral every 4 hours PRN Severe Pain (7 - 10)    Vital Signs Last 24 Hrs  T(C): 37.3 (02-21-22 @ 21:10), Max: 37.3 (02-21-22 @ 20:10)  T(F): 99.1 (02-21-22 @ 21:10), Max: 99.1 (02-21-22 @ 20:10)  HR: 79 (02-21-22 @ 21:10) (70 - 88)  BP: 108/45 (02-21-22 @ 21:10) (104/60 - 108/83)  BP(mean): --  RR: 17 (02-21-22 @ 21:10) (16 - 17)  SpO2: 94% (02-21-22 @ 20:10) (94% - 100%)          Constitutional: No fever, fatigue  Skin: No rash.  Eyes: No recent vision problems or eye pain.  ENT: No congestion, ear pain, or sore throat.  Cardiovascular: No chest pain or palpation.  Respiratory: No cough, shortness of breath, congestion, or wheezing.  Gastrointestinal: No abdominal pain, nausea, vomiting, or diarrhea.  Genitourinary: No dysuria.  Musculoskeletal: No joint swelling.  Neurologic: No headache.    PHYSICAL EXAM:  GENERAL: NAD  EYES: EOMI, PERRLA  NECK: Supple, No JVD  CHEST/LUNG: dec breath sounds at bases  HEART:  S1 , S2 +  ABDOMEN: soft , bs+, abd wound +  EXTREMITIES:  edema+  NEUROLOGY:alert awake    LABS:  02-21    136  |  86<L>  |  49<H>  ----------------------------<  147<H>  4.5   |  22  |  7.07<H>    Ca    10.0      21 Feb 2022 21:36  Phos  4.3     02-21  Mg     2.50     02-21      Creatinine Trend: 7.07 <--, 6.00 <--                        7.2    10.98 )-----------( 475      ( 21 Feb 2022 21:36 )             25.2     Urine Studies:      CARDIAC MARKERS ( 20 Feb 2022 17:28 )  x     / x     / 17 U/L / x     / 1.1 ng/mL

## 2022-02-20 NOTE — PROGRESS NOTE ADULT - ASSESSMENT
a/p  56 year old female with hx of morbid obesity, CHAMP not on home O2, ESRD (HD MWF), HTN, DM, COPD, Afib no longer on AC, chronic R pannus wound, followed by Dr. Layne, sent by visiting RN for worsening wound.    #Chronic Pannus Wound  -s/p IV abx per primary team  -on cycloSPORINE - statin on hold   -med/derm/wound care  f/u     #Afib  -stable, in NSR   -cont eliquis   -cont cardizem    #Hypertension  -stable, cont current meds    #ESRD on HD  -HD per renal    # Near Syncope  -tele no events   -orthostatics neg  -echo w grossly nl lv sys fxn    #Covid-19+  -resolved   -med f/u     #Elevated troponin  -demand ischemia in setting of esrd  -echo as above  -no cp/sob   \  35 minutes spent on total encounter; more than 50% of the visit was spent counseling and/or coordinating care by the attending physician.

## 2022-02-20 NOTE — CHART NOTE - NSCHARTNOTEFT_GEN_A_CORE
Pt's Daughter Ashley updated on plan of care via phone @ [phone]. She had no additional questions and was in agreement with plan of care.
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
## 2022-02-20 NOTE — CHART NOTE - NSCHARTNOTEFT_GEN_A_CORE
LATE ENTRY:    EKG performed. NSR at 71 bpm. QTc 436. No ischemic changes compared to 1/17/22 EKG.     CARDIAC MARKERS ( 20 Feb 2022 17:28 )  CK17 U/L / CKMB1.1 ng/mL / Troponin Tx 61

## 2022-02-20 NOTE — PROGRESS NOTE ADULT - SUBJECTIVE AND OBJECTIVE BOX
CARDIOLOGY FOLLOW UP - Dr. Bullock  DATE OF SERVICE: 2/20/22    CC no cp or sob      REVIEW OF SYSTEMS:  CONSTITUTIONAL: No fever, weight loss, or fatigue  RESPIRATORY: No cough, wheezing, chills or hemoptysis; No Shortness of Breath  CARDIOVASCULAR: No chest pain, palpitations, passing out, dizziness, or leg swelling  GASTROINTESTINAL: No abdominal or epigastric pain. No nausea, vomiting, or hematemesis; No diarrhea or constipation. No melena or hematochezia.  VASCULAR: No edema     PHYSICAL EXAM:  T(C): 36.8 (02-20-22 @ 06:25), Max: 36.9 (02-19-22 @ 13:00)  HR: 70 (02-20-22 @ 06:25) (65 - 71)  BP: 99/54 (02-20-22 @ 06:25) (99/54 - 104/62)  RR: 15 (02-20-22 @ 06:25) (15 - 17)  SpO2: 100% (02-20-22 @ 06:25) (96% - 100%)  Wt(kg): --  I&O's Summary      Appearance: Normal	  Cardiovascular: Normal S1 S2,RRR, No JVD, No murmurs  Respiratory: Lungs clear to auscultation	  Gastrointestinal:  Soft, Non-tender, + BS	  Extremities: Normal range of motion, No clubbing, cyanosis or edema      Home Medications:  aspirin 81 mg oral delayed release tablet: 1 tab(s) orally once a day (12 Jan 2022 17:49)  buPROPion 150 mg/24 hours (XL) oral tablet, extended release: 1 tab(s) orally once a day (in the morning) (12 Jan 2022 17:49)  cetirizine 10 mg oral tablet: 1 tab(s) orally once a day (12 Jan 2022 17:49)  dilTIAZem 120 mg/24 hours oral tablet, extended release: 1 tab(s) orally once a day (12 Jan 2022 17:49)  doxepin 25 mg oral capsule: 1 cap(s) orally once a day (at bedtime) (12 Jan 2022 17:49)  Eliquis 2.5 mg oral tablet: 1 tab(s) orally 2 times a day    Pharmacy states patient no longer wants to fill this medication (12 Jan 2022 17:49)  furosemide 80 mg oral tablet: 1 tab(s) orally once a day    Pharmacy states patient no longer wants to fill this medication (12 Jan 2022 17:49)  gabapentin 300 mg oral capsule: 1 cap(s) orally 2 times a day (12 Jan 2022 17:49)  hydrOXYzine hydrochloride 25 mg oral tablet: 1 tab(s) orally 2 times a day, As Needed (12 Jan 2022 17:49)  lanthanum 1000 mg oral tablet, chewable: 2 tab(s) orally with meals and 1 tab(s) orally with snacks    Pharmacy states patient no longer wants to fill this medication (12 Jan 2022 17:49)  mirtazapine 7.5 mg oral tablet: 1 tab(s) orally once a day (at bedtime) (12 Jan 2022 17:49)  montelukast 10 mg oral tablet: 1 tab(s) orally once a day (in the evening) (12 Jan 2022 17:49)  mupirocin 2% topical ointment: Apply sparingly to affected area 2 times a day as directed (12 Jan 2022 17:49)  nystatin 100,000 units/mL oral suspension: 5 milliliter(s) orally 4 times a day, as directed (12 Jan 2022 17:49)  omeprazole 20 mg oral delayed release capsule: 2 cap(s) orally once a day before breakfast (12 Jan 2022 17:49)  Pennsaid 2% topical solution: Apply topically to affected area 2 times a day (12 Jan 2022 17:49)  rosuvastatin 40 mg oral tablet: 1 tab(s) orally once a day (12 Jan 2022 17:49)  Santyl 250 units/g topical ointment: Apply topically to affected area once a day as directed (12 Jan 2022 17:49)  sertraline 50 mg oral tablet: 1 tab(s) orally once a day (12 Jan 2022 17:49)  Spiriva HandiHaler 18 mcg inhalation capsule: 1 cap(s) inhaled once a day (12 Jan 2022 17:49)  traZODone 100 mg oral tablet: 1 tab(s) orally once a day (at bedtime) (12 Jan 2022 17:49)  Tresiba FlexTouch 100 units/mL subcutaneous solution: 80 unit(s) subcutaneous once a day (at bedtime) (12 Jan 2022 17:49)  Trulicity Pen 1.5 mg/0.5 mL subcutaneous solution: 1 dose(s) subcutaneous once a week (12 Jan 2022 17:49)      MEDICATIONS  (STANDING):  apixaban 2.5 milliGRAM(s) Oral two times a day  aspirin enteric coated 81 milliGRAM(s) Oral daily  buPROPion XL (24-Hour) . 150 milliGRAM(s) Oral daily  chlorhexidine 2% Cloths 1 Application(s) Topical daily  cinacalcet 60 milliGRAM(s) Oral daily  cycloSPORINE  , modified (NEORAL) 125 milliGRAM(s) Oral every 12 hours  Dakins Solution - 1/4 Strength 1 Application(s) Topical daily  dextrose 40% Gel 15 Gram(s) Oral once  dextrose 5%. 1000 milliLiter(s) (50 mL/Hr) IV Continuous <Continuous>  dextrose 5%. 1000 milliLiter(s) (100 mL/Hr) IV Continuous <Continuous>  dextrose 50% Injectable 25 Gram(s) IV Push once  dextrose 50% Injectable 12.5 Gram(s) IV Push once  dextrose 50% Injectable 25 Gram(s) IV Push once  diltiazem    milliGRAM(s) Oral daily  diphenhydrAMINE 25 milliGRAM(s) Oral once  epoetin anabela-epbx (RETACRIT) Injectable 19257 Unit(s) IV Push <User Schedule>  gabapentin 300 milliGRAM(s) Oral daily  glucagon  Injectable 1 milliGRAM(s) IntraMuscular once  heparin   Injectable. 500 Unit(s) Dialysis. every 1 hour  insulin glargine Injectable (LANTUS) 16 Unit(s) SubCutaneous at bedtime  insulin lispro (ADMELOG) corrective regimen sliding scale   SubCutaneous three times a day before meals  insulin lispro (ADMELOG) corrective regimen sliding scale   SubCutaneous at bedtime  insulin lispro Injectable (ADMELOG) 10 Unit(s) SubCutaneous three times a day before meals  loratadine 10 milliGRAM(s) Oral daily  melatonin 6 milliGRAM(s) Oral at bedtime  midodrine. 5 milliGRAM(s) Oral <User Schedule>  mirtazapine 7.5 milliGRAM(s) Oral at bedtime  montelukast 10 milliGRAM(s) Oral at bedtime  oxyCODONE  ER Tablet 10 milliGRAM(s) Oral every 12 hours  pantoprazole    Tablet 40 milliGRAM(s) Oral before breakfast  polyethylene glycol 3350 17 Gram(s) Oral two times a day  senna 2 Tablet(s) Oral at bedtime  sertraline 50 milliGRAM(s) Oral daily  sevelamer carbonate 800 milliGRAM(s) Oral three times a day with meals  sodium thiosulfate IVPB 25 Gram(s) IV Intermittent <User Schedule>  tacrolimus   0.1% Ointment 1 Application(s) Topical daily  traZODone 100 milliGRAM(s) Oral at bedtime      TELEMETRY: 	    ECG:  	  RADIOLOGY:   DIAGNOSTIC TESTING:  [ ] Echocardiogram:  [ ]  Catheterization:  [ ] Stress Test:    OTHER: 	    LABS:	 	    Troponin T, High Sensitivity Result: 54 ng/L (02-14 @ 11:51)

## 2022-02-20 NOTE — PROGRESS NOTE ADULT - ASSESSMENT
56y Female with history of ESRD on HD presents with vomiting and diarrhea found to be COVID-19 positive. Nephrology consulted for ESRD status.    1) ESRD: Last HD on 2/18 with intradialytic hypotension and 1.5L removed. Plan for next maintenance HD on 2/21. Monitor electrolytes.    2) HTN with ESRD: BP low normal. Start midodrine pre-HD on HD days to avoid intradialytic hypotension. Cardizem as per cardiology. Monitor BP.    3) Anemia of renal disease: Hb low with elevated ferritin. Increase Epo to 16K with HD. Will give 1 unit PRBC with HD in AM. Monitor Hb.    4) Secondary HPT of renal origin: Phosphorus acceptable. iPTH low normal however sensipar already decreased to 60 mg PO daily. Continue with renvela 1 tab with meals (goal < 4.5 given concerns for calciphylaxis). Low calcium bath with HD. Monitor serum calcium and phosphorus.    5) Ab wound: Appreciate dermatology follow up. Ddx includes calciphylaxis versus pyoderma gangrenosum. S/P biopsy. Continue with empiric sodium thiosulfate with HD. On CSA as per Derm as benefits outweigh risks to residual renal function (which is minimal at this point).      [facility] NEPHROLOG[facility]  Keaton Lopez M.D.  Juma Green D.O.  Myla Hoffmann M.D.  Julia Brewer, MSN, ANP-C    Telephone: [phone]  Facsimile: [phone]    [address] [place] [address]
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
## 2022-02-21 NOTE — PROGRESS NOTE ADULT - SUBJECTIVE AND OBJECTIVE BOX
CARDIOLOGY FOLLOW UP - Dr. Bullock  DATE OF SERVICE: 2/21/22    CC no cp or sob      REVIEW OF SYSTEMS:  CONSTITUTIONAL: No fever, weight loss, or fatigue  RESPIRATORY: No cough, wheezing, chills or hemoptysis; No Shortness of Breath  CARDIOVASCULAR: No chest pain, palpitations, passing out, dizziness, or leg swelling  GASTROINTESTINAL: No abdominal or epigastric pain. No nausea, vomiting, or hematemesis; No diarrhea or constipation. No melena or hematochezia.  VASCULAR: No edema     PHYSICAL EXAM:  T(C): 37 (02-21-22 @ 05:37), Max: 37 (02-21-22 @ 05:37)  HR: 70 (02-21-22 @ 05:37) (67 - 81)  BP: 105/57 (02-21-22 @ 05:37) (92/47 - 105/57)  RR: 16 (02-21-22 @ 05:37) (16 - 20)  SpO2: 100% (02-21-22 @ 05:37) (95% - 100%)  Wt(kg): --  I&O's Summary    20 Feb 2022 07:01  -  21 Feb 2022 07:00  --------------------------------------------------------  IN: 750 mL / OUT: 0 mL / NET: 750 mL        Appearance: Normal	  Cardiovascular: Normal S1 S2,RRR, No JVD, No murmurs  Respiratory: Lungs clear to auscultation	  Gastrointestinal:  Soft, Non-tender, + BS	  Extremities: Normal range of motion, No clubbing, cyanosis or edema      Home Medications:  aspirin 81 mg oral delayed release tablet: 1 tab(s) orally once a day (12 Jan 2022 17:49)  buPROPion 150 mg/24 hours (XL) oral tablet, extended release: 1 tab(s) orally once a day (in the morning) (12 Jan 2022 17:49)  cetirizine 10 mg oral tablet: 1 tab(s) orally once a day (12 Jan 2022 17:49)  dilTIAZem 120 mg/24 hours oral tablet, extended release: 1 tab(s) orally once a day (12 Jan 2022 17:49)  doxepin 25 mg oral capsule: 1 cap(s) orally once a day (at bedtime) (12 Jan 2022 17:49)  Eliquis 2.5 mg oral tablet: 1 tab(s) orally 2 times a day    Pharmacy states patient no longer wants to fill this medication (12 Jan 2022 17:49)  furosemide 80 mg oral tablet: 1 tab(s) orally once a day    Pharmacy states patient no longer wants to fill this medication (12 Jan 2022 17:49)  gabapentin 300 mg oral capsule: 1 cap(s) orally 2 times a day (12 Jan 2022 17:49)  hydrOXYzine hydrochloride 25 mg oral tablet: 1 tab(s) orally 2 times a day, As Needed (12 Jan 2022 17:49)  lanthanum 1000 mg oral tablet, chewable: 2 tab(s) orally with meals and 1 tab(s) orally with snacks    Pharmacy states patient no longer wants to fill this medication (12 Jan 2022 17:49)  mirtazapine 7.5 mg oral tablet: 1 tab(s) orally once a day (at bedtime) (12 Jan 2022 17:49)  montelukast 10 mg oral tablet: 1 tab(s) orally once a day (in the evening) (12 Jan 2022 17:49)  mupirocin 2% topical ointment: Apply sparingly to affected area 2 times a day as directed (12 Jan 2022 17:49)  nystatin 100,000 units/mL oral suspension: 5 milliliter(s) orally 4 times a day, as directed (12 Jan 2022 17:49)  omeprazole 20 mg oral delayed release capsule: 2 cap(s) orally once a day before breakfast (12 Jan 2022 17:49)  Pennsaid 2% topical solution: Apply topically to affected area 2 times a day (12 Jan 2022 17:49)  rosuvastatin 40 mg oral tablet: 1 tab(s) orally once a day (12 Jan 2022 17:49)  Santyl 250 units/g topical ointment: Apply topically to affected area once a day as directed (12 Jan 2022 17:49)  sertraline 50 mg oral tablet: 1 tab(s) orally once a day (12 Jan 2022 17:49)  Spiriva HandiHaler 18 mcg inhalation capsule: 1 cap(s) inhaled once a day (12 Jan 2022 17:49)  traZODone 100 mg oral tablet: 1 tab(s) orally once a day (at bedtime) (12 Jan 2022 17:49)  Tresiba FlexTouch 100 units/mL subcutaneous solution: 80 unit(s) subcutaneous once a day (at bedtime) (12 Jan 2022 17:49)  Trulicity Pen 1.5 mg/0.5 mL subcutaneous solution: 1 dose(s) subcutaneous once a week (12 Jan 2022 17:49)      MEDICATIONS  (STANDING):  apixaban 2.5 milliGRAM(s) Oral two times a day  aspirin enteric coated 81 milliGRAM(s) Oral daily  buPROPion XL (24-Hour) . 150 milliGRAM(s) Oral daily  chlorhexidine 2% Cloths 1 Application(s) Topical daily  cinacalcet 60 milliGRAM(s) Oral daily  cycloSPORINE  , modified (NEORAL) 125 milliGRAM(s) Oral every 12 hours  Dakins Solution - 1/4 Strength 1 Application(s) Topical daily  dextrose 40% Gel 15 Gram(s) Oral once  dextrose 5%. 1000 milliLiter(s) (50 mL/Hr) IV Continuous <Continuous>  dextrose 5%. 1000 milliLiter(s) (100 mL/Hr) IV Continuous <Continuous>  dextrose 50% Injectable 25 Gram(s) IV Push once  dextrose 50% Injectable 12.5 Gram(s) IV Push once  dextrose 50% Injectable 25 Gram(s) IV Push once  diltiazem    milliGRAM(s) Oral daily  diphenhydrAMINE 25 milliGRAM(s) Oral once  epoetin anabela-epbx (RETACRIT) Injectable 82994 Unit(s) IV Push <User Schedule>  gabapentin 300 milliGRAM(s) Oral daily  glucagon  Injectable 1 milliGRAM(s) IntraMuscular once  heparin   Injectable. 500 Unit(s) Dialysis. every 1 hour  insulin glargine Injectable (LANTUS) 16 Unit(s) SubCutaneous at bedtime  insulin lispro (ADMELOG) corrective regimen sliding scale   SubCutaneous three times a day before meals  insulin lispro (ADMELOG) corrective regimen sliding scale   SubCutaneous at bedtime  insulin lispro Injectable (ADMELOG) 10 Unit(s) SubCutaneous three times a day before meals  loratadine 10 milliGRAM(s) Oral daily  melatonin 6 milliGRAM(s) Oral at bedtime  midodrine. 5 milliGRAM(s) Oral <User Schedule>  mirtazapine 7.5 milliGRAM(s) Oral at bedtime  montelukast 10 milliGRAM(s) Oral at bedtime  oxyCODONE  ER Tablet 10 milliGRAM(s) Oral every 12 hours  pantoprazole    Tablet 40 milliGRAM(s) Oral two times a day  polyethylene glycol 3350 17 Gram(s) Oral two times a day  senna 2 Tablet(s) Oral at bedtime  sertraline 50 milliGRAM(s) Oral daily  sevelamer carbonate 800 milliGRAM(s) Oral three times a day with meals  sodium thiosulfate IVPB 25 Gram(s) IV Intermittent <User Schedule>  tacrolimus   0.1% Ointment 1 Application(s) Topical daily  traZODone 100 milliGRAM(s) Oral at bedtime      TELEMETRY: 	    ECG:  	  RADIOLOGY:   DIAGNOSTIC TESTING:  [ ] Echocardiogram:  [ ]  Catheterization:  [ ] Stress Test:    OTHER: 	    LABS:	 	    Creatine Kinase, Serum: 17 U/L [25 - 170] (02-20 @ 17:28)  CKMB Units: 1.1 ng/mL (02-20 @ 17:28)  Troponin T, High Sensitivity Result: 61 ng/L (02-20 @ 17:21)

## 2022-02-21 NOTE — PROGRESS NOTE ADULT - ASSESSMENT
a/p  56 year old female with hx of morbid obesity, CHAMP not on home O2, ESRD (HD MWF), HTN, DM, COPD, Afib no longer on AC, chronic R pannus wound, followed by Dr. Layne, sent by visiting RN for worsening wound.    #Chronic Pannus Wound  -s/p IV abx per primary team  -on cycloSPORINE - statin on hold   -med/derm/wound care  f/u     #Afib  -stable, in NSR   -cont eliquis   -cont cardizem    #Hypertension  -stable, cont current meds    #ESRD on HD  -HD per renal    # Near Syncope  -tele no events   -orthostatics neg  -echo w grossly nl lv sys fxn    #Covid-19+  -resolved   -med f/u     #Elevated troponin  -demand ischemia in setting of esrd  -echo as above  -no cp/sob
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
## 2022-02-21 NOTE — PROGRESS NOTE ADULT - SUBJECTIVE AND OBJECTIVE BOX
CC: DM 2 uncontrolled with hyperglycemia     HPI: Denies complaints, no hypoglycemia. drinking nepro shake at time of visit.    MEDICATIONS  (STANDING):  apixaban 2.5 milliGRAM(s) Oral two times a day  aspirin enteric coated 81 milliGRAM(s) Oral daily  buPROPion XL (24-Hour) . 150 milliGRAM(s) Oral daily  chlorhexidine 2% Cloths 1 Application(s) Topical daily  cinacalcet 60 milliGRAM(s) Oral daily  cycloSPORINE  , modified (NEORAL) 125 milliGRAM(s) Oral every 12 hours  Dakins Solution - 1/4 Strength 1 Application(s) Topical daily  dextrose 40% Gel 15 Gram(s) Oral once  dextrose 5%. 1000 milliLiter(s) (50 mL/Hr) IV Continuous <Continuous>  dextrose 5%. 1000 milliLiter(s) (100 mL/Hr) IV Continuous <Continuous>  dextrose 50% Injectable 25 Gram(s) IV Push once  dextrose 50% Injectable 12.5 Gram(s) IV Push once  dextrose 50% Injectable 25 Gram(s) IV Push once  diltiazem    milliGRAM(s) Oral daily  diphenhydrAMINE 25 milliGRAM(s) Oral once  epoetin anabela-epbx (RETACRIT) Injectable 09095 Unit(s) IV Push <User Schedule>  gabapentin 300 milliGRAM(s) Oral daily  glucagon  Injectable 1 milliGRAM(s) IntraMuscular once  heparin   Injectable. 500 Unit(s) Dialysis. every 1 hour  insulin glargine Injectable (LANTUS) 16 Unit(s) SubCutaneous at bedtime  insulin lispro (ADMELOG) corrective regimen sliding scale   SubCutaneous three times a day before meals  insulin lispro (ADMELOG) corrective regimen sliding scale   SubCutaneous at bedtime  insulin lispro Injectable (ADMELOG) 10 Unit(s) SubCutaneous three times a day before meals  loratadine 10 milliGRAM(s) Oral daily  melatonin 6 milliGRAM(s) Oral at bedtime  mirtazapine 7.5 milliGRAM(s) Oral at bedtime  montelukast 10 milliGRAM(s) Oral at bedtime  oxyCODONE  ER Tablet 10 milliGRAM(s) Oral every 12 hours  pantoprazole    Tablet 40 milliGRAM(s) Oral before breakfast  polyethylene glycol 3350 17 Gram(s) Oral two times a day  senna 2 Tablet(s) Oral at bedtime  sertraline 50 milliGRAM(s) Oral daily  sevelamer carbonate 800 milliGRAM(s) Oral three times a day with meals  sodium thiosulfate IVPB 25 Gram(s) IV Intermittent <User Schedule>  tacrolimus   0.1% Ointment 1 Application(s) Topical daily  traZODone 100 milliGRAM(s) Oral at bedtime    ALLERGIES  Latex (Rash)  Penicillin (Nausea)  Strawberry (Rash)    Intolerances  caffeine (Nausea)    REVIEW OF SYSTEMS:  General: No fevers  GI: denies N/V, see HPI    Vital Signs Last 24 Hrs  T(C): 37 (21 Feb 2022 05:37), Max: 37 (21 Feb 2022 05:37)  T(F): 98.6 (21 Feb 2022 05:37), Max: 98.6 (21 Feb 2022 05:37)  HR: 70 (21 Feb 2022 05:37) (67 - 81)  BP: 105/57 (21 Feb 2022 05:37) (92/47 - 105/57)  BP(mean): --  RR: 16 (21 Feb 2022 05:37) (16 - 20)  SpO2: 100% (21 Feb 2022 05:37) (95% - 100%)  GENERAL: NAD  EYES: No proptosis, no lid lag, anicteric  HEENT:  Atraumatic, Normocephalic  RESPIRATORY: Non labored respirations     CAPILLARY BLOOD GLUCOSE    POCT Blood Glucose.: 163 mg/dL (21 Feb 2022 11:47)  POCT Blood Glucose.: 172 mg/dL (21 Feb 2022 07:52)  POCT Blood Glucose.: 91 mg/dL (20 Feb 2022 21:21)  POCT Blood Glucose.: 130 mg/dL (20 Feb 2022 17:12)  POCT Blood Glucose.: 113 mg/dL (18 Feb 2022 15:09)  POCT Blood Glucose.: 124 mg/dL (18 Feb 2022 14:08)  POCT Blood Glucose.: 139 mg/dL (18 Feb 2022 12:37)  POCT Blood Glucose.: 148 mg/dL (18 Feb 2022 09:55)  POCT Blood Glucose.: 128 mg/dL (18 Feb 2022 07:51)  POCT Blood Glucose.: 120 mg/dL (17 Feb 2022 21:00)  POCT Blood Glucose.: 171 mg/dL (17 Feb 2022 17:06)      A1C with Estimated Average Glucose Result: 7.0 % (01-13-22 @ 08:21)  A1C with Estimated Average Glucose Result: 6.7 % (08-31-21 @ 09:30)  A1C with Estimated Average Glucose Result: 7.2 % (04-28-21 @ 07:04)    Diet, Consistent Carbohydrate Renal w/Evening Snack:   Supplement Feeding Modality:  Oral  Nepro Cans or Servings Per Day:  1       Frequency:  Three Times a day (02-07-22 @ 12:23) [Active]

## 2022-02-21 NOTE — PROGRESS NOTE ADULT - SUBJECTIVE AND OBJECTIVE BOX
[facility] NEPHROLOGY- PROGRESS NOTE    56y Female with history of ESRD on HD presents with vomiting and diarrhea found to be COVID-19 positive. Nephrology consulted for ESRD status.    REVIEW OF SYSTEMS:  Gen: no changes in weight  Cards: no chest pain  Resp: no dyspnea  GI: no nausea or vomiting or diarrhea + ab wound pain  Vascular: no LE edema    caffeine (Nausea)  latex (Rash)  penicillin (Nausea)  strawberry (Rash)      Hospital Medications: Medications reviewed      VITALS:  T(F): 98.6 (02-21-22 @ 05:37), Max: 98.6 (02-21-22 @ 05:37)  HR: 70 (02-21-22 @ 05:37)  BP: 105/57 (02-21-22 @ 05:37)  RR: 16 (02-21-22 @ 05:37)  SpO2: 100% (02-21-22 @ 05:37)  Wt(kg): --    02-20 @ 07:01  -  02-21 @ 07:00  --------------------------------------------------------  IN: 750 mL / OUT: 0 mL / NET: 750 mL        PHYSICAL EXAM:    Gen: NAD, calm  Cards: RRR, +S1/S2, no M/G/R  Resp: CTA B/L  GI: soft, RLQ bandage/tender  Vascular: no LE edema B/L, LUE AVF + bruit/thrill      LABS:        Creatinine Trend: 6.00 <--, 6.92 <--    Urine Studies:

## 2022-02-21 NOTE — PROGRESS NOTE ADULT - ASSESSMENT
56y Female with history of ESRD on HD presents with vomiting and diarrhea found to be COVID-19 positive. Nephrology consulted for ESRD status.    1) ESRD: Last HD on 2/18 with intradialytic hypotension and 1.5L removed. Plan for next maintenance HD today. Monitor electrolytes.    2) HTN with ESRD: BP low normal. Continue with midodrine pre-HD on HD days to avoid intradialytic hypotension. Cardizem as per cardiology. Monitor BP.    3) Anemia of renal disease: Hb low with elevated ferritin. Continue with Epo 16K with HD. Will give 1 unit PRBC with HD today. Monitor Hb.    4) Secondary HPT of renal origin: Phosphorus acceptable. iPTH low normal however sensipar already decreased to 60 mg PO daily. Continue with renvela 1 tab with meals (goal < 4.5 given concerns for calciphylaxis). Low calcium bath with HD. Monitor serum calcium and phosphorus.    5) Ab wound: Appreciate dermatology follow up. Ddx includes calciphylaxis versus pyoderma gangrenosum. S/P biopsy. Continue with empiric sodium thiosulfate with HD. On CSA as per Derm as benefits outweigh risks to residual renal function (which is minimal at this point).      [facility] NEPHROLOGY  Keaton Lopez M.D.  Juma Green D.O.  Myla Hoffmann M.D.  Julia Brewer, MSN, ANP-C    Telephone: [phone]  Facsimile: [phone]    [address] [place] [address]

## 2022-02-21 NOTE — PROGRESS NOTE ADULT - ASSESSMENT
56 yr old F with morbid obesity, CHAMP not on home O2, ESRD (HD MWF), HTN, DM2 A1C 7.0, COPD, Afib no longer on AC, chronic R pannus wound here with worsening wound, diarrhea.  Pt exhibits persistent steroid induced hyperglycemia despite increased insulin regimen.         1. Type 2 diabetes mellitus uncontrolled  A1c 7.0% (may be inaccurate in setting of ESRD)  Home Regimen: Tresiba 80 units HS and Trulicity 1.5mg subq weekly  While inpatient:  BG target 100-180 mg/dL  Trending at goal   Will continue Lantus 16 units SQ qHS - glucose close to goal this AM  Will continue Admelog 10 units SQ TID before meals (Hold if NPO/not eating meal)    Continue Admelog correctional scale as LOW before meals and bedtime   Check BG before meals and bedtime  Hypoglycemia protocol   Consistent carbohydrate diet    Discharge Plan:  STOP Trulicity (patient ESRD on HD)  Steroids have been dc'd  Likely dc plan is basal/oral regimen. For oral agent, depends on inpatient requirements but can consider renally dosed DPP4 (Januvia 25 mg or Tradjenta 5 mg daily) and Prandin 1 mg po tid before meals- hold if skips meal  Patient was on Tresiba at home may benefit from a different Long acting insulin such as Lantus or Levemir given ESRD.  Tresiba is ultra-Long acting insulin  Please assess insulin coverage for Levemir or Lantus, instead of Tresiba pens  Consider CGM (such as Freestyle Libre2 outpatient)  If desiring to followup with [facility] Endocrinology: [address] [place] [address], [address], [facility] [address], [phone]    2. HTN  Management per primary team     3. Hyperlipidemia  Can continue home dose of Atorvastatin 80 mg  Note, limited benefit in ESRD. Followup lipid panel as outpatient      Tamela Ruiz  Nurse Practitioner  Division of Endocrinology & Diabetes  Pager # [phone]      If after 6PM or before 9AM, or on weekends/holidays, please call endocrine answering service for assistance ([phone]).  For nonurgent matters email [email or web address] for assistance.

## 2022-02-22 NOTE — PROGRESS NOTE ADULT - SUBJECTIVE AND OBJECTIVE BOX
[facility] NEPHROLOGY- PROGRESS NOTE    56y Female with history of ESRD on HD presents with vomiting and diarrhea found to be COVID-19 positive. Nephrology consulted for ESRD status.    REVIEW OF SYSTEMS:  Gen: no changes in weight  Cards: no chest pain  Resp: no dyspnea  GI: no nausea or vomiting or diarrhea + ab wound pain  Vascular: no LE edema    caffeine (Nausea)  latex (Rash)  penicillin (Nausea)  strawberry (Rash)      Hospital Medications: Medications reviewed      VITALS:  T(F): 99.3 (02-22-22 @ 06:30), Max: 99.3 (02-22-22 @ 06:30)  HR: 74 (02-22-22 @ 11:16)  BP: 108/48 (02-22-22 @ 06:30)  RR: 15 (02-22-22 @ 06:30)  SpO2: 98% (02-22-22 @ 11:16)  Wt(kg): --    02-21 @ 07:01  -  02-22 @ 07:00  --------------------------------------------------------  IN: 680 mL / OUT: 2200 mL / NET: -1520 mL        PHYSICAL EXAM:    Gen: NAD, calm  Cards: RRR, +S1/S2, no M/G/R  Resp: CTA B/L  GI: soft, RLQ bandage/tender  Vascular: no LE edema B/L, LUE AVF + bruit/thrill      LABS:  02-21    136  |  86<L>  |  49<H>  ----------------------------<  147<H>  4.5   |  22  |  7.07<H>    Ca    10.0      21 Feb 2022 21:36  Phos  4.3     02-21  Mg     2.50     02-21      Creatinine Trend: 7.07 <--, 6.00 <--                        7.2    10.98 )-----------( 475      ( 21 Feb 2022 21:36 )             25.2     Urine Studies:

## 2022-02-22 NOTE — PROGRESS NOTE ADULT - SUBJECTIVE AND OBJECTIVE BOX
CC: F/U for wound    Saw/spoke to patient, being cleaned up by nursing team.    Allergies  latex (Rash)  penicillin (Nausea)  strawberry (Rash)    ANTIMICROBIALS:  Off    PE:    Vital Signs Last 24 Hrs  T(C): 37.4 (22 Feb 2022 06:30), Max: 37.4 (22 Feb 2022 06:30)  T(F): 99.3 (22 Feb 2022 06:30), Max: 99.3 (22 Feb 2022 06:30)  HR: 74 (22 Feb 2022 11:16) (74 - 88)  BP: 108/48 (22 Feb 2022 06:30) (103/57 - 110/53)  RR: 15 (22 Feb 2022 06:30) (15 - 18)  SpO2: 98% (22 Feb 2022 11:16) (92% - 99%)    Gen: AOx3, NAD, non-toxic  Resp: Breathing comfortably, RA  Abd: Soft, nontender    LABS:                        7.2    10.98 )-----------( 475      ( 21 Feb 2022 21:36 )             25.2     02-21    136  |  86<L>  |  49<H>  ----------------------------<  147<H>  4.5   |  22  |  7.07<H>    Ca    10.0      21 Feb 2022 21:36  Phos  4.3     02-21  Mg     2.50     02-21    MICROBIOLOGY:    Wound Wound  02-17-22   Numerous Serratia marcescens  Numerous Pseudomonas aeruginosa  Few Enterococcus avium  Rare Corynebacterium species "Susceptibilities not performed"  Few Alpha hemolytic strep "Susceptibilities not performed"  --  Serratia marcescens  Pseudomonas aeruginosa  Enterococcus avium    .Tissue skin  01-25-22   No growth  --  Pseudomonas aeruginosa (Carbapenem Resistant)  Staphylococcus epidermidis  Enterococcus faecalis    (otherwise reviewed)    RADIOLOGY:    2/17 XR:    FINDINGS:  Mild vascular congestion.  No pleural effusion or pneumothorax.  The heart is stable in size and enlarged. Loop recorder.  No acute osseous abnormality.    IMPRESSION:  No focal consolidation

## 2022-02-22 NOTE — PROGRESS NOTE ADULT - ASSESSMENT
57 yo F with morbid obesity, CHAMP, ESRD--fistula, with known abd wound, presenting with worsening of wound  No fever, leukocytosis  COVID+  CXR clear  S/p treatment for possible superinfected wound (1/21)  Still with ongoing wound, derm suspecting possible pyoderma vs calcium lesion  In the interim had cultures sent off the organism which grew multiple organisms--likely due colonizers  Decision on wound infection, depending on clinical findings infection (rather than culture alone)  Low suspicion active infection at this time  Overall,  1) Wound infection  - Chronic wound, possible superinfected, s/p treatment course  - Wound care eval to site, follow up dermatology  - Monitor for systemic signs infection  2) Leukocytosis  - Trend to normal, generally trending down?  3) Positive culture finding  - Culture positive, but anticipate would always be positive coming from existing wound  - Monitor wound site for local signs infection    Signing off. Please call with further questions or change in status.    Se Leary MD  Pager [phone]  From 5pm-9am, and on weekends call [phone]

## 2022-02-22 NOTE — PROGRESS NOTE ADULT - ASSESSMENT
56y Female with history of ESRD on HD presents with vomiting and diarrhea found to be COVID-19 positive. Nephrology consulted for ESRD status.    1) ESRD: Last HD on 2/21 tolerated well with 1.5L removed. Plan for next maintenance HD on 2/23. Monitor electrolytes.    2) HTN with ESRD: BP low normal. Continue with midodrine pre-HD on HD days to avoid intradialytic hypotension. Cardizem as per cardiology. Monitor BP.    3) Anemia of renal disease: Hb low with elevated ferritin s/p PRBC with HD on 2/21. Continue with Epo 16K with HD. Monitor Hb.    4) Secondary HPT of renal origin: Phosphorus acceptable. iPTH low normal however sensipar already decreased to 60 mg PO daily. Continue with renvela 1 tab with meals (goal < 4.5 given concerns for calciphylaxis). Low calcium bath with HD. Monitor serum calcium and phosphorus.    5) Ab wound: Appreciate dermatology follow up. Ddx includes calciphylaxis versus pyoderma gangrenosum. S/P biopsy. Continue with empiric sodium thiosulfate with HD. On CSA as per Derm as benefits outweigh risks to residual renal function (which is minimal at this point).      [facility] NEPHROLOGY  Kaeton Lopez M.D.  Juma Green D.O.  Myla Hoffmann M.D.  Julia Brewer, MSN, ANP-C    Telephone: [phone]  Facsimile: [phone]    [address] [place] [address]

## 2022-02-22 NOTE — PROGRESS NOTE ADULT - SUBJECTIVE AND OBJECTIVE BOX
CARDIOLOGY FOLLOW UP - Dr. Bullock  DATE OF SERVICE: 2/22/22    CC no cp or sob      REVIEW OF SYSTEMS:  CONSTITUTIONAL: No fever, weight loss, or fatigue  RESPIRATORY: No cough, wheezing, chills or hemoptysis; No Shortness of Breath  CARDIOVASCULAR: No chest pain, palpitations, passing out, dizziness, or leg swelling  GASTROINTESTINAL: No abdominal or epigastric pain. No nausea, vomiting, or hematemesis; No diarrhea or constipation. No melena or hematochezia.  VASCULAR: No edema     PHYSICAL EXAM:  T(C): 37.4 (02-22-22 @ 06:30), Max: 37.4 (02-22-22 @ 06:30)  HR: 83 (02-22-22 @ 06:59) (74 - 88)  BP: 108/48 (02-22-22 @ 06:30) (103/57 - 110/53)  RR: 15 (02-22-22 @ 06:30) (15 - 18)  SpO2: 92% (02-22-22 @ 06:59) (92% - 99%)  Wt(kg): --  I&O's Summary    21 Feb 2022 07:01  -  22 Feb 2022 07:00  --------------------------------------------------------  IN: 680 mL / OUT: 2200 mL / NET: -1520 mL        Appearance: Normal	  Cardiovascular: Normal S1 S2,RRR, No JVD, No murmurs  Respiratory: Lungs clear to auscultation	  Gastrointestinal:  Soft, Non-tender, + BS	  Extremities: Normal range of motion, No clubbing, cyanosis or edema      Home Medications:  aspirin 81 mg oral delayed release tablet: 1 tab(s) orally once a day (12 Jan 2022 17:49)  buPROPion 150 mg/24 hours (XL) oral tablet, extended release: 1 tab(s) orally once a day (in the morning) (12 Jan 2022 17:49)  cetirizine 10 mg oral tablet: 1 tab(s) orally once a day (12 Jan 2022 17:49)  dilTIAZem 120 mg/24 hours oral tablet, extended release: 1 tab(s) orally once a day (12 Jan 2022 17:49)  doxepin 25 mg oral capsule: 1 cap(s) orally once a day (at bedtime) (12 Jan 2022 17:49)  Eliquis 2.5 mg oral tablet: 1 tab(s) orally 2 times a day    Pharmacy states patient no longer wants to fill this medication (12 Jan 2022 17:49)  furosemide 80 mg oral tablet: 1 tab(s) orally once a day    Pharmacy states patient no longer wants to fill this medication (12 Jan 2022 17:49)  gabapentin 300 mg oral capsule: 1 cap(s) orally 2 times a day (12 Jan 2022 17:49)  hydrOXYzine hydrochloride 25 mg oral tablet: 1 tab(s) orally 2 times a day, As Needed (12 Jan 2022 17:49)  lanthanum 1000 mg oral tablet, chewable: 2 tab(s) orally with meals and 1 tab(s) orally with snacks    Pharmacy states patient no longer wants to fill this medication (12 Jan 2022 17:49)  mirtazapine 7.5 mg oral tablet: 1 tab(s) orally once a day (at bedtime) (12 Jan 2022 17:49)  montelukast 10 mg oral tablet: 1 tab(s) orally once a day (in the evening) (12 Jan 2022 17:49)  mupirocin 2% topical ointment: Apply sparingly to affected area 2 times a day as directed (12 Jan 2022 17:49)  nystatin 100,000 units/mL oral suspension: 5 milliliter(s) orally 4 times a day, as directed (12 Jan 2022 17:49)  omeprazole 20 mg oral delayed release capsule: 2 cap(s) orally once a day before breakfast (12 Jan 2022 17:49)  Pennsaid 2% topical solution: Apply topically to affected area 2 times a day (12 Jan 2022 17:49)  rosuvastatin 40 mg oral tablet: 1 tab(s) orally once a day (12 Jan 2022 17:49)  Santyl 250 units/g topical ointment: Apply topically to affected area once a day as directed (12 Jan 2022 17:49)  sertraline 50 mg oral tablet: 1 tab(s) orally once a day (12 Jan 2022 17:49)  Spiriva HandiHaler 18 mcg inhalation capsule: 1 cap(s) inhaled once a day (12 Jan 2022 17:49)  traZODone 100 mg oral tablet: 1 tab(s) orally once a day (at bedtime) (12 Jan 2022 17:49)  Tresiba FlexTouch 100 units/mL subcutaneous solution: 80 unit(s) subcutaneous once a day (at bedtime) (12 Jan 2022 17:49)  Trulicity Pen 1.5 mg/0.5 mL subcutaneous solution: 1 dose(s) subcutaneous once a week (12 Jan 2022 17:49)      MEDICATIONS  (STANDING):  apixaban 2.5 milliGRAM(s) Oral two times a day  aspirin enteric coated 81 milliGRAM(s) Oral daily  buPROPion XL (24-Hour) . 150 milliGRAM(s) Oral daily  chlorhexidine 2% Cloths 1 Application(s) Topical daily  cinacalcet 60 milliGRAM(s) Oral daily  cycloSPORINE  , modified (NEORAL) 125 milliGRAM(s) Oral every 12 hours  Dakins Solution - 1/4 Strength 1 Application(s) Topical daily  dextrose 40% Gel 15 Gram(s) Oral once  dextrose 5%. 1000 milliLiter(s) (50 mL/Hr) IV Continuous <Continuous>  dextrose 5%. 1000 milliLiter(s) (100 mL/Hr) IV Continuous <Continuous>  dextrose 50% Injectable 25 Gram(s) IV Push once  dextrose 50% Injectable 12.5 Gram(s) IV Push once  dextrose 50% Injectable 25 Gram(s) IV Push once  diltiazem    milliGRAM(s) Oral daily  diphenhydrAMINE 25 milliGRAM(s) Oral once  epoetin anabela-epbx (RETACRIT) Injectable 82898 Unit(s) IV Push <User Schedule>  gabapentin 300 milliGRAM(s) Oral daily  glucagon  Injectable 1 milliGRAM(s) IntraMuscular once  heparin   Injectable. 500 Unit(s) Dialysis. every 1 hour  insulin glargine Injectable (LANTUS) 16 Unit(s) SubCutaneous at bedtime  insulin lispro (ADMELOG) corrective regimen sliding scale   SubCutaneous three times a day before meals  insulin lispro (ADMELOG) corrective regimen sliding scale   SubCutaneous at bedtime  insulin lispro Injectable (ADMELOG) 10 Unit(s) SubCutaneous three times a day before meals  loratadine 10 milliGRAM(s) Oral daily  melatonin 6 milliGRAM(s) Oral at bedtime  midodrine. 5 milliGRAM(s) Oral <User Schedule>  mirtazapine 7.5 milliGRAM(s) Oral at bedtime  montelukast 10 milliGRAM(s) Oral at bedtime  oxyCODONE  ER Tablet 10 milliGRAM(s) Oral every 12 hours  pantoprazole    Tablet 40 milliGRAM(s) Oral two times a day  polyethylene glycol 3350 17 Gram(s) Oral two times a day  senna 2 Tablet(s) Oral at bedtime  sertraline 50 milliGRAM(s) Oral daily  sevelamer carbonate 800 milliGRAM(s) Oral three times a day with meals  sodium thiosulfate IVPB 25 Gram(s) IV Intermittent <User Schedule>  tacrolimus   0.1% Ointment 1 Application(s) Topical daily  traZODone 100 milliGRAM(s) Oral at bedtime      TELEMETRY: 	    ECG:  	  RADIOLOGY:   DIAGNOSTIC TESTING:  [ ] Echocardiogram:  [ ]  Catheterization:  [ ] Stress Test:    OTHER: 	    LABS:	 	    Creatine Kinase, Serum: 17 U/L [25 - 170] (02-20 @ 17:28)  CKMB Units: 1.1 ng/mL (02-20 @ 17:28)  Troponin T, High Sensitivity Result: 61 ng/L (02-20 @ 17:21)                          7.2    10.98 )-----------( 475      ( 21 Feb 2022 21:36 )             25.2     02-21    136  |  86<L>  |  49<H>  ----------------------------<  147<H>  4.5   |  22  |  7.07<H>    Ca    10.0      21 Feb 2022 21:36  Phos  4.3     02-21  Mg     2.50     02-21
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
## 2022-02-22 NOTE — PROGRESS NOTE ADULT - SUBJECTIVE AND OBJECTIVE BOX
CC: DM 2 uncontrolled with hyperglycemia     HPI: Denies complaints, no hypoglycemia. fair appetite    MEDICATIONS  (STANDING):  apixaban 2.5 milliGRAM(s) Oral two times a day  aspirin enteric coated 81 milliGRAM(s) Oral daily  buPROPion XL (24-Hour) . 150 milliGRAM(s) Oral daily  chlorhexidine 2% Cloths 1 Application(s) Topical daily  cinacalcet 60 milliGRAM(s) Oral daily  cycloSPORINE  , modified (NEORAL) 125 milliGRAM(s) Oral every 12 hours  Dakins Solution - 1/4 Strength 1 Application(s) Topical daily  dextrose 40% Gel 15 Gram(s) Oral once  dextrose 5%. 1000 milliLiter(s) (50 mL/Hr) IV Continuous <Continuous>  dextrose 5%. 1000 milliLiter(s) (100 mL/Hr) IV Continuous <Continuous>  dextrose 50% Injectable 25 Gram(s) IV Push once  dextrose 50% Injectable 12.5 Gram(s) IV Push once  dextrose 50% Injectable 25 Gram(s) IV Push once  diltiazem    milliGRAM(s) Oral daily  diphenhydrAMINE 25 milliGRAM(s) Oral once  epoetin anabela-epbx (RETACRIT) Injectable 05464 Unit(s) IV Push <User Schedule>  gabapentin 300 milliGRAM(s) Oral daily  glucagon  Injectable 1 milliGRAM(s) IntraMuscular once  heparin   Injectable. 500 Unit(s) Dialysis. every 1 hour  insulin glargine Injectable (LANTUS) 16 Unit(s) SubCutaneous at bedtime  insulin lispro (ADMELOG) corrective regimen sliding scale   SubCutaneous three times a day before meals  insulin lispro (ADMELOG) corrective regimen sliding scale   SubCutaneous at bedtime  insulin lispro Injectable (ADMELOG) 10 Unit(s) SubCutaneous three times a day before meals  loratadine 10 milliGRAM(s) Oral daily  melatonin 6 milliGRAM(s) Oral at bedtime  mirtazapine 7.5 milliGRAM(s) Oral at bedtime  montelukast 10 milliGRAM(s) Oral at bedtime  oxyCODONE  ER Tablet 10 milliGRAM(s) Oral every 12 hours  pantoprazole    Tablet 40 milliGRAM(s) Oral before breakfast  polyethylene glycol 3350 17 Gram(s) Oral two times a day  senna 2 Tablet(s) Oral at bedtime  sertraline 50 milliGRAM(s) Oral daily  sevelamer carbonate 800 milliGRAM(s) Oral three times a day with meals  sodium thiosulfate IVPB 25 Gram(s) IV Intermittent <User Schedule>  tacrolimus   0.1% Ointment 1 Application(s) Topical daily  traZODone 100 milliGRAM(s) Oral at bedtime    ALLERGIES  Latex (Rash)  Penicillin (Nausea)  Strawberry (Rash)    Intolerances  caffeine (Nausea)    REVIEW OF SYSTEMS:  General: No fevers  GI: denies N/V, see HPI    Vital Signs Last 24 Hrs  T(C): 37.4 (22 Feb 2022 06:30), Max: 37.4 (22 Feb 2022 06:30)  T(F): 99.3 (22 Feb 2022 06:30), Max: 99.3 (22 Feb 2022 06:30)  HR: 74 (22 Feb 2022 11:16) (74 - 88)  BP: 108/48 (22 Feb 2022 06:30) (103/57 - 110/53)  BP(mean): --  RR: 15 (22 Feb 2022 06:30) (15 - 18)  SpO2: 98% (22 Feb 2022 11:16) (92% - 99%)  GENERAL: NAD  EYES: No proptosis, no lid lag, anicteric  HEENT:  Atraumatic, Normocephalic  RESPIRATORY: Non labored respirations       CAPILLARY BLOOD GLUCOSE    POCT Blood Glucose.: 172 mg/dL (22 Feb 2022 12:01)  POCT Blood Glucose.: 208 mg/dL (22 Feb 2022 08:18)  POCT Blood Glucose.: 103 mg/dL (22 Feb 2022 00:08)  POCT Blood Glucose.: 155 mg/dL (21 Feb 2022 17:03)  POCT Blood Glucose.: 163 mg/dL (21 Feb 2022 11:47)  POCT Blood Glucose.: 172 mg/dL (21 Feb 2022 07:52)  POCT Blood Glucose.: 91 mg/dL (20 Feb 2022 21:21)  POCT Blood Glucose.: 130 mg/dL (20 Feb 2022 17:12)  POCT Blood Glucose.: 113 mg/dL (18 Feb 2022 15:09)  POCT Blood Glucose.: 124 mg/dL (18 Feb 2022 14:08)  POCT Blood Glucose.: 139 mg/dL (18 Feb 2022 12:37)  POCT Blood Glucose.: 148 mg/dL (18 Feb 2022 09:55)  POCT Blood Glucose.: 128 mg/dL (18 Feb 2022 07:51)  POCT Blood Glucose.: 120 mg/dL (17 Feb 2022 21:00)  POCT Blood Glucose.: 171 mg/dL (17 Feb 2022 17:06)      A1C with Estimated Average Glucose Result: 7.0 % (01-13-22 @ 08:21)  A1C with Estimated Average Glucose Result: 6.7 % (08-31-21 @ 09:30)  A1C with Estimated Average Glucose Result: 7.2 % (04-28-21 @ 07:04)    Diet, Consistent Carbohydrate Renal w/Evening Snack:   Supplement Feeding Modality:  Oral  Nepro Cans or Servings Per Day:  1       Frequency:  Three Times a day (02-07-22 @ 12:23) [Active]

## 2022-02-22 NOTE — PROGRESS NOTE ADULT - ASSESSMENT
56 yr old F with morbid obesity, CHAMP not on home O2, ESRD (HD MWF), HTN, DM2 A1C 7.0, COPD, Afib no longer on AC, chronic R pannus wound here with worsening wound, diarrhea.  Pt exhibits persistent steroid induced hyperglycemia despite increased insulin regimen.         1. Type 2 diabetes mellitus uncontrolled  A1c 7.0% (may be inaccurate in setting of ESRD)  Home Regimen: Tresiba 80 units HS and Trulicity 1.5mg subq weekly  While inpatient:  BG target 100-180 mg/dL  Trending at goal   Will continue Lantus 16 units SQ qHS - glucose close to goal this AM  Will continue Admelog 10 units SQ TID before meals (Hold if NPO/not eating meal)    Continue Admelog correctional scale as LOW before meals and bedtime   Check BG before meals and bedtime  Hypoglycemia protocol   Consistent carbohydrate diet    Discharge Plan:  STOP Trulicity (patient ESRD on HD)  Steroids have been dc'd  For dc to rehab- recommend to continue current insulin management as outlined above  For dc to home- Recommend basal PO regimen  Patient was on Tresiba at home may benefit from a different Long acting insulin such as Lantus or Levemir given ESRD.  Tresiba is ultra-Long acting insulin  Please assess insurance coverage for basal insulin pens:  Levemir, Lantus, Basaglar, Toujeo or Semglee.   Recommend tradjenta 5 mg po daily, if not covered can see if januvia 25 mg po daily is covered.  Please obtain prior auth if needed as patitnet is ESRD and dpp4's are indicated for patients with ESRD  Also for d/c from rehab can start Prandin 1 mg po TID AC- hold if skips meal  Consider CGM (such as Freestyle Libre2 outpatient)  If desiring to followup with [facility]: [address] [place] [address], [address], [place] [address], [phone]    2. HTN  Management per primary team     3. Hyperlipidemia  Can continue home dose of Atorvastatin 80 mg  Note, limited benefit in ESRD. Followup lipid panel as outpatient      Tamela Ruiz  Nurse Practitioner  Division of Endocrinology & Diabetes  Pager # [phone]      If after 6PM or before 9AM, or on weekends/holidays, please call endocrine answering service for assistance ([phone]).  For nonurgent matters email [email or web address] for assistance.

## 2022-02-22 NOTE — PROGRESS NOTE ADULT - ASSESSMENT
Echo 1/19/22: grossly nl LV sys fx, min MR     a/p  56 year old female with hx of morbid obesity, CHAMP not on home O2, ESRD (HD MWF), HTN, DM, COPD, Afib no longer on AC, chronic R pannus wound, followed by Dr. Layne, sent by visiting RN for worsening wound.    #Chronic Pannus Wound  -s/p IV abx per primary team  -on cycloSPORINE - statin on hold   -med/derm/wound care  f/u     #Afib  -stable, in NSR   -cont eliquis   -cont cardizem     #Hypertension  -stable low normal- on mido to augment bp    #ESRD on HD  -HD per renal    # Near Syncope  -tele no events   -orthostatics neg  -echo w grossly nl lv sys fxn    #Covid-19+  -resolved   -med f/u     #Elevated troponin  -demand ischemia in setting of esrd  -echo as above  -no cp/sob

## 2022-02-22 NOTE — PROGRESS NOTE ADULT - SUBJECTIVE AND OBJECTIVE BOX
SUBJECTIVE / OVERNIGHT EVENTS:pt seen and examined, c/o pain at the wound site  2-22-22    MEDICATIONS  (STANDING):  apixaban 2.5 milliGRAM(s) Oral two times a day  aspirin enteric coated 81 milliGRAM(s) Oral daily  buPROPion XL (24-Hour) . 150 milliGRAM(s) Oral daily  chlorhexidine 2% Cloths 1 Application(s) Topical daily  cinacalcet 60 milliGRAM(s) Oral daily  cycloSPORINE  , modified (NEORAL) 125 milliGRAM(s) Oral every 12 hours  Dakins Solution - 1/4 Strength 1 Application(s) Topical daily  dextrose 40% Gel 15 Gram(s) Oral once  dextrose 5%. 1000 milliLiter(s) (50 mL/Hr) IV Continuous <Continuous>  dextrose 5%. 1000 milliLiter(s) (100 mL/Hr) IV Continuous <Continuous>  dextrose 50% Injectable 25 Gram(s) IV Push once  dextrose 50% Injectable 12.5 Gram(s) IV Push once  dextrose 50% Injectable 25 Gram(s) IV Push once  diltiazem    milliGRAM(s) Oral daily  diphenhydrAMINE 25 milliGRAM(s) Oral once  epoetin anabela-epbx (RETACRIT) Injectable 13412 Unit(s) IV Push <User Schedule>  gabapentin 300 milliGRAM(s) Oral daily  glucagon  Injectable 1 milliGRAM(s) IntraMuscular once  heparin   Injectable. 500 Unit(s) Dialysis. every 1 hour  insulin glargine Injectable (LANTUS) 16 Unit(s) SubCutaneous at bedtime  insulin lispro (ADMELOG) corrective regimen sliding scale   SubCutaneous three times a day before meals  insulin lispro (ADMELOG) corrective regimen sliding scale   SubCutaneous at bedtime  insulin lispro Injectable (ADMELOG) 10 Unit(s) SubCutaneous three times a day before meals  loratadine 10 milliGRAM(s) Oral daily  melatonin 6 milliGRAM(s) Oral at bedtime  midodrine. 5 milliGRAM(s) Oral <User Schedule>  mirtazapine 7.5 milliGRAM(s) Oral at bedtime  montelukast 10 milliGRAM(s) Oral at bedtime  oxyCODONE  ER Tablet 10 milliGRAM(s) Oral every 12 hours  pantoprazole    Tablet 40 milliGRAM(s) Oral two times a day  polyethylene glycol 3350 17 Gram(s) Oral two times a day  senna 2 Tablet(s) Oral at bedtime  sertraline 50 milliGRAM(s) Oral daily  sevelamer carbonate 800 milliGRAM(s) Oral three times a day with meals  sodium thiosulfate IVPB 25 Gram(s) IV Intermittent <User Schedule>  tacrolimus   0.1% Ointment 1 Application(s) Topical daily  traZODone 100 milliGRAM(s) Oral at bedtime    MEDICATIONS  (PRN):  acetaminophen     Tablet .. 650 milliGRAM(s) Oral every 8 hours PRN Mild Pain  diphenhydrAMINE Injectable 25 milliGRAM(s) IV Push <User Schedule> PRN Itching  oxyCODONE    IR 7.5 milliGRAM(s) Oral every 4 hours PRN Severe Pain (7 - 10)    Vital Signs Last 24 Hrs  T(C): 37.1 (02-22-22 @ 14:30), Max: 37.4 (02-22-22 @ 06:30)  T(F): 98.8 (02-22-22 @ 14:30), Max: 99.3 (02-22-22 @ 06:30)  HR: 79 (02-22-22 @ 19:20) (74 - 87)  BP: 103/50 (02-22-22 @ 14:30) (103/50 - 110/53)  BP(mean): --  RR: 18 (02-22-22 @ 14:30) (15 - 18)  SpO2: 95% (02-22-22 @ 19:20) (92% - 99%)      Constitutional: No fever, fatigue  Skin: No rash.  Eyes: No recent vision problems or eye pain.  ENT: No congestion, ear pain, or sore throat.  Cardiovascular: No chest pain or palpation.  Respiratory: No cough, shortness of breath, congestion, or wheezing.  Gastrointestinal: No abdominal pain, nausea, vomiting, or diarrhea.  Genitourinary: No dysuria.  Musculoskeletal: No joint swelling.  Neurologic: No headache.    PHYSICAL EXAM:  GENERAL: NAD  EYES: EOMI, PERRLA  NECK: Supple, No JVD  CHEST/LUNG: dec breath sounds at bases  HEART:  S1 , S2 +  ABDOMEN: soft , bs+, abd wound +  EXTREMITIES:  edema+  NEUROLOGY:alert awake    LABS:  02-22    141  |  93<L>  |  29<H>  ----------------------------<  121<H>  4.4   |  20<L>  |  4.88<H>    Ca    10.1      22 Feb 2022 18:06  Phos  3.1     02-22  Mg     2.40     02-22      Creatinine Trend: 4.88 <--, 7.07 <--, 6.00 <--                        7.8    10.86 )-----------( 424      ( 22 Feb 2022 18:06 )             26.9     Urine Studies:

## 2022-02-23 NOTE — PROVIDER CONTACT NOTE (CRITICAL VALUE NOTIFICATION) - BACKGROUND
ESRD
patient on dialysis
Renal patient
admitted with worsening pannus wound
Pt admitted for an unspecified open wound of abdominal wall.
Pt AOx4, admitted for abdominal wound infection, covid positive, pmh: esrd, anemia, afib, dm, htn

## 2022-02-23 NOTE — PROVIDER CONTACT NOTE (CRITICAL VALUE NOTIFICATION) - ACTION/TREATMENT ORDERED:
No new orders
Provider aware.
As per FELIX Hernandez, provider aware and no further orders at this time
none
EKG ordered.
Provider made aware

## 2022-02-23 NOTE — PROVIDER CONTACT NOTE (CRITICAL VALUE NOTIFICATION) - ASSESSMENT
patient asymptomatic. patient denies chest pain, headache. dizziness.
patient denies chest pain. comfortably resting on BED.
Will f/u blood work
Pt AOx4, denies SOB, chest pain, no acute distress noted, vitals stable
Pt VS stable. No s/s of distress
Troponin: 66. Patient in no acute distress. Safety maintained.
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
## 2022-02-23 NOTE — PROGRESS NOTE ADULT - SUBJECTIVE AND OBJECTIVE BOX
[facility] NEPHROLOGY- PROGRESS NOTE    56y Female with history of ESRD on HD presents with vomiting and diarrhea found to be COVID-19 positive. Nephrology consulted for ESRD status.    REVIEW OF SYSTEMS:  Gen: no changes in weight  Cards: no chest pain  Resp: no dyspnea  GI: no nausea or vomiting or diarrhea + ab wound pain  Vascular: no LE edema    caffeine (Nausea)  latex (Rash)  penicillin (Nausea)  strawberry (Rash)      Hospital Medications: Medications reviewed      VITALS:  T(F): 98.3 (02-23-22 @ 06:49), Max: 98.8 (02-22-22 @ 14:30)  HR: 70 (02-23-22 @ 07:14)  BP: 112/48 (02-23-22 @ 06:49)  RR: 17 (02-23-22 @ 06:49)  SpO2: 98% (02-23-22 @ 07:14)  Wt(kg): --    02-22 @ 07:01  -  02-23 @ 07:00  --------------------------------------------------------  IN: 500 mL / OUT: 0 mL / NET: 500 mL      PHYSICAL EXAM:    Gen: NAD, calm  Cards: RRR, +S1/S2, no M/G/R  Resp: CTA B/L  GI: soft, RLQ bandage/tender  Vascular: no LE edema B/L, LUE AVF + bruit/thrill      LABS:  02-22    141  |  93<L>  |  29<H>  ----------------------------<  121<H>  4.4   |  20<L>  |  4.88<H>    Ca    10.1      22 Feb 2022 18:06  Phos  3.1     02-22  Mg     2.40     02-22      Creatinine Trend: 4.88 <--, 7.07 <--, 6.00 <--                        7.8    10.86 )-----------( 424      ( 22 Feb 2022 18:06 )             26.9     Urine Studies:

## 2022-02-23 NOTE — PROGRESS NOTE ADULT - ASSESSMENT
56y Female with history of ESRD on HD presents with vomiting and diarrhea found to be COVID-19 positive. Nephrology consulted for ESRD status.    1) ESRD: Last HD on 2/21 tolerated well with 1.5L removed. Plan for next maintenance HD today. Monitor electrolytes.    2) HTN with ESRD: BP low normal. Continue with midodrine pre-HD on HD days to avoid intradialytic hypotension. Cardizem as per cardiology. Monitor BP.    3) Anemia of renal disease: Hb low with elevated ferritin s/p PRBC with HD on 2/21. Continue with Epo 16K with HD. Monitor Hb.    4) Secondary HPT of renal origin: Phosphorus acceptable. iPTH low normal however sensipar already decreased to 60 mg PO daily. Continue with renvela 1 tab with meals (goal < 4.5 given concerns for calciphylaxis). Low calcium bath with HD. Monitor serum calcium and phosphorus.    5) Ab wound: Appreciate dermatology follow up. Ddx includes calciphylaxis versus pyoderma gangrenosum. S/P biopsy. Continue with empiric sodium thiosulfate with HD. On CSA as per Derm as benefits outweigh risks to residual renal function (which is minimal at this point).      [facility] NEPHROLOGY  Keaton Lopez M.D.  uJma Green D.O.  Myla Hoffmann M.D.  Julia Brewer, MSN, ANP-C    Telephone: [phone]  Facsimile: [phone]    [address] [place] [address]

## 2022-02-23 NOTE — PROGRESS NOTE ADULT - SUBJECTIVE AND OBJECTIVE BOX
SUBJECTIVE / OVERNIGHT EVENTS:pt seen and examined, c/o pain at the wound site  2-23-22    MEDICATIONS  (STANDING):  apixaban 2.5 milliGRAM(s) Oral two times a day  aspirin enteric coated 81 milliGRAM(s) Oral daily  buPROPion XL (24-Hour) . 150 milliGRAM(s) Oral daily  chlorhexidine 2% Cloths 1 Application(s) Topical daily  cinacalcet 60 milliGRAM(s) Oral daily  cycloSPORINE  , modified (NEORAL) 125 milliGRAM(s) Oral every 12 hours  Dakins Solution - 1/4 Strength 1 Application(s) Topical daily  dextrose 40% Gel 15 Gram(s) Oral once  dextrose 5%. 1000 milliLiter(s) (50 mL/Hr) IV Continuous <Continuous>  dextrose 5%. 1000 milliLiter(s) (100 mL/Hr) IV Continuous <Continuous>  dextrose 50% Injectable 25 Gram(s) IV Push once  dextrose 50% Injectable 12.5 Gram(s) IV Push once  dextrose 50% Injectable 25 Gram(s) IV Push once  diltiazem    milliGRAM(s) Oral daily  diphenhydrAMINE 25 milliGRAM(s) Oral once  epoetin anabela-epbx (RETACRIT) Injectable 80527 Unit(s) IV Push <User Schedule>  gabapentin 300 milliGRAM(s) Oral daily  glucagon  Injectable 1 milliGRAM(s) IntraMuscular once  heparin   Injectable. 500 Unit(s) Dialysis. every 1 hour  insulin glargine Injectable (LANTUS) 16 Unit(s) SubCutaneous at bedtime  insulin lispro (ADMELOG) corrective regimen sliding scale   SubCutaneous three times a day before meals  insulin lispro (ADMELOG) corrective regimen sliding scale   SubCutaneous at bedtime  insulin lispro Injectable (ADMELOG) 10 Unit(s) SubCutaneous three times a day before meals  loratadine 10 milliGRAM(s) Oral daily  melatonin 6 milliGRAM(s) Oral at bedtime  midodrine. 5 milliGRAM(s) Oral <User Schedule>  mirtazapine 7.5 milliGRAM(s) Oral at bedtime  montelukast 10 milliGRAM(s) Oral at bedtime  oxyCODONE  ER Tablet 10 milliGRAM(s) Oral every 12 hours  pantoprazole    Tablet 40 milliGRAM(s) Oral two times a day  polyethylene glycol 3350 17 Gram(s) Oral two times a day  senna 2 Tablet(s) Oral at bedtime  sertraline 50 milliGRAM(s) Oral daily  sevelamer carbonate 800 milliGRAM(s) Oral three times a day with meals  sodium thiosulfate IVPB 25 Gram(s) IV Intermittent <User Schedule>  tacrolimus   0.1% Ointment 1 Application(s) Topical daily  traZODone 100 milliGRAM(s) Oral at bedtime    MEDICATIONS  (PRN):  acetaminophen     Tablet .. 650 milliGRAM(s) Oral every 8 hours PRN Mild Pain  diphenhydrAMINE Injectable 25 milliGRAM(s) IV Push <User Schedule> PRN Itching  oxyCODONE    IR 7.5 milliGRAM(s) Oral every 4 hours PRN Severe Pain (7 - 10)    Vital Signs Last 24 Hrs  T(C): 37 (02-23-22 @ 21:00), Max: 37.1 (02-23-22 @ 15:56)  T(F): 98.6 (02-23-22 @ 21:00), Max: 98.7 (02-23-22 @ 15:56)  HR: 75 (02-23-22 @ 21:00) (69 - 78)  BP: 117/54 (02-23-22 @ 21:00) (112/48 - 128/58)  BP(mean): --  RR: 20 (02-23-22 @ 21:00) (17 - 20)  SpO2: 100% (02-23-22 @ 21:00) (95% - 100%)          Constitutional: No fever, fatigue  Skin: No rash.  Eyes: No recent vision problems or eye pain.  ENT: No congestion, ear pain, or sore throat.  Cardiovascular: No chest pain or palpation.  Respiratory: No cough, shortness of breath, congestion, or wheezing.  Gastrointestinal: No abdominal pain, nausea, vomiting, or diarrhea.  Genitourinary: No dysuria.  Musculoskeletal: No joint swelling.  Neurologic: No headache.    PHYSICAL EXAM:  GENERAL: NAD  EYES: EOMI, PERRLA  NECK: Supple, No JVD  CHEST/LUNG: dec breath sounds at bases  HEART:  S1 , S2 +  ABDOMEN: soft , bs+, abd wound +  EXTREMITIES:  edema+  NEUROLOGY:alert awake    LABS:  02-22    141  |  93<L>  |  29<H>  ----------------------------<  121<H>  4.4   |  20<L>  |  4.88<H>    Ca    10.1      22 Feb 2022 18:06  Phos  3.1     02-22  Mg     2.40     02-22      Creatinine Trend: 4.88 <--, 7.07 <--                        7.6    11.28 )-----------( 462      ( 23 Feb 2022 21:29 )             26.2     Urine Studies:

## 2022-02-23 NOTE — PROVIDER CONTACT NOTE (CRITICAL VALUE NOTIFICATION) - SITUATION
Troponin: 66
troponin: 61
patient on Hemodialysis tx. . lab drawn pre dialysis tx.
Blood work done in HD
Troponin 55
Pt Brigida Bond received a critical value, troponin of 58.

## 2022-02-23 NOTE — PROGRESS NOTE ADULT - SUBJECTIVE AND OBJECTIVE BOX
CARDIOLOGY FOLLOW UP - Dr. Bullock  DATE OF SERVICE: 2/23/22    CC no cp or sob      REVIEW OF SYSTEMS:  CONSTITUTIONAL: No fever, weight loss, or fatigue  RESPIRATORY: No cough, wheezing, chills or hemoptysis; No Shortness of Breath  CARDIOVASCULAR: No chest pain, palpitations, passing out, dizziness, or leg swelling  GASTROINTESTINAL: No abdominal or epigastric pain. No nausea, vomiting, or hematemesis; No diarrhea or constipation. No melena or hematochezia.      PHYSICAL EXAM:  T(C): 36.8 (02-23-22 @ 06:49), Max: 37.1 (02-22-22 @ 14:30)  HR: 70 (02-23-22 @ 07:14) (69 - 79)  BP: 112/48 (02-23-22 @ 06:49) (103/40 - 112/48)  RR: 17 (02-23-22 @ 06:49) (17 - 18)  SpO2: 98% (02-23-22 @ 07:14) (95% - 98%)  Wt(kg): --  I&O's Summary    22 Feb 2022 07:01  -  23 Feb 2022 07:00  --------------------------------------------------------  IN: 500 mL / OUT: 0 mL / NET: 500 mL        Appearance: Normal	  Cardiovascular: Normal S1 S2,RRR, No JVD, No murmurs  Respiratory: Lungs clear to auscultation	  Gastrointestinal:  Soft, Non-tender, + BS	  Extremities: Normal range of motion, No clubbing, cyanosis or edema      Home Medications:  aspirin 81 mg oral delayed release tablet: 1 tab(s) orally once a day (12 Jan 2022 17:49)  buPROPion 150 mg/24 hours (XL) oral tablet, extended release: 1 tab(s) orally once a day (in the morning) (12 Jan 2022 17:49)  cetirizine 10 mg oral tablet: 1 tab(s) orally once a day (12 Jan 2022 17:49)  dilTIAZem 120 mg/24 hours oral tablet, extended release: 1 tab(s) orally once a day (12 Jan 2022 17:49)  doxepin 25 mg oral capsule: 1 cap(s) orally once a day (at bedtime) (12 Jan 2022 17:49)  Eliquis 2.5 mg oral tablet: 1 tab(s) orally 2 times a day    Pharmacy states patient no longer wants to fill this medication (12 Jan 2022 17:49)  furosemide 80 mg oral tablet: 1 tab(s) orally once a day    Pharmacy states patient no longer wants to fill this medication (12 Jan 2022 17:49)  gabapentin 300 mg oral capsule: 1 cap(s) orally 2 times a day (12 Jan 2022 17:49)  hydrOXYzine hydrochloride 25 mg oral tablet: 1 tab(s) orally 2 times a day, As Needed (12 Jan 2022 17:49)  lanthanum 1000 mg oral tablet, chewable: 2 tab(s) orally with meals and 1 tab(s) orally with snacks    Pharmacy states patient no longer wants to fill this medication (12 Jan 2022 17:49)  mirtazapine 7.5 mg oral tablet: 1 tab(s) orally once a day (at bedtime) (12 Jan 2022 17:49)  montelukast 10 mg oral tablet: 1 tab(s) orally once a day (in the evening) (12 Jan 2022 17:49)  mupirocin 2% topical ointment: Apply sparingly to affected area 2 times a day as directed (12 Jan 2022 17:49)  nystatin 100,000 units/mL oral suspension: 5 milliliter(s) orally 4 times a day, as directed (12 Jan 2022 17:49)  omeprazole 20 mg oral delayed release capsule: 2 cap(s) orally once a day before breakfast (12 Jan 2022 17:49)  Pennsaid 2% topical solution: Apply topically to affected area 2 times a day (12 Jan 2022 17:49)  rosuvastatin 40 mg oral tablet: 1 tab(s) orally once a day (12 Jan 2022 17:49)  Santyl 250 units/g topical ointment: Apply topically to affected area once a day as directed (12 Jan 2022 17:49)  sertraline 50 mg oral tablet: 1 tab(s) orally once a day (12 Jan 2022 17:49)  Spiriva HandiHaler 18 mcg inhalation capsule: 1 cap(s) inhaled once a day (12 Jan 2022 17:49)  traZODone 100 mg oral tablet: 1 tab(s) orally once a day (at bedtime) (12 Jan 2022 17:49)  Tresiba FlexTouch 100 units/mL subcutaneous solution: 80 unit(s) subcutaneous once a day (at bedtime) (12 Jan 2022 17:49)  Trulicity Pen 1.5 mg/0.5 mL subcutaneous solution: 1 dose(s) subcutaneous once a week (12 Jan 2022 17:49)      MEDICATIONS  (STANDING):  apixaban 2.5 milliGRAM(s) Oral two times a day  aspirin enteric coated 81 milliGRAM(s) Oral daily  buPROPion XL (24-Hour) . 150 milliGRAM(s) Oral daily  chlorhexidine 2% Cloths 1 Application(s) Topical daily  cinacalcet 60 milliGRAM(s) Oral daily  cycloSPORINE  , modified (NEORAL) 125 milliGRAM(s) Oral every 12 hours  Dakins Solution - 1/4 Strength 1 Application(s) Topical daily  dextrose 40% Gel 15 Gram(s) Oral once  dextrose 5%. 1000 milliLiter(s) (50 mL/Hr) IV Continuous <Continuous>  dextrose 5%. 1000 milliLiter(s) (100 mL/Hr) IV Continuous <Continuous>  dextrose 50% Injectable 25 Gram(s) IV Push once  dextrose 50% Injectable 12.5 Gram(s) IV Push once  dextrose 50% Injectable 25 Gram(s) IV Push once  diltiazem    milliGRAM(s) Oral daily  diphenhydrAMINE 25 milliGRAM(s) Oral once  epoetin anabela-epbx (RETACRIT) Injectable 46309 Unit(s) IV Push <User Schedule>  gabapentin 300 milliGRAM(s) Oral daily  glucagon  Injectable 1 milliGRAM(s) IntraMuscular once  heparin   Injectable. 500 Unit(s) Dialysis. every 1 hour  insulin glargine Injectable (LANTUS) 16 Unit(s) SubCutaneous at bedtime  insulin lispro (ADMELOG) corrective regimen sliding scale   SubCutaneous three times a day before meals  insulin lispro (ADMELOG) corrective regimen sliding scale   SubCutaneous at bedtime  insulin lispro Injectable (ADMELOG) 10 Unit(s) SubCutaneous three times a day before meals  loratadine 10 milliGRAM(s) Oral daily  melatonin 6 milliGRAM(s) Oral at bedtime  midodrine. 5 milliGRAM(s) Oral <User Schedule>  mirtazapine 7.5 milliGRAM(s) Oral at bedtime  montelukast 10 milliGRAM(s) Oral at bedtime  oxyCODONE  ER Tablet 10 milliGRAM(s) Oral every 12 hours  pantoprazole    Tablet 40 milliGRAM(s) Oral two times a day  polyethylene glycol 3350 17 Gram(s) Oral two times a day  senna 2 Tablet(s) Oral at bedtime  sertraline 50 milliGRAM(s) Oral daily  sevelamer carbonate 800 milliGRAM(s) Oral three times a day with meals  sodium thiosulfate IVPB 25 Gram(s) IV Intermittent <User Schedule>  tacrolimus   0.1% Ointment 1 Application(s) Topical daily  traZODone 100 milliGRAM(s) Oral at bedtime      TELEMETRY: 	    ECG:  	  RADIOLOGY:   DIAGNOSTIC TESTING:  [ ] Echocardiogram:  [ ]  Catheterization:  [ ] Stress Test:    OTHER: 	    LABS:	 	    Creatine Kinase, Serum: 17 U/L [25 - 170] (02-20 @ 17:28)  CKMB Units: 1.1 ng/mL (02-20 @ 17:28)  Troponin T, High Sensitivity Result: 61 ng/L (02-20 @ 17:21)                          7.8    10.86 )-----------( 424      ( 22 Feb 2022 18:06 )             26.9     02-22    141  |  93<L>  |  29<H>  ----------------------------<  121<H>  4.4   |  20<L>  |  4.88<H>    Ca    10.1      22 Feb 2022 18:06  Phos  3.1     02-22  Mg     2.40     02-22

## 2022-02-23 NOTE — PROVIDER CONTACT NOTE (CRITICAL VALUE NOTIFICATION) - NAME OF MD/NP/PA/DO NOTIFIED:
FELIX Angel
Marylu Ayon, PA
Cynthia-#[id number]
Evelyn Hernández NP
ACP Aminah Castillo [id number]
TERESITA Lopez

## 2022-02-24 NOTE — PROGRESS NOTE ADULT - ASSESSMENT
56 yr old F with morbid obesity, CHAMP not on home O2, ESRD (HD MWF), HTN, DM2 A1C 7.0, COPD, Afib no longer on AC, chronic R pannus wound here with worsening wound, diarrhea.  Pt exhibits persistent steroid induced hyperglycemia despite increased insulin regimen.         1. Type 2 diabetes mellitus uncontrolled  A1c 7.0% (may be inaccurate in setting of ESRD)  Home Regimen: Tresiba 80 units HS and Trulicity 1.5mg subq weekly  While inpatient:  BG target 100-180 mg/dL  Trending at goal   Increased Lantus to 17 units SQ qHS   Increased Admelog to 11 units SQ TID before meals (Hold if NPO/not eating meal)    Continue Admelog correctional scale as LOW before meals and bedtime   Check BG before meals and bedtime  Hypoglycemia protocol   Consistent carbohydrate diet    Discharge Plan:  STOP Trulicity (patient ESRD on HD)  Steroids have been dc'd  For dc to rehab- recommend to continue current insulin management as outlined above  For dc to home- Recommend basal PO regimen  Patient was on Tresiba at home may benefit from a different Long acting insulin such as Lantus or Levemir given ESRD.  Tresiba is ultra-Long acting insulin  Please assess insurance coverage for basal insulin pens:  Levemir, Lantus, Basaglar, Toujeo or Semglee.   Recommend tradjenta 5 mg po daily, if not covered can see if januvia 25 mg po daily is covered.  Please obtain prior auth if needed as patitnet is ESRD and dpp4's are indicated for patients with ESRD  Also for d/c from rehab can start Prandin 1 mg po TID AC- hold if skips meal  Consider CGM (such as Freestyle Libre2 outpatient)  If desiring to followup with [facility] End[facility]: [address], [address], [place] [address], [phone]    2. HTN  Management per primary team     3. Hyperlipidemia  Can continue home dose of Atorvastatin 80 mg  Note, limited benefit in ESRD. Followup lipid panel as outpatient      Tamela Ruiz  Nurse Practitioner  Division of Endocrinology & Diabetes  Pager # [phone]      If after 6PM or before 9AM, or on weekends/holidays, please call endocrine answering service for assistance ([phone]).  For nonurgent matters email [email or web address] for assistance.

## 2022-02-24 NOTE — PROGRESS NOTE ADULT - SUBJECTIVE AND OBJECTIVE BOX
SUBJECTIVE / OVERNIGHT EVENTS:pt seen and examined, c/o pain at the wound site  2-24-22    MEDICATIONS  (STANDING):  apixaban 2.5 milliGRAM(s) Oral two times a day  aspirin enteric coated 81 milliGRAM(s) Oral daily  buPROPion XL (24-Hour) . 150 milliGRAM(s) Oral daily  chlorhexidine 2% Cloths 1 Application(s) Topical daily  cinacalcet 60 milliGRAM(s) Oral daily  cycloSPORINE  , modified (NEORAL) 125 milliGRAM(s) Oral every 12 hours  Dakins Solution - 1/4 Strength 1 Application(s) Topical daily  dextrose 40% Gel 15 Gram(s) Oral once  dextrose 5%. 1000 milliLiter(s) (50 mL/Hr) IV Continuous <Continuous>  dextrose 5%. 1000 milliLiter(s) (100 mL/Hr) IV Continuous <Continuous>  dextrose 50% Injectable 25 Gram(s) IV Push once  dextrose 50% Injectable 12.5 Gram(s) IV Push once  dextrose 50% Injectable 25 Gram(s) IV Push once  diltiazem    milliGRAM(s) Oral daily  diphenhydrAMINE 25 milliGRAM(s) Oral once  epoetin anabela-epbx (RETACRIT) Injectable 83009 Unit(s) IV Push <User Schedule>  gabapentin 300 milliGRAM(s) Oral daily  glucagon  Injectable 1 milliGRAM(s) IntraMuscular once  heparin   Injectable. 500 Unit(s) Dialysis. every 1 hour  insulin glargine Injectable (LANTUS) 16 Unit(s) SubCutaneous at bedtime  insulin lispro (ADMELOG) corrective regimen sliding scale   SubCutaneous three times a day before meals  insulin lispro (ADMELOG) corrective regimen sliding scale   SubCutaneous at bedtime  insulin lispro Injectable (ADMELOG) 10 Unit(s) SubCutaneous three times a day before meals  loratadine 10 milliGRAM(s) Oral daily  melatonin 6 milliGRAM(s) Oral at bedtime  midodrine. 5 milliGRAM(s) Oral <User Schedule>  mirtazapine 7.5 milliGRAM(s) Oral at bedtime  montelukast 10 milliGRAM(s) Oral at bedtime  oxyCODONE  ER Tablet 10 milliGRAM(s) Oral every 12 hours  pantoprazole    Tablet 40 milliGRAM(s) Oral two times a day  polyethylene glycol 3350 17 Gram(s) Oral two times a day  senna 2 Tablet(s) Oral at bedtime  sertraline 50 milliGRAM(s) Oral daily  sevelamer carbonate 800 milliGRAM(s) Oral three times a day with meals  sodium thiosulfate IVPB 25 Gram(s) IV Intermittent <User Schedule>  tacrolimus   0.1% Ointment 1 Application(s) Topical daily  traZODone 100 milliGRAM(s) Oral at bedtime    MEDICATIONS  (PRN):  acetaminophen     Tablet .. 650 milliGRAM(s) Oral every 8 hours PRN Mild Pain  diphenhydrAMINE Injectable 25 milliGRAM(s) IV Push <User Schedule> PRN Itching  oxyCODONE    IR 7.5 milliGRAM(s) Oral every 4 hours PRN Severe Pain (7 - 10)    Vital Signs Last 24 Hrs  T(C): 37 (02-23-22 @ 21:00), Max: 37.1 (02-23-22 @ 15:56)  T(F): 98.6 (02-23-22 @ 21:00), Max: 98.7 (02-23-22 @ 15:56)  HR: 75 (02-23-22 @ 21:00) (69 - 78)  BP: 117/54 (02-23-22 @ 21:00) (112/48 - 128/58)  BP(mean): --  RR: 20 (02-23-22 @ 21:00) (17 - 20)  SpO2: 100% (02-23-22 @ 21:00) (95% - 100%)          Constitutional: No fever, fatigue  Skin: No rash.  Eyes: No recent vision problems or eye pain.  ENT: No congestion, ear pain, or sore throat.  Cardiovascular: No chest pain or palpation.  Respiratory: No cough, shortness of breath, congestion, or wheezing.  Gastrointestinal: No abdominal pain, nausea, vomiting, or diarrhea.  Genitourinary: No dysuria.  Musculoskeletal: No joint swelling.  Neurologic: No headache.    PHYSICAL EXAM:  GENERAL: NAD  EYES: EOMI, PERRLA  NECK: Supple, No JVD  CHEST/LUNG: dec breath sounds at bases  HEART:  S1 , S2 +  ABDOMEN: soft , bs+, abd wound +  EXTREMITIES:  edema+  NEUROLOGY:alert awake    LABS:  02-22    141  |  93<L>  |  29<H>  ----------------------------<  121<H>  4.4   |  20<L>  |  4.88<H>    Ca    10.1      22 Feb 2022 18:06  Phos  3.1     02-22  Mg     2.40     02-22      Creatinine Trend: 4.88 <--, 7.07 <--                        7.6    11.28 )-----------( 462      ( 23 Feb 2022 21:29 )             26.2     Urine Studies:

## 2022-02-24 NOTE — PROGRESS NOTE ADULT - SUBJECTIVE AND OBJECTIVE BOX
[facility] NEPHROLOGY- PROGRESS NOTE    56y Female with history of ESRD on HD presents with vomiting and diarrhea found to be COVID-19 positive. Nephrology consulted for ESRD status.    REVIEW OF SYSTEMS:  Gen: no changes in weight  Cards: no chest pain  Resp: no dyspnea  GI: no nausea or vomiting or diarrhea + ab wound pain  Vascular: no LE edema    caffeine (Nausea)  latex (Rash)  penicillin (Nausea)  strawberry (Rash)      Hospital Medications: Medications reviewed      VITALS:  T(F): 98.5 (02-24-22 @ 06:30), Max: 98.7 (02-23-22 @ 15:56)  HR: 74 (02-24-22 @ 07:42)  BP: 108/49 (02-24-22 @ 06:30)  RR: 18 (02-24-22 @ 06:30)  SpO2: 98% (02-24-22 @ 07:42)  Wt(kg): --    02-23 @ 07:01  -  02-24 @ 07:00  --------------------------------------------------------  IN: 400 mL / OUT: 1900 mL / NET: -1500 mL        PHYSICAL EXAM:    Gen: NAD, calm  Cards: RRR, +S1/S2, no M/G/R  Resp: CTA B/L  GI: soft, RLQ bandage/tender  Vascular: no LE edema B/L, LUE AVF + bruit/thrill        LABS:  02-22    141  |  93<L>  |  29<H>  ----------------------------<  121<H>  4.4   |  20<L>  |  4.88<H>    Ca    10.1      22 Feb 2022 18:06  Phos  3.1     02-22  Mg     2.40     02-22      Creatinine Trend: 4.88 <--, 7.07 <--                        7.6    11.28 )-----------( 462      ( 23 Feb 2022 21:29 )             26.2     Urine Studies:

## 2022-02-24 NOTE — CHART NOTE - NSCHARTNOTEFT_GEN_A_CORE
Nutrition / RD follow-up for length of stay.  Patient a 55 yo f with worsening pannus wound, receiving wound care, s/p abx, A fib, ESRD on HD, Covid-19, DM, diarrhea (now improved). Variable PO intake noted in nursing flowsheets.    Source: Patient [ ]    Family [ ]     other [X] Chart Review    Diet, Consistent Carbohydrate Renal w/Evening Snack:   Supplement Feeding Modality:  Oral  Nepro Cans or Servings Per Day:  1       Frequency:  Three Times a day (02-07-22 @ 12:23)    Nepro 3x daily (provides add'l1,275 kcals, 52.3g protein).     Current Weight: Per nursing flowsheets, bedscale not functioning      Last weight 2/18 - 129.5kg  post HD      2/16 - 134kg post HD      2/14 - 130.5kg post HD      2/11 - 138.2kg post HD    Weight fluctuations likely regarding fluid status in setting of ESRD/HD.    Pertinent Medications:   buPROPion XL (24-Hour) .  cinacalcet  dextrose 40% Gel  dextrose 5%.  dextrose 5%.  dextrose 50% Injectable  dextrose 50% Injectable  dextrose 50% Injectable  diltiazem   CD  diphenhydrAMINE  gabapentin  glucagon  Injectable  insulin glargine Injectable (LANTUS)  insulin lispro (ADMELOG) corrective regimen sliding scale  insulin lispro (ADMELOG) corrective regimen sliding scale  insulin lispro Injectable (ADMELOG)  loratadine  melatonin  midodrine.  mirtazapine  montelukast  oxyCODONE  ER Tablet  pantoprazole    Tablet  polyethylene glycol 3350  senna  sertraline  traZODone    Pertinent Labs:  02-22 Na141 mmol/L Glu 121 mg/dL<H> K+ 4.4 mmol/L Cr  4.88 mg/dL<H> BUN 29 mg/dL<H> 02-22 Phos 3.1 mg/dL 02-23 Chol --    LDL --    HDL --    Trig 183 mg/dL<H>    CAPILLARY BLOOD GLUCOSE  POCT Blood Glucose.: 207 mg/dL (24 Feb 2022 12:34)  POCT Blood Glucose.: 205 mg/dL (24 Feb 2022 12:18)  POCT Blood Glucose.: 166 mg/dL (24 Feb 2022 09:18)  POCT Blood Glucose.: 138 mg/dL (24 Feb 2022 07:44)  POCT Blood Glucose.: 139 mg/dL (24 Feb 2022 00:53)  POCT Blood Glucose.: 172 mg/dL (23 Feb 2022 22:47)  POCT Blood Glucose.: 172 mg/dL (23 Feb 2022 19:58)  POCT Blood Glucose.: 128 mg/dL (23 Feb 2022 17:38)      Skin: No pressure injuries noted; + abdominal wound; generalized 1+ edema noted per nursing flowsheets    Estimated Needs:   [ X ] no change since previous assessment  [ ] recalculated:       Previous Nutrition Diagnosis: Increased Nutrient Needs - Calories and Protein    Nutrition Diagnosis is [X] ongoing  [ ] resolved [ ] not applicable     Goals:  -Patient to meet >75% of nutritional needs via tolerated route.  -Promote/maintain skin integrity & prevent skin breakdown.  -Maintain bowel fxn; BM every 1 - 3 days.  -Maintain hydration status i.e. maintain good skin turgor, maintain moist mucous membranes.    Additional Recommendations:  1) Please consistently document % PO intake in nursing flowsheets to assess adequacy of nutritional intake/monitor need for further nutritional intervention(s).   2) Obtain and record current weights;   3) Honor food preferences as requested and available. Nutrition / RD follow-up for length of stay.  Patient a 55 yo f with worsening pannus wound, receiving wound care, s/p abx, A fib, ESRD on HD, Covid-19, DM, diarrhea (now improved). Variable PO intake noted in nursing flowsheets. Patient reported appetite has improved, stated she has been getting creative with menu items i.e. requests salads with some meals and cuts up chicken and makes a chicken salad. Denies having a strawberry allergy.  No reported GI distress i.e. nausea, vomiting, diarrhea.  No reported chewing or swallowing difficulties.    Source: Patient [X]    Family [ ]     other [X] Chart Review    Diet, Consistent Carbohydrate Renal w/Evening Snack:   Supplement Feeding Modality:  Oral  Nepro Cans or Servings Per Day:  1       Frequency:  Three Times a day (02-07-22 @ 12:23)    Nepro 3x daily (provides add'l 1,275 kcals, 52.3g protein).     Current Weight: Per nursing flowsheets, bedscale not functioning      Last weight 2/18 - 129.5kg  post HD      2/16 - 134kg post HD      2/14 - 130.5kg post HD      2/11 - 138.2kg post HD    Weight fluctuations likely regarding fluid status in setting of ESRD/HD.    Pertinent Medications:   buPROPion XL (24-Hour) .  cinacalcet  dextrose 40% Gel  dextrose 5%.  dextrose 5%.  dextrose 50% Injectable  dextrose 50% Injectable  dextrose 50% Injectable  diltiazem   CD  diphenhydrAMINE  gabapentin  glucagon  Injectable  insulin glargine Injectable (LANTUS)  insulin lispro (ADMELOG) corrective regimen sliding scale  insulin lispro (ADMELOG) corrective regimen sliding scale  insulin lispro Injectable (ADMELOG)  loratadine  melatonin  midodrine.  mirtazapine  montelukast  oxyCODONE  ER Tablet  pantoprazole    Tablet  polyethylene glycol 3350  senna  sertraline  traZODone    Pertinent Labs:  02-22 Na141 mmol/L Glu 121 mg/dL<H> K+ 4.4 mmol/L Cr  4.88 mg/dL<H> BUN 29 mg/dL<H> 02-22 Phos 3.1 mg/dL 02-23 Chol --    LDL --    HDL --    Trig 183 mg/dL<H>    CAPILLARY BLOOD GLUCOSE  POCT Blood Glucose.: 207 mg/dL (24 Feb 2022 12:34)  POCT Blood Glucose.: 205 mg/dL (24 Feb 2022 12:18)  POCT Blood Glucose.: 166 mg/dL (24 Feb 2022 09:18)  POCT Blood Glucose.: 138 mg/dL (24 Feb 2022 07:44)  POCT Blood Glucose.: 139 mg/dL (24 Feb 2022 00:53)  POCT Blood Glucose.: 172 mg/dL (23 Feb 2022 22:47)  POCT Blood Glucose.: 172 mg/dL (23 Feb 2022 19:58)  POCT Blood Glucose.: 128 mg/dL (23 Feb 2022 17:38)      Skin: No pressure injuries noted; + abdominal wound; generalized 1+ edema noted per nursing flowsheets    Estimated Needs:   [ X ] no change since previous assessment  [ ] recalculated:       Previous Nutrition Diagnosis: Increased Nutrient Needs - Calories and Protein    Nutrition Diagnosis is [X] ongoing  [ ] resolved [ ] not applicable     Goals:  -Patient to meet >75% of nutritional needs via tolerated route.  -Promote/maintain skin integrity & prevent skin breakdown.  -Maintain bowel fxn; BM every 1 - 3 days.  -Maintain hydration status i.e. maintain good skin turgor, maintain moist mucous membranes.    Additional Recommendations:  1) Please consistently document % PO intake in nursing flowsheets to assess adequacy of nutritional intake/monitor need for further nutritional intervention(s).   2) Obtain and record current weights;   3) Honor food preferences as requested and available.

## 2022-02-24 NOTE — PROGRESS NOTE ADULT - SUBJECTIVE AND OBJECTIVE BOX
CC: DM 2 uncontrolled with hyperglycemia     HPI: Denies complaints, no hypoglycemia. fair appetite    MEDICATIONS  (STANDING):  apixaban 2.5 milliGRAM(s) Oral two times a day  aspirin enteric coated 81 milliGRAM(s) Oral daily  buPROPion XL (24-Hour) . 150 milliGRAM(s) Oral daily  chlorhexidine 2% Cloths 1 Application(s) Topical daily  cinacalcet 60 milliGRAM(s) Oral daily  cycloSPORINE  , modified (NEORAL) 125 milliGRAM(s) Oral every 12 hours  Dakins Solution - 1/4 Strength 1 Application(s) Topical daily  dextrose 40% Gel 15 Gram(s) Oral once  dextrose 5%. 1000 milliLiter(s) (50 mL/Hr) IV Continuous <Continuous>  dextrose 5%. 1000 milliLiter(s) (100 mL/Hr) IV Continuous <Continuous>  dextrose 50% Injectable 25 Gram(s) IV Push once  dextrose 50% Injectable 12.5 Gram(s) IV Push once  dextrose 50% Injectable 25 Gram(s) IV Push once  diltiazem    milliGRAM(s) Oral daily  diphenhydrAMINE 25 milliGRAM(s) Oral once  epoetin anabela-epbx (RETACRIT) Injectable 98273 Unit(s) IV Push <User Schedule>  gabapentin 300 milliGRAM(s) Oral daily  glucagon  Injectable 1 milliGRAM(s) IntraMuscular once  heparin   Injectable. 500 Unit(s) Dialysis. every 1 hour  insulin glargine Injectable (LANTUS) 16 Unit(s) SubCutaneous at bedtime  insulin lispro (ADMELOG) corrective regimen sliding scale   SubCutaneous three times a day before meals  insulin lispro (ADMELOG) corrective regimen sliding scale   SubCutaneous at bedtime  insulin lispro Injectable (ADMELOG) 10 Unit(s) SubCutaneous three times a day before meals  loratadine 10 milliGRAM(s) Oral daily  melatonin 6 milliGRAM(s) Oral at bedtime  mirtazapine 7.5 milliGRAM(s) Oral at bedtime  montelukast 10 milliGRAM(s) Oral at bedtime  oxyCODONE  ER Tablet 10 milliGRAM(s) Oral every 12 hours  pantoprazole    Tablet 40 milliGRAM(s) Oral before breakfast  polyethylene glycol 3350 17 Gram(s) Oral two times a day  senna 2 Tablet(s) Oral at bedtime  sertraline 50 milliGRAM(s) Oral daily  sevelamer carbonate 800 milliGRAM(s) Oral three times a day with meals  sodium thiosulfate IVPB 25 Gram(s) IV Intermittent <User Schedule>  tacrolimus   0.1% Ointment 1 Application(s) Topical daily  traZODone 100 milliGRAM(s) Oral at bedtime    ALLERGIES  Latex (Rash)  Penicillin (Nausea)  Strawberry (Rash)    Intolerances  caffeine (Nausea)    REVIEW OF SYSTEMS:  General: No fevers  GI: denies N/V, see HPI    Vital Signs Last 24 Hrs  T(C): 36.9 (24 Feb 2022 06:30), Max: 37 (23 Feb 2022 21:00)  T(F): 98.5 (24 Feb 2022 06:30), Max: 98.6 (23 Feb 2022 21:00)  HR: 74 (24 Feb 2022 07:42) (64 - 77)  BP: 108/49 (24 Feb 2022 06:30) (107/55 - 117/54)  BP(mean): --  RR: 18 (24 Feb 2022 06:30) (18 - 20)  SpO2: 98% (24 Feb 2022 07:42) (98% - 100%)  GENERAL: NAD  EYES: No proptosis, no lid lag, anicteric  HEENT:  Atraumatic, Normocephalic  RESPIRATORY: Non labored respirations       CAPILLARY BLOOD GLUCOSE    POCT Blood Glucose.: 207 mg/dL (24 Feb 2022 12:34)  POCT Blood Glucose.: 205 mg/dL (24 Feb 2022 12:18)  POCT Blood Glucose.: 166 mg/dL (24 Feb 2022 09:18)  POCT Blood Glucose.: 138 mg/dL (24 Feb 2022 07:44)  POCT Blood Glucose.: 139 mg/dL (24 Feb 2022 00:53)  POCT Blood Glucose.: 172 mg/dL (23 Feb 2022 22:47)  POCT Blood Glucose.: 172 mg/dL (23 Feb 2022 19:58)  POCT Blood Glucose.: 128 mg/dL (23 Feb 2022 17:38)  POCT Blood Glucose.: 172 mg/dL (22 Feb 2022 12:01)  POCT Blood Glucose.: 208 mg/dL (22 Feb 2022 08:18)  POCT Blood Glucose.: 103 mg/dL (22 Feb 2022 00:08)  POCT Blood Glucose.: 155 mg/dL (21 Feb 2022 17:03)  POCT Blood Glucose.: 163 mg/dL (21 Feb 2022 11:47)  POCT Blood Glucose.: 172 mg/dL (21 Feb 2022 07:52)  POCT Blood Glucose.: 91 mg/dL (20 Feb 2022 21:21)  POCT Blood Glucose.: 130 mg/dL (20 Feb 2022 17:12)  POCT Blood Glucose.: 113 mg/dL (18 Feb 2022 15:09)  POCT Blood Glucose.: 124 mg/dL (18 Feb 2022 14:08)  POCT Blood Glucose.: 139 mg/dL (18 Feb 2022 12:37)  POCT Blood Glucose.: 148 mg/dL (18 Feb 2022 09:55)  POCT Blood Glucose.: 128 mg/dL (18 Feb 2022 07:51)  POCT Blood Glucose.: 120 mg/dL (17 Feb 2022 21:00)  POCT Blood Glucose.: 171 mg/dL (17 Feb 2022 17:06)      A1C with Estimated Average Glucose Result: 7.0 % (01-13-22 @ 08:21)  A1C with Estimated Average Glucose Result: 6.7 % (08-31-21 @ 09:30)  A1C with Estimated Average Glucose Result: 7.2 % (04-28-21 @ 07:04)    Diet, Consistent Carbohydrate Renal w/Evening Snack:   Supplement Feeding Modality:  Oral  Nepro Cans or Servings Per Day:  1       Frequency:  Three Times a day (02-07-22 @ 12:23) [Active]

## 2022-02-24 NOTE — PROGRESS NOTE ADULT - ASSESSMENT
56y Female with history of ESRD on HD presents with vomiting and diarrhea found to be COVID-19 positive. Nephrology consulted for ESRD status.    1) ESRD: Last HD on 2/23 tolerated well with 1.5L removed. Plan for next maintenance HD on 2/25. Monitor electrolytes.    2) HTN with ESRD: BP low normal. Continue with midodrine pre-HD on HD days to avoid intradialytic hypotension. Cardizem as per cardiology. Monitor BP.    3) Anemia of renal disease: Hb low with elevated ferritin s/p PRBC with HD on 2/21. Continue with Epo 16K with HD. Monitor Hb.    4) Secondary HPT of renal origin: Phosphorus acceptable. iPTH low normal however sensipar already decreased to 60 mg PO daily. Continue with renvela 1 tab with meals (goal < 4.5 given concerns for calciphylaxis). Low calcium bath with HD. Monitor serum calcium and phosphorus.    5) Ab wound: Appreciate dermatology follow up. Ddx includes calciphylaxis versus pyoderma gangrenosum. S/P biopsy. Continue with empiric sodium thiosulfate with HD. On CSA as per Derm as benefits outweigh risks to residual renal function (which is minimal at this point).      [facility] NEPHROLOGY  Keaton Lopez M.D.  Juma Green D.O.  Myla Hoffmann M.D.  Julia Brewer, MSN, ANP-C    Telephone: [phone]  Facsimile: [phone]    [address] [place] [address]

## 2022-02-24 NOTE — PROGRESS NOTE ADULT - SUBJECTIVE AND OBJECTIVE BOX
[facility]-- WOUND TEAM -- FOLLOW UP NOTE  --------------------------------------------------------------------------------    24 hour events/subjective:  Called by ACP to reval pt since Pt with complaint of worsening pain from the abdominal wound radiating around her back and to left knee.   Upon exam, pt said she received pain medication and it had taken the edge off.  Pt appeared to be in NAD but said she still had some pain.     Diet:  Diet, Consistent Carbohydrate Renal w/Evening Snack:   Supplement Feeding Modality:  Oral  Nepro Cans or Servings Per Day:  1       Frequency:  Three Times a day (02-07-22 @ 12:23)      ROS: General/ SKIN/ MSK/ Neuro/ GI see HPI  all other systems negative      ALLERGIES & MEDICATIONS  --------------------------------------------------------------------------------  Allergies    latex (Rash)  penicillin (Nausea)  strawberry (Rash)    Intolerances    caffeine (Nausea)        STANDING INPATIENT MEDICATIONS    apixaban 2.5 milliGRAM(s) Oral two times a day  aspirin enteric coated 81 milliGRAM(s) Oral daily  buPROPion XL (24-Hour) . 150 milliGRAM(s) Oral daily  chlorhexidine 2% Cloths 1 Application(s) Topical daily  cinacalcet 60 milliGRAM(s) Oral daily  cycloSPORINE  , modified (NEORAL) 125 milliGRAM(s) Oral every 12 hours  Dakins Solution - 1/4 Strength 1 Application(s) Topical daily  dextrose 40% Gel 15 Gram(s) Oral once  dextrose 5%. 1000 milliLiter(s) IV Continuous <Continuous>  dextrose 5%. 1000 milliLiter(s) IV Continuous <Continuous>  dextrose 50% Injectable 25 Gram(s) IV Push once  dextrose 50% Injectable 12.5 Gram(s) IV Push once  dextrose 50% Injectable 25 Gram(s) IV Push once  diltiazem    milliGRAM(s) Oral daily  diphenhydrAMINE 25 milliGRAM(s) Oral once  epoetin anabela-epbx (RETACRIT) Injectable 87604 Unit(s) IV Push <User Schedule>  gabapentin 300 milliGRAM(s) Oral daily  glucagon  Injectable 1 milliGRAM(s) IntraMuscular once  heparin   Injectable. 500 Unit(s) Dialysis. every 1 hour  insulin glargine Injectable (LANTUS) 17 Unit(s) SubCutaneous at bedtime  insulin lispro (ADMELOG) corrective regimen sliding scale   SubCutaneous three times a day before meals  insulin lispro (ADMELOG) corrective regimen sliding scale   SubCutaneous at bedtime  insulin lispro Injectable (ADMELOG) 11 Unit(s) SubCutaneous three times a day before meals  loratadine 10 milliGRAM(s) Oral daily  melatonin 6 milliGRAM(s) Oral at bedtime  midodrine. 5 milliGRAM(s) Oral <User Schedule>  mirtazapine 7.5 milliGRAM(s) Oral at bedtime  montelukast 10 milliGRAM(s) Oral at bedtime  oxyCODONE  ER Tablet 10 milliGRAM(s) Oral every 12 hours  pantoprazole    Tablet 40 milliGRAM(s) Oral two times a day  polyethylene glycol 3350 17 Gram(s) Oral two times a day  senna 2 Tablet(s) Oral at bedtime  sertraline 50 milliGRAM(s) Oral daily  sevelamer carbonate 800 milliGRAM(s) Oral three times a day with meals  sodium thiosulfate IVPB 25 Gram(s) IV Intermittent <User Schedule>  tacrolimus   0.1% Ointment 1 Application(s) Topical daily  traZODone 100 milliGRAM(s) Oral at bedtime      PRN INPATIENT MEDICATION  acetaminophen     Tablet .. 650 milliGRAM(s) Oral every 8 hours PRN  diphenhydrAMINE Injectable 25 milliGRAM(s) IV Push <User Schedule> PRN  oxyCODONE    IR 7.5 milliGRAM(s) Oral every 4 hours PRN        VITALS/PHYSICAL EXAM  --------------------------------------------------------------------------------  T(C): 37 (02-24-22 @ 18:10), Max: 37 (02-23-22 @ 21:00)  HR: 70 (02-24-22 @ 19:32) (64 - 77)  BP: 118/51 (02-24-22 @ 18:10) (107/55 - 118/51)  RR: 17 (02-24-22 @ 18:10) (17 - 20)  SpO2: 99% (02-24-22 @ 19:32) (98% - 100%)  Wt(kg): --        02-23-22 @ 07:01  -  02-24-22 @ 07:00  --------------------------------------------------------  IN: 400 mL / OUT: 1900 mL / NET: -1500 mL    02-24-22 @ 07:01  -  02-24-22 @ 19:36  --------------------------------------------------------  IN: 240 mL / OUT: 0 mL / NET: 240 mL      NAD /  A&Ox3/    Obese  well groomed  low air loss fluidized mattress       HEENT:  NC/AT, EOMI, mucosa moist,  neck supple  PSYCHE: appropriate  RESPIRATORY:  equal chest rise  GASTROINTESTINAL:  soft NT/ND ,   NEUROLOGY:  full  strength BUE, weakened strength BLE & sensation grossly intact      follows commands  MUSCULOSKELETAL:  FROM BUE, decreased ROM BLE, no deformities/ contractures  VASCULAR:  BLE equally warm,  no cyanosis, clubbing, edema     DP pulses palpable     no acute ischemia noted     SERA AVF  SKIN:  moist w/ good turgor     abdominal wound:  [id number], 2cm at 1:00, 3cm at 11:00; 50% granulation tissue, 50% slough.  malodorous.  Seropurulent drainage moderate     No periowund, erythema, increased warmth, increased tenderness, induration, fluctuance or crepitus.  Pt tolerated exam, cleansing of wound and dressing change with minimal discomfort (not out of proportion as expected with wound)      LABS/ CULTURES/ RADIOLOGY:              7.6    11.28 >-----------<  462      [02-23-22 @ 21:29]              26.2         CAPILLARY BLOOD GLUCOSE      POCT Blood Glucose.: 107 mg/dL (24 Feb 2022 17:20)  POCT Blood Glucose.: 207 mg/dL (24 Feb 2022 12:34)  POCT Blood Glucose.: 205 mg/dL (24 Feb 2022 12:18)  POCT Blood Glucose.: 166 mg/dL (24 Feb 2022 09:18)  POCT Blood Glucose.: 138 mg/dL (24 Feb 2022 07:44)  POCT Blood Glucose.: 139 mg/dL (24 Feb 2022 00:53)  POCT Blood Glucose.: 172 mg/dL (23 Feb 2022 22:47)  POCT Blood Glucose.: 172 mg/dL (23 Feb 2022 19:58)      A1C with Estimated Average Glucose Result: 7.0 % (01-13-22 @ 08:21)  A1C with Estimated Average Glucose Result: 6.7 % (08-31-21 @ 09:30)

## 2022-02-24 NOTE — PROGRESS NOTE ADULT - ASSESSMENT
A/P:  56y old  Female  ESRD with calciphylaxis and open riqht wound pannicula with hx of morbid obesity, CHAMP not on home O2, ESRD (HD MWF), HTN, DM, COPD, Afib  ( on ac ) chronic R pannus wound. Patient followed by derm as well, s/p punch biopsy by Derm. Derm biopsy non-specific findings, no obvious intravascular calcium deposits although limited sample of subcutaneous tissue. Ct- abdomen "There are no subcutaneous tissue calcifications. Specifically no subcutaneous tissue calcifications in the patient's abdominal wall pannus." Current working diagnosis of Pyoderma Gangrenosum although Calciphylaxis remains in differential. Patient remains on systemic IV steroid, on empiric sodium thiosulfate, Cyclosporin has been added. Of note attempted Irrigation VAC 1/26 - 1 /28, noted deterioration of wound with NPWt/VAc therapy, therapy d/c'd, likely pathergy. Will continue to avoid surgical debridement. Collagenase d/c'd to avoid enzymatic debridement as well.     Wound Consult requested to assist w/ management of increased pain from abdominal wound and reevaluation      On exam:  Abdominal wound Dx: Pyoderma Gangrenosum    Recommendations:  Abdominal wound:  Upon exam,  findings appear c/w previous exam by Dr. Layne on 2-16-22 (measurements vary depending upon person measuring).  There does not appear to be an acute wound issue.  Continue current treatment (cleanse with 1/4 dakins, tacrolimus to wound bed followed by adaptic then aquacel Ag then ABD)  Recommend reconsulting the pain service for better pain management  BLE elevation  Abx per Medicine/ ID  Moisturize intact skin w/ SWEEN cream BID  Nutrition Consult for optimization as tolerated in pt w/ Protein Calorie Malnutrition; Inadequate PO intake        consider MVI & Vit C to promote wound healing        encourage high quality protein, w/ supplements such as Ensure/ Glucerna  Hyperglycemia - consider HgA1c        FS w/ ISS q6h, consider Endo Consult  Anemia- transfusions, Fe studies  Continue turning and positioning w/ offloading assistive devices as per protocol  Continue w/ Pericare as per protocol  Waffle Cushion to chair when oob to chair  Continue w/ low air loss fluidized bed surface   Care as per medicine, will follow w/ you  Upon discharge f/u as outpatient at [facility] [address] [facility] [phone]  Pt seen with Wound Care RN.  D/w Team (ACP)  Thank you for this consult    Karine Aguiar MD  Wound Care/Surgery    I spent   25   minutes face to face with this pt of which more than 50% of this time was spent counseling and coordinating care of this pt.

## 2022-02-24 NOTE — PROGRESS NOTE ADULT - SUBJECTIVE AND OBJECTIVE BOX
CARDIOLOGY FOLLOW UP - Dr. Bullock  DATE OF SERVICE: 2/24/22     CC no acute events       REVIEW OF SYSTEMS:  CONSTITUTIONAL: No fever, weight loss, or fatigue  RESPIRATORY: No cough, wheezing, chills or hemoptysis; No Shortness of Breath  CARDIOVASCULAR: No chest pain, palpitations, passing out, dizziness, or leg swelling  GASTROINTESTINAL: No abdominal or epigastric pain. No nausea, vomiting, or hematemesis; No diarrhea or constipation. No melena or hematochezia.  VASCULAR: No edema     PHYSICAL EXAM:  T(C): 36.9 (02-24-22 @ 06:30), Max: 37.1 (02-23-22 @ 15:56)  HR: 74 (02-24-22 @ 07:42) (64 - 77)  BP: 108/49 (02-24-22 @ 06:30) (107/55 - 128/58)  RR: 18 (02-24-22 @ 06:30) (18 - 20)  SpO2: 98% (02-24-22 @ 07:42) (98% - 100%)  Wt(kg): --  I&O's Summary    23 Feb 2022 07:01  -  24 Feb 2022 07:00  --------------------------------------------------------  IN: 400 mL / OUT: 1900 mL / NET: -1500 mL        Appearance: Normal	  Cardiovascular: Normal S1 S2,RRR, No JVD, No murmurs  Respiratory: Lungs clear to auscultation	  Gastrointestinal:  Soft, Non-tender, + BS	  Extremities: Normal range of motion, No clubbing, cyanosis or edema      Home Medications:  aspirin 81 mg oral delayed release tablet: 1 tab(s) orally once a day (12 Jan 2022 17:49)  buPROPion 150 mg/24 hours (XL) oral tablet, extended release: 1 tab(s) orally once a day (in the morning) (12 Jan 2022 17:49)  cetirizine 10 mg oral tablet: 1 tab(s) orally once a day (12 Jan 2022 17:49)  dilTIAZem 120 mg/24 hours oral tablet, extended release: 1 tab(s) orally once a day (12 Jan 2022 17:49)  doxepin 25 mg oral capsule: 1 cap(s) orally once a day (at bedtime) (12 Jan 2022 17:49)  Eliquis 2.5 mg oral tablet: 1 tab(s) orally 2 times a day    Pharmacy states patient no longer wants to fill this medication (12 Jan 2022 17:49)  furosemide 80 mg oral tablet: 1 tab(s) orally once a day    Pharmacy states patient no longer wants to fill this medication (12 Jan 2022 17:49)  gabapentin 300 mg oral capsule: 1 cap(s) orally 2 times a day (12 Jan 2022 17:49)  hydrOXYzine hydrochloride 25 mg oral tablet: 1 tab(s) orally 2 times a day, As Needed (12 Jan 2022 17:49)  lanthanum 1000 mg oral tablet, chewable: 2 tab(s) orally with meals and 1 tab(s) orally with snacks    Pharmacy states patient no longer wants to fill this medication (12 Jan 2022 17:49)  mirtazapine 7.5 mg oral tablet: 1 tab(s) orally once a day (at bedtime) (12 Jan 2022 17:49)  montelukast 10 mg oral tablet: 1 tab(s) orally once a day (in the evening) (12 Jan 2022 17:49)  mupirocin 2% topical ointment: Apply sparingly to affected area 2 times a day as directed (12 Jan 2022 17:49)  nystatin 100,000 units/mL oral suspension: 5 milliliter(s) orally 4 times a day, as directed (12 Jan 2022 17:49)  omeprazole 20 mg oral delayed release capsule: 2 cap(s) orally once a day before breakfast (12 Jan 2022 17:49)  Pennsaid 2% topical solution: Apply topically to affected area 2 times a day (12 Jan 2022 17:49)  rosuvastatin 40 mg oral tablet: 1 tab(s) orally once a day (12 Jan 2022 17:49)  Santyl 250 units/g topical ointment: Apply topically to affected area once a day as directed (12 Jan 2022 17:49)  sertraline 50 mg oral tablet: 1 tab(s) orally once a day (12 Jan 2022 17:49)  Spiriva HandiHaler 18 mcg inhalation capsule: 1 cap(s) inhaled once a day (12 Jan 2022 17:49)  traZODone 100 mg oral tablet: 1 tab(s) orally once a day (at bedtime) (12 Jan 2022 17:49)  Tresiba FlexTouch 100 units/mL subcutaneous solution: 80 unit(s) subcutaneous once a day (at bedtime) (12 Jan 2022 17:49)  Trulicity Pen 1.5 mg/0.5 mL subcutaneous solution: 1 dose(s) subcutaneous once a week (12 Jan 2022 17:49)      MEDICATIONS  (STANDING):  apixaban 2.5 milliGRAM(s) Oral two times a day  aspirin enteric coated 81 milliGRAM(s) Oral daily  buPROPion XL (24-Hour) . 150 milliGRAM(s) Oral daily  chlorhexidine 2% Cloths 1 Application(s) Topical daily  cinacalcet 60 milliGRAM(s) Oral daily  cycloSPORINE  , modified (NEORAL) 125 milliGRAM(s) Oral every 12 hours  Dakins Solution - 1/4 Strength 1 Application(s) Topical daily  dextrose 40% Gel 15 Gram(s) Oral once  dextrose 5%. 1000 milliLiter(s) (50 mL/Hr) IV Continuous <Continuous>  dextrose 5%. 1000 milliLiter(s) (100 mL/Hr) IV Continuous <Continuous>  dextrose 50% Injectable 25 Gram(s) IV Push once  dextrose 50% Injectable 12.5 Gram(s) IV Push once  dextrose 50% Injectable 25 Gram(s) IV Push once  diltiazem    milliGRAM(s) Oral daily  diphenhydrAMINE 25 milliGRAM(s) Oral once  epoetin anabela-epbx (RETACRIT) Injectable 98417 Unit(s) IV Push <User Schedule>  gabapentin 300 milliGRAM(s) Oral daily  glucagon  Injectable 1 milliGRAM(s) IntraMuscular once  heparin   Injectable. 500 Unit(s) Dialysis. every 1 hour  insulin glargine Injectable (LANTUS) 16 Unit(s) SubCutaneous at bedtime  insulin lispro (ADMELOG) corrective regimen sliding scale   SubCutaneous three times a day before meals  insulin lispro (ADMELOG) corrective regimen sliding scale   SubCutaneous at bedtime  insulin lispro Injectable (ADMELOG) 10 Unit(s) SubCutaneous three times a day before meals  loratadine 10 milliGRAM(s) Oral daily  melatonin 6 milliGRAM(s) Oral at bedtime  midodrine. 5 milliGRAM(s) Oral <User Schedule>  mirtazapine 7.5 milliGRAM(s) Oral at bedtime  montelukast 10 milliGRAM(s) Oral at bedtime  oxyCODONE  ER Tablet 10 milliGRAM(s) Oral every 12 hours  pantoprazole    Tablet 40 milliGRAM(s) Oral two times a day  polyethylene glycol 3350 17 Gram(s) Oral two times a day  senna 2 Tablet(s) Oral at bedtime  sertraline 50 milliGRAM(s) Oral daily  sevelamer carbonate 800 milliGRAM(s) Oral three times a day with meals  sodium thiosulfate IVPB 25 Gram(s) IV Intermittent <User Schedule>  tacrolimus   0.1% Ointment 1 Application(s) Topical daily  traZODone 100 milliGRAM(s) Oral at bedtime      TELEMETRY: 	    ECG:  	  RADIOLOGY:   DIAGNOSTIC TESTING:  [ ] Echocardiogram:  [ ]  Catheterization:  [ ] Stress Test:    OTHER: 	    LABS:	 	    Troponin T, High Sensitivity Result: 70 ng/L (02-23 @ 21:29)  Creatine Kinase, Serum: 17 U/L [25 - 170] (02-20 @ 17:28)  CKMB Units: 1.1 ng/mL (02-20 @ 17:28)  Troponin T, High Sensitivity Result: 61 ng/L (02-20 @ 17:21)                          7.6    11.28 )-----------( 462      ( 23 Feb 2022 21:29 )             26.2     02-22    141  |  93<L>  |  29<H>  ----------------------------<  121<H>  4.4   |  20<L>  |  4.88<H>    Ca    10.1      22 Feb 2022 18:06  Phos  3.1     02-22  Mg     2.40     02-22

## 2022-02-24 NOTE — CHART NOTE - NSCHARTNOTEFT_GEN_A_CORE
Dermatology Chart Note    Interval History:  Stable, pt reports ongoing significant pain, fluctuates at times  on CsA and TTS    Physical Exam:  VSS, no hypertension noted  The patient was alert and oriented X 3, well nourished, and in no  apparent distress.  There was no visible lymphadenopathy.  Conjunctiva were non injected  There was no clubbing or edema of extremities.  There was no hyperhidrosis or bromhidrosis.    Of note on skin exam:     - right pannus with large round, deep ulcer with undermined borders   - borders hyperpigmented, not erythematous or violaceous  - no new areas of purpura/necrosis  - more healthy appearing granulation tissue throughout wound bed  - surrounding areas of induration and firm subcutaneous nodules      Labs reviewed  interim wound cx:  02-17-22   Numerous Serratia marcescens  Numerous Pseudomonas aeruginosa  Few Enterococcus avium  Rare Corynebacterium species "Susceptibilities not performed"  Few Alpha hemolytic strep "Susceptibilities not performed"  --  Serratia marcescens  Pseudomonas aeruginosa  Enterococcus avium    A&P:  #Deep extensive pannus ulcer in patient with DM and ESRD on HD. Ulcer with extensive undermining - suggestive of pyoderma gangrenosum vs calciphylaxis. Both are challenging diagnoses with no definitive diagnostic tests. Biopsy and imaging non diagnostic. Favor Pyoderma Gangrenosum based on clinical appearance of deep ulceration with extensive undermining and worsening when trial off of steroids. IMPROVING  - Bacterial tissue culture 1/25/21 with carbapenem-resistent Pseudomonas, Staph epidermidis, Enterococcus faecalis; reportedly same as previous bacterial culture.   - Fungal tissue culture 1/25/21 no growth final  - s/p 10-day course of IV cefepime completed 1/21/22 and other previously other outpatient abx.   - Biopsy 1/25/22 findings non specific. No obvious intravascular calcium deposits although limited sample of subcutaneous tissue. Repeat von Kossa staining (for calcium), and deeper sections from original biopsy were reviewed with initial diagnosis unchanged.   -SPEP, serum DOUG, ANCAs negative.  -no calcifications noted on CT A/P 2/4   -quantiferon indeterminate (2/11/22), HIV negative, hepatitis panel nonreactive and non immune to hepatitis B(1/21/22)  -wound cx 2/17 w/ polymicrobial growth, likely colonizers per ID, no indication to treat    At this time:  - please obtain UPEP with immunofixation  - fu final AFB TC results; still pending  - c/w cyclosporine 125mg BID (~3 mg/kg based on dosing weight), continue to monitor blood pressure, electrolytes  - c/w sodium thiosulfate  - c/w pain management  - recommend palliative care consult to discuss symptomatic care for chronic painful wound, we have had multiple extensive discussions with the patient and her daughters on this topic, including 2/7, pt amenable  - c/w wound care: c/w topical tacrolimus 0.01% ointment 1-2x daily to wound edges and aquacel Ag dressing  - patient will need close f/u upon discharge  Please page [phone] for further related questions.    Alicia Queen MD  Resident Physician, PGY3  [facility] Dermatology  Pager: [phone]  Office: [phone]. Dermatology Chart Note    Interval History:  Stable, pt reports ongoing significant pain, fluctuates at times  on CsA and TTS    Physical Exam:  VSS, no hypertension noted  The patient was alert and oriented X 3, well nourished, and in no  apparent distress.  There was no visible lymphadenopathy.  Conjunctiva were non injected  There was no clubbing or edema of extremities.  There was no hyperhidrosis or bromhidrosis.    Of note on skin exam:     - right pannus with large round, deep ulcer with undermined borders   - borders hyperpigmented, not erythematous or violaceous  - no new areas of purpura/necrosis  - more healthy appearing granulation tissue throughout wound bed  - surrounding areas of induration and firm subcutaneous nodules      Labs reviewed  interim wound cx:  02-17-22   Numerous Serratia marcescens  Numerous Pseudomonas aeruginosa  Few Enterococcus avium  Rare Corynebacterium species "Susceptibilities not performed"  Few Alpha hemolytic strep "Susceptibilities not performed"  --  Serratia marcescens  Pseudomonas aeruginosa  Enterococcus avium    A&P:  #Deep extensive pannus ulcer in patient with DM and ESRD on HD. Ulcer with extensive undermining - suggestive of pyoderma gangrenosum vs calciphylaxis. Both are challenging diagnoses with no definitive diagnostic tests. Biopsy and imaging non diagnostic. Favor Pyoderma Gangrenosum based on clinical appearance of deep ulceration with extensive undermining and worsening when trial off of steroids. IMPROVING  - Bacterial tissue culture 1/25/21 with carbapenem-resistent Pseudomonas, Staph epidermidis, Enterococcus faecalis; reportedly same as previous bacterial culture.   - Fungal tissue culture 1/25/21 no growth final  - s/p 10-day course of IV cefepime completed 1/21/22 and other previously other outpatient abx.   - Biopsy 1/25/22 findings non specific. No obvious intravascular calcium deposits although limited sample of subcutaneous tissue. Repeat von Kossa staining (for calcium), and deeper sections from original biopsy were reviewed with initial diagnosis unchanged.   -SPEP, serum DOUG, ANCAs negative.  -no calcifications noted on CT A/P 2/4   -quantiferon indeterminate (2/11/22), HIV negative, hepatitis panel nonreactive and non immune to hepatitis B(1/21/22)  -wound cx 2/17 w/ polymicrobial growth, likely colonizers per ID, no indication to treat    At this time:  - please obtain UPEP with immunofixation  - fu final AFB TC results; still pending  - c/w cyclosporine 125mg BID (~3 mg/kg based on dosing weight), continue to monitor blood pressure, electrolytes  - c/w sodium thiosulfate  - c/w pain management  - recommend palliative care consult to discuss symptomatic care for chronic painful wound, we have had multiple extensive discussions with the patient and her daughters on this topic, including 2/7, pt amenable  - c/w wound care: c/w topical tacrolimus 0.01% ointment 1-2x daily to wound edges and aquacel Ag dressing  - patient will need close f/u upon discharge    Discussed with attending dermatologist Dr. Romeo Song.  Please page [phone] for further related questions.    Alicia Queen MD  Resident Physician, PGY3  [facility] Dermatology  Pager: [phone]  Office: [phone]. Dermatology Chart Note    Interval History:  Stable, pt reports ongoing significant pain, fluctuates at times  on CsA since 2/8 and TTS    Physical Exam:  VSS, no hypertension noted  The patient was alert and oriented X 3, well nourished, and in no  apparent distress.  There was no visible lymphadenopathy.  Conjunctiva were non injected  There was no clubbing or edema of extremities.  There was no hyperhidrosis or bromhidrosis.    Of note on skin exam:     - right pannus with large round, deep ulcer with undermined borders   - borders hyperpigmented, not erythematous or violaceous  - no new areas of purpura/necrosis  - more healthy appearing granulation tissue throughout wound bed  - surrounding areas of induration and firm subcutaneous nodules      Labs reviewed  interim wound cx:  02-17-22   Numerous Serratia marcescens  Numerous Pseudomonas aeruginosa  Few Enterococcus avium  Rare Corynebacterium species "Susceptibilities not performed"  Few Alpha hemolytic strep "Susceptibilities not performed"  --  Serratia marcescens  Pseudomonas aeruginosa  Enterococcus avium    A&P:  #Deep extensive pannus ulcer in patient with DM and ESRD on HD. Ulcer with extensive undermining - suggestive of pyoderma gangrenosum vs calciphylaxis. Both are challenging diagnoses with no definitive diagnostic tests. Biopsy and imaging non diagnostic. Favor Pyoderma Gangrenosum based on clinical appearance of deep ulceration with extensive undermining and worsening when trial off of steroids. IMPROVING  - Bacterial tissue culture 1/25/21 with carbapenem-resistent Pseudomonas, Staph epidermidis, Enterococcus faecalis; reportedly same as previous bacterial culture.   - Fungal tissue culture 1/25/21 no growth final  - s/p 10-day course of IV cefepime completed 1/21/22 and other previously other outpatient abx.   - Biopsy 1/25/22 findings non specific. No obvious intravascular calcium deposits although limited sample of subcutaneous tissue. Repeat von Kossa staining (for calcium), and deeper sections from original biopsy were reviewed with initial diagnosis unchanged.   -SPEP, serum DOUG, ANCAs negative.  -no calcifications noted on CT A/P 2/4   -quantiferon indeterminate (2/11/22), HIV negative, hepatitis panel nonreactive and non immune to hepatitis B(1/21/22)  -wound cx 2/17 w/ polymicrobial growth, likely colonizers per ID, no indication to treat    At this time:  - please obtain UPEP with immunofixation  - fu final AFB TC results; still pending  - c/w cyclosporine 125mg BID (~3 mg/kg based on dosing weight), continue to monitor blood pressure, electrolytes  - c/w sodium thiosulfate  - c/w pain management  - recommend palliative care consult to discuss symptomatic care for chronic painful wound, we have had multiple extensive discussions with the patient and her daughters on this topic, including 2/7, pt amenable  - c/w wound care: c/w topical tacrolimus 0.01% ointment 1-2x daily to wound edges and aquacel Ag dressing  - patient will need close f/u upon discharge    Discussed with attending dermatologist Dr. Romeo Song.  Please page [phone] for further related questions.    Alicia Queen MD  Resident Physician, PGY3  [facility] Dermatology  Pager: [phone]  Office: [phone].
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
## 2022-02-25 NOTE — PROGRESS NOTE ADULT - SUBJECTIVE AND OBJECTIVE BOX
SUBJECTIVE / OVERNIGHT EVENTS:pt seen and examined, c/o pain at the wound site  2-25-22    MEDICATIONS  (STANDING):  apixaban 2.5 milliGRAM(s) Oral two times a day  aspirin enteric coated 81 milliGRAM(s) Oral daily  buPROPion XL (24-Hour) . 150 milliGRAM(s) Oral daily  chlorhexidine 2% Cloths 1 Application(s) Topical daily  cinacalcet 60 milliGRAM(s) Oral daily  cycloSPORINE  , modified (NEORAL) 125 milliGRAM(s) Oral every 12 hours  Dakins Solution - 1/4 Strength 1 Application(s) Topical daily  dextrose 40% Gel 15 Gram(s) Oral once  dextrose 5%. 1000 milliLiter(s) (50 mL/Hr) IV Continuous <Continuous>  dextrose 5%. 1000 milliLiter(s) (100 mL/Hr) IV Continuous <Continuous>  dextrose 50% Injectable 25 Gram(s) IV Push once  dextrose 50% Injectable 12.5 Gram(s) IV Push once  dextrose 50% Injectable 25 Gram(s) IV Push once  diltiazem    milliGRAM(s) Oral daily  epoetin anabela-epbx (RETACRIT) Injectable 15050 Unit(s) IV Push <User Schedule>  gabapentin 300 milliGRAM(s) Oral daily  glucagon  Injectable 1 milliGRAM(s) IntraMuscular once  heparin   Injectable. 500 Unit(s) Dialysis. every 1 hour  insulin glargine Injectable (LANTUS) 17 Unit(s) SubCutaneous at bedtime  insulin lispro (ADMELOG) corrective regimen sliding scale   SubCutaneous three times a day before meals  insulin lispro (ADMELOG) corrective regimen sliding scale   SubCutaneous at bedtime  insulin lispro Injectable (ADMELOG) 11 Unit(s) SubCutaneous three times a day before meals  loratadine 10 milliGRAM(s) Oral daily  melatonin 6 milliGRAM(s) Oral at bedtime  midodrine. 5 milliGRAM(s) Oral <User Schedule>  mirtazapine 7.5 milliGRAM(s) Oral at bedtime  montelukast 10 milliGRAM(s) Oral at bedtime  oxyCODONE  ER Tablet 10 milliGRAM(s) Oral every 12 hours  pantoprazole    Tablet 40 milliGRAM(s) Oral two times a day  polyethylene glycol 3350 17 Gram(s) Oral two times a day  senna 2 Tablet(s) Oral at bedtime  sertraline 50 milliGRAM(s) Oral daily  sevelamer carbonate 800 milliGRAM(s) Oral three times a day with meals  sodium thiosulfate IVPB 25 Gram(s) IV Intermittent <User Schedule>  tacrolimus   0.1% Ointment 1 Application(s) Topical daily  traZODone 100 milliGRAM(s) Oral at bedtime    MEDICATIONS  (PRN):  acetaminophen     Tablet .. 650 milliGRAM(s) Oral every 8 hours PRN Mild Pain  diphenhydrAMINE Injectable 25 milliGRAM(s) IV Push <User Schedule> PRN Itching  oxyCODONE    IR 7.5 milliGRAM(s) Oral every 4 hours PRN Severe Pain (7 - 10)    Vital Signs Last 24 Hrs  T(C): 36.4 (02-25-22 @ 20:05), Max: 36.8 (02-25-22 @ 14:15)  T(F): 97.6 (02-25-22 @ 20:05), Max: 98.3 (02-25-22 @ 14:15)  HR: 78 (02-25-22 @ 20:05) (65 - 78)  BP: 120/56 (02-25-22 @ 20:05) (98/50 - 120/56)  BP(mean): --  RR: 17 (02-25-22 @ 20:05) (15 - 19)  SpO2: 97% (02-25-22 @ 14:15) (97% - 100%)          Constitutional: No fever, fatigue  Skin: No rash.  Eyes: No recent vision problems or eye pain.  ENT: No congestion, ear pain, or sore throat.  Cardiovascular: No chest pain or palpation.  Respiratory: No cough, shortness of breath, congestion, or wheezing.  Gastrointestinal: No abdominal pain, nausea, vomiting, or diarrhea.  Genitourinary: No dysuria.  Musculoskeletal: No joint swelling.  Neurologic: No headache.    PHYSICAL EXAM:  GENERAL: NAD  EYES: EOMI, PERRLA  NECK: Supple, No JVD  CHEST/LUNG: dec breath sounds at bases  HEART:  S1 , S2 +  ABDOMEN: soft , bs+, abd wound +  EXTREMITIES:  edema+  NEUROLOGY:alert awake    LABS:  02-25    135  |  87<L>  |  36<H>  ----------------------------<  220<H>  4.6   |  22  |  5.59<H>    Ca    10.6<H>      25 Feb 2022 17:53  Phos  4.3     02-25  Mg     2.40     02-25      Creatinine Trend: 5.59 <--, 4.88 <--, 7.07 <--                        7.9    9.14  )-----------( 562      ( 25 Feb 2022 17:55 )             26.6     Urine Studies:                Urine Studies:

## 2022-02-25 NOTE — PROGRESS NOTE ADULT - SUBJECTIVE AND OBJECTIVE BOX
CARDIOLOGY FOLLOW UP - Dr. Bullock  Date of Service: 2/25/22  CC: no events    Review of Systems:  Constitutional: No fever, weight loss, or fatigue  Respiratory: No cough, wheezing, or hemoptysis, no shortness of breath  Cardiovascular: No chest pain, palpitaitons, passing out, dizziness, or leg swelling  Gastrointestinal: No abd or epigastric pain. No nausea, vomitting, or hematemesis; no diarrhea or consiptaiton, no melena or hematochezia  Vascular: No edema     TELEMETRY:    PHYSICAL EXAM:  T(C): 36.6 (02-25-22 @ 06:00), Max: 37 (02-24-22 @ 18:10)  HR: 68 (02-25-22 @ 11:21) (65 - 71)  BP: 99/42 (02-25-22 @ 11:21) (98/50 - 118/51)  RR: 19 (02-25-22 @ 06:00) (15 - 20)  SpO2: 100% (02-25-22 @ 11:21) (97% - 100%)  Wt(kg): --  I&O's Summary    24 Feb 2022 07:01  -  25 Feb 2022 07:00  --------------------------------------------------------  IN: 240 mL / OUT: 0 mL / NET: 240 mL        Appearance: Normal	  Cardiovascular: Normal S1 S2,RRR, No JVD, No murmurs  Respiratory: Lungs clear to auscultation	  Gastrointestinal:  Soft, Non-tender, + BS	  Extremities: Normal range of motion, No clubbing, cyanosis or edema  Vascular: Peripheral pulses palpable 2+ bilaterally       Home Medications:  aspirin 81 mg oral delayed release tablet: 1 tab(s) orally once a day (12 Jan 2022 17:49)  buPROPion 150 mg/24 hours (XL) oral tablet, extended release: 1 tab(s) orally once a day (in the morning) (12 Jan 2022 17:49)  cetirizine 10 mg oral tablet: 1 tab(s) orally once a day (12 Jan 2022 17:49)  dilTIAZem 120 mg/24 hours oral tablet, extended release: 1 tab(s) orally once a day (12 Jan 2022 17:49)  doxepin 25 mg oral capsule: 1 cap(s) orally once a day (at bedtime) (12 Jan 2022 17:49)  Eliquis 2.5 mg oral tablet: 1 tab(s) orally 2 times a day    Pharmacy states patient no longer wants to fill this medication (12 Jan 2022 17:49)  furosemide 80 mg oral tablet: 1 tab(s) orally once a day    Pharmacy states patient no longer wants to fill this medication (12 Jan 2022 17:49)  gabapentin 300 mg oral capsule: 1 cap(s) orally 2 times a day (12 Jan 2022 17:49)  hydrOXYzine hydrochloride 25 mg oral tablet: 1 tab(s) orally 2 times a day, As Needed (12 Jan 2022 17:49)  lanthanum 1000 mg oral tablet, chewable: 2 tab(s) orally with meals and 1 tab(s) orally with snacks    Pharmacy states patient no longer wants to fill this medication (12 Jan 2022 17:49)  mirtazapine 7.5 mg oral tablet: 1 tab(s) orally once a day (at bedtime) (12 Jan 2022 17:49)  montelukast 10 mg oral tablet: 1 tab(s) orally once a day (in the evening) (12 Jan 2022 17:49)  mupirocin 2% topical ointment: Apply sparingly to affected area 2 times a day as directed (12 Jan 2022 17:49)  nystatin 100,000 units/mL oral suspension: 5 milliliter(s) orally 4 times a day, as directed (12 Jan 2022 17:49)  omeprazole 20 mg oral delayed release capsule: 2 cap(s) orally once a day before breakfast (12 Jan 2022 17:49)  Pennsaid 2% topical solution: Apply topically to affected area 2 times a day (12 Jan 2022 17:49)  rosuvastatin 40 mg oral tablet: 1 tab(s) orally once a day (12 Jan 2022 17:49)  Santyl 250 units/g topical ointment: Apply topically to affected area once a day as directed (12 Jan 2022 17:49)  sertraline 50 mg oral tablet: 1 tab(s) orally once a day (12 Jan 2022 17:49)  Spiriva HandiHaler 18 mcg inhalation capsule: 1 cap(s) inhaled once a day (12 Jan 2022 17:49)  traZODone 100 mg oral tablet: 1 tab(s) orally once a day (at bedtime) (12 Jan 2022 17:49)  Tresiba FlexTouch 100 units/mL subcutaneous solution: 80 unit(s) subcutaneous once a day (at bedtime) (12 Jan 2022 17:49)  Trulicity Pen 1.5 mg/0.5 mL subcutaneous solution: 1 dose(s) subcutaneous once a week (12 Jan 2022 17:49)        MEDICATIONS  (STANDING):  apixaban 2.5 milliGRAM(s) Oral two times a day  aspirin enteric coated 81 milliGRAM(s) Oral daily  buPROPion XL (24-Hour) . 150 milliGRAM(s) Oral daily  chlorhexidine 2% Cloths 1 Application(s) Topical daily  cinacalcet 60 milliGRAM(s) Oral daily  cycloSPORINE  , modified (NEORAL) 125 milliGRAM(s) Oral every 12 hours  Dakins Solution - 1/4 Strength 1 Application(s) Topical daily  dextrose 40% Gel 15 Gram(s) Oral once  dextrose 5%. 1000 milliLiter(s) (50 mL/Hr) IV Continuous <Continuous>  dextrose 5%. 1000 milliLiter(s) (100 mL/Hr) IV Continuous <Continuous>  dextrose 50% Injectable 25 Gram(s) IV Push once  dextrose 50% Injectable 12.5 Gram(s) IV Push once  dextrose 50% Injectable 25 Gram(s) IV Push once  diltiazem    milliGRAM(s) Oral daily  epoetin anabela-epbx (RETACRIT) Injectable 38879 Unit(s) IV Push <User Schedule>  gabapentin 300 milliGRAM(s) Oral daily  glucagon  Injectable 1 milliGRAM(s) IntraMuscular once  heparin   Injectable. 500 Unit(s) Dialysis. every 1 hour  insulin glargine Injectable (LANTUS) 17 Unit(s) SubCutaneous at bedtime  insulin lispro (ADMELOG) corrective regimen sliding scale   SubCutaneous three times a day before meals  insulin lispro (ADMELOG) corrective regimen sliding scale   SubCutaneous at bedtime  insulin lispro Injectable (ADMELOG) 11 Unit(s) SubCutaneous three times a day before meals  loratadine 10 milliGRAM(s) Oral daily  melatonin 6 milliGRAM(s) Oral at bedtime  midodrine. 5 milliGRAM(s) Oral <User Schedule>  mirtazapine 7.5 milliGRAM(s) Oral at bedtime  montelukast 10 milliGRAM(s) Oral at bedtime  oxyCODONE  ER Tablet 10 milliGRAM(s) Oral every 12 hours  pantoprazole    Tablet 40 milliGRAM(s) Oral two times a day  polyethylene glycol 3350 17 Gram(s) Oral two times a day  senna 2 Tablet(s) Oral at bedtime  sertraline 50 milliGRAM(s) Oral daily  sevelamer carbonate 800 milliGRAM(s) Oral three times a day with meals  sodium thiosulfate IVPB 25 Gram(s) IV Intermittent <User Schedule>  tacrolimus   0.1% Ointment 1 Application(s) Topical daily  traZODone 100 milliGRAM(s) Oral at bedtime        EKG:  RADIOLOGY:  DIAGNOSTIC TESTING:  [ ] Echocardiogram:  [ ] Catherterization:  [ ] Stress Test:  OTHER:     LABS:	 	                          7.6    11.28 )-----------( 462      ( 23 Feb 2022 21:29 )             26.2                 CARDIAC MARKERS:

## 2022-02-25 NOTE — PROGRESS NOTE ADULT - SUBJECTIVE AND OBJECTIVE BOX
ANTONIA [id number]  56yFemale  T(C): 36.6 (02-25-22 @ 06:00), Max: 37 (02-24-22 @ 18:10)  HR: 65 (02-25-22 @ 06:00) (65 - 74)  BP: 98/50 (02-25-22 @ 06:00) (98/50 - 118/51)  RR: 19 (02-25-22 @ 06:00) (15 - 20)  SpO2: 98% (02-25-22 @ 06:00) (97% - 100%)  Wt(kg): --  02-24 @ 07:01  -  02-25 @ 07:00  --------------------------------------------------------  IN: 240 mL / OUT: 0 mL / NET: 240 mL      normal cephalic atraumatic  s1s2   clear to ascultation bilaterally  soft, non tender, non distended no guarding or rebound  no clubbing cyanosis or edema

## 2022-02-25 NOTE — PROGRESS NOTE ADULT - SUBJECTIVE AND OBJECTIVE BOX
CC: uncontrolled DM 2 with hyperglycemia     Interval history: Glucose stable, several pre-meal Admelog doses not administered as patient did not want to eat     PAST MEDICAL & SURGICAL HISTORY:  COPD (chronic obstructive pulmonary disease)    DM (diabetes mellitus)    Atrial fibrillation  with loop recorder , battery most likely depleted, as per cardiac clearance, Dr. Reece Anesthesia aware, pt on Eliquis    HTN (hypertension)    Morbid obesity  BMI - 58.3    Chronic GERD    CHAMP (obstructive sleep apnea)  non compliance with CPAP, Anesthesia Dr. Reece aware, pt told to bring CPAP for sx, pr verbalized understanding    Potential difficult airway on pre-intubation assessment  airway class III, large neck, morbid obesity, hx of CHAMP, no compliance with CPAP- Dr. Reece, Anesthesia aware    End-stage renal disease    Anemia    Medication management    H/O tubal ligation      Status post placement of implantable loop recorder  left chest-     History of vascular access device  s/p insertion right chest permacath 2019, removal 2019, insertion left chest permacath 2019    S/P arteriovenous (AV) fistula creation  left  arm 2019      FAMILY HISTORY:  Family history of diabetes mellitus  mother-     Family hx of hypertension  mother-     Social History: nonsmoker, nondrinker    Outpatient Medications:  · 	Cleocin HCl 300 mg oral capsule: 1 cap(s) orally 4 times a day   · 	cyclobenzaprine 5 mg oral tablet: 1 tab(s) orally 3 times a day, As needed, Muscle Spasm  · 	cyclobenzaprine 5 mg oral tablet: 1 tab(s) orally 3 times a day, As needed, Muscle Spasm  · 	Cardizem  mg/24 hours oral capsule, extended release: 1 cap(s) orally once a day  · 	aspirin 81 mg oral delayed release tablet: 1 tab(s) orally once a day  · 	acetaminophen 325 mg oral tablet: 2 tab(s) orally every 6 hours, As needed, Mild Pain (1 - 3)  · 	raNITIdine 150 mg oral capsule: 1 cap(s) orally 2 times a day  · 	rosuvastatin 40 mg oral tablet: 1 tab(s) orally once a day (at bedtime)  · 	Sensipar 60 mg oral tablet: 1 tab(s) orally once a day (at bedtime)  · 	sevelamer carbonate 800 mg oral tablet: 2 tab(s) orally 3 times a day (with meals)  · 	Singulair 10 mg oral tablet: 1 tab(s) orally once a day (at bedtime)  · 	Combivent Respimat CFC free 20 mcg-100 mcg/inh inhalation aerosol: 1 puff(s) inhaled 4 times a day, As Needed  · 	ZyrTEC 10 mg oral tablet: 1 tab(s) orally once a day (at bedtime)  · 	vitamin b complex: 1 tab(s) orally once a day  · 	Tresiba FlexTouch 100 units/mL subcutaneous solution: 80 unit(s) subcutaneous once a day (at bedtime)  Trulicity 1.5mg subq weekly    MEDICATIONS  (STANDING):  apixaban 2.5 milliGRAM(s) Oral two times a day  aspirin enteric coated 81 milliGRAM(s) Oral daily  buPROPion XL (24-Hour) . 150 milliGRAM(s) Oral daily  chlorhexidine 2% Cloths 1 Application(s) Topical daily  cinacalcet 60 milliGRAM(s) Oral daily  cycloSPORINE  , modified (NEORAL) 125 milliGRAM(s) Oral every 12 hours  Dakins Solution - 1/4 Strength 1 Application(s) Topical daily  dextrose 40% Gel 15 Gram(s) Oral once  dextrose 5%. 1000 milliLiter(s) (50 mL/Hr) IV Continuous <Continuous>  dextrose 5%. 1000 milliLiter(s) (100 mL/Hr) IV Continuous <Continuous>  dextrose 50% Injectable 25 Gram(s) IV Push once  dextrose 50% Injectable 12.5 Gram(s) IV Push once  dextrose 50% Injectable 25 Gram(s) IV Push once  diltiazem    milliGRAM(s) Oral daily  epoetin anabela-epbx (RETACRIT) Injectable 12157 Unit(s) IV Push <User Schedule>  gabapentin 300 milliGRAM(s) Oral daily  glucagon  Injectable 1 milliGRAM(s) IntraMuscular once  heparin   Injectable. 500 Unit(s) Dialysis. every 1 hour  insulin glargine Injectable (LANTUS) 17 Unit(s) SubCutaneous at bedtime  insulin lispro (ADMELOG) corrective regimen sliding scale   SubCutaneous three times a day before meals  insulin lispro (ADMELOG) corrective regimen sliding scale   SubCutaneous at bedtime  insulin lispro Injectable (ADMELOG) 11 Unit(s) SubCutaneous three times a day before meals  loratadine 10 milliGRAM(s) Oral daily  melatonin 6 milliGRAM(s) Oral at bedtime  midodrine. 5 milliGRAM(s) Oral <User Schedule>  mirtazapine 7.5 milliGRAM(s) Oral at bedtime  montelukast 10 milliGRAM(s) Oral at bedtime  oxyCODONE  ER Tablet 10 milliGRAM(s) Oral every 12 hours  pantoprazole    Tablet 40 milliGRAM(s) Oral two times a day  polyethylene glycol 3350 17 Gram(s) Oral two times a day  senna 2 Tablet(s) Oral at bedtime  sertraline 50 milliGRAM(s) Oral daily  sevelamer carbonate 800 milliGRAM(s) Oral three times a day with meals  sodium thiosulfate IVPB 25 Gram(s) IV Intermittent <User Schedule>  tacrolimus   0.1% Ointment 1 Application(s) Topical daily  traZODone 100 milliGRAM(s) Oral at bedtime      Allergies    latex (Rash)  penicillin (Nausea)  strawberry (Rash)    Intolerances    caffeine (Nausea)    Review of Systems:  Constitutional: No fever  Cardiovascular: No chest pain, palpitations  Respiratory: No SOB, no cough  GI: No nausea, vomiting, abdominal pain    ALL OTHER SYSTEMS REVIEWED AND NEGATIVE    PHYSICAL EXAM:  Vital Signs Last 24 Hrs  T(C): 36.6 (2022 06:00), Max: 37 (2022 18:10)  T(F): 97.8 (2022 06:00), Max: 98.6 (2022 18:10)  HR: 68 (2022 11:21) (65 - 71)  BP: 99/42 (2022 11:21) (98/50 - 118/51)  BP(mean): --  RR: 19 (2022 06:00) (15 - 20)  SpO2: 100% (2022 11:21) (97% - 100%)  GENERAL: NAD  EYES: No proptosis, no lid lag, anicteric  RESPIRATORY: non labored respirations   PSYCH: Alert and oriented x 3, normal affect, normal mood    CAPILLARY BLOOD GLUCOSE      POCT Blood Glucose.: 162 mg/dL (2022 11:57)  POCT Blood Glucose.: 108 mg/dL (2022 21:50)  POCT Blood Glucose.: 107 mg/dL (2022 17:20)    01-12 Chol -- Direct LDL -- LDL calculated -- HDL -- Trig 147    Diet, Consistent Carbohydrate Renal w/Evening Snack:   Supplement Feeding Modality:  Oral  Nepro Cans or Servings Per Day:  1       Frequency:  Three Times a day (22 @ 12:23) [Active]

## 2022-02-25 NOTE — PROGRESS NOTE ADULT - ASSESSMENT
56 yr old F with morbid obesity, CHAMP not on home O2, ESRD (HD MWF), HTN, DM2 A1C 7.0, COPD, Afib no longer on AC, chronic R pannus wound here with worsening wound, diarrhea and found to be COVID+. Has very poor po intake at this time.     1. Type 2 diabetes mellitus uncontrolled  A1c 7.0% (may be inaccurate in setting of ESRD)  Home Regimen: Tresiba 80 units HS and Trulicity 1.5mg subq weekly  While inpatient:  BG target 100-180 mg/dL  Trending at goal - several pre-meal Admelog doses not given due to patient not wanting to eat   Would continue with insulin regimen as ordered below:  Lantus to 17 units SQ qHS   Admelog to 11 units SQ TID before meals (Hold if NPO/not eating meal)    Admelog correctional scale as LOW before meals and bedtime   Check BG before meals and bedtime  Hypoglycemia protocol   Consistent carbohydrate diet    Discharge Plan:  STOP Trulicity (patient ESRD on HD)  Steroids have been dc'd  For dc to rehab- recommend to continue current insulin management as outlined above  For dc to home- Recommend basal PO regimen  Patient was on Tresiba at home may benefit from a different Long acting insulin such as Lantus or Levemir given ESRD.  Tresiba is ultra-Long acting insulin  Please assess insurance coverage for basal insulin pens:  Levemir, Lantus, Basaglar, Toujeo or Semglee.   Recommend tradjenta 5 mg po daily, if not covered can see if januvia 25 mg po daily is covered.  Please obtain prior auth if needed as patitnet is ESRD and dpp4's are indicated for patients with ESRD  Also for d/c from rehab can start Prandin 1 mg po TID AC- hold if skips meal  Consider CGM (such as Freestyle Libre2 outpatient)  If desiring to followup with [facility] Endocrinology: [address], [address], [place] [address], [phone]    2. HTN  Management per primary team     3. Hyperlipidemia  Can continue home dose of Atorvastatin 80 mg  Note, limited benefit in ESRD. Followup lipid panel as outpatient    PIERRE Cade-BC  Nurse Practitioner   Division of Endocrinology  Pager: KARMA pager [phone]    If out of hospital/unavailable when paged, please note: patient will be cared for by another provider on the endocrine service.  Please call the endocrine answering service for assistance to reach covering provider ([phone]). For non-urgent matters, please email [email or web address] for assistance.

## 2022-02-25 NOTE — PROGRESS NOTE ADULT - SUBJECTIVE AND OBJECTIVE BOX
[facility] NEPHROLOGY- PROGRESS NOTE    56y Female with history of ESRD on HD presents with vomiting and diarrhea found to be COVID-19 positive. Nephrology consulted for ESRD status.    REVIEW OF SYSTEMS:  Gen: no changes in weight  Cards: no chest pain  Resp: no dyspnea  GI: no nausea or vomiting or diarrhea + ab wound pain  Vascular: no LE edema    caffeine (Nausea)  latex (Rash)  penicillin (Nausea)  strawberry (Rash)      Hospital Medications: Medications reviewed      VITALS:  T(F): 98.3 (02-25-22 @ 14:15), Max: 98.6 (02-24-22 @ 18:10)  HR: 73 (02-25-22 @ 14:15)  BP: 118/52 (02-25-22 @ 14:15)  RR: 16 (02-25-22 @ 14:15)  SpO2: 97% (02-25-22 @ 14:15)  Wt(kg): --    02-24 @ 07:01  -  02-25 @ 07:00  --------------------------------------------------------  IN: 240 mL / OUT: 0 mL / NET: 240 mL        PHYSICAL EXAM:    Gen: NAD, calm  Cards: RRR, +S1/S2, no M/G/R  Resp: CTA B/L  GI: soft, RLQ bandage/tender  Vascular: no LE edema B/L, LUE AVF + bruit/thrill        LABS:        Creatinine Trend: 4.88 <--, 7.07 <--                        7.6    11.28 )-----------( 462      ( 23 Feb 2022 21:29 )             26.2     Urine Studies:

## 2022-02-25 NOTE — PROGRESS NOTE ADULT - ASSESSMENT
56y Female with history of ESRD on HD presents with vomiting and diarrhea found to be COVID-19 positive. Nephrology consulted for ESRD status.    1) ESRD: Last HD on 2/23 tolerated well with 1.5L removed. Plan for next maintenance HD today. Monitor electrolytes.    2) HTN with ESRD: BP low normal. Continue with midodrine pre-HD on HD days to avoid intradialytic hypotension. Cardizem as per cardiology. Monitor BP.    3) Anemia of renal disease: Hb low with elevated ferritin s/p PRBC with HD on 2/21. Continue with Epo 16K with HD. Monitor Hb.    4) Secondary HPT of renal origin: Phosphorus acceptable. iPTH low normal however sensipar already decreased to 60 mg PO daily. Continue with renvela 1 tab with meals (goal < 4.5 given concerns for calciphylaxis). Low calcium bath with HD. Monitor serum calcium and phosphorus.    5) Ab wound: Appreciate dermatology follow up. Ddx includes calciphylaxis versus pyoderma gangrenosum. S/P biopsy. Continue with empiric sodium thiosulfate with HD. On CSA as per Derm as benefits outweigh risks to residual renal function (which is minimal at this point).      [facility] NEPHROLOGY  Keaton Lopez M.D.  Juma Green D.O.  Myla Hoffmann M.D.  Julia Brewer, MSN, ANP-C    Telephone: [phone]  Facsimile: [phone]    [address] [place] [address]

## 2022-02-26 NOTE — PROGRESS NOTE ADULT - ASSESSMENT
56y Female with history of ESRD on HD presents with vomiting and diarrhea found to be COVID-19 positive. Nephrology consulted for ESRD status.    1) ESRD: Last HD on 2/25 tolerated well. Plan for next maintenance HD 2/28. Monitor electrolytes.    2) HTN with ESRD: BP low normal. Continue with midodrine pre-HD on HD days to avoid intradialytic hypotension. Cardizem as per cardiology. Monitor BP.    3) Anemia of renal disease: Hb low with elevated ferritin s/p PRBC with HD on 2/21. Continue with Epo 16K with HD. Monitor Hb.    4) Secondary HPT of renal origin: Phosphorus acceptable. iPTH low normal however sensipar already decreased to 60 mg PO daily. Continue with renvela 1 tab with meals (goal < 4.5 given concerns for calciphylaxis). Low calcium bath with HD. Monitor serum calcium and phosphorus.    5) Ab wound: Appreciate dermatology follow up. Ddx includes calciphylaxis versus pyoderma gangrenosum. S/P biopsy. Continue with empiric sodium thiosulfate with HD. On CSA as per Derm as benefits outweigh risks to residual renal function (which is minimal at this point).      [facility] NEPHROLOGY  Keaton Lopez M.D.  Juma Green D.O.  Myla Hoffmann M.D.  Julia Brewer, MSN, ANP-C    Telephone: [phone]  Facsimile: [phone]    [address] [place] [address]

## 2022-02-26 NOTE — PROGRESS NOTE ADULT - SUBJECTIVE AND OBJECTIVE BOX
SUBJECTIVE / OVERNIGHT EVENTS:pt seen and examined, c/o pain at the wound site  2-26-22    MEDICATIONS  (STANDING):  apixaban 2.5 milliGRAM(s) Oral two times a day  aspirin enteric coated 81 milliGRAM(s) Oral daily  buPROPion XL (24-Hour) . 150 milliGRAM(s) Oral daily  chlorhexidine 2% Cloths 1 Application(s) Topical daily  cinacalcet 60 milliGRAM(s) Oral daily  cycloSPORINE  , modified (NEORAL) 125 milliGRAM(s) Oral every 12 hours  Dakins Solution - 1/4 Strength 1 Application(s) Topical daily  dextrose 40% Gel 15 Gram(s) Oral once  dextrose 5%. 1000 milliLiter(s) (50 mL/Hr) IV Continuous <Continuous>  dextrose 5%. 1000 milliLiter(s) (100 mL/Hr) IV Continuous <Continuous>  dextrose 50% Injectable 25 Gram(s) IV Push once  dextrose 50% Injectable 12.5 Gram(s) IV Push once  dextrose 50% Injectable 25 Gram(s) IV Push once  diltiazem    milliGRAM(s) Oral daily  epoetin anabela-epbx (RETACRIT) Injectable 26488 Unit(s) IV Push <User Schedule>  gabapentin 300 milliGRAM(s) Oral daily  glucagon  Injectable 1 milliGRAM(s) IntraMuscular once  heparin   Injectable. 500 Unit(s) Dialysis. every 1 hour  insulin glargine Injectable (LANTUS) 17 Unit(s) SubCutaneous at bedtime  insulin lispro (ADMELOG) corrective regimen sliding scale   SubCutaneous three times a day before meals  insulin lispro (ADMELOG) corrective regimen sliding scale   SubCutaneous at bedtime  insulin lispro Injectable (ADMELOG) 11 Unit(s) SubCutaneous three times a day before meals  loratadine 10 milliGRAM(s) Oral daily  melatonin 6 milliGRAM(s) Oral at bedtime  midodrine. 5 milliGRAM(s) Oral <User Schedule>  mirtazapine 7.5 milliGRAM(s) Oral at bedtime  montelukast 10 milliGRAM(s) Oral at bedtime  oxyCODONE  ER Tablet 10 milliGRAM(s) Oral every 12 hours  pantoprazole    Tablet 40 milliGRAM(s) Oral two times a day  polyethylene glycol 3350 17 Gram(s) Oral two times a day  senna 2 Tablet(s) Oral at bedtime  sertraline 50 milliGRAM(s) Oral daily  sevelamer carbonate 800 milliGRAM(s) Oral three times a day with meals  sodium thiosulfate IVPB 25 Gram(s) IV Intermittent <User Schedule>  tacrolimus   0.1% Ointment 1 Application(s) Topical daily  traZODone 100 milliGRAM(s) Oral at bedtime    MEDICATIONS  (PRN):  acetaminophen     Tablet .. 650 milliGRAM(s) Oral every 8 hours PRN Mild Pain  diphenhydrAMINE Injectable 25 milliGRAM(s) IV Push <User Schedule> PRN Itching  oxyCODONE    IR 7.5 milliGRAM(s) Oral every 4 hours PRN Severe Pain (7 - 10)    Vital Signs Last 24 Hrs  T(C): 36.4 (02-25-22 @ 20:05), Max: 36.8 (02-25-22 @ 14:15)  T(F): 97.6 (02-25-22 @ 20:05), Max: 98.3 (02-25-22 @ 14:15)  HR: 78 (02-25-22 @ 20:05) (65 - 78)  BP: 120/56 (02-25-22 @ 20:05) (98/50 - 120/56)  BP(mean): --  RR: 17 (02-25-22 @ 20:05) (15 - 19)  SpO2: 97% (02-25-22 @ 14:15) (97% - 100%)          Constitutional: No fever, fatigue  Skin: No rash.  Eyes: No recent vision problems or eye pain.  ENT: No congestion, ear pain, or sore throat.  Cardiovascular: No chest pain or palpation.  Respiratory: No cough, shortness of breath, congestion, or wheezing.  Gastrointestinal: No abdominal pain, nausea, vomiting, or diarrhea.  Genitourinary: No dysuria.  Musculoskeletal: No joint swelling.  Neurologic: No headache.    PHYSICAL EXAM:  GENERAL: NAD  EYES: EOMI, PERRLA  NECK: Supple, No JVD  CHEST/LUNG: dec breath sounds at bases  HEART:  S1 , S2 +  ABDOMEN: soft , bs+, abd wound +  EXTREMITIES:  edema+  NEUROLOGY:alert awake    LABS:  02-25    135  |  87<L>  |  36<H>  ----------------------------<  220<H>  4.6   |  22  |  5.59<H>    Ca    10.6<H>      25 Feb 2022 17:53  Phos  4.3     02-25  Mg     2.40     02-25      Creatinine Trend: 5.59 <--, 4.88 <--, 7.07 <--                        7.9    9.14  )-----------( 562      ( 25 Feb 2022 17:55 )             26.6     Urine Studies:                Urine Studies:

## 2022-02-26 NOTE — PROGRESS NOTE ADULT - SUBJECTIVE AND OBJECTIVE BOX
CARDIOLOGY FOLLOW UP - Dr. Bullock  Date of Service:   CC: no events    Review of Systems:  Constitutional: No fever, weight loss, or fatigue  Respiratory: No cough, wheezing, or hemoptysis, no shortness of breath  Cardiovascular: No chest pain, palpitaitons, passing out, dizziness, or leg swelling  Gastrointestinal: No abd or epigastric pain. No nausea, vomitting, or hematemesis; no diarrhea or consiptaiton, no melena or hematochezia  Vascular: No edema     TELEMETRY:    PHYSICAL EXAM:  T(C): 36.8 (02-26-22 @ 06:24), Max: 37.2 (02-26-22 @ 02:38)  HR: 75 (02-26-22 @ 06:24) (68 - 84)  BP: 113/53 (02-26-22 @ 06:24) (99/42 - 125/49)  RR: 18 (02-26-22 @ 06:24) (16 - 18)  SpO2: 100% (02-26-22 @ 06:24) (96% - 100%)  Wt(kg): --  I&O's Summary    25 Feb 2022 07:01  -  26 Feb 2022 07:00  --------------------------------------------------------  IN: 520 mL / OUT: 1900 mL / NET: -1380 mL        Appearance: Normal	  Cardiovascular: Normal S1 S2,RRR, No JVD, No murmurs  Respiratory: Lungs clear to auscultation	  Gastrointestinal:  Soft, Non-tender, + BS	  Extremities: Normal range of motion, No clubbing, cyanosis or edema  Vascular: Peripheral pulses palpable 2+ bilaterally       Home Medications:  aspirin 81 mg oral delayed release tablet: 1 tab(s) orally once a day (12 Jan 2022 17:49)  buPROPion 150 mg/24 hours (XL) oral tablet, extended release: 1 tab(s) orally once a day (in the morning) (12 Jan 2022 17:49)  cetirizine 10 mg oral tablet: 1 tab(s) orally once a day (12 Jan 2022 17:49)  dilTIAZem 120 mg/24 hours oral tablet, extended release: 1 tab(s) orally once a day (12 Jan 2022 17:49)  doxepin 25 mg oral capsule: 1 cap(s) orally once a day (at bedtime) (12 Jan 2022 17:49)  Eliquis 2.5 mg oral tablet: 1 tab(s) orally 2 times a day    Pharmacy states patient no longer wants to fill this medication (12 Jan 2022 17:49)  furosemide 80 mg oral tablet: 1 tab(s) orally once a day    Pharmacy states patient no longer wants to fill this medication (12 Jan 2022 17:49)  gabapentin 300 mg oral capsule: 1 cap(s) orally 2 times a day (12 Jan 2022 17:49)  hydrOXYzine hydrochloride 25 mg oral tablet: 1 tab(s) orally 2 times a day, As Needed (12 Jan 2022 17:49)  lanthanum 1000 mg oral tablet, chewable: 2 tab(s) orally with meals and 1 tab(s) orally with snacks    Pharmacy states patient no longer wants to fill this medication (12 Jan 2022 17:49)  mirtazapine 7.5 mg oral tablet: 1 tab(s) orally once a day (at bedtime) (12 Jan 2022 17:49)  montelukast 10 mg oral tablet: 1 tab(s) orally once a day (in the evening) (12 Jan 2022 17:49)  mupirocin 2% topical ointment: Apply sparingly to affected area 2 times a day as directed (12 Jan 2022 17:49)  nystatin 100,000 units/mL oral suspension: 5 milliliter(s) orally 4 times a day, as directed (12 Jan 2022 17:49)  omeprazole 20 mg oral delayed release capsule: 2 cap(s) orally once a day before breakfast (12 Jan 2022 17:49)  Pennsaid 2% topical solution: Apply topically to affected area 2 times a day (12 Jan 2022 17:49)  rosuvastatin 40 mg oral tablet: 1 tab(s) orally once a day (12 Jan 2022 17:49)  Santyl 250 units/g topical ointment: Apply topically to affected area once a day as directed (12 Jan 2022 17:49)  sertraline 50 mg oral tablet: 1 tab(s) orally once a day (12 Jan 2022 17:49)  Spiriva HandiHaler 18 mcg inhalation capsule: 1 cap(s) inhaled once a day (12 Jan 2022 17:49)  traZODone 100 mg oral tablet: 1 tab(s) orally once a day (at bedtime) (12 Jan 2022 17:49)  Tresiba FlexTouch 100 units/mL subcutaneous solution: 80 unit(s) subcutaneous once a day (at bedtime) (12 Jan 2022 17:49)  Trulicity Pen 1.5 mg/0.5 mL subcutaneous solution: 1 dose(s) subcutaneous once a week (12 Jan 2022 17:49)        MEDICATIONS  (STANDING):  apixaban 2.5 milliGRAM(s) Oral two times a day  aspirin enteric coated 81 milliGRAM(s) Oral daily  buPROPion XL (24-Hour) . 150 milliGRAM(s) Oral daily  chlorhexidine 2% Cloths 1 Application(s) Topical daily  cinacalcet 60 milliGRAM(s) Oral daily  cycloSPORINE  , modified (NEORAL) 125 milliGRAM(s) Oral every 12 hours  Dakins Solution - 1/4 Strength 1 Application(s) Topical daily  dextrose 40% Gel 15 Gram(s) Oral once  dextrose 5%. 1000 milliLiter(s) (50 mL/Hr) IV Continuous <Continuous>  dextrose 5%. 1000 milliLiter(s) (100 mL/Hr) IV Continuous <Continuous>  dextrose 50% Injectable 25 Gram(s) IV Push once  dextrose 50% Injectable 12.5 Gram(s) IV Push once  dextrose 50% Injectable 25 Gram(s) IV Push once  diltiazem    milliGRAM(s) Oral daily  epoetin anabela-epbx (RETACRIT) Injectable 30890 Unit(s) IV Push <User Schedule>  gabapentin 300 milliGRAM(s) Oral daily  glucagon  Injectable 1 milliGRAM(s) IntraMuscular once  heparin   Injectable. 500 Unit(s) Dialysis. every 1 hour  insulin glargine Injectable (LANTUS) 17 Unit(s) SubCutaneous at bedtime  insulin lispro (ADMELOG) corrective regimen sliding scale   SubCutaneous three times a day before meals  insulin lispro (ADMELOG) corrective regimen sliding scale   SubCutaneous at bedtime  insulin lispro Injectable (ADMELOG) 11 Unit(s) SubCutaneous three times a day before meals  loratadine 10 milliGRAM(s) Oral daily  melatonin 6 milliGRAM(s) Oral at bedtime  midodrine. 5 milliGRAM(s) Oral <User Schedule>  mirtazapine 7.5 milliGRAM(s) Oral at bedtime  montelukast 10 milliGRAM(s) Oral at bedtime  oxyCODONE  ER Tablet 10 milliGRAM(s) Oral every 12 hours  pantoprazole    Tablet 40 milliGRAM(s) Oral two times a day  polyethylene glycol 3350 17 Gram(s) Oral two times a day  senna 2 Tablet(s) Oral at bedtime  sertraline 50 milliGRAM(s) Oral daily  sevelamer carbonate 800 milliGRAM(s) Oral three times a day with meals  sodium thiosulfate IVPB 25 Gram(s) IV Intermittent <User Schedule>  tacrolimus   0.1% Ointment 1 Application(s) Topical daily  traZODone 100 milliGRAM(s) Oral at bedtime        EKG:  RADIOLOGY:  DIAGNOSTIC TESTING:  [ ] Echocardiogram:  [ ] Catherterization:  [ ] Stress Test:  OTHER:     LABS:	 	                          7.9    9.14  )-----------( 562      ( 25 Feb 2022 17:55 )             26.6     02-25    135  |  87<L>  |  36<H>  ----------------------------<  220<H>  4.6   |  22  |  5.59<H>    Ca    10.6<H>      25 Feb 2022 17:53  Phos  4.3     02-25  Mg     2.40     02-25            CARDIAC MARKERS:

## 2022-02-26 NOTE — PROGRESS NOTE ADULT - SUBJECTIVE AND OBJECTIVE BOX
Full note to follow. [facility] NEPHROLOGY- PROGRESS NOTE    56y Female with history of ESRD on HD presents with vomiting and diarrhea found to be COVID-19 positive. Nephrology consulted for ESRD status.    REVIEW OF SYSTEMS:  Gen: no changes in weight  Cards: no chest pain  Resp: no dyspnea  GI: no nausea or vomiting or diarrhea + ab wound pain  Vascular: no LE edema    caffeine (Nausea)  latex (Rash)  penicillin (Nausea)  strawberry (Rash)      Hospital Medications: Medications reviewed      VITALS:  T(F): 98.2 (02-26-22 @ 21:31), Max: 98.9 (02-26-22 @ 02:38)  HR: 66 (02-26-22 @ 21:31)  BP: 113/52 (02-26-22 @ 21:31)  RR: 18 (02-26-22 @ 21:31)  SpO2: 100% (02-26-22 @ 21:31)  Wt(kg): --    02-25 @ 07:01  -  02-26 @ 07:00  --------------------------------------------------------  IN: 520 mL / OUT: 1900 mL / NET: -1380 mL        PHYSICAL EXAM:  Gen: NAD, calm  Cards: RRR, +S1/S2, no M/G/R  Resp: CTA B/L  GI: soft, RLQ bandage/tender  Vascular: no LE edema B/L, LUE AVF + bruit/thrill        LABS:  02-25    135  |  87<L>  |  36<H>  ----------------------------<  220<H>  4.6   |  22  |  5.59<H>    Ca    10.6<H>      25 Feb 2022 17:53  Phos  4.3     02-25  Mg     2.40     02-25      Creatinine Trend: 5.59 <--, 4.88 <--, 7.07 <--                        7.9    9.14  )-----------( 562      ( 25 Feb 2022 17:55 )             26.6
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
## 2022-02-27 NOTE — PROGRESS NOTE ADULT - SUBJECTIVE AND OBJECTIVE BOX
[facility] NEPHROLOGY- PROGRESS NOTE    56y Female with history of ESRD on HD presents with vomiting and diarrhea found to be COVID-19 positive. Nephrology consulted for ESRD status.    REVIEW OF SYSTEMS:  Gen: no fever  Cards: no chest pain  Resp: no dyspnea  GI: no nausea or vomiting or diarrhea + ab wound pain  Vascular: no LE edema    caffeine (Nausea)  latex (Rash)  penicillin (Nausea)  strawberry (Rash)      Hospital Medications: Medications reviewed      VITALS:  T(F): 97.9 (02-27-22 @ 13:49), Max: 98.6 (02-27-22 @ 09:43)  HR: 70 (02-27-22 @ 13:49)  BP: 111/43 (02-27-22 @ 13:49)  RR: 20 (02-27-22 @ 13:49)  SpO2: 100% (02-27-22 @ 13:49)  Wt(kg): --    02-27 @ 07:01  -  02-27 @ 17:05  --------------------------------------------------------  IN: 750 mL / OUT: 0 mL / NET: 750 mL      PHYSICAL EXAM:  Gen: NAD, calm  Cards: RRR, +S1/S2, no M/G/R  Resp: CTA B/L  GI: soft, RLQ bandage/tender  Vascular: no LE edema B/L, LUE AVF + bruit/thrill        LABS:  02-25    135  |  87<L>  |  36<H>  ----------------------------<  220<H>  4.6   |  22  |  5.59<H>    Ca    10.6<H>      25 Feb 2022 17:53  Phos  4.3     02-25  Mg     2.40     02-25      Creatinine Trend: 5.59 <--, 4.88 <--, 7.07 <--                        7.9    9.14  )-----------( 562      ( 25 Feb 2022 17:55 )             26.6     Urine Studies:

## 2022-02-27 NOTE — PROGRESS NOTE ADULT - SUBJECTIVE AND OBJECTIVE BOX
SUBJECTIVE / OVERNIGHT EVENTS:pt seen and examined, c/o pain at the wound site  2-27-22    MEDICATIONS  (STANDING):  apixaban 2.5 milliGRAM(s) Oral two times a day  aspirin enteric coated 81 milliGRAM(s) Oral daily  buPROPion XL (24-Hour) . 150 milliGRAM(s) Oral daily  chlorhexidine 2% Cloths 1 Application(s) Topical daily  cinacalcet 60 milliGRAM(s) Oral daily  cycloSPORINE  , modified (NEORAL) 125 milliGRAM(s) Oral every 12 hours  Dakins Solution - 1/4 Strength 1 Application(s) Topical daily  dextrose 40% Gel 15 Gram(s) Oral once  dextrose 5%. 1000 milliLiter(s) (50 mL/Hr) IV Continuous <Continuous>  dextrose 5%. 1000 milliLiter(s) (100 mL/Hr) IV Continuous <Continuous>  dextrose 50% Injectable 25 Gram(s) IV Push once  dextrose 50% Injectable 12.5 Gram(s) IV Push once  dextrose 50% Injectable 25 Gram(s) IV Push once  diltiazem    milliGRAM(s) Oral daily  epoetin anabela-epbx (RETACRIT) Injectable 87522 Unit(s) IV Push <User Schedule>  gabapentin 300 milliGRAM(s) Oral daily  glucagon  Injectable 1 milliGRAM(s) IntraMuscular once  heparin   Injectable. 500 Unit(s) Dialysis. every 1 hour  insulin glargine Injectable (LANTUS) 17 Unit(s) SubCutaneous at bedtime  insulin lispro (ADMELOG) corrective regimen sliding scale   SubCutaneous three times a day before meals  insulin lispro (ADMELOG) corrective regimen sliding scale   SubCutaneous at bedtime  insulin lispro Injectable (ADMELOG) 11 Unit(s) SubCutaneous three times a day before meals  loratadine 10 milliGRAM(s) Oral daily  melatonin 6 milliGRAM(s) Oral at bedtime  midodrine. 5 milliGRAM(s) Oral <User Schedule>  mirtazapine 7.5 milliGRAM(s) Oral at bedtime  montelukast 10 milliGRAM(s) Oral at bedtime  ondansetron    Tablet 4 milliGRAM(s) Oral once  oxyCODONE  ER Tablet 10 milliGRAM(s) Oral every 12 hours  pantoprazole    Tablet 40 milliGRAM(s) Oral two times a day  polyethylene glycol 3350 17 Gram(s) Oral two times a day  senna 2 Tablet(s) Oral at bedtime  sertraline 50 milliGRAM(s) Oral daily  sevelamer carbonate 800 milliGRAM(s) Oral three times a day with meals  sodium thiosulfate IVPB 25 Gram(s) IV Intermittent <User Schedule>  tacrolimus   0.1% Ointment 1 Application(s) Topical daily  traZODone 100 milliGRAM(s) Oral at bedtime    MEDICATIONS  (PRN):  diphenhydrAMINE Injectable 25 milliGRAM(s) IV Push <User Schedule> PRN Itching  oxyCODONE    IR 7.5 milliGRAM(s) Oral every 4 hours PRN Severe Pain (7 - 10)    Vital Signs Last 24 Hrs  T(C): 37.1 (02-27-22 @ 19:59), Max: 37.1 (02-27-22 @ 19:59)  T(F): 98.7 (02-27-22 @ 19:59), Max: 98.7 (02-27-22 @ 19:59)  HR: 70 (02-27-22 @ 19:59) (61 - 72)  BP: 127/58 (02-27-22 @ 19:59) (102/45 - 127/58)  BP(mean): --  RR: 20 (02-27-22 @ 19:59) (18 - 20)  SpO2: 100% (02-27-22 @ 19:59) (98% - 100%)            Constitutional: No fever, fatigue  Skin: No rash.  Eyes: No recent vision problems or eye pain.  ENT: No congestion, ear pain, or sore throat.  Cardiovascular: No chest pain or palpation.  Respiratory: No cough, shortness of breath, congestion, or wheezing.  Gastrointestinal: No abdominal pain, nausea, vomiting, or diarrhea.  Genitourinary: No dysuria.  Musculoskeletal: No joint swelling.  Neurologic: No headache.    PHYSICAL EXAM:  GENERAL: NAD  EYES: EOMI, PERRLA  NECK: Supple, No JVD  CHEST/LUNG: dec breath sounds at bases  HEART:  S1 , S2 +  ABDOMEN: soft , bs+, abd wound +  EXTREMITIES:  edema+  NEUROLOGY:alert awake    LABS:        Creatinine Trend: 5.59 <--, 4.88 <--, 7.07 <--    Urine Studies:                          Urine Studies:
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
## 2022-02-27 NOTE — PROGRESS NOTE ADULT - SUBJECTIVE AND OBJECTIVE BOX
CARDIOLOGY FOLLOW UP - Dr. Bullock  Date of Service: 2/27/22  CC: no complaints    Review of Systems:  Constitutional: No fever, weight loss, or fatigue  Respiratory: No cough, wheezing, or hemoptysis, no shortness of breath  Cardiovascular: No chest pain, palpitaitons, passing out, dizziness, or leg swelling  Gastrointestinal: No abd or epigastric pain. No nausea, vomitting, or hematemesis; no diarrhea or consiptaiton, no melena or hematochezia  Vascular: No edema     TELEMETRY:    PHYSICAL EXAM:  T(C): 37 (02-27-22 @ 09:43), Max: 37 (02-27-22 @ 09:43)  HR: 71 (02-27-22 @ 09:43) (61 - 72)  BP: 102/45 (02-27-22 @ 09:43) (102/45 - 113/52)  RR: 20 (02-27-22 @ 09:43) (17 - 20)  SpO2: 100% (02-27-22 @ 09:43) (99% - 100%)  Wt(kg): --  I&O's Summary      Appearance: Normal	  Cardiovascular: Normal S1 S2,RRR, No JVD, No murmurs  Respiratory: Lungs clear to auscultation	  Gastrointestinal:  Soft, Non-tender, + BS	  Extremities: Normal range of motion, No clubbing, cyanosis or edema  Vascular: Peripheral pulses palpable 2+ bilaterally       Home Medications:  aspirin 81 mg oral delayed release tablet: 1 tab(s) orally once a day (12 Jan 2022 17:49)  buPROPion 150 mg/24 hours (XL) oral tablet, extended release: 1 tab(s) orally once a day (in the morning) (12 Jan 2022 17:49)  cetirizine 10 mg oral tablet: 1 tab(s) orally once a day (12 Jan 2022 17:49)  dilTIAZem 120 mg/24 hours oral tablet, extended release: 1 tab(s) orally once a day (12 Jan 2022 17:49)  doxepin 25 mg oral capsule: 1 cap(s) orally once a day (at bedtime) (12 Jan 2022 17:49)  Eliquis 2.5 mg oral tablet: 1 tab(s) orally 2 times a day    Pharmacy states patient no longer wants to fill this medication (12 Jan 2022 17:49)  furosemide 80 mg oral tablet: 1 tab(s) orally once a day    Pharmacy states patient no longer wants to fill this medication (12 Jan 2022 17:49)  gabapentin 300 mg oral capsule: 1 cap(s) orally 2 times a day (12 Jan 2022 17:49)  hydrOXYzine hydrochloride 25 mg oral tablet: 1 tab(s) orally 2 times a day, As Needed (12 Jan 2022 17:49)  lanthanum 1000 mg oral tablet, chewable: 2 tab(s) orally with meals and 1 tab(s) orally with snacks    Pharmacy states patient no longer wants to fill this medication (12 Jan 2022 17:49)  mirtazapine 7.5 mg oral tablet: 1 tab(s) orally once a day (at bedtime) (12 Jan 2022 17:49)  montelukast 10 mg oral tablet: 1 tab(s) orally once a day (in the evening) (12 Jan 2022 17:49)  mupirocin 2% topical ointment: Apply sparingly to affected area 2 times a day as directed (12 Jan 2022 17:49)  nystatin 100,000 units/mL oral suspension: 5 milliliter(s) orally 4 times a day, as directed (12 Jan 2022 17:49)  omeprazole 20 mg oral delayed release capsule: 2 cap(s) orally once a day before breakfast (12 Jan 2022 17:49)  Pennsaid 2% topical solution: Apply topically to affected area 2 times a day (12 Jan 2022 17:49)  rosuvastatin 40 mg oral tablet: 1 tab(s) orally once a day (12 Jan 2022 17:49)  Santyl 250 units/g topical ointment: Apply topically to affected area once a day as directed (12 Jan 2022 17:49)  sertraline 50 mg oral tablet: 1 tab(s) orally once a day (12 Jan 2022 17:49)  Spiriva HandiHaler 18 mcg inhalation capsule: 1 cap(s) inhaled once a day (12 Jan 2022 17:49)  traZODone 100 mg oral tablet: 1 tab(s) orally once a day (at bedtime) (12 Jan 2022 17:49)  Tresiba FlexTouch 100 units/mL subcutaneous solution: 80 unit(s) subcutaneous once a day (at bedtime) (12 Jan 2022 17:49)  Trulicity Pen 1.5 mg/0.5 mL subcutaneous solution: 1 dose(s) subcutaneous once a week (12 Jan 2022 17:49)        MEDICATIONS  (STANDING):  apixaban 2.5 milliGRAM(s) Oral two times a day  aspirin enteric coated 81 milliGRAM(s) Oral daily  buPROPion XL (24-Hour) . 150 milliGRAM(s) Oral daily  chlorhexidine 2% Cloths 1 Application(s) Topical daily  cinacalcet 60 milliGRAM(s) Oral daily  cycloSPORINE  , modified (NEORAL) 125 milliGRAM(s) Oral every 12 hours  Dakins Solution - 1/4 Strength 1 Application(s) Topical daily  dextrose 40% Gel 15 Gram(s) Oral once  dextrose 5%. 1000 milliLiter(s) (50 mL/Hr) IV Continuous <Continuous>  dextrose 5%. 1000 milliLiter(s) (100 mL/Hr) IV Continuous <Continuous>  dextrose 50% Injectable 25 Gram(s) IV Push once  dextrose 50% Injectable 12.5 Gram(s) IV Push once  dextrose 50% Injectable 25 Gram(s) IV Push once  diltiazem    milliGRAM(s) Oral daily  epoetin anabela-epbx (RETACRIT) Injectable 02344 Unit(s) IV Push <User Schedule>  gabapentin 300 milliGRAM(s) Oral daily  glucagon  Injectable 1 milliGRAM(s) IntraMuscular once  heparin   Injectable. 500 Unit(s) Dialysis. every 1 hour  insulin glargine Injectable (LANTUS) 17 Unit(s) SubCutaneous at bedtime  insulin lispro (ADMELOG) corrective regimen sliding scale   SubCutaneous three times a day before meals  insulin lispro (ADMELOG) corrective regimen sliding scale   SubCutaneous at bedtime  insulin lispro Injectable (ADMELOG) 11 Unit(s) SubCutaneous three times a day before meals  loratadine 10 milliGRAM(s) Oral daily  melatonin 6 milliGRAM(s) Oral at bedtime  midodrine. 5 milliGRAM(s) Oral <User Schedule>  mirtazapine 7.5 milliGRAM(s) Oral at bedtime  montelukast 10 milliGRAM(s) Oral at bedtime  oxyCODONE  ER Tablet 10 milliGRAM(s) Oral every 12 hours  pantoprazole    Tablet 40 milliGRAM(s) Oral two times a day  polyethylene glycol 3350 17 Gram(s) Oral two times a day  senna 2 Tablet(s) Oral at bedtime  sertraline 50 milliGRAM(s) Oral daily  sevelamer carbonate 800 milliGRAM(s) Oral three times a day with meals  sodium thiosulfate IVPB 25 Gram(s) IV Intermittent <User Schedule>  tacrolimus   0.1% Ointment 1 Application(s) Topical daily  traZODone 100 milliGRAM(s) Oral at bedtime        EKG:  RADIOLOGY:  DIAGNOSTIC TESTING:  [ ] Echocardiogram:  [ ] Catherterization:  [ ] Stress Test:  OTHER:     LABS:	 	                          7.9    9.14  )-----------( 562      ( 25 Feb 2022 17:55 )             26.6     02-25    135  |  87<L>  |  36<H>  ----------------------------<  220<H>  4.6   |  22  |  5.59<H>    Ca    10.6<H>      25 Feb 2022 17:53  Phos  4.3     02-25  Mg     2.40     02-25            CARDIAC MARKERS:

## 2022-02-27 NOTE — PROVIDER CONTACT NOTE (OTHER) - RECOMMENDATIONS
Hold bedtime Lantus.
Provider was made aware. Provider ordered to admin cefepime at 4AM as scheduled in [facility]. Will make AM shift nurse aware as well.
STAT one time dose?
Give more insulin.
Iv team
Provider was made aware.
Pt states usually my labs get drawn in dialysis
Applied Ice pack on itchiness  area. Diphenhydramine Hydrochloride 25 mg IV push. Stop HD Treatment for few minutes.
Notify ACP
inform provider

## 2022-02-27 NOTE — PROGRESS NOTE ADULT - ASSESSMENT
56y Female with history of ESRD on HD presents with vomiting and diarrhea found to be COVID-19 positive. Nephrology consulted for ESRD status.    1) ESRD: Last HD on 2/25 tolerated well. Plan for next maintenance HD 2/28. Monitor electrolytes.    2) HTN with ESRD: BP low normal. Continue with midodrine pre-HD on HD days to avoid intradialytic hypotension. Cardizem as per cardiology. Monitor BP.    3) Anemia of renal disease: Hb low with elevated ferritin s/p PRBC with HD on 2/21. Continue with Epo 16K with HD. Monitor Hb.    4) Secondary HPT of renal origin: Phosphorus acceptable. iPTH low normal however sensipar already decreased to 60 mg PO daily. Continue with renvela 1 tab with meals (goal < 4.5 given concerns for calciphylaxis). Low calcium bath with HD. Monitor serum calcium and phosphorus.    5) Ab wound: Appreciate dermatology follow up. Ddx includes calciphylaxis versus pyoderma gangrenosum. S/P biopsy. Continue with empiric sodium thiosulfate with HD. On CSA as per Derm as benefits outweigh risks to residual renal function (which is minimal at this point).      [facility] NEPHROLOGY  Keaton Lopez M.D.  Juma Green D.O.  Myla Hoffmann M.D.  Julia Brewer, MSN, ANP-C    Telephone: [phone]  Facsimile: [phone]    [address] [place] [address]

## 2022-02-28 NOTE — PROVIDER CONTACT NOTE (OTHER) - BACKGROUND
Abdominal wound
Abdominal wound
Patient admitted for worsening abdominal wound
pt admitted for abdominal wound
Pt has pmh DM2.
admitted with worsening pannus wound
ESRD, Anemia, Atrial fibrillation, Morbid Obesity, CHAMP, Chronic GERD, COPD, HTN, DM, Unspecified open wound of abdominal wall, unspecified quadrant w/o penetration into peritoneal cavity.
pt admitted for abd wound
pt admitted for abdominal wound
Admitted for abdl. wound, hx of DM, HTN
pt admitted for abdominal wound
admitted with worsening pannus wound
admitted with worsening pannus wound
pt admitted for abdominal wound
Pt adm for non-healing abd wound

## 2022-02-28 NOTE — PROGRESS NOTE ADULT - SUBJECTIVE AND OBJECTIVE BOX
[facility] NEPHROLOGY- PROGRESS NOTE    56y Female with history of ESRD on HD presents with vomiting and diarrhea found to be COVID-19 positive. Nephrology consulted for ESRD status.    REVIEW OF SYSTEMS:  Gen: no changes in weight  Cards: no chest pain  Resp: no dyspnea  GI: no nausea or vomiting or diarrhea + ab wound pain  Vascular: no LE edema    caffeine (Nausea)  latex (Rash)  penicillin (Nausea)  strawberry (Rash)      Hospital Medications: Medications reviewed      VITALS:  T(F): 98.7 (02-28-22 @ 13:19), Max: 99 (02-27-22 @ 21:36)  HR: 69 (02-28-22 @ 13:19)  BP: 102/49 (02-28-22 @ 13:19)  RR: 20 (02-28-22 @ 13:19)  SpO2: 99% (02-28-22 @ 13:19)  Wt(kg): --    02-27 @ 07:01  -  02-28 @ 07:00  --------------------------------------------------------  IN: 1230 mL / OUT: 300 mL / NET: 930 mL    02-28 @ 07:01  -  02-28 @ 14:26  --------------------------------------------------------  IN: 240 mL / OUT: 0 mL / NET: 240 mL        PHYSICAL EXAM:    Gen: NAD, calm  Cards: RRR, +S1/S2, no M/G/R  Resp: CTA B/L  GI: soft, RLQ bandage/tender  Vascular: no LE edema B/L, LUE AVF + bruit/thrill        LABS:        Creatinine Trend: 5.59 <--, 4.88 <--, 7.07 <--    Urine Studies:

## 2022-02-28 NOTE — PROGRESS NOTE ADULT - SUBJECTIVE AND OBJECTIVE BOX
CARDIOLOGY FOLLOW UP NOTE - DR. JAQUEZ    Patient Name: ANTONIA ORTIZ  Date of Service: 22     no new events    Subjective:    cv: denies chest pain, dyspnea, palpitations, dizziness  pulmonary: denies cough  GI: denies abdominal pain, nausea, vomiting  vascular/legs: no edema   skin: no rash  ROS: otherwise negative   overnight events:      PHYSICAL EXAM:  T(C): 36.8 (22 @ 14:50), Max: 37.2 (22 @ 21:36)  HR: 69 (22 @ 14:50) (61 - 72)  BP: 123/57 (22 @ 14:50) (102/49 - 127/58)  RR: 20 (22 @ 14:50) (18 - 20)  SpO2: 99% (22 @ 13:19) (96% - 100%)  Wt(kg): --  I&O's Summary    2022 07:  -  2022 07:00  --------------------------------------------------------  IN: 1230 mL / OUT: 300 mL / NET: 930 mL    2022 07:01  -  2022 17:50  --------------------------------------------------------  IN: 240 mL / OUT: 0 mL / NET: 240 mL      Daily     Daily Weight in k.8 (2022 14:50)    Appearance: Normal	  Cardiovascular: Normal S1 S2,RRR, No JVD, No murmurs  Respiratory: Lungs clear to auscultation	  Gastrointestinal:  Soft, Non-tender, + BS	  Extremities: Normal range of motion, No clubbing, cyanosis or edema      Home Medications:  aspirin 81 mg oral delayed release tablet: 1 tab(s) orally once a day (2022 17:49)  buPROPion 150 mg/24 hours (XL) oral tablet, extended release: 1 tab(s) orally once a day (in the morning) (2022 17:49)  cetirizine 10 mg oral tablet: 1 tab(s) orally once a day (2022 17:49)  dilTIAZem 120 mg/24 hours oral tablet, extended release: 1 tab(s) orally once a day (2022 17:49)  doxepin 25 mg oral capsule: 1 cap(s) orally once a day (at bedtime) (2022 17:49)  Eliquis 2.5 mg oral tablet: 1 tab(s) orally 2 times a day    Pharmacy states patient no longer wants to fill this medication (2022 17:49)  furosemide 80 mg oral tablet: 1 tab(s) orally once a day    Pharmacy states patient no longer wants to fill this medication (2022 17:49)  gabapentin 300 mg oral capsule: 1 cap(s) orally 2 times a day (2022 17:49)  hydrOXYzine hydrochloride 25 mg oral tablet: 1 tab(s) orally 2 times a day, As Needed (2022 17:49)  lanthanum 1000 mg oral tablet, chewable: 2 tab(s) orally with meals and 1 tab(s) orally with snacks    Pharmacy states patient no longer wants to fill this medication (2022 17:49)  mirtazapine 7.5 mg oral tablet: 1 tab(s) orally once a day (at bedtime) (2022 17:49)  montelukast 10 mg oral tablet: 1 tab(s) orally once a day (in the evening) (2022 17:49)  mupirocin 2% topical ointment: Apply sparingly to affected area 2 times a day as directed (2022 17:49)  nystatin 100,000 units/mL oral suspension: 5 milliliter(s) orally 4 times a day, as directed (2022 17:49)  omeprazole 20 mg oral delayed release capsule: 2 cap(s) orally once a day before breakfast (2022 17:49)  Pennsaid 2% topical solution: Apply topically to affected area 2 times a day (2022 17:49)  rosuvastatin 40 mg oral tablet: 1 tab(s) orally once a day (2022 17:49)  Santyl 250 units/g topical ointment: Apply topically to affected area once a day as directed (2022 17:49)  sertraline 50 mg oral tablet: 1 tab(s) orally once a day (2022 17:49)  Spiriva HandiHaler 18 mcg inhalation capsule: 1 cap(s) inhaled once a day (2022 17:49)  traZODone 100 mg oral tablet: 1 tab(s) orally once a day (at bedtime) (2022 17:49)  Tresiba FlexTouch 100 units/mL subcutaneous solution: 80 unit(s) subcutaneous once a day (at bedtime) (2022 17:49)  Trulicity Pen 1.5 mg/0.5 mL subcutaneous solution: 1 dose(s) subcutaneous once a week (2022 17:49)      MEDICATIONS  (STANDING):  apixaban 2.5 milliGRAM(s) Oral two times a day  aspirin enteric coated 81 milliGRAM(s) Oral daily  buPROPion XL (24-Hour) . 150 milliGRAM(s) Oral daily  chlorhexidine 2% Cloths 1 Application(s) Topical daily  cinacalcet 60 milliGRAM(s) Oral daily  cycloSPORINE  , modified (NEORAL) 125 milliGRAM(s) Oral every 12 hours  Dakins Solution - 1/4 Strength 1 Application(s) Topical daily  dextrose 40% Gel 15 Gram(s) Oral once  dextrose 5%. 1000 milliLiter(s) (50 mL/Hr) IV Continuous <Continuous>  dextrose 5%. 1000 milliLiter(s) (100 mL/Hr) IV Continuous <Continuous>  dextrose 50% Injectable 25 Gram(s) IV Push once  dextrose 50% Injectable 12.5 Gram(s) IV Push once  dextrose 50% Injectable 25 Gram(s) IV Push once  diltiazem    milliGRAM(s) Oral daily  epoetin anabela-epbx (RETACRIT) Injectable 26397 Unit(s) IV Push <User Schedule>  gabapentin 300 milliGRAM(s) Oral daily  glucagon  Injectable 1 milliGRAM(s) IntraMuscular once  heparin   Injectable. 500 Unit(s) Dialysis. every 1 hour  insulin glargine Injectable (LANTUS) 12 Unit(s) SubCutaneous at bedtime  insulin lispro (ADMELOG) corrective regimen sliding scale   SubCutaneous three times a day before meals  insulin lispro (ADMELOG) corrective regimen sliding scale   SubCutaneous at bedtime  insulin lispro Injectable (ADMELOG) 6 Unit(s) SubCutaneous three times a day before meals  loratadine 10 milliGRAM(s) Oral daily  melatonin 6 milliGRAM(s) Oral at bedtime  midodrine. 5 milliGRAM(s) Oral <User Schedule>  mirtazapine 7.5 milliGRAM(s) Oral at bedtime  montelukast 10 milliGRAM(s) Oral at bedtime  oxyCODONE  ER Tablet 10 milliGRAM(s) Oral every 12 hours  pantoprazole    Tablet 40 milliGRAM(s) Oral two times a day  polyethylene glycol 3350 17 Gram(s) Oral two times a day  senna 2 Tablet(s) Oral at bedtime  sertraline 50 milliGRAM(s) Oral daily  sevelamer carbonate 800 milliGRAM(s) Oral three times a day with meals  sodium thiosulfate IVPB 25 Gram(s) IV Intermittent <User Schedule>  tacrolimus   0.1% Ointment 1 Application(s) Topical daily  traZODone 100 milliGRAM(s) Oral at bedtime      TELEMETRY: 	    ECG:  	  RADIOLOGY:   DIAGNOSTIC TESTING:  [ ] Echocardiogram:  [ ] Catheterization:  [ ] Stress Test:    OTHER: 	    LABS:	 	    CARDIAC MARKERS:        Troponin T, High Sensitivity Result: 70 ng/L ( @ 21:29)                                7.9    9.64  )-----------( 547      ( 2022 15:50 )             27.0         136  |  87<L>  |  40<H>  ----------------------------<  102<H>  5.1   |  21<L>  |  5.95<H>    Ca    11.2<H>      2022 15:50  Phos  4.4       Mg     2.60         TPro  x   /  Alb  x   /  TBili  x   /  DBili  x   /  AST  x   /  ALT  <5<L>  /  AlkPhos  x       proBNP:     Lipid Profile:   HgA1c:     Creatinine, Serum: 5.95 mg/dL (22 @ 15:50)  Creatinine, Serum: 5.59 mg/dL (22 @ 17:53)

## 2022-02-28 NOTE — PROVIDER CONTACT NOTE (OTHER) - NAME OF MD/NP/PA/DO NOTIFIED:
[facility] [id number]
Brayan Mckenna
Sneha Coreas
Brayan Mckenna
Evelyn ACP
FELIX Wolf
Keven Leslie
Kishan Lui
Brayan Mckenna
Davida Loco
Hussein Coreas
ACP Bindhu
Brayan Mckenna
Brayan Mckenna
Connie, ACP
TERESITA Castillo
Jorge L Connors

## 2022-02-28 NOTE — CHART NOTE - NSCHARTNOTEFT_GEN_A_CORE
Patient's pyoderma gangrenosum improving with cyclosporine 125mg BID since 2/8, pt tolerating well, no hypertension, labs reviewed.  At this time recommend:  - discontinue sodium thiosulfate  - increase cyclosporine to 150 mg BID (~3.5mg/kg dosing weight divided BID)  - check Mg, uric acid, and cholesterol levels    Recommendations were communicated with the primary team.  Please page [phone] for further related questions.    Alicia Queen MD  Resident Physician, PGY3  [facility] Dermatology  Pager: [phone]  Office: [phone]

## 2022-02-28 NOTE — PROGRESS NOTE ADULT - SUBJECTIVE AND OBJECTIVE BOX
Chief Complaint: DM2    History: hypoglycemia event yesterday evening to 33, patient denies symptoms  she does report vomiting prior to the event   was given juice with added sugar and drank 2 of those  Lantus was held  Does not like the hospital food  Patient is also requesting to speak with  to get update on discharge planning    MEDICATIONS  (STANDING):  apixaban 2.5 milliGRAM(s) Oral two times a day  aspirin enteric coated 81 milliGRAM(s) Oral daily  buPROPion XL (24-Hour) . 150 milliGRAM(s) Oral daily  chlorhexidine 2% Cloths 1 Application(s) Topical daily  cinacalcet 60 milliGRAM(s) Oral daily  cycloSPORINE  , modified (NEORAL) 125 milliGRAM(s) Oral every 12 hours  Dakins Solution - 1/4 Strength 1 Application(s) Topical daily  dextrose 40% Gel 15 Gram(s) Oral once  dextrose 5%. 1000 milliLiter(s) (50 mL/Hr) IV Continuous <Continuous>  dextrose 5%. 1000 milliLiter(s) (100 mL/Hr) IV Continuous <Continuous>  dextrose 50% Injectable 25 Gram(s) IV Push once  dextrose 50% Injectable 12.5 Gram(s) IV Push once  dextrose 50% Injectable 25 Gram(s) IV Push once  diltiazem    milliGRAM(s) Oral daily  epoetin anabela-epbx (RETACRIT) Injectable 46606 Unit(s) IV Push <User Schedule>  gabapentin 300 milliGRAM(s) Oral daily  glucagon  Injectable 1 milliGRAM(s) IntraMuscular once  heparin   Injectable. 500 Unit(s) Dialysis. every 1 hour  insulin glargine Injectable (LANTUS) 17 Unit(s) SubCutaneous at bedtime  insulin lispro (ADMELOG) corrective regimen sliding scale   SubCutaneous three times a day before meals  insulin lispro (ADMELOG) corrective regimen sliding scale   SubCutaneous at bedtime  insulin lispro Injectable (ADMELOG) 6 Unit(s) SubCutaneous three times a day before meals  loratadine 10 milliGRAM(s) Oral daily  melatonin 6 milliGRAM(s) Oral at bedtime  midodrine. 5 milliGRAM(s) Oral <User Schedule>  mirtazapine 7.5 milliGRAM(s) Oral at bedtime  montelukast 10 milliGRAM(s) Oral at bedtime  oxyCODONE  ER Tablet 10 milliGRAM(s) Oral every 12 hours  pantoprazole    Tablet 40 milliGRAM(s) Oral two times a day  polyethylene glycol 3350 17 Gram(s) Oral two times a day  senna 2 Tablet(s) Oral at bedtime  sertraline 50 milliGRAM(s) Oral daily  sevelamer carbonate 800 milliGRAM(s) Oral three times a day with meals  sodium thiosulfate IVPB 25 Gram(s) IV Intermittent <User Schedule>  tacrolimus   0.1% Ointment 1 Application(s) Topical daily  traZODone 100 milliGRAM(s) Oral at bedtime    MEDICATIONS  (PRN):  diphenhydrAMINE Injectable 25 milliGRAM(s) IV Push <User Schedule> PRN Itching  oxyCODONE    IR 7.5 milliGRAM(s) Oral every 4 hours PRN Severe Pain (7 - 10)  simethicone 80 milliGRAM(s) Chew every 6 hours PRN Gas      Allergies    latex (Rash)  penicillin (Nausea)  strawberry (Rash)    Intolerances    caffeine (Nausea)    Review of Systems:    ALL OTHER SYSTEMS REVIEWED AND NEGATIVE        PHYSICAL EXAM:  VITALS: T(C): 36.8 (02-28-22 @ 14:50)  T(F): 98.2 (02-28-22 @ 14:50), Max: 99 (02-27-22 @ 21:36)  HR: 69 (02-28-22 @ 14:50) (61 - 72)  BP: 123/57 (02-28-22 @ 14:50) (102/49 - 127/58)  RR:  (18 - 20)  SpO2:  (96% - 100%)  Wt(kg): --  GENERAL: NAD, well-groomed, well-developed  EYES: No proptosis, no lid lag, anicteric  HEENT:  Atraumatic, Normocephalic, moist mucous membranes  RESPIRATORY: nonlabored respirations, no wheezing  PSYCH: Alert and oriented x 3, normal affect, normal mood    CAPILLARY BLOOD GLUCOSE      POCT Blood Glucose.: 102 mg/dL (28 Feb 2022 14:02)  POCT Blood Glucose.: 87 mg/dL (28 Feb 2022 12:15)  POCT Blood Glucose.: 85 mg/dL (28 Feb 2022 08:35)  POCT Blood Glucose.: 95 mg/dL (27 Feb 2022 23:03)  POCT Blood Glucose.: 33 mg/dL (27 Feb 2022 22:57)  POCT Blood Glucose.: 91 mg/dL (27 Feb 2022 22:41)  POCT Blood Glucose.: 85 mg/dL (27 Feb 2022 22:09)  POCT Blood Glucose.: 74 mg/dL (27 Feb 2022 21:24)  POCT Blood Glucose.: 104 mg/dL (27 Feb 2022 17:44)      02-25    135  |  87<L>  |  36<H>  ----------------------------<  220<H>  4.6   |  22  |  5.59<H>    EGFR if : 9<L>  EGFR if non : 8<L>    Ca    10.6<H>      02-25  Mg     2.40     02-25  Phos  4.3     02-25        A1C with Estimated Average Glucose Result: 7.0 % (01-13-22 @ 08:21)  A1C with Estimated Average Glucose Result: 6.7 % (08-31-21 @ 09:30)  A1C with Estimated Average Glucose Result: 7.2 % (04-28-21 @ 07:04)      Thyroid Function Tests:

## 2022-02-28 NOTE — PROGRESS NOTE ADULT - ASSESSMENT
56 yr old F with morbid obesity, CHAMP not on home O2, ESRD (HD MWF), HTN, DM2 A1C 7.0, COPD, Afib no longer on AC, chronic R pannus wound here with worsening wound, diarrhea and found to be COVID+. Has very poor po intake at this time.     1. Type 2 diabetes mellitus uncontrolled  A1c 7.0% (may be inaccurate in setting of ESRD)  Home Regimen: Tresiba 80 units HS and Trulicity 1.5mg subq weekly  While inpatient:  BG target 100-180 mg/dL, s/p hypoglycemia last evening  Lantus held  Glucose low normal today and not being given insulin, reduced po intake  Reduce Lantus to 12 units SQ qHS   Reduce Admelog to 6 units SQ TID before meals (Hold if NPO/not eating meal)    Admelog correctional scale as LOW before meals and low bedtime scale  Check BG before meals and bedtime  Hypoglycemia protocol   Consistent carbohydrate diet    Discharge Plan:  STOP Trulicity (patient ESRD on HD)  Steroids have been dc'd  For dc to rehab- recommend to continue current insulin management as outlined above  For dc to home- Recommend basal plus PO regimen  Patient was on Tresiba at home may benefit from a different Long acting insulin such as Lantus or Levemir given ESRD.  Tresiba is ultra-Long acting insulin  Please assess insurance coverage for basal insulin pens:  Levemir, Lantus, Basaglar, Toujeo or Semglee.   Recommend Tradjenta 5 mg po daily, if not covered can see if Januvia 25 mg po daily is covered.  Please obtain prior auth if needed as patient is ESRD and DPP4i class are indicated for patients with ESRD  Also for d/c from rehab can start Prandin 1 mg po TID AC- hold if skips meal  Consider CGM (such as Freestyle Libre2 outpatient)  If desiring to followup with [facility] Endocrinology: [address] [place] [address], [address], [place] [address], [phone]    2. HTN  Management per primary team     3. Hyperlipidemia  Can continue home dose of Atorvastatin 80 mg  Note, limited benefit in ESRD. Followup lipid panel as outpatient    Krista Kim MD  Division of Endocrinology  Pager: [phone]    If after 6PM or before 9AM, or on weekends/holidays, please call endocrine answering service for assistance ([phone]).  For nonurgent matters email [email or web address] for assistance.

## 2022-02-28 NOTE — PROVIDER CONTACT NOTE (HYPOGLYCEMIA EVENT) - NS PROVIDER CONTACT ASSESS-HYPO
Vitals signs stable and patient was asymptomatic. 
Patient A&O X4 slightly dizzy vital signs stable
Patient A&O X4, patient asymptomatic. patient ordered a late lunch - eating and drinking at bedside.

## 2022-02-28 NOTE — PROGRESS NOTE ADULT - ASSESSMENT
56y Female with history of ESRD on HD presents with vomiting and diarrhea found to be COVID-19 positive. Nephrology consulted for ESRD status.    1) ESRD: Last HD on 2/25 tolerated well with 1.5L removed. Plan for next maintenance HD today. Monitor electrolytes.    2) HTN with ESRD: BP low normal. Continue with midodrine pre-HD on HD days to avoid intradialytic hypotension. Cardizem as per cardiology. Monitor BP.    3) Anemia of renal disease: Hb low with elevated ferritin s/p PRBC with HD on 2/21. Continue with Epo 16K with HD. Monitor Hb.    4) Secondary HPT of renal origin: Phosphorus acceptable. iPTH low normal however sensipar already decreased to 60 mg PO daily. Continue with renvela 1 tab with meals (goal < 4.5 given concerns for calciphylaxis). Low calcium bath with HD. Monitor serum calcium and phosphorus.    5) Ab wound: Appreciate dermatology follow up. Ddx includes calciphylaxis versus pyoderma gangrenosum. S/P biopsy. Continue with empiric sodium thiosulfate with HD. On CSA as per Derm as benefits outweigh risks to residual renal function (which is minimal at this point).      [facility] NEPHROLOGY  Keaton Lopez M.D.  Juma Green D.O.  Myla Hoffmann M.D.  Julia Brewer, MSN, ANP-C    Telephone: [phone]  Facsimile: [phone]    [address] [place] [address]

## 2022-02-28 NOTE — PROVIDER CONTACT NOTE (OTHER) - ASSESSMENT
A&Ox4. Vitals as per flowsheet. patient refused the lunch time dose patient states she does not want the medication
AOX3, FS=85, denies dizziness or lightheadeness
Patient FS 73, due for 64 units lantus. Patient at baseline mental status. In no acute distress.
pt is A&Ox4. Pt refusing labs to be drawn and wants them drawn at dialysis today.
pt is A&Ox4. Pt FS was 511 on 1710 day nurse had not communicated that with the night nurse. Nowhere in the Emar it shows that insulin was given. Asked pt if insulin was given, pt stated she believes she gave her 12 units.
Blood glucose is 511   No signs or symptoms of acute distress noted.
A&Ox4. Vitals as per flowsheet. According to HD RN, covid patients are scheduled last for HD, Covid patients would start getting HD at 10PM at night.
VS stable.    Call bell made within reach. Pt resting comfortably in bed.  No signs or symptoms of acute distress noted Unable to draw blood Pt is difficult stick.
pt is A&Ox4. Pt FS was 391. Pt denies any s/s of hyperglycemia.
Pt bedtime FS 74.
Pt is A&Ox3.   VSS.   Pt c/o 8/10 pain in abdomen
Patient A&Ox4. VSS. Patient states she does not want her labs drawn this morning
Patient denies any chest pain.
pt is A&Ox4. Pt FS was 428. Gave 4 units of insulin bedtime sliding scale and 10 units of Lantus. Pt denies any s/s of hyperglycemia.
pt is A&Ox4. Pt FS was 431. Pt denies any s/s of hyperglycemia.
pt is A&Ox4. Pt has no iv access. Attempted 4 times but was unsuccessful. Day nurse was notified.
pt is A&Ox4. pt refused AM labs. pt educated on importance of labs, and pt still refused
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
## 2022-02-28 NOTE — PROVIDER CONTACT NOTE (OTHER) - SITUATION
Pt 
Patient c/o itchiness on her left upper arm and right upper arm.
Patient FS 73, due for 64 units lantus
Patient has cefepime with instructions to administer post HD; HD scheduled for tonight 1/14/22 at 10PM
Patient refused morning labs
Unsuccessful attempts to draw labs
FS=85, 11 units Admelog due as standing, pt. w/ poor PO intake. "per pt. she doesn't like the food in the hospital"
For Pt Brigida Bond Rm 429A. Pt c/o 8/10 abd pain. Pt received Oxycodone 7.5 @ 12:52. Not within 4 hr parameter to receive next dose. Pt also not due for Tylenol or Oxycontin till later.
Patient refused renvela for the lunch dose
Blood glucose is 511
Pt 
pt refused AM labs
Pt 
Pt has no iv access attempted 4 times but was unsuccessful.
Pt 
bedtime fingerstick 74

## 2022-02-28 NOTE — PROVIDER CONTACT NOTE (OTHER) - DATE AND TIME:
05-Feb-2022 07:20
06-Feb-2022 22:40
15-Feb-2022 15:30
06-Feb-2022 15:19
07-Feb-2022 02:00
07-Feb-2022 08:05
16-Jan-2022 12:30
27-Feb-2022 21:24
06-Feb-2022 20:15
19-Jan-2022 23:45
07-Feb-2022 02:35
07-Feb-2022 06:15
17-Feb-2022 06:18
12-Jan-2022 10:33
28-Feb-2022 08:47
06-Feb-2022 17:20
14-Jan-2022 04:50

## 2022-02-28 NOTE — PROVIDER CONTACT NOTE (HYPOGLYCEMIA EVENT) - NS PROVIDER CONTACT RECOMMEND-HYPO
As per Noemi Rock held. Encouraging PO intake, continuing to monitor
Continue to monitor.
As per Noemi Rock held. Encouraging PO intake, continuing to monitor
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
## 2022-02-28 NOTE — PROVIDER CONTACT NOTE (OTHER) - ACTION/TREATMENT ORDERED:
admin cefepime at 4AM as scheduled in [facility]. Will make AM shift nurse aware as well.
provider made aware. Provider ordered to give 4 units of insulin, make pt npo and draw labs.
provider made aware. Provider stated to recheck FS at 10pm and give the bedtime sliding scale and Lantus.
PA made aware. Encouraged pt. to eat breakfast.
provider made aware. Provider ordered npo sliding scale. Gave 10 units.
provider made aware. Provider stated to recheck FS at 2am again.
provider made aware. Provider ordered  is to recheck FS in 3 hours.
[facility] made aware. No new orders at this time
Insulin given
Provider aware. Do not give lantus.
awaiting further orders will continue to provide education
provider made aware. Order was to get someone else to try.
provider made aware. no new orders at this time. will endorse to dayshift RN to attempt AM labs again.
Administered Diphenhydramine Hydrochloride 25 mg IV push.
Hold bedtime Lantus.
Okay to give 7.5mg Oxycodone one hour earlier. Will order a provider to RN.
Labs will be drawn in dialysis tomorrow

## 2022-02-28 NOTE — PROVIDER CONTACT NOTE (HYPOGLYCEMIA EVENT) - NS PROVIDER CONTACT SITUATION-HYPO
Patient hypoglycemic blood glucose 43
BG 33. Fingerstick recheck was 95.
Patient hypoglycemic blood glucose 61

## 2022-02-28 NOTE — PROVIDER CONTACT NOTE (HYPOGLYCEMIA EVENT) - NS PROVIDER CONTACT BACKGROUND-HYPO
Age: 56y    Gender: Female    POCT Blood Glucose:  97 mg/dL (02-28-22 @ 18:25)  105 mg/dL (02-28-22 @ 16:50)  102 mg/dL (02-28-22 @ 14:02)  87 mg/dL (02-28-22 @ 12:15)  85 mg/dL (02-28-22 @ 08:35)  95 mg/dL (02-27-22 @ 23:03)  33 mg/dL (02-27-22 @ 22:57)  91 mg/dL (02-27-22 @ 22:41)      eMAR:  cinacalcet   60 milliGRAM(s) Oral (02-28-22 @ 13:13)    
Age: 56y    Gender: Female    POCT Blood Glucose:  108 mg/dL (01-14-22 @ 09:02)  92 mg/dL (01-14-22 @ 08:37)  85 mg/dL (01-14-22 @ 08:26)  47 mg/dL  (01-14-22 @ 08:15)  43 mg/dL (01-14-22 @ 08:05)    eMAR:atorvastatin   80 milliGRAM(s) Oral (01-13-22 @ 22:24)    cinacalcet   60 milliGRAM(s) Oral (01-13-22 @ 22:23)    dextrose 50% Injectable   12.5 Gram(s) IV Push (01-14-22 @ 08:25)        
Age: 56y    Gender: Female    POCT Blood Glucose:  127 mg/dL (01-14-22 @ 16:30)  94 mg/dL (01-14-22 @ 15:54)  80 mg/dL (01-14-22 @ 15:31)  52 mg/dL (01-14-22 @ 15:13)  61 mg/dL (01-14-22 @ 15:06)        eMAR:atorvastatin   80 milliGRAM(s) Oral (01-13-22 @ 22:24)    cinacalcet   60 milliGRAM(s) Oral (01-13-22 @ 22:23)      dextrose 50% Injectable   12.5 Gram(s) IV Push (01-14-22 @ 15:26)

## 2022-02-28 NOTE — PROGRESS NOTE ADULT - SUBJECTIVE AND OBJECTIVE BOX
SUBJECTIVE / OVERNIGHT EVENTS:pt seen and examined, c/o pain at the wound site  2-28-22    MEDICATIONS  (STANDING):  apixaban 2.5 milliGRAM(s) Oral two times a day  aspirin enteric coated 81 milliGRAM(s) Oral daily  buPROPion XL (24-Hour) . 150 milliGRAM(s) Oral daily  chlorhexidine 2% Cloths 1 Application(s) Topical daily  cinacalcet 60 milliGRAM(s) Oral daily  cycloSPORINE  , modified (NEORAL) 150 milliGRAM(s) Oral every 12 hours  Dakins Solution - 1/4 Strength 1 Application(s) Topical daily  dextrose 40% Gel 15 Gram(s) Oral once  dextrose 5%. 1000 milliLiter(s) (50 mL/Hr) IV Continuous <Continuous>  dextrose 5%. 1000 milliLiter(s) (100 mL/Hr) IV Continuous <Continuous>  dextrose 50% Injectable 25 Gram(s) IV Push once  dextrose 50% Injectable 12.5 Gram(s) IV Push once  dextrose 50% Injectable 25 Gram(s) IV Push once  diltiazem    milliGRAM(s) Oral daily  epoetin anabela-epbx (RETACRIT) Injectable 47632 Unit(s) IV Push <User Schedule>  gabapentin 300 milliGRAM(s) Oral daily  glucagon  Injectable 1 milliGRAM(s) IntraMuscular once  heparin   Injectable. 500 Unit(s) Dialysis. every 1 hour  insulin glargine Injectable (LANTUS) 12 Unit(s) SubCutaneous at bedtime  insulin lispro (ADMELOG) corrective regimen sliding scale   SubCutaneous three times a day before meals  insulin lispro (ADMELOG) corrective regimen sliding scale   SubCutaneous at bedtime  insulin lispro Injectable (ADMELOG) 6 Unit(s) SubCutaneous three times a day before meals  loratadine 10 milliGRAM(s) Oral daily  melatonin 6 milliGRAM(s) Oral at bedtime  midodrine. 5 milliGRAM(s) Oral <User Schedule>  mirtazapine 7.5 milliGRAM(s) Oral at bedtime  montelukast 10 milliGRAM(s) Oral at bedtime  oxyCODONE  ER Tablet 10 milliGRAM(s) Oral every 12 hours  pantoprazole    Tablet 40 milliGRAM(s) Oral two times a day  polyethylene glycol 3350 17 Gram(s) Oral two times a day  senna 2 Tablet(s) Oral at bedtime  sertraline 50 milliGRAM(s) Oral daily  sevelamer carbonate 800 milliGRAM(s) Oral three times a day with meals  tacrolimus   0.1% Ointment 1 Application(s) Topical daily  traZODone 100 milliGRAM(s) Oral at bedtime    MEDICATIONS  (PRN):  diphenhydrAMINE Injectable 25 milliGRAM(s) IV Push <User Schedule> PRN Itching  oxyCODONE    IR 7.5 milliGRAM(s) Oral every 4 hours PRN Severe Pain (7 - 10)  simethicone 80 milliGRAM(s) Chew every 6 hours PRN Gas    Vital Signs Last 24 Hrs  T(C): 37.6 (02-28-22 @ 22:08), Max: 37.6 (02-28-22 @ 22:08)  T(F): 99.6 (02-28-22 @ 22:08), Max: 99.6 (02-28-22 @ 22:08)  HR: 82 (02-28-22 @ 22:08) (61 - 82)  BP: 109/42 (02-28-22 @ 22:08) (102/49 - 123/57)  BP(mean): --  RR: 17 (02-28-22 @ 22:08) (16 - 20)  SpO2: 98% (02-28-22 @ 22:08) (97% - 100%)      Constitutional: No fever, fatigue  Skin: No rash.  Eyes: No recent vision problems or eye pain.  ENT: No congestion, ear pain, or sore throat.  Cardiovascular: No chest pain or palpation.  Respiratory: No cough, shortness of breath, congestion, or wheezing.  Gastrointestinal: No abdominal pain, nausea, vomiting, or diarrhea.  Genitourinary: No dysuria.  Musculoskeletal: No joint swelling.  Neurologic: No headache.    PHYSICAL EXAM:  GENERAL: NAD  EYES: EOMI, PERRLA  NECK: Supple, No JVD  CHEST/LUNG: dec breath sounds at bases  HEART:  S1 , S2 +  ABDOMEN: soft , bs+, abd wound +  EXTREMITIES:  edema+  NEUROLOGY:alert awake    LABS:  02-28    136  |  87<L>  |  40<H>  ----------------------------<  102<H>  5.1   |  21<L>  |  5.95<H>    Ca    11.2<H>      28 Feb 2022 15:50  Phos  4.4     02-28  Mg     2.60     02-28    TPro      /  Alb      /  TBili      /  DBili      /  AST      /  ALT  <5<L>  /  AlkPhos      02-28    Creatinine Trend: 5.95 <--, 5.59 <--, 4.88 <--                        7.9    9.64  )-----------( 547      ( 28 Feb 2022 15:50 )             27.0     Urine Studies:            LIVER FUNCTIONS - ( 28 Feb 2022 15:50 )  Alb: x     / Pro: x     / ALK PHOS: x     / ALT: <5 U/L / AST: x     / GGT: x

## 2022-02-28 NOTE — PROGRESS NOTE ADULT - ASSESSMENT
56 y.o. F with PMH of morbid obesity, CHAMP, ESRD (HD MWF), HTN, DM, COPD, Afib currently on AC, COVID +, chronic R pannus wound in abdomen presented to [facility] on 1/12 due to worsening abdominal pain, RRt called. On Neuro exam no focality CT head negative    Impression: tremor and mild lethargy likely 2/2 multiple medical issues and polypharmacy    supportive care  -No further inpatient Neurologic workup at this time  mild tremor with asterixis likely due to toxic-metabolic state and polypharmacy

## 2022-03-01 NOTE — PROGRESS NOTE ADULT - SUBJECTIVE AND OBJECTIVE BOX
[facility] NEPHROLOGY- PROGRESS NOTE    56y Female with history of ESRD on HD presents with vomiting and diarrhea found to be COVID-19 positive. Nephrology consulted for ESRD status.    REVIEW OF SYSTEMS:  Gen: no changes in weight  Cards: no chest pain  Resp: no dyspnea  GI: no nausea or vomiting or diarrhea + ab wound pain  Vascular: no LE edema    caffeine (Nausea)  latex (Rash)  penicillin (Nausea)  strawberry (Rash)      Hospital Medications: Medications reviewed      VITALS:  T(F): 98.9 (03-01-22 @ 09:36), Max: 99.6 (02-28-22 @ 22:08)  HR: 75 (03-01-22 @ 09:36)  BP: 123/46 (03-01-22 @ 09:36)  RR: 18 (03-01-22 @ 09:36)  SpO2: 100% (03-01-22 @ 09:36)  Wt(kg): --    02-28 @ 07:01  -  03-01 @ 07:00  --------------------------------------------------------  IN: 640 mL / OUT: 1900 mL / NET: -1260 mL        PHYSICAL EXAM:    Gen: NAD, calm  Cards: RRR, +S1/S2, no M/G/R  Resp: CTA B/L  GI: soft, RLQ bandage/tender  Vascular: no LE edema B/L, LUE AVF + bruit/thrill        LABS:  02-28    136  |  87<L>  |  40<H>  ----------------------------<  102<H>  5.1   |  21<L>  |  5.95<H>    Ca    11.2<H>      28 Feb 2022 15:50  Phos  4.4     02-28  Mg     2.60     02-28    TPro      /  Alb      /  TBili      /  DBili      /  AST      /  ALT  <5<L>  /  AlkPhos      02-28    Creatinine Trend: 5.95 <--, 5.59 <--, 4.88 <--                        7.9    9.64  )-----------( 997      ( 28 Feb 2022 15:50 )             27.0     Urine Studies:
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
## 2022-03-01 NOTE — PROGRESS NOTE ADULT - ASSESSMENT
Echo 1/19/22: grossly nl LV sys fx, min MR     a/p  56 year old female with hx of morbid obesity, CHAMP not on home O2, ESRD (HD MWF), HTN, DM, COPD, Afib no longer on AC, chronic R pannus wound, followed by Dr. Layne, sent by visiting RN for worsening wound.    #Chronic Pannus Wound  -s/p IV abx per primary team  -on cycloSPORINE - statin on hold   -med/derm/wound care  f/u     #Afib  -stable, in NSR   -cont eliquis   -cont cardizem     #Hypertension  -stable low normal- on mido to augment bp  -c/w ccb     #ESRD on HD  -HD per renal    # Near Syncope  -tele no events   -orthostatics neg  -echo w grossly nl lv sys fxn    #Covid-19+  -resolved   -med f/u     #Elevated troponin  -demand ischemia in setting of esrd  -echo as above  -no cp/sob

## 2022-03-01 NOTE — PROGRESS NOTE ADULT - ASSESSMENT
56y Female with history of ESRD on HD presents with vomiting and diarrhea found to be COVID-19 positive. Nephrology consulted for ESRD status.    1) ESRD: Last HD on 2/28 tolerated well with 1.5L removed. Plan for next maintenance HD on 3/2. Monitor electrolytes.    2) HTN with ESRD: BP low normal. Continue with midodrine pre-HD on HD days to avoid intradialytic hypotension. Cardizem as per cardiology. Monitor BP.    3) Anemia of renal disease: Hb low with elevated ferritin s/p PRBC with HD on 2/21. Continue with Epo 16K with HD. Would transfuse with HD. Monitor Hb.    4) Secondary HPT of renal origin: Phosphorus acceptable with hypercalcemia. Continue with sensipar 60 mg daily and renvela 1 tab with meals. Low calcium bath with HD and repeat iPTH. Monitor serum calcium and phosphorus.    5) Ab wound: Appreciate dermatology follow up. 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       [facility] NEPHROLOG[facility]  Keaton Lopez M.D.  Juma Green D.O.  Myla Hoffmann M.D.  Julia Brewer, MSN, ANP-C    Telephone: [phone]  Facsimile: [phone]    [address] [place] [address]

## 2022-03-01 NOTE — PROGRESS NOTE ADULT - SUBJECTIVE AND OBJECTIVE BOX
SUBJECTIVE / OVERNIGHT EVENTS:pt seen and examined  3-1--22    MEDICATIONS  (STANDING):  apixaban 2.5 milliGRAM(s) Oral two times a day  aspirin enteric coated 81 milliGRAM(s) Oral daily  buPROPion XL (24-Hour) . 150 milliGRAM(s) Oral daily  chlorhexidine 2% Cloths 1 Application(s) Topical daily  cinacalcet 60 milliGRAM(s) Oral daily  cycloSPORINE  , modified (NEORAL) 150 milliGRAM(s) Oral every 12 hours  Dakins Solution - 1/4 Strength 1 Application(s) Topical daily  dextrose 40% Gel 15 Gram(s) Oral once  dextrose 5%. 1000 milliLiter(s) (50 mL/Hr) IV Continuous <Continuous>  dextrose 5%. 1000 milliLiter(s) (100 mL/Hr) IV Continuous <Continuous>  dextrose 50% Injectable 25 Gram(s) IV Push once  dextrose 50% Injectable 12.5 Gram(s) IV Push once  dextrose 50% Injectable 25 Gram(s) IV Push once  diltiazem    milliGRAM(s) Oral daily  epoetin anabela-epbx (RETACRIT) Injectable 82949 Unit(s) IV Push <User Schedule>  gabapentin 300 milliGRAM(s) Oral daily  glucagon  Injectable 1 milliGRAM(s) IntraMuscular once  heparin   Injectable. 500 Unit(s) Dialysis. every 1 hour  insulin glargine Injectable (LANTUS) 12 Unit(s) SubCutaneous at bedtime  insulin lispro (ADMELOG) corrective regimen sliding scale   SubCutaneous three times a day before meals  insulin lispro (ADMELOG) corrective regimen sliding scale   SubCutaneous at bedtime  insulin lispro Injectable (ADMELOG) 6 Unit(s) SubCutaneous three times a day before meals  loratadine 10 milliGRAM(s) Oral daily  melatonin 6 milliGRAM(s) Oral at bedtime  midodrine. 5 milliGRAM(s) Oral <User Schedule>  mirtazapine 7.5 milliGRAM(s) Oral at bedtime  montelukast 10 milliGRAM(s) Oral at bedtime  oxyCODONE  ER Tablet 10 milliGRAM(s) Oral every 12 hours  pantoprazole    Tablet 40 milliGRAM(s) Oral two times a day  polyethylene glycol 3350 17 Gram(s) Oral two times a day  senna 2 Tablet(s) Oral at bedtime  sertraline 50 milliGRAM(s) Oral daily  sevelamer carbonate 800 milliGRAM(s) Oral three times a day with meals  tacrolimus   0.1% Ointment 1 Application(s) Topical daily  traZODone 100 milliGRAM(s) Oral at bedtime    MEDICATIONS  (PRN):  diphenhydrAMINE Injectable 25 milliGRAM(s) IV Push <User Schedule> PRN Itching  oxyCODONE    IR 7.5 milliGRAM(s) Oral every 4 hours PRN Severe Pain (7 - 10)  simethicone 80 milliGRAM(s) Chew every 6 hours PRN Gas    Vital Signs Last 24 Hrs  T(C): 36.6 (03-01-22 @ 22:32), Max: 37.3 (03-01-22 @ 18:13)  T(F): 97.9 (03-01-22 @ 22:32), Max: 99.2 (03-01-22 @ 18:13)  HR: 67 (03-01-22 @ 22:32) (67 - 78)  BP: 99/43 (03-01-22 @ 22:32) (99/43 - 123/51)  BP(mean): --  RR: 17 (03-01-22 @ 22:32) (17 - 18)  SpO2: 98% (03-01-22 @ 22:32) (96% - 100%)      Constitutional: No fever, fatigue  Skin: No rash.  Eyes: No recent vision problems or eye pain.  ENT: No congestion, ear pain, or sore throat.  Cardiovascular: No chest pain or palpation.  Respiratory: No cough, shortness of breath, congestion, or wheezing.  Gastrointestinal: No abdominal pain, nausea, vomiting, or diarrhea.  Genitourinary: No dysuria.  Musculoskeletal: No joint swelling.  Neurologic: No headache.    PHYSICAL EXAM:  GENERAL: NAD  EYES: EOMI, PERRLA  NECK: Supple, No JVD  CHEST/LUNG: dec breath sounds at bases  HEART:  S1 , S2 +  ABDOMEN: soft , bs+, abd wound +  EXTREMITIES:  edema+  NEUROLOGY:alert awake    LABS:  02-28    136  |  87<L>  |  40<H>  ----------------------------<  102<H>  5.1   |  21<L>  |  5.95<H>    Ca    11.2<H>      28 Feb 2022 15:50  Phos  4.4     02-28  Mg     2.60     02-28    TPro      /  Alb      /  TBili      /  DBili      /  AST      /  ALT  <5<L>  /  AlkPhos      02-28    Creatinine Trend: 5.95 <--, 5.59 <--                        7.9    9.64  )-----------( 547      ( 28 Feb 2022 15:50 )             27.0     Urine Studies:            LIVER FUNCTIONS - ( 28 Feb 2022 15:50 )  Alb: x     / Pro: x     / ALK PHOS: x     / ALT: <5 U/L / AST: x     / GGT: x

## 2022-03-02 NOTE — PROGRESS NOTE ADULT - PROBLEM SELECTOR PROBLEM 6
Medication management

## 2022-03-02 NOTE — PROGRESS NOTE ADULT - PROBLEM SELECTOR PLAN 1
wound care   abx as per ID    pt had vasovagal episode earlier , monitor pts mental status clos[place]
wound care   s/p abx  wound vac  pain management f/u
wound care   s/p abx  wound vac  pain management f/u
wound care   abx as per ID    pt had vasovagal episode earlier , monitor pts mental status clos[place]
wound care   s/p abx  wound vac  pain management f/u
wound care   s/p abx  wound vac  pain management f/u
wound care   s/p abx  wound vac
wound care   s/p abx  wound vac  pain management f/u
wound care   s/p abx  wound vac  pain management f/u
wound care   s/p abx  wound vac
wound care   s/p abx  wound vac  pain management f/u
wound care   s/p abx  wound vac  pain management f/u
wound care   s/p abx  wound vac
wound care   s/p abx  wound vac  pain management f/u
wound care   abx as per ID
wound care   s/p abx  wound vac  pain management f/u
wound care   abx as per ID    pt had vasovagal episode earlier , monitor pts mental status [place]
wound care   s/p abx  wound vac  pain management f/u
wound care   s/p abx
wound care   s/p abx
wound care   s/p abx  wound vac  pain management f/u
wound care   abx as per ID    pt had vasovagal episode earlier , monitor pts mental status [place]
wound care   abx as per ID    pt had vasovagal episode earlier , monitor pts mental status clos[place]
wound care   s/p abx  wound vac  pain management f/u
wound care   s/p abx
wound care   s/p abx  wound vac  pain management f/u
wound care   abx as per ID
wound care   s/p abx  wound vac  pain management f/u
wound care   abx as per ID    pt had vasovagal episode earlier , monitor pts mental status clos[place]
wound care   s/p abx  wound vac  pain management f/u
wound care   s/p abx  wound vac  pain management f/u
wound care   s/p abx  wound vac
wound care   ID eval
wound care   abx as per ID
wound care   s/p abx  wound vac  pain management f/u

## 2022-03-02 NOTE — PROGRESS NOTE ADULT - PROBLEM SELECTOR PROBLEM 4
2019 novel coronavirus disease (COVID-19)

## 2022-03-02 NOTE — PROGRESS NOTE ADULT - PROBLEM SELECTOR PLAN 2
c/w cardizem, aspirin   AC AS PER CARDS
c/w cardizem, aspirin   pt not on ac- cards eval about ?ac
c/w cardizem, aspirin   AC AS PER CARDS

## 2022-03-02 NOTE — PROGRESS NOTE ADULT - SUBJECTIVE AND OBJECTIVE BOX
[facility] NEPHROLOGY- PROGRESS NOTE    56y Female with history of ESRD on HD presents with vomiting and diarrhea found to be COVID-19 positive. Nephrology consulted for ESRD status.    REVIEW OF SYSTEMS:  Gen: no changes in weight  Cards: no chest pain  Resp: no dyspnea  GI: no nausea or vomiting or diarrhea + ab wound pain  Vascular: no LE edema    caffeine (Nausea)  latex (Rash)  penicillin (Nausea)  strawberry (Rash)      Hospital Medications: Medications reviewed      VITALS:  T(F): 98.3 (03-02-22 @ 06:20), Max: 99.2 (03-01-22 @ 18:13)  HR: 73 (03-02-22 @ 06:20)  BP: 110/43 (03-02-22 @ 06:20)  RR: 18 (03-02-22 @ 06:20)  SpO2: 96% (03-02-22 @ 06:20)  Wt(kg): --    03-01 @ 07:01  -  03-02 @ 07:00  --------------------------------------------------------  IN: 180 mL / OUT: 0 mL / NET: 180 mL        PHYSICAL EXAM:    Gen: NAD, calm  Cards: RRR, +S1/S2, no M/G/R  Resp: CTA B/L  GI: soft, RLQ bandage/tender  Vascular: no LE edema B/L, LUE AVF + bruit/thrill        LABS:  02-28    136  |  87<L>  |  40<H>  ----------------------------<  102<H>  5.1   |  21<L>  |  5.95<H>    Ca    11.2<H>      28 Feb 2022 15:50  Phos  4.4     02-28  Mg     2.60     02-28    TPro      /  Alb      /  TBili      /  DBili      /  AST      /  ALT  <5<L>  /  AlkPhos      02-28    Creatinine Trend: 5.95 <--, 5.59 <--                        7.9    9.64  )-----------( 547      ( 28 Feb 2022 15:50 )             27.0     Urine Studies:

## 2022-03-02 NOTE — PROGRESS NOTE ADULT - PROBLEM SELECTOR PROBLEM 7
Diarrhea

## 2022-03-02 NOTE — PROGRESS NOTE ADULT - PROBLEM SELECTOR PLAN 7
improved    derm evaluated pt - poss calciphylaxis, stop solumedrol as per derm,  Erosive changes involving the sacroiliac joints are new may represent renal osteodystrophy.    Right upper lobe nodular opacities of uncertain etiology but may be infectious.    Fibroid uterus. Air and debris noted in the endometrial canal.
improved    derm evaluated pt - poss calciphylaxis, stop solumedrol as per derm, ct abd // pelvis
improved    derm evaluated pt - poss calciphylaxis, stop solumedrol as per derm,  Erosive changes involving the sacroiliac joints are new may represent renal osteodystrophy.    Right upper lobe nodular opacities of uncertain etiology but may be infectious.    Fibroid uterus. Air and debris noted in the endometrial canal.
improved    derm evaluated pt - poss calciphylaxis, stop solumedrol as per derm, ct abd // pelvis
given recent abx use, will r/o cdiff; gi pcr ordered
improved    derm evaluated pt - poss calciphylaxis, stop solumedrol as per derm,  Erosive changes involving the sacroiliac joints are new may represent renal osteodystrophy.    Right upper lobe nodular opacities of uncertain etiology but may be infectious.    Fibroid uterus. Air and debris noted in the endometrial canal.
improved    derm evaluated pt - poss calciphylaxis, stop solumedrol as per derm,  Erosive changes involving the sacroiliac joints are new may represent renal osteodystrophy.    Right upper lobe nodular opacities of uncertain etiology but may be infectious.    Fibroid uterus. Air and debris noted in the endometrial canal.
improved    derm evaluated pt - poss calciphylaxis, stop solumedrol as per derm, ct abd // pelvis
given recent abx use, will r/o cdiff; gi pcr ordered
improved    derm evaluated pt - poss calciphylaxis, stop solumedrol as per derm,  Erosive changes involving the sacroiliac joints are new may represent renal osteodystrophy.    Right upper lobe nodular opacities of uncertain etiology but may be infectious.    Fibroid uterus. Air and debris noted in the endometrial canal.
given recent abx use, will r/o cdiff; gi pcr ordered
given recent abx use, will r/o cdiff; gi pcr ordered
improved    derm evaluated pt - poss calciphylaxis, stop solumedrol as per derm,  Erosive changes involving the sacroiliac joints are new may represent renal osteodystrophy.    Right upper lobe nodular opacities of uncertain etiology but may be infectious.    Fibroid uterus. Air and debris noted in the endometrial canal.
given recent abx use, will r/o cdiff; gi pcr ordered
improved    derm evaluated pt - poss calciphylaxis, stop solumedrol as per derm, ct abd // pelvis
given recent abx use, will r/o cdiff; gi pcr ordered
improved    derm evaluated pt - poss calciphylaxis, stop solumedrol as per derm,  Erosive changes involving the sacroiliac joints are new may represent renal osteodystrophy.    Right upper lobe nodular opacities of uncertain etiology but may be infectious.    Fibroid uterus. Air and debris noted in the endometrial canal.
improved    derm evaluated pt - poss calciphylaxis, stop solumedrol as per derm, ct abd // pelvis
given recent abx use, will r/o cdiff; gi pcr ordered
given recent abx use, will r/o cdiff; gi pcr ordered
improved    derm evaluated pt - poss calciphylaxis, stop solumedrol as per derm,  Erosive changes involving the sacroiliac joints are new may represent renal osteodystrophy.    Right upper lobe nodular opacities of uncertain etiology but may be infectious.    Fibroid uterus. Air and debris noted in the endometrial canal.
given recent abx use, will r/o cdiff; gi pcr ordered
improved    derm evaluated pt - poss calciphylaxis, stop solumedrol as per derm,  Erosive changes involving the sacroiliac joints are new may represent renal osteodystrophy.    Right upper lobe nodular opacities of uncertain etiology but may be infectious.    Fibroid uterus. Air and debris noted in the endometrial canal.
improved    derm evaluated pt - poss calciphylaxis, stop solumedrol as per derm, ct abd // pelvis
improved    derm evaluated pt - poss calciphylaxis, stop solumedrol as per derm,  Erosive changes involving the sacroiliac joints are new may represent renal osteodystrophy.    Right upper lobe nodular opacities of uncertain etiology but may be infectious.    Fibroid uterus. Air and debris noted in the endometrial canal.
given recent abx use, will r/o cdiff; gi pcr ordered
improved    derm evaluated pt - poss calciphylaxis, stop solumedrol as per derm, ct abd // pelvis
given recent abx use, will r/o cdiff; gi pcr ordered
improved    derm evaluated pt - poss calciphylaxis, stop solumedrol as per derm,  Erosive changes involving the sacroiliac joints are new may represent renal osteodystrophy.    Right upper lobe nodular opacities of uncertain etiology but may be infectious.    Fibroid uterus. Air and debris noted in the endometrial canal.
improved    derm evaluated pt - poss calciphylaxis, stop solumedrol as per derm, ct abd // pelvis
improved    derm evaluated pt - poss calciphylaxis, stop solumedrol as per derm,  Erosive changes involving the sacroiliac joints are new may represent renal osteodystrophy.    Right upper lobe nodular opacities of uncertain etiology but may be infectious.    Fibroid uterus. Air and debris noted in the endometrial canal.
improved    derm evaluated pt - poss calciphylaxis, stop solumedrol as per derm,  Erosive changes involving the sacroiliac joints are new may represent renal osteodystrophy.    Right upper lobe nodular opacities of uncertain etiology but may be infectious.    Fibroid uterus. Air and debris noted in the endometrial canal.
improved    derm evaluated pt - poss calciphylaxis, stop solumedrol as per derm, ct abd // pelvis
given recent abx use, will r/o cdiff; gi pcr ordered
improved    derm evaluated pt - poss calciphylaxis, stop solumedrol as per derm,  Erosive changes involving the sacroiliac joints are new may represent renal osteodystrophy.    Right upper lobe nodular opacities of uncertain etiology but may be infectious.    Fibroid uterus. Air and debris noted in the endometrial canal.
improved    derm evaluated pt - poss calciphylaxis, stop solumedrol as per derm,  Erosive changes involving the sacroiliac joints are new may represent renal osteodystrophy.    Right upper lobe nodular opacities of uncertain etiology but may be infectious.    Fibroid uterus. Air and debris noted in the endometrial canal.
given recent abx use, will r/o cdiff; gi pcr ordered
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
## 2022-03-02 NOTE — PROGRESS NOTE ADULT - PROBLEM SELECTOR PROBLEM 5
DM (diabetes mellitus)

## 2022-03-02 NOTE — PROGRESS NOTE ADULT - SUBJECTIVE AND OBJECTIVE BOX
CARDIOLOGY FOLLOW UP - Dr. Bullock  Date of Service: 3/2/22  CC: no cp/sob     Review of Systems:  Constitutional: No fever, weight loss, or fatigue  Respiratory: No cough, wheezing, or hemoptysis, no shortness of breath  Cardiovascular: No chest pain, palpitations, passing out, dizziness, or leg swelling  Gastrointestinal: No abd or epigastric pain.  No nausea, vomiting, or hematemesis; no diarrhea or constipation, no melena or hematochezia  Vascular: no edema       PHYSICAL EXAM:  T(C): 36.8 (03-02-22 @ 06:20), Max: 37.3 (03-01-22 @ 18:13)  HR: 73 (03-02-22 @ 06:20) (65 - 74)  BP: 110/43 (03-02-22 @ 06:20) (99/43 - 123/51)  RR: 18 (03-02-22 @ 06:20) (17 - 18)  SpO2: 96% (03-02-22 @ 06:20) (94% - 98%)  Wt(kg): --  I&O's Summary    01 Mar 2022 07:01  -  02 Mar 2022 07:00  --------------------------------------------------------  IN: 180 mL / OUT: 0 mL / NET: 180 mL        Appearance: Normal	  Cardiovascular: Normal S1 S2,RRR, No JVD, No murmurs  Respiratory: Lungs clear to auscultation	  Gastrointestinal:  Soft, Non-tender, + BS	  Extremities: Normal range of motion, No clubbing, cyanosis or edema      Home Medications:  aspirin 81 mg oral delayed release tablet: 1 tab(s) orally once a day (12 Jan 2022 17:49)  buPROPion 150 mg/24 hours (XL) oral tablet, extended release: 1 tab(s) orally once a day (in the morning) (12 Jan 2022 17:49)  cetirizine 10 mg oral tablet: 1 tab(s) orally once a day (12 Jan 2022 17:49)  dilTIAZem 120 mg/24 hours oral tablet, extended release: 1 tab(s) orally once a day (12 Jan 2022 17:49)  doxepin 25 mg oral capsule: 1 cap(s) orally once a day (at bedtime) (12 Jan 2022 17:49)  Eliquis 2.5 mg oral tablet: 1 tab(s) orally 2 times a day    Pharmacy states patient no longer wants to fill this medication (12 Jan 2022 17:49)  furosemide 80 mg oral tablet: 1 tab(s) orally once a day    Pharmacy states patient no longer wants to fill this medication (12 Jan 2022 17:49)  gabapentin 300 mg oral capsule: 1 cap(s) orally 2 times a day (12 Jan 2022 17:49)  hydrOXYzine hydrochloride 25 mg oral tablet: 1 tab(s) orally 2 times a day, As Needed (12 Jan 2022 17:49)  lanthanum 1000 mg oral tablet, chewable: 2 tab(s) orally with meals and 1 tab(s) orally with snacks    Pharmacy states patient no longer wants to fill this medication (12 Jan 2022 17:49)  mirtazapine 7.5 mg oral tablet: 1 tab(s) orally once a day (at bedtime) (12 Jan 2022 17:49)  montelukast 10 mg oral tablet: 1 tab(s) orally once a day (in the evening) (12 Jan 2022 17:49)  mupirocin 2% topical ointment: Apply sparingly to affected area 2 times a day as directed (12 Jan 2022 17:49)  nystatin 100,000 units/mL oral suspension: 5 milliliter(s) orally 4 times a day, as directed (12 Jan 2022 17:49)  omeprazole 20 mg oral delayed release capsule: 2 cap(s) orally once a day before breakfast (12 Jan 2022 17:49)  Pennsaid 2% topical solution: Apply topically to affected area 2 times a day (12 Jan 2022 17:49)  rosuvastatin 40 mg oral tablet: 1 tab(s) orally once a day (12 Jan 2022 17:49)  Santyl 250 units/g topical ointment: Apply topically to affected area once a day as directed (12 Jan 2022 17:49)  sertraline 50 mg oral tablet: 1 tab(s) orally once a day (12 Jan 2022 17:49)  Spiriva HandiHaler 18 mcg inhalation capsule: 1 cap(s) inhaled once a day (12 Jan 2022 17:49)  traZODone 100 mg oral tablet: 1 tab(s) orally once a day (at bedtime) (12 Jan 2022 17:49)  Tresiba FlexTouch 100 units/mL subcutaneous solution: 80 unit(s) subcutaneous once a day (at bedtime) (12 Jan 2022 17:49)  Trulicity Pen 1.5 mg/0.5 mL subcutaneous solution: 1 dose(s) subcutaneous once a week (12 Jan 2022 17:49)      MEDICATIONS  (STANDING):  apixaban 2.5 milliGRAM(s) Oral two times a day  aspirin enteric coated 81 milliGRAM(s) Oral daily  buPROPion XL (24-Hour) . 150 milliGRAM(s) Oral daily  chlorhexidine 2% Cloths 1 Application(s) Topical daily  cinacalcet 60 milliGRAM(s) Oral daily  cycloSPORINE  , modified (NEORAL) 150 milliGRAM(s) Oral every 12 hours  Dakins Solution - 1/4 Strength 1 Application(s) Topical daily  dextrose 40% Gel 15 Gram(s) Oral once  dextrose 5%. 1000 milliLiter(s) (50 mL/Hr) IV Continuous <Continuous>  dextrose 5%. 1000 milliLiter(s) (100 mL/Hr) IV Continuous <Continuous>  dextrose 50% Injectable 25 Gram(s) IV Push once  dextrose 50% Injectable 12.5 Gram(s) IV Push once  dextrose 50% Injectable 25 Gram(s) IV Push once  diltiazem    milliGRAM(s) Oral daily  epoetin anabela-epbx (RETACRIT) Injectable 01742 Unit(s) IV Push <User Schedule>  gabapentin 300 milliGRAM(s) Oral daily  glucagon  Injectable 1 milliGRAM(s) IntraMuscular once  heparin   Injectable. 500 Unit(s) Dialysis. every 1 hour  insulin glargine Injectable (LANTUS) 12 Unit(s) SubCutaneous at bedtime  insulin lispro (ADMELOG) corrective regimen sliding scale   SubCutaneous three times a day before meals  insulin lispro (ADMELOG) corrective regimen sliding scale   SubCutaneous at bedtime  insulin lispro Injectable (ADMELOG) 6 Unit(s) SubCutaneous three times a day before meals  loratadine 10 milliGRAM(s) Oral daily  melatonin 6 milliGRAM(s) Oral at bedtime  midodrine. 5 milliGRAM(s) Oral <User Schedule>  mirtazapine 7.5 milliGRAM(s) Oral at bedtime  montelukast 10 milliGRAM(s) Oral at bedtime  oxyCODONE  ER Tablet 10 milliGRAM(s) Oral every 12 hours  pantoprazole    Tablet 40 milliGRAM(s) Oral two times a day  polyethylene glycol 3350 17 Gram(s) Oral two times a day  senna 2 Tablet(s) Oral at bedtime  sertraline 50 milliGRAM(s) Oral daily  sevelamer carbonate 800 milliGRAM(s) Oral three times a day with meals  tacrolimus   0.1% Ointment 1 Application(s) Topical daily  traZODone 100 milliGRAM(s) Oral at bedtime      TELEMETRY: 	    ECG:  	  RADIOLOGY:   DIAGNOSTIC TESTING:  [ ] Echocardiogram:  [ ]  Catheterization:  [ ] Stress Test:    OTHER: 	    LABS:	 	    Troponin T, High Sensitivity Result: 70 ng/L (02-23 @ 21:29)                          7.9    9.64  )-----------( 547      ( 28 Feb 2022 15:50 )             27.0     02-28    136  |  87<L>  |  40<H>  ----------------------------<  102<H>  5.1   |  21<L>  |  5.95<H>    Ca    11.2<H>      28 Feb 2022 15:50  Phos  4.4     02-28  Mg     2.60     02-28    TPro  x   /  Alb  x   /  TBili  x   /  DBili  x   /  AST  x   /  ALT  <5<L>  /  AlkPhos  x   02-28

## 2022-03-02 NOTE — PROGRESS NOTE ADULT - SUBJECTIVE AND OBJECTIVE BOX
SUBJECTIVE / OVERNIGHT EVENTS:pt seen and examined  3-2--22    MEDICATIONS  (STANDING):  apixaban 2.5 milliGRAM(s) Oral two times a day  aspirin enteric coated 81 milliGRAM(s) Oral daily  buPROPion XL (24-Hour) . 150 milliGRAM(s) Oral daily  chlorhexidine 2% Cloths 1 Application(s) Topical daily  cinacalcet 60 milliGRAM(s) Oral daily  cycloSPORINE  , modified (NEORAL) 150 milliGRAM(s) Oral every 12 hours  Dakins Solution - 1/4 Strength 1 Application(s) Topical daily  dextrose 40% Gel 15 Gram(s) Oral once  dextrose 5%. 1000 milliLiter(s) (50 mL/Hr) IV Continuous <Continuous>  dextrose 5%. 1000 milliLiter(s) (100 mL/Hr) IV Continuous <Continuous>  dextrose 50% Injectable 25 Gram(s) IV Push once  dextrose 50% Injectable 12.5 Gram(s) IV Push once  dextrose 50% Injectable 25 Gram(s) IV Push once  diltiazem    milliGRAM(s) Oral daily  epoetin anabela-epbx (RETACRIT) Injectable 31962 Unit(s) IV Push <User Schedule>  gabapentin 300 milliGRAM(s) Oral daily  glucagon  Injectable 1 milliGRAM(s) IntraMuscular once  heparin   Injectable. 500 Unit(s) Dialysis. every 1 hour  insulin glargine Injectable (LANTUS) 12 Unit(s) SubCutaneous at bedtime  insulin lispro (ADMELOG) corrective regimen sliding scale   SubCutaneous three times a day before meals  insulin lispro (ADMELOG) corrective regimen sliding scale   SubCutaneous at bedtime  insulin lispro Injectable (ADMELOG) 6 Unit(s) SubCutaneous three times a day before meals  loratadine 10 milliGRAM(s) Oral daily  melatonin 6 milliGRAM(s) Oral at bedtime  midodrine. 5 milliGRAM(s) Oral <User Schedule>  mirtazapine 7.5 milliGRAM(s) Oral at bedtime  montelukast 10 milliGRAM(s) Oral at bedtime  oxyCODONE  ER Tablet 10 milliGRAM(s) Oral every 12 hours  pantoprazole    Tablet 40 milliGRAM(s) Oral two times a day  polyethylene glycol 3350 17 Gram(s) Oral two times a day  senna 2 Tablet(s) Oral at bedtime  sertraline 50 milliGRAM(s) Oral daily  sevelamer carbonate 800 milliGRAM(s) Oral three times a day with meals  tacrolimus   0.1% Ointment 1 Application(s) Topical daily  traZODone 100 milliGRAM(s) Oral at bedtime    MEDICATIONS  (PRN):  diphenhydrAMINE Injectable 25 milliGRAM(s) IV Push <User Schedule> PRN Itching  oxyCODONE    IR 7.5 milliGRAM(s) Oral every 4 hours PRN Severe Pain (7 - 10)  simethicone 80 milliGRAM(s) Chew every 6 hours PRN Gas    Vital Signs Last 24 Hrs  T(C): 36.6 (03-01-22 @ 22:32), Max: 37.3 (03-01-22 @ 18:13)  T(F): 97.9 (03-01-22 @ 22:32), Max: 99.2 (03-01-22 @ 18:13)  HR: 67 (03-01-22 @ 22:32) (67 - 78)  BP: 99/43 (03-01-22 @ 22:32) (99/43 - 123/51)  BP(mean): --  RR: 17 (03-01-22 @ 22:32) (17 - 18)  SpO2: 98% (03-01-22 @ 22:32) (96% - 100%)      Constitutional: No fever, fatigue  Skin: No rash.  Eyes: No recent vision problems or eye pain.  ENT: No congestion, ear pain, or sore throat.  Cardiovascular: No chest pain or palpation.  Respiratory: No cough, shortness of breath, congestion, or wheezing.  Gastrointestinal: No abdominal pain, nausea, vomiting, or diarrhea.  Genitourinary: No dysuria.  Musculoskeletal: No joint swelling.  Neurologic: No headache.    PHYSICAL EXAM:  GENERAL: NAD  EYES: EOMI, PERRLA  NECK: Supple, No JVD  CHEST/LUNG: dec breath sounds at bases  HEART:  S1 , S2 +  ABDOMEN: soft , bs+, abd wound +  EXTREMITIES:  edema+  NEUROLOGY:alert awake    LABS:        Creatinine Trend: 5.95 <--, 5.59 <--    Urine Studies:

## 2022-03-02 NOTE — PROGRESS NOTE ADULT - ASSESSMENT
56y Female with history of ESRD on HD presents with vomiting and diarrhea found to be COVID-19 positive. Nephrology consulted for ESRD status.    1) ESRD: Last HD on 2/28 tolerated well with 1.5L removed. Plan for next maintenance HD on 3/3 given plans to discharge today to rehab where patient will be converted to TTS schedule. Monitor electrolytes.    2) HTN with ESRD: BP low normal. Continue with midodrine pre-HD on HD days to avoid intradialytic hypotension. Cardizem as per cardiology. Monitor BP.    3) Anemia of renal disease: Hb low with elevated ferritin s/p PRBC with HD on 2/21. Continue with Epo 16K with HD. Monitor Hb.    4) Secondary HPT of renal origin: Phosphorus acceptable with hypercalcemia. Continue with sensipar 60 mg daily and renvela 1 tab with meals. Low calcium bath with HD and repeat iPTH. Monitor serum calcium and phosphorus.    5) Ab wound: Appreciate dermatology follow up. 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       [facility]  Keaton Lopez M.D.  Juma Green D.O.  Myla Hoffmann M.D.  Julia Brewer, MSN, ANP-C    Telephone: [phone]  Facsimile: [phone]    [address] [place] [address]

## 2022-03-02 NOTE — PROGRESS NOTE ADULT - PROBLEM SELECTOR PLAN 3
meed to call renal for m/w /f dialysis   c/w sevelamer, sensipar

## 2022-03-02 NOTE — CHART NOTE - NSCHARTNOTEFT_GEN_A_CORE
56 yr old F with morbid obesity, CHAMP not on home O2, ESRD (HD MWF), HTN, DM2 A1C 7.0, COPD, Afib no longer on AC, chronic R pannus wound here with worsening wound, diarrhea and found to be COVID+. Has very poor po intake at this time.     CAPILLARY BLOOD GLUCOSE      POCT Blood Glucose.: 98 mg/dL (02 Mar 2022 10:46)  POCT Blood Glucose.: 119 mg/dL (02 Mar 2022 08:35)  POCT Blood Glucose.: 99 mg/dL (02 Mar 2022 02:58)  POCT Blood Glucose.: 93 mg/dL (02 Mar 2022 00:29)  POCT Blood Glucose.: 82 mg/dL (01 Mar 2022 22:20)  POCT Blood Glucose.: 207 mg/dL (01 Mar 2022 17:09)  POCT Blood Glucose.: 199 mg/dL (01 Mar 2022 12:09)      1. Type 2 diabetes mellitus uncontrolled  A1c 7.0% (may be inaccurate in setting of ESRD)  Home Regimen: Tresiba 80 units HS and Trulicity 1.5mg subq weekly  While inpatient:  BG target 100-180 mg/dL, s/p hypoglycemia last evening  Lantus held  Glucose low normal today and not being given insulin, reduced po intake  Reduce Lantus to 10 units SQ qHS   Reduce Admelog to 5 units SQ TID before meals (Hold if NPO/not eating meal)    Admelog correctional scale as LOW before meals and low bedtime scale  Check BG before meals and bedtime  Hypoglycemia protocol   Consistent carbohydrate diet    Discharge Plan:  STOP Trulicity (patient ESRD on HD)  Steroids have been dc'd  For dc to rehab- recommend to continue current insulin management as outlined above  For dc to home- Recommend basal plus PO regimen  Patient was on Tresiba at home may benefit from a different Long acting insulin such as Lantus or Levemir given ESRD.  Tresiba is ultra-Long acting insulin  Please assess insurance coverage for basal insulin pens:  Levemir, Lantus, Basaglar, Toujeo or Semglee.  Can d/c on current basal dose.  Rehab will titrate dosing for d/c  Recommend Tradjenta 5 mg po daily, if not covered can see if Januvia 25 mg po daily is covered.  Please obtain prior auth if needed as patient is ESRD and DPP4i class are indicated for patients with ESRD  Also for d/c from rehab can start Prandin 1 mg po TID AC- hold if skips meal  Consider CGM (such as Freestyle Libre2 outpatient)  If desiring to followup with [facility]: [address], [address], [facility] [address], [phone]

## 2022-03-02 NOTE — PROGRESS NOTE ADULT - PROBLEM SELECTOR PLAN 5
reduce 80 Tresiba to 64 lantus, place on mod ISS  on Trulicity q weekly

## 2022-03-03 NOTE — PROGRESS NOTE ADULT - PROBLEM SELECTOR PROBLEM 3
Hyperlipidemia
ESRD on dialysis
ESRD on dialysis
Hyperlipidemia
Hyperlipidemia
ESRD on dialysis
Hyperlipidemia
ESRD on dialysis
Hyperlipidemia
ESRD on dialysis
Hyperlipidemia
ESRD on dialysis
ESRD on dialysis
Hyperlipidemia
ESRD on dialysis
ESRD on dialysis
Hyperlipidemia
Hyperlipidemia
ESRD on dialysis
Hyperlipidemia
ESRD on dialysis
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
## 2022-03-03 NOTE — PROGRESS NOTE ADULT - SUBJECTIVE AND OBJECTIVE BOX
[facility] NEPHROLOGY- PROGRESS NOTE    56y Female with history of ESRD on HD presents with vomiting and diarrhea found to be COVID-19 positive. Nephrology consulted for ESRD status.    REVIEW OF SYSTEMS:  Gen: no changes in weight  Cards: no chest pain  Resp: no dyspnea  GI: no nausea or vomiting or diarrhea + ab wound pain  Vascular: no LE edema    caffeine (Nausea)  latex (Rash)  penicillin (Nausea)  strawberry (Rash)      Hospital Medications: Medications reviewed      VITALS:  T(F): 98.2 (03-03-22 @ 14:32), Max: 99.1 (03-02-22 @ 21:31)  HR: 70 (03-03-22 @ 14:32)  BP: 126/60 (03-03-22 @ 14:32)  RR: 18 (03-03-22 @ 14:32)  SpO2: 98% (03-03-22 @ 14:32)  Wt(kg): --    03-02 @ 07:01  -  03-03 @ 07:00  --------------------------------------------------------  IN: 900 mL / OUT: 0 mL / NET: 900 mL    03-03 @ 07:01  -  03-03 @ 16:11  --------------------------------------------------------  IN: 640 mL / OUT: 2400 mL / NET: -1760 mL        PHYSICAL EXAM:    Gen: NAD, calm  Cards: RRR, +S1/S2, no M/G/R  Resp: CTA B/L  GI: soft, RLQ bandage/tender  Vascular: no LE edema B/L, LUE AVF + bruit/thrill        LABS:  03-03    134<L>  |  92<L>  |  45<H>  ----------------------------<  110<H>  4.6   |  22  |  6.11<H>    Ca    10.5      03 Mar 2022 07:11  Phos  4.3     03-03  Mg     2.40     03-03      Creatinine Trend: 6.11 <--, 5.95 <--, 5.59 <--                        8.0    9.37  )-----------( 564      ( 03 Mar 2022 07:11 )             28.2     Urine Studies:

## 2022-03-03 NOTE — PROGRESS NOTE ADULT - ASSESSMENT
56 yr old F with morbid obesity, CHAMP not on home O2, ESRD (HD MWF), HTN, DM2 A1C 7.0, COPD, Afib no longer on AC, chronic R pannus wound here with worsening wound, diarrhea and found to be COVID+. Has very poor po intake at this time.     1. Type 2 diabetes mellitus uncontrolled  A1c 7.0% (may be inaccurate in setting of ESRD)  Home Regimen: Tresiba 80 units HS and Trulicity 1.5mg subq weekly  While inpatient:  BG target 100-180 mg/dL, s/p hypoglycemia last evening  Lantus held  Glucose low normal today and not being given insulin, reduced po intake  Continue Lantus 10 units SQ qHS   Continue Admelog 6 units SQ TID before meals (Hold if NPO/not eating meal)    Admelog correctional scale as LOW before meals and low bedtime scale  Check BG before meals and bedtime  Hypoglycemia protocol   Consistent carbohydrate diet    Discharge Plan:  STOP Trulicity (patient ESRD on HD)  Steroids have been dc'd  For dc to rehab- recommend to continue current insulin management as outlined above  For dc to home- Recommend basal plus PO regimen  Patient was on Tresiba at home may benefit from a different Long acting insulin such as Lantus or Levemir given ESRD.  Tresiba is ultra-Long acting insulin  Please assess insurance coverage for basal insulin pens:  Levemir, Lantus, Basaglar, Toujeo or Semglee.   Recommend Tradjenta 5 mg po daily, if not covered can see if Januvia 25 mg po daily is covered.  Please obtain prior auth if needed as patient is ESRD and DPP4i class are indicated for patients with ESRD  Also for d/c from rehab can start Prandin 1 mg po TID AC- hold if skips meal  Consider CGM (such as Freestyle Libre2 outpatient)  If desiring to followup with [facility] Endocrinology: [address], [address], [place] [address], [phone]    2. HTN  Management per primary team     3. Hyperlipidemia  Can continue home dose of Atorvastatin 80 mg  Note, limited benefit in ESRD. Followup lipid panel as outpatient        Tamela Ruiz  Nurse Practitioner  Division of Endocrinology & Diabetes  Pager # [phone]      If after 6PM or before 9AM, or on weekends/holidays, please call endocrine answering service for assistance ([phone]).  For nonurgent matters email [email or web address] for assistance.

## 2022-03-03 NOTE — PROGRESS NOTE ADULT - SUBJECTIVE AND OBJECTIVE BOX
CARDIOLOGY FOLLOW UP - Dr. Bullock  Date of Service: 3/3/22  CC: no cp/sob     Review of Systems:  Constitutional: No fever, weight loss, or fatigue  Respiratory: No cough, wheezing, or hemoptysis, no shortness of breath  Cardiovascular: No chest pain, palpitations, passing out, dizziness, or leg swelling  Gastrointestinal: No abd or epigastric pain.  No nausea, vomiting, or hematemesis; no diarrhea or constipation, no melena or hematochezia  Vascular: no edema       PHYSICAL EXAM:  T(C): 36.7 (03-03-22 @ 10:22), Max: 37.3 (03-02-22 @ 21:31)  HR: 68 (03-03-22 @ 10:22) (66 - 77)  BP: 130/62 (03-03-22 @ 10:22) (101/45 - 148/71)  RR: 18 (03-03-22 @ 10:22) (17 - 18)  SpO2: 98% (03-03-22 @ 10:22) (94% - 98%)  Wt(kg): --  I&O's Summary    02 Mar 2022 07:01  -  03 Mar 2022 07:00  --------------------------------------------------------  IN: 900 mL / OUT: 0 mL / NET: 900 mL    03 Mar 2022 07:01  -  03 Mar 2022 12:12  --------------------------------------------------------  IN: 400 mL / OUT: 2400 mL / NET: -2000 mL        Appearance: Normal	  Cardiovascular: Normal S1 S2,RRR, No JVD, No murmurs  Respiratory: Lungs clear to auscultation	  Gastrointestinal:  Soft, Non-tender, + BS	  Extremities: Normal range of motion, No clubbing, cyanosis or edema      Home Medications:  aspirin 81 mg oral delayed release tablet: 1 tab(s) orally once a day (12 Jan 2022 17:49)  buPROPion 150 mg/24 hours (XL) oral tablet, extended release: 1 tab(s) orally once a day (in the morning) (12 Jan 2022 17:49)  cycloSPORINE modified 50 mg oral capsule: 3 cap(s) orally every 12 hours (02 Mar 2022 14:00)  dilTIAZem 120 mg/24 hours oral tablet, extended release: 1 tab(s) orally once a day (12 Jan 2022 17:49)  Eliquis 2.5 mg oral tablet: 1 tab(s) orally 2 times a day    Pharmacy states patient no longer wants to fill this medication (12 Jan 2022 17:49)  gabapentin 300 mg oral capsule: 1 cap(s) orally 2 times a day (12 Jan 2022 17:49)  insulin glargine: 10 unit(s) subcutaneous once a day (at bedtime) (02 Mar 2022 14:04)  insulin lispro 100 units/mL injectable solution: 5 unit(s) injectable 3 times a day (before meals) (02 Mar 2022 14:04)  insulin lispro 100 units/mL injectable solution: 0 Unit(s) if Glucose 61 - 250  1 Unit(s) if Glucose 251 - 300  2 Unit(s) if Glucose 301 - 350  3 Unit(s) if Glucose 351 - 400  4 Unit(s) if Glucose Greater Than 400 (02 Mar 2022 14:06)  insulin lispro 100 units/mL injectable solution: 1 Unit(s) if Glucose 151 - 200  2 Unit(s) if Glucose 201 - 250  3 Unit(s) if Glucose 251 - 300  4 Unit(s) if Glucose 301 - 350  5 Unit(s) if Glucose 351 - 400  6 Unit(s) if Glucose Greater Than 400 (02 Mar 2022 14:04)  loratadine 10 mg oral tablet: 1 tab(s) orally once a day (02 Mar 2022 14:00)  mirtazapine 7.5 mg oral tablet: 1 tab(s) orally once a day (at bedtime) (12 Jan 2022 17:49)  montelukast 10 mg oral tablet: 1 tab(s) orally once a day (in the evening) (12 Jan 2022 17:49)  oxyCODONE 10 mg oral tablet, extended release: 1 tab(s) orally every 12 hours (02 Mar 2022 14:04)  oxyCODONE 7.5 mg oral tablet: 1 tab(s) orally every 4 hours, As needed, Severe Pain (7 - 10) (02 Mar 2022 14:04)  pantoprazole 40 mg oral delayed release tablet: 1 tab(s) orally 2 times a day (02 Mar 2022 14:00)  polyethylene glycol 3350 oral powder for reconstitution: 17 gram(s) orally 2 times a day (02 Mar 2022 14:00)  senna oral tablet: 2 tab(s) orally once a day (at bedtime) (02 Mar 2022 14:00)  sertraline 50 mg oral tablet: 1 tab(s) orally once a day (12 Jan 2022 17:49)  sevelamer carbonate 800 mg oral tablet: 1 tab(s) orally 3 times a day (with meals) (02 Mar 2022 14:00)  traZODone 100 mg oral tablet: 1 tab(s) orally once a day (at bedtime) (12 Jan 2022 17:49)  Tresiba FlexTouch 100 units/mL subcutaneous solution: 80 unit(s) subcutaneous once a day (at bedtime) (12 Jan 2022 17:49)  Trulicity Pen 1.5 mg/0.5 mL subcutaneous solution: 1 dose(s) subcutaneous once a week (12 Jan 2022 17:49)      MEDICATIONS  (STANDING):  apixaban 2.5 milliGRAM(s) Oral two times a day  aspirin enteric coated 81 milliGRAM(s) Oral daily  buPROPion XL (24-Hour) . 150 milliGRAM(s) Oral daily  chlorhexidine 2% Cloths 1 Application(s) Topical daily  cinacalcet 60 milliGRAM(s) Oral daily  cycloSPORINE  , modified (NEORAL) 150 milliGRAM(s) Oral every 12 hours  Dakins Solution - 1/2 Strength 1 Application(s) Topical daily  dextrose 40% Gel 15 Gram(s) Oral once  dextrose 5%. 1000 milliLiter(s) (50 mL/Hr) IV Continuous <Continuous>  dextrose 5%. 1000 milliLiter(s) (100 mL/Hr) IV Continuous <Continuous>  dextrose 50% Injectable 25 Gram(s) IV Push once  dextrose 50% Injectable 12.5 Gram(s) IV Push once  dextrose 50% Injectable 25 Gram(s) IV Push once  diltiazem    milliGRAM(s) Oral daily  epoetin anabela-epbx (RETACRIT) Injectable 01720 Unit(s) IV Push <User Schedule>  gabapentin 300 milliGRAM(s) Oral daily  glucagon  Injectable 1 milliGRAM(s) IntraMuscular once  heparin   Injectable. 500 Unit(s) Dialysis. every 1 hour  insulin glargine Injectable (LANTUS) 10 Unit(s) SubCutaneous at bedtime  insulin lispro (ADMELOG) corrective regimen sliding scale   SubCutaneous three times a day before meals  insulin lispro (ADMELOG) corrective regimen sliding scale   SubCutaneous at bedtime  insulin lispro Injectable (ADMELOG) 5 Unit(s) SubCutaneous three times a day before meals  loratadine 10 milliGRAM(s) Oral daily  melatonin 6 milliGRAM(s) Oral at bedtime  midodrine. 5 milliGRAM(s) Oral <User Schedule>  mirtazapine 7.5 milliGRAM(s) Oral at bedtime  montelukast 10 milliGRAM(s) Oral at bedtime  oxyCODONE  ER Tablet 10 milliGRAM(s) Oral every 12 hours  pantoprazole    Tablet 40 milliGRAM(s) Oral two times a day  polyethylene glycol 3350 17 Gram(s) Oral two times a day  senna 2 Tablet(s) Oral at bedtime  sertraline 50 milliGRAM(s) Oral daily  sevelamer carbonate 800 milliGRAM(s) Oral three times a day with meals  tacrolimus   0.1% Ointment 1 Application(s) Topical daily  traZODone 100 milliGRAM(s) Oral at bedtime      TELEMETRY: 	    ECG:  	  RADIOLOGY:   DIAGNOSTIC TESTING:  [ ] Echocardiogram:  [ ]  Catheterization:  [ ] Stress Test:    OTHER: 	    LABS:	 	                            8.0    9.37  )-----------( 564      ( 03 Mar 2022 07:11 )             28.2     03-03    134<L>  |  92<L>  |  45<H>  ----------------------------<  110<H>  4.6   |  22  |  6.11<H>    Ca    10.5      03 Mar 2022 07:11  Phos  4.3     03-03  Mg     2.40     03-03

## 2022-03-03 NOTE — PROGRESS NOTE ADULT - REASON FOR ADMISSION
worsening abdominal wound
worsening abdominal wound  stay with dakins wet to dry and santyl- not ready for vac  if needed biopsy can be obtained
worsening abdominal wound

## 2022-03-03 NOTE — PROGRESS NOTE ADULT - ASSESSMENT
56y Female with history of ESRD on HD presents with vomiting and diarrhea found to be COVID-19 positive. Nephrology consulted for ESRD status.    1) ESRD: Last HD earlier this morning tolerated well with 2L removed. Plan for next maintenance HD on 3/5. Monitor electrolytes.    2) HTN with ESRD: BP low normal. Continue with midodrine pre-HD on HD days to avoid intradialytic hypotension. Cardizem as per cardiology. Monitor BP.    3) Anemia of renal disease: Hb low with elevated ferritin s/p PRBC with HD on 2/21. Continue with Epo 16K with HD. Monitor Hb.    4) Secondary HPT of renal origin: Phosphorus acceptable with high normal serum calcium. Continue with sensipar 60 mg daily and renvela 1 tab with meals. Low calcium bath with HD and repeat iPTH. Monitor serum calcium and phosphorus.    5) Ab wound: Appreciate dermatology follow up. 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       [facility] NEPHROLOGY  Keaton Lopez M.D.  Juma Green D.O.  Myla Hoffmann M.D.  Julia Brewer, MSN, ANP-C    Telephone: [phone]  Facsimile: [phone]    [address] [place] [address] 56y Female with history of ESRD on HD presents with vomiting and diarrhea found to be COVID-19 positive. Nephrology consulted for ESRD status.    1) ESRD: Last HD earlier this morning tolerated well with 2L removed with clots in venous chamber for which will increase heparin with next HD. Plan for next maintenance HD on 3/5. Monitor electrolytes.    2) HTN with ESRD: BP low normal. Continue with midodrine pre-HD on HD days to avoid intradialytic hypotension. Cardizem as per cardiology. Monitor BP.    3) Anemia of renal disease: Hb low with elevated ferritin s/p PRBC with HD on 2/21. Continue with Epo 16K with HD. Monitor Hb.    4) Secondary HPT of renal origin: Phosphorus acceptable with high normal serum calcium. Continue with sensipar 60 mg daily and renvela 1 tab with meals. Low calcium bath with HD and repeat iPTH. Monitor serum calcium and phosphorus.    5) Ab wound: Appreciate dermatology follow up. 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       [facility] NEPHROLOG[facility]  Keaton Lopez M.D.  Juma Green D.O.  Myla Hoffmann M.D.  Julia Brewer, JASIEL, ANP-C    Telephone: [phone]  Facsimile: [phone]    [address] [place] [id number]

## 2022-03-03 NOTE — PROGRESS NOTE ADULT - PROBLEM SELECTOR PROBLEM 2
Afib
Afib
Hypertension, unspecified type
Afib
Hypertension, unspecified type
Afib
Afib
Hypertension, unspecified type
Afib
Hypertension, unspecified type
Afib
Hypertension, unspecified type
Afib
Hypertension, unspecified type
Afib
Hypertension, unspecified type
Afib
Hypertension, unspecified type
Afib
Hypertension, unspecified type
Afib

## 2022-03-03 NOTE — PROGRESS NOTE ADULT - PROBLEM SELECTOR PROBLEM 1
Chronic abdominal wound infection
Chronic abdominal wound infection
DM (diabetes mellitus)
Type 2 diabetes mellitus with hyperglycemia
Type 2 diabetes mellitus with hyperglycemia
Chronic abdominal wound infection
Type 2 diabetes mellitus with hyperglycemia
Chronic abdominal wound infection
Chronic abdominal wound infection
DM (diabetes mellitus)
Type 2 diabetes mellitus with hyperglycemia
Chronic abdominal wound infection
Chronic abdominal wound infection
DM (diabetes mellitus)
Type 2 diabetes mellitus with hyperglycemia
Type 2 diabetes mellitus with hyperglycemia
Chronic abdominal wound infection
DM (diabetes mellitus)
Type 2 diabetes mellitus with hyperglycemia
Type 2 diabetes mellitus with hyperglycemia
Chronic abdominal wound infection
Chronic abdominal wound infection
Type 2 diabetes mellitus with hyperglycemia
Chronic abdominal wound infection
DM (diabetes mellitus)
Type 2 diabetes mellitus with hyperglycemia
Chronic abdominal wound infection
Chronic abdominal wound infection
Type 2 diabetes mellitus with hyperglycemia
Type 2 diabetes mellitus with hyperglycemia
Chronic abdominal wound infection
Chronic abdominal wound infection
Type 2 diabetes mellitus with hyperglycemia
Chronic abdominal wound infection
Type 2 diabetes mellitus with hyperglycemia
Chronic abdominal wound infection

## 2022-03-03 NOTE — PROGRESS NOTE ADULT - SUBJECTIVE AND OBJECTIVE BOX
[facility]-- WOUND TEAM -- FOLLOW UP NOTE  --------------------------------------------------------------------------------      Subjective/Interval HPI/24 hour events:   Patient was seen and examined at bedside.   Pt continues to complain of worsening pain from the abdominal wound radiating around her back and legs. Per patient she received Oxycodone apx 15 minutes prior to encounter. Reports that "Tylenol IV is usually effective."   Patient tearful "Do you think I am going to die from all of this."    Chart reviewed including labs and relevant images      Diet:  Diet, Consistent Carbohydrate Renal w/Evening Snack:   Supplement Feeding Modality:  Oral  Nepro Cans or Servings Per Day:  1       Frequency:  Three Times a day (02-07-22 @ 12:23)      ROS: General/ SKIN see above  All other systems negative.    ALLERGIES & MEDICATIONS  --------------------------------------------------------------------------------  Allergies    latex (Rash)  penicillin (Nausea)  strawberry (Rash)    Intolerances    caffeine (Nausea)        STANDING INPATIENT MEDICATIONS    apixaban 2.5 milliGRAM(s) Oral two times a day  aspirin enteric coated 81 milliGRAM(s) Oral daily  buPROPion XL (24-Hour) . 150 milliGRAM(s) Oral daily  chlorhexidine 2% Cloths 1 Application(s) Topical daily  cinacalcet 60 milliGRAM(s) Oral daily  cycloSPORINE  , modified (NEORAL) 150 milliGRAM(s) Oral every 12 hours  Dakins Solution - 1/2 Strength 1 Application(s) Topical daily  dextrose 40% Gel 15 Gram(s) Oral once  dextrose 5%. 1000 milliLiter(s) IV Continuous <Continuous>  dextrose 5%. 1000 milliLiter(s) IV Continuous <Continuous>  dextrose 50% Injectable 25 Gram(s) IV Push once  dextrose 50% Injectable 12.5 Gram(s) IV Push once  dextrose 50% Injectable 25 Gram(s) IV Push once  diltiazem    milliGRAM(s) Oral daily  epoetin anabela-epbx (RETACRIT) Injectable 66575 Unit(s) IV Push <User Schedule>  gabapentin 300 milliGRAM(s) Oral daily  glucagon  Injectable 1 milliGRAM(s) IntraMuscular once  heparin   Injectable. 500 Unit(s) Dialysis. every 1 hour  insulin glargine Injectable (LANTUS) 10 Unit(s) SubCutaneous at bedtime  insulin lispro (ADMELOG) corrective regimen sliding scale   SubCutaneous three times a day before meals  insulin lispro (ADMELOG) corrective regimen sliding scale   SubCutaneous at bedtime  insulin lispro Injectable (ADMELOG) 5 Unit(s) SubCutaneous three times a day before meals  loratadine 10 milliGRAM(s) Oral daily  melatonin 6 milliGRAM(s) Oral at bedtime  midodrine. 5 milliGRAM(s) Oral <User Schedule>  mirtazapine 7.5 milliGRAM(s) Oral at bedtime  montelukast 10 milliGRAM(s) Oral at bedtime  oxyCODONE  ER Tablet 10 milliGRAM(s) Oral every 12 hours  pantoprazole    Tablet 40 milliGRAM(s) Oral two times a day  polyethylene glycol 3350 17 Gram(s) Oral two times a day  senna 2 Tablet(s) Oral at bedtime  sertraline 50 milliGRAM(s) Oral daily  sevelamer carbonate 800 milliGRAM(s) Oral three times a day with meals  tacrolimus   0.1% Ointment 1 Application(s) Topical daily  traZODone 100 milliGRAM(s) Oral at bedtime      PRN INPATIENT MEDICATION  diphenhydrAMINE Injectable 25 milliGRAM(s) IV Push <User Schedule> PRN  oxyCODONE    IR 7.5 milliGRAM(s) Oral every 4 hours PRN  simethicone 80 milliGRAM(s) Chew every 6 hours PRN        Vital signs:  T(C): 36.7 (03-03-22 @ 10:22), Max: 37.3 (03-02-22 @ 21:31)  HR: 68 (03-03-22 @ 10:22) (66 - 77)  BP: 130/62 (03-03-22 @ 10:22) (101/45 - 148/71)  RR: 18 (03-03-22 @ 10:22) (17 - 18)  SpO2: 98% (03-03-22 @ 10:22) (94% - 98%)  Wt(kg): --      Constitutional: NAD, A&O x 3, awake.  Contact isolation for polymicrobial wound culture  ENMT: edith, non icteric  Back: nl  Respiratory: rm air, non labored  Cardiovascular: rrr  Gastrointestinal: wound lower right sided pannus, 7cmx12.5cmx2.8cm  undermining from 11 - 1 o'clock extending 2.2cm (prev [id number], undermining from 11- 1 o'clock extending 3cm). Wound base 35% tan-moist firmly attached slough predominantely from 12 - 3 o'clock,  65% red-moist agranular base. Re-epithelialization noted from 4-8 o'clock of wound edge, continuing to re-epithelize. small-moderate sero-purulent drainage, + mild malodor remains. Satellite lesions in periwound skin at 12 o'clock 0.5cmx0.5cmx0.2cm 100% slough, and 4 o'clock 0.8cmx0.5cmx0.2cm 100% slough.   Entire area tender to touch. Cleansed with NS, tacrolimus applied, adaptic touch, aquacel Ag and abdominal pad.  Extremities: Left arm AV fistula + thrill   Skin: as noted  Musculoskeletal: able to flex, extend knees  psych: anxious, tearful      03-02-22 @ 07:01  -  03-03-22 @ 07:00  --------------------------------------------------------  IN: 900 mL / OUT: 0 mL / NET: 900 mL    03-03-22 @ 07:01  -  03-03-22 @ 12:43  --------------------------------------------------------  IN: 520 mL / OUT: 2400 mL / NET: -1880 mL        LABS/ CULTURES/ RADIOLOGY:              8.0    9.37  >-----------<  564      [03-03-22 @ 07:11]              28.2     134  |  92  |  45  ----------------------------<  110      [03-03-22 @ 07:11]  4.6   |  22  |  6.11        Ca     10.5     [03-03-22 @ 07:11]      Mg     2.40     [03-03-22 @ 07:11]      Phos  4.3     [03-03-22 @ 07:11]          Uric acid 5.6      [03-03-22 @ 07:26]        CAPILLARY BLOOD GLUCOSE      POCT Blood Glucose.: 105 mg/dL (03 Mar 2022 11:54)  POCT Blood Glucose.: 105 mg/dL (03 Mar 2022 09:25)  POCT Blood Glucose.: 105 mg/dL (03 Mar 2022 06:09)  POCT Blood Glucose.: 126 mg/dL (02 Mar 2022 21:18)  POCT Blood Glucose.: 137 mg/dL (02 Mar 2022 17:28)    A1C with Estimated Average Glucose Result: 7.0 % (01-13-22 @ 08:21)    Triglycerides, Serum: 171 mg/dL (03-03-22 @ 07:26)  Triglycerides, Serum: 183 mg/dL (02-23-22 @ 21:29)  Triglycerides, Serum: 281 mg/dL (02-09-22 @ 22:09)  Triglycerides, Serum: 270 mg/dL (02-09-22 @ 19:56)    Intact PTH: 107 pg/mL (02-18-22 @ 12:21)
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
## 2022-03-03 NOTE — PROGRESS NOTE ADULT - TIME-BASED BILLING (NON-CRITICAL CARE)
Time-based billing (NON-critical care)

## 2022-03-03 NOTE — PROGRESS NOTE ADULT - ATTENDING COMMENTS
Above noted   Extensive discussion had with patient regrading current status  All questions answered
Pyoderma gangrenosum ulcer appears stable. No signs of extension. Started on CSA last night. This likely will take months to heal, at least. Will continue to monitor. Will need close outpatient f/u when discharged.    Romeo Song MD, PharmD, MPH  Co-Director, Inpatient Dermatology Consultation Service, [facility] [place]
Worsening ulcer exhibiting pathergy, extensive undermining on exam and suppurative inflammation on biopsy raise suspicion for pyoderma gangrenosum. Recommend trial of immunosuppression with systemic steroids -- positive response would support a diagnosis of PG.   ****PLEASE OBTAIN ENDOCRINE INPUT regarding insulin requirements while on high dose steroids to avoid hyperglycemic complications.   **** Recommend CONCOMITANT antibiotics (w/pseudomonal coverage) to avoid worsening superinfection while on steroids.   We will follow up on final biopsy reports (Von Kossa staining for calcium, deeper sections and organismal staining pending)  Will follow wound on daily basis to assess for changes. Plan for 4-5 day course of systemic steroids unless notable worsening of ulcer occurs.
I was physically present for the key portions of the evaluation and management (E/M) service provided.  I agree with the above history, physical, and plan which I have reviewed and edited where appropriate.
Large deep ulcer on pannus in a diabetic patient on dialysis. While the differential certainly includes calciphylaxis (especially given peripheral induration along wound and surrounding purpuric patches), specific clinical features (namely undermining and pathergy) raise the possibility of pyoderma gangrenosum. Unfortunately, both of these diagnoses can be difficult to make, as calciphylaxis is frequently not detected on skin biopsy and pyoderma gangrenosum essentially remains a diagnosis of exclusion.     This patient's skin biopsy demonstrates extensive and deep suppurative inflammation with negative organismal staining (which would support a diagnosis of PG) and no evidence of calciphylaxis-- however fat tissue was limited in sample submitted (due to depth of pannus, adequate sampling would likely require incisional biopsy). Recommend obtaining CT to better assess for subcutaneous intravascular calcifications (net-like calcification pattern on imaging), while continuing to treat her empirically with sodium thiosulfate. Because part of the diagnostic criteria for pyoderma gangrenosum involves a positive response to immunosuppression, would trial a short course of corticosteroids with close monitoring of both the wound and patient's glucose levels. Pain control remains a priority and patient may benefit from palliative care consult (for symptom control) as this is likely to be a chronic condition, however would defer this until we have a better understanding of the diagnosis.
Preliminary biopsy findings show extensive suppurative inflammation and necrosis. No obvious intravascular calcium deposits although limited sample of subcutaneous tissue. These findings along with extensive undermining on exam raise possibility of pyoderma gangrenosum. Awaiting von Kossa staining (for calcium), special organismal staining and deeper sections from original biopsy, but if ot improving early next week on empiric sodium thiosulfate and wound care, would recommend trial of systemic steroids.
Deep skin ulcer noted on pannus. Favor pyoderma gangrenosum based on morphology and clinical course. Pt also noted it began as a "pimple" which subsequently ruptured and developed into an ulcer. This is a common history for PG. Will consider long-term treatment plan. Prednisone often helps but is causing significant hyperglycemia. Cyclosporine is another consideration (as patient already has ESRD, renal effects may be less of a concern). CSA may also serve as a bridge to other treatments in the long-term. Would likely use dosing weight if initiated on CSA after further discussion with the patient. Potential for CSA was discussed with nephrology as well.     Romeo Song MD, PharmD, MPH  Co-Director, Inpatient Dermatology Consultation Service, [facility] [place]
I was physically present for the key portions of the evaluation and management (E/M) service provided.  I agree with the above history, physical, and plan which I have reviewed and edited where appropriate.
Pyoderma gangrensoum ulcer improving. Will take months to fully heal if this treatment is successful. Continue CSA.    Romeo Song MD, PharmD, MPH  Co-Director, Inpatient Dermatology Consultation Service, [facility] [place]
Diagnosis remains unclear. While biopsy findings of diffuse suppurative inflammation combined with exam findings of undermining and pathergy are suggestive of pyoderma gangrenosum, new stellate purpuric patches surrounding wound today and increase in peripheral erythema after 2 doses of steroids continue to raise calciphylaxis as a possibility, especially given the distrubution of ulcer in this patient with ESRD on HD. Skin biopsy showed no evidence of intravascular calcification in the subcutis, however calciphylaxis may be a challenge to diagnose histologically with low sensitivity on skin biopsy. Would recommend CT imaging for further eval  (net-like pattern of calcification in soft tissue would support diagnosis of calciphylaxis) and continue empiric therapy with sodium thiosulfate. Given mild increase in peripheral erythema and new surrounding purpura, would also recommend HOLDING AM dose of solumedrol pending wound care and/or derm eval of ulcer.

## 2022-03-03 NOTE — PROGRESS NOTE ADULT - TIME BILLING
.
Agree with above NP note.  cv stable  cont current tx
Agree with above NP note.  cv stable  cont current tx   med/renal f/u  cont a/c
agree w above  cv stable  cont current cv meds
Agree with above NP note.  cv stable  cont current tx
Agree with above NP note.  cv stable  cont current tx   med/renal f/u  cont a/c
Agree with above NP note.  cv stable  cont current tx   med/renal f/u  cont a/c
agree w above  cv stable  cont a/c  cont current cv meds
Agree with above NP note.  cv stable  cont current tx
agree w above  cv stable  cont current cv meds

## 2022-03-03 NOTE — PROGRESS NOTE ADULT - SUBJECTIVE AND OBJECTIVE BOX
INTERVAL HPI/OVERNIGHT EVENTS:    MEDICATIONS  (STANDING):  apixaban 2.5 milliGRAM(s) Oral two times a day  aspirin enteric coated 81 milliGRAM(s) Oral daily  buPROPion XL (24-Hour) . 150 milliGRAM(s) Oral daily  chlorhexidine 2% Cloths 1 Application(s) Topical daily  cinacalcet 60 milliGRAM(s) Oral daily  cycloSPORINE  , modified (NEORAL) 150 milliGRAM(s) Oral every 12 hours  Dakins Solution - 1/2 Strength 1 Application(s) Topical daily  dextrose 40% Gel 15 Gram(s) Oral once  dextrose 5%. 1000 milliLiter(s) (50 mL/Hr) IV Continuous <Continuous>  dextrose 5%. 1000 milliLiter(s) (100 mL/Hr) IV Continuous <Continuous>  dextrose 50% Injectable 25 Gram(s) IV Push once  dextrose 50% Injectable 12.5 Gram(s) IV Push once  dextrose 50% Injectable 25 Gram(s) IV Push once  diltiazem    milliGRAM(s) Oral daily  epoetin anabela-epbx (RETACRIT) Injectable 11918 Unit(s) IV Push <User Schedule>  gabapentin 300 milliGRAM(s) Oral daily  glucagon  Injectable 1 milliGRAM(s) IntraMuscular once  heparin   Injectable. 500 Unit(s) Dialysis. every 1 hour  insulin glargine Injectable (LANTUS) 10 Unit(s) SubCutaneous at bedtime  insulin lispro (ADMELOG) corrective regimen sliding scale   SubCutaneous three times a day before meals  insulin lispro (ADMELOG) corrective regimen sliding scale   SubCutaneous at bedtime  insulin lispro Injectable (ADMELOG) 5 Unit(s) SubCutaneous three times a day before meals  loratadine 10 milliGRAM(s) Oral daily  melatonin 6 milliGRAM(s) Oral at bedtime  midodrine. 5 milliGRAM(s) Oral <User Schedule>  mirtazapine 7.5 milliGRAM(s) Oral at bedtime  montelukast 10 milliGRAM(s) Oral at bedtime  oxyCODONE  ER Tablet 10 milliGRAM(s) Oral every 12 hours  pantoprazole    Tablet 40 milliGRAM(s) Oral two times a day  polyethylene glycol 3350 17 Gram(s) Oral two times a day  senna 2 Tablet(s) Oral at bedtime  sertraline 50 milliGRAM(s) Oral daily  sevelamer carbonate 800 milliGRAM(s) Oral three times a day with meals  tacrolimus   0.1% Ointment 1 Application(s) Topical daily  traZODone 100 milliGRAM(s) Oral at bedtime    MEDICATIONS  (PRN):  diphenhydrAMINE Injectable 25 milliGRAM(s) IV Push <User Schedule> PRN Itching  oxyCODONE    IR 7.5 milliGRAM(s) Oral every 4 hours PRN Severe Pain (7 - 10)  simethicone 80 milliGRAM(s) Chew every 6 hours PRN Gas      Allergies    latex (Rash)  penicillin (Nausea)  strawberry (Rash)    Intolerances    caffeine (Nausea)      REVIEW OF SYSTEMS      General: no fevers/chills, no NS	    Skin: see HPI  	  Ophthalmologic: no eye pain or change in vision    Genitourinary: no dysuria or hematuria    Musculoskeletal: no joint pains or weakness	    Neurological:no weakness or tingling          Vital Signs Last 24 Hrs  T(C): 36.7 (03 Mar 2022 10:22), Max: 37.3 (02 Mar 2022 21:31)  T(F): 98 (03 Mar 2022 10:22), Max: 99.1 (02 Mar 2022 21:31)  HR: 68 (03 Mar 2022 10:22) (66 - 77)  BP: 130/62 (03 Mar 2022 10:22) (101/45 - 148/71)  BP(mean): --  RR: 18 (03 Mar 2022 10:22) (17 - 18)  SpO2: 98% (03 Mar 2022 10:22) (94% - 98%)    PHYSICAL EXAM:   The patient was alert and oriented X 3, well nourished, and in no apparent distress.  There was no visible lymphadenopathy.  Conjunctiva were non injected  There was no clubbing or edema of extremities.    Of note on skin exam:     7 x 12.5 x 2.8 cm   LABS:                        8.0    9.37  )-----------( 564      ( 03 Mar 2022 07:11 )             28.2     03-03    134<L>  |  92<L>  |  45<H>  ----------------------------<  110<H>  4.6   |  22  |  6.11<H>    Ca    10.5      03 Mar 2022 07:11  Phos  4.3     03-03  Mg     2.40     03-03            RADIOLOGY & ADDITIONAL TESTS:     INTERVAL HPI/OVERNIGHT EVENTS:  No acute events overnight, pt planned for upcoming d/c to rehab  Notes large ulcerated wound on abdominal pannus has been much more painful in the last 1 week. Per chart review, pt not receiving any PRN oxycodone IR from 2/25 - 2/28, overall in last week getting prn IR oxy dosed much less frequently than is ordered, which may explain pain.     MEDICATIONS  (STANDING):  apixaban 2.5 milliGRAM(s) Oral two times a day  aspirin enteric coated 81 milliGRAM(s) Oral daily  buPROPion XL (24-Hour) . 150 milliGRAM(s) Oral daily  chlorhexidine 2% Cloths 1 Application(s) Topical daily  cinacalcet 60 milliGRAM(s) Oral daily  cycloSPORINE  , modified (NEORAL) 150 milliGRAM(s) Oral every 12 hours  Dakins Solution - 1/2 Strength 1 Application(s) Topical daily  dextrose 40% Gel 15 Gram(s) Oral once  dextrose 5%. 1000 milliLiter(s) (50 mL/Hr) IV Continuous <Continuous>  dextrose 5%. 1000 milliLiter(s) (100 mL/Hr) IV Continuous <Continuous>  dextrose 50% Injectable 25 Gram(s) IV Push once  dextrose 50% Injectable 12.5 Gram(s) IV Push once  dextrose 50% Injectable 25 Gram(s) IV Push once  diltiazem    milliGRAM(s) Oral daily  epoetin anabela-epbx (RETACRIT) Injectable 50648 Unit(s) IV Push <User Schedule>  gabapentin 300 milliGRAM(s) Oral daily  glucagon  Injectable 1 milliGRAM(s) IntraMuscular once  heparin   Injectable. 500 Unit(s) Dialysis. every 1 hour  insulin glargine Injectable (LANTUS) 10 Unit(s) SubCutaneous at bedtime  insulin lispro (ADMELOG) corrective regimen sliding scale   SubCutaneous three times a day before meals  insulin lispro (ADMELOG) corrective regimen sliding scale   SubCutaneous at bedtime  insulin lispro Injectable (ADMELOG) 5 Unit(s) SubCutaneous three times a day before meals  loratadine 10 milliGRAM(s) Oral daily  melatonin 6 milliGRAM(s) Oral at bedtime  midodrine. 5 milliGRAM(s) Oral <User Schedule>  mirtazapine 7.5 milliGRAM(s) Oral at bedtime  montelukast 10 milliGRAM(s) Oral at bedtime  oxyCODONE  ER Tablet 10 milliGRAM(s) Oral every 12 hours  pantoprazole    Tablet 40 milliGRAM(s) Oral two times a day  polyethylene glycol 3350 17 Gram(s) Oral two times a day  senna 2 Tablet(s) Oral at bedtime  sertraline 50 milliGRAM(s) Oral daily  sevelamer carbonate 800 milliGRAM(s) Oral three times a day with meals  tacrolimus   0.1% Ointment 1 Application(s) Topical daily  traZODone 100 milliGRAM(s) Oral at bedtime    MEDICATIONS  (PRN):  diphenhydrAMINE Injectable 25 milliGRAM(s) IV Push <User Schedule> PRN Itching  oxyCODONE    IR 7.5 milliGRAM(s) Oral every 4 hours PRN Severe Pain (7 - 10)  simethicone 80 milliGRAM(s) Chew every 6 hours PRN Gas      Allergies    latex (Rash)  penicillin (Nausea)  strawberry (Rash)    Intolerances    caffeine (Nausea)      REVIEW OF SYSTEMS      General: no fevers/chills, no NS	    Skin: see HPI  	  Ophthalmologic: no eye pain or change in vision    Genitourinary: no dysuria or hematuria    Musculoskeletal: no joint pains or weakness	    Neurological:no weakness or tingling          Vital Signs Last 24 Hrs  T(C): 36.7 (03 Mar 2022 10:22), Max: 37.3 (02 Mar 2022 21:31)  T(F): 98 (03 Mar 2022 10:22), Max: 99.1 (02 Mar 2022 21:31)  HR: 68 (03 Mar 2022 10:22) (66 - 77)  BP: 130/62 (03 Mar 2022 10:22) (101/45 - 148/71)  BP(mean): --  RR: 18 (03 Mar 2022 10:22) (17 - 18)  SpO2: 98% (03 Mar 2022 10:22) (94% - 98%)    PHYSICAL EXAM:   The patient was alert and oriented X 3, well nourished, and in no apparent distress.  There was no visible lymphadenopathy.  Conjunctiva were non injected  There was no clubbing or edema of extremities.    Of note on skin exam:     on R abdominal pannus is large round deep ulcer with focally undermined boarders measuring 7 x 12.5 x 2.8 cm (undermining from 11 - 1 o'clock extending 2.2cm (prev [id number], undermining from 11- 1 o'clock extending 3cm on 2/24). No new areas of purpura/necrosis, more healthy appearing granulation tissue throughout wound bed, though portions today appear a bit deeper compared to prior photos (which may be explained by sloughage of wound bed)    LABS:                        8.0    9.37  )-----------( 564      ( 03 Mar 2022 07:11 )             28.2     03-03    134<L>  |  92<L>  |  45<H>  ----------------------------<  110<H>  4.6   |  22  |  6.11<H>    Ca    10.5      03 Mar 2022 07:11  Phos  4.3     03-03  Mg     2.40     03-03            RADIOLOGY & ADDITIONAL TESTS:

## 2022-03-03 NOTE — PROGRESS NOTE ADULT - SUBJECTIVE AND OBJECTIVE BOX
Chief Complaint: DM2    History: awaiting rehab placement.    Denies vomiting has episodes of nausea, reports fair appetite    MEDICATIONS  (STANDING):  apixaban 2.5 milliGRAM(s) Oral two times a day  aspirin enteric coated 81 milliGRAM(s) Oral daily  buPROPion XL (24-Hour) . 150 milliGRAM(s) Oral daily  chlorhexidine 2% Cloths 1 Application(s) Topical daily  cinacalcet 60 milliGRAM(s) Oral daily  cycloSPORINE  , modified (NEORAL) 125 milliGRAM(s) Oral every 12 hours  Dakins Solution - 1/4 Strength 1 Application(s) Topical daily  dextrose 40% Gel 15 Gram(s) Oral once  dextrose 5%. 1000 milliLiter(s) (50 mL/Hr) IV Continuous <Continuous>  dextrose 5%. 1000 milliLiter(s) (100 mL/Hr) IV Continuous <Continuous>  dextrose 50% Injectable 25 Gram(s) IV Push once  dextrose 50% Injectable 12.5 Gram(s) IV Push once  dextrose 50% Injectable 25 Gram(s) IV Push once  diltiazem    milliGRAM(s) Oral daily  epoetin anbaela-epbx (RETACRIT) Injectable 44865 Unit(s) IV Push <User Schedule>  gabapentin 300 milliGRAM(s) Oral daily  glucagon  Injectable 1 milliGRAM(s) IntraMuscular once  heparin   Injectable. 500 Unit(s) Dialysis. every 1 hour  insulin glargine Injectable (LANTUS) 17 Unit(s) SubCutaneous at bedtime  insulin lispro (ADMELOG) corrective regimen sliding scale   SubCutaneous three times a day before meals  insulin lispro (ADMELOG) corrective regimen sliding scale   SubCutaneous at bedtime  insulin lispro Injectable (ADMELOG) 6 Unit(s) SubCutaneous three times a day before meals  loratadine 10 milliGRAM(s) Oral daily  melatonin 6 milliGRAM(s) Oral at bedtime  midodrine. 5 milliGRAM(s) Oral <User Schedule>  mirtazapine 7.5 milliGRAM(s) Oral at bedtime  montelukast 10 milliGRAM(s) Oral at bedtime  oxyCODONE  ER Tablet 10 milliGRAM(s) Oral every 12 hours  pantoprazole    Tablet 40 milliGRAM(s) Oral two times a day  polyethylene glycol 3350 17 Gram(s) Oral two times a day  senna 2 Tablet(s) Oral at bedtime  sertraline 50 milliGRAM(s) Oral daily  sevelamer carbonate 800 milliGRAM(s) Oral three times a day with meals  sodium thiosulfate IVPB 25 Gram(s) IV Intermittent <User Schedule>  tacrolimus   0.1% Ointment 1 Application(s) Topical daily  traZODone 100 milliGRAM(s) Oral at bedtime    MEDICATIONS  (PRN):  diphenhydrAMINE Injectable 25 milliGRAM(s) IV Push <User Schedule> PRN Itching  oxyCODONE    IR 7.5 milliGRAM(s) Oral every 4 hours PRN Severe Pain (7 - 10)  simethicone 80 milliGRAM(s) Chew every 6 hours PRN Gas      Allergies    latex (Rash)  penicillin (Nausea)  strawberry (Rash)    Intolerances    caffeine (Nausea)    Review of Systems:    ALL OTHER SYSTEMS REVIEWED AND NEGATIVE        PHYSICAL EXAM:  Vital Signs Last 24 Hrs  T(C): 36.8 (03 Mar 2022 14:32), Max: 37.3 (02 Mar 2022 21:31)  T(F): 98.2 (03 Mar 2022 14:32), Max: 99.1 (02 Mar 2022 21:31)  HR: 70 (03 Mar 2022 14:32) (66 - 77)  BP: 126/60 (03 Mar 2022 14:32) (102/42 - 148/71)  BP(mean): --  RR: 18 (03 Mar 2022 14:32) (17 - 18)  SpO2: 98% (03 Mar 2022 14:32) (94% - 98%)  GENERAL: NAD, well-groomed, well-developed  EYES: No proptosis, no lid lag, anicteric  HEENT:  Atraumatic, Normocephalic, moist mucous membranes  RESPIRATORY: nonlabored respirations, no wheezing  PSYCH: Alert and oriented x 3, normal affect, normal mood    CAPILLARY BLOOD GLUCOSE  POCT Blood Glucose.: 183 mg/dL (03 Mar 2022 16:26)  POCT Blood Glucose.: 105 mg/dL (03 Mar 2022 11:54)  POCT Blood Glucose.: 105 mg/dL (03 Mar 2022 09:25)  POCT Blood Glucose.: 105 mg/dL (03 Mar 2022 06:09)  POCT Blood Glucose.: 126 mg/dL (02 Mar 2022 21:18)  POCT Blood Glucose.: 137 mg/dL (02 Mar 2022 17:28)  POCT Blood Glucose.: 102 mg/dL (28 Feb 2022 14:02)  POCT Blood Glucose.: 87 mg/dL (28 Feb 2022 12:15)  POCT Blood Glucose.: 85 mg/dL (28 Feb 2022 08:35)  POCT Blood Glucose.: 95 mg/dL (27 Feb 2022 23:03)  POCT Blood Glucose.: 33 mg/dL (27 Feb 2022 22:57)  POCT Blood Glucose.: 91 mg/dL (27 Feb 2022 22:41)  POCT Blood Glucose.: 85 mg/dL (27 Feb 2022 22:09)  POCT Blood Glucose.: 74 mg/dL (27 Feb 2022 21:24)  POCT Blood Glucose.: 104 mg/dL (27 Feb 2022 17:44)    03-03    134<L>  |  92<L>  |  45<H>  ----------------------------<  110<H>  4.6   |  22  |  6.11<H>    Ca    10.5      03 Mar 2022 07:11  Phos  4.3     03-03  Mg     2.40     03-03        A1C with Estimated Average Glucose Result: 7.0 % (01-13-22 @ 08:21)  A1C with Estimated Average Glucose Result: 6.7 % (08-31-21 @ 09:30)  A1C with Estimated Average Glucose Result: 7.2 % (04-28-21 @ 07:04)

## 2022-03-03 NOTE — CHART NOTE - NSCHARTNOTEFT_GEN_A_CORE
Concern expressed by wound care and dermatology regarding pt's uncontrolled pain.   Called pain management team, who was unable to offer any medication adjustments as patient had not received all prn medications in the past 24 hours as ordered.   IV tylenol x 1 given this am in addition to pain regimen.       Discussed case with Dr. Maurice, who is in agreement with plan for discharge today.   Discussed with RIOS Bae PA-C  Department of Medicine  Pager [phone] Concern expressed by wound care and dermatology regarding pt's uncontrolled pain.   Called pain management team to reevaluate patient.   Per RIOS     Discussed case with Dr. Maurice, who is in agreement with plan for discharge today.   Discussed with RIOS Bae PA-C  Department of Medicine  Pager [phone] Concern expressed by wound care and dermatology regarding pt's uncontrolled pain.   Called pain management team to reevaluate patient.   Pt to follow up with Dr. Mcgrath, pain management upon discharge, who accepts pt's insurance.   Pt to be discharged on oxy IR q 4 prn and oxy ER q 12 as per pain management recs.   Lidocaine added per recommendation of dermatology.      Discussed case with Dr. Maurice, who is in agreement with plan for discharge today. No further orders.    Discussed with RIOS Bae PA-C  Department of Medicine  Pager [phone]

## 2022-03-03 NOTE — PROGRESS NOTE ADULT - ASSESSMENT
The above is a preliminary note, recommendations are not final until this note is signed.      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 Ulcer with extensive undermining - suggestive of pyoderma gangrenosum vs calciphylaxis. Both are challenging diagnoses with no definitive diagnostic tests. Biopsy and imaging non diagnostic. Favor Pyoderma Gangrenosum based on clinical appearance of deep ulceration with extensive undermining and worsening when trial off of steroids, now with significant improvement since initiation of cyclosporine since 2/8.     Prior work-up:   - Bacterial tissue culture 1/25/21 with carbapenem-resistent Pseudomonas, Staph epidermidis, Enterococcus faecalis; reportedly same as previous bacterial culture.   - Fungal tissue culture 1/25/21 no growth final  - s/p 10-day course of IV cefepime completed 1/21/22 and other previously other outpatient abx.   - Biopsy 1/25/22 findings non specific. No obvious intravascular calcium deposits although limited sample of subcutaneous tissue. Repeat von Kossa staining (for calcium), and deeper sections from original biopsy were reviewed with initial diagnosis unchanged.   -SPEP, serum DOUG, ANCAs negative. Unable to obtain UPEP/uIFE as pt is not making much urine 2/2 ESRD.   -no calcifications noted on CT A/P 2/4   -quantiferon indeterminate (2/11/22), HIV negative, hepatitis panel nonreactive and non immune to hepatitis B(1/21/22)  -wound cx 2/17 w/ polymicrobial growth, likely colonizers per ID, no indication to treat    At this time:  - c/w cyclosporine 150mg BID (~3 mg/kg based on dosing weight), continue to monitor blood pressure, electrolytes  - c/w pain management  - recommend palliative care consult to discuss symptomatic care for chronic painful wound, we have had multiple extensive discussions with the patient and her daughters on this topic, including 2/7, pt amenable  - c/w wound care: c/w topical tacrolimus 0.01% ointment 1-2x daily to wound edges and aquacel Ag dressing  - patient will need close f/u upon discharge  - fu final AFB TC results; still pending    The patient's chart was reviewed in addition to being seen and examined at bedside with the dermatology attending Dr. Lary Bo. Recommendations were communicated with the primary team.  Please page [phone] w/10 digit call back number for further related questions.    Brittny Deng MD  Resident Physician, PGY3  [facility]  Pager: [phone]  Office: [phone]   #Deep extensive pannus ulcer in patient with DM and ESRD on HD. Ulcer with extensive undermining - suggestive of pyoderma gangrenosum vs calciphylaxis. Both are challenging diagnoses with no definitive diagnostic tests. Biopsy and imaging non diagnostic. At this time, favor Pyoderma Gangrenosum based on clinical appearance of deep ulceration with extensive undermining and worsening when trial off of steroids, now with gradual improvement since initiation of cyclosporine since 2/8 (as evidenced by slight decrease in size and evidence of re-epithelialization). PG is an inflammatory, noninfectious, ulcerative neutrophilic skin, that are hallmarked by early pustules that ultimately give rise to ulcers with an undermined, violaceous border. PG is associated with a number of systemic illnesses (classically inflammatory bowel disease, hematologic malignancy, arthritis, etc), none of which are seen in our patient.     Prior work-up:   - Bacterial tissue culture 1/25/21 with carbapenem-resistent Pseudomonas, Staph epidermidis, Enterococcus faecalis; reportedly same as previous bacterial culture.   - Fungal tissue culture 1/25/21 no growth final  - s/p 10-day course of IV cefepime completed 1/21/22 and other previously other outpatient abx.   - Biopsy 1/25/22 findings non specific. No obvious intravascular calcium deposits although limited sample of subcutaneous tissue. Repeat von Kossa staining (for calcium), and deeper sections from original biopsy were reviewed with initial diagnosis unchanged.   -SPEP, serum DOUG, ANCAs negative. Unable to obtain UPEP/uIFE as pt is not making much urine 2/2 ESRD.   -no calcifications noted on CT A/P 2/4   -quantiferon indeterminate (2/11/22), HIV negative, hepatitis panel nonreactive and non immune to hepatitis B(1/21/22)  -wound cx 2/17 w/ polymicrobial growth, likely colonizers per ID, no indication to treat    At this time:  - INCREASE cyclosporine 150mg qAM and 200mg qPM, continue to monitor blood pressure, electrolytes  - c/w pain management. Reinforced with patient and primary care team that patient should be receiving breakthrough prn IR oxycodone doses as needed for moderate to severe pain. Discussed alternative pain regimens with pain management, pt may benefit from prn IV medication prior to dressing changes, but this may not be an optimal long term solution for discharge to rehab. Appreciate pain management rec's.   - Can consider addition of topical lidocaine patch in area adjacent to ulcer (would not apply directly to ulcer to avoid lidocaine toxicity from increased absorption given lack of epidermis in this area).   - C/w wound care: c/w topical tacrolimus 0.01% ointment 1-2x daily to wound edges and aquacel Ag dressing. Appreciate wound care recs.   - Patient will need close f/u upon discharge  - fu final AFB TC results; ngtd, still pending  - If wound without much improvement at FUV, can consider step-up therapy with addition of TNF inhibitor (remicade).     Within 1 week of discharge, pt should follow up with Dr. Bo in the [facility] located at [address] [place] [address] [address] [address] upon discharge. Our office will call to schedule an appointment but if patient does not hear from us within a few days of discharge, please instruct patient to call our office. Office phone number is [phone].    The patient's chart was reviewed in addition to being seen and examined at bedside with the dermatology attending Dr. Lary Bo. Recommendations were communicated with the primary team. Please page [phone] w/10 digit call back number for further related questions.    Brittny Deng MD  Resident Physician, PGY3  [facility]  Pager: [phone]  Office: [phone]

## 2022-03-03 NOTE — PROGRESS NOTE ADULT - PROVIDER SPECIALTY LIST ADULT
Cardiology
Endocrinology
Gastroenterology
Infectious Disease
Infectious Disease
Nephrology
Pain Medicine
Wound Care
Wound Care
Cardiology
Dermatology
Gastroenterology
Internal Medicine
Nephrology
Neurology
Wound Care
Wound Care
Cardiology
Dermatology
Endocrinology
Gastroenterology
Infectious Disease
Internal Medicine
Nephrology
Neurology
Neurology
Pain Medicine
Wound Care
Cardiology
Endocrinology
Endocrinology
Gastroenterology
Infectious Disease
Internal Medicine
Nephrology
Neurology
Wound Care
Endocrinology
Gastroenterology
Internal Medicine
Endocrinology
Endocrinology
Internal Medicine
Endocrinology
Internal Medicine
Endocrinology
Internal Medicine
Endocrinology
Internal Medicine
Internal Medicine
Endocrinology
Internal Medicine

## 2022-03-03 NOTE — PROGRESS NOTE ADULT - ASSESSMENT
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 Patient followed by derm as well, s/p punch biopsy by Derm. Derm biopsy non-specific findings, no obvious intravascular calcium deposits although limited sample of subcutaneous tissue. Ct- abdomen "There are no subcutaneous tissue calcifications. Specifically no subcutaneous tissue calcifications in the patient's abdominal wall pannus." Dx with Pyoderma Gangrenosum. On Cyclosporin, topical tacrolimus. Sodium thiosulfate discontinued per dermatology.   Of note attempted Irrigation VAC 1/26 - 1 /28, noted deterioration of wound with NPWt/VAc therapy, therapy d/c'd, likely pathergy. Will continue to avoid surgical and enzymatic debridement.     Patient seen with Dermatology today.    Exam:  Wound dimensions without significant change.   Mild odor remains.   Tissue type with increased granular base, decreased nonviable tissue. New satellite lesion. Re-epithelization at inferior border continues.  Pain and tenderness remains with radiation to back and legs.      Plan:  -Topical dressing: Cleanse with Dakins 1/4 strength. Apply Liquid barrier film to periwound skin. Adaptic touch to wound base for atraumatic dressing application and removal. Apply Topical Tacrolimus 0.1% ointment to wound base, cover with Aquacel AG, and abdominal pad. Change daily.  -Interdry textile sheeting beneath abdominal pannus leaving 2" out at end to wick.  -Agree with Cyclosporin; nephrology not opposed, patient and daughter amenable.   -Glucose control per primary team/endocrinology  -Differ empiric Sodium Thiosulfate to primary team/derm/nephrology, now discontinued dx PG  -Continue to follow nutrition/RD recommendations   -Continue to offload pressure  -DVT prophylaxis    Discussion with patient at bedside with Dermatology, pt expressed fear of death due to current illness and uncontrolled pain, tearful throughout exam. Emotional support and encouragement provided.    Discussed pain regiment with primary team ACP due to concern regarding uncontrolled pain. Plan for ACP to speak with pain management service line. Discussed with RN staff importance of prn pain management. Awaiting dermatology attending' s input regarding risk/benefit Lidocaine patch to area. Recommend Pain management reconsult prior to discharging patient.  Findings and plan discussed with Dermatology, primary team ACP, and Dr. Cleary.    Upon discharge follow up at outpatient [facility]. [address] [facility]. [phone].    Will continue to follow while inpatient.  Thank you.    CARMELA William-BC, [facility]    pager #[phone]    If after 4PM or before 7:30AM on Mon-Friday or weekend/holiday please contact general surgery for urgent matters.   Team A- [id number]   Team B- [id number]  For non-urgent matters e-mail [email or web address]    I spent 35 minutes face-to-face with this patient of which more than 50% of the time was spent counseling/coordinating care of this patient.

## 2022-03-07 NOTE — ED ADULT TRIAGE NOTE - PRO INTERPRETER NEED 2
Advance Care Planning:   Does patient have an Advance Directive:  currently not on file; education provided Reports she received an AMD form from [facility]. Patient plans to complete. Requested copy to be scanned into chart. Pt verbalized her understanding.
English

## 2022-03-14 PROBLEM — Z79.899 OTHER LONG TERM (CURRENT) DRUG THERAPY: Chronic | Status: ACTIVE | Noted: 2022-01-01

## 2022-03-19 NOTE — ED ADULT TRIAGE NOTE - CHIEF COMPLAINT QUOTE
Pt states " I am having abdominal pain since couple of days, vaginal bleeding since 1 week with big clots.' wound on her lower stomach.

## 2022-03-19 NOTE — ED PROVIDER NOTE - OBJECTIVE STATEMENT
57yo F with PMH of ESRD TTS, HTN, DM, COPD, afib no AC, known chronic R pannus wound presents for 1.5wk vag bleeding. Pt recently had prolonged stay for pannus wound and dc 3/3 to SNF. Per pt she has had issues since, developed bed sores & not eating bc food is poor. For last ~12d has been having what she thought was menstrual bleeding, passing clots & going through ~6-8 pads/day initially though it is now improving. Now no longer passing clots but still going through ~4-5 pads so came to ED. Thinks she feels a little more fatigued than usual but she's largely bed-bound. Also endorsing some inc abd periumbilical pain and her chronic pannus wound pain. No fever, CP, SOB, NV. Supposed to go to HD today but missed.

## 2022-03-19 NOTE — ED PROVIDER NOTE - PHYSICAL EXAMINATION
Past Medical History:   Diagnosis Date    CHF (congestive heart failure) 1/2010    Dx  1/2010 w/ decreased LV systolic function (EF 15%) by ECHO 1/2015    COCM (congestive cardiomyopathy) 7/20/2016    Hyperlipidemia     Hypertension     Paroxysmal atrial fibrillation     Pulmonary embolus 2008    Stroke     Superficial thrombophlebitis        Past Surgical History:   Procedure Laterality Date    LEFT VENTRICULAR ASSIST DEVICE Left 1/23/2020    Procedure: INSERTION-LEFT VENTRICULAR ASSIST DEVICE;  Surgeon: Ino Schmitt MD;  Location: [facility] OR [address] [place] [address];  Service: Cardiovascular;  Laterality: Left;  DT HM3    RIGHT HEART CATHETERIZATION Right 1/16/2020    Procedure: INSERTION, CATHETER, RIGHT HEART;  Surgeon: Isiah Montero MD;  Location: [facility] CATH LAB;  Service: Cardiology;  Laterality: Right;    TONSILLECTOMY      VEIN LIGATION AND STRIPPING         Review of patient's allergies indicates:   Allergen Reactions    Biopatch [chlorhexidine gluconate]      Site burning    Dobutamine in d5w      Tachycardia, tremors, SOB, flushing    Percocet [oxycodone-acetaminophen] Itching    Penicillins Rash     Cefepime given on 1/23/2020 without issue     Current Facility-Administered Medications   Medication Frequency    adenosine (ADENOCARD) 3 mg/mL injection     albumin human 5% bottle 500 mL PRN    albuterol sulfate nebulizer solution 2.5 mg Q4H PRN    albuterol sulfate nebulizer solution 2.5 mg Q4H PRN    amiodarone 360 mg/200 mL (1.8 mg/mL) infusion Continuous    aspirin EC tablet 325 mg Daily    aspirin tablet 325 mg Daily    bisacodyl suppository 10 mg Daily PRN    calcium gluconate 2 g in dextrose 5 % 100 mL IVPB Once    ceFEPIme injection 2 g Q12H    And    vancomycin in dextrose 5 % 1 gram/250 mL IVPB 1,000 mg Q24H    dextrose 10% (D10W) Bolus PRN    dextrose 10% (D10W) Bolus PRN    dextrose 5 % and 0.45 % NaCl with KCl 40 mEq infusion Continuous    docusate sodium capsule 200 mg QHS     docusate sodium capsule 50 mg BID    EPINEPHrine (ADRENALIN) 5 mg in sodium chloride 0.9% 250 mL infusion Continuous    fentaNYL injection 25 mcg Q1H PRN    ferrous gluconate tablet 324 mg Daily with breakfast    furosemide (LASIX) 2 mg/mL in sodium chloride 0.9% 100 mL infusion (conc: 2 mg/mL) Continuous    insulin regular 100 Units in sodium chloride 0.9% 100 mL infusion Continuous    magnesium hydroxide 400 mg/5 ml suspension 2,400 mg Daily PRN    magnesium sulfate 1 g in dextrose 5 % 50 mL IVPB Once    magnesium sulfate 2g in water 50mL IVPB (premix) PRN    mupirocin 2 % ointment BID    niCARdipine 40 mg/200 mL infusion Continuous    nitric oxide gas Gas 30 ppm Continuous    norepinephrine 4 mg in dextrose 5% 250 mL infusion (premix) (titrating) Continuous    oxyCODONE immediate release tablet 5 mg Q4H PRN    oxyCODONE immediate release tablet Tab 10 mg Q4H PRN    pantoprazole EC tablet 40 mg Daily    pantoprazole injection 40 mg Daily    polyethylene glycol packet 17 g BID    potassium chloride 40 mEq in 100 mL IVPB (FOR CENTRAL LINE ADMINISTRATION ONLY) Once    propofol (DIPRIVAN) 10 mg/mL infusion Continuous    sodium chloride 0.9% flush 10 mL PRN    sodium chloride 0.9% flush 3 mL Q8H     Family History     Problem Relation (Age of Onset)    Cancer Mother        Tobacco Use    Smoking status: Never Smoker    Smokeless tobacco: Never Used   Substance and Sexual Activity    Alcohol use: No    Drug use: No    Sexual activity: Not on file     Review of Systems   Unable to perform ROS: Acuity of condition     Objective:     Vital Signs (Most Recent):  Temp: (!) 100.5 °F (38.1 °C) (01/24/20 1700)  Pulse: (!) 146 (01/24/20 1815)  Resp: (!) 27 (01/24/20 1730)  BP: (!) 86/0 (01/24/20 1130)  SpO2: 99 % (01/24/20 1815)  O2 Device (Oxygen Therapy): ventilator (01/24/20 1800) Vital Signs (24h Range):  Temp:  [99.9 °F (37.7 °C)-101.2 °F (38.4 °C)] 100.5 °F (38.1 °C)  Pulse:  [] 146  Resp:   [17-27] 27  SpO2:  [90 %-100 %] 99 %  BP: (84-98)/(0-75) 86/0  Arterial Line BP: ()/(62-81) 87/74     Weight: 110.8 kg (244 lb 4.3 oz) (01/23/20 1530)  Body mass index is 35.05 kg/m².  Body surface area is 2.34 meters squared.    I/O last 3 completed shifts:  In: 5849 [I.V.:5284; Other:415; NG/GT:50; IV Piggyback:100]  Out: 3450 [Urine:3215; Chest Tube:235]    Physical Exam   Constitutional: He appears well-developed. No distress. He is sedated and intubated.   HENT:   Head: Normocephalic and atraumatic.   Right Ear: External ear normal.   Left Ear: External ear normal.   Cardiovascular: Tachycardia present.   RVAD hum   Pulmonary/Chest: Effort normal and breath sounds normal. He is intubated. He has no rales.   Musculoskeletal: He exhibits edema. He exhibits no deformity.   Skin: Skin is warm and dry. He is not diaphoretic.   Sternal surgical wound, drsg intact       Significant Labs:  CBC:   Recent Labs   Lab 01/24/20  1545  01/24/20  1803   WBC 17.87*  --   --    RBC 3.63*  --   --    HGB 10.1*  --   --    HCT 32.7*   < > 30*     --   --    MCV 90  --   --    MCH 27.8  --   --    MCHC 30.9*  --   --     < > = values in this interval not displayed.     CMP:   Recent Labs   Lab 01/24/20  0334  01/24/20  1545   GLU  --    < > 173*   CALCIUM  --    < > 8.8   ALBUMIN 3.0*  --   --    PROT 6.4  --   --    NA  --    < > 141   K  --    < > 4.5   CO2  --    < > 26   CL  --    < > 103   BUN  --    < > 49*   CREATININE  --    < > 2.4*   ALKPHOS 40*  --   --    ALT 34  --   --    AST 97*  --   --    BILITOT 1.1*  --   --     < > = values in this interval not displayed.          Constitutional:  See HPI  Eyes:  No visual changes  ENMT: No neck pain or stiffness  Cardiac:  No chest pain  Respiratory:  No cough or respiratory distress. 88% when sleeping, 94% when awake; 100% on 2L NC  GI:  No nausea, vomiting, diarrhea +abdominal pain.  :  doesn't make much urine ; vaginal exam by cornelius, chaperone Jyoti - normal pink vaginal vault tissue with some blood in vault but no significant clots or bright red blood or pooled material ; blood seem come through cervix  MS:  chronic back pain.  Neuro:  No headache   Skin:  large 8-10" long pannus wound, open and packed with gauze, pink tissue but poor healing, some fibrinous fluid with area deep in L pocket with some dark fibrinous material in bottom of wound.   Except as documented in the HPI,  all other systems are negative

## 2022-03-19 NOTE — ED ADULT NURSE NOTE - NSIMPLEMENTINTERV_GEN_ALL_ED
Implemented All Universal Safety Interventions:  [place] to call system. Call bell, personal items and telephone within reach. Instruct patient to call for assistance. Room bathroom lighting operational. Non-slip footwear when patient is off stretcher. Physically safe environment: no spills, clutter or unnecessary equipment. Stretcher in lowest position, wheels locked, appropriate side rails in place.

## 2022-03-19 NOTE — ED ADULT NURSE NOTE - OBJECTIVE STATEMENT
pt A&ox4, came to ED for generalized  abdominal pain, pt has abdominal wound in LLQ and RLQ, dressing is dry clean and intact. pt denies Chest pain and SOB. pt denies H/A, Dizziness, lightheadedness, and radiating chest pain. NSR on telemetry. breathing is spontaneous and unlabored. sating 99% on 2L NC. bilateral pedal and radial pulses palpable and strong. IV placed Labs drawn and sent as per ordered. Bed in lowest position, call bell within reach, all other safety and comfort measures provided. awaiting labs results and further orders.

## 2022-03-19 NOTE — ED ADULT NURSE REASSESSMENT NOTE - NS ED NURSE REASSESS COMMENT FT1
Pt awake, alert - 13cm x 9cm unstageable wound on abd fold - mostly pink tissue exposed with exception of L side gray eschar - irrigated with saline applied, packing applied, covered with abd gauze and paper tape to hold in place - underneath that wound is a 3 cm x 3 cm stage 2 on L side abd - turned, changed, repositioned for comfort

## 2022-03-19 NOTE — ED PROVIDER NOTE - CLINICAL SUMMARY MEDICAL DECISION MAKING FREE TEXT BOX
cornelius pgy1: 55yo M ESRD, obese, COPD, chronic wound presents to ED for vaginal bleeding. +vaginal bleeding on exam w/o profuse bright red blood or clots but still blood through cervical os. Been perimenopausal per GYN notes since 2015? - now concern for malignancy given irregular bleeding for >1yr. Pannus wound remains non-healing. Given inc abd pain will CT scan, TVUS, labs. Plan likely to admit - also was due to HD today which she didn't go to.

## 2022-03-19 NOTE — ED PROVIDER NOTE - ATTENDING CONTRIBUTION TO CARE
57 yo female with PMH ESRD TTS, HTN, DM, COPD, afib no AC, known chronic R pannus wound for evaluation of vag bleeding x 1.5 weeks. PE: large chornic wound with necrotic changes noted, no active discharge. A/P Labs, imaging, medicate, adm

## 2022-03-19 NOTE — ED PROVIDER NOTE - NSICDXPASTMEDICALHX_GEN_ALL_CORE_FT
PAST MEDICAL HISTORY:  Anemia     Atrial fibrillation with loop recorder 2015, battery most likely depleted, as per cardiac clearance, Dr. Reece Anesthesia aware, pt on Eliquis    Chronic GERD     COPD (chronic obstructive pulmonary disease)     DM (diabetes mellitus)     End-stage renal disease     HTN (hypertension)     Medication management     Morbid obesity BMI - 58.3    CHAMP (obstructive sleep apnea) non compliance with CPAP, Anesthesia Dr. Reece aware, pt told to bring CPAP for sx, pr verbalized understanding    Potential difficult airway on pre-intubation assessment airway class III, large neck, morbid obesity, hx of CHAMP, no compliance with CPAP- Dr. Reece, Anesthesia aware

## 2022-03-19 NOTE — ED PROVIDER NOTE - NS ED ROS FT
Constitutional:  See HPI, no fever  Eyes:  No visual changes  ENMT: No neck pain or stiffness  Cardiac:  No chest pain  Respiratory:  No cough or respiratory distress. 94%   GI:  No nausea, vomiting, diarrhea ; +abd wound in pannus   :  +vaginal bleeding  MS:  chronic pain back pain.  Neuro:  No headache   Skin:  No skin rash  Except as documented in the HPI,  all other systems are negative

## 2022-03-19 NOTE — ED PROVIDER NOTE - PROGRESS NOTE DETAILS
cornelius pgy1: US attempted but due to positioning/habitus unable to get images. cornelius pgy1: discussed CT results with surg, no acute surg intervention. Admit to med, antibiotics ordered given stranding/cellulitis/pain. admit to [facility].

## 2022-03-20 NOTE — CONSULT NOTE ADULT - SUBJECTIVE AND OBJECTIVE BOX
HPI:  56F ESRD TTS, HTN, DM, COPD, afib, known chronic R pannus wound felt most likely pyoderma gangrenosum presents for 1.5wk vag bleeding. Pt recently had prolonged stay for pannus wound and dc 3/3 to SNF. Per pt she has had issues since, developed bed sores & not eating bec food is poor. For last ~12d has been having what she thought was menstrual bleeding, passing clots & going through ~6-8 pads/day initially though it is now improving. Now no longer passing clots but still going through ~4-5 pads so came to ED. Thinks she feels a little more fatigued than usual but she's largely bed-bound. Also endorsing some inc abd periumbilical pain and her chronic pannus wound pain. No fever, CP, SOB, NV. Supposed to go to HD Saturday but missed.     Endocrine: Type 2 DM x 3 years, cannot name current endocrinologist. Maintained on Tresiba 80 units qd and Trulicity, possibly 3 mg weekly. Pt states BS generally 200s. does not know prior A1c.  h/o pannus wound as noted.  No recent wt loss  ESRD on HD, has secondary hyperpara  PAST MEDICAL & SURGICAL HISTORY:  COPD (chronic obstructive pulmonary disease)    DM (diabetes mellitus)    Atrial fibrillation  with loop recorder , battery most likely depleted, as per cardiac clearance, Dr. Reece Anesthesia aware, pt on Eliquis    HTN (hypertension)    Morbid obesity  BMI - 58.3    Chronic GERD    CHAMP (obstructive sleep apnea)  non compliance with CPAP, Anesthesia Dr. Reece aware, pt told to bring CPAP for sx, pr verbalized understanding    Potential difficult airway on pre-intubation assessment  airway class III, large neck, morbid obesity, hx of CHAMP, no compliance with CPAP- Dr. Reece, Anesthesia aware    End-stage renal disease    Anemia    Medication management    H/O tubal ligation      Status post placement of implantable loop recorder  left chest-     History of vascular access device  s/p insertion right chest permacath 2019, removal 2019, insertion left chest permacath 2019    S/P arteriovenous (AV) fistula creation  left  arm 2019        FAMILY HISTORY:  Family history of diabetes mellitus  mother-     Family hx of hypertension  mother-         Social History:    Outpatient Medications:    MEDICATIONS  (STANDING):  buPROPion XL (24-Hour) . 150 milliGRAM(s) Oral daily  cinacalcet 60 milliGRAM(s) Oral daily  cycloSPORINE  , modified (NEORAL) 200 milliGRAM(s) Oral at bedtime  dextrose 40% Gel 15 Gram(s) Oral once  dextrose 5%. 1000 milliLiter(s) (50 mL/Hr) IV Continuous <Continuous>  dextrose 5%. 1000 milliLiter(s) (100 mL/Hr) IV Continuous <Continuous>  dextrose 50% Injectable 25 Gram(s) IV Push once  dextrose 50% Injectable 12.5 Gram(s) IV Push once  dextrose 50% Injectable 25 Gram(s) IV Push once  diltiazem    milliGRAM(s) Oral daily  epoetin anabela-epbx (RETACRIT) Injectable 53447 Unit(s) IV Push <User Schedule>  gabapentin 300 milliGRAM(s) Oral two times a day  glucagon  Injectable 1 milliGRAM(s) IntraMuscular once  insulin glargine Injectable (LANTUS) 10 Unit(s) SubCutaneous at bedtime  insulin lispro (ADMELOG) corrective regimen sliding scale   SubCutaneous three times a day before meals  insulin lispro (ADMELOG) corrective regimen sliding scale   SubCutaneous at bedtime  lidocaine   4% Patch 1 Patch Transdermal daily  lidocaine   4% Patch 1 Patch Transdermal daily  mirtazapine 7.5 milliGRAM(s) Oral at bedtime  montelukast 10 milliGRAM(s) Oral at bedtime  oxyCODONE  ER Tablet 10 milliGRAM(s) Oral every 12 hours  pantoprazole    Tablet 40 milliGRAM(s) Oral before breakfast  polyethylene glycol 3350 17 Gram(s) Oral at bedtime  senna 2 Tablet(s) Oral at bedtime  sertraline 50 milliGRAM(s) Oral daily  sevelamer carbonate 800 milliGRAM(s) Oral three times a day with meals  simethicone 80 milliGRAM(s) Chew three times a day  traZODone 100 milliGRAM(s) Oral at bedtime    MEDICATIONS  (PRN):  acetaminophen     Tablet .. 650 milliGRAM(s) Oral every 6 hours PRN Temp greater or equal to 38C (100.4F), Mild Pain (1 - 3)  melatonin 3 milliGRAM(s) Oral at bedtime PRN Insomnia  midodrine. 5 milliGRAM(s) Oral <User Schedule> PRN 30 minutes prior to dialysis  ondansetron Injectable 4 milliGRAM(s) IV Push every 8 hours PRN Nausea and/or Vomiting  oxyCODONE    IR 7.5 milliGRAM(s) Oral every 6 hours PRN Severe Pain (7 - 10)      Allergies    latex (Rash)  penicillin (Nausea)  strawberry (Rash)    Intolerances    caffeine (Nausea)    Review of Systems:  Constitutional: No fever  Eyes: No blurry vision  Neuro: No tremors  HEENT: No pain  Cardiovascular: No chest pain, palpitations  Respiratory: No SOB, no cough  GI: No nausea, vomiting, abdominal pain  : No dysuria  Skin: no rash  Psych: no depression      PHYSICAL EXAM:  VITALS: T(C): 37.1 (22 @ 05:14)  T(F): 98.8 (22 @ 05:14), Max: 99.1 (22 @ 17:40)  HR: 76 (22 @ 05:14) (72 - 80)  BP: 147/69 (22 @ 05:14) (105/51 - 156/80)  RR:  (16 - 20)  SpO2:  (99% - 100%)  Wt(kg):  not recorded--  GENERAL: obese  EYES: No proptosis, no lid lag, anicteric  THYROID: Normal size, no palpable nodules  RESPIRATORY: Clear to auscultation bilaterally; No rales, rhonchi, wheezing, or rubs  CARDIOVASCULAR: Regular rate and rhythm; No murmurs; no peripheral edema  GI: lower abd bandaged, not examined  SKIN: Dry, intact, +hirsutism      PSYCH: Alert and oriented x 3, normal affect, normal mood  CUSHING'S SIGNS: no striae    POCT Blood Glucose.: 142 mg/dL (22 @ 17:46)                            7.6    12.21 )-----------( 500      ( 20 Mar 2022 12:48 )             28.0       -20    135  |  95<L>  |  71<H>  ----------------------------<  145<H>  4.3   |  25  |  6.85<H>    EGFR if : x   EGFR if non : x     Ca    10.4      -20  Mg     2.70     03-20  Phos  5.0         TPro  7.1  /  Alb  3.4  /  TBili  0.3  /  DBili  x   /  AST  9   /  ALT  <5  /  AlkPhos  170<H>        Thyroid Function Tests:           Chol 202<H> Direct LDL -- LDL calculated 123<H> HDL 45<L> Trig 171<H>,  Chol -- Direct LDL -- LDL calculated -- HDL -- Trig 183<H>,  Chol -- Direct LDL -- LDL calculated -- HDL -- Trig 281<H>,  Chol 135 Direct LDL -- LDL calculated 34 HDL 47<L> Trig 270<H>,  Chol -- Direct LDL -- LDL calculated -- HDL -- Trig 147    Radiology:

## 2022-03-20 NOTE — H&P ADULT - ASSESSMENT
56F ESRD TTS, HTN, DM, COPD, afib, known chronic pannus wound felt most likely pyoderma gangrenosum presents for 1.5wk vag bleeding.

## 2022-03-20 NOTE — CONSULT NOTE ADULT - SUBJECTIVE AND OBJECTIVE BOX
General Surgery Consult Note  Attending: Dr. Gaudencio Garcia  Service: B Team Surgery      HPI:  55yo F with PMH of ESRD TTS, HTN, DM, COPD, afib no AC, known chronic R pannus wound presents for 1.5wk vag bleeding. She recently had prolonged stay for pannus wound and dc 3/3 to SNF. She has had issues since, developed bed sores & not eating bc food is poor. For last ~12d has been having what she thought was menstrual bleeding, passing clots & going through ~6-8 pads/day initially though it is now improving. Now no longer passing clots but still going through ~4-5 pads so came to ED. Thinks she feels a little more fatigued than usual but she's largely bed-bound. Also endorsing some inc abd periumbilical pain and her chronic pannus wound pain. No fever, CP, SOB, NV. Supposed to go to HD today but missed. Surgery consulted for Pannus Wound      PAST MEDICAL & SURGICAL HISTORY:  COPD (chronic obstructive pulmonary disease)  DM (diabetes mellitus)  Atrial fibrillation with loop recorder , battery most likely depleted, as per cardiac clearance, Dr. Reece Anesthesia aware, pt on Eliquis  HTN (hypertension)  Morbid obesity BMI - 58.3  Chronic GERD  CHAMP (obstructive sleep apnea) non compliance with CPAP, Anesthesia Dr. Reece aware, pt told to bring CPAP for sx, pr verbalized understanding  Potential difficult airway on pre-intubation assessment airway class III, large neck, morbid obesity, hx of CHAMP, no compliance with CPAP- Dr. Reece, Anesthesia aware  End-stage renal disease   Anemia  H/O tubal ligation   s/p insertion right chest permacath 2019, removal 2019, insertion left chest permacath 2019  S/P arteriovenous (AV) fistula creation left  arm 2019        ALLERGIES:  Allergies    latex (Rash)  penicillin (Nausea)  strawberry (Rash)    Intolerances    caffeine (Nausea)      SOCIAL HISTORY:    FAMILY HISTORY:  FAMILY HISTORY:  Family history of diabetes mellitus  mother-     Family hx of hypertension  mother-         PHYSICAL EXAM:  General: NAD, resting comfortably  HEENT: NC/AT, normal hearing  Pulmonary: normal resp effort  Cardiovascular: RRR  Abdominal: soft, ND, tending around pannus wound overall wound looks healthy no purulence noted  Extremities: WWP, no clubbing/cyanosis/edema  Neuro: A/O x 3, normal sensation, no focal deficits  Pulses: palpable distal pulses    VITAL SIGNS:  Vital Signs Last 24 Hrs  T(C): 36.6 (20 Mar 2022 02:08), Max: 37.3 (19 Mar 2022 17:40)  T(F): 97.8 (20 Mar 2022 02:08), Max: 99.1 (19 Mar 2022 17:40)  HR: 74 (20 Mar 2022 02:08) (72 - 80)  BP: 107/48 (20 Mar 2022 02:08) (105/51 - 123/65)  BP(mean): --  RR: 17 (20 Mar 2022 02:08) (16 - 20)  SpO2: 100% (20 Mar 2022 02:08) (99% - 100%)    I&O's Summary      LABS:                        7.5     )-----------( 479      ( 19 Mar 2022 20:17 )             26.6     03-    138  |  99  |  61<H>  ----------------------------<  142<H>  5.5<H>   |  22  |  6.03<H>    Ca    10.2      19 Mar 2022 20:17  Mg     2.40     -    TPro  6.8  /  Alb  2.8<L>  /  TBili  0.2  /  DBili  x   /  AST  44<H>  /  ALT  6   /  AlkPhos  157<H>  -19    PT/INR - ( 19 Mar 2022 20:17 )   PT: 14.2 sec;   INR: 1.22 ratio         PTT - ( 19 Mar 2022 20:17 )  PTT:41.2 sec    CAPILLARY BLOOD GLUCOSE      POCT Blood Glucose.: 142 mg/dL (19 Mar 2022 17:46)    LIVER FUNCTIONS - ( 19 Mar 2022 20:17 )  Alb: 2.8 g/dL / Pro: 6.8 g/dL / ALK PHOS: 157 U/L / ALT: 6 U/L / AST: 44 U/L / GGT: x             CULTURES:      RADIOLOGY & ADDITIONAL STUDIES:    < from: CT Abdomen and Pelvis w/ IV Cont (22 @ 00:26) >  FINDINGS:  LOWER CHEST: Bilateral subsegmental atelectasis, left greater than right.   Mild cardiomegaly.    LIVER: Within normal limits.  BILE DUCTS: Normal caliber.  GALLBLADDER: Cholelithiasis. Mildly distended gallbladder. No   pericholecystic inflammation.  SPLEEN: Within normal limits.  PANCREAS: Within normal limits.  ADRENALS: Within normal limits.  KIDNEYS/URETERS: Bilateral subcentimeter hypodense lesions too small to   characterize. Bilateral small nonobstructing renal calculi.    BLADDER: Incompletely distended.  REPRODUCTIVE ORGANS: Enlarged myomatous uterus. Suspected endometrial   thickening up to 1 cm.    BOWEL: No bowel obstruction. Appendix is normal. Trace colonic   diverticulosis.  PERITONEUM: No ascites.  VESSELS: Atherosclerotic changes.  RETROPERITONEUM/LYMPH NODES: No lymphadenopathy.  ABDOMINAL WALL: Fat-containing umbilical hernia. Incomplete visualization   of the pannus. Large skin defect involving the inferior pannus (example  2, 140) with adjacent subcutaneous stranding and overlying skin   thickening. No abscess identified. Adjacent tiny foci of air appear to be   within skin folds on coronal and sagittal images.  BONES: Degenerative changes. Diffuse bony sclerosis again noted.    IMPRESSION:    1. Incompletely imaged large skin defect involving the inferior pannus   with subcutaneous stranding and skin thickening compatible with   cellulitis. No abscess identified.  2. Enlarged myomatous uterus. Suspected endometrial thickening.   Correlation with pelvic ultrasound or MRI is recommended.    < end of copied text >

## 2022-03-20 NOTE — H&P ADULT - PROBLEM SELECTOR PLAN 6
not been eating well past few weeks, not been eating well past few weeks, was on Lantus 15 at rehab  - will decrease lantus to 10hs (dose was on last visit), hold premeals for now  - FSBGs, low correctional scale  - last A1c 7 in Jan --> f/u repeat  - will ask endo input

## 2022-03-20 NOTE — CONSULT NOTE ADULT - ASSESSMENT
A/P: 57yo F  perimenopausal F on anticoagulation p/w heavy vaginal bleeding during menses c/w anovulatory bleeding in perimenopausal state. Pt with thickened EM on CT and high risk for endometrial pathology/ malignancy given prolonged AUB and obesity. No current heavy vaginal bleeding, VSS and anemia near baseline.   - Bleeding minimal, not requiring acute GYN intervention  - TVUS to further assess endometrium (should be easily performed given ease of speculum exam with assistance) and surrounding structures   - Pt will need endometrial sampling given high risk for malignancy, can be done OP. Discussed need and importance with patient. Pt can f/u in     [facility]  Oncology Building, Basement  [address] [place] [id number]  [phone]     Andree Woodward, PGY-2    Seen with Dr Ruiz    A/P: 55yo F  perimenopausal F on anticoagulation p/w heavy vaginal bleeding during menses c/w anovulatory bleeding in perimenopausal state. Pt with thickened EM on CT and high risk for endometrial pathology/ malignancy given prolonged AUB and obesity. No current heavy vaginal bleeding, VSS and anemia near baseline.   - Bleeding minimal, not requiring acute GYN intervention  - TVUS to further assess endometrium (should be easily performed given ease of speculum exam with assistance) and surrounding structures   - Pt will need endometrial sampling given high risk for malignancy, can be done inpt vs OP and should not delay discharge. Discussed need and importance with patient. Pt can f/u in     [facility]  Oncology Building, [facility]  [address] [place] [id number]  [phone]     - GYN will continue to follow    Andree Woodward, PGY-2    Seen with Dr Ruiz    A/P: 55yo F  perimenopausal F on anticoagulation p/w heavy vaginal bleeding c/f postmenopausal bleeding. Pt with thickened EM on CT and high risk for endometrial pathology/ malignancy given prolonged AUB and obesity. No current heavy vaginal bleeding, VSS and anemia near baseline.   - Bleeding minimal, not requiring acute GYN intervention  - TVUS to further assess endometrium (should be easily performed given ease of speculum exam with assistance) and surrounding structures   - Pt will need endometrial sampling given high risk for malignancy, can be done inpt vs OP and should not delay discharge. Discussed need and importance with patient. Pt can f/u in     [facility]  Oncology Building, [facility]  [address] [place] [id number]  [phone]     - GYN will continue to follow    Andree Woodward, PGY-2    Seen with Dr Ruiz

## 2022-03-20 NOTE — H&P ADULT - PROBLEM SELECTOR PLAN 2
likely multifactorial - CKD, chronic inflammatory disease (pyoderma), now with some component of anemia of acute blood loss  - serial CBC, T&S  - consider iron with HD

## 2022-03-20 NOTE — CONSULT NOTE ADULT - SUBJECTIVE AND OBJECTIVE BOX
GYN Consult Note **INCOMPLETE NOTE**    HPI:  57yo F G P LMP admitted for wound cellulitis in setting of chronic R pannus wound. Pt presented to ED on 3/19 due top heavy vaginal bleeding x12d with passage of clots and soaking 6-8 ppd, as well as dizziness. Pt found to be anemic (7.5/26.6), but near baseline anemia 2/2 ESRD. Vaginal bleeding at that time minimal. CT notable for 1cm EM with myomatous uterus, as well as evidence of cellulitis at her pannus wound. Pt admitted for treatment and dialysis.       Name of GYN Physician:    ObHx:    GynHx: Denies fibroids, cysts, endometriosis, STI's, abnormal pap smears. Last pap:     PMHx:  PSHx:  Meds:  All:  FH: No h/o breast, ovarian or uterine cancer  SH:  Denies etoh, cigarette smoking, recreational drugs.       PAST MEDICAL & SURGICAL HISTORY:  COPD (chronic obstructive pulmonary disease)    DM (diabetes mellitus)    Atrial fibrillation  with loop recorder 2015, battery most likely depleted, as per cardiac clearance, Dr. Reece Anesthesia aware, pt on Eliquis    HTN (hypertension)    Morbid obesity  BMI - 58.3    Chronic GERD    CHAMP (obstructive sleep apnea)  non compliance with CPAP, Anesthesia Dr. Reece aware, pt told to bring CPAP for sx, pr verbalized understanding    Potential difficult airway on pre-intubation assessment  airway class III, large neck, morbid obesity, hx of CHAMP, no compliance with CPAP- Dr. Reece, Anesthesia aware    End-stage renal disease    Anemia    Medication management    H/O tubal ligation  1989    Status post placement of implantable loop recorder  left chest- 2015    History of vascular access device  s/p insertion right chest permacath 2/2019, removal 6/2019, insertion left chest permacath 6/2019    S/P arteriovenous (AV) fistula creation  left  arm 6/2019    MEDICATIONS  (STANDING):  buPROPion XL (24-Hour) . 150 milliGRAM(s) Oral daily  cinacalcet 60 milliGRAM(s) Oral daily  cycloSPORINE  (SandIMMUNE) 150 milliGRAM(s) Oral <User Schedule>  cycloSPORINE  , modified (NEORAL) 200 milliGRAM(s) Oral at bedtime  diltiazem    milliGRAM(s) Oral daily  gabapentin 300 milliGRAM(s) Oral two times a day  lidocaine   4% Patch 1 Patch Transdermal daily  lidocaine   4% Patch 1 Patch Transdermal daily  mirtazapine 7.5 milliGRAM(s) Oral at bedtime  montelukast 10 milliGRAM(s) Oral at bedtime  oxyCODONE  ER Tablet 10 milliGRAM(s) Oral every 12 hours  pantoprazole    Tablet 40 milliGRAM(s) Oral before breakfast  polyethylene glycol 3350 17 Gram(s) Oral at bedtime  senna 2 Tablet(s) Oral at bedtime  sertraline 50 milliGRAM(s) Oral daily  sevelamer carbonate 800 milliGRAM(s) Oral three times a day with meals  simethicone 80 milliGRAM(s) Chew three times a day  traZODone 100 milliGRAM(s) Oral at bedtime    MEDICATIONS  (PRN):  acetaminophen     Tablet .. 650 milliGRAM(s) Oral every 6 hours PRN Temp greater or equal to 38C (100.4F), Mild Pain (1 - 3)  melatonin 3 milliGRAM(s) Oral at bedtime PRN Insomnia  midodrine. 5 milliGRAM(s) Oral every 8 hours PRN 30 minutes prior to dialysis  ondansetron Injectable 4 milliGRAM(s) IV Push every 8 hours PRN Nausea and/or Vomiting  oxyCODONE    IR 7.5 milliGRAM(s) Oral every 6 hours PRN Severe Pain (7 - 10)      Allergies    latex (Rash)  penicillin (Nausea)  strawberry (Rash)    Intolerances    caffeine (Nausea)      REVIEW OF SYSTEMS  General: denies fevers, chills, tiredness  Skin/Breast: denies breast pain  Respiratory and Thorax: denies shortness of breath, denies cough  Cardiovascular: denies chest pain and denies palpitations  Gastrointestinal: denies abdominal pain, nausea/ vomiting	  Genitourinary: denies dysuria, increased urinary frequency, urgency	  Constitutional, Cardiovascular, Respiratory, Gastrointestinal, Genitourinary, Musculoskeletal and Integumentary review of systems are normal except as noted. 	      Vital Signs Last 24 Hrs  T(C): 37.1 (20 Mar 2022 05:14), Max: 37.3 (19 Mar 2022 17:40)  T(F): 98.8 (20 Mar 2022 05:14), Max: 99.1 (19 Mar 2022 17:40)  HR: 76 (20 Mar 2022 05:14) (72 - 80)  BP: 147/69 (20 Mar 2022 05:14) (105/51 - 156/80)  BP(mean): --  RR: 19 (20 Mar 2022 05:14) (16 - 20)  SpO2: 99% (20 Mar 2022 05:14) (99% - 100%)        PHYSICAL EXAM:   Gen: Comfortable, NAD  Psych: appropriate mood & affect  CV: RRR  Pulm: CTAB  Abd: Soft, NT, ND  Ext: Warm & well perfused. No edema or tenderness bilaterally  Pelvic: Normal external genitalia, urethra, clitoris, and anus. Normal labia bilaterally. Normal pattern of hair growth along pubic area. Normal-appearing skin, no lesion, no masses.  Spec Exam: Normal vaginal epithelium, normal appearing cervix. No lesions or masses appriciated on the cervix. No lesions or masses noted along the vaginal epithelium. Normal vaginal discharge, no odor.   Bimanual exam: ***uterus, nontender. No adenxal fullness or masses.     LABS:                        7.5    12.22 )-----------( 479      ( 19 Mar 2022 20:17 )             26.6     03-19    138  |  99  |  61<H>  ----------------------------<  142<H>  5.5<H>   |  22  |  6.03<H>    Ca    10.2      19 Mar 2022 20:17  Mg     2.40     03-19    TPro  6.8  /  Alb  2.8<L>  /  TBili  0.2  /  DBili  x   /  AST  44<H>  /  ALT  6   /  AlkPhos  157<H>  03-19    PT/INR - ( 19 Mar 2022 20:17 )   PT: 14.2 sec;   INR: 1.22 ratio         PTT - ( 19 Mar 2022 20:17 )  PTT:41.2 sec      Blood Type: O Positive        RADIOLOGY & ADDITIONAL STUDIES:    A/P:      Andree Woodward, PGY-2    D/W  GYN Consult Note **INCOMPLETE NOTE**    HPI:  57yo F G P LMP admitted for wound cellulitis in setting of chronic R pannus wound. Pt presented to ED on 3/19 due top heavy vaginal bleeding x12d with passage of clots and soaking 6-8 ppd, as well as dizziness. Pt found to be anemic (7.5/26.6), but near baseline anemia 2/2 ESRD. Vaginal bleeding at that time minimal. CT notable for 1cm EM with myomatous uterus, as well as evidence of cellulitis at her pannus wound. Pt admitted for treatment of cellulitis, sp cefepime/vancx1, and dialysis.       Name of GYN Physician: None - last seen in resident GYN clinic in 2017     ObHx:  SAB x1  TOP x1  C/S x1 to  infant 2/2  labor   x1 c/b PEC    GynHx: known fibroids, has not seen GYN since 2017, last pap (no co-testing): NILM in 2017, menstrual history as above     PMHx:  PSHx:  Meds:  All:  FH: No h/o breast, ovarian or uterine cancer  SH:  Denies etoh, cigarette smoking, recreational drugs.       PAST MEDICAL & SURGICAL HISTORY:  COPD (chronic obstructive pulmonary disease)    DM (diabetes mellitus)    Atrial fibrillation  with loop recorder , battery most likely depleted, as per cardiac clearance, Dr. Reece Anesthesia aware, pt on Eliquis    HTN (hypertension)    Morbid obesity  BMI - 58.3    Chronic GERD    CHAMP (obstructive sleep apnea)  non compliance with CPAP, Anesthesia Dr. Reece aware, pt told to bring CPAP for sx, pr verbalized understanding    Potential difficult airway on pre-intubation assessment  airway class III, large neck, morbid obesity, hx of CHAMP, no compliance with CPAP- Dr. Reece, Anesthesia aware    End-stage renal disease    Anemia    Medication management    H/O tubal ligation      Status post placement of implantable loop recorder  left chest-     History of vascular access device  s/p insertion right chest permacath 2019, removal 2019, insertion left chest permacath 2019    S/P arteriovenous (AV) fistula creation  left  arm 2019    MEDICATIONS  (STANDING):  buPROPion XL (24-Hour) . 150 milliGRAM(s) Oral daily  cinacalcet 60 milliGRAM(s) Oral daily  cycloSPORINE  (SandIMMUNE) 150 milliGRAM(s) Oral <User Schedule>  cycloSPORINE  , modified (NEORAL) 200 milliGRAM(s) Oral at bedtime  diltiazem    milliGRAM(s) Oral daily  gabapentin 300 milliGRAM(s) Oral two times a day  lidocaine   4% Patch 1 Patch Transdermal daily  lidocaine   4% Patch 1 Patch Transdermal daily  mirtazapine 7.5 milliGRAM(s) Oral at bedtime  montelukast 10 milliGRAM(s) Oral at bedtime  oxyCODONE  ER Tablet 10 milliGRAM(s) Oral every 12 hours  pantoprazole    Tablet 40 milliGRAM(s) Oral before breakfast  polyethylene glycol 3350 17 Gram(s) Oral at bedtime  senna 2 Tablet(s) Oral at bedtime  sertraline 50 milliGRAM(s) Oral daily  sevelamer carbonate 800 milliGRAM(s) Oral three times a day with meals  simethicone 80 milliGRAM(s) Chew three times a day  traZODone 100 milliGRAM(s) Oral at bedtime    MEDICATIONS  (PRN):  acetaminophen     Tablet .. 650 milliGRAM(s) Oral every 6 hours PRN Temp greater or equal to 38C (100.4F), Mild Pain (1 - 3)  melatonin 3 milliGRAM(s) Oral at bedtime PRN Insomnia  midodrine. 5 milliGRAM(s) Oral every 8 hours PRN 30 minutes prior to dialysis  ondansetron Injectable 4 milliGRAM(s) IV Push every 8 hours PRN Nausea and/or Vomiting  oxyCODONE    IR 7.5 milliGRAM(s) Oral every 6 hours PRN Severe Pain (7 - 10)      Allergies    latex (Rash)  penicillin (Nausea)  strawberry (Rash)    Intolerances    caffeine (Nausea)      REVIEW OF SYSTEMS  General: denies fevers, chills, tiredness  Skin/Breast: denies breast pain  Respiratory and Thorax: denies shortness of breath, denies cough  Cardiovascular: denies chest pain and denies palpitations  Gastrointestinal: denies abdominal pain, nausea/ vomiting	  Genitourinary: denies dysuria, increased urinary frequency, urgency	  Constitutional, Cardiovascular, Respiratory, Gastrointestinal, Genitourinary, Musculoskeletal and Integumentary review of systems are normal except as noted. 	      Vital Signs Last 24 Hrs  T(C): 37.1 (20 Mar 2022 05:14), Max: 37.3 (19 Mar 2022 17:40)  T(F): 98.8 (20 Mar 2022 05:14), Max: 99.1 (19 Mar 2022 17:40)  HR: 76 (20 Mar 2022 05:14) (72 - 80)  BP: 147/69 (20 Mar 2022 05:14) (105/51 - 156/80)  BP(mean): --  RR: 19 (20 Mar 2022 05:14) (16 - 20)  SpO2: 99% (20 Mar 2022 05:14) (99% - 100%)        PHYSICAL EXAM:   Gen: Comfortable, NAD  Psych: appropriate mood & affect  CV: RRR  Pulm: CTAB  Abd: Soft, NT, ND  Ext: Warm & well perfused. No edema or tenderness bilaterally  Pelvic: Normal external genitalia, urethra, clitoris, and anus. Normal labia bilaterally. Normal pattern of hair growth along pubic area. Normal-appearing skin, no lesion, no masses.  Spec Exam: Normal vaginal epithelium, normal appearing cervix. No lesions or masses appriciated on the cervix. No lesions or masses noted along the vaginal epithelium. Normal vaginal discharge, no odor.   Bimanual exam: ***uterus, nontender. No adenxal fullness or masses.     LABS:                        7.5    12.22 )-----------( 479      ( 19 Mar 2022 20:17 )             26.6     03-19    138  |  99  |  61<H>  ----------------------------<  142<H>  5.5<H>   |  22  |  6.03<H>    Ca    10.2      19 Mar 2022 20:17  Mg     2.40     -    TPro  6.8  /  Alb  2.8<L>  /  TBili  0.2  /  DBili  x   /  AST  44<H>  /  ALT  6   /  AlkPhos  157<H>  -19    PT/INR - ( 19 Mar 2022 20:17 )   PT: 14.2 sec;   INR: 1.22 ratio         PTT - ( 19 Mar 2022 20:17 )  PTT:41.2 sec      Blood Type: O Positive        RADIOLOGY & ADDITIONAL STUDIES:    A/P:      Andree Woodward, PGY-2    D/W  GYN Consult Note     HPI:  55yo F  LMP 2021 w/ h/o afib on ASA and Eliquis admitted for wound cellulitis in setting of chronic R pannus wound 2/2 pyoderma gangrenosum. Pt presented to ED on 3/19 due top heavy vaginal bleeding x12d with passage of clots and soaking 6-8 ppd, as well as dizziness. Pt found to be anemic (7.5/26.6), but near baseline anemia 2/2 ESRD. Vaginal bleeding at that time minimal. CT notable for 1cm EM with myomatous uterus, as well as evidence of cellulitis at her pannus wound. Pt admitted for treatment of cellulitis, sp cefepime/vancx1, and dialysis.     Since admission, vaginal bleeding has remained lighter. Today she has not changed one adult diaper all day. She still notes dizziness and abdominal cramping. Pt notes she has had many year history of irregular menses. Pt unsure of when this started 2/2 feeling overwhelmed by chronic illnesses. Per review of outpatient records, appears pt started having irregular bleeding c/f perimenopause in  or . Per pt, never >1yr without menses. Prior to that, pt reports regular menses since menarche. Did note that they were heavy and painful her whole life. Reports she has tried man pills before, but unable to recall names. She denies ever having biopsy done. Pt denies unintentional weight loss or hair loss. Does note decrease appetite.    Name of GYN Physician: None - last seen in resident GYN clinic in 2017     ObHx:  SAB x1  TOP x1  C/S x1 to  infant 2/2  labor   x1 c/b PEC    GynHx: known fibroids, has not seen GYN since 2017, last pap (no co-testing): NILM in 2017, menstrual history as above       PAST MEDICAL & SURGICAL HISTORY:  COPD (chronic obstructive pulmonary disease)    DM (diabetes mellitus)    Atrial fibrillation  with loop recorder , battery most likely depleted, as per cardiac clearance, Dr. Reece Anesthesia aware, pt on Eliquis (held for admission)    HTN (hypertension)    Morbid obesity  BMI - 58.3    Chronic GERD    CHAMP (obstructive sleep apnea)  non compliance with CPAP, Anesthesia Dr. Reece aware, pt told to bring CPAP for sx, pr verbalized understanding    Potential difficult airway on pre-intubation assessment  airway class III, large neck, morbid obesity, hx of CHAMP, no compliance with CPAP- Dr. Reece, Anesthesia aware    End-stage renal disease    Anemia    Medication management    H/O tubal ligation      Status post placement of implantable loop recorder  left chest-     History of vascular access device  s/p insertion right chest permacath 2019, removal 2019, insertion left chest permacath 2019    S/P arteriovenous (AV) fistula creation  left  arm 2019    MEDICATIONS  (STANDING):  buPROPion XL (24-Hour) . 150 milliGRAM(s) Oral daily  cinacalcet 60 milliGRAM(s) Oral daily  cycloSPORINE  (SandIMMUNE) 150 milliGRAM(s) Oral <User Schedule>  cycloSPORINE  , modified (NEORAL) 200 milliGRAM(s) Oral at bedtime  diltiazem    milliGRAM(s) Oral daily  gabapentin 300 milliGRAM(s) Oral two times a day  lidocaine   4% Patch 1 Patch Transdermal daily  lidocaine   4% Patch 1 Patch Transdermal daily  mirtazapine 7.5 milliGRAM(s) Oral at bedtime  montelukast 10 milliGRAM(s) Oral at bedtime  oxyCODONE  ER Tablet 10 milliGRAM(s) Oral every 12 hours  pantoprazole    Tablet 40 milliGRAM(s) Oral before breakfast  polyethylene glycol 3350 17 Gram(s) Oral at bedtime  senna 2 Tablet(s) Oral at bedtime  sertraline 50 milliGRAM(s) Oral daily  sevelamer carbonate 800 milliGRAM(s) Oral three times a day with meals  simethicone 80 milliGRAM(s) Chew three times a day  traZODone 100 milliGRAM(s) Oral at bedtime    MEDICATIONS  (PRN):  acetaminophen     Tablet .. 650 milliGRAM(s) Oral every 6 hours PRN Temp greater or equal to 38C (100.4F), Mild Pain (1 - 3)  melatonin 3 milliGRAM(s) Oral at bedtime PRN Insomnia  midodrine. 5 milliGRAM(s) Oral every 8 hours PRN 30 minutes prior to dialysis  ondansetron Injectable 4 milliGRAM(s) IV Push every 8 hours PRN Nausea and/or Vomiting  oxyCODONE    IR 7.5 milliGRAM(s) Oral every 6 hours PRN Severe Pain (7 - 10)      Allergies    latex (Rash)  penicillin (Nausea)  strawberry (Rash)    Intolerances    caffeine (Nausea)      REVIEW OF SYSTEMS  General: see above  Skin/Breast: denies breast pain  Respiratory and Thorax: denies shortness of breath, denies cough  Cardiovascular: denies chest pain and denies palpitations  Gastrointestinal: see above	  Genitourinary: denies dysuria, increased urinary frequency, urgency	  Constitutional, Cardiovascular, Respiratory, Gastrointestinal, Genitourinary, Musculoskeletal and Integumentary review of systems are normal except as noted. 	      Vital Signs Last 24 Hrs  T(C): 37.1 (20 Mar 2022 05:14), Max: 37.3 (19 Mar 2022 17:40)  T(F): 98.8 (20 Mar 2022 05:14), Max: 99.1 (19 Mar 2022 17:40)  HR: 76 (20 Mar 2022 05:14) (72 - 80)  BP: 147/69 (20 Mar 2022 05:14) (105/51 - 156/80)  BP(mean): --  RR: 19 (20 Mar 2022 05:14) (16 - 20)  SpO2: 99% (20 Mar 2022 05:14) (99% - 100%)        PHYSICAL EXAM:   Gen: intermittently tearful, NAD  Psych: appropriate mood & affect  CV: RRR  Pulm: CTAB  Abd: Soft, ND, tender in b/l LQ, pannus wound dressed with dressing clean, dry and intact   Ext: Warm & well perfused. Skin lesions noted ?2/2 healed pressure ulcer, mild b/l edema (non-pitting)  Pelvic: chaperoned by vj jones*  Adult diaper <40% saturated (not changed all day, seen in early afternoon). Normal external genitalia, urethra, clitoris, and anus. Normal labia bilaterally. Normal pattern of hair growth along pubic area. Normal-appearing skin, no lesion, no masses.  Spec Exam: Dark brown blood in vault with light bleeding from os. Normal vaginal epithelium, normal appearing cervix. No lesions or masses appreciated on the cervix. No lesions or masses noted along the vaginal epithelium. Normal vaginal discharge.      LABS:                        7.5    12.22 )-----------( 479      ( 19 Mar 2022 20:17 )             26.6     03-19    138  |  99  |  61<H>  ----------------------------<  142<H>  5.5<H>   |  22  |  6.03<H>    Ca    10.2      19 Mar 2022 20:17  Mg     2.40         TPro  6.8  /  Alb  2.8<L>  /  TBili  0.2  /  DBili  x   /  AST  44<H>  /  ALT  6   /  AlkPhos  157<H>      PT/INR - ( 19 Mar 2022 20:17 )   PT: 14.2 sec;   INR: 1.22 ratio         PTT - ( 19 Mar 2022 20:17 )  PTT:41.2 sec      Blood Type: O Positive        RADIOLOGY & ADDITIONAL STUDIES:    < from: CT Abdomen and Pelvis w/ IV Cont (22 @ 00:26) >  ACC: [id number] EXAM:  CT ABDOMEN AND PELVIS IC                          PROCEDURE DATE:  2022          INTERPRETATION:  CLINICAL INFORMATION: Heavy vaginal bleeding for 1.5   weeks, and right pannus wound.    COMPARISON: CT abdomen pelvis 2022.    CONTRAST/COMPLICATIONS:  IV Contrast: Omnipaque 350  95 cc administered   5 cc discarded  Oral Contrast: NONE  Complications: None reported at time of study completion    PROCEDURE:  CT of the Abdomen and Pelvis was performed.  Sagittal and coronal reformats were performed.    FINDINGS:  LOWER CHEST: Bilateral subsegmental atelectasis, left greater than right.   Mild cardiomegaly.    LIVER: Within normal limits.  BILE DUCTS: Normal caliber.  GALLBLADDER: Cholelithiasis. Mildly distended gallbladder. No   pericholecystic inflammation.  SPLEEN: Within normal limits.  PANCREAS: Within normal limits.  ADRENALS: Within normal limits.  KIDNEYS/URETERS: Bilateral subcentimeter hypodense lesions too small to   characterize. Bilateral small nonobstructing renal calculi.    BLADDER: Incompletely distended.  REPRODUCTIVE ORGANS: Enlarged myomatous uterus. Suspected endometrial   thickening up to 1 cm.    BOWEL: No bowel obstruction. Appendix is normal. Trace colonic   diverticulosis.  PERITONEUM: No ascites.  VESSELS: Atherosclerotic changes.  RETROPERITONEUM/LYMPH NODES: No lymphadenopathy.  ABDOMINAL WALL: Fat-containing umbilical hernia. Incomplete visualization   of the pannus. Large skin defect involving the inferior pannus (example  2, 140) with adjacent subcutaneous stranding and overlying skin   thickening. No abscess identified. Adjacent tiny foci of air appear to be   within skin folds on coronal and sagittal images.  BONES: Degenerative changes. Diffuse bony sclerosis again noted.    IMPRESSION:    1. Incompletely imaged large skin defect involving the inferior pannus   with subcutaneous stranding and skin thickening compatible with   cellulitis. No abscess identified.  2. Enlarged myomatous uterus. Suspected endometrial thickening.   Correlation with pelvic ultrasound or MRI is recommended.    --- End of Report ---          NOY VALADEZ MD; Resident Radiologist  This document has been electronically signed.  KENNY BARILLAS MD; Attending Radiologist  This document has been electronically signed. Mar 20 2022  2:17AM    < end of copied text >

## 2022-03-20 NOTE — H&P ADULT - NSHPPHYSICALEXAM_GEN_ALL_CORE
Vital Signs Last 24 Hrs  T(C): 37.1 (20 Mar 2022 05:14), Max: 37.3 (19 Mar 2022 17:40)  T(F): 98.8 (20 Mar 2022 05:14), Max: 99.1 (19 Mar 2022 17:40)  HR: 76 (20 Mar 2022 05:14) (72 - 80)  BP: 147/69 (20 Mar 2022 05:14) (105/51 - 156/80)  BP(mean): --  RR: 19 (20 Mar 2022 05:14) (16 - 20)  SpO2: 99% (20 Mar 2022 05:14) (99% - 100%)

## 2022-03-20 NOTE — CONSULT NOTE ADULT - SUBJECTIVE AND OBJECTIVE BOX
CARDIOLOGY CONSULT NOTE - DR. JAQUEZ    HPI:  56F ESRD TTS, HTN, DM, COPD, afib, known chronic R pannus wound felt most likely pyoderma gangrenosum presents for 1.5wk vag bleeding. Pt recently had prolonged stay for pannus wound and dc 3/3 to SNF. Per pt she has had issues since, developed bed sores & not eating bec food is poor. For last ~12d has been having what she thought was menstrual bleeding, passing clots & going through ~6-8 pads/day initially though it is now improving. Now no longer passing clots but still going through ~4-5 pads so came to ED. Thinks she feels a little more fatigued than usual but she's largely bed-bound. Also endorsing some inc abd periumbilical pain and her chronic pannus wound pain. No fever, CP, SOB, NV. Supposed to go to HD Saturday but missed.    being examined by gyn      PAST MEDICAL & SURGICAL HISTORY:  COPD (chronic obstructive pulmonary disease)    DM (diabetes mellitus)    Atrial fibrillation  with loop recorder , battery most likely depleted, as per cardiac clearance, Dr. Reece Anesthesia aware, pt on Eliquis    HTN (hypertension)    Morbid obesity  BMI - 58.3    Chronic GERD    CHAMP (obstructive sleep apnea)  non compliance with CPAP, Anesthesia Dr. Reece aware, pt told to bring CPAP for sx, pr verbalized understanding    Potential difficult airway on pre-intubation assessment  airway class III, large neck, morbid obesity, hx of CHAMP, no compliance with CPAP- Dr. Reece, Anesthesia aware    End-stage renal disease    Anemia    Medication management    H/O tubal ligation      Status post placement of implantable loop recorder  left chest-     History of vascular access device  s/p insertion right chest permacath 2019, removal 2019, insertion left chest permacath 2019    S/P arteriovenous (AV) fistula creation  left  arm 2019          PREVIOUS DIAGNOSTIC TESTING:    [ ] Echocardiogram:  [ ]  Catheterization:  [ ] Stress Test:  	    MEDICATIONS:    Home Medications:  Aspercreme with Lidocaine 4% topical film: Apply topically to affected area once a day (low back) (20 Mar 2022 10:15)  Aspercreme with Lidocaine 4% topical film: Apply topically to affected area once a day (L knee) (20 Mar 2022 10:15)  aspirin 81 mg oral delayed release tablet: 1 tab(s) orally once a day (20 Mar 2022 10:15)  buPROPion 150 mg/24 hours (XL) oral tablet, extended release: 1 tab(s) orally once a day (in the morning) (20 Mar 2022 10:15)  cycloSPORINE modified 100 mg oral capsule: 2 cap(s) orally once a day (at bedtime) (20 Mar 2022 10:15)  cycloSPORINE modified 50 mg oral capsule: 3 cap(s) orally once a day in the morning  (20 Mar 2022 10:15)  dilTIAZem 120 mg/24 hours oral capsule, extended release: 1 cap(s) orally once a day (hold SBP&lt;110, HR&lt;60) (20 Mar 2022 10:15)  doxycycline hyclate 100 mg oral tablet: 1 tab(s) orally 2 times a day (20 Mar 2022 10:15)  Eliquis 2.5 mg oral tablet: 1 tab(s) orally 2 times a day    Pharmacy states patient no longer wants to fill this medication (20 Mar 2022 10:15)  gabapentin 300 mg oral capsule: 1 cap(s) orally 2 times a day (20 Mar 2022 10:15)  insulin glargine: 15 unit(s) subcutaneous once a day (at bedtime) (20 Mar 2022 10:15)  midodrine 5 mg oral tablet: 1 tab(s) orally 3 times a week, 30 minute prior to dialysis sessions (hold is SBP&gt;130) (20 Mar 2022 10:15)  mirtazapine 7.5 mg oral tablet: 1 tab(s) orally once a day (at bedtime) (20 Mar 2022 10:15)  montelukast 10 mg oral tablet: 1 tab(s) orally once a day (in the evening) (20 Mar 2022 10:15)  oxyCODONE 10 mg oral tablet, extended release: 1 tab(s) orally every 12 hours (20 Mar 2022 10:15)  oxycodone-acetaminophen 7.5 mg-325 mg oral tablet: 1 tab(s) orally every 6 hours, As Needed (20 Mar 2022 10:15)  pantoprazole 40 mg oral delayed release tablet: 1 tab(s) orally once a day (20 Mar 2022 10:15)  polyethylene glycol 3350 oral powder for reconstitution: 17 gram(s) orally once a day (at bedtime) (20 Mar 2022 10:15)  senna oral tablet: 2 tab(s) orally once a day (at bedtime) (20 Mar 2022 10:15)  sertraline 50 mg oral tablet: 1 tab(s) orally once a day (20 Mar 2022 10:15)  sevelamer carbonate 800 mg oral tablet: 1 tab(s) orally 3 times a day (with meals) (20 Mar 2022 10:15)  simethicone 80 mg oral tablet, chewable: 1 tab(s) orally 3 times a day (before meals) (20 Mar 2022 10:15)  simvastatin 10 mg oral tablet: 1 tab(s) orally once a day (at bedtime) (20 Mar 2022 10:15)  sodium hypochlorite 0.25% topical solution: 1 application topically once a day (20 Mar 2022 10:15)  tacrolimus 0.1% topical ointment: 1 application topically once a day (20 Mar 2022 10:15)  traZODone 100 mg oral tablet: 1 tab(s) orally once a day (at bedtime) (20 Mar 2022 10:15)      MEDICATIONS  (STANDING):  buPROPion XL (24-Hour) . 150 milliGRAM(s) Oral daily  cinacalcet 60 milliGRAM(s) Oral daily  cycloSPORINE  , modified (NEORAL) 200 milliGRAM(s) Oral at bedtime  dextrose 40% Gel 15 Gram(s) Oral once  dextrose 5%. 1000 milliLiter(s) (50 mL/Hr) IV Continuous <Continuous>  dextrose 5%. 1000 milliLiter(s) (100 mL/Hr) IV Continuous <Continuous>  dextrose 50% Injectable 25 Gram(s) IV Push once  dextrose 50% Injectable 12.5 Gram(s) IV Push once  dextrose 50% Injectable 25 Gram(s) IV Push once  diltiazem    milliGRAM(s) Oral daily  epoetin anabela-epbx (RETACRIT) Injectable 78820 Unit(s) IV Push <User Schedule>  gabapentin 300 milliGRAM(s) Oral two times a day  glucagon  Injectable 1 milliGRAM(s) IntraMuscular once  insulin glargine Injectable (LANTUS) 10 Unit(s) SubCutaneous at bedtime  insulin lispro (ADMELOG) corrective regimen sliding scale   SubCutaneous three times a day before meals  insulin lispro (ADMELOG) corrective regimen sliding scale   SubCutaneous at bedtime  lidocaine   4% Patch 1 Patch Transdermal daily  lidocaine   4% Patch 1 Patch Transdermal daily  mirtazapine 7.5 milliGRAM(s) Oral at bedtime  montelukast 10 milliGRAM(s) Oral at bedtime  oxyCODONE  ER Tablet 10 milliGRAM(s) Oral every 12 hours  pantoprazole    Tablet 40 milliGRAM(s) Oral before breakfast  polyethylene glycol 3350 17 Gram(s) Oral at bedtime  senna 2 Tablet(s) Oral at bedtime  sertraline 50 milliGRAM(s) Oral daily  sevelamer carbonate 800 milliGRAM(s) Oral three times a day with meals  simethicone 80 milliGRAM(s) Chew three times a day  traZODone 100 milliGRAM(s) Oral at bedtime      FAMILY HISTORY:  Family history of diabetes mellitus  mother-     Family hx of hypertension  mother-         SOCIAL HISTORY:    [ ] Non-smoker  [ ] Smoker  [ ] Alcohol    Allergies    latex (Rash)  penicillin (Nausea)  strawberry (Rash)    Intolerances    caffeine (Nausea)  	    REVIEW OF SYSTEMS:  CONSTITUTIONAL: No fever, weight loss, or fatigue  EYES: No eye pain, visual disturbances, or discharge  ENMT:  No difficulty hearing, tinnitus, vertigo; No sinus or throat pain  NECK: No pain or stiffness  RESPIRATORY: No cough, wheezing, chills or hemoptysis; No Shortness of Breath  CARDIOVASCULAR: as HPI  GASTROINTESTINAL: No abdominal or epigastric pain. No nausea, vomiting, or hematemesis; No diarrhea or constipation. No melena or hematochezia.abd wound  GENITOURINARY: No dysuria, frequency, hematuria, or incontinence  va bleeding   NEUROLOGICAL: No headaches, memory loss, loss of strength, numbness, or tremors  SKIN: No itching, burning, rashes, or lesions   	  [ ] All others negative	  [ ] Unable to obtain    PHYSICAL EXAM:    T(C): 37.1 (22 @ 05:14), Max: 37.3 (22 @ 17:40)  HR: 76 (22 @ 05:14) (72 - 80)  BP: 147/69 (22 @ 05:14) (105/51 - 156/80)  RR: 19 (22 @ 05:14) (16 - 20)  SpO2: 99% (22 @ 05:14) (99% - 100%)  Wt(kg): --  I&O's Summary    Daily Height in cm: 157.48 (19 Mar 2022 17:40)    Daily     Appearance: Normal	  Psychiatry: A & O x 3, Mood & affect appropriate  HEENT:   Normal oral mucosa, PERRL, EOMI	  Lymphatic: No lymphadenopathy  Cardiovascular: Normal S1 S2,RRR, No JVD, No murmurs  Respiratory: Lungs clear to auscultation	  Gastrointestinal:  Soft, Non-tender, + BS	  Skin: No rashes, No ecchymoses, No cyanosis	  Neurologic: Non-focal  Extremities: Normal range of motion, No clubbing, cyanosis or edema  Vascular: Peripheral pulses palpable 2+ bilaterally    TELEMETRY: 	    ECG:  	can not find ecg in chart   RADIOLOGY:  OTHER: 	  	  LABS:	 	    CARDIAC MARKERS:        proBNP:     Lipid Profile:   HgA1c:   TSH:                           7.6    12.21 )-----------( 500      ( 20 Mar 2022 12:48 )             28.0         135  |  95<L>  |  71<H>  ----------------------------<  145<H>  4.3   |  25  |  6.85<H>    Ca    10.4      20 Mar 2022 12:48  Phos  5.0       Mg     2.70         TPro  7.1  /  Alb  3.4  /  TBili  0.3  /  DBili  x   /  AST  9   /  ALT  <5  /  AlkPhos  170<H>      PT/INR - ( 19 Mar 2022 20:17 )   PT: 14.2 sec;   INR: 1.22 ratio         PTT - ( 19 Mar 2022 20:17 )  PTT:41.2 sec    Creatinine, Serum: 6.85 mg/dL (22 @ 12:48)  Creatinine, Serum: 6.03 mg/dL (22 @ 20:17)        ASSESSMENT/PLAN:

## 2022-03-20 NOTE — CONSULT NOTE ADULT - ASSESSMENT
Complicated 57 yo female with T2DM, on HD for ESRD, adm for pannus wound.  Pt is normally on much higher doses of insulin at home.   BS here in 100s on Lantus 10 and correction  A1c 6.1 but may not be fully reliable in ESRD  Recommend: continue Lantus 10 units, increase as needed and add premeal if BS rise.

## 2022-03-20 NOTE — H&P ADULT - HISTORY OF PRESENT ILLNESS
57yo F with PMH of ESRD TTS, HTN, DM, COPD, afib no AC, known chronic R pannus wound presents for 1.5wk vag bleeding. Pt recently had prolonged stay for pannus wound and dc 3/3 to SNF. Per pt she has had issues since, developed bed sores & not eating bc food is poor. For last ~12d has been having what she thought was menstrual bleeding, passing clots & going through ~6-8 pads/day initially though it is now improving. Now no longer passing clots but still going through ~4-5 pads so came to ED. Thinks she feels a little more fatigued than usual but she's largely bed-bound. Also endorsing some inc abd periumbilical pain and her chronic pannus wound pain. No fever, CP, SOB, NV. Supposed to go to HD today but missed.    ED: 118/56, 80, 18, 99.1, 99RA,  56F ESRD TTS, HTN, DM, COPD, afib, known chronic R pannus wound felt most likely pyoderma gangrenosum presents for 1.5wk vag bleeding. Pt recently had prolonged stay for pannus wound and dc 3/3 to SNF. Per pt she has had issues since, developed bed sores & not eating bc food is poor. For last ~12d has been having what she thought was menstrual bleeding, passing clots & going through ~6-8 pads/day initially though it is now improving. Now no longer passing clots but still going through ~4-5 pads so came to ED. Thinks she feels a little more fatigued than usual but she's largely bed-bound. Also endorsing some inc abd periumbilical pain and her chronic pannus wound pain. No fever, CP, SOB, NV. Supposed to go to HD today but missed.    ED: 118/56, 80, 18, 99.1, 99RA --> vanco/cefepime  Tearful, overwhelmed with chronic illness. VB not active now, but had severe cramps past day. Denies chest pain, SOB, nausea,  56F ESRD TTS, HTN, DM, COPD, afib, known chronic R pannus wound felt most likely pyoderma gangrenosum presents for 1.5wk vag bleeding. Pt recently had prolonged stay for pannus wound and dc 3/3 to SNF. Per pt she has had issues since, developed bed sores & not eating bec food is poor. For last ~12d has been having what she thought was menstrual bleeding, passing clots & going through ~6-8 pads/day initially though it is now improving. Now no longer passing clots but still going through ~4-5 pads so came to ED. Thinks she feels a little more fatigued than usual but she's largely bed-bound. Also endorsing some inc abd periumbilical pain and her chronic pannus wound pain. No fever, CP, SOB, NV. Supposed to go to HD Saturday but missed.    ED: 118/56, 80, 18, 99.1, 99RA --> vanco/cefepime  Tearful, overwhelmed with chronic illness. VB not active now, but had severe cramps past day. Denies chest pain, SOB, nausea,

## 2022-03-20 NOTE — CONSULT NOTE ADULT - ASSESSMENT
56y Female with history of ESRD on HD presents with vaginal bleeding. Nephrology consulted for ESRD status.    1) ESRD: Last HD 3/17. Pt missed HD on 3/19. No need for urgent HD today. Plan for next HD on 3/21. Monitor electrolytes.    2) HTN with ESRD: BP acceptable on Cardizem. Continue with midodrine pre-HD on HD days to avoid intradialytic hypotension.  Monitor BP.    3) Anemia of renal disease: and due to blood loss. Hb low. Check iron studies including ferritin in am. PRBC tx prn. Continue with Epo 16K with HD. Monitor Hb.    4) Secondary HPT of renal origin: Phosphorus acceptable with high normal serum calcium. Continue with sensipar 60 mg daily and renvela 1 tab with meals. Low calcium bath with HD. Check iPTH. Monitor serum calcium and phosphorus.    [facility] NEPHROLOGY  Keaton Lopez M.D.  Juma Green D.O.  Myla Hoffmann M.D.  Julia Brewer, JASIEL, ANP-C    Telephone: [phone]  Facsimile: [phone]    [address] [place] [address] 56y Female with history of ESRD on HD presents with vaginal bleeding. Nephrology consulted for ESRD status.    1) ESRD: Last HD 3/17. Pt missed HD on 3/19. No need for urgent HD today. Plan for next HD on 3/21 (will hold heparin in HD due to vaginal bleeding). Monitor electrolytes.    2) HTN with ESRD: BP acceptable on Cardizem. Continue with midodrine pre-HD on HD days to avoid intradialytic hypotension.  Monitor BP.    3) Anemia of renal disease: and due to blood loss. Hb low. Check iron studies including ferritin in am. PRBC tx prn. Continue with Epo 16K with HD. Monitor Hb.    4) Secondary HPT of renal origin: Phosphorus acceptable with high normal serum calcium. Continue with sensipar 60 mg daily and renvela 1 tab with meals. Low calcium bath with HD. Check iPTH. Monitor serum calcium and phosphorus.    [facility] NEPHROLOGY  Keaton Lopez M.D.  Juma Green D.O.  Myla Hoffmann M.D.  Julia Brewer, JASIEL, ANP-C    Telephone: [phone]  Facsimile: [phone]    [address] [place] [address]

## 2022-03-20 NOTE — CHART NOTE - NSCHARTNOTEFT_GEN_A_CORE
Patient seen and examined on AM rounds  Lower midline pannus wound with clean borders, pink granulation tissue.  No evidence of active infection, necrosis.  Recommend wound care consult (patient currently follows with Dr. Layne)  May benefit from wound vac  No acute surgery intervention at this time  Please call if any additional questions/concerns    Logan Cantu, PGY5  [phone]

## 2022-03-20 NOTE — PATIENT PROFILE ADULT - FALL HARM RISK - HARM RISK INTERVENTIONS

## 2022-03-20 NOTE — H&P ADULT - NSHPADDITIONALINFOADULT_GEN_ALL_CORE
GOAMANDA addressed - Pt very overwhelmed, very afraid of death, wants to live to see her grandchildren grow - full code.

## 2022-03-20 NOTE — CONSULT NOTE ADULT - ASSESSMENT
Echo 1/19/22: grossly nl LV sys fx, min MR     a/p  56 year old female with hx of morbid obesity, CHAMP not on home O2, ESRD (HD MWF), HTN, DM, COPD, Afib no longer on AC, chronic R pannus wound, followed by Dr. Layne, likely pyoderma gangrenosum presents for vaginal bleeding     #Chronic Pannus Wound  -surgical eval noted, wound care  -abx per med     #Chronic Afib  -stable, rates controlled  -cont dilt as ordered  -eliquis now on hold in setting of poss vaginal bleeding  -cbc stable, gyn eval     #Hypertension  -stable  -cont ccb     #ESRD on HD  -HD per renal    dvt ppx    70 minutes spent on total encounter; more than 50% of the visit was spent counseling and/or coordinating care by the attending physician.

## 2022-03-20 NOTE — H&P ADULT - PROBLEM SELECTOR PLAN 1
last menses October 2021, noted by GYN to be perimenopausal in 2019  CT Enlarged myomatous uterus. Suspected endometrial thickening  - stopping Eliquis and ASA for now, serial CBC with T&S (difficult stick)  - f/u GYN eval

## 2022-03-20 NOTE — CONSULT NOTE ADULT - SUBJECTIVE AND OBJECTIVE BOX
[facility] NEPHROLOGY- CONSULTATION NOTE    56y Female with history of below including ESRD on HD (TTS @ [place] p/w vaginal bleeding. Nephrology consulted for ESRD status.    Patient well known to our practice for h/o ESRD and use to dialyze at [facility] but during recent hospitalization was d/c to [place] with onsite HD.   Last HD 3/17; missed HD on 3/19 @ [place]. Pt denies any SOB or chest pain.   Pt c/o vaginal bleeding with clots for 1.5 weeks with cramping. Pt states her Eliquis was held a few days ago. Pt also report decreased PO intake since she dislikes the food at rehab.   Pt denies any n/v/d or abd pain. RLQ abd wound assessed by Surgery and healing well. Pt was taken off sodium thiosulfate due to low suspicion of calciphylaxis as per Derm    REVIEW OF SYSTEMS:  Gen: no changes in weight  HEENT: no rhinorrhea  Neck: no sore throat  Cards: no chest pain  Resp: no dyspnea  GI: no nausea,  vomiting and diarrhea  : no dysuria or hematuria  +vaginal bleeding with cramps; no further clots  Vascular: no LE edema  Derm: no rashes  Neuro: no numbness/tingling    latex (Rash)  penicillin (Nausea)      Home Medications Reviewed  Hospital Medications:   MEDICATIONS  (STANDING):      PAST MEDICAL & SURGICAL HISTORY:  COPD (chronic obstructive pulmonary disease)    DM (diabetes mellitus)    Atrial fibrillation  with loop recorder , battery most likely depleted, as per cardiac clearance, Dr. Reece Anesthesia aware, pt on Eliquis    HTN (hypertension)    Morbid obesity  BMI - 58.3    Chronic GERD    CHAMP (obstructive sleep apnea)  non compliance with CPAP, Anesthesia Dr. Reece aware, pt told to bring CPAP for sx, pr verbalized understanding    Potential difficult airway on pre-intubation assessment  airway class III, large neck, morbid obesity, hx of CHAMP, no compliance with CPAP- Dr. Reece, Anesthesia aware    End-stage renal disease    Anemia    H/O tubal ligation      Status post placement of implantable loop recorder  left chest-     History of vascular access device  s/p insertion right chest permacath 2019, removal 2019, insertion left chest permacath 2019    S/P arteriovenous (AV) fistula creation  left  arm 2019        FAMILY HISTORY:  Family history of diabetes mellitus  mother-     Family hx of hypertension  mother-         SOCIAL HISTORY:  Denies toxic substance use     VITALS:  T(F): 98.8 (22 @ 05:14), Max: 99.1 (22 @ 17:40)  HR: 76 (22 @ 05:14)  BP: 147/69 (22 @ 05:14)  RR: 19 (22 @ 05:14)  SpO2: 99% (22 @ 05:14)  Wt(kg): --    Height (cm): 157.5 ( @ 17:40)      PHYSICAL EXAM:  Gen: NAD, calm/ Obese  HEENT: MMM  Neck: no JVD  Cards: RRR, +S1/S2, no M/G/R  Resp: CTA B/L  GI: soft, + ab wound with dressing in place  : no CVA tenderness  Vascular: no LE edema B/L, LUE AVF + bruit/thrill  Derm: no rashes  Neuro: non-focal    LABS:      135  |  95<L>  |  71<H>  ----------------------------<  145<H>  4.3   |  25  |  6.85<H>    Ca    10.4      20 Mar 2022 12:48  Phos  5.0       Mg     2.70         TPro  7.1  /  Alb  3.4  /  TBili  0.3  /  DBili      /  AST  9   /  ALT  <5  /  AlkPhos  170<H>      Creatinine Trend: 6.85 <--, 6.03 <--                        7.6    12.21 )-----------( 500      ( 20 Mar 2022 12:48 )             28.0     Urine Studies:    < from: CT Abdomen and Pelvis w/ IV Cont (22 @ 00:26) >    ACC: [id number] EXAM:  CT ABDOMEN AND PELVIS IC                          PROCEDURE DATE:  2022        < end of copied text >    < from: CT Abdomen and Pelvis w/ IV Cont (22 @ 00:26) >    IMPRESSION:    1. Incompletely imaged large skin defect involving the inferior pannus   with subcutaneous stranding and skin thickening compatible with   cellulitis. No abscess identified.  2. Enlarged myomatous uterus. Suspected endometrial thickening.   Correlation with pelvic ultrasound or MRI is recommended.    --- End of Report ---    < end of copied text >

## 2022-03-20 NOTE — H&P ADULT - NSHPLABSRESULTS_GEN_ALL_CORE
7.5    12.22 )-----------( 479      ( 19 Mar 2022 20:17 )             26.6     03-19    138  |  99  |  61<H>  ----------------------------<  142<H>  5.5<H>   |  22  |  6.03<H>    Ca    10.2      19 Mar 2022 20:17  Mg     2.40     03-19    TPro  6.8  /  Alb  2.8<L>  /  TBili  0.2  /  DBili  x   /  AST  44<H>  /  ALT  6   /  AlkPhos  157<H>  03-19    LIVER FUNCTIONS - ( 19 Mar 2022 20:17 )  Alb: 2.8 g/dL / Pro: 6.8 g/dL / ALK PHOS: 157 U/L / ALT: 6 U/L / AST: 44 U/L / GGT: x             PT/INR - ( 19 Mar 2022 20:17 )   PT: 14.2 sec;   INR: 1.22 ratio    PTT - ( 19 Mar 2022 20:17 )  PTT:41.2 sec    Radiology:  < from: CT Abdomen and Pelvis w/ IV Cont (03.20.22 @ 00:26) >    LOWER CHEST: Bilateral subsegmental atelectasis, left greater than right.   Mild cardiomegaly.    LIVER: Within normal limits.  BILE DUCTS: Normal caliber.  GALLBLADDER: Cholelithiasis. Mildly distended gallbladder. No   pericholecystic inflammation.  SPLEEN: Within normal limits.  PANCREAS: Within normal limits.  ADRENALS: Within normal limits.  KIDNEYS/URETERS: Bilateral subcentimeter hypodense lesions too small to   characterize. Bilateral small nonobstructing renal calculi.    BLADDER: Incompletely distended.  REPRODUCTIVE ORGANS: Enlarged myomatous uterus. Suspected endometrial   thickening up to 1 cm.    BOWEL: No bowel obstruction. Appendix is normal. Trace colonic   diverticulosis.  PERITONEUM: No ascites.  VESSELS: Atherosclerotic changes.  RETROPERITONEUM/LYMPH NODES: No lymphadenopathy.  ABDOMINAL WALL: Fat-containing umbilical hernia. Incomplete visualization   of the pannus. Large skin defect involving the inferior pannus (example  2, 140) with adjacent subcutaneous stranding and overlying skin   thickening. No abscess identified. Adjacent tiny foci of air appear to be   within skin folds on coronal and sagittal images.  BONES: Degenerative changes. Diffuse bony sclerosis again noted.    IMPRESSION:    1. Incompletely imaged large skin defect involving the inferior pannus   with subcutaneous stranding and skin thickening compatible with   cellulitis. No abscess identified.  2. Enlarged myomatous uterus. Suspected endometrial thickening.   Correlation with pelvic ultrasound or MRI is recommended.

## 2022-03-20 NOTE — H&P ADULT - PROBLEM SELECTOR PLAN 3
as per previous derm note: "favor Pyoderma Gangrenosum based on clinical appearance of deep ulceration with extensive undermining and worsening when trial off of steroids, now with gradual improvement since initiation of cyclosporine since 2/8 (as evidenced by slight decrease in size and evidence of re-epithelialization). PG is an inflammatory, noninfectious, ulcerative neutrophilic skin, that are hallmarked by early pustules that ultimately give rise to ulcers with an undermined, violaceous border. PG is associated with a number of systemic illnesses (classically inflammatory bowel disease, hematologic malignancy, arthritis, etc), none of which are seen in our patient"  - now with VB, GYN eval pending (r/o malignancy)  - c/w cyclosporine 150am, 200pm  - appreciate surgery input - does not seem wound acutely infected - will monitor off abx, WC f/u in am  - order WC as suggested last visit, pharmacy is ordering topical tacrolimus...  - pain control - c/w oxycontin 10 bid, oxy IR 7.5 q6 PRN breakthrough  - team to call derm in am....

## 2022-03-20 NOTE — H&P ADULT - PROBLEM SELECTOR PLAN 4
appreciate renal input, d/w Dr. Hoffmann - will retry to get labs via arterial stick, K5.5 yesterday was hemolyzed, last HD Thursday, will plan for HD tomorrow  - midodrine PRN pre HD  - renal diet  - c/w phoslo; cinacalcet restarted

## 2022-03-20 NOTE — CONSULT NOTE ADULT - ASSESSMENT
ASSESSMENT:  55yo F with PMH of ESRD TTS, HTN, DM, COPD, afib no AC, known chronic R pannus wound presents for 1.5wk vag bleeding. Surgery consulted for Pannus Wound which appears to have cellulitis per CT abdomen read.    PLAN:    - To be discussed with Dr. Gaudencio Garcia    [id number] ASSESSMENT:  57yo F with PMH of ESRD TTS, HTN, DM, COPD, afib no AC, known chronic R pannus wound presents for 1.5wk vag bleeding. Surgery consulted for Pannus Wound which appears to have cellulitis per CT abdomen read.    PLAN:    - No acute indication for surgical intervention or admission  - Recommend wound care consult  - Antibiotics for cellulitis  - Medicine admission  - Will follow  - Discussed with Dr. Gaudencio Garcia    [id number]

## 2022-03-21 NOTE — PROGRESS NOTE ADULT - ASSESSMENT
56y Female with history of ESRD on HD presents with vaginal bleeding. Nephrology consulted for ESRD status.    1) ESRD: Last HD on 3/17. Plan for next maintenance HD today (will hold heparin with HD due to vaginal bleeding). Monitor electrolytes.    2) HTN with ESRD: BP acceptable on Cardizem. Continue with midodrine pre-HD on HD days to avoid intradialytic hypotension. Monitor BP.    3) Anemia of renal disease: With component of blood loss from vaginal bleeding. Hb low. Continue with Epo 16K with HD. F/U iron stores. PRBC as needed. Monitor Hb.    4) Secondary HPT of renal origin: Phosphorus acceptable with high normal serum calcium. Continue with sensipar 60 mg daily and renvela 1 tab with meals. Low calcium bath with HD. Follow up iPTH. Monitor serum calcium and phosphorus.    [facility] NEPHROLOG[facility]  Keaton Lopez M.D.  Juma Green D.O.  Myla Hoffmann M.D.  Julia Brewer, JASIEL, ANP-C    Telephone: [phone]  Facsimile: [phone]    [address] [place] [address]

## 2022-03-21 NOTE — PROGRESS NOTE ADULT - SUBJECTIVE AND OBJECTIVE BOX
SUBJECTIVE / OVERNIGHT EVENTS:pt seen and examined  03-21-22     MEDICATIONS  (STANDING):  buPROPion XL (24-Hour) . 150 milliGRAM(s) Oral daily  cinacalcet 60 milliGRAM(s) Oral daily  cycloSPORINE  , modified (NEORAL) 200 milliGRAM(s) Oral at bedtime  cycloSPORINE  , modified (NEORAL) 150 milliGRAM(s) Oral <User Schedule>  dextrose 40% Gel 15 Gram(s) Oral once  dextrose 5%. 1000 milliLiter(s) (50 mL/Hr) IV Continuous <Continuous>  dextrose 5%. 1000 milliLiter(s) (100 mL/Hr) IV Continuous <Continuous>  dextrose 50% Injectable 25 Gram(s) IV Push once  dextrose 50% Injectable 12.5 Gram(s) IV Push once  dextrose 50% Injectable 25 Gram(s) IV Push once  diltiazem    milliGRAM(s) Oral daily  epoetin anabela-epbx (RETACRIT) Injectable 82487 Unit(s) IV Push <User Schedule>  gabapentin 300 milliGRAM(s) Oral two times a day  glucagon  Injectable 1 milliGRAM(s) IntraMuscular once  insulin glargine Injectable (LANTUS) 10 Unit(s) SubCutaneous at bedtime  insulin lispro (ADMELOG) corrective regimen sliding scale   SubCutaneous three times a day before meals  insulin lispro (ADMELOG) corrective regimen sliding scale   SubCutaneous at bedtime  lidocaine   4% Patch 1 Patch Transdermal daily  lidocaine   4% Patch 1 Patch Transdermal daily  mirtazapine 7.5 milliGRAM(s) Oral at bedtime  montelukast 10 milliGRAM(s) Oral at bedtime  oxyCODONE  ER Tablet 10 milliGRAM(s) Oral every 12 hours  pantoprazole    Tablet 40 milliGRAM(s) Oral before breakfast  polyethylene glycol 3350 17 Gram(s) Oral at bedtime  senna 2 Tablet(s) Oral at bedtime  sertraline 50 milliGRAM(s) Oral daily  sevelamer carbonate 800 milliGRAM(s) Oral three times a day with meals  simethicone 80 milliGRAM(s) Chew three times a day  tacrolimus   0.1% Ointment 1 Application(s) Topical daily  traZODone 100 milliGRAM(s) Oral at bedtime    MEDICATIONS  (PRN):  acetaminophen     Tablet .. 650 milliGRAM(s) Oral every 6 hours PRN Temp greater or equal to 38C (100.4F), Mild Pain (1 - 3)  melatonin 3 milliGRAM(s) Oral at bedtime PRN Insomnia  midodrine. 5 milliGRAM(s) Oral <User Schedule> PRN 30 minutes prior to dialysis  ondansetron Injectable 4 milliGRAM(s) IV Push every 8 hours PRN Nausea and/or Vomiting  oxyCODONE    IR 7.5 milliGRAM(s) Oral every 6 hours PRN Severe Pain (7 - 10)    T(C): 36.7 (03-21-22 @ 19:39), Max: 36.8 (03-21-22 @ 09:33)  HR: 80 (03-21-22 @ 19:39) (70 - 80)  BP: 107/69 (03-21-22 @ 19:39) (107/69 - 129/70)  RR: 18 (03-21-22 @ 19:39) (18 - 20)  SpO2: 100% (03-21-22 @ 15:39) (99% - 100%)    CAPILLARY BLOOD GLUCOSE      POCT Blood Glucose.: 135 mg/dL (21 Mar 2022 21:20)  POCT Blood Glucose.: 103 mg/dL (21 Mar 2022 20:03)  POCT Blood Glucose.: 111 mg/dL (21 Mar 2022 18:44)  POCT Blood Glucose.: 117 mg/dL (21 Mar 2022 15:31)  POCT Blood Glucose.: 113 mg/dL (21 Mar 2022 12:51)  POCT Blood Glucose.: 124 mg/dL (21 Mar 2022 08:51)  POCT Blood Glucose.: 152 mg/dL (20 Mar 2022 22:10)    I&O's Summary      Constitutional: No fever, fatigue  Skin: No rash.  Eyes: No recent vision problems or eye pain.  ENT: No congestion, ear pain, or sore throat.  Cardiovascular: No chest pain or palpation.  Respiratory: No cough, shortness of breath, congestion, or wheezing.  Gastrointestinal: No abdominal pain, nausea, vomiting, or diarrhea.  Genitourinary: No dysuria.  Musculoskeletal: No joint swelling.  Neurologic: No headache.    PHYSICAL EXAM:  GENERAL: NAD  EYES: EOMI, PERRLA  NECK: Supple, No JVD  CHEST/LUNG: dec breath sounds at bases  HEART:  S1 , S2 +  ABDOMEN: soft , bs+, wound dressing+  EXTREMITIES:  edema+  NEUROLOGY:alert awake oriented       LABS:                        6.8    12.69 )-----------( 474      ( 21 Mar 2022 17:26 )             24.9     03-21    136  |  96<L>  |  80<H>  ----------------------------<  94  4.4   |  23  |  7.98<H>    Ca    9.0      21 Mar 2022 17:26  Phos  4.9     03-21  Mg     2.50     03-21    TPro  6.3  /  Alb  3.0<L>  /  TBili  0.3  /  DBili  x   /  AST  9   /  ALT  <5<L>  /  AlkPhos  156<H>  03-21    PT/INR - ( 21 Mar 2022 17:26 )   PT: 13.9 sec;   INR: 1.20 ratio         PTT - ( 21 Mar 2022 17:26 )  PTT:39.0 sec          RADIOLOGY & ADDITIONAL TESTS:    Imaging Personally Reviewed:    Consultant(s) Notes Reviewed:      Care Discussed with Consultants/Other Providers:

## 2022-03-21 NOTE — CHART NOTE - NSCHARTNOTEFT_GEN_A_CORE
VBG hgb 7.0, CBC Hgb 6.8.  OK to monitor.  Check CBC in AM.  D/W Dr. Maurice. VBG hgb 7.0, CBC Hgb 6.8.  OK to monitor.  Check CBC in AM. Consider transfusing in next HD.   D/W Dr. Maurice.

## 2022-03-21 NOTE — PROGRESS NOTE ADULT - SUBJECTIVE AND OBJECTIVE BOX
History:  patient see at bedside.  Reports poor appetite.  States she ate very little at rehab as the food was really bad.  Denies n/v.  Drinking nepro shakes.  No hypoglycemia    MEDICATIONS  (STANDING):  buPROPion XL (24-Hour) . 150 milliGRAM(s) Oral daily  cinacalcet 60 milliGRAM(s) Oral daily  cycloSPORINE  , modified (NEORAL) 200 milliGRAM(s) Oral at bedtime  cycloSPORINE  , modified (NEORAL) 150 milliGRAM(s) Oral <User Schedule>  dextrose 40% Gel 15 Gram(s) Oral once  dextrose 5%. 1000 milliLiter(s) (50 mL/Hr) IV Continuous <Continuous>  dextrose 5%. 1000 milliLiter(s) (100 mL/Hr) IV Continuous <Continuous>  dextrose 50% Injectable 25 Gram(s) IV Push once  dextrose 50% Injectable 12.5 Gram(s) IV Push once  dextrose 50% Injectable 25 Gram(s) IV Push once  diltiazem    milliGRAM(s) Oral daily  epoetin anabela-epbx (RETACRIT) Injectable 57937 Unit(s) IV Push <User Schedule>  gabapentin 300 milliGRAM(s) Oral two times a day  glucagon  Injectable 1 milliGRAM(s) IntraMuscular once  insulin glargine Injectable (LANTUS) 10 Unit(s) SubCutaneous at bedtime  insulin lispro (ADMELOG) corrective regimen sliding scale   SubCutaneous three times a day before meals  insulin lispro (ADMELOG) corrective regimen sliding scale   SubCutaneous at bedtime  lidocaine   4% Patch 1 Patch Transdermal daily  lidocaine   4% Patch 1 Patch Transdermal daily  mirtazapine 7.5 milliGRAM(s) Oral at bedtime  montelukast 10 milliGRAM(s) Oral at bedtime  oxyCODONE  ER Tablet 10 milliGRAM(s) Oral every 12 hours  pantoprazole    Tablet 40 milliGRAM(s) Oral before breakfast  polyethylene glycol 3350 17 Gram(s) Oral at bedtime  senna 2 Tablet(s) Oral at bedtime  sertraline 50 milliGRAM(s) Oral daily  sevelamer carbonate 800 milliGRAM(s) Oral three times a day with meals  simethicone 80 milliGRAM(s) Chew three times a day  tacrolimus   0.1% Ointment 1 Application(s) Topical daily  traZODone 100 milliGRAM(s) Oral at bedtime    MEDICATIONS  (PRN):  acetaminophen     Tablet .. 650 milliGRAM(s) Oral every 6 hours PRN Temp greater or equal to 38C (100.4F), Mild Pain (1 - 3)  melatonin 3 milliGRAM(s) Oral at bedtime PRN Insomnia  midodrine. 5 milliGRAM(s) Oral <User Schedule> PRN 30 minutes prior to dialysis  ondansetron Injectable 4 milliGRAM(s) IV Push every 8 hours PRN Nausea and/or Vomiting  oxyCODONE    IR 7.5 milliGRAM(s) Oral every 6 hours PRN Severe Pain (7 - 10)      Allergies    latex (Rash)  penicillin (Nausea)  strawberry (Rash)    Intolerances    caffeine (Nausea)    Review of Systems:  Constitutional: No fever  ALL OTHER SYSTEMS REVIEWED AND NEGATIVE      PHYSICAL EXAM:  VITALS: T(C): 36.7 (03-21-22 @ 19:39)  T(F): 98 (03-21-22 @ 19:39), Max: 98.2 (03-21-22 @ 09:33)  HR: 80 (03-21-22 @ 19:39) (70 - 80)  BP: 107/69 (03-21-22 @ 19:39) (107/69 - 129/70)  RR:  (18 - 20)  SpO2:  (99% - 100%)  Wt(kg): --  GENERAL: NAD, well-developed  GI: Soft, nontender, non distended  SKIN: Dry, intact, No rashes or lesions  PSYCH: Alert and oriented x 3, normal affect, normal mood      CAPILLARY BLOOD GLUCOSE  POCT Blood Glucose.: 103 mg/dL (21 Mar 2022 20:03)  POCT Blood Glucose.: 111 mg/dL (21 Mar 2022 18:44)  POCT Blood Glucose.: 117 mg/dL (21 Mar 2022 15:31)  POCT Blood Glucose.: 113 mg/dL (21 Mar 2022 12:51)  POCT Blood Glucose.: 124 mg/dL (21 Mar 2022 08:51)  POCT Blood Glucose.: 152 mg/dL (20 Mar 2022 22:10)      03-21    136  |  96<L>  |  80<H>  ----------------------------<  94  4.4   |  23  |  7.98<H>    EGFR if : x   EGFR if non : x     Ca    9.0      03-21  Mg     2.50     03-21  Phos  4.9     03-21    TPro  6.3  /  Alb  3.0<L>  /  TBili  0.3  /  DBili  x   /  AST  9   /  ALT  <5<L>  /  AlkPhos  156<H>  03-21  A1C with Estimated Average Glucose (03.20.22 @ 12:48)    A1C with Estimated Average Glucose Result: 6.1%    Estimated Average Glucose: 128

## 2022-03-21 NOTE — PROGRESS NOTE ADULT - SUBJECTIVE AND OBJECTIVE BOX
CARDIOLOGY FOLLOW UP - Dr. Bullock  DATE OF SERVICE: 3/21/22     CC no cp or sob       REVIEW OF SYSTEMS:  CONSTITUTIONAL: No fever, weight loss, or fatigue  RESPIRATORY: No cough, wheezing, chills or hemoptysis; No Shortness of Breath  CARDIOVASCULAR: No chest pain, palpitations, passing out, dizziness, or leg swelling  GASTROINTESTINAL: No abdominal or epigastric pain. No nausea, vomiting, or hematemesis; No diarrhea or constipation. No melena or hematochezia.  VASCULAR: No edema     PHYSICAL EXAM:  T(C): 36.6 (03-21-22 @ 05:15), Max: 36.6 (03-21-22 @ 05:15)  HR: 75 (03-21-22 @ 05:15) (75 - 76)  BP: 123/61 (03-21-22 @ 05:15) (123/61 - 125/66)  RR: 18 (03-21-22 @ 05:15) (18 - 18)  SpO2: 100% (03-21-22 @ 05:15) (100% - 100%)  Wt(kg): --  I&O's Summary      Appearance: Normal	  Cardiovascular: Normal S1 S2,RRR, No JVD, No murmurs  Respiratory: Lungs clear to auscultation	  Gastrointestinal:  Soft, Non-tender, + BS	  Extremities: Normal range of motion, No clubbing, cyanosis or edema      Home Medications:  Aspercreme with Lidocaine 4% topical film: Apply topically to affected area once a day (low back) (20 Mar 2022 10:15)  Aspercreme with Lidocaine 4% topical film: Apply topically to affected area once a day (L knee) (20 Mar 2022 10:15)  aspirin 81 mg oral delayed release tablet: 1 tab(s) orally once a day (20 Mar 2022 10:15)  buPROPion 150 mg/24 hours (XL) oral tablet, extended release: 1 tab(s) orally once a day (in the morning) (20 Mar 2022 10:15)  cycloSPORINE modified 100 mg oral capsule: 2 cap(s) orally once a day (at bedtime) (20 Mar 2022 10:15)  cycloSPORINE modified 50 mg oral capsule: 3 cap(s) orally once a day in the morning  (20 Mar 2022 10:15)  dilTIAZem 120 mg/24 hours oral capsule, extended release: 1 cap(s) orally once a day (hold SBP&lt;110, HR&lt;60) (20 Mar 2022 10:15)  doxycycline hyclate 100 mg oral tablet: 1 tab(s) orally 2 times a day (20 Mar 2022 10:15)  Eliquis 2.5 mg oral tablet: 1 tab(s) orally 2 times a day    Pharmacy states patient no longer wants to fill this medication (20 Mar 2022 10:15)  gabapentin 300 mg oral capsule: 1 cap(s) orally 2 times a day (20 Mar 2022 10:15)  insulin glargine: 15 unit(s) subcutaneous once a day (at bedtime) (20 Mar 2022 10:15)  midodrine 5 mg oral tablet: 1 tab(s) orally 3 times a week, 30 minute prior to dialysis sessions (hold is SBP&gt;130) (20 Mar 2022 10:15)  mirtazapine 7.5 mg oral tablet: 1 tab(s) orally once a day (at bedtime) (20 Mar 2022 10:15)  montelukast 10 mg oral tablet: 1 tab(s) orally once a day (in the evening) (20 Mar 2022 10:15)  oxyCODONE 10 mg oral tablet, extended release: 1 tab(s) orally every 12 hours (20 Mar 2022 10:15)  oxycodone-acetaminophen 7.5 mg-325 mg oral tablet: 1 tab(s) orally every 6 hours, As Needed (20 Mar 2022 10:15)  pantoprazole 40 mg oral delayed release tablet: 1 tab(s) orally once a day (20 Mar 2022 10:15)  polyethylene glycol 3350 oral powder for reconstitution: 17 gram(s) orally once a day (at bedtime) (20 Mar 2022 10:15)  senna oral tablet: 2 tab(s) orally once a day (at bedtime) (20 Mar 2022 10:15)  sertraline 50 mg oral tablet: 1 tab(s) orally once a day (20 Mar 2022 10:15)  sevelamer carbonate 800 mg oral tablet: 1 tab(s) orally 3 times a day (with meals) (20 Mar 2022 10:15)  simethicone 80 mg oral tablet, chewable: 1 tab(s) orally 3 times a day (before meals) (20 Mar 2022 10:15)  simvastatin 10 mg oral tablet: 1 tab(s) orally once a day (at bedtime) (20 Mar 2022 10:15)  sodium hypochlorite 0.25% topical solution: 1 application topically once a day (20 Mar 2022 10:15)  tacrolimus 0.1% topical ointment: 1 application topically once a day (20 Mar 2022 10:15)  traZODone 100 mg oral tablet: 1 tab(s) orally once a day (at bedtime) (20 Mar 2022 10:15)      MEDICATIONS  (STANDING):  buPROPion XL (24-Hour) . 150 milliGRAM(s) Oral daily  cinacalcet 60 milliGRAM(s) Oral daily  cycloSPORINE  , modified (NEORAL) 200 milliGRAM(s) Oral at bedtime  cycloSPORINE  , modified (NEORAL) 150 milliGRAM(s) Oral <User Schedule>  dextrose 40% Gel 15 Gram(s) Oral once  dextrose 5%. 1000 milliLiter(s) (50 mL/Hr) IV Continuous <Continuous>  dextrose 5%. 1000 milliLiter(s) (100 mL/Hr) IV Continuous <Continuous>  dextrose 50% Injectable 25 Gram(s) IV Push once  dextrose 50% Injectable 12.5 Gram(s) IV Push once  dextrose 50% Injectable 25 Gram(s) IV Push once  diltiazem    milliGRAM(s) Oral daily  epoetin anabela-epbx (RETACRIT) Injectable 01266 Unit(s) IV Push <User Schedule>  gabapentin 300 milliGRAM(s) Oral two times a day  glucagon  Injectable 1 milliGRAM(s) IntraMuscular once  insulin glargine Injectable (LANTUS) 10 Unit(s) SubCutaneous at bedtime  insulin lispro (ADMELOG) corrective regimen sliding scale   SubCutaneous three times a day before meals  insulin lispro (ADMELOG) corrective regimen sliding scale   SubCutaneous at bedtime  lidocaine   4% Patch 1 Patch Transdermal daily  lidocaine   4% Patch 1 Patch Transdermal daily  mirtazapine 7.5 milliGRAM(s) Oral at bedtime  montelukast 10 milliGRAM(s) Oral at bedtime  oxyCODONE  ER Tablet 10 milliGRAM(s) Oral every 12 hours  pantoprazole    Tablet 40 milliGRAM(s) Oral before breakfast  polyethylene glycol 3350 17 Gram(s) Oral at bedtime  senna 2 Tablet(s) Oral at bedtime  sertraline 50 milliGRAM(s) Oral daily  sevelamer carbonate 800 milliGRAM(s) Oral three times a day with meals  simethicone 80 milliGRAM(s) Chew three times a day  traZODone 100 milliGRAM(s) Oral at bedtime      TELEMETRY: 	    ECG:  	  RADIOLOGY:   DIAGNOSTIC TESTING:  [ ] Echocardiogram:  [ ]  Catheterization:  [ ] Stress Test:    OTHER: 	    LABS:	 	                            7.6    12.21 )-----------( 500      ( 20 Mar 2022 12:48 )             28.0     03-20    135  |  95<L>  |  71<H>  ----------------------------<  145<H>  4.3   |  25  |  6.85<H>    Ca    10.4      20 Mar 2022 12:48  Phos  5.0     03-20  Mg     2.70     03-20    TPro  7.1  /  Alb  3.4  /  TBili  0.3  /  DBili  x   /  AST  9   /  ALT  <5  /  AlkPhos  170<H>  03-20    PT/INR - ( 19 Mar 2022 20:17 )   PT: 14.2 sec;   INR: 1.22 ratio         PTT - ( 19 Mar 2022 20:17 )  PTT:41.2 sec

## 2022-03-21 NOTE — PHYSICAL THERAPY INITIAL EVALUATION ADULT - PERTINENT HX OF CURRENT PROBLEM, REHAB EVAL
Patient is 56 year old female history of ESRD (TTHS), HTN, DM, COPD, afib, known chronic pannus wound felt most likely pyoderma gangrenosum presents for 1.5wk vaginal bleeding.

## 2022-03-21 NOTE — CONSULT NOTE ADULT - ASSESSMENT
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   Patient followed by derm as well, s/p punch biopsy by Derm.   Derm biopsy non-specific findings, no obvious intravascular calcium deposits although limited sample of subcutaneous tissue. Ct- abdomen "There are no subcutaneous tissue calcifications. Specifically no subcutaneous tissue calcifications in the patient's abdominal wall pannus." Dx with Pyoderma Gangrenosum.   On Cyclosporin, topical tacrolimus. Sodium thiosulfate discontinued per dermatology.   Of note attempted Irrigation VAC 1/26 - 1 /28, noted deterioration of wound with NPWt/VAc therapy, therapy d/c'd, likely pathergy. Will continue to avoid surgical and enzymatic debridement.     Patient seen with Dermatology today.    nutrition 35cal/kg, protien 1.2-1.5g/kg, DM , Obesity management  offload  moisture barrier  compression 2 layer kerlex, ace  DVT prophylaxisis  established/new problem improved, stable , worsened  additional workup  data reviewed lab, tests, records, image  complexity high mod  risk high -  due to dx, complexity data, risk  time 70 min 223    Exam:  Wound dimensions without significant change.   Mild odor remains.   Tissue type with increased granular base, decreased nonviable tissue. New satellite lesion. Re-epithelization at inferior border continues.  Pain and tenderness remains with radiation to back and legs.      Plan:  -Topical dressing: Cleanse with Dakins 1/4 strength. Apply Liquid barrier film to periwound skin. Adaptic touch to wound base for atraumatic dressing application and removal. Apply Topical Tacrolimus 0.1% ointment to wound base, cover with Aquacel AG, and abdominal pad. Change daily.  -Interdry textile sheeting beneath abdominal pannus leaving 2" out at end to wick.  -Agree with Cyclosporin; nephrology not opposed, patient and daughter amenable.   -Glucose control per primary team/endocrinology  -Differ empiric Sodium Thiosulfate to primary team/derm/nephrology, now discontinued dx PG  -Continue to follow nutrition/RD recommendations   -Continue to offload pressure  -DVT prophylaxis    Discussion with patient at bedside with Dermatology, pt expressed fear of death due to current illness and uncontrolled pain, tearful throughout exam. Emotional support and encouragement provided.    Discussed pain regiment with primary team ACP due to concern regarding uncontrolled pain. Plan for ACP to speak with pain management service line. Discussed with RN staff importance of prn pain management. Awaiting dermatology attending' s input regarding risk/benefit Lidocaine patch to area. Recommend Pain management reconsult prior to discharging patient.  Findings and plan discussed with Dermatology, primary team ACP, and Dr. Cleary.    Upon discharge follow up at outpatient [facility] [place]. [address] [place] [address]. [phone].    Will continue to follow while inpatient.  Thank you.    CARMELA William-BC, CW    pager #[phone]    If after 4PM or before 7:30AM on Mon-Friday or weekend/holiday please contact general surgery for urgent matters.   Team A- [id number]   Team B- [id number]  For non-urgent matters e-mail [email or web address]    I spent 35 minutes face-to-face with this patient of which more than 50% of the time was spent counseling/coordinating care of this patient.     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   Patient followed by derm as well, s/p punch biopsy by Derm.   Derm biopsy non-specific findings, no obvious intravascular calcium deposits although limited sample of subcutaneous tissue. Ct- abdomen "There are no subcutaneous tissue calcifications. Specifically no subcutaneous tissue calcifications in the patient's abdominal wall pannus." Dx with Pyoderma Gangrenosum.   On Cyclosporin,   continue topical tacrolimus. Sodium thiosulfate discontinued per dermatology.   Of note attempted Irrigation VAC 1/26 - 1 /28, noted deterioration of wound with NPWt/VAc therapy, therapy d/c'd, likely pathergy. Will continue to avoid surgical and enzymatic debridement.     Patient seen with Dermatology today.    nutrition 35cal/kg, protien 1.2-1.5g/kg, DM , Obesity management  offload  moisture barrier  compression 2 layer kerlex, ace  DVT prophylaxisis  established/ problem , stable       data reviewed lab, tests, records, image  complexity high mod  risk high -  due to dx, complexity data, risk  time 70 min 223    Exam:  Wound dimensions without significant change.   Mild odor remains.   Tissue type with increased granular base, decreased nonviable tissue. New satellite lesion. Re-epithelization at inferior border continues.  Pain and tenderness remains with radiation to back and legs.      Plan:  -Topical dressing: Cleanse with Dakins 1/4 strength. Apply Liquid barrier film to periwound skin. Adaptic touch to wound base for atraumatic dressing application and removal. Apply Topical Tacrolimus 0.1% ointment to wound base, cover with Aquacel AG, and abdominal pad. Change daily.  -Interdry textile sheeting beneath abdominal pannus leaving 2" out at end to wick.  -Agree with Cyclosporin; nephrology not opposed, patient and daughter amenable.   -Glucose control per primary team/endocrinology  -Differ empiric Sodium Thiosulfate to primary team/derm/nephrology, now discontinued dx PG  -Continue to follow nutrition/RD recommendations   -Continue to offload pressure  -DVT prophylaxis    Discussion with patient at bedside with Dermatology, pt expressed fear of death due to current illness and uncontrolled pain, tearful throughout exam. Emotional support and encouragement provided.     Discussed with RN staff importance of prn pain management.  Recommend Pain management reconsult prior to discharging patient.  Findings and plan discussed with Dermatology, primary team ACP,     Upon discharge follow up at outpatient [facility]. [address] [facility]. [phone].

## 2022-03-21 NOTE — PHYSICAL THERAPY INITIAL EVALUATION ADULT - ACTIVE RANGE OF MOTION EXAMINATION, REHAB EVAL
active assistive ROM of right UE/ LE WFL/Left UE Active ROM was WFL (within functional limits)/Left LE Active ROM was WFL (within functional limits)

## 2022-03-21 NOTE — CONSULT NOTE ADULT - SUBJECTIVE AND OBJECTIVE BOX
Patient is a 56y old  Female who presents with a chief complaint of vaginal bleeding (20 Mar 2022 16:21)      HPI:  56F ESRD TTS, HTN, DM, COPD, afib, known chronic R pannus wound felt most likely pyoderma gangrenosum presents for 1.5wk vag bleeding. Pt recently had prolonged stay for pannus wound and dc 3/3 to SNF. Per pt she has had issues since, developed bed sores & not eating bec food is poor. For last ~12d has been having what she thought was menstrual bleeding, passing clots & going through ~6-8 pads/day initially though it is now improving. Now no longer passing clots but still going through ~4-5 pads so came to ED. Thinks she feels a little more fatigued than usual but she's largely bed-bound. Also endorsing some inc abd periumbilical pain and her chronic pannus wound pain. No fever, CP, SOB, NV. Supposed to go to HD Saturday but missed.    ED: 118/56, 80, 18, 99.1, 99RA --> vanco/cefepime  Tearful, overwhelmed with chronic illness. VB not active now, but had severe cramps past day. Denies chest pain, SOB, nausea,  (20 Mar 2022 09:38)  need 4    REVIEW OF SYSTEMSneed2  Allergies    latex (Rash)  penicillin (Nausea)  strawberry (Rash)    Intolerances    caffeine (Nausea)    General:	  Skin/Breast:  ENMT:	  Respiratory and Thorax:  Cardiovascular:	  Gastrointestinal:	  Genitourinary:	  Musculoskeletal:	  Neurological:	  Endocrine:	    MEDICATIONS  (STANDING):  buPROPion XL (24-Hour) . 150 milliGRAM(s) Oral daily  cinacalcet 60 milliGRAM(s) Oral daily  cycloSPORINE  , modified (NEORAL) 200 milliGRAM(s) Oral at bedtime  cycloSPORINE  , modified (NEORAL) 150 milliGRAM(s) Oral <User Schedule>  dextrose 40% Gel 15 Gram(s) Oral once  dextrose 5%. 1000 milliLiter(s) (50 mL/Hr) IV Continuous <Continuous>  dextrose 5%. 1000 milliLiter(s) (100 mL/Hr) IV Continuous <Continuous>  dextrose 50% Injectable 25 Gram(s) IV Push once  dextrose 50% Injectable 12.5 Gram(s) IV Push once  dextrose 50% Injectable 25 Gram(s) IV Push once  diltiazem    milliGRAM(s) Oral daily  epoetin anabela-epbx (RETACRIT) Injectable 40994 Unit(s) IV Push <User Schedule>  gabapentin 300 milliGRAM(s) Oral two times a day  glucagon  Injectable 1 milliGRAM(s) IntraMuscular once  insulin glargine Injectable (LANTUS) 10 Unit(s) SubCutaneous at bedtime  insulin lispro (ADMELOG) corrective regimen sliding scale   SubCutaneous three times a day before meals  insulin lispro (ADMELOG) corrective regimen sliding scale   SubCutaneous at bedtime  lidocaine   4% Patch 1 Patch Transdermal daily  lidocaine   4% Patch 1 Patch Transdermal daily  mirtazapine 7.5 milliGRAM(s) Oral at bedtime  montelukast 10 milliGRAM(s) Oral at bedtime  oxyCODONE  ER Tablet 10 milliGRAM(s) Oral every 12 hours  pantoprazole    Tablet 40 milliGRAM(s) Oral before breakfast  polyethylene glycol 3350 17 Gram(s) Oral at bedtime  senna 2 Tablet(s) Oral at bedtime  sertraline 50 milliGRAM(s) Oral daily  sevelamer carbonate 800 milliGRAM(s) Oral three times a day with meals  simethicone 80 milliGRAM(s) Chew three times a day  traZODone 100 milliGRAM(s) Oral at bedtime    MEDICATIONS  (PRN):  acetaminophen     Tablet .. 650 milliGRAM(s) Oral every 6 hours PRN Temp greater or equal to 38C (100.4F), Mild Pain (1 - 3)  melatonin 3 milliGRAM(s) Oral at bedtime PRN Insomnia  midodrine. 5 milliGRAM(s) Oral <User Schedule> PRN 30 minutes prior to dialysis  ondansetron Injectable 4 milliGRAM(s) IV Push every 8 hours PRN Nausea and/or Vomiting  oxyCODONE    IR 7.5 milliGRAM(s) Oral every 6 hours PRN Severe Pain (7 - 10)      FAMILY HISTORY:  Family history of diabetes mellitus  mother-     Family hx of hypertension  mother-         Social history:    PAST MEDICAL & SURGICAL HISTORY:  COPD (chronic obstructive pulmonary disease)    DM (diabetes mellitus)    Atrial fibrillation  with loop recorder , battery most likely depleted, as per cardiac clearance, Dr. Reece Anesthesia aware, pt on Eliquis    HTN (hypertension)    Morbid obesity  BMI - 58.3    Chronic GERD    CHAMP (obstructive sleep apnea)  non compliance with CPAP, Anesthesia Dr. Reece aware, pt told to bring CPAP for sx, pr verbalized understanding    Potential difficult airway on pre-intubation assessment  airway class III, large neck, morbid obesity, hx of CHAMP, no compliance with CPAP- Dr. Reece, Anesthesia aware    End-stage renal disease    Anemia    Medication management    H/O tubal ligation      Status post placement of implantable loop recorder  left chest-     History of vascular access device  s/p insertion right chest permacath 2019, removal 2019, insertion left chest permacath 2019    S/P arteriovenous (AV) fistula creation  left  arm 2019        I&O's Summary      Vital Signs Last 24 Hrs  T(C): 36.6 (21 Mar 2022 05:15), Max: 36.6 (21 Mar 2022 05:15)  T(F): 97.9 (21 Mar 2022 05:15), Max: 97.9 (21 Mar 2022 05:15)  HR: 75 (21 Mar 2022 05:15) (75 - 76)  BP: 123/61 (21 Mar 2022 05:15) (123/61 - 125/66)  BP(mean): --  RR: 18 (21 Mar 2022 05:15) (18 - 18)  SpO2: 100% (21 Mar 2022 05:15) (100% - 100%)        PHYSICAL EXAM:5-7              Constitutional: NAD, A&O x 3, awake.     ENMT: edith, non icteric  Back: nl  Respiratory: rm air, non labored  Cardiovascular: rrr  Gastrointestinal: wound lower right sided pannus,    ( [id number]  undermining from 11 - 1 o'clock extending 2.2cm   firmly attached slough predominantely from 12 - 3 o'clock,  65% red-moist agranular base. Re-epithelialization noted from 4-8 o'clock of wound edge, continuing to re-epithelize. small-moderate sero-purulent drainage, + mild malodor remains. Satellite lesions in periwound skin at 12 o'clock 0.5cmx0.5cmx0.2cm 100% slough, and 4 o'clock 0.8cmx0.5cmx0.2cm 100% slough.   Entire area tender to touch. Cleansed with NS, tacrolimus applied, adaptic touch, aquacel Ag and abdominal pad.  Extremities: Left arm AV fistula + thrill   Skin: as noted  Musculoskeletal: able to flex, extend knees  psych: anxious, tearful      CBC Full  -  ( 20 Mar 2022 12:48 )  WBC Count : 12.21 K/uL  RBC Count : 2.85 M/uL  Hemoglobin : 7.6 g/dL  Hematocrit : 28.0 %  Platelet Count - Automated : 500 K/uL  Mean Cell Volume : 98.2 fL  Mean Cell Hemoglobin : 26.7 pg  Mean Cell Hemoglobin Concentration : 27.1 gm/dL  Auto Neutrophil # : 8.53 K/uL  Auto Lymphocyte # : 0.94 K/uL  Auto Monocyte # : 1.26 K/uL  Auto Eosinophil # : 1.59 K/uL  Auto Basophil # : 0.07 K/uL  Auto Neutrophil % : 68.3 %  Auto Lymphocyte % : 7.5 %  Auto Monocyte % : 10.1 %  Auto Eosinophil % : 12.7 %  Auto Basophil % : 0.6 %          135  |  95<L>  |  71<H>  ----------------------------<  145<H>  4.3   |  25  |  6.85<H>    Ca    10.4      20 Mar 2022 12:48  Phos  5.0       Mg     2.70         TPro  7.1  /  Alb  3.4  /  TBili  0.3  /  DBili  x   /  AST  9   /  ALT  <5  /  AlkPhos  170<H>                Radiology:         Patient is a 56y old  Female who presents with a chief complaint of vaginal bleeding (20 Mar 2022 16:21)      HPI:  56F ESRD TTS, HTN, DM, COPD, afib, known chronic R pannus wound felt most likely pyoderma gangrenosum presents for 1.5wk vag bleeding. Pt recently had prolonged stay for pannus wound and dc 3/3 to SNF. Per pt she has had issues since, developed bed sores & not eating bec food is poor. For last ~12d has been having what she thought was menstrual bleeding, passing clots & going through ~6-8 pads/day initially though it is now improving. Now no longer passing clots but still going through ~4-5 pads so came to ED. Thinks she feels a little more fatigued than usual but she's largely bed-bound. Also endorsing some inc abd periumbilical pain and her chronic pannus wound pain. No fever, CP, SOB, NV. Supposed to go to HD Saturday but missed.    ED: 118/56, 80, 18, 99.1, 99RA --> vanco/cefepime  Tearful, overwhelmed with chronic illness. VB not active now, but had severe cramps past day. Denies chest pain, SOB, nausea,  (20 Mar 2022 09:38)       REVIEW OF SYSTEMS see hpi and pmh others neg  Allergies    latex (Rash)  penicillin (Nausea)  strawberry (Rash)    Intolerances    caffeine (Nausea)          MEDICATIONS  (STANDING):  buPROPion XL (24-Hour) . 150 milliGRAM(s) Oral daily  cinacalcet 60 milliGRAM(s) Oral daily  cycloSPORINE  , modified (NEORAL) 200 milliGRAM(s) Oral at bedtime  cycloSPORINE  , modified (NEORAL) 150 milliGRAM(s) Oral <User Schedule>  dextrose 40% Gel 15 Gram(s) Oral once  dextrose 5%. 1000 milliLiter(s) (50 mL/Hr) IV Continuous <Continuous>  dextrose 5%. 1000 milliLiter(s) (100 mL/Hr) IV Continuous <Continuous>  dextrose 50% Injectable 25 Gram(s) IV Push once  dextrose 50% Injectable 12.5 Gram(s) IV Push once  dextrose 50% Injectable 25 Gram(s) IV Push once  diltiazem    milliGRAM(s) Oral daily  epoetin anabela-epbx (RETACRIT) Injectable 50570 Unit(s) IV Push <User Schedule>  gabapentin 300 milliGRAM(s) Oral two times a day  glucagon  Injectable 1 milliGRAM(s) IntraMuscular once  insulin glargine Injectable (LANTUS) 10 Unit(s) SubCutaneous at bedtime  insulin lispro (ADMELOG) corrective regimen sliding scale   SubCutaneous three times a day before meals  insulin lispro (ADMELOG) corrective regimen sliding scale   SubCutaneous at bedtime  lidocaine   4% Patch 1 Patch Transdermal daily  lidocaine   4% Patch 1 Patch Transdermal daily  mirtazapine 7.5 milliGRAM(s) Oral at bedtime  montelukast 10 milliGRAM(s) Oral at bedtime  oxyCODONE  ER Tablet 10 milliGRAM(s) Oral every 12 hours  pantoprazole    Tablet 40 milliGRAM(s) Oral before breakfast  polyethylene glycol 3350 17 Gram(s) Oral at bedtime  senna 2 Tablet(s) Oral at bedtime  sertraline 50 milliGRAM(s) Oral daily  sevelamer carbonate 800 milliGRAM(s) Oral three times a day with meals  simethicone 80 milliGRAM(s) Chew three times a day  traZODone 100 milliGRAM(s) Oral at bedtime    MEDICATIONS  (PRN):  acetaminophen     Tablet .. 650 milliGRAM(s) Oral every 6 hours PRN Temp greater or equal to 38C (100.4F), Mild Pain (1 - 3)  melatonin 3 milliGRAM(s) Oral at bedtime PRN Insomnia  midodrine. 5 milliGRAM(s) Oral <User Schedule> PRN 30 minutes prior to dialysis  ondansetron Injectable 4 milliGRAM(s) IV Push every 8 hours PRN Nausea and/or Vomiting  oxyCODONE    IR 7.5 milliGRAM(s) Oral every 6 hours PRN Severe Pain (7 - 10)      FAMILY HISTORY:  Family history of diabetes mellitus  mother-     Family hx of hypertension  mother-         Social history:non smoker esrd pt    PAST MEDICAL & SURGICAL HISTORY:  COPD (chronic obstructive pulmonary disease)    DM (diabetes mellitus)    Atrial fibrillation  with loop recorder , battery most likely depleted, as per cardiac clearance, Dr. Reece Anesthesia aware, pt on Eliquis    HTN (hypertension)    Morbid obesity  BMI - 58.3    Chronic GERD    CHAMP (obstructive sleep apnea)  non compliance with CPAP, Anesthesia Dr. Reece aware, pt told to bring CPAP for sx, pr verbalized understanding    Potential difficult airway on pre-intubation assessment  airway class III, large neck, morbid obesity, hx of CHAMP, no compliance with CPAP- Dr. Reece, Anesthesia aware    End-stage renal disease    Anemia    Medication management    H/O tubal ligation      Status post placement of implantable loop recorder  left chest-     History of vascular access device  s/p insertion right chest permacath 2019, removal 2019, insertion left chest permacath 2019    S/P arteriovenous (AV) fistula creation  left  arm 2019        I&O's Summary      Vital Signs Last 24 Hrs  T(C): 36.6 (21 Mar 2022 05:15), Max: 36.6 (21 Mar 2022 05:15)  T(F): 97.9 (21 Mar 2022 05:15), Max: 97.9 (21 Mar 2022 05:15)  HR: 75 (21 Mar 2022 05:15) (75 - 76)  BP: 123/61 (21 Mar 2022 05:15) (123/61 - 125/66)  BP(mean): --  RR: 18 (21 Mar 2022 05:15) (18 - 18)  SpO2: 100% (21 Mar 2022 05:15) (100% - 100%)        PHYSICAL EXAM:               Constitutional: NAD, A&O x 3, awake.     ENMT: edith, non icteric  Back: nl  Respiratory: rm air, non labored  Cardiovascular: rrr  Gastrointestinal: wound lower right sided pannus,  5x17x3, less slough most into 12-2 ock corner  ( [id number]  undermining from 11 - 1 o'clock extending 2.2cm   firmly attached slough predominantely from 12 - 3 o'clock,  65% red-moist agranular base. Re-epithelialization noted from 4-8 o'clock of wound edge, continuing to re-epithelize. small-moderate sero-purulent drainage, + mild malodor remains. Satellite lesions in periwound skin at 12 o'clock 0.5cmx0.5cmx0.2cm 100% slough, and 4 o'clock 0.8cmx0.5cmx0.2cm 100% slough.   Entire area tender to touch. Cleansed with NS, tacrolimus applied, adaptic touch, aquacel Ag and abdominal pad.  Extremities: Left arm AV fistula + thrill   Skin: as noted  Musculoskeletal: able to flex, extend knees, complains of right knee pain, no swelling  psych: anxious, tearful      CBC Full  -  ( 20 Mar 2022 12:48 )  WBC Count : 12.21 K/uL  RBC Count : 2.85 M/uL  Hemoglobin : 7.6 g/dL  Hematocrit : 28.0 %  Platelet Count - Automated : 500 K/uL  Mean Cell Volume : 98.2 fL  Mean Cell Hemoglobin : 26.7 pg  Mean Cell Hemoglobin Concentration : 27.1 gm/dL  Auto Neutrophil # : 8.53 K/uL  Auto Lymphocyte # : 0.94 K/uL  Auto Monocyte # : 1.26 K/uL  Auto Eosinophil # : 1.59 K/uL  Auto Basophil # : 0.07 K/uL  Auto Neutrophil % : 68.3 %  Auto Lymphocyte % : 7.5 %  Auto Monocyte % : 10.1 %  Auto Eosinophil % : 12.7 %  Auto Basophil % : 0.6 %          135  |  95<L>  |  71<H>  ----------------------------<  145<H>  4.3   |  25  |  6.85<H>    Ca    10.4      20 Mar 2022 12:48  Phos  5.0       Mg     2.70         TPro  7.1  /  Alb  3.4  /  TBili  0.3  /  DBili  x   /  AST  9   /  ALT  <5  /  AlkPhos  170<H>                Radiology:         Patient is a 56y old  Female who presents with a chief complaint of vaginal bleeding  ESRD TTS, HTN, DM, COPD, afib, known chronic R pannus wound felt most likely pyoderma gangrenosum presents for 1.5wk vag bleeding. Pt recently had prolonged stay for pannus wound and dc 3/3 to SNF. Per pt she has had issues since, developed bed sores & not eating bec food is poor. For last ~12d has been having what she thought was menstrual bleeding, passing clots & going through ~6-8 pads/day initially though it is now improving. Now no longer passing clots but still going through ~4-5 pads so came to ED. Thinks she feels a little more fatigued than usual but she's largely bed-bound. Also endorsing some inc abd periumbilical pain and her chronic pannus wound pain. No fever, CP, SOB, NV. Supposed to go to HD Saturday but missed.    ED: 118/56, 80, 18, 99.1, 99RA --> vanco/cefepime  Tearful, overwhelmed with chronic illness. VB not active now, but had severe cramps past day. Denies chest pain, SOB, nausea,  (20 Mar 2022 09:38)       REVIEW OF SYSTEMS see hpi and pmh others neg  Allergies    latex (Rash)  penicillin (Nausea)  strawberry (Rash)    Intolerances    caffeine (Nausea)          MEDICATIONS  (STANDING):  buPROPion XL (24-Hour) . 150 milliGRAM(s) Oral daily  cinacalcet 60 milliGRAM(s) Oral daily  cycloSPORINE  , modified (NEORAL) 200 milliGRAM(s) Oral at bedtime  cycloSPORINE  , modified (NEORAL) 150 milliGRAM(s) Oral <User Schedule>  dextrose 40% Gel 15 Gram(s) Oral once  dextrose 5%. 1000 milliLiter(s) (50 mL/Hr) IV Continuous <Continuous>  dextrose 5%. 1000 milliLiter(s) (100 mL/Hr) IV Continuous <Continuous>  dextrose 50% Injectable 25 Gram(s) IV Push once  dextrose 50% Injectable 12.5 Gram(s) IV Push once  dextrose 50% Injectable 25 Gram(s) IV Push once  diltiazem    milliGRAM(s) Oral daily  epoetin anabela-epbx (RETACRIT) Injectable 99196 Unit(s) IV Push <User Schedule>  gabapentin 300 milliGRAM(s) Oral two times a day  glucagon  Injectable 1 milliGRAM(s) IntraMuscular once  insulin glargine Injectable (LANTUS) 10 Unit(s) SubCutaneous at bedtime  insulin lispro (ADMELOG) corrective regimen sliding scale   SubCutaneous three times a day before meals  insulin lispro (ADMELOG) corrective regimen sliding scale   SubCutaneous at bedtime  lidocaine   4% Patch 1 Patch Transdermal daily  lidocaine   4% Patch 1 Patch Transdermal daily  mirtazapine 7.5 milliGRAM(s) Oral at bedtime  montelukast 10 milliGRAM(s) Oral at bedtime  oxyCODONE  ER Tablet 10 milliGRAM(s) Oral every 12 hours  pantoprazole    Tablet 40 milliGRAM(s) Oral before breakfast  polyethylene glycol 3350 17 Gram(s) Oral at bedtime  senna 2 Tablet(s) Oral at bedtime  sertraline 50 milliGRAM(s) Oral daily  sevelamer carbonate 800 milliGRAM(s) Oral three times a day with meals  simethicone 80 milliGRAM(s) Chew three times a day  traZODone 100 milliGRAM(s) Oral at bedtime    MEDICATIONS  (PRN):  acetaminophen     Tablet .. 650 milliGRAM(s) Oral every 6 hours PRN Temp greater or equal to 38C (100.4F), Mild Pain (1 - 3)  melatonin 3 milliGRAM(s) Oral at bedtime PRN Insomnia  midodrine. 5 milliGRAM(s) Oral <User Schedule> PRN 30 minutes prior to dialysis  ondansetron Injectable 4 milliGRAM(s) IV Push every 8 hours PRN Nausea and/or Vomiting  oxyCODONE    IR 7.5 milliGRAM(s) Oral every 6 hours PRN Severe Pain (7 - 10)      FAMILY HISTORY:  Family history of diabetes mellitus  mother-     Family hx of hypertension  mother-         Social history:non smoker esrd pt    PAST MEDICAL & SURGICAL HISTORY:  COPD (chronic obstructive pulmonary disease)    DM (diabetes mellitus)    Atrial fibrillation  with loop recorder , battery most likely depleted, as per cardiac clearance, Dr. Reece Anesthesia aware, pt on Eliquis    HTN (hypertension)    Morbid obesity  BMI - 58.3    Chronic GERD    CHAMP (obstructive sleep apnea)  non compliance with CPAP, Anesthesia Dr. Reece aware, pt told to bring CPAP for sx, pr verbalized understanding    Potential difficult airway on pre-intubation assessment  airway class III, large neck, morbid obesity, hx of CHAMP, no compliance with CPAP- Dr. Reece, Anesthesia aware    End-stage renal disease    Anemia    Medication management    H/O tubal ligation      Status post placement of implantable loop recorder  left chest-     History of vascular access device  s/p insertion right chest permacath 2019, removal 2019, insertion left chest permacath 2019    S/P arteriovenous (AV) fistula creation  left  arm 2019        I&O's Summary      Vital Signs Last 24 Hrs  T(C): 36.6 (21 Mar 2022 05:15), Max: 36.6 (21 Mar 2022 05:15)  T(F): 97.9 (21 Mar 2022 05:15), Max: 97.9 (21 Mar 2022 05:15)  HR: 75 (21 Mar 2022 05:15) (75 - 76)  BP: 123/61 (21 Mar 2022 05:15) (123/61 - 125/66)  BP(mean): --  RR: 18 (21 Mar 2022 05:15) (18 - 18)  SpO2: 100% (21 Mar 2022 05:15) (100% - 100%)        PHYSICAL EXAM:               Constitutional: NAD, A&O x 3, awake.     ENMT: edith, non icteric  Back: nl  Respiratory: rm air, non labored  Cardiovascular: rrr  Gastrointestinal: wound lower right sided pannus,  5x17x3, less slough most into 12-2 ock corner  ( [id number]  undermining from 11 - 1 o'clock extending 2.2cm   firmly attached slough predominantely from 12 - 3 o'clock,  65% red-moist agranular base. Re-epithelialization noted from 4-8 o'clock of wound edge, continuing to re-epithelize. small-moderate sero-purulent drainage, + mild malodor remains. Satellite lesions in periwound skin at 12 o'clock 0.5cmx0.5cmx0.2cm 100% slough, and 4 o'clock 0.8cmx0.5cmx0.2cm 100% slough.   Entire area tender to touch. Cleansed with NS, tacrolimus applied, adaptic touch, aquacel Ag and abdominal pad.  Extremities: Left arm AV fistula + thrill   Skin: as noted  Musculoskeletal: able to flex, extend knees, complains of right knee pain, no swelling  psych: anxious, tearful      CBC Full  -  ( 20 Mar 2022 12:48 )  WBC Count : 12.21 K/uL  RBC Count : 2.85 M/uL  Hemoglobin : 7.6 g/dL  Hematocrit : 28.0 %  Platelet Count - Automated : 500 K/uL  Mean Cell Volume : 98.2 fL  Mean Cell Hemoglobin : 26.7 pg  Mean Cell Hemoglobin Concentration : 27.1 gm/dL  Auto Neutrophil # : 8.53 K/uL  Auto Lymphocyte # : 0.94 K/uL  Auto Monocyte # : 1.26 K/uL  Auto Eosinophil # : 1.59 K/uL  Auto Basophil # : 0.07 K/uL  Auto Neutrophil % : 68.3 %  Auto Lymphocyte % : 7.5 %  Auto Monocyte % : 10.1 %  Auto Eosinophil % : 12.7 %  Auto Basophil % : 0.6 %          135  |  95<L>  |  71<H>  ----------------------------<  145<H>  4.3   |  25  |  6.85<H>    Ca    10.4      20 Mar 2022 12:48  Phos  5.0       Mg     2.70         TPro  7.1  /  Alb  3.4  /  TBili  0.3  /  DBili  x   /  AST  9   /  ALT  <5  /  AlkPhos  170<H>                Radiology:

## 2022-03-21 NOTE — PROGRESS NOTE ADULT - ASSESSMENT
Echo 1/19/22: grossly nl LV sys fx, min MR     a/p  56 year old female with hx of morbid obesity, CHAMP not on home O2, ESRD (HD MWF), HTN, DM, COPD, Afib no longer on AC, chronic R pannus wound, followed by Dr. Layne, likely pyoderma gangrenosum presents for vaginal bleeding     #Chronic Pannus Wound  -surgical eval noted, wound care  -abx per med     #Chronic Afib  -stable, rates controlled  -cont dilt as ordered  -eliquis now on hold in setting of poss vaginal bleeding  -cbc stable, gyn FU     #Hypertension  -stable  -cont ccb     #ESRD on HD  -HD per renal    dvt ppx

## 2022-03-21 NOTE — PROGRESS NOTE ADULT - SUBJECTIVE AND OBJECTIVE BOX
[facility] NEPHROLOGY- PROGRESS NOTE    56y Female with history of ESRD on HD presents with vaginal bleeding. Nephrology consulted for ESRD status.    REVIEW OF SYSTEMS:  Gen: no changes in weight  Cards: no chest pain  Resp: no dyspnea  GI: no nausea or vomiting or diarrhea  : + vaginal bleeding  Vascular: no LE edema    caffeine (Nausea)  latex (Rash)  penicillin (Nausea)  strawberry (Rash)      Hospital Medications: Medications reviewed    VITALS:  T(F): 97.9 (03-21-22 @ 05:15), Max: 97.9 (03-21-22 @ 05:15)  HR: 75 (03-21-22 @ 05:15)  BP: 123/61 (03-21-22 @ 05:15)  RR: 18 (03-21-22 @ 05:15)  SpO2: 100% (03-21-22 @ 05:15)  Wt(kg): --  Height (cm): 157.5 (03-19 @ 17:40)      PHYSICAL EXAM:    Gen: NAD, calm  Cards: RRR, +S1/S2, no M/G/R  Resp: CTA B/L  GI: soft, NT/ND, NABS, + ab wound dressing in place  Vascular: no LE edema B/L, LUE AVF + bruit/thrill    LABS:  03-20    135  |  95<L>  |  71<H>  ----------------------------<  145<H>  4.3   |  25  |  6.85<H>    Ca    10.4      20 Mar 2022 12:48  Phos  5.0     03-20  Mg     2.70     03-20    TPro  7.1  /  Alb  3.4  /  TBili  0.3  /  DBili      /  AST  9   /  ALT  <5  /  AlkPhos  170<H>  03-20    Creatinine Trend: 6.85 <--, 6.03 <--                        7.6    12.21 )-----------( 500      ( 20 Mar 2022 12:48 )             28.0     Urine Studies:        RADIOLOGY & ADDITIONAL STUDIES:

## 2022-03-21 NOTE — PROGRESS NOTE ADULT - ASSESSMENT
56F with Hx of ESRD TTS, HTN, DM 2, COPD, afib, known chronic R pannus wound felt most likely pyoderma gangrenosum presents for 1.5wk vag bleeding. Pt recently had prolonged stay at [facility] for pannus wound, known to endocrine service.  Was dc'd to rehab on 3/3.  Prior to last hospitalization patient was on much higher doses of basal bolus.  Now requiring much less.  Has poor appetite.      1. Type 2 diabetes mellitus uncontrolled  A1c 7.0% (may be inaccurate in setting of ESRD)  Home Regimen: Tresiba 80 units HS and Trulicity 1.5mg subq weekly  While inpatient:  BG target 100-180 mg/dL  reduced po intake.  Patient is driking nepro shakes and eating very little  Continue Lantus 10 units SQ qHS   Continue off of premeal admelog for now.  If appetite improves and FS begin to increase > 200, will consider restarting admelog premeal  Admelog correctional scale LOW before meals and low bedtime scale  Check BG before meals and bedtime  Hypoglycemia protocol   Consistent carbohydrate diet    Discharge Plan:  STOP Trulicity (patient ESRD on HD)  For dc to rehab- recommend to continue current insulin management as outlined above  For dc to home- Recommend basal plus PO regimen  Patient was on Tresiba at home may benefit from a different Long acting insulin such as Lantus or Levemir given ESRD.  Tresiba is ultra-Long acting insulin  Please assess insurance coverage for basal insulin pens:  Levemir, Lantus, Basaglar, Toujeo or Semglee.   Recommend Tradjenta 5 mg po daily, if not covered can see if Januvia 25 mg po daily is covered.  Please obtain prior auth if needed as patient is ESRD and DPP4i class are indicated for patients with ESRD  Consider CGM (such as Freestyle Libre2 outpatient)  If desiring to followup with [facility]: [address] [place] [address], [address], [place] [address], [phone]    2. HTN  Management per primary team     3. Hyperlipidemia  consider restarting statin      Tamela Ruiz  Nurse Practitioner  Division of Endocrinology & Diabetes  Pager # [phone]      If after 6PM or before 9AM, or on weekends/holidays, please call endocrine answering service for assistance ([phone]).  For nonurgent matters email [email or web address] for assistance.

## 2022-03-22 NOTE — CONSULT NOTE ADULT - SUBJECTIVE AND OBJECTIVE BOX
HPI: 56F ESRD TTS, HTN, DM, COPD, afib, known chronic R pannus wound felt most likely pyoderma gangrenosum presents for 1.5wk vag bleeding. Pt recently had prolonged stay for pannus wound and dc 3/3 to SNF. Per pt she has had issues since, developed bed sores & not eating due to food is poor. For last ~12d has been having what she thought was menstrual bleeding, passing clots & going through ~6-8 pads/day initially though it is now improving. Now no longer passing clots but still going through ~4-5 pads so came to ED. Thinks she feels a little more fatigued than usual but she's largely bed-bound. Also endorsing some inc abd periumbilical pain and her chronic pannus wound pain. No fever, CP, SOB, NV. Supposed to go to HD Saturday but missed.    ED: 118/56, 80, 18, 99.1, 99RA --> vanco/cefepime  Tearful, overwhelmed with chronic illness. VB not active now, but had severe cramps past day. Denies chest pain, SOB, nausea,  (20 Mar 2022 09:38)    DERM CONSULT/HPI: Dermatology consulted for management of abdominal panus wound, onset ~ 2021, currently on cyclosporine , now on CsA 150mg/200mg po daily (~4.14 mg/kg of dosing weight). Pt previously followed by inpatient dermatology during last hospitalization.             PAST MEDICAL & SURGICAL HISTORY:  COPD (chronic obstructive pulmonary disease)    DM (diabetes mellitus)    Atrial fibrillation  with loop recorder , battery most likely depleted, as per cardiac clearance, Dr. Reece Anesthesia aware, pt on Eliquis    HTN (hypertension)    Morbid obesity  BMI - 58.3    Chronic GERD    CHAMP (obstructive sleep apnea)  non compliance with CPAP, Anesthesia Dr. Reece aware, pt told to bring CPAP for sx, pr verbalized understanding    Potential difficult airway on pre-intubation assessment  airway class III, large neck, morbid obesity, hx of CHAMP, no compliance with CPAP- Dr. Reece, Anesthesia aware    End-stage renal disease    Anemia    Medication management    H/O tubal ligation      Status post placement of implantable loop recorder  left chest-     History of vascular access device  s/p insertion right chest permacath 2019, removal 2019, insertion left chest permacath 2019    S/P arteriovenous (AV) fistula creation  left  arm 2019        REVIEW OF SYSTEMS:  General: no fevers/chills; no lethargy  Skin/Breast: see HPI  Ophthalmologic: no eye pain or changes in vision  ENMT: no dysphagia or changes in haering  Respiratory: no SOB or cough  Cardiovascular: no palpitations or chest pain  Gastroinestinal: no abdominal pain or blood in stool  Genitourinary: no dysuria or frequency  Musculoskeletal: no joint pains or weakness  Neurological: no weakness, numbness, or tingling    MEDICATIONS  (STANDING):  buPROPion XL (24-Hour) . 150 milliGRAM(s) Oral daily  cinacalcet 60 milliGRAM(s) Oral daily  cycloSPORINE  , modified (NEORAL) 200 milliGRAM(s) Oral at bedtime  cycloSPORINE  , modified (NEORAL) 150 milliGRAM(s) Oral <User Schedule>  dextrose 40% Gel 15 Gram(s) Oral once  dextrose 5%. 1000 milliLiter(s) (50 mL/Hr) IV Continuous <Continuous>  dextrose 5%. 1000 milliLiter(s) (100 mL/Hr) IV Continuous <Continuous>  dextrose 50% Injectable 25 Gram(s) IV Push once  dextrose 50% Injectable 12.5 Gram(s) IV Push once  dextrose 50% Injectable 25 Gram(s) IV Push once  diltiazem    milliGRAM(s) Oral daily  epoetin anabela-epbx (RETACRIT) Injectable 26928 Unit(s) IV Push <User Schedule>  gabapentin 300 milliGRAM(s) Oral two times a day  glucagon  Injectable 1 milliGRAM(s) IntraMuscular once  insulin glargine Injectable (LANTUS) 10 Unit(s) SubCutaneous at bedtime  insulin lispro (ADMELOG) corrective regimen sliding scale   SubCutaneous three times a day before meals  insulin lispro (ADMELOG) corrective regimen sliding scale   SubCutaneous at bedtime  lidocaine   4% Patch 1 Patch Transdermal daily  lidocaine   4% Patch 1 Patch Transdermal daily  mirtazapine 7.5 milliGRAM(s) Oral at bedtime  montelukast 10 milliGRAM(s) Oral at bedtime  oxyCODONE  ER Tablet 10 milliGRAM(s) Oral every 12 hours  pantoprazole    Tablet 40 milliGRAM(s) Oral before breakfast  polyethylene glycol 3350 17 Gram(s) Oral at bedtime  senna 2 Tablet(s) Oral at bedtime  sertraline 50 milliGRAM(s) Oral daily  sevelamer carbonate 800 milliGRAM(s) Oral three times a day with meals  simethicone 80 milliGRAM(s) Chew three times a day  tacrolimus   0.1% Ointment 1 Application(s) Topical daily  traZODone 100 milliGRAM(s) Oral at bedtime    MEDICATIONS  (PRN):  acetaminophen     Tablet .. 650 milliGRAM(s) Oral every 6 hours PRN Temp greater or equal to 38C (100.4F), Mild Pain (1 - 3)  melatonin 3 milliGRAM(s) Oral at bedtime PRN Insomnia  midodrine. 5 milliGRAM(s) Oral <User Schedule> PRN 30 minutes prior to dialysis  ondansetron Injectable 4 milliGRAM(s) IV Push every 8 hours PRN Nausea and/or Vomiting  oxyCODONE    IR 7.5 milliGRAM(s) Oral every 6 hours PRN Severe Pain (7 - 10)      Allergies    latex (Rash)  penicillin (Nausea)  strawberry (Rash)    Intolerances    caffeine (Nausea)      SOCIAL HISTORY:    FAMILY HISTORY:  Family history of diabetes mellitus  mother-     Family hx of hypertension  mother-         Vital Signs Last 24 Hrs  T(C): 36.6 (22 Mar 2022 05:15), Max: 37.4 (21 Mar 2022 22:26)  T(F): 97.9 (22 Mar 2022 05:15), Max: 99.4 (21 Mar 2022 22:26)  HR: 74 (22 Mar 2022 05:15) (70 - 80)  BP: 129/62 (22 Mar 2022 05:15) (105/59 - 129/70)  BP(mean): --  RR: 17 (22 Mar 2022 05:15) (17 - 20)  SpO2: 100% (22 Mar 2022 05:15) (99% - 100%)    PHYSICAL EXAM:  The patient was AOx3, well nourished, and in NAD.  OP showed no ulcerations.  There was no visible LAD.  Conjunctiva were non-injected.  There was no clubbing or edema of extremities.   The scalp, hair, face, eyebrows, lips, OP, neck, chest, back, extremities x4, and nails were examined.  There was no hyperhidrosis or bromhidrosis.     Of note on skin exam:     LABS:                        7.8    14.76 )-----------( 388      ( 22 Mar 2022 06:46 )             28.5         136  |  95<L>  |  51<H>  ----------------------------<  111<H>  4.0   |  26  |  5.94<H>    Ca    9.1      22 Mar 2022 06:46  Phos  3.9       Mg     2.50         TPro  6.3  /  Alb  3.0<L>  /  TBili  0.3  /  DBili  x   /  AST  9   /  ALT  <5<L>  /  AlkPhos  156<H>      PT/INR - ( 21 Mar 2022 17:26 )   PT: 13.9 sec;   INR: 1.20 ratio         PTT - ( 21 Mar 2022 17:26 )  PTT:39.0 sec      RADIOLOGY & ADDITIONAL STUDIES: HPI: 56F ESRD TTS, HTN, DM, COPD, afib, known chronic R pannus wound felt most likely pyoderma gangrenosum presents for 1.5wk vag bleeding. Pt recently had prolonged stay for pannus wound and dc 3/3 to SNF. Per pt she has had issues since, developed bed sores & not eating due to food is poor. For last ~12d has been having what she thought was menstrual bleeding, passing clots & going through ~6-8 pads/day initially though it is now improving. Now no longer passing clots but still going through ~4-5 pads so came to ED. Thinks she feels a little more fatigued than usual but she's largely bed-bound. Also endorsing some inc abd periumbilical pain and her chronic pannus wound pain. No fever, CP, SOB, NV. Supposed to go to HD Saturday but missed.    ED: 118/56, 80, 18, 99.1, 99RA --> vanco/cefepime  Tearful, overwhelmed with chronic illness. VB not active now, but had severe cramps past day. Denies chest pain, SOB, nausea,  (20 Mar 2022 09:38)    DERM CONSULT/HPI: Dermatology consulted for management of abdominal panus wound, onset ~ 2021, currently on cyclosporine , now on CsA 150mg/200mg po daily (~4.14 mg/kg of dosing weight). Pt previously followed by inpatient dermatology during last hospitalization.     Size progression per wound care documentation:   3/21/22:  5x17x3, less slough most into 12-2 ock corner  3/3/22: 7cmx12.5cmx2.8cm    22:  [id number], 2cm at 1:00, 3cm at 11:00  2/10/22: [id number], undermining from 11- 1 o'clock extending 3cm        PAST MEDICAL & SURGICAL HISTORY:  COPD (chronic obstructive pulmonary disease)    DM (diabetes mellitus)    Atrial fibrillation  with loop recorder , battery most likely depleted, as per cardiac clearance, Dr. Reece Anesthesia aware, pt on Eliquis    HTN (hypertension)    Morbid obesity  BMI - 58.3    Chronic GERD    CHAMP (obstructive sleep apnea)  non compliance with CPAP, Anesthesia Dr. Reece aware, pt told to bring CPAP for sx, pr verbalized understanding    Potential difficult airway on pre-intubation assessment  airway class III, large neck, morbid obesity, hx of CHAMP, no compliance with CPAP- Dr. Reece, Anesthesia aware    End-stage renal disease    Anemia    Medication management    H/O tubal ligation      Status post placement of implantable loop recorder  left chest-     History of vascular access device  s/p insertion right chest permacath 2019, removal 2019, insertion left chest permacath 2019    S/P arteriovenous (AV) fistula creation  left  arm 2019        REVIEW OF SYSTEMS:  General: no fevers/chills; no lethargy  Skin/Breast: see HPI  Ophthalmologic: no eye pain or changes in vision  ENMT: no dysphagia or changes in haering  Respiratory: no SOB or cough  Cardiovascular: no palpitations or chest pain  Gastroinestinal: no abdominal pain or blood in stool  Genitourinary: no dysuria or frequency  Musculoskeletal: no joint pains or weakness  Neurological: no weakness, numbness, or tingling    MEDICATIONS  (STANDING):  buPROPion XL (24-Hour) . 150 milliGRAM(s) Oral daily  cinacalcet 60 milliGRAM(s) Oral daily  cycloSPORINE  , modified (NEORAL) 200 milliGRAM(s) Oral at bedtime  cycloSPORINE  , modified (NEORAL) 150 milliGRAM(s) Oral <User Schedule>  dextrose 40% Gel 15 Gram(s) Oral once  dextrose 5%. 1000 milliLiter(s) (50 mL/Hr) IV Continuous <Continuous>  dextrose 5%. 1000 milliLiter(s) (100 mL/Hr) IV Continuous <Continuous>  dextrose 50% Injectable 25 Gram(s) IV Push once  dextrose 50% Injectable 12.5 Gram(s) IV Push once  dextrose 50% Injectable 25 Gram(s) IV Push once  diltiazem    milliGRAM(s) Oral daily  epoetin anabela-epbx (RETACRIT) Injectable 79144 Unit(s) IV Push <User Schedule>  gabapentin 300 milliGRAM(s) Oral two times a day  glucagon  Injectable 1 milliGRAM(s) IntraMuscular once  insulin glargine Injectable (LANTUS) 10 Unit(s) SubCutaneous at bedtime  insulin lispro (ADMELOG) corrective regimen sliding scale   SubCutaneous three times a day before meals  insulin lispro (ADMELOG) corrective regimen sliding scale   SubCutaneous at bedtime  lidocaine   4% Patch 1 Patch Transdermal daily  lidocaine   4% Patch 1 Patch Transdermal daily  mirtazapine 7.5 milliGRAM(s) Oral at bedtime  montelukast 10 milliGRAM(s) Oral at bedtime  oxyCODONE  ER Tablet 10 milliGRAM(s) Oral every 12 hours  pantoprazole    Tablet 40 milliGRAM(s) Oral before breakfast  polyethylene glycol 3350 17 Gram(s) Oral at bedtime  senna 2 Tablet(s) Oral at bedtime  sertraline 50 milliGRAM(s) Oral daily  sevelamer carbonate 800 milliGRAM(s) Oral three times a day with meals  simethicone 80 milliGRAM(s) Chew three times a day  tacrolimus   0.1% Ointment 1 Application(s) Topical daily  traZODone 100 milliGRAM(s) Oral at bedtime    MEDICATIONS  (PRN):  acetaminophen     Tablet .. 650 milliGRAM(s) Oral every 6 hours PRN Temp greater or equal to 38C (100.4F), Mild Pain (1 - 3)  melatonin 3 milliGRAM(s) Oral at bedtime PRN Insomnia  midodrine. 5 milliGRAM(s) Oral <User Schedule> PRN 30 minutes prior to dialysis  ondansetron Injectable 4 milliGRAM(s) IV Push every 8 hours PRN Nausea and/or Vomiting  oxyCODONE    IR 7.5 milliGRAM(s) Oral every 6 hours PRN Severe Pain (7 - 10)      Allergies    latex (Rash)  penicillin (Nausea)  strawberry (Rash)    Intolerances    caffeine (Nausea)      SOCIAL HISTORY:    FAMILY HISTORY:  Family history of diabetes mellitus  mother-     Family hx of hypertension  mother-         Vital Signs Last 24 Hrs  T(C): 36.6 (22 Mar 2022 05:15), Max: 37.4 (21 Mar 2022 22:26)  T(F): 97.9 (22 Mar 2022 05:15), Max: 99.4 (21 Mar 2022 22:26)  HR: 74 (22 Mar 2022 05:15) (70 - 80)  BP: 129/62 (22 Mar 2022 05:15) (105/59 - 129/70)  BP(mean): --  RR: 17 (22 Mar 2022 05:15) (17 - 20)  SpO2: 100% (22 Mar 2022 05:15) (99% - 100%)    PHYSICAL EXAM:  The patient was AOx3, well nourished, and in NAD.  OP showed no ulcerations.  There was no visible LAD.  Conjunctiva were non-injected.  There was no clubbing or edema of extremities.   The scalp, hair, face, eyebrows, lips, OP, neck, chest, back, extremities x4, and nails were examined.  There was no hyperhidrosis or bromhidrosis.     Of note on skin exam:     LABS:                        7.8    14.76 )-----------( 388      ( 22 Mar 2022 06:46 )             28.5     03-22    136  |  95<L>  |  51<H>  ----------------------------<  111<H>  4.0   |  26  |  5.94<H>    Ca    9.1      22 Mar 2022 06:46  Phos  3.9     03-  Mg     2.50     -    TPro  6.3  /  Alb  3.0<L>  /  TBili  0.3  /  DBili  x   /  AST  9   /  ALT  <5<L>  /  AlkPhos  156<H>  03-21    PT/INR - ( 21 Mar 2022 17:26 )   PT: 13.9 sec;   INR: 1.20 ratio         PTT - ( 21 Mar 2022 17:26 )  PTT:39.0 sec      RADIOLOGY & ADDITIONAL STUDIES: HPI: 56F ESRD TTS, HTN, DM, COPD, afib, known chronic R pannus wound felt most likely pyoderma gangrenosum presents for 1.5wk vag bleeding. Pt recently had prolonged stay for pannus wound and dc 3/3 to SNF. Per pt she has had issues since, developed bed sores & not eating due to food is poor. For last ~12d has been having what she thought was menstrual bleeding, passing clots & going through ~6-8 pads/day initially though it is now improving. Now no longer passing clots but still going through ~4-5 pads so came to ED. Thinks she feels a little more fatigued than usual but she's largely bed-bound. Also endorsing some inc abd periumbilical pain and her chronic pannus wound pain. No fever, CP, SOB, NV. Supposed to go to HD Saturday but missed.    ED: 118/56, 80, 18, 99.1, 99RA --> vanco/cefepime  Tearful, overwhelmed with chronic illness. VB not active now, but had severe cramps past day. Denies chest pain, SOB, nausea,  (20 Mar 2022 09:38)    DERM CONSULT/HPI: Dermatology consulted for management of abdominal panus wound, onset ~ 2021, currently on cyclosporine , now on CsA 150mg/200mg po daily (~4.14 mg/kg of dosing weight 84.5kg). Pt previously followed by inpatient dermatology during last hospitalization. Pt continues to endorse pain of wound, and R knee.  Prior work-up indicated as below, wound was favored to represent pyoderma gangrenosum based on clinical appearance of deep ulceration with extensive undermining and worsening when trial off of steroids during last admission.    Prior work-up:   - Bacterial tissue culture 21 with carbapenem-resistent Pseudomonas, Staph epidermidis, Enterococcus faecalis; reportedly same as previous bacterial culture.   - Fungal tissue culture 21 no growth final  - s/p 10-day course of IV cefepime completed 22 and other previously other outpatient abx.   - Biopsy 22 findings non specific. No obvious intravascular calcium deposits although limited sample of subcutaneous tissue. Repeat von Kossa staining (for calcium), and deeper sections from original biopsy were reviewed with initial diagnosis unchanged.   -SPEP, serum DOUG, ANCAs negative. Unable to obtain UPEP/uIFE as pt is not making much urine  ESRD.   -no calcifications noted on CT A/P 22   -quantiferon indeterminate (22), HIV negative, hepatitis panel nonreactive and non immune to hepatitis B(22)  -wound cx  w/ polymicrobial growth, likely colonizers per ID during last admission, no indication to treat    Size progression per wound care documentation:   3/21/22:  5x17x3, less slough most into 12-2 ock corner  3/3/22: 7cmx12.5cmx2.8cm    22:  [id number], 2cm at 1:00, 3cm at 11:00  2/10/22: [id number], undermining from 11- 1 o'clock extending 3cm        PAST MEDICAL & SURGICAL HISTORY:  COPD (chronic obstructive pulmonary disease)    DM (diabetes mellitus)    Atrial fibrillation  with loop recorder , battery most likely depleted, as per cardiac clearance, Dr. Reece Anesthesia aware, pt on Eliquis    HTN (hypertension)    Morbid obesity  BMI - 58.3    Chronic GERD    CHAMP (obstructive sleep apnea)  non compliance with CPAP, Anesthesia Dr. Reece aware, pt told to bring CPAP for sx, pr verbalized understanding    Potential difficult airway on pre-intubation assessment  airway class III, large neck, morbid obesity, hx of CHAMP, no compliance with CPAP- Dr. Reece, Anesthesia aware    End-stage renal disease    Anemia    Medication management    H/O tubal ligation      Status post placement of implantable loop recorder  left chest-     History of vascular access device  s/p insertion right chest permacath 2019, removal 2019, insertion left chest permacath 2019    S/P arteriovenous (AV) fistula creation  left  arm 2019        REVIEW OF SYSTEMS:  General: no fevers/chills; no lethargy  Skin/Breast: see HPI  Ophthalmologic: no eye pain or changes in vision  ENMT: no dysphagia or changes in haering  Respiratory: no SOB or cough  Cardiovascular: no palpitations or chest pain  Gastroinestinal: no abdominal pain or blood in stool  Genitourinary: no dysuria or frequency  Musculoskeletal: no joint pains or weakness  Neurological: no weakness, numbness, or tingling    MEDICATIONS  (STANDING):  buPROPion XL (24-Hour) . 150 milliGRAM(s) Oral daily  cinacalcet 60 milliGRAM(s) Oral daily  cycloSPORINE  , modified (NEORAL) 200 milliGRAM(s) Oral at bedtime  cycloSPORINE  , modified (NEORAL) 150 milliGRAM(s) Oral <User Schedule>  dextrose 40% Gel 15 Gram(s) Oral once  dextrose 5%. 1000 milliLiter(s) (50 mL/Hr) IV Continuous <Continuous>  dextrose 5%. 1000 milliLiter(s) (100 mL/Hr) IV Continuous <Continuous>  dextrose 50% Injectable 25 Gram(s) IV Push once  dextrose 50% Injectable 12.5 Gram(s) IV Push once  dextrose 50% Injectable 25 Gram(s) IV Push once  diltiazem    milliGRAM(s) Oral daily  epoetin anabela-epbx (RETACRIT) Injectable 88815 Unit(s) IV Push <User Schedule>  gabapentin 300 milliGRAM(s) Oral two times a day  glucagon  Injectable 1 milliGRAM(s) IntraMuscular once  insulin glargine Injectable (LANTUS) 10 Unit(s) SubCutaneous at bedtime  insulin lispro (ADMELOG) corrective regimen sliding scale   SubCutaneous three times a day before meals  insulin lispro (ADMELOG) corrective regimen sliding scale   SubCutaneous at bedtime  lidocaine   4% Patch 1 Patch Transdermal daily  lidocaine   4% Patch 1 Patch Transdermal daily  mirtazapine 7.5 milliGRAM(s) Oral at bedtime  montelukast 10 milliGRAM(s) Oral at bedtime  oxyCODONE  ER Tablet 10 milliGRAM(s) Oral every 12 hours  pantoprazole    Tablet 40 milliGRAM(s) Oral before breakfast  polyethylene glycol 3350 17 Gram(s) Oral at bedtime  senna 2 Tablet(s) Oral at bedtime  sertraline 50 milliGRAM(s) Oral daily  sevelamer carbonate 800 milliGRAM(s) Oral three times a day with meals  simethicone 80 milliGRAM(s) Chew three times a day  tacrolimus   0.1% Ointment 1 Application(s) Topical daily  traZODone 100 milliGRAM(s) Oral at bedtime    MEDICATIONS  (PRN):  acetaminophen     Tablet .. 650 milliGRAM(s) Oral every 6 hours PRN Temp greater or equal to 38C (100.4F), Mild Pain (1 - 3)  melatonin 3 milliGRAM(s) Oral at bedtime PRN Insomnia  midodrine. 5 milliGRAM(s) Oral <User Schedule> PRN 30 minutes prior to dialysis  ondansetron Injectable 4 milliGRAM(s) IV Push every 8 hours PRN Nausea and/or Vomiting  oxyCODONE    IR 7.5 milliGRAM(s) Oral every 6 hours PRN Severe Pain (7 - 10)      Allergies    latex (Rash)  penicillin (Nausea)  strawberry (Rash)    Intolerances    caffeine (Nausea)      SOCIAL HISTORY:    FAMILY HISTORY:  Family history of diabetes mellitus  mother-     Family hx of hypertension  mother-         Vital Signs Last 24 Hrs  T(C): 36.6 (22 Mar 2022 05:15), Max: 37.4 (21 Mar 2022 22:26)  T(F): 97.9 (22 Mar 2022 05:15), Max: 99.4 (21 Mar 2022 22:26)  HR: 74 (22 Mar 2022 05:15) (70 - 80)  BP: 129/62 (22 Mar 2022 05:15) (105/59 - 129/70)  BP(mean): --  RR: 17 (22 Mar 2022 05:15) (17 - 20)  SpO2: 100% (22 Mar 2022 05:15) (99% - 100%)    PHYSICAL EXAM:  The patient was AOx3, well nourished, and in NAD.  OP showed no ulcerations.  There was no visible LAD.  Conjunctiva were non-injected.  There was no clubbing or edema of extremities.   The scalp, hair, face, eyebrows, lips, OP, neck, chest, back, extremities x4, and nails were examined.  There was no hyperhidrosis or bromhidrosis.     Of note on skin exam:   on right sided pannus,  5x17x3 cm ulcerated plaque with, less slough most into 12-2 ock corner, wound appears deeper in this area compared to prior, and also with healthy appearing granulation tissue throughout the rest of the wound base. Satellite lesions 0.5 x 0.5 x 0.2 cm at 12 o'clock and 0.8 cm x 0.5cm x 0.2 cm at 4 o'clock.       LABS:                        7.8    14.76 )-----------( 388      ( 22 Mar 2022 06:46 )             28.5     03-    136  |  95<L>  |  51<H>  ----------------------------<  111<H>  4.0   |  26  |  5.94<H>    Ca    9.1      22 Mar 2022 06:46  Phos  3.9     03-  Mg     2.50         TPro  6.3  /  Alb  3.0<L>  /  TBili  0.3  /  DBili  x   /  AST  9   /  ALT  <5<L>  /  AlkPhos  156<H>  03-21    PT/INR - ( 21 Mar 2022 17:26 )   PT: 13.9 sec;   INR: 1.20 ratio         PTT - ( 21 Mar 2022 17:26 )  PTT:39.0 sec      RADIOLOGY & ADDITIONAL STUDIES:

## 2022-03-22 NOTE — CHART NOTE - NSCHARTNOTEFT_GEN_A_CORE
R4 Chart Note    US PELVIC COMPLETE                        PROCEDURE DATE:  03/22/2022    FINDINGS:  Transvaginal exam was not performed as patient was unable to cooperate   for proper positioning. Nondiagnostic evaluation of the uterus and adnexa   due to poor acoustic windows via transabdominal approach.  IMPRESSION:  Nondiagnostic exam.    Recommendations  - Obtain endometrial biopsy in the outpatient setting  - No contraindication to AC in the setting of vaginal bleeding, benefits of AC outweigh risks  - Follow up outpatient with Dr. Diane  - Above recommendations discussed with patient's daughter Ashley per patient request    Please reconsult as needed    JOSEPH Lozano PGY4  d/w Dr. Corral R4 Chart Note    US PELVIC COMPLETE                        PROCEDURE DATE:  03/22/2022    FINDINGS:  Transvaginal exam was not performed as patient was unable to cooperate   for proper positioning. Nondiagnostic evaluation of the uterus and adnexa   due to poor acoustic windows via transabdominal approach.  IMPRESSION:  Nondiagnostic exam.    Recommendations  - Dermatology considering TNF inhibitor and thus they are requesting inpatient EMB inpatient  - Will attempt EMB tomorrow at bedside  - No contraindication to AC in the setting of vaginal bleeding, benefits of AC outweigh risks  - Plan for follow up outpatient with Dr. Roxi Lozano PGY4  d/w Dr. Corral R4 Chart Note    US PELVIC COMPLETE                        PROCEDURE DATE:  03/22/2022    FINDINGS:  Transvaginal exam was not performed as patient was unable to cooperate   for proper positioning. Nondiagnostic evaluation of the uterus and adnexa   due to poor acoustic windows via transabdominal approach.  IMPRESSION:  Nondiagnostic exam.    Recommendations  - Dermatology considering TNF inhibitor and thus they are requesting EMB inpatient  - Will attempt EMB tomorrow at bedside  - No contraindication to AC in the setting of vaginal bleeding, benefits of AC outweigh risks  - Plan for follow up outpatient with Dr. Roxi Lozano PGY4  d/w Dr. Corral

## 2022-03-22 NOTE — DIETITIAN INITIAL EVALUATION ADULT. - PERTINENT MEDS FT
MEDICATIONS  (STANDING):  buPROPion XL (24-Hour) . 150 milliGRAM(s) Oral daily  cinacalcet 60 milliGRAM(s) Oral daily  cycloSPORINE  , modified (NEORAL) 200 milliGRAM(s) Oral at bedtime  cycloSPORINE  , modified (NEORAL) 150 milliGRAM(s) Oral <User Schedule>  dextrose 40% Gel 15 Gram(s) Oral once  dextrose 5%. 1000 milliLiter(s) (50 mL/Hr) IV Continuous <Continuous>  dextrose 5%. 1000 milliLiter(s) (100 mL/Hr) IV Continuous <Continuous>  dextrose 50% Injectable 25 Gram(s) IV Push once  dextrose 50% Injectable 12.5 Gram(s) IV Push once  dextrose 50% Injectable 25 Gram(s) IV Push once  diltiazem    milliGRAM(s) Oral daily  epoetin anabela-epbx (RETACRIT) Injectable 36281 Unit(s) IV Push <User Schedule>  gabapentin 300 milliGRAM(s) Oral two times a day  glucagon  Injectable 1 milliGRAM(s) IntraMuscular once  insulin glargine Injectable (LANTUS) 10 Unit(s) SubCutaneous at bedtime  insulin lispro (ADMELOG) corrective regimen sliding scale   SubCutaneous three times a day before meals  insulin lispro (ADMELOG) corrective regimen sliding scale   SubCutaneous at bedtime  lidocaine   4% Patch 1 Patch Transdermal daily  lidocaine   4% Patch 1 Patch Transdermal daily  mirtazapine 7.5 milliGRAM(s) Oral at bedtime  montelukast 10 milliGRAM(s) Oral at bedtime  oxyCODONE  ER Tablet 10 milliGRAM(s) Oral every 12 hours  pantoprazole    Tablet 40 milliGRAM(s) Oral before breakfast  polyethylene glycol 3350 17 Gram(s) Oral at bedtime  senna 2 Tablet(s) Oral at bedtime  sertraline 50 milliGRAM(s) Oral daily  sevelamer carbonate 800 milliGRAM(s) Oral three times a day with meals  simethicone 80 milliGRAM(s) Chew three times a day  tacrolimus   0.1% Ointment 1 Application(s) Topical daily  traZODone 100 milliGRAM(s) Oral at bedtime    MEDICATIONS  (PRN):  acetaminophen     Tablet .. 650 milliGRAM(s) Oral every 6 hours PRN Temp greater or equal to 38C (100.4F), Mild Pain (1 - 3)  melatonin 3 milliGRAM(s) Oral at bedtime PRN Insomnia  midodrine. 5 milliGRAM(s) Oral <User Schedule> PRN 30 minutes prior to dialysis  ondansetron Injectable 4 milliGRAM(s) IV Push every 8 hours PRN Nausea and/or Vomiting  oxyCODONE    IR 7.5 milliGRAM(s) Oral every 6 hours PRN Severe Pain (7 - 10)

## 2022-03-22 NOTE — CONSULT NOTE ADULT - SUBJECTIVE AND OBJECTIVE BOX
Chief Complaint:  unrelieved wound pain    HPI:  56F ESRD on HD,  HTN, DM, COPD, afib, known chronic R pannus wound felt most likely pyoderma gangrenosum presents for 1.5wk vag bleeding. Pt recently had prolonged stay for pannus wound and dc 3/3 to SNF. Per pt she has had issues since, developed bed sores & not eating bec food is poor. For last ~12d has been having what she thought was menstrual bleeding, passing clots & going through ~6-8 pads/day initially though it is now improving. Now no longer passing clots but still going through ~4-5 pads so came to ED. Thinks she feels a little more fatigued than usual but she's largely bed-bound. Also endorsing some inc abd periumbilical pain and her chronic pannus wound pain. No fever, CP, SOB, NV. Supposed to go to HD Saturday but missed.    ED: 118/56, 80, 18, 99.1, 99RA --> vanco/cefepime  Tearful, overwhelmed with chronic illness. VB not active now, but had severe cramps past day. Denies chest pain, SOB, nausea,  (20 Mar 2022 09:38)      PAST MEDICAL & SURGICAL HISTORY:  COPD (chronic obstructive pulmonary disease)  DM (diabetes mellitus)  Atrial fibrillation  with loop recorder , battery most likely depleted, as per cardiac clearance, Dr. Reece Anesthesia aware, pt on Eliquis  HTN (hypertension)  Morbid obesity  BMI - 58.3  Chronic GERD  CHAMP (obstructive sleep apnea)  non compliance with CPAP, Anesthesia Dr. Reece aware, pt told to bring CPAP for sx, pr verbalized understanding  Potential difficult airway on pre-intubation assessment  airway class III, large neck, morbid obesity, hx of CHAMP, no compliance with CPAP- Dr. Reece, Anesthesia aware  End-stage renal disease  Anemia  Medication management  H/O tubal ligation    Status post placement of implantable loop recorder  left chest-   History of vascular access device  s/p insertion right chest permacath 2019, removal 2019, insertion left chest permacath 2019  S/P arteriovenous (AV) fistula creation  left  arm 2019      FAMILY HISTORY:  Family history of diabetes mellitus  mother-   Family hx of hypertension  mother-     SOCIAL HISTORY:  [x ] Denies Smoking, Alcohol, or Drug Use    Allergies  latex (Rash)  penicillin (Nausea)  strawberry (Rash)    Intolerances  caffeine (Nausea)      PAIN MEDICATIONS:  acetaminophen     Tablet .. 650 milliGRAM(s) Oral every 6 hours PRN  buPROPion XL (24-Hour) . 150 milliGRAM(s) Oral daily  gabapentin 300 milliGRAM(s) Oral two times a day  melatonin 3 milliGRAM(s) Oral at bedtime PRN  mirtazapine 7.5 milliGRAM(s) Oral at bedtime  ondansetron Injectable 4 milliGRAM(s) IV Push every 8 hours PRN  oxyCODONE    IR 7.5 milliGRAM(s) Oral every 6 hours PRN  oxyCODONE  ER Tablet 10 milliGRAM(s) Oral every 12 hours  sertraline 50 milliGRAM(s) Oral daily  traZODone 100 milliGRAM(s) Oral at bedtime    Cardiovascular:  diltiazem    milliGRAM(s) Oral daily  midodrine. 5 milliGRAM(s) Oral <User Schedule> PRN    GI:  pantoprazole    Tablet 40 milliGRAM(s) Oral before breakfast  polyethylene glycol 3350 17 Gram(s) Oral at bedtime  senna 2 Tablet(s) Oral at bedtime  simethicone 80 milliGRAM(s) Chew three times a day    Endocrine:  dextrose 40% Gel 15 Gram(s) Oral once  dextrose 50% Injectable 25 Gram(s) IV Push once  dextrose 50% Injectable 12.5 Gram(s) IV Push once  dextrose 50% Injectable 25 Gram(s) IV Push once  glucagon  Injectable 1 milliGRAM(s) IntraMuscular once  insulin glargine Injectable (LANTUS) 10 Unit(s) SubCutaneous at bedtime  insulin lispro (ADMELOG) corrective regimen sliding scale   SubCutaneous three times a day before meals  insulin lispro (ADMELOG) corrective regimen sliding scale   SubCutaneous at bedtime    All Other Medications:  chlorhexidine 2% Cloths 1 Application(s) Topical daily  cycloSPORINE  , modified (NEORAL) 200 milliGRAM(s) Oral at bedtime  cycloSPORINE  , modified (NEORAL) 150 milliGRAM(s) Oral <User Schedule>  Dakins Solution - 1/4 Strength 1 Application(s) Topical daily  dextrose 5%. 1000 milliLiter(s) IV Continuous <Continuous>  dextrose 5%. 1000 milliLiter(s) IV Continuous <Continuous>  epoetin anabela-epbx (RETACRIT) Injectable 34859 Unit(s) IV Push <User Schedule>  lidocaine   4% Patch 1 Patch Transdermal daily  lidocaine   4% Patch 1 Patch Transdermal daily  sevelamer carbonate 800 milliGRAM(s) Oral three times a day with meals  tacrolimus   0.1% Ointment 1 Application(s) Topical daily      Vital Signs Last 24 Hrs  T(C): 36.8 (22 Mar 2022 10:17), Max: 37.4 (21 Mar 2022 22:26)  T(F): 98.2 (22 Mar 2022 10:17), Max: 99.4 (21 Mar 2022 22:26)  HR: 80 (22 Mar 2022 10:17) (70 - 80)  BP: 116/60 (22 Mar 2022 10:17) (105/59 - 129/62)  BP(mean): --  RR: 18 (22 Mar 2022 10:17) (17 - 20)  SpO2: 99% (22 Mar 2022 10:17) (99% - 100%)    PAIN SCORE:   7.5/10     SCALE USED: (1-10 VNRS)           LABS:                          7.8    14.76 )-----------( 388      ( 22 Mar 2022 06:46 )             28.5     03-22    136  |  95<L>  |  51<H>  ----------------------------<  111<H>  4.0   |  26  |  5.94<H>    Ca    9.1      22 Mar 2022 06:46  Phos  3.9     -  Mg     2.50     -22    TPro  6.3  /  Alb  3.0<L>  /  TBili  0.3  /  DBili  x   /  AST  9   /  ALT  <5<L>  /  AlkPhos  156<H>  03-21    PT/INR - ( 21 Mar 2022 17:26 )   PT: 13.9 sec;   INR: 1.20 ratio         PTT - ( 21 Mar 2022 17:26 )  PTT:39.0 sec    [x ]  [place]  Reviewed     Summary:  The patient is a 56 years old, morbidly obese, female with history of DM, ESRD on HD, COPD recently discharged from here to [facility] on 3/3/22.  The patient started having vaginal bleeding and still had Right chronic pannus wound pain, hence she was brought to the ED and subsequently admitted. The patient is lying in the bed complaining of 7.5/10 right pannus wound pain, radiating across and around her lower pannus,  down  her left leg and to her left foot arches.  The pain is aching, throbbing and sometimes her left leg has some muscle tightening sensation. Patient states the Oxycodone IR helps for about an hour and a half and that's it.  While the Oxycontin ER she feels does not help much at all.     PHYSICAL EXAM:  GENERAL: Alert & Oriented x 3 in NAD, well-groomed, well-developed, obese, dressing found under pannus, no oozing found.      Impression/Plan: Requested by Medicine Team  to help manage pain.   Recommendations:  -  Continue with Oxycontin as ordered.  -  Consider reducing gabapentin order given patient's GFR and Creatinine to only Gabapentin 300mg once a day.  HOld for oversedation.   -  Consider changing Oxycodone 7.5mg order to po Oxycodone 10 mg every 4 hours PRN for moderate to severe pain (4-10)   -  Consider ordering Tylenol 650 mg every 6 hours standing x2 days, then PRN for pain.  -  Recommend wound care consult.  -  Recommend Nutritionist since patient is not eating much and needs proper nutrition for healing   -  Recommend maintaining continuous pulse oximetry.  -  Recommend Physical Therapy consult for TENS therapy for left leg.  -  Recommend follow up with Chronic Pain doctor when discharged. If patient does not have a Chronic Pain doctor, may acquire one through patient's personal insurance carrier.  Discussed patient with Chronic Pain Attending on call, Dr. Meeks, who agrees with the above recommendations.  No further recommendations at this time, Chronic pain service to sign off. May call Chronic Pain Service if needed.   Thank you.

## 2022-03-22 NOTE — DIETITIAN INITIAL EVALUATION ADULT. - PROBLEM SELECTOR PLAN 6
not been eating well past few weeks, was on Lantus 15 at rehab  - will decrease lantus to 10hs (dose was on last visit), hold premeals for now  - FSBGs, low correctional scale  - last A1c 7 in Jan --> f/u repeat  - will ask endo input

## 2022-03-22 NOTE — PROGRESS NOTE ADULT - SUBJECTIVE AND OBJECTIVE BOX
[facility] NEPHROLOGY- PROGRESS NOTE    56y Female with history of ESRD on HD presents with vaginal bleeding. Nephrology consulted for ESRD status.    REVIEW OF SYSTEMS:  Gen: no changes in weight  Cards: no chest pain  Resp: no dyspnea  GI: no nausea or vomiting or diarrhea  : + vaginal bleeding  Vascular: no LE edema    caffeine (Nausea)  latex (Rash)  penicillin (Nausea)  strawberry (Rash)      Hospital Medications: Medications reviewed      VITALS:  T(F): 97.9 (03-22-22 @ 05:15), Max: 99.4 (03-21-22 @ 22:26)  HR: 74 (03-22-22 @ 05:15)  BP: 129/62 (03-22-22 @ 05:15)  RR: 17 (03-22-22 @ 05:15)  SpO2: 100% (03-22-22 @ 05:15)  Wt(kg): --        PHYSICAL EXAM:    Gen: NAD, calm  Cards: RRR, +S1/S2, no M/G/R  Resp: CTA B/L  GI: soft, NT/ND, NABS, + ab wound dressing in place  Vascular: no LE edema B/L, LUE AVF + bruit/thrill      LABS:  03-22    136  |  95<L>  |  51<H>  ----------------------------<  111<H>  4.0   |  26  |  5.94<H>    Ca    9.1      22 Mar 2022 06:46  Phos  3.9     03-22  Mg     2.50     03-22    TPro  6.3  /  Alb  3.0<L>  /  TBili  0.3  /  DBili      /  AST  9   /  ALT  <5<L>  /  AlkPhos  156<H>  03-21    Creatinine Trend: 5.94 <--, 7.98 <--, 6.85 <--, 6.03 <--                        7.8    14.76 )-----------( 388      ( 22 Mar 2022 06:46 )             28.5     Urine Studies:

## 2022-03-22 NOTE — PROGRESS NOTE ADULT - ASSESSMENT
56y Female with history of ESRD on HD presents with vaginal bleeding. Nephrology consulted for ESRD status.    1) ESRD: Last HD on 3/21 tolerated well with 1.5L removed. Plan for next maintenance HD on 3/23 (will hold heparin with HD due to vaginal bleeding). Monitor electrolytes.    2) HTN with ESRD: BP acceptable on Cardizem. Continue with midodrine pre-HD on HD days to avoid intradialytic hypotension. Monitor BP.    3) Anemia of renal disease: With component of blood loss from vaginal bleeding. Hb low acceptable iron stores. Continue with Epo 16K with HD. PRBC as needed. Monitor Hb.    4) Secondary HPT of renal origin: Phosphorus acceptable with low iPTH. Decrease sensipar to 30 mg daily and continue with renvela 1 tab with meals. Monitor serum calcium and phosphorus.    [facility] NEPHROLOGY  Keaton Lopez M.D.  Juma Green D.O.  Myla Hoffmann M.D.  Julia Brewer, MSN, ANP-C    Telephone: [phone]  Facsimile: [phone]    [address] [place] [address]

## 2022-03-22 NOTE — PROGRESS NOTE ADULT - SUBJECTIVE AND OBJECTIVE BOX
SUBJECTIVE / OVERNIGHT EVENTS:pt seen and examined  03-22-22     MEDICATIONS  (STANDING):  buPROPion XL (24-Hour) . 150 milliGRAM(s) Oral daily  chlorhexidine 2% Cloths 1 Application(s) Topical daily  cycloSPORINE  , modified (NEORAL) 200 milliGRAM(s) Oral two times a day  Dakins Solution - 1/4 Strength 1 Application(s) Topical daily  dextrose 40% Gel 15 Gram(s) Oral once  dextrose 5%. 1000 milliLiter(s) (50 mL/Hr) IV Continuous <Continuous>  dextrose 5%. 1000 milliLiter(s) (100 mL/Hr) IV Continuous <Continuous>  dextrose 50% Injectable 25 Gram(s) IV Push once  dextrose 50% Injectable 12.5 Gram(s) IV Push once  dextrose 50% Injectable 25 Gram(s) IV Push once  diltiazem    milliGRAM(s) Oral daily  epoetin anabela-epbx (RETACRIT) Injectable 10361 Unit(s) IV Push <User Schedule>  gabapentin 300 milliGRAM(s) Oral daily  glucagon  Injectable 1 milliGRAM(s) IntraMuscular once  insulin glargine Injectable (LANTUS) 10 Unit(s) SubCutaneous at bedtime  insulin lispro (ADMELOG) corrective regimen sliding scale   SubCutaneous three times a day before meals  insulin lispro (ADMELOG) corrective regimen sliding scale   SubCutaneous at bedtime  lidocaine   4% Patch 1 Patch Transdermal daily  lidocaine   4% Patch 1 Patch Transdermal daily  mirtazapine 7.5 milliGRAM(s) Oral at bedtime  montelukast 10 milliGRAM(s) Oral at bedtime  mupirocin 2% Ointment 1 Application(s) Topical two times a day  oxyCODONE  ER Tablet 10 milliGRAM(s) Oral every 12 hours  pantoprazole    Tablet 40 milliGRAM(s) Oral before breakfast  polyethylene glycol 3350 17 Gram(s) Oral at bedtime  senna 2 Tablet(s) Oral at bedtime  sertraline 50 milliGRAM(s) Oral daily  sevelamer carbonate 800 milliGRAM(s) Oral three times a day with meals  simethicone 80 milliGRAM(s) Chew three times a day  tacrolimus   0.1% Ointment 1 Application(s) Topical daily  traZODone 100 milliGRAM(s) Oral at bedtime    MEDICATIONS  (PRN):  acetaminophen     Tablet .. 650 milliGRAM(s) Oral every 6 hours PRN Temp greater or equal to 38C (100.4F), Mild Pain (1 - 3)  melatonin 3 milliGRAM(s) Oral at bedtime PRN Insomnia  midodrine. 5 milliGRAM(s) Oral <User Schedule> PRN 30 minutes prior to dialysis  ondansetron Injectable 4 milliGRAM(s) IV Push every 8 hours PRN Nausea and/or Vomiting  oxyCODONE    IR 10 milliGRAM(s) Oral every 4 hours PRN moderate and severe pain    Vital Signs Last 24 Hrs  T(C): 36.6 (03-22-22 @ 17:29), Max: 37.4 (03-21-22 @ 22:26)  T(F): 97.9 (03-22-22 @ 17:29), Max: 99.4 (03-21-22 @ 22:26)  HR: 83 (03-22-22 @ 17:29) (74 - 83)  BP: 105/60 (03-22-22 @ 17:29) (105/59 - 129/62)  BP(mean): --  RR: 19 (03-22-22 @ 17:29) (17 - 19)  SpO2: 98% (03-22-22 @ 17:29) (98% - 100%)          Constitutional: No fever, fatigue  Skin: No rash.  Eyes: No recent vision problems or eye pain.  ENT: No congestion, ear pain, or sore throat.  Cardiovascular: No chest pain or palpation.  Respiratory: No cough, shortness of breath, congestion, or wheezing.  Gastrointestinal: No abdominal pain, nausea, vomiting, or diarrhea.  Genitourinary: No dysuria.  Musculoskeletal: No joint swelling.  Neurologic: No headache.    PHYSICAL EXAM:  GENERAL: NAD  EYES: EOMI, PERRLA  NECK: Supple, No JVD  CHEST/LUNG: dec breath sounds at bases  HEART:  S1 , S2 +  ABDOMEN: soft , bs+, wound dressing+  EXTREMITIES:  edema+  NEUROLOGY:alert awake oriented     LABS:  03-22    136  |  95<L>  |  51<H>  ----------------------------<  111<H>  4.0   |  26  |  5.94<H>    Ca    9.1      22 Mar 2022 06:46  Phos  3.9     03-22  Mg     2.50     03-22    TPro  6.3  /  Alb  3.0<L>  /  TBili  0.3  /  DBili      /  AST  9   /  ALT  <5<L>  /  AlkPhos  156<H>  03-21    Creatinine Trend: 5.94 <--, 7.98 <--, 6.85 <--, 6.03 <--                        7.8    14.76 )-----------( 388      ( 22 Mar 2022 06:46 )             28.5     Urine Studies:            LIVER FUNCTIONS - ( 21 Mar 2022 17:26 )  Alb: 3.0 g/dL / Pro: 6.3 g/dL / ALK PHOS: 156 U/L / ALT: <5 U/L / AST: 9 U/L / GGT: x           PT/INR - ( 21 Mar 2022 17:26 )   PT: 13.9 sec;   INR: 1.20 ratio         PTT - ( 21 Mar 2022 17:26 )  PTT:39.0 sec      RADIOLOGY & ADDITIONAL TESTS:    Imaging Personally Reviewed:    Consultant(s) Notes Reviewed:      Care Discussed with Consultants/Other Providers:

## 2022-03-22 NOTE — DIETITIAN INITIAL EVALUATION ADULT. - OTHER INFO
Nutrition consult requested for pressure injury stage II or greater. No pressure injuries currently noted in flowsheets.  Patient with abdominal wound with suspected pyoderma gangrenosum.  Per nursing flowsheets, unable to obtain weight at this time.  Unable to assess BMI at present; noted with h/o morbid obesity.  Weight from a previous admission 2/18/22 - 129.5kg post HD.  Endocrinology following patient.  Per chart, patient noted with poor PO intake @ rehab due to dislike of foods provided.  Diet currently being supplemented with Nepro 3x daily (1,275 kcals, 52.3g protein) for added nutritional support.

## 2022-03-22 NOTE — DIETITIAN INITIAL EVALUATION ADULT. - ADD RECOMMEND
1) Monitor weights, PO intake/diet tolerance, skin integrity, pertinent labs. 2) Please consistently document % PO intake in nursing flowsheets to assess adequacy of nutritional intake/monitor need for further nutritional intervention(s). 3) Encourage High Biological Value Protein sources (i.e. chicken, turkey, beef, fish, eggs, etc.).

## 2022-03-22 NOTE — CHART NOTE - NSCHARTNOTEFT_GEN_A_CORE
FS reviewed. Continue Lantus 10 Units HS and low scale before meals and bedtime. Endocrine team will follow.      Gina Dunham DO

## 2022-03-22 NOTE — CONSULT NOTE ADULT - SUBJECTIVE AND OBJECTIVE BOX
Patient is a 56y old  Female who presents with a chief complaint of vaginal bleeding (22 Mar 2022 14:04)      HPI:  56F ESRD TTS, HTN, DM, COPD, afib, known chronic R pannus wound felt most likely pyoderma gangrenosum presents for 1.5wk vag bleeding. Pt recently had prolonged stay for pannus wound and dc 3/3 to SNF. Per pt she has had issues since, developed bed sores & not eating bec food is poor. For last ~12d has been having what she thought was menstrual bleeding, passing clots & going through ~6-8 pads/day initially though it is now improving. Now no longer passing clots but still going through ~4-5 pads so came to ED. Thinks she feels a little more fatigued than usual but she's largely bed-bound. Also endorsing some inc abd periumbilical pain and her chronic pannus wound pain. No fever, CP, SOB, NV. Supposed to go to HD Saturday but missed.  Tearful, overwhelmed with chronic illness. VB not active now, but had severe cramps past day. Denies chest pain, SOB, nausea,  (20 Mar 2022 09:38)  Above reviewed:   pt says she came in for vaginal bleeding and not the ulcer, no fever at NH. No chest pain, no N/V, + constipation.       PAST MEDICAL & SURGICAL HISTORY:  COPD (chronic obstructive pulmonary disease)    DM (diabetes mellitus)    Atrial fibrillation  with loop recorder , battery most likely depleted, as per cardiac clearance, Dr. Reece Anesthesia aware, pt on Eliquis    HTN (hypertension)    Morbid obesity  BMI - 58.3    Chronic GERD    CHAMP (obstructive sleep apnea)  non compliance with CPAP, Anesthesia Dr. Reece aware, pt told to bring CPAP for sx, pr verbalized understanding    Potential difficult airway on pre-intubation assessment  airway class III, large neck, morbid obesity, hx of CHAMP, no compliance with CPAP- Dr. Reece, Anesthesia aware    End-stage renal disease    Anemia    Medication management    H/O tubal ligation      Status post placement of implantable loop recorder  left chest-     History of vascular access device  s/p insertion right chest permacath 2019, removal 2019, insertion left chest permacath 2019    S/P arteriovenous (AV) fistula creation  left  arm 2019        REVIEW OF SYSTEMS    General: Fevers absent    Skin: No rash, rest per HPI   	  Ophthalmologic: Denies any discharge, redness.  	  ENMT: No nasal congestion or throat pain.     Respiratory and Thorax: No cough, sputum. Denies shortness of breath.  	  Cardiovascular: No chest pain,    Gastrointestinal: No nausea or diarrhea.    Genitourinary: No dysuria,     Musculoskeletal: No joint swelling     Neurological: No new extremity weakness.    Psychiatric: No hallucinations	    Extremities: No swelling     Endocrine: No polyuria or polydipsia     Allergic/Immunologic: No hives         Social history:  From NH, single, no smoking       FAMILY HISTORY:  Family history of diabetes mellitus  mother-     Family hx of hypertension  mother-         Allergies  latex (Rash)  penicillin (Nausea)  strawberry (Rash)      Antimicrobials:        Vital Signs Last 24 Hrs  T(C): 36.8 (22 Mar 2022 10:17), Max: 37.4 (21 Mar 2022 22:26)  T(F): 98.2 (22 Mar 2022 10:17), Max: 99.4 (21 Mar 2022 22:26)  HR: 80 (22 Mar 2022 10:17) (74 - 80)  BP: 116/60 (22 Mar 2022 10:17) (105/59 - 129/62)  BP(mean): --  RR: 18 (22 Mar 2022 10:17) (17 - 18)  SpO2: 99% (22 Mar 2022 10:17) (99% - 100%)      PHYSICAL EXAM: Patient in no acute distress.    Constitutional: Comfortable. Awake and alert    Eyes: No discharge or conjunctival injection    ENT: No thrush. No pharyngeal exudate    Neck: Supple,    Respiratory: + air entry bilaterally.    Cardiovascular: S1 S2 wnl,     Gastrointestinal: Soft BS(+), non distended. tenderness at the site of ulcer.     Genitourinary: No daniel, + vaginal bleed     Extremities: No edema.    Vascular: lt arm fistula     Neurological: No new gross focal deficits.    Skin: No rash, large rt sided ulcer under the pannus, rt sided ulcer clean base, lt sided ulcer with malodor, appears to have fibrinous base with some drainage.     Musculoskeletal: No joint swelling.    Psychiatric: Affect normal.                              7.8    14.76 )-----------( 388      ( 22 Mar 2022 06:46 )             28.5       03-    136  |  95<L>  |  51<H>  ----------------------------<  111<H>  4.0   |  26  |  5.94<H>    Ca    9.1      22 Mar 2022 06:46  Phos  3.9       Mg     2.50         TPro  6.3  /  Alb  3.0<L>  /  TBili  0.3  /  DBili  x   /  AST  9   /  ALT  <5<L>  /  AlkPhos  156<H>              Radiology: Imaging reviewed and visualized personally except U/S [ x]    < from: CT Abdomen and Pelvis w/ IV Cont (22 @ 00:26) >    IMPRESSION:    1. Incompletely imaged large skin defect involving the inferior pannus   with subcutaneous stranding and skin thickening compatible with   cellulitis. No abscess identified.  2. Enlarged myomatous uterus. Suspected endometrial thickening.   Correlation with pelvic ultrasound or MRI is recommended.      < from: US Pelvis Complete (US Pelvis Complete .) (22 @ 09:11) >  IMPRESSION:  Nondiagnostic exam.

## 2022-03-22 NOTE — PROGRESS NOTE ADULT - SUBJECTIVE AND OBJECTIVE BOX
CARDIOLOGY FOLLOW UP - Dr. Bullock  Date of Service: 3/22/22  CC: no events    Review of Systems:  Constitutional: No fever, weight loss, or fatigue  Respiratory: No cough, wheezing, or hemoptysis, no shortness of breath  Cardiovascular: No chest pain, palpitaitons, passing out, dizziness, or leg swelling  Gastrointestinal: No abd or epigastric pain. No nausea, vomitting, or hematemesis; no diarrhea or consiptaiton, no melena or hematochezia  Vascular: No edema     TELEMETRY:    PHYSICAL EXAM:  T(C): 36.6 (03-22-22 @ 05:15), Max: 37.4 (03-21-22 @ 22:26)  HR: 74 (03-22-22 @ 05:15) (70 - 80)  BP: 129/62 (03-22-22 @ 05:15) (105/59 - 129/62)  RR: 17 (03-22-22 @ 05:15) (17 - 20)  SpO2: 100% (03-22-22 @ 05:15) (100% - 100%)  Wt(kg): --  I&O's Summary      Appearance: Normal	  Cardiovascular: Normal S1 S2,RRR, No JVD, No murmurs  Respiratory: Lungs clear to auscultation	  Gastrointestinal:  Soft, Non-tender, + BS	  Extremities: Normal range of motion, No clubbing, cyanosis or edema  Vascular: Peripheral pulses palpable 2+ bilaterally       Home Medications:  Aspercreme with Lidocaine 4% topical film: Apply topically to affected area once a day (low back) (20 Mar 2022 10:15)  Aspercreme with Lidocaine 4% topical film: Apply topically to affected area once a day (L knee) (20 Mar 2022 10:15)  aspirin 81 mg oral delayed release tablet: 1 tab(s) orally once a day (20 Mar 2022 10:15)  buPROPion 150 mg/24 hours (XL) oral tablet, extended release: 1 tab(s) orally once a day (in the morning) (20 Mar 2022 10:15)  cycloSPORINE modified 100 mg oral capsule: 2 cap(s) orally once a day (at bedtime) (20 Mar 2022 10:15)  cycloSPORINE modified 50 mg oral capsule: 3 cap(s) orally once a day in the morning  (20 Mar 2022 10:15)  dilTIAZem 120 mg/24 hours oral capsule, extended release: 1 cap(s) orally once a day (hold SBP&lt;110, HR&lt;60) (20 Mar 2022 10:15)  doxycycline hyclate 100 mg oral tablet: 1 tab(s) orally 2 times a day (20 Mar 2022 10:15)  Eliquis 2.5 mg oral tablet: 1 tab(s) orally 2 times a day    Pharmacy states patient no longer wants to fill this medication (20 Mar 2022 10:15)  gabapentin 300 mg oral capsule: 1 cap(s) orally 2 times a day (20 Mar 2022 10:15)  insulin glargine: 15 unit(s) subcutaneous once a day (at bedtime) (20 Mar 2022 10:15)  midodrine 5 mg oral tablet: 1 tab(s) orally 3 times a week, 30 minute prior to dialysis sessions (hold is SBP&gt;130) (20 Mar 2022 10:15)  mirtazapine 7.5 mg oral tablet: 1 tab(s) orally once a day (at bedtime) (20 Mar 2022 10:15)  montelukast 10 mg oral tablet: 1 tab(s) orally once a day (in the evening) (20 Mar 2022 10:15)  oxyCODONE 10 mg oral tablet, extended release: 1 tab(s) orally every 12 hours (20 Mar 2022 10:15)  oxycodone-acetaminophen 7.5 mg-325 mg oral tablet: 1 tab(s) orally every 6 hours, As Needed (20 Mar 2022 10:15)  pantoprazole 40 mg oral delayed release tablet: 1 tab(s) orally once a day (20 Mar 2022 10:15)  polyethylene glycol 3350 oral powder for reconstitution: 17 gram(s) orally once a day (at bedtime) (20 Mar 2022 10:15)  senna oral tablet: 2 tab(s) orally once a day (at bedtime) (20 Mar 2022 10:15)  sertraline 50 mg oral tablet: 1 tab(s) orally once a day (20 Mar 2022 10:15)  sevelamer carbonate 800 mg oral tablet: 1 tab(s) orally 3 times a day (with meals) (20 Mar 2022 10:15)  simethicone 80 mg oral tablet, chewable: 1 tab(s) orally 3 times a day (before meals) (20 Mar 2022 10:15)  simvastatin 10 mg oral tablet: 1 tab(s) orally once a day (at bedtime) (20 Mar 2022 10:15)  sodium hypochlorite 0.25% topical solution: 1 application topically once a day (20 Mar 2022 10:15)  tacrolimus 0.1% topical ointment: 1 application topically once a day (20 Mar 2022 10:15)  traZODone 100 mg oral tablet: 1 tab(s) orally once a day (at bedtime) (20 Mar 2022 10:15)        MEDICATIONS  (STANDING):  buPROPion XL (24-Hour) . 150 milliGRAM(s) Oral daily  chlorhexidine 2% Cloths 1 Application(s) Topical daily  cycloSPORINE  , modified (NEORAL) 200 milliGRAM(s) Oral at bedtime  cycloSPORINE  , modified (NEORAL) 150 milliGRAM(s) Oral <User Schedule>  Dakins Solution - 1/4 Strength 1 Application(s) Topical daily  dextrose 40% Gel 15 Gram(s) Oral once  dextrose 5%. 1000 milliLiter(s) (50 mL/Hr) IV Continuous <Continuous>  dextrose 5%. 1000 milliLiter(s) (100 mL/Hr) IV Continuous <Continuous>  dextrose 50% Injectable 25 Gram(s) IV Push once  dextrose 50% Injectable 12.5 Gram(s) IV Push once  dextrose 50% Injectable 25 Gram(s) IV Push once  diltiazem    milliGRAM(s) Oral daily  epoetin anabela-epbx (RETACRIT) Injectable 65351 Unit(s) IV Push <User Schedule>  gabapentin 300 milliGRAM(s) Oral two times a day  glucagon  Injectable 1 milliGRAM(s) IntraMuscular once  insulin glargine Injectable (LANTUS) 10 Unit(s) SubCutaneous at bedtime  insulin lispro (ADMELOG) corrective regimen sliding scale   SubCutaneous three times a day before meals  insulin lispro (ADMELOG) corrective regimen sliding scale   SubCutaneous at bedtime  lidocaine   4% Patch 1 Patch Transdermal daily  lidocaine   4% Patch 1 Patch Transdermal daily  mirtazapine 7.5 milliGRAM(s) Oral at bedtime  montelukast 10 milliGRAM(s) Oral at bedtime  oxyCODONE  ER Tablet 10 milliGRAM(s) Oral every 12 hours  pantoprazole    Tablet 40 milliGRAM(s) Oral before breakfast  polyethylene glycol 3350 17 Gram(s) Oral at bedtime  senna 2 Tablet(s) Oral at bedtime  sertraline 50 milliGRAM(s) Oral daily  sevelamer carbonate 800 milliGRAM(s) Oral three times a day with meals  simethicone 80 milliGRAM(s) Chew three times a day  tacrolimus   0.1% Ointment 1 Application(s) Topical daily  traZODone 100 milliGRAM(s) Oral at bedtime        EKG:  RADIOLOGY:  DIAGNOSTIC TESTING:  [ ] Echocardiogram:  [ ] Catherterization:  [ ] Stress Test:  OTHER:     LABS:	 	                          7.8    14.76 )-----------( 388      ( 22 Mar 2022 06:46 )             28.5     03-22    136  |  95<L>  |  51<H>  ----------------------------<  111<H>  4.0   |  26  |  5.94<H>    Ca    9.1      22 Mar 2022 06:46  Phos  3.9     03-22  Mg     2.50     03-22    TPro  6.3  /  Alb  3.0<L>  /  TBili  0.3  /  DBili  x   /  AST  9   /  ALT  <5<L>  /  AlkPhos  156<H>  03-21      PT/INR - ( 21 Mar 2022 17:26 )   PT: 13.9 sec;   INR: 1.20 ratio         PTT - ( 21 Mar 2022 17:26 )  PTT:39.0 sec    CARDIAC MARKERS:

## 2022-03-22 NOTE — CHART NOTE - NSCHARTNOTEFT_GEN_A_CORE
GYN Chart Note    Please obtain transabdominal pelvic sono if patient is unable to tolerate transvaginal approach.    JOSEPH Lozano PGY4

## 2022-03-22 NOTE — CONSULT NOTE ADULT - ASSESSMENT
56F ESRD TTS, HTN, DM, COPD, afib, known chronic R pannus wound felt most likely pyoderma gangrenosum presents for 1.5wk vag bleeding.      Overall leucocytosis, Vaginal bleed, large abdominal ulcer, concern for pyoderma gangrenosum.     Plan:   leucocytosis could be contributed by vaginal bleed.  discussed with Derm, the ulcer compatible with pyoderma and not superinfected   monitor off abx   trend cbc for leucocytosis   if spikes check blood cx  c/w wound care  Gyn following for vaginal bleeding    Plan discussed with Medicine Attending.     Brock Mott  Please contact through MS Teams   If no response or past 5 pm/weekend call [phone].     Will sign off, please call with questions.  39.4

## 2022-03-22 NOTE — CONSULT NOTE ADULT - ASSESSMENT
The above is a preliminary note, recommendations are not final until this note is signed.    The patient's chart was reviewed in addition to being seen and examined at bedside with the dermatology attending ###. Recommendations were communicated with the primary team.  Please page [phone] w/10 digit call back number for further related questions.    Brittny Deng MD  Resident Physician, PGY3  [facility] Dermat[facility]  Pager: [phone]  Office: [phone]   #Deep extensive pannus ulcer in patient with DM and ESRD on HD. Ulcer with extensive undermining - suggestive of pyoderma gangrenosum vs calciphylaxis. Both are challenging diagnoses with no definitive diagnostic tests. Biopsy and imaging non diagnostic. At this time, favor Pyoderma Gangrenosum based on clinical appearance of deep ulceration with extensive undermining and worsening when trial off of steroids, now WITHOUT SIGNIFICANT IMPROVEMENT since initiation of cyclosporine since 2/8. PG is an inflammatory, noninfectious, ulcerative neutrophilic skin, that are hallmarked by early pustules that ultimately give rise to ulcers with an undermined, violaceous border. PG is associated with a number of systemic illnesses (classically inflammatory bowel disease, hematologic malignancy, arthritis, etc), none of which are seen in our patient.     Given refractory PG, would ideally recommend step-up therapy with TNF inhibitor (ie. remicade), however current concern for possible endometrial malignancy precludes use of TNFinhibitor till work-up completed. Discussed with OB/GYN resident 3/22, will see if endometrial bx can be obtained inpatient during current admission.     Prior work-up (from prior admission):   - Bacterial tissue culture 1/25/21 with carbapenem-resistent Pseudomonas, Staph epidermidis, Enterococcus faecalis; reportedly same as previous bacterial culture.   - Fungal tissue culture 1/25/21 no growth final  - AFB tissue culture 1/24/21 no growth final x 6 weeks   - s/p 10-day course of IV cefepime completed 1/21/22 and other previously other outpatient abx.   - Biopsy 1/25/22 findings non specific. No obvious intravascular calcium deposits although limited sample of subcutaneous tissue. Repeat von Kossa staining (for calcium), and deeper sections from original biopsy were reviewed with initial diagnosis unchanged.   -SPEP, serum DOUG, ANCAs negative. Unable to obtain UPEP/uIFE as pt is not making much urine 2/2 ESRD.   -no calcifications noted on CT A/P 2/4   -quantiferon indeterminate (2/11/22) repeat in process, HIV negative, hepatitis panel nonreactive and non immune to hepatitis B(1/21/22)  -wound cx 2/17 w/ polymicrobial growth, likely colonizers per ID, no indication to treat    At this time:  - INCREASE cyclosporine to 200mg qAM and 200mg qPM, ( 4.7 mg/kg of dosing weight) continue to monitor blood pressure, electrolytes ( including mg, phos, uric acid).   - Appreciate pain management rec's.   - C/w wound care: c/w topical tacrolimus 0.01% ointment 1-2x daily to wound edges and aquacel Ag dressing. Appreciate wound care recs.   - Patient will need close f/u upon discharge  - Pending results of endometrial bx will consider addition of TNF inhibitor (remicade), d/w GYN need for inpatient bx if possible   - Given pt immunosuppressed on CsA and large abdominal wound, if pt spikes fever, recommend full infectious work-up and starting empiric abx.     The patient's chart was reviewed in addition to being seen and examined at bedside with the dermatology attending Dr. Lary Bo. Recommendations were communicated with the primary team. Please page [phone] w/10 digit call back number for further related questions. Dermatology will continue to follow.     Brittny Deng MD  Resident Physician, PGY3  [facility] Dermatology  Pager: [phone]  Office: [phone]

## 2022-03-22 NOTE — DIETITIAN INITIAL EVALUATION ADULT. - PERTINENT LABORATORY DATA
03-22 Na136 mmol/L Glu 111 mg/dL<H> K+ 4.0 mmol/L Cr  5.94 mg/dL<H> BUN 51 mg/dL<H> 03-22 Phos 3.9 mg/dL 03-21 Alb 3.0 g/dL<L> 03-21 Chol 165 mg/dL LDL --    HDL --    Trig 222 mg/dL<H>    CAPILLARY BLOOD GLUCOSE  POCT Blood Glucose.: 112 mg/dL (22 Mar 2022 09:34)  POCT Blood Glucose.: 135 mg/dL (21 Mar 2022 21:20)  POCT Blood Glucose.: 103 mg/dL (21 Mar 2022 20:03)  POCT Blood Glucose.: 111 mg/dL (21 Mar 2022 18:44)  POCT Blood Glucose.: 117 mg/dL (21 Mar 2022 15:31)  POCT Blood Glucose.: 113 mg/dL (21 Mar 2022 12:51)    A1C with Estimated Average Glucose (03.20.22 @ 12:48)    A1C with Estimated Average Glucose Result: 6.1: High Risk (prediabetic)    5.7 - 6.4 %  Diabetic, diagnostic           > 6.5 %  ADA diabetic treatment goal    < 7.0 %  HbA1C values may not accurately reflect mean blood glucose in patients  with Hb variants.  Suggest clinical correlation. %    Estimated Average Glucose: 128

## 2022-03-23 NOTE — PROGRESS NOTE ADULT - SUBJECTIVE AND OBJECTIVE BOX
[facility] NEPHROLOGY- PROGRESS NOTE    56y Female with history of ESRD on HD presents with vaginal bleeding. Nephrology consulted for ESRD status.    REVIEW OF SYSTEMS:  Gen: no changes in weight  Cards: no chest pain  Resp: no dyspnea  GI: no nausea or vomiting or diarrhea  : + vaginal bleeding  Vascular: no LE edema    caffeine (Nausea)  latex (Rash)  penicillin (Nausea)  strawberry (Rash)      Hospital Medications: Medications reviewed      VITALS:  T(F): 97.6 (03-23-22 @ 10:15), Max: 99.2 (03-22-22 @ 21:50)  HR: 77 (03-23-22 @ 10:15)  BP: 118/74 (03-23-22 @ 10:15)  RR: 18 (03-23-22 @ 10:15)  SpO2: 99% (03-23-22 @ 10:15)  Wt(kg): --    03-23 @ 07:01  -  03-23 @ 10:56  --------------------------------------------------------  IN: 500 mL / OUT: 2200 mL / NET: -1700 mL        PHYSICAL EXAM:    Gen: NAD, calm  Cards: RRR, +S1/S2, no M/G/R  Resp: CTA B/L  GI: soft, NT/ND, NABS, + ab wound dressing in place  Vascular: no LE edema B/L, LUE AVF + bruit/thrill      LABS:  03-23    135  |  94<L>  |  56<H>  ----------------------------<  137<H>  4.0   |  26  |  6.98<H>    Ca    9.8      23 Mar 2022 07:12  Phos  4.4     03-23  Mg     2.50     03-23    TPro  6.3  /  Alb  3.0<L>  /  TBili  0.3  /  DBili      /  AST  9   /  ALT  <5<L>  /  AlkPhos  156<H>  03-21    Creatinine Trend: 6.98 <--, 5.94 <--, 7.98 <--, 6.85 <--, 6.03 <--                        7.1    12.54 )-----------( 411      ( 23 Mar 2022 07:12 )             24.8     Urine Studies:

## 2022-03-23 NOTE — PROGRESS NOTE ADULT - SUBJECTIVE AND OBJECTIVE BOX
SUBJECTIVE / OVERNIGHT EVENTS:pt seen and examined  03-23-22     MEDICATIONS  (STANDING):  buPROPion XL (24-Hour) . 150 milliGRAM(s) Oral daily  chlorhexidine 2% Cloths 1 Application(s) Topical daily  cinacalcet 30 milliGRAM(s) Oral daily  cycloSPORINE  , modified (NEORAL) 200 milliGRAM(s) Oral two times a day  Dakins Solution - 1/4 Strength 1 Application(s) Topical daily  dextrose 40% Gel 15 Gram(s) Oral once  dextrose 5%. 1000 milliLiter(s) (50 mL/Hr) IV Continuous <Continuous>  dextrose 5%. 1000 milliLiter(s) (100 mL/Hr) IV Continuous <Continuous>  dextrose 50% Injectable 25 Gram(s) IV Push once  dextrose 50% Injectable 12.5 Gram(s) IV Push once  dextrose 50% Injectable 25 Gram(s) IV Push once  diltiazem    milliGRAM(s) Oral daily  epoetin anabela-epbx (RETACRIT) Injectable 41460 Unit(s) IV Push <User Schedule>  gabapentin 300 milliGRAM(s) Oral daily  glucagon  Injectable 1 milliGRAM(s) IntraMuscular once  insulin glargine Injectable (LANTUS) 10 Unit(s) SubCutaneous at bedtime  insulin lispro (ADMELOG) corrective regimen sliding scale   SubCutaneous three times a day before meals  insulin lispro (ADMELOG) corrective regimen sliding scale   SubCutaneous at bedtime  lidocaine   4% Patch 1 Patch Transdermal daily  lidocaine   4% Patch 1 Patch Transdermal daily  mirtazapine 7.5 milliGRAM(s) Oral at bedtime  montelukast 10 milliGRAM(s) Oral at bedtime  mupirocin 2% Ointment 1 Application(s) Topical two times a day  oxyCODONE  ER Tablet 10 milliGRAM(s) Oral every 12 hours  pantoprazole    Tablet 40 milliGRAM(s) Oral before breakfast  polyethylene glycol 3350 17 Gram(s) Oral at bedtime  senna 2 Tablet(s) Oral at bedtime  sertraline 50 milliGRAM(s) Oral daily  sevelamer carbonate 800 milliGRAM(s) Oral three times a day with meals  simethicone 80 milliGRAM(s) Chew three times a day  tacrolimus   0.1% Ointment 1 Application(s) Topical daily  traZODone 100 milliGRAM(s) Oral at bedtime    MEDICATIONS  (PRN):  acetaminophen     Tablet .. 650 milliGRAM(s) Oral every 6 hours PRN Temp greater or equal to 38C (100.4F), Mild Pain (1 - 3)  melatonin 3 milliGRAM(s) Oral at bedtime PRN Insomnia  midodrine. 5 milliGRAM(s) Oral <User Schedule> PRN 30 minutes prior to dialysis  ondansetron Injectable 4 milliGRAM(s) IV Push every 8 hours PRN Nausea and/or Vomiting  oxyCODONE    IR 10 milliGRAM(s) Oral every 4 hours PRN moderate and severe pain    Vital Signs Last 24 Hrs  T(C): 36.4 (03-23-22 @ 10:15), Max: 37.1 (03-23-22 @ 02:58)  T(F): 97.6 (03-23-22 @ 10:15), Max: 98.7 (03-23-22 @ 02:58)  HR: 77 (03-23-22 @ 10:15) (77 - 83)  BP: 118/74 (03-23-22 @ 10:15) (102/53 - 118/74)  BP(mean): --  RR: 18 (03-23-22 @ 10:15) (16 - 18)  SpO2: 99% (03-23-22 @ 10:15) (95% - 99%)          Constitutional: No fever, fatigue  Skin: No rash.  Eyes: No recent vision problems or eye pain.  ENT: No congestion, ear pain, or sore throat.  Cardiovascular: No chest pain or palpation.  Respiratory: No cough, shortness of breath, congestion, or wheezing.  Gastrointestinal: No abdominal pain, nausea, vomiting, or diarrhea.  Genitourinary: No dysuria.  Musculoskeletal: No joint swelling.  Neurologic: No headache.    PHYSICAL EXAM:  GENERAL: NAD  EYES: EOMI, PERRLA  NECK: Supple, No JVD  CHEST/LUNG: dec breath sounds at bases  HEART:  S1 , S2 +  ABDOMEN: soft , bs+, wound dressing+  EXTREMITIES:  edema+  NEUROLOGY:alert awake oriented     LABS:  03-23    135  |  94<L>  |  56<H>  ----------------------------<  137<H>  4.0   |  26  |  6.98<H>    Ca    9.8      23 Mar 2022 07:12  Phos  4.4     03-23  Mg     2.50     03-23      Creatinine Trend: 6.98 <--, 5.94 <--, 7.98 <--, 6.85 <--, 6.03 <--                        7.1    12.54 )-----------( 411      ( 23 Mar 2022 07:12 )             24.8     Urine Studies:

## 2022-03-23 NOTE — PROGRESS NOTE ADULT - ASSESSMENT
Echo 1/19/22: grossly nl LV sys fx, min MR     a/p  56 year old female with hx of morbid obesity, CHAMP not on home O2, ESRD (HD MWF), HTN, DM, COPD, Afib no longer on AC, chronic R pannus wound, followed by Dr. Layne, likely pyoderma gangrenosum presents for vaginal bleeding     #Chronic Pannus Wound  -surgical eval noted, wound care  -abx per med     #Chronic Afib  -stable, rates controlled  -cont dilt as ordered  -eliquis now on hold in setting of poss vaginal bleeding, ANEMIA   -cbc stable, gyn FU   RESTART A/C IF NO CI AND HEME STABLE     #Hypertension  -stable  -cont ccb     #ESRD on HD  -HD per renal    dvt ppx    35 minutes spent on total encounter; more than 50% of the visit was spent counseling and/or coordinating care by the attending physician.

## 2022-03-23 NOTE — PROGRESS NOTE ADULT - SUBJECTIVE AND OBJECTIVE BOX
CARDIOLOGY FOLLOW UP NOTE - DR. JAQUEZ    Patient Name: ANTONIA ORTIZ  Date of Service: 03-23-22 @ 15:33    Patient seen and examined  no new complaints  feels fatgiued      Subjective:    cv: denies chest pain, dyspnea, palpitations, dizziness  pulmonary: denies cough  GI: denies abdominal pain, nausea, vomiting  vascular/legs: no edema   skin: no rash  ROS: otherwise negative   overnight events:      PHYSICAL EXAM:  T(C): 36.4 (03-23-22 @ 10:15), Max: 37.3 (03-22-22 @ 21:50)  HR: 77 (03-23-22 @ 10:15) (77 - 83)  BP: 118/74 (03-23-22 @ 10:15) (102/53 - 125/59)  RR: 18 (03-23-22 @ 10:15) (16 - 19)  SpO2: 99% (03-23-22 @ 10:15) (95% - 99%)  Wt(kg): --  I&O's Summary    23 Mar 2022 07:01  -  23 Mar 2022 15:33  --------------------------------------------------------  IN: 500 mL / OUT: 2200 mL / NET: -1700 mL      Daily     Daily     Appearance: Normal	  Cardiovascular: Normal S1 S2,RRR, No JVD, No murmurs  Respiratory: Lungs clear to auscultation	  Gastrointestinal:  Soft, Non-tender, + BS	  Extremities: Normal range of motion, No clubbing, cyanosis or edema      Home Medications:  Aspercreme with Lidocaine 4% topical film: Apply topically to affected area once a day (low back) (20 Mar 2022 10:15)  Aspercreme with Lidocaine 4% topical film: Apply topically to affected area once a day (L knee) (20 Mar 2022 10:15)  aspirin 81 mg oral delayed release tablet: 1 tab(s) orally once a day (20 Mar 2022 10:15)  buPROPion 150 mg/24 hours (XL) oral tablet, extended release: 1 tab(s) orally once a day (in the morning) (20 Mar 2022 10:15)  cycloSPORINE modified 100 mg oral capsule: 2 cap(s) orally once a day (at bedtime) (20 Mar 2022 10:15)  cycloSPORINE modified 50 mg oral capsule: 3 cap(s) orally once a day in the morning  (20 Mar 2022 10:15)  dilTIAZem 120 mg/24 hours oral capsule, extended release: 1 cap(s) orally once a day (hold SBP&lt;110, HR&lt;60) (20 Mar 2022 10:15)  doxycycline hyclate 100 mg oral tablet: 1 tab(s) orally 2 times a day (20 Mar 2022 10:15)  Eliquis 2.5 mg oral tablet: 1 tab(s) orally 2 times a day    Pharmacy states patient no longer wants to fill this medication (20 Mar 2022 10:15)  gabapentin 300 mg oral capsule: 1 cap(s) orally 2 times a day (20 Mar 2022 10:15)  insulin glargine: 15 unit(s) subcutaneous once a day (at bedtime) (20 Mar 2022 10:15)  midodrine 5 mg oral tablet: 1 tab(s) orally 3 times a week, 30 minute prior to dialysis sessions (hold is SBP&gt;130) (20 Mar 2022 10:15)  mirtazapine 7.5 mg oral tablet: 1 tab(s) orally once a day (at bedtime) (20 Mar 2022 10:15)  montelukast 10 mg oral tablet: 1 tab(s) orally once a day (in the evening) (20 Mar 2022 10:15)  oxyCODONE 10 mg oral tablet, extended release: 1 tab(s) orally every 12 hours (20 Mar 2022 10:15)  oxycodone-acetaminophen 7.5 mg-325 mg oral tablet: 1 tab(s) orally every 6 hours, As Needed (20 Mar 2022 10:15)  pantoprazole 40 mg oral delayed release tablet: 1 tab(s) orally once a day (20 Mar 2022 10:15)  polyethylene glycol 3350 oral powder for reconstitution: 17 gram(s) orally once a day (at bedtime) (20 Mar 2022 10:15)  senna oral tablet: 2 tab(s) orally once a day (at bedtime) (20 Mar 2022 10:15)  sertraline 50 mg oral tablet: 1 tab(s) orally once a day (20 Mar 2022 10:15)  sevelamer carbonate 800 mg oral tablet: 1 tab(s) orally 3 times a day (with meals) (20 Mar 2022 10:15)  simethicone 80 mg oral tablet, chewable: 1 tab(s) orally 3 times a day (before meals) (20 Mar 2022 10:15)  simvastatin 10 mg oral tablet: 1 tab(s) orally once a day (at bedtime) (20 Mar 2022 10:15)  sodium hypochlorite 0.25% topical solution: 1 application topically once a day (20 Mar 2022 10:15)  tacrolimus 0.1% topical ointment: 1 application topically once a day (20 Mar 2022 10:15)  traZODone 100 mg oral tablet: 1 tab(s) orally once a day (at bedtime) (20 Mar 2022 10:15)      MEDICATIONS  (STANDING):  buPROPion XL (24-Hour) . 150 milliGRAM(s) Oral daily  chlorhexidine 2% Cloths 1 Application(s) Topical daily  cinacalcet 30 milliGRAM(s) Oral daily  cycloSPORINE  , modified (NEORAL) 200 milliGRAM(s) Oral two times a day  Dakins Solution - 1/4 Strength 1 Application(s) Topical daily  dextrose 40% Gel 15 Gram(s) Oral once  dextrose 5%. 1000 milliLiter(s) (50 mL/Hr) IV Continuous <Continuous>  dextrose 5%. 1000 milliLiter(s) (100 mL/Hr) IV Continuous <Continuous>  dextrose 50% Injectable 25 Gram(s) IV Push once  dextrose 50% Injectable 12.5 Gram(s) IV Push once  dextrose 50% Injectable 25 Gram(s) IV Push once  diltiazem    milliGRAM(s) Oral daily  epoetin anabela-epbx (RETACRIT) Injectable 97685 Unit(s) IV Push <User Schedule>  gabapentin 300 milliGRAM(s) Oral daily  glucagon  Injectable 1 milliGRAM(s) IntraMuscular once  insulin glargine Injectable (LANTUS) 10 Unit(s) SubCutaneous at bedtime  insulin lispro (ADMELOG) corrective regimen sliding scale   SubCutaneous three times a day before meals  insulin lispro (ADMELOG) corrective regimen sliding scale   SubCutaneous at bedtime  lidocaine   4% Patch 1 Patch Transdermal daily  lidocaine   4% Patch 1 Patch Transdermal daily  mirtazapine 7.5 milliGRAM(s) Oral at bedtime  montelukast 10 milliGRAM(s) Oral at bedtime  mupirocin 2% Ointment 1 Application(s) Topical two times a day  oxyCODONE  ER Tablet 10 milliGRAM(s) Oral every 12 hours  pantoprazole    Tablet 40 milliGRAM(s) Oral before breakfast  polyethylene glycol 3350 17 Gram(s) Oral at bedtime  senna 2 Tablet(s) Oral at bedtime  sertraline 50 milliGRAM(s) Oral daily  sevelamer carbonate 800 milliGRAM(s) Oral three times a day with meals  simethicone 80 milliGRAM(s) Chew three times a day  tacrolimus   0.1% Ointment 1 Application(s) Topical daily  traZODone 100 milliGRAM(s) Oral at bedtime      TELEMETRY: 	    ECG:  	  RADIOLOGY:   DIAGNOSTIC TESTING:  [ ] Echocardiogram:  [ ] Catheterization:  [ ] Stress Test:    OTHER: 	    LABS:	 	    CARDIAC MARKERS:                                      7.1    12.54 )-----------( 411      ( 23 Mar 2022 07:12 )             24.8     03-23    135  |  94<L>  |  56<H>  ----------------------------<  137<H>  4.0   |  26  |  6.98<H>    Ca    9.8      23 Mar 2022 07:12  Phos  4.4     03-23  Mg     2.50     03-23    TPro  6.3  /  Alb  3.0<L>  /  TBili  0.3  /  DBili  x   /  AST  9   /  ALT  <5<L>  /  AlkPhos  156<H>  03-21    proBNP:   PT/INR - ( 21 Mar 2022 17:26 )   PT: 13.9 sec;   INR: 1.20 ratio         PTT - ( 21 Mar 2022 17:26 )  PTT:39.0 sec  Lipid Profile:   HgA1c:     Creatinine, Serum: 6.98 mg/dL (03-23-22 @ 07:12)  Creatinine, Serum: 5.94 mg/dL (03-22-22 @ 06:46)  Creatinine, Serum: 7.98 mg/dL (03-21-22 @ 17:26)

## 2022-03-23 NOTE — PROGRESS NOTE ADULT - ASSESSMENT
56y Female with history of ESRD on HD presents with vaginal bleeding. Nephrology consulted for ESRD status.    1) ESRD: Last HD earlier this morning with clotting of dialyzer for which treatment terminated after 3 hours with 1.5L removed. Plan for next maintenance HD on 3/25 with heparin. Monitor electrolytes.    2) HTN with ESRD: BP acceptable on Cardizem. Continue with midodrine pre-HD on HD days to avoid intradialytic hypotension. Monitor BP.    3) Anemia of renal disease: With component of blood loss from vaginal bleeding. Hb low acceptable iron stores. Continue with Epo 16K with HD. PRBC as needed. Monitor Hb.    4) Secondary HPT of renal origin: Phosphorus acceptable with low iPTH for which sensipar decreased to 30 mg daily. Continue with renvela 1 tab with meals. Monitor serum calcium and phosphorus.    No renal objection to increase CSA as needed by Derm as believe hyperuricemia predominantly due to ESRD status and not medication with uric acid high dialyzable.     [facility] NEPHROLOGY  Keaton Lopez M.D.  Juma Green D.O.  Myla Hoffmann M.D.  Julia Brewer, MSN, ANP-C    Telephone: [phone]  Facsimile: [phone]    [address] [place] [address] 56y Female with history of ESRD on HD presents with vaginal bleeding. Nephrology consulted for ESRD status.    1) ESRD: Last HD earlier this morning with clotting of dialyzer for which treatment terminated after 3 hours with 1.5L removed. Plan for next maintenance HD on 3/25 with heparin. Monitor electrolytes.    2) HTN with ESRD: BP acceptable on Cardizem. Continue with midodrine pre-HD on HD days to avoid intradialytic hypotension. Monitor BP.    3) Anemia of renal disease: With component of blood loss from vaginal bleeding. Hb low acceptable iron stores. Continue with Epo 16K with HD. PRBC as needed. Monitor Hb.    4) Secondary HPT of renal origin: Phosphorus acceptable with low iPTH for which sensipar decreased to 30 mg daily. Continue with renvela 1 tab with meals. Monitor serum calcium and phosphorus.    No renal objection to increase CSA as needed by Derm as believe hyperuricemia predominantly due to ESRD status and not medication with uric acid highly dialyzable.     [facility] [place] [facility]  Keaton Lopez M.D.  Juma Green D.O.  Myla Hoffmann M.D.  Julia Brewer, MSN, ANP-C    Telephone: [phone]  Facsimile: [phone]    [address] [place] [address]

## 2022-03-24 NOTE — CHART NOTE - NSCHARTNOTEFT_GEN_A_CORE
GYN Follow up appreciated, Unable to perform bedside endometrial Biopsy. Case discussed with Attending, Pt to follow up with GYN as outpt for Biopsy.   Ok to start Heparin Gtt ( No Bolus) for A Fib. will Continue to Monitor H/H Closely while on AC.

## 2022-03-24 NOTE — PROGRESS NOTE ADULT - ASSESSMENT
56F with Hx of ESRD TTS, HTN, DM 2, COPD, afib, known chronic R pannus wound felt most likely pyoderma gangrenosum presents for 1.5wk vag bleeding. Pt recently had prolonged stay at [facility] for pannus wound, known to endocrine service.  Was dc'd to rehab on 3/3.  Prior to last hospitalization patient was on much higher doses of basal bolus.  Now requiring much less.  Has poor appetite.      1. Type 2 diabetes mellitus uncontrolled  A1c 7.0% (may be inaccurate in setting of ESRD)  Home Regimen: Tresiba 80 units HS and Trulicity 1.5mg subq weekly  While inpatient:  BG target 100-180 mg/dL  reduced po intake.  Patient is driking nepro shakes and eating very little  Continue Lantus 10 units SQ qHS   Continue off of premeal admelog for now.  If appetite improves and FS begin to increase > 200, will consider restarting admelog premeal  Admelog correctional scale LOW before meals and low bedtime scale  Check BG before meals and bedtime  Hypoglycemia protocol   Consistent carbohydrate diet    Discharge Plan:  STOP Trulicity (patient ESRD on HD)  For dc to rehab- recommend to continue current insulin management as outlined above  For dc to home- Recommend basal plus PO regimen  Patient was on Tresiba at home may benefit from a different Long acting insulin such as Lantus or Levemir given ESRD.  Tresiba is ultra-Long acting insulin  Please assess insurance coverage for basal insulin pens:  Levemir, Lantus, Basaglar, Toujeo or Semglee.   Recommend Tradjenta 5 mg po daily, if not covered can see if Januvia 25 mg po daily is covered.  Please obtain prior auth if needed as patient is ESRD and DPP4i class are indicated for patients with ESRD  Consider CGM (such as Freestyle Libre2 outpatient)  If desiring to followup with [facility] Endocrinology: [address] [place] [address], [address], [place] [address], [phone]    2. HTN  Management per primary team     3. Hyperlipidemia  consider restarting statin      Tamela Ruiz  Nurse Practitioner  Division of Endocrinology & Diabetes  Pager # [phone]      If after 6PM or before 9AM, or on weekends/holidays, please call endocrine answering service for assistance ([phone]).  For nonurgent matters email [email or web address] for assistance.

## 2022-03-24 NOTE — CHART NOTE - NSCHARTNOTEFT_GEN_A_CORE
Bedside endometrial bx attempted by Gynecology team. Exam limited by patient discomfort and immobility. Unable to perform bx at this time. If pt improves in mobility to point of being able to move her own legs, likely after outpatient PT, then biopsy can be performed outpatient. Alternative is to obtain medical clearance for EUA, hysteroscopy and dilation and curettage under general anesthesia. Discussed with patient that treatment for cancer, if it is detected, would be hysterectomy and in patient's current state she is a poor surgical candidate. Advised pt she would need to have improved functioning to undergo surgical treatment and pt demonstrated understanding.     Andree Woodward  Seen with GYN team & Dr Corral

## 2022-03-24 NOTE — PROGRESS NOTE ADULT - ASSESSMENT
56y Female with history of ESRD on HD presents with vaginal bleeding. Nephrology consulted for ESRD status.    1) ESRD: Last HD on 3/23 with clotting of dialyzer for which treatment terminated after 3 hours with 1.5L removed. Plan for next maintenance HD on 3/25 with heparin. Monitor electrolytes.    2) HTN with ESRD: BP acceptable on Cardizem. Continue with midodrine pre-HD on HD days to avoid intradialytic hypotension. Monitor BP.    3) Anemia of renal disease: With component of blood loss from vaginal bleeding. Hb low acceptable iron stores. Continue with Epo 16K with HD. PRBC as needed. Monitor Hb.    4) Secondary HPT of renal origin: Phosphorus acceptable with low iPTH for which sensipar decreased to 30 mg daily. Continue with renvela 1 tab with meals. Monitor serum calcium and phosphorus.    No renal objection to increase CSA as needed by Derm as believe hyperuricemia predominantly due to ESRD status and not medication with uric acid highly dialyzable.     [facility] NEPHROLOGY  Keaton Lopez M.D.  Juma Green D.O.  Myla Hoffmann M.D.  Julia Brewer, JASIEL, ANP-C    Telephone: [phone]  Facsimile: [phone]    [address] [place] [address]

## 2022-03-24 NOTE — PROGRESS NOTE ADULT - SUBJECTIVE AND OBJECTIVE BOX
CARDIOLOGY FOLLOW UP NOTE - DR. JAQUEZ    Patient Name: ANTONIA ORTIZ  Date of Service: 03-24-22     no new events    Subjective:    cv: denies chest pain, dyspnea, palpitations, dizziness  pulmonary: denies cough  GI: denies abdominal pain, nausea, vomiting  vascular/legs: no edema   skin: no rash  ROS: otherwise negative   overnight events:      PHYSICAL EXAM:  T(C): 37.1 (03-23-22 @ 21:50), Max: 37.1 (03-23-22 @ 21:50)  HR: 85 (03-23-22 @ 21:50) (85 - 85)  BP: 129/55 (03-23-22 @ 21:50) (129/55 - 129/55)  RR: 17 (03-23-22 @ 21:50) (17 - 17)  SpO2: 96% (03-23-22 @ 21:50) (96% - 96%)  Wt(kg): --  I&O's Summary    23 Mar 2022 07:01  -  24 Mar 2022 07:00  --------------------------------------------------------  IN: 500 mL / OUT: 2200 mL / NET: -1700 mL      Daily     Daily     Appearance: Normal	  Cardiovascular: Normal S1 S2,RRR, No JVD, No murmurs  Respiratory: Lungs clear to auscultation	  Gastrointestinal:  Soft, Non-tender, + BS	  Extremities: Normal range of motion, No clubbing, cyanosis or edema      Home Medications:  Aspercreme with Lidocaine 4% topical film: Apply topically to affected area once a day (low back) (20 Mar 2022 10:15)  Aspercreme with Lidocaine 4% topical film: Apply topically to affected area once a day (L knee) (20 Mar 2022 10:15)  aspirin 81 mg oral delayed release tablet: 1 tab(s) orally once a day (20 Mar 2022 10:15)  buPROPion 150 mg/24 hours (XL) oral tablet, extended release: 1 tab(s) orally once a day (in the morning) (20 Mar 2022 10:15)  cycloSPORINE modified 100 mg oral capsule: 2 cap(s) orally once a day (at bedtime) (20 Mar 2022 10:15)  cycloSPORINE modified 50 mg oral capsule: 3 cap(s) orally once a day in the morning  (20 Mar 2022 10:15)  dilTIAZem 120 mg/24 hours oral capsule, extended release: 1 cap(s) orally once a day (hold SBP&lt;110, HR&lt;60) (20 Mar 2022 10:15)  doxycycline hyclate 100 mg oral tablet: 1 tab(s) orally 2 times a day (20 Mar 2022 10:15)  Eliquis 2.5 mg oral tablet: 1 tab(s) orally 2 times a day    Pharmacy states patient no longer wants to fill this medication (20 Mar 2022 10:15)  gabapentin 300 mg oral capsule: 1 cap(s) orally 2 times a day (20 Mar 2022 10:15)  insulin glargine: 15 unit(s) subcutaneous once a day (at bedtime) (20 Mar 2022 10:15)  midodrine 5 mg oral tablet: 1 tab(s) orally 3 times a week, 30 minute prior to dialysis sessions (hold is SBP&gt;130) (20 Mar 2022 10:15)  mirtazapine 7.5 mg oral tablet: 1 tab(s) orally once a day (at bedtime) (20 Mar 2022 10:15)  montelukast 10 mg oral tablet: 1 tab(s) orally once a day (in the evening) (20 Mar 2022 10:15)  oxyCODONE 10 mg oral tablet, extended release: 1 tab(s) orally every 12 hours (20 Mar 2022 10:15)  oxycodone-acetaminophen 7.5 mg-325 mg oral tablet: 1 tab(s) orally every 6 hours, As Needed (20 Mar 2022 10:15)  pantoprazole 40 mg oral delayed release tablet: 1 tab(s) orally once a day (20 Mar 2022 10:15)  polyethylene glycol 3350 oral powder for reconstitution: 17 gram(s) orally once a day (at bedtime) (20 Mar 2022 10:15)  senna oral tablet: 2 tab(s) orally once a day (at bedtime) (20 Mar 2022 10:15)  sertraline 50 mg oral tablet: 1 tab(s) orally once a day (20 Mar 2022 10:15)  sevelamer carbonate 800 mg oral tablet: 1 tab(s) orally 3 times a day (with meals) (20 Mar 2022 10:15)  simethicone 80 mg oral tablet, chewable: 1 tab(s) orally 3 times a day (before meals) (20 Mar 2022 10:15)  simvastatin 10 mg oral tablet: 1 tab(s) orally once a day (at bedtime) (20 Mar 2022 10:15)  sodium hypochlorite 0.25% topical solution: 1 application topically once a day (20 Mar 2022 10:15)  tacrolimus 0.1% topical ointment: 1 application topically once a day (20 Mar 2022 10:15)  traZODone 100 mg oral tablet: 1 tab(s) orally once a day (at bedtime) (20 Mar 2022 10:15)      MEDICATIONS  (STANDING):  buPROPion XL (24-Hour) . 150 milliGRAM(s) Oral daily  chlorhexidine 2% Cloths 1 Application(s) Topical daily  cinacalcet 30 milliGRAM(s) Oral daily  cycloSPORINE  , modified (NEORAL) 200 milliGRAM(s) Oral two times a day  Dakins Solution - 1/4 Strength 1 Application(s) Topical daily  dextrose 40% Gel 15 Gram(s) Oral once  dextrose 5%. 1000 milliLiter(s) (50 mL/Hr) IV Continuous <Continuous>  dextrose 5%. 1000 milliLiter(s) (100 mL/Hr) IV Continuous <Continuous>  dextrose 50% Injectable 25 Gram(s) IV Push once  dextrose 50% Injectable 12.5 Gram(s) IV Push once  dextrose 50% Injectable 25 Gram(s) IV Push once  diltiazem    milliGRAM(s) Oral daily  epoetin anabela-epbx (RETACRIT) Injectable 23377 Unit(s) IV Push <User Schedule>  gabapentin 300 milliGRAM(s) Oral daily  glucagon  Injectable 1 milliGRAM(s) IntraMuscular once  insulin glargine Injectable (LANTUS) 10 Unit(s) SubCutaneous at bedtime  insulin lispro (ADMELOG) corrective regimen sliding scale   SubCutaneous three times a day before meals  insulin lispro (ADMELOG) corrective regimen sliding scale   SubCutaneous at bedtime  lidocaine   4% Patch 1 Patch Transdermal daily  lidocaine   4% Patch 1 Patch Transdermal daily  mirtazapine 7.5 milliGRAM(s) Oral at bedtime  montelukast 10 milliGRAM(s) Oral at bedtime  mupirocin 2% Ointment 1 Application(s) Topical two times a day  oxyCODONE  ER Tablet 10 milliGRAM(s) Oral every 12 hours  pantoprazole    Tablet 40 milliGRAM(s) Oral before breakfast  polyethylene glycol 3350 17 Gram(s) Oral at bedtime  senna 2 Tablet(s) Oral at bedtime  sertraline 50 milliGRAM(s) Oral daily  sevelamer carbonate 800 milliGRAM(s) Oral three times a day with meals  simethicone 80 milliGRAM(s) Chew three times a day  tacrolimus   0.1% Ointment 1 Application(s) Topical daily  traZODone 100 milliGRAM(s) Oral at bedtime      TELEMETRY: 	    ECG:  	  RADIOLOGY:   DIAGNOSTIC TESTING:  [ ] Echocardiogram:  [ ] Catheterization:  [ ] Stress Test:    OTHER: 	    LABS:	 	    CARDIAC MARKERS:                                      7.5    11.49 )-----------( 363      ( 24 Mar 2022 06:53 )             27.1     03-24    137  |  96<L>  |  34<H>  ----------------------------<  165<H>  4.0   |  24  |  5.27<H>    Ca    9.3      24 Mar 2022 06:53  Phos  3.3     03-24  Mg     2.30     03-24      proBNP:     Lipid Profile:   HgA1c:     Creatinine, Serum: 5.27 mg/dL (03-24-22 @ 06:53)  Creatinine, Serum: 6.98 mg/dL (03-23-22 @ 07:12)  Creatinine, Serum: 5.94 mg/dL (03-22-22 @ 06:46)  Creatinine, Serum: 7.98 mg/dL (03-21-22 @ 17:26)

## 2022-03-24 NOTE — PROGRESS NOTE ADULT - SUBJECTIVE AND OBJECTIVE BOX
SUBJECTIVE / OVERNIGHT EVENTS:pt seen and examined  03-24-22     MEDICATIONS  (STANDING):  buPROPion XL (24-Hour) . 150 milliGRAM(s) Oral daily  chlorhexidine 2% Cloths 1 Application(s) Topical daily  cinacalcet 30 milliGRAM(s) Oral daily  cycloSPORINE  , modified (NEORAL) 200 milliGRAM(s) Oral two times a day  Dakins Solution - 1/4 Strength 1 Application(s) Topical daily  dextrose 40% Gel 15 Gram(s) Oral once  dextrose 5%. 1000 milliLiter(s) (100 mL/Hr) IV Continuous <Continuous>  dextrose 5%. 1000 milliLiter(s) (50 mL/Hr) IV Continuous <Continuous>  dextrose 50% Injectable 25 Gram(s) IV Push once  dextrose 50% Injectable 12.5 Gram(s) IV Push once  dextrose 50% Injectable 25 Gram(s) IV Push once  diltiazem    milliGRAM(s) Oral daily  epoetin anabela-epbx (RETACRIT) Injectable 69416 Unit(s) IV Push <User Schedule>  gabapentin 300 milliGRAM(s) Oral daily  glucagon  Injectable 1 milliGRAM(s) IntraMuscular once  insulin glargine Injectable (LANTUS) 10 Unit(s) SubCutaneous at bedtime  insulin lispro (ADMELOG) corrective regimen sliding scale   SubCutaneous three times a day before meals  insulin lispro (ADMELOG) corrective regimen sliding scale   SubCutaneous at bedtime  lidocaine   4% Patch 1 Patch Transdermal daily  lidocaine   4% Patch 1 Patch Transdermal daily  lidocaine   4% Patch 1 Patch Transdermal daily  mirtazapine 7.5 milliGRAM(s) Oral at bedtime  montelukast 10 milliGRAM(s) Oral at bedtime  mupirocin 2% Ointment 1 Application(s) Topical two times a day  oxyCODONE  ER Tablet 10 milliGRAM(s) Oral every 12 hours  pantoprazole    Tablet 40 milliGRAM(s) Oral before breakfast  polyethylene glycol 3350 17 Gram(s) Oral at bedtime  senna 2 Tablet(s) Oral at bedtime  sertraline 50 milliGRAM(s) Oral daily  sevelamer carbonate 800 milliGRAM(s) Oral three times a day with meals  simethicone 80 milliGRAM(s) Chew three times a day  tacrolimus   0.1% Ointment 1 Application(s) Topical daily  traZODone 100 milliGRAM(s) Oral at bedtime    MEDICATIONS  (PRN):  acetaminophen     Tablet .. 650 milliGRAM(s) Oral every 6 hours PRN Temp greater or equal to 38C (100.4F), Mild Pain (1 - 3)  melatonin 3 milliGRAM(s) Oral at bedtime PRN Insomnia  midodrine. 5 milliGRAM(s) Oral <User Schedule> PRN 30 minutes prior to dialysis  ondansetron Injectable 4 milliGRAM(s) IV Push every 8 hours PRN Nausea and/or Vomiting  oxyCODONE    IR 10 milliGRAM(s) Oral every 4 hours PRN moderate and severe pain    Vital Signs Last 24 Hrs  T(C): 37 (03-24-22 @ 22:35), Max: 37 (03-24-22 @ 13:36)  T(F): 98.6 (03-24-22 @ 22:35), Max: 98.6 (03-24-22 @ 13:36)  HR: 82 (03-24-22 @ 22:35) (82 - 84)  BP: 107/63 (03-24-22 @ 22:35) (107/63 - 120/55)  BP(mean): --  RR: 18 (03-24-22 @ 22:35) (17 - 18)  SpO2: 96% (03-24-22 @ 22:35) (96% - 99%)      Skin: No rash.  Eyes: No recent vision problems or eye pain.  ENT: No congestion, ear pain, or sore throat.  Cardiovascular: No chest pain or palpation.  Respiratory: No cough, shortness of breath, congestion, or wheezing.  Gastrointestinal: No abdominal pain, nausea, vomiting, or diarrhea.  Genitourinary: No dysuria.  Musculoskeletal: No joint swelling.  Neurologic: No headache.    PHYSICAL EXAM:  GENERAL: NAD  EYES: EOMI, PERRLA  NECK: Supple, No JVD  CHEST/LUNG: dec breath sounds at bases  HEART:  S1 , S2 +  ABDOMEN: soft , bs+, wound dressing+  EXTREMITIES:  edema+  NEUROLOGY:alert awake oriented     LABS:  03-24    137  |  96<L>  |  34<H>  ----------------------------<  165<H>  4.0   |  24  |  5.27<H>    Ca    9.3      24 Mar 2022 06:53  Phos  3.3     03-24  Mg     2.30     03-24      Creatinine Trend: 5.27 <--, 6.98 <--, 5.94 <--, 7.98 <--, 6.85 <--, 6.03 <--                        7.5    11.49 )-----------( 363      ( 24 Mar 2022 06:53 )             27.1     Urine Studies:

## 2022-03-24 NOTE — PROGRESS NOTE ADULT - SUBJECTIVE AND OBJECTIVE BOX
History:  patient see at bedside.  Reports poor appetite.  Only drinking nepro shakes.  No hypoglycemia    MEDICATIONS  (STANDING):  buPROPion XL (24-Hour) . 150 milliGRAM(s) Oral daily  cinacalcet 60 milliGRAM(s) Oral daily  cycloSPORINE  , modified (NEORAL) 200 milliGRAM(s) Oral at bedtime  cycloSPORINE  , modified (NEORAL) 150 milliGRAM(s) Oral <User Schedule>  dextrose 40% Gel 15 Gram(s) Oral once  dextrose 5%. 1000 milliLiter(s) (50 mL/Hr) IV Continuous <Continuous>  dextrose 5%. 1000 milliLiter(s) (100 mL/Hr) IV Continuous <Continuous>  dextrose 50% Injectable 25 Gram(s) IV Push once  dextrose 50% Injectable 12.5 Gram(s) IV Push once  dextrose 50% Injectable 25 Gram(s) IV Push once  diltiazem    milliGRAM(s) Oral daily  epoetin anabela-epbx (RETACRIT) Injectable 05513 Unit(s) IV Push <User Schedule>  gabapentin 300 milliGRAM(s) Oral two times a day  glucagon  Injectable 1 milliGRAM(s) IntraMuscular once  insulin glargine Injectable (LANTUS) 10 Unit(s) SubCutaneous at bedtime  insulin lispro (ADMELOG) corrective regimen sliding scale   SubCutaneous three times a day before meals  insulin lispro (ADMELOG) corrective regimen sliding scale   SubCutaneous at bedtime  lidocaine   4% Patch 1 Patch Transdermal daily  lidocaine   4% Patch 1 Patch Transdermal daily  mirtazapine 7.5 milliGRAM(s) Oral at bedtime  montelukast 10 milliGRAM(s) Oral at bedtime  oxyCODONE  ER Tablet 10 milliGRAM(s) Oral every 12 hours  pantoprazole    Tablet 40 milliGRAM(s) Oral before breakfast  polyethylene glycol 3350 17 Gram(s) Oral at bedtime  senna 2 Tablet(s) Oral at bedtime  sertraline 50 milliGRAM(s) Oral daily  sevelamer carbonate 800 milliGRAM(s) Oral three times a day with meals  simethicone 80 milliGRAM(s) Chew three times a day  tacrolimus   0.1% Ointment 1 Application(s) Topical daily  traZODone 100 milliGRAM(s) Oral at bedtime    MEDICATIONS  (PRN):  acetaminophen     Tablet .. 650 milliGRAM(s) Oral every 6 hours PRN Temp greater or equal to 38C (100.4F), Mild Pain (1 - 3)  melatonin 3 milliGRAM(s) Oral at bedtime PRN Insomnia  midodrine. 5 milliGRAM(s) Oral <User Schedule> PRN 30 minutes prior to dialysis  ondansetron Injectable 4 milliGRAM(s) IV Push every 8 hours PRN Nausea and/or Vomiting  oxyCODONE    IR 7.5 milliGRAM(s) Oral every 6 hours PRN Severe Pain (7 - 10)      Allergies    latex (Rash)  penicillin (Nausea)  strawberry (Rash)    Intolerances    caffeine (Nausea)    Review of Systems:  Constitutional: No fever  ALL OTHER SYSTEMS REVIEWED AND NEGATIVE      PHYSICAL EXAM:  Vital Signs Last 24 Hrs  T(C): 37 (24 Mar 2022 13:36), Max: 37.1 (23 Mar 2022 21:50)  T(F): 98.6 (24 Mar 2022 13:36), Max: 98.7 (23 Mar 2022 21:50)  HR: 84 (24 Mar 2022 13:36) (84 - 85)  BP: 120/55 (24 Mar 2022 13:36) (120/55 - 129/55)  BP(mean): --  RR: 17 (24 Mar 2022 13:36) (17 - 17)  SpO2: 99% (24 Mar 2022 13:36) (96% - 99%)  GENERAL: NAD, well-developed  GI: Soft, nontender, non distended  SKIN: Dry, intact, No rashes or lesions  PSYCH: Alert and oriented x 3, normal affect, normal mood    CAPILLARY BLOOD GLUCOSE    POCT Blood Glucose.: 142 mg/dL (24 Mar 2022 21:08)  POCT Blood Glucose.: 171 mg/dL (24 Mar 2022 17:41)  POCT Blood Glucose.: 208 mg/dL (24 Mar 2022 13:03)  POCT Blood Glucose.: 188 mg/dL (24 Mar 2022 09:04)  POCT Blood Glucose.: 171 mg/dL (24 Mar 2022 06:52)  POCT Blood Glucose.: 178 mg/dL (23 Mar 2022 21:41)  POCT Blood Glucose.: 103 mg/dL (21 Mar 2022 20:03)  POCT Blood Glucose.: 111 mg/dL (21 Mar 2022 18:44)  POCT Blood Glucose.: 117 mg/dL (21 Mar 2022 15:31)  POCT Blood Glucose.: 113 mg/dL (21 Mar 2022 12:51)  POCT Blood Glucose.: 124 mg/dL (21 Mar 2022 08:51)  POCT Blood Glucose.: 152 mg/dL (20 Mar 2022 22:10)    03-24    137  |  96<L>  |  34<H>  ----------------------------<  165<H>  4.0   |  24  |  5.27<H>    Ca    9.3      24 Mar 2022 06:53  Phos  3.3     03-24  Mg     2.30     03-24          A1C with Estimated Average Glucose (03.20.22 @ 12:48)    A1C with Estimated Average Glucose Result: 6.1%    Estimated Average Glucose: 128

## 2022-03-24 NOTE — PROGRESS NOTE ADULT - SUBJECTIVE AND OBJECTIVE BOX
[facility] NEPHROLOGY- PROGRESS NOTE    56y Female with history of ESRD on HD presents with vaginal bleeding. Nephrology consulted for ESRD status.    REVIEW OF SYSTEMS:  Gen: no changes in weight  Cards: no chest pain  Resp: no dyspnea  GI: no nausea or vomiting or diarrhea  : + vaginal bleeding  Vascular: no LE edema    caffeine (Nausea)  latex (Rash)  penicillin (Nausea)  strawberry (Rash)      Hospital Medications: Medications reviewed      VITALS:  T(F): 98.7 (03-23-22 @ 21:50), Max: 98.7 (03-23-22 @ 21:50)  HR: 85 (03-23-22 @ 21:50)  BP: 129/55 (03-23-22 @ 21:50)  RR: 17 (03-23-22 @ 21:50)  SpO2: 96% (03-23-22 @ 21:50)  Wt(kg): --    03-23 @ 07:01  -  03-24 @ 07:00  --------------------------------------------------------  IN: 500 mL / OUT: 2200 mL / NET: -1700 mL        PHYSICAL EXAM:    Gen: NAD, calm  Cards: RRR, +S1/S2, no M/G/R  Resp: CTA B/L  GI: soft, NT/ND, NABS, + ab wound dressing in place  Vascular: no LE edema B/L, LUE AVF + bruit/thrill      LABS:  03-24    137  |  96<L>  |  34<H>  ----------------------------<  165<H>  4.0   |  24  |  5.27<H>    Ca    9.3      24 Mar 2022 06:53  Phos  3.3     03-24  Mg     2.30     03-24      Creatinine Trend: 5.27 <--, 6.98 <--, 5.94 <--, 7.98 <--, 6.85 <--, 6.03 <--                        7.5    11.49 )-----------( 363      ( 24 Mar 2022 06:53 )             27.1     Urine Studies:

## 2022-03-25 NOTE — PROGRESS NOTE ADULT - SUBJECTIVE AND OBJECTIVE BOX
SUBJECTIVE / OVERNIGHT EVENTS:pt seen and examined  03-25-22     MEDICATIONS  (STANDING):  buPROPion XL (24-Hour) . 150 milliGRAM(s) Oral daily  chlorhexidine 2% Cloths 1 Application(s) Topical daily  cinacalcet 30 milliGRAM(s) Oral daily  cycloSPORIsevelamer carbonate 800 milliGRAM(s) Oral three times a day with meals  simethicone 80 milliGRAM(s) Chew three times a day  tacrolimus   0.1% Ointment 1 Application(s) Topical daily  traZODone 100 milliGRAM(s) Oral at bedtime    MEDICATIONS  (PRN):  acetaminophen     Tablet .. 650 milliGRAM(s) Oral every 6 hours PRN Temp greater or equal to 38C (100.4F), Mild Pain (1 - 3)  melatonin 3 milliGRAM(s) Oral at bedtime PRN Insomnia  midodrine. 5 milliGRAM(s) Oral <User Schedule> PRN 30 minutes prior to dialysis  ondansetron Injectable 4 milliGRAM(s) IV Push every 8 hours PRN Nausea and/or Vomiting  oxyCODONE    IR 10 milliGRAM(s) Oral every 4 hours PRN moderate and severe pain    Vital Signs Last 24 Hrs  T(C): 37 (03-24-22 @ 22:35), Max: 37 (03-24-22 @ 13:36)  T(F): 98.6 (03-24-22 @ 22:35), Max: 98.6 (03-24-22 @ 13:36)  HR: 82 (03-24-22 @ 22:35) (82 - 84)  BP: 107/63 (03-24-22 @ 22:35) (107/63 - 120/55)  BP(mean): --  RR: 18 (03-24-22 @ 22:35) (17 - 18)  SpO2: 96% (03-24-22 @ 22:35) (96% - 99%)      Skin: No rash.  Eyes: No recent vision problems or eye pain.  ENT: No congestion, ear pain, or sore throat.  Cardiovascular: No chest pain or palpation.  Respiratory: No cough, shortness of breath, congestion, or wheezing.  Gastrointestinal: No abdominal pain, nausea, vomiting, or diarrhea.  Genitourinary: No dysuria.  Musculoskeletal: No joint swelling.  Neurologic: No headache.    PHYSICAL EXAM:  GENERAL: NAD  EYES: EOMI, PERRLA  NECK: Supple, No JVD  CHEST/LUNG: dec breath sounds at bases  HEART:  S1 , S2 +  ABDOMEN: soft , bs+, wound dressing+  EXTREMITIES:  edema+  NEUROLOGY:alert awake oriented     LABS:  03-25    136  |  96<L>  |  42<H>  ----------------------------<  162<H>  3.9   |  24  |  6.00<H>    Ca    9.3      25 Mar 2022 04:37  Phos  3.9     03-25  Mg     2.40     03-25      Creatinine Trend: 6.00 <--, 5.27 <--, 6.98 <--, 5.94 <--, 7.98 <--, 6.85 <--, 6.03 <--                        7.1    11.25 )-----------( 321      ( 25 Mar 2022 04:37 )             27.1     Urine Studies:              PTT - ( 25 Mar 2022 06:53 )  PTT:37.0 sec  Urine Studies:

## 2022-03-25 NOTE — PROGRESS NOTE ADULT - SUBJECTIVE AND OBJECTIVE BOX
[facility] NEPHROLOGY- PROGRESS NOTE    56y Female with history of ESRD on HD presents with vaginal bleeding. Nephrology consulted for ESRD status.    REVIEW OF SYSTEMS:  Gen: no changes in weight  Cards: no chest pain  Resp: no dyspnea  GI: no nausea or vomiting or diarrhea  : + vaginal bleeding improving  Vascular: no LE edema    caffeine (Nausea)  latex (Rash)  penicillin (Nausea)  strawberry (Rash)      Hospital Medications: Medications reviewed      VITALS:  T(F): 98.6 (03-25-22 @ 10:00), Max: 98.8 (03-25-22 @ 06:20)  HR: 82 (03-25-22 @ 10:00)  BP: 127/60 (03-25-22 @ 10:00)  RR: 17 (03-25-22 @ 10:00)  SpO2: 98% (03-25-22 @ 10:00)  Wt(kg): --    03-25 @ 07:01  -  03-25 @ 11:21  --------------------------------------------------------  IN: 500 mL / OUT: 2500 mL / NET: -2000 mL        Weight (kg): 130.6 (03-24 @ 21:15)      PHYSICAL EXAM:    Gen: NAD, calm  Cards: RRR, +S1/S2, no M/G/R  Resp: CTA B/L  GI: soft, NT/ND, NABS, + ab wound dressing in place  Vascular: no LE edema B/L, LUE AVF + bruit/thrill      LABS:  03-25    136  |  96<L>  |  42<H>  ----------------------------<  162<H>  3.9   |  24  |  6.00<H>    Ca    9.3      25 Mar 2022 04:37  Phos  3.9     03-25  Mg     2.40     03-25      Creatinine Trend: 6.00 <--, 5.27 <--, 6.98 <--, 5.94 <--, 7.98 <--, 6.85 <--, 6.03 <--                        7.1    11.25 )-----------( 321      ( 25 Mar 2022 04:37 )             27.1     Urine Studies:

## 2022-03-25 NOTE — CHART NOTE - NSCHARTNOTEFT_GEN_A_CORE
Case discussed with attending, Dr. Maurice.  Pt can be medically cleared for D&C/endometrial biopsy as long as procedure does not delay discharge.  Attempted to reach GYN; no response yet. Will continue to monitor and reach GYN.

## 2022-03-25 NOTE — PROGRESS NOTE ADULT - ASSESSMENT
Echo 1/19/22: grossly nl LV sys fx, min MR     a/p  56 year old female with hx of morbid obesity, CHAMP not on home O2, ESRD (HD MWF), HTN, DM, COPD, Afib no longer on AC, chronic R pannus wound, followed by Dr. Layne, likely pyoderma gangrenosum presents for vaginal bleeding     #Chronic Pannus Wound  -surgical eval noted, wound care  -abx per med     #Chronic Afib  -stable, rates controlled  -cont dilt as ordered  -on iv hep     Hypertension  -stable  -cont ccb     #ESRD on HD  -HD per renal    #Gyn bleeding  -await poss D&C, biopsy  -remains stable form cardiac standpoint and can proceed with acceptable cardiac risk       35 minutes spent on total encounter; more than 50% of the visit was spent counseling and/or coordinating care by the attending physician.

## 2022-03-25 NOTE — PROGRESS NOTE ADULT - ASSESSMENT
56y Female with history of ESRD on HD presents with vaginal bleeding. Nephrology consulted for ESRD status.    1) ESRD: Last HD earlier this morning tolerated well with 2L removed with heparin to prevent circuit clotting. Plan for next maintenance HD on 3/28 with heparin. Monitor electrolytes.    2) HTN with ESRD: BP acceptable on Cardizem. Continue with midodrine pre-HD on HD days to avoid intradialytic hypotension. Monitor BP.    3) Anemia of renal disease: With component of blood loss from vaginal bleeding. Hb low acceptable iron stores. Continue with Epo 16K with HD. PRBC as needed. Monitor Hb.    4) Secondary HPT of renal origin: Phosphorus acceptable with low iPTH for which sensipar decreased to 30 mg daily. Continue with renvela 1 tab with meals. Monitor serum calcium and phosphorus.      [facility] NEPHROLOGY  Keaton Lopez M.D.  Juma Green D.O.  Myla Hoffmann M.D.  Julia Brewer, MSN, ANP-C    Telephone: [phone]  Facsimile: [phone]    [address] [place] [address]

## 2022-03-25 NOTE — PROGRESS NOTE ADULT - SUBJECTIVE AND OBJECTIVE BOX
CARDIOLOGY FOLLOW UP NOTE - DR. JAQUEZ    Patient Name: ANTONIA ORTIZ  Date of Service: 03-25-22     events noted  no new complaints  Subjective:    cv: denies chest pain, dyspnea, palpitations, dizziness  pulmonary: denies cough  GI: denies abdominal pain, nausea, vomiting  vascular/legs: no edema   skin: no rash  ROS: otherwise negative   overnight events:      PHYSICAL EXAM:  T(C): 37.1 (03-25-22 @ 15:40), Max: 37.1 (03-25-22 @ 06:20)  HR: 83 (03-25-22 @ 15:40) (80 - 83)  BP: 116/69 (03-25-22 @ 15:40) (105/55 - 127/60)  RR: 19 (03-25-22 @ 15:40) (17 - 19)  SpO2: 99% (03-25-22 @ 15:40) (96% - 99%)  Wt(kg): --  I&O's Summary    25 Mar 2022 07:01  -  25 Mar 2022 19:19  --------------------------------------------------------  IN: 500 mL / OUT: 2500 mL / NET: -2000 mL      Daily     Daily     Appearance: Normal	  Cardiovascular: Normal S1 S2,RRR, No JVD, No murmurs  Respiratory: Lungs clear to auscultation	  Gastrointestinal:  Soft, Non-tender, + BS	  Extremities: Normal range of motion, No clubbing, cyanosis or edema      Home Medications:  Aspercreme with Lidocaine 4% topical film: Apply topically to affected area once a day (low back) (20 Mar 2022 10:15)  Aspercreme with Lidocaine 4% topical film: Apply topically to affected area once a day (L knee) (20 Mar 2022 10:15)  aspirin 81 mg oral delayed release tablet: 1 tab(s) orally once a day (20 Mar 2022 10:15)  buPROPion 150 mg/24 hours (XL) oral tablet, extended release: 1 tab(s) orally once a day (in the morning) (20 Mar 2022 10:15)  cycloSPORINE modified 100 mg oral capsule: 2 cap(s) orally once a day (at bedtime) (20 Mar 2022 10:15)  cycloSPORINE modified 50 mg oral capsule: 3 cap(s) orally once a day in the morning  (20 Mar 2022 10:15)  dilTIAZem 120 mg/24 hours oral capsule, extended release: 1 cap(s) orally once a day (hold SBP&lt;110, HR&lt;60) (20 Mar 2022 10:15)  doxycycline hyclate 100 mg oral tablet: 1 tab(s) orally 2 times a day (20 Mar 2022 10:15)  Eliquis 2.5 mg oral tablet: 1 tab(s) orally 2 times a day    Pharmacy states patient no longer wants to fill this medication (20 Mar 2022 10:15)  gabapentin 300 mg oral capsule: 1 cap(s) orally 2 times a day (20 Mar 2022 10:15)  insulin glargine: 15 unit(s) subcutaneous once a day (at bedtime) (20 Mar 2022 10:15)  midodrine 5 mg oral tablet: 1 tab(s) orally 3 times a week, 30 minute prior to dialysis sessions (hold is SBP&gt;130) (20 Mar 2022 10:15)  mirtazapine 7.5 mg oral tablet: 1 tab(s) orally once a day (at bedtime) (20 Mar 2022 10:15)  montelukast 10 mg oral tablet: 1 tab(s) orally once a day (in the evening) (20 Mar 2022 10:15)  oxyCODONE 10 mg oral tablet, extended release: 1 tab(s) orally every 12 hours (20 Mar 2022 10:15)  oxycodone-acetaminophen 7.5 mg-325 mg oral tablet: 1 tab(s) orally every 6 hours, As Needed (20 Mar 2022 10:15)  pantoprazole 40 mg oral delayed release tablet: 1 tab(s) orally once a day (20 Mar 2022 10:15)  polyethylene glycol 3350 oral powder for reconstitution: 17 gram(s) orally once a day (at bedtime) (20 Mar 2022 10:15)  senna oral tablet: 2 tab(s) orally once a day (at bedtime) (20 Mar 2022 10:15)  sertraline 50 mg oral tablet: 1 tab(s) orally once a day (20 Mar 2022 10:15)  sevelamer carbonate 800 mg oral tablet: 1 tab(s) orally 3 times a day (with meals) (20 Mar 2022 10:15)  simethicone 80 mg oral tablet, chewable: 1 tab(s) orally 3 times a day (before meals) (20 Mar 2022 10:15)  simvastatin 10 mg oral tablet: 1 tab(s) orally once a day (at bedtime) (20 Mar 2022 10:15)  sodium hypochlorite 0.25% topical solution: 1 application topically once a day (20 Mar 2022 10:15)  tacrolimus 0.1% topical ointment: 1 application topically once a day (20 Mar 2022 10:15)  traZODone 100 mg oral tablet: 1 tab(s) orally once a day (at bedtime) (20 Mar 2022 10:15)      MEDICATIONS  (STANDING):  buPROPion XL (24-Hour) . 150 milliGRAM(s) Oral daily  chlorhexidine 2% Cloths 1 Application(s) Topical daily  cinacalcet 30 milliGRAM(s) Oral daily  cycloSPORINE  , modified (NEORAL) 200 milliGRAM(s) Oral two times a day  Dakins Solution - 1/4 Strength 1 Application(s) Topical daily  dextrose 40% Gel 15 Gram(s) Oral once  dextrose 5%. 1000 milliLiter(s) (50 mL/Hr) IV Continuous <Continuous>  dextrose 5%. 1000 milliLiter(s) (100 mL/Hr) IV Continuous <Continuous>  dextrose 50% Injectable 25 Gram(s) IV Push once  dextrose 50% Injectable 12.5 Gram(s) IV Push once  dextrose 50% Injectable 25 Gram(s) IV Push once  diltiazem    milliGRAM(s) Oral daily  epoetin anabela-epbx (RETACRIT) Injectable 71595 Unit(s) IV Push <User Schedule>  gabapentin 300 milliGRAM(s) Oral daily  glucagon  Injectable 1 milliGRAM(s) IntraMuscular once  heparin  Infusion 1800 Unit(s)/Hr (18 mL/Hr) IV Continuous <Continuous>  insulin glargine Injectable (LANTUS) 10 Unit(s) SubCutaneous at bedtime  insulin lispro (ADMELOG) corrective regimen sliding scale   SubCutaneous three times a day before meals  insulin lispro (ADMELOG) corrective regimen sliding scale   SubCutaneous at bedtime  lidocaine   4% Patch 1 Patch Transdermal daily  lidocaine   4% Patch 1 Patch Transdermal daily  lidocaine   4% Patch 1 Patch Transdermal daily  mirtazapine 7.5 milliGRAM(s) Oral at bedtime  montelukast 10 milliGRAM(s) Oral at bedtime  mupirocin 2% Ointment 1 Application(s) Topical two times a day  oxyCODONE  ER Tablet 10 milliGRAM(s) Oral every 12 hours  pantoprazole    Tablet 40 milliGRAM(s) Oral before breakfast  polyethylene glycol 3350 17 Gram(s) Oral at bedtime  senna 2 Tablet(s) Oral at bedtime  sertraline 50 milliGRAM(s) Oral daily  sevelamer carbonate 800 milliGRAM(s) Oral three times a day with meals  simethicone 80 milliGRAM(s) Chew three times a day  tacrolimus   0.1% Ointment 1 Application(s) Topical daily  traZODone 100 milliGRAM(s) Oral at bedtime      TELEMETRY: 	    ECG:  	  RADIOLOGY:   DIAGNOSTIC TESTING:  [ ] Echocardiogram:  [ ] Catheterization:  [ ] Stress Test:    OTHER: 	    LABS:	 	    CARDIAC MARKERS:                                      7.1    11.25 )-----------( 321      ( 25 Mar 2022 04:37 )             27.1     03-25    136  |  96<L>  |  42<H>  ----------------------------<  162<H>  3.9   |  24  |  6.00<H>    Ca    9.3      25 Mar 2022 04:37  Phos  3.9     03-25  Mg     2.40     03-25      proBNP:   PTT - ( 25 Mar 2022 06:53 )  PTT:37.0 sec  Lipid Profile:   HgA1c:     Creatinine, Serum: 6.00 mg/dL (03-25-22 @ 04:37)  Creatinine, Serum: 5.27 mg/dL (03-24-22 @ 06:53)  Creatinine, Serum: 6.98 mg/dL (03-23-22 @ 07:12)

## 2022-03-25 NOTE — CHART NOTE - NSCHARTNOTEFT_GEN_A_CORE
Labs reviewed with Dr. Maurice including today's H/H.  As per her recommendations, it is okay to start patient on patient specific heparin drip with a target PTT goal of 50-60.  Will continue to monitor and re-assess patient closely.

## 2022-03-26 NOTE — PROGRESS NOTE ADULT - SUBJECTIVE AND OBJECTIVE BOX
CARDIOLOGY FOLLOW UP - Dr. Bullock  Date of Service: 3/26/22  CC: no events    Review of Systems:  Constitutional: No fever, weight loss, or fatigue  Respiratory: No cough, wheezing, or hemoptysis, no shortness of breath  Cardiovascular: No chest pain, palpitaitons, passing out, dizziness, or leg swelling  Gastrointestinal: No abd or epigastric pain. No nausea, vomitting, or hematemesis; no diarrhea or consiptaiton, no melena or hematochezia  Vascular: No edema     TELEMETRY:    PHYSICAL EXAM:  T(C): 36.9 (03-26-22 @ 06:00), Max: 37.3 (03-25-22 @ 22:00)  HR: 85 (03-26-22 @ 06:00) (83 - 88)  BP: 112/45 (03-26-22 @ 06:00) (112/45 - 126/52)  RR: 18 (03-26-22 @ 06:00) (18 - 19)  SpO2: 97% (03-26-22 @ 06:00) (96% - 99%)  Wt(kg): --  I&O's Summary    25 Mar 2022 07:01  -  26 Mar 2022 07:00  --------------------------------------------------------  IN: 500 mL / OUT: 2500 mL / NET: -2000 mL        Appearance: Normal	  Cardiovascular: Normal S1 S2,RRR, No JVD, No murmurs  Respiratory: Lungs clear to auscultation	  Gastrointestinal:  Soft, Non-tender, + BS	  Extremities: Normal range of motion, No clubbing, cyanosis or edema  Vascular: Peripheral pulses palpable 2+ bilaterally       Home Medications:  Aspercreme with Lidocaine 4% topical film: Apply topically to affected area once a day (low back) (20 Mar 2022 10:15)  Aspercreme with Lidocaine 4% topical film: Apply topically to affected area once a day (L knee) (20 Mar 2022 10:15)  aspirin 81 mg oral delayed release tablet: 1 tab(s) orally once a day (20 Mar 2022 10:15)  buPROPion 150 mg/24 hours (XL) oral tablet, extended release: 1 tab(s) orally once a day (in the morning) (20 Mar 2022 10:15)  cycloSPORINE modified 100 mg oral capsule: 2 cap(s) orally once a day (at bedtime) (20 Mar 2022 10:15)  cycloSPORINE modified 50 mg oral capsule: 3 cap(s) orally once a day in the morning  (20 Mar 2022 10:15)  dilTIAZem 120 mg/24 hours oral capsule, extended release: 1 cap(s) orally once a day (hold SBP&lt;110, HR&lt;60) (20 Mar 2022 10:15)  doxycycline hyclate 100 mg oral tablet: 1 tab(s) orally 2 times a day (20 Mar 2022 10:15)  Eliquis 2.5 mg oral tablet: 1 tab(s) orally 2 times a day    Pharmacy states patient no longer wants to fill this medication (20 Mar 2022 10:15)  gabapentin 300 mg oral capsule: 1 cap(s) orally 2 times a day (20 Mar 2022 10:15)  insulin glargine: 15 unit(s) subcutaneous once a day (at bedtime) (20 Mar 2022 10:15)  midodrine 5 mg oral tablet: 1 tab(s) orally 3 times a week, 30 minute prior to dialysis sessions (hold is SBP&gt;130) (20 Mar 2022 10:15)  mirtazapine 7.5 mg oral tablet: 1 tab(s) orally once a day (at bedtime) (20 Mar 2022 10:15)  montelukast 10 mg oral tablet: 1 tab(s) orally once a day (in the evening) (20 Mar 2022 10:15)  oxyCODONE 10 mg oral tablet, extended release: 1 tab(s) orally every 12 hours (20 Mar 2022 10:15)  oxycodone-acetaminophen 7.5 mg-325 mg oral tablet: 1 tab(s) orally every 6 hours, As Needed (20 Mar 2022 10:15)  pantoprazole 40 mg oral delayed release tablet: 1 tab(s) orally once a day (20 Mar 2022 10:15)  polyethylene glycol 3350 oral powder for reconstitution: 17 gram(s) orally once a day (at bedtime) (20 Mar 2022 10:15)  senna oral tablet: 2 tab(s) orally once a day (at bedtime) (20 Mar 2022 10:15)  sertraline 50 mg oral tablet: 1 tab(s) orally once a day (20 Mar 2022 10:15)  sevelamer carbonate 800 mg oral tablet: 1 tab(s) orally 3 times a day (with meals) (20 Mar 2022 10:15)  simethicone 80 mg oral tablet, chewable: 1 tab(s) orally 3 times a day (before meals) (20 Mar 2022 10:15)  simvastatin 10 mg oral tablet: 1 tab(s) orally once a day (at bedtime) (20 Mar 2022 10:15)  sodium hypochlorite 0.25% topical solution: 1 application topically once a day (20 Mar 2022 10:15)  tacrolimus 0.1% topical ointment: 1 application topically once a day (20 Mar 2022 10:15)  traZODone 100 mg oral tablet: 1 tab(s) orally once a day (at bedtime) (20 Mar 2022 10:15)        MEDICATIONS  (STANDING):  buPROPion XL (24-Hour) . 150 milliGRAM(s) Oral daily  chlorhexidine 2% Cloths 1 Application(s) Topical daily  cinacalcet 30 milliGRAM(s) Oral daily  cycloSPORINE  , modified (NEORAL) 200 milliGRAM(s) Oral two times a day  Dakins Solution - 1/4 Strength 1 Application(s) Topical daily  dextrose 40% Gel 15 Gram(s) Oral once  dextrose 5%. 1000 milliLiter(s) (50 mL/Hr) IV Continuous <Continuous>  dextrose 5%. 1000 milliLiter(s) (100 mL/Hr) IV Continuous <Continuous>  dextrose 50% Injectable 25 Gram(s) IV Push once  dextrose 50% Injectable 12.5 Gram(s) IV Push once  dextrose 50% Injectable 25 Gram(s) IV Push once  diltiazem    milliGRAM(s) Oral daily  epoetin anabela-epbx (RETACRIT) Injectable 19449 Unit(s) IV Push <User Schedule>  gabapentin 300 milliGRAM(s) Oral daily  glucagon  Injectable 1 milliGRAM(s) IntraMuscular once  heparin  Infusion 1900 Unit(s)/Hr (19 mL/Hr) IV Continuous <Continuous>  insulin glargine Injectable (LANTUS) 10 Unit(s) SubCutaneous at bedtime  insulin lispro (ADMELOG) corrective regimen sliding scale   SubCutaneous three times a day before meals  insulin lispro (ADMELOG) corrective regimen sliding scale   SubCutaneous at bedtime  lidocaine   4% Patch 1 Patch Transdermal daily  lidocaine   4% Patch 1 Patch Transdermal daily  lidocaine   4% Patch 1 Patch Transdermal daily  mirtazapine 7.5 milliGRAM(s) Oral at bedtime  montelukast 10 milliGRAM(s) Oral at bedtime  mupirocin 2% Ointment 1 Application(s) Topical two times a day  oxyCODONE  ER Tablet 10 milliGRAM(s) Oral every 12 hours  pantoprazole    Tablet 40 milliGRAM(s) Oral before breakfast  polyethylene glycol 3350 17 Gram(s) Oral at bedtime  senna 2 Tablet(s) Oral at bedtime  sertraline 50 milliGRAM(s) Oral daily  sevelamer carbonate 800 milliGRAM(s) Oral three times a day with meals  simethicone 80 milliGRAM(s) Chew three times a day  tacrolimus   0.1% Ointment 1 Application(s) Topical daily  traZODone 100 milliGRAM(s) Oral at bedtime        EKG:  RADIOLOGY:  DIAGNOSTIC TESTING:  [ ] Echocardiogram:  [ ] Catherterization:  [ ] Stress Test:  OTHER:     LABS:	 	                          7.7    11.81 )-----------( 332      ( 26 Mar 2022 08:49 )             27.2     03-26    137  |  95<L>  |  24<H>  ----------------------------<  144<H>  3.6   |  26  |  4.69<H>    Ca    9.3      26 Mar 2022 08:49  Phos  2.5     03-26  Mg     2.20     03-26        PTT - ( 25 Mar 2022 23:13 )  PTT:37.5 sec    CARDIAC MARKERS:

## 2022-03-26 NOTE — PROGRESS NOTE ADULT - SUBJECTIVE AND OBJECTIVE BOX
SUBJECTIVE / OVERNIGHT EVENTS:pt seen and examined  03-26-22     MEDICATIONS  (STANDING):  buPROPion XL (24-Hour) . 150 milliGRAM(s) Oral daily  chlorhexidine 2% Cloths 1 Application(s) Topical daily  cinacalcet 30 milliGRAM(s) Oral daily  cycloSPORIsevelamer carbonate 800 milliGRAM(s) Oral three times a day with meals  simethicone 80 milliGRAM(s) Chew three times a day  tacrolimus   0.1% Ointment 1 Application(s) Topical daily  traZODone 100 milliGRAM(s) Oral at bedtime    MEDICATIONS  (PRN):  acetaminophen     Tablet .. 650 milliGRAM(s) Oral every 6 hours PRN Temp greater or equal to 38C (100.4F), Mild Pain (1 - 3)  melatonin 3 milliGRAM(s) Oral at bedtime PRN Insomnia  midodrine. 5 milliGRAM(s) Oral <User Schedule> PRN 30 minutes prior to dialysis  ondansetron Injectable 4 milliGRAM(s) IV Push every 8 hours PRN Nausea and/or Vomiting  oxyCODONE    IR 10 milliGRAM(s) Oral every 4 hours PRN moderate and severe pain    Vital Signs Last 24 Hrs  T(C): 37 (03-24-22 @ 22:35), Max: 37 (03-24-22 @ 13:36)  T(F): 98.6 (03-24-22 @ 22:35), Max: 98.6 (03-24-22 @ 13:36)  HR: 82 (03-24-22 @ 22:35) (82 - 84)  BP: 107/63 (03-24-22 @ 22:35) (107/63 - 120/55)  BP(mean): --  RR: 18 (03-24-22 @ 22:35) (17 - 18)  SpO2: 96% (03-24-22 @ 22:35) (96% - 99%)      Skin: No rash.  Eyes: No recent vision problems or eye pain.  ENT: No congestion, ear pain, or sore throat.  Cardiovascular: No chest pain or palpation.  Respiratory: No cough, shortness of breath, congestion, or wheezing.  Gastrointestinal: No abdominal pain, nausea, vomiting, or diarrhea.  Genitourinary: No dysuria.  Musculoskeletal: No joint swelling.  Neurologic: No headache.    PHYSICAL EXAM:  GENERAL: NAD  EYES: EOMI, PERRLA  NECK: Supple, No JVD  CHEST/LUNG: dec breath sounds at bases  HEART:  S1 , S2 +  ABDOMEN: soft , bs+, wound dressing+  EXTREMITIES:  edema+  NEUROLOGY:alert awake oriented     LABS:  03-25    136  |  96<L>  |  42<H>  ----------------------------<  162<H>  3.9   |  24  |  6.00<H>    Ca    9.3      25 Mar 2022 04:37  Phos  3.9     03-25  Mg     2.40     03-25      Creatinine Trend: 6.00 <--, 5.27 <--, 6.98 <--, 5.94 <--, 7.98 <--, 6.85 <--, 6.03 <--                        7.1    11.25 )-----------( 321      ( 25 Mar 2022 04:37 )             27.1     Urine Studies:              PTT - ( 25 Mar 2022 06:53 )  PTT:37.0 sec  Urine Studies:

## 2022-03-26 NOTE — PROGRESS NOTE ADULT - SUBJECTIVE AND OBJECTIVE BOX
[facility] NEPHROLOGY- PROGRESS NOTE    56y Female with history of ESRD on HD presents with vaginal bleeding. Nephrology consulted for ESRD status.    REVIEW OF SYSTEMS:  Gen: no changes in weight  Cards: no chest pain  Resp: no dyspnea  GI: no nausea or vomiting or diarrhea  : + vaginal bleeding improving  Vascular: no LE edema    caffeine (Nausea)  latex (Rash)  penicillin (Nausea)  strawberry (Rash)      Hospital Medications: Medications reviewed        VITALS:  T(F): 98.5 (03-26-22 @ 06:00), Max: 99.1 (03-25-22 @ 22:00)  HR: 85 (03-26-22 @ 06:00)  BP: 112/45 (03-26-22 @ 06:00)  RR: 18 (03-26-22 @ 06:00)  SpO2: 97% (03-26-22 @ 06:00)  Wt(kg): --    03-25 @ 07:01  -  03-26 @ 07:00  --------------------------------------------------------  IN: 500 mL / OUT: 2500 mL / NET: -2000 mL        PHYSICAL EXAM:    Gen: NAD, calm  Cards: RRR, +S1/S2, no M/G/R  Resp: CTA B/L  GI: soft, NT/ND, NABS, + ab wound dressing in place  Vascular: no LE edema B/L, LUE AVF + bruit/thrill      LABS:  03-26    137  |  95<L>  |  24<H>  ----------------------------<  144<H>  3.6   |  26  |  4.69<H>    Ca    9.3      26 Mar 2022 08:49  Phos  2.5     03-26  Mg     2.20     03-26      Creatinine Trend: 4.69 <--, 6.00 <--, 5.27 <--, 6.98 <--, 5.94 <--, 7.98 <--, 6.85 <--, 6.03 <--                        7.7    11.81 )-----------( 332      ( 26 Mar 2022 08:49 )             27.2     Urine Studies:

## 2022-03-26 NOTE — PROGRESS NOTE ADULT - ASSESSMENT
56y Female with history of ESRD on HD presents with vaginal bleeding. Nephrology consulted for ESRD status.    1) ESRD: Last HD on 3/25 tolerated well with 2L removed with heparin to prevent circuit clotting. Plan for next maintenance HD on 3/28 with heparin. Monitor electrolytes.    2) HTN with ESRD: BP acceptable on Cardizem. Continue with midodrine pre-HD on HD days to avoid intradialytic hypotension. Monitor BP.    3) Anemia of renal disease: With component of blood loss from vaginal bleeding. Hb low acceptable iron stores. Continue with Epo 16K with HD. PRBC as needed. Monitor Hb.    4) Secondary HPT of renal origin: Phosphorus acceptable with low iPTH for which sensipar decreased to 30 mg daily. Continue with renvela 1 tab with meals. Monitor serum calcium and phosphorus.      [facility] NEPHROLOGY  Keaton Lopez M.D.  Juma Green D.O.  Myla Hoffmann M.D.  Julia Brewer, MSN, ANP-C    Telephone: [phone]  Facsimile: [phone]    [address] [place] [address]

## 2022-03-27 NOTE — PROGRESS NOTE ADULT - SUBJECTIVE AND OBJECTIVE BOX
CARDIOLOGY FOLLOW UP - Dr. Bullock  Date of Service: 3/27/22  CC: no events    Review of Systems:  Constitutional: No fever, weight loss, or fatigue  Respiratory: No cough, wheezing, or hemoptysis, no shortness of breath  Cardiovascular: No chest pain, palpitaitons, passing out, dizziness, or leg swelling  Gastrointestinal: No abd or epigastric pain. No nausea, vomitting, or hematemesis; no diarrhea or consiptaiton, no melena or hematochezia  Vascular: No edema     TELEMETRY:    PHYSICAL EXAM:  T(C): 37.2 (03-27-22 @ 05:30), Max: 37.3 (03-26-22 @ 21:30)  HR: 80 (03-27-22 @ 05:30) (80 - 86)  BP: 111/55 (03-27-22 @ 05:30) (108/51 - 128/56)  RR: 18 (03-27-22 @ 05:30) (18 - 18)  SpO2: 100% (03-27-22 @ 05:30) (100% - 100%)  Wt(kg): --  I&O's Summary      Appearance: Normal	  Cardiovascular: Normal S1 S2,RRR, No JVD, No murmurs  Respiratory: Lungs clear to auscultation	  Gastrointestinal:  Soft, Non-tender, + BS	  Extremities: Normal range of motion, No clubbing, cyanosis or edema  Vascular: Peripheral pulses palpable 2+ bilaterally       Home Medications:  Aspercreme with Lidocaine 4% topical film: Apply topically to affected area once a day (low back) (20 Mar 2022 10:15)  Aspercreme with Lidocaine 4% topical film: Apply topically to affected area once a day (L knee) (20 Mar 2022 10:15)  aspirin 81 mg oral delayed release tablet: 1 tab(s) orally once a day (20 Mar 2022 10:15)  buPROPion 150 mg/24 hours (XL) oral tablet, extended release: 1 tab(s) orally once a day (in the morning) (20 Mar 2022 10:15)  cycloSPORINE modified 100 mg oral capsule: 2 cap(s) orally once a day (at bedtime) (20 Mar 2022 10:15)  cycloSPORINE modified 50 mg oral capsule: 3 cap(s) orally once a day in the morning  (20 Mar 2022 10:15)  dilTIAZem 120 mg/24 hours oral capsule, extended release: 1 cap(s) orally once a day (hold SBP&lt;110, HR&lt;60) (20 Mar 2022 10:15)  doxycycline hyclate 100 mg oral tablet: 1 tab(s) orally 2 times a day (20 Mar 2022 10:15)  Eliquis 2.5 mg oral tablet: 1 tab(s) orally 2 times a day    Pharmacy states patient no longer wants to fill this medication (20 Mar 2022 10:15)  gabapentin 300 mg oral capsule: 1 cap(s) orally 2 times a day (20 Mar 2022 10:15)  insulin glargine: 15 unit(s) subcutaneous once a day (at bedtime) (20 Mar 2022 10:15)  midodrine 5 mg oral tablet: 1 tab(s) orally 3 times a week, 30 minute prior to dialysis sessions (hold is SBP&gt;130) (20 Mar 2022 10:15)  mirtazapine 7.5 mg oral tablet: 1 tab(s) orally once a day (at bedtime) (20 Mar 2022 10:15)  montelukast 10 mg oral tablet: 1 tab(s) orally once a day (in the evening) (20 Mar 2022 10:15)  oxyCODONE 10 mg oral tablet, extended release: 1 tab(s) orally every 12 hours (20 Mar 2022 10:15)  oxycodone-acetaminophen 7.5 mg-325 mg oral tablet: 1 tab(s) orally every 6 hours, As Needed (20 Mar 2022 10:15)  pantoprazole 40 mg oral delayed release tablet: 1 tab(s) orally once a day (20 Mar 2022 10:15)  polyethylene glycol 3350 oral powder for reconstitution: 17 gram(s) orally once a day (at bedtime) (20 Mar 2022 10:15)  senna oral tablet: 2 tab(s) orally once a day (at bedtime) (20 Mar 2022 10:15)  sertraline 50 mg oral tablet: 1 tab(s) orally once a day (20 Mar 2022 10:15)  sevelamer carbonate 800 mg oral tablet: 1 tab(s) orally 3 times a day (with meals) (20 Mar 2022 10:15)  simethicone 80 mg oral tablet, chewable: 1 tab(s) orally 3 times a day (before meals) (20 Mar 2022 10:15)  simvastatin 10 mg oral tablet: 1 tab(s) orally once a day (at bedtime) (20 Mar 2022 10:15)  sodium hypochlorite 0.25% topical solution: 1 application topically once a day (20 Mar 2022 10:15)  tacrolimus 0.1% topical ointment: 1 application topically once a day (20 Mar 2022 10:15)  traZODone 100 mg oral tablet: 1 tab(s) orally once a day (at bedtime) (20 Mar 2022 10:15)        MEDICATIONS  (STANDING):  buPROPion XL (24-Hour) . 150 milliGRAM(s) Oral daily  chlorhexidine 2% Cloths 1 Application(s) Topical daily  cinacalcet 30 milliGRAM(s) Oral daily  cycloSPORINE  , modified (NEORAL) 200 milliGRAM(s) Oral two times a day  Dakins Solution - 1/4 Strength 1 Application(s) Topical daily  dextrose 40% Gel 15 Gram(s) Oral once  dextrose 5%. 1000 milliLiter(s) (50 mL/Hr) IV Continuous <Continuous>  dextrose 5%. 1000 milliLiter(s) (100 mL/Hr) IV Continuous <Continuous>  dextrose 50% Injectable 25 Gram(s) IV Push once  dextrose 50% Injectable 12.5 Gram(s) IV Push once  dextrose 50% Injectable 25 Gram(s) IV Push once  diltiazem    milliGRAM(s) Oral daily  epoetin anabela-epbx (RETACRIT) Injectable 28638 Unit(s) IV Push <User Schedule>  gabapentin 300 milliGRAM(s) Oral daily  glucagon  Injectable 1 milliGRAM(s) IntraMuscular once  heparin  Infusion 1900 Unit(s)/Hr (21 mL/Hr) IV Continuous <Continuous>  insulin glargine Injectable (LANTUS) 10 Unit(s) SubCutaneous at bedtime  insulin lispro (ADMELOG) corrective regimen sliding scale   SubCutaneous three times a day before meals  insulin lispro (ADMELOG) corrective regimen sliding scale   SubCutaneous at bedtime  lidocaine   4% Patch 1 Patch Transdermal daily  lidocaine   4% Patch 1 Patch Transdermal daily  lidocaine   4% Patch 1 Patch Transdermal daily  mirtazapine 7.5 milliGRAM(s) Oral at bedtime  montelukast 10 milliGRAM(s) Oral at bedtime  oxyCODONE  ER Tablet 10 milliGRAM(s) Oral every 12 hours  pantoprazole    Tablet 40 milliGRAM(s) Oral before breakfast  polyethylene glycol 3350 17 Gram(s) Oral at bedtime  senna 2 Tablet(s) Oral at bedtime  sertraline 50 milliGRAM(s) Oral daily  sevelamer carbonate 800 milliGRAM(s) Oral three times a day with meals  simethicone 80 milliGRAM(s) Chew three times a day  tacrolimus   0.1% Ointment 1 Application(s) Topical daily  traZODone 100 milliGRAM(s) Oral at bedtime        EKG:  RADIOLOGY:  DIAGNOSTIC TESTING:  [ ] Echocardiogram:  [ ] Catherterization:  [ ] Stress Test:  OTHER:     LABS:	 	                          7.7    12.18 )-----------( 383      ( 27 Mar 2022 08:24 )             26.7     03-27    136  |  94<L>  |  30<H>  ----------------------------<  153<H>  3.7   |  26  |  5.79<H>    Ca    9.5      27 Mar 2022 08:24  Phos  3.1     03-27  Mg     2.30     03-27        PT/INR - ( 27 Mar 2022 08:55 )   PT: 13.2 sec;   INR: 1.14 ratio         PTT - ( 27 Mar 2022 08:55 )  PTT:49.4 sec    CARDIAC MARKERS:

## 2022-03-27 NOTE — PROGRESS NOTE ADULT - ASSESSMENT
56y Female with history of ESRD on HD presents with vaginal bleeding. Nephrology consulted for ESRD status.    1) ESRD: Last HD on 3/25 tolerated well with 2L removed with heparin to prevent circuit clotting. Plan for next maintenance HD on 3/28 with heparin. Monitor electrolytes.    2) HTN with ESRD: BP acceptable on Cardizem. Continue with midodrine pre-HD on HD days to avoid intradialytic hypotension. Monitor BP.    3) Anemia of renal disease: With component of blood loss from vaginal bleeding. Hb low with acceptable iron stores. Increase Epo to 18K with HD. PRBC as needed. Monitor Hb.    4) Secondary HPT of renal origin: Phosphorus acceptable with low iPTH for which sensipar decreased to 30 mg daily. Continue with renvela 1 tab with meals. Monitor serum calcium and phosphorus.      [facility] NEPHROLOGY  Keaton Lopez M.D.  Juma Green D.O.  Myla Hoffmann M.D.  Julia Brewer, MSN, ANP-C    Telephone: [phone]  Facsimile: [phone]    [address] [place] [address]

## 2022-03-27 NOTE — PROGRESS NOTE ADULT - SUBJECTIVE AND OBJECTIVE BOX
SUBJECTIVE / OVERNIGHT EVENTS:pt seen and examined  03-27-22     MEDICATIONS  (STANDING):  buPROPion XL (24-Hour) . 150 milliGRAM(s) Oral daily  chlorhexidine 2% Cloths 1 Application(s) Topical daily  cinacalcet 30 milliGRAM(s) Oral daily  cycloSPORIsevelamer carbonate 800 milliGRAM(s) Oral three times a day with meals  simethicone 80 milliGRAM(s) Chew three times a day  tacrolimus   0.1% Ointment 1 Application(s) Topical daily  traZODone 100 milliGRAM(s) Oral at bedtime    MEDICATIONS  (PRN):  acetaminophen     Tablet .. 650 milliGRAM(s) Oral every 6 hours PRN Temp greater or equal to 38C (100.4F), Mild Pain (1 - 3)  melatonin 3 milliGRAM(s) Oral at bedtime PRN Insomnia  midodrine. 5 milliGRAM(s) Oral <User Schedule> PRN 30 minutes prior to dialysis  ondansetron Injectable 4 milliGRAM(s) IV Push every 8 hours PRN Nausea and/or Vomiting  oxyCODONE    IR 10 milliGRAM(s) Oral every 4 hours PRN moderate and severe pain    Vital Signs Last 24 Hrs  T(C): 37 (03-24-22 @ 22:35), Max: 37 (03-24-22 @ 13:36)  T(F): 98.6 (03-24-22 @ 22:35), Max: 98.6 (03-24-22 @ 13:36)  HR: 82 (03-24-22 @ 22:35) (82 - 84)  BP: 107/63 (03-24-22 @ 22:35) (107/63 - 120/55)  BP(mean): --  RR: 18 (03-24-22 @ 22:35) (17 - 18)  SpO2: 96% (03-24-22 @ 22:35) (96% - 99%)      Skin: No rash.  Eyes: No recent vision problems or eye pain.  ENT: No congestion, ear pain, or sore throat.  Cardiovascular: No chest pain or palpation.  Respiratory: No cough, shortness of breath, congestion, or wheezing.  Gastrointestinal: No abdominal pain, nausea, vomiting, or diarrhea.  Genitourinary: No dysuria.  Musculoskeletal: No joint swelling.  Neurologic: No headache.    PHYSICAL EXAM:  GENERAL: NAD  EYES: EOMI, PERRLA  NECK: Supple, No JVD  CHEST/LUNG: dec breath sounds at bases  HEART:  S1 , S2 +  ABDOMEN: soft , bs+, wound dressing+  EXTREMITIES:  edema+  NEUROLOGY:alert awake oriented     LABS:  03-25    136  |  96<L>  |  42<H>  ----------------------------<  162<H>  3.9   |  24  |  6.00<H>    Ca    9.3      25 Mar 2022 04:37  Phos  3.9     03-25  Mg     2.40     03-25      Creatinine Trend: 6.00 <--, 5.27 <--, 6.98 <--, 5.94 <--, 7.98 <--, 6.85 <--, 6.03 <--                        7.1    11.25 )-----------( 321      ( 25 Mar 2022 04:37 )             27.1     Urine Studies:              PTT - ( 25 Mar 2022 06:53 )  PTT:37.0 sec  Urine Studies:

## 2022-03-27 NOTE — PROGRESS NOTE ADULT - SUBJECTIVE AND OBJECTIVE BOX
[facility] NEPHROLOGY- PROGRESS NOTE    56y Female with history of ESRD on HD presents with vaginal bleeding. Nephrology consulted for ESRD status.    REVIEW OF SYSTEMS:  Gen: no changes in weight  Cards: no chest pain  Resp: no dyspnea  GI: no nausea or vomiting or diarrhea  : + vaginal bleeding improving  Vascular: no LE edema    caffeine (Nausea)  latex (Rash)  penicillin (Nausea)  strawberry (Rash)      Hospital Medications: Medications reviewed        VITALS:  T(F): 99 (03-27-22 @ 05:30), Max: 99.2 (03-26-22 @ 21:30)  HR: 80 (03-27-22 @ 05:30)  BP: 111/55 (03-27-22 @ 05:30)  RR: 18 (03-27-22 @ 05:30)  SpO2: 100% (03-27-22 @ 05:30)  Wt(kg): --        PHYSICAL EXAM:    Gen: NAD, calm  Cards: RRR, +S1/S2, no M/G/R  Resp: CTA B/L  GI: soft, NT/ND, NABS, + ab wound dressing in place  Vascular: no LE edema B/L, LUE AVF + bruit/thrill      LABS:  03-27    136  |  94<L>  |  30<H>  ----------------------------<  153<H>  3.7   |  26  |  5.79<H>    Ca    9.5      27 Mar 2022 08:24  Phos  3.1     03-27  Mg     2.30     03-27      Creatinine Trend: 5.79 <--, 4.69 <--, 6.00 <--, 5.27 <--, 6.98 <--, 5.94 <--, 7.98 <--, 6.85 <--                        7.7    12.18 )-----------( 383      ( 27 Mar 2022 08:24 )             26.7     Urine Studies:

## 2022-03-28 NOTE — PROGRESS NOTE ADULT - SUBJECTIVE AND OBJECTIVE BOX
CARDIOLOGY FOLLOW UP - Dr. Bullock  Date of Service: 3/28/22  CC: denies cp, sob, and palpitations     Review of Systems:  Constitutional: No fever, weight loss, or fatigue  Respiratory: No cough, wheezing, or hemoptysis, no shortness of breath  Cardiovascular: No chest pain, palpitations, passing out, dizziness, or leg swelling  Gastrointestinal: No abd or epigastric pain.  No nausea, vomiting, or hematemesis; no diarrhea or constipation, no melena or hematochezia  Vascular: no edema       PHYSICAL EXAM:  T(C): 36.2 (03-28-22 @ 06:00), Max: 37.2 (03-27-22 @ 22:20)  HR: 70 (03-28-22 @ 06:00) (70 - 82)  BP: 126/65 (03-28-22 @ 06:00) (126/65 - 136/65)  RR: 16 (03-28-22 @ 06:00) (16 - 18)  SpO2: 100% (03-28-22 @ 06:00) (99% - 100%)  Wt(kg): --  I&O's Summary      Appearance: Normal	  Cardiovascular: Normal S1 S2,RRR, No JVD, No murmurs  Respiratory: Lungs clear to auscultation	  Gastrointestinal:  Soft, Non-tender, + BS	  Extremities: Normal range of motion, No clubbing, cyanosis or edema      Home Medications:  Aspercreme with Lidocaine 4% topical film: Apply topically to affected area once a day (low back) (20 Mar 2022 10:15)  Aspercreme with Lidocaine 4% topical film: Apply topically to affected area once a day (L knee) (20 Mar 2022 10:15)  aspirin 81 mg oral delayed release tablet: 1 tab(s) orally once a day (20 Mar 2022 10:15)  buPROPion 150 mg/24 hours (XL) oral tablet, extended release: 1 tab(s) orally once a day (in the morning) (20 Mar 2022 10:15)  cycloSPORINE modified 100 mg oral capsule: 2 cap(s) orally once a day (at bedtime) (20 Mar 2022 10:15)  cycloSPORINE modified 50 mg oral capsule: 3 cap(s) orally once a day in the morning  (20 Mar 2022 10:15)  dilTIAZem 120 mg/24 hours oral capsule, extended release: 1 cap(s) orally once a day (hold SBP&lt;110, HR&lt;60) (20 Mar 2022 10:15)  doxycycline hyclate 100 mg oral tablet: 1 tab(s) orally 2 times a day (20 Mar 2022 10:15)  Eliquis 2.5 mg oral tablet: 1 tab(s) orally 2 times a day    Pharmacy states patient no longer wants to fill this medication (20 Mar 2022 10:15)  gabapentin 300 mg oral capsule: 1 cap(s) orally 2 times a day (20 Mar 2022 10:15)  insulin glargine: 15 unit(s) subcutaneous once a day (at bedtime) (20 Mar 2022 10:15)  midodrine 5 mg oral tablet: 1 tab(s) orally 3 times a week, 30 minute prior to dialysis sessions (hold is SBP&gt;130) (20 Mar 2022 10:15)  mirtazapine 7.5 mg oral tablet: 1 tab(s) orally once a day (at bedtime) (20 Mar 2022 10:15)  montelukast 10 mg oral tablet: 1 tab(s) orally once a day (in the evening) (20 Mar 2022 10:15)  oxyCODONE 10 mg oral tablet, extended release: 1 tab(s) orally every 12 hours (20 Mar 2022 10:15)  oxycodone-acetaminophen 7.5 mg-325 mg oral tablet: 1 tab(s) orally every 6 hours, As Needed (20 Mar 2022 10:15)  pantoprazole 40 mg oral delayed release tablet: 1 tab(s) orally once a day (20 Mar 2022 10:15)  polyethylene glycol 3350 oral powder for reconstitution: 17 gram(s) orally once a day (at bedtime) (20 Mar 2022 10:15)  senna oral tablet: 2 tab(s) orally once a day (at bedtime) (20 Mar 2022 10:15)  sertraline 50 mg oral tablet: 1 tab(s) orally once a day (20 Mar 2022 10:15)  sevelamer carbonate 800 mg oral tablet: 1 tab(s) orally 3 times a day (with meals) (20 Mar 2022 10:15)  simethicone 80 mg oral tablet, chewable: 1 tab(s) orally 3 times a day (before meals) (20 Mar 2022 10:15)  simvastatin 10 mg oral tablet: 1 tab(s) orally once a day (at bedtime) (20 Mar 2022 10:15)  sodium hypochlorite 0.25% topical solution: 1 application topically once a day (20 Mar 2022 10:15)  tacrolimus 0.1% topical ointment: 1 application topically once a day (20 Mar 2022 10:15)  traZODone 100 mg oral tablet: 1 tab(s) orally once a day (at bedtime) (20 Mar 2022 10:15)      MEDICATIONS  (STANDING):  buPROPion XL (24-Hour) . 150 milliGRAM(s) Oral daily  chlorhexidine 2% Cloths 1 Application(s) Topical daily  cinacalcet 30 milliGRAM(s) Oral daily  cycloSPORINE  , modified (NEORAL) 200 milliGRAM(s) Oral two times a day  Dakins Solution - 1/4 Strength 1 Application(s) Topical daily  dextrose 40% Gel 15 Gram(s) Oral once  dextrose 5%. 1000 milliLiter(s) (50 mL/Hr) IV Continuous <Continuous>  dextrose 5%. 1000 milliLiter(s) (100 mL/Hr) IV Continuous <Continuous>  dextrose 50% Injectable 25 Gram(s) IV Push once  dextrose 50% Injectable 12.5 Gram(s) IV Push once  dextrose 50% Injectable 25 Gram(s) IV Push once  diltiazem    milliGRAM(s) Oral daily  epoetin anabela-epbx (RETACRIT) Injectable 70475 Unit(s) IV Push <User Schedule>  gabapentin 300 milliGRAM(s) Oral daily  glucagon  Injectable 1 milliGRAM(s) IntraMuscular once  heparin  Infusion 2200 Unit(s)/Hr (22 mL/Hr) IV Continuous <Continuous>  insulin glargine Injectable (LANTUS) 10 Unit(s) SubCutaneous at bedtime  insulin lispro (ADMELOG) corrective regimen sliding scale   SubCutaneous three times a day before meals  insulin lispro (ADMELOG) corrective regimen sliding scale   SubCutaneous at bedtime  lidocaine   4% Patch 1 Patch Transdermal daily  lidocaine   4% Patch 1 Patch Transdermal daily  lidocaine   4% Patch 1 Patch Transdermal daily  mirtazapine 7.5 milliGRAM(s) Oral at bedtime  montelukast 10 milliGRAM(s) Oral at bedtime  oxyCODONE  ER Tablet 10 milliGRAM(s) Oral every 12 hours  pantoprazole    Tablet 40 milliGRAM(s) Oral before breakfast  polyethylene glycol 3350 17 Gram(s) Oral at bedtime  senna 2 Tablet(s) Oral at bedtime  sertraline 50 milliGRAM(s) Oral daily  sevelamer carbonate 800 milliGRAM(s) Oral three times a day with meals  simethicone 80 milliGRAM(s) Chew three times a day  tacrolimus   0.1% Ointment 1 Application(s) Topical daily  traZODone 100 milliGRAM(s) Oral at bedtime      TELEMETRY: 	    ECG:  	  RADIOLOGY:   DIAGNOSTIC TESTING:  [ ] Echocardiogram:  [ ]  Catheterization:  [ ] Stress Test:    OTHER: 	    LABS:	 	                            7.7    12.18 )-----------( 383      ( 27 Mar 2022 08:24 )             26.7     03-27    136  |  94<L>  |  30<H>  ----------------------------<  153<H>  3.7   |  26  |  5.79<H>    Ca    9.5      27 Mar 2022 08:24  Phos  3.1     03-27  Mg     2.30     03-27      PT/INR - ( 27 Mar 2022 08:55 )   PT: 13.2 sec;   INR: 1.14 ratio         PTT - ( 28 Mar 2022 09:07 )  PTT:115.4 sec

## 2022-03-28 NOTE — CHART NOTE - NSCHARTNOTEFT_GEN_A_CORE
Discussed with pulmonary team patient's respiratory status and the need for BIPAP going to [facility]. Pt will need to continue current settings of BIPAP at [facility], and will email to schedule an appointment with pulmonary team to help set up BIPAP for home.

## 2022-03-28 NOTE — PROGRESS NOTE ADULT - ASSESSMENT
Echo 1/19/22: grossly nl LV sys fx, min MR     a/p  56 year old female with hx of morbid obesity, CHAMP not on home O2, ESRD (HD MWF), HTN, DM, COPD, Afib no longer on AC, chronic R pannus wound, followed by Dr. Layne, likely pyoderma gangrenosum presents for vaginal bleeding     #Chronic Pannus Wound  -surgical eval noted, wound care  -abx per med     #Chronic Afib  -stable, rates controlled  -cont dilt as ordered  -on iv hep     #Hypertension  -stable  -cont ccb     #ESRD on HD  -HD per renal    #Gyn bleeding  -await poss D&C, biopsy  -remains stable form cardiac standpoint and can proceed with acceptable cardiac risk

## 2022-03-28 NOTE — CHART NOTE - NSCHARTNOTEFT_GEN_A_CORE
Per pharmacy, correct dose of Eliquis for pt's age and weight is 5mg BID. Pt was taking a decreased dose of 2.5mg at home. Will dose Eliquis 5mg BID.

## 2022-03-28 NOTE — PROGRESS NOTE ADULT - SUBJECTIVE AND OBJECTIVE BOX
SUBJECTIVE / OVERNIGHT EVENTS:pt seen and examined  03-28-22     MEDICATIONS  (STANDING):  apixaban 5 milliGRAM(s) Oral every 12 hours  buPROPion XL (24-Hour) . 150 milliGRAM(s) Oral daily  chlorhexidine 2% Cloths 1 Application(s) Topical daily  cinacalcet 30 milliGRAM(s) Oral daily  cycloSPORINE  , modified (NEORAL) 200 milliGRAM(s) Oral two times a day  Dakins Solution - 1/4 Strength 1 Application(s) Topical daily  dextrose 40% Gel 15 Gram(s) Oral once  dextrose 5%. 1000 milliLiter(s) (50 mL/Hr) IV Continuous <Continuous>  dextrose 5%. 1000 milliLiter(s) (100 mL/Hr) IV Continuous <Continuous>  dextrose 50% Injectable 25 Gram(s) IV Push once  dextrose 50% Injectable 12.5 Gram(s) IV Push once  dextrose 50% Injectable 25 Gram(s) IV Push once  diltiazem    milliGRAM(s) Oral daily  epoetin anabela-epbx (RETACRIT) Injectable 21818 Unit(s) IV Push <User Schedule>  gabapentin 300 milliGRAM(s) Oral daily  glucagon  Injectable 1 milliGRAM(s) IntraMuscular once  insulin glargine Injectable (LANTUS) 10 Unit(s) SubCutaneous at bedtime  insulin lispro (ADMELOG) corrective regimen sliding scale   SubCutaneous three times a day before meals  insulin lispro (ADMELOG) corrective regimen sliding scale   SubCutaneous at bedtime  lidocaine   4% Patch 1 Patch Transdermal daily  lidocaine   4% Patch 1 Patch Transdermal daily  lidocaine   4% Patch 1 Patch Transdermal daily  mirtazapine 7.5 milliGRAM(s) Oral at bedtime  montelukast 10 milliGRAM(s) Oral at bedtime  oxyCODONE  ER Tablet 10 milliGRAM(s) Oral every 12 hours  pantoprazole    Tablet 40 milliGRAM(s) Oral before breakfast  polyethylene glycol 3350 17 Gram(s) Oral at bedtime  senna 2 Tablet(s) Oral at bedtime  sertraline 50 milliGRAM(s) Oral daily  sevelamer carbonate 800 milliGRAM(s) Oral three times a day with meals  simethicone 80 milliGRAM(s) Chew three times a day  tacrolimus   0.1% Ointment 1 Application(s) Topical daily  traZODone 100 milliGRAM(s) Oral at bedtime    MEDICATIONS  (PRN):  acetaminophen     Tablet .. 650 milliGRAM(s) Oral every 6 hours PRN Temp greater or equal to 38C (100.4F), Mild Pain (1 - 3)  melatonin 3 milliGRAM(s) Oral at bedtime PRN Insomnia  midodrine. 5 milliGRAM(s) Oral <User Schedule> PRN 30 minutes prior to dialysis  ondansetron Injectable 4 milliGRAM(s) IV Push every 8 hours PRN Nausea and/or Vomiting  oxyCODONE    IR 10 milliGRAM(s) Oral every 4 hours PRN moderate and severe pain    Vital Signs Last 24 Hrs  T(C): 37.1 (03-28-22 @ 21:30), Max: 37.1 (03-28-22 @ 21:30)  T(F): 98.8 (03-28-22 @ 21:30), Max: 98.8 (03-28-22 @ 21:30)  HR: 81 (03-28-22 @ 21:30) (70 - 81)  BP: 115/56 (03-28-22 @ 21:30) (111/55 - 136/58)  BP(mean): --  RR: 17 (03-28-22 @ 21:30) (16 - 18)  SpO2: 98% (03-28-22 @ 21:30) (98% - 100%)        Skin: No rash.  Eyes: No recent vision problems or eye pain.  ENT: No congestion, ear pain, or sore throat.  Cardiovascular: No chest pain or palpation.  Respiratory: No cough, shortness of breath, congestion, or wheezing.  Gastrointestinal: No abdominal pain, nausea, vomiting, or diarrhea.  Genitourinary: No dysuria.  Musculoskeletal: No joint swelling.  Neurologic: No headache.    PHYSICAL EXAM:  GENERAL: NAD  EYES: EOMI, PERRLA  NECK: Supple, No JVD  CHEST/LUNG: dec breath sounds at bases  HEART:  S1 , S2 +  ABDOMEN: soft , bs+, wound dressing+  EXTREMITIES:  edema+  NEUROLOGY:alert awake oriented     LABS:  03-25    136  |  96<L>  |  42<H>  ----------------------------<  162<H>  3.9   |  24  |  6.00<H>    Ca    9.3      25 Mar 2022 04:37  Phos  3.9     03-25  Mg     2.40     03-25      Creatinine Trend: 6.00 <--, 5.27 <--, 6.98 <--, 5.94 <--, 7.98 <--, 6.85 <--, 6.03 <--                        7.1    11.25 )-----------( 321      ( 25 Mar 2022 04:37 )             27.1     Urine Studies:              PTT - ( 25 Mar 2022 06:53 )  PTT:37.0 sec  Urine Studies:

## 2022-03-28 NOTE — PROGRESS NOTE ADULT - SUBJECTIVE AND OBJECTIVE BOX
[facility] NEPHROLOGY- PROGRESS NOTE    56y Female with history of ESRD on HD presents with vaginal bleeding. Nephrology consulted for ESRD status.    REVIEW OF SYSTEMS:  Gen: no changes in weight  Cards: no chest pain  Resp: no dyspnea  GI: no nausea or vomiting or diarrhea  : + vaginal bleeding improving  Vascular: no LE edema    caffeine (Nausea)  latex (Rash)  penicillin (Nausea)  strawberry (Rash)      Hospital Medications: Medications reviewed        VITALS:  T(F): 97.2 (03-28-22 @ 06:00), Max: 99 (03-27-22 @ 22:20)  HR: 70 (03-28-22 @ 06:00)  BP: 126/65 (03-28-22 @ 06:00)  RR: 16 (03-28-22 @ 06:00)  SpO2: 100% (03-28-22 @ 06:00)  Wt(kg): --        PHYSICAL EXAM:    Gen: NAD, calm  Cards: RRR, +S1/S2, no M/G/R  Resp: CTA B/L  GI: soft, NT/ND, NABS, + ab wound dressing in place  Vascular: no LE edema B/L, LUE AVF + bruit/thrill      LABS:  03-27    136  |  94<L>  |  30<H>  ----------------------------<  153<H>  3.7   |  26  |  5.79<H>    Ca    9.5      27 Mar 2022 08:24  Phos  3.1     03-27  Mg     2.30     03-27      Creatinine Trend: 5.79 <--, 4.69 <--, 6.00 <--, 5.27 <--, 6.98 <--, 5.94 <--, 7.98 <--                        7.7    12.18 )-----------( 383      ( 27 Mar 2022 08:24 )             26.7     Urine Studies:

## 2022-03-28 NOTE — PROGRESS NOTE ADULT - ASSESSMENT
56y Female with history of ESRD on HD presents with vaginal bleeding. Nephrology consulted for ESRD status.    1) ESRD: Last HD on 3/25 tolerated well with 2L removed with heparin to prevent circuit clotting. Plan for next maintenance HD today with heparin. Monitor electrolytes.    2) HTN with ESRD: BP acceptable on Cardizem. Continue with midodrine pre-HD on HD days to avoid intradialytic hypotension. Monitor BP.    3) Anemia of renal disease: With component of blood loss from vaginal bleeding. Hb low with acceptable iron stores. Continue with Epo 18K with HD. PRBC as needed. Monitor Hb.    4) Secondary HPT of renal origin: Phosphorus acceptable with low iPTH for which sensipar decreased to 30 mg daily. Continue with renvela 1 tab with meals. Monitor serum calcium and phosphorus.      [facility] NEPHROLOGY  Keaton Lopez M.D.  Juma Green D.O.  Myla Hoffmann M.D.  Julia Brewer, MSN, ANP-C    Telephone: [phone]  Facsimile: [phone]    [address] [place] [address]

## 2022-03-28 NOTE — PROGRESS NOTE ADULT - ASSESSMENT
56F with Hx of ESRD TTS, HTN, DM 2, COPD, afib, known chronic R pannus wound felt most likely pyoderma gangrenosum presents for 1.5wk vag bleeding. Pt recently had prolonged stay at [facility] for pannus wound, known to endocrine service.  Was dc'd to rehab on 3/3.  Prior to last hospitalization patient was on much higher doses of basal bolus.  Now requiring much less.  Has poor appetite.      1. Type 2 diabetes mellitus uncontrolled  A1c 7.0% (may be inaccurate in setting of ESRD)  Home Regimen: Tresiba 80 units HS and Trulicity 1.5mg subq weekly  While inpatient:  BG target 100-180 mg/dL  reduced po intake.  Patient is driking nepro shakes and eating very little  Continue Lantus 10 units SQ qHS   Continue off of premeal admelog for now.  If appetite improves and FS begin to increase > 200, will consider restarting admelog premeal  Admelog correctional scale LOW before meals and low bedtime scale  Check BG before meals and bedtime  Hypoglycemia protocol   Consistent carbohydrate diet    Discharge Plan:  STOP Trulicity (patient ESRD on HD)  For dc to rehab- recommend to continue current insulin management as outlined above  For dc to home- Recommend basal plus PO regimen  Patient was on Tresiba at home may benefit from a different Long acting insulin such as Lantus or Levemir given ESRD.  Tresiba is ultra-Long acting insulin  Please assess insurance coverage for basal insulin pens:  Levemir, Lantus, Basaglar, Toujeo or Semglee.   Recommend Tradjenta 5 mg po daily, if not covered can see if Januvia 25 mg po daily is covered.  Please obtain prior auth if needed as patient is ESRD and DPP4i class are indicated for patients with ESRD  Consider CGM (such as Freestyle Libre2 outpatient)  If desiring to followup with [facility]ocrinology: [address] [place] [address], [address], [place] [address], [phone]    2. HTN  Management per primary team     3. Hyperlipidemia  consider restarting statin      Tamela Ruiz  Nurse Practitioner  Division of Endocrinology & Diabetes  Pager # [phone]      If after 6PM or before 9AM, or on weekends/holidays, please call endocrine answering service for assistance ([phone]).  For nonurgent matters email [email or web address] for assistance.

## 2022-03-28 NOTE — PROGRESS NOTE ADULT - SUBJECTIVE AND OBJECTIVE BOX
History:  patient see at bedside.  Reports poor appetite.  Only drinking nepro shakes.  endorses ordering yesterday but ate about 10% of meal. No hypoglycemia    MEDICATIONS  (STANDING):  buPROPion XL (24-Hour) . 150 milliGRAM(s) Oral daily  cinacalcet 60 milliGRAM(s) Oral daily  cycloSPORINE  , modified (NEORAL) 200 milliGRAM(s) Oral at bedtime  cycloSPORINE  , modified (NEORAL) 150 milliGRAM(s) Oral <User Schedule>  dextrose 40% Gel 15 Gram(s) Oral once  dextrose 5%. 1000 milliLiter(s) (50 mL/Hr) IV Continuous <Continuous>  dextrose 5%. 1000 milliLiter(s) (100 mL/Hr) IV Continuous <Continuous>  dextrose 50% Injectable 25 Gram(s) IV Push once  dextrose 50% Injectable 12.5 Gram(s) IV Push once  dextrose 50% Injectable 25 Gram(s) IV Push once  diltiazem    milliGRAM(s) Oral daily  epoetin anabela-epbx (RETACRIT) Injectable 72625 Unit(s) IV Push <User Schedule>  gabapentin 300 milliGRAM(s) Oral two times a day  glucagon  Injectable 1 milliGRAM(s) IntraMuscular once  insulin glargine Injectable (LANTUS) 10 Unit(s) SubCutaneous at bedtime  insulin lispro (ADMELOG) corrective regimen sliding scale   SubCutaneous three times a day before meals  insulin lispro (ADMELOG) corrective regimen sliding scale   SubCutaneous at bedtime  lidocaine   4% Patch 1 Patch Transdermal daily  lidocaine   4% Patch 1 Patch Transdermal daily  mirtazapine 7.5 milliGRAM(s) Oral at bedtime  montelukast 10 milliGRAM(s) Oral at bedtime  oxyCODONE  ER Tablet 10 milliGRAM(s) Oral every 12 hours  pantoprazole    Tablet 40 milliGRAM(s) Oral before breakfast  polyethylene glycol 3350 17 Gram(s) Oral at bedtime  senna 2 Tablet(s) Oral at bedtime  sertraline 50 milliGRAM(s) Oral daily  sevelamer carbonate 800 milliGRAM(s) Oral three times a day with meals  simethicone 80 milliGRAM(s) Chew three times a day  tacrolimus   0.1% Ointment 1 Application(s) Topical daily  traZODone 100 milliGRAM(s) Oral at bedtime    MEDICATIONS  (PRN):  acetaminophen     Tablet .. 650 milliGRAM(s) Oral every 6 hours PRN Temp greater or equal to 38C (100.4F), Mild Pain (1 - 3)  melatonin 3 milliGRAM(s) Oral at bedtime PRN Insomnia  midodrine. 5 milliGRAM(s) Oral <User Schedule> PRN 30 minutes prior to dialysis  ondansetron Injectable 4 milliGRAM(s) IV Push every 8 hours PRN Nausea and/or Vomiting  oxyCODONE    IR 7.5 milliGRAM(s) Oral every 6 hours PRN Severe Pain (7 - 10)      Allergies    latex (Rash)  penicillin (Nausea)  strawberry (Rash)    Intolerances    caffeine (Nausea)    Review of Systems:  Constitutional: No fever  ALL OTHER SYSTEMS REVIEWED AND NEGATIVE      PHYSICAL EXAM:  Vital Signs Last 24 Hrs  T(C): 36.4 (28 Mar 2022 14:00), Max: 37.2 (27 Mar 2022 22:20)  T(F): 97.6 (28 Mar 2022 14:00), Max: 99 (27 Mar 2022 22:20)  HR: 74 (28 Mar 2022 14:00) (70 - 82)  BP: 136/58 (28 Mar 2022 14:00) (126/65 - 136/58)  BP(mean): --  RR: 18 (28 Mar 2022 14:00) (16 - 18)  SpO2: 100% (28 Mar 2022 14:00) (99% - 100%)  GENERAL: NAD, well-developed  GI: Soft, nontender, non distended  SKIN: Dry, intact, No rashes or lesions  PSYCH: Alert and oriented x 3, normal affect, normal mood      CAPILLARY BLOOD GLUCOSE  POCT Blood Glucose.: 123 mg/dL (28 Mar 2022 17:29)  POCT Blood Glucose.: 156 mg/dL (28 Mar 2022 15:38)  POCT Blood Glucose.: 119 mg/dL (28 Mar 2022 12:43)  POCT Blood Glucose.: 129 mg/dL (28 Mar 2022 09:03)  POCT Blood Glucose.: 186 mg/dL (27 Mar 2022 22:06)  POCT Blood Glucose.: 140 mg/dL (27 Mar 2022 20:51)  POCT Blood Glucose.: 142 mg/dL (24 Mar 2022 21:08)  POCT Blood Glucose.: 171 mg/dL (24 Mar 2022 17:41)  POCT Blood Glucose.: 208 mg/dL (24 Mar 2022 13:03)  POCT Blood Glucose.: 188 mg/dL (24 Mar 2022 09:04)  POCT Blood Glucose.: 171 mg/dL (24 Mar 2022 06:52)  POCT Blood Glucose.: 178 mg/dL (23 Mar 2022 21:41)  POCT Blood Glucose.: 103 mg/dL (21 Mar 2022 20:03)  POCT Blood Glucose.: 111 mg/dL (21 Mar 2022 18:44)  POCT Blood Glucose.: 117 mg/dL (21 Mar 2022 15:31)  POCT Blood Glucose.: 113 mg/dL (21 Mar 2022 12:51)  POCT Blood Glucose.: 124 mg/dL (21 Mar 2022 08:51)  POCT Blood Glucose.: 152 mg/dL (20 Mar 2022 22:10)    03-24    137  |  96<L>  |  34<H>  ----------------------------<  165<H>  4.0   |  24  |  5.27<H>    Ca    9.3      24 Mar 2022 06:53  Phos  3.3     03-24  Mg     2.30     03-24        A1C with Estimated Average Glucose (03.20.22 @ 12:48)    A1C with Estimated Average Glucose Result: 6.1%    Estimated Average Glucose: 128

## 2022-03-29 NOTE — PROGRESS NOTE ADULT - ASSESSMENT
Echo 1/19/22: grossly nl LV sys fx, min MR     a/p  56 year old female with hx of morbid obesity, CHAMP not on home O2, ESRD (HD MWF), HTN, DM, COPD, Afib no longer on AC, chronic R pannus wound, followed by Dr. Layne, likely pyoderma gangrenosum presents for vaginal bleeding     #Chronic Pannus Wound  -surgical eval noted, wound care  -abx per med     #Chronic Afib  -stable, rates controlled  -cont dilt as ordered  -cont oral AC     #Hypertension  -stable  -cont ccb     #ESRD on HD  -HD per renal    #Gyn bleeding  -await , dilation and curettage of uterus, hysteroscopy, and possible IUD placement  -remains stable form cardiac standpoint and can proceed with acceptable cardiac risk

## 2022-03-29 NOTE — CHART NOTE - NSCHARTNOTEFT_GEN_A_CORE
R4 GYN Chart Note    Patient cleared by Cardiology for general anesthesia  Booking sheet submitted to OR scheduling.  Attempting to obtain OR time for 3/30.  Would need to be NPO evening prior.  Can continue Eliquis perioperatively  COVID neg 3/27/22  Plan for exam under anesthesia, dilation and curettage of uterus, hysteroscopy, and possible IUD placement  Will speak directly with primary team if scheduling can be confirmed.    JOSEPH Lozano PGY4  d/w Dr. Phillips

## 2022-03-29 NOTE — CHART NOTE - NSCHARTNOTEFT_GEN_A_CORE
Confirmed OR time on Wednesday 3/30 at 2:30pm.   Plan for exam under anesthesia, dilation and curettage of uterus, and possible IUD placement.  NPO tonight at midnight  Can continue Eliquis perioperatively  COVID neg 3/27/22  Please resend T&S in AM for OR    Andree Woodward PGY2

## 2022-03-29 NOTE — PROGRESS NOTE ADULT - SUBJECTIVE AND OBJECTIVE BOX
[facility] NEPHROLOGY- PROGRESS NOTE    56y Female with history of ESRD on HD presents with vaginal bleeding. Nephrology consulted for ESRD status.    REVIEW OF SYSTEMS:  Gen: no changes in weight  Cards: no chest pain  Resp: no dyspnea  GI: no nausea or vomiting or diarrhea  : + vaginal bleeding improving  Vascular: no LE edema    caffeine (Nausea)  latex (Rash)  penicillin (Nausea)  strawberry (Rash)      Hospital Medications: Medications reviewed        VITALS:  T(F): 98.8 (03-29-22 @ 05:25), Max: 98.8 (03-28-22 @ 21:30)  HR: 80 (03-29-22 @ 05:25)  BP: 124/60 (03-29-22 @ 05:25)  RR: 18 (03-29-22 @ 05:25)  SpO2: 100% (03-29-22 @ 05:25)  Wt(kg): --    03-28 @ 07:01  -  03-29 @ 07:00  --------------------------------------------------------  IN: 800 mL / OUT: 2800 mL / NET: -2000 mL        PHYSICAL EXAM:    Gen: NAD, calm  Cards: RRR, +S1/S2, no M/G/R  Resp: CTA B/L  GI: soft, NT/ND, NABS, + ab wound dressing in place  Vascular: no LE edema B/L, LUE AVF + bruit/thrill      LABS:  03-29    133<L>  |  94<L>  |  20  ----------------------------<  130<H>  4.1   |  26  |  4.27<H>    Ca    9.6      29 Mar 2022 06:25  Phos  2.9     03-29  Mg     2.20     03-29    TPro  6.1  /  Alb  3.0<L>  /  TBili  0.2  /  DBili      /  AST  8   /  ALT  5   /  AlkPhos  142<H>  03-28    Creatinine Trend: 4.27 <--, 6.60 <--, 5.79 <--, 4.69 <--, 6.00 <--, 5.27 <--, 6.98 <--                        7.8    13.96 )-----------( 263      ( 29 Mar 2022 06:25 )             28.1     Urine Studies:

## 2022-03-29 NOTE — PROGRESS NOTE ADULT - SUBJECTIVE AND OBJECTIVE BOX
SUBJECTIVE / OVERNIGHT EVENTS:pt seen and examined  03-29-22     MEDICATIONS  (STANDING):  apixaban 5 milliGRAM(s) Oral every 12 hours  buPROPion XL (24-Hour) . 150 milliGRAM(s) Oral daily  chlorhexidine 2% Cloths 1 Application(s) Topical daily  cinacalcet 30 milliGRAM(s) Oral daily  cycloSPORINE  , modified (NEORAL) 200 milliGRAM(s) Oral two times a day  Dakins Solution - 1/4 Strength 1 Application(s) Topical daily  dextrose 40% Gel 15 Gram(s) Oral once  dextrose 5%. 1000 milliLiter(s) (50 mL/Hr) IV Continuous <Continuous>  dextrose 5%. 1000 milliLiter(s) (100 mL/Hr) IV Continuous <Continuous>  dextrose 50% Injectable 25 Gram(s) IV Push once  dextrose 50% Injectable 12.5 Gram(s) IV Push once  dextrose 50% Injectable 25 Gram(s) IV Push once  diltiazem    milliGRAM(s) Oral daily  epoetin anabela-epbx (RETACRIT) Injectable 96918 Unit(s) IV Push <User Schedule>  gabapentin 300 milliGRAM(s) Oral daily  glucagon  Injectable 1 milliGRAM(s) IntraMuscular once  insulin glargine Injectable (LANTUS) 10 Unit(s) SubCutaneous at bedtime  insulin lispro (ADMELOG) corrective regimen sliding scale   SubCutaneous three times a day before meals  insulin lispro (ADMELOG) corrective regimen sliding scale   SubCutaneous at bedtime  lidocaine   4% Patch 1 Patch Transdermal daily  lidocaine   4% Patch 1 Patch Transdermal daily  lidocaine   4% Patch 1 Patch Transdermal daily  mirtazapine 7.5 milliGRAM(s) Oral at bedtime  montelukast 10 milliGRAM(s) Oral at bedtime  oxyCODONE  ER Tablet 10 milliGRAM(s) Oral every 12 hours  pantoprazole    Tablet 40 milliGRAM(s) Oral before breakfast  polyethylene glycol 3350 17 Gram(s) Oral at bedtime  senna 2 Tablet(s) Oral at bedtime  sertraline 50 milliGRAM(s) Oral daily  sevelamer carbonate 800 milliGRAM(s) Oral three times a day with meals  simethicone 80 milliGRAM(s) Chew three times a day  tacrolimus   0.1% Ointment 1 Application(s) Topical daily  traZODone 100 milliGRAM(s) Oral at bedtime    MEDICATIONS  (PRN):  acetaminophen     Tablet .. 650 milliGRAM(s) Oral every 6 hours PRN Temp greater or equal to 38C (100.4F), Mild Pain (1 - 3)  melatonin 3 milliGRAM(s) Oral at bedtime PRN Insomnia  midodrine. 5 milliGRAM(s) Oral <User Schedule> PRN 30 minutes prior to dialysis  ondansetron Injectable 4 milliGRAM(s) IV Push every 8 hours PRN Nausea and/or Vomiting  oxyCODONE    IR 10 milliGRAM(s) Oral every 4 hours PRN moderate and severe pain    Vital Signs Last 24 Hrs  T(C): 36.9 (03-29-22 @ 10:17), Max: 37.1 (03-28-22 @ 21:30)  T(F): 98.4 (03-29-22 @ 10:17), Max: 98.8 (03-28-22 @ 21:30)  HR: 82 (03-29-22 @ 10:17) (71 - 82)  BP: 121/62 (03-29-22 @ 10:17) (111/55 - 136/58)  BP(mean): --  RR: 17 (03-29-22 @ 10:17) (17 - 18)  SpO2: 99% (03-29-22 @ 10:17) (98% - 100%)          Skin: No rash.  Eyes: No recent vision problems or eye pain.  ENT: No congestion, ear pain, or sore throat.  Cardiovascular: No chest pain or palpation.  Respiratory: No cough, shortness of breath, congestion, or wheezing.  Gastrointestinal: No abdominal pain, nausea, vomiting, or diarrhea.  Genitourinary: No dysuria.  Musculoskeletal: No joint swelling.  Neurologic: No headache.    PHYSICAL EXAM:  GENERAL: NAD  EYES: EOMI, PERRLA  NECK: Supple, No JVD  CHEST/LUNG: dec breath sounds at bases  HEART:  S1 , S2 +  ABDOMEN: soft , bs+, wound dressing+  EXTREMITIES:  edema+  NEUROLOGY:alert awake oriented     LABS:  03-29    133<L>  |  94<L>  |  20  ----------------------------<  130<H>  4.1   |  26  |  4.27<H>    Ca    9.6      29 Mar 2022 06:25  Phos  2.9     03-29  Mg     2.20     03-29    TPro  6.1  /  Alb  3.0<L>  /  TBili  0.2  /  DBili      /  AST  8   /  ALT  5   /  AlkPhos  142<H>  03-28    Creatinine Trend: 4.27 <--, 6.60 <--, 5.79 <--, 4.69 <--, 6.00 <--, 5.27 <--, 6.98 <--                        7.8    13.96 )-----------( 263      ( 29 Mar 2022 06:25 )             28.1     Urine Studies:            LIVER FUNCTIONS - ( 28 Mar 2022 16:42 )  Alb: 3.0 g/dL / Pro: 6.1 g/dL / ALK PHOS: 142 U/L / ALT: 5 U/L / AST: 8 U/L / GGT: x           PTT - ( 28 Mar 2022 09:07 )  PTT:115.4 sec

## 2022-03-29 NOTE — PROGRESS NOTE ADULT - ASSESSMENT
56y Female with history of ESRD on HD presents with vaginal bleeding. Nephrology consulted for ESRD status.    1) ESRD: Last HD on 3/28 tolerated well with 2L removed with heparin to prevent circuit clotting. Plan for next maintenance HD on 3/30 with heparin. Monitor electrolytes.    2) HTN with ESRD: BP acceptable on Cardizem. Continue with midodrine pre-HD on HD days to avoid intradialytic hypotension. Monitor BP.    3) Anemia of renal disease: With component of blood loss from vaginal bleeding. Hb low with acceptable iron stores. Continue with Epo 18K with HD. PRBC as needed. Monitor Hb.    4) Secondary HPT of renal origin: Phosphorus acceptable with low iPTH for which sensipar decreased to 30 mg daily. Continue with renvela 1 tab with meals. Monitor serum calcium and phosphorus.      [facility]  Keaton Lopez M.D.  Juma Green D.O.  Myla Hoffmann M.D.  Julia Brewer, MSN, ANP-C    Telephone: [phone]  Facsimile: [phone]    [address] [place] [address]

## 2022-03-29 NOTE — PROGRESS NOTE ADULT - SUBJECTIVE AND OBJECTIVE BOX
CARDIOLOGY FOLLOW UP - Dr. Bullock  Date of Service: 3/29/22  CC: no cp/sob  c/o abd pain     Review of Systems:  Constitutional: No fever, weight loss, or fatigue  Respiratory: No cough, wheezing, or hemoptysis, no shortness of breath  Cardiovascular: No chest pain, palpitations, passing out, dizziness, or leg swelling  Gastrointestinal: No abd or epigastric pain.  No nausea, vomiting, or hematemesis; no diarrhea or constipation, no melena or hematochezia  Vascular: no edema       PHYSICAL EXAM:  T(C): 36.9 (03-29-22 @ 10:17), Max: 37.1 (03-28-22 @ 21:30)  HR: 82 (03-29-22 @ 10:17) (71 - 82)  BP: 121/62 (03-29-22 @ 10:17) (111/55 - 136/58)  RR: 17 (03-29-22 @ 10:17) (17 - 18)  SpO2: 99% (03-29-22 @ 10:17) (98% - 100%)  Wt(kg): --  I&O's Summary    28 Mar 2022 07:01  -  29 Mar 2022 07:00  --------------------------------------------------------  IN: 800 mL / OUT: 2800 mL / NET: -2000 mL        Appearance: Normal	  Cardiovascular: Normal S1 S2,RRR, No JVD, No murmurs  Respiratory: Lungs clear to auscultation	  Gastrointestinal:  Soft, Non-tender, + BS	  Extremities: Normal range of motion, No clubbing, cyanosis or edema      Home Medications:  Aspercreme with Lidocaine 4% topical film: Apply topically to affected area once a day (low back) (20 Mar 2022 10:15)  Aspercreme with Lidocaine 4% topical film: Apply topically to affected area once a day (L knee) (20 Mar 2022 10:15)  aspirin 81 mg oral delayed release tablet: 1 tab(s) orally once a day (20 Mar 2022 10:15)  buPROPion 150 mg/24 hours (XL) oral tablet, extended release: 1 tab(s) orally once a day (in the morning) (20 Mar 2022 10:15)  cycloSPORINE modified 100 mg oral capsule: 2 cap(s) orally once a day (at bedtime) (20 Mar 2022 10:15)  cycloSPORINE modified 50 mg oral capsule: 3 cap(s) orally once a day in the morning  (20 Mar 2022 10:15)  dilTIAZem 120 mg/24 hours oral capsule, extended release: 1 cap(s) orally once a day (hold SBP&lt;110, HR&lt;60) (20 Mar 2022 10:15)  doxycycline hyclate 100 mg oral tablet: 1 tab(s) orally 2 times a day (20 Mar 2022 10:15)  Eliquis 2.5 mg oral tablet: 1 tab(s) orally 2 times a day    Pharmacy states patient no longer wants to fill this medication (20 Mar 2022 10:15)  gabapentin 300 mg oral capsule: 1 cap(s) orally 2 times a day (20 Mar 2022 10:15)  insulin glargine: 15 unit(s) subcutaneous once a day (at bedtime) (20 Mar 2022 10:15)  midodrine 5 mg oral tablet: 1 tab(s) orally 3 times a week, 30 minute prior to dialysis sessions (hold is SBP&gt;130) (20 Mar 2022 10:15)  mirtazapine 7.5 mg oral tablet: 1 tab(s) orally once a day (at bedtime) (20 Mar 2022 10:15)  montelukast 10 mg oral tablet: 1 tab(s) orally once a day (in the evening) (20 Mar 2022 10:15)  oxyCODONE 10 mg oral tablet, extended release: 1 tab(s) orally every 12 hours (20 Mar 2022 10:15)  oxycodone-acetaminophen 7.5 mg-325 mg oral tablet: 1 tab(s) orally every 6 hours, As Needed (20 Mar 2022 10:15)  pantoprazole 40 mg oral delayed release tablet: 1 tab(s) orally once a day (20 Mar 2022 10:15)  polyethylene glycol 3350 oral powder for reconstitution: 17 gram(s) orally once a day (at bedtime) (20 Mar 2022 10:15)  senna oral tablet: 2 tab(s) orally once a day (at bedtime) (20 Mar 2022 10:15)  sertraline 50 mg oral tablet: 1 tab(s) orally once a day (20 Mar 2022 10:15)  sevelamer carbonate 800 mg oral tablet: 1 tab(s) orally 3 times a day (with meals) (20 Mar 2022 10:15)  simethicone 80 mg oral tablet, chewable: 1 tab(s) orally 3 times a day (before meals) (20 Mar 2022 10:15)  simvastatin 10 mg oral tablet: 1 tab(s) orally once a day (at bedtime) (20 Mar 2022 10:15)  sodium hypochlorite 0.25% topical solution: 1 application topically once a day (20 Mar 2022 10:15)  tacrolimus 0.1% topical ointment: 1 application topically once a day (20 Mar 2022 10:15)  traZODone 100 mg oral tablet: 1 tab(s) orally once a day (at bedtime) (20 Mar 2022 10:15)      MEDICATIONS  (STANDING):  apixaban 5 milliGRAM(s) Oral every 12 hours  buPROPion XL (24-Hour) . 150 milliGRAM(s) Oral daily  chlorhexidine 2% Cloths 1 Application(s) Topical daily  cinacalcet 30 milliGRAM(s) Oral daily  cycloSPORINE  , modified (NEORAL) 200 milliGRAM(s) Oral two times a day  Dakins Solution - 1/4 Strength 1 Application(s) Topical daily  dextrose 40% Gel 15 Gram(s) Oral once  dextrose 5%. 1000 milliLiter(s) (50 mL/Hr) IV Continuous <Continuous>  dextrose 5%. 1000 milliLiter(s) (100 mL/Hr) IV Continuous <Continuous>  dextrose 50% Injectable 25 Gram(s) IV Push once  dextrose 50% Injectable 12.5 Gram(s) IV Push once  dextrose 50% Injectable 25 Gram(s) IV Push once  diltiazem    milliGRAM(s) Oral daily  epoetin anabela-epbx (RETACRIT) Injectable 83780 Unit(s) IV Push <User Schedule>  gabapentin 300 milliGRAM(s) Oral daily  glucagon  Injectable 1 milliGRAM(s) IntraMuscular once  insulin glargine Injectable (LANTUS) 10 Unit(s) SubCutaneous at bedtime  insulin lispro (ADMELOG) corrective regimen sliding scale   SubCutaneous three times a day before meals  insulin lispro (ADMELOG) corrective regimen sliding scale   SubCutaneous at bedtime  lidocaine   4% Patch 1 Patch Transdermal daily  lidocaine   4% Patch 1 Patch Transdermal daily  lidocaine   4% Patch 1 Patch Transdermal daily  mirtazapine 7.5 milliGRAM(s) Oral at bedtime  montelukast 10 milliGRAM(s) Oral at bedtime  oxyCODONE  ER Tablet 10 milliGRAM(s) Oral every 12 hours  pantoprazole    Tablet 40 milliGRAM(s) Oral before breakfast  polyethylene glycol 3350 17 Gram(s) Oral at bedtime  senna 2 Tablet(s) Oral at bedtime  sertraline 50 milliGRAM(s) Oral daily  sevelamer carbonate 800 milliGRAM(s) Oral three times a day with meals  simethicone 80 milliGRAM(s) Chew three times a day  tacrolimus   0.1% Ointment 1 Application(s) Topical daily  traZODone 100 milliGRAM(s) Oral at bedtime      TELEMETRY: 	    ECG:  	  RADIOLOGY:   DIAGNOSTIC TESTING:  [ ] Echocardiogram:  [ ]  Catheterization:  [ ] Stress Test:    OTHER: 	    LABS:	 	                            7.8    13.96 )-----------( 263      ( 29 Mar 2022 06:25 )             28.1     03-29    133<L>  |  94<L>  |  20  ----------------------------<  130<H>  4.1   |  26  |  4.27<H>    Ca    9.6      29 Mar 2022 06:25  Phos  2.9     03-29  Mg     2.20     03-29    TPro  6.1  /  Alb  3.0<L>  /  TBili  0.2  /  DBili  x   /  AST  8   /  ALT  5   /  AlkPhos  142<H>  03-28    PTT - ( 28 Mar 2022 09:07 )  PTT:115.4 sec

## 2022-03-30 NOTE — PROGRESS NOTE ADULT - SUBJECTIVE AND OBJECTIVE BOX
GYN Progress Note    HD#11    S: Patient seen and examined at bedside. No acute events overnight. No acute complaints. Patient not ambulatory. NPO for OR. Denies heavy vaginal bleeding.    Vital Signs Last 24 Hours  T(C): 37.2 (03-30-22 @ 06:20), Max: 37.2 (03-30-22 @ 06:20)  HR: 81 (03-30-22 @ 06:20) (78 - 82)  BP: 120/74 (03-30-22 @ 06:20) (115/61 - 121/62)  RR: 18 (03-30-22 @ 06:20) (17 - 18)  SpO2: 97% (03-30-22 @ 06:20) (97% - 99%)    I&O's Summary    28 Mar 2022 07:01  -  29 Mar 2022 07:00  --------------------------------------------------------  IN: 800 mL / OUT: 2800 mL / NET: -2000 mL      Physical Exam:  General: NAD  Abdomen: soft, non-tender, non-distended, +wound dressing  : no bleeding on pad    Labs:                        7.8    13.96 )-----------( 263      ( 29 Mar 2022 06:25 )             28.1   baso x      eos x      imm gran x      lymph x      mono x      poly x                            7.1    11.05 )-----------( 391      ( 28 Mar 2022 16:42 )             24.2   baso 0.5    eos 9.7    imm gran 2.1    lymph 10.5   mono 10.3   poly 66.9                         7.7    12.18 )-----------( 383      ( 27 Mar 2022 08:24 )             26.7   baso 0.7    eos 9.3    imm gran 2.5    lymph 10.6   mono 10.5   poly 66.4       MEDICATIONS  (STANDING):  apixaban 5 milliGRAM(s) Oral every 12 hours  buPROPion XL (24-Hour) . 150 milliGRAM(s) Oral daily  chlorhexidine 2% Cloths 1 Application(s) Topical daily  cinacalcet 30 milliGRAM(s) Oral daily  cycloSPORINE  , modified (NEORAL) 200 milliGRAM(s) Oral two times a day  Dakins Solution - 1/4 Strength 1 Application(s) Topical daily  dextrose 40% Gel 15 Gram(s) Oral once  dextrose 5%. 1000 milliLiter(s) (50 mL/Hr) IV Continuous <Continuous>  dextrose 5%. 1000 milliLiter(s) (100 mL/Hr) IV Continuous <Continuous>  dextrose 50% Injectable 25 Gram(s) IV Push once  dextrose 50% Injectable 12.5 Gram(s) IV Push once  dextrose 50% Injectable 25 Gram(s) IV Push once  diltiazem    milliGRAM(s) Oral daily  epoetin anabela-epbx (RETACRIT) Injectable 14002 Unit(s) IV Push <User Schedule>  gabapentin 300 milliGRAM(s) Oral daily  glucagon  Injectable 1 milliGRAM(s) IntraMuscular once  insulin glargine Injectable (LANTUS) 10 Unit(s) SubCutaneous at bedtime  insulin lispro (ADMELOG) corrective regimen sliding scale   SubCutaneous three times a day before meals  insulin lispro (ADMELOG) corrective regimen sliding scale   SubCutaneous at bedtime  lidocaine   4% Patch 1 Patch Transdermal daily  lidocaine   4% Patch 1 Patch Transdermal daily  lidocaine   4% Patch 1 Patch Transdermal daily  mirtazapine 7.5 milliGRAM(s) Oral at bedtime  montelukast 10 milliGRAM(s) Oral at bedtime  oxyCODONE  ER Tablet 10 milliGRAM(s) Oral every 12 hours  pantoprazole    Tablet 40 milliGRAM(s) Oral before breakfast  polyethylene glycol 3350 17 Gram(s) Oral at bedtime  senna 2 Tablet(s) Oral at bedtime  sertraline 50 milliGRAM(s) Oral daily  sevelamer carbonate 800 milliGRAM(s) Oral three times a day with meals  simethicone 80 milliGRAM(s) Chew three times a day  tacrolimus   0.1% Ointment 1 Application(s) Topical daily  traZODone 100 milliGRAM(s) Oral at bedtime    MEDICATIONS  (PRN):  acetaminophen     Tablet .. 650 milliGRAM(s) Oral every 6 hours PRN Temp greater or equal to 38C (100.4F), Mild Pain (1 - 3)  melatonin 3 milliGRAM(s) Oral at bedtime PRN Insomnia  midodrine. 5 milliGRAM(s) Oral <User Schedule> PRN 30 minutes prior to dialysis  ondansetron Injectable 4 milliGRAM(s) IV Push every 8 hours PRN Nausea and/or Vomiting

## 2022-03-30 NOTE — PROGRESS NOTE ADULT - SUBJECTIVE AND OBJECTIVE BOX
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    subjective: Patient was seen and examined at bedside.  Reports mild decrease in pain to abdominal wound.  Endorses that she is scheduled for an endometrial biopsy.    Interval HPI/24 hour events: no acute events overnight.    Chart reviewed including labs and relevant images      Diet:  Diet, NPO after Midnight:      NPO Start Date: 29-Mar-2022,   NPO Start Time: 23:59  Except Medications (03-29-22 @ 13:54)  Diet, Consistent Carbohydrate Renal w/Evening Snack:   For patients receiving Renal Replacement - No Protein Restr, No Conc K, No Conc Phos, Low Sodium (RENAL)  Supplement Feeding Modality:  Oral  Nepro Cans or Servings Per Day:  1       Frequency:  Three Times a day (03-20-22 @ 10:20)      ROS: General/ SKIN/gyn see above  all other systems negative      ALLERGIES & MEDICATIONS  --------------------------------------------------------------------------------  Allergies    latex (Rash)  penicillin (Nausea)  strawberry (Rash)    Intolerances    caffeine (Nausea)        STANDING INPATIENT MEDICATIONS    apixaban 5 milliGRAM(s) Oral every 12 hours  buPROPion XL (24-Hour) . 150 milliGRAM(s) Oral daily  chlorhexidine 2% Cloths 1 Application(s) Topical daily  cinacalcet 30 milliGRAM(s) Oral daily  cycloSPORINE  , modified (NEORAL) 200 milliGRAM(s) Oral two times a day  Dakins Solution - 1/4 Strength 1 Application(s) Topical daily  dextrose 40% Gel 15 Gram(s) Oral once  dextrose 5%. 1000 milliLiter(s) IV Continuous <Continuous>  dextrose 5%. 1000 milliLiter(s) IV Continuous <Continuous>  dextrose 50% Injectable 25 Gram(s) IV Push once  dextrose 50% Injectable 12.5 Gram(s) IV Push once  dextrose 50% Injectable 25 Gram(s) IV Push once  diltiazem    milliGRAM(s) Oral daily  epoetin anabela-epbx (RETACRIT) Injectable 83948 Unit(s) IV Push <User Schedule>  gabapentin 300 milliGRAM(s) Oral daily  glucagon  Injectable 1 milliGRAM(s) IntraMuscular once  insulin glargine Injectable (LANTUS) 10 Unit(s) SubCutaneous at bedtime  insulin lispro (ADMELOG) corrective regimen sliding scale   SubCutaneous three times a day before meals  insulin lispro (ADMELOG) corrective regimen sliding scale   SubCutaneous at bedtime  lidocaine   4% Patch 1 Patch Transdermal daily  lidocaine   4% Patch 1 Patch Transdermal daily  lidocaine   4% Patch 1 Patch Transdermal daily  mirtazapine 7.5 milliGRAM(s) Oral at bedtime  montelukast 10 milliGRAM(s) Oral at bedtime  oxyCODONE  ER Tablet 10 milliGRAM(s) Oral every 12 hours  pantoprazole    Tablet 40 milliGRAM(s) Oral before breakfast  polyethylene glycol 3350 17 Gram(s) Oral at bedtime  senna 2 Tablet(s) Oral at bedtime  sertraline 50 milliGRAM(s) Oral daily  sevelamer carbonate 800 milliGRAM(s) Oral three times a day with meals  simethicone 80 milliGRAM(s) Chew three times a day  tacrolimus   0.1% Ointment 1 Application(s) Topical daily  traZODone 100 milliGRAM(s) Oral at bedtime      PRN INPATIENT MEDICATION  acetaminophen     Tablet .. 650 milliGRAM(s) Oral every 6 hours PRN  melatonin 3 milliGRAM(s) Oral at bedtime PRN  midodrine. 5 milliGRAM(s) Oral <User Schedule> PRN  ondansetron Injectable 4 milliGRAM(s) IV Push every 8 hours PRN        Vital signs:  T(C): 36.8 (03-30-22 @ 10:34), Max: 37.2 (03-30-22 @ 06:20)  HR: 80 (03-30-22 @ 10:34) (80 - 81)  BP: 118/73 (03-30-22 @ 10:34) (118/73 - 120/74)  RR: 17 (03-30-22 @ 10:34) (17 - 18)  SpO2: 97% (03-30-22 @ 10:34) (97% - 97%)  Wt(kg): 130.6 kg (24 Mar 2022)  BMI: 52.7kg/m2    Constitutional: NAD, A&O x 3.  ENMT: edith, non icteric  Respiratory: rm air, non labored  Cardiovascular: rate regular  Gastrointestinal: wound lower right sided pannus, [id number] (on 3/21/22 - 5x17x3), slough continues to decrease and remains localized to wound base from 12- 3 o'clock with deepest point in this area. Remainder of wound granular. Continues to re-epithelialize along wound edges. Satellite ulceration noted 3 o'clock 1cm from wound measuring [id number], pink-moist. Small-moderate serous drainage, + mild odor remains. Tenderness mildly improving. Cleansed with Dakins, tacrolimus applied, adaptic touch, aquacel Ag and abdominal pad.  Extremities: Left arm AV fistula + thrill   Skin: as noted  Musculoskeletal: able to flex, extend knees, complains of right knee pain, no swelling  psych: anxious, tearful        LABS/ CULTURES/ RADIOLOGY:              7.5    12.63 >-----------<  309      [03-30-22 @ 09:55]              26.6     134  |  93  |  27  ----------------------------<  109      [03-30-22 @ 09:55]  4.2   |  26  |  5.43        Ca     9.5     [03-30-22 @ 09:55]      Mg     2.40     [03-30-22 @ 09:55]      Phos  3.5     [03-30-22 @ 09:55]          CAPILLARY BLOOD GLUCOSE      POCT Blood Glucose.: 99 mg/dL (30 Mar 2022 21:09)  POCT Blood Glucose.: 95 mg/dL (30 Mar 2022 18:06)  POCT Blood Glucose.: 102 mg/dL (30 Mar 2022 11:48)  POCT Blood Glucose.: 100 mg/dL (30 Mar 2022 06:10)  POCT Blood Glucose.: 122 mg/dL (29 Mar 2022 23:49)        A1C with Estimated Average Glucose Result: 6.1 % (03-20-22 @ 12:48)  A1C with Estimated Average Glucose Result: 7.0 % (01-13-22 @ 08:21)        Triglycerides, Serum: 222 mg/dL (03-21-22 @ 17:31)    Intact PTH: 81 pg/mL (03-21-22 @ 17:26)

## 2022-03-30 NOTE — PROGRESS NOTE ADULT - SUBJECTIVE AND OBJECTIVE BOX
SUBJECTIVE / OVERNIGHT EVENTS:pt seen and examined  03-30-22     MEDICATIONS  (STANDING):  apixaban 5 milliGRAM(s) Oral every 12 hours  buPROPion XL (24-Hour) . 150 milliGRAM(s) Oral daily  chlorhexidine 2% Cloths 1 Application(s) Topical daily  cinacalcet 30 milliGRAM(s) Oral daily  cycloSPORINE  , modified (NEORAL) 200 milliGRAM(s) Oral two times a day  Dakins Solution - 1/4 Strength 1 Application(s) Topical daily  dextrose 40% Gel 15 Gram(s) Oral once  dextrose 5%. 1000 milliLiter(s) (50 mL/Hr) IV Continuous <Continuous>  dextrose 5%. 1000 milliLiter(s) (100 mL/Hr) IV Continuous <Continuous>  dextrose 50% Injectable 25 Gram(s) IV Push once  dextrose 50% Injectable 12.5 Gram(s) IV Push once  dextrose 50% Injectable 25 Gram(s) IV Push once  diltiazem    milliGRAM(s) Oral daily  epoetin anabela-epbx (RETACRIT) Injectable 27468 Unit(s) IV Push <User Schedule>  gabapentin 300 milliGRAM(s) Oral daily  glucagon  Injectable 1 milliGRAM(s) IntraMuscular once  insulin glargine Injectable (LANTUS) 10 Unit(s) SubCutaneous at bedtime  insulin lispro (ADMELOG) corrective regimen sliding scale   SubCutaneous three times a day before meals  insulin lispro (ADMELOG) corrective regimen sliding scale   SubCutaneous at bedtime  lidocaine   4% Patch 1 Patch Transdermal daily  lidocaine   4% Patch 1 Patch Transdermal daily  lidocaine   4% Patch 1 Patch Transdermal daily  mirtazapine 7.5 milliGRAM(s) Oral at bedtime  montelukast 10 milliGRAM(s) Oral at bedtime  oxyCODONE  ER Tablet 10 milliGRAM(s) Oral every 12 hours  pantoprazole    Tablet 40 milliGRAM(s) Oral before breakfast  polyethylene glycol 3350 17 Gram(s) Oral at bedtime  senna 2 Tablet(s) Oral at bedtime  sertraline 50 milliGRAM(s) Oral daily  sevelamer carbonate 800 milliGRAM(s) Oral three times a day with meals  simethicone 80 milliGRAM(s) Chew three times a day  tacrolimus   0.1% Ointment 1 Application(s) Topical daily  traZODone 100 milliGRAM(s) Oral at bedtime    MEDICATIONS  (PRN):  acetaminophen     Tablet .. 650 milliGRAM(s) Oral every 6 hours PRN Temp greater or equal to 38C (100.4F), Mild Pain (1 - 3)  melatonin 3 milliGRAM(s) Oral at bedtime PRN Insomnia  midodrine. 5 milliGRAM(s) Oral <User Schedule> PRN 30 minutes prior to dialysis  ondansetron Injectable 4 milliGRAM(s) IV Push every 8 hours PRN Nausea and/or Vomiting    Vital Signs Last 24 Hrs  T(C): 36.1 (03-30-22 @ 23:05), Max: 37.2 (03-30-22 @ 06:20)  T(F): 97 (03-30-22 @ 23:05), Max: 98.9 (03-30-22 @ 06:20)  HR: 82 (03-30-22 @ 23:05) (80 - 82)  BP: 111/62 (03-30-22 @ 23:05) (111/62 - 120/74)  BP(mean): --  RR: 17 (03-30-22 @ 23:05) (17 - 18)  SpO2: 97% (03-30-22 @ 23:05) (97% - 97%)            Skin: No rash.  Eyes: No recent vision problems or eye pain.  ENT: No congestion, ear pain, or sore throat.  Cardiovascular: No chest pain or palpation.  Respiratory: No cough, shortness of breath, congestion, or wheezing.  Gastrointestinal: No abdominal pain, nausea, vomiting, or diarrhea.  Genitourinary: No dysuria.  Musculoskeletal: No joint swelling.  Neurologic: No headache.    PHYSICAL EXAM:  GENERAL: NAD  EYES: EOMI, PERRLA  NECK: Supple, No JVD  CHEST/LUNG: dec breath sounds at bases  HEART:  S1 , S2 +  ABDOMEN: soft , bs+, wound dressing+  EXTREMITIES:  edema+  NEUROLOGY:alert awake oriented     LABS:  03-30    134<L>  |  93<L>  |  27<H>  ----------------------------<  109<H>  4.2   |  26  |  5.43<H>    Ca    9.5      30 Mar 2022 09:55  Phos  3.5     03-30  Mg     2.40     03-30      Creatinine Trend: 5.43 <--, 4.27 <--, 6.60 <--, 5.79 <--, 4.69 <--, 6.00 <--, 5.27 <--                        7.5    12.63 )-----------( 309      ( 30 Mar 2022 09:55 )             26.6     Urine Studies:                  Urine Studies:            LIVER FUNCTIONS - ( 28 Mar 2022 16:42 )  Alb: 3.0 g/dL / Pro: 6.1 g/dL / ALK PHOS: 142 U/L / ALT: 5 U/L / AST: 8 U/L / GGT: x           PTT - ( 28 Mar 2022 09:07 )  PTT:115.4 sec

## 2022-03-30 NOTE — CHART NOTE - NSCHARTNOTEFT_GEN_A_CORE
Notified by RN that patient upset and refused HD. Patient seen at bedside by provider. Patient explained that she was upset that her D&C w/ GYN was canceled today and that she remained NPO all day. Patient refused HD as she did not feel she could tolerate HD without eating all day.  Risks of missing HD explained to patient, patient expressed understanding and agreed that she would go to HD tomorrow. Patient NPO after midnight again as she is on call for OR as an add-on for D&C. Writer called HD and patient added on schedule for 10:00 tomorrow. Vitals stable, labs stable. FU 4am labs. Will continue to monitor and support patient.

## 2022-03-30 NOTE — PROGRESS NOTE ADULT - ASSESSMENT
A/P: 56y , LMP 10/2021, w/ PMH significant for ESRD (on HD), afib (on ASA and Eliquis), DM, HTN, COPD, CHAMP, HLD, depression, chronic R pannus wound (2/2 pyoderma gangrenosum, c/b superimposed cellulitis) presented with vaginal bleeding found to have thickened EM on CT. Limited TAUS findings. Patient high risk for endometrial pathology / malignancy given prolonged AUB and obesity. Attempted EMB at bedside, unsuccessful. Definitive tissue sampling requested by Derm given desire to start TNF inhibitor for pyoderma gangrenosum.    - Cleared for general anesthesia by Cardiology  - Added on to OR for EUA, D&C, hysteroscopy, Mirena IUD placement  - NPO until OR  - Can continue Eliquis perioperatively  - COVID neg 3/27/22  - Please resend T&S for OR  - Plan for dialysis this AM    JOSEPH Lozano PGY4  d/w Dr. Phillips A/P: 56y , LMP 10/2021, w/ PMH significant for ESRD (on HD), afib (on ASA and Eliquis), DM, HTN, COPD, CHAMP, HLD, depression, chronic R pannus wound (2/2 pyoderma gangrenosum, c/b superimposed cellulitis) presented with vaginal bleeding found to have thickened EM on CT. Limited TAUS findings. Patient high risk for endometrial pathology / malignancy given prolonged AUB and obesity. Attempted EMB at bedside, unsuccessful. Definitive tissue sampling requested by Derm given desire to start TNF inhibitor for pyoderma gangrenosum.    - Cleared for general anesthesia by Cardiology  - Added on to OR 17 @ 430pm for EUA, D&C, hysteroscopy, Mirena IUD placement  - NPO until OR  - Can continue Eliquis perioperatively  - COVID neg 3/27/22  - Please resend T&S for OR  - Plan for dialysis this AM    JOSEPH Lozano PGY4  d/w Dr. Phillips

## 2022-03-30 NOTE — PROGRESS NOTE ADULT - ASSESSMENT
Assessment: 56y old  Female  ESRD, morbid obesity, CHAMP not on home O2, ESRD (HD MWF), HTN, DM, COPD, Afib  ( on ac ),  chronic R pannus pyoderma gangrenosum presented with 1 week of vaginal bleeding. Plan for endometrial biopsy today with Gyn  On previous admission patient followed by Wound surgery and dermatology. right pannus PG. Remains on Cyclosporin. Topical tacrolimus, adaptic touch, aquacel ag.       Exam:  Wound dimensions without significant change, mildly improved.   Mild odor remains.   Tissue type with increased granular base, decreased nonviable tissue. New satellite lesion at 3 o'clock. Re-epithelization along wound edges.  Pain and tenderness improving.      Plan:  -Topical dressing: Cleanse with Dakins 1/4 strength. Apply Liquid barrier film to periwound skin. Apply Topical Tacrolimus 0.1% ointment to wound base and wound edges, adaptic touch to cover, apply Aquacel AG, and abdominal pad. Change daily.  -Interdry textile sheeting beneath abdominal pannus leaving 2" out at end to wick.  -Agree to continue with Cyclosporin   -Glucose control per primary team/endocrinology  -Continue to follow nutrition/RD recommendations   -Continue to offload pressure  -DVT prophylaxis    Patient seen today with Dr. Layne.  FIndings and plan discussed with Dermatology and primary team.    Upon discharge follow up at outpatient [facility]. [address] [place] [address]. [phone].    Will continue to follow periodically while inpatient.  Please reconsult earlier if needed.  Thank you.    CARMELA William-BC, CW    pager #[phone]    If after 4PM or before 7:30AM on Mon-Friday or weekend/holiday please contact general surgery for urgent matters.   Team A- [id number]   Team B- [id number]  For non-urgent matters e-mail [email or web address]    We spent 35 minutes face-to-face with this patient of which more than 50% of the time was spent counseling/coordinating care of this patient.       Assessment: 56y old  Female  ESRD, morbid obesity, CHAMP not on home O2, ESRD (HD MWF), HTN, DM, COPD, Afib  ( on ac ),  chronic R pannus pyoderma gangrenosum presented with 1 week of vaginal bleeding. Plan for endometrial biopsy today with Gyn  On previous admission patient followed by Wound surgery and dermatology. right pannus PG. Remains on Cyclosporin. Topical tacrolimus, adaptic touch, aquacel ag.       Exam:  Wound dimensions without significant change, mildly improved.   Mild odor remains.   Tissue type with increased granular base, decreased nonviable tissue. New satellite lesion at 3 o'clock. Re-epithelization along wound edges.  Pain and tenderness improving.      Plan:  -Topical dressing: Cleanse with Dakins 1/4 strength. Apply Liquid barrier film to periwound skin. Apply Topical Tacrolimus 0.1% ointment to wound base and wound edges, adaptic touch to cover, apply Aquacel AG, and abdominal pad. Change daily.  -Interdry textile sheeting beneath abdominal pannus leaving 2" out at end to wick.  -Agree to continue with Cyclosporin   -Glucose control per primary team/endocrinology  -Continue to follow nutrition/RD recommendations   -Continue to offload pressure  -DVT prophylaxis    Patient seen today with Dr. Layne.  FIndings and plan discussed with Dermatology and primary team.    Upon discharge follow up at outpatient [facility]. [address] [place] [address]. [phone].    Will continue to follow periodically while inpatient.  Please reconsult earlier if needed.  Thank you.    CARMELA William-BC, CWOC    pager #[phone]    If after 4PM or before 7:30AM on Mon-Friday or weekend/holiday please contact general surgery for urgent matters.   Team A- [id number]   Team B- [id number]  For non-urgent matters e-mail [email or web address]

## 2022-03-30 NOTE — PROGRESS NOTE ADULT - SUBJECTIVE AND OBJECTIVE BOX
CARDIOLOGY FOLLOW UP - Dr. Bullock  Date of Service: 3/30/22  CC: no cp/sob     Review of Systems:  Constitutional: No fever, weight loss, or fatigue  Respiratory: No cough, wheezing, or hemoptysis, no shortness of breath  Cardiovascular: No chest pain, palpitations, passing out, dizziness, or leg swelling  Gastrointestinal: No abd or epigastric pain.  No nausea, vomiting, or hematemesis; no diarrhea or constipation, no melena or hematochezia  Vascular: no edema       PHYSICAL EXAM:  T(C): 37.2 (03-30-22 @ 06:20), Max: 37.2 (03-30-22 @ 06:20)  HR: 81 (03-30-22 @ 06:20) (78 - 82)  BP: 120/74 (03-30-22 @ 06:20) (115/61 - 121/62)  RR: 18 (03-30-22 @ 06:20) (17 - 18)  SpO2: 97% (03-30-22 @ 06:20) (97% - 99%)  Wt(kg): --  I&O's Summary      Appearance: Normal	  Cardiovascular: Normal S1 S2,RRR, No JVD, No murmurs  Respiratory: Lungs clear to auscultation	  Gastrointestinal:  Soft, Non-tender, + BS	  Extremities: Normal range of motion, No clubbing, cyanosis or edema      Home Medications:  Aspercreme with Lidocaine 4% topical film: Apply topically to affected area once a day (low back) (20 Mar 2022 10:15)  Aspercreme with Lidocaine 4% topical film: Apply topically to affected area once a day (L knee) (20 Mar 2022 10:15)  aspirin 81 mg oral delayed release tablet: 1 tab(s) orally once a day (20 Mar 2022 10:15)  buPROPion 150 mg/24 hours (XL) oral tablet, extended release: 1 tab(s) orally once a day (in the morning) (20 Mar 2022 10:15)  cycloSPORINE modified 100 mg oral capsule: 2 cap(s) orally once a day (at bedtime) (20 Mar 2022 10:15)  cycloSPORINE modified 50 mg oral capsule: 3 cap(s) orally once a day in the morning  (20 Mar 2022 10:15)  dilTIAZem 120 mg/24 hours oral capsule, extended release: 1 cap(s) orally once a day (hold SBP&lt;110, HR&lt;60) (20 Mar 2022 10:15)  doxycycline hyclate 100 mg oral tablet: 1 tab(s) orally 2 times a day (20 Mar 2022 10:15)  Eliquis 2.5 mg oral tablet: 1 tab(s) orally 2 times a day    Pharmacy states patient no longer wants to fill this medication (20 Mar 2022 10:15)  gabapentin 300 mg oral capsule: 1 cap(s) orally 2 times a day (20 Mar 2022 10:15)  insulin glargine: 15 unit(s) subcutaneous once a day (at bedtime) (20 Mar 2022 10:15)  midodrine 5 mg oral tablet: 1 tab(s) orally 3 times a week, 30 minute prior to dialysis sessions (hold is SBP&gt;130) (20 Mar 2022 10:15)  mirtazapine 7.5 mg oral tablet: 1 tab(s) orally once a day (at bedtime) (20 Mar 2022 10:15)  montelukast 10 mg oral tablet: 1 tab(s) orally once a day (in the evening) (20 Mar 2022 10:15)  oxyCODONE 10 mg oral tablet, extended release: 1 tab(s) orally every 12 hours (20 Mar 2022 10:15)  oxycodone-acetaminophen 7.5 mg-325 mg oral tablet: 1 tab(s) orally every 6 hours, As Needed (20 Mar 2022 10:15)  pantoprazole 40 mg oral delayed release tablet: 1 tab(s) orally once a day (20 Mar 2022 10:15)  polyethylene glycol 3350 oral powder for reconstitution: 17 gram(s) orally once a day (at bedtime) (20 Mar 2022 10:15)  senna oral tablet: 2 tab(s) orally once a day (at bedtime) (20 Mar 2022 10:15)  sertraline 50 mg oral tablet: 1 tab(s) orally once a day (20 Mar 2022 10:15)  sevelamer carbonate 800 mg oral tablet: 1 tab(s) orally 3 times a day (with meals) (20 Mar 2022 10:15)  simethicone 80 mg oral tablet, chewable: 1 tab(s) orally 3 times a day (before meals) (20 Mar 2022 10:15)  simvastatin 10 mg oral tablet: 1 tab(s) orally once a day (at bedtime) (20 Mar 2022 10:15)  sodium hypochlorite 0.25% topical solution: 1 application topically once a day (20 Mar 2022 10:15)  tacrolimus 0.1% topical ointment: 1 application topically once a day (20 Mar 2022 10:15)  traZODone 100 mg oral tablet: 1 tab(s) orally once a day (at bedtime) (20 Mar 2022 10:15)      MEDICATIONS  (STANDING):  apixaban 5 milliGRAM(s) Oral every 12 hours  buPROPion XL (24-Hour) . 150 milliGRAM(s) Oral daily  chlorhexidine 2% Cloths 1 Application(s) Topical daily  cinacalcet 30 milliGRAM(s) Oral daily  cycloSPORINE  , modified (NEORAL) 200 milliGRAM(s) Oral two times a day  Dakins Solution - 1/4 Strength 1 Application(s) Topical daily  dextrose 40% Gel 15 Gram(s) Oral once  dextrose 5%. 1000 milliLiter(s) (50 mL/Hr) IV Continuous <Continuous>  dextrose 5%. 1000 milliLiter(s) (100 mL/Hr) IV Continuous <Continuous>  dextrose 50% Injectable 25 Gram(s) IV Push once  dextrose 50% Injectable 12.5 Gram(s) IV Push once  dextrose 50% Injectable 25 Gram(s) IV Push once  diltiazem    milliGRAM(s) Oral daily  epoetin anabela-epbx (RETACRIT) Injectable 32041 Unit(s) IV Push <User Schedule>  gabapentin 300 milliGRAM(s) Oral daily  glucagon  Injectable 1 milliGRAM(s) IntraMuscular once  insulin glargine Injectable (LANTUS) 10 Unit(s) SubCutaneous at bedtime  insulin lispro (ADMELOG) corrective regimen sliding scale   SubCutaneous three times a day before meals  insulin lispro (ADMELOG) corrective regimen sliding scale   SubCutaneous at bedtime  lidocaine   4% Patch 1 Patch Transdermal daily  lidocaine   4% Patch 1 Patch Transdermal daily  lidocaine   4% Patch 1 Patch Transdermal daily  mirtazapine 7.5 milliGRAM(s) Oral at bedtime  montelukast 10 milliGRAM(s) Oral at bedtime  oxyCODONE  ER Tablet 10 milliGRAM(s) Oral every 12 hours  pantoprazole    Tablet 40 milliGRAM(s) Oral before breakfast  polyethylene glycol 3350 17 Gram(s) Oral at bedtime  senna 2 Tablet(s) Oral at bedtime  sertraline 50 milliGRAM(s) Oral daily  sevelamer carbonate 800 milliGRAM(s) Oral three times a day with meals  simethicone 80 milliGRAM(s) Chew three times a day  tacrolimus   0.1% Ointment 1 Application(s) Topical daily  traZODone 100 milliGRAM(s) Oral at bedtime      TELEMETRY: 	    ECG:  	  RADIOLOGY:   DIAGNOSTIC TESTING:  [ ] Echocardiogram:  [ ]  Catheterization:  [ ] Stress Test:    OTHER: 	    LABS:	 	                            7.8    13.96 )-----------( 263      ( 29 Mar 2022 06:25 )             28.1     03-29    133<L>  |  94<L>  |  20  ----------------------------<  130<H>  4.1   |  26  |  4.27<H>    Ca    9.6      29 Mar 2022 06:25  Phos  2.9     03-29  Mg     2.20     03-29    TPro  6.1  /  Alb  3.0<L>  /  TBili  0.2  /  DBili  x   /  AST  8   /  ALT  5   /  AlkPhos  142<H>  03-28    PTT - ( 28 Mar 2022 09:07 )  PTT:115.4 sec

## 2022-03-30 NOTE — PROGRESS NOTE ADULT - SUBJECTIVE AND OBJECTIVE BOX
[facility] NEPHROLOGY- PROGRESS NOTE    56y Female with history of ESRD on HD presents with vaginal bleeding. Nephrology consulted for ESRD status.    REVIEW OF SYSTEMS:  Gen: no changes in weight  Cards: no chest pain  Resp: no dyspnea  GI: no nausea or vomiting or diarrhea  : + vaginal bleeding improving  Vascular: no LE edema    caffeine (Nausea)  latex (Rash)  penicillin (Nausea)  strawberry (Rash)      Hospital Medications: Medications reviewed        VITALS:  T(F): 98.3 (03-30-22 @ 10:34), Max: 98.9 (03-30-22 @ 06:20)  HR: 80 (03-30-22 @ 10:34)  BP: 118/73 (03-30-22 @ 10:34)  RR: 17 (03-30-22 @ 10:34)  SpO2: 97% (03-30-22 @ 10:34)  Wt(kg): --        PHYSICAL EXAM:    Gen: NAD, calm  Cards: RRR, +S1/S2, no M/G/R  Resp: CTA B/L  GI: soft, NT/ND, NABS, + ab wound dressing in place  Vascular: no LE edema B/L, LUE AVF + bruit/thrill      LABS:  03-30    134<L>  |  93<L>  |  27<H>  ----------------------------<  109<H>  4.2   |  26  |  5.43<H>    Ca    9.5      30 Mar 2022 09:55  Phos  3.5     03-30  Mg     2.40     03-30    TPro  6.1  /  Alb  3.0<L>  /  TBili  0.2  /  DBili      /  AST  8   /  ALT  5   /  AlkPhos  142<H>  03-28    Creatinine Trend: 5.43 <--, 4.27 <--, 6.60 <--, 5.79 <--, 4.69 <--, 6.00 <--, 5.27 <--                        7.5    12.63 )-----------( 309      ( 30 Mar 2022 09:55 )             26.6     Urine Studies:

## 2022-03-30 NOTE — PROGRESS NOTE ADULT - ASSESSMENT
56y Female with history of ESRD on HD presents with vaginal bleeding. Nephrology consulted for ESRD status.    1) ESRD: Last HD on 3/28 tolerated well with 2L removed with heparin to prevent circuit clotting. Plan for next maintenance HD today post-OR. Monitor electrolytes.    2) HTN with ESRD: BP acceptable on Cardizem. Continue with midodrine pre-HD on HD days to avoid intradialytic hypotension. Monitor BP.    3) Anemia of renal disease: With component of blood loss from vaginal bleeding. Hb low with acceptable iron stores. Continue with Epo 18K with HD. PRBC as needed. Monitor Hb.    4) Secondary HPT of renal origin: Phosphorus acceptable with low iPTH for which sensipar decreased to 30 mg daily. Continue with renvela 1 tab with meals. Monitor serum calcium and phosphorus.      [facility] NEPHROLOGY  Keaton Lopez M.D.  Juma Green D.O.  Myla Hoffmann M.D.  Julia Brewer, MSN, ANP-C    Telephone: [phone]  Facsimile: [phone]    [address] [place] [address]

## 2022-03-31 NOTE — PRE-OP CHECKLIST - COMMENTS
Patient has an appointment at clinic today at 11:20am. [employer] has refused further authorization for skilled nursing due to medication set up not qualifying as a covered service. I will see her this afternoon to discuss homecare discharge. Please discuss this with her at her appointment today and get her set up with MTM visits for her or a caregiver to come in for med set up and teaching. Let me know if you have any other options that we can coordinate for her. She will have to be discharged from homecare at the next visit.  Thanks  Bri Patel RN   see emar

## 2022-03-31 NOTE — PROGRESS NOTE ADULT - ASSESSMENT
56y Female with history of ESRD on HD presents with vaginal bleeding. Nephrology consulted for ESRD status.    1) ESRD: Last HD on 3/28 tolerated well with 2L removed with heparin to prevent circuit clotting. Patient had been planned for HD on 3/30 after OR however refused due to weakness. Plan for next maintenance HD today post-OR. Monitor electrolytes.    2) HTN with ESRD: BP acceptable on Cardizem. Continue with midodrine pre-HD on HD days to avoid intradialytic hypotension. Monitor BP.    3) Anemia of renal disease: With component of blood loss from vaginal bleeding. Hb low with acceptable iron stores. Continue with Epo 18K with HD. PRBC as needed. Monitor Hb.    4) Secondary HPT of renal origin: Phosphorus acceptable with low iPTH for which sensipar decreased to 30 mg daily. Continue with renvela 1 tab with meals. Monitor serum calcium and phosphorus.      [facility] NEPHROLOGY  Keaton Lopez M.D.  Juma Green D.O.  Myla Hoffmann M.D.  Julia Brewer, JASIEL, ANP-C    Telephone: [phone]  Facsimile: [phone]    [address] [place] [address]

## 2022-03-31 NOTE — PROGRESS NOTE ADULT - SUBJECTIVE AND OBJECTIVE BOX
CARDIOLOGY FOLLOW UP - Dr. Bullock  Date of Service: 3/31/22  CC: no cp/sob     Review of Systems:  Constitutional: No fever, weight loss, or fatigue  Respiratory: No cough, wheezing, or hemoptysis, no shortness of breath  Cardiovascular: No chest pain, palpitations, passing out, dizziness, or leg swelling  Gastrointestinal: No abd or epigastric pain.  No nausea, vomiting, or hematemesis; no diarrhea or constipation, no melena or hematochezia  Vascular: no edema       PHYSICAL EXAM:  T(C): 36.6 (03-31-22 @ 05:07), Max: 36.8 (03-30-22 @ 10:34)  HR: 78 (03-31-22 @ 05:07) (78 - 82)  BP: 115/68 (03-31-22 @ 05:07) (111/62 - 118/73)  RR: 17 (03-31-22 @ 05:07) (17 - 17)  SpO2: 97% (03-31-22 @ 05:07) (97% - 97%)  Wt(kg): --  I&O's Summary      Appearance: Normal	  Cardiovascular: Normal S1 S2,RRR, No JVD, No murmurs  Respiratory: Lungs clear to auscultation	  Gastrointestinal:  Soft, Non-tender, + BS	  Extremities: Normal range of motion, No clubbing, cyanosis or edema      Home Medications:  Aspercreme with Lidocaine 4% topical film: Apply topically to affected area once a day (low back) (20 Mar 2022 10:15)  Aspercreme with Lidocaine 4% topical film: Apply topically to affected area once a day (L knee) (20 Mar 2022 10:15)  aspirin 81 mg oral delayed release tablet: 1 tab(s) orally once a day (20 Mar 2022 10:15)  buPROPion 150 mg/24 hours (XL) oral tablet, extended release: 1 tab(s) orally once a day (in the morning) (20 Mar 2022 10:15)  cycloSPORINE modified 100 mg oral capsule: 2 cap(s) orally once a day (at bedtime) (20 Mar 2022 10:15)  cycloSPORINE modified 50 mg oral capsule: 3 cap(s) orally once a day in the morning  (20 Mar 2022 10:15)  dilTIAZem 120 mg/24 hours oral capsule, extended release: 1 cap(s) orally once a day (hold SBP&lt;110, HR&lt;60) (20 Mar 2022 10:15)  doxycycline hyclate 100 mg oral tablet: 1 tab(s) orally 2 times a day (20 Mar 2022 10:15)  Eliquis 2.5 mg oral tablet: 1 tab(s) orally 2 times a day    Pharmacy states patient no longer wants to fill this medication (20 Mar 2022 10:15)  gabapentin 300 mg oral capsule: 1 cap(s) orally 2 times a day (20 Mar 2022 10:15)  insulin glargine: 15 unit(s) subcutaneous once a day (at bedtime) (20 Mar 2022 10:15)  midodrine 5 mg oral tablet: 1 tab(s) orally 3 times a week, 30 minute prior to dialysis sessions (hold is SBP&gt;130) (20 Mar 2022 10:15)  mirtazapine 7.5 mg oral tablet: 1 tab(s) orally once a day (at bedtime) (20 Mar 2022 10:15)  montelukast 10 mg oral tablet: 1 tab(s) orally once a day (in the evening) (20 Mar 2022 10:15)  oxyCODONE 10 mg oral tablet, extended release: 1 tab(s) orally every 12 hours (20 Mar 2022 10:15)  oxycodone-acetaminophen 7.5 mg-325 mg oral tablet: 1 tab(s) orally every 6 hours, As Needed (20 Mar 2022 10:15)  pantoprazole 40 mg oral delayed release tablet: 1 tab(s) orally once a day (20 Mar 2022 10:15)  polyethylene glycol 3350 oral powder for reconstitution: 17 gram(s) orally once a day (at bedtime) (20 Mar 2022 10:15)  senna oral tablet: 2 tab(s) orally once a day (at bedtime) (20 Mar 2022 10:15)  sertraline 50 mg oral tablet: 1 tab(s) orally once a day (20 Mar 2022 10:15)  sevelamer carbonate 800 mg oral tablet: 1 tab(s) orally 3 times a day (with meals) (20 Mar 2022 10:15)  simethicone 80 mg oral tablet, chewable: 1 tab(s) orally 3 times a day (before meals) (20 Mar 2022 10:15)  simvastatin 10 mg oral tablet: 1 tab(s) orally once a day (at bedtime) (20 Mar 2022 10:15)  sodium hypochlorite 0.25% topical solution: 1 application topically once a day (20 Mar 2022 10:15)  tacrolimus 0.1% topical ointment: 1 application topically once a day (20 Mar 2022 10:15)  traZODone 100 mg oral tablet: 1 tab(s) orally once a day (at bedtime) (20 Mar 2022 10:15)      MEDICATIONS  (STANDING):  apixaban 5 milliGRAM(s) Oral every 12 hours  buPROPion XL (24-Hour) . 150 milliGRAM(s) Oral daily  chlorhexidine 2% Cloths 1 Application(s) Topical daily  cinacalcet 30 milliGRAM(s) Oral daily  cycloSPORINE  , modified (NEORAL) 200 milliGRAM(s) Oral two times a day  Dakins Solution - 1/4 Strength 1 Application(s) Topical daily  dextrose 40% Gel 15 Gram(s) Oral once  dextrose 5%. 1000 milliLiter(s) (50 mL/Hr) IV Continuous <Continuous>  dextrose 5%. 1000 milliLiter(s) (100 mL/Hr) IV Continuous <Continuous>  dextrose 50% Injectable 25 Gram(s) IV Push once  dextrose 50% Injectable 12.5 Gram(s) IV Push once  dextrose 50% Injectable 25 Gram(s) IV Push once  diltiazem    milliGRAM(s) Oral daily  epoetin anabela-epbx (RETACRIT) Injectable 41999 Unit(s) IV Push <User Schedule>  gabapentin 300 milliGRAM(s) Oral daily  glucagon  Injectable 1 milliGRAM(s) IntraMuscular once  insulin glargine Injectable (LANTUS) 10 Unit(s) SubCutaneous at bedtime  insulin lispro (ADMELOG) corrective regimen sliding scale   SubCutaneous three times a day before meals  insulin lispro (ADMELOG) corrective regimen sliding scale   SubCutaneous at bedtime  lidocaine   4% Patch 1 Patch Transdermal daily  lidocaine   4% Patch 1 Patch Transdermal daily  lidocaine   4% Patch 1 Patch Transdermal daily  mirtazapine 7.5 milliGRAM(s) Oral at bedtime  montelukast 10 milliGRAM(s) Oral at bedtime  oxyCODONE  ER Tablet 10 milliGRAM(s) Oral every 12 hours  pantoprazole    Tablet 40 milliGRAM(s) Oral before breakfast  polyethylene glycol 3350 17 Gram(s) Oral at bedtime  senna 2 Tablet(s) Oral at bedtime  sertraline 50 milliGRAM(s) Oral daily  sevelamer carbonate 800 milliGRAM(s) Oral three times a day with meals  simethicone 80 milliGRAM(s) Chew three times a day  tacrolimus   0.1% Ointment 1 Application(s) Topical daily  traZODone 100 milliGRAM(s) Oral at bedtime      TELEMETRY: 	    ECG:  	  RADIOLOGY:   DIAGNOSTIC TESTING:  [ ] Echocardiogram:  [ ]  Catheterization:  [ ] Stress Test:    OTHER: 	    LABS:	 	                            7.5    12.91 )-----------( 367      ( 31 Mar 2022 08:48 )             26.0     03-31    131<L>  |  92<L>  |  35<H>  ----------------------------<  104<H>  4.3   |  26  |  6.52<H>    Ca    9.3      31 Mar 2022 08:48  Phos  4.2     03-31  Mg     2.50     03-31      PT/INR - ( 31 Mar 2022 08:48 )   PT: 16.2 sec;   INR: 1.39 ratio         PTT - ( 31 Mar 2022 08:48 )  PTT:41.8 sec

## 2022-03-31 NOTE — CHART NOTE - NSCHARTNOTEFT_GEN_A_CORE
RD follow-up for length of stay.      56F ESRD TTS, HTN, DM, COPD, afib, known chronic pannus wound felt most likely pyoderma gangrenosum presents for vaginal bleeding.  Patient pending D&C with GYN.    Source: Patient [ ]    Family [ ]     other [X] Chart Review     Diet, NPO after Midnight:      NPO Start Date: 30-Mar-2022,   NPO Start Time: 23:59 (03-30-22 @ 23:59)  Diet, Consistent Carbohydrate Renal w/Evening Snack:   For patients receiving Renal Replacement - No Protein Restr, No Conc K, No Conc Phos, Low Sodium (RENAL)  Supplement Feeding Modality:  Oral  Nepro Cans or Servings Per Day:  1       Frequency:  Three Times a day (03-20-22 @ 10:20)    Current Weight: Weight (kg): 130.6 (03-24 @ 21:15)  Ht - 157.5cm BMI - 52.7 (Ht and Wt were pending at time of initial dietitian assessment)    Pertinent Medications:   buPROPion XL (24-Hour) .  cinacalcet  dextrose 40% Gel  dextrose 5%.  dextrose 5%.  dextrose 50% Injectable  dextrose 50% Injectable  dextrose 50% Injectable  diltiazem   CD  gabapentin  glucagon  Injectable  insulin glargine Injectable (LANTUS)  insulin lispro (ADMELOG) corrective regimen sliding scale  insulin lispro (ADMELOG) corrective regimen sliding scale  mirtazapine  montelukast  oxyCODONE  ER Tablet  pantoprazole    Tablet  polyethylene glycol 3350  senna  sertraline  simethicone  traZODone    Pertinent Labs:  03-31 Na131 mmol/L<L> Glu 104 mg/dL<H> K+ 4.3 mmol/L Cr  6.52 mg/dL<H> BUN 35 mg/dL<H> 03-31 Phos 4.2 mg/dL 03-28 Alb 3.0 g/dL<L> 03-21 Chol 165 mg/dL LDL --    HDL --    Trig 222 mg/dL<H>    CAPILLARY BLOOD GLUCOSE  POCT Blood Glucose.: 116 mg/dL (31 Mar 2022 08:53)  POCT Blood Glucose.: 127 mg/dL (31 Mar 2022 07:37)  POCT Blood Glucose.: 161 mg/dL (30 Mar 2022 23:16)  POCT Blood Glucose.: 99 mg/dL (30 Mar 2022 21:09)  POCT Blood Glucose.: 95 mg/dL (30 Mar 2022 18:06)  POCT Blood Glucose.: 102 mg/dL (30 Mar 2022 11:48)      Skin: No pressure injuries, Generalized edema noted; 1+ edema noted to lt and rt leg    Estimated Needs:   [X] no change since previous assessment - Based on upper end IBW  1644-1918kcal (30-35kcal/kg); 87-98gm protein (1.6-1.8gm/kg/protein)  [ ] recalculated:       Previous Nutrition Diagnosis: Increased Nutrient Needs, ongoing - patient on HD and is with abdominal wound    Goals:  -Patient to meet >75% of nutritional needs via tolerated route.  -Maintain skin integrity, prevent skin breakdown.  -Maintain bowel fxn; BM every 1 - 3 days.  -Maintain hydration status i.e. maintain good skin turgor, maintain moist mucous membranes.    Additional Recommendations:   1) Continue therapeutic diet restrictions as ordered with Nepro 3x daily (1,275 kcals, 52.3g protein) to optimize overall intake (currently NPO for procedure)    Noemi Michelle, MS, RDN, CDN  Pager [phone]

## 2022-03-31 NOTE — PROGRESS NOTE ADULT - SUBJECTIVE AND OBJECTIVE BOX
[facility] NEPHROLOGY- PROGRESS NOTE    56y Female with history of ESRD on HD presents with vaginal bleeding. Nephrology consulted for ESRD status.    REVIEW OF SYSTEMS:  Gen: no changes in weight  Cards: no chest pain  Resp: no dyspnea  GI: no nausea or vomiting or diarrhea  : + vaginal bleeding improving  Vascular: no LE edema    caffeine (Nausea)  latex (Rash)  penicillin (Nausea)  strawberry (Rash)      Hospital Medications: Medications reviewed        VITALS:  T(F): 97.9 (03-31-22 @ 05:07), Max: 98.3 (03-30-22 @ 10:34)  HR: 78 (03-31-22 @ 05:07)  BP: 115/68 (03-31-22 @ 05:07)  RR: 17 (03-31-22 @ 05:07)  SpO2: 97% (03-31-22 @ 05:07)  Wt(kg): --        PHYSICAL EXAM:    Gen: NAD, calm  Cards: RRR, +S1/S2, no M/G/R  Resp: CTA B/L  GI: soft, NT/ND, NABS, + ab wound dressing in place  Vascular: no LE edema B/L, LUE AVF + bruit/thrill      LABS:  03-30    134<L>  |  93<L>  |  27<H>  ----------------------------<  109<H>  4.2   |  26  |  5.43<H>    Ca    9.5      30 Mar 2022 09:55  Phos  3.5     03-30  Mg     2.40     03-30      Creatinine Trend: 5.43 <--, 4.27 <--, 6.60 <--, 5.79 <--, 4.69 <--, 6.00 <--                        7.5    12.63 )-----------( 309      ( 30 Mar 2022 09:55 )             26.6     Urine Studies:

## 2022-03-31 NOTE — PROGRESS NOTE ADULT - SUBJECTIVE AND OBJECTIVE BOX
SUBJECTIVE / OVERNIGHT EVENTS:pt seen and examined  03-31-22     MEDICATIONS  (STANDING):  apixaban 5 milliGRAM(s) Oral every 12 hours  buPROPion XL (24-Hour) . 150 milliGRAM(s) Oral daily  chlorhexidine 2% Cloths 1 Application(s) Topical daily  cinacalcet 30 milliGRAM(s) Oral daily  cycloSPORINE  , modified (NEORAL) 200 milliGRAM(s) Oral two times a day  Dakins Solution - 1/4 Strength 1 Application(s) Topical daily  dextrose 40% Gel 15 Gram(s) Oral once  dextrose 5%. 1000 milliLiter(s) (50 mL/Hr) IV Continuous <Continuous>  dextrose 5%. 1000 milliLiter(s) (100 mL/Hr) IV Continuous <Continuous>  dextrose 50% Injectable 25 Gram(s) IV Push once  dextrose 50% Injectable 12.5 Gram(s) IV Push once  dextrose 50% Injectable 25 Gram(s) IV Push once  diltiazem    milliGRAM(s) Oral daily  epoetin anabela-epbx (RETACRIT) Injectable 30700 Unit(s) IV Push <User Schedule>  gabapentin 300 milliGRAM(s) Oral daily  glucagon  Injectable 1 milliGRAM(s) IntraMuscular once  insulin glargine Injectable (LANTUS) 10 Unit(s) SubCutaneous at bedtime  insulin lispro (ADMELOG) corrective regimen sliding scale   SubCutaneous three times a day before meals  insulin lispro (ADMELOG) corrective regimen sliding scale   SubCutaneous at bedtime  lidocaine   4% Patch 1 Patch Transdermal daily  lidocaine   4% Patch 1 Patch Transdermal daily  lidocaine   4% Patch 1 Patch Transdermal daily  mirtazapine 7.5 milliGRAM(s) Oral at bedtime  montelukast 10 milliGRAM(s) Oral at bedtime  oxyCODONE  ER Tablet 10 milliGRAM(s) Oral every 12 hours  pantoprazole    Tablet 40 milliGRAM(s) Oral before breakfast  polyethylene glycol 3350 17 Gram(s) Oral at bedtime  senna 2 Tablet(s) Oral at bedtime  sertraline 50 milliGRAM(s) Oral daily  sevelamer carbonate 800 milliGRAM(s) Oral three times a day with meals  simethicone 80 milliGRAM(s) Chew three times a day  tacrolimus   0.1% Ointment 1 Application(s) Topical daily  traZODone 100 milliGRAM(s) Oral at bedtime    MEDICATIONS  (PRN):  acetaminophen     Tablet .. 650 milliGRAM(s) Oral every 6 hours PRN Temp greater or equal to 38C (100.4F), Mild Pain (1 - 3)  melatonin 3 milliGRAM(s) Oral at bedtime PRN Insomnia  midodrine. 5 milliGRAM(s) Oral <User Schedule> PRN 30 minutes prior to dialysis  ondansetron Injectable 4 milliGRAM(s) IV Push every 8 hours PRN Nausea and/or Vomiting    Vital Signs Last 24 Hrs  T(C): 36.4 (03-31-22 @ 19:06), Max: 37.1 (03-31-22 @ 14:10)  T(F): 97.5 (03-31-22 @ 19:06), Max: 98.7 (03-31-22 @ 14:10)  HR: 76 (03-31-22 @ 19:06) (76 - 83)  BP: 120/62 (03-31-22 @ 19:06) (111/62 - 147/76)  BP(mean): 88 (03-31-22 @ 18:30) (78 - 96)  RR: 18 (03-31-22 @ 19:06) (12 - 22)  SpO2: 97% (03-31-22 @ 19:06) (94% - 100%)          Skin: No rash.  Eyes: No recent vision problems or eye pain.  ENT: No congestion, ear pain, or sore throat.  Cardiovascular: No chest pain or palpation.  Respiratory: No cough, shortness of breath, congestion, or wheezing.  Gastrointestinal: No abdominal pain, nausea, vomiting, or diarrhea.  Genitourinary: No dysuria.  Musculoskeletal: No joint swelling.  Neurologic: No headache.    PHYSICAL EXAM:  GENERAL: NAD  EYES: EOMI, PERRLA  NECK: Supple, No JVD  CHEST/LUNG: dec breath sounds at bases  HEART:  S1 , S2 +  ABDOMEN: soft , bs+, wound dressing+  EXTREMITIES:  edema+  NEUROLOGY:alert awake oriented     LABS:  03-31    131<L>  |  92<L>  |  35<H>  ----------------------------<  104<H>  4.3   |  26  |  6.52<H>    Ca    9.3      31 Mar 2022 08:48  Phos  4.2     03-31  Mg     2.50     03-31      Creatinine Trend: 6.52 <--, 5.43 <--, 4.27 <--, 6.60 <--, 5.79 <--, 4.69 <--, 6.00 <--                        7.5    12.91 )-----------( 367      ( 31 Mar 2022 08:48 )             26.0     Urine Studies:              PT/INR - ( 31 Mar 2022 08:48 )   PT: 16.2 sec;   INR: 1.39 ratio         PTT - ( 31 Mar 2022 08:48 )  PTT:41.8 sec          Urine Studies:            LIVER FUNCTIONS - ( 28 Mar 2022 16:42 )  Alb: 3.0 g/dL / Pro: 6.1 g/dL / ALK PHOS: 142 U/L / ALT: 5 U/L / AST: 8 U/L / GGT: x           PTT - ( 28 Mar 2022 09:07 )  PTT:115.4 sec

## 2022-04-01 NOTE — PROGRESS NOTE ADULT - SUBJECTIVE AND OBJECTIVE BOX
Gyn Progress Note POD #1   HD#13    Subjective:   Pt seen and examined at bedside. No events overnight. Pain well controlled. Had some cramping overnight but now improved. Bleeding is minimal.  Passing flatus. Tolerating regular diet. Pt denies fever, chills, chest pain, SOB, nausea, vomiting, lightheadedness, dizziness.      Objective:    MEDICATIONS  (STANDING):  apixaban 5 milliGRAM(s) Oral every 12 hours  buPROPion XL (24-Hour) . 150 milliGRAM(s) Oral daily  chlorhexidine 2% Cloths 1 Application(s) Topical daily  cinacalcet 30 milliGRAM(s) Oral daily  cycloSPORINE  , modified (NEORAL) 200 milliGRAM(s) Oral two times a day  Dakins Solution - 1/4 Strength 1 Application(s) Topical daily  dextrose 40% Gel 15 Gram(s) Oral once  dextrose 5%. 1000 milliLiter(s) (50 mL/Hr) IV Continuous <Continuous>  dextrose 5%. 1000 milliLiter(s) (100 mL/Hr) IV Continuous <Continuous>  dextrose 50% Injectable 25 Gram(s) IV Push once  dextrose 50% Injectable 12.5 Gram(s) IV Push once  dextrose 50% Injectable 25 Gram(s) IV Push once  diltiazem    milliGRAM(s) Oral daily  epoetin anabela-epbx (RETACRIT) Injectable 86169 Unit(s) IV Push <User Schedule>  gabapentin 300 milliGRAM(s) Oral daily  glucagon  Injectable 1 milliGRAM(s) IntraMuscular once  insulin glargine Injectable (LANTUS) 10 Unit(s) SubCutaneous at bedtime  insulin lispro (ADMELOG) corrective regimen sliding scale   SubCutaneous three times a day before meals  insulin lispro (ADMELOG) corrective regimen sliding scale   SubCutaneous at bedtime  lidocaine   4% Patch 1 Patch Transdermal daily  lidocaine   4% Patch 1 Patch Transdermal daily  lidocaine   4% Patch 1 Patch Transdermal daily  mirtazapine 7.5 milliGRAM(s) Oral at bedtime  montelukast 10 milliGRAM(s) Oral at bedtime  pantoprazole    Tablet 40 milliGRAM(s) Oral before breakfast  polyethylene glycol 3350 17 Gram(s) Oral at bedtime  senna 2 Tablet(s) Oral at bedtime  sertraline 50 milliGRAM(s) Oral daily  sevelamer carbonate 800 milliGRAM(s) Oral three times a day with meals  simethicone 80 milliGRAM(s) Chew three times a day  tacrolimus   0.1% Ointment 1 Application(s) Topical daily  traZODone 100 milliGRAM(s) Oral at bedtime    MEDICATIONS  (PRN):  acetaminophen     Tablet .. 650 milliGRAM(s) Oral every 6 hours PRN Temp greater or equal to 38C (100.4F), Mild Pain (1 - 3)  melatonin 3 milliGRAM(s) Oral at bedtime PRN Insomnia  midodrine. 5 milliGRAM(s) Oral <User Schedule> PRN 30 minutes prior to dialysis  ondansetron Injectable 4 milliGRAM(s) IV Push every 8 hours PRN Nausea and/or Vomiting      T(F): 98.2 (04-01-22 @ 02:50), Max: 98.7 (03-31-22 @ 14:10)  HR: 73 (04-01-22 @ 02:50) (73 - 85)  BP: 152/83 (04-01-22 @ 02:50) (116/69 - 152/83)  RR: 18 (04-01-22 @ 02:50) (12 - 22)  SpO2: 100% (03-31-22 @ 22:50) (94% - 100%)  Wt(kg): --    Physical Exam:  General: NAD, A&O x3  Abdomen: soft, non-tender, non-distended, +wound dressing  : no bleeding on pad    LABS:  04-01    132<L>  |  91<L>  |  16     ----------------------------<  189<H>  3.5     |  25     |  3.33<H>  03-31    131<L>  |  92<L>  |  35<H>  ----------------------------<  104<H>  4.3     |  26     |  6.52<H>    Ca    9.4        01 Apr 2022 02:35  Ca    9.3        31 Mar 2022 08:48  Phos  2.6       04-01  Phos  4.2       03-31  Mg     2.10      04-01  Mg     2.50      03-31      PT/INR - ( 31 Mar 2022 08:48 )   PT: 16.2 sec;   INR: 1.39 ratio    PTT - ( 31 Mar 2022 08:48 )  PTT:41.8 sec    CAPILLARY BLOOD GLUCOSE      POCT Blood Glucose.: 192 mg/dL (01 Apr 2022 02:28)  POCT Blood Glucose.: 296 mg/dL (31 Mar 2022 21:49)  POCT Blood Glucose.: 145 mg/dL (31 Mar 2022 19:38)  POCT Blood Glucose.: 121 mg/dL (31 Mar 2022 18:06)  POCT Blood Glucose.: 113 mg/dL (31 Mar 2022 12:49)  POCT Blood Glucose.: 116 mg/dL (31 Mar 2022 08:53)  POCT Blood Glucose.: 127 mg/dL (31 Mar 2022 07:37)      I&O's Summary    31 Mar 2022 07:01  -  01 Apr 2022 06:45  --------------------------------------------------------  IN: 400 mL / OUT: 2400 mL / NET: -2000 mL

## 2022-04-01 NOTE — PROGRESS NOTE ADULT - ASSESSMENT
A/P: 56y , LMP 10/2021, w/ PMH significant for ESRD (on HD), afib (on ASA and Eliquis), DM, HTN, COPD, CHAMP, HLD, depression, chronic R pannus wound (2/2 pyoderma gangrenosum, c/b superimposed cellulitis) presented with vaginal bleeding found to have thickened EM on CT. Limited TAUS findings. Patient high risk for endometrial pathology / malignancy given prolonged AUB and obesity. Definitive tissue sampling requested by Derm given desire to start TNF inhibitor for pyoderma gangrenosum. Now POD#1 s/p EUA, D&C, diagnostic hysteroscopy, insertion of Mirena IUD, pap smear.  Pt stable and doing well postoperatively. No heavy vaginal bleeding noted.   - Doing well post operatively. No acute concerns.  - GYN will follow up pathology and contact patient with results.  - Pt to follow up in OB/Gyn clinic in 2 weeks for post operative visit.  - Patient provided with clinic information for follow up:  [facility] - OB/Gyn Clinic  Ambulatory Care Unit  [id number] [facility]  Oncology [facility], [facility]  - GYN signing off, please reconsult with any GYN concerns.    Seen at bedside with GYN team  Sania Benjamin-Lisseth PGY1

## 2022-04-01 NOTE — PROGRESS NOTE ADULT - ATTENDING COMMENTS
Patient seen at bedside, resting comfortably in no acute distress.  Now s/p D&C, hysteroscopy with Mirena IUD placement. She had dialysis yesterday after the procedure.  Will follow up pathology results. Patient to follow up outpatient with GYN clinic to go over results and make a further plan.  No further GYN interventions at this time. GYN to sign off.    Matthew Phillips MD  Covering GYN Service Attending

## 2022-04-01 NOTE — PROGRESS NOTE ADULT - ASSESSMENT
56y Female with history of ESRD on HD presents with vaginal bleeding. Nephrology consulted for ESRD status.    1) ESRD: Last HD on 3/31 tolerated well with 2L removed with heparin to prevent circuit clotting. Plan for next maintenance HD on 4/2. Monitor electrolytes.    2) HTN with ESRD: BP acceptable on Cardizem. Continue with midodrine pre-HD on HD days to avoid intradialytic hypotension. Monitor BP.    3) Anemia of renal disease: With component of blood loss from vaginal bleeding. Hb low with acceptable iron stores. Continue with Epo 18K with HD. PRBC as needed. Monitor Hb.    4) Secondary HPT of renal origin: Phosphorus acceptable with low iPTH for which sensipar decreased to 30 mg daily. Continue with renvela 1 tab with meals. Monitor serum calcium and phosphorus.      [facility] NEPHROLOGY  Keaton Lopez M.D.  Juma Green D.O.  Myla Hoffmann M.D.  Julia Brewer, MSN, ANP-C    Telephone: [phone]  Facsimile: [phone]    [address] [place] [address]

## 2022-04-01 NOTE — PROGRESS NOTE ADULT - SUBJECTIVE AND OBJECTIVE BOX
History:  patient see at bedside.  Reports improving appetite and finally starting to feel a little better. no n/v    MEDICATIONS  (STANDING):  buPROPion XL (24-Hour) . 150 milliGRAM(s) Oral daily  cinacalcet 60 milliGRAM(s) Oral daily  cycloSPORINE  , modified (NEORAL) 200 milliGRAM(s) Oral at bedtime  cycloSPORINE  , modified (NEORAL) 150 milliGRAM(s) Oral <User Schedule>  dextrose 40% Gel 15 Gram(s) Oral once  dextrose 5%. 1000 milliLiter(s) (50 mL/Hr) IV Continuous <Continuous>  dextrose 5%. 1000 milliLiter(s) (100 mL/Hr) IV Continuous <Continuous>  dextrose 50% Injectable 25 Gram(s) IV Push once  dextrose 50% Injectable 12.5 Gram(s) IV Push once  dextrose 50% Injectable 25 Gram(s) IV Push once  diltiazem    milliGRAM(s) Oral daily  epoetin anabela-epbx (RETACRIT) Injectable 80290 Unit(s) IV Push <User Schedule>  gabapentin 300 milliGRAM(s) Oral two times a day  glucagon  Injectable 1 milliGRAM(s) IntraMuscular once  insulin glargine Injectable (LANTUS) 10 Unit(s) SubCutaneous at bedtime  insulin lispro (ADMELOG) corrective regimen sliding scale   SubCutaneous three times a day before meals  insulin lispro (ADMELOG) corrective regimen sliding scale   SubCutaneous at bedtime  lidocaine   4% Patch 1 Patch Transdermal daily  lidocaine   4% Patch 1 Patch Transdermal daily  mirtazapine 7.5 milliGRAM(s) Oral at bedtime  montelukast 10 milliGRAM(s) Oral at bedtime  oxyCODONE  ER Tablet 10 milliGRAM(s) Oral every 12 hours  pantoprazole    Tablet 40 milliGRAM(s) Oral before breakfast  polyethylene glycol 3350 17 Gram(s) Oral at bedtime  senna 2 Tablet(s) Oral at bedtime  sertraline 50 milliGRAM(s) Oral daily  sevelamer carbonate 800 milliGRAM(s) Oral three times a day with meals  simethicone 80 milliGRAM(s) Chew three times a day  tacrolimus   0.1% Ointment 1 Application(s) Topical daily  traZODone 100 milliGRAM(s) Oral at bedtime    MEDICATIONS  (PRN):  acetaminophen     Tablet .. 650 milliGRAM(s) Oral every 6 hours PRN Temp greater or equal to 38C (100.4F), Mild Pain (1 - 3)  melatonin 3 milliGRAM(s) Oral at bedtime PRN Insomnia  midodrine. 5 milliGRAM(s) Oral <User Schedule> PRN 30 minutes prior to dialysis  ondansetron Injectable 4 milliGRAM(s) IV Push every 8 hours PRN Nausea and/or Vomiting  oxyCODONE    IR 7.5 milliGRAM(s) Oral every 6 hours PRN Severe Pain (7 - 10)      Allergies    latex (Rash)  penicillin (Nausea)  strawberry (Rash)    Intolerances    caffeine (Nausea)    Review of Systems:  Constitutional: No fever  ALL OTHER SYSTEMS REVIEWED AND NEGATIVE      Vital Signs Last 24 Hrs  T(C): 37.1 (01 Apr 2022 06:00), Max: 37.1 (01 Apr 2022 06:00)  T(F): 98.7 (01 Apr 2022 06:00), Max: 98.7 (01 Apr 2022 06:00)  HR: 83 (01 Apr 2022 06:00) (73 - 85)  BP: 120/78 (01 Apr 2022 06:00) (116/69 - 152/83)  BP(mean): 88 (31 Mar 2022 18:30) (78 - 96)  RR: 17 (01 Apr 2022 06:00) (12 - 22)  SpO2: 95% (01 Apr 2022 06:00) (94% - 100%)  GENERAL: NAD, well-developed  GI: Soft, nontender, non distended  PSYCH: Alert and oriented x 3, normal affect, normal mood      CAPILLARY BLOOD GLUCOSE    POCT Blood Glucose.: 133 mg/dL (01 Apr 2022 12:35)  POCT Blood Glucose.: 223 mg/dL (01 Apr 2022 08:50)  POCT Blood Glucose.: 192 mg/dL (01 Apr 2022 02:28)  POCT Blood Glucose.: 296 mg/dL (31 Mar 2022 21:49)  POCT Blood Glucose.: 145 mg/dL (31 Mar 2022 19:38)  POCT Blood Glucose.: 121 mg/dL (31 Mar 2022 18:06)  POCT Blood Glucose.: 123 mg/dL (28 Mar 2022 17:29)  POCT Blood Glucose.: 156 mg/dL (28 Mar 2022 15:38)  POCT Blood Glucose.: 119 mg/dL (28 Mar 2022 12:43)  POCT Blood Glucose.: 129 mg/dL (28 Mar 2022 09:03)  POCT Blood Glucose.: 186 mg/dL (27 Mar 2022 22:06)  POCT Blood Glucose.: 140 mg/dL (27 Mar 2022 20:51)  POCT Blood Glucose.: 142 mg/dL (24 Mar 2022 21:08)  POCT Blood Glucose.: 171 mg/dL (24 Mar 2022 17:41)  POCT Blood Glucose.: 208 mg/dL (24 Mar 2022 13:03)  POCT Blood Glucose.: 188 mg/dL (24 Mar 2022 09:04)  POCT Blood Glucose.: 171 mg/dL (24 Mar 2022 06:52)  POCT Blood Glucose.: 178 mg/dL (23 Mar 2022 21:41)  POCT Blood Glucose.: 103 mg/dL (21 Mar 2022 20:03)  POCT Blood Glucose.: 111 mg/dL (21 Mar 2022 18:44)  POCT Blood Glucose.: 117 mg/dL (21 Mar 2022 15:31)  POCT Blood Glucose.: 113 mg/dL (21 Mar 2022 12:51)  POCT Blood Glucose.: 124 mg/dL (21 Mar 2022 08:51)  POCT Blood Glucose.: 152 mg/dL (20 Mar 2022 22:10)    04-01    132<L>  |  91<L>  |  16  ----------------------------<  189<H>  3.5   |  25  |  3.33<H>    Ca    9.4      01 Apr 2022 02:35  Phos  2.6     04-01  Mg     2.10     04-01        A1C with Estimated Average Glucose (03.20.22 @ 12:48)    A1C with Estimated Average Glucose Result: 6.1%    Estimated Average Glucose: 128

## 2022-04-01 NOTE — PROGRESS NOTE ADULT - SUBJECTIVE AND OBJECTIVE BOX
[facility] NEPHROLOGY- PROGRESS NOTE    56y Female with history of ESRD on HD presents with vaginal bleeding. Nephrology consulted for ESRD status.    REVIEW OF SYSTEMS:  Gen: no changes in weight  Cards: no chest pain  Resp: no dyspnea  GI: no nausea or vomiting or diarrhea  : + vaginal bleeding improving  Vascular: no LE edema    caffeine (Nausea)  latex (Rash)  penicillin (Nausea)  strawberry (Rash)      Hospital Medications: Medications reviewed        VITALS:  T(F): 98.7 (04-01-22 @ 06:00), Max: 98.7 (03-31-22 @ 14:10)  HR: 83 (04-01-22 @ 06:00)  BP: 120/78 (04-01-22 @ 06:00)  RR: 17 (04-01-22 @ 06:00)  SpO2: 95% (04-01-22 @ 06:00)  Wt(kg): --    03-31 @ 07:01  -  04-01 @ 07:00  --------------------------------------------------------  IN: 400 mL / OUT: 2400 mL / NET: -2000 mL        Weight (kg): 130 (03-31 @ 15:34)      PHYSICAL EXAM:    Gen: NAD, calm  Cards: RRR, +S1/S2, no M/G/R  Resp: CTA B/L  GI: soft, NT/ND, NABS, + ab wound dressing in place  Vascular: no LE edema B/L, LUE AVF + bruit/thrill        LABS:  04-01    132<L>  |  91<L>  |  16  ----------------------------<  189<H>  3.5   |  25  |  3.33<H>    Ca    9.4      01 Apr 2022 02:35  Phos  2.6     04-01  Mg     2.10     04-01      Creatinine Trend: 3.33 <--, 6.52 <--, 5.43 <--, 4.27 <--, 6.60 <--, 5.79 <--, 4.69 <--                        8.8    14.61 )-----------( 424      ( 01 Apr 2022 02:35 )             30.4     Urine Studies:

## 2022-04-01 NOTE — PROGRESS NOTE ADULT - SUBJECTIVE AND OBJECTIVE BOX
ANESTHESIA POSTOP CHECK    56y Female POSTOP DAY 1     No COMPLAINTS    NO APPARENT ANESTHESIA COMPLICATIONS

## 2022-04-01 NOTE — PROGRESS NOTE ADULT - ASSESSMENT
Echo 1/19/22: grossly nl LV sys fx, min MR     a/p  56 year old female with hx of morbid obesity, CHAMP not on home O2, ESRD (HD MWF), HTN, DM, COPD, Afib no longer on AC, chronic R pannus wound, followed by Dr. Layne, likely pyoderma gangrenosum presents for vaginal bleeding     #Chronic Pannus Wound  -surgical eval noted, wound care  -abx per med     #Chronic Afib  -stable, rates controlled  -cont dilt as ordered  -cont oral AC     #Hypertension  -stable  -cont ccb     #ESRD on HD  -HD per renal    #Gyn bleeding  -s/p EUA, D&C, diagnostic hysteroscopy, insertion of Mirena IUD  -cv remains stable post op  -med f/u

## 2022-04-01 NOTE — PROGRESS NOTE ADULT - ASSESSMENT
56F with Hx of ESRD TTS, HTN, DM 2, COPD, afib, known chronic R pannus wound felt most likely pyoderma gangrenosum presents for 1.5wk vag bleeding. Pt recently had prolonged stay at [facility] for pannus wound, known to endocrine service.  Was dc'd to rehab on 3/3.  Prior to last hospitalization patient was on much higher doses of basal bolus.  Now requiring much less.  Has poor appetite.      1. Type 2 diabetes mellitus uncontrolled  A1c 7.0% (may be inaccurate in setting of ESRD)  Home Regimen: Tresiba 80 units HS and Trulicity 1.5mg subq weekly  While inpatient:  BG target 100-180 mg/dL  reduced po intake.  Patient is driking nepro shakes and eating very little  Continue Lantus 10 units SQ qHS   Continue off of premeal admelog for now.  As improves and FS begin to increase > 200, will consider restarting admelog premeal  Admelog correctional scale LOW before meals and low bedtime scale  Check BG before meals and bedtime  Hypoglycemia protocol   Consistent carbohydrate diet    Discharge Plan:  STOP Trulicity (patient ESRD on HD)  For dc to rehab- recommend to continue current insulin management as outlined above  For dc to home- Recommend basal plus PO regimen  Patient was on Tresiba at home may benefit from a different Long acting insulin such as Lantus or Levemir given ESRD.  Tresiba is ultra-Long acting insulin  Please assess insurance coverage for basal insulin pens:  Levemir, Lantus, Basaglar, Toujeo or Semglee.   Recommend Tradjenta 5 mg po daily, if not covered can see if Januvia 25 mg po daily is covered.  Please obtain prior auth if needed as patient is ESRD and DPP4i class are indicated for patients with ESRD  Consider CGM (such as Freestyle Libre2 outpatient)  If desiring to followup with [facility]: [address] [place] [address], [address], [place] [address], [phone]    2. HTN  Management per primary team     3. Hyperlipidemia  consider restarting statin      Tamela Ruiz  Nurse Practitioner  Division of Endocrinology & Diabetes  Pager # [phone]      If after 6PM or before 9AM, or on weekends/holidays, please call endocrine answering service for assistance ([phone]).  For nonurgent matters email [email or web address] for assistance.

## 2022-04-01 NOTE — PROGRESS NOTE ADULT - SUBJECTIVE AND OBJECTIVE BOX
CARDIOLOGY FOLLOW UP - Dr. Bullock  Date of Service: 4/1/22  CC: no cp/sob     Review of Systems:  Constitutional: No fever, weight loss, or fatigue  Respiratory: No cough, wheezing, or hemoptysis, no shortness of breath  Cardiovascular: No chest pain, palpitations, passing out, dizziness, or leg swelling  Gastrointestinal: No abd or epigastric pain.  No nausea, vomiting, or hematemesis; no diarrhea or constipation, no melena or hematochezia  Vascular: no edema       PHYSICAL EXAM:  T(C): 37.1 (04-01-22 @ 06:00), Max: 37.1 (03-31-22 @ 14:10)  HR: 83 (04-01-22 @ 06:00) (73 - 85)  BP: 120/78 (04-01-22 @ 06:00) (116/69 - 152/83)  RR: 17 (04-01-22 @ 06:00) (12 - 22)  SpO2: 95% (04-01-22 @ 06:00) (94% - 100%)  Wt(kg): --  I&O's Summary    31 Mar 2022 07:01  -  01 Apr 2022 07:00  --------------------------------------------------------  IN: 400 mL / OUT: 2400 mL / NET: -2000 mL        Appearance: Normal	  Cardiovascular: Normal S1 S2,RRR, No JVD, No murmurs  Respiratory: Lungs clear to auscultation	  Gastrointestinal:  Soft, Non-tender, + BS	  Extremities: Normal range of motion, No clubbing, cyanosis or edema      Home Medications:  Aspercreme with Lidocaine 4% topical film: Apply topically to affected area once a day (low back) (20 Mar 2022 10:15)  Aspercreme with Lidocaine 4% topical film: Apply topically to affected area once a day (L knee) (20 Mar 2022 10:15)  aspirin 81 mg oral delayed release tablet: 1 tab(s) orally once a day (20 Mar 2022 10:15)  buPROPion 150 mg/24 hours (XL) oral tablet, extended release: 1 tab(s) orally once a day (in the morning) (20 Mar 2022 10:15)  cycloSPORINE modified 100 mg oral capsule: 2 cap(s) orally once a day (at bedtime) (20 Mar 2022 10:15)  cycloSPORINE modified 50 mg oral capsule: 3 cap(s) orally once a day in the morning  (20 Mar 2022 10:15)  dilTIAZem 120 mg/24 hours oral capsule, extended release: 1 cap(s) orally once a day (hold SBP&lt;110, HR&lt;60) (20 Mar 2022 10:15)  doxycycline hyclate 100 mg oral tablet: 1 tab(s) orally 2 times a day (20 Mar 2022 10:15)  Eliquis 2.5 mg oral tablet: 1 tab(s) orally 2 times a day    Pharmacy states patient no longer wants to fill this medication (20 Mar 2022 10:15)  gabapentin 300 mg oral capsule: 1 cap(s) orally 2 times a day (20 Mar 2022 10:15)  insulin glargine: 15 unit(s) subcutaneous once a day (at bedtime) (20 Mar 2022 10:15)  midodrine 5 mg oral tablet: 1 tab(s) orally 3 times a week, 30 minute prior to dialysis sessions (hold is SBP&gt;130) (20 Mar 2022 10:15)  mirtazapine 7.5 mg oral tablet: 1 tab(s) orally once a day (at bedtime) (20 Mar 2022 10:15)  montelukast 10 mg oral tablet: 1 tab(s) orally once a day (in the evening) (20 Mar 2022 10:15)  oxyCODONE 10 mg oral tablet, extended release: 1 tab(s) orally every 12 hours (20 Mar 2022 10:15)  oxycodone-acetaminophen 7.5 mg-325 mg oral tablet: 1 tab(s) orally every 6 hours, As Needed (20 Mar 2022 10:15)  pantoprazole 40 mg oral delayed release tablet: 1 tab(s) orally once a day (20 Mar 2022 10:15)  polyethylene glycol 3350 oral powder for reconstitution: 17 gram(s) orally once a day (at bedtime) (20 Mar 2022 10:15)  senna oral tablet: 2 tab(s) orally once a day (at bedtime) (20 Mar 2022 10:15)  sertraline 50 mg oral tablet: 1 tab(s) orally once a day (20 Mar 2022 10:15)  sevelamer carbonate 800 mg oral tablet: 1 tab(s) orally 3 times a day (with meals) (20 Mar 2022 10:15)  simethicone 80 mg oral tablet, chewable: 1 tab(s) orally 3 times a day (before meals) (20 Mar 2022 10:15)  simvastatin 10 mg oral tablet: 1 tab(s) orally once a day (at bedtime) (20 Mar 2022 10:15)  sodium hypochlorite 0.25% topical solution: 1 application topically once a day (20 Mar 2022 10:15)  tacrolimus 0.1% topical ointment: 1 application topically once a day (20 Mar 2022 10:15)  traZODone 100 mg oral tablet: 1 tab(s) orally once a day (at bedtime) (20 Mar 2022 10:15)      MEDICATIONS  (STANDING):  apixaban 5 milliGRAM(s) Oral every 12 hours  buPROPion XL (24-Hour) . 150 milliGRAM(s) Oral daily  chlorhexidine 2% Cloths 1 Application(s) Topical daily  cinacalcet 30 milliGRAM(s) Oral daily  cycloSPORINE  , modified (NEORAL) 200 milliGRAM(s) Oral two times a day  Dakins Solution - 1/4 Strength 1 Application(s) Topical daily  dextrose 40% Gel 15 Gram(s) Oral once  dextrose 5%. 1000 milliLiter(s) (50 mL/Hr) IV Continuous <Continuous>  dextrose 5%. 1000 milliLiter(s) (100 mL/Hr) IV Continuous <Continuous>  dextrose 50% Injectable 25 Gram(s) IV Push once  dextrose 50% Injectable 12.5 Gram(s) IV Push once  dextrose 50% Injectable 25 Gram(s) IV Push once  diltiazem    milliGRAM(s) Oral daily  epoetin anabela-epbx (RETACRIT) Injectable 67348 Unit(s) IV Push <User Schedule>  gabapentin 300 milliGRAM(s) Oral daily  glucagon  Injectable 1 milliGRAM(s) IntraMuscular once  insulin glargine Injectable (LANTUS) 10 Unit(s) SubCutaneous at bedtime  insulin lispro (ADMELOG) corrective regimen sliding scale   SubCutaneous three times a day before meals  insulin lispro (ADMELOG) corrective regimen sliding scale   SubCutaneous at bedtime  lidocaine   4% Patch 1 Patch Transdermal daily  lidocaine   4% Patch 1 Patch Transdermal daily  lidocaine   4% Patch 1 Patch Transdermal daily  mirtazapine 7.5 milliGRAM(s) Oral at bedtime  montelukast 10 milliGRAM(s) Oral at bedtime  pantoprazole    Tablet 40 milliGRAM(s) Oral before breakfast  polyethylene glycol 3350 17 Gram(s) Oral at bedtime  senna 2 Tablet(s) Oral at bedtime  sertraline 50 milliGRAM(s) Oral daily  sevelamer carbonate 800 milliGRAM(s) Oral three times a day with meals  simethicone 80 milliGRAM(s) Chew three times a day  tacrolimus   0.1% Ointment 1 Application(s) Topical daily  traZODone 100 milliGRAM(s) Oral at bedtime      TELEMETRY: 	    ECG:  	  RADIOLOGY:   DIAGNOSTIC TESTING:  [ ] Echocardiogram:  [ ]  Catheterization:  [ ] Stress Test:    OTHER: 	    LABS:	 	                            8.8    14.61 )-----------( 424      ( 01 Apr 2022 02:35 )             30.4     04-01    132<L>  |  91<L>  |  16  ----------------------------<  189<H>  3.5   |  25  |  3.33<H>    Ca    9.4      01 Apr 2022 02:35  Phos  2.6     04-01  Mg     2.10     04-01      PT/INR - ( 31 Mar 2022 08:48 )   PT: 16.2 sec;   INR: 1.39 ratio         PTT - ( 31 Mar 2022 08:48 )  PTT:41.8 sec

## 2022-04-02 NOTE — PROGRESS NOTE ADULT - SUBJECTIVE AND OBJECTIVE BOX
SUBJECTIVE / OVERNIGHT EVENTS:pt seen and examined  4-2-22    MEDICATIONS  (STANDING):  apixaban 5 milliGRAM(s) Oral every 12 hours  buPROPion XL (24-Hour) . 150 milliGRAM(s) Oral daily  chlorhexidine 2% Cloths 1 Application(s) Topical daily  cinacalcet 30 milliGRAM(s) Oral daily  cycloSPORINE  , modified (NEORAL) 200 milliGRAM(s) Oral two times a day  Dakins Solution - 1/4 Strength 1 Application(s) Topical daily  dextrose 40% Gel 15 Gram(s) Oral once  dextrose 5%. 1000 milliLiter(s) (50 mL/Hr) IV Continuous <Continuous>  dextrose 5%. 1000 milliLiter(s) (100 mL/Hr) IV Continuous <Continuous>  dextrose 50% Injectable 25 Gram(s) IV Push once  dextrose 50% Injectable 12.5 Gram(s) IV Push once  dextrose 50% Injectable 25 Gram(s) IV Push once  diltiazem    milliGRAM(s) Oral daily  epoetin anabela-epbx (RETACRIT) Injectable 58476 Unit(s) IV Push <User Schedule>  gabapentin 300 milliGRAM(s) Oral daily  glucagon  Injectable 1 milliGRAM(s) IntraMuscular once  insulin glargine Injectable (LANTUS) 10 Unit(s) SubCutaneous at bedtime  insulin lispro (ADMELOG) corrective regimen sliding scale   SubCutaneous three times a day before meals  insulin lispro (ADMELOG) corrective regimen sliding scale   SubCutaneous at bedtime  lidocaine   4% Patch 1 Patch Transdermal daily  lidocaine   4% Patch 1 Patch Transdermal daily  lidocaine   4% Patch 1 Patch Transdermal daily  mirtazapine 7.5 milliGRAM(s) Oral at bedtime  montelukast 10 milliGRAM(s) Oral at bedtime  pantoprazole    Tablet 40 milliGRAM(s) Oral before breakfast  polyethylene glycol 3350 17 Gram(s) Oral at bedtime  senna 2 Tablet(s) Oral at bedtime  sertraline 50 milliGRAM(s) Oral daily  sevelamer carbonate 800 milliGRAM(s) Oral three times a day with meals  simethicone 80 milliGRAM(s) Chew three times a day  tacrolimus   0.1% Ointment 1 Application(s) Topical daily  traZODone 100 milliGRAM(s) Oral at bedtime    MEDICATIONS  (PRN):  acetaminophen     Tablet .. 650 milliGRAM(s) Oral every 6 hours PRN Temp greater or equal to 38C (100.4F), Mild Pain (1 - 3)  melatonin 3 milliGRAM(s) Oral at bedtime PRN Insomnia  midodrine. 5 milliGRAM(s) Oral <User Schedule> PRN 30 minutes prior to dialysis  ondansetron Injectable 4 milliGRAM(s) IV Push every 8 hours PRN Nausea and/or Vomiting  oxyCODONE    IR 10 milliGRAM(s) Oral every 6 hours PRN Severe Pain (7 - 10)  oxyCODONE    IR 5 milliGRAM(s) Oral every 6 hours PRN Moderate Pain (4 - 6)    Vital Signs Last 24 Hrs  T(C): 36.3 (04-02-22 @ 12:20), Max: 37.1 (04-01-22 @ 21:45)  T(F): 97.4 (04-02-22 @ 12:20), Max: 98.7 (04-01-22 @ 21:45)  HR: 77 (04-02-22 @ 12:20) (77 - 86)  BP: 119/49 (04-02-22 @ 12:20) (101/46 - 119/49)  BP(mean): --  RR: 18 (04-02-22 @ 12:20) (18 - 18)  SpO2: 100% (04-02-22 @ 12:20) (94% - 100%)              Skin: No rash.  Eyes: No recent vision problems or eye pain.  ENT: No congestion, ear pain, or sore throat.  Cardiovascular: No chest pain or palpation.  Respiratory: No cough, shortness of breath, congestion, or wheezing.  Gastrointestinal: No abdominal pain, nausea, vomiting, or diarrhea.  Genitourinary: No dysuria.  Musculoskeletal: No joint swelling.  Neurologic: No headache.    PHYSICAL EXAM:  GENERAL: NAD  EYES: EOMI, PERRLA  NECK: Supple, No JVD  CHEST/LUNG: dec breath sounds at bases  HEART:  S1 , S2 +  ABDOMEN: soft , bs+, wound dressing+  EXTREMITIES:  edema+  NEUROLOGY:alert awake oriented     LABS:  04-01    132<L>  |  91<L>  |  16  ----------------------------<  189<H>  3.5   |  25  |  3.33<H>    Ca    9.4      01 Apr 2022 02:35  Phos  2.6     04-01  Mg     2.10     04-01      Creatinine Trend: 3.33 <--, 6.52 <--, 5.43 <--, 4.27 <--, 6.60 <--, 5.79 <--                        8.8    14.61 )-----------( 424      ( 01 Apr 2022 02:35 )             30.4     Urine Studies:                  PTT - ( 28 Mar 2022 09:07 )  PTT:115.4 sec

## 2022-04-02 NOTE — PROGRESS NOTE ADULT - ASSESSMENT
56y Female with history of ESRD on HD presents with vaginal bleeding. Nephrology consulted for ESRD status.    1) ESRD: Last HD on 4/2 tolerated well with heparin to prevent circuit clotting. Plan for next maintenance HD on 4/5. Monitor electrolytes.    2) HTN with ESRD: BP acceptable on Cardizem. Continue with midodrine pre-HD on HD days to avoid intradialytic hypotension. Monitor BP.    3) Anemia of renal disease: With component of blood loss from vaginal bleeding. Hb low with acceptable iron stores. Continue with Epo 18K with HD. PRBC as needed. Monitor Hb.    4) Secondary HPT of renal origin: Phosphorus acceptable with low iPTH for which sensipar decreased to 30 mg daily. Continue with renvela 1 tab with meals. Monitor serum calcium and phosphorus.      [facility] NEPHROLOGY  Keaton Lopez M.D.  Juma Green D.O.  Myla Hoffmann M.D.  Julia Brewer, JASIEL, ANP-C    Telephone: [phone]  Facsimile: [phone]    [address] [place] [address]

## 2022-04-02 NOTE — PROGRESS NOTE ADULT - SUBJECTIVE AND OBJECTIVE BOX
[facility] NEPHROLOGY- PROGRESS NOTE    56y Female with history of ESRD on HD presents with vaginal bleeding. Nephrology consulted for ESRD status.    REVIEW OF SYSTEMS:  Gen: no changes in weight  Cards: no chest pain  Resp: no dyspnea  GI: no nausea or vomiting or diarrhea  : + vaginal bleeding improving  Vascular: no LE edema    caffeine (Nausea)  latex (Rash)  penicillin (Nausea)  strawberry (Rash)    Hospital Medications: Medications reviewed    VITALS:  T(F): 98.1 (04-03-22 @ 00:19), Max: 98.2 (04-02-22 @ 20:33)  HR: 81 (04-03-22 @ 00:19)  BP: 119/51 (04-03-22 @ 00:19)  RR: 19 (04-03-22 @ 00:19)  SpO2: 100% (04-02-22 @ 18:00)  Wt(kg): --    04-02 @ 07:01  -  04-03 @ 01:32  --------------------------------------------------------  IN: 400 mL / OUT: 2400 mL / NET: -2000 mL    Weight (kg): 130 (03-31 @ 15:34)      PHYSICAL EXAM:  Gen: NAD, calm  Cards: RRR, +S1/S2, no M/G/R  Resp: CTA B/L  GI: soft, NT/ND, NABS, + ab wound dressing in place  Vascular: no LE edema B/L, LUE AVF + bruit/thrill      LABS:  04-02    137  |  96<L>  |  27<H>  ----------------------------<  236<H>  3.6   |  25  |  5.42<H>    Ca    8.5      02 Apr 2022 21:01  Phos  2.9     04-02  Mg     2.10     04-02      Creatinine Trend: 5.42 <--, 3.33 <--, 6.52 <--, 5.43 <--, 4.27 <--, 6.60 <--, 5.79 <--                        7.4    11.95 )-----------( 456      ( 02 Apr 2022 21:01 )             26.9

## 2022-04-02 NOTE — PROGRESS NOTE ADULT - SUBJECTIVE AND OBJECTIVE BOX
CARDIOLOGY FOLLOW UP - Dr. Bullock  DATE OF SERVICE: 4/2/22      no acute events       REVIEW OF SYSTEMS:  CONSTITUTIONAL: No fever, weight loss, or fatigue  RESPIRATORY: No cough, wheezing, chills or hemoptysis; No Shortness of Breath  CARDIOVASCULAR: No chest pain, palpitations, passing out, dizziness, or leg swelling  GASTROINTESTINAL: No abdominal or epigastric pain. No nausea, vomiting, or hematemesis; No diarrhea or constipation. No melena or hematochezia.  VASCULAR: No edema     PHYSICAL EXAM:  T(C): 36.7 (04-02-22 @ 06:01), Max: 37.1 (04-01-22 @ 21:45)  HR: 77 (04-02-22 @ 06:01) (77 - 86)  BP: 109/45 (04-02-22 @ 06:01) (101/46 - 109/45)  RR: 18 (04-02-22 @ 06:01) (18 - 18)  SpO2: 94% (04-02-22 @ 06:01) (94% - 94%)  Wt(kg): --  I&O's Summary      Appearance: Normal	  Cardiovascular: Normal S1 S2,RRR, No JVD, No murmurs  Respiratory: Lungs clear to auscultation	  Gastrointestinal:  Soft, Non-tender, + BS	  Extremities: Normal range of motion, No clubbing, cyanosis or edema      Home Medications:  Aspercreme with Lidocaine 4% topical film: Apply topically to affected area once a day (low back) (20 Mar 2022 10:15)  Aspercreme with Lidocaine 4% topical film: Apply topically to affected area once a day (L knee) (20 Mar 2022 10:15)  aspirin 81 mg oral delayed release tablet: 1 tab(s) orally once a day (20 Mar 2022 10:15)  buPROPion 150 mg/24 hours (XL) oral tablet, extended release: 1 tab(s) orally once a day (in the morning) (20 Mar 2022 10:15)  cycloSPORINE modified 100 mg oral capsule: 2 cap(s) orally once a day (at bedtime) (20 Mar 2022 10:15)  cycloSPORINE modified 50 mg oral capsule: 3 cap(s) orally once a day in the morning  (20 Mar 2022 10:15)  dilTIAZem 120 mg/24 hours oral capsule, extended release: 1 cap(s) orally once a day (hold SBP&lt;110, HR&lt;60) (20 Mar 2022 10:15)  doxycycline hyclate 100 mg oral tablet: 1 tab(s) orally 2 times a day (20 Mar 2022 10:15)  Eliquis 2.5 mg oral tablet: 1 tab(s) orally 2 times a day    Pharmacy states patient no longer wants to fill this medication (20 Mar 2022 10:15)  gabapentin 300 mg oral capsule: 1 cap(s) orally 2 times a day (20 Mar 2022 10:15)  insulin glargine: 15 unit(s) subcutaneous once a day (at bedtime) (20 Mar 2022 10:15)  midodrine 5 mg oral tablet: 1 tab(s) orally 3 times a week, 30 minute prior to dialysis sessions (hold is SBP&gt;130) (20 Mar 2022 10:15)  mirtazapine 7.5 mg oral tablet: 1 tab(s) orally once a day (at bedtime) (20 Mar 2022 10:15)  montelukast 10 mg oral tablet: 1 tab(s) orally once a day (in the evening) (20 Mar 2022 10:15)  oxyCODONE 10 mg oral tablet, extended release: 1 tab(s) orally every 12 hours (20 Mar 2022 10:15)  oxycodone-acetaminophen 7.5 mg-325 mg oral tablet: 1 tab(s) orally every 6 hours, As Needed (20 Mar 2022 10:15)  pantoprazole 40 mg oral delayed release tablet: 1 tab(s) orally once a day (20 Mar 2022 10:15)  polyethylene glycol 3350 oral powder for reconstitution: 17 gram(s) orally once a day (at bedtime) (20 Mar 2022 10:15)  senna oral tablet: 2 tab(s) orally once a day (at bedtime) (20 Mar 2022 10:15)  sertraline 50 mg oral tablet: 1 tab(s) orally once a day (20 Mar 2022 10:15)  sevelamer carbonate 800 mg oral tablet: 1 tab(s) orally 3 times a day (with meals) (20 Mar 2022 10:15)  simethicone 80 mg oral tablet, chewable: 1 tab(s) orally 3 times a day (before meals) (20 Mar 2022 10:15)  simvastatin 10 mg oral tablet: 1 tab(s) orally once a day (at bedtime) (20 Mar 2022 10:15)  sodium hypochlorite 0.25% topical solution: 1 application topically once a day (20 Mar 2022 10:15)  tacrolimus 0.1% topical ointment: 1 application topically once a day (20 Mar 2022 10:15)  traZODone 100 mg oral tablet: 1 tab(s) orally once a day (at bedtime) (20 Mar 2022 10:15)      MEDICATIONS  (STANDING):  apixaban 5 milliGRAM(s) Oral every 12 hours  buPROPion XL (24-Hour) . 150 milliGRAM(s) Oral daily  chlorhexidine 2% Cloths 1 Application(s) Topical daily  cinacalcet 30 milliGRAM(s) Oral daily  cycloSPORINE  , modified (NEORAL) 200 milliGRAM(s) Oral two times a day  Dakins Solution - 1/4 Strength 1 Application(s) Topical daily  dextrose 40% Gel 15 Gram(s) Oral once  dextrose 5%. 1000 milliLiter(s) (50 mL/Hr) IV Continuous <Continuous>  dextrose 5%. 1000 milliLiter(s) (100 mL/Hr) IV Continuous <Continuous>  dextrose 50% Injectable 25 Gram(s) IV Push once  dextrose 50% Injectable 12.5 Gram(s) IV Push once  dextrose 50% Injectable 25 Gram(s) IV Push once  diltiazem    milliGRAM(s) Oral daily  epoetin anabela-epbx (RETACRIT) Injectable 09819 Unit(s) IV Push <User Schedule>  gabapentin 300 milliGRAM(s) Oral daily  glucagon  Injectable 1 milliGRAM(s) IntraMuscular once  insulin glargine Injectable (LANTUS) 10 Unit(s) SubCutaneous at bedtime  insulin lispro (ADMELOG) corrective regimen sliding scale   SubCutaneous three times a day before meals  insulin lispro (ADMELOG) corrective regimen sliding scale   SubCutaneous at bedtime  lidocaine   4% Patch 1 Patch Transdermal daily  lidocaine   4% Patch 1 Patch Transdermal daily  lidocaine   4% Patch 1 Patch Transdermal daily  mirtazapine 7.5 milliGRAM(s) Oral at bedtime  montelukast 10 milliGRAM(s) Oral at bedtime  pantoprazole    Tablet 40 milliGRAM(s) Oral before breakfast  polyethylene glycol 3350 17 Gram(s) Oral at bedtime  senna 2 Tablet(s) Oral at bedtime  sertraline 50 milliGRAM(s) Oral daily  sevelamer carbonate 800 milliGRAM(s) Oral three times a day with meals  simethicone 80 milliGRAM(s) Chew three times a day  tacrolimus   0.1% Ointment 1 Application(s) Topical daily  traZODone 100 milliGRAM(s) Oral at bedtime      TELEMETRY: 	    ECG:  	  RADIOLOGY:   DIAGNOSTIC TESTING:  [ ] Echocardiogram:  [ ]  Catheterization:  [ ] Stress Test:    OTHER: 	    LABS:	 	                            8.8    14.61 )-----------( 424      ( 01 Apr 2022 02:35 )             30.4     04-01    132<L>  |  91<L>  |  16  ----------------------------<  189<H>  3.5   |  25  |  3.33<H>    Ca    9.4      01 Apr 2022 02:35  Phos  2.6     04-01  Mg     2.10     04-01

## 2022-04-03 NOTE — PROGRESS NOTE ADULT - ASSESSMENT
56y Female with history of ESRD on HD presents with vaginal bleeding. Nephrology consulted for ESRD status.    1) ESRD: Last HD on 4/2 tolerated well with heparin to prevent circuit clotting. Plan for next maintenance HD on 4/5. Monitor electrolytes.    2) HTN with ESRD: BP acceptable on Cardizem. Continue with midodrine pre-HD on HD days to avoid intradialytic hypotension. Monitor BP.    3) Anemia of renal disease: With component of blood loss from vaginal bleeding. Hb low with acceptable iron stores. Continue with Epo 18K with HD. PRBC as needed. Monitor Hb.    4) Secondary HPT of renal origin: Phosphorus acceptable with low iPTH for which sensipar decreased to 30 mg daily. Continue with renvela 1 tab with meals. Monitor serum calcium and phosphorus.      [facility] NEPHROLOGY  Keaton Lopez M.D.  Juma Green D.O.  Myla Hoffmann M.D.  Julia Brewer, JASIEL, ANP-C    Telephone: [phone]  Facsimile: [phone]    [address] [place] [address]

## 2022-04-03 NOTE — PROGRESS NOTE ADULT - SUBJECTIVE AND OBJECTIVE BOX
SUBJECTIVE / OVERNIGHT EVENTS:pt seen and examined  4-3- 22    MEDICATIONS  (STANDING):  apixaban 5 milliGRAM(s) Oral every 12 hours  buPROPion XL (24-Hour) . 150 milliGRAM(s) Oral daily  chlorhexidine 2% Cloths 1 Application(s) Topical daily  cinacalcet 30 milliGRAM(s) Oral daily  cycloSPORINE  , modified (NEORAL) 200 milliGRAM(s) Oral two times a day  Dakins Solution - 1/4 Strength 1 Application(s) Topical daily  dextrose 40% Gel 15 Gram(s) Oral once  dextrose 5%. 1000 milliLiter(s) (50 mL/Hr) IV Continuous <Continuous>  dextrose 5%. 1000 milliLiter(s) (100 mL/Hr) IV Continuous <Continuous>  dextrose 50% Injectable 25 Gram(s) IV Push once  dextrose 50% Injectable 12.5 Gram(s) IV Push once  dextrose 50% Injectable 25 Gram(s) IV Push once  diltiazem    milliGRAM(s) Oral daily  epoetin anabela-epbx (RETACRIT) Injectable 85421 Unit(s) IV Push <User Schedule>  gabapentin 300 milliGRAM(s) Oral daily  glucagon  Injectable 1 milliGRAM(s) IntraMuscular once  insulin glargine Injectable (LANTUS) 10 Unit(s) SubCutaneous at bedtime  insulin lispro (ADMELOG) corrective regimen sliding scale   SubCutaneous three times a day before meals  insulin lispro (ADMELOG) corrective regimen sliding scale   SubCutaneous at bedtime  lidocaine   4% Patch 1 Patch Transdermal daily  lidocaine   4% Patch 1 Patch Transdermal daily  lidocaine   4% Patch 1 Patch Transdermal daily  mirtazapine 7.5 milliGRAM(s) Oral at bedtime  montelukast 10 milliGRAM(s) Oral at bedtime  pantoprazole    Tablet 40 milliGRAM(s) Oral before breakfast  polyethylene glycol 3350 17 Gram(s) Oral at bedtime  senna 2 Tablet(s) Oral at bedtime  sertraline 50 milliGRAM(s) Oral daily  sevelamer carbonate 800 milliGRAM(s) Oral three times a day with meals  simethicone 80 milliGRAM(s) Chew three times a day  tacrolimus   0.1% Ointment 1 Application(s) Topical daily  traZODone 100 milliGRAM(s) Oral at bedtime    MEDICATIONS  (PRN):  acetaminophen     Tablet .. 650 milliGRAM(s) Oral every 6 hours PRN Temp greater or equal to 38C (100.4F), Mild Pain (1 - 3)  melatonin 3 milliGRAM(s) Oral at bedtime PRN Insomnia  midodrine. 5 milliGRAM(s) Oral <User Schedule> PRN 30 minutes prior to dialysis  ondansetron Injectable 4 milliGRAM(s) IV Push every 8 hours PRN Nausea and/or Vomiting  oxyCODONE    IR 10 milliGRAM(s) Oral every 6 hours PRN Severe Pain (7 - 10)  oxyCODONE    IR 5 milliGRAM(s) Oral every 6 hours PRN Moderate Pain (4 - 6)    Orthostatic VS  Vital Signs Last 24 Hrs  T(C): 36.8 (04-03-22 @ 16:44), Max: 36.9 (04-03-22 @ 06:40)  T(F): 98.3 (04-03-22 @ 16:44), Max: 98.5 (04-03-22 @ 06:40)  HR: 88 (04-03-22 @ 16:44) (81 - 88)  BP: 117/70 (04-03-22 @ 16:44) (117/70 - 133/74)  BP(mean): --  RR: 17 (04-03-22 @ 16:44) (17 - 19)  SpO2: 98% (04-03-22 @ 16:44) (96% - 98%)                Skin: No rash.  Eyes: No recent vision problems or eye pain.  ENT: No congestion, ear pain, or sore throat.  Cardiovascular: No chest pain or palpation.  Respiratory: No cough, shortness of breath, congestion, or wheezing.  Gastrointestinal: No abdominal pain, nausea, vomiting, or diarrhea.  Genitourinary: No dysuria.  Musculoskeletal: No joint swelling.  Neurologic: No headache.    PHYSICAL EXAM:  GENERAL: NAD  EYES: EOMI, PERRLA  NECK: Supple, No JVD  CHEST/LUNG: dec breath sounds at bases  HEART:  S1 , S2 +  ABDOMEN: soft , bs+, wound dressing+  EXTREMITIES:  edema+  NEUROLOGY:alert awake oriented     LABS:  04-03    139  |  97<L>  |  14  ----------------------------<  136<H>  3.7   |  26  |  3.69<H>    Ca    9.3      03 Apr 2022 07:34  Phos  2.6     04-03  Mg     2.10     04-03      Creatinine Trend: 3.69 <--, 5.42 <--, 3.33 <--, 6.52 <--, 5.43 <--, 4.27 <--, 6.60 <--                        8.6    13.61 )-----------( 504      ( 03 Apr 2022 07:34 )             30.0     Urine Studies:

## 2022-04-03 NOTE — PROGRESS NOTE ADULT - SUBJECTIVE AND OBJECTIVE BOX
CARDIOLOGY FOLLOW UP - Dr. Bullock  DATE OF SERVICE: 4/3/22     CC no cp or sob      REVIEW OF SYSTEMS:  CONSTITUTIONAL: No fever, weight loss, or fatigue  RESPIRATORY: No cough, wheezing, chills or hemoptysis; No Shortness of Breath  CARDIOVASCULAR: No chest pain, palpitations, passing out, dizziness, or leg swelling  GASTROINTESTINAL: No abdominal or epigastric pain. No nausea, vomiting, or hematemesis; No diarrhea or constipation. No melena or hematochezia.  VASCULAR: No edema     PHYSICAL EXAM:  T(C): 36.9 (04-03-22 @ 06:40), Max: 36.9 (04-03-22 @ 06:40)  HR: 88 (04-03-22 @ 06:40) (77 - 88)  BP: 117/76 (04-03-22 @ 06:40) (113/50 - 133/74)  RR: 18 (04-03-22 @ 06:40) (18 - 19)  SpO2: 96% (04-03-22 @ 06:40) (96% - 100%)  Wt(kg): --  I&O's Summary    02 Apr 2022 07:01  -  03 Apr 2022 07:00  --------------------------------------------------------  IN: 400 mL / OUT: 2400 mL / NET: -2000 mL        Appearance: Normal	  Cardiovascular: Normal S1 S2,RRR, No JVD, No murmurs  Respiratory: Lungs clear to auscultation	  Gastrointestinal:  Soft, Non-tender, + BS	  Extremities: Normal range of motion, No clubbing, cyanosis or edema      Home Medications:  Aspercreme with Lidocaine 4% topical film: Apply topically to affected area once a day (low back) (20 Mar 2022 10:15)  Aspercreme with Lidocaine 4% topical film: Apply topically to affected area once a day (L knee) (20 Mar 2022 10:15)  aspirin 81 mg oral delayed release tablet: 1 tab(s) orally once a day (20 Mar 2022 10:15)  buPROPion 150 mg/24 hours (XL) oral tablet, extended release: 1 tab(s) orally once a day (in the morning) (20 Mar 2022 10:15)  cycloSPORINE modified 100 mg oral capsule: 2 cap(s) orally once a day (at bedtime) (20 Mar 2022 10:15)  cycloSPORINE modified 50 mg oral capsule: 3 cap(s) orally once a day in the morning  (20 Mar 2022 10:15)  dilTIAZem 120 mg/24 hours oral capsule, extended release: 1 cap(s) orally once a day (hold SBP&lt;110, HR&lt;60) (20 Mar 2022 10:15)  doxycycline hyclate 100 mg oral tablet: 1 tab(s) orally 2 times a day (20 Mar 2022 10:15)  Eliquis 2.5 mg oral tablet: 1 tab(s) orally 2 times a day    Pharmacy states patient no longer wants to fill this medication (20 Mar 2022 10:15)  gabapentin 300 mg oral capsule: 1 cap(s) orally 2 times a day (20 Mar 2022 10:15)  insulin glargine: 15 unit(s) subcutaneous once a day (at bedtime) (20 Mar 2022 10:15)  midodrine 5 mg oral tablet: 1 tab(s) orally 3 times a week, 30 minute prior to dialysis sessions (hold is SBP&gt;130) (20 Mar 2022 10:15)  mirtazapine 7.5 mg oral tablet: 1 tab(s) orally once a day (at bedtime) (20 Mar 2022 10:15)  montelukast 10 mg oral tablet: 1 tab(s) orally once a day (in the evening) (20 Mar 2022 10:15)  oxyCODONE 10 mg oral tablet, extended release: 1 tab(s) orally every 12 hours (20 Mar 2022 10:15)  oxycodone-acetaminophen 7.5 mg-325 mg oral tablet: 1 tab(s) orally every 6 hours, As Needed (20 Mar 2022 10:15)  pantoprazole 40 mg oral delayed release tablet: 1 tab(s) orally once a day (20 Mar 2022 10:15)  polyethylene glycol 3350 oral powder for reconstitution: 17 gram(s) orally once a day (at bedtime) (20 Mar 2022 10:15)  senna oral tablet: 2 tab(s) orally once a day (at bedtime) (20 Mar 2022 10:15)  sertraline 50 mg oral tablet: 1 tab(s) orally once a day (20 Mar 2022 10:15)  sevelamer carbonate 800 mg oral tablet: 1 tab(s) orally 3 times a day (with meals) (20 Mar 2022 10:15)  simethicone 80 mg oral tablet, chewable: 1 tab(s) orally 3 times a day (before meals) (20 Mar 2022 10:15)  simvastatin 10 mg oral tablet: 1 tab(s) orally once a day (at bedtime) (20 Mar 2022 10:15)  sodium hypochlorite 0.25% topical solution: 1 application topically once a day (20 Mar 2022 10:15)  tacrolimus 0.1% topical ointment: 1 application topically once a day (20 Mar 2022 10:15)  traZODone 100 mg oral tablet: 1 tab(s) orally once a day (at bedtime) (20 Mar 2022 10:15)      MEDICATIONS  (STANDING):  apixaban 5 milliGRAM(s) Oral every 12 hours  buPROPion XL (24-Hour) . 150 milliGRAM(s) Oral daily  chlorhexidine 2% Cloths 1 Application(s) Topical daily  cinacalcet 30 milliGRAM(s) Oral daily  cycloSPORINE  , modified (NEORAL) 200 milliGRAM(s) Oral two times a day  Dakins Solution - 1/4 Strength 1 Application(s) Topical daily  dextrose 40% Gel 15 Gram(s) Oral once  dextrose 5%. 1000 milliLiter(s) (50 mL/Hr) IV Continuous <Continuous>  dextrose 5%. 1000 milliLiter(s) (100 mL/Hr) IV Continuous <Continuous>  dextrose 50% Injectable 25 Gram(s) IV Push once  dextrose 50% Injectable 12.5 Gram(s) IV Push once  dextrose 50% Injectable 25 Gram(s) IV Push once  diltiazem    milliGRAM(s) Oral daily  epoetin anabela-epbx (RETACRIT) Injectable 45675 Unit(s) IV Push <User Schedule>  gabapentin 300 milliGRAM(s) Oral daily  glucagon  Injectable 1 milliGRAM(s) IntraMuscular once  insulin glargine Injectable (LANTUS) 10 Unit(s) SubCutaneous at bedtime  insulin lispro (ADMELOG) corrective regimen sliding scale   SubCutaneous three times a day before meals  insulin lispro (ADMELOG) corrective regimen sliding scale   SubCutaneous at bedtime  lidocaine   4% Patch 1 Patch Transdermal daily  lidocaine   4% Patch 1 Patch Transdermal daily  lidocaine   4% Patch 1 Patch Transdermal daily  mirtazapine 7.5 milliGRAM(s) Oral at bedtime  montelukast 10 milliGRAM(s) Oral at bedtime  pantoprazole    Tablet 40 milliGRAM(s) Oral before breakfast  polyethylene glycol 3350 17 Gram(s) Oral at bedtime  senna 2 Tablet(s) Oral at bedtime  sertraline 50 milliGRAM(s) Oral daily  sevelamer carbonate 800 milliGRAM(s) Oral three times a day with meals  simethicone 80 milliGRAM(s) Chew three times a day  tacrolimus   0.1% Ointment 1 Application(s) Topical daily  traZODone 100 milliGRAM(s) Oral at bedtime      TELEMETRY: 	    ECG:  	  RADIOLOGY:   DIAGNOSTIC TESTING:  [ ] Echocardiogram:  [ ]  Catheterization:  [ ] Stress Test:    OTHER: 	    LABS:	 	                            8.6    13.61 )-----------( 504      ( 03 Apr 2022 07:34 )             30.0     04-03    139  |  97<L>  |  14  ----------------------------<  136<H>  3.7   |  26  |  3.69<H>    Ca    9.3      03 Apr 2022 07:34  Phos  2.6     04-03  Mg     2.10     04-03

## 2022-04-03 NOTE — PROGRESS NOTE ADULT - SUBJECTIVE AND OBJECTIVE BOX
Full note to follow. [facility] NEPHROLOGY- PROGRESS NOTE    56y Female with history of ESRD on HD presents with vaginal bleeding. Nephrology consulted for ESRD status.    REVIEW OF SYSTEMS:  Gen: no changes in weight  Cards: no chest pain  Resp: no dyspnea  GI: no nausea or vomiting or diarrhea  : + vaginal bleeding improving  Vascular: no LE edema    caffeine (Nausea)  latex (Rash)  penicillin (Nausea)  strawberry (Rash)    Hospital Medications: Medications reviewed    VITALS:  T(F): 98.3 (04-03-22 @ 16:44), Max: 98.5 (04-03-22 @ 06:40)  HR: 88 (04-03-22 @ 16:44)  BP: 117/70 (04-03-22 @ 16:44)  RR: 17 (04-03-22 @ 16:44)  SpO2: 98% (04-03-22 @ 16:44)  Wt(kg): --    04-02 @ 07:01  -  04-03 @ 07:00  --------------------------------------------------------  IN: 400 mL / OUT: 2400 mL / NET: -2000 mL      PHYSICAL EXAM:  Gen: NAD, calm  Cards: RRR, +S1/S2, no M/G/R  Resp: CTA B/L  GI: soft, NT/ND, NABS, + ab wound dressing in place  Vascular: no LE edema B/L, LUE AVF + bruit/thrill    LABS:  04-03    139  |  97<L>  |  14  ----------------------------<  136<H>  3.7   |  26  |  3.69<H>    Ca    9.3      03 Apr 2022 07:34  Phos  2.6     04-03  Mg     2.10     04-03      Creatinine Trend: 3.69 <--, 5.42 <--, 3.33 <--, 6.52 <--, 5.43 <--, 4.27 <--, 6.60 <--                        8.6    13.61 )-----------( 504      ( 03 Apr 2022 07:34 )             30.0

## 2022-04-04 NOTE — PROGRESS NOTE ADULT - ASSESSMENT
Echo 1/19/22: grossly nl LV sys fx, min MR     a/p  56 year old female with hx of morbid obesity, CHAMP not on home O2, ESRD (HD MWF), HTN, DM, COPD, Afib no longer on AC, chronic R pannus wound, followed by Dr. Layne, likely pyoderma gangrenosum presents for vaginal bleeding     #Chronic Pannus Wound  -surgical eval noted, wound care  -abx per med     #Chronic Afib  -stable, rates controlled  -cont dilt as ordered  -cont oral AC - monitor h.h pending labs today     #Hypertension  -stable  -cont ccb     #ESRD on HD  -HD per renal    #Gyn bleeding  -s/p EUA, D&C, diagnostic hysteroscopy, insertion of Mirena IUD  -cv remains stable post op  -med f/u

## 2022-04-04 NOTE — PROGRESS NOTE ADULT - ASSESSMENT
56F with Hx of ESRD TTS, HTN, DM 2, COPD, afib, known chronic R pannus wound felt most likely pyoderma gangrenosum presents for 1.5wk vag bleeding. Pt recently had prolonged stay at [facility] for pannus wound, known to endocrine service.  Was dc'd to rehab on 3/3.  Prior to last hospitalization patient was on much higher doses of basal bolus.  Now requiring much less.  Has poor appetite.      1. Type 2 diabetes mellitus uncontrolled  A1c 7.0% (may be inaccurate in setting of ESRD)  Home Regimen: Tresiba 80 units HS and Trulicity 1.5mg subq weekly  While inpatient:  BG target 100-180 mg/dL  reduced po intake.  Patient is driking nepro shakes and eating very little  Continue Lantus 10 units SQ qHS   Continue off of premeal admelog for now.  As improves and FS begin to increase > 200, will consider restarting admelog premeal  Admelog correctional scale LOW before meals and low bedtime scale  Check BG before meals and bedtime  Hypoglycemia protocol   Consistent carbohydrate diet    Discharge Plan:  STOP Trulicity (patient ESRD on HD)  For dc to rehab- recommend to continue current insulin management as outlined above  For dc to home- Recommend basal plus PO regimen  Patient was on Tresiba at home may benefit from a different Long acting insulin such as Lantus or Levemir given ESRD.  Tresiba is ultra-Long acting insulin  Please assess insurance coverage for basal insulin pens:  Levemir, Lantus, Basaglar, Toujeo or Semglee.   Recommend Tradjenta 5 mg po daily, if not covered can see if Januvia 25 mg po daily is covered.  Please obtain prior auth if needed as patient is ESRD and DPP4i class are indicated for patients with ESRD  Consider CGM (such as Freestyle Libre2 outpatient)  If desiring to followup with [facility]: [address] [place] [address], [address], [place] [address], [phone]    2. HTN  Management per primary team     3. Hyperlipidemia  consider restarting statin      Tamela Ruiz  Nurse Practitioner  Division of Endocrinology & Diabetes  Pager # [phone]      If after 6PM or before 9AM, or on weekends/holidays, please call endocrine answering service for assistance ([phone]).  For nonurgent matters email [email or web address] for assistance.

## 2022-04-04 NOTE — PROGRESS NOTE ADULT - SUBJECTIVE AND OBJECTIVE BOX
History:  patient see at bedside. eating once per day, as she is ordering out food because does not like hospital food.  No hypoglycemia     MEDICATIONS  (STANDING):  buPROPion XL (24-Hour) . 150 milliGRAM(s) Oral daily  cinacalcet 60 milliGRAM(s) Oral daily  cycloSPORINE  , modified (NEORAL) 200 milliGRAM(s) Oral at bedtime  cycloSPORINE  , modified (NEORAL) 150 milliGRAM(s) Oral <User Schedule>  dextrose 40% Gel 15 Gram(s) Oral once  dextrose 5%. 1000 milliLiter(s) (50 mL/Hr) IV Continuous <Continuous>  dextrose 5%. 1000 milliLiter(s) (100 mL/Hr) IV Continuous <Continuous>  dextrose 50% Injectable 25 Gram(s) IV Push once  dextrose 50% Injectable 12.5 Gram(s) IV Push once  dextrose 50% Injectable 25 Gram(s) IV Push once  diltiazem    milliGRAM(s) Oral daily  epoetin anabela-epbx (RETACRIT) Injectable 48244 Unit(s) IV Push <User Schedule>  gabapentin 300 milliGRAM(s) Oral two times a day  glucagon  Injectable 1 milliGRAM(s) IntraMuscular once  insulin glargine Injectable (LANTUS) 10 Unit(s) SubCutaneous at bedtime  insulin lispro (ADMELOG) corrective regimen sliding scale   SubCutaneous three times a day before meals  insulin lispro (ADMELOG) corrective regimen sliding scale   SubCutaneous at bedtime  lidocaine   4% Patch 1 Patch Transdermal daily  lidocaine   4% Patch 1 Patch Transdermal daily  mirtazapine 7.5 milliGRAM(s) Oral at bedtime  montelukast 10 milliGRAM(s) Oral at bedtime  oxyCODONE  ER Tablet 10 milliGRAM(s) Oral every 12 hours  pantoprazole    Tablet 40 milliGRAM(s) Oral before breakfast  polyethylene glycol 3350 17 Gram(s) Oral at bedtime  senna 2 Tablet(s) Oral at bedtime  sertraline 50 milliGRAM(s) Oral daily  sevelamer carbonate 800 milliGRAM(s) Oral three times a day with meals  simethicone 80 milliGRAM(s) Chew three times a day  tacrolimus   0.1% Ointment 1 Application(s) Topical daily  traZODone 100 milliGRAM(s) Oral at bedtime    MEDICATIONS  (PRN):  acetaminophen     Tablet .. 650 milliGRAM(s) Oral every 6 hours PRN Temp greater or equal to 38C (100.4F), Mild Pain (1 - 3)  melatonin 3 milliGRAM(s) Oral at bedtime PRN Insomnia  midodrine. 5 milliGRAM(s) Oral <User Schedule> PRN 30 minutes prior to dialysis  ondansetron Injectable 4 milliGRAM(s) IV Push every 8 hours PRN Nausea and/or Vomiting  oxyCODONE    IR 7.5 milliGRAM(s) Oral every 6 hours PRN Severe Pain (7 - 10)      Allergies    latex (Rash)  penicillin (Nausea)  strawberry (Rash)    Intolerances    caffeine (Nausea)    Review of Systems:  Constitutional: No fever  ALL OTHER SYSTEMS REVIEWED AND NEGATIVE      Vital Signs Last 24 Hrs  Vital Signs Last 24 Hrs  T(C): 37.1 (04 Apr 2022 05:40), Max: 37.1 (04 Apr 2022 05:40)  T(F): 98.8 (04 Apr 2022 05:40), Max: 98.8 (04 Apr 2022 05:40)  HR: 81 (04 Apr 2022 08:45) (81 - 89)  BP: 131/52 (04 Apr 2022 08:45) (117/70 - 131/52)  BP(mean): --  RR: 19 (04 Apr 2022 08:45) (17 - 19)  SpO2: 100% (04 Apr 2022 08:45) (96% - 100%)  GENERAL: NAD, well-developed  GI: Soft, nontender, non distended  PSYCH: Alert and oriented x 3, normal affect, normal mood      CAPILLARY BLOOD GLUCOSE    POCT Blood Glucose.: 179 mg/dL (04 Apr 2022 12:37)  POCT Blood Glucose.: 171 mg/dL (04 Apr 2022 08:49)  POCT Blood Glucose.: 192 mg/dL (03 Apr 2022 21:13)  POCT Blood Glucose.: 124 mg/dL (03 Apr 2022 18:04)  POCT Blood Glucose.: 133 mg/dL (01 Apr 2022 12:35)  POCT Blood Glucose.: 223 mg/dL (01 Apr 2022 08:50)  POCT Blood Glucose.: 192 mg/dL (01 Apr 2022 02:28)  POCT Blood Glucose.: 296 mg/dL (31 Mar 2022 21:49)  POCT Blood Glucose.: 145 mg/dL (31 Mar 2022 19:38)  POCT Blood Glucose.: 121 mg/dL (31 Mar 2022 18:06)  POCT Blood Glucose.: 123 mg/dL (28 Mar 2022 17:29)  POCT Blood Glucose.: 156 mg/dL (28 Mar 2022 15:38)  POCT Blood Glucose.: 119 mg/dL (28 Mar 2022 12:43)  POCT Blood Glucose.: 129 mg/dL (28 Mar 2022 09:03)  POCT Blood Glucose.: 186 mg/dL (27 Mar 2022 22:06)  POCT Blood Glucose.: 140 mg/dL (27 Mar 2022 20:51)  POCT Blood Glucose.: 142 mg/dL (24 Mar 2022 21:08)  POCT Blood Glucose.: 171 mg/dL (24 Mar 2022 17:41)  POCT Blood Glucose.: 208 mg/dL (24 Mar 2022 13:03)  POCT Blood Glucose.: 188 mg/dL (24 Mar 2022 09:04)  POCT Blood Glucose.: 171 mg/dL (24 Mar 2022 06:52)  POCT Blood Glucose.: 178 mg/dL (23 Mar 2022 21:41)  POCT Blood Glucose.: 103 mg/dL (21 Mar 2022 20:03)  POCT Blood Glucose.: 111 mg/dL (21 Mar 2022 18:44)  POCT Blood Glucose.: 117 mg/dL (21 Mar 2022 15:31)  POCT Blood Glucose.: 113 mg/dL (21 Mar 2022 12:51)  POCT Blood Glucose.: 124 mg/dL (21 Mar 2022 08:51)  POCT Blood Glucose.: 152 mg/dL (20 Mar 2022 22:10)    04-03    139  |  97<L>  |  14  ----------------------------<  136<H>  3.7   |  26  |  3.69<H>    Ca    9.3      03 Apr 2022 07:34  Phos  2.6     04-03  Mg     2.10     04-03        A1C with Estimated Average Glucose (03.20.22 @ 12:48)    A1C with Estimated Average Glucose Result: 6.1%    Estimated Average Glucose: 128

## 2022-04-04 NOTE — PROGRESS NOTE ADULT - SUBJECTIVE AND OBJECTIVE BOX
SUBJECTIVE / OVERNIGHT EVENTS:pt seen and examined  4-4- 22    MEDICATIONS  (STANDING):  apixaban 5 milliGRAM(s) Oral every 12 hours  buPROPion XL (24-Hour) . 150 milliGRAM(s) Oral daily  chlorhexidine 2% Cloths 1 Application(s) Topical daily  cinacalcet 30 milliGRAM(s) Oral daily  cycloSPORINE  , modified (NEORAL) 200 milliGRAM(s) Oral two times a day  Dakins Solution - 1/4 Strength 1 Application(s) Topical daily  dextrose 40% Gel 15 Gram(s) Oral once  dextrose 5%. 1000 milliLiter(s) (50 mL/Hr) IV Continuous <Continuous>  dextrose 5%. 1000 milliLiter(s) (100 mL/Hr) IV Continuous <Continuous>  dextrose 50% Injectable 25 Gram(s) IV Push once  dextrose 50% Injectable 12.5 Gram(s) IV Push once  dextrose 50% Injectable 25 Gram(s) IV Push once  diltiazem    milliGRAM(s) Oral daily  epoetin anabela-epbx (RETACRIT) Injectable 37918 Unit(s) IV Push <User Schedule>  gabapentin 300 milliGRAM(s) Oral daily  glucagon  Injectable 1 milliGRAM(s) IntraMuscular once  insulin glargine Injectable (LANTUS) 10 Unit(s) SubCutaneous at bedtime  insulin lispro (ADMELOG) corrective regimen sliding scale   SubCutaneous three times a day before meals  insulin lispro (ADMELOG) corrective regimen sliding scale   SubCutaneous at bedtime  lidocaine   4% Patch 1 Patch Transdermal daily  lidocaine   4% Patch 1 Patch Transdermal daily  lidocaine   4% Patch 1 Patch Transdermal daily  mirtazapine 7.5 milliGRAM(s) Oral at bedtime  montelukast 10 milliGRAM(s) Oral at bedtime  pantoprazole    Tablet 40 milliGRAM(s) Oral before breakfast  polyethylene glycol 3350 17 Gram(s) Oral at bedtime  senna 2 Tablet(s) Oral at bedtime  sertraline 50 milliGRAM(s) Oral daily  sevelamer carbonate 800 milliGRAM(s) Oral three times a day with meals  simethicone 80 milliGRAM(s) Chew three times a day  tacrolimus   0.1% Ointment 1 Application(s) Topical daily  traZODone 100 milliGRAM(s) Oral at bedtime    MEDICATIONS  (PRN):  acetaminophen     Tablet .. 650 milliGRAM(s) Oral every 6 hours PRN Temp greater or equal to 38C (100.4F), Mild Pain (1 - 3)  melatonin 3 milliGRAM(s) Oral at bedtime PRN Insomnia  midodrine. 5 milliGRAM(s) Oral <User Schedule> PRN 30 minutes prior to dialysis  ondansetron Injectable 4 milliGRAM(s) IV Push every 8 hours PRN Nausea and/or Vomiting  oxyCODONE    IR 5 milliGRAM(s) Oral every 6 hours PRN Moderate Pain (4 - 6)  oxyCODONE    IR 10 milliGRAM(s) Oral every 6 hours PRN Severe Pain (7 - 10)    Vital Signs Last 24 Hrs  T(C): 36.8 (04-04-22 @ 17:24), Max: 37.1 (04-04-22 @ 05:40)  T(F): 98.2 (04-04-22 @ 17:24), Max: 98.8 (04-04-22 @ 05:40)  HR: 77 (04-04-22 @ 17:24) (77 - 89)  BP: 124/63 (04-04-22 @ 17:24) (121/78 - 131/52)  BP(mean): --  RR: 17 (04-04-22 @ 17:24) (17 - 19)  SpO2: 97% (04-04-22 @ 17:24) (96% - 100%)      Skin: No rash.  Eyes: No recent vision problems or eye pain.  ENT: No congestion, ear pain, or sore throat.  Cardiovascular: No chest pain or palpation.  Respiratory: No cough, shortness of breath, congestion, or wheezing.  Gastrointestinal: No abdominal pain, nausea, vomiting, or diarrhea.  Genitourinary: No dysuria.  Musculoskeletal: No joint swelling.  Neurologic: No headache.    PHYSICAL EXAM:  GENERAL: NAD  EYES: EOMI, PERRLA  NECK: Supple, No JVD  CHEST/LUNG: dec breath sounds at bases  HEART:  S1 , S2 +  ABDOMEN: soft , bs+, wound dressing+  EXTREMITIES:  edema+  NEUROLOGY:alert awake oriented     LABS:  04-03    139  |  97<L>  |  14  ----------------------------<  136<H>  3.7   |  26  |  3.69<H>    Ca    9.3      03 Apr 2022 07:34  Phos  2.6     04-03  Mg     2.10     04-03      Creatinine Trend: 3.69 <--, 5.42 <--, 3.33 <--, 6.52 <--, 5.43 <--, 4.27 <--                        8.6    13.61 )-----------( 504      ( 03 Apr 2022 07:34 )             30.0     Urine Studies:

## 2022-04-04 NOTE — PROGRESS NOTE ADULT - ASSESSMENT
56y Female with history of ESRD on HD presents with vaginal bleeding. Nephrology consulted for ESRD status.    1) ESRD: Last HD on 4/2 tolerated well with 2L removed with heparin to prevent circuit clotting. Plan for next maintenance HD on 4/5. Monitor electrolytes.    2) HTN with ESRD: BP acceptable on Cardizem. Continue with midodrine pre-HD on HD days to avoid intradialytic hypotension. Monitor BP.    3) Anemia of renal disease: With component of blood loss from vaginal bleeding. Hb low with acceptable iron stores. Continue with Epo 18K with HD. PRBC as needed. Monitor Hb.    4) Secondary HPT of renal origin: Phosphorus acceptable with low iPTH for which sensipar decreased to 30 mg daily. Continue with renvela 1 tab with meals. Monitor serum calcium and phosphorus.      [facility] NEPHROLOGY  Keaton Lopez M.D.  Juma Green D.O.  Myla Hoffmann M.D.  Julia Brewer, MSN, ANP-C    Telephone: [phone]  Facsimile: [phone]    [address] [place] [address]

## 2022-04-04 NOTE — PROGRESS NOTE ADULT - SUBJECTIVE AND OBJECTIVE BOX
[facility] NEPHROLOGY- PROGRESS NOTE    56y Female with history of ESRD on HD presents with vaginal bleeding. Nephrology consulted for ESRD status.    REVIEW OF SYSTEMS:  Gen: no changes in weight  Cards: no chest pain  Resp: no dyspnea  GI: no nausea or vomiting or diarrhea  Vascular: no LE edema    caffeine (Nausea)  latex (Rash)  penicillin (Nausea)  strawberry (Rash)    Hospital Medications: Medications reviewed      VITALS:  T(F): 98.8 (04-04-22 @ 05:40), Max: 98.8 (04-04-22 @ 05:40)  HR: 81 (04-04-22 @ 08:45)  BP: 131/52 (04-04-22 @ 08:45)  RR: 19 (04-04-22 @ 08:45)  SpO2: 100% (04-04-22 @ 08:45)  Wt(kg): --        PHYSICAL EXAM:  Gen: NAD, calm  Cards: RRR, +S1/S2, no M/G/R  Resp: CTA B/L  GI: soft, NT/ND, NABS, + ab wound dressing in place  Vascular: no LE edema B/L, LUE AVF + bruit/thrill      LABS:  04-03    139  |  97<L>  |  14  ----------------------------<  136<H>  3.7   |  26  |  3.69<H>    Ca    9.3      03 Apr 2022 07:34  Phos  2.6     04-03  Mg     2.10     04-03      Creatinine Trend: 3.69 <--, 5.42 <--, 3.33 <--, 6.52 <--, 5.43 <--, 4.27 <--, 6.60 <--                        8.6    13.61 )-----------( 504      ( 03 Apr 2022 07:34 )             30.0     Urine Studies:

## 2022-04-04 NOTE — PROGRESS NOTE ADULT - SUBJECTIVE AND OBJECTIVE BOX
CARDIOLOGY FOLLOW UP - Dr. Bullock  DATE OF SERVICE: 4/4/22     CC events noted - ref am labs; vag bleeding   no cp/ sob      REVIEW OF SYSTEMS:  CONSTITUTIONAL: No fever, weight loss, or fatigue  RESPIRATORY: No cough, wheezing, chills or hemoptysis; No Shortness of Breath  CARDIOVASCULAR: No chest pain, palpitations, passing out, dizziness, or leg swelling  GASTROINTESTINAL: No abdominal or epigastric pain. No nausea, vomiting, or hematemesis; No diarrhea or constipation. No melena or hematochezia.  VASCULAR: No edema     PHYSICAL EXAM:  T(C): 37.1 (04-04-22 @ 05:40), Max: 37.1 (04-04-22 @ 05:40)  HR: 81 (04-04-22 @ 08:45) (81 - 89)  BP: 131/52 (04-04-22 @ 08:45) (117/70 - 131/52)  RR: 19 (04-04-22 @ 08:45) (17 - 19)  SpO2: 100% (04-04-22 @ 08:45) (96% - 100%)  Wt(kg): --  I&O's Summary      Appearance: Normal	  Cardiovascular: Normal S1 S2,RRR, No JVD, No murmurs  Respiratory: Lungs clear to auscultation	  Gastrointestinal:  Soft, Non-tender, + BS	  Extremities: Normal range of motion, No clubbing, cyanosis or edema      Home Medications:  Aspercreme with Lidocaine 4% topical film: Apply topically to affected area once a day (low back) (20 Mar 2022 10:15)  Aspercreme with Lidocaine 4% topical film: Apply topically to affected area once a day (L knee) (20 Mar 2022 10:15)  aspirin 81 mg oral delayed release tablet: 1 tab(s) orally once a day (20 Mar 2022 10:15)  buPROPion 150 mg/24 hours (XL) oral tablet, extended release: 1 tab(s) orally once a day (in the morning) (20 Mar 2022 10:15)  cycloSPORINE modified 100 mg oral capsule: 2 cap(s) orally once a day (at bedtime) (20 Mar 2022 10:15)  cycloSPORINE modified 50 mg oral capsule: 3 cap(s) orally once a day in the morning  (20 Mar 2022 10:15)  dilTIAZem 120 mg/24 hours oral capsule, extended release: 1 cap(s) orally once a day (hold SBP&lt;110, HR&lt;60) (20 Mar 2022 10:15)  doxycycline hyclate 100 mg oral tablet: 1 tab(s) orally 2 times a day (20 Mar 2022 10:15)  Eliquis 2.5 mg oral tablet: 1 tab(s) orally 2 times a day    Pharmacy states patient no longer wants to fill this medication (20 Mar 2022 10:15)  gabapentin 300 mg oral capsule: 1 cap(s) orally 2 times a day (20 Mar 2022 10:15)  insulin glargine: 15 unit(s) subcutaneous once a day (at bedtime) (20 Mar 2022 10:15)  midodrine 5 mg oral tablet: 1 tab(s) orally 3 times a week, 30 minute prior to dialysis sessions (hold is SBP&gt;130) (20 Mar 2022 10:15)  mirtazapine 7.5 mg oral tablet: 1 tab(s) orally once a day (at bedtime) (20 Mar 2022 10:15)  montelukast 10 mg oral tablet: 1 tab(s) orally once a day (in the evening) (20 Mar 2022 10:15)  oxyCODONE 10 mg oral tablet, extended release: 1 tab(s) orally every 12 hours (20 Mar 2022 10:15)  oxycodone-acetaminophen 7.5 mg-325 mg oral tablet: 1 tab(s) orally every 6 hours, As Needed (20 Mar 2022 10:15)  pantoprazole 40 mg oral delayed release tablet: 1 tab(s) orally once a day (20 Mar 2022 10:15)  polyethylene glycol 3350 oral powder for reconstitution: 17 gram(s) orally once a day (at bedtime) (20 Mar 2022 10:15)  senna oral tablet: 2 tab(s) orally once a day (at bedtime) (20 Mar 2022 10:15)  sertraline 50 mg oral tablet: 1 tab(s) orally once a day (20 Mar 2022 10:15)  sevelamer carbonate 800 mg oral tablet: 1 tab(s) orally 3 times a day (with meals) (20 Mar 2022 10:15)  simethicone 80 mg oral tablet, chewable: 1 tab(s) orally 3 times a day (before meals) (20 Mar 2022 10:15)  simvastatin 10 mg oral tablet: 1 tab(s) orally once a day (at bedtime) (20 Mar 2022 10:15)  sodium hypochlorite 0.25% topical solution: 1 application topically once a day (20 Mar 2022 10:15)  tacrolimus 0.1% topical ointment: 1 application topically once a day (20 Mar 2022 10:15)  traZODone 100 mg oral tablet: 1 tab(s) orally once a day (at bedtime) (20 Mar 2022 10:15)      MEDICATIONS  (STANDING):  apixaban 5 milliGRAM(s) Oral every 12 hours  buPROPion XL (24-Hour) . 150 milliGRAM(s) Oral daily  chlorhexidine 2% Cloths 1 Application(s) Topical daily  cinacalcet 30 milliGRAM(s) Oral daily  cycloSPORINE  , modified (NEORAL) 200 milliGRAM(s) Oral two times a day  Dakins Solution - 1/4 Strength 1 Application(s) Topical daily  dextrose 40% Gel 15 Gram(s) Oral once  dextrose 5%. 1000 milliLiter(s) (50 mL/Hr) IV Continuous <Continuous>  dextrose 5%. 1000 milliLiter(s) (100 mL/Hr) IV Continuous <Continuous>  dextrose 50% Injectable 25 Gram(s) IV Push once  dextrose 50% Injectable 12.5 Gram(s) IV Push once  dextrose 50% Injectable 25 Gram(s) IV Push once  diltiazem    milliGRAM(s) Oral daily  epoetin anabela-epbx (RETACRIT) Injectable 25383 Unit(s) IV Push <User Schedule>  gabapentin 300 milliGRAM(s) Oral daily  glucagon  Injectable 1 milliGRAM(s) IntraMuscular once  insulin glargine Injectable (LANTUS) 10 Unit(s) SubCutaneous at bedtime  insulin lispro (ADMELOG) corrective regimen sliding scale   SubCutaneous three times a day before meals  insulin lispro (ADMELOG) corrective regimen sliding scale   SubCutaneous at bedtime  lidocaine   4% Patch 1 Patch Transdermal daily  lidocaine   4% Patch 1 Patch Transdermal daily  lidocaine   4% Patch 1 Patch Transdermal daily  mirtazapine 7.5 milliGRAM(s) Oral at bedtime  montelukast 10 milliGRAM(s) Oral at bedtime  pantoprazole    Tablet 40 milliGRAM(s) Oral before breakfast  polyethylene glycol 3350 17 Gram(s) Oral at bedtime  senna 2 Tablet(s) Oral at bedtime  sertraline 50 milliGRAM(s) Oral daily  sevelamer carbonate 800 milliGRAM(s) Oral three times a day with meals  simethicone 80 milliGRAM(s) Chew three times a day  tacrolimus   0.1% Ointment 1 Application(s) Topical daily  traZODone 100 milliGRAM(s) Oral at bedtime      TELEMETRY: 	    ECG:  	  RADIOLOGY:   DIAGNOSTIC TESTING:  [ ] Echocardiogram:  [ ]  Catheterization:  [ ] Stress Test:    OTHER: 	    LABS:	 	                            8.6    13.61 )-----------( 504      ( 03 Apr 2022 07:34 )             30.0     04-03    139  |  97<L>  |  14  ----------------------------<  136<H>  3.7   |  26  |  3.69<H>    Ca    9.3      03 Apr 2022 07:34  Phos  2.6     04-03  Mg     2.10     04-03

## 2022-04-05 NOTE — PROGRESS NOTE ADULT - SUBJECTIVE AND OBJECTIVE BOX
CARDIOLOGY FOLLOW UP - Dr. Bullock  DATE OF SERVICE: 4/5/22     CC no cp or sob       REVIEW OF SYSTEMS:  CONSTITUTIONAL: No fever, weight loss, or fatigue  RESPIRATORY: No cough, wheezing, chills or hemoptysis; No Shortness of Breath  CARDIOVASCULAR: No chest pain, palpitations, passing out, dizziness, or leg swelling  GASTROINTESTINAL: No abdominal or epigastric pain. No nausea, vomiting, or hematemesis; No diarrhea or constipation. No melena or hematochezia.  VASCULAR: No edema     PHYSICAL EXAM:  T(C): 37 (04-05-22 @ 05:45), Max: 37 (04-05-22 @ 05:45)  HR: 79 (04-05-22 @ 05:45) (77 - 79)  BP: 140/89 (04-05-22 @ 05:45) (124/63 - 140/89)  RR: 18 (04-05-22 @ 05:45) (17 - 18)  SpO2: 98% (04-05-22 @ 05:45) (97% - 98%)  Wt(kg): --  I&O's Summary      Appearance: Normal	  Cardiovascular: Normal S1 S2,RRR, No JVD, No murmurs  Respiratory: Lungs clear to auscultation	  Gastrointestinal:  Soft, Non-tender, + BS	  Extremities: Normal range of motion, No clubbing, cyanosis or edema      Home Medications:  Aspercreme with Lidocaine 4% topical film: Apply topically to affected area once a day (low back) (20 Mar 2022 10:15)  Aspercreme with Lidocaine 4% topical film: Apply topically to affected area once a day (L knee) (20 Mar 2022 10:15)  aspirin 81 mg oral delayed release tablet: 1 tab(s) orally once a day (20 Mar 2022 10:15)  buPROPion 150 mg/24 hours (XL) oral tablet, extended release: 1 tab(s) orally once a day (in the morning) (20 Mar 2022 10:15)  cycloSPORINE modified 100 mg oral capsule: 2 cap(s) orally once a day (at bedtime) (20 Mar 2022 10:15)  cycloSPORINE modified 50 mg oral capsule: 3 cap(s) orally once a day in the morning  (20 Mar 2022 10:15)  dilTIAZem 120 mg/24 hours oral capsule, extended release: 1 cap(s) orally once a day (hold SBP&lt;110, HR&lt;60) (20 Mar 2022 10:15)  doxycycline hyclate 100 mg oral tablet: 1 tab(s) orally 2 times a day (20 Mar 2022 10:15)  Eliquis 2.5 mg oral tablet: 1 tab(s) orally 2 times a day    Pharmacy states patient no longer wants to fill this medication (20 Mar 2022 10:15)  gabapentin 300 mg oral capsule: 1 cap(s) orally 2 times a day (20 Mar 2022 10:15)  insulin glargine: 15 unit(s) subcutaneous once a day (at bedtime) (20 Mar 2022 10:15)  midodrine 5 mg oral tablet: 1 tab(s) orally 3 times a week, 30 minute prior to dialysis sessions (hold is SBP&gt;130) (20 Mar 2022 10:15)  mirtazapine 7.5 mg oral tablet: 1 tab(s) orally once a day (at bedtime) (20 Mar 2022 10:15)  montelukast 10 mg oral tablet: 1 tab(s) orally once a day (in the evening) (20 Mar 2022 10:15)  oxyCODONE 10 mg oral tablet, extended release: 1 tab(s) orally every 12 hours (20 Mar 2022 10:15)  oxycodone-acetaminophen 7.5 mg-325 mg oral tablet: 1 tab(s) orally every 6 hours, As Needed (20 Mar 2022 10:15)  pantoprazole 40 mg oral delayed release tablet: 1 tab(s) orally once a day (20 Mar 2022 10:15)  polyethylene glycol 3350 oral powder for reconstitution: 17 gram(s) orally once a day (at bedtime) (20 Mar 2022 10:15)  senna oral tablet: 2 tab(s) orally once a day (at bedtime) (20 Mar 2022 10:15)  sertraline 50 mg oral tablet: 1 tab(s) orally once a day (20 Mar 2022 10:15)  sevelamer carbonate 800 mg oral tablet: 1 tab(s) orally 3 times a day (with meals) (20 Mar 2022 10:15)  simethicone 80 mg oral tablet, chewable: 1 tab(s) orally 3 times a day (before meals) (20 Mar 2022 10:15)  simvastatin 10 mg oral tablet: 1 tab(s) orally once a day (at bedtime) (20 Mar 2022 10:15)  sodium hypochlorite 0.25% topical solution: 1 application topically once a day (20 Mar 2022 10:15)  tacrolimus 0.1% topical ointment: 1 application topically once a day (20 Mar 2022 10:15)  traZODone 100 mg oral tablet: 1 tab(s) orally once a day (at bedtime) (20 Mar 2022 10:15)      MEDICATIONS  (STANDING):  apixaban 5 milliGRAM(s) Oral every 12 hours  buPROPion XL (24-Hour) . 150 milliGRAM(s) Oral daily  chlorhexidine 2% Cloths 1 Application(s) Topical daily  cinacalcet 30 milliGRAM(s) Oral daily  cycloSPORINE  , modified (NEORAL) 200 milliGRAM(s) Oral two times a day  Dakins Solution - 1/4 Strength 1 Application(s) Topical daily  dextrose 40% Gel 15 Gram(s) Oral once  dextrose 5%. 1000 milliLiter(s) (50 mL/Hr) IV Continuous <Continuous>  dextrose 5%. 1000 milliLiter(s) (100 mL/Hr) IV Continuous <Continuous>  dextrose 50% Injectable 25 Gram(s) IV Push once  dextrose 50% Injectable 12.5 Gram(s) IV Push once  dextrose 50% Injectable 25 Gram(s) IV Push once  diltiazem    milliGRAM(s) Oral daily  epoetin anabela-epbx (RETACRIT) Injectable 55035 Unit(s) IV Push <User Schedule>  gabapentin 300 milliGRAM(s) Oral daily  glucagon  Injectable 1 milliGRAM(s) IntraMuscular once  insulin glargine Injectable (LANTUS) 10 Unit(s) SubCutaneous at bedtime  insulin lispro (ADMELOG) corrective regimen sliding scale   SubCutaneous three times a day before meals  insulin lispro (ADMELOG) corrective regimen sliding scale   SubCutaneous at bedtime  lidocaine   4% Patch 1 Patch Transdermal daily  lidocaine   4% Patch 1 Patch Transdermal daily  lidocaine   4% Patch 1 Patch Transdermal daily  mirtazapine 7.5 milliGRAM(s) Oral at bedtime  montelukast 10 milliGRAM(s) Oral at bedtime  pantoprazole    Tablet 40 milliGRAM(s) Oral before breakfast  polyethylene glycol 3350 17 Gram(s) Oral at bedtime  senna 2 Tablet(s) Oral at bedtime  sertraline 50 milliGRAM(s) Oral daily  sevelamer carbonate 800 milliGRAM(s) Oral three times a day with meals  simethicone 80 milliGRAM(s) Chew three times a day  tacrolimus   0.1% Ointment 1 Application(s) Topical daily  traZODone 100 milliGRAM(s) Oral at bedtime      TELEMETRY: 	    ECG:  	  RADIOLOGY:   DIAGNOSTIC TESTING:  [ ] Echocardiogram:  [ ]  Catheterization:  [ ] Stress Test:    OTHER: 	    LABS:

## 2022-04-05 NOTE — PROGRESS NOTE ADULT - SUBJECTIVE AND OBJECTIVE BOX
SUBJECTIVE / OVERNIGHT EVENTS:pt seen and examined  4-5- 22    MEDICATIONS  (STANDING):  apixaban 5 milliGRAM(s) Oral every 12 hours  buPROPion XL (24-Hour) . 150 milliGRAM(s) Oral daily  chlorhexidine 2% Cloths 1 Application(s) Topical daily  cinacalcet 30 milliGRAM(s) Oral daily  cycloSPORINE  , modified (NEORAL) 200 milliGRAM(s) Oral two times a day  Dakins Solution - 1/4 Strength 1 Application(s) Topical daily  dextrose 40% Gel 15 Gram(s) Oral once  dextrose 5%. 1000 milliLiter(s) (50 mL/Hr) IV Continuous <Continuous>  dextrose 5%. 1000 milliLiter(s) (100 mL/Hr) IV Continuous <Continuous>  dextrose 50% Injectable 25 Gram(s) IV Push once  dextrose 50% Injectable 12.5 Gram(s) IV Push once  dextrose 50% Injectable 25 Gram(s) IV Push once  diltiazem    milliGRAM(s) Oral daily  epoetin anabela-epbx (RETACRIT) Injectable 62950 Unit(s) IV Push <User Schedule>  gabapentin 300 milliGRAM(s) Oral daily  glucagon  Injectable 1 milliGRAM(s) IntraMuscular once  insulin glargine Injectable (LANTUS) 12 Unit(s) SubCutaneous at bedtime  insulin lispro (ADMELOG) corrective regimen sliding scale   SubCutaneous three times a day before meals  insulin lispro (ADMELOG) corrective regimen sliding scale   SubCutaneous at bedtime  lidocaine   4% Patch 1 Patch Transdermal daily  lidocaine   4% Patch 1 Patch Transdermal daily  lidocaine   4% Patch 1 Patch Transdermal daily  mirtazapine 7.5 milliGRAM(s) Oral at bedtime  montelukast 10 milliGRAM(s) Oral at bedtime  pantoprazole    Tablet 40 milliGRAM(s) Oral before breakfast  polyethylene glycol 3350 17 Gram(s) Oral at bedtime  senna 2 Tablet(s) Oral at bedtime  sertraline 50 milliGRAM(s) Oral daily  sevelamer carbonate 800 milliGRAM(s) Oral three times a day with meals  simethicone 80 milliGRAM(s) Chew three times a day  tacrolimus   0.1% Ointment 1 Application(s) Topical daily  traZODone 100 milliGRAM(s) Oral at bedtime    MEDICATIONS  (PRN):  acetaminophen     Tablet .. 650 milliGRAM(s) Oral every 6 hours PRN Temp greater or equal to 38C (100.4F), Mild Pain (1 - 3)  melatonin 3 milliGRAM(s) Oral at bedtime PRN Insomnia  midodrine. 5 milliGRAM(s) Oral <User Schedule> PRN 30 minutes prior to dialysis  ondansetron Injectable 4 milliGRAM(s) IV Push every 8 hours PRN Nausea and/or Vomiting  oxyCODONE    IR 10 milliGRAM(s) Oral every 6 hours PRN Severe Pain (7 - 10)  oxyCODONE    IR 5 milliGRAM(s) Oral every 6 hours PRN Moderate Pain (4 - 6)    Vital Signs Last 24 Hrs  T(C): 37.2 (04-05-22 @ 21:00), Max: 37.2 (04-05-22 @ 21:00)  T(F): 98.9 (04-05-22 @ 21:00), Max: 98.9 (04-05-22 @ 21:00)  HR: 97 (04-05-22 @ 21:00) (78 - 97)  BP: 100/91 (04-05-22 @ 21:00) (95/44 - 140/89)  BP(mean): 53 (04-05-22 @ 10:30) (53 - 53)  RR: 17 (04-05-22 @ 21:00) (17 - 18)  SpO2: 98% (04-05-22 @ 21:00) (97% - 98%)        Skin: No rash.  Eyes: No recent vision problems or eye pain.  ENT: No congestion, ear pain, or sore throat.  Cardiovascular: No chest pain or palpation.  Respiratory: No cough, shortness of breath, congestion, or wheezing.  Gastrointestinal: No abdominal pain, nausea, vomiting, or diarrhea.  Genitourinary: No dysuria.  Musculoskeletal: No joint swelling.  Neurologic: No headache.    PHYSICAL EXAM:  GENERAL: NAD  EYES: EOMI, PERRLA  NECK: Supple, No JVD  CHEST/LUNG: dec breath sounds at bases  HEART:  S1 , S2 +  ABDOMEN: soft , bs+, wound dressing+  EXTREMITIES:  edema+  NEUROLOGY:alert awake oriented     LABS:  04-05    137  |  95<L>  |  32<H>  ----------------------------<  137<H>  4.1   |  24  |  6.73<H>    Ca    8.8      05 Apr 2022 17:22  Phos  4.1     04-05  Mg     2.20     04-05      Creatinine Trend: 6.73 <--, 3.69 <--, 5.42 <--, 3.33 <--, 6.52 <--, 5.43 <--                        7.8    15.20 )-----------( 516      ( 05 Apr 2022 17:22 )             27.9     Urine Studies:

## 2022-04-05 NOTE — PROGRESS NOTE ADULT - SUBJECTIVE AND OBJECTIVE BOX
[facility] NEPHROLOGY- PROGRESS NOTE    56y Female with history of ESRD on HD presents with vaginal bleeding. Nephrology consulted for ESRD status.    REVIEW OF SYSTEMS:  Gen: no changes in weight  Cards: no chest pain  Resp: no dyspnea  GI: no nausea or vomiting or diarrhea  Vascular: no LE edema    caffeine (Nausea)  latex (Rash)  penicillin (Nausea)  strawberry (Rash)    Hospital Medications: Medications reviewed      VITALS:  T(F): 98.6 (04-05-22 @ 05:45), Max: 98.6 (04-05-22 @ 05:45)  HR: 79 (04-05-22 @ 05:45)  BP: 140/89 (04-05-22 @ 05:45)  RR: 18 (04-05-22 @ 05:45)  SpO2: 98% (04-05-22 @ 05:45)  Wt(kg): --        PHYSICAL EXAM:  Gen: NAD, calm  Cards: RRR, +S1/S2, no M/G/R  Resp: CTA B/L  GI: soft, NT/ND, NABS, + ab wound dressing in place  Vascular: no LE edema B/L, LUE AVF + bruit/thrill      LABS:        Creatinine Trend: 3.69 <--, 5.42 <--, 3.33 <--, 6.52 <--, 5.43 <--    Urine Studies:

## 2022-04-05 NOTE — PROGRESS NOTE ADULT - ASSESSMENT
56F with Hx of ESRD TTS, HTN, DM 2, COPD, afib, known chronic R pannus wound felt most likely pyoderma gangrenosum presents for 1.5wk vag bleeding. Pt recently had prolonged stay at [facility] for pannus wound, known to endocrine service.  Was dc'd to rehab on 3/3.  Prior to last hospitalization patient was on much higher doses of basal bolus.  Now requiring much less.  Has poor appetite.      1. Type 2 diabetes mellitus uncontrolled  A1c 7.0% (may be inaccurate in setting of ESRD)  Home Regimen: Tresiba 80 units HS and Trulicity 1.5mg subq weekly  While inpatient:  BG target 100-180 mg/dL  reduced po intake.  Patient is driking nepro shakes and eating very little  Increase Lantus to 12 units SQ qHS   Continue off of premeal admelog for now.  As improves and FS begin to increase > 200, will consider restarting admelog premeal  Admelog correctional scale LOW before meals and low bedtime scale  Check BG before meals and bedtime  Hypoglycemia protocol   Consistent carbohydrate diet    Discharge Plan:  STOP Trulicity (patient ESRD on HD)  For dc to rehab- recommend to continue current insulin management as outlined above  For dc to home- Recommend basal plus PO regimen  Patient was on Tresiba at home may benefit from a different Long acting insulin such as Lantus or Levemir given ESRD.  Tresiba is ultra-Long acting insulin  Please assess insurance coverage for basal insulin pens:  Levemir, Lantus, Basaglar, Toujeo or Semglee.   Recommend Tradjenta 5 mg po daily, if not covered can see if Januvia 25 mg po daily is covered.  Please obtain prior auth if needed as patient is ESRD and DPP4i class are indicated for patients with ESRD  Consider CGM (such as Freestyle Libre2 outpatient)  If desiring to followup with [facility]: [address] [place] [address], [address], [place] [address], [phone]    2. HTN  Management per primary team     3. Hyperlipidemia  consider restarting statin      Tamela Ruiz  Nurse Practitioner  Division of Endocrinology & Diabetes  Pager # [phone]      If after 6PM or before 9AM, or on weekends/holidays, please call endocrine answering service for assistance ([phone]).  For nonurgent matters email [email or web address] for assistance.

## 2022-04-05 NOTE — PROGRESS NOTE ADULT - SUBJECTIVE AND OBJECTIVE BOX
History:  patient see at bedside. eating once per day, as she is ordering out food because does not like hospital food.  No hypoglycemia     MEDICATIONS  (STANDING):  buPROPion XL (24-Hour) . 150 milliGRAM(s) Oral daily  cinacalcet 60 milliGRAM(s) Oral daily  cycloSPORINE  , modified (NEORAL) 200 milliGRAM(s) Oral at bedtime  cycloSPORINE  , modified (NEORAL) 150 milliGRAM(s) Oral <User Schedule>  dextrose 40% Gel 15 Gram(s) Oral once  dextrose 5%. 1000 milliLiter(s) (50 mL/Hr) IV Continuous <Continuous>  dextrose 5%. 1000 milliLiter(s) (100 mL/Hr) IV Continuous <Continuous>  dextrose 50% Injectable 25 Gram(s) IV Push once  dextrose 50% Injectable 12.5 Gram(s) IV Push once  dextrose 50% Injectable 25 Gram(s) IV Push once  diltiazem    milliGRAM(s) Oral daily  epoetin anabela-epbx (RETACRIT) Injectable 07090 Unit(s) IV Push <User Schedule>  gabapentin 300 milliGRAM(s) Oral two times a day  glucagon  Injectable 1 milliGRAM(s) IntraMuscular once  insulin glargine Injectable (LANTUS) 10 Unit(s) SubCutaneous at bedtime  insulin lispro (ADMELOG) corrective regimen sliding scale   SubCutaneous three times a day before meals  insulin lispro (ADMELOG) corrective regimen sliding scale   SubCutaneous at bedtime  lidocaine   4% Patch 1 Patch Transdermal daily  lidocaine   4% Patch 1 Patch Transdermal daily  mirtazapine 7.5 milliGRAM(s) Oral at bedtime  montelukast 10 milliGRAM(s) Oral at bedtime  oxyCODONE  ER Tablet 10 milliGRAM(s) Oral every 12 hours  pantoprazole    Tablet 40 milliGRAM(s) Oral before breakfast  polyethylene glycol 3350 17 Gram(s) Oral at bedtime  senna 2 Tablet(s) Oral at bedtime  sertraline 50 milliGRAM(s) Oral daily  sevelamer carbonate 800 milliGRAM(s) Oral three times a day with meals  simethicone 80 milliGRAM(s) Chew three times a day  tacrolimus   0.1% Ointment 1 Application(s) Topical daily  traZODone 100 milliGRAM(s) Oral at bedtime    MEDICATIONS  (PRN):  acetaminophen     Tablet .. 650 milliGRAM(s) Oral every 6 hours PRN Temp greater or equal to 38C (100.4F), Mild Pain (1 - 3)  melatonin 3 milliGRAM(s) Oral at bedtime PRN Insomnia  midodrine. 5 milliGRAM(s) Oral <User Schedule> PRN 30 minutes prior to dialysis  ondansetron Injectable 4 milliGRAM(s) IV Push every 8 hours PRN Nausea and/or Vomiting  oxyCODONE    IR 7.5 milliGRAM(s) Oral every 6 hours PRN Severe Pain (7 - 10)      Allergies    latex (Rash)  penicillin (Nausea)  strawberry (Rash)    Intolerances    caffeine (Nausea)    Review of Systems:  Constitutional: No fever  ALL OTHER SYSTEMS REVIEWED AND NEGATIVE      Vital Signs Last 24 Hrs  T(C): 36.8 (05 Apr 2022 14:50), Max: 37.1 (05 Apr 2022 10:30)  T(F): 98.3 (05 Apr 2022 14:50), Max: 98.8 (05 Apr 2022 10:30)  HR: 83 (05 Apr 2022 14:50) (77 - 83)  BP: 101/53 (05 Apr 2022 14:50) (95/44 - 140/89)  BP(mean): 53 (05 Apr 2022 10:30) (53 - 53)  RR: 18 (05 Apr 2022 14:50) (17 - 18)  SpO2: 98% (05 Apr 2022 14:50) (97% - 98%)  GENERAL: NAD, well-developed  GI: Soft, nontender, non distended  PSYCH: Alert and oriented x 3, normal affect, normal mood        CAPILLARY BLOOD GLUCOSE  178 (05 Apr 2022 13:05)  POCT Blood Glucose.: 170 mg/dL (05 Apr 2022 08:48)  POCT Blood Glucose.: 169 mg/dL (04 Apr 2022 21:03)  POCT Blood Glucose.: 165 mg/dL (04 Apr 2022 18:14)  POCT Blood Glucose.: 179 mg/dL (04 Apr 2022 12:37)  POCT Blood Glucose.: 171 mg/dL (04 Apr 2022 08:49)  POCT Blood Glucose.: 192 mg/dL (03 Apr 2022 21:13)  POCT Blood Glucose.: 124 mg/dL (03 Apr 2022 18:04)  POCT Blood Glucose.: 133 mg/dL (01 Apr 2022 12:35)  POCT Blood Glucose.: 223 mg/dL (01 Apr 2022 08:50)  POCT Blood Glucose.: 192 mg/dL (01 Apr 2022 02:28)  POCT Blood Glucose.: 296 mg/dL (31 Mar 2022 21:49)  POCT Blood Glucose.: 145 mg/dL (31 Mar 2022 19:38)  POCT Blood Glucose.: 121 mg/dL (31 Mar 2022 18:06)  POCT Blood Glucose.: 123 mg/dL (28 Mar 2022 17:29)  POCT Blood Glucose.: 156 mg/dL (28 Mar 2022 15:38)  POCT Blood Glucose.: 119 mg/dL (28 Mar 2022 12:43)  POCT Blood Glucose.: 129 mg/dL (28 Mar 2022 09:03)  POCT Blood Glucose.: 186 mg/dL (27 Mar 2022 22:06)  POCT Blood Glucose.: 140 mg/dL (27 Mar 2022 20:51)  POCT Blood Glucose.: 142 mg/dL (24 Mar 2022 21:08)  POCT Blood Glucose.: 171 mg/dL (24 Mar 2022 17:41)  POCT Blood Glucose.: 208 mg/dL (24 Mar 2022 13:03)  POCT Blood Glucose.: 188 mg/dL (24 Mar 2022 09:04)  POCT Blood Glucose.: 171 mg/dL (24 Mar 2022 06:52)  POCT Blood Glucose.: 178 mg/dL (23 Mar 2022 21:41)  POCT Blood Glucose.: 103 mg/dL (21 Mar 2022 20:03)  POCT Blood Glucose.: 111 mg/dL (21 Mar 2022 18:44)  POCT Blood Glucose.: 117 mg/dL (21 Mar 2022 15:31)  POCT Blood Glucose.: 113 mg/dL (21 Mar 2022 12:51)  POCT Blood Glucose.: 124 mg/dL (21 Mar 2022 08:51)  POCT Blood Glucose.: 152 mg/dL (20 Mar 2022 22:10)        A1C with Estimated Average Glucose (03.20.22 @ 12:48)    A1C with Estimated Average Glucose Result: 6.1%    Estimated Average Glucose: 128

## 2022-04-05 NOTE — PROGRESS NOTE ADULT - ASSESSMENT
56y Female with history of ESRD on HD presents with vaginal bleeding. Nephrology consulted for ESRD status.    1) ESRD: Last HD on 4/2 tolerated well with 2L removed with heparin to prevent circuit clotting. Plan for next maintenance HD today. Monitor electrolytes.    2) HTN with ESRD: BP acceptable on Cardizem. Continue with midodrine pre-HD on HD days to avoid intradialytic hypotension. Monitor BP.    3) Anemia of renal disease: With component of blood loss from vaginal bleeding. Hb low with acceptable iron stores. Continue with Epo 18K with HD. PRBC as needed. Monitor Hb.    4) Secondary HPT of renal origin: Phosphorus acceptable with low iPTH for which sensipar decreased to 30 mg daily. Continue with renvela 1 tab with meals. Monitor serum calcium and phosphorus.      [facility] NEPHROLOGY  Keaton Lopez M.D.  Juma Green D.O.  Myla Hoffmann M.D.  Julia Brewer, JASIEL, ANP-C    Telephone: [phone]  Facsimile: [phone]    [address] [place] [address]

## 2022-04-06 NOTE — DISCHARGE NOTE PROVIDER - NSDCFUADDINST_GEN_ALL_CORE_FT
Nephro cans- 1 can three times per day. Renal diet   Ambulate with assistance  Nephro cans- 1 can three times per day. Renal diet   Ambulate with assistance     Abdominal wound care recs: Cleanse with Dakins 1/4 strength. Apply Liquid barrier film to periwound skin. Apply Topical Tacrolimus 0.1% ointment to wound base and wound edges, adaptic touch to cover, apply Aquacel AG, and abdominal pad. Change daily.  -Interdry textile sheeting beneath abdominal pannus leaving 2" out at end to wick.  -Continue to offload pressure

## 2022-04-06 NOTE — CHART NOTE - NSCHARTNOTEFT_GEN_A_CORE
R4 Chart Note    Patient's pathology from D&C reviewed:    EMC showed endometrial polyp, inactive endometrium and a NILM pap smear (BENIGN FINDINGS)  Patient had IUD in place which will prevent additional polyp formation.    No additional intervention from GYN at this time. Patient should follow up outpatient in 3 weeks for string check.    ANTONIO Lerner PGY-4 R4 Chart Note    Patient's pathology from [facility]&C reviewed and discussed with patient:    EMC showed endometrial polyp, inactive endometrium and a NILM pap smear (BENIGN FINDINGS)  Patient had IUD in place which will prevent additional polyp formation.    No additional intervention from GYN at this time. Patient should follow up outpatient in 3 weeks for string check.    ANTONIO Lerner PGY-4

## 2022-04-06 NOTE — CHART NOTE - NSCHARTNOTEFT_GEN_A_CORE
I spoke with the dental resident this morning regarding the patient's chipped tooth and tooth pain that radiates up into her ear and head. The dental resident stated they will perform a full dental evaluation this afternoon at 3pm. I inquired about a need for antibiotics beforehand if a dental extraction was needed, and the dentist said antibiotics beforehand are not indicated. Medical attending was made aware.

## 2022-04-06 NOTE — DISCHARGE NOTE PROVIDER - CARE PROVIDER_API CALL
Kristian Maurice)  Internal Medicine  [phone] [place] [address]  Phone: [phone]  Fax: [phone]  Follow Up Time:

## 2022-04-06 NOTE — PROGRESS NOTE ADULT - SUBJECTIVE AND OBJECTIVE BOX
SUBJECTIVE / OVERNIGHT EVENTS:pt seen and examined  4-6- 22    MEDICATIONS  (STANDING):  apixaban 5 milliGRAM(s) Oral every 12 hours  buPROPion XL (24-Hour) . 150 milliGRAM(s) Oral daily  chlorhexidine 2% Cloths 1 Application(s) Topical daily  cinacalcet 30 milliGRAM(s) Oral daily  cycloSPORINE  , modified (NEORAL) 200 milliGRAM(s) Oral two times a day  Dakins Solution - 1/4 Strength 1 Application(s) Topical daily  dextrose 40% Gel 15 Gram(s) Oral once  dextrose 5%. 1000 milliLiter(s) (50 mL/Hr) IV Continuous <Continuous>  dextrose 5%. 1000 milliLiter(s) (100 mL/Hr) IV Continuous <Continuous>  dextrose 50% Injectable 25 Gram(s) IV Push once  dextrose 50% Injectable 12.5 Gram(s) IV Push once  dextrose 50% Injectable 25 Gram(s) IV Push once  diltiazem    milliGRAM(s) Oral daily  epoetin anabela-epbx (RETACRIT) Injectable 42970 Unit(s) IV Push <User Schedule>  gabapentin 300 milliGRAM(s) Oral daily  glucagon  Injectable 1 milliGRAM(s) IntraMuscular once  insulin glargine Injectable (LANTUS) 12 Unit(s) SubCutaneous at bedtime  insulin lispro (ADMELOG) corrective regimen sliding scale   SubCutaneous three times a day before meals  insulin lispro (ADMELOG) corrective regimen sliding scale   SubCutaneous at bedtime  lidocaine   4% Patch 1 Patch Transdermal daily  lidocaine   4% Patch 1 Patch Transdermal daily  lidocaine   4% Patch 1 Patch Transdermal daily  mirtazapine 7.5 milliGRAM(s) Oral at bedtime  montelukast 10 milliGRAM(s) Oral at bedtime  pantoprazole    Tablet 40 milliGRAM(s) Oral before breakfast  polyethylene glycol 3350 17 Gram(s) Oral at bedtime  senna 2 Tablet(s) Oral at bedtime  sertraline 50 milliGRAM(s) Oral daily  sevelamer carbonate 800 milliGRAM(s) Oral three times a day with meals  simethicone 80 milliGRAM(s) Chew three times a day  tacrolimus   0.1% Ointment 1 Application(s) Topical daily  traZODone 100 milliGRAM(s) Oral at bedtime    MEDICATIONS  (PRN):  acetaminophen     Tablet .. 650 milliGRAM(s) Oral every 6 hours PRN Temp greater or equal to 38C (100.4F), Mild Pain (1 - 3)  melatonin 3 milliGRAM(s) Oral at bedtime PRN Insomnia  midodrine. 5 milliGRAM(s) Oral <User Schedule> PRN 30 minutes prior to dialysis  ondansetron Injectable 4 milliGRAM(s) IV Push every 8 hours PRN Nausea and/or Vomiting  oxyCODONE    IR 10 milliGRAM(s) Oral every 6 hours PRN Severe Pain (7 - 10)  oxyCODONE    IR 5 milliGRAM(s) Oral every 6 hours PRN Moderate Pain (4 - 6)    Vital Signs Last 24 Hrs  T(C): 36.9 (04-06-22 @ 21:30), Max: 37.1 (04-06-22 @ 05:33)  T(F): 98.5 (04-06-22 @ 21:30), Max: 98.8 (04-06-22 @ 05:33)  HR: 85 (04-06-22 @ 21:30) (80 - 85)  BP: 118/55 (04-06-22 @ 21:30) (118/55 - 131/68)  BP(mean): --  RR: 16 (04-06-22 @ 21:30) (16 - 16)  SpO2: 97% (04-06-22 @ 21:30) (95% - 99%)    SpO2: 98% (04-05-22 @ 21:00) (97% - 98%)        Skin: No rash.  Eyes: No recent vision problems or eye pain.  ENT: No congestion, ear pain, or sore throat.  Cardiovascular: No chest pain or palpation.  Respiratory: No cough, shortness of breath, congestion, or wheezing.  Gastrointestinal: No abdominal pain, nausea, vomiting, or diarrhea.  Genitourinary: No dysuria.  Musculoskeletal: No joint swelling.  Neurologic: No headache.    PHYSICAL EXAM:  GENERAL: NAD  EYES: EOMI, PERRLA  NECK: Supple, No JVD  CHEST/LUNG: dec breath sounds at bases  HEART:  S1 , S2 +  ABDOMEN: soft , bs+, wound dressing+  EXTREMITIES:  edema+  NEUROLOGY:alert awake oriented     LABS:  04-06    135  |  95<L>  |  15  ----------------------------<  178<H>  3.8   |  25  |  4.09<H>    Ca    9.3      06 Apr 2022 09:44  Phos  3.2     04-06  Mg     2.10     04-06      Creatinine Trend: 4.09 <--, 6.73 <--, 3.69 <--, 5.42 <--, 3.33 <--, 6.52 <--                        8.2    12.44 )-----------( 427      ( 06 Apr 2022 09:44 )             28.9     Urine Studies:

## 2022-04-06 NOTE — PROGRESS NOTE ADULT - SUBJECTIVE AND OBJECTIVE BOX
[facility] NEPHROLOGY- PROGRESS NOTE    56y Female with history of ESRD on HD presents with vaginal bleeding. Nephrology consulted for ESRD status.    REVIEW OF SYSTEMS:  Gen: no changes in weight  Cards: no chest pain  Resp: no dyspnea  GI: no nausea or vomiting or diarrhea  Vascular: no LE edema    caffeine (Nausea)  latex (Rash)  penicillin (Nausea)  strawberry (Rash)    Hospital Medications: Medications reviewed      VITALS:  T(F): 98.8 (04-06-22 @ 05:33), Max: 98.9 (04-05-22 @ 21:00)  HR: 83 (04-06-22 @ 05:33)  BP: 119/54 (04-06-22 @ 05:33)  RR: 16 (04-06-22 @ 05:33)  SpO2: 95% (04-06-22 @ 05:33)  Wt(kg): --    04-05 @ 07:01  -  04-06 @ 07:00  --------------------------------------------------------  IN: 400 mL / OUT: 2400 mL / NET: -2000 mL        PHYSICAL EXAM:  Gen: NAD, calm  Cards: RRR, +S1/S2, no M/G/R  Resp: CTA B/L  GI: soft, NT/ND, NABS, + ab wound dressing in place  Vascular: no LE edema B/L, LUE AVF + bruit/thrill      LABS:  04-05    137  |  95<L>  |  32<H>  ----------------------------<  137<H>  4.1   |  24  |  6.73<H>    Ca    8.8      05 Apr 2022 17:22  Phos  4.1     04-05  Mg     2.20     04-05      Creatinine Trend: 6.73 <--, 3.69 <--, 5.42 <--, 3.33 <--, 6.52 <--, 5.43 <--                        7.8    15.20 )-----------( 516      ( 05 Apr 2022 17:22 )             27.9     Urine Studies:

## 2022-04-06 NOTE — PROGRESS NOTE ADULT - SUBJECTIVE AND OBJECTIVE BOX
pt medically clear for dental extraction pt medically clear for dental extraction, pt on ac , monitor for active signs of bleeding given ac

## 2022-04-06 NOTE — DISCHARGE NOTE PROVIDER - NSDCFUSCHEDAPPT_GEN_ALL_CORE_FT
ANTONIA ORTIZ ; 04/12/2022 ; [facility]PP OB[facility] ANTONIA ORTIZ ; 05/10/2022 ; NPP Derm [address]

## 2022-04-06 NOTE — CHART NOTE - NSCHARTNOTEFT_GEN_A_CORE
I spoke with the dermatology resident regarding plan for patient's pyoderma gangrenosum. Dermatologist stated that there is no plan to start a TNF inhibitor at this point, and that she should continue with the cyclosporine as prescribed. Patient can follow up outpatient with the dermatology clinic within 2 weeks of discharge for further management.

## 2022-04-06 NOTE — DISCHARGE NOTE PROVIDER - NSDCCPCAREPLAN_GEN_ALL_CORE_FT
PRINCIPAL DISCHARGE DIAGNOSIS  Diagnosis: Vaginal bleeding  Assessment and Plan of Treatment: You were admitted to the hospital for abnormal vaginal bleeding. You were seen by a gynecologist, and you had a procedure done to help stop the bleeding and you had a Mirena IUD placed. Please make sure to follow up with the gynecologist outpatient, and notify a provider if you have abdominal pain or continued vaginal bleeding. Also notify a provider if you feel faint, dizzy, weak or have palpitations      SECONDARY DISCHARGE DIAGNOSES  Diagnosis: Anemia of chronic disease  Assessment and Plan of Treatment: You had anemia in the hospital, which is when the hemoglobin in your blood is too low. This was most likely realted to your vaginal bleedign and your kidney disease. Follow-up with your outpatient provider for further monitoring of your blood counts.   Monitor for signs/symptoms indicating worsening of disease, such as, easy bleeding/bruising, pale skin, fatigue, dizziness, increased heart rate, or chest pain.    Diagnosis: Atrial fibrillation  Assessment and Plan of Treatment: You had afib, which is an irregular heart rhythm. Please continue to take Eliquis as prescribed to prevent blood clots and stroke. Please follow up with your PCP and cardiologist for continued management    Diagnosis: DM (diabetes mellitus)  Assessment and Plan of Treatment: Your HgA1C during hospitalization was noted to be 6.1% (Provide such information to your primary care).  Continue your medication regimen and a consistent carbohydrate diet (Meaning eating the same amount of carbohydrates at the same time each day). Monitor blood glucose levels throughout the day before meals and at bedtime. Record blood sugars and bring to outpatient providers appointment in order to be reviewed by your doctor for management modifications. If your sugars are more than 400 or less than 70 you should contact your PCP immediately.   Monitor for signs/symptoms of low blood glucose, such as, dizziness, altered mental status, or cool/clammy skin. In addition, monitor for signs/symptoms of high blood glucose, such as, feeling hot, dry, fatigued, or with increased thirst/urination.   Make regular podiatry appointments in order to have feet checked for wounds and uncontrolled toe nail growth to prevent infections, as well as, appointments with an ophthalmologist to monitor your vision.    Diagnosis: ESRD on dialysis  Assessment and Plan of Treatment: You have kidney failure, and your equire dialysis regularly. Please continue your dialysis schedule, and follow up with your nephrologist outpatient for further management and repeat bloodwork.    Diagnosis: HLD (hyperlipidemia)  Assessment and Plan of Treatment: We stopped your statin while in the hospital since you were put on a medication called cyclosporin for your abdominal wound infection. Continue prescribed medications to control your cholesterol levels and a DASH (Low fat/salt) diet. Follow up with your primary care provider upon discharge for further management and monitoring of cholesterol levels.    Diagnosis: HTN (hypertension)  Assessment and Plan of Treatment: Continue blood pressure medication regimen as directed. Monitor for any visual changes, headaches or dizziness.  Monitor blood pressure regularly.  Follow up with your primary care provider for further management for high blood pressure.    Diagnosis: Depression  Assessment and Plan of Treatment: Please continue your medications for depression as prescribed, and follow upw ith your PCP outpatient. Please notify a provider if you develop a change in mood or behavior, or have thoughts of hurting yourself or others.    Diagnosis: Pyoderma gangrenosum  Assessment and Plan of Treatment: You were found to have an infection of your abdomen, and you were seen by a dermatologist. You are on cyclosporine medication for this. please continue this medication upond ischarge, and follow up with the dermatologist outpatient.     PRINCIPAL DISCHARGE DIAGNOSIS  Diagnosis: Vaginal bleeding  Assessment and Plan of Treatment: You were admitted to the hospital for abnormal vaginal bleeding. You were seen by a gynecologist, and you had a procedure done to help stop the bleeding and you had a Mirena IUD placed. Please make sure to follow up with the gynecologist outpatient, and notify a provider if you have abdominal pain or continued vaginal bleeding. Also notify a provider if you feel faint, dizzy, weak or have palpitations      SECONDARY DISCHARGE DIAGNOSES  Diagnosis: Anemia of chronic disease  Assessment and Plan of Treatment: You had anemia in the hospital, which is when the hemoglobin in your blood is too low. This was most likely realted to your vaginal bleedign and your kidney disease. Follow-up with your outpatient provider for further monitoring of your blood counts.   Monitor for signs/symptoms indicating worsening of disease, such as, easy bleeding/bruising, pale skin, fatigue, dizziness, increased heart rate, or chest pain.    Diagnosis: Atrial fibrillation  Assessment and Plan of Treatment: You had afib, which is an irregular heart rhythm. Please continue to take Eliquis as prescribed to prevent blood clots and stroke. Please follow up with your PCP and cardiologist for continued management    Diagnosis: DM (diabetes mellitus)  Assessment and Plan of Treatment: Your HgA1C during hospitalization was noted to be 6.1% (Provide such information to your primary care).  Continue your medication regimen and a consistent carbohydrate diet (Meaning eating the same amount of carbohydrates at the same time each day). Monitor blood glucose levels throughout the day before meals and at bedtime. Record blood sugars and bring to outpatient providers appointment in order to be reviewed by your doctor for management modifications. If your sugars are more than 400 or less than 70 you should contact your PCP immediately.   Monitor for signs/symptoms of low blood glucose, such as, dizziness, altered mental status, or cool/clammy skin. In addition, monitor for signs/symptoms of high blood glucose, such as, feeling hot, dry, fatigued, or with increased thirst/urination.   Make regular podiatry appointments in order to have feet checked for wounds and uncontrolled toe nail growth to prevent infections, as well as, appointments with an ophthalmologist to monitor your vision.    Diagnosis: ESRD on dialysis  Assessment and Plan of Treatment: You have kidney failure, and your equire dialysis regularly. Please continue your dialysis schedule, and follow up with your nephrologist outpatient for further management and repeat bloodwork.    Diagnosis: HLD (hyperlipidemia)  Assessment and Plan of Treatment: We stopped your statin while in the hospital since you were put on a medication called cyclosporin for your abdominal wound infection. Continue prescribed medications to control your cholesterol levels and a DASH (Low fat/salt) diet. Follow up with your primary care provider upon discharge for further management and monitoring of cholesterol levels.    Diagnosis: HTN (hypertension)  Assessment and Plan of Treatment: Continue blood pressure medication regimen as directed. Monitor for any visual changes, headaches or dizziness.  Monitor blood pressure regularly.  Follow up with your primary care provider for further management for high blood pressure.    Diagnosis: Depression  Assessment and Plan of Treatment: Please continue your medications for depression as prescribed, and follow upw ith your PCP outpatient. Please notify a provider if you develop a change in mood or behavior, or have thoughts of hurting yourself or others.    Diagnosis: Pyoderma gangrenosum  Assessment and Plan of Treatment: You were found to have an infection of your abdomen, and you were seen by a dermatologist. You are on cyclosporine medication for this. please continue this medication upond ischarge, and follow up with the dermatologist outpatient.    Diagnosis: Closed fracture of tooth, initial encounter  Assessment and Plan of Treatment: You had a fractured tooth in the hospital, and you were seen by a dentist who repaired your tooth. You can follow up with the dental team at [facility] for further dental care.    Diagnosis: URI due to influenza A virus  Assessment and Plan of Treatment: You tested positive for influenza on 4/11. You were started on Tamiflu for this, and you have not had a fever since. Please notify a provider if you develop worsening sympotms or develop a fever again

## 2022-04-06 NOTE — PROGRESS NOTE ADULT - SUBJECTIVE AND OBJECTIVE BOX
History:  patient patricia at bedside. eating once per day, as she is ordering out food because does not like hospital food.  No hypoglycemia     MEDICATIONS  (STANDING):  buPROPion XL (24-Hour) . 150 milliGRAM(s) Oral daily  cinacalcet 60 milliGRAM(s) Oral daily  cycloSPORINE  , modified (NEORAL) 200 milliGRAM(s) Oral at bedtime  cycloSPORINE  , modified (NEORAL) 150 milliGRAM(s) Oral <User Schedule>  dextrose 40% Gel 15 Gram(s) Oral once  dextrose 5%. 1000 milliLiter(s) (50 mL/Hr) IV Continuous <Continuous>  dextrose 5%. 1000 milliLiter(s) (100 mL/Hr) IV Continuous <Continuous>  dextrose 50% Injectable 25 Gram(s) IV Push once  dextrose 50% Injectable 12.5 Gram(s) IV Push once  dextrose 50% Injectable 25 Gram(s) IV Push once  diltiazem    milliGRAM(s) Oral daily  epoetin anabela-epbx (RETACRIT) Injectable 20884 Unit(s) IV Push <User Schedule>  gabapentin 300 milliGRAM(s) Oral two times a day  glucagon  Injectable 1 milliGRAM(s) IntraMuscular once  insulin glargine Injectable (LANTUS) 10 Unit(s) SubCutaneous at bedtime  insulin lispro (ADMELOG) corrective regimen sliding scale   SubCutaneous three times a day before meals  insulin lispro (ADMELOG) corrective regimen sliding scale   SubCutaneous at bedtime  lidocaine   4% Patch 1 Patch Transdermal daily  lidocaine   4% Patch 1 Patch Transdermal daily  mirtazapine 7.5 milliGRAM(s) Oral at bedtime  montelukast 10 milliGRAM(s) Oral at bedtime  oxyCODONE  ER Tablet 10 milliGRAM(s) Oral every 12 hours  pantoprazole    Tablet 40 milliGRAM(s) Oral before breakfast  polyethylene glycol 3350 17 Gram(s) Oral at bedtime  senna 2 Tablet(s) Oral at bedtime  sertraline 50 milliGRAM(s) Oral daily  sevelamer carbonate 800 milliGRAM(s) Oral three times a day with meals  simethicone 80 milliGRAM(s) Chew three times a day  tacrolimus   0.1% Ointment 1 Application(s) Topical daily  traZODone 100 milliGRAM(s) Oral at bedtime    MEDICATIONS  (PRN):  acetaminophen     Tablet .. 650 milliGRAM(s) Oral every 6 hours PRN Temp greater or equal to 38C (100.4F), Mild Pain (1 - 3)  melatonin 3 milliGRAM(s) Oral at bedtime PRN Insomnia  midodrine. 5 milliGRAM(s) Oral <User Schedule> PRN 30 minutes prior to dialysis  ondansetron Injectable 4 milliGRAM(s) IV Push every 8 hours PRN Nausea and/or Vomiting  oxyCODONE    IR 7.5 milliGRAM(s) Oral every 6 hours PRN Severe Pain (7 - 10)      Allergies    latex (Rash)  penicillin (Nausea)  strawberry (Rash)    Intolerances    caffeine (Nausea)    Review of Systems:  Constitutional: No fever  ALL OTHER SYSTEMS REVIEWED AND NEGATIVE      Vital Signs Last 24 Hrs  T(C): 37.1 (06 Apr 2022 13:34), Max: 37.2 (05 Apr 2022 21:00)  T(F): 98.8 (06 Apr 2022 13:34), Max: 98.9 (05 Apr 2022 21:00)  HR: 80 (06 Apr 2022 13:34) (80 - 97)  BP: 131/68 (06 Apr 2022 13:34) (100/91 - 131/68)  BP(mean): --  RR: 16 (06 Apr 2022 13:34) (16 - 18)  SpO2: 99% (06 Apr 2022 13:34) (95% - 99%)  GENERAL: NAD, well-developed  GI: Soft, nontender, non distended  PSYCH: Alert and oriented x 3, normal affect, normal mood        CAPILLARY BLOOD GLUCOSE  208 (06 Apr 2022 12:30)  182 (06 Apr 2022 09:17)  134 (05 Apr 2022 21:22)    POCT Blood Glucose.: 114 mg/dL (05 Apr 2022 19:14)  178 (05 Apr 2022 13:05)  POCT Blood Glucose.: 170 mg/dL (05 Apr 2022 08:48)  POCT Blood Glucose.: 169 mg/dL (04 Apr 2022 21:03)  POCT Blood Glucose.: 165 mg/dL (04 Apr 2022 18:14)  POCT Blood Glucose.: 179 mg/dL (04 Apr 2022 12:37)  POCT Blood Glucose.: 171 mg/dL (04 Apr 2022 08:49)  POCT Blood Glucose.: 192 mg/dL (03 Apr 2022 21:13)  POCT Blood Glucose.: 124 mg/dL (03 Apr 2022 18:04)  POCT Blood Glucose.: 133 mg/dL (01 Apr 2022 12:35)  POCT Blood Glucose.: 223 mg/dL (01 Apr 2022 08:50)  POCT Blood Glucose.: 192 mg/dL (01 Apr 2022 02:28)  POCT Blood Glucose.: 296 mg/dL (31 Mar 2022 21:49)  POCT Blood Glucose.: 145 mg/dL (31 Mar 2022 19:38)  POCT Blood Glucose.: 121 mg/dL (31 Mar 2022 18:06)  POCT Blood Glucose.: 123 mg/dL (28 Mar 2022 17:29)  POCT Blood Glucose.: 156 mg/dL (28 Mar 2022 15:38)  POCT Blood Glucose.: 119 mg/dL (28 Mar 2022 12:43)  POCT Blood Glucose.: 129 mg/dL (28 Mar 2022 09:03)  POCT Blood Glucose.: 186 mg/dL (27 Mar 2022 22:06)  POCT Blood Glucose.: 140 mg/dL (27 Mar 2022 20:51)  POCT Blood Glucose.: 142 mg/dL (24 Mar 2022 21:08)  POCT Blood Glucose.: 171 mg/dL (24 Mar 2022 17:41)  POCT Blood Glucose.: 208 mg/dL (24 Mar 2022 13:03)  POCT Blood Glucose.: 188 mg/dL (24 Mar 2022 09:04)  POCT Blood Glucose.: 171 mg/dL (24 Mar 2022 06:52)  POCT Blood Glucose.: 178 mg/dL (23 Mar 2022 21:41)  POCT Blood Glucose.: 103 mg/dL (21 Mar 2022 20:03)  POCT Blood Glucose.: 111 mg/dL (21 Mar 2022 18:44)  POCT Blood Glucose.: 117 mg/dL (21 Mar 2022 15:31)  POCT Blood Glucose.: 113 mg/dL (21 Mar 2022 12:51)  POCT Blood Glucose.: 124 mg/dL (21 Mar 2022 08:51)  POCT Blood Glucose.: 152 mg/dL (20 Mar 2022 22:10)        A1C with Estimated Average Glucose (03.20.22 @ 12:48)    A1C with Estimated Average Glucose Result: 6.1%    Estimated Average Glucose: 128

## 2022-04-06 NOTE — PROGRESS NOTE ADULT - SUBJECTIVE AND OBJECTIVE BOX
CARDIOLOGY FOLLOW UP - Dr. Bullock  DATE OF SERVICE: 4/6/22     CC no cp or sob      REVIEW OF SYSTEMS:  CONSTITUTIONAL: No fever, weight loss, or fatigue  RESPIRATORY: No cough, wheezing, chills or hemoptysis; No Shortness of Breath  CARDIOVASCULAR: No chest pain, palpitations, passing out, dizziness, or leg swelling  GASTROINTESTINAL: No abdominal or epigastric pain. No nausea, vomiting, or hematemesis; No diarrhea or constipation. No melena or hematochezia.  VASCULAR: No edema     PHYSICAL EXAM:  T(C): 37.1 (04-06-22 @ 05:33), Max: 37.2 (04-05-22 @ 21:00)  HR: 83 (04-06-22 @ 05:33) (78 - 97)  BP: 119/54 (04-06-22 @ 05:33) (95/44 - 123/69)  RR: 16 (04-06-22 @ 05:33) (16 - 18)  SpO2: 95% (04-06-22 @ 05:33) (95% - 98%)  Wt(kg): --  I&O's Summary    05 Apr 2022 07:01  -  06 Apr 2022 07:00  --------------------------------------------------------  IN: 400 mL / OUT: 2400 mL / NET: -2000 mL        Appearance: Normal	  Cardiovascular: Normal S1 S2,RRR, No JVD, No murmurs  Respiratory: Lungs clear to auscultation	  Gastrointestinal:  Soft, Non-tender, + BS	  Extremities: Normal range of motion, No clubbing, cyanosis or edema      Home Medications:  Aspercreme with Lidocaine 4% topical film: Apply topically to affected area once a day (low back) (20 Mar 2022 10:15)  Aspercreme with Lidocaine 4% topical film: Apply topically to affected area once a day (L knee) (20 Mar 2022 10:15)  aspirin 81 mg oral delayed release tablet: 1 tab(s) orally once a day (20 Mar 2022 10:15)  buPROPion 150 mg/24 hours (XL) oral tablet, extended release: 1 tab(s) orally once a day (in the morning) (20 Mar 2022 10:15)  cycloSPORINE modified 100 mg oral capsule: 2 cap(s) orally once a day (at bedtime) (20 Mar 2022 10:15)  cycloSPORINE modified 50 mg oral capsule: 3 cap(s) orally once a day in the morning  (20 Mar 2022 10:15)  dilTIAZem 120 mg/24 hours oral capsule, extended release: 1 cap(s) orally once a day (hold SBP&lt;110, HR&lt;60) (20 Mar 2022 10:15)  doxycycline hyclate 100 mg oral tablet: 1 tab(s) orally 2 times a day (20 Mar 2022 10:15)  Eliquis 2.5 mg oral tablet: 1 tab(s) orally 2 times a day    Pharmacy states patient no longer wants to fill this medication (20 Mar 2022 10:15)  gabapentin 300 mg oral capsule: 1 cap(s) orally 2 times a day (20 Mar 2022 10:15)  insulin glargine: 15 unit(s) subcutaneous once a day (at bedtime) (20 Mar 2022 10:15)  midodrine 5 mg oral tablet: 1 tab(s) orally 3 times a week, 30 minute prior to dialysis sessions (hold is SBP&gt;130) (20 Mar 2022 10:15)  mirtazapine 7.5 mg oral tablet: 1 tab(s) orally once a day (at bedtime) (20 Mar 2022 10:15)  montelukast 10 mg oral tablet: 1 tab(s) orally once a day (in the evening) (20 Mar 2022 10:15)  oxyCODONE 10 mg oral tablet, extended release: 1 tab(s) orally every 12 hours (20 Mar 2022 10:15)  oxycodone-acetaminophen 7.5 mg-325 mg oral tablet: 1 tab(s) orally every 6 hours, As Needed (20 Mar 2022 10:15)  pantoprazole 40 mg oral delayed release tablet: 1 tab(s) orally once a day (20 Mar 2022 10:15)  polyethylene glycol 3350 oral powder for reconstitution: 17 gram(s) orally once a day (at bedtime) (20 Mar 2022 10:15)  senna oral tablet: 2 tab(s) orally once a day (at bedtime) (20 Mar 2022 10:15)  sertraline 50 mg oral tablet: 1 tab(s) orally once a day (20 Mar 2022 10:15)  sevelamer carbonate 800 mg oral tablet: 1 tab(s) orally 3 times a day (with meals) (20 Mar 2022 10:15)  simethicone 80 mg oral tablet, chewable: 1 tab(s) orally 3 times a day (before meals) (20 Mar 2022 10:15)  simvastatin 10 mg oral tablet: 1 tab(s) orally once a day (at bedtime) (20 Mar 2022 10:15)  sodium hypochlorite 0.25% topical solution: 1 application topically once a day (20 Mar 2022 10:15)  tacrolimus 0.1% topical ointment: 1 application topically once a day (20 Mar 2022 10:15)  traZODone 100 mg oral tablet: 1 tab(s) orally once a day (at bedtime) (20 Mar 2022 10:15)      MEDICATIONS  (STANDING):  acetaminophen   IVPB .. 1000 milliGRAM(s) IV Intermittent once  apixaban 5 milliGRAM(s) Oral every 12 hours  buPROPion XL (24-Hour) . 150 milliGRAM(s) Oral daily  chlorhexidine 2% Cloths 1 Application(s) Topical daily  cinacalcet 30 milliGRAM(s) Oral daily  cycloSPORINE  , modified (NEORAL) 200 milliGRAM(s) Oral two times a day  Dakins Solution - 1/4 Strength 1 Application(s) Topical daily  dextrose 40% Gel 15 Gram(s) Oral once  dextrose 5%. 1000 milliLiter(s) (50 mL/Hr) IV Continuous <Continuous>  dextrose 5%. 1000 milliLiter(s) (100 mL/Hr) IV Continuous <Continuous>  dextrose 50% Injectable 25 Gram(s) IV Push once  dextrose 50% Injectable 12.5 Gram(s) IV Push once  dextrose 50% Injectable 25 Gram(s) IV Push once  diltiazem    milliGRAM(s) Oral daily  epoetin anabela-epbx (RETACRIT) Injectable 46466 Unit(s) IV Push <User Schedule>  gabapentin 300 milliGRAM(s) Oral daily  glucagon  Injectable 1 milliGRAM(s) IntraMuscular once  insulin glargine Injectable (LANTUS) 12 Unit(s) SubCutaneous at bedtime  insulin lispro (ADMELOG) corrective regimen sliding scale   SubCutaneous three times a day before meals  insulin lispro (ADMELOG) corrective regimen sliding scale   SubCutaneous at bedtime  lidocaine   4% Patch 1 Patch Transdermal daily  lidocaine   4% Patch 1 Patch Transdermal daily  lidocaine   4% Patch 1 Patch Transdermal daily  mirtazapine 7.5 milliGRAM(s) Oral at bedtime  montelukast 10 milliGRAM(s) Oral at bedtime  pantoprazole    Tablet 40 milliGRAM(s) Oral before breakfast  polyethylene glycol 3350 17 Gram(s) Oral at bedtime  senna 2 Tablet(s) Oral at bedtime  sertraline 50 milliGRAM(s) Oral daily  sevelamer carbonate 800 milliGRAM(s) Oral three times a day with meals  simethicone 80 milliGRAM(s) Chew three times a day  tacrolimus   0.1% Ointment 1 Application(s) Topical daily  traZODone 100 milliGRAM(s) Oral at bedtime      TELEMETRY: 	    ECG:  	  RADIOLOGY:   DIAGNOSTIC TESTING:  [ ] Echocardiogram:  [ ]  Catheterization:  [ ] Stress Test:    OTHER: 	    LABS:	 	                            7.8    15.20 )-----------( 516      ( 05 Apr 2022 17:22 )             27.9     04-05    137  |  95<L>  |  32<H>  ----------------------------<  137<H>  4.1   |  24  |  6.73<H>    Ca    8.8      05 Apr 2022 17:22  Phos  4.1     04-05  Mg     2.20     04-05

## 2022-04-06 NOTE — DISCHARGE NOTE PROVIDER - NSDCFUADDAPPT_GEN_ALL_CORE_FT
Please follow up with the gynecologist within 1-2 weeks for a post-operative follow up visit. [facility] - OB/Gyn Clinic  Ambulatory Care Unit  [address] [place] [address], [facility] Level    Please follow up with your PCP within 1-2 weeks of discharge    Please follow up with your nephrologist within 2-3 weeks of discharge:  [facility] NEPHROLOGY  Keaton Lopez M.D.  RALPH Cody M.D.  Julia Brewer, MSN, ANP-C    Telephone: [phone]  [address] [place] [address]     Please follow up with the gynecologist within 3 weeks for a post-operative follow up visit. [facility] - OB/Gyn Clinic  Ambulatory Care Unit  [address] [place] [address], [facility] Level    Please follow up with your PCP within 1-2 weeks of discharge    Please follow up with your nephrologist within 2-3 weeks of discharge:  [facility] NEPHROLOGY  Keaton Lopez M.D.  Juma Green D.O.  Myla Hoffmann M.D.  Julia Brewer, MSN, ANP-C    Telephone: [phone]  [address] [place] [address]     Please follow up with the gynecologist within 2 weeks for a post-operative follow up visit. [facility] - OB/Gyn Clinic  Ambulatory Care Unit  [address] [place] [address], [facility] Level    Please follow up with your PCP within 1-2 weeks of discharge    Please follow up with the dentist at [place] for further dental restoration management  Please follow up with your dentist for routine dental care    Please follow up with your nephrologist within 2-3 weeks of discharge:  [facility] NEPHROLOGY  Keaton Lopez M.D.  RALPH Cody M.D.  Julia Brewer, MSN, ANP-C    Telephone: [phone]  [address] [place] [address]

## 2022-04-06 NOTE — DISCHARGE NOTE PROVIDER - NSDCMRMEDTOKEN_GEN_ALL_CORE_FT
Aspercreme with Lidocaine 4% topical film: Apply topically to affected area once a day (low back)  Aspercreme with Lidocaine 4% topical film: Apply topically to affected area once a day (L knee)  aspirin 81 mg oral delayed release tablet: 1 tab(s) orally once a day  buPROPion 150 mg/24 hours (XL) oral tablet, extended release: 1 tab(s) orally once a day (in the morning)  cycloSPORINE modified 100 mg oral capsule: 2 cap(s) orally once a day (at bedtime)  cycloSPORINE modified 50 mg oral capsule: 3 cap(s) orally once a day in the morning   dilTIAZem 120 mg/24 hours oral capsule, extended release: 1 cap(s) orally once a day (hold SBP&lt;110, HR&lt;60)  doxycycline hyclate 100 mg oral tablet: 1 tab(s) orally 2 times a day  Eliquis 2.5 mg oral tablet: 1 tab(s) orally 2 times a day    Pharmacy states patient no longer wants to fill this medication  gabapentin 300 mg oral capsule: 1 cap(s) orally 2 times a day  insulin glargine: 15 unit(s) subcutaneous once a day (at bedtime)  midodrine 5 mg oral tablet: 1 tab(s) orally 3 times a week, 30 minute prior to dialysis sessions (hold is SBP&gt;130)  mirtazapine 7.5 mg oral tablet: 1 tab(s) orally once a day (at bedtime)  montelukast 10 mg oral tablet: 1 tab(s) orally once a day (in the evening)  oxyCODONE 10 mg oral tablet, extended release: 1 tab(s) orally every 12 hours  oxycodone-acetaminophen 7.5 mg-325 mg oral tablet: 1 tab(s) orally every 6 hours, As Needed  pantoprazole 40 mg oral delayed release tablet: 1 tab(s) orally once a day  polyethylene glycol 3350 oral powder for reconstitution: 17 gram(s) orally once a day (at bedtime)  senna oral tablet: 2 tab(s) orally once a day (at bedtime)  sertraline 50 mg oral tablet: 1 tab(s) orally once a day  sevelamer carbonate 800 mg oral tablet: 1 tab(s) orally 3 times a day (with meals)  simethicone 80 mg oral tablet, chewable: 1 tab(s) orally 3 times a day (before meals)  simvastatin 10 mg oral tablet: 1 tab(s) orally once a day (at bedtime)  sodium hypochlorite 0.25% topical solution: 1 application topically once a day  tacrolimus 0.1% topical ointment: 1 application topically once a day  traZODone 100 mg oral tablet: 1 tab(s) orally once a day (at bedtime)   apixaban 5 mg oral tablet: 1 tab(s) orally every 12 hours  Aspercreme with Lidocaine 4% topical film: Apply topically to affected area once a day (low back)  Aspercreme with Lidocaine 4% topical film: Apply topically to affected area once a day (L knee)  aspirin 81 mg oral delayed release tablet: 1 tab(s) orally once a day  benzonatate 100 mg oral capsule: 1 cap(s) orally 3 times a day, As needed, Cough  bisacodyl 5 mg oral delayed release tablet: 1 tab(s) orally once a day (at bedtime)  buPROPion 150 mg/24 hours (XL) oral tablet, extended release: 1 tab(s) orally once a day (in the morning)  cycloSPORINE modified 100 mg oral capsule: 2 cap(s) orally 2 times a day  cycloSPORINE modified 100 mg oral capsule: 2 cap(s) orally once a day (at bedtime)  cycloSPORINE modified 50 mg oral capsule: 3 cap(s) orally once a day in the morning   dilTIAZem 120 mg/24 hours oral capsule, extended release: 1 cap(s) orally once a day (hold SBP&lt;110, HR&lt;60)  dilTIAZem 120 mg/24 hours oral capsule, extended release: 1 cap(s) orally once a day  diphenhydrAMINE 50 mg/mL injectable solution: 25 milligram(s) injectable 3 times a week (at 08:00 before dialysis sessions on Tuesday/Thursday/saturday)  doxycycline hyclate 100 mg oral tablet: 1 tab(s) orally 2 times a day  Eliquis 2.5 mg oral tablet: 1 tab(s) orally 2 times a day    Pharmacy states patient no longer wants to fill this medication  gabapentin 300 mg oral capsule: 1 cap(s) orally 2 times a day  gabapentin 300 mg oral capsule: 1 cap(s) orally once a day  insulin glargine: 15 unit(s) subcutaneous once a day (at bedtime)  lidocaine 4% topical film: Apply topically to affected area once a day to right knee  lidocaine 4% topical film: Apply topically to affected area once a day to lower back  lidocaine 4% topical film: Apply topically to affected area once a day to left knee  lidocaine-prilocaine 2.5%-2.5% topical cream: 1 application topically   midodrine 5 mg oral tablet: 1 tab(s) orally 3 times a week, 30 minute prior to dialysis sessions (hold is SBP&gt;130)  midodrine 5 mg oral tablet: 1 tab(s) orally 3 times a week, As Needed 30 minutes prior to dialysis (08:00 on Tuesdays/Thursdays/Saturdays). HOLD if SBP &gt;130  mirtazapine 7.5 mg oral tablet: 1 tab(s) orally once a day (at bedtime)  montelukast 10 mg oral tablet: 1 tab(s) orally once a day (in the evening)  montelukast 10 mg oral tablet: 1 tab(s) orally once a day (at bedtime)  oxyCODONE 10 mg oral tablet, extended release: 1 tab(s) orally every 12 hours  oxycodone-acetaminophen 7.5 mg-325 mg oral tablet: 1 tab(s) orally every 6 hours, As Needed  pantoprazole 40 mg oral delayed release tablet: 1 tab(s) orally once a day  pantoprazole 40 mg oral delayed release tablet: 1 tab(s) orally once a day (before a meal)  polyethylene glycol 3350 oral powder for reconstitution: 17 gram(s) orally once a day (at bedtime)  polyethylene glycol 3350 oral powder for reconstitution: 17 gram(s) orally once a day (at bedtime)  senna oral tablet: 2 tab(s) orally once a day (at bedtime)  senna oral tablet: 2 tab(s) orally once a day (at bedtime)  sertraline 50 mg oral tablet: 1 tab(s) orally once a day  sevelamer carbonate 800 mg oral tablet: 1 tab(s) orally 3 times a day (with meals)  simethicone 80 mg oral tablet, chewable: 1 tab(s) orally 3 times a day (before meals)  simvastatin 10 mg oral tablet: 1 tab(s) orally once a day (at bedtime)  sodium hypochlorite 0.125% topical solution: 1 application topically once a day to affected area (pannus wound on abdomen)  sodium hypochlorite 0.25% topical solution: 1 application topically once a day  tacrolimus 0.1% topical ointment: 1 application topically once a day  tacrolimus 0.1% topical ointment: 1 application topically once a day  traZODone 100 mg oral tablet: 1 tab(s) orally once a day (at bedtime)   apixaban 5 mg oral tablet: 1 tab(s) orally every 12 hours  Aspercreme with Lidocaine 4% topical film: Apply topically to affected area once a day (low back)  Aspercreme with Lidocaine 4% topical film: Apply topically to affected area once a day (L knee)  aspirin 81 mg oral delayed release tablet: 1 tab(s) orally once a day  benzonatate 100 mg oral capsule: 1 cap(s) orally 3 times a day, As needed, Cough  bisacodyl 5 mg oral delayed release tablet: 1 tab(s) orally once a day (at bedtime)  buPROPion 150 mg/24 hours (XL) oral tablet, extended release: 1 tab(s) orally once a day (in the morning)  cinacalcet 30 mg oral tablet: 1 tab(s) orally once a day  cycloSPORINE modified 100 mg oral capsule: 2 cap(s) orally 2 times a day  cycloSPORINE modified 100 mg oral capsule: 2 cap(s) orally once a day (at bedtime)  cycloSPORINE modified 50 mg oral capsule: 3 cap(s) orally once a day in the morning   dilTIAZem 120 mg/24 hours oral capsule, extended release: 1 cap(s) orally once a day (hold SBP&lt;110, HR&lt;60)  dilTIAZem 120 mg/24 hours oral capsule, extended release: 1 cap(s) orally once a day  diphenhydrAMINE 50 mg/mL injectable solution: 25 milligram(s) injectable 3 times a week (at 08:00 before dialysis sessions on Tuesday/Thursday/saturday)  doxycycline hyclate 100 mg oral tablet: 1 tab(s) orally 2 times a day  Eliquis 2.5 mg oral tablet: 1 tab(s) orally 2 times a day    Pharmacy states patient no longer wants to fill this medication  gabapentin 300 mg oral capsule: 1 cap(s) orally 2 times a day  gabapentin 300 mg oral capsule: 1 cap(s) orally once a day  insulin glargine: 15 unit(s) subcutaneous once a day (at bedtime)  lidocaine 4% topical film: Apply topically to affected area once a day to right knee  lidocaine 4% topical film: Apply topically to affected area once a day to lower back  lidocaine 4% topical film: Apply topically to affected area once a day to left knee  lidocaine-prilocaine 2.5%-2.5% topical cream: 1 application topically   midodrine 5 mg oral tablet: 1 tab(s) orally 3 times a week, 30 minute prior to dialysis sessions (hold is SBP&gt;130)  midodrine 5 mg oral tablet: 1 tab(s) orally 3 times a week, As Needed 30 minutes prior to dialysis (08:00 on Tuesdays/Thursdays/Saturdays). HOLD if SBP &gt;130  mirtazapine 7.5 mg oral tablet: 1 tab(s) orally once a day (at bedtime)  montelukast 10 mg oral tablet: 1 tab(s) orally once a day (in the evening)  montelukast 10 mg oral tablet: 1 tab(s) orally once a day (at bedtime)  oxyCODONE 10 mg oral tablet, extended release: 1 tab(s) orally every 12 hours  oxycodone-acetaminophen 7.5 mg-325 mg oral tablet: 1 tab(s) orally every 6 hours, As Needed  pantoprazole 40 mg oral delayed release tablet: 1 tab(s) orally once a day  pantoprazole 40 mg oral delayed release tablet: 1 tab(s) orally once a day (before a meal)  polyethylene glycol 3350 oral powder for reconstitution: 17 gram(s) orally once a day (at bedtime)  polyethylene glycol 3350 oral powder for reconstitution: 17 gram(s) orally once a day (at bedtime)  senna oral tablet: 2 tab(s) orally once a day (at bedtime)  senna oral tablet: 2 tab(s) orally once a day (at bedtime)  sertraline 50 mg oral tablet: 1 tab(s) orally once a day  sevelamer carbonate 800 mg oral tablet: 1 tab(s) orally 3 times a day (with meals)  simethicone 80 mg oral tablet, chewable: 1 tab(s) orally 3 times a day (before meals)  simvastatin 10 mg oral tablet: 1 tab(s) orally once a day (at bedtime)  sodium hypochlorite 0.125% topical solution: 1 application topically once a day to affected area (pannus wound on abdomen)  sodium hypochlorite 0.25% topical solution: 1 application topically once a day  tacrolimus 0.1% topical ointment: 1 application topically once a day  tacrolimus 0.1% topical ointment: 1 application topically once a day  traZODone 100 mg oral tablet: 1 tab(s) orally once a day (at bedtime)   apixaban 5 mg oral tablet: 1 tab(s) orally every 12 hours  Aspercreme with Lidocaine 4% topical film: Apply topically to affected area once a day (low back)  Aspercreme with Lidocaine 4% topical film: Apply topically to affected area once a day (L knee)  aspirin 81 mg oral delayed release tablet: 1 tab(s) orally once a day  benzonatate 100 mg oral capsule: 1 cap(s) orally 3 times a day, As needed, Cough  bisacodyl 5 mg oral delayed release tablet: 1 tab(s) orally once a day (at bedtime)  buPROPion 150 mg/24 hours (XL) oral tablet, extended release: 1 tab(s) orally once a day (in the morning)  cinacalcet 30 mg oral tablet: 1 tab(s) orally once a day  cycloSPORINE modified 100 mg oral capsule: 2 cap(s) orally 2 times a day  cycloSPORINE modified 100 mg oral capsule: 2 cap(s) orally once a day (at bedtime)  cycloSPORINE modified 50 mg oral capsule: 3 cap(s) orally once a day in the morning   dilTIAZem 120 mg/24 hours oral capsule, extended release: 1 cap(s) orally once a day (hold SBP&lt;110, HR&lt;60)  dilTIAZem 120 mg/24 hours oral capsule, extended release: 1 cap(s) orally once a day  diphenhydrAMINE 50 mg/mL injectable solution: 25 milligram(s) injectable 3 times a week (at 08:00 before dialysis sessions on Tuesday/Thursday/saturday)  doxycycline hyclate 100 mg oral tablet: 1 tab(s) orally 2 times a day  Eliquis 2.5 mg oral tablet: 1 tab(s) orally 2 times a day    Pharmacy states patient no longer wants to fill this medication  gabapentin 300 mg oral capsule: 1 cap(s) orally 2 times a day  gabapentin 300 mg oral capsule: 1 cap(s) orally once a day  insulin glargine: 15 unit(s) subcutaneous once a day (at bedtime)  lidocaine 4% topical film: Apply topically to affected area once a day to right knee  lidocaine 4% topical film: Apply topically to affected area once a day to lower back  lidocaine 4% topical film: Apply topically to affected area once a day to left knee  lidocaine-prilocaine 2.5%-2.5% topical cream: 1 application topically   midodrine 10 mg oral tablet: 1 tab(s) orally  3 times a week, As Needed 30 minutes prior to dialysis (08:00 on Tuesdays/Thursdays/Saturdays). HOLD if SBP &gt;130  midodrine 5 mg oral tablet: 1 tab(s) orally 3 times a week, 30 minute prior to dialysis sessions (hold is SBP&gt;130)  mirtazapine 7.5 mg oral tablet: 1 tab(s) orally once a day (at bedtime)  montelukast 10 mg oral tablet: 1 tab(s) orally once a day (in the evening)  montelukast 10 mg oral tablet: 1 tab(s) orally once a day (at bedtime)  oxyCODONE 10 mg oral tablet, extended release: 1 tab(s) orally every 12 hours  oxycodone-acetaminophen 7.5 mg-325 mg oral tablet: 1 tab(s) orally every 6 hours, As Needed  pantoprazole 40 mg oral delayed release tablet: 1 tab(s) orally once a day  pantoprazole 40 mg oral delayed release tablet: 1 tab(s) orally once a day (before a meal)  polyethylene glycol 3350 oral powder for reconstitution: 17 gram(s) orally once a day (at bedtime)  polyethylene glycol 3350 oral powder for reconstitution: 17 gram(s) orally once a day (at bedtime)  senna oral tablet: 2 tab(s) orally once a day (at bedtime)  senna oral tablet: 2 tab(s) orally once a day (at bedtime)  sertraline 50 mg oral tablet: 1 tab(s) orally once a day  sevelamer carbonate 800 mg oral tablet: 1 tab(s) orally 3 times a day (with meals)  simethicone 80 mg oral tablet, chewable: 1 tab(s) orally 3 times a day (before meals)  simvastatin 10 mg oral tablet: 1 tab(s) orally once a day (at bedtime)  sodium hypochlorite 0.125% topical solution: 1 application topically once a day to affected area (pannus wound on abdomen)  sodium hypochlorite 0.25% topical solution: 1 application topically once a day  tacrolimus 0.1% topical ointment: 1 application topically once a day  tacrolimus 0.1% topical ointment: 1 application topically once a day  traZODone 100 mg oral tablet: 1 tab(s) orally once a day (at bedtime)   acetaminophen 325 mg oral tablet: 2 tab(s) orally every 6 hours, As needed, Temp greater or equal to 38C (100.4F), Mild Pain (1 - 3)  aluminum hydroxide-magnesium hydroxide 200 mg-200 mg/5 mL oral suspension: 30 milliliter(s) orally every 6 hours, As needed, Dyspepsia  apixaban 5 mg oral tablet: 1 tab(s) orally every 12 hours  benzonatate 100 mg oral capsule: 1 cap(s) orally 3 times a day, As needed, Cough  bisacodyl 5 mg oral delayed release tablet: 1 tab(s) orally once a day (at bedtime)  buPROPion 150 mg/24 hours (XL) oral tablet, extended release: 1 tab(s) orally once a day (in the morning)  buPROPion 150 mg/24 hours (XL) oral tablet, extended release: 1 tab(s) orally once a day  cinacalcet 30 mg oral tablet: 1 tab(s) orally once a day  cycloSPORINE modified 100 mg oral capsule: 2 cap(s) orally 2 times a day  dilTIAZem 120 mg/24 hours oral capsule, extended release: 1 cap(s) orally once a day  diphenhydrAMINE 50 mg/mL injectable solution: 25 milligram(s) injectable 3 times a week (at 08:00 before dialysis sessions on Tuesday/Thursday/saturday)  gabapentin 300 mg oral capsule: 1 cap(s) orally once a day  guaiFENesin 100 mg/5 mL oral liquid: 5 milliliter(s) orally every 6 hours, As needed, Cough  insulin lispro 100 units/mL injectable solution: 2 unit(s) injectable 3 times a day (before meals)  lactobacillus acidophilus oral capsule: 1 cap(s) orally once a day  lidocaine 4% topical film: Apply topically to affected area once a day to right knee  lidocaine 4% topical film: Apply topically to affected area once a day to lower back  lidocaine 4% topical film: Apply topically to affected area once a day to left knee  lidocaine 4% topical film: Apply topically to affected area once a day    Apply to neck  melatonin 3 mg oral tablet: 2 tab(s) orally once a day (at bedtime), As needed, Insomnia  midodrine 10 mg oral tablet: 1 tab(s) orally  3 times a week, As Needed 30 minutes prior to dialysis (08:00 on Tuesdays/Thursdays/Saturdays). HOLD if SBP &gt;130  mirtazapine 7.5 mg oral tablet: 1 tab(s) orally once a day (at bedtime)  montelukast 10 mg oral tablet: 1 tab(s) orally once a day (at bedtime)  nystatin 100,000 units/g topical cream: 1 application topically every 12 hours  oxyCODONE 5 mg oral tablet: 1 tab(s) orally every 6 hours, As needed, Severe Pain (7 - 10)  pantoprazole 40 mg oral delayed release tablet: 1 tab(s) orally once a day  pantoprazole 40 mg oral delayed release tablet: 1 tab(s) orally once a day (before a meal)  polyethylene glycol 3350 oral powder for reconstitution: 17 gram(s) orally once a day (at bedtime)  polyethylene glycol 3350 oral powder for reconstitution: 17 gram(s) orally once a day (at bedtime)  senna oral tablet: 2 tab(s) orally once a day (at bedtime)  senna oral tablet: 2 tab(s) orally once a day (at bedtime)  sertraline 50 mg oral tablet: 1 tab(s) orally once a day  sevelamer carbonate 800 mg oral tablet: 1 tab(s) orally 3 times a day (with meals)  simethicone 80 mg oral tablet, chewable: 1 tab(s) orally 3 times a day (before meals)  sodium hypochlorite 0.125% topical solution: 1 application topically once a day to affected area (pannus wound on abdomen)  tacrolimus 0.1% topical ointment: 1 application topically once a day  traZODone 100 mg oral tablet: 1 tab(s) orally once a day (at bedtime)   acetaminophen 325 mg oral tablet: 2 tab(s) orally every 6 hours, As needed, Temp greater or equal to 38C (100.4F), Mild Pain (1 - 3)  aluminum hydroxide-magnesium hydroxide 200 mg-200 mg/5 mL oral suspension: 30 milliliter(s) orally every 6 hours, As needed, Dyspepsia  apixaban 5 mg oral tablet: 1 tab(s) orally every 12 hours  benzonatate 100 mg oral capsule: 1 cap(s) orally 3 times a day, As needed, Cough  bisacodyl 5 mg oral delayed release tablet: 1 tab(s) orally once a day (at bedtime)  buPROPion 150 mg/24 hours (XL) oral tablet, extended release: 1 tab(s) orally once a day (in the morning)  cinacalcet 30 mg oral tablet: 1 tab(s) orally once a day  cycloSPORINE modified 100 mg oral capsule: 2 cap(s) orally 2 times a day  dilTIAZem 120 mg/24 hours oral capsule, extended release: 1 cap(s) orally once a day  diphenhydrAMINE 50 mg/mL injectable solution: 25 milligram(s) injectable 3 times a week (at 08:00 before dialysis sessions on Tuesday/Thursday/saturday)  gabapentin 300 mg oral capsule: 1 cap(s) orally once a day  guaiFENesin 100 mg/5 mL oral liquid: 5 milliliter(s) orally every 6 hours, As needed, Cough  insulin lispro 100 units/mL injectable solution: 2 unit(s) injectable 3 times a day (before meals)  lactobacillus acidophilus oral capsule: 1 cap(s) orally once a day  lidocaine 4% topical film: Apply topically to affected area once a day to right knee  lidocaine 4% topical film: Apply topically to affected area once a day to lower back  lidocaine 4% topical film: Apply topically to affected area once a day to left knee  lidocaine 4% topical film: Apply topically to affected area once a day    Apply to neck  melatonin 3 mg oral tablet: 2 tab(s) orally once a day (at bedtime), As needed, Insomnia  midodrine 10 mg oral tablet: 1 tab(s) orally  3 times a week, As Needed 30 minutes prior to dialysis (08:00 on Tuesdays/Thursdays/Saturdays). HOLD if SBP &gt;130  mirtazapine 7.5 mg oral tablet: 1 tab(s) orally once a day (at bedtime)  montelukast 10 mg oral tablet: 1 tab(s) orally once a day (at bedtime)  nystatin 100,000 units/g topical cream: 1 application topically every 12 hours  oxyCODONE 5 mg oral tablet: 1 tab(s) orally every 6 hours, As needed, Severe Pain (7 - 10)  pantoprazole 40 mg oral delayed release tablet: 1 tab(s) orally once a day  polyethylene glycol 3350 oral powder for reconstitution: 17 gram(s) orally once a day (at bedtime)  senna oral tablet: 2 tab(s) orally once a day (at bedtime)  sertraline 50 mg oral tablet: 1 tab(s) orally once a day  sevelamer carbonate 800 mg oral tablet: 1 tab(s) orally 3 times a day (with meals)  simethicone 80 mg oral tablet, chewable: 1 tab(s) orally 3 times a day (before meals)  sodium hypochlorite 0.125% topical solution: 1 application topically once a day to affected area (pannus wound on abdomen)  tacrolimus 0.1% topical ointment: 1 application topically once a day  traZODone 100 mg oral tablet: 1 tab(s) orally once a day (at bedtime)

## 2022-04-06 NOTE — DISCHARGE NOTE PROVIDER - HOSPITAL COURSE
57yo F with PMH of ESRD (HD TTS), HTN, DM, COPD, afib (on Eliquis), known chronic R pannus wound presents for 1.5wk vag bleeding.     Pyoderma gangrenosum  - CTAP w/ Incompletely imaged large skin defect involving the inferior pannus with subcutaneous stranding and skin thickening compatible w/ cellulitis. No abscess identified.  Enlarged myomatous uterus. Suspected endometrial thickening.   - derm consulted - suspect Pyoderma Gangrenosum based on clinical appearance of deep ulceration with extensive undermining and worsening when trial off of steroids, now with gradual improvement since initiation of cyclosporine since 2/8 (as evidenced by slight decrease in size and evidence of re-epithelialization).   - inc dose cyclosporin to 200mg BID  - consider addition of TNF inhibitor (remicade)- follow derm's recs ***************    Vaginal bleeding  - last menses October 2021, noted by GYN to be perimenopausal in 2019  - CT Enlarged myomatous uterus. Suspected endometrial thickening  - TVUS: nondiagnostic   - GYN: 3/31 s/p D&C, hysteroscopy with Mirena IUD placement.  - f/u GYN outpatient     Anemia of chronic disease  - likely multifactorial - CKD, chronic inflammatory disease (pyoderma), now with some component of anemia of acute blood loss  - H/H stable     ESRD on dialysis  -HD as scheduled    Atrial fibrillation  - s/p pt specific heparin gtt without bleeding   - home dose eliquis 2.5 mg resumed 3/28 and increased to 5 mg given age and weight   - c/w cardizem for rate control     DM   -iss  -titrate insulin for optimal blood sugar control  - for d/c to rehab: Increase Lantus to 12 units SQ qHS, low ISS pre-meal and at bedtime. Holding off admelog pre-meal. STOP trulicity as patient with ESRD    HLD (hyperlipidemia)  -will hold statin now give possible interaction with cyclosporin.    Depression  -c/w current meds.    Patient is medically cleared for discharge to rehab as discussed with Dr. Maurice on______   55yo F with PMH of ESRD (HD TTS), HTN, DM, COPD, afib (on Eliquis), known chronic R pannus wound presents for 1.5wk vag bleeding.     Pyoderma gangrenosum  - CTAP w/ Incompletely imaged large skin defect involving the inferior pannus with subcutaneous stranding and skin thickening compatible w/ cellulitis. No abscess identified.  Enlarged myomatous uterus. Suspected endometrial thickening.   - derm consulted - suspect Pyoderma Gangrenosum based on clinical appearance of deep ulceration with extensive undermining and worsening when trial off of steroids, now with gradual improvement since initiation of cyclosporine since 2/8 (as evidenced by slight decrease in size and evidence of re-epithelialization).   - inc dose cyclosporin to 200mg BID  - consider addition of TNF inhibitor (remicade)- follow derm's recs ***************    Vaginal bleeding  - last menses October 2021, noted by GYN to be perimenopausal in 2019  - CT Enlarged myomatous uterus. Suspected endometrial thickening  - TVUS: nondiagnostic   - GYN: 3/31 s/p D&C, hysteroscopy with Mirena IUD placement.  - f/u GYN outpatient     Anemia of chronic disease  - likely multifactorial - CKD, chronic inflammatory disease (pyoderma), now with some component of anemia of acute blood loss  - H/H stable     ESRD on dialysis  -HD as scheduled    Atrial fibrillation  - s/p pt specific heparin gtt without bleeding   - home dose eliquis 2.5 mg resumed 3/28 and increased to 5 mg given age and weight   - c/w cardizem for rate control     DM   - for d/c to rehab: Increase Lantus to 12 units SQ qHS, low ISS pre-meal and at bedtime. Holding off admelog pre-meal. STOP trulicity as patient with ESRD    HLD (hyperlipidemia)  -will hold statin now give possible interaction with cyclosporin.    Depression  -c/w current meds.    Patient is medically cleared for discharge to rehab as discussed with Dr. Maurice on______   55yo F with PMH of ESRD (HD TTS), HTN, DM, COPD, afib (on Eliquis), known chronic R pannus wound presents for 1.5wk vag bleeding.     Pyoderma gangrenosum  - CTAP w/ Incompletely imaged large skin defect involving the inferior pannus with subcutaneous stranding and skin thickening compatible w/ cellulitis. No abscess identified.  Enlarged myomatous uterus. Suspected endometrial thickening.   - derm consulted - suspect Pyoderma Gangrenosum based on clinical appearance of deep ulceration with extensive undermining and worsening when trial off of steroids, now with gradual improvement since initiation of cyclosporine since 2/8 (as evidenced by slight decrease in size and evidence of re-epithelialization).   - inc dose cyclosporin to 200mg BID  - consider addition of TNF inhibitor (remicade)- follow derm's recs ***************  - wound care recs: Topical dressing: Cleanse with Dakins 1/4 strength. Apply Liquid barrier film to periwound skin. Apply Topical Tacrolimus 0.1% ointment to wound base and wound edges, adaptic touch to cover, apply Aquacel AG, and abdominal pad. Change daily.  -Interdry textile sheeting beneath abdominal pannus leaving 2" out at end to wick.  -Continue to offload pressure    Vaginal bleeding  - last menses October 2021, noted by GYN to be perimenopausal in 2019  - CT Enlarged myomatous uterus. Suspected endometrial thickening  - TVUS: nondiagnostic   - GYN: 3/31 s/p D&C, hysteroscopy with Mirena IUD placement.  - f/u GYN outpatient     Anemia of chronic disease  - likely multifactorial - CKD, chronic inflammatory disease (pyoderma), now with some component of anemia of acute blood loss  - H/H stable     ESRD on dialysis  -HD as scheduled    Atrial fibrillation  - s/p pt specific heparin gtt without bleeding   - home dose eliquis 2.5 mg resumed 3/28 and increased to 5 mg given age and weight   - c/w cardizem for rate control     DM   - for d/c to rehab: Increase Lantus to 12 units SQ qHS, low ISS pre-meal and at bedtime. Holding off admelog pre-meal. STOP trulicity as patient with ESRD    HLD (hyperlipidemia)  -will hold statin now give possible interaction with cyclosporin.    Depression  -c/w current meds.    Patient is medically cleared for discharge to rehab as discussed with Dr. Maurice on______   57yo F with PMH of ESRD (HD TTS), HTN, DM, COPD, afib (on Eliquis), known chronic R pannus wound presents for 1.5wk vag bleeding.     Pyoderma gangrenosum  - CTAP w/ Incompletely imaged large skin defect involving the inferior pannus with subcutaneous stranding and skin thickening compatible w/ cellulitis. No abscess identified.  Enlarged myomatous uterus. Suspected endometrial thickening.   - derm consulted - suspect Pyoderma Gangrenosum based on clinical appearance of deep ulceration with extensive undermining and worsening when trial off of steroids, now with gradual improvement since initiation of cyclosporine since 2/8 (as evidenced by slight decrease in size and evidence of re-epithelialization).   - inc dose cyclosporin to 200mg BID  - will not add TNF inhibitor at this time per derm's recs- will follow up with derm clinic outpatient   - wound care recs: Topical dressing: Cleanse with Dakins 1/4 strength. Apply Liquid barrier film to periwound skin. Apply Topical Tacrolimus 0.1% ointment to wound base and wound edges, adaptic touch to cover, apply Aquacel AG, and abdominal pad. Change daily.  -Interdry textile sheeting beneath abdominal pannus leaving 2" out at end to wick.  -Continue to offload pressure    Vaginal bleeding  - last menses October 2021, noted by GYN to be perimenopausal in 2019  - CT Enlarged myomatous uterus. Suspected endometrial thickening  - TVUS: nondiagnostic   - GYN: 3/31 s/p D&C, hysteroscopy with Mirena IUD placement.  - path report:  Benign inactive endometrium.Endometrial polyp.Benign squamous epithelium. Benign endocervical tissue.  - f/u GYN outpatient in 2 weeks from discharge     Anemia of chronic disease  - likely multifactorial - CKD, chronic inflammatory disease (pyoderma), now with some component of anemia of acute blood loss  - H/H stable     ESRD on dialysis  -HD as scheduled    Atrial fibrillation  - s/p pt specific heparin gtt without bleeding   - home dose eliquis 2.5 mg resumed 3/28 and increased to 5 mg given age and weight   - c/w cardizem for rate control     DM   - for d/c to rehab: Increase Lantus to 12 units SQ qHS, low ISS pre-meal and at bedtime. Holding off admelog pre-meal. STOP trulicity as patient with ESRD    HLD (hyperlipidemia)  -will hold statin now give possible interaction with cyclosporin.    Depression  -c/w current meds.    Patient is medically cleared for discharge to rehab as discussed with Dr. Maurice on______   57yo F with PMH of ESRD (HD TTS), HTN, DM, COPD, afib (on Eliquis), known chronic R pannus wound presents for 1.5wk vag bleeding.     Pyoderma gangrenosum  - CTAP w/ Incompletely imaged large skin defect involving the inferior pannus with subcutaneous stranding and skin thickening compatible w/ cellulitis. No abscess identified.  Enlarged myomatous uterus. Suspected endometrial thickening.   - derm consulted - suspect Pyoderma Gangrenosum based on clinical appearance of deep ulceration with extensive undermining and worsening when trial off of steroids, now with gradual improvement since initiation of cyclosporine since 2/8 (as evidenced by slight decrease in size and evidence of re-epithelialization).   - inc dose cyclosporin to 200mg BID  - will not add TNF inhibitor at this time per derm's recs- will follow up with derm clinic outpatient   - wound care recs: Topical dressing: Cleanse with Dakins 1/4 strength. Apply Liquid barrier film to periwound skin. Apply Topical Tacrolimus 0.1% ointment to wound base and wound edges, adaptic touch to cover, apply Aquacel AG, and abdominal pad. Change daily.  -Interdry textile sheeting beneath abdominal pannus leaving 2" out at end to wick.  -Continue to offload pressure    Vaginal bleeding  - last menses October 2021, noted by GYN to be perimenopausal in 2019  - CT Enlarged myomatous uterus. Suspected endometrial thickening  - TVUS: nondiagnostic   - GYN: 3/31 s/p D&C, hysteroscopy with Mirena IUD placement.  - path report:  Benign inactive endometrium.Endometrial polyp.Benign squamous epithelium. Benign endocervical tissue.  - f/u GYN  follow up outpatient in 3 weeks for string check.      Anemia of chronic disease  - likely multifactorial - CKD, chronic inflammatory disease (pyoderma), now with some component of anemia of acute blood loss  - H/H stable     ESRD on dialysis  -HD as scheduled    Atrial fibrillation  - s/p pt specific heparin gtt without bleeding   - home dose eliquis 2.5 mg resumed 3/28 and increased to 5 mg given age and weight   - c/w cardizem for rate control     DM   - for d/c to rehab: Increase Lantus to 12 units SQ qHS, low ISS pre-meal and at bedtime. Holding off admelog pre-meal. STOP trulicity as patient with ESRD    HLD (hyperlipidemia)  -will hold statin now give possible interaction with cyclosporin.    Depression  -c/w current meds.    Patient is medically cleared for discharge to rehab as discussed with Dr. Maurice on______   57yo F with PMH of ESRD (HD TTS), HTN, DM, COPD, afib (on Eliquis), known chronic R pannus wound presents for 1.5wk vag bleeding.     Pyoderma gangrenosum  - CTAP w/ Incompletely imaged large skin defect involving the inferior pannus with subcutaneous stranding and skin thickening compatible w/ cellulitis. No abscess identified.  Enlarged myomatous uterus. Suspected endometrial thickening.   - derm consulted - suspect Pyoderma Gangrenosum based on clinical appearance of deep ulceration with extensive undermining and worsening when trial off of steroids, now with gradual improvement since initiation of cyclosporine since 2/8 (as evidenced by slight decrease in size and evidence of re-epithelialization).   - inc dose cyclosporin to 200mg BID  - will not add TNF inhibitor at this time per derm's recs- will follow up with derm clinic outpatient   - wound care recs: Topical dressing: Cleanse with Dakins 1/4 strength. Apply Liquid barrier film to periwound skin. Apply Topical Tacrolimus 0.1% ointment to wound base and wound edges, adaptic touch to cover, apply Aquacel AG, and abdominal pad. Change daily.  -Interdry textile sheeting beneath abdominal pannus leaving 2" out at end to wick.  -Continue to offload pressure    Vaginal bleeding  - last menses October 2021, noted by GYN to be perimenopausal in 2019  - CT Enlarged myomatous uterus. Suspected endometrial thickening  - TVUS: nondiagnostic   - GYN: 3/31 s/p D&C, hysteroscopy with Mirena IUD placement.  - path report:  Benign inactive endometrium.Endometrial polyp.Benign squamous epithelium. Benign endocervical tissue.  - f/u GYN  follow up outpatient in 2 weeks for string check.      Anemia of chronic disease  - likely multifactorial - CKD, chronic inflammatory disease (pyoderma), now with some component of anemia of acute blood loss  - H/H stable     ESRD on dialysis  -HD as scheduled    Atrial fibrillation  - s/p pt specific heparin gtt without bleeding   - home dose eliquis 2.5 mg resumed 3/28 and increased to 5 mg given age and weight   - c/w cardizem for rate control     DM   - for d/c to rehab: Increase Lantus to 12 units SQ qHS, low ISS pre-meal and at bedtime. Holding off admelog pre-meal. STOP trulicity as patient with ESRD    Class II fracture #20. No acute odontogenic infection.    - s/p  Protective restoration DO #20   -Continue pain mgmt PRN  - f/u with [facility] for definitive restoration   - Dental F/U with outpatient dentist for comprehensive dental care.     Influenza  -  tested positive for influenza on 4/11  - Tamiflu given x 5 days (3 x/week after HD sessions)     HLD (hyperlipidemia)  -will hold statin now give possible interaction with cyclosporin.    Depression  -c/w current meds.    Patient is medically cleared for discharge to rehab as discussed with Dr. Maurice on______   57yo F with PMH of ESRD (HD TTS), HTN, DM, COPD, afib (on Eliquis), known chronic R pannus wound presents for 1.5wk vag bleeding.     Problem: Vaginal bleeding.   last menses October 2021, noted by GYN to be perimenopausal in 2019  CT Enlarged myomatous uterus. Suspected endometrial thickening  - stopping Eliquis and ASA for now, serial CBC with T&S - on heparin gtt  gyn f/u- s/p EUA, D&C, hysteroscopy, Mirena IUD placement.    Anemia of chronic disease.   likely multifactorial - CKD, chronic inflammatory disease (pyoderma), now with some component of anemia of acute blood loss  monitor hb closely     Pyoderma gangrenosum.   as per previous derm note: "favor Pyoderma Gangrenosum based on clinical appearance of deep ulceration with extensive undermining and worsening when trial off of steroids, now with gradual improvement since initiation of cyclosporine since 2/8 (as evidenced by slight decrease in size and evidence of re-epithelialization). PG is an inflammatory, noninfectious, ulcerative neutrophilic skin, that are hallmarked by early pustules that ultimately give rise to ulcers with an undermined, violaceous border. PG is associated with a number of systemic illnesses (classically inflammatory bowel disease, hematologic malignancy, arthritis, etc), none of which are seen in our patient"  - appreciate Derm input.    ESRD on dialysis.   HD  as scheduled.    Atrial fibrillation.   ·  Plan: stopped Eliquis and ASA for now in setting bleeding  c/w dilt as bp/hr tolerate, thus far rate controlled  cards f/u.    DM (diabetes mellitus).   iss  titrate insulin for optimal blood sugar control.    HLD (hyperlipidemia).   will hold statin now give possible interaction with cyclosporin.    Depression.   c/w current meds.    Patient is medically cleared for discharge to rehab as discussed with Dr. Maurice on_____   Dispo is SNF for long term care. 55yo F with PMH of ESRD (HD TTS), HTN, DM, COPD, afib (on Eliquis), known chronic R pannus wound presents for 1.5wk vag bleeding.     Problem: Vaginal bleeding.   last menses October 2021, noted by GYN to be perimenopausal in 2019  CT Enlarged myomatous uterus. Suspected endometrial thickening  - stopping Eliquis and ASA for now, serial CBC with T&S - on heparin gtt  gyn f/u- s/p EUA, D&C, hysteroscopy, Mirena IUD placement.    Anemia of chronic disease.   likely multifactorial - CKD, chronic inflammatory disease (pyoderma), now with some component of anemia of acute blood loss  monitor hb closely     Pyoderma gangrenosum.   as per previous derm note: "favor Pyoderma Gangrenosum based on clinical appearance of deep ulceration with extensive undermining and worsening when trial off of steroids, now with gradual improvement since initiation of cyclosporine since 2/8 (as evidenced by slight decrease in size and evidence of re-epithelialization). PG is an inflammatory, noninfectious, ulcerative neutrophilic skin, that are hallmarked by early pustules that ultimately give rise to ulcers with an undermined, violaceous border. PG is associated with a number of systemic illnesses (classically inflammatory bowel disease, hematologic malignancy, arthritis, etc), none of which are seen in our patient"  - appreciate Derm input.    ESRD on dialysis.   HD  as scheduled.    Atrial fibrillation.   ·  Plan: stopped Eliquis and ASA for now in setting bleeding  c/w dilt as bp/hr tolerate, thus far rate controlled  cards f/u.    DM (diabetes mellitus).   iss  titrate insulin for optimal blood sugar control.    HLD (hyperlipidemia).   will hold statin now give possible interaction with cyclosporin.    Depression.   c/w current meds.    Patient is medically cleared for discharge to rehab as discussed with Dr. Maurice on 4/26  Dispo is SNF for long term care.     COVID neg 4.24

## 2022-04-06 NOTE — PROGRESS NOTE ADULT - ASSESSMENT
Echo 1/19/22: grossly nl LV sys fx, min MR     a/p  56 year old female with hx of morbid obesity, CHAMP not on home O2, ESRD (HD MWF), HTN, DM, COPD, Afib no longer on AC, chronic R pannus wound, followed by Dr. Layne, likely pyoderma gangrenosum presents for vaginal bleeding     #Chronic Pannus Wound  -surgical eval noted, wound care  -abx per med     #Chronic Afib  -stable, rates controlled  -cont dilt as ordered  -cont oral AC - monitor h.h     #Hypertension  -stable  -cont ccb     #ESRD on HD  -HD per renal    #Gyn bleeding  -s/p EUA, D&C, diagnostic hysteroscopy, insertion of Mirena IUD  -cv remains stable post op  -med f/u

## 2022-04-06 NOTE — PROGRESS NOTE ADULT - ASSESSMENT
56F with Hx of ESRD TTS, HTN, DM 2, COPD, afib, known chronic R pannus wound felt most likely pyoderma gangrenosum presents for 1.5wk vag bleeding. Pt recently had prolonged stay at [facility] for pannus wound, known to endocrine service.  Was dc'd to rehab on 3/3.  Prior to last hospitalization patient was on much higher doses of basal bolus.  Now requiring much less.  Has poor appetite.      1. Type 2 diabetes mellitus uncontrolled  A1c 7.0% (may be inaccurate in setting of ESRD)  Home Regimen: Tresiba 80 units HS and Trulicity 1.5mg subq weekly  While inpatient:  BG target 100-180 mg/dL  PO intake improving  Continue Lantus 12 units SQ qHS   Continue off of premeal admelog for now.  As improves and FS begin to increase > 200, will consider restarting admelog premeal  Admelog correctional scale LOW before meals and low bedtime scale  Check BG before meals and bedtime  Hypoglycemia protocol   Consistent carbohydrate diet    Discharge Plan:  STOP Trulicity (patient ESRD on HD)  For dc to rehab- recommend to continue current insulin management as outlined above  For dc to home- Recommend basal plus PO regimen  Patient was on Tresiba at home may benefit from a different Long acting insulin such as Lantus or Levemir given ESRD.  Tresiba is ultra-Long acting insulin  Please assess insurance coverage for basal insulin pens:  Levemir, Lantus, Basaglar, Toujeo or Semglee.   Recommend Tradjenta 5 mg po daily, if not covered can see if Januvia 25 mg po daily is covered.  Please obtain prior auth if needed as patient is ESRD and DPP4i class are indicated for patients with ESRD  Consider CGM (such as Freestyle Libre2 outpatient)  If desiring to followup with [facility]: [address] [place] [address], [address], [place] [address], [phone]    2. HTN  Management per primary team     3. Hyperlipidemia  consider restarting statin      Tamela Ruiz  Nurse Practitioner  Division of Endocrinology & Diabetes  Pager # [phone]      If after 6PM or before 9AM, or on weekends/holidays, please call endocrine answering service for assistance ([phone]).  For nonurgent matters email [email or web address] for assistance.

## 2022-04-06 NOTE — DISCHARGE NOTE PROVIDER - DETAILS OF MALNUTRITION DIAGNOSIS/DIAGNOSES
This patient has been assessed with a concern for Malnutrition and was treated during this hospitalization for the following Nutrition diagnosis/diagnoses:     -  03/31/2022: Morbid obesity (BMI > 40)

## 2022-04-06 NOTE — PROGRESS NOTE ADULT - ASSESSMENT
56y Female with history of ESRD on HD presents with vaginal bleeding. Nephrology consulted for ESRD status.    1) ESRD: Last HD on 4/5 tolerated well with 2L removed with heparin to prevent circuit clotting. Plan for next maintenance HDon 4/7. Monitor electrolytes.    2) HTN with ESRD: BP acceptable on Cardizem. Continue with midodrine pre-HD on HD days to avoid intradialytic hypotension. Monitor BP.    3) Anemia of renal disease: With component of blood loss from vaginal bleeding. Hb low with acceptable iron stores. Increase Epo to 20K with HD. Monitor Hb.    4) Secondary HPT of renal origin: Phosphorus acceptable with low iPTH for which sensipar decreased to 30 mg daily. Continue with renvela 1 tab with meals. Monitor serum calcium and phosphorus.      [facility]  Keaton Lopez M.D.  Juma Green D.O.  Myla Hoffmann M.D.  Julia Brewer, JASIEL, ANP-C    Telephone: [phone]  Facsimile: [phone]    [address] [place] [address]

## 2022-04-07 NOTE — PROGRESS NOTE ADULT - SUBJECTIVE AND OBJECTIVE BOX
SUBJECTIVE / OVERNIGHT EVENTS:pt seen and examined  4-7- 22    MEDICATIONS  (STANDING):  apixaban 5 milliGRAM(s) Oral every 12 hours  buPROPion XL (24-Hour) . 150 milliGRAM(s) Oral daily  chlorhexidine 2% Cloths 1 Application(s) Topical daily  cinacalcet 30 milliGRAM(s) Oral daily  cycloSPORINE  , modified (NEORAL) 200 milliGRAM(s) Oral two times a day  Dakins Solution - 1/4 Strength 1 Application(s) Topical daily  dextrose 40% Gel 15 Gram(s) Oral once  dextrose 5%. 1000 milliLiter(s) (50 mL/Hr) IV Continuous <Continuous>  dextrose 5%. 1000 milliLiter(s) (100 mL/Hr) IV Continuous <Continuous>  dextrose 50% Injectable 25 Gram(s) IV Push once  dextrose 50% Injectable 25 Gram(s) IV Push once  dextrose 50% Injectable 12.5 Gram(s) IV Push once  diltiazem    milliGRAM(s) Oral daily  epoetin anabela-epbx (RETACRIT) Injectable 35978 Unit(s) IV Push <User Schedule>  gabapentin 300 milliGRAM(s) Oral daily  glucagon  Injectable 1 milliGRAM(s) IntraMuscular once  insulin glargine Injectable (LANTUS) 12 Unit(s) SubCutaneous at bedtime  insulin lispro (ADMELOG) corrective regimen sliding scale   SubCutaneous three times a day before meals  insulin lispro (ADMELOG) corrective regimen sliding scale   SubCutaneous at bedtime  lidocaine   4% Patch 1 Patch Transdermal daily  lidocaine   4% Patch 1 Patch Transdermal daily  lidocaine   4% Patch 1 Patch Transdermal daily  mirtazapine 7.5 milliGRAM(s) Oral at bedtime  montelukast 10 milliGRAM(s) Oral at bedtime  pantoprazole    Tablet 40 milliGRAM(s) Oral before breakfast  polyethylene glycol 3350 17 Gram(s) Oral at bedtime  senna 2 Tablet(s) Oral at bedtime  sertraline 50 milliGRAM(s) Oral daily  sevelamer carbonate 800 milliGRAM(s) Oral three times a day with meals  simethicone 80 milliGRAM(s) Chew three times a day  tacrolimus   0.1% Ointment 1 Application(s) Topical daily  traZODone 100 milliGRAM(s) Oral at bedtime    MEDICATIONS  (PRN):  acetaminophen     Tablet .. 650 milliGRAM(s) Oral every 6 hours PRN Temp greater or equal to 38C (100.4F), Mild Pain (1 - 3)  diphenhydrAMINE Injectable 25 milliGRAM(s) IV Push <User Schedule> PRN Rash and/or Itching  melatonin 3 milliGRAM(s) Oral at bedtime PRN Insomnia  midodrine. 5 milliGRAM(s) Oral <User Schedule> PRN 30 minutes prior to dialysis  ondansetron Injectable 4 milliGRAM(s) IV Push every 8 hours PRN Nausea and/or Vomiting  oxyCODONE    IR 10 milliGRAM(s) Oral every 6 hours PRN Severe Pain (7 - 10)  oxyCODONE    IR 5 milliGRAM(s) Oral every 6 hours PRN Moderate Pain (4 - 6)    Vital Signs Last 24 Hrs  T(C): 36.5 (04-07-22 @ 15:00), Max: 36.8 (04-07-22 @ 05:30)  T(F): 97.7 (04-07-22 @ 15:00), Max: 98.3 (04-07-22 @ 05:30)  HR: 86 (04-07-22 @ 15:00) (67 - 86)  BP: 116/78 (04-07-22 @ 15:00) (107/47 - 127/72)  BP(mean): --  RR: 16 (04-07-22 @ 15:00) (16 - 18)  SpO2: 97% (04-07-22 @ 05:30) (97% - 97%)    Skin: No rash.  Eyes: No recent vision problems or eye pain.  ENT: No congestion, ear pain, or sore throat.  Cardiovascular: No chest pain or palpation.  Respiratory: No cough, shortness of breath, congestion, or wheezing.  Gastrointestinal: No abdominal pain, nausea, vomiting, or diarrhea.  Genitourinary: No dysuria.  Musculoskeletal: No joint swelling.  Neurologic: No headache.    PHYSICAL EXAM:  GENERAL: NAD  EYES: EOMI, PERRLA  NECK: Supple, No JVD  CHEST/LUNG: dec breath sounds at bases  HEART:  S1 , S2 +  ABDOMEN: soft , bs+, wound dressing+  EXTREMITIES:  edema+  NEUROLOGY:alert awake oriented     LABS:  04-07    133<L>  |  95<L>  |  23  ----------------------------<  160<H>  3.8   |  24  |  5.13<H>    Ca    8.8      07 Apr 2022 11:25  Phos  3.7     04-07  Mg     2.10     04-07      Creatinine Trend: 5.13 <--, 4.09 <--, 6.73 <--, 3.69 <--, 5.42 <--, 3.33 <--                        7.7    10.37 )-----------( 405      ( 07 Apr 2022 11:25 )             27.3     Urine Studies:

## 2022-04-07 NOTE — PROGRESS NOTE ADULT - ASSESSMENT
56y Female with history of ESRD on HD presents with vaginal bleeding. Nephrology consulted for ESRD status.    1) ESRD: Last HD on 4/5 tolerated well with 2L removed with heparin to prevent circuit clotting. Plan for next maintenance HD today. Monitor electrolytes.    2) HTN with ESRD: BP acceptable on Cardizem. Continue with midodrine pre-HD on HD days to avoid intradialytic hypotension. Monitor BP.    3) Anemia of renal disease: With component of blood loss from vaginal bleeding. Hb low with acceptable iron stores. Continue with Epo 20K with HD. Monitor Hb.    4) Secondary HPT of renal origin: Phosphorus acceptable with low iPTH for which sensipar decreased to 30 mg daily. Continue with renvela 1 tab with meals. Monitor serum calcium and phosphorus.      [facility]  Keaton Lopez M.D.  Juma Green D.O.  Myla Hoffmann M.D.  Julia Brewer, JASIEL, ANP-C    Telephone: [phone]  Facsimile: [phone]    [address] [place] [address]

## 2022-04-07 NOTE — CHART NOTE - NSCHARTNOTEFT_GEN_A_CORE
Wound surgery attempted visit for abdominal wound with Pyoderma Gangrenosum  Patient off unit at HD. Will f/u at later time.    Thank you.  CARMELA William-AMANDA  Wound Surgery - NP  #[phone]

## 2022-04-07 NOTE — PROGRESS NOTE ADULT - SUBJECTIVE AND OBJECTIVE BOX
CARDIOLOGY FOLLOW UP - Dr. Bullock  DATE OF SERVICE: 4/7/22     CC no cp or sob   ref labs today       REVIEW OF SYSTEMS:  CONSTITUTIONAL: No fever, weight loss, or fatigue  RESPIRATORY: No cough, wheezing, chills or hemoptysis; No Shortness of Breath  CARDIOVASCULAR: No chest pain, palpitations, passing out, dizziness, or leg swelling  GASTROINTESTINAL: No abdominal or epigastric pain. No nausea, vomiting, or hematemesis; No diarrhea or constipation. No melena or hematochezia.  VASCULAR: No edema     PHYSICAL EXAM:  T(C): 36.8 (04-07-22 @ 05:30), Max: 37.1 (04-06-22 @ 13:34)  HR: 767 (04-07-22 @ 05:30) (80 - 767)  BP: 107/47 (04-07-22 @ 05:30) (107/47 - 131/68)  RR: 18 (04-07-22 @ 05:30) (16 - 18)  SpO2: 97% (04-07-22 @ 05:30) (97% - 99%)  Wt(kg): --  I&O's Summary      Appearance: Normal	  Cardiovascular: Normal S1 S2,RRR, No JVD, No murmurs  Respiratory: Lungs clear to auscultation	  Gastrointestinal:  Soft, Non-tender, + BS	  Extremities: Normal range of motion, No clubbing, cyanosis or edema      Home Medications:  Aspercreme with Lidocaine 4% topical film: Apply topically to affected area once a day (low back) (20 Mar 2022 10:15)  Aspercreme with Lidocaine 4% topical film: Apply topically to affected area once a day (L knee) (20 Mar 2022 10:15)  aspirin 81 mg oral delayed release tablet: 1 tab(s) orally once a day (20 Mar 2022 10:15)  buPROPion 150 mg/24 hours (XL) oral tablet, extended release: 1 tab(s) orally once a day (in the morning) (20 Mar 2022 10:15)  cycloSPORINE modified 100 mg oral capsule: 2 cap(s) orally once a day (at bedtime) (20 Mar 2022 10:15)  cycloSPORINE modified 50 mg oral capsule: 3 cap(s) orally once a day in the morning  (20 Mar 2022 10:15)  dilTIAZem 120 mg/24 hours oral capsule, extended release: 1 cap(s) orally once a day (hold SBP&lt;110, HR&lt;60) (20 Mar 2022 10:15)  doxycycline hyclate 100 mg oral tablet: 1 tab(s) orally 2 times a day (20 Mar 2022 10:15)  Eliquis 2.5 mg oral tablet: 1 tab(s) orally 2 times a day    Pharmacy states patient no longer wants to fill this medication (20 Mar 2022 10:15)  gabapentin 300 mg oral capsule: 1 cap(s) orally 2 times a day (20 Mar 2022 10:15)  insulin glargine: 15 unit(s) subcutaneous once a day (at bedtime) (20 Mar 2022 10:15)  midodrine 5 mg oral tablet: 1 tab(s) orally 3 times a week, 30 minute prior to dialysis sessions (hold is SBP&gt;130) (20 Mar 2022 10:15)  mirtazapine 7.5 mg oral tablet: 1 tab(s) orally once a day (at bedtime) (20 Mar 2022 10:15)  montelukast 10 mg oral tablet: 1 tab(s) orally once a day (in the evening) (20 Mar 2022 10:15)  oxyCODONE 10 mg oral tablet, extended release: 1 tab(s) orally every 12 hours (20 Mar 2022 10:15)  oxycodone-acetaminophen 7.5 mg-325 mg oral tablet: 1 tab(s) orally every 6 hours, As Needed (20 Mar 2022 10:15)  pantoprazole 40 mg oral delayed release tablet: 1 tab(s) orally once a day (20 Mar 2022 10:15)  polyethylene glycol 3350 oral powder for reconstitution: 17 gram(s) orally once a day (at bedtime) (20 Mar 2022 10:15)  senna oral tablet: 2 tab(s) orally once a day (at bedtime) (20 Mar 2022 10:15)  sertraline 50 mg oral tablet: 1 tab(s) orally once a day (20 Mar 2022 10:15)  sevelamer carbonate 800 mg oral tablet: 1 tab(s) orally 3 times a day (with meals) (20 Mar 2022 10:15)  simethicone 80 mg oral tablet, chewable: 1 tab(s) orally 3 times a day (before meals) (20 Mar 2022 10:15)  simvastatin 10 mg oral tablet: 1 tab(s) orally once a day (at bedtime) (20 Mar 2022 10:15)  sodium hypochlorite 0.25% topical solution: 1 application topically once a day (20 Mar 2022 10:15)  tacrolimus 0.1% topical ointment: 1 application topically once a day (20 Mar 2022 10:15)  traZODone 100 mg oral tablet: 1 tab(s) orally once a day (at bedtime) (20 Mar 2022 10:15)      MEDICATIONS  (STANDING):  apixaban 5 milliGRAM(s) Oral every 12 hours  buPROPion XL (24-Hour) . 150 milliGRAM(s) Oral daily  chlorhexidine 2% Cloths 1 Application(s) Topical daily  cinacalcet 30 milliGRAM(s) Oral daily  cycloSPORINE  , modified (NEORAL) 200 milliGRAM(s) Oral two times a day  Dakins Solution - 1/4 Strength 1 Application(s) Topical daily  dextrose 40% Gel 15 Gram(s) Oral once  dextrose 5%. 1000 milliLiter(s) (50 mL/Hr) IV Continuous <Continuous>  dextrose 5%. 1000 milliLiter(s) (100 mL/Hr) IV Continuous <Continuous>  dextrose 50% Injectable 12.5 Gram(s) IV Push once  dextrose 50% Injectable 25 Gram(s) IV Push once  dextrose 50% Injectable 25 Gram(s) IV Push once  diltiazem    milliGRAM(s) Oral daily  diphenhydrAMINE Injectable 25 milliGRAM(s) IV Push once  epoetin anabela-epbx (RETACRIT) Injectable 85020 Unit(s) IV Push <User Schedule>  gabapentin 300 milliGRAM(s) Oral daily  glucagon  Injectable 1 milliGRAM(s) IntraMuscular once  insulin glargine Injectable (LANTUS) 12 Unit(s) SubCutaneous at bedtime  insulin lispro (ADMELOG) corrective regimen sliding scale   SubCutaneous three times a day before meals  insulin lispro (ADMELOG) corrective regimen sliding scale   SubCutaneous at bedtime  lidocaine   4% Patch 1 Patch Transdermal daily  lidocaine   4% Patch 1 Patch Transdermal daily  lidocaine   4% Patch 1 Patch Transdermal daily  mirtazapine 7.5 milliGRAM(s) Oral at bedtime  montelukast 10 milliGRAM(s) Oral at bedtime  pantoprazole    Tablet 40 milliGRAM(s) Oral before breakfast  polyethylene glycol 3350 17 Gram(s) Oral at bedtime  senna 2 Tablet(s) Oral at bedtime  sertraline 50 milliGRAM(s) Oral daily  sevelamer carbonate 800 milliGRAM(s) Oral three times a day with meals  simethicone 80 milliGRAM(s) Chew three times a day  tacrolimus   0.1% Ointment 1 Application(s) Topical daily  traZODone 100 milliGRAM(s) Oral at bedtime      TELEMETRY: 	    ECG:  	  RADIOLOGY:   DIAGNOSTIC TESTING:  [ ] Echocardiogram:  [ ]  Catheterization:  [ ] Stress Test:    OTHER: 	    LABS:	 	                            8.2    12.44 )-----------( 427      ( 06 Apr 2022 09:44 )             28.9     04-06    135  |  95<L>  |  15  ----------------------------<  178<H>  3.8   |  25  |  4.09<H>    Ca    9.3      06 Apr 2022 09:44  Phos  3.2     04-06  Mg     2.10     04-06

## 2022-04-07 NOTE — PROGRESS NOTE ADULT - SUBJECTIVE AND OBJECTIVE BOX
[facility] NEPHROLOGY- PROGRESS NOTE    56y Female with history of ESRD on HD presents with vaginal bleeding. Nephrology consulted for ESRD status.    REVIEW OF SYSTEMS:  Gen: no changes in weight  Cards: no chest pain  Resp: no dyspnea  GI: no nausea or vomiting or diarrhea  Vascular: no LE edema    caffeine (Nausea)  latex (Rash)  penicillin (Nausea)  strawberry (Rash)    Hospital Medications: Medications reviewed      VITALS:  T(F): 98.2 (04-07-22 @ 10:55), Max: 98.5 (04-06-22 @ 21:30)  HR: 80 (04-07-22 @ 10:55)  BP: 127/72 (04-07-22 @ 10:55)  RR: 16 (04-07-22 @ 10:55)  SpO2: 97% (04-07-22 @ 05:30)  Wt(kg): --      PHYSICAL EXAM:  Gen: NAD, calm  Cards: RRR, +S1/S2, no M/G/R  Resp: CTA B/L  GI: soft, NT/ND, NABS, + ab wound dressing in place  Vascular: no LE edema B/L, LUE AVF + bruit/thrill      LABS:  04-07    133<L>  |  95<L>  |  23  ----------------------------<  160<H>  3.8   |  24  |  5.13<H>    Ca    8.8      07 Apr 2022 11:25  Phos  3.7     04-07  Mg     2.10     04-07      Creatinine Trend: 5.13 <--, 4.09 <--, 6.73 <--, 3.69 <--, 5.42 <--, 3.33 <--                        7.7    10.37 )-----------( 405      ( 07 Apr 2022 11:25 )             27.3     Urine Studies:

## 2022-04-08 NOTE — PROGRESS NOTE ADULT - SUBJECTIVE AND OBJECTIVE BOX
[facility]-- WOUND TEAM -- FOLLOW UP NOTE  --------------------------------------------------------------------------------  Wound surgery f/u for right abdominal pannus Pyoderma Gangrenosum.    subjective: Patient was seen and examined at bedside. Per patient pain from her abdominal wound has improved slightly.  Patient anxious to leave hospital but expresses concern regarding wound management.  Denies n/v chills, fever.    Interval HPI/24 hour events: no acute events overnight    Chart reviewed including labs and relevant images      Diet:  Diet, Consistent Carbohydrate Renal w/Evening Snack:   For patients receiving Renal Replacement - No Protein Restr, No Conc K, No Conc Phos, Low Sodium (RENAL)  Supplement Feeding Modality:  Oral  Nepro Cans or Servings Per Day:  1       Frequency:  Three Times a day (03-20-22 @ 10:20)      ROS: General/ SKIN/ MSK/ Neuro/ GI see HPI  all other systems negative  pt unable to offer    ALLERGIES & [id number]  --------------------------------------------------------------------------------  Allergies    latex (Rash)  penicillin (Nausea)  strawberry (Rash)    Intolerances    caffeine (Nausea)        STANDING INPATIENT MEDICATIONS    apixaban 5 milliGRAM(s) Oral every 12 hours  buPROPion XL (24-Hour) . 150 milliGRAM(s) Oral daily  chlorhexidine 2% Cloths 1 Application(s) Topical daily  cinacalcet 30 milliGRAM(s) Oral daily  cycloSPORINE  , modified (NEORAL) 200 milliGRAM(s) Oral two times a day  Dakins Solution - 1/4 Strength 1 Application(s) Topical daily  dextrose 40% Gel 15 Gram(s) Oral once  dextrose 5%. 1000 milliLiter(s) IV Continuous <Continuous>  dextrose 5%. 1000 milliLiter(s) IV Continuous <Continuous>  dextrose 50% Injectable 25 Gram(s) IV Push once  dextrose 50% Injectable 12.5 Gram(s) IV Push once  dextrose 50% Injectable 25 Gram(s) IV Push once  diltiazem    milliGRAM(s) Oral daily  epoetin anabela-epbx (RETACRIT) Injectable 56938 Unit(s) IV Push <User Schedule>  gabapentin 300 milliGRAM(s) Oral daily  glucagon  Injectable 1 milliGRAM(s) IntraMuscular once  insulin glargine Injectable (LANTUS) 12 Unit(s) SubCutaneous at bedtime  insulin lispro (ADMELOG) corrective regimen sliding scale   SubCutaneous three times a day before meals  insulin lispro (ADMELOG) corrective regimen sliding scale   SubCutaneous at bedtime  lidocaine   4% Patch 1 Patch Transdermal daily  lidocaine   4% Patch 1 Patch Transdermal daily  lidocaine   4% Patch 1 Patch Transdermal daily  mirtazapine 7.5 milliGRAM(s) Oral at bedtime  montelukast 10 milliGRAM(s) Oral at bedtime  pantoprazole    Tablet 40 milliGRAM(s) Oral before breakfast  polyethylene glycol 3350 17 Gram(s) Oral at bedtime  senna 2 Tablet(s) Oral at bedtime  sertraline 50 milliGRAM(s) Oral daily  sevelamer carbonate 800 milliGRAM(s) Oral three times a day with meals  simethicone 80 milliGRAM(s) Chew three times a day  tacrolimus   0.1% Ointment 1 Application(s) Topical daily  traZODone 100 milliGRAM(s) Oral at bedtime      PRN INPATIENT MEDICATION  acetaminophen     Tablet .. 650 milliGRAM(s) Oral every 6 hours PRN  diphenhydrAMINE Injectable 25 milliGRAM(s) IV Push <User Schedule> PRN  melatonin 3 milliGRAM(s) Oral at bedtime PRN  midodrine. 5 milliGRAM(s) Oral <User Schedule> PRN  ondansetron Injectable 4 milliGRAM(s) IV Push every 8 hours PRN  oxyCODONE    IR 10 milliGRAM(s) Oral every 6 hours PRN  oxyCODONE    IR 5 milliGRAM(s) Oral every 6 hours PRN        Vital signs:  T(C): 37.1 (04-08-22 @ 17:30), Max: 37.1 (04-08-22 @ 17:30)  HR: 88 (04-08-22 @ 17:30) (85 - 88)  BP: 133/64 (04-08-22 @ 17:30) (110/62 - 133/64)  RR: 18 (04-08-22 @ 17:30) (16 - 18)  SpO2: 100% (04-08-22 @ 17:30) (100% - 100%)  Wt(kg): --        04-07-22 @ 07:01  -  04-08-22 @ 07:00  --------------------------------------------------------  IN: 800 mL / OUT: 4800 mL / NET: -4000 mL      Constitutional: NAD, A&O x 3.  ENMT: edith, non icteric  Respiratory: rm air, non labored  Cardiovascular: rate regular  Gastrointestinal: wound lower right sided pannus, 4.5iae21mrl3.7cm (3/30/22 - 4.8fqa08etf8lj --> 3/21/22 - 5x17x3), slough continues to decrease now from 1- 3 o'clock. Remainder of wound granular. Continues to re-epithelialize along wound edges. Satellite ulceration  3 o'clock 0.5cm from wound measuring 1.5nqk7rli2.1cm (prev on 3/30/22- 2.0fvj7chw5.2cm), pink-moist. Small-moderate serous drainage, odor improving, mild odor today. Tolerated wound cleansing and dressing better than any previous encounter. Cleansed with Dakins, tacrolimus applied, adaptic touch, aquacel Ag and abdominal pad.  Extremities: Left arm AV fistula + thrill   Skin: as noted  Musculoskeletal: able to flex, extend knees, complains of right knee pain, no swelling  psych: remains anxious    LABS/ CULTURES/ RADIOLOGY:              7.7    10.37 >-----------<  405      [04-07-22 @ 11:25]              27.3     133  |  95  |  23  ----------------------------<  160      [04-07-22 @ 11:25]  3.8   |  24  |  5.13        Ca     8.8     [04-07-22 @ 11:25]      Mg     2.10     [04-07-22 @ 11:25]      Phos  3.7     [04-07-22 @ 11:25]                CAPILLARY BLOOD GLUCOSE  196 (08 Apr 2022 18:11)      POCT Blood Glucose.: 174 mg/dL (08 Apr 2022 21:19)  POCT Blood Glucose.: 155 mg/dL (08 Apr 2022 13:04)  POCT Blood Glucose.: 177 mg/dL (08 Apr 2022 08:47)        Triglycerides, Serum: 222 mg/dL (03-21-22 @ 17:31)    Intact PTH: 81 pg/mL (03-21-22 @ 17:26)              A1C with Estimated Average Glucose Result: 6.1 % (03-20-22 @ 12:48)  A1C with Estimated Average Glucose Result: 7.0 % (01-13-22 @ 08:21)

## 2022-04-08 NOTE — PROGRESS NOTE ADULT - SUBJECTIVE AND OBJECTIVE BOX
SUBJECTIVE / OVERNIGHT EVENTS:pt seen and examined  4-8- 22    MEDICATIONS  (STANDING):  apixaban 5 milliGRAM(s) Oral every 12 hours  buPROPion XL (24-Hour) . 150 milliGRAM(s) Oral daily  chlorhexidine 2% Cloths 1 Application(s) Topical daily  cinacalcet 30 milliGRAM(s) Oral daily  cycloSPORINE  , modified (NEORAL) 200 milliGRAM(s) Oral two times a day  Dakins Solution - 1/4 Strength 1 Application(s) Topical daily  dextrose 40% Gel 15 Gram(s) Oral once  dextrose 5%. 1000 milliLiter(s) (100 mL/Hr) IV Continuous <Continuous>  dextrose 5%. 1000 milliLiter(s) (50 mL/Hr) IV Continuous <Continuous>  dextrose 50% Injectable 25 Gram(s) IV Push once  dextrose 50% Injectable 12.5 Gram(s) IV Push once  dextrose 50% Injectable 25 Gram(s) IV Push once  diltiazem    milliGRAM(s) Oral daily  epoetin anabela-epbx (RETACRIT) Injectable 81467 Unit(s) IV Push <User Schedule>  gabapentin 300 milliGRAM(s) Oral daily  glucagon  Injectable 1 milliGRAM(s) IntraMuscular once  insulin glargine Injectable (LANTUS) 12 Unit(s) SubCutaneous at bedtime  insulin lispro (ADMELOG) corrective regimen sliding scale   SubCutaneous three times a day before meals  insulin lispro (ADMELOG) corrective regimen sliding scale   SubCutaneous at bedtime  lidocaine   4% Patch 1 Patch Transdermal daily  lidocaine   4% Patch 1 Patch Transdermal daily  lidocaine   4% Patch 1 Patch Transdermal daily  mirtazapine 7.5 milliGRAM(s) Oral at bedtime  montelukast 10 milliGRAM(s) Oral at bedtime  pantoprazole    Tablet 40 milliGRAM(s) Oral before breakfast  polyethylene glycol 3350 17 Gram(s) Oral at bedtime  senna 2 Tablet(s) Oral at bedtime  sertraline 50 milliGRAM(s) Oral daily  sevelamer carbonate 800 milliGRAM(s) Oral three times a day with meals  simethicone 80 milliGRAM(s) Chew three times a day  tacrolimus   0.1% Ointment 1 Application(s) Topical daily  traZODone 100 milliGRAM(s) Oral at bedtime    MEDICATIONS  (PRN):  acetaminophen     Tablet .. 650 milliGRAM(s) Oral every 6 hours PRN Temp greater or equal to 38C (100.4F), Mild Pain (1 - 3)  diphenhydrAMINE Injectable 25 milliGRAM(s) IV Push <User Schedule> PRN Rash and/or Itching  melatonin 3 milliGRAM(s) Oral at bedtime PRN Insomnia  midodrine. 5 milliGRAM(s) Oral <User Schedule> PRN 30 minutes prior to dialysis  ondansetron Injectable 4 milliGRAM(s) IV Push every 8 hours PRN Nausea and/or Vomiting  oxyCODONE    IR 10 milliGRAM(s) Oral every 6 hours PRN Severe Pain (7 - 10)  oxyCODONE    IR 5 milliGRAM(s) Oral every 6 hours PRN Moderate Pain (4 - 6)    Vital Signs Last 24 Hrs  T(C): 37.1 (04-08-22 @ 17:30), Max: 37.1 (04-08-22 @ 17:30)  T(F): 98.7 (04-08-22 @ 17:30), Max: 98.7 (04-08-22 @ 17:30)  HR: 88 (04-08-22 @ 17:30) (85 - 88)  BP: 133/64 (04-08-22 @ 17:30) (110/62 - 133/64)  BP(mean): --  RR: 18 (04-08-22 @ 17:30) (16 - 18)  SpO2: 100% (04-08-22 @ 17:30) (100% - 100%)    Skin: No rash.  Eyes: No recent vision problems or eye pain.  ENT: No congestion, ear pain, or sore throat.  Cardiovascular: No chest pain or palpation.  Respiratory: No cough, shortness of breath, congestion, or wheezing.  Gastrointestinal: No abdominal pain, nausea, vomiting, or diarrhea.  Genitourinary: No dysuria.  Musculoskeletal: No joint swelling.  Neurologic: No headache.    PHYSICAL EXAM:  GENERAL: NAD  EYES: EOMI, PERRLA  NECK: Supple, No JVD  CHEST/LUNG: dec breath sounds at bases  HEART:  S1 , S2 +  ABDOMEN: soft , bs+, wound dressing+  EXTREMITIES:  edema+  NEUROLOGY:alert awake oriented     LABS:  04-07    133<L>  |  95<L>  |  23  ----------------------------<  160<H>  3.8   |  24  |  5.13<H>    Ca    8.8      07 Apr 2022 11:25  Phos  3.7     04-07  Mg     2.10     04-07      Creatinine Trend: 5.13 <--, 4.09 <--, 6.73 <--, 3.69 <--, 5.42 <--                        7.7    10.37 )-----------( 405      ( 07 Apr 2022 11:25 )             27.3     Urine Studies:

## 2022-04-08 NOTE — PROGRESS NOTE ADULT - SUBJECTIVE AND OBJECTIVE BOX
[facility] NEPHROLOGY- PROGRESS NOTE    56y Female with history of ESRD on HD presents with vaginal bleeding. Nephrology consulted for ESRD status.    REVIEW OF SYSTEMS:  Gen: no changes in weight  Cards: no chest pain  Resp: no dyspnea  GI: no nausea or vomiting or diarrhea, + lower ab pain  Vascular: no LE edema    caffeine (Nausea)  latex (Rash)  penicillin (Nausea)  strawberry (Rash)    Hospital Medications: Medications reviewed      VITALS:  T(F): 98 (04-08-22 @ 05:50), Max: 98.5 (04-07-22 @ 22:00)  HR: 87 (04-08-22 @ 05:50)  BP: 116/69 (04-08-22 @ 05:50)  RR: 18 (04-08-22 @ 05:50)  SpO2: 100% (04-08-22 @ 05:50)  Wt(kg): --    04-07 @ 07:01  -  04-08 @ 07:00  --------------------------------------------------------  IN: 800 mL / OUT: 4800 mL / NET: -4000 mL        PHYSICAL EXAM:  Gen: NAD, calm  Cards: RRR, +S1/S2, no M/G/R  Resp: CTA B/L  GI: soft, NT/ND, NABS, + ab wound dressing in place  Vascular: no LE edema B/L, LUE AVF + bruit/thrill      LABS:  04-07    133<L>  |  95<L>  |  23  ----------------------------<  160<H>  3.8   |  24  |  5.13<H>    Ca    8.8      07 Apr 2022 11:25  Phos  3.7     04-07  Mg     2.10     04-07      Creatinine Trend: 5.13 <--, 4.09 <--, 6.73 <--, 3.69 <--, 5.42 <--                        7.7    10.37 )-----------( 405      ( 07 Apr 2022 11:25 )             27.3     Urine Studies:

## 2022-04-08 NOTE — PROGRESS NOTE ADULT - SUBJECTIVE AND OBJECTIVE BOX
CARDIOLOGY FOLLOW UP - Dr. Bullock  DATE OF SERVICE: 4/8/22     CC no cp or sob   ref labs       REVIEW OF SYSTEMS:  CONSTITUTIONAL: No fever, weight loss, or fatigue  RESPIRATORY: No cough, wheezing, chills or hemoptysis; No Shortness of Breath  CARDIOVASCULAR: No chest pain, palpitations, passing out, dizziness, or leg swelling  GASTROINTESTINAL: No abdominal or epigastric pain. No nausea, vomiting, or hematemesis; No diarrhea or constipation. No melena or hematochezia.  VASCULAR: No edema     PHYSICAL EXAM:  T(C): 36.7 (04-08-22 @ 05:50), Max: 36.9 (04-07-22 @ 22:00)  HR: 87 (04-08-22 @ 05:50) (86 - 88)  BP: 116/69 (04-08-22 @ 05:50) (110/62 - 116/78)  RR: 18 (04-08-22 @ 05:50) (16 - 18)  SpO2: 100% (04-08-22 @ 05:50) (100% - 100%)  Wt(kg): --  I&O's Summary    07 Apr 2022 07:01  -  08 Apr 2022 07:00  --------------------------------------------------------  IN: 800 mL / OUT: 4800 mL / NET: -4000 mL        Appearance: Normal	  Cardiovascular: Normal S1 S2, irreg  Respiratory: Lungs clear to auscultation	  Gastrointestinal:  Soft, Non-tender, + BS	  Extremities: Normal range of motion, No clubbing, cyanosis or edema      Home Medications:  Aspercreme with Lidocaine 4% topical film: Apply topically to affected area once a day (low back) (20 Mar 2022 10:15)  Aspercreme with Lidocaine 4% topical film: Apply topically to affected area once a day (L knee) (20 Mar 2022 10:15)  aspirin 81 mg oral delayed release tablet: 1 tab(s) orally once a day (20 Mar 2022 10:15)  buPROPion 150 mg/24 hours (XL) oral tablet, extended release: 1 tab(s) orally once a day (in the morning) (20 Mar 2022 10:15)  cycloSPORINE modified 100 mg oral capsule: 2 cap(s) orally once a day (at bedtime) (20 Mar 2022 10:15)  cycloSPORINE modified 50 mg oral capsule: 3 cap(s) orally once a day in the morning  (20 Mar 2022 10:15)  dilTIAZem 120 mg/24 hours oral capsule, extended release: 1 cap(s) orally once a day (hold SBP&lt;110, HR&lt;60) (20 Mar 2022 10:15)  doxycycline hyclate 100 mg oral tablet: 1 tab(s) orally 2 times a day (20 Mar 2022 10:15)  Eliquis 2.5 mg oral tablet: 1 tab(s) orally 2 times a day    Pharmacy states patient no longer wants to fill this medication (20 Mar 2022 10:15)  gabapentin 300 mg oral capsule: 1 cap(s) orally 2 times a day (20 Mar 2022 10:15)  insulin glargine: 15 unit(s) subcutaneous once a day (at bedtime) (20 Mar 2022 10:15)  midodrine 5 mg oral tablet: 1 tab(s) orally 3 times a week, 30 minute prior to dialysis sessions (hold is SBP&gt;130) (20 Mar 2022 10:15)  mirtazapine 7.5 mg oral tablet: 1 tab(s) orally once a day (at bedtime) (20 Mar 2022 10:15)  montelukast 10 mg oral tablet: 1 tab(s) orally once a day (in the evening) (20 Mar 2022 10:15)  oxyCODONE 10 mg oral tablet, extended release: 1 tab(s) orally every 12 hours (20 Mar 2022 10:15)  oxycodone-acetaminophen 7.5 mg-325 mg oral tablet: 1 tab(s) orally every 6 hours, As Needed (20 Mar 2022 10:15)  pantoprazole 40 mg oral delayed release tablet: 1 tab(s) orally once a day (20 Mar 2022 10:15)  polyethylene glycol 3350 oral powder for reconstitution: 17 gram(s) orally once a day (at bedtime) (20 Mar 2022 10:15)  senna oral tablet: 2 tab(s) orally once a day (at bedtime) (20 Mar 2022 10:15)  sertraline 50 mg oral tablet: 1 tab(s) orally once a day (20 Mar 2022 10:15)  sevelamer carbonate 800 mg oral tablet: 1 tab(s) orally 3 times a day (with meals) (20 Mar 2022 10:15)  simethicone 80 mg oral tablet, chewable: 1 tab(s) orally 3 times a day (before meals) (20 Mar 2022 10:15)  simvastatin 10 mg oral tablet: 1 tab(s) orally once a day (at bedtime) (20 Mar 2022 10:15)  sodium hypochlorite 0.25% topical solution: 1 application topically once a day (20 Mar 2022 10:15)  tacrolimus 0.1% topical ointment: 1 application topically once a day (20 Mar 2022 10:15)  traZODone 100 mg oral tablet: 1 tab(s) orally once a day (at bedtime) (20 Mar 2022 10:15)      MEDICATIONS  (STANDING):  apixaban 5 milliGRAM(s) Oral every 12 hours  buPROPion XL (24-Hour) . 150 milliGRAM(s) Oral daily  chlorhexidine 2% Cloths 1 Application(s) Topical daily  cinacalcet 30 milliGRAM(s) Oral daily  cycloSPORINE  , modified (NEORAL) 200 milliGRAM(s) Oral two times a day  Dakins Solution - 1/4 Strength 1 Application(s) Topical daily  dextrose 40% Gel 15 Gram(s) Oral once  dextrose 5%. 1000 milliLiter(s) (50 mL/Hr) IV Continuous <Continuous>  dextrose 5%. 1000 milliLiter(s) (100 mL/Hr) IV Continuous <Continuous>  dextrose 50% Injectable 12.5 Gram(s) IV Push once  dextrose 50% Injectable 25 Gram(s) IV Push once  dextrose 50% Injectable 25 Gram(s) IV Push once  diltiazem    milliGRAM(s) Oral daily  epoetin anabela-epbx (RETACRIT) Injectable 56343 Unit(s) IV Push <User Schedule>  gabapentin 300 milliGRAM(s) Oral daily  glucagon  Injectable 1 milliGRAM(s) IntraMuscular once  insulin glargine Injectable (LANTUS) 12 Unit(s) SubCutaneous at bedtime  insulin lispro (ADMELOG) corrective regimen sliding scale   SubCutaneous three times a day before meals  insulin lispro (ADMELOG) corrective regimen sliding scale   SubCutaneous at bedtime  lidocaine   4% Patch 1 Patch Transdermal daily  lidocaine   4% Patch 1 Patch Transdermal daily  lidocaine   4% Patch 1 Patch Transdermal daily  mirtazapine 7.5 milliGRAM(s) Oral at bedtime  montelukast 10 milliGRAM(s) Oral at bedtime  pantoprazole    Tablet 40 milliGRAM(s) Oral before breakfast  polyethylene glycol 3350 17 Gram(s) Oral at bedtime  senna 2 Tablet(s) Oral at bedtime  sertraline 50 milliGRAM(s) Oral daily  sevelamer carbonate 800 milliGRAM(s) Oral three times a day with meals  simethicone 80 milliGRAM(s) Chew three times a day  tacrolimus   0.1% Ointment 1 Application(s) Topical daily  traZODone 100 milliGRAM(s) Oral at bedtime      TELEMETRY: 	    ECG:  	  RADIOLOGY:   DIAGNOSTIC TESTING:  [ ] Echocardiogram:  [ ]  Catheterization:  [ ] Stress Test:    OTHER: 	    LABS:	 	                            7.7    10.37 )-----------( 405      ( 07 Apr 2022 11:25 )             27.3     04-07    133<L>  |  95<L>  |  23  ----------------------------<  160<H>  3.8   |  24  |  5.13<H>    Ca    8.8      07 Apr 2022 11:25  Phos  3.7     04-07  Mg     2.10     04-07

## 2022-04-08 NOTE — PROGRESS NOTE ADULT - ASSESSMENT
Assessment: 56y old  Female  ESRD, morbid obesity, CHAMP not on home O2, ESRD (HD MWF), HTN, DM, COPD, Afib  ( on ac ),  chronic R pannus pyoderma gangrenosum presented with 1 week of vaginal bleeding. Per chart review pathology post d&c benign, mgmt per gyn.  On previous admission patient followed by Wound surgery and dermatology. right pannus PG. Remains on Cyclosporin. Topical tacrolimus, adaptic touch, aquacel ag.       Exam:  Wound dimensions with mild improvement  Mild odor remains but is improving.   Tissue type with increased granular base, decreased nonviable tissue. Satellite at 3 o'clock remains. Re-epithelization along wound edges.  Pain and tenderness improving.      Plan:  -Topical dressing: Cleanse with Dakins 1/4 strength. Apply Liquid barrier film to periwound skin. Apply Topical Tacrolimus 0.1% ointment to wound base and wound edges, adaptic touch to cover, apply Aquacel AG, and abdominal pad. Change daily.  -Interdry textile sheeting beneath abdominal pannus leaving 2" out at end to wick.  -Agree to continue with Cyclosporin   -Glucose control per primary team/endocrinology  -Continue to follow nutrition/RD recommendations   -Continue to offload pressure  -DVT prophylaxis    FIndings and plan discussed with patient and primary team.    Upon discharge follow up at outpatient [facility]. [address] [place] [address]. [phone].    Will continue to follow periodically while inpatient.  Please reconsult earlier if needed.  Thank you.    CARMELA William-BC, CWOC    pager #[phone]    If after 4PM or before 7:30AM on Mon-Friday or weekend/holiday please contact general surgery for urgent matters.   Team A- [id number]   Team B- [id number]  For non-urgent matters e-mail [email or web address]     I spent 25 minutes face-to-face with this patient of which more than 50% of the time was spent counseling/coordinating care of this patient.

## 2022-04-08 NOTE — CONSULT NOTE ADULT - SUBJECTIVE AND OBJECTIVE BOX
Patient is a 56y old  Female who presents with a chief complaint of vaginal bleeding (2022 11:43)      HPI:  56F ESRD TTS, HTN, DM, COPD, afib, known chronic R pannus wound felt most likely pyoderma gangrenosum presents for 1.5wk vag bleeding. Pt recently had prolonged stay for pannus wound and dc 3/3 to SNF. Per pt she has had issues since, developed bed sores & not eating bec food is poor. For last ~12d has been having what she thought was menstrual bleeding, passing clots & going through ~6-8 pads/day initially though it is now improving. Now no longer passing clots but still going through ~4-5 pads so came to ED. Thinks she feels a little more fatigued than usual but she's largely bed-bound. Also endorsing some inc abd periumbilical pain and her chronic pannus wound pain. No fever, CP, SOB, NV. Supposed to go to HD Saturday but missed.    ED: 118/56, 80, 18, 99.1, 99RA --> vanco/cefepime  Tearful, overwhelmed with chronic illness. VB not active now, but had severe cramps past day. Denies chest pain, SOB, nausea,  (20 Mar 2022 09:38)      PAST MEDICAL & SURGICAL HISTORY:  COPD (chronic obstructive pulmonary disease)    DM (diabetes mellitus)    Atrial fibrillation  with loop recorder , battery most likely depleted, as per cardiac clearance, Dr. Reece Anesthesia aware, pt on Eliquis    HTN (hypertension)    Morbid obesity  BMI - 58.3    Chronic GERD    CHAMP (obstructive sleep apnea)  non compliance with CPAP, Anesthesia Dr. Reece aware, pt told to bring CPAP for sx, pr verbalized understanding    Potential difficult airway on pre-intubation assessment  airway class III, large neck, morbid obesity, hx of CHAMP, no compliance with CPAP- Dr. Reece, Anesthesia aware    End-stage renal disease    Anemia    Medication management    H/O tubal ligation      Status post placement of implantable loop recorder  left chest-     History of vascular access device  s/p insertion right chest permacath 2019, removal 2019, insertion left chest permacath 2019    S/P arteriovenous (AV) fistula creation  left  arm 2019        MEDICATIONS  (STANDING):  apixaban 5 milliGRAM(s) Oral every 12 hours  buPROPion XL (24-Hour) . 150 milliGRAM(s) Oral daily  chlorhexidine 2% Cloths 1 Application(s) Topical daily  cinacalcet 30 milliGRAM(s) Oral daily  cycloSPORINE  , modified (NEORAL) 200 milliGRAM(s) Oral two times a day  Dakins Solution - 1/4 Strength 1 Application(s) Topical daily  dextrose 40% Gel 15 Gram(s) Oral once  dextrose 5%. 1000 milliLiter(s) (50 mL/Hr) IV Continuous <Continuous>  dextrose 5%. 1000 milliLiter(s) (100 mL/Hr) IV Continuous <Continuous>  dextrose 50% Injectable 25 Gram(s) IV Push once  dextrose 50% Injectable 12.5 Gram(s) IV Push once  dextrose 50% Injectable 25 Gram(s) IV Push once  diltiazem    milliGRAM(s) Oral daily  epoetin anabela-epbx (RETACRIT) Injectable 21125 Unit(s) IV Push <User Schedule>  gabapentin 300 milliGRAM(s) Oral daily  glucagon  Injectable 1 milliGRAM(s) IntraMuscular once  insulin glargine Injectable (LANTUS) 12 Unit(s) SubCutaneous at bedtime  insulin lispro (ADMELOG) corrective regimen sliding scale   SubCutaneous three times a day before meals  insulin lispro (ADMELOG) corrective regimen sliding scale   SubCutaneous at bedtime  lidocaine   4% Patch 1 Patch Transdermal daily  lidocaine   4% Patch 1 Patch Transdermal daily  lidocaine   4% Patch 1 Patch Transdermal daily  mirtazapine 7.5 milliGRAM(s) Oral at bedtime  montelukast 10 milliGRAM(s) Oral at bedtime  pantoprazole    Tablet 40 milliGRAM(s) Oral before breakfast  polyethylene glycol 3350 17 Gram(s) Oral at bedtime  senna 2 Tablet(s) Oral at bedtime  sertraline 50 milliGRAM(s) Oral daily  sevelamer carbonate 800 milliGRAM(s) Oral three times a day with meals  simethicone 80 milliGRAM(s) Chew three times a day  tacrolimus   0.1% Ointment 1 Application(s) Topical daily  traZODone 100 milliGRAM(s) Oral at bedtime    MEDICATIONS  (PRN):  acetaminophen     Tablet .. 650 milliGRAM(s) Oral every 6 hours PRN Temp greater or equal to 38C (100.4F), Mild Pain (1 - 3)  diphenhydrAMINE Injectable 25 milliGRAM(s) IV Push <User Schedule> PRN Rash and/or Itching  melatonin 3 milliGRAM(s) Oral at bedtime PRN Insomnia  midodrine. 5 milliGRAM(s) Oral <User Schedule> PRN 30 minutes prior to dialysis  ondansetron Injectable 4 milliGRAM(s) IV Push every 8 hours PRN Nausea and/or Vomiting  oxyCODONE    IR 10 milliGRAM(s) Oral every 6 hours PRN Severe Pain (7 - 10)  oxyCODONE    IR 5 milliGRAM(s) Oral every 6 hours PRN Moderate Pain (4 - 6)      Allergies    latex (Rash)  penicillin (Nausea)  strawberry (Rash)    Intolerances    caffeine (Nausea)      FAMILY HISTORY:  Family history of diabetes mellitus  mother-     Family hx of hypertension  mother-         *SOCIAL HISTORY: (guardian or who pt came with), (smoking hx)      Vital Signs Last 24 Hrs  T(C): 36.7 (2022 05:50), Max: 36.9 (2022 22:00)  T(F): 98 (2022 05:50), Max: 98.5 (2022 22:00)  HR: 87 (2022 05:50) (87 - 88)  BP: 116/69 (2022 05:50) (110/62 - 116/69)  BP(mean): --  RR: 18 (2022 05:50) (16 - 18)  SpO2: 100% (2022 05:50) (100% - 100%)    EOE:  TMJ ( -  ) clicks                    (  -  ) pops                    (   - ) crepitus             Mandible FROM             Facial bones and MOM grossly intact             (  - ) trismus             (  - ) LAD             ( -  ) swelling             (  - ) asymmetry              IOE:  permanent dentition: multiple missing teeth. #20 has D marginal ridge fracture. No caries. No swelling or evidence of acute infection from tooth.            hard/soft palate:  (  - ) palatal torus           tongue/FOM WNL           labial/buccal mucosa WNL           ( ++  ) percussion           ( -  ) palpation           (  - ) swelling         LABS:                        7.7    10.37 )-----------( 405      ( 2022 11:25 )             27.3     04-07    133<L>  |  95<L>  |  23  ----------------------------<  160<H>  3.8   |  24  |  5.13<H>    Ca    8.8      2022 11:25  Phos  3.7     -  Mg     2.10     -      WBC Count: 10.37 K/uL [3.80 - 10.50] ( @ 11:25)  Platelet Count - Automated: 405 K/uL *H* [150 - 400] ( @ 11:25)  WBC Count: 12.44 K/uL *H* [3.80 - 10.50] ( @ 09:44)  Platelet Count - Automated: 427 K/uL *H* [150 - 400] ( @ 09:44)  WBC Count: 15.20 K/uL *H* [3.80 - 10.50] ( @ 17:22)  Platelet Count - Automated: 516 K/uL *H* [150 - 400] ( @ 17:22)        *DENTAL RADIOGRAPHS: 1 PA and 1 bw taken and interpreted. Fracture of #20 D marginal ridge. No PARL, No pulpal involvement No caries         ASSESSMENT: Class II fracture #20. No acute odontogenic infection.      PLAN: Protective restoration DO #20     PROCEDURE:  - Placed vitrabond over deepest portion of fracture  - etched tooth for 15 seconds, rinsed and dried.  -  placed, light cured.  - Flowable composite placed and light cured.  -Patient reports no more sensitivity to air or cold s/p protective restoration     RECOMMENDATIONS:  1)Continue pain mgmt PRN  2) f/u with [facility] for definitive restoration   2) Dental F/U with outpatient dentist for comprehensive dental care.   3) If any difficulty swallowing/breathing, fever occur, page dental.     Parag Wang DMD, #[id number]   Alina Garzon DDS, #[id number]

## 2022-04-08 NOTE — PROGRESS NOTE ADULT - ASSESSMENT
56y Female with history of ESRD on HD presents with vaginal bleeding. Nephrology consulted for ESRD status.    1) ESRD: Last HD on 4/7 tolerated well with 2L removed with heparin to prevent circuit clotting. Plan for next maintenance HD on 4/9. Monitor electrolytes.    2) HTN with ESRD: BP acceptable on Cardizem. Continue with midodrine pre-HD on HD days to avoid intradialytic hypotension. Monitor BP.    3) Anemia of renal disease: With component of blood loss from vaginal bleeding. Hb low with acceptable iron stores. Continue with Epo 20K with HD. Monitor Hb.    4) Secondary HPT of renal origin: Phosphorus acceptable with low iPTH for which sensipar decreased to 30 mg daily. Continue with renvela 1 tab with meals. Monitor serum calcium and phosphorus.      [facility] NEPHROLOGY  Keaton Lopez M.D.  Juma Green D.O.  Myla Hoffmann M.D.  Julia Brewer, JASIEL, ANP-C    Telephone: [phone]  Facsimile: [phone]    [address] [place] [address]

## 2022-04-09 NOTE — PROGRESS NOTE ADULT - SUBJECTIVE AND OBJECTIVE BOX
[facility] NEPHROLOGY- PROGRESS NOTE    56y Female with history of ESRD on HD presents with vaginal bleeding. Nephrology consulted for ESRD status.    REVIEW OF SYSTEMS:  Gen: no changes in weight  Cards: no chest pain  Resp: no dyspnea  GI: no nausea or vomiting or diarrhea, + lower ab pain  Vascular: no LE edema- pt c/o pain when cannulating AVF    caffeine (Nausea)  latex (Rash)  penicillin (Nausea)  strawberry (Rash)    Hospital Medications: Medications reviewed      VITALS:  T(F): 97.9 (04-09-22 @ 10:23), Max: 99.1 (04-09-22 @ 02:00)  HR: 91 (04-09-22 @ 10:23)  BP: 121/75 (04-09-22 @ 10:23)  RR: 16 (04-09-22 @ 10:23)  SpO2: 99% (04-09-22 @ 05:30)  Wt(kg): --    04-09 @ 07:01  -  04-09 @ 13:04  --------------------------------------------------------  IN: 2000 mL / OUT: 2400 mL / NET: -400 mL      PHYSICAL EXAM:  Gen: NAD, calm  Cards: RRR, +S1/S2, no M/G/R  Resp: CTA B/L  GI: soft, NT/ND, NABS, + ab wound dressing in place  Vascular: no LE edema B/L, LUE AVF + bruit/thrill      LABS:  04-09    135  |  94<L>  |  21  ----------------------------<  184<H>  3.8   |  24  |  4.94<H>    Ca    9.3      09 Apr 2022 07:31  Phos  4.0     04-09  Mg     2.20     04-09      Creatinine Trend: 4.94 <--, 5.13 <--, 4.09 <--, 6.73 <--, 3.69 <--, 5.42 <--                        8.7    11.39 )-----------( 427      ( 09 Apr 2022 07:31 )             29.5     Urine Studies:

## 2022-04-09 NOTE — PROGRESS NOTE ADULT - ASSESSMENT
56y Female with history of ESRD on HD presents with vaginal bleeding. Nephrology consulted for ESRD status.    1) ESRD: Last HD earlier today 4/9 tolerated well with 2L removed with heparin to prevent circuit clotting. Plan for next maintenance HD on 4/12. Will add Emla due to pain during cannulation of AVF. Monitor electrolytes.    2) HTN with ESRD: BP acceptable on Cardizem. Continue with midodrine pre-HD on HD days to avoid intradialytic hypotension. Monitor BP.    3) Anemia of renal disease: With component of blood loss from vaginal bleeding. Hb low with acceptable iron stores. Continue with Epo 20K with HD. Monitor Hb.    4) Secondary HPT of renal origin: Phosphorus acceptable with low iPTH for which sensipar decreased to 30 mg daily. Continue with renvela 1 tab with meals. Monitor serum calcium and phosphorus.      [facility] NEPHROLOGY  Keaton Lopez M.D.  Juma Green D.O.  Myla Hoffmann M.D.  Julia Brewer, JASIEL, ANP-C    Telephone: [phone]  Facsimile: [phone]    [address] [place] [address]

## 2022-04-09 NOTE — PROGRESS NOTE ADULT - SUBJECTIVE AND OBJECTIVE BOX
SUBJECTIVE / OVERNIGHT EVENTS:pt seen and examined  4-9- 22    MEDICATIONS  (STANDING):  apixaban 5 milliGRAM(s) Oral every 12 hours  buPROPion XL (24-Hour) . 150 milliGRAM(s) Oral daily  chlorhexidine 2% Cloths 1 Application(s) Topical daily  cinacalcet 30 milliGRAM(s) Oral daily  cycloSPORINE  , modified (NEORAL) 200 milliGRAM(s) Oral two times a day  Dakins Solution - 1/4 Strength 1 Application(s) Topical daily  dextrose 40% Gel 15 Gram(s) Oral once  dextrose 5%. 1000 milliLiter(s) (50 mL/Hr) IV Continuous <Continuous>  dextrose 5%. 1000 milliLiter(s) (100 mL/Hr) IV Continuous <Continuous>  dextrose 50% Injectable 25 Gram(s) IV Push once  dextrose 50% Injectable 12.5 Gram(s) IV Push once  dextrose 50% Injectable 25 Gram(s) IV Push once  diltiazem    milliGRAM(s) Oral daily  epoetin anabela-epbx (RETACRIT) Injectable 58039 Unit(s) IV Push <User Schedule>  gabapentin 300 milliGRAM(s) Oral daily  glucagon  Injectable 1 milliGRAM(s) IntraMuscular once  insulin glargine Injectable (LANTUS) 12 Unit(s) SubCutaneous at bedtime  insulin lispro (ADMELOG) corrective regimen sliding scale   SubCutaneous three times a day before meals  insulin lispro (ADMELOG) corrective regimen sliding scale   SubCutaneous at bedtime  lidocaine   4% Patch 1 Patch Transdermal daily  lidocaine   4% Patch 1 Patch Transdermal daily  lidocaine   4% Patch 1 Patch Transdermal daily  lidocaine/prilocaine Cream 1 Application(s) Topical <User Schedule>  mirtazapine 7.5 milliGRAM(s) Oral at bedtime  montelukast 10 milliGRAM(s) Oral at bedtime  pantoprazole    Tablet 40 milliGRAM(s) Oral before breakfast  polyethylene glycol 3350 17 Gram(s) Oral at bedtime  senna 2 Tablet(s) Oral at bedtime  sertraline 50 milliGRAM(s) Oral daily  sevelamer carbonate 800 milliGRAM(s) Oral three times a day with meals  simethicone 80 milliGRAM(s) Chew three times a day  tacrolimus   0.1% Ointment 1 Application(s) Topical daily  traZODone 100 milliGRAM(s) Oral at bedtime    MEDICATIONS  (PRN):  acetaminophen     Tablet .. 650 milliGRAM(s) Oral every 6 hours PRN Temp greater or equal to 38C (100.4F), Mild Pain (1 - 3)  diphenhydrAMINE Injectable 25 milliGRAM(s) IV Push <User Schedule> PRN Rash and/or Itching  melatonin 3 milliGRAM(s) Oral at bedtime PRN Insomnia  midodrine. 5 milliGRAM(s) Oral <User Schedule> PRN 30 minutes prior to dialysis  ondansetron Injectable 4 milliGRAM(s) IV Push every 8 hours PRN Nausea and/or Vomiting  oxyCODONE    IR 10 milliGRAM(s) Oral every 6 hours PRN Severe Pain (7 - 10)  oxyCODONE    IR 5 milliGRAM(s) Oral every 6 hours PRN Moderate Pain (4 - 6)    Vital Signs Last 24 Hrs  T(C): 36.8 (04-09-22 @ 20:20), Max: 37.3 (04-09-22 @ 02:00)  T(F): 98.3 (04-09-22 @ 20:20), Max: 99.1 (04-09-22 @ 02:00)  HR: 87 (04-09-22 @ 20:20) (87 - 95)  BP: 135/70 (04-09-22 @ 20:20) (114/55 - 138/73)  BP(mean): --  RR: 17 (04-09-22 @ 20:20) (16 - 18)  SpO2: 98% (04-09-22 @ 20:20) (98% - 100%)      Skin: No rash.  Eyes: No recent vision problems or eye pain.  ENT: No congestion, ear pain, or sore throat.  Cardiovascular: No chest pain or palpation.  Respiratory: No cough, shortness of breath, congestion, or wheezing.  Gastrointestinal: No abdominal pain, nausea, vomiting, or diarrhea.  Genitourinary: No dysuria.  Musculoskeletal: No joint swelling.  Neurologic: No headache.    PHYSICAL EXAM:  GENERAL: NAD  EYES: EOMI, PERRLA  NECK: Supple, No JVD  CHEST/LUNG: dec breath sounds at bases  HEART:  S1 , S2 +  ABDOMEN: soft , bs+, wound dressing+  EXTREMITIES:  edema+  NEUROLOGY:alert awake oriented     LABS:  04-09    135  |  94<L>  |  21  ----------------------------<  184<H>  3.8   |  24  |  4.94<H>    Ca    9.3      09 Apr 2022 07:31  Phos  4.0     04-09  Mg     2.20     04-09      Creatinine Trend: 4.94 <--, 5.13 <--, 4.09 <--, 6.73 <--, 3.69 <--                        8.7    11.39 )-----------( 427      ( 09 Apr 2022 07:31 )             29.5     Urine Studies:

## 2022-04-10 NOTE — PROGRESS NOTE ADULT - SUBJECTIVE AND OBJECTIVE BOX
SUBJECTIVE / OVERNIGHT EVENTS:pt seen and examined  4-10- 22    MEDICATIONS  (STANDING):  apixaban 5 milliGRAM(s) Oral every 12 hours  buPROPion XL (24-Hour) . 150 milliGRAM(s) Oral daily  chlorhexidine 2% Cloths 1 Application(s) Topical daily  cinacalcet 30 milliGRAM(s) Oral daily  cycloSPORINE  , modified (NEORAL) 200 milliGRAM(s) Oral two times a day  Dakins Solution - 1/4 Strength 1 Application(s) Topical daily  dextrose 40% Gel 15 Gram(s) Oral once  dextrose 5%. 1000 milliLiter(s) (50 mL/Hr) IV Continuous <Continuous>  dextrose 5%. 1000 milliLiter(s) (100 mL/Hr) IV Continuous <Continuous>  dextrose 50% Injectable 25 Gram(s) IV Push once  dextrose 50% Injectable 12.5 Gram(s) IV Push once  dextrose 50% Injectable 25 Gram(s) IV Push once  diltiazem    milliGRAM(s) Oral daily  epoetin anabela-epbx (RETACRIT) Injectable 92046 Unit(s) IV Push <User Schedule>  gabapentin 300 milliGRAM(s) Oral daily  glucagon  Injectable 1 milliGRAM(s) IntraMuscular once  insulin glargine Injectable (LANTUS) 12 Unit(s) SubCutaneous at bedtime  insulin lispro (ADMELOG) corrective regimen sliding scale   SubCutaneous three times a day before meals  insulin lispro (ADMELOG) corrective regimen sliding scale   SubCutaneous at bedtime  lidocaine   4% Patch 1 Patch Transdermal daily  lidocaine   4% Patch 1 Patch Transdermal daily  lidocaine   4% Patch 1 Patch Transdermal daily  lidocaine/prilocaine Cream 1 Application(s) Topical <User Schedule>  mirtazapine 7.5 milliGRAM(s) Oral at bedtime  montelukast 10 milliGRAM(s) Oral at bedtime  pantoprazole    Tablet 40 milliGRAM(s) Oral before breakfast  polyethylene glycol 3350 17 Gram(s) Oral at bedtime  senna 2 Tablet(s) Oral at bedtime  sertraline 50 milliGRAM(s) Oral daily  sevelamer carbonate 800 milliGRAM(s) Oral three times a day with meals  simethicone 80 milliGRAM(s) Chew three times a day  tacrolimus   0.1% Ointment 1 Application(s) Topical daily  traZODone 100 milliGRAM(s) Oral at bedtime    MEDICATIONS  (PRN):  acetaminophen     Tablet .. 650 milliGRAM(s) Oral every 6 hours PRN Temp greater or equal to 38C (100.4F), Mild Pain (1 - 3)  diphenhydrAMINE Injectable 25 milliGRAM(s) IV Push <User Schedule> PRN Rash and/or Itching  melatonin 3 milliGRAM(s) Oral at bedtime PRN Insomnia  midodrine. 5 milliGRAM(s) Oral <User Schedule> PRN 30 minutes prior to dialysis  ondansetron Injectable 4 milliGRAM(s) IV Push every 8 hours PRN Nausea and/or Vomiting  oxyCODONE    IR 10 milliGRAM(s) Oral every 6 hours PRN Severe Pain (7 - 10)  oxyCODONE    IR 5 milliGRAM(s) Oral every 6 hours PRN Moderate Pain (4 - 6)    Vital Signs Last 24 Hrs  T(C): 36.8 (04-10-22 @ 21:56), Max: 36.9 (04-10-22 @ 10:00)  T(F): 98.3 (04-10-22 @ 21:56), Max: 98.5 (04-10-22 @ 10:00)  HR: 88 (04-10-22 @ 21:56) (86 - 93)  BP: 119/62 (04-10-22 @ 21:56) (108/63 - 123/63)  BP(mean): --  RR: 18 (04-10-22 @ 21:56) (16 - 18)  SpO2: 98% (04-10-22 @ 21:56) (96% - 98%)      Skin: No rash.  Eyes: No recent vision problems or eye pain.  ENT: No congestion, ear pain, or sore throat.  Cardiovascular: No chest pain or palpation.  Respiratory: No cough, shortness of breath, congestion, or wheezing.  Gastrointestinal: No abdominal pain, nausea, vomiting, or diarrhea.  Genitourinary: No dysuria.  Musculoskeletal: No joint swelling.  Neurologic: No headache.    PHYSICAL EXAM:  GENERAL: NAD  EYES: EOMI, PERRLA  NECK: Supple, No JVD  CHEST/LUNG: dec breath sounds at bases  HEART:  S1 , S2 +  ABDOMEN: soft , bs+, wound dressing+  EXTREMITIES:  edema+  NEUROLOGY:alert awake oriented     LABS:  04-09    135  |  94<L>  |  21  ----------------------------<  184<H>  3.8   |  24  |  4.94<H>    Ca    9.3      09 Apr 2022 07:31  Phos  4.0     04-09  Mg     2.20     04-09      Creatinine Trend: 4.94 <--, 5.13 <--, 4.09 <--, 6.73 <--                        8.7    11.39 )-----------( 427      ( 09 Apr 2022 07:31 )             29.5     Urine Studies:

## 2022-04-10 NOTE — PROGRESS NOTE ADULT - SUBJECTIVE AND OBJECTIVE BOX
[facility] NEPHROLOGY- PROGRESS NOTE    56y Female with history of ESRD on HD presents with vaginal bleeding. Nephrology consulted for ESRD status.    REVIEW OF SYSTEMS:  Gen: no changes in weight  Cards: no chest pain  Resp: no dyspnea  GI: no nausea or vomiting or diarrhea, + lower ab pain  Vascular: no LE edema    caffeine (Nausea)  latex (Rash)  penicillin (Nausea)  strawberry (Rash)    Hospital Medications: Medications reviewed      VITALS:  T(F): 97.7 (04-10-22 @ 04:20), Max: 98.3 (04-09-22 @ 20:20)  HR: 93 (04-10-22 @ 05:31)  BP: 113/63 (04-10-22 @ 05:31)  RR: 17 (04-10-22 @ 04:20)  SpO2: 98% (04-10-22 @ 04:20)  Wt(kg): --    04-09 @ 07:01  -  04-10 @ 07:00  --------------------------------------------------------  IN: 2000 mL / OUT: 2400 mL / NET: -400 mL      PHYSICAL EXAM:  Gen: NAD, calm  Cards: RRR, +S1/S2, no M/G/R  Resp: CTA B/L  GI: soft, NT/ND, NABS, + ab wound dressing in place  Vascular: no LE edema B/L, LUE AVF + bruit/thrill      LABS: No new labs  04-09    135  |  94<L>  |  21  ----------------------------<  184<H>  3.8   |  24  |  4.94<H>    Ca    9.3      09 Apr 2022 07:31  Phos  4.0     04-09  Mg     2.20     04-09      Creatinine Trend: 4.94 <--, 5.13 <--, 4.09 <--, 6.73 <--, 3.69 <--, 5.42 <--                        8.7    11.39 )-----------( 427      ( 09 Apr 2022 07:31 )             29.5     Urine Studies:

## 2022-04-10 NOTE — PROGRESS NOTE ADULT - ASSESSMENT
56y Female with history of ESRD on HD presents with vaginal bleeding. Nephrology consulted for ESRD status.    1) ESRD: Last HD 4/9 tolerated well with 2L removed with heparin to prevent circuit clotting. Plan for next maintenance HD on 4/12. Will add Emla due to pain during cannulation of AVF. Monitor electrolytes.    2) HTN with ESRD: BP acceptable on Cardizem. Continue with midodrine pre-HD on HD days to avoid intradialytic hypotension. Monitor BP.    3) Anemia of renal disease: With component of blood loss from vaginal bleeding. Hb low with acceptable iron stores. Continue with Epo 20K with HD. Monitor Hb.    4) Secondary HPT of renal origin: Phosphorus acceptable with low iPTH for which sensipar decreased to 30 mg daily. Continue with renvela 1 tab with meals. Monitor serum calcium and phosphorus.      [facility] NEPHROLOGY  Keaton Lopez M.D.  Juma Green D.O.  Myla Hoffmann M.D.  Julia Brewer, MSN, ANP-C    Telephone: [phone]  Facsimile: [phone]    [address] [place] [address]

## 2022-04-11 NOTE — PROGRESS NOTE ADULT - ASSESSMENT
56F with Hx of ESRD TTS, HTN, DM 2, COPD, afib, known chronic R pannus wound felt most likely pyoderma gangrenosum presents for 1.5wk vag bleeding. Pt recently had prolonged stay at [facility] for pannus wound, known to endocrine service.  Was dc'd to rehab on 3/3.  Prior to last hospitalization patient was on much higher doses of basal bolus.  Now requiring much less.  Has poor appetite.    1. Type 2 diabetes mellitus uncontrolled  A1c 7.0% (may be inaccurate in setting of ESRD)  Home Regimen: Tresiba 80 units HS and Trulicity 1.5mg subq weekly    While inpatient:  BG target 100-180 mg/dL  Continue Lantus 12 units SQ qHS   Continue Admelog correctional scale LOW before meals and low bedtime scale  Remain OFF premeal Admelog for now. As PO intake improves or FS begin to increase > 200, can consider restarting standing premeal  Check BG before meals and bedtime  Hypoglycemia protocol   Consistent carbohydrate diet, seen by RD, supplement ordered    Discharge Plan:  STOP Trulicity (patient ESRD on HD)  For dc to rehab- recommend to continue current insulin management as outlined above  For dc to home- Recommend basal (resume Tresiba, dose TBD), plus PO regimen (Tradjenta 5 mg daily or Januvia 25 mg daily)   Recommend Tradjenta 5 mg po daily, if not covered can see if Januvia 25 mg po daily is covered.  Please obtain prior auth if needed as patient is ESRD and DPP4i class are indicated for patients with ESRD  Consider CGM (such as Freestyle Libre2 outpatient)  If desiring to followup with [facility] Endocrinology: [address], [address], [place] [facility] [address], [phone]    2. HTN  Management per primary team/nephrology    3. Hyperlipidemia  LDL target less than 70. Not currently on statin. Management per primary team    Priscilla Patel  Nurse Practitioner  Division of Endocrinology & Diabetes  In house pager #[phone]/long range pager #[phone]    If before 9AM or after 6PM, or on weekends/holidays, please call endocrine answering service for assistance ([phone]).  For nonurgent matters email [email or web address] for assistance.

## 2022-04-11 NOTE — PROVIDER CONTACT NOTE (CHANGE IN STATUS NOTIFICATION) - ASSESSMENT
Pt A&Ox4, not neuro deficits noted, Pt has productive cough, COVID result 4/10/22 negative, temp 100.4 oral, all other VS stable

## 2022-04-11 NOTE — PROVIDER CONTACT NOTE (CHANGE IN STATUS NOTIFICATION) - RECOMMENDATIONS
RVP swab, blood cultures, Pt anuric unable to obtain UA, chest x-ray, IV Tylenol for headache and fever

## 2022-04-11 NOTE — CHART NOTE - NSCHARTNOTEFT_GEN_A_CORE
ACP MEDICINE COVERAGE - Medicine Subsequent Hospital Care Note    CC: fever   HPI/Subjective:  56F ESRD TTS, HTN, DM, COPD, afib, known chronic pannus wound felt most likely pyoderma gangrenosum presents for 1.5wk vag bleeding. Patient with now headache and productive cough.  Nurse noted patient febrile 100.4.      ROS:  Denies , diaphoresis, malaise, night sweats, generalized weakness, changes in vision, dizziness, cough, sputum production, wheezing, hemoptysis, chest pain, palpitations, shortness of breath, dyspnea on exertion, PND, dysphagia, new abdominal pain, nausea, vomiting, diarrhea, constipation, melena, hematochezia, dysuria, increased urinary frequency/urgency, hematuria, nocturia, numbness/weakness/tingling, any other complaints.    Vital Signs Last 24 Hrs  T(C): 38 (04-11-22 @ 16:54), Max: 38 (04-11-22 @ 16:54)  T(F): 100.4 (04-11-22 @ 16:54), Max: 100.4 (04-11-22 @ 16:54)  HR: 96 (04-11-22 @ 16:54) (83 - 96)  BP: 106/62 (04-11-22 @ 16:54) (105/47 - 125/55)  RR: 18 (04-11-22 @ 16:54) (18 - 19)  SpO2: 100% (04-11-22 @ 16:54) (96% - 100%) on (O2)    PHYSICAL EXAM:  Constitutional: ill apperaring  Ears, nose, Mouth, and Throat: normal external ears and nose, normal hearing, moist oral mucosa  Eyes: normal conjunctiva, EOMI, PEERL  Neck: supple, no JVD  Respiratory: Clear to auscultation bilaterally. No wheezes, rales or rhonchi. Normal respiratory effort  Cardiovascular: regular rate and rhythm, S1 and S2, no murmurs, rubs or gallops, no edema, 2+ peripheral pulses  Gastrointestinal: soft, nontender, nondistended, +bowel sounds, no hernia  Skin: warm, dry, no rash  Neurologic: sensation grossly intact, CN grossly intact, non-focal exam  Musculoskeletal: no clubbing, no cyanosis, no joint swelling  Psychiatric: A&Ox3, appropriate mood, affect    LABS:    PT/INR: PT: 16.2 sec | INR: 1.39 ratio (03-31-22 @ 08:48)  PTT: 41.8 sec (03-31-22 @ 08:48)  PT/INR: PT: x | INR: x (03-28-22 @ 09:07)  PTT: 115.4 sec (03-28-22 @ 09:07)  PT/INR: PT: x | INR: x (03-28-22 @ 02:05)  PTT: 38.6 sec (03-28-22 @ 02:05)  PT/INR: PT: x | INR: x (03-27-22 @ 17:41)  PTT: 61.5 sec (03-27-22 @ 17:41)  PT/INR: PT: 13.2 sec | INR: 1.14 ratio (03-27-22 @ 08:55)  PTT: 49.4 sec (03-27-22 @ 08:55)  PT/INR: PT: x | INR: x (03-26-22 @ 21:54)  PTT: 34.7 sec (03-26-22 @ 21:54)  PT/INR: PT: x | INR: x (03-26-22 @ 11:54)  PTT: 56.3 sec (03-26-22 @ 11:54)  PT/INR: PT: x | INR: x (03-25-22 @ 23:13)  PTT: 37.5 sec (03-25-22 @ 23:13)  PT/INR: PT: x | INR: x (03-25-22 @ 06:53)  PTT: 37.0 sec (03-25-22 @ 06:53)  PT/INR: PT: 13.9 sec | INR: 1.20 ratio (03-21-22 @ 17:26)  PTT: 39.0 sec (03-21-22 @ 17:26)  PT/INR: PT: 14.2 sec | INR: 1.22 ratio (03-19-22 @ 20:17)  PTT: 41.2 sec (03-19-22 @ 20:17)    CARDIAC ENZYMES            Serum Pro-Brain Natriuretic Peptide:   D-Dimer Assay:     Urinanalysis Basic (04-11-22 @ 17:33):      Blood Gas Venous (04-11-22 @ 17:33):  pH: 7.23 | HCO3: 27 | pCO2: 65 | pO2: 138 | Lactate: 1.0  pH: 7.35 | HCO3: 26 | pCO2: 47 | pO2: 108 | Lactate: 1.1      Blood Gas Arterial (04-11-22 @ 17:33):      CAPILLARY BLOOD GLUCOSE:  POCT Blood Glucose: 168 mg/dL (04-11-22 @ 12:53)  POCT Blood Glucose: 195 mg/dL (04-11-22 @ 08:56)  POCT Blood Glucose: 136 mg/dL (04-10-22 @ 21:17)      COVID PCR:  NotDetec (04-10-22 @ 14:18)  NotDetec (04-03-22 @ 18:13)  NotDetec (03-27-22 @ 06:57)      RADIOLOGY:    MEDICATIONS  (STANDING):  apixaban 5 milliGRAM(s) Oral every 12 hours  buPROPion XL (24-Hour) . 150 milliGRAM(s) Oral daily  chlorhexidine 2% Cloths 1 Application(s) Topical daily  cinacalcet 30 milliGRAM(s) Oral daily  cycloSPORINE  , modified (NEORAL) 200 milliGRAM(s) Oral two times a day  Dakins Solution - 1/4 Strength 1 Application(s) Topical daily  dextrose 40% Gel 15 Gram(s) Oral once  dextrose 5%. 1000 milliLiter(s) (50 mL/Hr) IV Continuous <Continuous>  dextrose 5%. 1000 milliLiter(s) (100 mL/Hr) IV Continuous <Continuous>  dextrose 50% Injectable 25 Gram(s) IV Push once  dextrose 50% Injectable 12.5 Gram(s) IV Push once  dextrose 50% Injectable 25 Gram(s) IV Push once  diltiazem    milliGRAM(s) Oral daily  epoetin anabela-epbx (RETACRIT) Injectable 59078 Unit(s) IV Push <User Schedule>  gabapentin 300 milliGRAM(s) Oral daily  glucagon  Injectable 1 milliGRAM(s) IntraMuscular once  insulin glargine Injectable (LANTUS) 12 Unit(s) SubCutaneous at bedtime  insulin lispro (ADMELOG) corrective regimen sliding scale   SubCutaneous three times a day before meals  insulin lispro (ADMELOG) corrective regimen sliding scale   SubCutaneous at bedtime  lidocaine   4% Patch 1 Patch Transdermal daily  lidocaine   4% Patch 1 Patch Transdermal daily  lidocaine   4% Patch 1 Patch Transdermal daily  lidocaine/prilocaine Cream 1 Application(s) Topical <User Schedule>  mirtazapine 7.5 milliGRAM(s) Oral at bedtime  montelukast 10 milliGRAM(s) Oral at bedtime  pantoprazole    Tablet 40 milliGRAM(s) Oral before breakfast  polyethylene glycol 3350 17 Gram(s) Oral at bedtime  senna 2 Tablet(s) Oral at bedtime  sertraline 50 milliGRAM(s) Oral daily  sevelamer carbonate 800 milliGRAM(s) Oral three times a day with meals  simethicone 80 milliGRAM(s) Chew three times a day  tacrolimus   0.1% Ointment 1 Application(s) Topical daily  traZODone 100 milliGRAM(s) Oral at bedtime    MEDICATIONS  (PRN):  acetaminophen     Tablet .. 650 milliGRAM(s) Oral every 6 hours PRN Temp greater or equal to 38C (100.4F), Mild Pain (1 - 3)  acetaminophen   IVPB .. 1000 milliGRAM(s) IV Intermittent once PRN Temp greater or equal to 38C (100.4F), Mild Pain (1 - 3), Moderate Pain (4 - 6)  diphenhydrAMINE Injectable 25 milliGRAM(s) IV Push <User Schedule> PRN Rash and/or Itching  guaiFENesin Oral Liquid (Sugar-Free) 100 milliGRAM(s) Oral every 6 hours PRN Cough  melatonin 3 milliGRAM(s) Oral at bedtime PRN Insomnia  midodrine. 5 milliGRAM(s) Oral <User Schedule> PRN 30 minutes prior to dialysis  ondansetron Injectable 4 milliGRAM(s) IV Push every 8 hours PRN Nausea and/or Vomiting  oxyCODONE    IR 10 milliGRAM(s) Oral every 6 hours PRN Severe Pain (7 - 10)  oxyCODONE    IR 5 milliGRAM(s) Oral every 6 hours PRN Moderate Pain (4 - 6)    I&O's Summary    I reviewed the above labs, radiology, medications, tests, telemetry, and EKG interpretation.    ASSESSMENT/PLAN:  56F ESRD TTS, HTN, DM, COPD, afib, known chronic pannus wound felt most likely pyoderma gangrenosum presents for 1.5wk vag bleeding now febrile.  Infectious workup ordered: CXR, UA, BCX (2 sets), COVID/RVP.  Discussed with Dr. Maurice, recommending to start on Cefepime. Tylenol 1gm given for fever and headache.     Low complexity/risk (30min) ACP MEDICINE COVERAGE - Medicine Subsequent Hospital Care Note    CC: fever   HPI/Subjective:  56F ESRD TTS, HTN, DM, COPD, afib, known chronic pannus wound felt most likely pyoderma gangrenosum presents for 1.5wk vag bleeding. Patient with now headache and productive cough.  Nurse noted patient febrile 100.4.      ROS:  Denies , diaphoresis, malaise, night sweats, generalized weakness, changes in vision, dizziness, cough, sputum production, wheezing, hemoptysis, chest pain, palpitations, shortness of breath, dyspnea on exertion, PND, dysphagia, new abdominal pain, nausea, vomiting, diarrhea, constipation, melena, hematochezia, dysuria, increased urinary frequency/urgency, hematuria, nocturia, numbness/weakness/tingling, any other complaints.    Vital Signs Last 24 Hrs  T(C): 38 (04-11-22 @ 16:54), Max: 38 (04-11-22 @ 16:54)  T(F): 100.4 (04-11-22 @ 16:54), Max: 100.4 (04-11-22 @ 16:54)  HR: 96 (04-11-22 @ 16:54) (83 - 96)  BP: 106/62 (04-11-22 @ 16:54) (105/47 - 125/55)  RR: 18 (04-11-22 @ 16:54) (18 - 19)  SpO2: 100% (04-11-22 @ 16:54) (96% - 100%) on (O2)    PHYSICAL EXAM:  Constitutional: ill apperaring  Ears, nose, Mouth, and Throat: normal external ears and nose, normal hearing, moist oral mucosa  Eyes: normal conjunctiva, EOMI, PEERL  Neck: supple, no JVD  Respiratory: Clear to auscultation bilaterally. No wheezes, rales or rhonchi. Normal respiratory effort  Cardiovascular: regular rate and rhythm, S1 and S2, no murmurs, rubs or gallops, no edema, 2+ peripheral pulses  Gastrointestinal: soft, nontender, nondistended, +bowel sounds, no hernia  Skin: warm, dry, no rash  Neurologic: sensation grossly intact, CN grossly intact, non-focal exam  Musculoskeletal: no clubbing, no cyanosis, no joint swelling  Psychiatric: A&Ox3, appropriate mood, affect    LABS:    PT/INR: PT: 16.2 sec | INR: 1.39 ratio (03-31-22 @ 08:48)  PTT: 41.8 sec (03-31-22 @ 08:48)  PT/INR: PT: x | INR: x (03-28-22 @ 09:07)  PTT: 115.4 sec (03-28-22 @ 09:07)  PT/INR: PT: x | INR: x (03-28-22 @ 02:05)  PTT: 38.6 sec (03-28-22 @ 02:05)  PT/INR: PT: x | INR: x (03-27-22 @ 17:41)  PTT: 61.5 sec (03-27-22 @ 17:41)  PT/INR: PT: 13.2 sec | INR: 1.14 ratio (03-27-22 @ 08:55)  PTT: 49.4 sec (03-27-22 @ 08:55)  PT/INR: PT: x | INR: x (03-26-22 @ 21:54)  PTT: 34.7 sec (03-26-22 @ 21:54)  PT/INR: PT: x | INR: x (03-26-22 @ 11:54)  PTT: 56.3 sec (03-26-22 @ 11:54)  PT/INR: PT: x | INR: x (03-25-22 @ 23:13)  PTT: 37.5 sec (03-25-22 @ 23:13)  PT/INR: PT: x | INR: x (03-25-22 @ 06:53)  PTT: 37.0 sec (03-25-22 @ 06:53)  PT/INR: PT: 13.9 sec | INR: 1.20 ratio (03-21-22 @ 17:26)  PTT: 39.0 sec (03-21-22 @ 17:26)  PT/INR: PT: 14.2 sec | INR: 1.22 ratio (03-19-22 @ 20:17)  PTT: 41.2 sec (03-19-22 @ 20:17)    CARDIAC ENZYMES            Serum Pro-Brain Natriuretic Peptide:   D-Dimer Assay:     Urinanalysis Basic (04-11-22 @ 17:33):      Blood Gas Venous (04-11-22 @ 17:33):  pH: 7.23 | HCO3: 27 | pCO2: 65 | pO2: 138 | Lactate: 1.0  pH: 7.35 | HCO3: 26 | pCO2: 47 | pO2: 108 | Lactate: 1.1      Blood Gas Arterial (04-11-22 @ 17:33):      CAPILLARY BLOOD GLUCOSE:  POCT Blood Glucose: 168 mg/dL (04-11-22 @ 12:53)  POCT Blood Glucose: 195 mg/dL (04-11-22 @ 08:56)  POCT Blood Glucose: 136 mg/dL (04-10-22 @ 21:17)      COVID PCR:  NotDetec (04-10-22 @ 14:18)  NotDetec (04-03-22 @ 18:13)  NotDetec (03-27-22 @ 06:57)      RADIOLOGY:    MEDICATIONS  (STANDING):  apixaban 5 milliGRAM(s) Oral every 12 hours  buPROPion XL (24-Hour) . 150 milliGRAM(s) Oral daily  chlorhexidine 2% Cloths 1 Application(s) Topical daily  cinacalcet 30 milliGRAM(s) Oral daily  cycloSPORINE  , modified (NEORAL) 200 milliGRAM(s) Oral two times a day  Dakins Solution - 1/4 Strength 1 Application(s) Topical daily  dextrose 40% Gel 15 Gram(s) Oral once  dextrose 5%. 1000 milliLiter(s) (50 mL/Hr) IV Continuous <Continuous>  dextrose 5%. 1000 milliLiter(s) (100 mL/Hr) IV Continuous <Continuous>  dextrose 50% Injectable 25 Gram(s) IV Push once  dextrose 50% Injectable 12.5 Gram(s) IV Push once  dextrose 50% Injectable 25 Gram(s) IV Push once  diltiazem    milliGRAM(s) Oral daily  epoetin anabela-epbx (RETACRIT) Injectable 41243 Unit(s) IV Push <User Schedule>  gabapentin 300 milliGRAM(s) Oral daily  glucagon  Injectable 1 milliGRAM(s) IntraMuscular once  insulin glargine Injectable (LANTUS) 12 Unit(s) SubCutaneous at bedtime  insulin lispro (ADMELOG) corrective regimen sliding scale   SubCutaneous three times a day before meals  insulin lispro (ADMELOG) corrective regimen sliding scale   SubCutaneous at bedtime  lidocaine   4% Patch 1 Patch Transdermal daily  lidocaine   4% Patch 1 Patch Transdermal daily  lidocaine   4% Patch 1 Patch Transdermal daily  lidocaine/prilocaine Cream 1 Application(s) Topical <User Schedule>  mirtazapine 7.5 milliGRAM(s) Oral at bedtime  montelukast 10 milliGRAM(s) Oral at bedtime  pantoprazole    Tablet 40 milliGRAM(s) Oral before breakfast  polyethylene glycol 3350 17 Gram(s) Oral at bedtime  senna 2 Tablet(s) Oral at bedtime  sertraline 50 milliGRAM(s) Oral daily  sevelamer carbonate 800 milliGRAM(s) Oral three times a day with meals  simethicone 80 milliGRAM(s) Chew three times a day  tacrolimus   0.1% Ointment 1 Application(s) Topical daily  traZODone 100 milliGRAM(s) Oral at bedtime    MEDICATIONS  (PRN):  acetaminophen     Tablet .. 650 milliGRAM(s) Oral every 6 hours PRN Temp greater or equal to 38C (100.4F), Mild Pain (1 - 3)  acetaminophen   IVPB .. 1000 milliGRAM(s) IV Intermittent once PRN Temp greater or equal to 38C (100.4F), Mild Pain (1 - 3), Moderate Pain (4 - 6)  diphenhydrAMINE Injectable 25 milliGRAM(s) IV Push <User Schedule> PRN Rash and/or Itching  guaiFENesin Oral Liquid (Sugar-Free) 100 milliGRAM(s) Oral every 6 hours PRN Cough  melatonin 3 milliGRAM(s) Oral at bedtime PRN Insomnia  midodrine. 5 milliGRAM(s) Oral <User Schedule> PRN 30 minutes prior to dialysis  ondansetron Injectable 4 milliGRAM(s) IV Push every 8 hours PRN Nausea and/or Vomiting  oxyCODONE    IR 10 milliGRAM(s) Oral every 6 hours PRN Severe Pain (7 - 10)  oxyCODONE    IR 5 milliGRAM(s) Oral every 6 hours PRN Moderate Pain (4 - 6)    I&O's Summary    I reviewed the above labs, radiology, medications, tests, telemetry, and EKG interpretation.    ASSESSMENT/PLAN:  56F ESRD TTS, HTN, DM, COPD, afib, known chronic pannus wound felt most likely pyoderma gangrenosum presents for 1.5wk vag bleeding now febrile.  Infectious workup ordered: CXR, UA, BCX (2 sets), COVID/RVP.  Discussed with Dr. Maurice, patient with recent tooth extraction. Recommending to start on Vancomycin 1gm x1 and Ceftriaxone 2gm daily ( discussed w/ ID Dr. Juarez patient's intolerance/allergy to PCN ). Tylenol 1gm given for fever and headache.     Low complexity/risk (30min)

## 2022-04-11 NOTE — PROGRESS NOTE ADULT - ASSESSMENT
56y Female with history of ESRD on HD presents with vaginal bleeding. Nephrology consulted for ESRD status.    1) ESRD: Last HD 4/9 tolerated well with 2L removed with heparin to prevent circuit clotting. Plan for next maintenance HD on 4/12. Monitor electrolytes.    2) HTN with ESRD: BP acceptable on Cardizem. Continue with midodrine pre-HD on HD days to avoid intradialytic hypotension. Monitor BP.    3) Anemia of renal disease: With component of blood loss from vaginal bleeding. Hb low with acceptable iron stores. Continue with Epo 20K with HD. Monitor Hb.    4) Secondary HPT of renal origin: Phosphorus acceptable with low iPTH for which sensipar decreased to 30 mg daily. Continue with renvela 1 tab with meals. Monitor serum calcium and phosphorus.      [facility] NEPHROLOGY  Keaton Lopez M.D.  Juma Green D.O.  Myla Hoffmann M.D.  Julia Brewer, JASIEL, ANP-C    Telephone: [phone]  Facsimile: [phone]    [address] [place] [address]

## 2022-04-11 NOTE — DISCHARGE NOTE NURSING/CASE MANAGEMENT/SOCIAL WORK - NSDCPEFALRISK_GEN_ALL_CORE
For information on Fall & Injury Prevention, visit: [email or web address] OR  [email or web address] OR  https://www.cdc.gov/steadi/patient.html

## 2022-04-11 NOTE — PROGRESS NOTE ADULT - SUBJECTIVE AND OBJECTIVE BOX
CARDIOLOGY FOLLOW UP - Dr. Bullock  DATE OF SERVICE: 4/11/22      no acute events       REVIEW OF SYSTEMS:  CONSTITUTIONAL: No fever, weight loss, or fatigue  RESPIRATORY: No cough, wheezing, chills or hemoptysis; No Shortness of Breath  CARDIOVASCULAR: No chest pain, palpitations, passing out, dizziness, or leg swelling  GASTROINTESTINAL: No abdominal or epigastric pain. No nausea, vomiting, or hematemesis; No diarrhea or constipation. No melena or hematochezia.  VASCULAR: No edema     PHYSICAL EXAM:  T(C): 36.6 (04-11-22 @ 08:25), Max: 36.8 (04-10-22 @ 21:56)  HR: 85 (04-11-22 @ 08:25) (85 - 90)  BP: 105/47 (04-11-22 @ 08:25) (105/47 - 119/62)  RR: 18 (04-11-22 @ 08:25) (18 - 18)  SpO2: 98% (04-11-22 @ 08:25) (98% - 98%)  Wt(kg): --  I&O's Summary      Appearance: Normal	  Cardiovascular: Normal S1 S2,RRR, No JVD, No murmurs  Respiratory: Lungs clear to auscultation	  Gastrointestinal:  Soft, Non-tender, + BS	  Extremities: Normal range of motion, No clubbing, cyanosis or edema      Home Medications:  Aspercreme with Lidocaine 4% topical film: Apply topically to affected area once a day (low back) (20 Mar 2022 10:15)  Aspercreme with Lidocaine 4% topical film: Apply topically to affected area once a day (L knee) (20 Mar 2022 10:15)  aspirin 81 mg oral delayed release tablet: 1 tab(s) orally once a day (20 Mar 2022 10:15)  buPROPion 150 mg/24 hours (XL) oral tablet, extended release: 1 tab(s) orally once a day (in the morning) (20 Mar 2022 10:15)  cycloSPORINE modified 100 mg oral capsule: 2 cap(s) orally once a day (at bedtime) (20 Mar 2022 10:15)  cycloSPORINE modified 50 mg oral capsule: 3 cap(s) orally once a day in the morning  (20 Mar 2022 10:15)  dilTIAZem 120 mg/24 hours oral capsule, extended release: 1 cap(s) orally once a day (hold SBP&lt;110, HR&lt;60) (20 Mar 2022 10:15)  doxycycline hyclate 100 mg oral tablet: 1 tab(s) orally 2 times a day (20 Mar 2022 10:15)  Eliquis 2.5 mg oral tablet: 1 tab(s) orally 2 times a day    Pharmacy states patient no longer wants to fill this medication (20 Mar 2022 10:15)  gabapentin 300 mg oral capsule: 1 cap(s) orally 2 times a day (20 Mar 2022 10:15)  insulin glargine: 15 unit(s) subcutaneous once a day (at bedtime) (20 Mar 2022 10:15)  midodrine 5 mg oral tablet: 1 tab(s) orally 3 times a week, 30 minute prior to dialysis sessions (hold is SBP&gt;130) (20 Mar 2022 10:15)  mirtazapine 7.5 mg oral tablet: 1 tab(s) orally once a day (at bedtime) (20 Mar 2022 10:15)  montelukast 10 mg oral tablet: 1 tab(s) orally once a day (in the evening) (20 Mar 2022 10:15)  oxyCODONE 10 mg oral tablet, extended release: 1 tab(s) orally every 12 hours (20 Mar 2022 10:15)  oxycodone-acetaminophen 7.5 mg-325 mg oral tablet: 1 tab(s) orally every 6 hours, As Needed (20 Mar 2022 10:15)  pantoprazole 40 mg oral delayed release tablet: 1 tab(s) orally once a day (20 Mar 2022 10:15)  polyethylene glycol 3350 oral powder for reconstitution: 17 gram(s) orally once a day (at bedtime) (20 Mar 2022 10:15)  senna oral tablet: 2 tab(s) orally once a day (at bedtime) (20 Mar 2022 10:15)  sertraline 50 mg oral tablet: 1 tab(s) orally once a day (20 Mar 2022 10:15)  sevelamer carbonate 800 mg oral tablet: 1 tab(s) orally 3 times a day (with meals) (20 Mar 2022 10:15)  simethicone 80 mg oral tablet, chewable: 1 tab(s) orally 3 times a day (before meals) (20 Mar 2022 10:15)  simvastatin 10 mg oral tablet: 1 tab(s) orally once a day (at bedtime) (20 Mar 2022 10:15)  sodium hypochlorite 0.25% topical solution: 1 application topically once a day (20 Mar 2022 10:15)  tacrolimus 0.1% topical ointment: 1 application topically once a day (20 Mar 2022 10:15)  traZODone 100 mg oral tablet: 1 tab(s) orally once a day (at bedtime) (20 Mar 2022 10:15)      MEDICATIONS  (STANDING):  apixaban 5 milliGRAM(s) Oral every 12 hours  buPROPion XL (24-Hour) . 150 milliGRAM(s) Oral daily  chlorhexidine 2% Cloths 1 Application(s) Topical daily  cinacalcet 30 milliGRAM(s) Oral daily  cycloSPORINE  , modified (NEORAL) 200 milliGRAM(s) Oral two times a day  Dakins Solution - 1/4 Strength 1 Application(s) Topical daily  dextrose 40% Gel 15 Gram(s) Oral once  dextrose 5%. 1000 milliLiter(s) (50 mL/Hr) IV Continuous <Continuous>  dextrose 5%. 1000 milliLiter(s) (100 mL/Hr) IV Continuous <Continuous>  dextrose 50% Injectable 25 Gram(s) IV Push once  dextrose 50% Injectable 12.5 Gram(s) IV Push once  dextrose 50% Injectable 25 Gram(s) IV Push once  diltiazem    milliGRAM(s) Oral daily  epoetin anabela-epbx (RETACRIT) Injectable 87345 Unit(s) IV Push <User Schedule>  gabapentin 300 milliGRAM(s) Oral daily  glucagon  Injectable 1 milliGRAM(s) IntraMuscular once  insulin glargine Injectable (LANTUS) 12 Unit(s) SubCutaneous at bedtime  insulin lispro (ADMELOG) corrective regimen sliding scale   SubCutaneous three times a day before meals  insulin lispro (ADMELOG) corrective regimen sliding scale   SubCutaneous at bedtime  lidocaine   4% Patch 1 Patch Transdermal daily  lidocaine   4% Patch 1 Patch Transdermal daily  lidocaine   4% Patch 1 Patch Transdermal daily  lidocaine/prilocaine Cream 1 Application(s) Topical <User Schedule>  mirtazapine 7.5 milliGRAM(s) Oral at bedtime  montelukast 10 milliGRAM(s) Oral at bedtime  pantoprazole    Tablet 40 milliGRAM(s) Oral before breakfast  polyethylene glycol 3350 17 Gram(s) Oral at bedtime  senna 2 Tablet(s) Oral at bedtime  sertraline 50 milliGRAM(s) Oral daily  sevelamer carbonate 800 milliGRAM(s) Oral three times a day with meals  simethicone 80 milliGRAM(s) Chew three times a day  tacrolimus   0.1% Ointment 1 Application(s) Topical daily  traZODone 100 milliGRAM(s) Oral at bedtime      TELEMETRY: 	    ECG:  	  RADIOLOGY:   DIAGNOSTIC TESTING:  [ ] Echocardiogram:  [ ]  Catheterization:  [ ] Stress Test:    OTHER: 	    LABS:

## 2022-04-11 NOTE — PROGRESS NOTE ADULT - SUBJECTIVE AND OBJECTIVE BOX
Chief Complaint: DM 2    History: Patient seen at bedside. Endorsing low PO intake continued, does not like the hospital (or rehab) food. No nausea/vomiting, no s/s of hypoglycemia     MEDICATIONS  (STANDING):  apixaban 5 milliGRAM(s) Oral every 12 hours  buPROPion XL (24-Hour) . 150 milliGRAM(s) Oral daily  chlorhexidine 2% Cloths 1 Application(s) Topical daily  cinacalcet 30 milliGRAM(s) Oral daily  cycloSPORINE  , modified (NEORAL) 200 milliGRAM(s) Oral two times a day  Dakins Solution - 1/4 Strength 1 Application(s) Topical daily  dextrose 40% Gel 15 Gram(s) Oral once  dextrose 5%. 1000 milliLiter(s) (50 mL/Hr) IV Continuous <Continuous>  dextrose 5%. 1000 milliLiter(s) (100 mL/Hr) IV Continuous <Continuous>  dextrose 50% Injectable 25 Gram(s) IV Push once  dextrose 50% Injectable 12.5 Gram(s) IV Push once  dextrose 50% Injectable 25 Gram(s) IV Push once  diltiazem    milliGRAM(s) Oral daily  epoetin anabela-epbx (RETACRIT) Injectable 17930 Unit(s) IV Push <User Schedule>  gabapentin 300 milliGRAM(s) Oral daily  glucagon  Injectable 1 milliGRAM(s) IntraMuscular once  insulin glargine Injectable (LANTUS) 12 Unit(s) SubCutaneous at bedtime  insulin lispro (ADMELOG) corrective regimen sliding scale   SubCutaneous three times a day before meals  insulin lispro (ADMELOG) corrective regimen sliding scale   SubCutaneous at bedtime  lidocaine   4% Patch 1 Patch Transdermal daily  lidocaine   4% Patch 1 Patch Transdermal daily  lidocaine   4% Patch 1 Patch Transdermal daily  lidocaine/prilocaine Cream 1 Application(s) Topical <User Schedule>  mirtazapine 7.5 milliGRAM(s) Oral at bedtime  montelukast 10 milliGRAM(s) Oral at bedtime  pantoprazole    Tablet 40 milliGRAM(s) Oral before breakfast  polyethylene glycol 3350 17 Gram(s) Oral at bedtime  senna 2 Tablet(s) Oral at bedtime  sertraline 50 milliGRAM(s) Oral daily  sevelamer carbonate 800 milliGRAM(s) Oral three times a day with meals  simethicone 80 milliGRAM(s) Chew three times a day  tacrolimus   0.1% Ointment 1 Application(s) Topical daily  traZODone 100 milliGRAM(s) Oral at bedtime    MEDICATIONS  (PRN):  acetaminophen     Tablet .. 650 milliGRAM(s) Oral every 6 hours PRN Temp greater or equal to 38C (100.4F), Mild Pain (1 - 3)  diphenhydrAMINE Injectable 25 milliGRAM(s) IV Push <User Schedule> PRN Rash and/or Itching  guaiFENesin Oral Liquid (Sugar-Free) 100 milliGRAM(s) Oral every 6 hours PRN Cough  melatonin 3 milliGRAM(s) Oral at bedtime PRN Insomnia  midodrine. 5 milliGRAM(s) Oral <User Schedule> PRN 30 minutes prior to dialysis  ondansetron Injectable 4 milliGRAM(s) IV Push every 8 hours PRN Nausea and/or Vomiting  oxyCODONE    IR 10 milliGRAM(s) Oral every 6 hours PRN Severe Pain (7 - 10)  oxyCODONE    IR 5 milliGRAM(s) Oral every 6 hours PRN Moderate Pain (4 - 6)      Allergies  latex (Rash)  penicillin (Nausea)  strawberry (Rash)    Intolerances  caffeine (Nausea)    Review of Systems:  Cardiovascular: No chest pain  Respiratory: No SOB  GI: No nausea, vomiting  Endocrine: no hypoglycemia     PHYSICAL EXAM:  VITALS: T(C): 36.7 (04-11-22 @ 12:45)  T(F): 98.1 (04-11-22 @ 12:45), Max: 98.3 (04-10-22 @ 21:56)  HR: 83 (04-11-22 @ 12:45) (83 - 90)  BP: 125/55 (04-11-22 @ 12:45) (105/47 - 125/55)  RR:  (18 - 19)  SpO2:  (96% - 98%)  Wt(kg): --  GENERAL: NAD  EYES: No proptosis, no lid lag, anicteric  HEENT:  Atraumatic, Normocephalic, moist mucous membranes  RESPIRATORY: unlabored respirations     CAPILLARY BLOOD GLUCOSE    POCT Blood Glucose.: 168 mg/dL (11 Apr 2022 12:53)  POCT Blood Glucose.: 195 mg/dL (11 Apr 2022 08:56)  POCT Blood Glucose.: 136 mg/dL (10 Apr 2022 21:17)  POCT Blood Glucose.: 161 mg/dL (10 Apr 2022 18:10)      04-09    135  |  94<L>  |  21  ----------------------------<  184<H>  3.8   |  24  |  4.94<H>    EGFR if : x   EGFR if non : x     Ca    9.3      04-09  Mg     2.20     04-09  Phos  4.0     04-09    A1C with Estimated Average Glucose Result: 6.1 % (03-20-22 @ 12:48)  A1C with Estimated Average Glucose Result: 7.0 % (01-13-22 @ 08:21)  A1C with Estimated Average Glucose Result: 6.7 % (08-31-21 @ 09:30)  A1C with Estimated Average Glucose Result: 7.2 % (04-28-21 @ 07:04)    Diet, Consistent Carbohydrate Renal w/Evening Snack:   For patients receiving Renal Replacement - No Protein Restr, No Conc K, No Conc Phos, Low Sodium (RENAL)  Supplement Feeding Modality:  Oral  Nepro Cans or Servings Per Day:  1       Frequency:  Three Times a day (03-20-22 @ 10:20)

## 2022-04-11 NOTE — PROGRESS NOTE ADULT - SUBJECTIVE AND OBJECTIVE BOX
[facility] NEPHROLOGY- PROGRESS NOTE    56y Female with history of ESRD on HD presents with vaginal bleeding. Nephrology consulted for ESRD status.    REVIEW OF SYSTEMS:  Gen: no changes in weight  Cards: no chest pain  Resp: no dyspnea  GI: no nausea or vomiting or diarrhea  Vascular: no LE edema    caffeine (Nausea)  latex (Rash)  penicillin (Nausea)  strawberry (Rash)    Hospital Medications: Medications reviewed      VITALS:  T(F): 97.8 (04-11-22 @ 08:25), Max: 98.3 (04-10-22 @ 21:56)  HR: 85 (04-11-22 @ 08:25)  BP: 105/47 (04-11-22 @ 08:25)  RR: 18 (04-11-22 @ 08:25)  SpO2: 98% (04-11-22 @ 08:25)  Wt(kg): --      PHYSICAL EXAM:  Gen: NAD, calm  Cards: RRR, +S1/S2, no M/G/R  Resp: CTA B/L  GI: soft, NT/ND, NABS, + ab wound dressing in place  Vascular: no LE edema B/L, LUE AVF + bruit/thrill      LABS:        Creatinine Trend: 4.94 <--, 5.13 <--, 4.09 <--, 6.73 <--    Urine Studies:

## 2022-04-11 NOTE — PROGRESS NOTE ADULT - SUBJECTIVE AND OBJECTIVE BOX
SUBJECTIVE / OVERNIGHT EVENTS:pt seen and examined, pt spiked fever   4-11- 22    MEDICATIONS  (STANDING):  apixaban 5 milliGRAM(s) Oral every 12 hours  buPROPion XL (24-Hour) . 150 milliGRAM(s) Oral daily  cefTRIAXone   IVPB 2000 milliGRAM(s) IV Intermittent every 24 hours  chlorhexidine 2% Cloths 1 Application(s) Topical daily  cinacalcet 30 milliGRAM(s) Oral daily  cycloSPORINE  , modified (NEORAL) 200 milliGRAM(s) Oral two times a day  Dakins Solution - 1/4 Strength 1 Application(s) Topical daily  dextrose 40% Gel 15 Gram(s) Oral once  dextrose 5%. 1000 milliLiter(s) (50 mL/Hr) IV Continuous <Continuous>  dextrose 5%. 1000 milliLiter(s) (100 mL/Hr) IV Continuous <Continuous>  dextrose 50% Injectable 25 Gram(s) IV Push once  dextrose 50% Injectable 12.5 Gram(s) IV Push once  dextrose 50% Injectable 25 Gram(s) IV Push once  diltiazem    milliGRAM(s) Oral daily  epoetin anabela-epbx (RETACRIT) Injectable 45305 Unit(s) IV Push <User Schedule>  gabapentin 300 milliGRAM(s) Oral daily  glucagon  Injectable 1 milliGRAM(s) IntraMuscular once  insulin glargine Injectable (LANTUS) 12 Unit(s) SubCutaneous at bedtime  insulin lispro (ADMELOG) corrective regimen sliding scale   SubCutaneous three times a day before meals  insulin lispro (ADMELOG) corrective regimen sliding scale   SubCutaneous at bedtime  lactobacillus acidophilus 1 Tablet(s) Oral daily  lidocaine   4% Patch 1 Patch Transdermal daily  lidocaine   4% Patch 1 Patch Transdermal daily  lidocaine   4% Patch 1 Patch Transdermal daily  lidocaine/prilocaine Cream 1 Application(s) Topical <User Schedule>  mirtazapine 7.5 milliGRAM(s) Oral at bedtime  montelukast 10 milliGRAM(s) Oral at bedtime  pantoprazole    Tablet 40 milliGRAM(s) Oral before breakfast  polyethylene glycol 3350 17 Gram(s) Oral at bedtime  senna 2 Tablet(s) Oral at bedtime  sertraline 50 milliGRAM(s) Oral daily  sevelamer carbonate 800 milliGRAM(s) Oral three times a day with meals  simethicone 80 milliGRAM(s) Chew three times a day  tacrolimus   0.1% Ointment 1 Application(s) Topical daily  traZODone 100 milliGRAM(s) Oral at bedtime  vancomycin  IVPB 1000 milliGRAM(s) IV Intermittent once    MEDICATIONS  (PRN):  acetaminophen     Tablet .. 650 milliGRAM(s) Oral every 6 hours PRN Temp greater or equal to 38C (100.4F), Mild Pain (1 - 3)  benzonatate 100 milliGRAM(s) Oral three times a day PRN Cough  diphenhydrAMINE Injectable 25 milliGRAM(s) IV Push <User Schedule> PRN Rash and/or Itching  guaiFENesin Oral Liquid (Sugar-Free) 100 milliGRAM(s) Oral every 6 hours PRN Cough  melatonin 3 milliGRAM(s) Oral at bedtime PRN Insomnia  midodrine. 5 milliGRAM(s) Oral <User Schedule> PRN 30 minutes prior to dialysis  ondansetron Injectable 4 milliGRAM(s) IV Push every 8 hours PRN Nausea and/or Vomiting  oxyCODONE    IR 10 milliGRAM(s) Oral every 6 hours PRN Severe Pain (7 - 10)  oxyCODONE    IR 5 milliGRAM(s) Oral every 6 hours PRN Moderate Pain (4 - 6)    Vital Signs Last 24 Hrs  T(C): 37 (04-11-22 @ 21:45), Max: 38 (04-11-22 @ 16:54)  T(F): 98.6 (04-11-22 @ 21:45), Max: 100.4 (04-11-22 @ 16:54)  HR: 95 (04-11-22 @ 21:45) (83 - 96)  BP: 110/61 (04-11-22 @ 21:45) (105/47 - 125/55)  BP(mean): --  RR: 18 (04-11-22 @ 21:45) (18 - 19)  SpO2: 100% (04-11-22 @ 21:45) (96% - 100%)        Skin: No rash.  Eyes: No recent vision problems or eye pain.  ENT: No congestion, ear pain, or sore throat.  Cardiovascular: No chest pain or palpation.  Respiratory: No cough, shortness of breath, congestion, or wheezing.  Gastrointestinal: No abdominal pain, nausea, vomiting, or diarrhea.  Genitourinary: No dysuria.  Musculoskeletal: No joint swelling.  Neurologic: No headache.    PHYSICAL EXAM:  GENERAL: NAD  EYES: EOMI, PERRLA  NECK: Supple, No JVD  CHEST/LUNG: dec breath sounds at bases  HEART:  S1 , S2 +  ABDOMEN: soft , bs+, wound dressing+  EXTREMITIES:  edema+  NEUROLOGY:alert awake oriented     LABS:        Creatinine Trend: 4.94 <--, 5.13 <--, 4.09 <--, 6.73 <--    Urine Studies:                    Urine Studies:

## 2022-04-11 NOTE — CHART NOTE - NSCHARTNOTEFT_GEN_A_CORE
Discussed w/ SW Chelsy.  Patient declined for rehab by insurance ([employer]).  Patient now requiring peer to peer review. Dr. Maurice made aware to call [phone] by 5pm today to speak the medical director ([facility]) to justify need for rehab.

## 2022-04-11 NOTE — DISCHARGE NOTE NURSING/CASE MANAGEMENT/SOCIAL WORK - NSDCFUADDAPPT_GEN_ALL_CORE_FT
Please follow up with the gynecologist within 3 weeks for a post-operative follow up visit. [facility] - OB/Gyn Clinic  Ambulatory Care Unit  [phone] [place] [address], [facility] Level    Please follow up with your PCP within 1-2 weeks of discharge    Please follow up with your nephrologist within 2-3 weeks of discharge:  [facility] NEPHROLOGY  Keaton Lopez M.D.  Juma Green D.O.  Myla Hoffmann M.D.  Julia Brewer, MSN, ANP-C    Telephone: [phone]  [address] [place] [address]

## 2022-04-11 NOTE — DISCHARGE NOTE NURSING/CASE MANAGEMENT/SOCIAL WORK - PATIENT PORTAL LINK FT
You can access the FollowMyHealth Patient Portal offered by [facility] by registering at the following website: [email or web address]. By joining [employer]’s FollowMyHealth portal, you will also be able to view your health information using other applications (apps) compatible with our system.

## 2022-04-12 NOTE — CHART NOTE - NSCHARTNOTEFT_GEN_A_CORE
NUTRITION FOLLOW-UP:    56 f with DM, HTN, COPD, a-fib, ESRD on HD via LUE AVF, known chronic R pannus wound due to pyoderma gangrenosum admitted 3/20 for vaginal bleeding which she also had before and ultimately s/p endometrial biopsy which was benign, was being followed by wound care and derm for pyoderma wound was not started on aTNF inhibitor due to possibility of malignancy(vaginal bleeding) but cyclosporine was increased, had a tooth fx s/p extraction 4/8 developed a new fever 4/11 with headache and cough, RVP came back positive with influenza AH3    Pt f/u as per protocol, reports good appetite but dislikes some food choices.  Encouraged pt to fill out menu to ensure receiving food preferences. Pt drinks 100% of her supplement Nepro 3x daily (1,275 kcals, 52.3g protein). Attempted to weigh pt via bed scale but bed scale not working.     Weight: No new wts, 284.3# (4/1/22)    Labs:  04-12 Na 133 mmol/L<L> Glu 161 mg/dL<H> K+ 4.2 mmol/L Cr 6.21 mg/dL<H> BUN 28 mg/dL<H> Phos 3.1 mg/dL  04-12 @ 11:28 POCT 134 mg/dL  04-12 @ 09:01 POCT 131 mg/dL  04-11 @ 23:26 POCT 128 mg/dL  04-11 @ 18:15 POCT 181 mg/dL    Current Diet: Diet, Consistent Carbohydrate Renal w/Evening Snack:   For patients receiving Renal Replacement - No Protein Restr, No Conc K, No Conc Phos, Low Sodium (RENAL)  Supplement Feeding Modality:  Oral  Nepro Cans or Servings Per Day:  1       Frequency:  Three Times a day (03-20-22 @ 10:20)    Skin- Lower abdominal wound, Rt hip wound, +1 edema generalized as per RN flow sheet    Estimated needs:  NO changes since previous assessment.     Nutrition Diagnosis:  Ongoing, BMI>40    RD to Remain Available:    Additional Recommendations:   Monitor PO intake, weight trends, nutrition related lab values, BMs/GI distress, hydration status, skin integrity.       Rosemarie Choi RDN #[id number]

## 2022-04-12 NOTE — PROGRESS NOTE ADULT - SUBJECTIVE AND OBJECTIVE BOX
Follow Up:  fever, cough    Interval History: pt had a new fever and RVP came back with flue, she also c/o cough    ROS:      All other systems negative    Constitutional: + fever  Cardiovascular:  no chest pain, no palpitation  Respiratory:  no SOB, + cough  GI:  no abd pain, no vomiting, no diarrhea  urinary: no dysuria, no hematuria, no flank pain  musculoskeletal:  no joint pain, no joint swelling  skin:  abd wound  neurology:   headache, no seizure      Allergies  latex (Rash)  penicillin (Nausea)  strawberry (Rash)        ANTIMICROBIALS:  oseltamivir 30 daily      OTHER MEDS:  acetaminophen     Tablet .. 650 milliGRAM(s) Oral every 6 hours PRN  apixaban 5 milliGRAM(s) Oral every 12 hours  benzonatate 100 milliGRAM(s) Oral three times a day PRN  buPROPion XL (24-Hour) . 150 milliGRAM(s) Oral daily  chlorhexidine 2% Cloths 1 Application(s) Topical daily  cinacalcet 30 milliGRAM(s) Oral daily  cycloSPORINE  , modified (NEORAL) 200 milliGRAM(s) Oral two times a day  Dakins Solution - 1/4 Strength 1 Application(s) Topical daily  dextrose 40% Gel 15 Gram(s) Oral once  dextrose 5%. 1000 milliLiter(s) IV Continuous <Continuous>  dextrose 5%. 1000 milliLiter(s) IV Continuous <Continuous>  dextrose 50% Injectable 25 Gram(s) IV Push once  dextrose 50% Injectable 12.5 Gram(s) IV Push once  dextrose 50% Injectable 25 Gram(s) IV Push once  diltiazem    milliGRAM(s) Oral daily  diphenhydrAMINE Injectable 25 milliGRAM(s) IV Push <User Schedule> PRN  epoetin anabela-epbx (RETACRIT) Injectable 63288 Unit(s) IV Push <User Schedule>  gabapentin 300 milliGRAM(s) Oral daily  glucagon  Injectable 1 milliGRAM(s) IntraMuscular once  guaiFENesin Oral Liquid (Sugar-Free) 100 milliGRAM(s) Oral every 6 hours PRN  insulin glargine Injectable (LANTUS) 12 Unit(s) SubCutaneous at bedtime  insulin lispro (ADMELOG) corrective regimen sliding scale   SubCutaneous three times a day before meals  insulin lispro (ADMELOG) corrective regimen sliding scale   SubCutaneous at bedtime  lactobacillus acidophilus 1 Tablet(s) Oral daily  lidocaine   4% Patch 1 Patch Transdermal daily  lidocaine   4% Patch 1 Patch Transdermal daily  lidocaine   4% Patch 1 Patch Transdermal daily  lidocaine/prilocaine Cream 1 Application(s) Topical <User Schedule>  melatonin 3 milliGRAM(s) Oral at bedtime PRN  midodrine. 5 milliGRAM(s) Oral <User Schedule> PRN  mirtazapine 7.5 milliGRAM(s) Oral at bedtime  montelukast 10 milliGRAM(s) Oral at bedtime  ondansetron Injectable 4 milliGRAM(s) IV Push every 8 hours PRN  oxyCODONE    IR 10 milliGRAM(s) Oral every 6 hours PRN  oxyCODONE    IR 5 milliGRAM(s) Oral every 6 hours PRN  pantoprazole    Tablet 40 milliGRAM(s) Oral before breakfast  polyethylene glycol 3350 17 Gram(s) Oral at bedtime  senna 2 Tablet(s) Oral at bedtime  sertraline 50 milliGRAM(s) Oral daily  sevelamer carbonate 800 milliGRAM(s) Oral three times a day with meals  simethicone 80 milliGRAM(s) Chew three times a day  tacrolimus   0.1% Ointment 1 Application(s) Topical daily  traZODone 100 milliGRAM(s) Oral at bedtime      Vital Signs Last 24 Hrs  T(C): 37 (12 Apr 2022 07:00), Max: 38 (11 Apr 2022 16:54)  T(F): 98.6 (12 Apr 2022 07:00), Max: 100.4 (11 Apr 2022 16:54)  HR: 93 (12 Apr 2022 07:00) (83 - 96)  BP: 110/64 (12 Apr 2022 07:00) (106/62 - 125/55)  BP(mean): --  RR: 17 (12 Apr 2022 07:00) (17 - 19)  SpO2: 99% (12 Apr 2022 05:15) (96% - 100%)    Physical Exam:  General:    NAD,  non toxic getting HD  Cardio:     regular S1, S2  Respiratory:    clear b/l,    no wheezing  abd:     soft,   BS +,   no tenderness  :   no CVAT,  no suprapubic tenderness,   no  daniel  Musculoskeletal:   no joint swelling  vascular: LUE AVF  Skin:    no rash, abd pannus wound with dressing                        8.7    9.23  )-----------( 332      ( 12 Apr 2022 07:36 )             30.0       04-12    133<L>  |  93<L>  |  28<H>  ----------------------------<  161<H>  4.2   |  22  |  6.21<H>    Ca    9.3      12 Apr 2022 07:36  Phos  3.1     04-12  Mg     2.10     04-12            MICROBIOLOGY:  v      Rapid RVP Result: Detected (04-11 @ 21:45)        RADIOLOGY:  Images independently visualized and reviewed personally, findings as below  < from: Xray Chest 1 View AP/PA (04.06.22 @ 23:06) >    Impression:  Stable cardiomegaly  Redemonstration of mild vascular congestive changes.    < end of copied text >  < from: CT Abdomen and Pelvis w/ IV Cont (03.20.22 @ 00:26) >  IMPRESSION:    1. Incompletely imaged large skin defect involving the inferior pannus   with subcutaneous stranding and skin thickening compatible with   cellulitis. No abscess identified.  2. Enlarged myomatous uterus. Suspected endometrial thickening.   Correlation with pelvic ultrasound or MRI is recommended.      < end of copied text >

## 2022-04-12 NOTE — PROGRESS NOTE ADULT - SUBJECTIVE AND OBJECTIVE BOX
CARDIOLOGY FOLLOW UP - Dr. Bullock  Date of Service: 4/12/22  CC: events noted  +RVP  c/o cough, sore throat  no cp/sob     Review of Systems:  Constitutional: No fever, weight loss, or fatigue  Respiratory: No cough, wheezing, or hemoptysis, no shortness of breath  Cardiovascular: No chest pain, palpitations, passing out, dizziness, or leg swelling  Gastrointestinal: No abd or epigastric pain.  No nausea, vomiting, or hematemesis; no diarrhea or constipation, no melena or hematochezia  Vascular: no edema       PHYSICAL EXAM:  T(C): 36.6 (04-12-22 @ 11:00), Max: 38 (04-11-22 @ 16:54)  HR: 89 (04-12-22 @ 11:00) (83 - 96)  BP: 127/66 (04-12-22 @ 11:00) (106/62 - 127/66)  RR: 17 (04-12-22 @ 11:00) (17 - 19)  SpO2: 99% (04-12-22 @ 05:15) (96% - 100%)  Wt(kg): --  I&O's Summary    11 Apr 2022 07:01  -  12 Apr 2022 07:00  --------------------------------------------------------  IN: 75 mL / OUT: 1 mL / NET: 74 mL    12 Apr 2022 07:01  -  12 Apr 2022 12:44  --------------------------------------------------------  IN: 700 mL / OUT: 2621 mL / NET: -1921 mL        Appearance: Normal	  Cardiovascular: Normal S1 S2,RRR, No JVD, No murmurs  Respiratory: Lungs clear to auscultation	  Gastrointestinal:  Soft, Non-tender, + BS	  Extremities: Normal range of motion, No clubbing, cyanosis or edema      Home Medications:  Aspercreme with Lidocaine 4% topical film: Apply topically to affected area once a day (low back) (20 Mar 2022 10:15)  Aspercreme with Lidocaine 4% topical film: Apply topically to affected area once a day (L knee) (20 Mar 2022 10:15)  aspirin 81 mg oral delayed release tablet: 1 tab(s) orally once a day (20 Mar 2022 10:15)  buPROPion 150 mg/24 hours (XL) oral tablet, extended release: 1 tab(s) orally once a day (in the morning) (20 Mar 2022 10:15)  cycloSPORINE modified 100 mg oral capsule: 2 cap(s) orally once a day (at bedtime) (20 Mar 2022 10:15)  cycloSPORINE modified 50 mg oral capsule: 3 cap(s) orally once a day in the morning  (20 Mar 2022 10:15)  dilTIAZem 120 mg/24 hours oral capsule, extended release: 1 cap(s) orally once a day (hold SBP&lt;110, HR&lt;60) (20 Mar 2022 10:15)  doxycycline hyclate 100 mg oral tablet: 1 tab(s) orally 2 times a day (20 Mar 2022 10:15)  Eliquis 2.5 mg oral tablet: 1 tab(s) orally 2 times a day    Pharmacy states patient no longer wants to fill this medication (20 Mar 2022 10:15)  gabapentin 300 mg oral capsule: 1 cap(s) orally 2 times a day (20 Mar 2022 10:15)  insulin glargine: 15 unit(s) subcutaneous once a day (at bedtime) (20 Mar 2022 10:15)  midodrine 5 mg oral tablet: 1 tab(s) orally 3 times a week, 30 minute prior to dialysis sessions (hold is SBP&gt;130) (20 Mar 2022 10:15)  mirtazapine 7.5 mg oral tablet: 1 tab(s) orally once a day (at bedtime) (20 Mar 2022 10:15)  montelukast 10 mg oral tablet: 1 tab(s) orally once a day (in the evening) (20 Mar 2022 10:15)  oxyCODONE 10 mg oral tablet, extended release: 1 tab(s) orally every 12 hours (20 Mar 2022 10:15)  oxycodone-acetaminophen 7.5 mg-325 mg oral tablet: 1 tab(s) orally every 6 hours, As Needed (20 Mar 2022 10:15)  pantoprazole 40 mg oral delayed release tablet: 1 tab(s) orally once a day (20 Mar 2022 10:15)  polyethylene glycol 3350 oral powder for reconstitution: 17 gram(s) orally once a day (at bedtime) (20 Mar 2022 10:15)  senna oral tablet: 2 tab(s) orally once a day (at bedtime) (20 Mar 2022 10:15)  sertraline 50 mg oral tablet: 1 tab(s) orally once a day (20 Mar 2022 10:15)  sevelamer carbonate 800 mg oral tablet: 1 tab(s) orally 3 times a day (with meals) (20 Mar 2022 10:15)  simethicone 80 mg oral tablet, chewable: 1 tab(s) orally 3 times a day (before meals) (20 Mar 2022 10:15)  simvastatin 10 mg oral tablet: 1 tab(s) orally once a day (at bedtime) (20 Mar 2022 10:15)  sodium hypochlorite 0.25% topical solution: 1 application topically once a day (20 Mar 2022 10:15)  tacrolimus 0.1% topical ointment: 1 application topically once a day (20 Mar 2022 10:15)  traZODone 100 mg oral tablet: 1 tab(s) orally once a day (at bedtime) (20 Mar 2022 10:15)      MEDICATIONS  (STANDING):  apixaban 5 milliGRAM(s) Oral every 12 hours  buPROPion XL (24-Hour) . 150 milliGRAM(s) Oral daily  chlorhexidine 2% Cloths 1 Application(s) Topical daily  cinacalcet 30 milliGRAM(s) Oral daily  cycloSPORINE  , modified (NEORAL) 200 milliGRAM(s) Oral two times a day  Dakins Solution - 1/4 Strength 1 Application(s) Topical daily  dextrose 40% Gel 15 Gram(s) Oral once  dextrose 5%. 1000 milliLiter(s) (50 mL/Hr) IV Continuous <Continuous>  dextrose 5%. 1000 milliLiter(s) (100 mL/Hr) IV Continuous <Continuous>  dextrose 50% Injectable 25 Gram(s) IV Push once  dextrose 50% Injectable 12.5 Gram(s) IV Push once  dextrose 50% Injectable 25 Gram(s) IV Push once  diltiazem    milliGRAM(s) Oral daily  epoetin anabela-epbx (RETACRIT) Injectable 18724 Unit(s) IV Push <User Schedule>  gabapentin 300 milliGRAM(s) Oral daily  glucagon  Injectable 1 milliGRAM(s) IntraMuscular once  insulin glargine Injectable (LANTUS) 12 Unit(s) SubCutaneous at bedtime  insulin lispro (ADMELOG) corrective regimen sliding scale   SubCutaneous three times a day before meals  insulin lispro (ADMELOG) corrective regimen sliding scale   SubCutaneous at bedtime  lactobacillus acidophilus 1 Tablet(s) Oral daily  lidocaine   4% Patch 1 Patch Transdermal daily  lidocaine   4% Patch 1 Patch Transdermal daily  lidocaine   4% Patch 1 Patch Transdermal daily  lidocaine/prilocaine Cream 1 Application(s) Topical <User Schedule>  mirtazapine 7.5 milliGRAM(s) Oral at bedtime  montelukast 10 milliGRAM(s) Oral at bedtime  oseltamivir 30 milliGRAM(s) Oral daily  pantoprazole    Tablet 40 milliGRAM(s) Oral before breakfast  polyethylene glycol 3350 17 Gram(s) Oral at bedtime  senna 2 Tablet(s) Oral at bedtime  sertraline 50 milliGRAM(s) Oral daily  sevelamer carbonate 800 milliGRAM(s) Oral three times a day with meals  simethicone 80 milliGRAM(s) Chew three times a day  tacrolimus   0.1% Ointment 1 Application(s) Topical daily  traZODone 100 milliGRAM(s) Oral at bedtime      TELEMETRY: 	    ECG:  	  RADIOLOGY:   DIAGNOSTIC TESTING:  [ ] Echocardiogram:  [ ]  Catheterization:  [ ] Stress Test:    OTHER: 	    LABS:	 	                            8.7    9.23  )-----------( 332      ( 12 Apr 2022 07:36 )             30.0     04-12    133<L>  |  93<L>  |  28<H>  ----------------------------<  161<H>  4.2   |  22  |  6.21<H>    Ca    9.3      12 Apr 2022 07:36  Phos  3.1     04-12  Mg     2.10     04-12

## 2022-04-12 NOTE — PROGRESS NOTE ADULT - SUBJECTIVE AND OBJECTIVE BOX
[facility] NEPHROLOGY- PROGRESS NOTE    56y Female with history of ESRD on HD presents with vaginal bleeding. Nephrology consulted for ESRD status.    Patient febrile overnight found to have Influenza started on tamiflu.     REVIEW OF SYSTEMS:  Gen: + fever overnight   Cards: no chest pain  Resp: no dyspnea  GI: no nausea or vomiting or diarrhea  Vascular: no LE edema    caffeine (Nausea)  latex (Rash)  penicillin (Nausea)  strawberry (Rash)    Hospital Medications: Medications reviewed      VITALS:  T(F): 98.6 (04-12-22 @ 07:00), Max: 100.4 (04-11-22 @ 16:54)  HR: 93 (04-12-22 @ 07:00)  BP: 110/64 (04-12-22 @ 07:00)  RR: 17 (04-12-22 @ 07:00)  SpO2: 99% (04-12-22 @ 05:15)  Wt(kg): --    04-11 @ 07:01  -  04-12 @ 07:00  --------------------------------------------------------  IN: 75 mL / OUT: 1 mL / NET: 74 mL        PHYSICAL EXAM:  Gen: NAD, calm  Cards: RRR, +S1/S2, no M/G/R  Resp: CTA B/L  GI: soft, NT/ND, NABS, + ab wound dressing in place  Vascular: no LE edema B/L, LUE AVF + bruit/thrill        LABS:  04-12    133<L>  |  93<L>  |  28<H>  ----------------------------<  161<H>  4.2   |  22  |  6.21<H>    Ca    9.3      12 Apr 2022 07:36  Phos  3.1     04-12  Mg     2.10     04-12      Creatinine Trend: 6.21 <--, 4.94 <--, 5.13 <--, 4.09 <--, 6.73 <--                        8.7    9.23  )-----------( 332      ( 12 Apr 2022 07:36 )             30.0     Urine Studies:

## 2022-04-12 NOTE — PROGRESS NOTE ADULT - SUBJECTIVE AND OBJECTIVE BOX
SUBJECTIVE / OVERNIGHT EVENTS:pt seen and examined, pt spiked fever yesterday , rvp+ flu  4-12- 22    MEDICATIONS  (STANDING):  apixaban 5 milliGRAM(s) Oral every 12 hours  bisacodyl 5 milliGRAM(s) Oral at bedtime  buPROPion XL (24-Hour) . 150 milliGRAM(s) Oral daily  chlorhexidine 2% Cloths 1 Application(s) Topical daily  cinacalcet 30 milliGRAM(s) Oral daily  cycloSPORINE  , modified (NEORAL) 200 milliGRAM(s) Oral two times a day  Dakins Solution - 1/4 Strength 1 Application(s) Topical daily  dextrose 40% Gel 15 Gram(s) Oral once  dextrose 5%. 1000 milliLiter(s) (50 mL/Hr) IV Continuous <Continuous>  dextrose 5%. 1000 milliLiter(s) (100 mL/Hr) IV Continuous <Continuous>  dextrose 50% Injectable 25 Gram(s) IV Push once  dextrose 50% Injectable 12.5 Gram(s) IV Push once  dextrose 50% Injectable 25 Gram(s) IV Push once  diltiazem    milliGRAM(s) Oral daily  epoetin anabela-epbx (RETACRIT) Injectable 17792 Unit(s) IV Push <User Schedule>  gabapentin 300 milliGRAM(s) Oral daily  glucagon  Injectable 1 milliGRAM(s) IntraMuscular once  insulin glargine Injectable (LANTUS) 12 Unit(s) SubCutaneous at bedtime  insulin lispro (ADMELOG) corrective regimen sliding scale   SubCutaneous three times a day before meals  insulin lispro (ADMELOG) corrective regimen sliding scale   SubCutaneous at bedtime  lactobacillus acidophilus 1 Tablet(s) Oral daily  lidocaine   4% Patch 1 Patch Transdermal daily  lidocaine   4% Patch 1 Patch Transdermal daily  lidocaine   4% Patch 1 Patch Transdermal daily  lidocaine/prilocaine Cream 1 Application(s) Topical <User Schedule>  mirtazapine 7.5 milliGRAM(s) Oral at bedtime  montelukast 10 milliGRAM(s) Oral at bedtime  oseltamivir 30 milliGRAM(s) Oral daily  pantoprazole    Tablet 40 milliGRAM(s) Oral before breakfast  polyethylene glycol 3350 17 Gram(s) Oral at bedtime  senna 2 Tablet(s) Oral at bedtime  sertraline 50 milliGRAM(s) Oral daily  sevelamer carbonate 800 milliGRAM(s) Oral three times a day with meals  simethicone 80 milliGRAM(s) Chew three times a day  tacrolimus   0.1% Ointment 1 Application(s) Topical daily  traZODone 100 milliGRAM(s) Oral at bedtime    MEDICATIONS  (PRN):  acetaminophen     Tablet .. 650 milliGRAM(s) Oral every 6 hours PRN Temp greater or equal to 38C (100.4F), Mild Pain (1 - 3)  benzonatate 100 milliGRAM(s) Oral three times a day PRN Cough  diphenhydrAMINE Injectable 25 milliGRAM(s) IV Push <User Schedule> PRN Rash and/or Itching  guaiFENesin Oral Liquid (Sugar-Free) 100 milliGRAM(s) Oral every 6 hours PRN Cough  melatonin 3 milliGRAM(s) Oral at bedtime PRN Insomnia  midodrine. 5 milliGRAM(s) Oral <User Schedule> PRN 30 minutes prior to dialysis  ondansetron Injectable 4 milliGRAM(s) IV Push every 8 hours PRN Nausea and/or Vomiting  oxyCODONE    IR 10 milliGRAM(s) Oral every 6 hours PRN Severe Pain (7 - 10)  oxyCODONE    IR 5 milliGRAM(s) Oral every 6 hours PRN Moderate Pain (4 - 6)    Vital Signs Last 24 Hrs  T(C): 37.2 (04-12-22 @ 17:44), Max: 38 (04-12-22 @ 05:15)  T(F): 98.9 (04-12-22 @ 17:44), Max: 100.4 (04-12-22 @ 05:15)  HR: 91 (04-12-22 @ 17:44) (87 - 96)  BP: 116/64 (04-12-22 @ 17:44) (110/61 - 127/66)  BP(mean): --  RR: 19 (04-12-22 @ 17:44) (17 - 19)  SpO2: 99% (04-12-22 @ 17:44) (99% - 100%)          Skin: No rash.  Eyes: No recent vision problems or eye pain.  ENT: No congestion, ear pain, or sore throat.  Cardiovascular: No chest pain or palpation.  Respiratory: No cough, shortness of breath, congestion, or wheezing.  Gastrointestinal: No abdominal pain, nausea, vomiting, or diarrhea.  Genitourinary: No dysuria.  Musculoskeletal: No joint swelling.  Neurologic: No headache.    PHYSICAL EXAM:  GENERAL: NAD  EYES: EOMI, PERRLA  NECK: Supple, No JVD  CHEST/LUNG: dec breath sounds at bases  HEART:  S1 , S2 +  ABDOMEN: soft , bs+, wound dressing+  EXTREMITIES:  edema+  NEUROLOGY:alert awake oriented     LABS:  04-12    133<L>  |  93<L>  |  28<H>  ----------------------------<  161<H>  4.2   |  22  |  6.21<H>    Ca    9.3      12 Apr 2022 07:36  Phos  3.1     04-12  Mg     2.10     04-12      Creatinine Trend: 6.21 <--, 4.94 <--, 5.13 <--, 4.09 <--                        8.7    9.23  )-----------( 332      ( 12 Apr 2022 07:36 )             30.0     Urine Studies:

## 2022-04-12 NOTE — PROGRESS NOTE ADULT - ASSESSMENT
Echo 1/19/22: grossly nl LV sys fx, min MR     a/p  56 year old female with hx of morbid obesity, CHAMP not on home O2, ESRD (HD MWF), HTN, DM, COPD, Afib no longer on AC, chronic R pannus wound, followed by Dr. Layne, likely pyoderma gangrenosum presents for vaginal bleeding     #Influenza  -oseltamivir  per med  -supportive care  -med f/u     #Chronic Pannus Wound  -surgical eval noted, wound care  -abx per med     #Chronic Afib  -stable, rates controlled  -cont dilt as ordered  -cont oral AC - monitor h.h     #Hypertension  -stable  -cont ccb     #ESRD on HD  -HD per renal    #Gyn bleeding  -s/p EUA, D&C, diagnostic hysteroscopy, insertion of Mirena IUD  -cv remains stable post op  -med f/u     plan discussed with ACP

## 2022-04-12 NOTE — PROGRESS NOTE ADULT - ASSESSMENT
56 f with DM, HTN, COPD, a-fib, ESRD on HD via LUE AVF, known chronic R pannus wound due to pyoderma gangrenosum admitted 3/20 for vaginal bleeding which she also had before and ultimately s/p endometrial biopsy which was benign, was being followed by wound care and derm for pyoderma wound was not started on aTNF inhibitor due to possibility of malignancy(vaginal bleeding) but cyclosporine was increased, had a tooth fx s/p extraction 4/8  developed a new fever 4/11 with headache and cough, RVP came back positive with influenza AH3  normal WBC, blood cx in progress    fever, cough, due to AH3 influenzae   abd pyoderma gangrenosum   vaginal bleeding s/p biopsy which was benign    * f/u the blood cx  * s/p a dose of vanco, will monitor off antibiotics  * c/w tamiflu to complete a 5 day course    The above assessment and plan was discussed with the primary team    Ewelina Pan MD  contact on teams  After 5pm and on weekends call [phone]

## 2022-04-13 NOTE — PROGRESS NOTE ADULT - SUBJECTIVE AND OBJECTIVE BOX
SUBJECTIVE / OVERNIGHT EVENTS:pt seen and examined, pt spiked fever yesterday , rvp+ flu  4-13- 22    MEDICATIONS  (STANDING):  apixaban 5 milliGRAM(s) Oral every 12 hours  bisacodyl 5 milliGRAM(s) Oral at bedtime  buPROPion XL (24-Hour) . 150 milliGRAM(s) Oral daily  chlorhexidine 2% Cloths 1 Application(s) Topical daily  cinacalcet 30 milliGRAM(s) Oral daily  cycloSPORINE  , modified (NEORAL) 200 milliGRAM(s) Oral two times a day  Dakins Solution - 1/4 Strength 1 Application(s) Topical daily  dextrose 40% Gel 15 Gram(s) Oral once  dextrose 5%. 1000 milliLiter(s) (50 mL/Hr) IV Continuous <Continuous>  dextrose 5%. 1000 milliLiter(s) (100 mL/Hr) IV Continuous <Continuous>  dextrose 50% Injectable 25 Gram(s) IV Push once  dextrose 50% Injectable 12.5 Gram(s) IV Push once  dextrose 50% Injectable 25 Gram(s) IV Push once  diltiazem    milliGRAM(s) Oral daily  epoetin anabela-epbx (RETACRIT) Injectable 59551 Unit(s) IV Push <User Schedule>  gabapentin 300 milliGRAM(s) Oral daily  glucagon  Injectable 1 milliGRAM(s) IntraMuscular once  insulin glargine Injectable (LANTUS) 12 Unit(s) SubCutaneous at bedtime  insulin lispro (ADMELOG) corrective regimen sliding scale   SubCutaneous three times a day before meals  insulin lispro (ADMELOG) corrective regimen sliding scale   SubCutaneous at bedtime  lactobacillus acidophilus 1 Tablet(s) Oral daily  lidocaine   4% Patch 1 Patch Transdermal daily  lidocaine   4% Patch 1 Patch Transdermal daily  lidocaine   4% Patch 1 Patch Transdermal daily  lidocaine/prilocaine Cream 1 Application(s) Topical <User Schedule>  mirtazapine 7.5 milliGRAM(s) Oral at bedtime  montelukast 10 milliGRAM(s) Oral at bedtime  oseltamivir 30 milliGRAM(s) Oral <User Schedule>  pantoprazole    Tablet 40 milliGRAM(s) Oral before breakfast  polyethylene glycol 3350 17 Gram(s) Oral at bedtime  senna 2 Tablet(s) Oral at bedtime  sertraline 50 milliGRAM(s) Oral daily  sevelamer carbonate 800 milliGRAM(s) Oral three times a day with meals  simethicone 80 milliGRAM(s) Chew three times a day  tacrolimus   0.1% Ointment 1 Application(s) Topical daily  traZODone 100 milliGRAM(s) Oral at bedtime    MEDICATIONS  (PRN):  acetaminophen     Tablet .. 650 milliGRAM(s) Oral every 6 hours PRN Temp greater or equal to 38C (100.4F), Mild Pain (1 - 3)  benzonatate 100 milliGRAM(s) Oral three times a day PRN Cough  diphenhydrAMINE Injectable 25 milliGRAM(s) IV Push <User Schedule> PRN Rash and/or Itching  guaiFENesin Oral Liquid (Sugar-Free) 100 milliGRAM(s) Oral every 6 hours PRN Cough  melatonin 3 milliGRAM(s) Oral at bedtime PRN Insomnia  midodrine. 5 milliGRAM(s) Oral <User Schedule> PRN 30 minutes prior to dialysis  ondansetron Injectable 4 milliGRAM(s) IV Push every 8 hours PRN Nausea and/or Vomiting  oxyCODONE    IR 10 milliGRAM(s) Oral every 6 hours PRN Severe Pain (7 - 10)  oxyCODONE    IR 5 milliGRAM(s) Oral every 6 hours PRN Moderate Pain (4 - 6)    Vital Signs Last 24 Hrs  T(C): 36.8 (04-13-22 @ 11:48), Max: 37.2 (04-12-22 @ 17:44)  T(F): 98.2 (04-13-22 @ 11:48), Max: 99 (04-12-22 @ 21:20)  HR: 82 (04-13-22 @ 11:48) (82 - 91)  BP: 159/83 (04-13-22 @ 11:48) (116/64 - 159/83)  BP(mean): --  RR: 19 (04-13-22 @ 11:48) (18 - 19)  SpO2: 100% (04-13-22 @ 11:48) (98% - 100%)          Skin: No rash.  Eyes: No recent vision problems or eye pain.  ENT: No congestion, ear pain, or sore throat.  Cardiovascular: No chest pain or palpation.  Respiratory: No cough, shortness of breath, congestion, or wheezing.  Gastrointestinal: No abdominal pain, nausea, vomiting, or diarrhea.  Genitourinary: No dysuria.  Musculoskeletal: No joint swelling.  Neurologic: No headache.    PHYSICAL EXAM:  GENERAL: NAD  EYES: EOMI, PERRLA  NECK: Supple, No JVD  CHEST/LUNG: dec breath sounds at bases  HEART:  S1 , S2 +  ABDOMEN: soft , bs+, wound dressing+  EXTREMITIES:  edema+  NEUROLOGY:alert awake oriented     LABS:  04-12    133<L>  |  93<L>  |  28<H>  ----------------------------<  161<H>  4.2   |  22  |  6.21<H>    Ca    9.3      12 Apr 2022 07:36  Phos  3.1     04-12  Mg     2.10     04-12      Creatinine Trend: 6.21 <--, 4.94 <--, 5.13 <--                        8.7    9.23  )-----------( 332      ( 12 Apr 2022 07:36 )             30.0     Urine Studies:

## 2022-04-13 NOTE — PROGRESS NOTE ADULT - ASSESSMENT
56y Female with history of ESRD on HD presents with vaginal bleeding. Nephrology consulted for ESRD status.    1) ESRD: Last HD on 4/12 with mild intradialytic hypotension and 1.9L removed with HD terminated early due to complaints of ab pain with heparin to prevent circuit clotting. Plan for next maintenance HD on 4/14. Monitor electrolytes.    2) HTN with ESRD: BP acceptable on Cardizem. Continue with midodrine pre-HD on HD days to avoid intradialytic hypotension. Monitor BP.    3) Anemia of renal disease: With component of blood loss from vaginal bleeding. Hb low with acceptable iron stores. Continue with Epo 20K with HD. Monitor Hb.    4) Secondary HPT of renal origin: Phosphorus acceptable with low iPTH for which sensipar decreased to 30 mg daily. Continue with renvela 1 tab with meals. Monitor serum calcium and phosphorus.      [facility] NEPHROLOGY  Keaton Lopez M.D.  Juma Green D.O.  Myla Hoffmann M.D.  Julia Brewer, JASIEL, ANP-C    Telephone: [phone]  Facsimile: [phone]    [address] [place] [address]

## 2022-04-13 NOTE — PROGRESS NOTE ADULT - SUBJECTIVE AND OBJECTIVE BOX
Follow Up:  fever, cough    Interval History: no more fever    ROS:      All other systems negative    Constitutional: no more fever  Cardiovascular:  no chest pain, no palpitation  Respiratory:  no SOB, + cough  GI:  no abd pain, no vomiting, no diarrhea  urinary: no dysuria, no hematuria, no flank pain  musculoskeletal:  no joint pain, no joint swelling  skin:  abd wound  neurology:   headache, no seizure        Allergies  latex (Rash)  penicillin (Nausea)  strawberry (Rash)        ANTIMICROBIALS:  oseltamivir 30 <User Schedule>      OTHER MEDS:  acetaminophen     Tablet .. 650 milliGRAM(s) Oral every 6 hours PRN  apixaban 5 milliGRAM(s) Oral every 12 hours  benzonatate 100 milliGRAM(s) Oral three times a day PRN  bisacodyl 5 milliGRAM(s) Oral at bedtime  buPROPion XL (24-Hour) . 150 milliGRAM(s) Oral daily  chlorhexidine 2% Cloths 1 Application(s) Topical daily  cinacalcet 30 milliGRAM(s) Oral daily  cycloSPORINE  , modified (NEORAL) 200 milliGRAM(s) Oral two times a day  Dakins Solution - 1/4 Strength 1 Application(s) Topical daily  dextrose 40% Gel 15 Gram(s) Oral once  dextrose 5%. 1000 milliLiter(s) IV Continuous <Continuous>  dextrose 5%. 1000 milliLiter(s) IV Continuous <Continuous>  dextrose 50% Injectable 25 Gram(s) IV Push once  dextrose 50% Injectable 12.5 Gram(s) IV Push once  dextrose 50% Injectable 25 Gram(s) IV Push once  diltiazem    milliGRAM(s) Oral daily  diphenhydrAMINE Injectable 25 milliGRAM(s) IV Push <User Schedule> PRN  epoetin anabela-epbx (RETACRIT) Injectable 53532 Unit(s) IV Push <User Schedule>  gabapentin 300 milliGRAM(s) Oral daily  glucagon  Injectable 1 milliGRAM(s) IntraMuscular once  guaiFENesin Oral Liquid (Sugar-Free) 100 milliGRAM(s) Oral every 6 hours PRN  insulin glargine Injectable (LANTUS) 12 Unit(s) SubCutaneous at bedtime  insulin lispro (ADMELOG) corrective regimen sliding scale   SubCutaneous three times a day before meals  insulin lispro (ADMELOG) corrective regimen sliding scale   SubCutaneous at bedtime  lactobacillus acidophilus 1 Tablet(s) Oral daily  lidocaine   4% Patch 1 Patch Transdermal daily  lidocaine   4% Patch 1 Patch Transdermal daily  lidocaine   4% Patch 1 Patch Transdermal daily  lidocaine/prilocaine Cream 1 Application(s) Topical <User Schedule>  melatonin 3 milliGRAM(s) Oral at bedtime PRN  midodrine. 5 milliGRAM(s) Oral <User Schedule> PRN  mirtazapine 7.5 milliGRAM(s) Oral at bedtime  montelukast 10 milliGRAM(s) Oral at bedtime  ondansetron Injectable 4 milliGRAM(s) IV Push every 8 hours PRN  oxyCODONE    IR 10 milliGRAM(s) Oral every 6 hours PRN  oxyCODONE    IR 5 milliGRAM(s) Oral every 6 hours PRN  pantoprazole    Tablet 40 milliGRAM(s) Oral before breakfast  polyethylene glycol 3350 17 Gram(s) Oral at bedtime  senna 2 Tablet(s) Oral at bedtime  sertraline 50 milliGRAM(s) Oral daily  sevelamer carbonate 800 milliGRAM(s) Oral three times a day with meals  simethicone 80 milliGRAM(s) Chew three times a day  tacrolimus   0.1% Ointment 1 Application(s) Topical daily  traZODone 100 milliGRAM(s) Oral at bedtime      Vital Signs Last 24 Hrs  T(C): 36.8 (13 Apr 2022 11:48), Max: 37.2 (12 Apr 2022 17:44)  T(F): 98.2 (13 Apr 2022 11:48), Max: 99 (12 Apr 2022 21:20)  HR: 82 (13 Apr 2022 11:48) (82 - 91)  BP: 159/83 (13 Apr 2022 11:48) (116/64 - 159/83)  BP(mean): --  RR: 19 (13 Apr 2022 11:48) (18 - 19)  SpO2: 100% (13 Apr 2022 11:48) (98% - 100%)    Physical Exam:  General:    NAD,  non toxic getting HD  Cardio:     regular S1, S2  Respiratory:    clear b/l,    no wheezing  abd:     soft,   BS +,   no tenderness  :   no CVAT,  no suprapubic tenderness,   no  daniel  Musculoskeletal:   no joint swelling  vascular: LUE AVF  Skin:    no rash, abd pannus wound with dressing                          8.7    9.23  )-----------( 332      ( 12 Apr 2022 07:36 )             30.0       04-12    133<L>  |  93<L>  |  28<H>  ----------------------------<  161<H>  4.2   |  22  |  6.21<H>    Ca    9.3      12 Apr 2022 07:36  Phos  3.1     04-12  Mg     2.10     04-12            MICROBIOLOGY:  v  .Blood Blood  04-11-22   No growth to date.  --  --          Rapid RVP Result: Detected (04-11 @ 21:45)        RADIOLOGY:  Images independently visualized and reviewed personally, findings as below  < from: Xray Chest 1 View-PORTABLE IMMEDIATE (Xray Chest 1 View-PORTABLE IMMEDIATE .) (04.11.22 @ 17:50) >  IMPRESSION:    Clear lungs.        < end of copied text >

## 2022-04-13 NOTE — PROGRESS NOTE ADULT - SUBJECTIVE AND OBJECTIVE BOX
[facility] NEPHROLOGY- PROGRESS NOTE    56y Female with history of ESRD on HD presents with vaginal bleeding. Nephrology consulted for ESRD status.      REVIEW OF SYSTEMS:  Gen: no fevers, + weakness  Cards: no chest pain  Resp: no dyspnea, + cough  GI: no nausea or vomiting or diarrhea  Vascular: no LE edema    caffeine (Nausea)  latex (Rash)  penicillin (Nausea)  strawberry (Rash)    Hospital Medications: Medications reviewed        VITALS:  T(F): 98.7 (04-13-22 @ 05:30), Max: 99 (04-12-22 @ 21:20)  HR: 91 (04-13-22 @ 05:30)  BP: 123/84 (04-13-22 @ 05:30)  RR: 18 (04-13-22 @ 05:30)  SpO2: 98% (04-13-22 @ 05:30)  Wt(kg): --    04-12 @ 07:01  -  04-13 @ 07:00  --------------------------------------------------------  IN: 700 mL / OUT: 2621 mL / NET: -1921 mL        PHYSICAL EXAM:  Gen: NAD, calm  Cards: RRR, +S1/S2, no M/G/R  Resp: CTA B/L  GI: soft, NT/ND, NABS, + ab wound dressing in place  Vascular: no LE edema B/L, LUE AVF + bruit/thrill        LABS:  04-12    133<L>  |  93<L>  |  28<H>  ----------------------------<  161<H>  4.2   |  22  |  6.21<H>    Ca    9.3      12 Apr 2022 07:36  Phos  3.1     04-12  Mg     2.10     04-12      Creatinine Trend: 6.21 <--, 4.94 <--, 5.13 <--                        8.7    9.23  )-----------( 332      ( 12 Apr 2022 07:36 )             30.0     Urine Studies:

## 2022-04-13 NOTE — PROGRESS NOTE ADULT - ASSESSMENT
56 f with DM, HTN, COPD, a-fib, ESRD on HD via LUE AVF, known chronic R pannus wound due to pyoderma gangrenosum admitted 3/20 for vaginal bleeding which she also had before and ultimately s/p endometrial biopsy which was benign, was being followed by wound care and derm for pyoderma wound was not started on aTNF inhibitor due to possibility of malignancy(vaginal bleeding) but cyclosporine was increased, had a tooth fx s/p extraction 4/8  developed a new fever 4/11 with headache and cough, RVP came back positive with influenza AH3  normal WBC, blood cx in progress    fever, cough, due to AH3 influenzae   abd pyoderma gangrenosum   vaginal bleeding s/p biopsy which was benign    * blood cx negative  * s/p a dose of vanco, will monitor off antibiotics  * c/w tamiflu to complete a 5 day course, s/p a dose 4/12 and will c/w 30 post HD  * will sign off, please call with questions    The above assessment and plan was discussed with the primary team    Ewelina Pan MD  contact on teams  After 5pm and on weekends call [phone]

## 2022-04-13 NOTE — PROGRESS NOTE ADULT - ASSESSMENT
56F with Hx of ESRD TTS, HTN, DM 2, COPD, afib, known chronic R pannus wound felt most likely pyoderma gangrenosum presents for 1.5wk vag bleeding. Pt recently had prolonged stay at [facility] for pannus wound, known to endocrine service.  Was dc'd to rehab on 3/3.  Prior to last hospitalization patient was on much higher doses of basal bolus.  Now requiring much less.  Has poor appetite.    1. Type 2 diabetes mellitus uncontrolled  A1c 7.0% (may be inaccurate in setting of ESRD)  Home Regimen: Tresiba 80 units HS and Trulicity 1.5mg subq weekly    While inpatient:  BG target 100-180 mg/dL  Continue Lantus 12 units SQ qHS   Continue Admelog correctional scale LOW before meals and low bedtime scale  Remain OFF premeal Admelog for now. As PO intake improves or FS begin to increase > 200, can consider restarting standing premeal  Check BG before meals and bedtime  Hypoglycemia protocol   Consistent carbohydrate diet, seen by RD, supplement ordered    Discharge Plan:  STOP Trulicity (patient ESRD on HD)  For dc to rehab- recommend to continue current insulin management as outlined above  For dc to home- Recommend basal (resume Tresiba, dose TBD), plus PO regimen (Tradjenta 5 mg daily or Januvia 25 mg daily)   Recommend Tradjenta 5 mg po daily, if not covered can see if Januvia 25 mg po daily is covered.  Please obtain prior auth if needed as patient is ESRD and DPP4i class are indicated for patients with ESRD  Consider CGM (such as Freestyle Libre2 outpatient)  If desiring to followup with [facility] Endocr[facility]: [address], [address], [facility] [address], [phone]    2. HTN  Management per primary team/nephrology    3. Hyperlipidemia  LDL target less than 70. Not currently on statin. Management per primary team    Priscilla Patel  Nurse Practitioner  Division of Endocrinology & Diabetes  In house pager #[phone]/long range pager #[phone]    If before 9AM or after 6PM, or on weekends/holidays, please call endocrine answering service for assistance ([phone]).  For nonurgent matters email [email or web address] for assistance.

## 2022-04-13 NOTE — PROGRESS NOTE ADULT - ASSESSMENT
Echo 1/19/22: grossly nl LV sys fx, min MR     a/p  56 year old female with hx of morbid obesity, CHAMP not on home O2, ESRD (HD MWF), HTN, DM, COPD, Afib no longer on AC, chronic R pannus wound, followed by Dr. Layne, likely pyoderma gangrenosum presents for vaginal bleeding     #Influenza  -oseltamivir  per med  -supportive care  -med f/u     #Chronic Pannus Wound  -surgical eval noted, wound care  -abx per med     #Chronic Afib  -stable, rates controlled  -cont dilt as ordered  -cont oral AC - monitor h.h     #Hypertension  -stable  -cont ccb     #ESRD on HD  -HD per renal    #Gyn bleeding  -s/p EUA, D&C, diagnostic hysteroscopy, insertion of Mirena IUD  -cv remains stable post op  -med f/u

## 2022-04-13 NOTE — PROGRESS NOTE ADULT - SUBJECTIVE AND OBJECTIVE BOX
Chief Complaint: DM 2    History: Patient seen at bedside. Eating outside food - a bagel sandwich plus Madeleine Sweet Iced Tea. Reviewed importance of limiting concentrated sweets/juices.    MEDICATIONS  (STANDING):  apixaban 5 milliGRAM(s) Oral every 12 hours  bisacodyl 5 milliGRAM(s) Oral at bedtime  buPROPion XL (24-Hour) . 150 milliGRAM(s) Oral daily  chlorhexidine 2% Cloths 1 Application(s) Topical daily  cinacalcet 30 milliGRAM(s) Oral daily  cycloSPORINE  , modified (NEORAL) 200 milliGRAM(s) Oral two times a day  Dakins Solution - 1/4 Strength 1 Application(s) Topical daily  dextrose 40% Gel 15 Gram(s) Oral once  dextrose 5%. 1000 milliLiter(s) (50 mL/Hr) IV Continuous <Continuous>  dextrose 5%. 1000 milliLiter(s) (100 mL/Hr) IV Continuous <Continuous>  dextrose 50% Injectable 25 Gram(s) IV Push once  dextrose 50% Injectable 12.5 Gram(s) IV Push once  dextrose 50% Injectable 25 Gram(s) IV Push once  diltiazem    milliGRAM(s) Oral daily  epoetin anabela-epbx (RETACRIT) Injectable 61867 Unit(s) IV Push <User Schedule>  gabapentin 300 milliGRAM(s) Oral daily  glucagon  Injectable 1 milliGRAM(s) IntraMuscular once  insulin glargine Injectable (LANTUS) 12 Unit(s) SubCutaneous at bedtime  insulin lispro (ADMELOG) corrective regimen sliding scale   SubCutaneous three times a day before meals  insulin lispro (ADMELOG) corrective regimen sliding scale   SubCutaneous at bedtime  lactobacillus acidophilus 1 Tablet(s) Oral daily  lidocaine   4% Patch 1 Patch Transdermal daily  lidocaine   4% Patch 1 Patch Transdermal daily  lidocaine   4% Patch 1 Patch Transdermal daily  lidocaine/prilocaine Cream 1 Application(s) Topical <User Schedule>  mirtazapine 7.5 milliGRAM(s) Oral at bedtime  montelukast 10 milliGRAM(s) Oral at bedtime  oseltamivir 30 milliGRAM(s) Oral <User Schedule>  pantoprazole    Tablet 40 milliGRAM(s) Oral before breakfast  polyethylene glycol 3350 17 Gram(s) Oral at bedtime  senna 2 Tablet(s) Oral at bedtime  sertraline 50 milliGRAM(s) Oral daily  sevelamer carbonate 800 milliGRAM(s) Oral three times a day with meals  simethicone 80 milliGRAM(s) Chew three times a day  tacrolimus   0.1% Ointment 1 Application(s) Topical daily  traZODone 100 milliGRAM(s) Oral at bedtime    MEDICATIONS  (PRN):  acetaminophen     Tablet .. 650 milliGRAM(s) Oral every 6 hours PRN Temp greater or equal to 38C (100.4F), Mild Pain (1 - 3)  benzonatate 100 milliGRAM(s) Oral three times a day PRN Cough  diphenhydrAMINE Injectable 25 milliGRAM(s) IV Push <User Schedule> PRN Rash and/or Itching  guaiFENesin Oral Liquid (Sugar-Free) 100 milliGRAM(s) Oral every 6 hours PRN Cough  melatonin 3 milliGRAM(s) Oral at bedtime PRN Insomnia  midodrine. 5 milliGRAM(s) Oral <User Schedule> PRN 30 minutes prior to dialysis  ondansetron Injectable 4 milliGRAM(s) IV Push every 8 hours PRN Nausea and/or Vomiting  oxyCODONE    IR 10 milliGRAM(s) Oral every 6 hours PRN Severe Pain (7 - 10)  oxyCODONE    IR 5 milliGRAM(s) Oral every 6 hours PRN Moderate Pain (4 - 6)      Allergies  latex (Rash)  penicillin (Nausea)  strawberry (Rash)    Intolerances  caffeine (Nausea)    Review of Systems:  Cardiovascular: No chest pain  Respiratory: No SOB  GI: No nausea, vomiting  Endocrine: no hypoglycemia     PHYSICAL EXAM:  VITALS: T(C): 36.8 (04-13-22 @ 11:48)  T(F): 98.2 (04-13-22 @ 11:48), Max: 99 (04-12-22 @ 21:20)  HR: 82 (04-13-22 @ 11:48) (82 - 91)  BP: 159/83 (04-13-22 @ 11:48) (116/64 - 159/83)  RR:  (18 - 19)  SpO2:  (98% - 100%)  Wt(kg): --  GENERAL: NAD  EYES: No proptosis, no lid lag, anicteric  HEENT:  Atraumatic, Normocephalic, moist mucous membranes  RESPIRATORY: unlabored respirations     CAPILLARY BLOOD GLUCOSE    POCT Blood Glucose.: 157 mg/dL (13 Apr 2022 11:59)  POCT Blood Glucose.: 149 mg/dL (13 Apr 2022 08:48)  POCT Blood Glucose.: 165 mg/dL (12 Apr 2022 21:16)  POCT Blood Glucose.: 109 mg/dL (12 Apr 2022 18:17)      04-12    133<L>  |  93<L>  |  28<H>  ----------------------------<  161<H>  4.2   |  22  |  6.21<H>    EGFR if : x   EGFR if non : x     Ca    9.3      04-12  Mg     2.10     04-12  Phos  3.1     04-12      A1C with Estimated Average Glucose Result: 6.1 % (03-20-22 @ 12:48)  A1C with Estimated Average Glucose Result: 7.0 % (01-13-22 @ 08:21)  A1C with Estimated Average Glucose Result: 6.7 % (08-31-21 @ 09:30)  A1C with Estimated Average Glucose Result: 7.2 % (04-28-21 @ 07:04)    Diet, Consistent Carbohydrate Renal w/Evening Snack:   For patients receiving Renal Replacement - No Protein Restr, No Conc K, No Conc Phos, Low Sodium (RENAL)  Supplement Feeding Modality:  Oral  Nepro Cans or Servings Per Day:  1       Frequency:  Three Times a day (03-20-22 @ 10:20)

## 2022-04-13 NOTE — PROGRESS NOTE ADULT - SUBJECTIVE AND OBJECTIVE BOX
CARDIOLOGY FOLLOW UP - Dr. Bullock  Date of Service: 4/13/22  CC: oob to chair  no cp/sob   feeling better today     Review of Systems:  Constitutional: No fever, weight loss, or fatigue  Respiratory: No cough, wheezing, or hemoptysis, no shortness of breath  Cardiovascular: No chest pain, palpitations, passing out, dizziness, or leg swelling  Gastrointestinal: No abd or epigastric pain.  No nausea, vomiting, or hematemesis; no diarrhea or constipation, no melena or hematochezia  Vascular: no edema       PHYSICAL EXAM:  T(C): 36.8 (04-13-22 @ 11:48), Max: 37.2 (04-12-22 @ 17:44)  HR: 82 (04-13-22 @ 11:48) (82 - 91)  BP: 159/83 (04-13-22 @ 11:48) (116/64 - 159/83)  RR: 19 (04-13-22 @ 11:48) (18 - 19)  SpO2: 100% (04-13-22 @ 11:48) (98% - 100%)  Wt(kg): --  I&O's Summary    12 Apr 2022 07:01  -  13 Apr 2022 07:00  --------------------------------------------------------  IN: 700 mL / OUT: 2621 mL / NET: -1921 mL        Appearance: Normal	  Cardiovascular: Normal S1 S2,RRR, No JVD, No murmurs  Respiratory: Lungs clear to auscultation	  Gastrointestinal:  Soft, Non-tender, + BS	  Extremities: Normal range of motion, No clubbing, cyanosis or edema      Home Medications:  Aspercreme with Lidocaine 4% topical film: Apply topically to affected area once a day (low back) (20 Mar 2022 10:15)  Aspercreme with Lidocaine 4% topical film: Apply topically to affected area once a day (L knee) (20 Mar 2022 10:15)  aspirin 81 mg oral delayed release tablet: 1 tab(s) orally once a day (20 Mar 2022 10:15)  buPROPion 150 mg/24 hours (XL) oral tablet, extended release: 1 tab(s) orally once a day (in the morning) (20 Mar 2022 10:15)  cycloSPORINE modified 100 mg oral capsule: 2 cap(s) orally once a day (at bedtime) (20 Mar 2022 10:15)  cycloSPORINE modified 50 mg oral capsule: 3 cap(s) orally once a day in the morning  (20 Mar 2022 10:15)  dilTIAZem 120 mg/24 hours oral capsule, extended release: 1 cap(s) orally once a day (hold SBP&lt;110, HR&lt;60) (20 Mar 2022 10:15)  doxycycline hyclate 100 mg oral tablet: 1 tab(s) orally 2 times a day (20 Mar 2022 10:15)  Eliquis 2.5 mg oral tablet: 1 tab(s) orally 2 times a day    Pharmacy states patient no longer wants to fill this medication (20 Mar 2022 10:15)  gabapentin 300 mg oral capsule: 1 cap(s) orally 2 times a day (20 Mar 2022 10:15)  insulin glargine: 15 unit(s) subcutaneous once a day (at bedtime) (20 Mar 2022 10:15)  midodrine 5 mg oral tablet: 1 tab(s) orally 3 times a week, 30 minute prior to dialysis sessions (hold is SBP&gt;130) (20 Mar 2022 10:15)  mirtazapine 7.5 mg oral tablet: 1 tab(s) orally once a day (at bedtime) (20 Mar 2022 10:15)  montelukast 10 mg oral tablet: 1 tab(s) orally once a day (in the evening) (20 Mar 2022 10:15)  oxyCODONE 10 mg oral tablet, extended release: 1 tab(s) orally every 12 hours (20 Mar 2022 10:15)  oxycodone-acetaminophen 7.5 mg-325 mg oral tablet: 1 tab(s) orally every 6 hours, As Needed (20 Mar 2022 10:15)  pantoprazole 40 mg oral delayed release tablet: 1 tab(s) orally once a day (20 Mar 2022 10:15)  polyethylene glycol 3350 oral powder for reconstitution: 17 gram(s) orally once a day (at bedtime) (20 Mar 2022 10:15)  senna oral tablet: 2 tab(s) orally once a day (at bedtime) (20 Mar 2022 10:15)  sertraline 50 mg oral tablet: 1 tab(s) orally once a day (20 Mar 2022 10:15)  sevelamer carbonate 800 mg oral tablet: 1 tab(s) orally 3 times a day (with meals) (20 Mar 2022 10:15)  simethicone 80 mg oral tablet, chewable: 1 tab(s) orally 3 times a day (before meals) (20 Mar 2022 10:15)  simvastatin 10 mg oral tablet: 1 tab(s) orally once a day (at bedtime) (20 Mar 2022 10:15)  sodium hypochlorite 0.25% topical solution: 1 application topically once a day (20 Mar 2022 10:15)  tacrolimus 0.1% topical ointment: 1 application topically once a day (20 Mar 2022 10:15)  traZODone 100 mg oral tablet: 1 tab(s) orally once a day (at bedtime) (20 Mar 2022 10:15)      MEDICATIONS  (STANDING):  apixaban 5 milliGRAM(s) Oral every 12 hours  bisacodyl 5 milliGRAM(s) Oral at bedtime  buPROPion XL (24-Hour) . 150 milliGRAM(s) Oral daily  chlorhexidine 2% Cloths 1 Application(s) Topical daily  cinacalcet 30 milliGRAM(s) Oral daily  cycloSPORINE  , modified (NEORAL) 200 milliGRAM(s) Oral two times a day  Dakins Solution - 1/4 Strength 1 Application(s) Topical daily  dextrose 40% Gel 15 Gram(s) Oral once  dextrose 5%. 1000 milliLiter(s) (50 mL/Hr) IV Continuous <Continuous>  dextrose 5%. 1000 milliLiter(s) (100 mL/Hr) IV Continuous <Continuous>  dextrose 50% Injectable 25 Gram(s) IV Push once  dextrose 50% Injectable 12.5 Gram(s) IV Push once  dextrose 50% Injectable 25 Gram(s) IV Push once  diltiazem    milliGRAM(s) Oral daily  epoetin anabela-epbx (RETACRIT) Injectable 70584 Unit(s) IV Push <User Schedule>  gabapentin 300 milliGRAM(s) Oral daily  glucagon  Injectable 1 milliGRAM(s) IntraMuscular once  insulin glargine Injectable (LANTUS) 12 Unit(s) SubCutaneous at bedtime  insulin lispro (ADMELOG) corrective regimen sliding scale   SubCutaneous three times a day before meals  insulin lispro (ADMELOG) corrective regimen sliding scale   SubCutaneous at bedtime  lactobacillus acidophilus 1 Tablet(s) Oral daily  lidocaine   4% Patch 1 Patch Transdermal daily  lidocaine   4% Patch 1 Patch Transdermal daily  lidocaine   4% Patch 1 Patch Transdermal daily  lidocaine/prilocaine Cream 1 Application(s) Topical <User Schedule>  mirtazapine 7.5 milliGRAM(s) Oral at bedtime  montelukast 10 milliGRAM(s) Oral at bedtime  pantoprazole    Tablet 40 milliGRAM(s) Oral before breakfast  polyethylene glycol 3350 17 Gram(s) Oral at bedtime  senna 2 Tablet(s) Oral at bedtime  sertraline 50 milliGRAM(s) Oral daily  sevelamer carbonate 800 milliGRAM(s) Oral three times a day with meals  simethicone 80 milliGRAM(s) Chew three times a day  tacrolimus   0.1% Ointment 1 Application(s) Topical daily  traZODone 100 milliGRAM(s) Oral at bedtime      TELEMETRY: 	    ECG:  	  RADIOLOGY:   DIAGNOSTIC TESTING:  [ ] Echocardiogram:  [ ]  Catheterization:  [ ] Stress Test:    OTHER: 	    LABS:	 	                            8.7    9.23  )-----------( 332      ( 12 Apr 2022 07:36 )             30.0     04-12    133<L>  |  93<L>  |  28<H>  ----------------------------<  161<H>  4.2   |  22  |  6.21<H>    Ca    9.3      12 Apr 2022 07:36  Phos  3.1     04-12  Mg     2.10     04-12

## 2022-04-14 NOTE — PROGRESS NOTE ADULT - SUBJECTIVE AND OBJECTIVE BOX
CARDIOLOGY FOLLOW UP - Dr. Bullock  Date of Service: 4/14/22  CC: no cp/sob     Review of Systems:  Constitutional: No fever, weight loss, or fatigue  Respiratory: No cough, wheezing, or hemoptysis, no shortness of breath  Cardiovascular: No chest pain, palpitations, passing out, dizziness, or leg swelling  Gastrointestinal: No abd or epigastric pain.  No nausea, vomiting, or hematemesis; no diarrhea or constipation, no melena or hematochezia  Vascular: no edema       PHYSICAL EXAM:  T(C): 36.8 (04-14-22 @ 10:55), Max: 37 (04-14-22 @ 06:40)  HR: 94 (04-14-22 @ 10:55) (76 - 94)  BP: 109/54 (04-14-22 @ 10:55) (96/47 - 126/64)  RR: 18 (04-14-22 @ 10:55) (17 - 18)  SpO2: 95% (04-14-22 @ 10:55) (95% - 100%)  Wt(kg): --  I&O's Summary      Appearance: Normal	  Cardiovascular: Normal S1 S2,RRR, No JVD, No murmurs  Respiratory: Lungs clear to auscultation	  Gastrointestinal:  Soft, Non-tender, + BS	  Extremities: Normal range of motion, No clubbing, cyanosis or edema      Home Medications:  Aspercreme with Lidocaine 4% topical film: Apply topically to affected area once a day (low back) (20 Mar 2022 10:15)  Aspercreme with Lidocaine 4% topical film: Apply topically to affected area once a day (L knee) (20 Mar 2022 10:15)  aspirin 81 mg oral delayed release tablet: 1 tab(s) orally once a day (20 Mar 2022 10:15)  buPROPion 150 mg/24 hours (XL) oral tablet, extended release: 1 tab(s) orally once a day (in the morning) (20 Mar 2022 10:15)  cycloSPORINE modified 100 mg oral capsule: 2 cap(s) orally once a day (at bedtime) (20 Mar 2022 10:15)  cycloSPORINE modified 50 mg oral capsule: 3 cap(s) orally once a day in the morning  (20 Mar 2022 10:15)  dilTIAZem 120 mg/24 hours oral capsule, extended release: 1 cap(s) orally once a day (hold SBP&lt;110, HR&lt;60) (20 Mar 2022 10:15)  doxycycline hyclate 100 mg oral tablet: 1 tab(s) orally 2 times a day (20 Mar 2022 10:15)  Eliquis 2.5 mg oral tablet: 1 tab(s) orally 2 times a day    Pharmacy states patient no longer wants to fill this medication (20 Mar 2022 10:15)  gabapentin 300 mg oral capsule: 1 cap(s) orally 2 times a day (20 Mar 2022 10:15)  insulin glargine: 15 unit(s) subcutaneous once a day (at bedtime) (20 Mar 2022 10:15)  midodrine 5 mg oral tablet: 1 tab(s) orally 3 times a week, 30 minute prior to dialysis sessions (hold is SBP&gt;130) (20 Mar 2022 10:15)  mirtazapine 7.5 mg oral tablet: 1 tab(s) orally once a day (at bedtime) (20 Mar 2022 10:15)  montelukast 10 mg oral tablet: 1 tab(s) orally once a day (in the evening) (20 Mar 2022 10:15)  oxyCODONE 10 mg oral tablet, extended release: 1 tab(s) orally every 12 hours (20 Mar 2022 10:15)  oxycodone-acetaminophen 7.5 mg-325 mg oral tablet: 1 tab(s) orally every 6 hours, As Needed (20 Mar 2022 10:15)  pantoprazole 40 mg oral delayed release tablet: 1 tab(s) orally once a day (20 Mar 2022 10:15)  polyethylene glycol 3350 oral powder for reconstitution: 17 gram(s) orally once a day (at bedtime) (20 Mar 2022 10:15)  senna oral tablet: 2 tab(s) orally once a day (at bedtime) (20 Mar 2022 10:15)  sertraline 50 mg oral tablet: 1 tab(s) orally once a day (20 Mar 2022 10:15)  sevelamer carbonate 800 mg oral tablet: 1 tab(s) orally 3 times a day (with meals) (20 Mar 2022 10:15)  simethicone 80 mg oral tablet, chewable: 1 tab(s) orally 3 times a day (before meals) (20 Mar 2022 10:15)  simvastatin 10 mg oral tablet: 1 tab(s) orally once a day (at bedtime) (20 Mar 2022 10:15)  sodium hypochlorite 0.25% topical solution: 1 application topically once a day (20 Mar 2022 10:15)  tacrolimus 0.1% topical ointment: 1 application topically once a day (20 Mar 2022 10:15)  traZODone 100 mg oral tablet: 1 tab(s) orally once a day (at bedtime) (20 Mar 2022 10:15)      MEDICATIONS  (STANDING):  apixaban 5 milliGRAM(s) Oral every 12 hours  bisacodyl 5 milliGRAM(s) Oral at bedtime  buPROPion XL (24-Hour) . 150 milliGRAM(s) Oral daily  chlorhexidine 2% Cloths 1 Application(s) Topical daily  cinacalcet 30 milliGRAM(s) Oral daily  cycloSPORINE  , modified (NEORAL) 200 milliGRAM(s) Oral two times a day  Dakins Solution - 1/4 Strength 1 Application(s) Topical daily  dextrose 40% Gel 15 Gram(s) Oral once  dextrose 5%. 1000 milliLiter(s) (50 mL/Hr) IV Continuous <Continuous>  dextrose 5%. 1000 milliLiter(s) (100 mL/Hr) IV Continuous <Continuous>  dextrose 50% Injectable 25 Gram(s) IV Push once  dextrose 50% Injectable 12.5 Gram(s) IV Push once  dextrose 50% Injectable 25 Gram(s) IV Push once  diltiazem    milliGRAM(s) Oral daily  epoetin anabela-epbx (RETACRIT) Injectable 40013 Unit(s) IV Push <User Schedule>  gabapentin 300 milliGRAM(s) Oral daily  glucagon  Injectable 1 milliGRAM(s) IntraMuscular once  insulin glargine Injectable (LANTUS) 12 Unit(s) SubCutaneous at bedtime  insulin lispro (ADMELOG) corrective regimen sliding scale   SubCutaneous three times a day before meals  insulin lispro (ADMELOG) corrective regimen sliding scale   SubCutaneous at bedtime  lactobacillus acidophilus 1 Tablet(s) Oral daily  lidocaine   4% Patch 1 Patch Transdermal daily  lidocaine   4% Patch 1 Patch Transdermal daily  lidocaine   4% Patch 1 Patch Transdermal daily  lidocaine/prilocaine Cream 1 Application(s) Topical <User Schedule>  mirtazapine 7.5 milliGRAM(s) Oral at bedtime  montelukast 10 milliGRAM(s) Oral at bedtime  oseltamivir 30 milliGRAM(s) Oral <User Schedule>  pantoprazole    Tablet 40 milliGRAM(s) Oral before breakfast  polyethylene glycol 3350 17 Gram(s) Oral at bedtime  senna 2 Tablet(s) Oral at bedtime  sertraline 50 milliGRAM(s) Oral daily  sevelamer carbonate 800 milliGRAM(s) Oral three times a day with meals  simethicone 80 milliGRAM(s) Chew three times a day  tacrolimus   0.1% Ointment 1 Application(s) Topical daily  traZODone 100 milliGRAM(s) Oral at bedtime      TELEMETRY: 	    ECG:  	  RADIOLOGY:   DIAGNOSTIC TESTING:  [ ] Echocardiogram:  [ ]  Catheterization:  [ ] Stress Test:    OTHER: 	    LABS:	 	                            8.0    8.66  )-----------( 345      ( 14 Apr 2022 08:49 )             28.6     04-14    130<L>  |  91<L>  |  29<H>  ----------------------------<  205<H>  3.2<L>   |  24  |  5.62<H>    Ca    8.7      14 Apr 2022 08:49  Phos  3.4     04-14  Mg     2.10     04-14

## 2022-04-14 NOTE — PROGRESS NOTE ADULT - SUBJECTIVE AND OBJECTIVE BOX
Chief Complaint: DM 2    History: Patient seen at bedside. Sitting in chair. Noted with elevated glucose levels, patient has been eating outside food/drinking sweetened tea    MEDICATIONS  (STANDING):  apixaban 5 milliGRAM(s) Oral every 12 hours  bisacodyl 5 milliGRAM(s) Oral at bedtime  buPROPion XL (24-Hour) . 150 milliGRAM(s) Oral daily  chlorhexidine 2% Cloths 1 Application(s) Topical daily  cinacalcet 30 milliGRAM(s) Oral daily  cycloSPORINE  , modified (NEORAL) 200 milliGRAM(s) Oral two times a day  Dakins Solution - 1/4 Strength 1 Application(s) Topical daily  dextrose 40% Gel 15 Gram(s) Oral once  dextrose 5%. 1000 milliLiter(s) (50 mL/Hr) IV Continuous <Continuous>  dextrose 5%. 1000 milliLiter(s) (100 mL/Hr) IV Continuous <Continuous>  dextrose 50% Injectable 25 Gram(s) IV Push once  dextrose 50% Injectable 12.5 Gram(s) IV Push once  dextrose 50% Injectable 25 Gram(s) IV Push once  diltiazem    milliGRAM(s) Oral daily  epoetin anabela-epbx (RETACRIT) Injectable 93659 Unit(s) IV Push <User Schedule>  gabapentin 300 milliGRAM(s) Oral daily  glucagon  Injectable 1 milliGRAM(s) IntraMuscular once  insulin glargine Injectable (LANTUS) 12 Unit(s) SubCutaneous at bedtime  insulin lispro (ADMELOG) corrective regimen sliding scale   SubCutaneous three times a day before meals  insulin lispro (ADMELOG) corrective regimen sliding scale   SubCutaneous at bedtime  lactobacillus acidophilus 1 Tablet(s) Oral daily  lidocaine   4% Patch 1 Patch Transdermal daily  lidocaine   4% Patch 1 Patch Transdermal daily  lidocaine   4% Patch 1 Patch Transdermal daily  lidocaine/prilocaine Cream 1 Application(s) Topical <User Schedule>  mirtazapine 7.5 milliGRAM(s) Oral at bedtime  montelukast 10 milliGRAM(s) Oral at bedtime  oseltamivir 30 milliGRAM(s) Oral <User Schedule>  pantoprazole    Tablet 40 milliGRAM(s) Oral before breakfast  polyethylene glycol 3350 17 Gram(s) Oral at bedtime  senna 2 Tablet(s) Oral at bedtime  sertraline 50 milliGRAM(s) Oral daily  sevelamer carbonate 800 milliGRAM(s) Oral three times a day with meals  simethicone 80 milliGRAM(s) Chew three times a day  tacrolimus   0.1% Ointment 1 Application(s) Topical daily  traZODone 100 milliGRAM(s) Oral at bedtime    MEDICATIONS  (PRN):  acetaminophen     Tablet .. 650 milliGRAM(s) Oral every 6 hours PRN Temp greater or equal to 38C (100.4F), Mild Pain (1 - 3)  benzonatate 100 milliGRAM(s) Oral three times a day PRN Cough  diphenhydrAMINE Injectable 25 milliGRAM(s) IV Push <User Schedule> PRN Rash and/or Itching  guaiFENesin Oral Liquid (Sugar-Free) 100 milliGRAM(s) Oral every 6 hours PRN Cough  melatonin 3 milliGRAM(s) Oral at bedtime PRN Insomnia  midodrine. 5 milliGRAM(s) Oral <User Schedule> PRN 30 minutes prior to dialysis  ondansetron Injectable 4 milliGRAM(s) IV Push every 8 hours PRN Nausea and/or Vomiting  oxyCODONE    IR 10 milliGRAM(s) Oral every 6 hours PRN Severe Pain (7 - 10)  oxyCODONE    IR 5 milliGRAM(s) Oral every 6 hours PRN Moderate Pain (4 - 6)      Allergies  latex (Rash)  penicillin (Nausea)  strawberry (Rash)    Intolerances  caffeine (Nausea)    Review of Systems:  Cardiovascular: No chest pain  Respiratory: No SOB  GI: No nausea, vomiting  Endocrine: no hypoglycemia     PHYSICAL EXAM:  VITALS: T(C): 36.8 (04-14-22 @ 10:55)  T(F): 98.3 (04-14-22 @ 10:55), Max: 98.9 (04-14-22 @ 10:20)  HR: 94 (04-14-22 @ 10:55) (76 - 94)  BP: 109/54 (04-14-22 @ 10:55) (96/47 - 126/64)  RR:  (17 - 18)  SpO2:  (95% - 100%)  Wt(kg): --  GENERAL: NAD  EYES: No proptosis, no lid lag, anicteric  HEENT:  Atraumatic, Normocephalic, moist mucous membranes  RESPIRATORY: unlabored respirations     CAPILLARY BLOOD GLUCOSE    POCT Blood Glucose.: 207 mg/dL (14 Apr 2022 13:13)  POCT Blood Glucose.: 149 mg/dL (14 Apr 2022 10:07)  POCT Blood Glucose.: 236 mg/dL (14 Apr 2022 05:46)  POCT Blood Glucose.: 176 mg/dL (13 Apr 2022 21:08)  POCT Blood Glucose.: 270 mg/dL (13 Apr 2022 17:56)      04-14    130<L>  |  91<L>  |  29<H>  ----------------------------<  205<H>  3.2<L>   |  24  |  5.62<H>    EGFR if : x   EGFR if non : x     Ca    8.7      04-14  Mg     2.10     04-14  Phos  3.4     04-14      A1C with Estimated Average Glucose Result: 6.1 % (03-20-22 @ 12:48)  A1C with Estimated Average Glucose Result: 7.0 % (01-13-22 @ 08:21)  A1C with Estimated Average Glucose Result: 6.7 % (08-31-21 @ 09:30)  A1C with Estimated Average Glucose Result: 7.2 % (04-28-21 @ 07:04)    Diet, Consistent Carbohydrate Renal w/Evening Snack:   For patients receiving Renal Replacement - No Protein Restr, No Conc K, No Conc Phos, Low Sodium (RENAL)  Supplement Feeding Modality:  Oral  Nepro Cans or Servings Per Day:  1       Frequency:  Three Times a day (03-20-22 @ 10:20)

## 2022-04-14 NOTE — PROGRESS NOTE ADULT - SUBJECTIVE AND OBJECTIVE BOX
[facility] NEPHROLOGY- PROGRESS NOTE    56y Female with history of ESRD on HD presents with vaginal bleeding. Nephrology consulted for ESRD status.      REVIEW OF SYSTEMS:  Gen: no fevers, + weakness  Cards: no chest pain  Resp: no dyspnea, + cough  GI: no nausea or vomiting or diarrhea  Vascular: no LE edema      caffeine (Nausea)  latex (Rash)  penicillin (Nausea)  strawberry (Rash)    Hospital Medications: Medications reviewed      VITALS:  T(F): 98.3 (04-14-22 @ 10:55), Max: 98.9 (04-14-22 @ 10:20)  HR: 94 (04-14-22 @ 10:55)  BP: 109/54 (04-14-22 @ 10:55)  RR: 18 (04-14-22 @ 10:55)  SpO2: 95% (04-14-22 @ 10:55)  Wt(kg): --    04-14 @ 07:01  -  04-14 @ 14:37  --------------------------------------------------------  IN: 400 mL / OUT: 2200 mL / NET: -1800 mL        PHYSICAL EXAM:  Gen: NAD, calm  Cards: RRR, +S1/S2, no M/G/R  Resp: CTA B/L  GI: soft, NT/ND, NABS, + ab wound dressing in place  Vascular: no LE edema B/L, LUE AVF + bruit/thrill      LABS:  04-14    130<L>  |  91<L>  |  29<H>  ----------------------------<  205<H>  3.2<L>   |  24  |  5.62<H>    Ca    8.7      14 Apr 2022 08:49  Phos  3.4     04-14  Mg     2.10     04-14      Creatinine Trend: 5.62 <--, 6.21 <--, 4.94 <--                        8.0    8.66  )-----------( 345      ( 14 Apr 2022 08:49 )             28.6     Urine Studies:

## 2022-04-14 NOTE — PROGRESS NOTE ADULT - ASSESSMENT
Echo 1/19/22: grossly nl LV sys fx, min MR     a/p  56 year old female with hx of morbid obesity, CHAMP not on home O2, ESRD (HD MWF), HTN, DM, COPD, Afib no longer on AC, chronic R pannus wound, followed by Dr. Layne, likely pyoderma gangrenosum presents for vaginal bleeding     #Influenza  -oseltamivir  per med  -supportive care  -med f/u     #Chronic Pannus Wound  -surgical eval noted, wound care  -abx per med     #Chronic Afib  -stable, rates controlled  -cont dilt as ordered  -cont oral AC - monitor h.h     #Hypertension  -overall stable  -cont ccb   -mido prehd     #ESRD on HD  -HD per renal    #Gyn bleeding  -s/p EUA, D&C, diagnostic hysteroscopy, insertion of Mirena IUD  -cv remains stable post op  -med f/u     DCP  plan discussed with ACP

## 2022-04-14 NOTE — PROGRESS NOTE ADULT - ASSESSMENT
56y Female with history of ESRD on HD presents with vaginal bleeding. Nephrology consulted for ESRD status.    1) ESRD: Last HD earlier this morning with mild intradialytic hypotension and 1.8L removed with heparin to prevent circuit clotting. Plan for next maintenance HD on 4/16. Hypokalemia on labs this morning drawn during HD and therefore not accurate. Monitor electrolytes.    2) HTN with ESRD: BP acceptable on Cardizem. Continue with midodrine pre-HD on HD days to avoid intradialytic hypotension. Monitor BP.    3) Anemia of renal disease: With component of blood loss from vaginal bleeding. Hb low with acceptable iron stores. Continue with Epo 20K with HD. Monitor Hb.    4) Secondary HPT of renal origin: Phosphorus acceptable with low iPTH for which sensipar decreased to 30 mg daily. Continue with renvela 1 tab with meals. Monitor serum calcium and phosphorus.      [facility] NEPHROLOGY  Keaton Lopez M.D.  Juma Green D.O.  Myla Hoffmann M.D.  Julia Brewer, JASIEL, ANP-C    Telephone: [phone]  Facsimile: [phone]    [address] [place] [address]

## 2022-04-14 NOTE — PROGRESS NOTE ADULT - SUBJECTIVE AND OBJECTIVE BOX
SUBJECTIVE / OVERNIGHT EVENTS:pt seen and examined, pt spiked fever yesterday , rvp+ flu  4-14- 22    MEDICATIONS  (STANDING):  apixaban 5 milliGRAM(s) Oral every 12 hours  bisacodyl 5 milliGRAM(s) Oral at bedtime  buPROPion XL (24-Hour) . 150 milliGRAM(s) Oral daily  chlorhexidine 2% Cloths 1 Application(s) Topical daily  cinacalcet 30 milliGRAM(s) Oral daily  cycloSPORINE  , modified (NEORAL) 200 milliGRAM(s) Oral two times a day  Dakins Solution - 1/4 Strength 1 Application(s) Topical daily  dextrose 40% Gel 15 Gram(s) Oral once  dextrose 5%. 1000 milliLiter(s) (50 mL/Hr) IV Continuous <Continuous>  dextrose 5%. 1000 milliLiter(s) (100 mL/Hr) IV Continuous <Continuous>  dextrose 50% Injectable 25 Gram(s) IV Push once  dextrose 50% Injectable 12.5 Gram(s) IV Push once  dextrose 50% Injectable 25 Gram(s) IV Push once  diltiazem    milliGRAM(s) Oral daily  epoetin anabela-epbx (RETACRIT) Injectable 05783 Unit(s) IV Push <User Schedule>  gabapentin 300 milliGRAM(s) Oral daily  glucagon  Injectable 1 milliGRAM(s) IntraMuscular once  insulin glargine Injectable (LANTUS) 12 Unit(s) SubCutaneous at bedtime  insulin lispro (ADMELOG) corrective regimen sliding scale   SubCutaneous three times a day before meals  insulin lispro (ADMELOG) corrective regimen sliding scale   SubCutaneous at bedtime  lactobacillus acidophilus 1 Tablet(s) Oral daily  lidocaine   4% Patch 1 Patch Transdermal daily  lidocaine   4% Patch 1 Patch Transdermal daily  lidocaine   4% Patch 1 Patch Transdermal daily  lidocaine/prilocaine Cream 1 Application(s) Topical <User Schedule>  mirtazapine 7.5 milliGRAM(s) Oral at bedtime  montelukast 10 milliGRAM(s) Oral at bedtime  oseltamivir 30 milliGRAM(s) Oral <User Schedule>  pantoprazole    Tablet 40 milliGRAM(s) Oral before breakfast  polyethylene glycol 3350 17 Gram(s) Oral at bedtime  senna 2 Tablet(s) Oral at bedtime  sertraline 50 milliGRAM(s) Oral daily  sevelamer carbonate 800 milliGRAM(s) Oral three times a day with meals  simethicone 80 milliGRAM(s) Chew three times a day  tacrolimus   0.1% Ointment 1 Application(s) Topical daily  traZODone 100 milliGRAM(s) Oral at bedtime    MEDICATIONS  (PRN):  acetaminophen     Tablet .. 650 milliGRAM(s) Oral every 6 hours PRN Temp greater or equal to 38C (100.4F), Mild Pain (1 - 3)  benzonatate 100 milliGRAM(s) Oral three times a day PRN Cough  diphenhydrAMINE Injectable 25 milliGRAM(s) IV Push <User Schedule> PRN Rash and/or Itching  guaiFENesin Oral Liquid (Sugar-Free) 100 milliGRAM(s) Oral every 6 hours PRN Cough  melatonin 3 milliGRAM(s) Oral at bedtime PRN Insomnia  midodrine. 5 milliGRAM(s) Oral <User Schedule> PRN 30 minutes prior to dialysis  ondansetron Injectable 4 milliGRAM(s) IV Push every 8 hours PRN Nausea and/or Vomiting  oxyCODONE    IR 10 milliGRAM(s) Oral every 6 hours PRN Severe Pain (7 - 10)  oxyCODONE    IR 5 milliGRAM(s) Oral every 6 hours PRN Moderate Pain (4 - 6)    Vital Signs Last 24 Hrs  T(C): 37.2 (04-14-22 @ 17:30), Max: 37.2 (04-14-22 @ 10:20)  T(F): 98.9 (04-14-22 @ 17:30), Max: 98.9 (04-14-22 @ 10:20)  HR: 88 (04-14-22 @ 17:30) (76 - 94)  BP: 116/62 (04-14-22 @ 17:30) (96/47 - 126/64)  BP(mean): --  RR: 18 (04-14-22 @ 17:30) (17 - 18)  SpO2: 100% (04-14-22 @ 17:30) (95% - 100%)          Skin: No rash.  Eyes: No recent vision problems or eye pain.  ENT: No congestion, ear pain, or sore throat.  Cardiovascular: No chest pain or palpation.  Respiratory: No cough, shortness of breath, congestion, or wheezing.  Gastrointestinal: No abdominal pain, nausea, vomiting, or diarrhea.  Genitourinary: No dysuria.  Musculoskeletal: No joint swelling.  Neurologic: No headache.    PHYSICAL EXAM:  GENERAL: NAD  EYES: EOMI, PERRLA  NECK: Supple, No JVD  CHEST/LUNG: dec breath sounds at bases  HEART:  S1 , S2 +  ABDOMEN: soft , bs+, wound dressing+  EXTREMITIES:  edema+  NEUROLOGY:alert awake oriented     LABS:  04-14    130<L>  |  91<L>  |  29<H>  ----------------------------<  205<H>  3.2<L>   |  24  |  5.62<H>    Ca    8.7      14 Apr 2022 08:49  Phos  3.4     04-14  Mg     2.10     04-14      Creatinine Trend: 5.62 <--, 6.21 <--, 4.94 <--                        8.0    8.66  )-----------( 345      ( 14 Apr 2022 08:49 )             28.6     Urine Studies:

## 2022-04-14 NOTE — PROGRESS NOTE ADULT - ASSESSMENT
56F with Hx of ESRD TTS, HTN, DM 2, COPD, afib, known chronic R pannus wound felt most likely pyoderma gangrenosum presents for 1.5wk vag bleeding. Pt recently had prolonged stay at [facility] for pannus wound, known to endocrine service.  Was dc'd to rehab on 3/3.  Prior to last hospitalization patient was on much higher doses of basal bolus.  Now requiring much less.  Has poor appetite.    1. Type 2 diabetes mellitus uncontrolled  A1c 7.0% (may be inaccurate in setting of ESRD)  Home Regimen: Tresiba 80 units HS and Trulicity 1.5mg subq weekly    While inpatient:  BG target 100-180 mg/dL  *Intermittent elevated FS over last 24h however has been stable on regimen below for several days, recommend to continue monitoring on these doses for now  Continue Lantus 12 units SQ qHS   Continue Admelog correctional scale LOW before meals and low bedtime scale  Remain OFF premeal Admelog for now  Check BG before meals and bedtime  Hypoglycemia protocol   Consistent carbohydrate diet, seen by RD, supplement ordered    Discharge Plan:  STOP Trulicity   For dc to rehab- recommend to continue current insulin management as outlined above  For dc to home- Recommend basal (resume Tresiba, dose TBD), plus PO regimen (Tradjenta 5 mg daily or Januvia 25 mg daily)   Recommend Tradjenta 5 mg po daily, if not covered can see if Januvia 25 mg po daily is covered.  Please obtain prior auth if needed as patient is ESRD and DPP4i class are indicated for patients with ESRD  Consider CGM (such as Freestyle Libre2 outpatient)  If desiring to followup with [facility] Endocrinology: [address], [address], [place] [facility] [address], [phone]    2. HTN  Management per primary team/nephrology    3. Hyperlipidemia  LDL target less than 70. Not currently on statin. Management per primary team    Priscilla Patel  Nurse Practitioner  Division of Endocrinology & Diabetes  In house pager #[phone]/long range pager #[phone]    If before 9AM or after 6PM, or on weekends/holidays, please call endocrine answering service for assistance ([phone]).  For nonurgent matters email [email or web address] for assistance.

## 2022-04-15 NOTE — PROGRESS NOTE ADULT - SUBJECTIVE AND OBJECTIVE BOX
SUBJECTIVE / OVERNIGHT EVENTS:pt seen and examined, pt spiked fever yesterday , rvp+ flu  4-15- 22    MEDICATIONS  (STANDING):  apixaban 5 milliGRAM(s) Oral every 12 hours  bisacodyl 5 milliGRAM(s) Oral at bedtime  buPROPion XL (24-Hour) . 150 milliGRAM(s) Oral daily  chlorhexidine 2% Cloths 1 Application(s) Topical daily  cinacalcet 30 milliGRAM(s) Oral daily  cycloSPORINE  , modified (NEORAL) 200 milliGRAM(s) Oral two times a day  Dakins Solution - 1/4 Strength 1 Application(s) Topical daily  dextrose 40% Gel 15 Gram(s) Oral once  dextrose 5%. 1000 milliLiter(s) (50 mL/Hr) IV Continuous <Continuous>  dextrose 5%. 1000 milliLiter(s) (100 mL/Hr) IV Continuous <Continuous>  dextrose 50% Injectable 25 Gram(s) IV Push once  dextrose 50% Injectable 12.5 Gram(s) IV Push once  dextrose 50% Injectable 25 Gram(s) IV Push once  diltiazem    milliGRAM(s) Oral daily  epoetin anabela-epbx (RETACRIT) Injectable 36052 Unit(s) IV Push <User Schedule>  gabapentin 300 milliGRAM(s) Oral daily  glucagon  Injectable 1 milliGRAM(s) IntraMuscular once  insulin glargine Injectable (LANTUS) 12 Unit(s) SubCutaneous at bedtime  insulin lispro (ADMELOG) corrective regimen sliding scale   SubCutaneous three times a day before meals  insulin lispro (ADMELOG) corrective regimen sliding scale   SubCutaneous at bedtime  lactobacillus acidophilus 1 Tablet(s) Oral daily  lidocaine   4% Patch 1 Patch Transdermal daily  lidocaine   4% Patch 1 Patch Transdermal daily  lidocaine   4% Patch 1 Patch Transdermal daily  lidocaine/prilocaine Cream 1 Application(s) Topical <User Schedule>  mirtazapine 7.5 milliGRAM(s) Oral at bedtime  montelukast 10 milliGRAM(s) Oral at bedtime  oseltamivir 30 milliGRAM(s) Oral <User Schedule>  pantoprazole    Tablet 40 milliGRAM(s) Oral before breakfast  polyethylene glycol 3350 17 Gram(s) Oral at bedtime  potassium phosphate / sodium phosphate Powder (PHOS-NaK) 1 Packet(s) Oral two times a day before meals  senna 2 Tablet(s) Oral at bedtime  sertraline 50 milliGRAM(s) Oral daily  simethicone 80 milliGRAM(s) Chew three times a day  tacrolimus   0.1% Ointment 1 Application(s) Topical daily  traZODone 100 milliGRAM(s) Oral at bedtime    MEDICATIONS  (PRN):  acetaminophen     Tablet .. 650 milliGRAM(s) Oral every 6 hours PRN Temp greater or equal to 38C (100.4F), Mild Pain (1 - 3)  benzonatate 100 milliGRAM(s) Oral three times a day PRN Cough  diphenhydrAMINE Injectable 25 milliGRAM(s) IV Push <User Schedule> PRN Rash and/or Itching  guaiFENesin Oral Liquid (Sugar-Free) 100 milliGRAM(s) Oral every 6 hours PRN Cough  melatonin 3 milliGRAM(s) Oral at bedtime PRN Insomnia  midodrine. 10 milliGRAM(s) Oral <User Schedule> PRN 30 minutes prior to dialysis  ondansetron Injectable 4 milliGRAM(s) IV Push every 8 hours PRN Nausea and/or Vomiting    Vital Signs Last 24 Hrs  T(C): 36.7 (04-15-22 @ 13:40), Max: 37.1 (04-14-22 @ 21:00)  T(F): 98.1 (04-15-22 @ 13:40), Max: 98.7 (04-14-22 @ 21:00)  HR: 88 (04-15-22 @ 17:20) (80 - 88)  BP: 117/64 (04-15-22 @ 17:20) (112/62 - 122/63)  BP(mean): --  RR: 18 (04-15-22 @ 17:20) (17 - 18)  SpO2: 100% (04-15-22 @ 17:20) (96% - 100%)          Skin: No rash.  Eyes: No recent vision problems or eye pain.  ENT: No congestion, ear pain, or sore throat.  Cardiovascular: No chest pain or palpation.  Respiratory: No cough, shortness of breath, congestion, or wheezing.  Gastrointestinal: No abdominal pain, nausea, vomiting, or diarrhea.  Genitourinary: No dysuria.  Musculoskeletal: No joint swelling.  Neurologic: No headache.    PHYSICAL EXAM:  GENERAL: NAD  EYES: EOMI, PERRLA  NECK: Supple, No JVD  CHEST/LUNG: dec breath sounds at bases  HEART:  S1 , S2 +  ABDOMEN: soft , bs+, wound dressing+  EXTREMITIES:  edema+  NEUROLOGY:alert awake oriented     LABS:  04-15    133<L>  |  96<L>  |  16  ----------------------------<  179<H>  4.4   |  17<L>  |  3.82<H>    Ca    9.1      15 Apr 2022 07:32  Phos  2.4     04-15  Mg     2.10     04-15      Creatinine Trend: 3.82 <--, 5.62 <--, 6.21 <--, 4.94 <--                        7.9    11.71 )-----------( 376      ( 15 Apr 2022 07:32 )             28.3     Urine Studies:

## 2022-04-15 NOTE — PROGRESS NOTE ADULT - ASSESSMENT
56y Female with history of ESRD on HD presents with vaginal bleeding. Nephrology consulted for ESRD status.    1) ESRD: Last HD on 4/14 with mild intradialytic hypotension and 1.8L removed with heparin to prevent circuit clotting. Plan for next maintenance HD on 4/16. Monitor electrolytes.    2) HTN with ESRD: BP acceptable on Cardizem. Increase midodrine to 10 mg pre-HD on HD days to avoid intradialytic hypotension. Monitor BP.    3) Anemia of renal disease: With component of blood loss from vaginal bleeding. Hb low with acceptable iron stores. Continue with Epo 20K with HD. Monitor Hb.    4) Secondary HPT of renal origin: Phosphorus low. Continue with sensipar 30 mg daily and discontinue renvela 1 tab with meals. Monitor serum calcium and phosphorus.      [facility] NEPHROLOGY  Keaton Lopez M.D.  Juma Green D.O.  Myla Hoffmann M.D.  Julia Brewer, MSN, ANP-C    Telephone: [phone]  Facsimile: [phone]    [address] [place] [address]

## 2022-04-15 NOTE — PROGRESS NOTE ADULT - SUBJECTIVE AND OBJECTIVE BOX
[facility] NEPHROLOGY- PROGRESS NOTE    56y Female with history of ESRD on HD presents with vaginal bleeding. Nephrology consulted for ESRD status.      REVIEW OF SYSTEMS:  Gen: no fevers, + weakness  Cards: no chest pain  Resp: no dyspnea, + cough  GI: no nausea or vomiting or diarrhea  Vascular: no LE edema      caffeine (Nausea)  latex (Rash)  penicillin (Nausea)  strawberry (Rash)    Hospital Medications: Medications reviewed      VITALS:  T(F): 98 (04-15-22 @ 05:40), Max: 98.9 (04-14-22 @ 17:30)  HR: 80 (04-15-22 @ 05:40)  BP: 122/63 (04-15-22 @ 05:40)  RR: 18 (04-15-22 @ 05:40)  SpO2: 100% (04-15-22 @ 05:40)  Wt(kg): --    04-14 @ 07:01  -  04-15 @ 07:00  --------------------------------------------------------  IN: 400 mL / OUT: 2200 mL / NET: -1800 mL        PHYSICAL EXAM:  Gen: NAD, calm  Cards: RRR, +S1/S2, no M/G/R  Resp: CTA B/L  GI: soft, NT/ND, NABS, + ab wound dressing in place  Vascular: no LE edema B/L, LUE AVF + bruit/thrill      LABS:  04-15    133<L>  |  96<L>  |  16  ----------------------------<  179<H>  4.4   |  17<L>  |  3.82<H>    Ca    9.1      15 Apr 2022 07:32  Phos  2.4     04-15  Mg     2.10     04-15      Creatinine Trend: 3.82 <--, 5.62 <--, 6.21 <--, 4.94 <--                        7.9    11.71 )-----------( 376      ( 15 Apr 2022 07:32 )             28.3     Urine Studies:

## 2022-04-15 NOTE — PROGRESS NOTE ADULT - SUBJECTIVE AND OBJECTIVE BOX
CARDIOLOGY FOLLOW UP - Dr. Bullock  DATE OF SERVICE: 4/15/22     CC no cp or sob       REVIEW OF SYSTEMS:  CONSTITUTIONAL: No fever, weight loss, or fatigue  RESPIRATORY: No cough, wheezing, chills or hemoptysis; No Shortness of Breath  CARDIOVASCULAR: No chest pain, palpitations, passing out, dizziness, or leg swelling  GASTROINTESTINAL: No abdominal or epigastric pain. No nausea, vomiting, or hematemesis; No diarrhea or constipation. No melena or hematochezia.      PHYSICAL EXAM:  T(C): 36.7 (04-15-22 @ 05:40), Max: 37.2 (04-14-22 @ 17:30)  HR: 80 (04-15-22 @ 05:40) (80 - 88)  BP: 122/63 (04-15-22 @ 05:40) (116/62 - 122/63)  RR: 18 (04-15-22 @ 05:40) (18 - 18)  SpO2: 100% (04-15-22 @ 05:40) (96% - 100%)  Wt(kg): --  I&O's Summary    14 Apr 2022 07:01  -  15 Apr 2022 07:00  --------------------------------------------------------  IN: 400 mL / OUT: 2200 mL / NET: -1800 mL        Appearance: Normal	  Cardiovascular: Normal S1 S2,RRR, No JVD, No murmurs  Respiratory: Lungs clear to auscultation	  Gastrointestinal:  Soft, Non-tender, + BS	  Extremities: Normal range of motion, No clubbing, cyanosis or edema      Home Medications:  apixaban 5 mg oral tablet: 1 tab(s) orally every 12 hours (15 Apr 2022 10:37)  Aspercreme with Lidocaine 4% topical film: Apply topically to affected area once a day (low back) (20 Mar 2022 10:15)  Aspercreme with Lidocaine 4% topical film: Apply topically to affected area once a day (L knee) (20 Mar 2022 10:15)  aspirin 81 mg oral delayed release tablet: 1 tab(s) orally once a day (20 Mar 2022 10:15)  benzonatate 100 mg oral capsule: 1 cap(s) orally 3 times a day, As needed, Cough (15 Apr 2022 10:37)  bisacodyl 5 mg oral delayed release tablet: 1 tab(s) orally once a day (at bedtime) (15 Apr 2022 10:37)  buPROPion 150 mg/24 hours (XL) oral tablet, extended release: 1 tab(s) orally once a day (in the morning) (20 Mar 2022 10:15)  cycloSPORINE modified 100 mg oral capsule: 2 cap(s) orally 2 times a day (15 Apr 2022 10:37)  cycloSPORINE modified 100 mg oral capsule: 2 cap(s) orally once a day (at bedtime) (20 Mar 2022 10:15)  cycloSPORINE modified 50 mg oral capsule: 3 cap(s) orally once a day in the morning  (20 Mar 2022 10:15)  dilTIAZem 120 mg/24 hours oral capsule, extended release: 1 cap(s) orally once a day (hold SBP&lt;110, HR&lt;60) (20 Mar 2022 10:15)  dilTIAZem 120 mg/24 hours oral capsule, extended release: 1 cap(s) orally once a day (15 Apr 2022 10:37)  diphenhydrAMINE 50 mg/mL injectable solution: 25 milligram(s) injectable 3 times a week (at 08:00 before dialysis sessions on Tuesday/Thursday/saturday) (15 Apr 2022 10:37)  doxycycline hyclate 100 mg oral tablet: 1 tab(s) orally 2 times a day (20 Mar 2022 10:15)  Eliquis 2.5 mg oral tablet: 1 tab(s) orally 2 times a day    Pharmacy states patient no longer wants to fill this medication (20 Mar 2022 10:15)  gabapentin 300 mg oral capsule: 1 cap(s) orally 2 times a day (20 Mar 2022 10:15)  gabapentin 300 mg oral capsule: 1 cap(s) orally once a day (15 Apr 2022 10:37)  insulin glargine: 15 unit(s) subcutaneous once a day (at bedtime) (20 Mar 2022 10:15)  lidocaine 4% topical film: Apply topically to affected area once a day to right knee (15 Apr 2022 10:37)  lidocaine 4% topical film: Apply topically to affected area once a day to lower back (15 Apr 2022 10:37)  lidocaine 4% topical film: Apply topically to affected area once a day to left knee (15 Apr 2022 10:37)  lidocaine-prilocaine 2.5%-2.5% topical cream: 1 application topically  (15 Apr 2022 10:37)  midodrine 5 mg oral tablet: 1 tab(s) orally 3 times a week, 30 minute prior to dialysis sessions (hold is SBP&gt;130) (20 Mar 2022 10:15)  midodrine 5 mg oral tablet: 1 tab(s) orally 3 times a week, As Needed 30 minutes prior to dialysis (08:00 on Tuesdays/Thursdays/Saturdays). HOLD if SBP &gt;130 (15 Apr 2022 10:37)  mirtazapine 7.5 mg oral tablet: 1 tab(s) orally once a day (at bedtime) (20 Mar 2022 10:15)  montelukast 10 mg oral tablet: 1 tab(s) orally once a day (in the evening) (20 Mar 2022 10:15)  montelukast 10 mg oral tablet: 1 tab(s) orally once a day (at bedtime) (15 Apr 2022 10:37)  oxyCODONE 10 mg oral tablet, extended release: 1 tab(s) orally every 12 hours (20 Mar 2022 10:15)  oxycodone-acetaminophen 7.5 mg-325 mg oral tablet: 1 tab(s) orally every 6 hours, As Needed (20 Mar 2022 10:15)  pantoprazole 40 mg oral delayed release tablet: 1 tab(s) orally once a day (20 Mar 2022 10:15)  pantoprazole 40 mg oral delayed release tablet: 1 tab(s) orally once a day (before a meal) (15 Apr 2022 10:37)  polyethylene glycol 3350 oral powder for reconstitution: 17 gram(s) orally once a day (at bedtime) (15 Apr 2022 10:37)  polyethylene glycol 3350 oral powder for reconstitution: 17 gram(s) orally once a day (at bedtime) (20 Mar 2022 10:15)  senna oral tablet: 2 tab(s) orally once a day (at bedtime) (20 Mar 2022 10:15)  senna oral tablet: 2 tab(s) orally once a day (at bedtime) (15 Apr 2022 10:37)  sertraline 50 mg oral tablet: 1 tab(s) orally once a day (20 Mar 2022 10:15)  sevelamer carbonate 800 mg oral tablet: 1 tab(s) orally 3 times a day (with meals) (20 Mar 2022 10:15)  simethicone 80 mg oral tablet, chewable: 1 tab(s) orally 3 times a day (before meals) (20 Mar 2022 10:15)  simvastatin 10 mg oral tablet: 1 tab(s) orally once a day (at bedtime) (20 Mar 2022 10:15)  sodium hypochlorite 0.125% topical solution: 1 application topically once a day to affected area (pannus wound on abdomen) (15 Apr 2022 10:37)  sodium hypochlorite 0.25% topical solution: 1 application topically once a day (20 Mar 2022 10:15)  tacrolimus 0.1% topical ointment: 1 application topically once a day (20 Mar 2022 10:15)  tacrolimus 0.1% topical ointment: 1 application topically once a day (15 Apr 2022 10:37)  traZODone 100 mg oral tablet: 1 tab(s) orally once a day (at bedtime) (20 Mar 2022 10:15)      MEDICATIONS  (STANDING):  apixaban 5 milliGRAM(s) Oral every 12 hours  bisacodyl 5 milliGRAM(s) Oral at bedtime  buPROPion XL (24-Hour) . 150 milliGRAM(s) Oral daily  chlorhexidine 2% Cloths 1 Application(s) Topical daily  cinacalcet 30 milliGRAM(s) Oral daily  cycloSPORINE  , modified (NEORAL) 200 milliGRAM(s) Oral two times a day  Dakins Solution - 1/4 Strength 1 Application(s) Topical daily  dextrose 40% Gel 15 Gram(s) Oral once  dextrose 5%. 1000 milliLiter(s) (50 mL/Hr) IV Continuous <Continuous>  dextrose 5%. 1000 milliLiter(s) (100 mL/Hr) IV Continuous <Continuous>  dextrose 50% Injectable 25 Gram(s) IV Push once  dextrose 50% Injectable 12.5 Gram(s) IV Push once  dextrose 50% Injectable 25 Gram(s) IV Push once  diltiazem    milliGRAM(s) Oral daily  epoetin anabela-epbx (RETACRIT) Injectable 93395 Unit(s) IV Push <User Schedule>  gabapentin 300 milliGRAM(s) Oral daily  glucagon  Injectable 1 milliGRAM(s) IntraMuscular once  insulin glargine Injectable (LANTUS) 12 Unit(s) SubCutaneous at bedtime  insulin lispro (ADMELOG) corrective regimen sliding scale   SubCutaneous three times a day before meals  insulin lispro (ADMELOG) corrective regimen sliding scale   SubCutaneous at bedtime  lactobacillus acidophilus 1 Tablet(s) Oral daily  lidocaine   4% Patch 1 Patch Transdermal daily  lidocaine   4% Patch 1 Patch Transdermal daily  lidocaine   4% Patch 1 Patch Transdermal daily  lidocaine/prilocaine Cream 1 Application(s) Topical <User Schedule>  mirtazapine 7.5 milliGRAM(s) Oral at bedtime  montelukast 10 milliGRAM(s) Oral at bedtime  oseltamivir 30 milliGRAM(s) Oral <User Schedule>  pantoprazole    Tablet 40 milliGRAM(s) Oral before breakfast  polyethylene glycol 3350 17 Gram(s) Oral at bedtime  potassium phosphate / sodium phosphate Powder (PHOS-NaK) 1 Packet(s) Oral two times a day before meals  senna 2 Tablet(s) Oral at bedtime  sertraline 50 milliGRAM(s) Oral daily  sevelamer carbonate 800 milliGRAM(s) Oral three times a day with meals  simethicone 80 milliGRAM(s) Chew three times a day  tacrolimus   0.1% Ointment 1 Application(s) Topical daily  traZODone 100 milliGRAM(s) Oral at bedtime      TELEMETRY: 	    ECG:  	  RADIOLOGY:   DIAGNOSTIC TESTING:  [ ] Echocardiogram:  [ ]  Catheterization:  [ ] Stress Test:    OTHER: 	    LABS:	 	                            7.9    11.71 )-----------( 376      ( 15 Apr 2022 07:32 )             28.3     04-15    133<L>  |  96<L>  |  16  ----------------------------<  179<H>  4.4   |  17<L>  |  3.82<H>    Ca    9.1      15 Apr 2022 07:32  Phos  2.4     04-15  Mg     2.10     04-15

## 2022-04-15 NOTE — PROGRESS NOTE ADULT - ASSESSMENT
Echo 1/19/22: grossly nl LV sys fx, min MR     a/p  56 year old female with hx of morbid obesity, CHAMP not on home O2, ESRD (HD MWF), HTN, DM, COPD, Afib no longer on AC, chronic R pannus wound, followed by Dr. Layne, likely pyoderma gangrenosum presents for vaginal bleeding     #Influenza  -oseltamivir  per med  -supportive care  -med f/u     #Chronic Pannus Wound  -surgical eval noted, wound care  -abx per med     #Chronic Afib  -stable, rates controlled  -cont dilt as ordered  -cont oral AC - monitor h.h     #Hypertension  -overall stable  -cont ccb   -mido pre-HD on HD days     #ESRD on HD  -HD per renal    #Gyn bleeding  -s/p EUA, D&C, diagnostic hysteroscopy, insertion of Mirena IUD  -cv remains stable post op  -med f/u     DCP  plan discussed with ACP

## 2022-04-16 NOTE — PROGRESS NOTE ADULT - SUBJECTIVE AND OBJECTIVE BOX
CARDIOLOGY FOLLOW UP - Dr. Bullock  Date of Service: 4/16/22  CC: no cp/sob     Review of Systems:  Constitutional: No fever, weight loss, or fatigue  Respiratory: No cough, wheezing, or hemoptysis, no shortness of breath  Cardiovascular: No chest pain, palpitations, passing out, dizziness, or leg swelling  Gastrointestinal: No abd or epigastric pain.  No nausea, vomiting, or hematemesis; no diarrhea or constipation, no melena or hematochezia  Vascular: no edema       PHYSICAL EXAM:  T(C): 37 (04-16-22 @ 07:30), Max: 37.1 (04-15-22 @ 21:05)  HR: 74 (04-16-22 @ 07:30) (74 - 88)  BP: 103/51 (04-16-22 @ 07:30) (103/51 - 117/64)  RR: 18 (04-16-22 @ 07:30) (17 - 18)  SpO2: 100% (04-16-22 @ 05:00) (98% - 100%)  Wt(kg): --  I&O's Summary      Appearance: Normal	  Cardiovascular: Normal S1 S2,RRR, No JVD, No murmurs  Respiratory: Lungs clear to auscultation	  Gastrointestinal:  Soft, Non-tender, + BS	  Extremities: Normal range of motion, No clubbing, cyanosis or edema      Home Medications:  apixaban 5 mg oral tablet: 1 tab(s) orally every 12 hours (15 Apr 2022 10:37)  Aspercreme with Lidocaine 4% topical film: Apply topically to affected area once a day (low back) (20 Mar 2022 10:15)  Aspercreme with Lidocaine 4% topical film: Apply topically to affected area once a day (L knee) (20 Mar 2022 10:15)  aspirin 81 mg oral delayed release tablet: 1 tab(s) orally once a day (20 Mar 2022 10:15)  benzonatate 100 mg oral capsule: 1 cap(s) orally 3 times a day, As needed, Cough (15 Apr 2022 10:37)  bisacodyl 5 mg oral delayed release tablet: 1 tab(s) orally once a day (at bedtime) (15 Apr 2022 10:37)  buPROPion 150 mg/24 hours (XL) oral tablet, extended release: 1 tab(s) orally once a day (in the morning) (20 Mar 2022 10:15)  cinacalcet 30 mg oral tablet: 1 tab(s) orally once a day (15 Apr 2022 11:16)  cycloSPORINE modified 100 mg oral capsule: 2 cap(s) orally 2 times a day (15 Apr 2022 10:37)  cycloSPORINE modified 100 mg oral capsule: 2 cap(s) orally once a day (at bedtime) (20 Mar 2022 10:15)  cycloSPORINE modified 50 mg oral capsule: 3 cap(s) orally once a day in the morning  (20 Mar 2022 10:15)  dilTIAZem 120 mg/24 hours oral capsule, extended release: 1 cap(s) orally once a day (hold SBP&lt;110, HR&lt;60) (20 Mar 2022 10:15)  dilTIAZem 120 mg/24 hours oral capsule, extended release: 1 cap(s) orally once a day (15 Apr 2022 10:37)  diphenhydrAMINE 50 mg/mL injectable solution: 25 milligram(s) injectable 3 times a week (at 08:00 before dialysis sessions on Tuesday/Thursday/saturday) (15 Apr 2022 10:37)  doxycycline hyclate 100 mg oral tablet: 1 tab(s) orally 2 times a day (20 Mar 2022 10:15)  Eliquis 2.5 mg oral tablet: 1 tab(s) orally 2 times a day    Pharmacy states patient no longer wants to fill this medication (20 Mar 2022 10:15)  gabapentin 300 mg oral capsule: 1 cap(s) orally 2 times a day (20 Mar 2022 10:15)  gabapentin 300 mg oral capsule: 1 cap(s) orally once a day (15 Apr 2022 10:37)  insulin glargine: 15 unit(s) subcutaneous once a day (at bedtime) (20 Mar 2022 10:15)  lidocaine 4% topical film: Apply topically to affected area once a day to right knee (15 Apr 2022 10:37)  lidocaine 4% topical film: Apply topically to affected area once a day to lower back (15 Apr 2022 10:37)  lidocaine 4% topical film: Apply topically to affected area once a day to left knee (15 Apr 2022 10:37)  lidocaine-prilocaine 2.5%-2.5% topical cream: 1 application topically  (15 Apr 2022 10:37)  midodrine 10 mg oral tablet: 1 tab(s) orally  3 times a week, As Needed 30 minutes prior to dialysis (08:00 on Tuesdays/Thursdays/Saturdays). HOLD if SBP &gt;130 (15 Apr 2022 11:35)  midodrine 5 mg oral tablet: 1 tab(s) orally 3 times a week, 30 minute prior to dialysis sessions (hold is SBP&gt;130) (20 Mar 2022 10:15)  mirtazapine 7.5 mg oral tablet: 1 tab(s) orally once a day (at bedtime) (20 Mar 2022 10:15)  montelukast 10 mg oral tablet: 1 tab(s) orally once a day (in the evening) (20 Mar 2022 10:15)  montelukast 10 mg oral tablet: 1 tab(s) orally once a day (at bedtime) (15 Apr 2022 10:37)  oxyCODONE 10 mg oral tablet, extended release: 1 tab(s) orally every 12 hours (20 Mar 2022 10:15)  oxycodone-acetaminophen 7.5 mg-325 mg oral tablet: 1 tab(s) orally every 6 hours, As Needed (20 Mar 2022 10:15)  pantoprazole 40 mg oral delayed release tablet: 1 tab(s) orally once a day (20 Mar 2022 10:15)  pantoprazole 40 mg oral delayed release tablet: 1 tab(s) orally once a day (before a meal) (15 Apr 2022 10:37)  polyethylene glycol 3350 oral powder for reconstitution: 17 gram(s) orally once a day (at bedtime) (15 Apr 2022 10:37)  polyethylene glycol 3350 oral powder for reconstitution: 17 gram(s) orally once a day (at bedtime) (20 Mar 2022 10:15)  senna oral tablet: 2 tab(s) orally once a day (at bedtime) (20 Mar 2022 10:15)  senna oral tablet: 2 tab(s) orally once a day (at bedtime) (15 Apr 2022 10:37)  sertraline 50 mg oral tablet: 1 tab(s) orally once a day (20 Mar 2022 10:15)  sevelamer carbonate 800 mg oral tablet: 1 tab(s) orally 3 times a day (with meals) (20 Mar 2022 10:15)  simethicone 80 mg oral tablet, chewable: 1 tab(s) orally 3 times a day (before meals) (20 Mar 2022 10:15)  simvastatin 10 mg oral tablet: 1 tab(s) orally once a day (at bedtime) (20 Mar 2022 10:15)  sodium hypochlorite 0.125% topical solution: 1 application topically once a day to affected area (pannus wound on abdomen) (15 Apr 2022 10:37)  sodium hypochlorite 0.25% topical solution: 1 application topically once a day (20 Mar 2022 10:15)  tacrolimus 0.1% topical ointment: 1 application topically once a day (20 Mar 2022 10:15)  tacrolimus 0.1% topical ointment: 1 application topically once a day (15 Apr 2022 10:37)  traZODone 100 mg oral tablet: 1 tab(s) orally once a day (at bedtime) (20 Mar 2022 10:15)      MEDICATIONS  (STANDING):  apixaban 5 milliGRAM(s) Oral every 12 hours  bisacodyl 5 milliGRAM(s) Oral at bedtime  buPROPion XL (24-Hour) . 150 milliGRAM(s) Oral daily  chlorhexidine 2% Cloths 1 Application(s) Topical daily  cinacalcet 30 milliGRAM(s) Oral daily  cycloSPORINE  , modified (NEORAL) 200 milliGRAM(s) Oral two times a day  Dakins Solution - 1/4 Strength 1 Application(s) Topical daily  dextrose 40% Gel 15 Gram(s) Oral once  dextrose 5%. 1000 milliLiter(s) (50 mL/Hr) IV Continuous <Continuous>  dextrose 5%. 1000 milliLiter(s) (100 mL/Hr) IV Continuous <Continuous>  dextrose 50% Injectable 25 Gram(s) IV Push once  dextrose 50% Injectable 12.5 Gram(s) IV Push once  dextrose 50% Injectable 25 Gram(s) IV Push once  diltiazem    milliGRAM(s) Oral daily  epoetin anabela-epbx (RETACRIT) Injectable 27883 Unit(s) IV Push <User Schedule>  gabapentin 300 milliGRAM(s) Oral daily  glucagon  Injectable 1 milliGRAM(s) IntraMuscular once  insulin glargine Injectable (LANTUS) 12 Unit(s) SubCutaneous at bedtime  insulin lispro (ADMELOG) corrective regimen sliding scale   SubCutaneous three times a day before meals  insulin lispro (ADMELOG) corrective regimen sliding scale   SubCutaneous at bedtime  lactobacillus acidophilus 1 Tablet(s) Oral daily  lidocaine   4% Patch 1 Patch Transdermal daily  lidocaine   4% Patch 1 Patch Transdermal daily  lidocaine   4% Patch 1 Patch Transdermal daily  lidocaine/prilocaine Cream 1 Application(s) Topical <User Schedule>  mirtazapine 7.5 milliGRAM(s) Oral at bedtime  montelukast 10 milliGRAM(s) Oral at bedtime  oseltamivir 30 milliGRAM(s) Oral <User Schedule>  pantoprazole    Tablet 40 milliGRAM(s) Oral before breakfast  polyethylene glycol 3350 17 Gram(s) Oral at bedtime  potassium phosphate / sodium phosphate Powder (PHOS-NaK) 1 Packet(s) Oral two times a day before meals  senna 2 Tablet(s) Oral at bedtime  sertraline 50 milliGRAM(s) Oral daily  simethicone 80 milliGRAM(s) Chew three times a day  tacrolimus   0.1% Ointment 1 Application(s) Topical daily  traZODone 100 milliGRAM(s) Oral at bedtime      TELEMETRY: 	    ECG:  	  RADIOLOGY:   DIAGNOSTIC TESTING:  [ ] Echocardiogram:  [ ]  Catheterization:  [ ] Stress Test:    OTHER: 	    LABS:	 	                            8.0    10.22 )-----------( 419      ( 16 Apr 2022 05:00 )             27.6     04-16    135  |  96<L>  |  23  ----------------------------<  180<H>  3.7   |  22  |  4.91<H>    Ca    8.9      16 Apr 2022 05:00  Phos  2.7     04-16  Mg     2.00     04-16

## 2022-04-16 NOTE — PROGRESS NOTE ADULT - ASSESSMENT
Echo 1/19/22: grossly nl LV sys fx, min MR     a/p  56 year old female with hx of morbid obesity, CHAMP not on home O2, ESRD (HD MWF), HTN, DM, COPD, Afib no longer on AC, chronic R pannus wound, followed by Dr. Layne, likely pyoderma gangrenosum presents for vaginal bleeding     #Influenza  -oseltamivir  per med  -supportive care  -med f/u     #Chronic Pannus Wound  -surgical eval noted, wound care  -abx per med     #Chronic Afib  -stable, rates controlled  -cont dilt as ordered  -cont oral AC - monitor h.h     #Hypertension  -overall stable  -cont ccb   -mido pre-HD on HD days     #ESRD on HD  -HD per renal    #Gyn bleeding  -s/p EUA, D&C, diagnostic hysteroscopy, insertion of Mirena IUD  -cv remains stable post op  -med f/u     DCP

## 2022-04-16 NOTE — PROGRESS NOTE ADULT - SUBJECTIVE AND OBJECTIVE BOX
SUBJECTIVE / OVERNIGHT EVENTS:pt seen and examined, pt spiked fever yesterday , rvp+ flu  4-16- 22    MEDICATIONS  (STANDING):  apixaban 5 milliGRAM(s) Oral every 12 hours  bisacodyl 5 milliGRAM(s) Oral at bedtime  buPROPion XL (24-Hour) . 150 milliGRAM(s) Oral daily  chlorhexidine 2% Cloths 1 Application(s) Topical daily  cinacalcet 30 milliGRAM(s) Oral daily  cycloSPORINE  , modified (NEORAL) 200 milliGRAM(s) Oral two times a day  Dakins Solution - 1/4 Strength 1 Application(s) Topical daily  dextrose 40% Gel 15 Gram(s) Oral once  dextrose 5%. 1000 milliLiter(s) (50 mL/Hr) IV Continuous <Continuous>  dextrose 5%. 1000 milliLiter(s) (100 mL/Hr) IV Continuous <Continuous>  dextrose 50% Injectable 25 Gram(s) IV Push once  dextrose 50% Injectable 12.5 Gram(s) IV Push once  dextrose 50% Injectable 25 Gram(s) IV Push once  diltiazem    milliGRAM(s) Oral daily  epoetin anabela-epbx (RETACRIT) Injectable 83731 Unit(s) IV Push <User Schedule>  gabapentin 300 milliGRAM(s) Oral daily  glucagon  Injectable 1 milliGRAM(s) IntraMuscular once  insulin glargine Injectable (LANTUS) 12 Unit(s) SubCutaneous at bedtime  insulin lispro (ADMELOG) corrective regimen sliding scale   SubCutaneous three times a day before meals  insulin lispro (ADMELOG) corrective regimen sliding scale   SubCutaneous at bedtime  lactobacillus acidophilus 1 Tablet(s) Oral daily  lidocaine   4% Patch 1 Patch Transdermal daily  lidocaine   4% Patch 1 Patch Transdermal daily  lidocaine   4% Patch 1 Patch Transdermal daily  lidocaine/prilocaine Cream 1 Application(s) Topical <User Schedule>  mirtazapine 7.5 milliGRAM(s) Oral at bedtime  montelukast 10 milliGRAM(s) Oral at bedtime  pantoprazole    Tablet 40 milliGRAM(s) Oral before breakfast  polyethylene glycol 3350 17 Gram(s) Oral at bedtime  senna 2 Tablet(s) Oral at bedtime  sertraline 50 milliGRAM(s) Oral daily  simethicone 80 milliGRAM(s) Chew three times a day  tacrolimus   0.1% Ointment 1 Application(s) Topical daily  traZODone 100 milliGRAM(s) Oral at bedtime    MEDICATIONS  (PRN):  acetaminophen     Tablet .. 650 milliGRAM(s) Oral every 6 hours PRN Temp greater or equal to 38C (100.4F), Mild Pain (1 - 3)  benzonatate 100 milliGRAM(s) Oral three times a day PRN Cough  diphenhydrAMINE Injectable 25 milliGRAM(s) IV Push <User Schedule> PRN Rash and/or Itching  guaiFENesin Oral Liquid (Sugar-Free) 100 milliGRAM(s) Oral every 6 hours PRN Cough  melatonin 6 milliGRAM(s) Oral at bedtime PRN Insomnia  midodrine. 10 milliGRAM(s) Oral <User Schedule> PRN 30 minutes prior to dialysis  ondansetron Injectable 4 milliGRAM(s) IV Push every 8 hours PRN Nausea and/or Vomiting  oxyCODONE    IR 5 milliGRAM(s) Oral every 6 hours PRN Severe Pain (7 - 10)    Vital Signs Last 24 Hrs  T(C): 36.8 (04-16-22 @ 16:43), Max: 37 (04-16-22 @ 05:00)  T(F): 98.2 (04-16-22 @ 16:43), Max: 98.6 (04-16-22 @ 05:00)  HR: 83 (04-16-22 @ 16:43) (74 - 83)  BP: 105/60 (04-16-22 @ 16:43) (103/51 - 115/65)  BP(mean): --  RR: 18 (04-16-22 @ 16:43) (18 - 19)  SpO2: 100% (04-16-22 @ 16:43) (99% - 100%)            Skin: No rash.  Eyes: No recent vision problems or eye pain.  ENT: No congestion, ear pain, or sore throat.  Cardiovascular: No chest pain or palpation.  Respiratory: No cough, shortness of breath, congestion, or wheezing.  Gastrointestinal: No abdominal pain, nausea, vomiting, or diarrhea.  Genitourinary: No dysuria.  Musculoskeletal: No joint swelling.  Neurologic: No headache.    PHYSICAL EXAM:  GENERAL: NAD  EYES: EOMI, PERRLA  NECK: Supple, No JVD  CHEST/LUNG: dec breath sounds at bases  HEART:  S1 , S2 +  ABDOMEN: soft , bs+, wound dressing+  EXTREMITIES:  edema+  NEUROLOGY:alert awake oriented     LABS:  LABS:  04-16    135  |  96<L>  |  23  ----------------------------<  180<H>  3.7   |  22  |  4.91<H>    Ca    8.9      16 Apr 2022 05:00  Phos  2.7     04-16  Mg     2.00     04-16      Creatinine Trend: 4.91 <--, 3.82 <--, 5.62 <--, 6.21 <--                        8.0    10.22 )-----------( 419      ( 16 Apr 2022 05:00 )             27.6     Urine Studies:

## 2022-04-16 NOTE — PROGRESS NOTE ADULT - ASSESSMENT
56y Female with history of ESRD on HD presents with vaginal bleeding. Nephrology consulted for ESRD status.    1) ESRD: Last HD on 4/14 with mild intradialytic hypotension and 1.8L removed with heparin to prevent circuit clotting. Plan for next maintenance HD today. Monitor electrolytes.    2) HTN with ESRD: BP acceptable on Cardizem. Continue with midodrine 10 mg pre-HD on HD days to avoid intradialytic hypotension. Monitor BP.    3) Anemia of renal disease: With component of blood loss from vaginal bleeding. Hb low with acceptable iron stores. Continue with Epo 20K with HD. Monitor Hb.    4) Secondary HPT of renal origin: Phosphorus acceptable. Continue with sensipar 30 mg daily and renal diet. Monitor serum calcium and phosphorus.      [facility] NEPHROLOGY  Keaton Lopez M.D.  Juma Green D.O.  Myla Hoffmann M.D.  Julia Brewer, JASIEL, ANP-C    Telephone: [phone]  Facsimile: [phone]    [id number] [place] [address]

## 2022-04-16 NOTE — PROGRESS NOTE ADULT - SUBJECTIVE AND OBJECTIVE BOX
[facility] NEPHROLOGY- PROGRESS NOTE    56y Female with history of ESRD on HD presents with vaginal bleeding. Nephrology consulted for ESRD status.    REVIEW OF SYSTEMS:  Gen: no fevers, + weakness  Cards: no chest pain  Resp: no dyspnea, + cough  GI: no nausea or vomiting or diarrhea  Vascular: no LE edema      caffeine (Nausea)  latex (Rash)  penicillin (Nausea)  strawberry (Rash)    Hospital Medications: Medications reviewed      VITALS:  T(F): 98.6 (04-16-22 @ 07:30), Max: 98.7 (04-15-22 @ 21:05)  HR: 74 (04-16-22 @ 07:30)  BP: 103/51 (04-16-22 @ 07:30)  RR: 18 (04-16-22 @ 07:30)  SpO2: 100% (04-16-22 @ 05:00)  Wt(kg): --      PHYSICAL EXAM:  Gen: NAD, calm  Cards: RRR, +S1/S2, no M/G/R  Resp: CTA B/L  GI: soft, NT/ND, NABS, + ab wound dressing in place  Vascular: no LE edema B/L, LUE AVF + bruit/thrill      LABS:  04-16    135  |  96<L>  |  23  ----------------------------<  180<H>  3.7   |  22  |  4.91<H>    Ca    8.9      16 Apr 2022 05:00  Phos  2.7     04-16  Mg     2.00     04-16      Creatinine Trend: 4.91 <--, 3.82 <--, 5.62 <--, 6.21 <--                        8.0    10.22 )-----------( 419      ( 16 Apr 2022 05:00 )             27.6     Urine Studies:

## 2022-04-17 NOTE — PROGRESS NOTE ADULT - SUBJECTIVE AND OBJECTIVE BOX
CARDIOLOGY FOLLOW UP - Dr. Bullock  Date of Service: 4/17/22  CC: no cp/sob     Review of Systems:  Constitutional: No fever, weight loss, or fatigue  Respiratory: No cough, wheezing, or hemoptysis, no shortness of breath  Cardiovascular: No chest pain, palpitations, passing out, dizziness, or leg swelling  Gastrointestinal: No abd or epigastric pain.  No nausea, vomiting, or hematemesis; no diarrhea or constipation, no melena or hematochezia  Vascular: no edema       PHYSICAL EXAM:  T(C): 37.3 (04-17-22 @ 06:54), Max: 37.3 (04-17-22 @ 06:54)  HR: 90 (04-17-22 @ 06:54) (79 - 90)  BP: 128/53 (04-17-22 @ 06:54) (105/60 - 128/53)  RR: 17 (04-17-22 @ 06:54) (17 - 19)  SpO2: 100% (04-17-22 @ 06:54) (99% - 100%)  Wt(kg): --  I&O's Summary    16 Apr 2022 07:01  -  17 Apr 2022 07:00  --------------------------------------------------------  IN: 700 mL / OUT: 2700 mL / NET: -2000 mL        Appearance: Normal	  Cardiovascular: Normal S1 S2,RRR, No JVD, No murmurs  Respiratory: Lungs clear to auscultation	  Gastrointestinal:  Soft, Non-tender, + BS	  Extremities: Normal range of motion, No clubbing, cyanosis or edema      Home Medications:  apixaban 5 mg oral tablet: 1 tab(s) orally every 12 hours (15 Apr 2022 10:37)  Aspercreme with Lidocaine 4% topical film: Apply topically to affected area once a day (low back) (20 Mar 2022 10:15)  Aspercreme with Lidocaine 4% topical film: Apply topically to affected area once a day (L knee) (20 Mar 2022 10:15)  aspirin 81 mg oral delayed release tablet: 1 tab(s) orally once a day (20 Mar 2022 10:15)  benzonatate 100 mg oral capsule: 1 cap(s) orally 3 times a day, As needed, Cough (15 Apr 2022 10:37)  bisacodyl 5 mg oral delayed release tablet: 1 tab(s) orally once a day (at bedtime) (15 Apr 2022 10:37)  buPROPion 150 mg/24 hours (XL) oral tablet, extended release: 1 tab(s) orally once a day (in the morning) (20 Mar 2022 10:15)  cinacalcet 30 mg oral tablet: 1 tab(s) orally once a day (15 Apr 2022 11:16)  cycloSPORINE modified 100 mg oral capsule: 2 cap(s) orally 2 times a day (15 Apr 2022 10:37)  cycloSPORINE modified 100 mg oral capsule: 2 cap(s) orally once a day (at bedtime) (20 Mar 2022 10:15)  cycloSPORINE modified 50 mg oral capsule: 3 cap(s) orally once a day in the morning  (20 Mar 2022 10:15)  dilTIAZem 120 mg/24 hours oral capsule, extended release: 1 cap(s) orally once a day (hold SBP&lt;110, HR&lt;60) (20 Mar 2022 10:15)  dilTIAZem 120 mg/24 hours oral capsule, extended release: 1 cap(s) orally once a day (15 Apr 2022 10:37)  diphenhydrAMINE 50 mg/mL injectable solution: 25 milligram(s) injectable 3 times a week (at 08:00 before dialysis sessions on Tuesday/Thursday/saturday) (15 Apr 2022 10:37)  doxycycline hyclate 100 mg oral tablet: 1 tab(s) orally 2 times a day (20 Mar 2022 10:15)  Eliquis 2.5 mg oral tablet: 1 tab(s) orally 2 times a day    Pharmacy states patient no longer wants to fill this medication (20 Mar 2022 10:15)  gabapentin 300 mg oral capsule: 1 cap(s) orally 2 times a day (20 Mar 2022 10:15)  gabapentin 300 mg oral capsule: 1 cap(s) orally once a day (15 Apr 2022 10:37)  insulin glargine: 15 unit(s) subcutaneous once a day (at bedtime) (20 Mar 2022 10:15)  lidocaine 4% topical film: Apply topically to affected area once a day to right knee (15 Apr 2022 10:37)  lidocaine 4% topical film: Apply topically to affected area once a day to lower back (15 Apr 2022 10:37)  lidocaine 4% topical film: Apply topically to affected area once a day to left knee (15 Apr 2022 10:37)  lidocaine-prilocaine 2.5%-2.5% topical cream: 1 application topically  (15 Apr 2022 10:37)  midodrine 10 mg oral tablet: 1 tab(s) orally  3 times a week, As Needed 30 minutes prior to dialysis (08:00 on Tuesdays/Thursdays/Saturdays). HOLD if SBP &gt;130 (15 Apr 2022 11:35)  midodrine 5 mg oral tablet: 1 tab(s) orally 3 times a week, 30 minute prior to dialysis sessions (hold is SBP&gt;130) (20 Mar 2022 10:15)  mirtazapine 7.5 mg oral tablet: 1 tab(s) orally once a day (at bedtime) (20 Mar 2022 10:15)  montelukast 10 mg oral tablet: 1 tab(s) orally once a day (in the evening) (20 Mar 2022 10:15)  montelukast 10 mg oral tablet: 1 tab(s) orally once a day (at bedtime) (15 Apr 2022 10:37)  oxyCODONE 10 mg oral tablet, extended release: 1 tab(s) orally every 12 hours (20 Mar 2022 10:15)  oxycodone-acetaminophen 7.5 mg-325 mg oral tablet: 1 tab(s) orally every 6 hours, As Needed (20 Mar 2022 10:15)  pantoprazole 40 mg oral delayed release tablet: 1 tab(s) orally once a day (20 Mar 2022 10:15)  pantoprazole 40 mg oral delayed release tablet: 1 tab(s) orally once a day (before a meal) (15 Apr 2022 10:37)  polyethylene glycol 3350 oral powder for reconstitution: 17 gram(s) orally once a day (at bedtime) (15 Apr 2022 10:37)  polyethylene glycol 3350 oral powder for reconstitution: 17 gram(s) orally once a day (at bedtime) (20 Mar 2022 10:15)  senna oral tablet: 2 tab(s) orally once a day (at bedtime) (20 Mar 2022 10:15)  senna oral tablet: 2 tab(s) orally once a day (at bedtime) (15 Apr 2022 10:37)  sertraline 50 mg oral tablet: 1 tab(s) orally once a day (20 Mar 2022 10:15)  sevelamer carbonate 800 mg oral tablet: 1 tab(s) orally 3 times a day (with meals) (20 Mar 2022 10:15)  simethicone 80 mg oral tablet, chewable: 1 tab(s) orally 3 times a day (before meals) (20 Mar 2022 10:15)  simvastatin 10 mg oral tablet: 1 tab(s) orally once a day (at bedtime) (20 Mar 2022 10:15)  sodium hypochlorite 0.125% topical solution: 1 application topically once a day to affected area (pannus wound on abdomen) (15 Apr 2022 10:37)  sodium hypochlorite 0.25% topical solution: 1 application topically once a day (20 Mar 2022 10:15)  tacrolimus 0.1% topical ointment: 1 application topically once a day (20 Mar 2022 10:15)  tacrolimus 0.1% topical ointment: 1 application topically once a day (15 Apr 2022 10:37)  traZODone 100 mg oral tablet: 1 tab(s) orally once a day (at bedtime) (20 Mar 2022 10:15)      MEDICATIONS  (STANDING):  apixaban 5 milliGRAM(s) Oral every 12 hours  bisacodyl 5 milliGRAM(s) Oral at bedtime  buPROPion XL (24-Hour) . 150 milliGRAM(s) Oral daily  chlorhexidine 2% Cloths 1 Application(s) Topical daily  cinacalcet 30 milliGRAM(s) Oral daily  cycloSPORINE  , modified (NEORAL) 200 milliGRAM(s) Oral two times a day  Dakins Solution - 1/4 Strength 1 Application(s) Topical daily  dextrose 40% Gel 15 Gram(s) Oral once  dextrose 5%. 1000 milliLiter(s) (50 mL/Hr) IV Continuous <Continuous>  dextrose 5%. 1000 milliLiter(s) (100 mL/Hr) IV Continuous <Continuous>  dextrose 50% Injectable 25 Gram(s) IV Push once  dextrose 50% Injectable 12.5 Gram(s) IV Push once  dextrose 50% Injectable 25 Gram(s) IV Push once  diltiazem    milliGRAM(s) Oral daily  epoetin anabela-epbx (RETACRIT) Injectable 16699 Unit(s) IV Push <User Schedule>  gabapentin 300 milliGRAM(s) Oral daily  glucagon  Injectable 1 milliGRAM(s) IntraMuscular once  insulin glargine Injectable (LANTUS) 12 Unit(s) SubCutaneous at bedtime  insulin lispro (ADMELOG) corrective regimen sliding scale   SubCutaneous three times a day before meals  insulin lispro (ADMELOG) corrective regimen sliding scale   SubCutaneous at bedtime  lactobacillus acidophilus 1 Tablet(s) Oral daily  lidocaine   4% Patch 1 Patch Transdermal daily  lidocaine   4% Patch 1 Patch Transdermal daily  lidocaine   4% Patch 1 Patch Transdermal daily  lidocaine/prilocaine Cream 1 Application(s) Topical <User Schedule>  mirtazapine 7.5 milliGRAM(s) Oral at bedtime  montelukast 10 milliGRAM(s) Oral at bedtime  pantoprazole    Tablet 40 milliGRAM(s) Oral before breakfast  polyethylene glycol 3350 17 Gram(s) Oral at bedtime  senna 2 Tablet(s) Oral at bedtime  sertraline 50 milliGRAM(s) Oral daily  simethicone 80 milliGRAM(s) Chew three times a day  tacrolimus   0.1% Ointment 1 Application(s) Topical daily  traZODone 100 milliGRAM(s) Oral at bedtime      TELEMETRY: 	    ECG:  	  RADIOLOGY:   DIAGNOSTIC TESTING:  [ ] Echocardiogram:  [ ]  Catheterization:  [ ] Stress Test:    OTHER: 	    LABS:	 	                            8.0    10.22 )-----------( 419      ( 16 Apr 2022 05:00 )             27.6     04-16    135  |  96<L>  |  23  ----------------------------<  180<H>  3.7   |  22  |  4.91<H>    Ca    8.9      16 Apr 2022 05:00  Phos  2.7     04-16  Mg     2.00     04-16

## 2022-04-17 NOTE — PROGRESS NOTE ADULT - SUBJECTIVE AND OBJECTIVE BOX
ANTONIA ORTIZ  56y  Female      Patient is a 56y old  Female who presents with a chief complaint of vaginal bleeding (17 Apr 2022 10:02)  Patient was seen and examined.chart reviewed.covering   feels ok.no sob,no cp,no fever.no new c/o    REVIEW OF SYSTEMS:  CONSTITUTIONAL: No fever  RESPIRATORY: No cough, hemoptysis or shortness of breath  CARDIOVASCULAR: No chest pain, palpitations, dizziness, or leg swelling  GASTROINTESTINAL: No abdominal pain. nausea, vomiting, hematemesis  INTERVAL HPI/OVERNIGHT EVENTS:  T(C): 37.2 (04-17-22 @ 15:33), Max: 37.3 (04-17-22 @ 06:54)  HR: 85 (04-17-22 @ 15:33) (85 - 90)  BP: 116/58 (04-17-22 @ 15:33) (116/58 - 128/53)  RR: 17 (04-17-22 @ 15:33) (17 - 17)  SpO2: 99% (04-17-22 @ 15:33) (99% - 100%)  Wt(kg): --  I&O's Summary    16 Apr 2022 07:01  -  17 Apr 2022 07:00  --------------------------------------------------------  IN: 700 mL / OUT: 2700 mL / NET: -2000 mL      T(C): 37.2 (04-17-22 @ 15:33), Max: 37.3 (04-17-22 @ 06:54)  HR: 85 (04-17-22 @ 15:33) (85 - 90)  BP: 116/58 (04-17-22 @ 15:33) (116/58 - 128/53)  RR: 17 (04-17-22 @ 15:33) (17 - 17)  SpO2: 99% (04-17-22 @ 15:33) (99% - 100%)  Wt(kg): --Vital Signs Last 24 Hrs  T(C): 37.2 (17 Apr 2022 15:33), Max: 37.3 (17 Apr 2022 06:54)  T(F): 98.9 (17 Apr 2022 15:33), Max: 99.2 (17 Apr 2022 06:54)  HR: 85 (17 Apr 2022 15:33) (85 - 90)  BP: 116/58 (17 Apr 2022 15:33) (116/58 - 128/53)  BP(mean): --  RR: 17 (17 Apr 2022 15:33) (17 - 17)  SpO2: 99% (17 Apr 2022 15:33) (99% - 100%)    LABS:                        8.0    10.22 )-----------( 419      ( 16 Apr 2022 05:00 )             27.6     04-16    135  |  96<L>  |  23  ----------------------------<  180<H>  3.7   |  22  |  4.91<H>    Ca    8.9      16 Apr 2022 05:00  Phos  2.7     04-16  Mg     2.00     04-16          CAPILLARY BLOOD GLUCOSE      POCT Blood Glucose.: 177 mg/dL (17 Apr 2022 18:12)  POCT Blood Glucose.: 179 mg/dL (17 Apr 2022 13:14)  POCT Blood Glucose.: 152 mg/dL (17 Apr 2022 08:25)  POCT Blood Glucose.: 188 mg/dL (16 Apr 2022 22:49)            PAST MEDICAL & SURGICAL HISTORY:  COPD (chronic obstructive pulmonary disease)    DM (diabetes mellitus)    Atrial fibrillation  with loop recorder 2015, battery most likely depleted, as per cardiac clearance, Dr. Reece Anesthesia aware, pt on Eliquis    HTN (hypertension)    Morbid obesity  BMI - 58.3    Chronic GERD    CHAMP (obstructive sleep apnea)  non compliance with CPAP, Anesthesia Dr. Reece aware, pt told to bring CPAP for sx, pr verbalized understanding    Potential difficult airway on pre-intubation assessment  airway class III, large neck, morbid obesity, hx of CHAMP, no compliance with CPAP- Dr. Reece, Anesthesia aware    End-stage renal disease    Anemia    Medication management    H/O tubal ligation  1989    Status post placement of implantable loop recorder  left chest- 2015    History of vascular access device  s/p insertion right chest permacath 2/2019, removal 6/2019, insertion left chest permacath 6/2019    S/P arteriovenous (AV) fistula creation  left  arm 6/2019        MEDICATIONS  (STANDING):  apixaban 5 milliGRAM(s) Oral every 12 hours  bisacodyl 5 milliGRAM(s) Oral at bedtime  buPROPion XL (24-Hour) . 150 milliGRAM(s) Oral daily  chlorhexidine 2% Cloths 1 Application(s) Topical daily  cinacalcet 30 milliGRAM(s) Oral daily  cycloSPORINE  , modified (NEORAL) 200 milliGRAM(s) Oral two times a day  Dakins Solution - 1/4 Strength 1 Application(s) Topical daily  dextrose 40% Gel 15 Gram(s) Oral once  dextrose 5%. 1000 milliLiter(s) (50 mL/Hr) IV Continuous <Continuous>  dextrose 5%. 1000 milliLiter(s) (100 mL/Hr) IV Continuous <Continuous>  dextrose 50% Injectable 25 Gram(s) IV Push once  dextrose 50% Injectable 12.5 Gram(s) IV Push once  dextrose 50% Injectable 25 Gram(s) IV Push once  diltiazem    milliGRAM(s) Oral daily  epoetin naabela-epbx (RETACRIT) Injectable 54400 Unit(s) IV Push <User Schedule>  gabapentin 300 milliGRAM(s) Oral daily  glucagon  Injectable 1 milliGRAM(s) IntraMuscular once  insulin glargine Injectable (LANTUS) 12 Unit(s) SubCutaneous at bedtime  insulin lispro (ADMELOG) corrective regimen sliding scale   SubCutaneous three times a day before meals  insulin lispro (ADMELOG) corrective regimen sliding scale   SubCutaneous at bedtime  lactobacillus acidophilus 1 Tablet(s) Oral daily  lidocaine   4% Patch 1 Patch Transdermal daily  lidocaine   4% Patch 1 Patch Transdermal daily  lidocaine   4% Patch 1 Patch Transdermal daily  lidocaine/prilocaine Cream 1 Application(s) Topical <User Schedule>  mirtazapine 7.5 milliGRAM(s) Oral at bedtime  montelukast 10 milliGRAM(s) Oral at bedtime  pantoprazole    Tablet 40 milliGRAM(s) Oral before breakfast  polyethylene glycol 3350 17 Gram(s) Oral at bedtime  senna 2 Tablet(s) Oral at bedtime  sertraline 50 milliGRAM(s) Oral daily  simethicone 80 milliGRAM(s) Chew three times a day  tacrolimus   0.1% Ointment 1 Application(s) Topical daily  traZODone 100 milliGRAM(s) Oral at bedtime    MEDICATIONS  (PRN):  acetaminophen     Tablet .. 650 milliGRAM(s) Oral every 6 hours PRN Temp greater or equal to 38C (100.4F), Mild Pain (1 - 3)  benzonatate 100 milliGRAM(s) Oral three times a day PRN Cough  diphenhydrAMINE Injectable 25 milliGRAM(s) IV Push <User Schedule> PRN Rash and/or Itching  guaiFENesin Oral Liquid (Sugar-Free) 100 milliGRAM(s) Oral every 6 hours PRN Cough  melatonin 6 milliGRAM(s) Oral at bedtime PRN Insomnia  midodrine. 10 milliGRAM(s) Oral <User Schedule> PRN 30 minutes prior to dialysis  ondansetron Injectable 4 milliGRAM(s) IV Push every 8 hours PRN Nausea and/or Vomiting  oxyCODONE    IR 5 milliGRAM(s) Oral every 6 hours PRN Severe Pain (7 - 10)        RADIOLOGY & ADDITIONAL TESTS:    Imaging Personally Reviewed:  [ ] YES  [ ] NO    Consultant(s) Notes Reviewed:  x[ ] YES  [ ] NO    PHYSICAL EXAM:  GENERAL: Alert and awake lying in bed in no distress  HEAD:  Atraumatic, Normocephalic  EYES: EOMI, MO, conjunctiva and sclera clear  NECK: Supple, No JVD, Normal thyroid  NERVOUS SYSTEM:  Alert & Oriented X3, Motor and sensory systems are intact,   CHEST/LUNG: Bilateral clear breath sounds, no rhochi, no wheezing, no crepitations,  HEART: Regular rate and rhythm; No murmurs, rubs, or gallops  ABDOMEN: Soft, Nontender, Nondistended; Bowel sounds present  EXTREMITIES:   Peripheral Pulses are palpable, no  edema        Care Discussed with Consultants/Other Providers [ ] YES  [ ] NO      Code Status: [] Full Code [] DNR [] DNI [] Goals of Care:   Disposition: [] ICU [] Stroke Unit [] RCU []PCU []Floor [] Discharge Home         SIXTO AndersonFACP

## 2022-04-17 NOTE — PROGRESS NOTE ADULT - SUBJECTIVE AND OBJECTIVE BOX
[facility] NEPHROLOGY- PROGRESS NOTE    56y Female with history of ESRD on HD presents with vaginal bleeding. Nephrology consulted for ESRD status.    REVIEW OF SYSTEMS:  Gen: no fevers  Cards: no chest pain  Resp: no dyspnea  GI: no nausea or vomiting or diarrhea  Vascular: no LE edema      caffeine (Nausea)  latex (Rash)  penicillin (Nausea)  strawberry (Rash)    Hospital Medications: Medications reviewed      VITALS:  T(F): 99.2 (04-17-22 @ 06:54), Max: 99.2 (04-17-22 @ 06:54)  HR: 90 (04-17-22 @ 06:54)  BP: 128/53 (04-17-22 @ 06:54)  RR: 17 (04-17-22 @ 06:54)  SpO2: 100% (04-17-22 @ 06:54)  Wt(kg): --    04-16 @ 07:01  -  04-17 @ 07:00  --------------------------------------------------------  IN: 700 mL / OUT: 2700 mL / NET: -2000 mL        PHYSICAL EXAM:  Gen: NAD, calm  Cards: RRR, +S1/S2, no M/G/R  Resp: CTA B/L  GI: soft, NT/ND, NABS, + ab wound dressing in place  Vascular: no LE edema B/L, LUE AVF + bruit/thrill      LABS:  04-16    135  |  96<L>  |  23  ----------------------------<  180<H>  3.7   |  22  |  4.91<H>    Ca    8.9      16 Apr 2022 05:00  Phos  2.7     04-16  Mg     2.00     04-16      Creatinine Trend: 4.91 <--, 3.82 <--, 5.62 <--, 6.21 <--                        8.0    10.22 )-----------( 419      ( 16 Apr 2022 05:00 )             27.6     Urine Studies:

## 2022-04-17 NOTE — PROGRESS NOTE ADULT - ASSESSMENT
56y Female with history of ESRD on HD presents with vaginal bleeding. Nephrology consulted for ESRD status.    1) ESRD: Last HD on 4/16 tolerated well with 2L removed with heparin to prevent circuit clotting. Plan for next maintenance HD on 4/19. Monitor electrolytes.    2) HTN with ESRD: BP acceptable on Cardizem. Continue with midodrine 10 mg pre-HD on HD days to avoid intradialytic hypotension. Monitor BP.    3) Anemia of renal disease: With component of blood loss from vaginal bleeding. Hb low with acceptable iron stores. Continue with Epo 20K with HD. Monitor Hb.    4) Secondary HPT of renal origin: Phosphorus acceptable. Continue with sensipar 30 mg daily and renal diet. Monitor serum calcium and phosphorus.      [facility] NEPHROLOGY  Keaton Lopez M.D.  Juma Green D.O.  Myla Hoffmann M.D.  Julia Brewer, MSN, ANP-C    Telephone: [phone]  Facsimile: [phone]    [address] [place] [address]

## 2022-04-18 NOTE — PROGRESS NOTE ADULT - SUBJECTIVE AND OBJECTIVE BOX
[facility] NEPHROLOGY- PROGRESS NOTE    56y Female with history of ESRD on HD presents with vaginal bleeding. Nephrology consulted for ESRD status.    REVIEW OF SYSTEMS:  Gen: no fevers  Cards: no chest pain  Resp: no dyspnea  GI: no nausea or vomiting or diarrhea  Vascular: no LE edema      caffeine (Nausea)  latex (Rash)  penicillin (Nausea)  strawberry (Rash)    Hospital Medications: Medications reviewed      VITALS:  T(F): 98.9 (04-18-22 @ 08:39), Max: 98.9 (04-17-22 @ 15:33)  HR: 79 (04-18-22 @ 08:39)  BP: 123/75 (04-18-22 @ 08:39)  RR: 18 (04-18-22 @ 08:39)  SpO2: 100% (04-18-22 @ 08:39)  Wt(kg): --        PHYSICAL EXAM:  Gen: NAD, calm  Cards: RRR, +S1/S2, no M/G/R  Resp: CTA B/L  GI: soft, NT/ND, NABS, + ab wound dressing in place  Vascular: no LE edema B/L, LUE AVF + bruit/thrill      LABS:        Creatinine Trend: 4.91 <--, 3.82 <--, 5.62 <--, 6.21 <--    Urine Studies:

## 2022-04-18 NOTE — PROVIDER CONTACT NOTE (MEDICATION) - SITUATION
patient 12pm medications held as patient in dialysis and not on unit at administration time.
Patient refused Renvela
pt due for diltiazem however /64, (less than parameter SBP<120)

## 2022-04-18 NOTE — PROGRESS NOTE ADULT - ASSESSMENT
Echo 1/19/22: grossly nl LV sys fx, min MR     a/p  56 year old female with hx of morbid obesity, CHAMP not on home O2, ESRD (HD MWF), HTN, DM, COPD, Afib no longer on AC, chronic R pannus wound, followed by Dr. Layne, likely pyoderma gangrenosum presents for vaginal bleeding     #Influenza  -sp oseltamivir  per med  -supportive care  -med f/u     #Chronic Pannus Wound  -surgical eval noted, wound care  -abx per med     #Chronic Afib  -stable, rates controlled  -cont dilt as ordered  -cont oral AC - monitor h.h     #Hypertension  -overall stable  -cont ccb   -mido pre-HD on HD days     #ESRD on HD  -HD per renal    #Gyn bleeding  -s/p EUA, D&C, diagnostic hysteroscopy, insertion of Mirena IUD  -cv remains stable post op  -med f/u     DCP

## 2022-04-18 NOTE — PROGRESS NOTE ADULT - SUBJECTIVE AND OBJECTIVE BOX
SUBJECTIVE / OVERNIGHT EVENTS:pt seen and examined,     MEDICATIONS  (STANDING):  apixaban 5 milliGRAM(s) Oral every 12 hours  bisacodyl 5 milliGRAM(s) Oral at bedtime  buPROPion XL (24-Hour) . 150 milliGRAM(s) Oral daily  chlorhexidine 2% Cloths 1 Application(s) Topical daily  cinacalcet 30 milliGRAM(s) Oral daily  cycloSPORINE  , modified (NEORAL) 200 milliGRAM(s) Oral two times a day  Dakins Solution - 1/4 Strength 1 Application(s) Topical daily  dextrose 40% Gel 15 Gram(s) Oral once  dextrose 5%. 1000 milliLiter(s) (50 mL/Hr) IV Continuous <Continuous>  dextrose 5%. 1000 milliLiter(s) (100 mL/Hr) IV Continuous <Continuous>  dextrose 50% Injectable 25 Gram(s) IV Push once  dextrose 50% Injectable 12.5 Gram(s) IV Push once  dextrose 50% Injectable 25 Gram(s) IV Push once  diltiazem    milliGRAM(s) Oral daily  epoetin anabela-epbx (RETACRIT) Injectable 20457 Unit(s) IV Push <User Schedule>  gabapentin 300 milliGRAM(s) Oral daily  glucagon  Injectable 1 milliGRAM(s) IntraMuscular once  insulin glargine Injectable (LANTUS) 12 Unit(s) SubCutaneous at bedtime  insulin lispro (ADMELOG) corrective regimen sliding scale   SubCutaneous three times a day before meals  insulin lispro (ADMELOG) corrective regimen sliding scale   SubCutaneous at bedtime  lactobacillus acidophilus 1 Tablet(s) Oral daily  lidocaine   4% Patch 1 Patch Transdermal daily  lidocaine   4% Patch 1 Patch Transdermal daily  lidocaine   4% Patch 1 Patch Transdermal daily  lidocaine/prilocaine Cream 1 Application(s) Topical <User Schedule>  mirtazapine 7.5 milliGRAM(s) Oral at bedtime  montelukast 10 milliGRAM(s) Oral at bedtime  pantoprazole    Tablet 40 milliGRAM(s) Oral before breakfast  polyethylene glycol 3350 17 Gram(s) Oral at bedtime  senna 2 Tablet(s) Oral at bedtime  sertraline 50 milliGRAM(s) Oral daily  simethicone 80 milliGRAM(s) Chew three times a day  tacrolimus   0.1% Ointment 1 Application(s) Topical daily  traZODone 100 milliGRAM(s) Oral at bedtime    MEDICATIONS  (PRN):  acetaminophen     Tablet .. 650 milliGRAM(s) Oral every 6 hours PRN Temp greater or equal to 38C (100.4F), Mild Pain (1 - 3)  benzonatate 100 milliGRAM(s) Oral three times a day PRN Cough  diphenhydrAMINE Injectable 25 milliGRAM(s) IV Push <User Schedule> PRN Rash and/or Itching  guaiFENesin Oral Liquid (Sugar-Free) 100 milliGRAM(s) Oral every 6 hours PRN Cough  melatonin 6 milliGRAM(s) Oral at bedtime PRN Insomnia  midodrine. 10 milliGRAM(s) Oral <User Schedule> PRN 30 minutes prior to dialysis  ondansetron Injectable 4 milliGRAM(s) IV Push every 8 hours PRN Nausea and/or Vomiting  oxyCODONE    IR 5 milliGRAM(s) Oral every 6 hours PRN Severe Pain (7 - 10)      Vital Signs Last 24 Hrs  T(C): 37.1 (04-18-22 @ 13:35), Max: 37.2 (04-18-22 @ 08:39)  T(F): 98.7 (04-18-22 @ 13:35), Max: 98.9 (04-18-22 @ 08:39)  HR: 81 (04-18-22 @ 13:35) (79 - 83)  BP: 118/67 (04-18-22 @ 13:35) (114/64 - 123/75)  BP(mean): --  RR: 17 (04-18-22 @ 13:35) (17 - 18)  SpO2: 99% (04-18-22 @ 13:35) (99% - 100%)            Skin: No rash.  Eyes: No recent vision problems or eye pain.  ENT: No congestion, ear pain, or sore throat.  Cardiovascular: No chest pain or palpation.  Respiratory: No cough, shortness of breath, congestion, or wheezing.  Gastrointestinal: No abdominal pain, nausea, vomiting, or diarrhea.  Genitourinary: No dysuria.  Musculoskeletal: No joint swelling.  Neurologic: No headache.    PHYSICAL EXAM:  GENERAL: NAD  EYES: EOMI, PERRLA  NECK: Supple, No JVD  CHEST/LUNG: dec breath sounds at bases  HEART:  S1 , S2 +  ABDOMEN: soft , bs+, wound dressing+  EXTREMITIES:  edema+  NEUROLOGY:alert awake oriented     LABS:        Creatinine Trend: 4.91 <--, 3.82 <--, 5.62 <--, 6.21 <--    Urine Studies:

## 2022-04-18 NOTE — PROGRESS NOTE ADULT - ASSESSMENT
56y Female with history of ESRD on HD presents with vaginal bleeding. Nephrology consulted for ESRD status.    1) ESRD: Last HD on 4/16 tolerated well with 2L removed with heparin to prevent circuit clotting. Plan for next maintenance HD on 4/19. Monitor electrolytes.    2) HTN with ESRD: BP acceptable on Cardizem. Continue with midodrine 10 mg pre-HD on HD days to avoid intradialytic hypotension. Monitor BP.    3) Anemia of renal disease: With component of blood loss from vaginal bleeding. Hb low with acceptable iron stores. Continue with Epo 20K with HD. Monitor Hb.    4) Secondary HPT of renal origin: Phosphorus acceptable. Continue with sensipar 30 mg daily and renal diet. Monitor serum calcium and phosphorus.      [facility] NEPHROLOGY  Keaton Lopez M.D.  Juma Green D.O.  Myla Hoffmann M.D.  Julia Brewer, MSN, ANP-C    Telephone: [phone]  Facsimile: [phone]    [id number] [place] [address]

## 2022-04-18 NOTE — PROGRESS NOTE ADULT - SUBJECTIVE AND OBJECTIVE BOX
CARDIOLOGY FOLLOW UP - Dr. Bullock  DATE OF SERVICE: 4/18/22     CC no acute events   ref labs     REVIEW OF SYSTEMS:  CONSTITUTIONAL: No fever, weight loss, or fatigue  RESPIRATORY: No cough, wheezing, chills or hemoptysis; No Shortness of Breath  CARDIOVASCULAR: No chest pain, palpitations, passing out, dizziness, or leg swelling  GASTROINTESTINAL: No abdominal or epigastric pain. No nausea, vomiting, or hematemesis; No diarrhea or constipation. No melena or hematochezia.  VASCULAR: No edema     PHYSICAL EXAM:  T(C): 37.2 (04-18-22 @ 08:39), Max: 37.2 (04-17-22 @ 15:33)  HR: 79 (04-18-22 @ 08:39) (79 - 85)  BP: 123/75 (04-18-22 @ 08:39) (114/64 - 123/75)  RR: 18 (04-18-22 @ 08:39) (17 - 18)  SpO2: 100% (04-18-22 @ 08:39) (99% - 100%)  Wt(kg): --  I&O's Summary      Appearance: Normal	  Cardiovascular: Normal S1 S2,RRR, No JVD, No murmurs  Respiratory: Lungs clear to auscultation	  Gastrointestinal:  Soft, Non-tender, + BS	  Extremities: Normal range of motion, No clubbing, cyanosis or edema      Home Medications:  apixaban 5 mg oral tablet: 1 tab(s) orally every 12 hours (15 Apr 2022 10:37)  Aspercreme with Lidocaine 4% topical film: Apply topically to affected area once a day (low back) (20 Mar 2022 10:15)  Aspercreme with Lidocaine 4% topical film: Apply topically to affected area once a day (L knee) (20 Mar 2022 10:15)  aspirin 81 mg oral delayed release tablet: 1 tab(s) orally once a day (20 Mar 2022 10:15)  benzonatate 100 mg oral capsule: 1 cap(s) orally 3 times a day, As needed, Cough (15 Apr 2022 10:37)  bisacodyl 5 mg oral delayed release tablet: 1 tab(s) orally once a day (at bedtime) (15 Apr 2022 10:37)  buPROPion 150 mg/24 hours (XL) oral tablet, extended release: 1 tab(s) orally once a day (in the morning) (20 Mar 2022 10:15)  cinacalcet 30 mg oral tablet: 1 tab(s) orally once a day (15 Apr 2022 11:16)  cycloSPORINE modified 100 mg oral capsule: 2 cap(s) orally 2 times a day (15 Apr 2022 10:37)  cycloSPORINE modified 100 mg oral capsule: 2 cap(s) orally once a day (at bedtime) (20 Mar 2022 10:15)  cycloSPORINE modified 50 mg oral capsule: 3 cap(s) orally once a day in the morning  (20 Mar 2022 10:15)  dilTIAZem 120 mg/24 hours oral capsule, extended release: 1 cap(s) orally once a day (hold SBP&lt;110, HR&lt;60) (20 Mar 2022 10:15)  dilTIAZem 120 mg/24 hours oral capsule, extended release: 1 cap(s) orally once a day (15 Apr 2022 10:37)  diphenhydrAMINE 50 mg/mL injectable solution: 25 milligram(s) injectable 3 times a week (at 08:00 before dialysis sessions on Tuesday/Thursday/saturday) (15 Apr 2022 10:37)  doxycycline hyclate 100 mg oral tablet: 1 tab(s) orally 2 times a day (20 Mar 2022 10:15)  Eliquis 2.5 mg oral tablet: 1 tab(s) orally 2 times a day    Pharmacy states patient no longer wants to fill this medication (20 Mar 2022 10:15)  gabapentin 300 mg oral capsule: 1 cap(s) orally 2 times a day (20 Mar 2022 10:15)  gabapentin 300 mg oral capsule: 1 cap(s) orally once a day (15 Apr 2022 10:37)  insulin glargine: 15 unit(s) subcutaneous once a day (at bedtime) (20 Mar 2022 10:15)  lidocaine 4% topical film: Apply topically to affected area once a day to right knee (15 Apr 2022 10:37)  lidocaine 4% topical film: Apply topically to affected area once a day to lower back (15 Apr 2022 10:37)  lidocaine 4% topical film: Apply topically to affected area once a day to left knee (15 Apr 2022 10:37)  lidocaine-prilocaine 2.5%-2.5% topical cream: 1 application topically  (15 Apr 2022 10:37)  midodrine 10 mg oral tablet: 1 tab(s) orally  3 times a week, As Needed 30 minutes prior to dialysis (08:00 on Tuesdays/Thursdays/Saturdays). HOLD if SBP &gt;130 (15 Apr 2022 11:35)  midodrine 5 mg oral tablet: 1 tab(s) orally 3 times a week, 30 minute prior to dialysis sessions (hold is SBP&gt;130) (20 Mar 2022 10:15)  mirtazapine 7.5 mg oral tablet: 1 tab(s) orally once a day (at bedtime) (20 Mar 2022 10:15)  montelukast 10 mg oral tablet: 1 tab(s) orally once a day (in the evening) (20 Mar 2022 10:15)  montelukast 10 mg oral tablet: 1 tab(s) orally once a day (at bedtime) (15 Apr 2022 10:37)  oxyCODONE 10 mg oral tablet, extended release: 1 tab(s) orally every 12 hours (20 Mar 2022 10:15)  oxycodone-acetaminophen 7.5 mg-325 mg oral tablet: 1 tab(s) orally every 6 hours, As Needed (20 Mar 2022 10:15)  pantoprazole 40 mg oral delayed release tablet: 1 tab(s) orally once a day (20 Mar 2022 10:15)  pantoprazole 40 mg oral delayed release tablet: 1 tab(s) orally once a day (before a meal) (15 Apr 2022 10:37)  polyethylene glycol 3350 oral powder for reconstitution: 17 gram(s) orally once a day (at bedtime) (15 Apr 2022 10:37)  polyethylene glycol 3350 oral powder for reconstitution: 17 gram(s) orally once a day (at bedtime) (20 Mar 2022 10:15)  senna oral tablet: 2 tab(s) orally once a day (at bedtime) (20 Mar 2022 10:15)  senna oral tablet: 2 tab(s) orally once a day (at bedtime) (15 Apr 2022 10:37)  sertraline 50 mg oral tablet: 1 tab(s) orally once a day (20 Mar 2022 10:15)  sevelamer carbonate 800 mg oral tablet: 1 tab(s) orally 3 times a day (with meals) (20 Mar 2022 10:15)  simethicone 80 mg oral tablet, chewable: 1 tab(s) orally 3 times a day (before meals) (20 Mar 2022 10:15)  simvastatin 10 mg oral tablet: 1 tab(s) orally once a day (at bedtime) (20 Mar 2022 10:15)  sodium hypochlorite 0.125% topical solution: 1 application topically once a day to affected area (pannus wound on abdomen) (15 Apr 2022 10:37)  sodium hypochlorite 0.25% topical solution: 1 application topically once a day (20 Mar 2022 10:15)  tacrolimus 0.1% topical ointment: 1 application topically once a day (20 Mar 2022 10:15)  tacrolimus 0.1% topical ointment: 1 application topically once a day (15 Apr 2022 10:37)  traZODone 100 mg oral tablet: 1 tab(s) orally once a day (at bedtime) (20 Mar 2022 10:15)      MEDICATIONS  (STANDING):  apixaban 5 milliGRAM(s) Oral every 12 hours  bisacodyl 5 milliGRAM(s) Oral at bedtime  buPROPion XL (24-Hour) . 150 milliGRAM(s) Oral daily  chlorhexidine 2% Cloths 1 Application(s) Topical daily  cinacalcet 30 milliGRAM(s) Oral daily  cycloSPORINE  , modified (NEORAL) 200 milliGRAM(s) Oral two times a day  Dakins Solution - 1/4 Strength 1 Application(s) Topical daily  dextrose 40% Gel 15 Gram(s) Oral once  dextrose 5%. 1000 milliLiter(s) (50 mL/Hr) IV Continuous <Continuous>  dextrose 5%. 1000 milliLiter(s) (100 mL/Hr) IV Continuous <Continuous>  dextrose 50% Injectable 25 Gram(s) IV Push once  dextrose 50% Injectable 12.5 Gram(s) IV Push once  dextrose 50% Injectable 25 Gram(s) IV Push once  diltiazem    milliGRAM(s) Oral daily  epoetin anabela-epbx (RETACRIT) Injectable 59316 Unit(s) IV Push <User Schedule>  gabapentin 300 milliGRAM(s) Oral daily  glucagon  Injectable 1 milliGRAM(s) IntraMuscular once  insulin glargine Injectable (LANTUS) 12 Unit(s) SubCutaneous at bedtime  insulin lispro (ADMELOG) corrective regimen sliding scale   SubCutaneous three times a day before meals  insulin lispro (ADMELOG) corrective regimen sliding scale   SubCutaneous at bedtime  lactobacillus acidophilus 1 Tablet(s) Oral daily  lidocaine   4% Patch 1 Patch Transdermal daily  lidocaine   4% Patch 1 Patch Transdermal daily  lidocaine   4% Patch 1 Patch Transdermal daily  lidocaine/prilocaine Cream 1 Application(s) Topical <User Schedule>  mirtazapine 7.5 milliGRAM(s) Oral at bedtime  montelukast 10 milliGRAM(s) Oral at bedtime  pantoprazole    Tablet 40 milliGRAM(s) Oral before breakfast  polyethylene glycol 3350 17 Gram(s) Oral at bedtime  senna 2 Tablet(s) Oral at bedtime  sertraline 50 milliGRAM(s) Oral daily  simethicone 80 milliGRAM(s) Chew three times a day  tacrolimus   0.1% Ointment 1 Application(s) Topical daily  traZODone 100 milliGRAM(s) Oral at bedtime      TELEMETRY: 	    ECG:  	  RADIOLOGY:   DIAGNOSTIC TESTING:  [ ] Echocardiogram:  [ ]  Catheterization:  [ ] Stress Test:    OTHER: 	    LABS:

## 2022-04-18 NOTE — PROGRESS NOTE ADULT - SUBJECTIVE AND OBJECTIVE BOX
Chief Complaint: DM 2    History: Patient seen at bedside. patient reports she has no appetite now.  ZELALEM placement was denied by insurance.  Patient was tearful at time of visit.  Stating that she is not eating now.  She was eating last week, and stated " but, I had hope".  Patient states wanting to go to rehab and get physical therapy so that she can walk again and care for herself.  Denies n/v.  Denies s/s of hypoglycemia.  Patient has been bedbound.  Gets out of bed with assistance/halie lift.  Patient appetite recently improved and she was ordering food out as she does not like the hospital food.  She was eating at least one meal per day.  Today, she does not have an appetite.    MEDICATIONS  (STANDING):  apixaban 5 milliGRAM(s) Oral every 12 hours  bisacodyl 5 milliGRAM(s) Oral at bedtime  buPROPion XL (24-Hour) . 150 milliGRAM(s) Oral daily  chlorhexidine 2% Cloths 1 Application(s) Topical daily  cinacalcet 30 milliGRAM(s) Oral daily  cycloSPORINE  , modified (NEORAL) 200 milliGRAM(s) Oral two times a day  Dakins Solution - 1/4 Strength 1 Application(s) Topical daily  dextrose 40% Gel 15 Gram(s) Oral once  dextrose 5%. 1000 milliLiter(s) (50 mL/Hr) IV Continuous <Continuous>  dextrose 5%. 1000 milliLiter(s) (100 mL/Hr) IV Continuous <Continuous>  dextrose 50% Injectable 25 Gram(s) IV Push once  dextrose 50% Injectable 12.5 Gram(s) IV Push once  dextrose 50% Injectable 25 Gram(s) IV Push once  diltiazem    milliGRAM(s) Oral daily  epoetin anabela-epbx (RETACRIT) Injectable 37686 Unit(s) IV Push <User Schedule>  gabapentin 300 milliGRAM(s) Oral daily  glucagon  Injectable 1 milliGRAM(s) IntraMuscular once  insulin glargine Injectable (LANTUS) 12 Unit(s) SubCutaneous at bedtime  insulin lispro (ADMELOG) corrective regimen sliding scale   SubCutaneous three times a day before meals  insulin lispro (ADMELOG) corrective regimen sliding scale   SubCutaneous at bedtime  lactobacillus acidophilus 1 Tablet(s) Oral daily  lidocaine   4% Patch 1 Patch Transdermal daily  lidocaine   4% Patch 1 Patch Transdermal daily  lidocaine   4% Patch 1 Patch Transdermal daily  lidocaine/prilocaine Cream 1 Application(s) Topical <User Schedule>  mirtazapine 7.5 milliGRAM(s) Oral at bedtime  montelukast 10 milliGRAM(s) Oral at bedtime  oseltamivir 30 milliGRAM(s) Oral <User Schedule>  pantoprazole    Tablet 40 milliGRAM(s) Oral before breakfast  polyethylene glycol 3350 17 Gram(s) Oral at bedtime  senna 2 Tablet(s) Oral at bedtime  sertraline 50 milliGRAM(s) Oral daily  sevelamer carbonate 800 milliGRAM(s) Oral three times a day with meals  simethicone 80 milliGRAM(s) Chew three times a day  tacrolimus   0.1% Ointment 1 Application(s) Topical daily  traZODone 100 milliGRAM(s) Oral at bedtime    MEDICATIONS  (PRN):  acetaminophen     Tablet .. 650 milliGRAM(s) Oral every 6 hours PRN Temp greater or equal to 38C (100.4F), Mild Pain (1 - 3)  benzonatate 100 milliGRAM(s) Oral three times a day PRN Cough  diphenhydrAMINE Injectable 25 milliGRAM(s) IV Push <User Schedule> PRN Rash and/or Itching  guaiFENesin Oral Liquid (Sugar-Free) 100 milliGRAM(s) Oral every 6 hours PRN Cough  melatonin 3 milliGRAM(s) Oral at bedtime PRN Insomnia  midodrine. 5 milliGRAM(s) Oral <User Schedule> PRN 30 minutes prior to dialysis  ondansetron Injectable 4 milliGRAM(s) IV Push every 8 hours PRN Nausea and/or Vomiting  oxyCODONE    IR 10 milliGRAM(s) Oral every 6 hours PRN Severe Pain (7 - 10)  oxyCODONE    IR 5 milliGRAM(s) Oral every 6 hours PRN Moderate Pain (4 - 6)      Allergies  latex (Rash)  penicillin (Nausea)  strawberry (Rash)    Intolerances  caffeine (Nausea)    Review of Systems:  Cardiovascular: No chest pain  Respiratory: No SOB  GI: No nausea, vomiting  Endocrine: no hypoglycemia     PHYSICAL EXAM:  Vital Signs Last 24 Hrs  T(C): 37.1 (18 Apr 2022 13:35), Max: 37.2 (18 Apr 2022 08:39)  T(F): 98.7 (18 Apr 2022 13:35), Max: 98.9 (18 Apr 2022 08:39)  HR: 81 (18 Apr 2022 13:35) (79 - 83)  BP: 118/67 (18 Apr 2022 13:35) (114/64 - 123/75)  BP(mean): --  RR: 17 (18 Apr 2022 13:35) (17 - 18)  SpO2: 99% (18 Apr 2022 13:35) (99% - 100%)  GENERAL: NAD  EYES: No proptosis, no lid lag, anicteric  HEENT:  Atraumatic, Normocephalic, moist mucous membranes  RESPIRATORY: unlabored respirations     CAPILLARY BLOOD GLUCOSE  POCT Blood Glucose.: 125 mg/dL (18 Apr 2022 18:33)  POCT Blood Glucose.: 133 mg/dL (18 Apr 2022 12:16)  POCT Blood Glucose.: 139 mg/dL (18 Apr 2022 09:10)  POCT Blood Glucose.: 207 mg/dL (14 Apr 2022 13:13)  POCT Blood Glucose.: 149 mg/dL (14 Apr 2022 10:07)  POCT Blood Glucose.: 236 mg/dL (14 Apr 2022 05:46)  POCT Blood Glucose.: 176 mg/dL (13 Apr 2022 21:08)  POCT Blood Glucose.: 270 mg/dL (13 Apr 2022 17:56)          A1C with Estimated Average Glucose Result: 6.1 % (03-20-22 @ 12:48)  A1C with Estimated Average Glucose Result: 7.0 % (01-13-22 @ 08:21)  A1C with Estimated Average Glucose Result: 6.7 % (08-31-21 @ 09:30)  A1C with Estimated Average Glucose Result: 7.2 % (04-28-21 @ 07:04)    Diet, Consistent Carbohydrate Renal w/Evening Snack:   For patients receiving Renal Replacement - No Protein Restr, No Conc K, No Conc Phos, Low Sodium (RENAL)  Supplement Feeding Modality:  Oral  Nepro Cans or Servings Per Day:  1       Frequency:  Three Times a day (03-20-22 @ 10:20)

## 2022-04-18 NOTE — PROVIDER CONTACT NOTE (MEDICATION) - REASON
pt due for diltiazem however /64, (less than parameter SBP<120)
Patient refused Renvela
patient 12pm medications held as patient in dialysis and not on unit at administration time

## 2022-04-18 NOTE — PROGRESS NOTE ADULT - ASSESSMENT
56F with Hx of ESRD TTS, HTN, DM 2, COPD, afib, known chronic R pannus wound felt most likely pyoderma gangrenosum presents for 1.5wk vag bleeding. Pt recently had prolonged stay at [facility] for pannus wound, known to endocrine service.  Was dc'd to rehab on 3/3.  Prior to last hospitalization patient was on much higher doses of basal bolus.  Now requiring much less.  Has poor appetite.    1. Type 2 diabetes mellitus uncontrolled  A1c 7.0% (may be inaccurate in setting of ESRD)  Home Regimen: Tresiba 80 units HS and Trulicity 1.5mg subq weekly    While inpatient:  BG target 100-180 mg/dL  Continue Lantus 12 units SQ qHS   Continue Admelog correctional scale LOW before meals and low bedtime scale  Remain OFF premeal Admelog for now  Check BG before meals and bedtime  Hypoglycemia protocol   Consistent carbohydrate diet, seen by RD, supplement ordered    Discharge Plan:  STOP Trulicity   For dc to rehab- recommend to continue current insulin management as outlined above  For dc to home- Recommend basal (resume Tresiba, dose TBD), plus PO regimen (Tradjenta 5 mg daily or Januvia 25 mg daily)   Recommend Tradjenta 5 mg po daily, if not covered can see if Januvia 25 mg po daily is covered.  Please obtain prior auth if needed as patient is ESRD and DPP4i class are indicated for patients with ESRD  Consider CGM (such as Freestyle Libre2 outpatient)  If desiring to followup with [facility]: [address] [place] [address], [address], [facility] [address], [phone]    2. HTN  Management per primary team/nephrology    3. Hyperlipidemia  LDL target less than 70. Not currently on statin. Management per primary team      Tamela Ruiz  Nurse Practitioner  Division of Endocrinology & Diabetes  Pager # [phone]      If after 6PM or before 9AM, or on weekends/holidays, please call endocrine answering service for assistance ([phone]).  For nonurgent matters email [email or web address] for assistance.

## 2022-04-18 NOTE — PROVIDER CONTACT NOTE (MEDICATION) - BACKGROUND
patient admitted for unspecified open wound of abdominal wall, unspecified quadrant without penetration into peritoneal cavity, initial encounter.
Patient was not hungry and did not want to take Renvela on an empty stomach
pt admitted with unspecified open wound of abdominal wall. PMH of anemia, ESRD, HTN, COPD, DM Type2, and CHAMP.

## 2022-04-19 NOTE — PROGRESS NOTE ADULT - SUBJECTIVE AND OBJECTIVE BOX
[facility] NEPHROLOGY- PROGRESS NOTE    56y Female with history of ESRD on HD presents with vaginal bleeding. Nephrology consulted for ESRD status.    REVIEW OF SYSTEMS:  Gen: no fevers  Cards: no chest pain  Resp: no dyspnea  GI: no nausea or vomiting or diarrhea  Vascular: no LE edema      caffeine (Nausea)  latex (Rash)  penicillin (Nausea)  strawberry (Rash)    Hospital Medications: Medications reviewed      VITALS:  T(F): 98.7 (04-19-22 @ 05:02), Max: 98.7 (04-18-22 @ 13:35)  HR: 74 (04-19-22 @ 05:02)  BP: 121/61 (04-19-22 @ 05:02)  RR: 18 (04-19-22 @ 05:02)  SpO2: 100% (04-19-22 @ 05:02)  Wt(kg): --        PHYSICAL EXAM:  Gen: NAD, calm  Cards: RRR, +S1/S2, no M/G/R  Resp: CTA B/L  GI: soft, NT/ND, NABS, + ab wound dressing in place  Vascular: no LE edema B/L, LUE AVF + bruit/thrill      LABS:        Creatinine Trend: 4.91 <--, 3.82 <--, 5.62 <--    Urine Studies:

## 2022-04-19 NOTE — PROGRESS NOTE ADULT - SUBJECTIVE AND OBJECTIVE BOX
CARDIOLOGY FOLLOW UP - Dr. Bullock  DATE OF SERVICE: 4/19/22     CC no cp or sob       REVIEW OF SYSTEMS:  CONSTITUTIONAL: No fever, weight loss, or fatigue  RESPIRATORY: No cough, wheezing, chills or hemoptysis; No Shortness of Breath  CARDIOVASCULAR: No chest pain, palpitations, passing out, dizziness, or leg swelling  GASTROINTESTINAL: No abdominal or epigastric pain. No nausea, vomiting, or hematemesis; No diarrhea or constipation. No melena or hematochezia.  VASCULAR: No edema     PHYSICAL EXAM:  T(C): 37.1 (04-19-22 @ 05:02), Max: 37.1 (04-18-22 @ 13:35)  HR: 74 (04-19-22 @ 05:02) (74 - 83)  BP: 121/61 (04-19-22 @ 05:02) (118/67 - 124/60)  RR: 18 (04-19-22 @ 05:02) (17 - 18)  SpO2: 100% (04-19-22 @ 05:02) (99% - 100%)  Wt(kg): --  I&O's Summary      Appearance: Normal	  Cardiovascular: Normal S1 S2,RRR, No JVD, No murmurs  Respiratory: Lungs clear to auscultation	  Gastrointestinal:  Soft, Non-tender, + BS	  Extremities: Normal range of motion, No clubbing, cyanosis or edema      Home Medications:  apixaban 5 mg oral tablet: 1 tab(s) orally every 12 hours (15 Apr 2022 10:37)  Aspercreme with Lidocaine 4% topical film: Apply topically to affected area once a day (low back) (20 Mar 2022 10:15)  Aspercreme with Lidocaine 4% topical film: Apply topically to affected area once a day (L knee) (20 Mar 2022 10:15)  aspirin 81 mg oral delayed release tablet: 1 tab(s) orally once a day (20 Mar 2022 10:15)  benzonatate 100 mg oral capsule: 1 cap(s) orally 3 times a day, As needed, Cough (15 Apr 2022 10:37)  bisacodyl 5 mg oral delayed release tablet: 1 tab(s) orally once a day (at bedtime) (15 Apr 2022 10:37)  buPROPion 150 mg/24 hours (XL) oral tablet, extended release: 1 tab(s) orally once a day (in the morning) (20 Mar 2022 10:15)  cinacalcet 30 mg oral tablet: 1 tab(s) orally once a day (15 Apr 2022 11:16)  cycloSPORINE modified 100 mg oral capsule: 2 cap(s) orally 2 times a day (15 Apr 2022 10:37)  cycloSPORINE modified 100 mg oral capsule: 2 cap(s) orally once a day (at bedtime) (20 Mar 2022 10:15)  cycloSPORINE modified 50 mg oral capsule: 3 cap(s) orally once a day in the morning  (20 Mar 2022 10:15)  dilTIAZem 120 mg/24 hours oral capsule, extended release: 1 cap(s) orally once a day (hold SBP&lt;110, HR&lt;60) (20 Mar 2022 10:15)  dilTIAZem 120 mg/24 hours oral capsule, extended release: 1 cap(s) orally once a day (15 Apr 2022 10:37)  diphenhydrAMINE 50 mg/mL injectable solution: 25 milligram(s) injectable 3 times a week (at 08:00 before dialysis sessions on Tuesday/Thursday/saturday) (15 Apr 2022 10:37)  doxycycline hyclate 100 mg oral tablet: 1 tab(s) orally 2 times a day (20 Mar 2022 10:15)  Eliquis 2.5 mg oral tablet: 1 tab(s) orally 2 times a day    Pharmacy states patient no longer wants to fill this medication (20 Mar 2022 10:15)  gabapentin 300 mg oral capsule: 1 cap(s) orally 2 times a day (20 Mar 2022 10:15)  gabapentin 300 mg oral capsule: 1 cap(s) orally once a day (15 Apr 2022 10:37)  insulin glargine: 15 unit(s) subcutaneous once a day (at bedtime) (20 Mar 2022 10:15)  lidocaine 4% topical film: Apply topically to affected area once a day to right knee (15 Apr 2022 10:37)  lidocaine 4% topical film: Apply topically to affected area once a day to lower back (15 Apr 2022 10:37)  lidocaine 4% topical film: Apply topically to affected area once a day to left knee (15 Apr 2022 10:37)  lidocaine-prilocaine 2.5%-2.5% topical cream: 1 application topically  (15 Apr 2022 10:37)  midodrine 10 mg oral tablet: 1 tab(s) orally  3 times a week, As Needed 30 minutes prior to dialysis (08:00 on Tuesdays/Thursdays/Saturdays). HOLD if SBP &gt;130 (15 Apr 2022 11:35)  midodrine 5 mg oral tablet: 1 tab(s) orally 3 times a week, 30 minute prior to dialysis sessions (hold is SBP&gt;130) (20 Mar 2022 10:15)  mirtazapine 7.5 mg oral tablet: 1 tab(s) orally once a day (at bedtime) (20 Mar 2022 10:15)  montelukast 10 mg oral tablet: 1 tab(s) orally once a day (in the evening) (20 Mar 2022 10:15)  montelukast 10 mg oral tablet: 1 tab(s) orally once a day (at bedtime) (15 Apr 2022 10:37)  oxyCODONE 10 mg oral tablet, extended release: 1 tab(s) orally every 12 hours (20 Mar 2022 10:15)  oxycodone-acetaminophen 7.5 mg-325 mg oral tablet: 1 tab(s) orally every 6 hours, As Needed (20 Mar 2022 10:15)  pantoprazole 40 mg oral delayed release tablet: 1 tab(s) orally once a day (20 Mar 2022 10:15)  pantoprazole 40 mg oral delayed release tablet: 1 tab(s) orally once a day (before a meal) (15 Apr 2022 10:37)  polyethylene glycol 3350 oral powder for reconstitution: 17 gram(s) orally once a day (at bedtime) (15 Apr 2022 10:37)  polyethylene glycol 3350 oral powder for reconstitution: 17 gram(s) orally once a day (at bedtime) (20 Mar 2022 10:15)  senna oral tablet: 2 tab(s) orally once a day (at bedtime) (20 Mar 2022 10:15)  senna oral tablet: 2 tab(s) orally once a day (at bedtime) (15 Apr 2022 10:37)  sertraline 50 mg oral tablet: 1 tab(s) orally once a day (20 Mar 2022 10:15)  sevelamer carbonate 800 mg oral tablet: 1 tab(s) orally 3 times a day (with meals) (20 Mar 2022 10:15)  simethicone 80 mg oral tablet, chewable: 1 tab(s) orally 3 times a day (before meals) (20 Mar 2022 10:15)  simvastatin 10 mg oral tablet: 1 tab(s) orally once a day (at bedtime) (20 Mar 2022 10:15)  sodium hypochlorite 0.125% topical solution: 1 application topically once a day to affected area (pannus wound on abdomen) (15 Apr 2022 10:37)  sodium hypochlorite 0.25% topical solution: 1 application topically once a day (20 Mar 2022 10:15)  tacrolimus 0.1% topical ointment: 1 application topically once a day (20 Mar 2022 10:15)  tacrolimus 0.1% topical ointment: 1 application topically once a day (15 Apr 2022 10:37)  traZODone 100 mg oral tablet: 1 tab(s) orally once a day (at bedtime) (20 Mar 2022 10:15)      MEDICATIONS  (STANDING):  apixaban 5 milliGRAM(s) Oral every 12 hours  bisacodyl 5 milliGRAM(s) Oral at bedtime  buPROPion XL (24-Hour) . 150 milliGRAM(s) Oral daily  chlorhexidine 2% Cloths 1 Application(s) Topical daily  cinacalcet 30 milliGRAM(s) Oral daily  cycloSPORINE  , modified (NEORAL) 200 milliGRAM(s) Oral two times a day  Dakins Solution - 1/4 Strength 1 Application(s) Topical daily  dextrose 40% Gel 15 Gram(s) Oral once  dextrose 5%. 1000 milliLiter(s) (50 mL/Hr) IV Continuous <Continuous>  dextrose 5%. 1000 milliLiter(s) (100 mL/Hr) IV Continuous <Continuous>  dextrose 50% Injectable 25 Gram(s) IV Push once  dextrose 50% Injectable 12.5 Gram(s) IV Push once  dextrose 50% Injectable 25 Gram(s) IV Push once  diltiazem    milliGRAM(s) Oral daily  epoetin anabela-epbx (RETACRIT) Injectable 64781 Unit(s) IV Push <User Schedule>  gabapentin 300 milliGRAM(s) Oral daily  glucagon  Injectable 1 milliGRAM(s) IntraMuscular once  insulin glargine Injectable (LANTUS) 12 Unit(s) SubCutaneous at bedtime  insulin lispro (ADMELOG) corrective regimen sliding scale   SubCutaneous three times a day before meals  insulin lispro (ADMELOG) corrective regimen sliding scale   SubCutaneous at bedtime  lactobacillus acidophilus 1 Tablet(s) Oral daily  lidocaine   4% Patch 1 Patch Transdermal daily  lidocaine   4% Patch 1 Patch Transdermal daily  lidocaine   4% Patch 1 Patch Transdermal daily  lidocaine/prilocaine Cream 1 Application(s) Topical <User Schedule>  mirtazapine 7.5 milliGRAM(s) Oral at bedtime  montelukast 10 milliGRAM(s) Oral at bedtime  pantoprazole    Tablet 40 milliGRAM(s) Oral before breakfast  polyethylene glycol 3350 17 Gram(s) Oral at bedtime  senna 2 Tablet(s) Oral at bedtime  sertraline 50 milliGRAM(s) Oral daily  simethicone 80 milliGRAM(s) Chew three times a day  tacrolimus   0.1% Ointment 1 Application(s) Topical daily  traZODone 100 milliGRAM(s) Oral at bedtime      TELEMETRY: 	    ECG:  	  RADIOLOGY:   DIAGNOSTIC TESTING:  [ ] Echocardiogram:  [ ]  Catheterization:  [ ] Stress Test:    OTHER: 	    LABS:

## 2022-04-19 NOTE — PROGRESS NOTE ADULT - SUBJECTIVE AND OBJECTIVE BOX
SUBJECTIVE / OVERNIGHT EVENTS:pt seen and examined,     MEDICATIONS  (STANDING):  apixaban 5 milliGRAM(s) Oral every 12 hours  bisacodyl 5 milliGRAM(s) Oral at bedtime  buPROPion XL (24-Hour) . 150 milliGRAM(s) Oral daily  chlorhexidine 2% Cloths 1 Application(s) Topical daily  cinacalcet 30 milliGRAM(s) Oral daily  cycloSPORINE  , modified (NEORAL) 200 milliGRAM(s) Oral two times a day  Dakins Solution - 1/4 Strength 1 Application(s) Topical daily  dextrose 40% Gel 15 Gram(s) Oral once  dextrose 5%. 1000 milliLiter(s) (50 mL/Hr) IV Continuous <Continuous>  dextrose 5%. 1000 milliLiter(s) (100 mL/Hr) IV Continuous <Continuous>  dextrose 50% Injectable 25 Gram(s) IV Push once  dextrose 50% Injectable 12.5 Gram(s) IV Push once  dextrose 50% Injectable 25 Gram(s) IV Push once  diltiazem    milliGRAM(s) Oral daily  epoetin anabela-epbx (RETACRIT) Injectable 37811 Unit(s) IV Push <User Schedule>  gabapentin 300 milliGRAM(s) Oral daily  glucagon  Injectable 1 milliGRAM(s) IntraMuscular once  insulin glargine Injectable (LANTUS) 12 Unit(s) SubCutaneous at bedtime  insulin lispro (ADMELOG) corrective regimen sliding scale   SubCutaneous at bedtime  insulin lispro (ADMELOG) corrective regimen sliding scale   SubCutaneous three times a day before meals  lactobacillus acidophilus 1 Tablet(s) Oral daily  lidocaine   4% Patch 1 Patch Transdermal daily  lidocaine   4% Patch 1 Patch Transdermal daily  lidocaine   4% Patch 1 Patch Transdermal daily  lidocaine/prilocaine Cream 1 Application(s) Topical <User Schedule>  mirtazapine 7.5 milliGRAM(s) Oral at bedtime  montelukast 10 milliGRAM(s) Oral at bedtime  pantoprazole    Tablet 40 milliGRAM(s) Oral before breakfast  polyethylene glycol 3350 17 Gram(s) Oral at bedtime  senna 2 Tablet(s) Oral at bedtime  sertraline 50 milliGRAM(s) Oral daily  simethicone 80 milliGRAM(s) Chew three times a day  tacrolimus   0.1% Ointment 1 Application(s) Topical daily  traZODone 100 milliGRAM(s) Oral at bedtime    MEDICATIONS  (PRN):  acetaminophen     Tablet .. 650 milliGRAM(s) Oral every 6 hours PRN Temp greater or equal to 38C (100.4F), Mild Pain (1 - 3)  benzonatate 100 milliGRAM(s) Oral three times a day PRN Cough  diphenhydrAMINE Injectable 25 milliGRAM(s) IV Push <User Schedule> PRN Rash and/or Itching  guaiFENesin Oral Liquid (Sugar-Free) 100 milliGRAM(s) Oral every 6 hours PRN Cough  melatonin 6 milliGRAM(s) Oral at bedtime PRN Insomnia  midodrine. 10 milliGRAM(s) Oral <User Schedule> PRN 30 minutes prior to dialysis  ondansetron Injectable 4 milliGRAM(s) IV Push every 8 hours PRN Nausea and/or Vomiting  oxyCODONE    IR 5 milliGRAM(s) Oral every 6 hours PRN Severe Pain (7 - 10)    Vital Signs Last 24 Hrs  T(C): 36.8 (04-19-22 @ 21:45), Max: 37.2 (04-19-22 @ 15:17)  T(F): 98.2 (04-19-22 @ 21:45), Max: 98.9 (04-19-22 @ 15:17)  HR: 82 (04-19-22 @ 21:45) (71 - 84)  BP: 114/61 (04-19-22 @ 21:45) (114/61 - 128/50)  BP(mean): --  RR: 17 (04-19-22 @ 21:45) (16 - 18)  SpO2: 100% (04-19-22 @ 21:45) (100% - 100%)          Skin: No rash.  Eyes: No recent vision problems or eye pain.  ENT: No congestion, ear pain, or sore throat.  Cardiovascular: No chest pain or palpation.  Respiratory: No cough, shortness of breath, congestion, or wheezing.  Gastrointestinal: No abdominal pain, nausea, vomiting, or diarrhea.  Genitourinary: No dysuria.  Musculoskeletal: No joint swelling.  Neurologic: No headache.    PHYSICAL EXAM:  GENERAL: NAD  EYES: EOMI, PERRLA  NECK: Supple, No JVD  CHEST/LUNG: dec breath sounds at bases  HEART:  S1 , S2 +  ABDOMEN: soft , bs+, wound dressing+  EXTREMITIES:  edema+  NEUROLOGY:alert awake oriented     LABS:        Creatinine Trend: 4.91 <--, 3.82 <--, 5.62 <--, 6.21 <--    Urine Studies:

## 2022-04-19 NOTE — CHART NOTE - NSCHARTNOTEFT_GEN_A_CORE
Patient endorsing loose stools x 3 days. C. diff and GI PCR ordered. Will follow up results. Patient endorsing loose foul smelling stools x 3 days. C. diff and GI PCR ordered. Will follow up results.

## 2022-04-19 NOTE — PROGRESS NOTE ADULT - ASSESSMENT
Echo 1/19/22: grossly nl LV sys fx, min MR     a/p  56 year old female with hx of morbid obesity, CHAMP not on home O2, ESRD (HD MWF), HTN, DM, COPD, Afib no longer on AC, chronic R pannus wound, followed by Dr. Layne, likely pyoderma gangrenosum presents for vaginal bleeding     #Influenza  -sp oseltamivir  per med  -supportive care  -med f/u     #Chronic Pannus Wound  -surgical eval noted, wound care  -sp abx per med     #Chronic Afib  -stable, rates controlled  -cont dilt as ordered  -cont oral AC - monitor h.h     #Hypertension  -overall stable  -cont ccb   -mido pre-HD on HD days     #ESRD on HD  -HD per renal    #Gyn bleeding  -s/p EUA, D&C, diagnostic hysteroscopy, insertion of Mirena IUD  -cv remains stable post op  -med f/u     DCP

## 2022-04-19 NOTE — PROGRESS NOTE ADULT - ASSESSMENT
56y Female with history of ESRD on HD presents with vaginal bleeding. Nephrology consulted for ESRD status.    1) ESRD: Last HD on 4/16 tolerated well with 2L removed with heparin to prevent circuit clotting. Plan for next maintenance HD today. Monitor electrolytes.    2) HTN with ESRD: BP acceptable on Cardizem. Continue with midodrine 10 mg pre-HD on HD days to avoid intradialytic hypotension. Monitor BP.    3) Anemia of renal disease: With component of blood loss from vaginal bleeding. Hb low with acceptable iron stores. Continue with Epo 20K with HD. Monitor Hb.    4) Secondary HPT of renal origin: Phosphorus acceptable. Continue with sensipar 30 mg daily and renal diet. Monitor serum calcium and phosphorus.      [facility]  Keaton Lopez M.D.  Juma Green D.O.  Myla Hoffmann M.D.  Julia Brewer, MSN, ANP-C    Telephone: [phone]  Facsimile: [phone]    [address] [place] [address]

## 2022-04-19 NOTE — PROGRESS NOTE ADULT - NSPROGADDITIONALINFOA_GEN_ALL_CORE
discussed pts current clinical status , management plan in detail in length with pt   waiting for dc, pt was declined [facility] placement   discussed management plan with acp covering pt
discussed pts current clinical status , management plan in detail in length with pt  all questions/ concerns addressed   discussed management plan with acp covering pt
discussed pts current clinical status , management plan in detail in length with pt   discussed management plan with acp covering pt
discussed pts current clinical status , management plan in detail in length with pt  all questions/ concerns addressed   discussed management plan with acp covering pt
discussed pts current clinical status , management plan in detail in length with pt  all questions/ concerns addressed   discussed management plan with acp covering pt
discussed pts current clinical status , management plan in detail in length with pt   waiting for dc, pt was declined [facility] placement   discussed management plan with acp covering pt

## 2022-04-19 NOTE — CHART NOTE - NSCHARTNOTEFT_GEN_A_CORE
Pt seen for Nutrition Follow up     Medical Course: 56F ESRD TTS, HTN, DM, COPD, afib, known chronic pannus wound felt most likely pyoderma gangrenosum presents for 1.5wk vag bleeding.    Nutrition Course: Nutrition interview: No recent episodes of nausea, vomiting, or constipation, Pt claims to have had diarrhea yesterday.  BM noted on 4/18 per RN flowsheets. Denies any chewing/swallowing difficulties. Food preferences explored and noted. Pt is unsatisfied with the food overall. She is bored of getting the same thing over and over, as menu is cyclic. Intake is 25-50% per RN flowsheets and per pt. Feeding skills: set up help only required from staff for eating. Attempted to get new weight via bedscale however bedscale was not working properly at present.     Pt noted w/ soda at bedside, diet education given excessive phosphorus and potassium in soda. Pt is aware and states she only has a little bit at a time. Diet education provided to pt in written format (per request) regarding K, phos and HD nutrition therapy to promote healthy choices while ordering out and picking menu selections.      Diet Prescription:   Pertinent Medications: MEDICATIONS  (STANDING):  apixaban 5 milliGRAM(s) Oral every 12 hours  bisacodyl 5 milliGRAM(s) Oral at bedtime  buPROPion XL (24-Hour) . 150 milliGRAM(s) Oral daily  chlorhexidine 2% Cloths 1 Application(s) Topical daily  cinacalcet 30 milliGRAM(s) Oral daily  cycloSPORINE  , modified (NEORAL) 200 milliGRAM(s) Oral two times a day  Dakins Solution - 1/4 Strength 1 Application(s) Topical daily  dextrose 40% Gel 15 Gram(s) Oral once  dextrose 5%. 1000 milliLiter(s) (50 mL/Hr) IV Continuous <Continuous>  dextrose 5%. 1000 milliLiter(s) (100 mL/Hr) IV Continuous <Continuous>  dextrose 50% Injectable 12.5 Gram(s) IV Push once  dextrose 50% Injectable 25 Gram(s) IV Push once  dextrose 50% Injectable 25 Gram(s) IV Push once  diltiazem    milliGRAM(s) Oral daily  epoetin naabela-epbx (RETACRIT) Injectable 98314 Unit(s) IV Push <User Schedule>  gabapentin 300 milliGRAM(s) Oral daily  glucagon  Injectable 1 milliGRAM(s) IntraMuscular once  insulin glargine Injectable (LANTUS) 12 Unit(s) SubCutaneous at bedtime  insulin lispro (ADMELOG) corrective regimen sliding scale   SubCutaneous three times a day before meals  insulin lispro (ADMELOG) corrective regimen sliding scale   SubCutaneous at bedtime  lactobacillus acidophilus 1 Tablet(s) Oral daily  lidocaine   4% Patch 1 Patch Transdermal daily  lidocaine   4% Patch 1 Patch Transdermal daily  lidocaine   4% Patch 1 Patch Transdermal daily  lidocaine/prilocaine Cream 1 Application(s) Topical <User Schedule>  mirtazapine 7.5 milliGRAM(s) Oral at bedtime  montelukast 10 milliGRAM(s) Oral at bedtime  pantoprazole    Tablet 40 milliGRAM(s) Oral before breakfast  polyethylene glycol 3350 17 Gram(s) Oral at bedtime  senna 2 Tablet(s) Oral at bedtime  sertraline 50 milliGRAM(s) Oral daily  simethicone 80 milliGRAM(s) Chew three times a day  tacrolimus   0.1% Ointment 1 Application(s) Topical daily  traZODone 100 milliGRAM(s) Oral at bedtime    MEDICATIONS  (PRN):  acetaminophen     Tablet .. 650 milliGRAM(s) Oral every 6 hours PRN Temp greater or equal to 38C (100.4F), Mild Pain (1 - 3)  benzonatate 100 milliGRAM(s) Oral three times a day PRN Cough  diphenhydrAMINE Injectable 25 milliGRAM(s) IV Push <User Schedule> PRN Rash and/or Itching  guaiFENesin Oral Liquid (Sugar-Free) 100 milliGRAM(s) Oral every 6 hours PRN Cough  melatonin 6 milliGRAM(s) Oral at bedtime PRN Insomnia  midodrine. 10 milliGRAM(s) Oral <User Schedule> PRN 30 minutes prior to dialysis  ondansetron Injectable 4 milliGRAM(s) IV Push every 8 hours PRN Nausea and/or Vomiting  oxyCODONE    IR 5 milliGRAM(s) Oral every 6 hours PRN Severe Pain (7 - 10)    Pertinent Labs:  04-16 Phos 2.7 mg/dL 03-21 Chol 165 mg/dL LDL --    HDL --    Trig 222 mg/dL<H>  04-16 Na 135 mmol/L Glu 180 mg/dL<H> K+ 3.7 mmol/L Cr 4.91 mg/dL<H> BUN 23 mg/dL Phos 2.7 mg/dL  04-19 @ 12:09 POCT 137 mg/dL  04-19 @ 09:06 POCT 136 mg/dL  04-18 @ 22:15 POCT 196 mg/dL  04-18 @ 18:33 POCT 125 mg/dL    POCT Blood Glucose.: 137 mg/dL (19 Apr 2022 12:09)  POCT Blood Glucose.: 136 mg/dL (19 Apr 2022 09:06)  POCT Blood Glucose.: 196 mg/dL (18 Apr 2022 22:15)  POCT Blood Glucose.: 125 mg/dL (18 Apr 2022 18:33)      Weight: Height (cm): 157.5 (03-19 @ 17:40)  Weight (kg): 130 (03-31 @ 15:34), 136.1 (01-22 @ 08:23)  BMI (kg/m2): 52.4 (03-31 @ 15:34), 54.9 (03-19 @ 17:40), 54.9 (01-22 @ 08:23)  Weight Assessment:      Physical Assessment, per flowsheets:  Edema: generalized edema  Pressure Injury: Intact w/ no pressure injuries noted per RN flowsheets     Estimated Needs:   [X] No change since previous assessment    Previous Nutrition Diagnosis:   [ ] Inadequate Energy Intake [ ]Inadequate Oral Intake [ ] Excessive Energy Intake   [ ] Underweight [ ] Increased Nutrient Needs [ ] Overweight/Obesity   [ ] Altered GI Function [ ] Unintended Weight Loss [ ] Food & Nutrition Related Knowledge Deficit [ ] Malnutrition   Nutrition Diagnosis is [ ] ongoing  [ ] resolved [ ] not applicable   New Nutrition Diagnosis: [ ] not applicable     Interventions:   1) Recommend liberalize diet at this time 2/2 K/phos labs wnr. d/c potassium, phosphorus restriction. Continue low sodium.   2) Continue to provide Nepro 3x daily (1,275 kcals, 52.3g protein).   3) Obtain new weight and weekly wts for HD.     Monitor & Evaluate:  PO intake, tolerance to diet/supplement, nutrition related lab values, weight trends, BMs/GI distress, hydration status, skin integrity.    Monica Mancia, MS, RDN (Pager #[phone])

## 2022-04-20 NOTE — PROGRESS NOTE ADULT - ASSESSMENT
Echo 1/19/22: grossly nl LV sys fx, min MR     a/p  56 year old female with hx of morbid obesity, CHAMP not on home O2, ESRD (HD MWF), HTN, DM, COPD, Afib no longer on AC, chronic R pannus wound, followed by Dr. Layne, likely pyoderma gangrenosum presents for vaginal bleeding     #Influenza  -sp oseltamivir  per med  -supportive care  -med f/u     #Chronic Pannus Wound  -surgical eval noted, wound care  -sp abx per med     #Chronic Afib  -stable, rates controlled  -cont dilt as ordered  -cont oral AC - monitor h.h     #Hypertension  -overall stable  -cont ccb   -mido pre-HD on HD days per renal     #ESRD on HD  -HD per renal    #Gyn bleeding  -s/p EUA, D&C, diagnostic hysteroscopy, insertion of Mirena IUD  -cv remains stable post op  -med f/u     DCP Echo 1/19/22: grossly nl LV sys fx, min MR     a/p  56 year old female with hx of morbid obesity, CHAMP not on home O2, ESRD (HD MWF), HTN, DM, COPD, Afib no longer on AC, chronic R pannus wound, followed by Dr. Layne, likely pyoderma gangrenosum presents for vaginal bleeding     #Influenza  -sp oseltamivir  per med  -supportive care  -med f/u     #Chronic Pannus Wound  -surgical eval noted, wound care  -sp abx per med     #Chronic Afib  -stable, rates controlled  -cont dilt as ordered  -cont oral AC - monitor h.h     #Hypertension  -overall stable  -cont ccb   -mido pre-HD on HD days per renal     #ESRD on HD  -HD per renal  -vascular eval noted -- malfunctioning LUE AVF. Patient is planned for LUE fistulogram tomorrow. pt can proceed from a cv standpoint     #Gyn bleeding  -s/p EUA, D&C, diagnostic hysteroscopy, insertion of Mirena IUD  -cv remains stable post op  -med f/u

## 2022-04-20 NOTE — PROGRESS NOTE ADULT - ASSESSMENT
56F with Hx of ESRD TTS, HTN, DM 2, COPD, afib, known chronic R pannus wound felt most likely pyoderma gangrenosum presents for 1.5wk vag bleeding. Pt recently had prolonged stay at [facility] for pannus wound, known to endocrine service.  Was dc'd to rehab on 3/3.  Prior to last hospitalization patient was on much higher doses of basal bolus.  Now requiring much less.  Has poor appetite.    1. Type 2 diabetes mellitus uncontrolled  A1c 7.0% (may be inaccurate in setting of ESRD)  Home Regimen: Tresiba 80 units HS and Trulicity 1.5mg subq weekly    While inpatient:  BG target 100-180 mg/dL  Tonight please give reduced dose of lantus- 9 units tonight for NPO past MN  Then can continue Lantus 12 units SQ qHS - resume this dose tomorrow night  Continue Admelog correctional scale LOW before meals and low bedtime scale  Remain OFF premeal Admelog for now  Check BG before meals and bedtime  Hypoglycemia protocol   Consistent carbohydrate diet, seen by RD, supplement ordered    Discharge Plan:  STOP Trulicity   For dc to rehab- recommend to continue current insulin management as outlined above  For dc to home- Recommend basal (resume Tresiba, dose TBD), plus PO regimen (Tradjenta 5 mg daily or Januvia 25 mg daily)   Recommend Tradjenta 5 mg po daily, if not covered can see if Januvia 25 mg po daily is covered.  Please obtain prior auth if needed as patient is ESRD and DPP4i class are indicated for patients with ESRD  Consider CGM (such as Freestyle Libre2 outpatient)  If desiring to followup with [facility]: [address] [place] [address], [address], [place] [address], [phone]    2. HTN  Management per primary team/nephrology    3. Hyperlipidemia  LDL target less than 70. Not currently on statin. Management per primary team      Tamela Ruiz  Nurse Practitioner  Division of Endocrinology & Diabetes  Pager # [phone]      If after 6PM or before 9AM, or on weekends/holidays, please call endocrine answering service for assistance ([phone]).  For nonurgent matters email [email or web address] for assistance.

## 2022-04-20 NOTE — PROGRESS NOTE ADULT - SUBJECTIVE AND OBJECTIVE BOX
[facility] NEPHROLOGY- PROGRESS NOTE    56y Female with history of ESRD on HD presents with vaginal bleeding. Nephrology consulted for ESRD status.    REVIEW OF SYSTEMS:  Gen: no fevers  Cards: no chest pain  Resp: no dyspnea  GI: no nausea or vomiting or diarrhea  Vascular: no LE edema      caffeine (Nausea)  latex (Rash)  penicillin (Nausea)  strawberry (Rash)    Hospital Medications: Medications reviewed      VITALS:  T(F): 98 (04-20-22 @ 05:45), Max: 98.9 (04-19-22 @ 15:17)  HR: 84 (04-20-22 @ 05:45)  BP: 110/61 (04-20-22 @ 05:45)  RR: 18 (04-20-22 @ 05:45)  SpO2: 100% (04-20-22 @ 05:45)  Wt(kg): --    04-19 @ 07:01  -  04-20 @ 07:00  --------------------------------------------------------  IN: 600 mL / OUT: 2000 mL / NET: -1400 mL        PHYSICAL EXAM:  Gen: NAD, calm  Cards: RRR, +S1/S2, no M/G/R  Resp: CTA B/L  GI: soft, NT/ND, NABS, + ab wound dressing in place  Vascular: no LE edema B/L, LUE AVF + bruit/thrill        LABS:  04-19    137  |  95<L>  |  21  ----------------------------<  132<H>  3.2<L>   |  22  |  4.56<H>    Ca    8.9      19 Apr 2022 18:51  Phos  2.4     04-19  Mg     2.00     04-19      Creatinine Trend: 4.56 <--, 4.91 <--, 3.82 <--, 5.62 <--                        10.5   11.77 )-----------( 428      ( 20 Apr 2022 10:38 )             36.0     Urine Studies:

## 2022-04-20 NOTE — PROGRESS NOTE ADULT - SUBJECTIVE AND OBJECTIVE BOX
CARDIOLOGY FOLLOW UP - Dr. Bullock  DATE OF SERVICE: 4/20/22     CC no cp or sob        REVIEW OF SYSTEMS:  CONSTITUTIONAL: No fever, weight loss, or fatigue  RESPIRATORY: No cough, wheezing, chills or hemoptysis; No Shortness of Breath  CARDIOVASCULAR: No chest pain, palpitations, passing out, dizziness, or leg swelling  GASTROINTESTINAL: No abdominal or epigastric pain. No nausea, vomiting, or hematemesis; No diarrhea or constipation. No melena or hematochezia.      PHYSICAL EXAM:  T(C): 36.7 (04-20-22 @ 05:45), Max: 37.2 (04-19-22 @ 15:17)  HR: 84 (04-20-22 @ 05:45) (71 - 84)  BP: 110/61 (04-20-22 @ 05:45) (110/61 - 128/50)  RR: 18 (04-20-22 @ 05:45) (16 - 18)  SpO2: 100% (04-20-22 @ 05:45) (100% - 100%)  Wt(kg): --  I&O's Summary    19 Apr 2022 07:01  -  20 Apr 2022 07:00  --------------------------------------------------------  IN: 600 mL / OUT: 2000 mL / NET: -1400 mL        Appearance: Normal	  Cardiovascular: Normal S1 S2,RRR, No JVD, No murmurs  Respiratory: Lungs clear to auscultation	  Gastrointestinal:  Soft, Non-tender, + BS	  Extremities: Normal range of motion, No clubbing, cyanosis or edema      Home Medications:  apixaban 5 mg oral tablet: 1 tab(s) orally every 12 hours (15 Apr 2022 10:37)  Aspercreme with Lidocaine 4% topical film: Apply topically to affected area once a day (low back) (20 Mar 2022 10:15)  Aspercreme with Lidocaine 4% topical film: Apply topically to affected area once a day (L knee) (20 Mar 2022 10:15)  aspirin 81 mg oral delayed release tablet: 1 tab(s) orally once a day (20 Mar 2022 10:15)  benzonatate 100 mg oral capsule: 1 cap(s) orally 3 times a day, As needed, Cough (15 Apr 2022 10:37)  bisacodyl 5 mg oral delayed release tablet: 1 tab(s) orally once a day (at bedtime) (15 Apr 2022 10:37)  buPROPion 150 mg/24 hours (XL) oral tablet, extended release: 1 tab(s) orally once a day (in the morning) (20 Mar 2022 10:15)  cinacalcet 30 mg oral tablet: 1 tab(s) orally once a day (15 Apr 2022 11:16)  cycloSPORINE modified 100 mg oral capsule: 2 cap(s) orally 2 times a day (15 Apr 2022 10:37)  cycloSPORINE modified 100 mg oral capsule: 2 cap(s) orally once a day (at bedtime) (20 Mar 2022 10:15)  cycloSPORINE modified 50 mg oral capsule: 3 cap(s) orally once a day in the morning  (20 Mar 2022 10:15)  dilTIAZem 120 mg/24 hours oral capsule, extended release: 1 cap(s) orally once a day (hold SBP&lt;110, HR&lt;60) (20 Mar 2022 10:15)  dilTIAZem 120 mg/24 hours oral capsule, extended release: 1 cap(s) orally once a day (15 Apr 2022 10:37)  diphenhydrAMINE 50 mg/mL injectable solution: 25 milligram(s) injectable 3 times a week (at 08:00 before dialysis sessions on Tuesday/Thursday/saturday) (15 Apr 2022 10:37)  doxycycline hyclate 100 mg oral tablet: 1 tab(s) orally 2 times a day (20 Mar 2022 10:15)  Eliquis 2.5 mg oral tablet: 1 tab(s) orally 2 times a day    Pharmacy states patient no longer wants to fill this medication (20 Mar 2022 10:15)  gabapentin 300 mg oral capsule: 1 cap(s) orally 2 times a day (20 Mar 2022 10:15)  gabapentin 300 mg oral capsule: 1 cap(s) orally once a day (15 Apr 2022 10:37)  insulin glargine: 15 unit(s) subcutaneous once a day (at bedtime) (20 Mar 2022 10:15)  lidocaine 4% topical film: Apply topically to affected area once a day to right knee (15 Apr 2022 10:37)  lidocaine 4% topical film: Apply topically to affected area once a day to lower back (15 Apr 2022 10:37)  lidocaine 4% topical film: Apply topically to affected area once a day to left knee (15 Apr 2022 10:37)  lidocaine-prilocaine 2.5%-2.5% topical cream: 1 application topically  (15 Apr 2022 10:37)  midodrine 10 mg oral tablet: 1 tab(s) orally  3 times a week, As Needed 30 minutes prior to dialysis (08:00 on Tuesdays/Thursdays/Saturdays). HOLD if SBP &gt;130 (15 Apr 2022 11:35)  midodrine 5 mg oral tablet: 1 tab(s) orally 3 times a week, 30 minute prior to dialysis sessions (hold is SBP&gt;130) (20 Mar 2022 10:15)  mirtazapine 7.5 mg oral tablet: 1 tab(s) orally once a day (at bedtime) (20 Mar 2022 10:15)  montelukast 10 mg oral tablet: 1 tab(s) orally once a day (in the evening) (20 Mar 2022 10:15)  montelukast 10 mg oral tablet: 1 tab(s) orally once a day (at bedtime) (15 Apr 2022 10:37)  oxyCODONE 10 mg oral tablet, extended release: 1 tab(s) orally every 12 hours (20 Mar 2022 10:15)  oxycodone-acetaminophen 7.5 mg-325 mg oral tablet: 1 tab(s) orally every 6 hours, As Needed (20 Mar 2022 10:15)  pantoprazole 40 mg oral delayed release tablet: 1 tab(s) orally once a day (20 Mar 2022 10:15)  pantoprazole 40 mg oral delayed release tablet: 1 tab(s) orally once a day (before a meal) (15 Apr 2022 10:37)  polyethylene glycol 3350 oral powder for reconstitution: 17 gram(s) orally once a day (at bedtime) (15 Apr 2022 10:37)  polyethylene glycol 3350 oral powder for reconstitution: 17 gram(s) orally once a day (at bedtime) (20 Mar 2022 10:15)  senna oral tablet: 2 tab(s) orally once a day (at bedtime) (20 Mar 2022 10:15)  senna oral tablet: 2 tab(s) orally once a day (at bedtime) (15 Apr 2022 10:37)  sertraline 50 mg oral tablet: 1 tab(s) orally once a day (20 Mar 2022 10:15)  sevelamer carbonate 800 mg oral tablet: 1 tab(s) orally 3 times a day (with meals) (20 Mar 2022 10:15)  simethicone 80 mg oral tablet, chewable: 1 tab(s) orally 3 times a day (before meals) (20 Mar 2022 10:15)  simvastatin 10 mg oral tablet: 1 tab(s) orally once a day (at bedtime) (20 Mar 2022 10:15)  sodium hypochlorite 0.125% topical solution: 1 application topically once a day to affected area (pannus wound on abdomen) (15 Apr 2022 10:37)  sodium hypochlorite 0.25% topical solution: 1 application topically once a day (20 Mar 2022 10:15)  tacrolimus 0.1% topical ointment: 1 application topically once a day (20 Mar 2022 10:15)  tacrolimus 0.1% topical ointment: 1 application topically once a day (15 Apr 2022 10:37)  traZODone 100 mg oral tablet: 1 tab(s) orally once a day (at bedtime) (20 Mar 2022 10:15)      MEDICATIONS  (STANDING):  apixaban 5 milliGRAM(s) Oral every 12 hours  bisacodyl 5 milliGRAM(s) Oral at bedtime  buPROPion XL (24-Hour) . 150 milliGRAM(s) Oral daily  chlorhexidine 2% Cloths 1 Application(s) Topical daily  cinacalcet 30 milliGRAM(s) Oral daily  cycloSPORINE  , modified (NEORAL) 200 milliGRAM(s) Oral two times a day  Dakins Solution - 1/4 Strength 1 Application(s) Topical daily  dextrose 40% Gel 15 Gram(s) Oral once  dextrose 5%. 1000 milliLiter(s) (50 mL/Hr) IV Continuous <Continuous>  dextrose 5%. 1000 milliLiter(s) (100 mL/Hr) IV Continuous <Continuous>  dextrose 50% Injectable 25 Gram(s) IV Push once  dextrose 50% Injectable 12.5 Gram(s) IV Push once  dextrose 50% Injectable 25 Gram(s) IV Push once  diltiazem    milliGRAM(s) Oral daily  epoetin anabela-epbx (RETACRIT) Injectable 39200 Unit(s) IV Push <User Schedule>  gabapentin 300 milliGRAM(s) Oral daily  glucagon  Injectable 1 milliGRAM(s) IntraMuscular once  insulin glargine Injectable (LANTUS) 12 Unit(s) SubCutaneous at bedtime  insulin lispro (ADMELOG) corrective regimen sliding scale   SubCutaneous three times a day before meals  insulin lispro (ADMELOG) corrective regimen sliding scale   SubCutaneous at bedtime  lactobacillus acidophilus 1 Tablet(s) Oral daily  lidocaine   4% Patch 1 Patch Transdermal daily  lidocaine   4% Patch 1 Patch Transdermal daily  lidocaine   4% Patch 1 Patch Transdermal daily  lidocaine/prilocaine Cream 1 Application(s) Topical <User Schedule>  mirtazapine 7.5 milliGRAM(s) Oral at bedtime  montelukast 10 milliGRAM(s) Oral at bedtime  pantoprazole    Tablet 40 milliGRAM(s) Oral before breakfast  polyethylene glycol 3350 17 Gram(s) Oral at bedtime  senna 2 Tablet(s) Oral at bedtime  sertraline 50 milliGRAM(s) Oral daily  simethicone 80 milliGRAM(s) Chew three times a day  tacrolimus   0.1% Ointment 1 Application(s) Topical daily  traZODone 100 milliGRAM(s) Oral at bedtime      TELEMETRY: 	    ECG:  	  RADIOLOGY:   DIAGNOSTIC TESTING:  [ ] Echocardiogram:  [ ]  Catheterization:  [ ] Stress Test:    OTHER: 	    LABS:	 	                            10.5   11.77 )-----------( 428      ( 20 Apr 2022 10:38 )             36.0     04-19    137  |  95<L>  |  21  ----------------------------<  132<H>  3.2<L>   |  22  |  4.56<H>    Ca    8.9      19 Apr 2022 18:51  Phos  2.4     04-19  Mg     2.00     04-19

## 2022-04-20 NOTE — CONSULT NOTE ADULT - SUBJECTIVE AND OBJECTIVE BOX
Full note to follow HPI:  56F ESRD TTS, HTN, DM, COPD, afib, known chronic R pannus wound felt most likely pyoderma gangrenosum presents for 1.5wk vag bleeding. Pt recently had prolonged stay for pannus wound and dc 3/3 to SNF. Per pt she has had issues since, developed bed sores & not eating bec food is poor. For last ~12d has been having what she thought was menstrual bleeding, passing clots & going through ~6-8 pads/day initially though it is now improving. Now no longer passing clots but still going through ~4-5 pads so came to ED. Thinks she feels a little more fatigued than usual but she's largely bed-bound. Also endorsing some inc abd periumbilical pain and her chronic pannus wound pain. No fever, CP, SOB, NV. Supposed to go to HD Saturday but missed.    ED: 118/56, 80, 18, 99.1, 99RA --> vanco/cefepime  Tearful, overwhelmed with chronic illness. VB not active now, but had severe cramps past day. Denies chest pain, SOB, nausea,  (20 Mar 2022 09:38)    Patient admitted for work-up of vaginal bleeding and pyoderma gangrenosum. Patient with high venous pressures during HD yesterday, unable to complete full treatment. Vascular Surgery consulted for further recommendations. Patient underwent creation of LUE brachiocephalic AVF with brachial endarterectomy 2019 and revision in 10/2019 with Dr. Manuel.       PAST MEDICAL & SURGICAL HISTORY:  COPD (chronic obstructive pulmonary disease)    DM (diabetes mellitus)    Atrial fibrillation  with loop recorder , battery most likely depleted, as per cardiac clearance, Dr. Reece Anesthesia aware, pt on Eliquis    HTN (hypertension)    Morbid obesity  BMI - 58.3    Chronic GERD    CHAMP (obstructive sleep apnea)  non compliance with CPAP, Anesthesia Dr. Reece aware, pt told to bring CPAP for sx, pr verbalized understanding    Potential difficult airway on pre-intubation assessment  airway class III, large neck, morbid obesity, hx of CHAMP, no compliance with CPAP- Dr. Reece, Anesthesia aware    End-stage renal disease    Anemia    Medication management    H/O tubal ligation      Status post placement of implantable loop recorder  left chest-     History of vascular access device  s/p insertion right chest permacath 2019, removal 2019, insertion left chest permacath 2019    S/P arteriovenous (AV) fistula creation  left  arm 2019        MEDICATIONS  (STANDING):  apixaban 5 milliGRAM(s) Oral every 12 hours  bisacodyl 5 milliGRAM(s) Oral at bedtime  buPROPion XL (24-Hour) . 150 milliGRAM(s) Oral daily  chlorhexidine 2% Cloths 1 Application(s) Topical daily  cinacalcet 30 milliGRAM(s) Oral daily  cycloSPORINE  , modified (NEORAL) 200 milliGRAM(s) Oral two times a day  Dakins Solution - 1/4 Strength 1 Application(s) Topical daily  dextrose 40% Gel 15 Gram(s) Oral once  dextrose 5%. 1000 milliLiter(s) (50 mL/Hr) IV Continuous <Continuous>  dextrose 5%. 1000 milliLiter(s) (100 mL/Hr) IV Continuous <Continuous>  dextrose 50% Injectable 25 Gram(s) IV Push once  dextrose 50% Injectable 12.5 Gram(s) IV Push once  dextrose 50% Injectable 25 Gram(s) IV Push once  diltiazem    milliGRAM(s) Oral daily  epoetin anabela-epbx (RETACRIT) Injectable 64993 Unit(s) IV Push <User Schedule>  gabapentin 300 milliGRAM(s) Oral daily  glucagon  Injectable 1 milliGRAM(s) IntraMuscular once  insulin glargine Injectable (LANTUS) 9 Unit(s) SubCutaneous at bedtime  insulin lispro (ADMELOG) corrective regimen sliding scale   SubCutaneous three times a day before meals  insulin lispro (ADMELOG) corrective regimen sliding scale   SubCutaneous at bedtime  lactobacillus acidophilus 1 Tablet(s) Oral daily  lidocaine   4% Patch 1 Patch Transdermal daily  lidocaine   4% Patch 1 Patch Transdermal daily  lidocaine   4% Patch 1 Patch Transdermal daily  lidocaine/prilocaine Cream 1 Application(s) Topical <User Schedule>  mirtazapine 7.5 milliGRAM(s) Oral at bedtime  montelukast 10 milliGRAM(s) Oral at bedtime  pantoprazole    Tablet 40 milliGRAM(s) Oral before breakfast  polyethylene glycol 3350 17 Gram(s) Oral at bedtime  senna 2 Tablet(s) Oral at bedtime  sertraline 50 milliGRAM(s) Oral daily  simethicone 80 milliGRAM(s) Chew three times a day  tacrolimus   0.1% Ointment 1 Application(s) Topical daily  traZODone 100 milliGRAM(s) Oral at bedtime    MEDICATIONS  (PRN):  acetaminophen     Tablet .. 650 milliGRAM(s) Oral every 6 hours PRN Temp greater or equal to 38C (100.4F), Mild Pain (1 - 3)  benzonatate 100 milliGRAM(s) Oral three times a day PRN Cough  diphenhydrAMINE Injectable 25 milliGRAM(s) IV Push <User Schedule> PRN Rash and/or Itching  guaiFENesin Oral Liquid (Sugar-Free) 100 milliGRAM(s) Oral every 6 hours PRN Cough  melatonin 6 milliGRAM(s) Oral at bedtime PRN Insomnia  midodrine. 10 milliGRAM(s) Oral <User Schedule> PRN 30 minutes prior to dialysis  ondansetron Injectable 4 milliGRAM(s) IV Push every 8 hours PRN Nausea and/or Vomiting  oxyCODONE    IR 5 milliGRAM(s) Oral every 6 hours PRN Severe Pain (7 - 10)      Allergies    latex (Rash)  penicillin (Nausea)  strawberry (Rash)    Intolerances    caffeine (Nausea)      SOCIAL HISTORY:  3 children, 4 grandchildren  been in rehab, previously from home with RHIANNON marie (20 Mar 2022 09:38)      FAMILY HISTORY:  Family history of diabetes mellitus  mother-     Family hx of hypertension  mother-         Physical Exam:  General: NAD, resting comfortably  Neuro: A/O x 3, CNs II-XII grossly intact, normal sensation, no focal deficits  HEENT: NC/AT, EOMI, normal hearing, no oral lesions, no LAD, neck supple  Pulmonary: normal resp effort on RA  Cardiovascular: irregularly irregular pulse  Abdominal: soft, ND/NT  Extremities: WWP. LUE BC AVF well healed without evidence of ulceration or significant aneurysmal changes  Vasc: palpable thrill over LUE fistula      Vital Signs Last 24 Hrs  T(C): 37.1 (2022 14:22), Max: 37.1 (2022 20:33)  T(F): 98.7 (2022 14:22), Max: 98.7 (2022 20:33)  HR: 91 (2022 14:22) (82 - 91)  BP: 131/55 (2022 14:22) (110/61 - 131/55)  BP(mean): --  RR: 17 (2022 14:22) (17 - 18)  SpO2: 98% (2022 14:22) (98% - 100%)    I&O's Summary    2022 07:01  -  2022 07:00  --------------------------------------------------------  IN: 600 mL / OUT: 2000 mL / NET: -1400 mL            LABS:                        10.5   11.77 )-----------( 428      ( 2022 10:38 )             36.0     04-19    137  |  95<L>  |  21  ----------------------------<  132<H>  3.2<L>   |  22  |  4.56<H>    Ca    8.9      2022 18:51  Phos  2.4     04-19  Mg     2.00     04-19          CAPILLARY BLOOD GLUCOSE      POCT Blood Glucose.: 152 mg/dL (2022 12:42)  POCT Blood Glucose.: 155 mg/dL (2022 09:19)  POCT Blood Glucose.: 130 mg/dL (2022 21:44)  POCT Blood Glucose.: 125 mg/dL (2022 19:41)

## 2022-04-20 NOTE — PROGRESS NOTE ADULT - ASSESSMENT
56y Female with history of ESRD on HD presents with vaginal bleeding. Nephrology consulted for ESRD status.    1) ESRD: Last HD on 4/19 with high venous pressures for which HD terminated prematurely with 1.6L removed. Plan for next maintenance HD on 4/21. Vascular surgery contacted for evaluation of AVF (will likely need fistulogram). Monitor electrolytes.    2) HTN with ESRD: BP acceptable on Cardizem. Continue with midodrine 10 mg pre-HD on HD days to avoid intradialytic hypotension. Monitor BP.    3) Anemia of renal disease: With component of blood loss from vaginal bleeding. Hb improving with acceptable iron stores. Continue with Epo 20K with HD. Monitor Hb.    4) Secondary HPT of renal origin: Phosphorus low for which renvela discontinued. Continue with sensipar 30 mg daily and renal diet. Monitor serum calcium and phosphorus.      [facility] NEPHROLOGY  Keaton Lopez M.D.  Juma Green D.O.  Myla Hoffmann M.D.  Julia Brewer, JASIEL, ANP-C    Telephone: [phone]  Facsimile: [phone]    [address] [place] [address]

## 2022-04-20 NOTE — PROGRESS NOTE ADULT - SUBJECTIVE AND OBJECTIVE BOX
Chief Complaint: DM 2    History: Patient seen at bedside. Plans to appeal insurance for rehab placement. does not have a desire to eat.  Denies n/v.  poor appetite at this time.  No hypoglycemia.  Will be NPO after MN for procedure in AM    MEDICATIONS  (STANDING):  apixaban 5 milliGRAM(s) Oral every 12 hours  bisacodyl 5 milliGRAM(s) Oral at bedtime  buPROPion XL (24-Hour) . 150 milliGRAM(s) Oral daily  chlorhexidine 2% Cloths 1 Application(s) Topical daily  cinacalcet 30 milliGRAM(s) Oral daily  cycloSPORINE  , modified (NEORAL) 200 milliGRAM(s) Oral two times a day  Dakins Solution - 1/4 Strength 1 Application(s) Topical daily  dextrose 40% Gel 15 Gram(s) Oral once  dextrose 5%. 1000 milliLiter(s) (50 mL/Hr) IV Continuous <Continuous>  dextrose 5%. 1000 milliLiter(s) (100 mL/Hr) IV Continuous <Continuous>  dextrose 50% Injectable 25 Gram(s) IV Push once  dextrose 50% Injectable 12.5 Gram(s) IV Push once  dextrose 50% Injectable 25 Gram(s) IV Push once  diltiazem    milliGRAM(s) Oral daily  epoetin anabela-epbx (RETACRIT) Injectable 97690 Unit(s) IV Push <User Schedule>  gabapentin 300 milliGRAM(s) Oral daily  glucagon  Injectable 1 milliGRAM(s) IntraMuscular once  insulin glargine Injectable (LANTUS) 12 Unit(s) SubCutaneous at bedtime  insulin lispro (ADMELOG) corrective regimen sliding scale   SubCutaneous three times a day before meals  insulin lispro (ADMELOG) corrective regimen sliding scale   SubCutaneous at bedtime  lactobacillus acidophilus 1 Tablet(s) Oral daily  lidocaine   4% Patch 1 Patch Transdermal daily  lidocaine   4% Patch 1 Patch Transdermal daily  lidocaine   4% Patch 1 Patch Transdermal daily  lidocaine/prilocaine Cream 1 Application(s) Topical <User Schedule>  mirtazapine 7.5 milliGRAM(s) Oral at bedtime  montelukast 10 milliGRAM(s) Oral at bedtime  oseltamivir 30 milliGRAM(s) Oral <User Schedule>  pantoprazole    Tablet 40 milliGRAM(s) Oral before breakfast  polyethylene glycol 3350 17 Gram(s) Oral at bedtime  senna 2 Tablet(s) Oral at bedtime  sertraline 50 milliGRAM(s) Oral daily  sevelamer carbonate 800 milliGRAM(s) Oral three times a day with meals  simethicone 80 milliGRAM(s) Chew three times a day  tacrolimus   0.1% Ointment 1 Application(s) Topical daily  traZODone 100 milliGRAM(s) Oral at bedtime    MEDICATIONS  (PRN):  acetaminophen     Tablet .. 650 milliGRAM(s) Oral every 6 hours PRN Temp greater or equal to 38C (100.4F), Mild Pain (1 - 3)  benzonatate 100 milliGRAM(s) Oral three times a day PRN Cough  diphenhydrAMINE Injectable 25 milliGRAM(s) IV Push <User Schedule> PRN Rash and/or Itching  guaiFENesin Oral Liquid (Sugar-Free) 100 milliGRAM(s) Oral every 6 hours PRN Cough  melatonin 3 milliGRAM(s) Oral at bedtime PRN Insomnia  midodrine. 5 milliGRAM(s) Oral <User Schedule> PRN 30 minutes prior to dialysis  ondansetron Injectable 4 milliGRAM(s) IV Push every 8 hours PRN Nausea and/or Vomiting  oxyCODONE    IR 10 milliGRAM(s) Oral every 6 hours PRN Severe Pain (7 - 10)  oxyCODONE    IR 5 milliGRAM(s) Oral every 6 hours PRN Moderate Pain (4 - 6)      Allergies  latex (Rash)  penicillin (Nausea)  strawberry (Rash)    Intolerances  caffeine (Nausea)    Review of Systems:  Cardiovascular: No chest pain  Respiratory: No SOB  GI: No nausea, vomiting  Endocrine: no hypoglycemia     PHYSICAL EXAM:  Vital Signs Last 24 Hrs  T(C): 37.1 (04-20-22 @ 14:22), Max: 37.1 (04-19-22 @ 20:33)  T(F): 98.7 (04-20-22 @ 14:22), Max: 98.7 (04-19-22 @ 20:33)  HR: 91 (04-20-22 @ 14:22) (71 - 91)  BP: 131/55 (04-20-22 @ 14:22) (110/61 - 131/55)  BP(mean): --  RR: 17 (04-20-22 @ 14:22) (17 - 18)  SpO2: 98% (04-20-22 @ 14:22) (98% - 100%)  GENERAL: NAD  EYES: No proptosis, no lid lag, anicteric  HEENT:  Atraumatic, Normocephalic, moist mucous membranes  RESPIRATORY: unlabored respirations     CAPILLARY BLOOD GLUCOSE  POCT Blood Glucose.: 152 mg/dL (20 Apr 2022 12:42)  POCT Blood Glucose.: 155 mg/dL (20 Apr 2022 09:19)  POCT Blood Glucose.: 130 mg/dL (19 Apr 2022 21:44)  POCT Blood Glucose.: 125 mg/dL (19 Apr 2022 19:41)  POCT Blood Glucose.: 125 mg/dL (18 Apr 2022 18:33)  POCT Blood Glucose.: 133 mg/dL (18 Apr 2022 12:16)  POCT Blood Glucose.: 139 mg/dL (18 Apr 2022 09:10)  POCT Blood Glucose.: 207 mg/dL (14 Apr 2022 13:13)  POCT Blood Glucose.: 149 mg/dL (14 Apr 2022 10:07)  POCT Blood Glucose.: 236 mg/dL (14 Apr 2022 05:46)  POCT Blood Glucose.: 176 mg/dL (13 Apr 2022 21:08)  POCT Blood Glucose.: 270 mg/dL (13 Apr 2022 17:56)      A1C with Estimated Average Glucose Result: 6.1 % (03-20-22 @ 12:48)  A1C with Estimated Average Glucose Result: 7.0 % (01-13-22 @ 08:21)  A1C with Estimated Average Glucose Result: 6.7 % (08-31-21 @ 09:30)  A1C with Estimated Average Glucose Result: 7.2 % (04-28-21 @ 07:04)    Diet, Consistent Carbohydrate Renal w/Evening Snack:   For patients receiving Renal Replacement - No Protein Restr, No Conc K, No Conc Phos, Low Sodium (RENAL)  Supplement Feeding Modality:  Oral  Nepro Cans or Servings Per Day:  1       Frequency:  Three Times a day (03-20-22 @ 10:20)

## 2022-04-20 NOTE — CONSULT NOTE ADULT - ASSESSMENT
56y Female with malfunctioning LUE BC AVF. Patient is planned for LUE fistulogram tomorrow. Optimized from IM/cardiac standpoint for operation.    - Please document Medicine/Cardiology clearance for procedure    [x] NPO after midnight  [x] IVF  [x] CXR -- done   [x] EKG -- done   [x] T&S -- 2 in system, done   [x] AM CBC -- ordered  [x] AM BMP -- ordered  [x] AM PT, PTT, INR -- ordered  [x] Pregnancy test ordered  [x] COVID negative 04/18  [ ] Documentation of medical optimization -- IM note   [ ] Documentation of cardiac optimization -- cardiology note   [ ] Consent to be signed   56y Female with malfunctioning LUE BC AVF. Patient is planned for LUE fistulogram tomorrow.    - Please document Medicine/Cardiology clearance for procedure    [x] NPO after midnight  [x] IVF  [x] CXR -- done   [x] EKG -- done   [x] T&S -- 2 in system, done   [x] AM CBC -- ordered  [x] AM BMP -- ordered  [x] AM PT, PTT, INR -- ordered  [x] Pregnancy test ordered  [x] COVID negative 04/18  [ ] Documentation of medical optimization -- IM note   [ ] Documentation of cardiac optimization -- cardiology note   [ ] Consent to be signed   56y Female with malfunctioning LUE BC AVF. Patient is planned for LUE fistulogram tomorrow.    - Please document Medicine/Cardiology clearance for procedure    [x] NPO after midnight  [x] IVF  [x] CXR -- done   [x] EKG -- done   [x] T&S -- 2 in system, done   [x] AM CBC -- ordered  [x] AM BMP -- ordered  [x] AM PT, PTT, INR -- ordered  [x] Pregnancy test ordered  [x] COVID negative 04/18  [ ] Documentation of medical optimization -- IM note   [ ] Documentation of cardiac optimization -- cardiology note   [x] Consent - signed in chart   56F w/ hx of morbid obesity, CHAMP not on home O2, ESRD (on HD), HTN, DM, COPD, Afib, chronic R pannus wound, , likely pyoderma gangrenosum admitted for vaginal bleeding, found to have malfunctioning LUE BC AVF. Vascular Surgery consulted for further recommendations. Patient is planned for LUE fistulogram tomorrow.    - Please document Medicine/Cardiology clearance for procedure  - Please hold Eliquis in preparation for OR  - OK to continue chemical DVT PPx    [x] NPO after midnight  [x] IVF  [x] CXR -- done   [x] EKG -- done   [x] T&S -- 2 in system, done   [x] AM CBC -- ordered  [x] AM BMP -- ordered  [x] AM PT, PTT, INR -- ordered  [x] Pregnancy test ordered  [x] COVID negative 04/18  [ ] Documentation of medical optimization -- IM note pending  [ ] Documentation of cardiac optimization -- cardiology note pending  [x] Consent - signed in chart      Patient discussed fellow, with attending, Dr. Nagy.    Laura Monzon MD  PGY2 Consult Resident  C Team Surgery (Vascular Surgery)  [id number]   56F w/ hx of morbid obesity, CHAMP not on home O2, ESRD (on HD), HTN, DM, COPD, Afib, chronic R pannus wound, , likely pyoderma gangrenosum admitted for vaginal bleeding, found to have malfunctioning LUE BC AVF. Vascular Surgery consulted for further recommendations. Patient is planned for LUE fistulogram tomorrow.      - Please hold Eliquis in preparation for OR  - OK to continue chemical DVT PPx    [x] NPO after midnight  [x] IVF  [x] CXR -- done   [x] EKG -- done   [x] T&S -- 2 in system, done   [x] AM CBC -- ordered  [x] AM BMP -- ordered  [x] AM PT, PTT, INR -- ordered  [x] Pregnancy test ordered  [x] COVID negative 04/18  [x] Consent - signed in chart      Patient discussed fellow, with attending, Dr. Nagy.    Laura Monzon MD  PGY2 Consult Resident  C Team Surgery (Vascular Surgery)  [id number]

## 2022-04-20 NOTE — PROGRESS NOTE ADULT - SUBJECTIVE AND OBJECTIVE BOX
SUBJECTIVE / OVERNIGHT EVENTS:pt seen and examined,   pt c/o dizziness  pt found to have elevated venous pressure during HD    MEDICATIONS  (STANDING):  bisacodyl 5 milliGRAM(s) Oral at bedtime  buPROPion XL (24-Hour) . 150 milliGRAM(s) Oral daily  chlorhexidine 2% Cloths 1 Application(s) Topical daily  cinacalcet 30 milliGRAM(s) Oral daily  cycloSPORINE  , modified (NEORAL) 200 milliGRAM(s) Oral two times a day  Dakins Solution - 1/4 Strength 1 Application(s) Topical daily  dextrose 40% Gel 15 Gram(s) Oral once  dextrose 5%. 1000 milliLiter(s) (50 mL/Hr) IV Continuous <Continuous>  dextrose 5%. 1000 milliLiter(s) (100 mL/Hr) IV Continuous <Continuous>  dextrose 50% Injectable 25 Gram(s) IV Push once  dextrose 50% Injectable 12.5 Gram(s) IV Push once  dextrose 50% Injectable 25 Gram(s) IV Push once  diltiazem    milliGRAM(s) Oral daily  epoetin anabela-epbx (RETACRIT) Injectable 25755 Unit(s) IV Push <User Schedule>  gabapentin 300 milliGRAM(s) Oral daily  glucagon  Injectable 1 milliGRAM(s) IntraMuscular once  heparin   Injectable 7500 Unit(s) SubCutaneous every 12 hours  insulin glargine Injectable (LANTUS) 9 Unit(s) SubCutaneous at bedtime  insulin lispro (ADMELOG) corrective regimen sliding scale   SubCutaneous three times a day before meals  insulin lispro (ADMELOG) corrective regimen sliding scale   SubCutaneous at bedtime  lactobacillus acidophilus 1 Tablet(s) Oral daily  lidocaine   4% Patch 1 Patch Transdermal daily  lidocaine   4% Patch 1 Patch Transdermal daily  lidocaine   4% Patch 1 Patch Transdermal daily  lidocaine/prilocaine Cream 1 Application(s) Topical <User Schedule>  mirtazapine 7.5 milliGRAM(s) Oral at bedtime  montelukast 10 milliGRAM(s) Oral at bedtime  pantoprazole    Tablet 40 milliGRAM(s) Oral before breakfast  polyethylene glycol 3350 17 Gram(s) Oral at bedtime  senna 2 Tablet(s) Oral at bedtime  sertraline 50 milliGRAM(s) Oral daily  simethicone 80 milliGRAM(s) Chew three times a day  tacrolimus   0.1% Ointment 1 Application(s) Topical daily  traZODone 100 milliGRAM(s) Oral at bedtime    MEDICATIONS  (PRN):  acetaminophen     Tablet .. 650 milliGRAM(s) Oral every 6 hours PRN Temp greater or equal to 38C (100.4F), Mild Pain (1 - 3)  aluminum hydroxide/magnesium hydroxide/simethicone Suspension 30 milliLiter(s) Oral every 6 hours PRN Dyspepsia  benzonatate 100 milliGRAM(s) Oral three times a day PRN Cough  diphenhydrAMINE Injectable 25 milliGRAM(s) IV Push <User Schedule> PRN Rash and/or Itching  guaiFENesin Oral Liquid (Sugar-Free) 100 milliGRAM(s) Oral every 6 hours PRN Cough  melatonin 6 milliGRAM(s) Oral at bedtime PRN Insomnia  midodrine. 10 milliGRAM(s) Oral <User Schedule> PRN 30 minutes prior to dialysis  ondansetron Injectable 4 milliGRAM(s) IV Push every 8 hours PRN Nausea and/or Vomiting  oxyCODONE    IR 5 milliGRAM(s) Oral every 6 hours PRN Severe Pain (7 - 10)    Vital Signs Last 24 Hrs  T(C): 37.1 (04-20-22 @ 14:22), Max: 37.1 (04-20-22 @ 14:22)  T(F): 98.7 (04-20-22 @ 14:22), Max: 98.7 (04-20-22 @ 14:22)  HR: 91 (04-20-22 @ 14:22) (84 - 91)  BP: 131/55 (04-20-22 @ 14:22) (110/61 - 131/55)  BP(mean): --  RR: 17 (04-20-22 @ 14:22) (17 - 18)  SpO2: 98% (04-20-22 @ 14:22) (98% - 100%)            Skin: No rash.  Eyes: No recent vision problems or eye pain.  ENT: No congestion, ear pain, or sore throat.  Cardiovascular: No chest pain or palpation.  Respiratory: No cough, shortness of breath, congestion, or wheezing.  Gastrointestinal: No abdominal pain, nausea, vomiting, or diarrhea.  Genitourinary: No dysuria.  Musculoskeletal: No joint swelling.  Neurologic: No headache.    PHYSICAL EXAM:  GENERAL: NAD  EYES: EOMI, PERRLA  NECK: Supple, No JVD  CHEST/LUNG: dec breath sounds at bases  HEART:  S1 , S2 +  ABDOMEN: soft , bs+, wound dressing+  EXTREMITIES:  edema+  NEUROLOGY:alert awake oriented     LABS:  04-19    137  |  95<L>  |  21  ----------------------------<  132<H>  3.2<L>   |  22  |  4.56<H>    Ca    8.9      19 Apr 2022 18:51  Phos  2.4     04-19  Mg     2.00     04-19      Creatinine Trend: 4.56 <--, 4.91 <--, 3.82 <--, 5.62 <--                        10.5   11.77 )-----------( 428      ( 20 Apr 2022 10:38 )             36.0     Urine Studies:

## 2022-04-21 NOTE — PROGRESS NOTE ADULT - SUBJECTIVE AND OBJECTIVE BOX
CARDIOLOGY FOLLOW UP - Dr. Bullock  DATE OF SERVICE: 4/21/22     CC no cp or sob   sp Fistulogram  today      REVIEW OF SYSTEMS:  CONSTITUTIONAL: No fever, weight loss, or fatigue  RESPIRATORY: No cough, wheezing, chills or hemoptysis; No Shortness of Breath  CARDIOVASCULAR: No chest pain, palpitations, passing out, dizziness, or leg swelling  GASTROINTESTINAL: No abdominal or epigastric pain. No nausea, vomiting, or hematemesis; No diarrhea or constipation. No melena or hematochezia.  VASCULAR: No edema     PHYSICAL EXAM:  T(C): 36.8 (04-21-22 @ 06:05), Max: 37.1 (04-20-22 @ 14:22)  HR: 86 (04-21-22 @ 06:05) (85 - 91)  BP: 106/68 (04-21-22 @ 06:05) (106/68 - 131/55)  RR: 17 (04-21-22 @ 06:05) (17 - 18)  SpO2: 98% (04-21-22 @ 06:05) (98% - 100%)  Wt(kg): --  I&O's Summary      Appearance: Normal	  Cardiovascular: Normal S1 S2,RRR, No JVD, No murmurs  Respiratory: Lungs clear to auscultation	  Gastrointestinal:  Soft, Non-tender, + BS	  Extremities: Normal range of motion, No clubbing, cyanosis or edema      Home Medications:  apixaban 5 mg oral tablet: 1 tab(s) orally every 12 hours (15 Apr 2022 10:37)  Aspercreme with Lidocaine 4% topical film: Apply topically to affected area once a day (low back) (20 Mar 2022 10:15)  Aspercreme with Lidocaine 4% topical film: Apply topically to affected area once a day (L knee) (20 Mar 2022 10:15)  aspirin 81 mg oral delayed release tablet: 1 tab(s) orally once a day (20 Mar 2022 10:15)  benzonatate 100 mg oral capsule: 1 cap(s) orally 3 times a day, As needed, Cough (15 Apr 2022 10:37)  bisacodyl 5 mg oral delayed release tablet: 1 tab(s) orally once a day (at bedtime) (15 Apr 2022 10:37)  buPROPion 150 mg/24 hours (XL) oral tablet, extended release: 1 tab(s) orally once a day (in the morning) (20 Mar 2022 10:15)  cinacalcet 30 mg oral tablet: 1 tab(s) orally once a day (15 Apr 2022 11:16)  cycloSPORINE modified 100 mg oral capsule: 2 cap(s) orally 2 times a day (15 Apr 2022 10:37)  cycloSPORINE modified 100 mg oral capsule: 2 cap(s) orally once a day (at bedtime) (20 Mar 2022 10:15)  cycloSPORINE modified 50 mg oral capsule: 3 cap(s) orally once a day in the morning  (20 Mar 2022 10:15)  dilTIAZem 120 mg/24 hours oral capsule, extended release: 1 cap(s) orally once a day (hold SBP&lt;110, HR&lt;60) (20 Mar 2022 10:15)  dilTIAZem 120 mg/24 hours oral capsule, extended release: 1 cap(s) orally once a day (15 Apr 2022 10:37)  diphenhydrAMINE 50 mg/mL injectable solution: 25 milligram(s) injectable 3 times a week (at 08:00 before dialysis sessions on Tuesday/Thursday/saturday) (15 Apr 2022 10:37)  doxycycline hyclate 100 mg oral tablet: 1 tab(s) orally 2 times a day (20 Mar 2022 10:15)  Eliquis 2.5 mg oral tablet: 1 tab(s) orally 2 times a day    Pharmacy states patient no longer wants to fill this medication (20 Mar 2022 10:15)  gabapentin 300 mg oral capsule: 1 cap(s) orally 2 times a day (20 Mar 2022 10:15)  gabapentin 300 mg oral capsule: 1 cap(s) orally once a day (15 Apr 2022 10:37)  insulin glargine: 15 unit(s) subcutaneous once a day (at bedtime) (20 Mar 2022 10:15)  lidocaine 4% topical film: Apply topically to affected area once a day to right knee (15 Apr 2022 10:37)  lidocaine 4% topical film: Apply topically to affected area once a day to lower back (15 Apr 2022 10:37)  lidocaine 4% topical film: Apply topically to affected area once a day to left knee (15 Apr 2022 10:37)  lidocaine-prilocaine 2.5%-2.5% topical cream: 1 application topically  (15 Apr 2022 10:37)  midodrine 10 mg oral tablet: 1 tab(s) orally  3 times a week, As Needed 30 minutes prior to dialysis (08:00 on Tuesdays/Thursdays/Saturdays). HOLD if SBP &gt;130 (15 Apr 2022 11:35)  midodrine 5 mg oral tablet: 1 tab(s) orally 3 times a week, 30 minute prior to dialysis sessions (hold is SBP&gt;130) (20 Mar 2022 10:15)  mirtazapine 7.5 mg oral tablet: 1 tab(s) orally once a day (at bedtime) (20 Mar 2022 10:15)  montelukast 10 mg oral tablet: 1 tab(s) orally once a day (in the evening) (20 Mar 2022 10:15)  montelukast 10 mg oral tablet: 1 tab(s) orally once a day (at bedtime) (15 Apr 2022 10:37)  oxyCODONE 10 mg oral tablet, extended release: 1 tab(s) orally every 12 hours (20 Mar 2022 10:15)  oxycodone-acetaminophen 7.5 mg-325 mg oral tablet: 1 tab(s) orally every 6 hours, As Needed (20 Mar 2022 10:15)  pantoprazole 40 mg oral delayed release tablet: 1 tab(s) orally once a day (20 Mar 2022 10:15)  pantoprazole 40 mg oral delayed release tablet: 1 tab(s) orally once a day (before a meal) (15 Apr 2022 10:37)  polyethylene glycol 3350 oral powder for reconstitution: 17 gram(s) orally once a day (at bedtime) (15 Apr 2022 10:37)  polyethylene glycol 3350 oral powder for reconstitution: 17 gram(s) orally once a day (at bedtime) (20 Mar 2022 10:15)  senna oral tablet: 2 tab(s) orally once a day (at bedtime) (20 Mar 2022 10:15)  senna oral tablet: 2 tab(s) orally once a day (at bedtime) (15 Apr 2022 10:37)  sertraline 50 mg oral tablet: 1 tab(s) orally once a day (20 Mar 2022 10:15)  sevelamer carbonate 800 mg oral tablet: 1 tab(s) orally 3 times a day (with meals) (20 Mar 2022 10:15)  simethicone 80 mg oral tablet, chewable: 1 tab(s) orally 3 times a day (before meals) (20 Mar 2022 10:15)  simvastatin 10 mg oral tablet: 1 tab(s) orally once a day (at bedtime) (20 Mar 2022 10:15)  sodium hypochlorite 0.125% topical solution: 1 application topically once a day to affected area (pannus wound on abdomen) (15 Apr 2022 10:37)  sodium hypochlorite 0.25% topical solution: 1 application topically once a day (20 Mar 2022 10:15)  tacrolimus 0.1% topical ointment: 1 application topically once a day (20 Mar 2022 10:15)  tacrolimus 0.1% topical ointment: 1 application topically once a day (15 Apr 2022 10:37)  traZODone 100 mg oral tablet: 1 tab(s) orally once a day (at bedtime) (20 Mar 2022 10:15)      MEDICATIONS  (STANDING):  bisacodyl 5 milliGRAM(s) Oral at bedtime  buPROPion XL (24-Hour) . 150 milliGRAM(s) Oral daily  chlorhexidine 2% Cloths 1 Application(s) Topical daily  cinacalcet 30 milliGRAM(s) Oral daily  cycloSPORINE  , modified (NEORAL) 200 milliGRAM(s) Oral two times a day  Dakins Solution - 1/4 Strength 1 Application(s) Topical daily  dextrose 40% Gel 15 Gram(s) Oral once  dextrose 5%. 1000 milliLiter(s) (50 mL/Hr) IV Continuous <Continuous>  dextrose 5%. 1000 milliLiter(s) (100 mL/Hr) IV Continuous <Continuous>  dextrose 50% Injectable 25 Gram(s) IV Push once  dextrose 50% Injectable 12.5 Gram(s) IV Push once  dextrose 50% Injectable 25 Gram(s) IV Push once  diltiazem    milliGRAM(s) Oral daily  epoetin anabela-epbx (RETACRIT) Injectable 28514 Unit(s) IV Push <User Schedule>  gabapentin 300 milliGRAM(s) Oral daily  glucagon  Injectable 1 milliGRAM(s) IntraMuscular once  heparin   Injectable 7500 Unit(s) SubCutaneous every 12 hours  insulin glargine Injectable (LANTUS) 12 Unit(s) SubCutaneous at bedtime  insulin lispro (ADMELOG) corrective regimen sliding scale   SubCutaneous three times a day before meals  insulin lispro (ADMELOG) corrective regimen sliding scale   SubCutaneous at bedtime  lactobacillus acidophilus 1 Tablet(s) Oral daily  lidocaine   4% Patch 1 Patch Transdermal daily  lidocaine   4% Patch 1 Patch Transdermal daily  lidocaine   4% Patch 1 Patch Transdermal daily  lidocaine/prilocaine Cream 1 Application(s) Topical <User Schedule>  mirtazapine 7.5 milliGRAM(s) Oral at bedtime  montelukast 10 milliGRAM(s) Oral at bedtime  pantoprazole    Tablet 40 milliGRAM(s) Oral before breakfast  polyethylene glycol 3350 17 Gram(s) Oral at bedtime  senna 2 Tablet(s) Oral at bedtime  sertraline 50 milliGRAM(s) Oral daily  simethicone 80 milliGRAM(s) Chew three times a day  sodium chloride 0.9%. 1000 milliLiter(s) (30 mL/Hr) IV Continuous <Continuous>  tacrolimus   0.1% Ointment 1 Application(s) Topical daily  traZODone 100 milliGRAM(s) Oral at bedtime      TELEMETRY: 	    ECG:  	  RADIOLOGY:   DIAGNOSTIC TESTING:  [ ] Echocardiogram:  [ ]  Catheterization:  [ ] Stress Test:    OTHER: 	    LABS:	 	                            10.4   11.89 )-----------( 494      ( 21 Apr 2022 07:34 )             36.2     04-21    136  |  94<L>  |  20  ----------------------------<  140<H>  3.9   |  24  |  5.42<H>    Ca    9.0      21 Apr 2022 07:34  Phos  3.1     04-21  Mg     2.00     04-21

## 2022-04-21 NOTE — PROGRESS NOTE ADULT - SUBJECTIVE AND OBJECTIVE BOX
ANTONIA ORTIZ  56y  Female      Patient is a 56y old  Female who presents with a chief complaint of vaginal bleeding (21 Apr 2022 11:39)  Patient feels ok.s/p fistulogram LUE,doing well.  no sob,no cp,    REVIEW OF SYSTEMS:  CONSTITUTIONAL: No fever  RESPIRATORY: No cough, hemoptysis or shortness of breath  CARDIOVASCULAR: No chest pain, palpitations, dizziness, or leg swelling  GASTROINTESTINAL: No abdominal pain. nausea, vomiting, hematemesis  INTERVAL HPI/OVERNIGHT EVENTS:  T(C): 36.2 (04-21-22 @ 21:28), Max: 36.8 (04-21-22 @ 06:05)  HR: 89 (04-21-22 @ 21:28) (76 - 89)  BP: 108/57 (04-21-22 @ 21:28) (106/68 - 121/69)  RR: 17 (04-21-22 @ 21:28) (16 - 18)  SpO2: 100% (04-21-22 @ 21:28) (98% - 100%)  Wt(kg): --  I&O's Summary    21 Apr 2022 07:01  -  21 Apr 2022 22:45  --------------------------------------------------------  IN: 400 mL / OUT: 1900 mL / NET: -1500 mL      T(C): 36.2 (04-21-22 @ 21:28), Max: 36.8 (04-21-22 @ 06:05)  HR: 89 (04-21-22 @ 21:28) (76 - 89)  BP: 108/57 (04-21-22 @ 21:28) (106/68 - 121/69)  RR: 17 (04-21-22 @ 21:28) (16 - 18)  SpO2: 100% (04-21-22 @ 21:28) (98% - 100%)  Wt(kg): --Vital Signs Last 24 Hrs  T(C): 36.2 (21 Apr 2022 21:28), Max: 36.8 (21 Apr 2022 06:05)  T(F): 97.2 (21 Apr 2022 21:28), Max: 98.2 (21 Apr 2022 06:05)  HR: 89 (21 Apr 2022 21:28) (76 - 89)  BP: 108/57 (21 Apr 2022 21:28) (106/68 - 121/69)  BP(mean): --  RR: 17 (21 Apr 2022 21:28) (16 - 18)  SpO2: 100% (21 Apr 2022 21:28) (98% - 100%)    LABS:                        10.4   11.89 )-----------( 494      ( 21 Apr 2022 07:34 )             36.2     04-21    136  |  94<L>  |  20  ----------------------------<  140<H>  3.9   |  24  |  5.42<H>    Ca    9.0      21 Apr 2022 07:34  Phos  3.1     04-21  Mg     2.00     04-21          CAPILLARY BLOOD GLUCOSE      POCT Blood Glucose.: 136 mg/dL (21 Apr 2022 21:40)  POCT Blood Glucose.: 120 mg/dL (21 Apr 2022 18:08)  POCT Blood Glucose.: 156 mg/dL (21 Apr 2022 13:20)  POCT Blood Glucose.: 137 mg/dL (21 Apr 2022 06:38)            PAST MEDICAL & SURGICAL HISTORY:  COPD (chronic obstructive pulmonary disease)    DM (diabetes mellitus)    Atrial fibrillation  with loop recorder 2015, battery most likely depleted, as per cardiac clearance, Dr. Reece Anesthesia aware, pt on Eliquis    HTN (hypertension)    Morbid obesity  BMI - 58.3    Chronic GERD    CHAMP (obstructive sleep apnea)  non compliance with CPAP, Anesthesia Dr. Reece aware, pt told to bring CPAP for sx, pr verbalized understanding    Potential difficult airway on pre-intubation assessment  airway class III, large neck, morbid obesity, hx of CHAMP, no compliance with CPAP- Dr. Reece, Anesthesia aware    End-stage renal disease    Anemia    Medication management    H/O tubal ligation  1989    Status post placement of implantable loop recorder  left chest- 2015    History of vascular access device  s/p insertion right chest permacath 2/2019, removal 6/2019, insertion left chest permacath 6/2019    S/P arteriovenous (AV) fistula creation  left  arm 6/2019        MEDICATIONS  (STANDING):  bisacodyl 5 milliGRAM(s) Oral at bedtime  buPROPion XL (24-Hour) . 150 milliGRAM(s) Oral daily  chlorhexidine 2% Cloths 1 Application(s) Topical daily  cinacalcet 30 milliGRAM(s) Oral daily  cycloSPORINE  , modified (NEORAL) 200 milliGRAM(s) Oral two times a day  Dakins Solution - 1/4 Strength 1 Application(s) Topical daily  dextrose 40% Gel 15 Gram(s) Oral once  dextrose 5%. 1000 milliLiter(s) (50 mL/Hr) IV Continuous <Continuous>  dextrose 5%. 1000 milliLiter(s) (100 mL/Hr) IV Continuous <Continuous>  dextrose 50% Injectable 25 Gram(s) IV Push once  dextrose 50% Injectable 12.5 Gram(s) IV Push once  dextrose 50% Injectable 25 Gram(s) IV Push once  diltiazem    milliGRAM(s) Oral daily  epoetin anabela-epbx (RETACRIT) Injectable 95677 Unit(s) IV Push <User Schedule>  gabapentin 300 milliGRAM(s) Oral daily  glucagon  Injectable 1 milliGRAM(s) IntraMuscular once  heparin   Injectable 7500 Unit(s) SubCutaneous every 12 hours  insulin glargine Injectable (LANTUS) 12 Unit(s) SubCutaneous at bedtime  insulin lispro (ADMELOG) corrective regimen sliding scale   SubCutaneous three times a day before meals  insulin lispro (ADMELOG) corrective regimen sliding scale   SubCutaneous at bedtime  lactobacillus acidophilus 1 Tablet(s) Oral daily  lidocaine   4% Patch 1 Patch Transdermal daily  lidocaine   4% Patch 1 Patch Transdermal daily  lidocaine   4% Patch 1 Patch Transdermal daily  lidocaine/prilocaine Cream 1 Application(s) Topical <User Schedule>  mirtazapine 7.5 milliGRAM(s) Oral at bedtime  montelukast 10 milliGRAM(s) Oral at bedtime  pantoprazole    Tablet 40 milliGRAM(s) Oral before breakfast  polyethylene glycol 3350 17 Gram(s) Oral at bedtime  senna 2 Tablet(s) Oral at bedtime  sertraline 50 milliGRAM(s) Oral daily  simethicone 80 milliGRAM(s) Chew three times a day  sodium chloride 0.9%. 1000 milliLiter(s) (30 mL/Hr) IV Continuous <Continuous>  tacrolimus   0.1% Ointment 1 Application(s) Topical daily  traZODone 100 milliGRAM(s) Oral at bedtime    MEDICATIONS  (PRN):  acetaminophen     Tablet .. 650 milliGRAM(s) Oral every 6 hours PRN Temp greater or equal to 38C (100.4F), Mild Pain (1 - 3)  aluminum hydroxide/magnesium hydroxide/simethicone Suspension 30 milliLiter(s) Oral every 6 hours PRN Dyspepsia  benzonatate 100 milliGRAM(s) Oral three times a day PRN Cough  diphenhydrAMINE Injectable 25 milliGRAM(s) IV Push <User Schedule> PRN Rash and/or Itching  guaiFENesin Oral Liquid (Sugar-Free) 100 milliGRAM(s) Oral every 6 hours PRN Cough  melatonin 6 milliGRAM(s) Oral at bedtime PRN Insomnia  midodrine. 10 milliGRAM(s) Oral <User Schedule> PRN 30 minutes prior to dialysis  ondansetron Injectable 4 milliGRAM(s) IV Push every 8 hours PRN Nausea and/or Vomiting  oxyCODONE    IR 5 milliGRAM(s) Oral every 6 hours PRN Severe Pain (7 - 10)        RADIOLOGY & ADDITIONAL TESTS:    Imaging Personally Reviewed:  [ ] YES  [ ] NO    Consultant(s) Notes Reviewed:  [x ] YES  [ ] NO    PHYSICAL EXAM:  GENERAL: Alert and awake lying in bed in no distress  HEAD:  Atraumatic, Normocephalic  EYES: EOMI, MO, conjunctiva and sclera clear  NECK: Supple, No JVD, Normal thyroid  NERVOUS SYSTEM:  Alert & Oriented X3, Motor and sensory systems are intact,   CHEST/LUNG: Bilateral clear breath sounds, no rhochi, no wheezing, no crepitations,  HEART: Regular rate and rhythm; No murmurs, rubs, or gallops  ABDOMEN: Soft, Nontender, Nondistended; Bowel sounds present  EXTREMITIES:   Peripheral Pulses are palpable, no  edema        Care Discussed with Consultants/Other Providers [ ] YES  [ ] NO      Code Status: [] Full Code [] DNR [] DNI [] Goals of Care:   Disposition: [] ICU [] Stroke Unit [] RCU []PCU []Floor [] Discharge Home         SIXTO AndersonFACP

## 2022-04-21 NOTE — CHART NOTE - NSCHARTNOTEFT_GEN_A_CORE
Spoke to vascular team ([id number]) - Eliquis held and heparin started per vasc surg 4/20, ok to restart Eliquis on 4/22 am as per vascular surgery team.

## 2022-04-21 NOTE — CHART NOTE - NSCHARTNOTEFT_GEN_A_CORE
Fistulogram completed, images reviewed by attending, fellow, and resident    Fistula with normal anatomy, no intervention done    May be used for HD     Edmund Villatoro MD  #[id number] Fistulogram completed, images reviewed by attending, fellow, and resident    Fistula with normal anatomy, no intervention done    On POC, patient without strike through on dressing. Endorsing mild pain    May be used for HD     Edmund Villatoro MD  #[id number] Post Operative Check    Patient is post op from a LUE fistulogram. No findings on fluoroscopy, normal fistula anatomy. No intervention done. 1hr stitch removed in Cath PACU. Patient seen at time of return to room in 4s. Has not gone to HD yet.       Vitals    T(C): 36.8 (04-21-22 @ 12:21), Max: 37.1 (04-20-22 @ 22:20)  HR: 86 (04-21-22 @ 12:21) (85 - 90)  BP: 111/67 (04-21-22 @ 12:21) (106/68 - 111/67)  RR: 16 (04-21-22 @ 12:21) (16 - 18)  SpO2: 100% (04-21-22 @ 12:21) (98% - 100%)        Labs                        10.4   11.89 )-----------( 494      ( 21 Apr 2022 07:34 )             36.2       CBC Full  -  ( 21 Apr 2022 07:34 )  WBC Count : 11.89 K/uL  Hemoglobin : 10.4 g/dL  Hematocrit : 36.2 %  Platelet Count - Automated : 494 K/uL  Mean Cell Volume : 95.5 fL  Mean Cell Hemoglobin : 27.4 pg  Mean Cell Hemoglobin Concentration : 28.7 gm/dL  Auto Neutrophil # : x  Auto Lymphocyte # : x  Auto Monocyte # : x  Auto Eosinophil # : x  Auto Basophil # : x  Auto Neutrophil % : x  Auto Lymphocyte % : x  Auto Monocyte % : x  Auto Eosinophil % : x  Auto Basophil % : x      Physical Exam  General: NAD. Recovering well and responding normally  Extremities: LUE brachiocephalic fistula site with gauze and tegaderm. No strike through on dressing, c/d/i  Lungs- Breathing comfortably on room air    Patient is a 56y old Female s/p fistulogram    -OK to access for HD  -Rest of care per primary  -Will follow with you       Vascular Surgery  #[id number]

## 2022-04-21 NOTE — PROGRESS NOTE ADULT - ASSESSMENT
56y Female with history of ESRD on HD presents with vaginal bleeding. Nephrology consulted for ESRD status.    1) ESRD: Last HD on 4/19 with high venous pressures for which HD terminated prematurely with 1.6L removed. Plan for next maintenance HD today post-fistulogram. Vascular surgery consult appreciated. Monitor electrolytes.    2) HTN with ESRD: BP acceptable on Cardizem. Continue with midodrine 10 mg pre-HD on HD days to avoid intradialytic hypotension. Monitor BP.    3) Anemia of renal disease: With component of blood loss from vaginal bleeding. Hb improving with acceptable iron stores. Continue with Epo 20K with HD. Monitor Hb.    4) Secondary HPT of renal origin: Phosphorus low for which renvela discontinued. Continue with sensipar 30 mg daily and renal diet. Monitor serum calcium and phosphorus.      [facility] NEPHROLOGY  Keaton Lopez M.D.  Juma Green D.O.  Myla Hoffmann M.D.  Julia Brewer, MSN, ANP-C    Telephone: [phone]  Facsimile: [phone]    [address] [place] [address]

## 2022-04-21 NOTE — PROGRESS NOTE ADULT - ASSESSMENT
Echo 1/19/22: grossly nl LV sys fx, min MR     a/p  56 year old female with hx of morbid obesity, CHAMP not on home O2, ESRD (HD MWF), HTN, DM, COPD, Afib no longer on AC, chronic R pannus wound, followed by Dr. Layne, likely pyoderma gangrenosum presents for vaginal bleeding     #Influenza  -sp oseltamivir  per med  -supportive care  -med f/u     #Chronic Pannus Wound  -surgical eval noted, wound care  -sp abx per med     #Chronic Afib  -stable, rates controlled  -cont dilt as ordered  -RESUME oral AC - per vascular sp Fistulogram today     #Hypertension  -overall stable  -cont ccb   -mido pre-HD on HD days per renal     #ESRD on HD  -HD per renal  -vascular fu -sp LUE fistulogram     #Gyn bleeding  -s/p EUA, D&C, diagnostic hysteroscopy, insertion of Mirena IUD  -cv remains stable post op  -med f/u

## 2022-04-21 NOTE — BRIEF OPERATIVE NOTE - NSICDXBRIEFPROCEDURE_GEN_ALL_CORE_FT
PROCEDURES:  Diagnostic hysteroscopy with dilation and curettage of uterus 31-Mar-2022 17:32:46  Sania Giles  Insertion of Mirena intrauterine device (IUD) 31-Mar-2022 17:33:13  Sania Giles  Pap smear 31-Mar-2022 17:33:22  Sania Giles  Exam under anesthesia, pelvic 31-Mar-2022 17:33:41  Sania Giles  
PROCEDURES:  Fistulogram 22-Apr-2022 05:15:39  Edmund Villatoro

## 2022-04-21 NOTE — PROGRESS NOTE ADULT - SUBJECTIVE AND OBJECTIVE BOX
[facility] NEPHROLOGY- PROGRESS NOTE    56y Female with history of ESRD on HD presents with vaginal bleeding. Nephrology consulted for ESRD status.    REVIEW OF SYSTEMS:  Gen: no fevers  Cards: no chest pain  Resp: no dyspnea  GI: no nausea or vomiting or diarrhea  Vascular: no LE edema      caffeine (Nausea)  latex (Rash)  penicillin (Nausea)  strawberry (Rash)    Hospital Medications: Medications reviewed      VITALS:  T(F): 98.2 (04-21-22 @ 06:05), Max: 98.8 (04-20-22 @ 22:20)  HR: 86 (04-21-22 @ 06:05)  BP: 106/68 (04-21-22 @ 06:05)  RR: 17 (04-21-22 @ 06:05)  SpO2: 98% (04-21-22 @ 06:05)  Wt(kg): --        PHYSICAL EXAM:  Gen: NAD, calm  Cards: RRR, +S1/S2, no M/G/R  Resp: CTA B/L  GI: soft, NT/ND, NABS, + ab wound dressing in place  Vascular: no LE edema B/L, LUE AVF + bruit/thrill        LABS:  04-21    136  |  94<L>  |  20  ----------------------------<  140<H>  3.9   |  24  |  5.42<H>    Ca    9.0      21 Apr 2022 07:34  Phos  3.1     04-21  Mg     2.00     04-21      Creatinine Trend: 5.42 <--, 5.20 <--, 4.56 <--, 4.91 <--, 3.82 <--                        10.4   11.89 )-----------( 494      ( 21 Apr 2022 07:34 )             36.2     Urine Studies:

## 2022-04-21 NOTE — BRIEF OPERATIVE NOTE - OPERATION/FINDINGS
Angiography of fistula, brachiocephalic. Left side. Normal anatomy visualized. Prolene stitch placed.
EUA: Normal external female genitalia. Anteverted uterus, 6 week size, adnexa nonpalpable bilaterally.  Adequate distension of uterus unable to be achieved, however no gross abnormalities noted within cervix or uterine cavity.  Mirena IUD inserted without issue. Pap smear performed.    EXP APR 2024  LOT#[id number]

## 2022-04-22 NOTE — PROGRESS NOTE ADULT - ASSESSMENT
Patient is a 56y old Female s/p fistulogram    -Ok to continue HD via AVF   -Rest of care per primary  -Please page with additional questions or concerns      Vascular Surgery  #[id number].

## 2022-04-22 NOTE — PROGRESS NOTE ADULT - SUBJECTIVE AND OBJECTIVE BOX
[facility] NEPHROLOGY- PROGRESS NOTE    56y Female with history of ESRD on HD presents with vaginal bleeding. Nephrology consulted for ESRD status.    REVIEW OF SYSTEMS:  Gen: no fevers  Cards: no chest pain  Resp: no dyspnea  GI: no nausea or vomiting or diarrhea  Vascular: no LE edema      caffeine (Nausea)  latex (Rash)  penicillin (Nausea)  strawberry (Rash)    Hospital Medications: Medications reviewed      VITALS:  T(F): 98.2 (04-22-22 @ 06:00), Max: 98.2 (04-21-22 @ 12:21)  HR: 88 (04-22-22 @ 06:00)  BP: 120/66 (04-22-22 @ 06:00)  RR: 17 (04-22-22 @ 06:00)  SpO2: 97% (04-22-22 @ 06:00)  Wt(kg): --    04-21 @ 07:01  -  04-22 @ 07:00  --------------------------------------------------------  IN: 400 mL / OUT: 1900 mL / NET: -1500 mL        PHYSICAL EXAM:  Gen: NAD, calm  Cards: RRR, +S1/S2, no M/G/R  Resp: CTA B/L  GI: soft, NT/ND, NABS, + ab wound dressing in place  Vascular: no LE edema B/L, LUE AVF + bruit/thrill        LABS:  04-21    136  |  94<L>  |  20  ----------------------------<  140<H>  3.9   |  24  |  5.42<H>    Ca    9.0      21 Apr 2022 07:34  Phos  3.1     04-21  Mg     2.00     04-21      Creatinine Trend: 5.42 <--, 5.20 <--, 4.56 <--, 4.91 <--                        10.4   11.89 )-----------( 494      ( 21 Apr 2022 07:34 )             36.2     Urine Studies:

## 2022-04-22 NOTE — PROGRESS NOTE ADULT - SUBJECTIVE AND OBJECTIVE BOX
[facility]-- WOUND TEAM -- FOLLOW UP NOTE  --------------------------------------------------------------------------------    subjective: Patient reports she was not discharge secondary to " my insurance denied transfer to [place] "     Interval HPI/24 hour events: Underwent angiography of fistula voa brachiocephalic 4/21, +RVP Influenza AH3 on 4/13, completed Tamiflu 5 day course.     Diet:  Diet, Consistent Carbohydrate Renal w/Evening Snack:   Low Sodium  Supplement Feeding Modality:  Oral  Nepro Cans or Servings Per Day:  1       Frequency:  Three Times a day (04-19-22 @ 16:47)    ROS: see HPI  all other systems negative    ALLERGIES & MEDICATIONS  --------------------------------------------------------------------------------  Allergies    latex (Rash)  penicillin (Nausea)  strawberry (Rash)    Intolerances    caffeine (Nausea)    STANDING INPATIENT MEDICATIONS    bisacodyl 5 milliGRAM(s) Oral at bedtime  buPROPion XL (24-Hour) . 150 milliGRAM(s) Oral daily  chlorhexidine 2% Cloths 1 Application(s) Topical daily  cinacalcet 30 milliGRAM(s) Oral daily  cycloSPORINE  , modified (NEORAL) 200 milliGRAM(s) Oral two times a day  Dakins Solution - 1/4 Strength 1 Application(s) Topical daily  dextrose 40% Gel 15 Gram(s) Oral once  dextrose 5%. 1000 milliLiter(s) IV Continuous <Continuous>  dextrose 5%. 1000 milliLiter(s) IV Continuous <Continuous>  dextrose 50% Injectable 25 Gram(s) IV Push once  dextrose 50% Injectable 12.5 Gram(s) IV Push once  dextrose 50% Injectable 25 Gram(s) IV Push once  diltiazem    milliGRAM(s) Oral daily  epoetin anabela-epbx (RETACRIT) Injectable 00550 Unit(s) IV Push <User Schedule>  gabapentin 300 milliGRAM(s) Oral daily  glucagon  Injectable 1 milliGRAM(s) IntraMuscular once  heparin   Injectable 7500 Unit(s) SubCutaneous every 12 hours  insulin glargine Injectable (LANTUS) 12 Unit(s) SubCutaneous at bedtime  insulin lispro (ADMELOG) corrective regimen sliding scale   SubCutaneous three times a day before meals  insulin lispro (ADMELOG) corrective regimen sliding scale   SubCutaneous at bedtime  lactobacillus acidophilus 1 Tablet(s) Oral daily  lidocaine   4% Patch 1 Patch Transdermal daily  lidocaine   4% Patch 1 Patch Transdermal daily  lidocaine   4% Patch 1 Patch Transdermal daily  lidocaine/prilocaine Cream 1 Application(s) Topical <User Schedule>  mirtazapine 7.5 milliGRAM(s) Oral at bedtime  montelukast 10 milliGRAM(s) Oral at bedtime  pantoprazole    Tablet 40 milliGRAM(s) Oral before breakfast  polyethylene glycol 3350 17 Gram(s) Oral at bedtime  senna 2 Tablet(s) Oral at bedtime  sertraline 50 milliGRAM(s) Oral daily  simethicone 80 milliGRAM(s) Chew three times a day  tacrolimus   0.1% Ointment 1 Application(s) Topical daily  traZODone 100 milliGRAM(s) Oral at bedtime      PRN INPATIENT MEDICATION  acetaminophen     Tablet .. 650 milliGRAM(s) Oral every 6 hours PRN  aluminum hydroxide/magnesium hydroxide/simethicone Suspension 30 milliLiter(s) Oral every 6 hours PRN  benzonatate 100 milliGRAM(s) Oral three times a day PRN  diphenhydrAMINE Injectable 25 milliGRAM(s) IV Push <User Schedule> PRN  guaiFENesin Oral Liquid (Sugar-Free) 100 milliGRAM(s) Oral every 6 hours PRN  melatonin 6 milliGRAM(s) Oral at bedtime PRN  midodrine. 10 milliGRAM(s) Oral <User Schedule> PRN  ondansetron Injectable 4 milliGRAM(s) IV Push every 8 hours PRN  oxyCODONE    IR 5 milliGRAM(s) Oral every 6 hours PRN    Vital signs:  T(C): 37.1 (04-22-22 @ 11:46), Max: 37.1 (04-22-22 @ 11:46)  HR: 84 (04-22-22 @ 11:46) (76 - 89)  BP: 107/63 (04-22-22 @ 11:46) (107/63 - 121/69)  RR: 18 (04-22-22 @ 11:46) (17 - 18)  SpO2: 96% (04-22-22 @ 11:46) (96% - 100%)  BMI 52.4kg/m2.     04-21-22 @ 07:01  -  04-22-22 @ 07:00  --------------------------------------------------------  IN: 400 mL / OUT: 1900 mL / NET: -1500 mL    Physical exam:  General: NAD, A & O x 3. Complete care wash at the bedside completed before skin assessment.   HEENT: NC/NT,  GI: Soft, NT/ND. Wound lower right sided pannus, on exam today [id number] measurements taking while nursing assisting in lifting pannus (on 4/8 measurements [id number]) slough from 1- 3 o'clock (also from 1-3 undermining areas). Undermining from 1-3 o'clock extending 4.3cm with 4.3cm at 3 0'clock (undermining noted on 3/21 assessment, increase in size since), no purulent, no drainage from undermining). Remainder of wound with pink granular. Continues to re-epithelialize along wound edges. Satellite ulceration 3 o'clock 0.5cm from wound measuring 1.8cmx1.4cmx0.2cm (prev on 4/8/22- 1.6wd9ioh7.1cm), pink-moist. Small-moderate serous drainage, mild odor today. Tolerated wound dressing while waiting for pain medicine.  Cleansed with Dakins, tacrolimus applied, adaptic touch, aquacel Ag and abdominal pad. Tegaderm.   Vascular: No temperature changes noted. No Edema bilaterally. Patient complaining of left leg pain (not acute), RN will place Lidocaine patch and will administered Oxycodone as per patients requests   Skin: moist.   Psych: appears emotional, crying. Emotional support provided.   Musculoskeletal: Able to  extremities         LABS/ CULTURES/ RADIOLOGY:              10.0   13.10 >-----------<  448      [04-22-22 @ 13:05]              35.1     136  |  96  |  18  ----------------------------<  184      [04-22-22 @ 13:05]  4.1   |  23  |  4.80        Ca     8.8     [04-22-22 @ 13:05]      Mg     2.00     [04-22-22 @ 13:05]      Phos  3.2     [04-22-22 @ 13:05]        CAPILLARY BLOOD GLUCOSE      POCT Blood Glucose.: 161 mg/dL (22 Apr 2022 10:18)  POCT Blood Glucose.: 160 mg/dL (22 Apr 2022 09:04)  POCT Blood Glucose.: 136 mg/dL (21 Apr 2022 21:40)  POCT Blood Glucose.: 120 mg/dL (21 Apr 2022 18:08)      A1C with Estimated Average Glucose Result: 6.1 % (03-20-22 @ 12:48)  A1C with Estimated Average Glucose Result: 7.0 % (01-13-22 @ 08:21)             [facility]-- WOUND TEAM -- FOLLOW UP NOTE  --------------------------------------------------------------------------------    subjective: Patient reports she was not discharge secondary to " my insurance denied transfer to [place] "     Interval HPI/24 hour events: Underwent angiography of fistula via brachiocephalic 4/21, +RVP Influenza AH3 on 4/13, completed Tamiflu 5 day course.     Diet:  Diet, Consistent Carbohydrate Renal w/Evening Snack:   Low Sodium  Supplement Feeding Modality:  Oral  Nepro Cans or Servings Per Day:  1       Frequency:  Three Times a day (04-19-22 @ 16:47)    ROS: see HPI  all other systems negative    ALLERGIES & MEDICATIONS  --------------------------------------------------------------------------------  Allergies    latex (Rash)  penicillin (Nausea)  strawberry (Rash)    Intolerances    caffeine (Nausea)    STANDING INPATIENT MEDICATIONS    bisacodyl 5 milliGRAM(s) Oral at bedtime  buPROPion XL (24-Hour) . 150 milliGRAM(s) Oral daily  chlorhexidine 2% Cloths 1 Application(s) Topical daily  cinacalcet 30 milliGRAM(s) Oral daily  cycloSPORINE  , modified (NEORAL) 200 milliGRAM(s) Oral two times a day  Dakins Solution - 1/4 Strength 1 Application(s) Topical daily  dextrose 40% Gel 15 Gram(s) Oral once  dextrose 5%. 1000 milliLiter(s) IV Continuous <Continuous>  dextrose 5%. 1000 milliLiter(s) IV Continuous <Continuous>  dextrose 50% Injectable 25 Gram(s) IV Push once  dextrose 50% Injectable 12.5 Gram(s) IV Push once  dextrose 50% Injectable 25 Gram(s) IV Push once  diltiazem    milliGRAM(s) Oral daily  epoetin anabela-epbx (RETACRIT) Injectable 49287 Unit(s) IV Push <User Schedule>  gabapentin 300 milliGRAM(s) Oral daily  glucagon  Injectable 1 milliGRAM(s) IntraMuscular once  heparin   Injectable 7500 Unit(s) SubCutaneous every 12 hours  insulin glargine Injectable (LANTUS) 12 Unit(s) SubCutaneous at bedtime  insulin lispro (ADMELOG) corrective regimen sliding scale   SubCutaneous three times a day before meals  insulin lispro (ADMELOG) corrective regimen sliding scale   SubCutaneous at bedtime  lactobacillus acidophilus 1 Tablet(s) Oral daily  lidocaine   4% Patch 1 Patch Transdermal daily  lidocaine   4% Patch 1 Patch Transdermal daily  lidocaine   4% Patch 1 Patch Transdermal daily  lidocaine/prilocaine Cream 1 Application(s) Topical <User Schedule>  mirtazapine 7.5 milliGRAM(s) Oral at bedtime  montelukast 10 milliGRAM(s) Oral at bedtime  pantoprazole    Tablet 40 milliGRAM(s) Oral before breakfast  polyethylene glycol 3350 17 Gram(s) Oral at bedtime  senna 2 Tablet(s) Oral at bedtime  sertraline 50 milliGRAM(s) Oral daily  simethicone 80 milliGRAM(s) Chew three times a day  tacrolimus   0.1% Ointment 1 Application(s) Topical daily  traZODone 100 milliGRAM(s) Oral at bedtime      PRN INPATIENT MEDICATION  acetaminophen     Tablet .. 650 milliGRAM(s) Oral every 6 hours PRN  aluminum hydroxide/magnesium hydroxide/simethicone Suspension 30 milliLiter(s) Oral every 6 hours PRN  benzonatate 100 milliGRAM(s) Oral three times a day PRN  diphenhydrAMINE Injectable 25 milliGRAM(s) IV Push <User Schedule> PRN  guaiFENesin Oral Liquid (Sugar-Free) 100 milliGRAM(s) Oral every 6 hours PRN  melatonin 6 milliGRAM(s) Oral at bedtime PRN  midodrine. 10 milliGRAM(s) Oral <User Schedule> PRN  ondansetron Injectable 4 milliGRAM(s) IV Push every 8 hours PRN  oxyCODONE    IR 5 milliGRAM(s) Oral every 6 hours PRN    Vital signs:  T(C): 37.1 (04-22-22 @ 11:46), Max: 37.1 (04-22-22 @ 11:46)  HR: 84 (04-22-22 @ 11:46) (76 - 89)  BP: 107/63 (04-22-22 @ 11:46) (107/63 - 121/69)  RR: 18 (04-22-22 @ 11:46) (17 - 18)  SpO2: 96% (04-22-22 @ 11:46) (96% - 100%)  BMI 52.4kg/m2.     04-21-22 @ 07:01  -  04-22-22 @ 07:00  --------------------------------------------------------  IN: 400 mL / OUT: 1900 mL / NET: -1500 mL    Physical exam:  General: NAD, A & O x 3. Complete care wash at the bedside completed before skin assessment.   HEENT: NC/NT,  GI: Soft, NT/ND. Wound lower right sided pannus, on exam today [id number] measurements taking while nursing assisting in lifting pannus (on 4/8 measurements [id number]) slough from 1- 3 o'clock (also from 1-3 undermining areas). Undermining from 1-3 o'clock extending 4.3cm with 4.3cm at 3 0'clock (undermining noted on 3/21 assessment, increase in size since), no purulent, no drainage from undermining). Remainder of wound with pink granular. Continues to re-epithelialize along wound edges. Satellite ulceration 3 o'clock 0.5cm from wound measuring 1.8cmx1.4cmx0.2cm (prev on 4/8/22- 1.3pw7fib2.1cm), pink-moist. Small-moderate serous drainage, mild odor today. Tolerated wound dressing while waiting for pain medicine.  Cleansed with Dakins, tacrolimus applied, adaptic touch, aquacel Ag and abdominal pad. Tegaderm.   Vascular: No temperature changes noted. No Edema bilaterally. Patient complaining of left leg pain (not acute), RN will place Lidocaine patch and will administered Oxycodone as per patients requests   Skin: moist.   Psych: appears emotional, crying. Emotional support provided.   Musculoskeletal: Able to move extremities         LABS/ CULTURES/ RADIOLOGY:              10.0   13.10 >-----------<  448      [04-22-22 @ 13:05]              35.1     136  |  96  |  18  ----------------------------<  184      [04-22-22 @ 13:05]  4.1   |  23  |  4.80        Ca     8.8     [04-22-22 @ 13:05]      Mg     2.00     [04-22-22 @ 13:05]      Phos  3.2     [04-22-22 @ 13:05]        CAPILLARY BLOOD GLUCOSE      POCT Blood Glucose.: 161 mg/dL (22 Apr 2022 10:18)  POCT Blood Glucose.: 160 mg/dL (22 Apr 2022 09:04)  POCT Blood Glucose.: 136 mg/dL (21 Apr 2022 21:40)  POCT Blood Glucose.: 120 mg/dL (21 Apr 2022 18:08)      A1C with Estimated Average Glucose Result: 6.1 % (03-20-22 @ 12:48)  A1C with Estimated Average Glucose Result: 7.0 % (01-13-22 @ 08:21)

## 2022-04-22 NOTE — PROGRESS NOTE ADULT - SUBJECTIVE AND OBJECTIVE BOX
ANTONIA ORTIZ  56y  Female      Patient is a 56y old  Female who presents with a chief complaint of vaginal bleeding (21 Apr 2022 11:39)  Patient feels ok.s/p fistulogram LUE,doing well.  no sob,no cp,    MEDICATIONS  (STANDING):  apixaban 5 milliGRAM(s) Oral every 12 hours  bisacodyl 5 milliGRAM(s) Oral at bedtime  buPROPion XL (24-Hour) . 150 milliGRAM(s) Oral daily  chlorhexidine 2% Cloths 1 Application(s) Topical daily  cinacalcet 30 milliGRAM(s) Oral daily  cycloSPORINE  , modified (NEORAL) 200 milliGRAM(s) Oral two times a day  Dakins Solution - 1/4 Strength 1 Application(s) Topical daily  dextrose 40% Gel 15 Gram(s) Oral once  dextrose 5%. 1000 milliLiter(s) (50 mL/Hr) IV Continuous <Continuous>  dextrose 5%. 1000 milliLiter(s) (100 mL/Hr) IV Continuous <Continuous>  dextrose 50% Injectable 25 Gram(s) IV Push once  dextrose 50% Injectable 12.5 Gram(s) IV Push once  dextrose 50% Injectable 25 Gram(s) IV Push once  diltiazem    milliGRAM(s) Oral daily  epoetin anabela-epbx (RETACRIT) Injectable 29301 Unit(s) IV Push <User Schedule>  gabapentin 300 milliGRAM(s) Oral daily  glucagon  Injectable 1 milliGRAM(s) IntraMuscular once  insulin glargine Injectable (LANTUS) 12 Unit(s) SubCutaneous at bedtime  insulin lispro (ADMELOG) corrective regimen sliding scale   SubCutaneous three times a day before meals  insulin lispro (ADMELOG) corrective regimen sliding scale   SubCutaneous at bedtime  lactobacillus acidophilus 1 Tablet(s) Oral daily  lidocaine   4% Patch 1 Patch Transdermal daily  lidocaine   4% Patch 1 Patch Transdermal daily  lidocaine   4% Patch 1 Patch Transdermal daily  lidocaine/prilocaine Cream 1 Application(s) Topical <User Schedule>  mirtazapine 7.5 milliGRAM(s) Oral at bedtime  montelukast 10 milliGRAM(s) Oral at bedtime  pantoprazole    Tablet 40 milliGRAM(s) Oral before breakfast  polyethylene glycol 3350 17 Gram(s) Oral at bedtime  senna 2 Tablet(s) Oral at bedtime  sertraline 50 milliGRAM(s) Oral daily  simethicone 80 milliGRAM(s) Chew three times a day  tacrolimus   0.1% Ointment 1 Application(s) Topical daily  traZODone 100 milliGRAM(s) Oral at bedtime    MEDICATIONS  (PRN):  acetaminophen     Tablet .. 650 milliGRAM(s) Oral every 6 hours PRN Temp greater or equal to 38C (100.4F), Mild Pain (1 - 3)  aluminum hydroxide/magnesium hydroxide/simethicone Suspension 30 milliLiter(s) Oral every 6 hours PRN Dyspepsia  benzonatate 100 milliGRAM(s) Oral three times a day PRN Cough  diphenhydrAMINE Injectable 25 milliGRAM(s) IV Push <User Schedule> PRN Rash and/or Itching  guaiFENesin Oral Liquid (Sugar-Free) 100 milliGRAM(s) Oral every 6 hours PRN Cough  melatonin 6 milliGRAM(s) Oral at bedtime PRN Insomnia  midodrine. 10 milliGRAM(s) Oral <User Schedule> PRN 30 minutes prior to dialysis  ondansetron Injectable 4 milliGRAM(s) IV Push every 8 hours PRN Nausea and/or Vomiting  oxyCODONE    IR 5 milliGRAM(s) Oral every 6 hours PRN Severe Pain (7 - 10)      Vital Signs Last 24 Hrs  T(C): 36.8 (04-22-22 @ 20:51), Max: 37.1 (04-22-22 @ 11:46)  T(F): 98.2 (04-22-22 @ 20:51), Max: 98.8 (04-22-22 @ 11:46)  HR: 83 (04-22-22 @ 20:51) (83 - 88)  BP: 118/66 (04-22-22 @ 20:51) (107/63 - 127/71)  BP(mean): --  RR: 17 (04-22-22 @ 20:51) (17 - 18)  SpO2: 100% (04-22-22 @ 20:51) (96% - 100%)        PAST MEDICAL & SURGICAL HISTORY:  COPD (chronic obstructive pulmonary disease)    DM (diabetes mellitus)    Atrial fibrillation  with loop recorder 2015, battery most likely depleted, as per cardiac clearance, Dr. Reece Anesthesia aware, pt on Eliquis    HTN (hypertension)    Morbid obesity  BMI - 58.3    Chronic GERD    CHAMP (obstructive sleep apnea)  non compliance with CPAP, Anesthesia Dr. Reece aware, pt told to bring CPAP for sx, pr verbalized understanding    Potential difficult airway on pre-intubation assessment  airway class III, large neck, morbid obesity, hx of CHAMP, no compliance with CPAP- Dr. Reece, Anesthesia aware    End-stage renal disease    Anemia    Medication management    H/O tubal ligation  1989    Status post placement of implantable loop recorder  left chest- 2015    History of vascular access device  s/p insertion right chest permacath 2/2019, removal 6/2019, insertion left chest permacath 6/2019    S/P arteriovenous (AV) fistula creation  left  arm 6/2019    PHYSICAL EXAM:  GENERAL: Alert and awake lying in bed in no distress  HEAD:  Atraumatic, Normocephalic  EYES: EOMI, MO, conjunctiva and sclera clear  NECK: Supple, No JVD, Normal thyroid  NERVOUS SYSTEM:  Alert & Oriented X3, Motor and sensory systems are intact,   CHEST/LUNG: Bilateral clear breath sounds, no rhochi, no wheezing, no crepitations,  HEART: Regular rate and rhythm; No murmurs, rubs, or gallops  ABDOMEN: Soft, Nontender, Nondistended; Bowel sounds present  EXTREMITIES:   Peripheral Pulses are palpable, + edema    LABS:  04-22    136  |  96<L>  |  18  ----------------------------<  184<H>  4.1   |  23  |  4.80<H>    Ca    8.8      22 Apr 2022 13:05  Phos  3.2     04-22  Mg     2.00     04-22      Creatinine Trend: 4.80 <--, 5.42 <--, 5.20 <--, 4.56 <--, 4.91 <--                        10.0   13.10 )-----------( 448      ( 22 Apr 2022 13:05 )             35.1     Urine Studies:                      =

## 2022-04-22 NOTE — PROGRESS NOTE ADULT - SUBJECTIVE AND OBJECTIVE BOX
SURGERY DAILY PROGRESS NOTE:     SUBJECTIVE/24hr Events:     Patient seen and examined on am rounds. S/p Fistulogram yesterday, recovering well. Tolerated HD post procedure.      OBJECTIVE:    MEDICATIONS  (STANDING):  apixaban 5 milliGRAM(s) Oral every 12 hours  bisacodyl 5 milliGRAM(s) Oral at bedtime  buPROPion XL (24-Hour) . 150 milliGRAM(s) Oral daily  chlorhexidine 2% Cloths 1 Application(s) Topical daily  cinacalcet 30 milliGRAM(s) Oral daily  cycloSPORINE  , modified (NEORAL) 200 milliGRAM(s) Oral two times a day  Dakins Solution - 1/4 Strength 1 Application(s) Topical daily  dextrose 40% Gel 15 Gram(s) Oral once  dextrose 5%. 1000 milliLiter(s) (50 mL/Hr) IV Continuous <Continuous>  dextrose 5%. 1000 milliLiter(s) (100 mL/Hr) IV Continuous <Continuous>  dextrose 50% Injectable 25 Gram(s) IV Push once  dextrose 50% Injectable 12.5 Gram(s) IV Push once  dextrose 50% Injectable 25 Gram(s) IV Push once  diltiazem    milliGRAM(s) Oral daily  epoetin anabela-epbx (RETACRIT) Injectable 73453 Unit(s) IV Push <User Schedule>  gabapentin 300 milliGRAM(s) Oral daily  glucagon  Injectable 1 milliGRAM(s) IntraMuscular once  insulin glargine Injectable (LANTUS) 12 Unit(s) SubCutaneous at bedtime  insulin lispro (ADMELOG) corrective regimen sliding scale   SubCutaneous three times a day before meals  insulin lispro (ADMELOG) corrective regimen sliding scale   SubCutaneous at bedtime  lactobacillus acidophilus 1 Tablet(s) Oral daily  lidocaine   4% Patch 1 Patch Transdermal daily  lidocaine   4% Patch 1 Patch Transdermal daily  lidocaine   4% Patch 1 Patch Transdermal daily  lidocaine/prilocaine Cream 1 Application(s) Topical <User Schedule>  mirtazapine 7.5 milliGRAM(s) Oral at bedtime  montelukast 10 milliGRAM(s) Oral at bedtime  pantoprazole    Tablet 40 milliGRAM(s) Oral before breakfast  polyethylene glycol 3350 17 Gram(s) Oral at bedtime  senna 2 Tablet(s) Oral at bedtime  sertraline 50 milliGRAM(s) Oral daily  simethicone 80 milliGRAM(s) Chew three times a day  tacrolimus   0.1% Ointment 1 Application(s) Topical daily  traZODone 100 milliGRAM(s) Oral at bedtime    MEDICATIONS  (PRN):  acetaminophen     Tablet .. 650 milliGRAM(s) Oral every 6 hours PRN Temp greater or equal to 38C (100.4F), Mild Pain (1 - 3)  aluminum hydroxide/magnesium hydroxide/simethicone Suspension 30 milliLiter(s) Oral every 6 hours PRN Dyspepsia  benzonatate 100 milliGRAM(s) Oral three times a day PRN Cough  diphenhydrAMINE Injectable 25 milliGRAM(s) IV Push <User Schedule> PRN Rash and/or Itching  guaiFENesin Oral Liquid (Sugar-Free) 100 milliGRAM(s) Oral every 6 hours PRN Cough  melatonin 6 milliGRAM(s) Oral at bedtime PRN Insomnia  midodrine. 10 milliGRAM(s) Oral <User Schedule> PRN 30 minutes prior to dialysis  ondansetron Injectable 4 milliGRAM(s) IV Push every 8 hours PRN Nausea and/or Vomiting  oxyCODONE    IR 5 milliGRAM(s) Oral every 6 hours PRN Severe Pain (7 - 10)      Vital Signs Last 24 Hrs  T(C): 37.1 (22 Apr 2022 11:46), Max: 37.1 (22 Apr 2022 11:46)  T(F): 98.8 (22 Apr 2022 11:46), Max: 98.8 (22 Apr 2022 11:46)  HR: 84 (22 Apr 2022 11:46) (76 - 89)  BP: 107/63 (22 Apr 2022 11:46) (107/63 - 121/69)  BP(mean): --  RR: 18 (22 Apr 2022 11:46) (17 - 18)  SpO2: 96% (22 Apr 2022 11:46) (96% - 100%)      I&O's Detail    21 Apr 2022 07:01  -  22 Apr 2022 07:00  --------------------------------------------------------  IN:    Other (mL): 400 mL  Total IN: 400 mL    OUT:    Other (mL): 1900 mL  Total OUT: 1900 mL    Total NET: -1500 mL        LABS:                        10.0   13.10 )-----------( 448      ( 22 Apr 2022 13:05 )             35.1     04-22    136  |  96<L>  |  18  ----------------------------<  184<H>  4.1   |  23  |  4.80<H>    Ca    8.8      22 Apr 2022 13:05  Phos  3.2     04-22  Mg     2.00     04-22      PHYSICAL EXAM:  Constitutional:NAD  ENMT: normal facies, symmetric  Respiratory: Normal respiratory effort   Extremity: Palpable radial pulse, sheath site c/d/i. Palpable thrill.   Psychiatric: oriented x 3

## 2022-04-22 NOTE — PROGRESS NOTE ADULT - ASSESSMENT
56y Female with history of ESRD on HD presents with vaginal bleeding. Nephrology consulted for ESRD status.    1) ESRD: Last HD on 4/21 post-fistulogram tolerated well with 1.5L removed. Plan for next maintenance HD on 4/23. Vascular surgery intervention appreciated. Monitor electrolytes.    2) HTN with ESRD: BP acceptable on Cardizem. Continue with midodrine 10 mg pre-HD on HD days to avoid intradialytic hypotension. Monitor BP.    3) Anemia of renal disease: With component of blood loss from vaginal bleeding. Hb and iron stores acceptable. Continue with Epo 20K with HD. Monitor Hb.    4) Secondary HPT of renal origin: Phosphorus acceptable. Continue with sensipar 30 mg daily and renal diet. Monitor serum calcium and phosphorus.      [facility] NEPHROLOGY  Keaton Lopez M.D.  Juma Green D.O.  Myla Hoffmann M.D.  Julia Brewer, JASIEL, ANP-C    Telephone: [phone]  Facsimile: [phone]    [address] [place] [address]

## 2022-04-22 NOTE — PROGRESS NOTE ADULT - ASSESSMENT
Assessment: 56y old  Female  ESRD, morbid obesity, CHAMP not on home O2, ESRD (HD MWF), HTN, DM, COPD, Afib  ( on ac ),  chronic R pannus pyoderma gangrenosum presented with 1 week of vaginal bleeding (GYN assisted, patient to follow up outpatient as per notes). .   On previous admission patient followed by Wound surgery and dermatology. right pannus PG. Remains on Cyclosporin. Topical tacrolimus, adaptic touch, Lightly pack with aquacel ag.     Exam:  Wound dimensions in main wound with some improvements in width. Undermining area increase in size weight shift?. Satellite lesion at 3 o'clock with mild increase in measurements. Discussed with findings with Attending Wound MD.   Mild odor remains. No signs of cellulitis. No purulence. Soft tissue contracture observed.   Tissue type with + pink tissue mostly. Re-epithelization along wound edges.  Patient denied Pain and tenderness of abdomen during dressing changes, complains of leg pain.    Interdry Textile dressing applied over ABD pannus. No suspected candidiasis.      Plan:  -Topical dressing: Cleanse with Dakins 1/4 strength. Apply Liquid barrier film to periwound skin. Apply Topical Tacrolimus 0.1% ointment to wound base and wound edges, adaptic touch to cover, apply Aquacel AG, and abdominal pad. Change daily.  -Interdry textile sheeting beneath abdominal pannus leaving 2" out at end to wick.  -Agree to continue with Cyclosporin   -Glucose control per primary team/endocrinology  -Continue to follow nutrition/RD recommendations   -Continue to offload pressure  -DVT prophylaxis    Findings and plan discussed with patient and primary team.    Upon discharge follow up at outpatient [facility]. [address] [place] [address]. [phone].    Will continue to follow periodically while inpatient.  Please reconsult earlier if needed.  Thank you.    Natty Cartagena, CARMELA-BC, [facility]    pager #[phone]  If after 4PM or before 7:30AM on Mon-Friday or weekend/holiday please contact general surgery for urgent matters.   Team A- [id number]   Team B- [id number]  For non-urgent matters e-mail [email or web address]    I spent 25 minutes face-to-face with this patient of which more than 50% of the time was spent counseling/coordinating care of this patient.

## 2022-04-22 NOTE — CHART NOTE - NSCHARTNOTEFT_GEN_A_CORE
Patient c/o L knee pain today.  There is tenderness with range of motion which she says started after procedure yesterday.   She is able to range knee passively but it is painful pain.  No obvious deformities or injuries noted.  Will check Xray L knee further evaluation.   Discussed with vascular - ok to restart eliquis today. Eliquis restarted.   Case and plan discussed with Dr. Maurice.

## 2022-04-22 NOTE — PROGRESS NOTE ADULT - SUBJECTIVE AND OBJECTIVE BOX
CARDIOLOGY FOLLOW UP - Dr. Bullock  DATE OF SERVICE: 4/22/22     CC no cp or sob       REVIEW OF SYSTEMS:  CONSTITUTIONAL: No fever, weight loss, or fatigue  RESPIRATORY: No cough, wheezing, chills or hemoptysis; No Shortness of Breath  CARDIOVASCULAR: No chest pain, palpitations, passing out, dizziness, or leg swelling  GASTROINTESTINAL: No abdominal or epigastric pain. No nausea, vomiting, or hematemesis; No diarrhea or constipation. No melena or hematochezia.    PHYSICAL EXAM:  T(C): 36.8 (04-22-22 @ 06:00), Max: 36.8 (04-21-22 @ 12:21)  HR: 88 (04-22-22 @ 06:00) (76 - 89)  BP: 120/66 (04-22-22 @ 06:00) (108/57 - 121/69)  RR: 17 (04-22-22 @ 06:00) (16 - 18)  SpO2: 97% (04-22-22 @ 06:00) (97% - 100%)  Wt(kg): --  I&O's Summary    21 Apr 2022 07:01  -  22 Apr 2022 07:00  --------------------------------------------------------  IN: 400 mL / OUT: 1900 mL / NET: -1500 mL        Appearance: Normal	  Cardiovascular: Normal S1 S2,RRR, No JVD, No murmurs  Respiratory: Lungs clear to auscultation	  Gastrointestinal:  Soft, Non-tender, + BS	  Extremities: Normal range of motion, No clubbing, cyanosis or edema      Home Medications:  apixaban 5 mg oral tablet: 1 tab(s) orally every 12 hours (15 Apr 2022 10:37)  Aspercreme with Lidocaine 4% topical film: Apply topically to affected area once a day (low back) (20 Mar 2022 10:15)  Aspercreme with Lidocaine 4% topical film: Apply topically to affected area once a day (L knee) (20 Mar 2022 10:15)  aspirin 81 mg oral delayed release tablet: 1 tab(s) orally once a day (20 Mar 2022 10:15)  benzonatate 100 mg oral capsule: 1 cap(s) orally 3 times a day, As needed, Cough (15 Apr 2022 10:37)  bisacodyl 5 mg oral delayed release tablet: 1 tab(s) orally once a day (at bedtime) (15 Apr 2022 10:37)  buPROPion 150 mg/24 hours (XL) oral tablet, extended release: 1 tab(s) orally once a day (in the morning) (20 Mar 2022 10:15)  cinacalcet 30 mg oral tablet: 1 tab(s) orally once a day (15 Apr 2022 11:16)  cycloSPORINE modified 100 mg oral capsule: 2 cap(s) orally 2 times a day (15 Apr 2022 10:37)  cycloSPORINE modified 100 mg oral capsule: 2 cap(s) orally once a day (at bedtime) (20 Mar 2022 10:15)  cycloSPORINE modified 50 mg oral capsule: 3 cap(s) orally once a day in the morning  (20 Mar 2022 10:15)  dilTIAZem 120 mg/24 hours oral capsule, extended release: 1 cap(s) orally once a day (hold SBP&lt;110, HR&lt;60) (20 Mar 2022 10:15)  dilTIAZem 120 mg/24 hours oral capsule, extended release: 1 cap(s) orally once a day (15 Apr 2022 10:37)  diphenhydrAMINE 50 mg/mL injectable solution: 25 milligram(s) injectable 3 times a week (at 08:00 before dialysis sessions on Tuesday/Thursday/saturday) (15 Apr 2022 10:37)  doxycycline hyclate 100 mg oral tablet: 1 tab(s) orally 2 times a day (20 Mar 2022 10:15)  Eliquis 2.5 mg oral tablet: 1 tab(s) orally 2 times a day    Pharmacy states patient no longer wants to fill this medication (20 Mar 2022 10:15)  gabapentin 300 mg oral capsule: 1 cap(s) orally 2 times a day (20 Mar 2022 10:15)  gabapentin 300 mg oral capsule: 1 cap(s) orally once a day (15 Apr 2022 10:37)  insulin glargine: 15 unit(s) subcutaneous once a day (at bedtime) (20 Mar 2022 10:15)  lidocaine 4% topical film: Apply topically to affected area once a day to right knee (15 Apr 2022 10:37)  lidocaine 4% topical film: Apply topically to affected area once a day to lower back (15 Apr 2022 10:37)  lidocaine 4% topical film: Apply topically to affected area once a day to left knee (15 Apr 2022 10:37)  lidocaine-prilocaine 2.5%-2.5% topical cream: 1 application topically  (15 Apr 2022 10:37)  midodrine 10 mg oral tablet: 1 tab(s) orally  3 times a week, As Needed 30 minutes prior to dialysis (08:00 on Tuesdays/Thursdays/Saturdays). HOLD if SBP &gt;130 (15 Apr 2022 11:35)  midodrine 5 mg oral tablet: 1 tab(s) orally 3 times a week, 30 minute prior to dialysis sessions (hold is SBP&gt;130) (20 Mar 2022 10:15)  mirtazapine 7.5 mg oral tablet: 1 tab(s) orally once a day (at bedtime) (20 Mar 2022 10:15)  montelukast 10 mg oral tablet: 1 tab(s) orally once a day (in the evening) (20 Mar 2022 10:15)  montelukast 10 mg oral tablet: 1 tab(s) orally once a day (at bedtime) (15 Apr 2022 10:37)  oxyCODONE 10 mg oral tablet, extended release: 1 tab(s) orally every 12 hours (20 Mar 2022 10:15)  oxycodone-acetaminophen 7.5 mg-325 mg oral tablet: 1 tab(s) orally every 6 hours, As Needed (20 Mar 2022 10:15)  pantoprazole 40 mg oral delayed release tablet: 1 tab(s) orally once a day (20 Mar 2022 10:15)  pantoprazole 40 mg oral delayed release tablet: 1 tab(s) orally once a day (before a meal) (15 Apr 2022 10:37)  polyethylene glycol 3350 oral powder for reconstitution: 17 gram(s) orally once a day (at bedtime) (15 Apr 2022 10:37)  polyethylene glycol 3350 oral powder for reconstitution: 17 gram(s) orally once a day (at bedtime) (20 Mar 2022 10:15)  senna oral tablet: 2 tab(s) orally once a day (at bedtime) (20 Mar 2022 10:15)  senna oral tablet: 2 tab(s) orally once a day (at bedtime) (15 Apr 2022 10:37)  sertraline 50 mg oral tablet: 1 tab(s) orally once a day (20 Mar 2022 10:15)  sevelamer carbonate 800 mg oral tablet: 1 tab(s) orally 3 times a day (with meals) (20 Mar 2022 10:15)  simethicone 80 mg oral tablet, chewable: 1 tab(s) orally 3 times a day (before meals) (20 Mar 2022 10:15)  simvastatin 10 mg oral tablet: 1 tab(s) orally once a day (at bedtime) (20 Mar 2022 10:15)  sodium hypochlorite 0.125% topical solution: 1 application topically once a day to affected area (pannus wound on abdomen) (15 Apr 2022 10:37)  sodium hypochlorite 0.25% topical solution: 1 application topically once a day (20 Mar 2022 10:15)  tacrolimus 0.1% topical ointment: 1 application topically once a day (20 Mar 2022 10:15)  tacrolimus 0.1% topical ointment: 1 application topically once a day (15 Apr 2022 10:37)  traZODone 100 mg oral tablet: 1 tab(s) orally once a day (at bedtime) (20 Mar 2022 10:15)      MEDICATIONS  (STANDING):  bisacodyl 5 milliGRAM(s) Oral at bedtime  buPROPion XL (24-Hour) . 150 milliGRAM(s) Oral daily  chlorhexidine 2% Cloths 1 Application(s) Topical daily  cinacalcet 30 milliGRAM(s) Oral daily  cycloSPORINE  , modified (NEORAL) 200 milliGRAM(s) Oral two times a day  Dakins Solution - 1/4 Strength 1 Application(s) Topical daily  dextrose 40% Gel 15 Gram(s) Oral once  dextrose 5%. 1000 milliLiter(s) (50 mL/Hr) IV Continuous <Continuous>  dextrose 5%. 1000 milliLiter(s) (100 mL/Hr) IV Continuous <Continuous>  dextrose 50% Injectable 25 Gram(s) IV Push once  dextrose 50% Injectable 12.5 Gram(s) IV Push once  dextrose 50% Injectable 25 Gram(s) IV Push once  diltiazem    milliGRAM(s) Oral daily  epoetin anabela-epbx (RETACRIT) Injectable 50328 Unit(s) IV Push <User Schedule>  gabapentin 300 milliGRAM(s) Oral daily  glucagon  Injectable 1 milliGRAM(s) IntraMuscular once  heparin   Injectable 7500 Unit(s) SubCutaneous every 12 hours  insulin glargine Injectable (LANTUS) 12 Unit(s) SubCutaneous at bedtime  insulin lispro (ADMELOG) corrective regimen sliding scale   SubCutaneous three times a day before meals  insulin lispro (ADMELOG) corrective regimen sliding scale   SubCutaneous at bedtime  lactobacillus acidophilus 1 Tablet(s) Oral daily  lidocaine   4% Patch 1 Patch Transdermal daily  lidocaine   4% Patch 1 Patch Transdermal daily  lidocaine   4% Patch 1 Patch Transdermal daily  lidocaine/prilocaine Cream 1 Application(s) Topical <User Schedule>  mirtazapine 7.5 milliGRAM(s) Oral at bedtime  montelukast 10 milliGRAM(s) Oral at bedtime  pantoprazole    Tablet 40 milliGRAM(s) Oral before breakfast  polyethylene glycol 3350 17 Gram(s) Oral at bedtime  senna 2 Tablet(s) Oral at bedtime  sertraline 50 milliGRAM(s) Oral daily  simethicone 80 milliGRAM(s) Chew three times a day  tacrolimus   0.1% Ointment 1 Application(s) Topical daily  traZODone 100 milliGRAM(s) Oral at bedtime      TELEMETRY: 	    ECG:  	  RADIOLOGY:   DIAGNOSTIC TESTING:  [ ] Echocardiogram:  [ ]  Catheterization:  [ ] Stress Test:    OTHER: 	    LABS:	 	                            10.4   11.89 )-----------( 494      ( 21 Apr 2022 07:34 )             36.2     04-21    136  |  94<L>  |  20  ----------------------------<  140<H>  3.9   |  24  |  5.42<H>    Ca    9.0      21 Apr 2022 07:34  Phos  3.1     04-21  Mg     2.00     04-21

## 2022-04-22 NOTE — PROGRESS NOTE ADULT - ASSESSMENT
Echo 1/19/22: grossly nl LV sys fx, min MR     a/p  56 year old female with hx of morbid obesity, CHAMP not on home O2, ESRD (HD MWF), HTN, DM, COPD, Afib no longer on AC, chronic R pannus wound, followed by Dr. Layne, likely pyoderma gangrenosum presents for vaginal bleeding     #Influenza  -sp oseltamivir  per med  -supportive care  -med f/u     #Chronic Pannus Wound  -surgical eval noted, wound care  -sp abx per med     #Chronic Afib  -stable, rates controlled  -cont dilt as ordered  -RESUME oral AC - per vascular sp Fistulogram 4/21    #Hypertension  -overall stable  -cont ccb   -mido pre-HD on HD days per renal     #ESRD on HD  -HD per renal  -vascular fu -sp LUE fistulogram  4/21    #Gyn bleeding  -s/p EUA, D&C, diagnostic hysteroscopy, insertion of Mirena IUD  -cv remains stable post op  -med f/u

## 2022-04-23 NOTE — PROGRESS NOTE ADULT - ASSESSMENT
Echo 1/19/22: grossly nl LV sys fx, min MR     a/p  56 year old female with hx of morbid obesity, CHAMP not on home O2, ESRD (HD MWF), HTN, DM, COPD, Afib no longer on AC, chronic R pannus wound, followed by Dr. Layne, likely pyoderma gangrenosum presents for vaginal bleeding     #Influenza  -sp oseltamivir  per med  -supportive care  -med f/u     #Chronic Pannus Wound  -surgical eval noted, wound care  -sp abx per med     #Chronic Afib  -stable, rates controlled  -cont dilt as ordered  -cont a/c     #Hypertension  -overall stable  -cont ccb   -mido pre-HD on HD days per renal     #ESRD on HD  -HD per renal  -vascular fu -sp LUE fistulogram  4/21    #Gyn bleeding  -s/p EUA, D&C, diagnostic hysteroscopy, insertion of Mirena IUD  -cv remains stable post op  -med f/u         35 minutes spent on total encounter; more than 50% of the visit was spent counseling and/or coordinating care by the attending physician.   discussed all surgical intraoperative planning. patient is currently a 38DD and would like to only have a mastopexy and minimal reduction of her current size. Patient understands all benefits, risks, and complications to the procedure and consents to the above. All incision sites planned and marked with patient consents in the preoperative holding area.  please see office notes and consultation for further details. Patient cleared by PMD and cardiologist

## 2022-04-23 NOTE — PROGRESS NOTE ADULT - SUBJECTIVE AND OBJECTIVE BOX
CARDIOLOGY FOLLOW UP NOTE - DR. JAQUEZ    Patient Name: ANTONIA ORTIZ  Date of Service: 04-23-22         Patient seen and examined    Subjective:    cv: denies chest pain, dyspnea, palpitations, dizziness  pulmonary: denies cough  GI: denies abdominal pain, nausea, vomiting  vascular/legs: no edema   skin: no rash  ROS: otherwise negative   overnight events:      PHYSICAL EXAM:  T(C): 37.2 (04-23-22 @ 13:37), Max: 37.2 (04-23-22 @ 13:37)  HR: 93 (04-23-22 @ 13:37) (77 - 93)  BP: 127/95 (04-23-22 @ 13:37) (110/66 - 127/95)  RR: 18 (04-23-22 @ 13:37) (16 - 18)  SpO2: 100% (04-23-22 @ 13:37) (100% - 100%)  Wt(kg): --  I&O's Summary    23 Apr 2022 07:01  -  23 Apr 2022 14:42  --------------------------------------------------------  IN: 900 mL / OUT: 2800 mL / NET: -1900 mL      Daily     Daily     Appearance: Normal	  Cardiovascular: Normal S1 S2,RRR, No JVD, No murmurs  Respiratory: Lungs clear to auscultation	  Gastrointestinal:  Soft, Non-tender, + BS	  Extremities: Normal range of motion, No clubbing, cyanosis or edema      Home Medications:  apixaban 5 mg oral tablet: 1 tab(s) orally every 12 hours (15 Apr 2022 10:37)  Aspercreme with Lidocaine 4% topical film: Apply topically to affected area once a day (low back) (20 Mar 2022 10:15)  Aspercreme with Lidocaine 4% topical film: Apply topically to affected area once a day (L knee) (20 Mar 2022 10:15)  aspirin 81 mg oral delayed release tablet: 1 tab(s) orally once a day (20 Mar 2022 10:15)  benzonatate 100 mg oral capsule: 1 cap(s) orally 3 times a day, As needed, Cough (15 Apr 2022 10:37)  bisacodyl 5 mg oral delayed release tablet: 1 tab(s) orally once a day (at bedtime) (15 Apr 2022 10:37)  buPROPion 150 mg/24 hours (XL) oral tablet, extended release: 1 tab(s) orally once a day (in the morning) (20 Mar 2022 10:15)  cinacalcet 30 mg oral tablet: 1 tab(s) orally once a day (15 Apr 2022 11:16)  cycloSPORINE modified 100 mg oral capsule: 2 cap(s) orally 2 times a day (15 Apr 2022 10:37)  cycloSPORINE modified 100 mg oral capsule: 2 cap(s) orally once a day (at bedtime) (20 Mar 2022 10:15)  cycloSPORINE modified 50 mg oral capsule: 3 cap(s) orally once a day in the morning  (20 Mar 2022 10:15)  dilTIAZem 120 mg/24 hours oral capsule, extended release: 1 cap(s) orally once a day (hold SBP&lt;110, HR&lt;60) (20 Mar 2022 10:15)  dilTIAZem 120 mg/24 hours oral capsule, extended release: 1 cap(s) orally once a day (15 Apr 2022 10:37)  diphenhydrAMINE 50 mg/mL injectable solution: 25 milligram(s) injectable 3 times a week (at 08:00 before dialysis sessions on Tuesday/Thursday/saturday) (15 Apr 2022 10:37)  doxycycline hyclate 100 mg oral tablet: 1 tab(s) orally 2 times a day (20 Mar 2022 10:15)  Eliquis 2.5 mg oral tablet: 1 tab(s) orally 2 times a day    Pharmacy states patient no longer wants to fill this medication (20 Mar 2022 10:15)  gabapentin 300 mg oral capsule: 1 cap(s) orally 2 times a day (20 Mar 2022 10:15)  gabapentin 300 mg oral capsule: 1 cap(s) orally once a day (15 Apr 2022 10:37)  insulin glargine: 15 unit(s) subcutaneous once a day (at bedtime) (20 Mar 2022 10:15)  lidocaine 4% topical film: Apply topically to affected area once a day to right knee (15 Apr 2022 10:37)  lidocaine 4% topical film: Apply topically to affected area once a day to lower back (15 Apr 2022 10:37)  lidocaine 4% topical film: Apply topically to affected area once a day to left knee (15 Apr 2022 10:37)  lidocaine-prilocaine 2.5%-2.5% topical cream: 1 application topically  (15 Apr 2022 10:37)  midodrine 10 mg oral tablet: 1 tab(s) orally  3 times a week, As Needed 30 minutes prior to dialysis (08:00 on Tuesdays/Thursdays/Saturdays). HOLD if SBP &gt;130 (15 Apr 2022 11:35)  midodrine 5 mg oral tablet: 1 tab(s) orally 3 times a week, 30 minute prior to dialysis sessions (hold is SBP&gt;130) (20 Mar 2022 10:15)  mirtazapine 7.5 mg oral tablet: 1 tab(s) orally once a day (at bedtime) (20 Mar 2022 10:15)  montelukast 10 mg oral tablet: 1 tab(s) orally once a day (in the evening) (20 Mar 2022 10:15)  montelukast 10 mg oral tablet: 1 tab(s) orally once a day (at bedtime) (15 Apr 2022 10:37)  oxyCODONE 10 mg oral tablet, extended release: 1 tab(s) orally every 12 hours (20 Mar 2022 10:15)  oxycodone-acetaminophen 7.5 mg-325 mg oral tablet: 1 tab(s) orally every 6 hours, As Needed (20 Mar 2022 10:15)  pantoprazole 40 mg oral delayed release tablet: 1 tab(s) orally once a day (20 Mar 2022 10:15)  pantoprazole 40 mg oral delayed release tablet: 1 tab(s) orally once a day (before a meal) (15 Apr 2022 10:37)  polyethylene glycol 3350 oral powder for reconstitution: 17 gram(s) orally once a day (at bedtime) (15 Apr 2022 10:37)  polyethylene glycol 3350 oral powder for reconstitution: 17 gram(s) orally once a day (at bedtime) (20 Mar 2022 10:15)  senna oral tablet: 2 tab(s) orally once a day (at bedtime) (20 Mar 2022 10:15)  senna oral tablet: 2 tab(s) orally once a day (at bedtime) (15 Apr 2022 10:37)  sertraline 50 mg oral tablet: 1 tab(s) orally once a day (20 Mar 2022 10:15)  sevelamer carbonate 800 mg oral tablet: 1 tab(s) orally 3 times a day (with meals) (20 Mar 2022 10:15)  simethicone 80 mg oral tablet, chewable: 1 tab(s) orally 3 times a day (before meals) (20 Mar 2022 10:15)  simvastatin 10 mg oral tablet: 1 tab(s) orally once a day (at bedtime) (20 Mar 2022 10:15)  sodium hypochlorite 0.125% topical solution: 1 application topically once a day to affected area (pannus wound on abdomen) (15 Apr 2022 10:37)  sodium hypochlorite 0.25% topical solution: 1 application topically once a day (20 Mar 2022 10:15)  tacrolimus 0.1% topical ointment: 1 application topically once a day (20 Mar 2022 10:15)  tacrolimus 0.1% topical ointment: 1 application topically once a day (15 Apr 2022 10:37)  traZODone 100 mg oral tablet: 1 tab(s) orally once a day (at bedtime) (20 Mar 2022 10:15)      MEDICATIONS  (STANDING):  apixaban 5 milliGRAM(s) Oral every 12 hours  bisacodyl 5 milliGRAM(s) Oral at bedtime  buPROPion XL (24-Hour) . 150 milliGRAM(s) Oral daily  chlorhexidine 2% Cloths 1 Application(s) Topical daily  cinacalcet 30 milliGRAM(s) Oral daily  cycloSPORINE  , modified (NEORAL) 200 milliGRAM(s) Oral two times a day  Dakins Solution - 1/4 Strength 1 Application(s) Topical daily  dextrose 40% Gel 15 Gram(s) Oral once  dextrose 5%. 1000 milliLiter(s) (50 mL/Hr) IV Continuous <Continuous>  dextrose 5%. 1000 milliLiter(s) (100 mL/Hr) IV Continuous <Continuous>  dextrose 50% Injectable 25 Gram(s) IV Push once  dextrose 50% Injectable 12.5 Gram(s) IV Push once  dextrose 50% Injectable 25 Gram(s) IV Push once  diltiazem    milliGRAM(s) Oral daily  epoetin anabela-epbx (RETACRIT) Injectable 49206 Unit(s) IV Push <User Schedule>  gabapentin 300 milliGRAM(s) Oral daily  glucagon  Injectable 1 milliGRAM(s) IntraMuscular once  insulin glargine Injectable (LANTUS) 12 Unit(s) SubCutaneous at bedtime  insulin lispro (ADMELOG) corrective regimen sliding scale   SubCutaneous three times a day before meals  insulin lispro (ADMELOG) corrective regimen sliding scale   SubCutaneous at bedtime  lactobacillus acidophilus 1 Tablet(s) Oral daily  lidocaine   4% Patch 1 Patch Transdermal daily  lidocaine   4% Patch 1 Patch Transdermal daily  lidocaine   4% Patch 1 Patch Transdermal daily  lidocaine/prilocaine Cream 1 Application(s) Topical <User Schedule>  mirtazapine 7.5 milliGRAM(s) Oral at bedtime  montelukast 10 milliGRAM(s) Oral at bedtime  pantoprazole    Tablet 40 milliGRAM(s) Oral before breakfast  polyethylene glycol 3350 17 Gram(s) Oral at bedtime  senna 2 Tablet(s) Oral at bedtime  sertraline 50 milliGRAM(s) Oral daily  simethicone 80 milliGRAM(s) Chew three times a day  tacrolimus   0.1% Ointment 1 Application(s) Topical daily  traZODone 100 milliGRAM(s) Oral at bedtime      TELEMETRY: 	    ECG:  	  RADIOLOGY:   DIAGNOSTIC TESTING:  [ ] Echocardiogram:  [ ] Catheterization:  [ ] Stress Test:    OTHER: 	    LABS:	 	    CARDIAC MARKERS:                                      9.7    11.90 )-----------( 445      ( 23 Apr 2022 08:26 )             33.8     04-23    131<L>  |  93<L>  |  25<H>  ----------------------------<  210<H>  4.3   |  23  |  5.63<H>    Ca    8.9      23 Apr 2022 08:26  Phos  3.6     04-23  Mg     2.00     04-23      proBNP:     Lipid Profile:   HgA1c:     Creatinine, Serum: 5.63 mg/dL (04-23-22 @ 08:26)  Creatinine, Serum: 4.80 mg/dL (04-22-22 @ 13:05)  Creatinine, Serum: 5.42 mg/dL (04-21-22 @ 07:34)  Creatinine, Serum: 5.20 mg/dL (04-21-22 @ 04:57)

## 2022-04-23 NOTE — CHART NOTE - NSCHARTNOTEFT_GEN_A_CORE
pt c/o L knee pain   exam notable for tenderness to palpation along medial L knee  x-ray notable for MCL avulsion injury   contacted Dr. Maurice, recommended ortho consult   ortho consult placed, will f/u recs

## 2022-04-23 NOTE — PROGRESS NOTE ADULT - SUBJECTIVE AND OBJECTIVE BOX
Full note to follow. [facility] NEPHROLOGY- PROGRESS NOTE    56y Female with history of ESRD on HD presents with vaginal bleeding. Nephrology consulted for ESRD status.    REVIEW OF SYSTEMS:  Gen: no fevers  Cards: no chest pain  Resp: no dyspnea  GI: no nausea or vomiting or diarrhea  Vascular: no LE edema      caffeine (Nausea)  latex (Rash)  penicillin (Nausea)  strawberry (Rash)    Hospital Medications: Medications reviewed      VITALS:  T(F): 98.2 (04-23-22 @ 08:15), Max: 98.8 (04-22-22 @ 11:46)  HR: 85 (04-23-22 @ 08:15)  BP: 124/64 (04-23-22 @ 08:15)  RR: 18 (04-23-22 @ 08:15)  SpO2: 100% (04-23-22 @ 05:33)      PHYSICAL EXAM:  Gen: NAD, calm  Cards: RRR, +S1/S2, no M/G/R  Resp: CTA B/L  GI: soft, NT/ND, NABS, + ab wound dressing in place  Vascular: no LE edema B/L, LUE AVF + bruit/thrill      LABS:  04-23    131<L>  |  93<L>  |  25<H>  ----------------------------<  210<H>  4.3   |  23  |  5.63<H>    Ca    8.9      23 Apr 2022 08:26  Phos  3.6     04-23  Mg     2.00     04-23      Creatinine Trend: 5.63 <--, 4.80 <--, 5.42 <--, 5.20 <--, 4.56 <--                        9.7    11.90 )-----------( 445      ( 23 Apr 2022 08:26 )             33.8

## 2022-04-23 NOTE — PROGRESS NOTE ADULT - ASSESSMENT
56y Female with history of ESRD on HD presents with vaginal bleeding. Nephrology consulted for ESRD status.    1) ESRD: Last HD on 4/21 post-fistulogram tolerated well with 1.5L removed. Plan for next maintenance HD on 4/23. Vascular surgery intervention appreciated. Monitor electrolytes.    2) HTN with ESRD: BP acceptable on Cardizem. Continue with midodrine 10 mg pre-HD on HD days to avoid intradialytic hypotension. Monitor BP.    3) Anemia of renal disease: With component of blood loss from vaginal bleeding. Hb and iron stores acceptable. Continue with Epo 20K with HD. Monitor Hb.    4) Secondary HPT of renal origin: Phosphorus acceptable. Continue with sensipar 30 mg daily and renal diet. Monitor serum calcium and phosphorus.      [facility] NEPHROLOGY  Keaton Lopez M.D.  Juma Green D.O.  Myla Hoffmann M.D.  Julia Brewer, JASIEL, ANP-C    Telephone: [phone]  Facsimile: [phone]    [address] [place] [address]

## 2022-04-23 NOTE — PROGRESS NOTE ADULT - SUBJECTIVE AND OBJECTIVE BOX
ANTONIA ORTIZ  56y  Female      Patient is a 56y old  Female who presents with a chief complaint of vaginal bleeding (21 Apr 2022 11:39)  Patient feels ok.s/p fistulogram LUE,doing well.  no sob,no cp,    MEDICATIONS  (STANDING):  apixaban 5 milliGRAM(s) Oral every 12 hours  bisacodyl 5 milliGRAM(s) Oral at bedtime  buPROPion XL (24-Hour) . 150 milliGRAM(s) Oral daily  chlorhexidine 2% Cloths 1 Application(s) Topical daily  cinacalcet 30 milliGRAM(s) Oral daily  cycloSPORINE  , modified (NEORAL) 200 milliGRAM(s) Oral two times a day  Dakins Solution - 1/4 Strength 1 Application(s) Topical daily  dextrose 40% Gel 15 Gram(s) Oral once  dextrose 5%. 1000 milliLiter(s) (50 mL/Hr) IV Continuous <Continuous>  dextrose 5%. 1000 milliLiter(s) (100 mL/Hr) IV Continuous <Continuous>  dextrose 50% Injectable 25 Gram(s) IV Push once  dextrose 50% Injectable 12.5 Gram(s) IV Push once  dextrose 50% Injectable 25 Gram(s) IV Push once  diltiazem    milliGRAM(s) Oral daily  epoetin anabela-epbx (RETACRIT) Injectable 98881 Unit(s) IV Push <User Schedule>  gabapentin 300 milliGRAM(s) Oral daily  glucagon  Injectable 1 milliGRAM(s) IntraMuscular once  insulin glargine Injectable (LANTUS) 12 Unit(s) SubCutaneous at bedtime  insulin lispro (ADMELOG) corrective regimen sliding scale   SubCutaneous three times a day before meals  insulin lispro (ADMELOG) corrective regimen sliding scale   SubCutaneous at bedtime  lactobacillus acidophilus 1 Tablet(s) Oral daily  lidocaine   4% Patch 1 Patch Transdermal daily  lidocaine   4% Patch 1 Patch Transdermal daily  lidocaine   4% Patch 1 Patch Transdermal daily  lidocaine/prilocaine Cream 1 Application(s) Topical <User Schedule>  mirtazapine 7.5 milliGRAM(s) Oral at bedtime  montelukast 10 milliGRAM(s) Oral at bedtime  pantoprazole    Tablet 40 milliGRAM(s) Oral before breakfast  polyethylene glycol 3350 17 Gram(s) Oral at bedtime  senna 2 Tablet(s) Oral at bedtime  sertraline 50 milliGRAM(s) Oral daily  simethicone 80 milliGRAM(s) Chew three times a day  tacrolimus   0.1% Ointment 1 Application(s) Topical daily  traZODone 100 milliGRAM(s) Oral at bedtime    MEDICATIONS  (PRN):  acetaminophen     Tablet .. 650 milliGRAM(s) Oral every 6 hours PRN Temp greater or equal to 38C (100.4F), Mild Pain (1 - 3)  aluminum hydroxide/magnesium hydroxide/simethicone Suspension 30 milliLiter(s) Oral every 6 hours PRN Dyspepsia  benzonatate 100 milliGRAM(s) Oral three times a day PRN Cough  diphenhydrAMINE Injectable 25 milliGRAM(s) IV Push <User Schedule> PRN Rash and/or Itching  guaiFENesin Oral Liquid (Sugar-Free) 100 milliGRAM(s) Oral every 6 hours PRN Cough  melatonin 6 milliGRAM(s) Oral at bedtime PRN Insomnia  midodrine. 10 milliGRAM(s) Oral <User Schedule> PRN 30 minutes prior to dialysis  ondansetron Injectable 4 milliGRAM(s) IV Push every 8 hours PRN Nausea and/or Vomiting  oxyCODONE    IR 5 milliGRAM(s) Oral every 6 hours PRN Severe Pain (7 - 10)    Vital Signs Last 24 Hrs  T(C): 36.7 (04-23-22 @ 21:50), Max: 37.2 (04-23-22 @ 13:37)  T(F): 98.1 (04-23-22 @ 21:50), Max: 98.9 (04-23-22 @ 13:37)  HR: 98 (04-23-22 @ 21:50) (77 - 98)  BP: 112/55 (04-23-22 @ 21:50) (110/66 - 127/95)  BP(mean): --  RR: 17 (04-23-22 @ 21:50) (16 - 18)  SpO2: 100% (04-23-22 @ 21:50) (100% - 100%)          PAST MEDICAL & SURGICAL HISTORY:  COPD (chronic obstructive pulmonary disease)    DM (diabetes mellitus)    Atrial fibrillation  with loop recorder 2015, battery most likely depleted, as per cardiac clearance, Dr. Reece Anesthesia aware, pt on Eliquis    HTN (hypertension)    Morbid obesity  BMI - 58.3    Chronic GERD    CHAMP (obstructive sleep apnea)  non compliance with CPAP, Anesthesia Dr. Reece aware, pt told to bring CPAP for sx, pr verbalized understanding    Potential difficult airway on pre-intubation assessment  airway class III, large neck, morbid obesity, hx of CHAMP, no compliance with CPAP- Dr. Reece, Anesthesia aware    End-stage renal disease    Anemia    Medication management    H/O tubal ligation  1989    Status post placement of implantable loop recorder  left chest- 2015    History of vascular access device  s/p insertion right chest permacath 2/2019, removal 6/2019, insertion left chest permacath 6/2019    S/P arteriovenous (AV) fistula creation  left  arm 6/2019    PHYSICAL EXAM:  GENERAL: Alert and awake lying in bed in no distress  HEAD:  Atraumatic, Normocephalic  EYES: EOMI, MO, conjunctiva and sclera clear  NECK: Supple, No JVD, Normal thyroid  NERVOUS SYSTEM:  Alert & Oriented X3, Motor and sensory systems are intact,   CHEST/LUNG: dec breath sounds at bases  HEART: Regular rate and rhythm; No murmurs, rubs, or gallops  ABDOMEN: Soft, Nontender, Nondistended; Bowel sounds present  EXTREMITIES:   Peripheral Pulses are palpable, + edema    LABS:  04-23    131<L>  |  93<L>  |  25<H>  ----------------------------<  210<H>  4.3   |  23  |  5.63<H>    Ca    8.9      23 Apr 2022 08:26  Phos  3.6     04-23  Mg     2.00     04-23      Creatinine Trend: 5.63 <--, 4.80 <--, 5.42 <--, 5.20 <--, 4.56 <--                        9.7    11.90 )-----------( 445      ( 23 Apr 2022 08:26 )             33.8     Urine Studies:                  =

## 2022-04-24 NOTE — CONSULT NOTE ADULT - CONSULT REQUESTED BY NAME
Dr Maurice
ED Team
Medicine
Medicine Team
er
team
Internal Medicine
Medicine
Med team
primary team
Dr Maurice
Dr. Maurice

## 2022-04-24 NOTE — CONSULT NOTE ADULT - TIME BILLING
Please note that over 50 minutes of time was spent in care of this patient including  - pre visit preparation  - In person visit  - post visit documentation  - discussion with colleagues  - review of imaging

## 2022-04-24 NOTE — CONSULT NOTE ADULT - PROVIDER SPECIALTY LIST ADULT
Infectious Disease
Pain Medicine
Dental
Nephrology
Orthopedics
Surgery
Cardiology
Dermatology
Endocrinology
Wound Care
GYN
Vascular Surgery

## 2022-04-24 NOTE — PROVIDER CONTACT NOTE (OTHER) - BACKGROUND
(Admit Diagnosis) Unspecified open wound of abdominal wall, unspecified quadrant without penetration into peritoneal cavity, initial encounter  (PMH) Medication management  (PMH) Anemia
Patient admitted for vaginal bleeding and abdominal wound. PMH anemia, ESRD, afib, DM, HTN.
patient admitted due to abdominal wound and vaginal bleeding
patient admitted due to abdominal wound and vaginal bleeding
pt admitted with unspecified open wound of abdominal wall. PMH of DM Type 2, CHAMP, COPD, and HTN.
(Admit Diagnosis) Unspecified open wound of abdominal wall, unspecified quadrant without penetration into peritoneal cavity, initial encounter  (PMH) Medication management  (PMH) Anemia
patient admitted due to abdominal wound and vaginal bleeding
patient admitted due to abdominal wound and vaginal bleeding
pt admitted with unspecified open wound of abdominal wall. PMH of DM Type 2, CHAMP, COPD, and HTN.
pt admitted with unspecified open wound of abdominal wall. PMH of DM Type 2, CHAMP, COPD, and HTN.
patient admitted due to abdominal wound and vaginal bleeding
Admitted for abdl. wound, hx of DM, HTN
patient admitted due to abdominal wound and vaginal bleeding
pt admitted with unspecified open wound of abdominal wall. PMH of DM Type 2, CHAMP, COPD, and HTN.
Admitted for abdl. wound, hx of DM, HTN
Pt admitted for unspecified open wound of abdominal wall and unspecified quadrant without penetration into peritoneal cavity.
patient admitted due to abdominal wound, wound care being utilized; patient a diabetic
patient admitted due to vaginal bleeding, has abdominal wound on LLQ/RLQ
pt admitted with unspecified open wound of abdominal wall. PMH of DM Type 2, CHAMP, COPD, and HTN.
Patient admitted for vaginal bleeding and abdominal wound. PMH anemia, ESRD, afib, DM, HTN.
Admitted for abdl. wound, hx of DM, HTN

## 2022-04-24 NOTE — PROGRESS NOTE ADULT - SUBJECTIVE AND OBJECTIVE BOX
[facility] NEPHROLOGY- PROGRESS NOTE    56y Female with history of ESRD on HD presents with vaginal bleeding. Nephrology consulted for ESRD status.    REVIEW OF SYSTEMS:  Gen: no fevers  Cards: no chest pain  Resp: no dyspnea  GI: no nausea or vomiting or diarrhea  Vascular: no LE edema      caffeine (Nausea)  latex (Rash)  penicillin (Nausea)  strawberry (Rash)    Hospital Medications: Medications reviewed      VITALS:  T(F): 98.5 (04-24-22 @ 05:15), Max: 98.9 (04-23-22 @ 13:37)  HR: 92 (04-24-22 @ 05:15)  BP: 103/53 (04-24-22 @ 05:15)  RR: 16 (04-24-22 @ 05:15)  SpO2: 97% (04-24-22 @ 05:15)    04-23 @ 07:01  -  04-24 @ 07:00  --------------------------------------------------------  IN: 900 mL / OUT: 2800 mL / NET: -1900 mL      PHYSICAL EXAM:  Gen: NAD, calm  Cards: RRR, +S1/S2, no M/G/R  Resp: CTA B/L  GI: soft, NT/ND, NABS, + ab wound dressing in place  Vascular: no LE edema B/L  Access: LUE AVF + bruit/thrill      LABS:  04-23    131<L>  |  93<L>  |  25<H>  ----------------------------<  210<H>  4.3   |  23  |  5.63<H>    Ca    8.9      23 Apr 2022 08:26  Phos  3.6     04-23  Mg     2.00     04-23      Creatinine Trend: 5.63 <--, 4.80 <--, 5.42 <--, 5.20 <--, 4.56 <--                        9.7    11.90 )-----------( 445      ( 23 Apr 2022 08:26 )             33.8

## 2022-04-24 NOTE — PROGRESS NOTE ADULT - ASSESSMENT
56y Female with history of ESRD on HD presents with vaginal bleeding. Nephrology consulted for ESRD status.    1) ESRD: Last HD on 4/23 post-fistulogram tolerated well with 1.5L removed. Plan for next maintenance HD on 4/26. Vascular surgery intervention appreciated. Monitor electrolytes.    2) HTN with ESRD: BP acceptable on Cardizem. Continue with midodrine 10 mg pre-HD on HD days to avoid intradialytic hypotension. Monitor BP.    3) Anemia of renal disease: With component of blood loss from vaginal bleeding. Hb and iron stores acceptable. Continue with Epo 20K with HD. Monitor Hb.    4) Secondary HPT of renal origin: Phosphorus acceptable. Continue with sensipar 30 mg daily and renal diet. Monitor serum calcium and phosphorus.      [facility] NEPHROLOGY  Keaton Lopez M.D.  Juma Green D.O.  Myla Hoffmann M.D.  Julia Brewer, JASIEL, ANP-C    Telephone: [phone]  Facsimile: [phone]    [address] [place] [address]

## 2022-04-24 NOTE — CONSULT NOTE ADULT - ASSESSMENT
A/P: This is a 56y Female who presented with worsening L knee pain preventing her from ambulation    - Pain control as medically indicated  - Knee immobilizer to protect LLE and allow for ambulation  - WBAT in   - Care per primary team

## 2022-04-24 NOTE — CONSULT NOTE ADULT - SUBJECTIVE AND OBJECTIVE BOX
Orthopedic Surgery Consult Note    HPI:   56yFemale presenting with more than 1 month of atraumatic knee pain that has worsened during her current hospitalization. Pain is currently so severe that patient is unable to ambulate. Pain medications have not helped much with pain. Patient denies radiation of pain. Denies any falls or recent trauma. No numbness/tingling. No other bone or joint complaints    Review of systems: As per HPI, otherwise negative.     PAST MEDICAL & SURGICAL HISTORY:  COPD (chronic obstructive pulmonary disease)    DM (diabetes mellitus)    Atrial fibrillation  with loop recorder , battery most likely depleted, as per cardiac clearance, Dr. Reece Anesthesia aware, pt on Eliquis    HTN (hypertension)    Morbid obesity  BMI - 58.3    Chronic GERD    CHAMP (obstructive sleep apnea)  non compliance with CPAP, Anesthesia Dr. Reece aware, pt told to bring CPAP for sx, pr verbalized understanding    Potential difficult airway on pre-intubation assessment  airway class III, large neck, morbid obesity, hx of CHAMP, no compliance with CPAP- Dr. Reece, Anesthesia aware    End-stage renal disease    Anemia    Medication management    H/O tubal ligation      Status post placement of implantable loop recorder  left chest-     History of vascular access device  s/p insertion right chest permacath 2019, removal 2019, insertion left chest permacath 2019    S/P arteriovenous (AV) fistula creation  left  arm 2019      Family History: FAMILY HISTORY:  Family history of diabetes mellitus  mother-     Family hx of hypertension  mother-         Medications: MEDICATIONS  (STANDING):  apixaban 5 milliGRAM(s) Oral every 12 hours  bisacodyl 5 milliGRAM(s) Oral at bedtime  buPROPion XL (24-Hour) . 150 milliGRAM(s) Oral daily  chlorhexidine 2% Cloths 1 Application(s) Topical daily  cinacalcet 30 milliGRAM(s) Oral daily  cycloSPORINE  , modified (NEORAL) 200 milliGRAM(s) Oral two times a day  Dakins Solution - 1/4 Strength 1 Application(s) Topical daily  dextrose 40% Gel 15 Gram(s) Oral once  dextrose 5%. 1000 milliLiter(s) (50 mL/Hr) IV Continuous <Continuous>  dextrose 5%. 1000 milliLiter(s) (100 mL/Hr) IV Continuous <Continuous>  dextrose 50% Injectable 25 Gram(s) IV Push once  dextrose 50% Injectable 12.5 Gram(s) IV Push once  dextrose 50% Injectable 25 Gram(s) IV Push once  diltiazem    milliGRAM(s) Oral daily  epoetin anabela-epbx (RETACRIT) Injectable 71175 Unit(s) IV Push <User Schedule>  gabapentin 300 milliGRAM(s) Oral daily  glucagon  Injectable 1 milliGRAM(s) IntraMuscular once  insulin glargine Injectable (LANTUS) 12 Unit(s) SubCutaneous at bedtime  insulin lispro (ADMELOG) corrective regimen sliding scale   SubCutaneous three times a day before meals  insulin lispro (ADMELOG) corrective regimen sliding scale   SubCutaneous at bedtime  lactobacillus acidophilus 1 Tablet(s) Oral daily  lidocaine   4% Patch 1 Patch Transdermal daily  lidocaine   4% Patch 1 Patch Transdermal daily  lidocaine   4% Patch 1 Patch Transdermal daily  lidocaine/prilocaine Cream 1 Application(s) Topical <User Schedule>  mirtazapine 7.5 milliGRAM(s) Oral at bedtime  montelukast 10 milliGRAM(s) Oral at bedtime  pantoprazole    Tablet 40 milliGRAM(s) Oral before breakfast  polyethylene glycol 3350 17 Gram(s) Oral at bedtime  senna 2 Tablet(s) Oral at bedtime  sertraline 50 milliGRAM(s) Oral daily  simethicone 80 milliGRAM(s) Chew three times a day  tacrolimus   0.1% Ointment 1 Application(s) Topical daily  traZODone 100 milliGRAM(s) Oral at bedtime    MEDICATIONS  (PRN):  acetaminophen     Tablet .. 650 milliGRAM(s) Oral every 6 hours PRN Temp greater or equal to 38C (100.4F), Mild Pain (1 - 3)  aluminum hydroxide/magnesium hydroxide/simethicone Suspension 30 milliLiter(s) Oral every 6 hours PRN Dyspepsia  benzonatate 100 milliGRAM(s) Oral three times a day PRN Cough  diphenhydrAMINE Injectable 25 milliGRAM(s) IV Push <User Schedule> PRN Rash and/or Itching  guaiFENesin Oral Liquid (Sugar-Free) 100 milliGRAM(s) Oral every 6 hours PRN Cough  melatonin 6 milliGRAM(s) Oral at bedtime PRN Insomnia  midodrine. 10 milliGRAM(s) Oral <User Schedule> PRN 30 minutes prior to dialysis  ondansetron Injectable 4 milliGRAM(s) IV Push every 8 hours PRN Nausea and/or Vomiting  oxyCODONE    IR 5 milliGRAM(s) Oral every 6 hours PRN Severe Pain (7 - 10)      T(C): 36.9 (22 @ 05:15), Max: 37.2 (22 @ 13:37)  HR: 92 (22 @ 05:15) (80 - 98)  BP: 103/53 (22 @ 05:15) (103/53 - 127/95)  RR: 16 (22 @ 05:15) (16 - 18)  SpO2: 97% (22 @ 05:15) (97% - 100%)  Wt(kg): --                        9.7    11.90 )-----------( 445      ( 2022 08:26 )             33.8     04    131<L>  |  93<L>  |  25<H>  ----------------------------<  210<H>  4.3   |  23  |  5.63<H>    Ca    8.9      2022 08:26  Phos  3.6       Mg     2.00             EXAM:  Gen: NAD, alert and oriented  Resp: no increased WOB on RA  LLE:  Skin intact, no overlying ecchymosis/erythema  No appreciable knee joint effusion/warmth  Exquisitely TTP over medial epicondyle  NTTP about lateral knee  Knee ROM 0-30  SILT diffusely  TA/GS/EHL/FHL motor intact  No varus/valgus instability  Toes WWP        Imaging: XR L knee shows medial femoral epicondyle avulsion fracture

## 2022-04-24 NOTE — CONSULT NOTE ADULT - ATTENDING COMMENTS
The patient does have pain with micromotion and she has cellulitis/abdominal open ulcer. Would recommend hip xrays and left knee aspiration to rule out septic arthritis.
Pt seen and examined.  Agree with resident eval and plan.  Impression:  Healing abdominal wound.  No surgical intervention.  Wound care with possible VAC dressing.
I was physically present for the key portions of the evaluation and management (E/M) service provided.  I agree with the above history, physical, and plan which I have reviewed and edited where appropriate.
malfunctioning AVF, HD could not be completed  will plan for fistulogram tomorrow

## 2022-04-24 NOTE — CONSULT NOTE ADULT - CONSULT REQUESTED DATE/TIME
20-Apr-2022 13:51
22-Mar-2022 14:06
24-Apr-2022 01:35
20-Mar-2022 11:17
20-Mar-2022 15:03
22-Mar-2022 08:39
08-Apr-2022 16:14
12-Jan-2022 12:09
20-Mar-2022 03:22
20-Mar-2022 16:21
22-Mar-2022 17:41
21-Mar-2022 11:27

## 2022-04-24 NOTE — PROGRESS NOTE ADULT - ASSESSMENT
Echo 1/19/22: grossly nl LV sys fx, min MR     a/p  56 year old female with hx of morbid obesity, CHAMP not on home O2, ESRD (HD MWF), HTN, DM, COPD, Afib no longer on AC, chronic R pannus wound, followed by Dr. Layne, likely pyoderma gangrenosum presents for vaginal bleeding     #Influenza  -sp oseltamivir  per med  -supportive care  -med f/u     #Chronic Pannus Wound  -surgical eval noted, wound care  -sp abx per med     #Chronic Afib  -stable, rates controlled  -cont dilt as ordered  -cont a/c     #Hypertension  -overall stable  -cont ccb   -mido pre-HD on HD days per renal     #ESRD on HD  -HD per renal  -vascular fu -sp LUE fistulogram  4/21    #Gyn bleeding  -s/p EUA, D&C, diagnostic hysteroscopy, insertion of Mirena IUD  -cv remains stable post op  -med f/u         35 minutes spent on total encounter; more than 50% of the visit was spent counseling and/or coordinating care by the attending physician.

## 2022-04-24 NOTE — PROGRESS NOTE ADULT - SUBJECTIVE AND OBJECTIVE BOX
ANTONIA ORTIZ  56y  Female      Patient is a 56y old  Female who presents with a chief complaint of vaginal bleeding (21 Apr 2022 11:39)  Patient feels ok.s/p fistulogram LUE,doing well.  no sob,no cp,    MEDICATIONS  (STANDING):  apixaban 5 milliGRAM(s) Oral every 12 hours  bisacodyl 5 milliGRAM(s) Oral at bedtime  buPROPion XL (24-Hour) . 150 milliGRAM(s) Oral daily  chlorhexidine 2% Cloths 1 Application(s) Topical daily  cinacalcet 30 milliGRAM(s) Oral daily  cycloSPORINE  , modified (NEORAL) 200 milliGRAM(s) Oral two times a day  Dakins Solution - 1/4 Strength 1 Application(s) Topical daily  dextrose 40% Gel 15 Gram(s) Oral once  dextrose 5%. 1000 milliLiter(s) (50 mL/Hr) IV Continuous <Continuous>  dextrose 5%. 1000 milliLiter(s) (100 mL/Hr) IV Continuous <Continuous>  dextrose 50% Injectable 25 Gram(s) IV Push once  dextrose 50% Injectable 12.5 Gram(s) IV Push once  dextrose 50% Injectable 25 Gram(s) IV Push once  diltiazem    milliGRAM(s) Oral daily  epoetin anabela-epbx (RETACRIT) Injectable 24578 Unit(s) IV Push <User Schedule>  gabapentin 300 milliGRAM(s) Oral daily  glucagon  Injectable 1 milliGRAM(s) IntraMuscular once  insulin glargine Injectable (LANTUS) 12 Unit(s) SubCutaneous at bedtime  insulin lispro (ADMELOG) corrective regimen sliding scale   SubCutaneous three times a day before meals  insulin lispro (ADMELOG) corrective regimen sliding scale   SubCutaneous at bedtime  lactobacillus acidophilus 1 Tablet(s) Oral daily  lidocaine   4% Patch 1 Patch Transdermal daily  lidocaine   4% Patch 1 Patch Transdermal daily  lidocaine   4% Patch 1 Patch Transdermal daily  lidocaine/prilocaine Cream 1 Application(s) Topical <User Schedule>  mirtazapine 7.5 milliGRAM(s) Oral at bedtime  montelukast 10 milliGRAM(s) Oral at bedtime  nystatin Cream 1 Application(s) Topical every 12 hours  pantoprazole    Tablet 40 milliGRAM(s) Oral before breakfast  polyethylene glycol 3350 17 Gram(s) Oral at bedtime  senna 2 Tablet(s) Oral at bedtime  sertraline 50 milliGRAM(s) Oral daily  simethicone 80 milliGRAM(s) Chew three times a day  tacrolimus   0.1% Ointment 1 Application(s) Topical daily  traZODone 100 milliGRAM(s) Oral at bedtime    MEDICATIONS  (PRN):  acetaminophen     Tablet .. 650 milliGRAM(s) Oral every 6 hours PRN Temp greater or equal to 38C (100.4F), Mild Pain (1 - 3)  aluminum hydroxide/magnesium hydroxide/simethicone Suspension 30 milliLiter(s) Oral every 6 hours PRN Dyspepsia  benzonatate 100 milliGRAM(s) Oral three times a day PRN Cough  diphenhydrAMINE Injectable 25 milliGRAM(s) IV Push <User Schedule> PRN Rash and/or Itching  guaiFENesin Oral Liquid (Sugar-Free) 100 milliGRAM(s) Oral every 6 hours PRN Cough  melatonin 6 milliGRAM(s) Oral at bedtime PRN Insomnia  midodrine. 10 milliGRAM(s) Oral <User Schedule> PRN 30 minutes prior to dialysis  ondansetron Injectable 4 milliGRAM(s) IV Push every 8 hours PRN Nausea and/or Vomiting  oxyCODONE    IR 5 milliGRAM(s) Oral every 6 hours PRN Severe Pain (7 - 10)    Vital Signs Last 24 Hrs  T(C): 36.7 (04-24-22 @ 21:40), Max: 36.9 (04-24-22 @ 05:15)  T(F): 98 (04-24-22 @ 21:40), Max: 98.5 (04-24-22 @ 05:15)  HR: 80 (04-24-22 @ 21:40) (80 - 92)  BP: 111/55 (04-24-22 @ 21:40) (103/53 - 111/55)  BP(mean): --  RR: 18 (04-24-22 @ 21:40) (16 - 18)  SpO2: 100% (04-24-22 @ 21:40) (97% - 100%)          PAST MEDICAL & SURGICAL HISTORY:  COPD (chronic obstructive pulmonary disease)    DM (diabetes mellitus)    Atrial fibrillation  with loop recorder 2015, battery most likely depleted, as per cardiac clearance, Dr. Reece Anesthesia aware, pt on Eliquis    HTN (hypertension)    Morbid obesity  BMI - 58.3    Chronic GERD    CHAMP (obstructive sleep apnea)  non compliance with CPAP, Anesthesia Dr. Reece aware, pt told to bring CPAP for sx, pr verbalized understanding    Potential difficult airway on pre-intubation assessment  airway class III, large neck, morbid obesity, hx of CHAMP, no compliance with CPAP- Dr. Reece, Anesthesia aware    End-stage renal disease    Anemia    Medication management    H/O tubal ligation  1989    Status post placement of implantable loop recorder  left chest- 2015    History of vascular access device  s/p insertion right chest permacath 2/2019, removal 6/2019, insertion left chest permacath 6/2019    S/P arteriovenous (AV) fistula creation  left  arm 6/2019    PHYSICAL EXAM:  GENERAL: Alert and awake lying in bed in no distress  HEAD:  Atraumatic, Normocephalic  EYES: EOMI, MO, conjunctiva and sclera clear  NECK: Supple, No JVD, Normal thyroid  NERVOUS SYSTEM:  Alert & Oriented X3, Motor and sensory systems are intact,   CHEST/LUNG: dec breath sounds at bases  HEART: Regular rate and rhythm; No murmurs, rubs, or gallops  ABDOMEN: Soft, Nontender, Nondistended; Bowel sounds present  EXTREMITIES:   Peripheral Pulses are palpable, + edema      LABS:  04-23    131<L>  |  93<L>  |  25<H>  ----------------------------<  210<H>  4.3   |  23  |  5.63<H>    Ca    8.9      23 Apr 2022 08:26  Phos  3.6     04-23  Mg     2.00     04-23      Creatinine Trend: 5.63 <--, 4.80 <--, 5.42 <--, 5.20 <--, 4.56 <--                        9.7    11.90 )-----------( 445      ( 23 Apr 2022 08:26 )             33.8     Urine Studies:                            =

## 2022-04-24 NOTE — CONSULT NOTE ADULT - REASON FOR ADMISSION
vaginal bleeding

## 2022-04-25 NOTE — PROGRESS NOTE ADULT - PROBLEM SELECTOR PROBLEM 5
Atrial fibrillation

## 2022-04-25 NOTE — PROGRESS NOTE ADULT - PROBLEM SELECTOR PLAN 8
c/w current meds

## 2022-04-25 NOTE — PROGRESS NOTE ADULT - SUBJECTIVE AND OBJECTIVE BOX
CARDIOLOGY FOLLOW UP - Dr. Bullock  Date of Service: 4/25/22  CC: no cp/sob   c/o l knee pain     Review of Systems:  Constitutional: No fever, weight loss, or fatigue  Respiratory: No cough, wheezing, or hemoptysis, no shortness of breath  Cardiovascular: No chest pain, palpitations, passing out, dizziness, or leg swelling  Gastrointestinal: No abd or epigastric pain.  No nausea, vomiting, or hematemesis; no diarrhea or constipation, no melena or hematochezia  Vascular: no edema       PHYSICAL EXAM:  T(C): 36.8 (04-25-22 @ 05:29), Max: 36.8 (04-25-22 @ 05:29)  HR: 92 (04-25-22 @ 05:29) (80 - 92)  BP: 108/52 (04-25-22 @ 05:29) (107/53 - 111/55)  RR: 18 (04-25-22 @ 05:29) (18 - 18)  SpO2: 100% (04-25-22 @ 05:29) (100% - 100%)  Wt(kg): --  I&O's Summary      Appearance: Normal	  Cardiovascular: Normal S1 S2,RRR, No JVD, No murmurs  Respiratory: Lungs clear to auscultation	  Gastrointestinal:  Soft, Non-tender, + BS	  Extremities: Normal range of motion, No clubbing, cyanosis or edema      Home Medications:  apixaban 5 mg oral tablet: 1 tab(s) orally every 12 hours (15 Apr 2022 10:37)  Aspercreme with Lidocaine 4% topical film: Apply topically to affected area once a day (low back) (20 Mar 2022 10:15)  Aspercreme with Lidocaine 4% topical film: Apply topically to affected area once a day (L knee) (20 Mar 2022 10:15)  aspirin 81 mg oral delayed release tablet: 1 tab(s) orally once a day (20 Mar 2022 10:15)  benzonatate 100 mg oral capsule: 1 cap(s) orally 3 times a day, As needed, Cough (15 Apr 2022 10:37)  bisacodyl 5 mg oral delayed release tablet: 1 tab(s) orally once a day (at bedtime) (15 Apr 2022 10:37)  buPROPion 150 mg/24 hours (XL) oral tablet, extended release: 1 tab(s) orally once a day (in the morning) (20 Mar 2022 10:15)  cinacalcet 30 mg oral tablet: 1 tab(s) orally once a day (15 Apr 2022 11:16)  cycloSPORINE modified 100 mg oral capsule: 2 cap(s) orally 2 times a day (15 Apr 2022 10:37)  cycloSPORINE modified 100 mg oral capsule: 2 cap(s) orally once a day (at bedtime) (20 Mar 2022 10:15)  cycloSPORINE modified 50 mg oral capsule: 3 cap(s) orally once a day in the morning  (20 Mar 2022 10:15)  dilTIAZem 120 mg/24 hours oral capsule, extended release: 1 cap(s) orally once a day (hold SBP&lt;110, HR&lt;60) (20 Mar 2022 10:15)  dilTIAZem 120 mg/24 hours oral capsule, extended release: 1 cap(s) orally once a day (15 Apr 2022 10:37)  diphenhydrAMINE 50 mg/mL injectable solution: 25 milligram(s) injectable 3 times a week (at 08:00 before dialysis sessions on Tuesday/Thursday/saturday) (15 Apr 2022 10:37)  doxycycline hyclate 100 mg oral tablet: 1 tab(s) orally 2 times a day (20 Mar 2022 10:15)  Eliquis 2.5 mg oral tablet: 1 tab(s) orally 2 times a day    Pharmacy states patient no longer wants to fill this medication (20 Mar 2022 10:15)  gabapentin 300 mg oral capsule: 1 cap(s) orally 2 times a day (20 Mar 2022 10:15)  gabapentin 300 mg oral capsule: 1 cap(s) orally once a day (15 Apr 2022 10:37)  insulin glargine: 15 unit(s) subcutaneous once a day (at bedtime) (20 Mar 2022 10:15)  lidocaine 4% topical film: Apply topically to affected area once a day to right knee (15 Apr 2022 10:37)  lidocaine 4% topical film: Apply topically to affected area once a day to lower back (15 Apr 2022 10:37)  lidocaine 4% topical film: Apply topically to affected area once a day to left knee (15 Apr 2022 10:37)  lidocaine-prilocaine 2.5%-2.5% topical cream: 1 application topically  (15 Apr 2022 10:37)  midodrine 10 mg oral tablet: 1 tab(s) orally  3 times a week, As Needed 30 minutes prior to dialysis (08:00 on Tuesdays/Thursdays/Saturdays). HOLD if SBP &gt;130 (15 Apr 2022 11:35)  midodrine 5 mg oral tablet: 1 tab(s) orally 3 times a week, 30 minute prior to dialysis sessions (hold is SBP&gt;130) (20 Mar 2022 10:15)  mirtazapine 7.5 mg oral tablet: 1 tab(s) orally once a day (at bedtime) (20 Mar 2022 10:15)  montelukast 10 mg oral tablet: 1 tab(s) orally once a day (in the evening) (20 Mar 2022 10:15)  montelukast 10 mg oral tablet: 1 tab(s) orally once a day (at bedtime) (15 Apr 2022 10:37)  oxyCODONE 10 mg oral tablet, extended release: 1 tab(s) orally every 12 hours (20 Mar 2022 10:15)  oxycodone-acetaminophen 7.5 mg-325 mg oral tablet: 1 tab(s) orally every 6 hours, As Needed (20 Mar 2022 10:15)  pantoprazole 40 mg oral delayed release tablet: 1 tab(s) orally once a day (20 Mar 2022 10:15)  pantoprazole 40 mg oral delayed release tablet: 1 tab(s) orally once a day (before a meal) (15 Apr 2022 10:37)  polyethylene glycol 3350 oral powder for reconstitution: 17 gram(s) orally once a day (at bedtime) (15 Apr 2022 10:37)  polyethylene glycol 3350 oral powder for reconstitution: 17 gram(s) orally once a day (at bedtime) (20 Mar 2022 10:15)  senna oral tablet: 2 tab(s) orally once a day (at bedtime) (20 Mar 2022 10:15)  senna oral tablet: 2 tab(s) orally once a day (at bedtime) (15 Apr 2022 10:37)  sertraline 50 mg oral tablet: 1 tab(s) orally once a day (20 Mar 2022 10:15)  sevelamer carbonate 800 mg oral tablet: 1 tab(s) orally 3 times a day (with meals) (20 Mar 2022 10:15)  simethicone 80 mg oral tablet, chewable: 1 tab(s) orally 3 times a day (before meals) (20 Mar 2022 10:15)  simvastatin 10 mg oral tablet: 1 tab(s) orally once a day (at bedtime) (20 Mar 2022 10:15)  sodium hypochlorite 0.125% topical solution: 1 application topically once a day to affected area (pannus wound on abdomen) (15 Apr 2022 10:37)  sodium hypochlorite 0.25% topical solution: 1 application topically once a day (20 Mar 2022 10:15)  tacrolimus 0.1% topical ointment: 1 application topically once a day (20 Mar 2022 10:15)  tacrolimus 0.1% topical ointment: 1 application topically once a day (15 Apr 2022 10:37)  traZODone 100 mg oral tablet: 1 tab(s) orally once a day (at bedtime) (20 Mar 2022 10:15)      MEDICATIONS  (STANDING):  apixaban 5 milliGRAM(s) Oral every 12 hours  bisacodyl 5 milliGRAM(s) Oral at bedtime  buPROPion XL (24-Hour) . 150 milliGRAM(s) Oral daily  chlorhexidine 2% Cloths 1 Application(s) Topical daily  cinacalcet 30 milliGRAM(s) Oral daily  cycloSPORINE  , modified (NEORAL) 200 milliGRAM(s) Oral two times a day  Dakins Solution - 1/4 Strength 1 Application(s) Topical daily  dextrose 40% Gel 15 Gram(s) Oral once  dextrose 5%. 1000 milliLiter(s) (50 mL/Hr) IV Continuous <Continuous>  dextrose 5%. 1000 milliLiter(s) (100 mL/Hr) IV Continuous <Continuous>  dextrose 50% Injectable 25 Gram(s) IV Push once  dextrose 50% Injectable 12.5 Gram(s) IV Push once  dextrose 50% Injectable 25 Gram(s) IV Push once  diltiazem    milliGRAM(s) Oral daily  epoetin anabela-epbx (RETACRIT) Injectable 76258 Unit(s) IV Push <User Schedule>  gabapentin 300 milliGRAM(s) Oral daily  glucagon  Injectable 1 milliGRAM(s) IntraMuscular once  insulin glargine Injectable (LANTUS) 12 Unit(s) SubCutaneous at bedtime  insulin lispro (ADMELOG) corrective regimen sliding scale   SubCutaneous three times a day before meals  insulin lispro (ADMELOG) corrective regimen sliding scale   SubCutaneous at bedtime  lactobacillus acidophilus 1 Tablet(s) Oral daily  lidocaine   4% Patch 1 Patch Transdermal daily  lidocaine   4% Patch 1 Patch Transdermal daily  lidocaine   4% Patch 1 Patch Transdermal daily  lidocaine/prilocaine Cream 1 Application(s) Topical <User Schedule>  mirtazapine 7.5 milliGRAM(s) Oral at bedtime  montelukast 10 milliGRAM(s) Oral at bedtime  nystatin Cream 1 Application(s) Topical every 12 hours  pantoprazole    Tablet 40 milliGRAM(s) Oral before breakfast  polyethylene glycol 3350 17 Gram(s) Oral at bedtime  senna 2 Tablet(s) Oral at bedtime  sertraline 50 milliGRAM(s) Oral daily  simethicone 80 milliGRAM(s) Chew three times a day  tacrolimus   0.1% Ointment 1 Application(s) Topical daily  traZODone 100 milliGRAM(s) Oral at bedtime      TELEMETRY: 	    ECG:  	  RADIOLOGY:   DIAGNOSTIC TESTING:  [ ] Echocardiogram:  [ ]  Catheterization:  [ ] Stress Test:    OTHER: 	    LABS:

## 2022-04-25 NOTE — PROGRESS NOTE ADULT - PROBLEM SELECTOR PROBLEM 6
DM (diabetes mellitus)

## 2022-04-25 NOTE — PROGRESS NOTE ADULT - ASSESSMENT
56F with Hx of ESRD TTS, HTN, DM 2, COPD, afib, known chronic R pannus wound felt most likely pyoderma gangrenosum presents for 1.5wk vag bleeding. Pt recently had prolonged stay at [facility] for pannus wound, known to endocrine service.  Was dc'd to rehab on 3/3.  Prior to last hospitalization patient was on much higher doses of basal bolus.  Now requiring much less.  Has poor appetite.    1. Type 2 diabetes mellitus uncontrolled  A1c 7.0% (may be inaccurate in setting of ESRD)  Home Regimen: Tresiba 80 units HS and Trulicity 1.5mg subq weekly    While inpatient:  BG target 100-180 mg/dL   continue Lantus 12 units SQ qHS - resume this dose tomorrow night  started admelog 2/2/2 as patient FS have been trending higher than 200 preprandial  Continue Admelog correctional scale LOW before meals and low bedtime scale  Check BG before meals and bedtime  Hypoglycemia protocol   Consistent carbohydrate diet, seen by RD, supplement ordered    Discharge Plan:  STOP Trulicity   For dc to rehab- recommend to continue current insulin management as outlined above  For dc to home- Recommend basal (resume Tresiba, dose TBD), plus PO regimen (Tradjenta 5 mg daily or Januvia 25 mg daily)   Recommend Tradjenta 5 mg po daily, if not covered can see if Januvia 25 mg po daily is covered.  Please obtain prior auth if needed as patient is ESRD and DPP4i class are indicated for patients with ESRD  Consider CGM (such as Freestyle Libre2 outpatient)  If desiring to followup with [facility]: [address] [place] [address], [address], [facility] [address], [phone]    2. HTN  Management per primary team/nephrology    3. Hyperlipidemia  LDL target less than 70. Not currently on statin. Management per primary team      Tamela Ruiz  Nurse Practitioner  Division of Endocrinology & Diabetes  Pager # [phone]      If after 6PM or before 9AM, or on weekends/holidays, please call endocrine answering service for assistance ([phone]).  For nonurgent matters email [email or web address] for assistance.

## 2022-04-25 NOTE — PROGRESS NOTE ADULT - NUTRITIONAL ASSESSMENT
This patient has been assessed with a concern for Malnutrition and has been determined to have a diagnosis/diagnoses of Morbid obesity (BMI > 40).    This patient is being managed with:   Diet Consistent Carbohydrate Renal w/Evening Snack-  For patients receiving Renal Replacement - No Protein Restr No Conc K No Conc Phos Low Sodium (RENAL)  Supplement Feeding Modality:  Oral  Nepro Cans or Servings Per Day:  1       Frequency:  Three Times a day  Entered: Mar 20 2022 10:20AM    
This patient has been assessed with a concern for Malnutrition and has been determined to have a diagnosis/diagnoses of Morbid obesity (BMI > 40).    This patient is being managed with:   Diet Consistent Carbohydrate Renal w/Evening Snack-  For patients receiving Renal Replacement - No Protein Restr No Conc K No Conc Phos Low Sodium (RENAL)  Supplement Feeding Modality:  Oral  Nepro Cans or Servings Per Day:  1       Frequency:  Three Times a day  Entered: Mar 20 2022 10:20AM      This patient has been assessed with a concern for Malnutrition and has been determined to have a diagnosis/diagnoses of Morbid obesity (BMI > 40).    This patient is being managed with:   Diet Consistent Carbohydrate Renal w/Evening Snack-  For patients receiving Renal Replacement - No Protein Restr No Conc K No Conc Phos Low Sodium (RENAL)  Supplement Feeding Modality:  Oral  Nepro Cans or Servings Per Day:  1       Frequency:  Three Times a day  Entered: Mar 20 2022 10:20AM    
This patient has been assessed with a concern for Malnutrition and has been determined to have a diagnosis/diagnoses of Morbid obesity (BMI > 40).    This patient is being managed with:   Diet Consistent Carbohydrate Renal w/Evening Snack-  For patients receiving Renal Replacement - No Protein Restr No Conc K No Conc Phos Low Sodium (RENAL)  Supplement Feeding Modality:  Oral  Nepro Cans or Servings Per Day:  1       Frequency:  Three Times a day  Entered: Mar 20 2022 10:20AM    
This patient has been assessed with a concern for Malnutrition and has been determined to have a diagnosis/diagnoses of Morbid obesity (BMI > 40).    This patient is being managed with:   Diet Consistent Carbohydrate Renal w/Evening Snack-  Low Sodium  Supplement Feeding Modality:  Oral  Nepro Cans or Servings Per Day:  1       Frequency:  Three Times a day  Entered: Apr 19 2022  4:47PM    
This patient has been assessed with a concern for Malnutrition and has been determined to have a diagnosis/diagnoses of Morbid obesity (BMI > 40).    This patient is being managed with:   Diet Consistent Carbohydrate Renal w/Evening Snack-  For patients receiving Renal Replacement - No Protein Restr No Conc K No Conc Phos Low Sodium (RENAL)  Supplement Feeding Modality:  Oral  Nepro Cans or Servings Per Day:  1       Frequency:  Three Times a day  Entered: Mar 20 2022 10:20AM    
This patient has been assessed with a concern for Malnutrition and has been determined to have a diagnosis/diagnoses of Morbid obesity (BMI > 40).    This patient is being managed with:   Diet Consistent Carbohydrate Renal w/Evening Snack-  Low Sodium  Supplement Feeding Modality:  Oral  Nepro Cans or Servings Per Day:  1       Frequency:  Three Times a day  Entered: Apr 19 2022  4:47PM    
This patient has been assessed with a concern for Malnutrition and has been determined to have a diagnosis/diagnoses of Morbid obesity (BMI > 40).    This patient is being managed with:   Diet Consistent Carbohydrate Renal w/Evening Snack-  For patients receiving Renal Replacement - No Protein Restr No Conc K No Conc Phos Low Sodium (RENAL)  Supplement Feeding Modality:  Oral  Nepro Cans or Servings Per Day:  1       Frequency:  Three Times a day  Entered: Mar 20 2022 10:20AM    
This patient has been assessed with a concern for Malnutrition and has been determined to have a diagnosis/diagnoses of Morbid obesity (BMI > 40).    This patient is being managed with:   Diet Regular-  Consistent Carbohydrate {Evening Snack} (CSTCHOSN)  DASH/TLC {Sodium & Cholesterol Restricted} (DASH)  For patients receiving Renal Replacement - No Protein Restr No Conc K No Conc Phos Low Sodium (RENAL)  Entered: Apr 25 2022 10:11AM    
This patient has been assessed with a concern for Malnutrition and has been determined to have a diagnosis/diagnoses of Morbid obesity (BMI > 40).    This patient is being managed with:   Diet Consistent Carbohydrate Renal w/Evening Snack-  For patients receiving Renal Replacement - No Protein Restr No Conc K No Conc Phos Low Sodium (RENAL)  Supplement Feeding Modality:  Oral  Nepro Cans or Servings Per Day:  1       Frequency:  Three Times a day  Entered: Mar 20 2022 10:20AM    
This patient has been assessed with a concern for Malnutrition and has been determined to have a diagnosis/diagnoses of Morbid obesity (BMI > 40).    This patient is being managed with:   Diet Consistent Carbohydrate Renal w/Evening Snack-  For patients receiving Renal Replacement - No Protein Restr No Conc K No Conc Phos Low Sodium (RENAL)  Supplement Feeding Modality:  Oral  Nepro Cans or Servings Per Day:  1       Frequency:  Three Times a day  Entered: Mar 20 2022 10:20AM    
This patient has been assessed with a concern for Malnutrition and has been determined to have a diagnosis/diagnoses of Morbid obesity (BMI > 40).    This patient is being managed with:   Diet NPO-  Except Medications  Entered: Apr 24 2022  9:39PM    
This patient has been assessed with a concern for Malnutrition and has been determined to have a diagnosis/diagnoses of Morbid obesity (BMI > 40).    This patient is being managed with:   Diet Consistent Carbohydrate Renal w/Evening Snack-  For patients receiving Renal Replacement - No Protein Restr No Conc K No Conc Phos Low Sodium (RENAL)  Supplement Feeding Modality:  Oral  Nepro Cans or Servings Per Day:  1       Frequency:  Three Times a day  Entered: Mar 20 2022 10:20AM    
This patient has been assessed with a concern for Malnutrition and has been determined to have a diagnosis/diagnoses of Morbid obesity (BMI > 40).    This patient is being managed with:   Diet Consistent Carbohydrate Renal w/Evening Snack-  For patients receiving Renal Replacement - No Protein Restr No Conc K No Conc Phos Low Sodium (RENAL)  Supplement Feeding Modality:  Oral  Nepro Cans or Servings Per Day:  1       Frequency:  Three Times a day  Entered: Mar 20 2022 10:20AM      This patient has been assessed with a concern for Malnutrition and has been determined to have a diagnosis/diagnoses of Morbid obesity (BMI > 40).    This patient is being managed with:   Diet Consistent Carbohydrate Renal w/Evening Snack-  For patients receiving Renal Replacement - No Protein Restr No Conc K No Conc Phos Low Sodium (RENAL)  Supplement Feeding Modality:  Oral  Nepro Cans or Servings Per Day:  1       Frequency:  Three Times a day  Entered: Mar 20 2022 10:20AM    
This patient has been assessed with a concern for Malnutrition and has been determined to have a diagnosis/diagnoses of Morbid obesity (BMI > 40).    This patient is being managed with:   Diet Consistent Carbohydrate Renal w/Evening Snack-  Low Sodium  Supplement Feeding Modality:  Oral  Nepro Cans or Servings Per Day:  1       Frequency:  Three Times a day  Entered: Apr 19 2022  4:47PM    
This patient has been assessed with a concern for Malnutrition and has been determined to have a diagnosis/diagnoses of Morbid obesity (BMI > 40).    This patient is being managed with:   Diet NPO after Midnight-     NPO Start Date: 20-Apr-2022   NPO Start Time: 23:59  Entered: Apr 20 2022  2:05PM    Diet Consistent Carbohydrate Renal w/Evening Snack-  Low Sodium  Supplement Feeding Modality:  Oral  Nepro Cans or Servings Per Day:  1       Frequency:  Three Times a day  Entered: Apr 19 2022  4:47PM    
This patient has been assessed with a concern for Malnutrition and has been determined to have a diagnosis/diagnoses of Morbid obesity (BMI > 40).    This patient is being managed with:   Diet Consistent Carbohydrate Renal w/Evening Snack-  Low Sodium  Supplement Feeding Modality:  Oral  Nepro Cans or Servings Per Day:  1       Frequency:  Three Times a day  Entered: Apr 19 2022  4:47PM

## 2022-04-25 NOTE — PROGRESS NOTE ADULT - PROBLEM SELECTOR PROBLEM 8
Depression

## 2022-04-25 NOTE — PROGRESS NOTE ADULT - PROBLEM SELECTOR PLAN 2
likely multifactorial - CKD, chronic inflammatory disease (pyoderma), now with some component of anemia of acute blood loss  monitor hb closely   -
likely multifactorial - CKD, chronic inflammatory disease (pyoderma), now with some component of anemia of acute blood loss  - serial CBC, T&S  -
likely multifactorial - CKD, chronic inflammatory disease (pyoderma), now with some component of anemia of acute blood loss  monitor hb closely   -
likely multifactorial - CKD, chronic inflammatory disease (pyoderma), now with some component of anemia of acute blood loss  - serial CBC, T&S  -
likely multifactorial - CKD, chronic inflammatory disease (pyoderma), now with some component of anemia of acute blood loss  monitor hb closely   -
likely multifactorial - CKD, chronic inflammatory disease (pyoderma), now with some component of anemia of acute blood loss  - serial CBC, T&S  -
likely multifactorial - CKD, chronic inflammatory disease (pyoderma), now with some component of anemia of acute blood loss  monitor hb closely   -
likely multifactorial - CKD, chronic inflammatory disease (pyoderma), now with some component of anemia of acute blood loss  - serial CBC, T&S  -
likely multifactorial - CKD, chronic inflammatory disease (pyoderma), now with some component of anemia of acute blood loss  monitor hb closely   -

## 2022-04-25 NOTE — PROGRESS NOTE ADULT - PROBLEM SELECTOR PLAN 5
stopped Eliquis and ASA for now in setting bleeding  c/w dilt as bp/hr tolerate, thus far rate controlled  cards f/u
stopped Eliquis and ASA for now in setting bleeding  c/w dilt as bp/hr tolerate, thus far rate controlled  cards eval
stopped Eliquis and ASA for now in setting bleeding  c/w dilt as bp/hr tolerate, thus far rate controlled  cards eval
stopped Eliquis and ASA for now in setting bleeding  c/w dilt as bp/hr tolerate, thus far rate controlled  cards f/u
stopped Eliquis and ASA for now in setting bleeding  c/w dilt as bp/hr tolerate, thus far rate controlled  cards eval
stopped Eliquis and ASA for now in setting bleeding  c/w dilt as bp/hr tolerate, thus far rate controlled  cards f/u
-on Eliquis
stopped Eliquis and ASA for now in setting bleeding  c/w dilt as bp/hr tolerate, thus far rate controlled  cards eval
stopped Eliquis and ASA for now in setting bleeding  c/w dilt as bp/hr tolerate, thus far rate controlled  cards f/u

## 2022-04-25 NOTE — PROGRESS NOTE ADULT - PROBLEM SELECTOR PROBLEM 2
Anemia of chronic disease
Anemia of chronic disease
HTN (hypertension)
Anemia of chronic disease
DM (diabetes mellitus), type 2, uncontrolled
Anemia of chronic disease
HTN (hypertension)
Anemia of chronic disease
Anemia of chronic disease
DM (diabetes mellitus), type 2, uncontrolled
HTN (hypertension)
HTN (hypertension)
Anemia of chronic disease
Anemia of chronic disease
DM (diabetes mellitus), type 2, uncontrolled
HTN (hypertension)
Anemia of chronic disease
DM (diabetes mellitus), type 2, uncontrolled
Anemia of chronic disease
Anemia of chronic disease
HTN (hypertension)
Anemia of chronic disease

## 2022-04-25 NOTE — PROGRESS NOTE ADULT - ASSESSMENT
Echo 1/19/22: grossly nl LV sys fx, min MR     a/p  56 year old female with hx of morbid obesity, CHAMP not on home O2, ESRD (HD MWF), HTN, DM, COPD, Afib no longer on AC, chronic R pannus wound, followed by Dr. Layne, likely pyoderma gangrenosum presents for vaginal bleeding     #Influenza  -sp oseltamivir  per med  -supportive care  -med f/u     #Chronic Pannus Wound  -surgical eval noted, wound care  -sp abx per med     #Chronic Afib  -stable, rates controlled  -cont dilt as ordered  -cont a/c     #Hypertension  -overall stable  -cont ccb   -mido pre-HD on HD days per renal     #ESRD on HD  -HD per renal  -vascular fu -sp LUE fistulogram  4/21    #Gyn bleeding  -s/p EUA, D&C, diagnostic hysteroscopy, insertion of Mirena IUD  -cv remains stable post op  -med f/u     #Knee pain  -s/p tap  -ortho f/u     plan discussed with ACP

## 2022-04-25 NOTE — PROGRESS NOTE ADULT - PROBLEM SELECTOR PLAN 7
will hold statin now give possible interaction with cyclosporin

## 2022-04-25 NOTE — PROGRESS NOTE ADULT - PROBLEM SELECTOR PLAN 6
iss  titrate insulin for optimal blood sugar control
What Type Of Note Output Would You Prefer (Optional)?: Standard Output
How Severe Are Your Spot(S)?: mild
iss  titrate insulin for optimal blood sugar control
Have Your Spot(S) Been Treated In The Past?: has not been treated
iss  titrate insulin for optimal blood sugar control
Hpi Title: Evaluation of Skin Lesions
iss  titrate insulin for optimal blood sugar control

## 2022-04-25 NOTE — PROGRESS NOTE ADULT - SUBJECTIVE AND OBJECTIVE BOX
Chief Complaint: DM 2    History: Patient seen at bedside. Plans to appeal insurance for rehab placement. reports fair appetite.  Has grapes at bedside.  starting PT at time of visit    MEDICATIONS  (STANDING):  apixaban 5 milliGRAM(s) Oral every 12 hours  bisacodyl 5 milliGRAM(s) Oral at bedtime  buPROPion XL (24-Hour) . 150 milliGRAM(s) Oral daily  chlorhexidine 2% Cloths 1 Application(s) Topical daily  cinacalcet 30 milliGRAM(s) Oral daily  cycloSPORINE  , modified (NEORAL) 200 milliGRAM(s) Oral two times a day  Dakins Solution - 1/4 Strength 1 Application(s) Topical daily  dextrose 40% Gel 15 Gram(s) Oral once  dextrose 5%. 1000 milliLiter(s) (50 mL/Hr) IV Continuous <Continuous>  dextrose 5%. 1000 milliLiter(s) (100 mL/Hr) IV Continuous <Continuous>  dextrose 50% Injectable 25 Gram(s) IV Push once  dextrose 50% Injectable 12.5 Gram(s) IV Push once  dextrose 50% Injectable 25 Gram(s) IV Push once  diltiazem    milliGRAM(s) Oral daily  epoetin anabela-epbx (RETACRIT) Injectable 47991 Unit(s) IV Push <User Schedule>  gabapentin 300 milliGRAM(s) Oral daily  glucagon  Injectable 1 milliGRAM(s) IntraMuscular once  insulin glargine Injectable (LANTUS) 12 Unit(s) SubCutaneous at bedtime  insulin lispro (ADMELOG) corrective regimen sliding scale   SubCutaneous three times a day before meals  insulin lispro (ADMELOG) corrective regimen sliding scale   SubCutaneous at bedtime  lactobacillus acidophilus 1 Tablet(s) Oral daily  lidocaine   4% Patch 1 Patch Transdermal daily  lidocaine   4% Patch 1 Patch Transdermal daily  lidocaine   4% Patch 1 Patch Transdermal daily  lidocaine/prilocaine Cream 1 Application(s) Topical <User Schedule>  mirtazapine 7.5 milliGRAM(s) Oral at bedtime  montelukast 10 milliGRAM(s) Oral at bedtime  oseltamivir 30 milliGRAM(s) Oral <User Schedule>  pantoprazole    Tablet 40 milliGRAM(s) Oral before breakfast  polyethylene glycol 3350 17 Gram(s) Oral at bedtime  senna 2 Tablet(s) Oral at bedtime  sertraline 50 milliGRAM(s) Oral daily  sevelamer carbonate 800 milliGRAM(s) Oral three times a day with meals  simethicone 80 milliGRAM(s) Chew three times a day  tacrolimus   0.1% Ointment 1 Application(s) Topical daily  traZODone 100 milliGRAM(s) Oral at bedtime    MEDICATIONS  (PRN):  acetaminophen     Tablet .. 650 milliGRAM(s) Oral every 6 hours PRN Temp greater or equal to 38C (100.4F), Mild Pain (1 - 3)  benzonatate 100 milliGRAM(s) Oral three times a day PRN Cough  diphenhydrAMINE Injectable 25 milliGRAM(s) IV Push <User Schedule> PRN Rash and/or Itching  guaiFENesin Oral Liquid (Sugar-Free) 100 milliGRAM(s) Oral every 6 hours PRN Cough  melatonin 3 milliGRAM(s) Oral at bedtime PRN Insomnia  midodrine. 5 milliGRAM(s) Oral <User Schedule> PRN 30 minutes prior to dialysis  ondansetron Injectable 4 milliGRAM(s) IV Push every 8 hours PRN Nausea and/or Vomiting  oxyCODONE    IR 10 milliGRAM(s) Oral every 6 hours PRN Severe Pain (7 - 10)  oxyCODONE    IR 5 milliGRAM(s) Oral every 6 hours PRN Moderate Pain (4 - 6)      Allergies  latex (Rash)  penicillin (Nausea)  strawberry (Rash)    Intolerances  caffeine (Nausea)    Review of Systems:  Cardiovascular: No chest pain  Respiratory: No SOB  GI: No nausea, vomiting  Endocrine: no hypoglycemia     PHYSICAL EXAM:  Vital Signs Last 24 Hrs  T(C): 36.6 (25 Apr 2022 13:30), Max: 36.8 (25 Apr 2022 05:29)  T(F): 97.9 (25 Apr 2022 13:30), Max: 98.2 (25 Apr 2022 05:29)  HR: 84 (25 Apr 2022 13:30) (80 - 92)  BP: 112/53 (25 Apr 2022 13:30) (108/52 - 112/53)  BP(mean): --  RR: 17 (25 Apr 2022 13:30) (17 - 18)  SpO2: 100% (25 Apr 2022 13:30) (100% - 100%)  GENERAL: NAD  EYES: No proptosis, no lid lag, anicteric  HEENT:  Atraumatic, Normocephalic, moist mucous membranes  RESPIRATORY: unlabored respirations     CAPILLARY BLOOD GLUCOSE    POCT Blood Glucose.: 215 mg/dL (25 Apr 2022 13:05)  POCT Blood Glucose.: 152 mg/dL (25 Apr 2022 08:58)  POCT Blood Glucose.: 168 mg/dL (24 Apr 2022 21:31)  POCT Blood Glucose.: 248 mg/dL (24 Apr 2022 18:24)  POCT Blood Glucose.: 303 mg/dL (24 Apr 2022 18:22)  POCT Blood Glucose.: 152 mg/dL (20 Apr 2022 12:42)  POCT Blood Glucose.: 155 mg/dL (20 Apr 2022 09:19)  POCT Blood Glucose.: 130 mg/dL (19 Apr 2022 21:44)  POCT Blood Glucose.: 125 mg/dL (19 Apr 2022 19:41)  POCT Blood Glucose.: 125 mg/dL (18 Apr 2022 18:33)  POCT Blood Glucose.: 133 mg/dL (18 Apr 2022 12:16)  POCT Blood Glucose.: 139 mg/dL (18 Apr 2022 09:10)  POCT Blood Glucose.: 207 mg/dL (14 Apr 2022 13:13)  POCT Blood Glucose.: 149 mg/dL (14 Apr 2022 10:07)  POCT Blood Glucose.: 236 mg/dL (14 Apr 2022 05:46)  POCT Blood Glucose.: 176 mg/dL (13 Apr 2022 21:08)  POCT Blood Glucose.: 270 mg/dL (13 Apr 2022 17:56)      A1C with Estimated Average Glucose Result: 6.1 % (03-20-22 @ 12:48)  A1C with Estimated Average Glucose Result: 7.0 % (01-13-22 @ 08:21)  A1C with Estimated Average Glucose Result: 6.7 % (08-31-21 @ 09:30)  A1C with Estimated Average Glucose Result: 7.2 % (04-28-21 @ 07:04)    Diet, Consistent Carbohydrate Renal w/Evening Snack:   For patients receiving Renal Replacement - No Protein Restr, No Conc K, No Conc Phos, Low Sodium (RENAL)  Supplement Feeding Modality:  Oral  Nepro Cans or Servings Per Day:  1       Frequency:  Three Times a day (03-20-22 @ 10:20)

## 2022-04-25 NOTE — PROGRESS NOTE ADULT - ASSESSMENT
56y Female with history of ESRD on HD presents with vaginal bleeding. Nephrology consulted for ESRD status.    1) ESRD: Last HD on 4/23 post-fistulogram tolerated well with 1.5L removed. Plan for next maintenance HD on 4/26. Vascular surgery intervention appreciated. Monitor electrolytes. Plan for HD tomorrow.    2) HTN with ESRD: BP acceptable on Cardizem. Continue with midodrine 10 mg pre-HD on HD days to avoid intradialytic hypotension. Monitor BP.    3) Anemia of renal disease: With component of blood loss from vaginal bleeding. Hb and iron stores acceptable. Continue with Epo 20K with HD. Monitor Hb.    4) Secondary HPT of renal origin: Phosphorus acceptable. Continue with sensipar 30 mg daily and renal diet. Monitor serum calcium and phosphorus.      [facility]  Keaton Lopez M.D.  Juma Green D.O.  Myla Hoffmann M.D.  Julia Brewer, MSN, ANP-C    Telephone: [phone]  Facsimile: [phone]    [address] [place] [address]

## 2022-04-25 NOTE — PROGRESS NOTE ADULT - SUBJECTIVE AND OBJECTIVE BOX
ANTONIA ORTIZ  56y  Female      Patient is a 56y old  Female who presents with a chief complaint of vaginal bleeding (21 Apr 2022 11:39)  Patient feels ok.s/p fistulogram LUE,doing well.  no sob,no cp,    MEDICATIONS  (STANDING):  apixaban 5 milliGRAM(s) Oral every 12 hours  bisacodyl 5 milliGRAM(s) Oral at bedtime  buPROPion XL (24-Hour) . 150 milliGRAM(s) Oral daily  chlorhexidine 2% Cloths 1 Application(s) Topical daily  cinacalcet 30 milliGRAM(s) Oral daily  cycloSPORINE  , modified (NEORAL) 200 milliGRAM(s) Oral two times a day  Dakins Solution - 1/4 Strength 1 Application(s) Topical daily  dextrose 40% Gel 15 Gram(s) Oral once  dextrose 5%. 1000 milliLiter(s) (50 mL/Hr) IV Continuous <Continuous>  dextrose 5%. 1000 milliLiter(s) (100 mL/Hr) IV Continuous <Continuous>  dextrose 50% Injectable 25 Gram(s) IV Push once  dextrose 50% Injectable 12.5 Gram(s) IV Push once  dextrose 50% Injectable 25 Gram(s) IV Push once  diltiazem    milliGRAM(s) Oral daily  epoetin anabela-epbx (RETACRIT) Injectable 38750 Unit(s) IV Push <User Schedule>  gabapentin 300 milliGRAM(s) Oral daily  glucagon  Injectable 1 milliGRAM(s) IntraMuscular once  insulin glargine Injectable (LANTUS) 12 Unit(s) SubCutaneous at bedtime  insulin lispro (ADMELOG) corrective regimen sliding scale   SubCutaneous three times a day before meals  insulin lispro (ADMELOG) corrective regimen sliding scale   SubCutaneous at bedtime  insulin lispro Injectable (ADMELOG) 2 Unit(s) SubCutaneous three times a day before meals  lactobacillus acidophilus 1 Tablet(s) Oral daily  lidocaine   4% Patch 1 Patch Transdermal daily  lidocaine   4% Patch 1 Patch Transdermal daily  lidocaine   4% Patch 1 Patch Transdermal daily  lidocaine   4% Patch 1 Patch Transdermal daily  lidocaine/prilocaine Cream 1 Application(s) Topical <User Schedule>  mirtazapine 7.5 milliGRAM(s) Oral at bedtime  montelukast 10 milliGRAM(s) Oral at bedtime  nystatin Cream 1 Application(s) Topical every 12 hours  pantoprazole    Tablet 40 milliGRAM(s) Oral before breakfast  polyethylene glycol 3350 17 Gram(s) Oral at bedtime  senna 2 Tablet(s) Oral at bedtime  sertraline 50 milliGRAM(s) Oral daily  simethicone 80 milliGRAM(s) Chew three times a day  tacrolimus   0.1% Ointment 1 Application(s) Topical daily  traZODone 100 milliGRAM(s) Oral at bedtime    MEDICATIONS  (PRN):  acetaminophen     Tablet .. 650 milliGRAM(s) Oral every 6 hours PRN Temp greater or equal to 38C (100.4F), Mild Pain (1 - 3)  aluminum hydroxide/magnesium hydroxide/simethicone Suspension 30 milliLiter(s) Oral every 6 hours PRN Dyspepsia  benzonatate 100 milliGRAM(s) Oral three times a day PRN Cough  diphenhydrAMINE Injectable 25 milliGRAM(s) IV Push <User Schedule> PRN Rash and/or Itching  guaiFENesin Oral Liquid (Sugar-Free) 100 milliGRAM(s) Oral every 6 hours PRN Cough  melatonin 6 milliGRAM(s) Oral at bedtime PRN Insomnia  midodrine. 10 milliGRAM(s) Oral <User Schedule> PRN 30 minutes prior to dialysis  ondansetron Injectable 4 milliGRAM(s) IV Push every 8 hours PRN Nausea and/or Vomiting  oxyCODONE    IR 5 milliGRAM(s) Oral every 6 hours PRN Severe Pain (7 - 10)    Vital Signs Last 24 Hrs  T(C): 36.7 (04-25-22 @ 18:00), Max: 36.8 (04-25-22 @ 05:29)  T(F): 98 (04-25-22 @ 18:00), Max: 98.2 (04-25-22 @ 05:29)  HR: 82 (04-25-22 @ 18:00) (82 - 92)  BP: 110/58 (04-25-22 @ 18:00) (108/52 - 112/53)  BP(mean): --  RR: 16 (04-25-22 @ 18:00) (16 - 18)  SpO2: 99% (04-25-22 @ 18:00) (99% - 100%)      PAST MEDICAL & SURGICAL HISTORY:  COPD (chronic obstructive pulmonary disease)    DM (diabetes mellitus)    Atrial fibrillation  with loop recorder 2015, battery most likely depleted, as per cardiac clearance, Dr. Reece Anesthesia aware, pt on Eliquis    HTN (hypertension)    Morbid obesity  BMI - 58.3    Chronic GERD    CHAMP (obstructive sleep apnea)  non compliance with CPAP, Anesthesia Dr. Reece aware, pt told to bring CPAP for sx, pr verbalized understanding    Potential difficult airway on pre-intubation assessment  airway class III, large neck, morbid obesity, hx of CHAMP, no compliance with CPAP- Dr. Reece, Anesthesia aware    End-stage renal disease    Anemia    Medication management    H/O tubal ligation  1989    Status post placement of implantable loop recorder  left chest- 2015    History of vascular access device  s/p insertion right chest permacath 2/2019, removal 6/2019, insertion left chest permacath 6/2019    S/P arteriovenous (AV) fistula creation  left  arm 6/2019    PHYSICAL EXAM:  GENERAL: Alert and awake lying in bed in no distress  HEAD:  Atraumatic, Normocephalic  EYES: EOMI, MO, conjunctiva and sclera clear  NECK: Supple, No JVD, Normal thyroid  NERVOUS SYSTEM:  Alert & Oriented X3, Motor and sensory systems are intact,   CHEST/LUNG: dec breath sounds at bases  HEART: Regular rate and rhythm; No murmurs, rubs, or gallops  ABDOMEN: Soft, Nontender, Nondistended; Bowel sounds present  EXTREMITIES:   Peripheral Pulses are palpable, + edema    LABS:        Creatinine Trend: 5.63 <--, 4.80 <--, 5.42 <--, 5.20 <--, 4.56 <--    Urine Studies:                                          =

## 2022-04-25 NOTE — PROGRESS NOTE ADULT - PROBLEM SELECTOR PROBLEM 7
HLD (hyperlipidemia)

## 2022-04-25 NOTE — PROGRESS NOTE ADULT - PROBLEM SELECTOR PLAN 1
last menses October 2021, noted by GYN to be perimenopausal in 2019  CT Enlarged myomatous uterus. Suspected endometrial thickening  - stopping Eliquis and ASA for now, serial CBC with T&S (difficult stick)  gyn f/u
last menses October 2021, noted by GYN to be perimenopausal in 2019  CT Enlarged myomatous uterus. Suspected endometrial thickening  - stopping Eliquis and ASA for now, serial CBC with T&S - on heparin gtt  gyn f/u- s/p EUA, D&C, hysteroscopy, Mirena IUD placement
last menses October 2021, noted by GYN to be perimenopausal in 2019  CT Enlarged myomatous uterus. Suspected endometrial thickening  - stopping Eliquis and ASA for now, serial CBC with T&S - on heparin gtt  gyn f/u- s/p EUA, D&C, hysteroscopy, Mirena IUD placement    fever w/u -AH3 influenza+  abd pyoderma gangrenosum   vaginal bleeding s/p biopsy which was benign      * s/p a dose of vanco, will monitor off antibiotics  * c/w tamiflu to complete a 5 day course
last menses October 2021, noted by GYN to be perimenopausal in 2019  CT Enlarged myomatous uterus. Suspected endometrial thickening  - stopping Eliquis and ASA for now, serial CBC with T&S - started heparin gtt  gyn f/u
last menses October 2021, noted by GYN to be perimenopausal in 2019  CT Enlarged myomatous uterus. Suspected endometrial thickening  - stopping Eliquis and ASA for now, serial CBC with T&S - started heparin gtt  gyn f/u
last menses October 2021, noted by GYN to be perimenopausal in 2019  CT Enlarged myomatous uterus. Suspected endometrial thickening  - stopping Eliquis and ASA for now, serial CBC with T&S - on heparin gtt  gyn f/u- s/p EUA, D&C, hysteroscopy, Mirena IUD placement    fever w/u -AH3 influenza+  abd pyoderma gangrenosum   vaginal bleeding s/p biopsy which was benign    * f/u the blood cx  * s/p a dose of vanco, will monitor off antibiotics  * c/w tamiflu to complete a 5 day course
last menses October 2021, noted by GYN to be perimenopausal in 2019  CT Enlarged myomatous uterus. Suspected endometrial thickening  - stopping Eliquis and ASA for now, serial CBC with T&S - on heparin gtt  gyn f/u- s/p EUA, D&C, hysteroscopy, Mirena IUD placement    fever w/u -AH3 influenza+  abd pyoderma gangrenosum   vaginal bleeding s/p biopsy which was benign    * f/u the blood cx  * s/p a dose of vanco, will monitor off antibiotics  * c/w tamiflu to complete a 5 day course
last menses October 2021, noted by GYN to be perimenopausal in 2019  CT Enlarged myomatous uterus. Suspected endometrial thickening  - stopping Eliquis and ASA for now, serial CBC with T&S - on heparin gtt  gyn f/u- s/p EUA, D&C, hysteroscopy, Mirena IUD placement
last menses October 2021, noted by GYN to be perimenopausal in 2019  CT Enlarged myomatous uterus. Suspected endometrial thickening  - stopping Eliquis and ASA for now, serial CBC with T&S - on heparin gtt  gyn f/u- s/p EUA, D&C, hysteroscopy, Mirena IUD placement    fever w/u -AH3 influenza+  abd pyoderma gangrenosum   vaginal bleeding s/p biopsy which was benign    * f/u the blood cx  * s/p a dose of vanco, will monitor off antibiotics  * c/w tamiflu to complete a 5 day course
last menses October 2021, noted by GYN to be perimenopausal in 2019  CT Enlarged myomatous uterus. Suspected endometrial thickening  - stopping Eliquis and ASA for now, serial CBC with T&S - on heparin gtt  gyn f/u- s/p EUA, D&C, hysteroscopy, Mirena IUD placement
last menses October 2021, noted by GYN to be perimenopausal in 2019  CT Enlarged myomatous uterus. Suspected endometrial thickening  - stopping Eliquis and ASA for now, serial CBC with T&S - on heparin gtt  gyn f/u- s/p EUA, D&C, hysteroscopy, Mirena IUD placement    fever w/u -AH3 influenza+  abd pyoderma gangrenosum   vaginal bleeding s/p biopsy which was benign    * f/u the blood cx  * s/p a dose of vanco, will monitor off antibiotics  * c/w tamiflu to complete a 5 day course
last menses October 2021, noted by GYN to be perimenopausal in 2019  CT Enlarged myomatous uterus. Suspected endometrial thickening  - stopping Eliquis and ASA for now, serial CBC with T&S - on heparin gtt  gyn f/u- poss OR for EUA, D&C, hysteroscopy, Mirena IUD placement
last menses October 2021, noted by GYN to be perimenopausal in 2019  CT Enlarged myomatous uterus. Suspected endometrial thickening  - stopping Eliquis and ASA for now, serial CBC with T&S - on heparin gtt  gyn f/u- s/p EUA, D&C, hysteroscopy, Mirena IUD placement    fever w/u -AH3 influenza+  abd pyoderma gangrenosum   vaginal bleeding s/p biopsy which was benign      * s/p a dose of vanco, will monitor off antibiotics  * c/w tamiflu to complete a 5 day course
last menses October 2021, noted by GYN to be perimenopausal in 2019  CT Enlarged myomatous uterus. Suspected endometrial thickening  - stopping Eliquis and ASA for now, serial CBC with T&S - on heparin gtt  gyn f/u- s/p EUA, D&C, hysteroscopy, Mirena IUD placement    fever w/u -AH3 influenza+  abd pyoderma gangrenosum   vaginal bleeding s/p biopsy which was benign    * f/u the blood cx  * s/p a dose of vanco, will monitor off antibiotics  * c/w tamiflu to complete a 5 day course
last menses October 2021, noted by GYN to be perimenopausal in 2019  CT Enlarged myomatous uterus. Suspected endometrial thickening  - stopping Eliquis and ASA for now, serial CBC with T&S - on heparin gtt  gyn f/u- s/p EUA, D&C, hysteroscopy, Mirena IUD placement
last menses October 2021, noted by GYN to be perimenopausal in 2019  CT Enlarged myomatous uterus. Suspected endometrial thickening  - stopping Eliquis and ASA for now, serial CBC with T&S - on heparin gtt  gyn f/u- s/p EUA, D&C, hysteroscopy, Mirena IUD placement    fever w/u -AH3 influenza+  abd pyoderma gangrenosum   vaginal bleeding s/p biopsy which was benign      * s/p a dose of vanco, will monitor off antibiotics  * c/w tamiflu to complete a 5 day course
last menses October 2021, noted by GYN to be perimenopausal in 2019  CT Enlarged myomatous uterus. Suspected endometrial thickening  - stopping Eliquis and ASA for now, serial CBC with T&S - on heparin gtt  gyn f/u- s/p EUA, D&C, hysteroscopy, Mirena IUD placement
last menses October 2021, noted by GYN to be perimenopausal in 2019  CT Enlarged myomatous uterus. Suspected endometrial thickening  - stopping Eliquis and ASA for now, serial CBC with T&S - on heparin gtt  gyn f/u- s/p EUA, D&C, hysteroscopy, Mirena IUD placement    fever w/u -AH3 influenza+  abd pyoderma gangrenosum   vaginal bleeding s/p biopsy which was benign    * f/u the blood cx  * s/p a dose of vanco, will monitor off antibiotics  * c/w tamiflu to complete a 5 day course
last menses October 2021, noted by GYN to be perimenopausal in 2019  CT Enlarged myomatous uterus. Suspected endometrial thickening  - stopping Eliquis and ASA for now, serial CBC with T&S - on heparin gtt  gyn f/u- s/p EUA, D&C, hysteroscopy, Mirena IUD placement    fever w/u -AH3 influenza+  abd pyoderma gangrenosum   vaginal bleeding s/p biopsy which was benign    * f/u the blood cx  * s/p a dose of vanco, will monitor off antibiotics  * c/w tamiflu to complete a 5 day course
last menses October 2021, noted by GYN to be perimenopausal in 2019  CT Enlarged myomatous uterus. Suspected endometrial thickening  - stopping Eliquis and ASA for now, serial CBC with T&S   gyn f/u
last menses October 2021, noted by GYN to be perimenopausal in 2019  CT Enlarged myomatous uterus. Suspected endometrial thickening  - stopping Eliquis and ASA for now, serial CBC with T&S - on heparin gtt  gyn f/u- s/p EUA, D&C, hysteroscopy, Mirena IUD placement    fever w/u -AH3 influenza+  abd pyoderma gangrenosum   vaginal bleeding s/p biopsy which was benign    * f/u the blood cx  * s/p a dose of vanco, will monitor off antibiotics  * c/w tamiflu to complete a 5 day course
last menses October 2021, noted by GYN to be perimenopausal in 2019  CT Enlarged myomatous uterus. Suspected endometrial thickening  - stopping Eliquis and ASA for now, serial CBC with T&S   gyn f/u
last menses October 2021, noted by GYN to be perimenopausal in 2019  CT Enlarged myomatous uterus. Suspected endometrial thickening  - stopping Eliquis and ASA for now, serial CBC with T&S - on heparin gtt  gyn f/u- poss OR tomorrow
last menses October 2021, noted by GYN to be perimenopausal in 2019  CT Enlarged myomatous uterus. Suspected endometrial thickening  - stopping Eliquis and ASA for now, serial CBC with T&S - on heparin gtt  gyn f/u- s/p EUA, D&C, hysteroscopy, Mirena IUD placement
last menses October 2021, noted by GYN to be perimenopausal in 2019  CT Enlarged myomatous uterus. Suspected endometrial thickening  - stopping Eliquis and ASA for now, serial CBC with T&S - on heparin gtt  gyn f/u- s/p EUA, D&C, hysteroscopy, Mirena IUD placement    fever w/u -AH3 influenza+  abd pyoderma gangrenosum   vaginal bleeding s/p biopsy which was benign      * s/p a dose of vanco, will monitor off antibiotics  * c/w tamiflu to complete a 5 day course
last menses October 2021, noted by GYN to be perimenopausal in 2019  CT Enlarged myomatous uterus. Suspected endometrial thickening  - stopping Eliquis and ASA for now, serial CBC with T&S - on heparin gtt  gyn f/u- s/p EUA, D&C, hysteroscopy, Mirena IUD placement
last menses October 2021, noted by GYN to be perimenopausal in 2019  CT Enlarged myomatous uterus. Suspected endometrial thickening  - stopping Eliquis and ASA for now, serial CBC with T&S - started heparin gtt  gyn f/u
last menses October 2021, noted by GYN to be perimenopausal in 2019  CT Enlarged myomatous uterus. Suspected endometrial thickening  - stopping Eliquis and ASA for now, serial CBC with T&S (difficult stick)  gyn f/u
last menses October 2021, noted by GYN to be perimenopausal in 2019  CT Enlarged myomatous uterus. Suspected endometrial thickening  - stopping Eliquis and ASA for now, serial CBC with T&S - on heparin gtt  gyn f/u- s/p EUA, D&C, hysteroscopy, Mirena IUD placement
last menses October 2021, noted by GYN to be perimenopausal in 2019  CT Enlarged myomatous uterus. Suspected endometrial thickening  - stopping Eliquis and ASA for now, serial CBC with T&S - on heparin gtt  gyn f/u- s/p EUA, D&C, hysteroscopy, Mirena IUD placement    fever w/u   ID f/u   abx , f/u cx
last menses October 2021, noted by GYN to be perimenopausal in 2019  CT Enlarged myomatous uterus. Suspected endometrial thickening  - stopping Eliquis and ASA for now, serial CBC with T&S - on heparin gtt  gyn f/u- s/p EUA, D&C, hysteroscopy, Mirena IUD placement
fever w/u -AH3 influenza+  abd pyoderma gangrenosum   vaginal bleeding s/p biopsy which was benign      * c/w tamiflu to complete a 5 day course
last menses October 2021, noted by GYN to be perimenopausal in 2019  CT Enlarged myomatous uterus. Suspected endometrial thickening  - stopping Eliquis and ASA for now, serial CBC with T&S - on heparin gtt  gyn f/u- poss OR for EUA, D&C, hysteroscopy, Mirena IUD placement
last menses October 2021, noted by GYN to be perimenopausal in 2019  CT Enlarged myomatous uterus. Suspected endometrial thickening  - stopping Eliquis and ASA for now, serial CBC with T&S - on heparin gtt  gyn f/u- s/p EUA, D&C, hysteroscopy, Mirena IUD placement
last menses October 2021, noted by GYN to be perimenopausal in 2019  CT Enlarged myomatous uterus. Suspected endometrial thickening  - stopping Eliquis and ASA for now, serial CBC with T&S - on heparin gtt  gyn f/u- s/p EUA, D&C, hysteroscopy, Mirena IUD placement    fever w/u -AH3 influenza+  abd pyoderma gangrenosum   vaginal bleeding s/p biopsy which was benign      * s/p a dose of vanco, will monitor off antibiotics  * c/w tamiflu to complete a 5 day course
last menses October 2021, noted by GYN to be perimenopausal in 2019  CT Enlarged myomatous uterus. Suspected endometrial thickening  - stopping Eliquis and ASA for now, serial CBC with T&S - started heparin gtt  gyn f/u

## 2022-04-25 NOTE — PROGRESS NOTE ADULT - PROBLEM SELECTOR PLAN 3
as per previous derm note: "favor Pyoderma Gangrenosum based on clinical appearance of deep ulceration with extensive undermining and worsening when trial off of steroids, now with gradual improvement since initiation of cyclosporine since 2/8 (as evidenced by slight decrease in size and evidence of re-epithelialization). PG is an inflammatory, noninfectious, ulcerative neutrophilic skin, that are hallmarked by early pustules that ultimately give rise to ulcers with an undermined, violaceous border. PG is associated with a number of systemic illnesses (classically inflammatory bowel disease, hematologic malignancy, arthritis, etc), none of which are seen in our patient"  - derm f/u
as per previous derm note: "favor Pyoderma Gangrenosum based on clinical appearance of deep ulceration with extensive undermining and worsening when trial off of steroids, now with gradual improvement since initiation of cyclosporine since 2/8 (as evidenced by slight decrease in size and evidence of re-epithelialization). PG is an inflammatory, noninfectious, ulcerative neutrophilic skin, that are hallmarked by early pustules that ultimately give rise to ulcers with an undermined, violaceous border. PG is associated with a number of systemic illnesses (classically inflammatory bowel disease, hematologic malignancy, arthritis, etc), none of which are seen in our patient"  - appreciate Derm input
as per previous derm note: "favor Pyoderma Gangrenosum based on clinical appearance of deep ulceration with extensive undermining and worsening when trial off of steroids, now with gradual improvement since initiation of cyclosporine since 2/8 (as evidenced by slight decrease in size and evidence of re-epithelialization). PG is an inflammatory, noninfectious, ulcerative neutrophilic skin, that are hallmarked by early pustules that ultimately give rise to ulcers with an undermined, violaceous border. PG is associated with a number of systemic illnesses (classically inflammatory bowel disease, hematologic malignancy, arthritis, etc), none of which are seen in our patient"  - derm f/u
as per previous derm note: "favor Pyoderma Gangrenosum based on clinical appearance of deep ulceration with extensive undermining and worsening when trial off of steroids, now with gradual improvement since initiation of cyclosporine since 2/8 (as evidenced by slight decrease in size and evidence of re-epithelialization). PG is an inflammatory, noninfectious, ulcerative neutrophilic skin, that are hallmarked by early pustules that ultimately give rise to ulcers with an undermined, violaceous border. PG is associated with a number of systemic illnesses (classically inflammatory bowel disease, hematologic malignancy, arthritis, etc), none of which are seen in our patient"  - appreciate Derm input
as per previous derm note: "favor Pyoderma Gangrenosum based on clinical appearance of deep ulceration with extensive undermining and worsening when trial off of steroids, now with gradual improvement since initiation of cyclosporine since 2/8 (as evidenced by slight decrease in size and evidence of re-epithelialization). PG is an inflammatory, noninfectious, ulcerative neutrophilic skin, that are hallmarked by early pustules that ultimately give rise to ulcers with an undermined, violaceous border. PG is associated with a number of systemic illnesses (classically inflammatory bowel disease, hematologic malignancy, arthritis, etc), none of which are seen in our patient"  - derm f/u
as per previous derm note: "favor Pyoderma Gangrenosum based on clinical appearance of deep ulceration with extensive undermining and worsening when trial off of steroids, now with gradual improvement since initiation of cyclosporine since 2/8 (as evidenced by slight decrease in size and evidence of re-epithelialization). PG is an inflammatory, noninfectious, ulcerative neutrophilic skin, that are hallmarked by early pustules that ultimately give rise to ulcers with an undermined, violaceous border. PG is associated with a number of systemic illnesses (classically inflammatory bowel disease, hematologic malignancy, arthritis, etc), none of which are seen in our patient"  - derm f/u
as per previous derm note: "favor Pyoderma Gangrenosum based on clinical appearance of deep ulceration with extensive undermining and worsening when trial off of steroids, now with gradual improvement since initiation of cyclosporine since 2/8 (as evidenced by slight decrease in size and evidence of re-epithelialization). PG is an inflammatory, noninfectious, ulcerative neutrophilic skin, that are hallmarked by early pustules that ultimately give rise to ulcers with an undermined, violaceous border. PG is associated with a number of systemic illnesses (classically inflammatory bowel disease, hematologic malignancy, arthritis, etc), none of which are seen in our patient"  - appreciate Derm input
as per previous derm note: "favor Pyoderma Gangrenosum based on clinical appearance of deep ulceration with extensive undermining and worsening when trial off of steroids, now with gradual improvement since initiation of cyclosporine since 2/8 (as evidenced by slight decrease in size and evidence of re-epithelialization). PG is an inflammatory, noninfectious, ulcerative neutrophilic skin, that are hallmarked by early pustules that ultimately give rise to ulcers with an undermined, violaceous border. PG is associated with a number of systemic illnesses (classically inflammatory bowel disease, hematologic malignancy, arthritis, etc), none of which are seen in our patient"  - derm f/u
as per previous derm note: "favor Pyoderma Gangrenosum based on clinical appearance of deep ulceration with extensive undermining and worsening when trial off of steroids, now with gradual improvement since initiation of cyclosporine since 2/8 (as evidenced by slight decrease in size and evidence of re-epithelialization). PG is an inflammatory, noninfectious, ulcerative neutrophilic skin, that are hallmarked by early pustules that ultimately give rise to ulcers with an undermined, violaceous border. PG is associated with a number of systemic illnesses (classically inflammatory bowel disease, hematologic malignancy, arthritis, etc), none of which are seen in our patient"  - appreciate Derm input
as per previous derm note: "favor Pyoderma Gangrenosum based on clinical appearance of deep ulceration with extensive undermining and worsening when trial off of steroids, now with gradual improvement since initiation of cyclosporine since 2/8 (as evidenced by slight decrease in size and evidence of re-epithelialization). PG is an inflammatory, noninfectious, ulcerative neutrophilic skin, that are hallmarked by early pustules that ultimately give rise to ulcers with an undermined, violaceous border. PG is associated with a number of systemic illnesses (classically inflammatory bowel disease, hematologic malignancy, arthritis, etc), none of which are seen in our patient"  - derm f/u
as per previous derm note: "favor Pyoderma Gangrenosum based on clinical appearance of deep ulceration with extensive undermining and worsening when trial off of steroids, now with gradual improvement since initiation of cyclosporine since 2/8 (as evidenced by slight decrease in size and evidence of re-epithelialization). PG is an inflammatory, noninfectious, ulcerative neutrophilic skin, that are hallmarked by early pustules that ultimately give rise to ulcers with an undermined, violaceous border. PG is associated with a number of systemic illnesses (classically inflammatory bowel disease, hematologic malignancy, arthritis, etc), none of which are seen in our patient"  - appreciate Derm input
as per previous derm note: "favor Pyoderma Gangrenosum based on clinical appearance of deep ulceration with extensive undermining and worsening when trial off of steroids, now with gradual improvement since initiation of cyclosporine since 2/8 (as evidenced by slight decrease in size and evidence of re-epithelialization). PG is an inflammatory, noninfectious, ulcerative neutrophilic skin, that are hallmarked by early pustules that ultimately give rise to ulcers with an undermined, violaceous border. PG is associated with a number of systemic illnesses (classically inflammatory bowel disease, hematologic malignancy, arthritis, etc), none of which are seen in our patient"  - appreciate Derm input
as per previous derm note: "favor Pyoderma Gangrenosum based on clinical appearance of deep ulceration with extensive undermining and worsening when trial off of steroids, now with gradual improvement since initiation of cyclosporine since 2/8 (as evidenced by slight decrease in size and evidence of re-epithelialization). PG is an inflammatory, noninfectious, ulcerative neutrophilic skin, that are hallmarked by early pustules that ultimately give rise to ulcers with an undermined, violaceous border. PG is associated with a number of systemic illnesses (classically inflammatory bowel disease, hematologic malignancy, arthritis, etc), none of which are seen in our patient"  - derm f/u
as per previous derm note: "favor Pyoderma Gangrenosum based on clinical appearance of deep ulceration with extensive undermining and worsening when trial off of steroids, now with gradual improvement since initiation of cyclosporine since 2/8 (as evidenced by slight decrease in size and evidence of re-epithelialization). PG is an inflammatory, noninfectious, ulcerative neutrophilic skin, that are hallmarked by early pustules that ultimately give rise to ulcers with an undermined, violaceous border. PG is associated with a number of systemic illnesses (classically inflammatory bowel disease, hematologic malignancy, arthritis, etc), none of which are seen in our patient"  - appreciate Derm input
as per previous derm note: "favor Pyoderma Gangrenosum based on clinical appearance of deep ulceration with extensive undermining and worsening when trial off of steroids, now with gradual improvement since initiation of cyclosporine since 2/8 (as evidenced by slight decrease in size and evidence of re-epithelialization). PG is an inflammatory, noninfectious, ulcerative neutrophilic skin, that are hallmarked by early pustules that ultimately give rise to ulcers with an undermined, violaceous border. PG is associated with a number of systemic illnesses (classically inflammatory bowel disease, hematologic malignancy, arthritis, etc), none of which are seen in our patient"   Derm if/u noted
as per previous derm note: "favor Pyoderma Gangrenosum based on clinical appearance of deep ulceration with extensive undermining and worsening when trial off of steroids, now with gradual improvement since initiation of cyclosporine since 2/8 (as evidenced by slight decrease in size and evidence of re-epithelialization). PG is an inflammatory, noninfectious, ulcerative neutrophilic skin, that are hallmarked by early pustules that ultimately give rise to ulcers with an undermined, violaceous border. PG is associated with a number of systemic illnesses (classically inflammatory bowel disease, hematologic malignancy, arthritis, etc), none of which are seen in our patient"  - appreciate Derm input
as per previous derm note: "favor Pyoderma Gangrenosum based on clinical appearance of deep ulceration with extensive undermining and worsening when trial off of steroids, now with gradual improvement since initiation of cyclosporine since 2/8 (as evidenced by slight decrease in size and evidence of re-epithelialization). PG is an inflammatory, noninfectious, ulcerative neutrophilic skin, that are hallmarked by early pustules that ultimately give rise to ulcers with an undermined, violaceous border. PG is associated with a number of systemic illnesses (classically inflammatory bowel disease, hematologic malignancy, arthritis, etc), none of which are seen in our patient"  - appreciate Derm input
as per previous derm note: "favor Pyoderma Gangrenosum based on clinical appearance of deep ulceration with extensive undermining and worsening when trial off of steroids, now with gradual improvement since initiation of cyclosporine since 2/8 (as evidenced by slight decrease in size and evidence of re-epithelialization). PG is an inflammatory, noninfectious, ulcerative neutrophilic skin, that are hallmarked by early pustules that ultimately give rise to ulcers with an undermined, violaceous border. PG is associated with a number of systemic illnesses (classically inflammatory bowel disease, hematologic malignancy, arthritis, etc), none of which are seen in our patient"  - derm f/u

## 2022-04-25 NOTE — PROVIDER CONTACT NOTE (OTHER) - DATE AND TIME:
08-Apr-2022 07:30
28-Mar-2022 12:50
02-Apr-2022 06:01
03-Apr-2022 23:40
17-Apr-2022 13:54
23-Mar-2022 01:56
24-Apr-2022 05:23
25-Apr-2022 18:15
01-Apr-2022 22:00
07-Apr-2022 06:49
10-Apr-2022 06:17
05-Apr-2022 05:51
04-Apr-2022 09:45
25-Mar-2022 04:58
04-Apr-2022 05:51
06-Apr-2022 09:30
22-Mar-2022 16:09
24-Apr-2022 06:47
18-Apr-2022 07:03
20-Apr-2022 06:12
20-Apr-2022 08:15
24-Apr-2022 10:11
30-Mar-2022 06:19

## 2022-04-25 NOTE — PROVIDER CONTACT NOTE (OTHER) - ACTION/TREATMENT ORDERED:
Reorder PTT.
pt refusing AM labs, educated on importance however still refusing
PA notified. Pt educated on refusal, pt refused 3x. pt was re-educated & stated she is aware of the outcome. dialysis states it is no problem they will draw AM labs even so patient coming in the PM.
pt refusing AM labs, educated on importance however still refusing
PA stated to monitor bleeding, will do so
Provider made aware. No new orders at this time. All safety maintained.
ACP made aware.  Will continue to monitor.
PA gave verbal orders to extend IV. no written orders obtained. PA signed out. will rely information to night shift RN.
ACP made aware.  Will continue to monitor.
PA aware. PA stated not to force labwork on patient, educate patient and have AM Rn try.
Provider made aware. No new orders at this time. Will continue to monitor.
Provider made aware. No new orders at this time. Will continue to monitor.
continue to monitor
No new orders at this time. Will continue to monitor.
no new orders given at this time
PA notified. Pt educated on refusal, pt refused 3x and started to cry once asked again and was agitated. paged PA multiple 3x no response, pt was re-educated & stated she is aware of the outcome
Provider made aware. Diltiazem held as per parameters. All safety maintained
Provider made aware. No new orders at this time. Will continue to monitor.
Provider made aware. No new orders at this time. Will continue to monitor.
Provider made aware. No new orders at this time. Will reposition & educate patient on non-pharmaceutical pain management. Will continue to monitor.
provider notified IV acetaminophen ordered and administered
Continue to attempt to draw lab with [facility] and contact back if unsuccessful.
no new orders given at this time

## 2022-04-25 NOTE — PROGRESS NOTE ADULT - PROBLEM SELECTOR PROBLEM 1
HLD (hyperlipidemia)
Vaginal bleeding
HLD (hyperlipidemia)
HLD (hyperlipidemia)
Vaginal bleeding
Vaginal bleeding
DM (diabetes mellitus), type 2, uncontrolled
HLD (hyperlipidemia)
Vaginal bleeding
Vaginal bleeding
DM (diabetes mellitus), type 2, uncontrolled
HLD (hyperlipidemia)
Vaginal bleeding
DM (diabetes mellitus), type 2, uncontrolled
HLD (hyperlipidemia)
Vaginal bleeding
DM (diabetes mellitus), type 2, uncontrolled
Vaginal bleeding

## 2022-04-25 NOTE — PROVIDER CONTACT NOTE (OTHER) - REASON
2 second pause and 1st degree heart block on tele monitor
PTT clotted.
patient complaining of tooth pain unresolved by PO acetaminophen
Patient episode of vaginal bleeding & complaint of hand tremors
patient refusing AM lab work
Patient continues to complain of pain despite oxycodone standing & PRN order.
Patient refused AM blood work
hypotension
hypotension
patient had spotting of vaginal bleeding, color was brown
pt refusing AM labs, educated on importance however still refusing
/53
Patient refused AM labs
Patient refused AM labs
Ptt lab unable to be drawn
extend IV
Patient refused AM labs
patient refused lab work stated she wants it done w/ dialysis. dialysis was called and aware of drawing AM labs, unfortunately she was pushed to PM due to BP meds
pt refused am labs
pt refused am labs
Patient refused AM blood work
patient refused lab work stated she wants it done w/ dialysis but dialysis was called and stated patient is due tomorrow morning, patient refused 3x
patient refused lab work, PA notified, stated she wants labwork to be done at dialysis. ANM attempted, + PCA & I attempted pt still refused. Pt was also educated on the importance but refused again.

## 2022-04-25 NOTE — PROVIDER CONTACT NOTE (OTHER) - RECOMMENDATIONS
Provider made aware.
Inform provider.
PA notified. patient was educted and asked multiples times still refused and stated she wants it done at dialysis, patient was educated
Inform provider.
PA notify. from previous refusals PA stated not to force patient to do blood work and to educate
Provider made aware, states it is "fine" at this time. Will continue to monitor H&H. No new orders at this time. Will continue to monitor.
Will continue to monitor.
pt refusing AM labs, educated on importance however still refusing
pt refusing AM labs, educated on importance however still refusing
Notify provider.
diltiazem 120 mg held as per parameter
Notify provider. Hold diltiazem.
PA notified. obtained verbal orders that PA would extend IV
PA notify. from previous refusals PA stated not to force patient to do blood work and to educate
Provider made aware. No new orders at this time. Will continue to monitor.
Will continue to monitor.
Notify PA, monitor patient for anymore bleeding
Provider made aware. Chronic pain consult to be done. No new pain medication at this time. Will continue to monitor.
Provider made aware. No new orders at this time. Will continue to monitor.
Provider made aware. No new orders at this time. Will continue to monitor.
no recommendations given at this time.
notify provider

## 2022-04-25 NOTE — PROGRESS NOTE ADULT - SUBJECTIVE AND OBJECTIVE BOX
[facility] NEPHROLOGY- PROGRESS NOTE    56y Female with history of ESRD on HD presents with vaginal bleeding. Nephrology consulted for ESRD status.    REVIEW OF SYSTEMS:  Gen: no fevers  Cards: no chest pain  Resp: no dyspnea  GI: no nausea or vomiting or diarrhea  Vascular: no LE edema      caffeine (Nausea)  latex (Rash)  penicillin (Nausea)  strawberry (Rash)    Hospital Medications: Medications reviewed      VITALS:  T(F): 98 (04-25-22 @ 18:00), Max: 98.2 (04-25-22 @ 05:29)  HR: 82 (04-25-22 @ 18:00)  BP: 110/58 (04-25-22 @ 18:00)  RR: 16 (04-25-22 @ 18:00)  SpO2: 99% (04-25-22 @ 18:00)        PHYSICAL EXAM:  Gen: NAD, calm  Cards: RRR, +S1/S2, no M/G/R  Resp: CTA B/L  GI: soft, NT/ND, NABS, + ab wound dressing in place  Vascular: no LE edema B/L  Access: LUE AVF + bruit/thrill    LABS: no new labs

## 2022-04-25 NOTE — PROVIDER CONTACT NOTE (OTHER) - SITUATION
Patient continues to complain of pain despite standing & PRN Q6 dose of oxycodone.
pt refusing AM labs, educated on importance however still refusing
/46
patient refused lab work stated she wants it done w/ dialysis but dialysis was called and stated patient is due tomorrow morning, patient refused 3x
patient refusing AM lab work. Attempted to educate patient on importance of lab work. Patient continued to refuse.
patient refused lab work, PA notified, stated she wants labwork to be done at dialysis. ANM attempted, + PCA & I attempted pt still refused. Pt was also educated on the importance but refused again.
PTT clogged.
Patient episode of vaginal bleeding & complaint of hand tremors
Pt had a 2 second pause and 1st degree heart block on tele monitor. Provider paged, and message sent.
patient complaining of tooth pain that is unresolved by PO acetaminophen states IV acetaminophen has helped in the past
patient refusing AM lab work. Attempted to educate patient on importance of lab work. Patient continued to refuse.
Patient refused AM blood work
patient had spotting of vaginal bleeding, color was brown
/45
Patient refused AM labs
aptt unable to be drawn. Pt is hard stick, with L arm precautions. Multiple attempts made by RN, PCA and ANM.
patient refusing Iv change/removal at this time due to difficulty obtaining IV access
Patient refused AM blood work
Patient refusing AM labs
Patient refusing AM labs
Pt /53
patient refused lab work stated she wants it done w/ dialysis. dialysis was called and aware of drawing AM labs, unfortunately she was pushed to PM due to BP meds
patient refusing AM lab work. Attempted to educate patient on importance of lab work. Patient continued to refuse.

## 2022-04-25 NOTE — PROVIDER CONTACT NOTE (OTHER) - ASSESSMENT
A&Ox4. Patient has Left arm AV fistula.
A&Ox4. Patient has Left arm AV fistula.
Patient A&Ox4, refusing AM labs
Pt VSS, No complaints of chest pain or SOB, pt resting in bed.
patient aox4, not on telemetry, bedrest
patient refusing Iv change/removal at this time due to difficulty obtaining IV access. no s/s of infiltration/infection/phlebitis.
pt refusing AM labs, educated on importance however still refusing. A&Ox4. vitals as per flow sheet
pt refusing AM labs, educated on importance however still refusing. A&Ox4. vitals as per flow sheet, Last labs taken yesterday on 4/19/22
A&Ox4. Patient continues to complain of pain in abdominal wound of a 5 despite administration of oxycodone for pain Q6 PRN
A&Ox4. /53 HR 92. No acute distress noted
A&Ox4. Patient has Left arm AV fistula.
No signs of acute distress noted.
patient aox4, bedrest and incontinent x2
py aox4. bedrest. incontinentx2
/46. P 86. patient asymptomatic.
patient AOx4 complaints of tooth pain
pt refusing AM labs, educated on importance however still refusing
py aox4. bedrest. incontinentx2
Patient A&Ox4, refusing AM labs
/45. P 77. patient asymptomatic.
A&Ox4. episode of red, painless vaginal bleeding. pt complaining of bilateral hand tremors that began yesterday night.
pt refusing AM labs, educated on importance however still refusing

## 2022-04-25 NOTE — PROGRESS NOTE ADULT - PROBLEM SELECTOR PLAN 4
HD  as scheduled
HD  as scheduled  vascular f/u noted.s/p fistulogram,
HD  as scheduled
HD  as scheduled  vascular EVAL DONE FOR ELEVATED VENOUS PRESSURE during hd
HD  as scheduled  vascular f/u noted.s/p fistulogram,
HD  as scheduled
HD  as scheduled
HD  as scheduled  vascular f/u noted.s/p fistulogram,
HD  as scheduled
HD  as scheduled  vascular f/u noted.s/p fistulogram,
HD  as scheduled
HD  as scheduled  vascular f/u noted.s/p fistulogram,
HD  as scheduled

## 2022-04-25 NOTE — PROGRESS NOTE ADULT - PROBLEM SELECTOR PROBLEM 3
Pyoderma gangrenosum
Pyoderma gangrenosum
HTN (hypertension)
HTN (hypertension)
Pyoderma gangrenosum
Pyoderma gangrenosum
HTN (hypertension)
Pyoderma gangrenosum
HLD (hyperlipidemia)
HLD (hyperlipidemia)
Pyoderma gangrenosum
HLD (hyperlipidemia)
HTN (hypertension)
Pyoderma gangrenosum
HLD (hyperlipidemia)
Pyoderma gangrenosum

## 2022-04-25 NOTE — PROGRESS NOTE ADULT - PROBLEM SELECTOR PROBLEM 4
ESRD on dialysis

## 2022-04-26 NOTE — PROGRESS NOTE ADULT - ASSESSMENT
56y Female with history of ESRD on HD presents with vaginal bleeding. Nephrology consulted for ESRD status.    1) ESRD: Last HD on 4/23 post-fistulogram access functioned well. HD today 4/26.   Vascular surgery intervention appreciated. Monitor electrolytes.    2) HTN with ESRD: BP acceptable on Cardizem. Continue with midodrine 10 mg pre-HD on HD days to avoid intradialytic hypotension. Monitor BP.    3) Anemia of renal disease: With component of blood loss from vaginal bleeding. Hb and iron stores acceptable. Continue with Epo 20K with HD. Monitor Hb.    4) Secondary HPT of renal origin: Phosphorus acceptable. Continue with sensipar 30 mg daily and renal diet. Monitor serum calcium and phosphorus.      [facility] NEPHROLOGY  Keaton Lopez M.D.  Juma Green D.O.  Myla Hoffmann M.D.  Julia Brewer, MSN, ANP-C    Telephone: [phone]  Facsimile: [phone]    [id number] [id number]

## 2022-04-26 NOTE — PROGRESS NOTE ADULT - REASON FOR ADMISSION
vaginal bleeding

## 2022-04-26 NOTE — PROGRESS NOTE ADULT - PROVIDER SPECIALTY LIST ADULT
Cardiology
Cardiology
Endocrinology
Infectious Disease
Internal Medicine
Internal Medicine
Nephrology
Anesthesia
Cardiology
Endocrinology
GYN
Internal Medicine
Internal Medicine
Nephrology
Vascular Surgery
Cardiology
Endocrinology
GYN
Internal Medicine
Nephrology
Wound Care
Cardiology
Endocrinology
Infectious Disease
Internal Medicine
Internal Medicine
Nephrology
Internal Medicine
Internal Medicine
Endocrinology
Internal Medicine
Internal Medicine
Endocrinology
Internal Medicine

## 2022-04-26 NOTE — PROGRESS NOTE ADULT - SUBJECTIVE AND OBJECTIVE BOX
[facility] NEPHROLOGY- PROGRESS NOTE    56y Female with history of ESRD on HD presents with vaginal bleeding. Nephrology consulted for ESRD status.    REVIEW OF SYSTEMS:  Gen: no fevers  Cards: no chest pain  Resp: no dyspnea  GI: no nausea or vomiting or diarrhea  Vascular: no LE edema      caffeine (Nausea)  latex (Rash)  penicillin (Nausea)  strawberry (Rash)    Hospital Medications: Medications reviewed      VITALS:  T(F): 98.2 (04-26-22 @ 11:20), Max: 98.2 (04-26-22 @ 11:20)  HR: 63 (04-26-22 @ 11:20)  BP: 126/94 (04-26-22 @ 11:20)  RR: 16 (04-26-22 @ 11:20)  SpO2: 100% (04-26-22 @ 05:00)  Wt(kg): --    04-26 @ 07:01  -  04-26 @ 15:45  --------------------------------------------------------  IN: 800 mL / OUT: 2800 mL / NET: -2000 mL      PHYSICAL EXAM:  Gen: NAD, calm  Cards: RRR, +S1/S2, no M/G/R  Resp: CTA B/L  GI: soft, NT/ND, NABS, + ab wound dressing in place  Vascular: no LE edema B/L  Access: LUE AVF + bruit/thrill    LABS:  04-26    136  |  94<L>  |  27<H>  ----------------------------<  152<H>  4.3   |  26  |  6.19<H>    Ca    9.0      26 Apr 2022 07:54  Phos  3.7     04-26  Mg     2.10     04-26      Creatinine Trend: 6.19 <--, 5.63 <--, 4.80 <--, 5.42 <--, 5.20 <--, 4.56 <--                        9.7    12.56 )-----------( 473      ( 26 Apr 2022 07:54 )             34.3     Urine Studies:

## 2022-04-26 NOTE — PROGRESS NOTE ADULT - SUBJECTIVE AND OBJECTIVE BOX
CARDIOLOGY FOLLOW UP - Dr. Bullock  Date of Service: 4/26/22  CC: no cp/sob      Review of Systems:  Constitutional: No fever, weight loss, or fatigue  Respiratory: No cough, wheezing, or hemoptysis, no shortness of breath  Cardiovascular: No chest pain, palpitations, passing out, dizziness, or leg swelling  Gastrointestinal: No abd or epigastric pain.  No nausea, vomiting, or hematemesis; no diarrhea or constipation, no melena or hematochezia  Vascular: no edema       PHYSICAL EXAM:  T(C): 36.8 (04-26-22 @ 11:20), Max: 36.8 (04-26-22 @ 11:20)  HR: 63 (04-26-22 @ 11:20) (63 - 86)  BP: 126/94 (04-26-22 @ 11:20) (90/52 - 127/69)  RR: 16 (04-26-22 @ 11:20) (16 - 17)  SpO2: 100% (04-26-22 @ 05:00) (99% - 100%)  Wt(kg): --  I&O's Summary    26 Apr 2022 07:01  -  26 Apr 2022 12:37  --------------------------------------------------------  IN: 800 mL / OUT: 2800 mL / NET: -2000 mL        Appearance: Normal	  Cardiovascular: Normal S1 S2,RRR, No JVD, No murmurs  Respiratory: Lungs clear to auscultation	  Gastrointestinal:  Soft, Non-tender, + BS	  Extremities: Normal range of motion, No clubbing, cyanosis or edema      Home Medications:  apixaban 5 mg oral tablet: 1 tab(s) orally every 12 hours (15 Apr 2022 10:37)  Aspercreme with Lidocaine 4% topical film: Apply topically to affected area once a day (low back) (20 Mar 2022 10:15)  Aspercreme with Lidocaine 4% topical film: Apply topically to affected area once a day (L knee) (20 Mar 2022 10:15)  aspirin 81 mg oral delayed release tablet: 1 tab(s) orally once a day (20 Mar 2022 10:15)  benzonatate 100 mg oral capsule: 1 cap(s) orally 3 times a day, As needed, Cough (15 Apr 2022 10:37)  bisacodyl 5 mg oral delayed release tablet: 1 tab(s) orally once a day (at bedtime) (15 Apr 2022 10:37)  buPROPion 150 mg/24 hours (XL) oral tablet, extended release: 1 tab(s) orally once a day (in the morning) (20 Mar 2022 10:15)  cinacalcet 30 mg oral tablet: 1 tab(s) orally once a day (15 Apr 2022 11:16)  cycloSPORINE modified 100 mg oral capsule: 2 cap(s) orally 2 times a day (15 Apr 2022 10:37)  cycloSPORINE modified 100 mg oral capsule: 2 cap(s) orally once a day (at bedtime) (20 Mar 2022 10:15)  cycloSPORINE modified 50 mg oral capsule: 3 cap(s) orally once a day in the morning  (20 Mar 2022 10:15)  dilTIAZem 120 mg/24 hours oral capsule, extended release: 1 cap(s) orally once a day (hold SBP&lt;110, HR&lt;60) (20 Mar 2022 10:15)  dilTIAZem 120 mg/24 hours oral capsule, extended release: 1 cap(s) orally once a day (15 Apr 2022 10:37)  diphenhydrAMINE 50 mg/mL injectable solution: 25 milligram(s) injectable 3 times a week (at 08:00 before dialysis sessions on Tuesday/Thursday/saturday) (15 Apr 2022 10:37)  doxycycline hyclate 100 mg oral tablet: 1 tab(s) orally 2 times a day (20 Mar 2022 10:15)  Eliquis 2.5 mg oral tablet: 1 tab(s) orally 2 times a day    Pharmacy states patient no longer wants to fill this medication (20 Mar 2022 10:15)  gabapentin 300 mg oral capsule: 1 cap(s) orally 2 times a day (20 Mar 2022 10:15)  gabapentin 300 mg oral capsule: 1 cap(s) orally once a day (15 Apr 2022 10:37)  insulin glargine: 15 unit(s) subcutaneous once a day (at bedtime) (20 Mar 2022 10:15)  lidocaine 4% topical film: Apply topically to affected area once a day to right knee (15 Apr 2022 10:37)  lidocaine 4% topical film: Apply topically to affected area once a day to lower back (15 Apr 2022 10:37)  lidocaine 4% topical film: Apply topically to affected area once a day to left knee (15 Apr 2022 10:37)  lidocaine-prilocaine 2.5%-2.5% topical cream: 1 application topically  (15 Apr 2022 10:37)  midodrine 10 mg oral tablet: 1 tab(s) orally  3 times a week, As Needed 30 minutes prior to dialysis (08:00 on Tuesdays/Thursdays/Saturdays). HOLD if SBP &gt;130 (15 Apr 2022 11:35)  midodrine 5 mg oral tablet: 1 tab(s) orally 3 times a week, 30 minute prior to dialysis sessions (hold is SBP&gt;130) (20 Mar 2022 10:15)  mirtazapine 7.5 mg oral tablet: 1 tab(s) orally once a day (at bedtime) (20 Mar 2022 10:15)  montelukast 10 mg oral tablet: 1 tab(s) orally once a day (in the evening) (20 Mar 2022 10:15)  montelukast 10 mg oral tablet: 1 tab(s) orally once a day (at bedtime) (15 Apr 2022 10:37)  oxyCODONE 10 mg oral tablet, extended release: 1 tab(s) orally every 12 hours (20 Mar 2022 10:15)  oxycodone-acetaminophen 7.5 mg-325 mg oral tablet: 1 tab(s) orally every 6 hours, As Needed (20 Mar 2022 10:15)  pantoprazole 40 mg oral delayed release tablet: 1 tab(s) orally once a day (20 Mar 2022 10:15)  pantoprazole 40 mg oral delayed release tablet: 1 tab(s) orally once a day (before a meal) (15 Apr 2022 10:37)  polyethylene glycol 3350 oral powder for reconstitution: 17 gram(s) orally once a day (at bedtime) (15 Apr 2022 10:37)  polyethylene glycol 3350 oral powder for reconstitution: 17 gram(s) orally once a day (at bedtime) (20 Mar 2022 10:15)  senna oral tablet: 2 tab(s) orally once a day (at bedtime) (20 Mar 2022 10:15)  senna oral tablet: 2 tab(s) orally once a day (at bedtime) (15 Apr 2022 10:37)  sertraline 50 mg oral tablet: 1 tab(s) orally once a day (20 Mar 2022 10:15)  sevelamer carbonate 800 mg oral tablet: 1 tab(s) orally 3 times a day (with meals) (20 Mar 2022 10:15)  simethicone 80 mg oral tablet, chewable: 1 tab(s) orally 3 times a day (before meals) (20 Mar 2022 10:15)  simvastatin 10 mg oral tablet: 1 tab(s) orally once a day (at bedtime) (20 Mar 2022 10:15)  sodium hypochlorite 0.125% topical solution: 1 application topically once a day to affected area (pannus wound on abdomen) (15 Apr 2022 10:37)  sodium hypochlorite 0.25% topical solution: 1 application topically once a day (20 Mar 2022 10:15)  tacrolimus 0.1% topical ointment: 1 application topically once a day (20 Mar 2022 10:15)  tacrolimus 0.1% topical ointment: 1 application topically once a day (15 Apr 2022 10:37)  traZODone 100 mg oral tablet: 1 tab(s) orally once a day (at bedtime) (20 Mar 2022 10:15)      MEDICATIONS  (STANDING):  apixaban 5 milliGRAM(s) Oral every 12 hours  bisacodyl 5 milliGRAM(s) Oral at bedtime  buPROPion XL (24-Hour) . 150 milliGRAM(s) Oral daily  chlorhexidine 2% Cloths 1 Application(s) Topical daily  cinacalcet 30 milliGRAM(s) Oral daily  cycloSPORINE  , modified (NEORAL) 200 milliGRAM(s) Oral two times a day  Dakins Solution - 1/4 Strength 1 Application(s) Topical daily  dextrose 40% Gel 15 Gram(s) Oral once  dextrose 5%. 1000 milliLiter(s) (50 mL/Hr) IV Continuous <Continuous>  dextrose 5%. 1000 milliLiter(s) (100 mL/Hr) IV Continuous <Continuous>  dextrose 50% Injectable 25 Gram(s) IV Push once  dextrose 50% Injectable 12.5 Gram(s) IV Push once  dextrose 50% Injectable 25 Gram(s) IV Push once  diltiazem    milliGRAM(s) Oral daily  epoetin anabela-epbx (RETACRIT) Injectable 22459 Unit(s) IV Push <User Schedule>  gabapentin 300 milliGRAM(s) Oral daily  glucagon  Injectable 1 milliGRAM(s) IntraMuscular once  insulin glargine Injectable (LANTUS) 12 Unit(s) SubCutaneous at bedtime  insulin lispro (ADMELOG) corrective regimen sliding scale   SubCutaneous three times a day before meals  insulin lispro (ADMELOG) corrective regimen sliding scale   SubCutaneous at bedtime  insulin lispro Injectable (ADMELOG) 2 Unit(s) SubCutaneous three times a day before meals  lactobacillus acidophilus 1 Tablet(s) Oral daily  lidocaine   4% Patch 1 Patch Transdermal daily  lidocaine   4% Patch 1 Patch Transdermal daily  lidocaine   4% Patch 1 Patch Transdermal daily  lidocaine   4% Patch 1 Patch Transdermal daily  lidocaine/prilocaine Cream 1 Application(s) Topical <User Schedule>  mirtazapine 7.5 milliGRAM(s) Oral at bedtime  montelukast 10 milliGRAM(s) Oral at bedtime  nystatin Cream 1 Application(s) Topical every 12 hours  pantoprazole    Tablet 40 milliGRAM(s) Oral before breakfast  polyethylene glycol 3350 17 Gram(s) Oral at bedtime  senna 2 Tablet(s) Oral at bedtime  sertraline 50 milliGRAM(s) Oral daily  simethicone 80 milliGRAM(s) Chew three times a day  tacrolimus   0.1% Ointment 1 Application(s) Topical daily  traZODone 100 milliGRAM(s) Oral at bedtime      TELEMETRY: 	    ECG:  	  RADIOLOGY:   DIAGNOSTIC TESTING:  [ ] Echocardiogram:  [ ]  Catheterization:  [ ] Stress Test:    OTHER: 	    LABS:	 	                            9.7    12.56 )-----------( 473      ( 26 Apr 2022 07:54 )             34.3     04-26    136  |  94<L>  |  27<H>  ----------------------------<  152<H>  4.3   |  26  |  6.19<H>    Ca    9.0      26 Apr 2022 07:54  Phos  3.7     04-26  Mg     2.10     04-26

## 2022-04-26 NOTE — CHART NOTE - NSCHARTNOTEFT_GEN_A_CORE
Patient last seen by RDN on 4/19, now for nutrition follow up. Spoke with pt and obtained subjective information from extensive chart review.     Current Diet : Diet, Regular:   Consistent Carbohydrate {Evening Snack} (CSTCHOSN)  DASH/TLC {Sodium & Cholesterol Restricted} (DASH)  For patients receiving Renal Replacement - No Protein Restr, No Conc K, No Conc Phos, Low Sodium (RENAL) (04-25-22 @ 10:11)  PO intake: %     Current Weight: 113.6 kg (4/26), 130 kg (03-31)  Height (cm): 157.5 (03-19 @ 17:40)  Weight (kg): 130 (03-31 @ 15:34)  BMI (kg/m2): 52.4 (03-31 @ 15:34)    Nutrition Interval Events: Pt eating well and denies GI distress. Significant weight loss since admission which may be related partially to fluid shifts and ESRD/HD; edema presently 1+ generalized. Request more frequent weights taken to better assess overall fluid status and oral intake. No pressure injuries identified at this time. Continue with nutrition plan of care as ordered. RDN services to remain available as needed.     __________________ Pertinent Medications__________________   MEDICATIONS  (STANDING):  apixaban 5 milliGRAM(s) Oral every 12 hours  bisacodyl 5 milliGRAM(s) Oral at bedtime  buPROPion XL (24-Hour) . 150 milliGRAM(s) Oral daily  chlorhexidine 2% Cloths 1 Application(s) Topical daily  cinacalcet 30 milliGRAM(s) Oral daily  cycloSPORINE  , modified (NEORAL) 200 milliGRAM(s) Oral two times a day  Dakins Solution - 1/4 Strength 1 Application(s) Topical daily  dextrose 40% Gel 15 Gram(s) Oral once  dextrose 5%. 1000 milliLiter(s) (50 mL/Hr) IV Continuous <Continuous>  dextrose 5%. 1000 milliLiter(s) (100 mL/Hr) IV Continuous <Continuous>  dextrose 50% Injectable 25 Gram(s) IV Push once  dextrose 50% Injectable 12.5 Gram(s) IV Push once  dextrose 50% Injectable 25 Gram(s) IV Push once  diltiazem    milliGRAM(s) Oral daily  epoetin anabela-epbx (RETACRIT) Injectable 15650 Unit(s) IV Push <User Schedule>  gabapentin 300 milliGRAM(s) Oral daily  glucagon  Injectable 1 milliGRAM(s) IntraMuscular once  insulin glargine Injectable (LANTUS) 12 Unit(s) SubCutaneous at bedtime  insulin lispro (ADMELOG) corrective regimen sliding scale   SubCutaneous three times a day before meals  insulin lispro (ADMELOG) corrective regimen sliding scale   SubCutaneous at bedtime  insulin lispro Injectable (ADMELOG) 2 Unit(s) SubCutaneous three times a day before meals  lactobacillus acidophilus 1 Tablet(s) Oral daily  lidocaine   4% Patch 1 Patch Transdermal daily  lidocaine   4% Patch 1 Patch Transdermal daily  lidocaine   4% Patch 1 Patch Transdermal daily  lidocaine   4% Patch 1 Patch Transdermal daily  lidocaine/prilocaine Cream 1 Application(s) Topical <User Schedule>  mirtazapine 7.5 milliGRAM(s) Oral at bedtime  montelukast 10 milliGRAM(s) Oral at bedtime  nystatin Cream 1 Application(s) Topical every 12 hours  pantoprazole    Tablet 40 milliGRAM(s) Oral before breakfast  polyethylene glycol 3350 17 Gram(s) Oral at bedtime  senna 2 Tablet(s) Oral at bedtime  sertraline 50 milliGRAM(s) Oral daily  simethicone 80 milliGRAM(s) Chew three times a day  tacrolimus   0.1% Ointment 1 Application(s) Topical daily  traZODone 100 milliGRAM(s) Oral at bedtime    MEDICATIONS  (PRN):  acetaminophen     Tablet .. 650 milliGRAM(s) Oral every 6 hours PRN Temp greater or equal to 38C (100.4F), Mild Pain (1 - 3)  aluminum hydroxide/magnesium hydroxide/simethicone Suspension 30 milliLiter(s) Oral every 6 hours PRN Dyspepsia  benzonatate 100 milliGRAM(s) Oral three times a day PRN Cough  diphenhydrAMINE Injectable 25 milliGRAM(s) IV Push <User Schedule> PRN Rash and/or Itching  guaiFENesin Oral Liquid (Sugar-Free) 100 milliGRAM(s) Oral every 6 hours PRN Cough  melatonin 6 milliGRAM(s) Oral at bedtime PRN Insomnia  midodrine. 10 milliGRAM(s) Oral <User Schedule> PRN 30 minutes prior to dialysis  ondansetron Injectable 4 milliGRAM(s) IV Push every 8 hours PRN Nausea and/or Vomiting  oxyCODONE    IR 5 milliGRAM(s) Oral every 6 hours PRN Severe Pain (7 - 10)      __________________ Pertinent Labs__________________   04-26 Na136 mmol/L Glu 152 mg/dL<H> K+ 4.3 mmol/L Cr  6.19 mg/dL<H> BUN 27 mg/dL<H> 04-26 Phos 3.7 mg/dL      Estimated Needs:   [x] no change since previous assessment      Previous Nutrition Diagnosis:     Increased Nutrient Needs    Nutrition Diagnosis is [x] ongoing          Goal(s):  1. Patient to meet > 75% estimated energy needs    Recommendations:   1. Continue with diet as ordered.    Monitoring and Evaluation:   1. Monitor weights, labs, BMs, skin integrity, PO intake and edema.  2. RD services to remain available. Request obtaining new weight to assess trend and determine adequacy of PO intake as well as fluid status.

## 2022-04-26 NOTE — CHART NOTE - NSCHARTNOTESELECT_GEN_ALL_CORE
Event Note
Follow up/Nutrition Services
GYN
Nutrition Services
ACP/Event Note
Chart Note GYN
Event Note
Follow-Up/Nutrition Services
GYN
GYN Note
Gyn Note
Nutrition Follow Up/Nutrition Services
Surgery/Event Note
Wound surgery attempted visit/Event Note
fever/Event Note
pathology review/Event Note

## 2022-04-26 NOTE — PROGRESS NOTE ADULT - TIME BILLING
Agree with above NP note.  cv stable  cont current meds  cont a/c  dcp per medicine
Agree with above NP note.  cv stable  s/p gyn procedure  cont current meds  cont a/c
Agree with above NP note.  cv stable and remains cardiac optimized if gyn OR planned  cont dilt, iv heparin as ordered
.
Agree with above NP note.  cv stable  a/c on hold for poss gyn bleeding  resume if no GYN CI and heme stable
Agree with above NP note.  cv stable  cont current meds  cont a/c  dcp per medicine
Agree with above NP note.  cv stable  cont current meds  cont a/c  dcp per medicine
Agree with above NP note.  cv stable  cont current tx
Agree with above NP note.  cv stable  cont current tx
Agree with above NP note.  cv stable  s/p gyn procedure  cont current meds  cont a/c
Agree with above NP note.  cv stable and remains cardiac optimized if gyn OR planned  cont dilt, a/c as ordered
Agree with above NP note.  cv stable and remains cardiac optimized if gyn OR planned  cont dilt, iv heparin as ordered
Agree with above NP note.  cv stable and remains cardiac optimized if gyn OR planned  cont dilt, iv heparin as ordered
Agree with above NP note.  cv stable  cont current meds  cont a/c  dcp per medicine
Agree with above NP note.  cv stable  s/p fistulogram  resume a/c per vascular   dcp per medicine
Agree with above NP note.  cv stable  s/p fistulogram  resume a/c per vascular   dcp per medicine
Agree with above NP note.  cv stable  s/p gyn procedure  cont current meds  cont a/c
Agree with above NP note.  cv stable  s/p gyn procedure  cont current meds  cont a/c
-d/w ACP
-pain management with oxycodone 5 mg q 6hr prn  -d/c planning  d/w RN

## 2022-04-26 NOTE — PROGRESS NOTE ADULT - ASSESSMENT
Echo 1/19/22: grossly nl LV sys fx, min MR     a/p  56 year old female with hx of morbid obesity, CHAMP not on home O2, ESRD (HD MWF), HTN, DM, COPD, Afib no longer on AC, chronic R pannus wound, followed by Dr. Layne, likely pyoderma gangrenosum presents for vaginal bleeding     #Influenza  -sp oseltamivir  per med  -supportive care  -med f/u     #Chronic Pannus Wound  -surgical eval noted, wound care  -sp abx per med     #Chronic Afib  -stable, rates controlled  -cont dilt as ordered  -cont a/c     #Hypertension  -overall stable  -cont ccb   -mido pre-HD on HD days per renal     #ESRD on HD  -HD per renal  -vascular fu -sp LUE fistulogram  4/21    #Gyn bleeding  -s/p EUA, D&C, diagnostic hysteroscopy, insertion of Mirena IUD  -cv remains stable post op  -med f/u     #Knee pain  -s/p tap  -ortho f/u

## 2022-05-04 NOTE — ED ADULT NURSE NOTE - NS PRO AD PATIENT TYPE
Health Care Proxy (HCP) Cyclosporine Counseling:  I discussed with the patient the risks of cyclosporine including but not limited to hypertension, gingival hyperplasia,myelosuppression, immunosuppression, liver damage, kidney damage, neurotoxicity, lymphoma, and serious infections. The patient understands that monitoring is required including baseline blood pressure, CBC, CMP, lipid panel and uric acid, and then 1-2 times monthly CMP and blood pressure.

## 2022-05-24 NOTE — DISCHARGE NOTE NURSING/CASE MANAGEMENT/SOCIAL WORK - NSPROEXTENSIONSOFSELF_GEN_A_NUR
[facility] [address]  Dept: [phone]      [facility] TRANSFER CONSENT    NAME Samra Toth                                         1991                              MRN [id number]    I have been informed of my rights regarding examination, treatment, and transfer   by Dr Jessee Ordaz MD    Benefits:      Risks:        Transfer Request   I acknowledge that my medical condition has been evaluated and explained to me by the emergency department physician or other qualified medical person and/or my attending physician who has recommended and offered to me further medical examination and treatment  I understand the Hospital's obligation with respect to the treatment and stabilization of my emergency medical condition  I nevertheless request to be transferred  I release the Hospital, the doctor, and any other persons caring for me from all responsibility or liability for any injury or ill effects that may result from my transfer and agree to accept all responsibility for the consequences of my choice to transfer, rather than receive stabilizing treatment at the Hospital  I understand that because the transfer is my request, my insurance may not provide reimbursement for the services  The Hospital will assist and direct me and my family in how to make arrangements for transfer, but the hospital is not liable for any fees charged by the transport service  In spite of this understanding, I refuse to consent to further medical examination and treatment which has been offered to me, and request transfer to  Vanessa Mckeon Name, [address] : [place]  I authorize the performance of emergency medical procedures and treatments upon me in both transit and upon arrival at the receiving facility    Additionally, I authorize the release of any and all medical records to the receiving facility and request they be transported with me, if possible  I authorize the performance of emergency medical procedures and treatments upon me in both transit and upon arrival at the receiving facility  Additionally, I authorize the release of any and all medical records to the receiving facility and request they be transported with me, if possible  I understand that the safest mode of transportation during a medical emergency is an ambulance and that the Hospital advocates the use of this mode of transport  Risks of traveling to the receiving facility by car, including absence of medical control, life sustaining equipment, such as oxygen, and medical personnel has been explained to me and I fully understand them  (REDD CORRECT BOX BELOW)  [  ]  I consent to the stated transfer and to be transported by ambulance/helicopter  [  ]  I consent to the stated transfer, but refuse transportation by ambulance and accept full responsibility for my transportation by car  I understand the risks of non-ambulance transfers and I exonerate the Hospital and its staff from any deterioration in my condition that results from this refusal     X___________________________________________    DATE  22  TIME________  Signature of patient or legally responsible individual signing on patient behalf           RELATIONSHIP TO PATIENT_________________________          Provider Certification    NAME Pauline Mireles                                        [facility] 1991                              MRN [id number]    A medical screening exam was performed on the above named patient  Based on the examination:    Condition Necessitating Transfer The primary encounter diagnosis was Depression with suicidal ideation  Diagnoses of Anxiety, PTSD (post-traumatic stress disorder), and Bipolar disorder ([facility] ) were also pertinent to this visit      Patient Condition:      Reason for Transfer:      Transfer Requirements: Facility [facility] available and qualified personnel available for treatment as acknowledged by    · Agreed to accept transfer and to provide appropriate medical treatment as acknowledged by       [facility]  · Appropriate medical records of the examination and treatment of the patient are provided at the time of transfer   [address], [address] _______  · Transfer will be performed by qualified personnel from    and appropriate transfer equipment as required, including the use of necessary and appropriate life support measures  Provider Certification: I have examined the patient and explained the following risks and benefits of being transferred/refusing transfer to the patient/family:         Based on these reasonable risks and benefits to the patient and/or the unborn child(jerry), and based upon the information available at the time of the patients examination, I certify that the medical benefits reasonably to be expected from the provision of appropriate medical treatments at another medical facility outweigh the increasing risks, if any, to the individuals medical condition, and in the case of labor to the unborn child, from effecting the transfer      X____________________________________________ DATE 05/24/22        TIME_______      ORIGINAL - SEND TO MEDICAL RECORDS   COPY - SEND WITH PATIENT DURING TRANSFER none

## 2022-05-26 NOTE — ED ADULT NURSE NOTE - OBJECTIVE STATEMENT
p/t sent from NH for eval, hx large open abd wound, c/o of diffuse abd pain and low b/p, p;/t denies any nausea or vomiting, labs drawn and sent as per MD order

## 2022-05-26 NOTE — ED ADULT NURSE NOTE - CHIEF COMPLAINT QUOTE
pt arrives from [facility]. pt c/o midsternal chest pain that began today. as per EMS pt states since her HD treatment yesterday she hasn't been feeling well. pts BP noted to be 71/52. pt sent to [facility]. pt with hx of COPD, ESRD on HD M/W/F.

## 2022-05-26 NOTE — ED ADULT TRIAGE NOTE - CHIEF COMPLAINT QUOTE
pt arrives from [facility]. pt c/o midsternal chest pain that began today. as per EMS pt states since her HD treatment yesterday she hasn't been feeling well. pts BP noted to be 71/52. pt sent to [facility]. pt with hx of COPD, ESRD on HD M/W/F.

## 2022-05-26 NOTE — ED ADULT NURSE REASSESSMENT NOTE - NS ED NURSE REASSESS COMMENT FT1
Mobile Critical Care RN: at bedside for pt with hypotension. Initial BP MAP was in 50-60 range, IV fluid bolus started. R forearm IV infiltrated 10 min after bolus started. MD at bedside inserted US guided R upper arm IV. Bolus resumed, abx administered as ordered, second liter bolus started. SBP improved to >90, pt endorsed to float IVANNA Romero. Mobile Critical Care RN: at bedside for pt with hypotension. Initial BP MAP was in 50-60 range, SBP in low 80's. Difficulty obtaining BP due to  L arm precautions, access in R arm and body habitus. BP obtained in R lower leg.  IV fluid bolus started. R forearm IV infiltrated 10 min after bolus started. MD at bedside inserted US guided R upper arm IV. Bolus resumed, abx administered as ordered, second liter bolus started. SBP improved to >90, pt endorsed to float IVANNA Romero, and primary RN Lisette.

## 2022-05-26 NOTE — ED PROVIDER NOTE - SHIFT CHANGE DETAILS
patient pending ct, radiology was called to expedite and states will take shortly-patient improved after fluids- maps ranging 60-65 (sbps 90s) after fluids-very guarded status. Plan discussed to closely monitor, f/u ct, micu if condition worsens.

## 2022-05-26 NOTE — ED PROVIDER NOTE - PHYSICAL EXAMINATION
GEN - in distress, appears to be in pain  HEAD - NC/AT   EYES- PERRL, EOMI  ENT: Airway patent, very dry mm, Oral cavity and pharynx normal.   NECK: Neck supple  PULMONARY - CTA b/l, symmetric breath sounds.   CARDIAC -s1s2, RRR (HR 95), no M,G,R  ABDOMEN - +BS, Tender to mid and lower abdomen, Large/deep ulceration to lower abdomen/pannus region, no active drainage, areas of gray coloring to inner borders of wound, no surrounding skin changes  BACK - no CVA tenderness  EXTREMITIES - FROM, no deformity  SKIN - no rash or bruising   NEUROLOGIC - alert, speech clear, no focal deficits  PSYCH -nl mood/affect, nl insight. GEN - in distress, appears to be in pain  HEAD - NC/AT   EYES- PERRL, EOMI  ENT: Airway patent, very dry mm, Oral cavity and pharynx normal.   NECK: Neck supple  PULMONARY - CTA b/l, symmetric breath sounds.   CARDIAC -s1s2, RRR (HR 95), no M,G,R  ABDOMEN - +BS, Tender to mid and lower abdomen, Large/deep ulceration to lower abdomen/pannus region, no active drainage, areas of gray coloring to inner borders of wound, no surrounding skin changes  BACK - no CVA tenderness  EXTREMITIES - FROM, no deformity  SKIN - abd wound as above, r. gluteal unstageable wound, no active drainage  NEUROLOGIC - alert, speech clear, no focal deficits  PSYCH -nl mood/affect, nl insight.

## 2022-05-26 NOTE — ED PROVIDER NOTE - CLINICAL SUMMARY MEDICAL DECISION MAKING FREE TEXT BOX
56f presents to the ED with acute on chronic abd pain worsening x last 5-6 days, and tearing chest pain earlier today which is now improved. On exam appears uncomfortable and reporting its 2/2 abd pain, appears very dry, unlabored breathing, abd soft, tender to lower abd with large ulcerated wound to lower pannus (known and tx with cyclosporine). Patient hypotensive on arrival. WIll check labs, cultures, ekg, cta c/a/p, pain ctrl prn, empiric abx (pcn allergy rash and has tolerated cefepime in past)-eval for infections, dissection, acs, elec disturbances, organ dysfunction, monitor closely. Nephrology Dr. Green covering for Dr. Lopez consulted and will follow.

## 2022-05-26 NOTE — ED ADULT NURSE REASSESSMENT NOTE - NS ED NURSE REASSESS COMMENT FT1
Patient resting at this time. Remains hypotensive, MD aware. IV fluids infusing. Turned, cleaned, repositioned for comfort.

## 2022-05-26 NOTE — ED PROVIDER NOTE - NS ED ROS FT
ROS:  GENERAL: No fever, no chills  EYES: no change in vision  HEENT: no trouble swallowing, no trouble speaking  CARDIAC: + chest pain  PULMONARY: no cough, no shortness of breath  GI: + abdominal pain, no nausea, no vomiting, no diarrhea, no constipation  : No dysuria, no frequency, no change in appearance, or odor of urine  SKIN: no rashes  NEURO: no headache, no weakness  MSK: No joint pain

## 2022-05-26 NOTE — ED PROVIDER NOTE - OBJECTIVE STATEMENT
56f presents to the ED with severe abd pain and chest pain 56f presents to the ED with severe abd pain and chest pain. 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 Patient has been in [facility]-over the weekend noted her abd pain to be severe, localized mostly to Lower and r. side of abdomen, constant. Today also noted to have constant, tearing type chest pain which was severe and prompted her to have the ambulance called. She was given aspirin by Stefan prior to EMS arrival. Currently states her chest pain is much better and basically improved at this point-abd pain is severe. Also notes she has not had anything to eat in a week, drinking minimally. No ha, cough, sob, vomiting, diarrhea, dysuria, edema.

## 2022-05-26 NOTE — ED PROVIDER NOTE - PROGRESS NOTE DETAILS
Clint: MICU at bedside; official read of CT can not exclude necrotizing fasciitis; pt has WBC of over 30; and remains borderline hypotensive MAP 55-65.  Clinda added, surgery consulted.  Also CT shows possible hemorrhagic pericarditis or signs of pericarditis.  pt is extremely sensitive to touch over chest.  Placed 2nd line EJ in case pressors are needed.  will continue to monitor Kulwant, PGY3 - received pt as sign-out ~11pm, was pending CTA at that time, has had MAP in low 60s, s/p 2L ivf. Pt previously reevaluated had some CP, states it is the same pain that she has had. Repeat EKG obtained AFlutter with variable AV block. Kulwant, PGY3 - received call from radiology resident, states attg concerned for possible necrotizing fascitis; also reporting that pericardial effusion hyperattenuating and could represent blood products. Pt ordered for Clindamycin & surgery consulted. MICU at bedside. Discussed possible hemorrhagic pericardial effusion with cardiology; states not possible to expedite echo at this time as no techs in the hospital Kulwant, PGY3 - pt seen by surgery, states can admit to Dr. Mathias at this time, pt likely to go to SICU, states must reverse coagulapathy prior to bringing pt to OR but will add pt to OR schedule. Pt hypotensive to MAP 50s, starting on Levophed, surg aware, Left EJ placed previously in addition to RUE line Kulwant, PGY3 - pt intermittently hypotensive throughout ER stay, but would improve to MAP >60s without intervention. D/w MICU, will eval pt Kulwant, PGY3 - surgery requesting 2u FFP stat, also requesting abdominal wound be cultured Kulwant, PGY3 - CTA performed, d/w radiology resident states chronic appearing wound to gluteal region with gas states likely in setting of gas tracking into open wound with chronic appear inflammatory changes, low suspicion for acute infection, no abscess identified; also noting small pericardial effusion and LLL atelectasis. POCUS performed at bedside with small pericardial effusion, IVC not completely collapsible but does not appear plethoric, lungs with A-line predominance, will order for 500cc ivf bolus and reeval Kulwant, PGY3 - surg called back stating they will order Kcentra instead of FFP and take pt straight to OR prior to SICU Kulwant, PGY3 - pt intermittently hypotensive throughout ER stay, but would improve to MAP >60s, SBP~90 without intervention. D/w MICU, will eval pt Kulwant, PGY3 - received call from radiology resident, states attg concerned for possible necrotizing fascitis; also reporting that pericardial effusion hyperattenuating and could represent blood products. Pt ordered for Clindamycin & surgery consulted. MICU at bedside. Discussed possible hemorrhagic pericardial effusion with cardiology; states not possible to expedite echo at this time as no techs in the hospital, but will eval pt Kulwant, PGY3 - received pt as sign-out ~11pm, was pending CTA at that time (Dr. Bolaños d/w CT tech to take pt as soon as possible), has been having MAP in low 60s, s/p 2L ivf. Pt previously reevaluated had some CP, states it is the same pain that she has had. Repeat EKG obtained AFlutter with variable AV block.

## 2022-05-27 NOTE — H&P ADULT - NSHPPHYSICALEXAM_GEN_ALL_CORE
Physical Exam  T(C): 37.6  HR: 129 (93 - 140)  BP: 86/56 (63/45 - 97/51)  RR: 15 (9 - 22)  SpO2: 99% (96% - 100%)  Tmax: T(C): , Max: 37.8 (05-26-22 @ 19:14)    General: well developed, well nourished, NAD  Neuro: alert and oriented, no focal deficits, moves all extremities spontaneously  HEENT: NCAT, EOMI, anicteric, mucosa moist  Respiratory: airway patent, respirations unlabored  CVS: regular rate and rhythm  Abdomen: soft, TTP around wound, ND, large right pannus wound with some area of granulation tissue but other side necrotic with some drainage, right gluteal region with small eschar but positive crepitus on exam   Extremities: no edema, sensation and movement grossly intact  Skin: warm, dry, appropriate color

## 2022-05-27 NOTE — CONSULT NOTE ADULT - SUBJECTIVE AND OBJECTIVE BOX
HPI: 56f presents to the ED with severe abd pain and chest pain. 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 Patient has been in [facility]-over the weekend noted her abd pain to be severe, localized mostly to Lower and r. side of abdomen, constant. Today also noted to have constant, tearing type chest pain which was severe and prompted her to have the ambulance called. She was given aspirin by [facility] prior to EMS arrival. Currently states her chest pain is improved at this point-abd pain is severe. Patient previously been evaluated by dermatology for pannus wound, thought to be pyoderma gangrenosum, on cyclosporine. Also follows with wound care outpatient for this chronic wound. Patient reports that pain has been on going for the last few days worsening today.  Also notes she has not had anything to eat in a week, drinking minimally. No ha, cough, sob, vomiting, diarrhea, dysuria, edema.  Patient noted to be hypotensive in ED to 60/40s, given 2L bolus. Being started on Levo.   Given triple dose abx. WBC elevated to 35  (27 May 2022 05:17)  	   Allergies  latex (Rash)  penicillin (Nausea)  strawberry (Rash)    Intolerances  caffeine (Nausea)  	  MEDICATIONS:  norepinephrine Infusion 0.05 MICROgram(s)/kG/Min IV Continuous <Continuous>  cefepime   IVPB 2000 milliGRAM(s) IV Intermittent every 12 hours  clindamycin IVPB 900 milliGRAM(s) IV Intermittent every 8 hours  lactated ringers. 1000 milliLiter(s) IV Continuous <Continuous>      PAST MEDICAL & SURGICAL HISTORY:  COPD (chronic obstructive pulmonary disease)  DM (diabetes mellitus)  Atrial fibrillation  with loop recorder , battery most likely depleted, as per cardiac clearance, Dr. Reece Anesthesia aware, pt on Eliquis  HTN (hypertension)  Morbid obesity  BMI - 58.3  Chronic GERD  CHAMP (obstructive sleep apnea)  non compliance with CPAP, Anesthesia Dr. Reece aware, pt told to bring CPAP for sx, pr verbalized understanding  Potential difficult airway on pre-intubation assessment  airway class III, large neck, morbid obesity, hx of CHAMP, no compliance with CPAP- Dr. Reece, Anesthesia aware  End-stage renal disease  Anemia  Medication management  H/O tubal ligation  1989  Status post placement of implantable loop recorder  left chest-   History of vascular access device  s/p insertion right chest permacath 2019, removal 2019, insertion left chest permacath 2019  S/P arteriovenous (AV) fistula creation  left  arm 2019    FAMILY HISTORY:  Family history of diabetes mellitus  mother-     Family hx of hypertension  mother-     REVIEW OF SYSTEMS:  CONSTITUTIONAL: No fevers, No chills, No fatigue, No weight gain  RESPIRATORY: No shortness of breath, No cough, No wheezing, No hemoptysis  CARDIOVASCULAR: +chest pain. No palpitations, No pleuritic pain  GASTROINTESTINAL: +abdominal pain, No nausea, No vomiting, No hematemesis, No diarrhea No constipation. No melena  GENITOURINARY: No dysuria, No frequency, No incontinence, No hematuria  NEUROLOGICAL: No dizziness, No lightheadedness, No syncope, No LOC, No headache, No numbness or weakness  EXTREMITIES: No Edema, No joint pain, No joint swelling.  SKIN: No diaphoresis. No itching, No rashes, No pressure ulcers  All other review of systems is negative unless indicated above.    T(C): 37.6 (22 @ 04:59), Max: 37.8 (22 @ 19:14)  HR: 142 (22 @ 06:40) (93 - 142)  BP: 94/69 (22 @ 06:40) (63/45 - 102/84)  RR: 10 (22 @ 06:40) (9 - 22)  SpO2: 99% (22 @ 06:40) (96% - 100%)    Physical Exam:  General: NAD  Cardiovascular: Normal S1 S2, No JVD, No murmurs, + edema  Respiratory: Lungs diminished to auscultation	  Gastrointestinal:  Soft, Non-tender, + BS	  Skin: +gluteal ulcer,  warm and dry, No ecchymoses, No cyanosis	  Extremities:  No clubbing, cyanosis or edema  Vascular: Peripheral pulses palpable 2+ bilaterally    CBC Full  -  ( 26 May 2022 19:02 )  WBC Count : 35.31 K/uL  Hemoglobin : 8.0 g/dL  Hematocrit : 28.4 %  Platelet Count - Automated : 578 K/uL  Mean Cell Volume : 94.4 fL  Mean Cell Hemoglobin : 26.6 pg  Mean Cell Hemoglobin Concentration : 28.2 gm/dL  Auto Neutrophil # : 31.81 K/uL  Auto Lymphocyte # : 0.63 K/uL  Auto Monocyte # : 1.62 K/uL  Auto Eosinophil # : 0.11 K/uL  Auto Basophil # : 0.10 K/uL  Auto Neutrophil % : 90.1 %  Auto Lymphocyte % : 1.8 %  Auto Monocyte % : 4.6 %  Auto Eosinophil % : 0.3 %  Auto Basophil % : 0.3 %        131<L>  |  90<L>  |  22  ----------------------------<  144<H>  3.4<L>   |  25  |  3.05<H>    Ca    9.6      26 May 2022 19:02    TPro  6.4  /  Alb  2.4<L>  /  TBili  0.8  /  DBili  x   /  AST  10  /  ALT  5   /  AlkPhos  232<H>      PREVIOUS DIAGNOSTIC TESTING:  < from: CT Angio Chest Aorta w/wo IV Cont (22 @ 01:14) >  IMPRESSION:    No aortic dissection. Atherosclerotic changes.    Small pericardial effusion of high attenuation. Consider correlation with   echocardiogram to exclude hemorrhagic pericardial fluid or pericarditis.    Small left pleural effusionand associated left lower lobe consolidative   opacity, atelectasis or developing pneumonia.    Mild mosaic groundglass attenuation attenuation throughout the lungs,   possibly related to motion artifact, small airway disease, mild pulmonary   edema or atypical infection.    Gas within the ventral wall pannus, right medial buttock and creases of   the bilateral groin with associated soft tissue thickening consistent   with skin inflammation/dehiscence. Necrotizing fasciitis difficult to   exclude. No defined fluid collection is identified.    Enlarged uterus containing multiple calcified fibroids. Suggestion of   thickened endometrial echo complex. Nonemergent correlation with pelvic   ultrasound or MRI is recommended. Malpositioned intrauterine device   identified within cervical canal.    < end of copied text >    < from: TTE with Doppler (w/Cont) (22 @ 14:28) >  CONCLUSIONS:  Technically very difficult study.  1. Normal mitral valve. Minimal mitral regurgitation.  2. Endocardium not well visualized; grossly normal left  ventricular systolic function.  Endocardial visualization  enhanced with intravenous injection of echo contrast  (Definity).  3. Unable to accurately evaluate right ventricular size or  systolic function.    < end of copied text >           Patient/Caregiver provided printed discharge information.

## 2022-05-27 NOTE — H&P ADULT - ATTENDING COMMENTS
Above noted. the patient was examined in the OR.  Agree with the above assessment and planned treatment.

## 2022-05-27 NOTE — CONSULT NOTE ADULT - TIME BILLING
[General Appearance - Well Developed] : well developed [Normal Appearance] : normal appearance [Well Groomed] : well groomed [General Appearance - Well Nourished] : well nourished [No Deformities] : no deformities Agree with above NP note.  a/p   57yo F w/ HTN, HLD, DM, Afib on eliquis, ESRD, COPD, morbid obesity presents to the ED with severe abd pain radiating to chest found to be in septic shock 2/2 necrotizing fascitis planned to go to OR and admit to SICU,    #necrotizing fascitis, shock   -S/P extensive debridement   -distributive shock   -pressors/ABX/vent mangement per sICU     #Atypical chest pain   -per ed description  -likely secondary to abd process  -cta chest neg for aortic dissection  -eventual echo   -HS trop indeterminate     #Pericardial Effusion  -CT scan also incidentally found small pericardial effusion with high attenuation.   -Unknown etiology possible infectious pericarditis vs ESRD  -check echo     #Chronic Afib  -Cardizem on hold  -on pressors   -a/c on hold per sicu   -echo     #ESRD on HD  -HD per renal  -vascular fu -sp LUE fistulogram  4/21 [General Appearance - In No Acute Distress] : no acute distress [Normal Conjunctiva] : the conjunctiva exhibited no abnormalities [Eyelids - No Xanthelasma] : the eyelids demonstrated no xanthelasmas [Normal Oral Mucosa] : normal oral mucosa [No Oral Pallor] : no oral pallor [No Oral Cyanosis] : no oral cyanosis [Respiration, Rhythm And Depth] : normal respiratory rhythm and effort [Exaggerated Use Of Accessory Muscles For Inspiration] : no accessory muscle use [Auscultation Breath Sounds / Voice Sounds] : lungs were clear to auscultation bilaterally [Heart Rate And Rhythm] : heart rate and rhythm were normal [Heart Sounds] : normal S1 and S2 [Murmurs] : no murmurs present [Abdomen Soft] : soft [Abdomen Tenderness] : non-tender [Abdomen Mass (___ Cm)] : no abdominal mass palpated [Abnormal Walk] : normal gait [Nail Clubbing] : no clubbing of the fingernails [Cyanosis, Localized] : no localized cyanosis [Petechial Hemorrhages (___cm)] : no petechial hemorrhages [Skin Color & Pigmentation] : normal skin color and pigmentation [] : no rash [No Venous Stasis] : no venous stasis [Skin Lesions] : no skin lesions [No Skin Ulcers] : no skin ulcer [No Xanthoma] : no  xanthoma was observed [Oriented To Time, Place, And Person] : oriented to person, place, and time [Affect] : the affect was normal [Mood] : the mood was normal [No Anxiety] : not feeling anxious [FreeTextEntry1] : no JVD or bruits

## 2022-05-27 NOTE — H&P ADULT - HISTORY OF PRESENT ILLNESS
56f presents to the ED with severe abd pain and chest pain. 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 Patient has been in [facility]-over the weekend noted her abd pain to be severe, localized mostly to Lower and r. side of abdomen, constant. Today also noted to have constant, tearing type chest pain which was severe and prompted her to have the ambulance called. She was given aspirin by [facility] prior to EMS arrival. Currently states her chest pain is improved at this point-abd pain is severe. Patient previously been evaluated by dermatology for pannus wound, thought to be pyoderma gangrenosum, on cyclosporine. Also follows with wound care outpatient for this chronic wound. Patient reports that pain has been on going for the last few days worsening today.  Also notes she has not had anything to eat in a week, drinking minimally. No ha, cough, sob, vomiting, diarrhea, dysuria, edema.  Patient noted to be hypotensive in ED to 60/40s, given 2L bolus. Being started on Levo.   Given triple dose abx. WBC elevated to 35

## 2022-05-27 NOTE — ED ADULT NURSE REASSESSMENT NOTE - NS ED NURSE REASSESS COMMENT FT1
Mobile Critical Care RN: Back at bedside, pt being evaluated by MICU and SICU teams. Assisted with turning pt for assessment, large slough covered wound noted to pannus, odourous, purulent exudate. Wound to R buttock, unstageable, approx 1.5 cm diameter, eschar covered. Subcutaneous emphysema noted in area under R buttock wound. Awaiting decision on disposition of pt.

## 2022-05-27 NOTE — ED ADULT NURSE REASSESSMENT NOTE - NS ED NURSE REASSESS COMMENT FT1
Report given to OR by flodarryl RN. Mobile critical care RN at bedside for gtt. Patient transported to OR by float RN and ED.

## 2022-05-27 NOTE — CONSULT NOTE ADULT - ASSESSMENT
Echo 1/19/22: grossly nl LV sys fx, min MR     a/p   57yo F w/ HTN, HLD, DM, Afib on eliquis, ESRD, COPD, morbid obesity presents to the ED with severe abd pain radiating to chest found to be in septic shock 2/2 necrotizing fascitis planned to go to OR and admit to SICU,    #necrotizing fascitis  -S/P  extensive debridement of both areas with cautery.   -septic shock   -pressors/ ABX / vent mangement management per sICU     #Atypical chest pain   -per ed description  -likely secondary to abd pain-currently intubated/ sedated  -cta chest neg for aortic  dissection, Small left pleural effusionand associated left lower lobe consolidative opacity, atelectasis or developing pneumonia. Small pericardial effusion of high attenuation  -check echo   -HS trop indeterminate     # Pericardial Effusion  -CT scan also incidentally found small pericardial effusion with high attenuation.   -Unknown etiology possible infectious pericarditis vs ESRD    #Chronic Afib  -stable elevated   -Cardizem on hold  -on pressors   -a/c on hold per sicu   -echo     #Hypertension  -overall stable  -cont ccb   -mido pre-HD on HD days per renal     #ESRD on HD  -HD per renal  -vascular fu -sp LUE fistulogram  4/21 Echo 1/19/22: grossly nl LV sys fx, min MR     a/p   55yo F w/ HTN, HLD, DM, Afib on eliquis, ESRD, COPD, morbid obesity presents to the ED with severe abd pain radiating to chest found to be in septic shock 2/2 necrotizing fascitis planned to go to OR and admit to SICU,    #necrotizing fascitis  -S/P  extensive debridement of both areas with cautery.   -septic shock   -pressors/ ABX / vent mangement management per sICU     #Atypical chest pain   -per ed description  -likely secondary to abd pain-currently intubated/ sedated  -cta chest neg for aortic  dissection, Small left pleural effusionand associated left lower lobe consolidative opacity, atelectasis or developing pneumonia. Small pericardial effusion of high attenuation  -check echo   -HS trop indeterminate     # Pericardial Effusion  -CT scan also incidentally found small pericardial effusion with high attenuation.   -Unknown etiology possible infectious pericarditis vs ESRD    #Chronic Afib  -Cardizem on hold  -on pressors   -a/c on hold per sicu   -echo     #ESRD on HD  -HD per renal  -vascular fu -sp LUE fistulogram  4/21

## 2022-05-27 NOTE — CONSULT NOTE ADULT - SUBJECTIVE AND OBJECTIVE BOX
CARDIOLOGY CONSULT - Dr. Bullock         HPI:   56f presents to the ED with severe abd pain and chest pain. 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 Patient has been in [facility]-over the weekend noted her abd pain to be severe, localized mostly to Lower and r. side of abdomen, constant. Today also noted to have constant, tearing type chest pain which was severe and prompted her to have the ambulance called. She was given aspirin by [facility] prior to EMS arrival. Currently states her chest pain is improved at this point-abd pain is severe. Patient previously been evaluated by dermatology for pannus wound, thought to be pyoderma gangrenosum, on cyclosporine. Also follows with wound care outpatient for this chronic wound. Patient reports that pain has been on going for the last few days worsening today.  Also notes she has not had anything to eat in a week, drinking minimally. No ha, cough, sob, vomiting, diarrhea, dysuria, edema.  Patient noted to be hypotensive in ED to 60/40s, given 2L bolus. Being started on Levo.   Given triple dose abx. WBC elevated to 35  (27 May 2022 05:17)      PAST MEDICAL & SURGICAL HISTORY:  COPD (chronic obstructive pulmonary disease)      DM (diabetes mellitus)      Atrial fibrillation  with loop recorder , battery most likely depleted, as per cardiac clearance, Dr. Reece Anesthesia aware, pt on Eliquis      HTN (hypertension)      Morbid obesity  BMI - 58.3      Chronic GERD      CHAMP (obstructive sleep apnea)  non compliance with CPAP, Anesthesia Dr. Reece aware, pt told to bring CPAP for sx, pr verbalized understanding      Potential difficult airway on pre-intubation assessment  airway class III, large neck, morbid obesity, hx of CHAMP, no compliance with CPAP- Dr. Reece, Anesthesia aware      End-stage renal disease      Anemia      Medication management      H/O tubal ligation        Status post placement of implantable loop recorder  left chest-       History of vascular access device  s/p insertion right chest permacath 2019, removal 2019, insertion left chest permacath 2019      S/P arteriovenous (AV) fistula creation  left  arm 2019              PREVIOUS DIAGNOSTIC TESTING:    [ ] Echocardiogram:  [ ]  Catheterization:  [ ] Stress Test:  	    MEDICATIONS:  Home Medications:  acetaminophen 325 mg oral tablet: 2 tab(s) orally every 6 hours, As needed, Temp greater or equal to 38C (100.4F), Mild Pain (1 - 3) (2022 12:38)  aluminum hydroxide-magnesium hydroxide 200 mg-200 mg/5 mL oral suspension: 30 milliliter(s) orally every 6 hours, As needed, Dyspepsia (2022 12:38)  apixaban 5 mg oral tablet: 1 tab(s) orally every 12 hours (15 Apr 2022 10:37)  benzonatate 100 mg oral capsule: 1 cap(s) orally 3 times a day, As needed, Cough (15 Apr 2022 10:37)  bisacodyl 5 mg oral delayed release tablet: 1 tab(s) orally once a day (at bedtime) (15 Apr 2022 10:37)  buPROPion 150 mg/24 hours (XL) oral tablet, extended release: 1 tab(s) orally once a day (in the morning) (20 Mar 2022 10:15)  cinacalcet 30 mg oral tablet: 1 tab(s) orally once a day (15 Apr 2022 11:16)  cycloSPORINE modified 100 mg oral capsule: 2 cap(s) orally 2 times a day (15 Apr 2022 10:37)  dilTIAZem 120 mg/24 hours oral capsule, extended release: 1 cap(s) orally once a day (15 Apr 2022 10:37)  diphenhydrAMINE 50 mg/mL injectable solution: 25 milligram(s) injectable 3 times a week (at 08:00 before dialysis sessions on Tuesday/Thursday/saturday) (15 Apr 2022 10:37)  gabapentin 300 mg oral capsule: 1 cap(s) orally once a day (15 Apr 2022 10:37)  guaiFENesin 100 mg/5 mL oral liquid: 5 milliliter(s) orally every 6 hours, As needed, Cough (2022 12:38)  insulin lispro 100 units/mL injectable solution: 2 unit(s) injectable 3 times a day (before meals) (2022 12:38)  lactobacillus acidophilus oral capsule: 1 cap(s) orally once a day (2022 12:38)  lidocaine 4% topical film: Apply topically to affected area once a day to right knee (15 Apr 2022 10:37)  lidocaine 4% topical film: Apply topically to affected area once a day to lower back (15 Apr 2022 10:37)  lidocaine 4% topical film: Apply topically to affected area once a day to left knee (15 Apr 2022 10:37)  lidocaine 4% topical film: Apply topically to affected area once a day    Apply to neck (2022 12:38)  melatonin 3 mg oral tablet: 2 tab(s) orally once a day (at bedtime), As needed, Insomnia (2022 12:38)  midodrine 10 mg oral tablet: 1 tab(s) orally  3 times a week, As Needed 30 minutes prior to dialysis (08:00 on //). HOLD if SBP &gt;130 (15 Apr 2022 11:35)  mirtazapine 7.5 mg oral tablet: 1 tab(s) orally once a day (at bedtime) (20 Mar 2022 10:15)  montelukast 10 mg oral tablet: 1 tab(s) orally once a day (at bedtime) (15 Apr 2022 10:37)  nystatin 100,000 units/g topical cream: 1 application topically every 12 hours (2022 12:38)  oxyCODONE 5 mg oral tablet: 1 tab(s) orally every 6 hours, As needed, Severe Pain (7 - 10) (2022 12:38)  pantoprazole 40 mg oral delayed release tablet: 1 tab(s) orally once a day (20 Mar 2022 10:15)  polyethylene glycol 3350 oral powder for reconstitution: 17 gram(s) orally once a day (at bedtime) (20 Mar 2022 10:15)  senna oral tablet: 2 tab(s) orally once a day (at bedtime) (20 Mar 2022 10:15)  sertraline 50 mg oral tablet: 1 tab(s) orally once a day (20 Mar 2022 10:15)  sevelamer carbonate 800 mg oral tablet: 1 tab(s) orally 3 times a day (with meals) (20 Mar 2022 10:15)  simethicone 80 mg oral tablet, chewable: 1 tab(s) orally 3 times a day (before meals) (20 Mar 2022 10:15)  sodium hypochlorite 0.125% topical solution: 1 application topically once a day to affected area (pannus wound on abdomen) (15 Apr 2022 10:37)  tacrolimus 0.1% topical ointment: 1 application topically once a day (15 Apr 2022 10:37)  traZODone 100 mg oral tablet: 1 tab(s) orally once a day (at bedtime) (2022 12:38)      MEDICATIONS  (STANDING):  cefepime   IVPB 2000 milliGRAM(s) IV Intermittent every 12 hours  chlorhexidine 4% Liquid 1 Application(s) Topical daily  clindamycin IVPB 900 milliGRAM(s) IV Intermittent every 8 hours  fentaNYL   Infusion. 0.5 MICROgram(s)/kG/Hr (5 mL/Hr) IV Continuous <Continuous>  lactated ringers. 1000 milliLiter(s) (100 mL/Hr) IV Continuous <Continuous>  norepinephrine Infusion 0.05 MICROgram(s)/kG/Min (9.38 mL/Hr) IV Continuous <Continuous>  pantoprazole  Injectable 40 milliGRAM(s) IV Push daily  propofol Infusion 10 MICROgram(s)/kG/Min (6 mL/Hr) IV Continuous <Continuous>  vasopressin Infusion 0.02 Unit(s)/Min (1.2 mL/Hr) IV Continuous <Continuous>      FAMILY HISTORY:  Family history of diabetes mellitus  mother-     Family hx of hypertension  mother-         SOCIAL HISTORY:    [ ] Non-smoker  [ ] Smoker  [ ] Alcohol    Allergies    latex (Rash)  penicillin (Nausea)  strawberry (Rash)    Intolerances    caffeine (Nausea)  	    REVIEW OF SYSTEMS:  CONSTITUTIONAL: No fever, weight loss, or fatigue  EYES: No eye pain, visual disturbances, or discharge  ENMT:  No difficulty hearing, tinnitus, vertigo; No sinus or throat pain  NECK: No pain or stiffness  RESPIRATORY: No cough, wheezing, chills or hemoptysis; No Shortness of Breath  CARDIOVASCULAR: No chest pain, palpitations, passing out, dizziness, or leg swelling  GASTROINTESTINAL: No abdominal or epigastric pain. No nausea, vomiting, or hematemesis; No diarrhea or constipation. No melena or hematochezia.  GENITOURINARY: No dysuria, frequency, hematuria, or incontinence  NEUROLOGICAL: No headaches, memory loss, loss of strength, numbness, or tremors  SKIN: No itching, burning, rashes, or lesions   	    [ ] All others negative	  [ ] Unable to obtain    PHYSICAL EXAM:  T(C): 37.1 (22 @ 12:30), Max: 37.8 (22 @ 19:14)  HR: 123 (22 @ 14:00) (93 - 142)  BP: 97/67 (22 @ 13:00) (63/45 - 102/84)  RR: 16 (22 @ 14:00) (9 - 22)  SpO2: 100% (22 @ 14:00) (96% - 100%)  Wt(kg): --  I&O's Summary      Appearance: Normal	  Psychiatry: A & O x 3, Mood & affect appropriate  HEENT:   Normal oral mucosa, PERRL, EOMI	  Lymphatic: No lymphadenopathy  Cardiovascular: Normal S1 S2,RRR, No JVD, No murmurs  Respiratory: Lungs clear to auscultation	  Gastrointestinal:  Soft, Non-tender, + BS	  Skin: No rashes, No ecchymoses, No cyanosis	  Neurologic: Non-focal  Extremities: Normal range of motion, No clubbing, cyanosis or edema  Vascular: Peripheral pulses palpable 2+ bilaterally    TELEMETRY: 	    ECG:  	  RADIOLOGY:  OTHER: 	  	  LABS:	 	    CARDIAC MARKERS:  Troponin T, High Sensitivity Result: 35 ng/L ( @ 02:00)  Troponin T, High Sensitivity Result: 39 ng/L ( @ 19:02)                                  8.6    50.37 )-----------( 641      ( 27 May 2022 10:18 )             29.4         130<L>  |  93<L>  |  24<H>  ----------------------------<  186<H>  3.8   |  19<L>  |  3.08<H>    Ca    9.7      27 May 2022 10:18    TPro  6.6  /  Alb  2.4<L>  /  TBili  0.9  /  DBili  x   /  AST  13  /  ALT  9   /  AlkPhos  248<H>      PT/INR - ( 27 May 2022 10:18 )   PT: 21.7 sec;   INR: 1.86 ratio         PTT - ( 27 May 2022 10:18 )  PTT:36.0 sec  proBNP:   Lipid Profile:   HgA1c:   TSH:        CARDIOLOGY CONSULT - Dr. Bullock         HPI:   56f presents to the ED with severe abd pain and chest pain. 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 Patient has been in [facility]-over the weekend noted her abd pain to be severe, localized mostly to Lower and r. side of abdomen, constant. Today also noted to have constant, tearing type chest pain which was severe and prompted her to have the ambulance called. She was given aspirin by [facility] prior to EMS arrival. Currently states her chest pain is improved at this point-abd pain is severe. Patient previously been evaluated by dermatology for pannus wound, thought to be pyoderma gangrenosum, on cyclosporine. Also follows with wound care outpatient for this chronic wound. Patient reports that pain has been on going for the last few days worsening today.  Also notes she has not had anything to eat in a week, drinking minimally. No ha, cough, sob, vomiting, diarrhea, dysuria, edema.  Patient noted to be hypotensive in ED to 60/40s, given 2L bolus. Being started on Levo. - Pt is now s/p extensive debriment with cautery, intubated and sedated. Transferred to SICU for close monitoring.  unable to obtain ROS           PAST MEDICAL & SURGICAL HISTORY:  COPD (chronic obstructive pulmonary disease)      DM (diabetes mellitus)      Atrial fibrillation  with loop recorder , battery most likely depleted, as per cardiac clearance, Dr. Reece Anesthesia aware, pt on Eliquis      HTN (hypertension)      Morbid obesity  BMI - 58.3      Chronic GERD      CHAMP (obstructive sleep apnea)  non compliance with CPAP, Anesthesia Dr. Reece aware, pt told to bring CPAP for sx, pr verbalized understanding      Potential difficult airway on pre-intubation assessment  airway class III, large neck, morbid obesity, hx of CHAMP, no compliance with CPAP- Dr. Reece, Anesthesia aware      End-stage renal disease      Anemia      Medication management      H/O tubal ligation        Status post placement of implantable loop recorder  left chest-       History of vascular access device  s/p insertion right chest permacath 2019, removal 2019, insertion left chest permacath 2019      S/P arteriovenous (AV) fistula creation  left  arm 2019              PREVIOUS DIAGNOSTIC TESTING:    Echo 22: grossly nl LV sys fx, min MR         MEDICATIONS:  Home Medications:  acetaminophen 325 mg oral tablet: 2 tab(s) orally every 6 hours, As needed, Temp greater or equal to 38C (100.4F), Mild Pain (1 - 3) (2022 12:38)  aluminum hydroxide-magnesium hydroxide 200 mg-200 mg/5 mL oral suspension: 30 milliliter(s) orally every 6 hours, As needed, Dyspepsia (2022 12:38)  apixaban 5 mg oral tablet: 1 tab(s) orally every 12 hours (15 Apr 2022 10:37)  benzonatate 100 mg oral capsule: 1 cap(s) orally 3 times a day, As needed, Cough (15 Apr 2022 10:37)  bisacodyl 5 mg oral delayed release tablet: 1 tab(s) orally once a day (at bedtime) (15 Apr 2022 10:37)  buPROPion 150 mg/24 hours (XL) oral tablet, extended release: 1 tab(s) orally once a day (in the morning) (20 Mar 2022 10:15)  cinacalcet 30 mg oral tablet: 1 tab(s) orally once a day (15 Apr 2022 11:16)  cycloSPORINE modified 100 mg oral capsule: 2 cap(s) orally 2 times a day (15 Apr 2022 10:37)  dilTIAZem 120 mg/24 hours oral capsule, extended release: 1 cap(s) orally once a day (15 Apr 2022 10:37)  diphenhydrAMINE 50 mg/mL injectable solution: 25 milligram(s) injectable 3 times a week (at 08:00 before dialysis sessions on Tuesday/Thursday/saturday) (15 Apr 2022 10:37)  gabapentin 300 mg oral capsule: 1 cap(s) orally once a day (15 Apr 2022 10:37)  guaiFENesin 100 mg/5 mL oral liquid: 5 milliliter(s) orally every 6 hours, As needed, Cough (2022 12:38)  insulin lispro 100 units/mL injectable solution: 2 unit(s) injectable 3 times a day (before meals) (2022 12:38)  lactobacillus acidophilus oral capsule: 1 cap(s) orally once a day (2022 12:38)  lidocaine 4% topical film: Apply topically to affected area once a day to right knee (15 Apr 2022 10:37)  lidocaine 4% topical film: Apply topically to affected area once a day to lower back (15 Apr 2022 10:37)  lidocaine 4% topical film: Apply topically to affected area once a day to left knee (15 Apr 2022 10:37)  lidocaine 4% topical film: Apply topically to affected area once a day    Apply to neck (2022 12:38)  melatonin 3 mg oral tablet: 2 tab(s) orally once a day (at bedtime), As needed, Insomnia (2022 12:38)  midodrine 10 mg oral tablet: 1 tab(s) orally  3 times a week, As Needed 30 minutes prior to dialysis (08:00 on //). HOLD if SBP &gt;130 (15 Apr 2022 11:35)  mirtazapine 7.5 mg oral tablet: 1 tab(s) orally once a day (at bedtime) (20 Mar 2022 10:15)  montelukast 10 mg oral tablet: 1 tab(s) orally once a day (at bedtime) (15 Apr 2022 10:37)  nystatin 100,000 units/g topical cream: 1 application topically every 12 hours (2022 12:38)  oxyCODONE 5 mg oral tablet: 1 tab(s) orally every 6 hours, As needed, Severe Pain (7 - 10) (2022 12:38)  pantoprazole 40 mg oral delayed release tablet: 1 tab(s) orally once a day (20 Mar 2022 10:15)  polyethylene glycol 3350 oral powder for reconstitution: 17 gram(s) orally once a day (at bedtime) (20 Mar 2022 10:15)  senna oral tablet: 2 tab(s) orally once a day (at bedtime) (20 Mar 2022 10:15)  sertraline 50 mg oral tablet: 1 tab(s) orally once a day (20 Mar 2022 10:15)  sevelamer carbonate 800 mg oral tablet: 1 tab(s) orally 3 times a day (with meals) (20 Mar 2022 10:15)  simethicone 80 mg oral tablet, chewable: 1 tab(s) orally 3 times a day (before meals) (20 Mar 2022 10:15)  sodium hypochlorite 0.125% topical solution: 1 application topically once a day to affected area (pannus wound on abdomen) (15 Apr 2022 10:37)  tacrolimus 0.1% topical ointment: 1 application topically once a day (15 Apr 2022 10:37)  traZODone 100 mg oral tablet: 1 tab(s) orally once a day (at bedtime) (2022 12:38)      MEDICATIONS  (STANDING):  cefepime   IVPB 2000 milliGRAM(s) IV Intermittent every 12 hours  chlorhexidine 4% Liquid 1 Application(s) Topical daily  clindamycin IVPB 900 milliGRAM(s) IV Intermittent every 8 hours  fentaNYL   Infusion. 0.5 MICROgram(s)/kG/Hr (5 mL/Hr) IV Continuous <Continuous>  lactated ringers. 1000 milliLiter(s) (100 mL/Hr) IV Continuous <Continuous>  norepinephrine Infusion 0.05 MICROgram(s)/kG/Min (9.38 mL/Hr) IV Continuous <Continuous>  pantoprazole  Injectable 40 milliGRAM(s) IV Push daily  propofol Infusion 10 MICROgram(s)/kG/Min (6 mL/Hr) IV Continuous <Continuous>  vasopressin Infusion 0.02 Unit(s)/Min (1.2 mL/Hr) IV Continuous <Continuous>      FAMILY HISTORY:  Family history of diabetes mellitus  mother-     Family hx of hypertension  mother-         SOCIAL HISTORY:    [ ] Non-smoker  [ ] Smoker  [ ] Alcohol    Allergies    latex (Rash)  penicillin (Nausea)  strawberry (Rash)    Intolerances    caffeine (Nausea)  	    REVIEW OF SYSTEMS:  CONSTITUTIONAL: No fever, weight loss, or fatigue  EYES: No eye pain, visual disturbances, or discharge  ENMT:  No difficulty hearing, tinnitus, vertigo; No sinus or throat pain  NECK: No pain or stiffness  RESPIRATORY: No cough, wheezing, chills or hemoptysis; No Shortness of Breath  CARDIOVASCULAR: No chest pain, palpitations, passing out, dizziness, or leg swelling  GASTROINTESTINAL: No abdominal or epigastric pain. No nausea, vomiting, or hematemesis; No diarrhea or constipation. No melena or hematochezia.  GENITOURINARY: No dysuria, frequency, hematuria, or incontinence  NEUROLOGICAL: No headaches, memory loss, loss of strength, numbness, or tremors  SKIN: No itching, burning, rashes, or lesions   	    [ ] All others negative	  [x ] Unable to obtain    PHYSICAL EXAM:  T(C): 37.1 (22 @ 12:30), Max: 37.8 (22 @ 19:14)  HR: 123 (22 @ 14:00) (93 - 142)  BP: 97/67 (22 @ 13:00) (63/45 - 102/84)  RR: 16 (22 @ 14:00) (9 - 22)  SpO2: 100% (22 @ 14:00) (96% - 100%)  Wt(kg): --  I&O's Summary      Appearance: intubated. sedated 	  Psychiatry: A & O x 0  HEENT:   Normal oral mucosa, PERRL, EOMI	  Lymphatic: No lymphadenopathy  Cardiovascular: Normal S1 S2 irreg tachycardia   Respiratory:  mech bs   Gastrointestinal:  Soft, Non-tender, + BS	      TELEMETRY: afib hr 110-120s 	    ECG:  	  RADIOLOGY:      < from: CT Angio Chest Aorta w/wo IV Cont (22 @ 01:14) >  IMPRESSION:    No aortic dissection. Atherosclerotic changes.    Small pericardial effusion of high attenuation. Consider correlation with   echocardiogram to exclude hemorrhagic pericardial fluid or pericarditis.    Small left pleural effusionand associated left lower lobe consolidative   opacity, atelectasis or developing pneumonia.    Mild mosaic groundglass attenuation attenuation throughout the lungs,   possibly related to motion artifact, small airway disease, mild pulmonary   edema or atypical infection.    Gas within the ventral wall pannus, right medial buttock and creases of   the bilateral groin with associated soft tissue thickening consistent   with skin inflammation/dehiscence. Necrotizing fasciitis difficult to   exclude. No defined fluid collection is identified.    Enlarged uterus containing multiple calcified fibroids. Suggestion of   thickened endometrial echo complex. Nonemergent correlation with pelvic   ultrasound or MRI is recommended. Malpositioned intrauterine device   identified within cervical canal.    Additional findings as mentioned above.    These results were discussed via telephone at 2022 4:11 AM by Dr. Lund of radiology with Dr. Blum.    --- End of Report ---        < end of copied text >    OTHER: 	  	  LABS:	 	    CARDIAC MARKERS:  Troponin T, High Sensitivity Result: 35 ng/L ( @ 02:00)  Troponin T, High Sensitivity Result: 39 ng/L ( @ 19:02)                                  8.6    50.37 )-----------( 641      ( 27 May 2022 10:18 )             29.4         130<L>  |  93<L>  |  24<H>  ----------------------------<  186<H>  3.8   |  19<L>  |  3.08<H>    Ca    9.7      27 May 2022 10:18    TPro  6.6  /  Alb  2.4<L>  /  TBili  0.9  /  DBili  x   /  AST  13  /  ALT  9   /  AlkPhos  248<H>      PT/INR - ( 27 May 2022 10:18 )   PT: 21.7 sec;   INR: 1.86 ratio         PTT - ( 27 May 2022 10:18 )  PTT:36.0 sec  proBNP:   Lipid Profile:   HgA1c:   TSH:

## 2022-05-27 NOTE — CONSULT NOTE ADULT - ATTENDING COMMENTS
Patient with ESRD, nsti of abdomen and groin s/p debridement, in septic shock multiorgan failure  N on prop and fentanyl, wean off prop, haldol prn for agitation  resp on minimal vent settings adequate gas exchange  cv septic shock, on levophed and vasopressin, high requirements for levophed, pocus shows plethoric IVC without variations, svv 19, administer fluid bolus 500cc albumin  gi npo, ivf  gu/renal esrd, acidosis, worsening lacate rising, start on bicarb gtt, and renal for HD  heme vte ppx  id on abx broad spectrum for NSTI  endo start on stress dose steroids    The patient is a critical care patient with life threatening hemodynamic and metabolic instability in SICU.  I have personally interviewed when possible and examined the patient, reviewed data and laboratory tests/x-rays and all pertinent electronic images.  I was physically present for the key portions of the evaluation and management (E/M) service provided.   The SICU team has a constant risk benefit analyzes discussion with the primary team, all consultants, House Staff and PA's on all decisions.  These diagnoses are unrelated to the surgical procedure noted above.  I meet with family if needed to get further history, discuss the case and make care decisions for this patient who might not be able to participate.  Time involved in performance of separately billable procedures was not counted toward my critical care time. There is no overlap.  I spent 55-75 minutes ( 0800Hrs-0915Hrs in AM/ 1600Hrs-1715Hrs in PM, or other time indicated) of critical care time for the diagnoses, assessment, plan and interventions.  This time excludes time spent on separate procedures and teaching.

## 2022-05-27 NOTE — ED ADULT NURSE REASSESSMENT NOTE - NS ED NURSE REASSESS COMMENT FT1
Mobile Critical Care RN: Pt started on levophed gtt, titrated for MAP > 65, currently at 0.25 mcg/kg/min.  Pt seen by surgery, plan to take pt to OR for debridement of wounds, per orders, pt given K centra IV, started on LR maintenance IV fluid. Report to OR given by anupama Romero. Mobile Critical Care RN: Pt started on levophed gtt, titrated for MAP > 65, currently at 0.25 mcg/kg/min.  Pt seen by surgery, plan to take pt to OR for debridement of wounds, per orders, pt given K centra IV, started on LR maintenance IV fluid. Report to OR given by float ER IVANNA Romero. addendum: Transporter came to take pt to OR. Pt transported with ER RN [facility] and ED jani. Pt endorsed to [facility].

## 2022-05-27 NOTE — H&P ADULT - ASSESSMENT
57y/o with multiple medical comorbidities presenting with abd. pain and gluteal pain, hypotensive and WBC of 35 concerning for necrotizing soft tissue infection     - admit to Dr. Mathias   - NPO   -IVF   - send wound culture   -IV abx   - ID consult   - STAT Kcentra   - added on emergently to OR for debridement   - consent in chart     Discussed with attending   [phone]

## 2022-05-27 NOTE — PATIENT PROFILE ADULT - FALL HARM RISK - RISK INTERVENTIONS
Assistance OOB with selected safe patient handling equipment/Assistance with ambulation/Communicate Fall Risk and Risk Factors to all staff, patient, and family/Monitor gait and stability/Reinforce activity limits and safety measures with patient and family/Sit up slowly, dangle for a short time, stand at bedside before walking/Use of alarms - bed, chair and/or voice tab/Visual Cue: Yellow wristband/Bed in lowest position, wheels locked, appropriate side rails in place/Call bell, personal items and telephone in reach/Instruct patient to call for assistance before getting out of bed or chair/Non-slip footwear when patient is out of bed/[place] to call system/Physically safe environment - no spills, clutter or unnecessary equipment/Purposeful Proactive Rounding/Room/bathroom lighting operational, light cord in reach

## 2022-05-27 NOTE — BRIEF OPERATIVE NOTE - OPERATION/FINDINGS
Significant necrotic tissue noted in right gluteal and right lower abdominal wound. Both were extensively debrided with cautery. EBL 50. Patient remained on pressors throughout the case. Wound cultures x2 and tissue cultures x2 sent intraop. Patient remained intubated and on levo and vaso. Patient brought to SICU bed 1.

## 2022-05-27 NOTE — CONSULT NOTE ADULT - SUBJECTIVE AND OBJECTIVE BOX
[facility] NEPHROLOGY- CONSULTATION NOTE    56y Female with history of below presents with abdominal pain concern for necrotizing fascitis. Nephrology consulted for ESRD status.    Patient well known to our practice for h/o ESRD on HD MWF at [facility] where she follows with me. Patient recently admitted for abdominal wound which was thought to be secondary to pyoderma gangrenosum and not calciphylaxis. Patient discharged to [facility] where she continues to get HD with last HD on .    Patient s/p OR this morning for debridement. Patient now hypotensive on 2 pressors.     REVIEW OF SYSTEMS: Unable to obtain as patient intubated and sedated.     latex (Rash)  penicillin (Nausea)      Home Medications Reviewed  Hospital Medications:   MEDICATIONS  (STANDING):      PAST MEDICAL & SURGICAL HISTORY:  COPD (chronic obstructive pulmonary disease)    DM (diabetes mellitus)    Atrial fibrillation  with loop recorder , battery most likely depleted, as per cardiac clearance, Dr. Reece Anesthesia aware, pt on Eliquis    HTN (hypertension)    Morbid obesity  BMI - 58.3    Chronic GERD    CHAMP (obstructive sleep apnea)  non compliance with CPAP, Anesthesia Dr. Reece aware, pt told to bring CPAP for sx, pr verbalized understanding    Potential difficult airway on pre-intubation assessment  airway class III, large neck, morbid obesity, hx of CHAMP, no compliance with CPAP- Dr. Reece, Anesthesia aware    End-stage renal disease    Anemia    H/O tubal ligation      Status post placement of implantable loop recorder  left chest-     History of vascular access device  s/p insertion right chest permacath 2019, removal 2019, insertion left chest permacath 2019    S/P arteriovenous (AV) fistula creation  left  arm 2019        FAMILY HISTORY:  Family history of diabetes mellitus  mother-     Family hx of hypertension  mother-         SOCIAL HISTORY:  Denies toxic substance use       VITALS:  T(F): 98.7 (22 @ 12:30), Max: 100 (22 @ 19:14)  HR: 123 (22 @ 12:30)  BP: 94/69 (22 @ 06:40)  RR: 18 (22 @ 12:30)  SpO2: 100% (22 @ 12:30)  Wt(kg): --    Height (cm): 157.5 ( @ 18:17)  Weight (kg): 100 ( @ 05:15)  BMI (kg/m2): 40.3 ( @ 05:15)  BSA (m2): 1.99 ( @ 05:15)        PHYSICAL EXAM:  Gen: Intubated and sedated  HEENT: MMM  Neck: no JVD  Cards: RRR, +S1/S2, no M/G/R  Resp: CTA B/L  GI: soft, NT/ND, NABS, + ab wound with dressing in place  : no CVA tenderness  Vascular: no LE edema B/L, LUE AVF + bruit/thrill  Derm: no rashes  Neuro: unable to assess        LABS:      130<L>  |  93<L>  |  24<H>  ----------------------------<  186<H>  3.8   |  19<L>  |  3.08<H>    Ca    9.7      27 May 2022 10:18    TPro  6.6  /  Alb  2.4<L>  /  TBili  0.9  /  DBili      /  AST  13  /  ALT  9   /  AlkPhos  248<H>      Creatinine Trend: 3.08 <--, 3.05 <--                        8.6    50.37 )-----------( 641      ( 27 May 2022 10:18 )             29.4     Urine Studies:          < from: CT Angio Chest Aorta w/wo IV Cont (22 @ 01:14) >  IMPRESSION:    No aortic dissection. Atherosclerotic changes.    Small pericardial effusion of high attenuation. Consider correlation with   echocardiogram to exclude hemorrhagic pericardial fluid or pericarditis.    Small left pleural effusionand associated left lower lobe consolidative   opacity, atelectasis or developing pneumonia.    Mild mosaic groundglass attenuation attenuation throughout the lungs,   possibly related to motion artifact, small airway disease, mild pulmonary   edema or atypical infection.    Gas within the ventral wall pannus, right medial buttock and creases of   the bilateral groin with associated soft tissue thickening consistent   with skin inflammation/dehiscence. Necrotizing fasciitis difficult to   exclude. No defined fluid collection is identified.    Enlarged uterus containing multiple calcified fibroids. Suggestion of   thickened endometrial echo complex. Nonemergent correlation with pelvic   ultrasound or MRI is recommended. Malpositioned intrauterine device   identified within cervical canal.    Additional findings as mentioned above.    These results were discussed via telephone at 2022 4:11 AM by Dr. Lund of radiology with Dr. Blum.    --- End of Report ---    < end of copied text >        < from: Xray Chest 1 View-PORTABLE IMMEDIATE (22 @ 19:51) >  FINDINGS/  IMPRESSION:  Implantable wireless loop recorder.    Rotation into an HA projection limits evaluation of the left lung.   Streaky opacity at the left lung base may represent atelectasis. Left   upper lobe and right lung are clear.    Cardiac size cannot accurately be assessed in this projection.      --- End of Report ---    < end of copied text >

## 2022-05-27 NOTE — CONSULT NOTE ADULT - ASSESSMENT
57yo F w/ HTN, HLD, DM, Afib on eliquis, ESRD, COPD, morbid obesity presents to the ED with severe abd pain radiating to chest found to be in septic shock 2/2 necrotizing fascitis planned to go to OR and admit to SICU, CT scan also incidentally found small pericardial effusion with high attenuation.     # Pericardial Effusion  Unknown etiology possible infectious pericarditis vs ESRD  Continuous cardiac monitoring to monitor for arrhythmias  Avoid anticoagulants   Labs: CBC, coags, ESR, CRP, Procalcitonin  ECHO: TTE in am, STAT if hemodynamically unstable to evaluate signs of tamponade  IV fluids if hypotensive  Pericardiocentesis: IR/cathlab pericardial drain placement if needed/tamponade  House cardiology to follow    Thank you, if any questions or clinical situation changes please call spectra #[phone].  Attending Attestation to follow

## 2022-05-27 NOTE — CONSULT NOTE ADULT - SUBJECTIVE AND OBJECTIVE BOX
SICU Consultation Note  =====================================================  HPI: 56y female  ***    Surgery Information  ***  Case Duration: 	EBL: 	IV Fluids: 	Blood Products: 	Urine Output:   Complications:     HISTORY  56y Female  HPI:   56f presents to the ED with severe abd pain and chest pain. 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 Patient has been in [facility]-over the weekend noted her abd pain to be severe, localized mostly to Lower and r. side of abdomen, constant. Today also noted to have constant, tearing type chest pain which was severe and prompted her to have the ambulance called. She was given aspirin by [facility] prior to EMS arrival. Currently states her chest pain is improved at this point-abd pain is severe. Patient previously been evaluated by dermatology for pannus wound, thought to be pyoderma gangrenosum, on cyclosporine. Also follows with wound care outpatient for this chronic wound. Patient reports that pain has been on going for the last few days worsening today.  Also notes she has not had anything to eat in a week, drinking minimally. No ha, cough, sob, vomiting, diarrhea, dysuria, edema.  Patient noted to be hypotensive in ED to 60/40s, given 2L bolus. Being started on Levo.   Given triple dose abx. WBC elevated to 35  (27 May 2022 05:17)    Allergies: caffeine (Nausea)  latex (Rash)  penicillin (Nausea)  strawberry (Rash)    PAST MEDICAL & SURGICAL HISTORY:  COPD (chronic obstructive pulmonary disease)      DM (diabetes mellitus)      Atrial fibrillation  with loop recorder , battery most likely depleted, as per cardiac clearance, Dr. Reece Anesthesia aware, pt on Eliquis      HTN (hypertension)      Morbid obesity  BMI - 58.3      Chronic GERD      CHAMP (obstructive sleep apnea)  non compliance with CPAP, Anesthesia Dr. Reece aware, pt told to bring CPAP for sx, pr verbalized understanding      Potential difficult airway on pre-intubation assessment  airway class III, large neck, morbid obesity, hx of CHAMP, no compliance with CPAP- Dr. Reece, Anesthesia aware      End-stage renal disease      Anemia      Medication management      H/O tubal ligation        Status post placement of implantable loop recorder  left chest-       History of vascular access device  s/p insertion right chest permacath 2019, removal 2019, insertion left chest permacath 2019      S/P arteriovenous (AV) fistula creation  left  arm 2019        FAMILY HISTORY:  Family history of diabetes mellitus  mother-     Family hx of hypertension  mother-         SOCIAL HISTORY:  ***    ADVANCE DIRECTIVES: Presumed Full Code      REVIEW OF SYSTEMS:  ***  General: Non-Contributory  Skin/Breast: Non-Contributory  Ophthalmologic: Non-Contributory  ENMT: Non-Contributory  Respiratory and Thorax: Non-Contributory  Cardiovascular: Non-Contributory  Gastrointestinal: Non-Contributory  Genitourinary: Non-Contributory  Musculoskeletal: Non-Contributory  Neurological: Non-Contributory  Psychiatric: Non-Contributory  Hematology/Lymphatics: Non-Contributory  Endocrine: Non-Contributory  Allergic/Immunologic: Non-Contributory    HOME MEDICATIONS:  ***    CURRENT MEDICATIONS:   --------------------------------------------------------------------------------------  Neurologic Medications  fentaNYL   Infusion. 0.5 MICROgram(s)/kG/Hr IV Continuous <Continuous>  propofol Infusion 10 MICROgram(s)/kG/Min IV Continuous <Continuous>    Respiratory Medications    Cardiovascular Medications  norepinephrine Infusion 0.05 MICROgram(s)/kG/Min IV Continuous <Continuous>    Gastrointestinal Medications  lactated ringers. 1000 milliLiter(s) IV Continuous <Continuous>    Genitourinary Medications    Hematologic/Oncologic Medications    Antimicrobial/Immunologic Medications  cefepime   IVPB 2000 milliGRAM(s) IV Intermittent every 12 hours  clindamycin IVPB 900 milliGRAM(s) IV Intermittent every 8 hours    Endocrine/Metabolic Medications    Topical/Other Medications  chlorhexidine 4% Liquid 1 Application(s) Topical daily    --------------------------------------------------------------------------------------    VITAL SIGNS, INS/OUTS (last 24 hours):  --------------------------------------------------------------------------------------  ((Insert SICU Vitals / Is+Os here)) ***  --------------------------------------------------------------------------------------    EXAM  NEUROLOGY  RASS:   	GCS:    Exam: Normal, NAD, alert, oriented x 3, no focal deficits. ***    HEENT  Exam: Normocephalic, atraumatic.  EOMI ***    RESPIRATORY  Exam: Lungs clear to auscultation, Normal expansion/effort.  ***  Mechanical Ventilation: Mode: AC/ CMV (Assist Control/ Continuous Mandatory Ventilation), RR (machine): 18, TV (machine): 450, FiO2: 100, PEEP: 8, MAP: 15, PIP: 34    CARDIOVASCULAR  Exam: S1, S2.  Regular rate and rhythm.  Peripheral edema  ***    GI/NUTRITION  Exam: Abdomen soft, Non-tender, Non-distended.  ***  Wound:   ***  Current Diet:  NPO ***    VASCULAR  Exam: Extremities warm, pink, well-perfused.  ***    MUSCULOSKELETAL  Exam: All extremities moving spontaneously without limitations.  ***    SKIN:  Exam: Good skin turgor, no skin breakdown.  ***    METABOLIC/FLUIDS/ELECTROLYTES  lactated ringers. 1000 milliLiter(s) IV Continuous <Continuous>      HEMATOLOGIC  [x] DVT Prophylaxis:   Transfusions:	[] PRBC	[] Platelets		[] FFP	[] Cryoprecipitate    INFECTIOUS DISEASE  Antimicrobials/Immunologic Medications:  cefepime   IVPB 2000 milliGRAM(s) IV Intermittent every 12 hours  clindamycin IVPB 900 milliGRAM(s) IV Intermittent every 8 hours    Day #  	of    ***    Tubes/Lines/Drains  ***  [x] Peripheral IV  [] Central Venous Line     	[] R	[] L	[] IJ	[] Fem	[] SC	Date Placed:   [] Arterial Line		[] R	[] L	[] Fem	[] Rad	[] Ax	Date Placed:   [] PICC:         	[] Midline		[] Mediport  [] Urinary Catheter		Date Placed:     LABS  --------------------------------------------------------------------------------------  ((Insert SICU Labs here))***  --------------------------------------------------------------------------------------    OTHER LABS    IMAGING RESULTS  Echo:   CT:   Xray:     ASSESSMENT:  56y Female ***    PLAN:  ***  Neurologic:   Respiratory:   Cardiovascular:   Gastrointestinal/Nutrition:   Renal/Genitourinary:   Hematologic:   Infectious Disease:   Tubes/Lines/Drains:   Endocrine:   Disposition:     --------------------------------------------------------------------------------------    Critical Care Diagnoses:   SICU Consultation Note  =====================================================  HPI:  56f presents to the ED with severe abd pain and chest pain. 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 Patient has been in [facility]-over the weekend noted her abd pain to be severe, localized mostly to Lower and r. side of abdomen, constant. Today also noted to have constant, tearing type chest pain which was severe and prompted her to have the ambulance called. She was given aspirin by [facility] prior to EMS arrival. Currently states her chest pain is improved at this point-abd pain is severe. Patient previously been evaluated by dermatology for pannus wound, thought to be pyoderma gangrenosum, on cyclosporine. Also follows with wound care outpatient for this chronic wound. Patient reports that pain has been on going for the last few days worsening today.  Also notes she has not had anything to eat in a week, drinking minimally. No ha, cough, sob, vomiting, diarrhea, dysuria, edema.  Patient noted to be hypotensive in ED to 60/40s, given 2L bolus, started on levo. Pt is now s/p extensive debriment with cautery, intubated and sedated. Transferred to SICU for close monitoring.   Given triple dose abx. WBC elevated to 35     Surgery Information Significant necrotic tissue noted in right gluteal and right lower abdominal wound. Both were extensively debrided with cautery. EBL 50. Patient remained on pressors throughout the case. Wound cultures x2 and tissue cultures x2 sent intraop. Patient remained intubated and on levo and vaso.  Case Duration: 	EBL: 50 	 IV Fluids: 300	Blood Products: 	Urine Output:   Complications:       Allergies: caffeine (Nausea)  latex (Rash)  penicillin (Nausea)  strawberry (Rash)    PAST MEDICAL & SURGICAL HISTORY:  COPD (chronic obstructive pulmonary disease)      DM (diabetes mellitus)      Atrial fibrillation  with loop recorder , battery most likely depleted, as per cardiac clearance, Dr. Reece Anesthesia aware, pt on Eliquis      HTN (hypertension)      Morbid obesity  BMI - 58.3      Chronic GERD      CHAMP (obstructive sleep apnea)  non compliance with CPAP, Anesthesia Dr. Reece aware, pt told to bring CPAP for sx, pr verbalized understanding      Potential difficult airway on pre-intubation assessment  airway class III, large neck, morbid obesity, hx of CHAMP, no compliance with CPAP- Dr. Reece, Anesthesia aware      End-stage renal disease      Anemia      Medication management      H/O tubal ligation        Status post placement of implantable loop recorder  left chest-       History of vascular access device  s/p insertion right chest permacath 2019, removal 2019, insertion left chest permacath 2019      S/P arteriovenous (AV) fistula creation  left  arm 2019        FAMILY HISTORY:  Family history of diabetes mellitus  mother-     Family hx of hypertension  mother-         SOCIAL HISTORY:     ADVANCE DIRECTIVES: Presumed Full Code      REVIEW OF SYSTEMS:   [x] Due to altered mental status/intubation, subjective information were not able to be obtained.     HOME MEDICATIONS:    · 	acetaminophen 325 mg oral tablet: 2 tab(s) orally every 6 hours, As needed, Temp greater or equal to 38C (100.4F), Mild Pain (1 - 3)  · 	oxyCODONE 5 mg oral tablet: 1 tab(s) orally every 6 hours, As needed, Severe Pain (7 - 10)  · 	aluminum hydroxide-magnesium hydroxide 200 mg-200 mg/5 mL oral suspension: 30 milliliter(s) orally every 6 hours, As needed, Dyspepsia  · 	traZODone 100 mg oral tablet: 1 tab(s) orally once a day (at bedtime)  · 	insulin lispro 100 units/mL injectable solution: 2 unit(s) injectable 3 times a day (before meals)  · 	melatonin 3 mg oral tablet: 2 tab(s) orally once a day (at bedtime), As needed, Insomnia  · 	lidocaine 4% topical film: Apply topically to affected area once a day  	Apply to neck  · 	nystatin 100,000 units/g topical cream: 1 application topically every 12 hours  · 	guaiFENesin 100 mg/5 mL oral liquid: 5 milliliter(s) orally every 6 hours, As needed, Cough  · 	lactobacillus acidophilus oral capsule: 1 cap(s) orally once a day  · 	midodrine 10 mg oral tablet: 1 tab(s) orally  3 times a week, As Needed 30 minutes prior to dialysis (08:00 on //). HOLD if SBP >130  · 	cinacalcet 30 mg oral tablet: 1 tab(s) orally once a day  · 	montelukast 10 mg oral tablet: 1 tab(s) orally once a day (at bedtime)  · 	bisacodyl 5 mg oral delayed release tablet: 1 tab(s) orally once a day (at bedtime)  · 	cycloSPORINE modified 100 mg oral capsule: 2 cap(s) orally 2 times a day  · 	lidocaine 4% topical film: Apply topically to affected area once a day to left knee  · 	lidocaine 4% topical film: Apply topically to affected area once a day to lower back  · 	lidocaine 4% topical film: Apply topically to affected area once a day to right knee  · 	tacrolimus 0.1% topical ointment: 1 application topically once a day  · 	dilTIAZem 120 mg/24 hours oral capsule, extended release: 1 cap(s) orally once a day  · 	benzonatate 100 mg oral capsule: 1 cap(s) orally 3 times a day, As needed, Cough  · 	sodium hypochlorite 0.125% topical solution: 1 application topically once a day to affected area (pannus wound on abdomen)  · 	diphenhydrAMINE 50 mg/mL injectable solution: 25 milligram(s) injectable 3 times a week (at 08:00 before dialysis sessions on Tuesday/Thursday/saturday)  · 	gabapentin 300 mg oral capsule: 1 cap(s) orally once a day  · 	apixaban 5 mg oral tablet: 1 tab(s) orally every 12 hours  · 	senna oral tablet: 2 tab(s) orally once a day (at bedtime)  · 	polyethylene glycol 3350 oral powder for reconstitution: 17 gram(s) orally once a day (at bedtime)  · 	pantoprazole 40 mg oral delayed release tablet: 1 tab(s) orally once a day  · 	sevelamer carbonate 800 mg oral tablet: 1 tab(s) orally 3 times a day (with meals)  · 	buPROPion 150 mg/24 hours (XL) oral tablet, extended release: 1 tab(s) orally once a day (in the morning)  · 	sertraline 50 mg oral tablet: 1 tab(s) orally once a day  · 	mirtazapine 7.5 mg oral tablet: 1 tab(s) orally once a day (at bedtime)  · 	simethicone 80 mg oral tablet, chewable: 1 tab(s) orally 3 times a day (before meals)    CURRENT MEDICATIONS:   --------------------------------------------------------------------------------------  Neurologic Medications  fentaNYL   Infusion. 0.5 MICROgram(s)/kG/Hr IV Continuous <Continuous>  propofol Infusion 10 MICROgram(s)/kG/Min IV Continuous <Continuous>    Respiratory Medications    Cardiovascular Medications  norepinephrine Infusion 0.05 MICROgram(s)/kG/Min IV Continuous <Continuous>    Gastrointestinal Medications  lactated ringers. 1000 milliLiter(s) IV Continuous <Continuous>    Genitourinary Medications    Hematologic/Oncologic Medications    Antimicrobial/Immunologic Medications  cefepime   IVPB 2000 milliGRAM(s) IV Intermittent every 12 hours  clindamycin IVPB 900 milliGRAM(s) IV Intermittent every 8 hours    Endocrine/Metabolic Medications    Topical/Other Medications  chlorhexidine 4% Liquid 1 Application(s) Topical daily    --------------------------------------------------------------------------------------    VITAL SIGNS, INS/OUTS (last 24 hours):  --------------------------------------------------------------------------------------  Vital Signs Last 24 Hrs  T(C): 37.1 (27 May 2022 12:30), Max: 37.8 (26 May 2022 19:14)  T(F): 98.7 (27 May 2022 12:30), Max: 100 (26 May 2022 19:14)  HR: 123 (27 May 2022 12:30) (93 - 142)  BP: 94/69 (27 May 2022 06:40) (63/45 - 102/84)  BP(mean): 77 (27 May 2022 06:40) (44 - 92)  RR: 18 (27 May 2022 12:30) (9 - 22)  SpO2: 100% (27 May 2022 12:30) (96% - 100%)      --------------------------------------------------------------------------------------    EXAM  NEUROLOGY  RASS:   	GCS:    Exam: Normal, NAD, alert, oriented x 3, no focal deficits. ***    HEENT  Exam: Normocephalic, atraumatic.  EOMI ***    RESPIRATORY  Exam: Lungs clear to auscultation, Normal expansion/effort.  ***  Mechanical Ventilation: Mode: AC/ CMV (Assist Control/ Continuous Mandatory Ventilation), RR (machine): 18, TV (machine): 450, FiO2: 100, PEEP: 8, MAP: 15, PIP: 34    CARDIOVASCULAR  Exam: S1, S2.  Regular rate and rhythm.  Peripheral edema  ***    GI/NUTRITION  Exam: Abdomen soft, Non-tender, Non-distended.  ***  Wound:   ***  Current Diet:  NPO ***    VASCULAR  Exam: Extremities warm, pink, well-perfused.  ***    MUSCULOSKELETAL  Exam: All extremities moving spontaneously without limitations.  ***    SKIN:  Exam: Good skin turgor, no skin breakdown.  ***    METABOLIC/FLUIDS/ELECTROLYTES  lactated ringers. 1000 milliLiter(s) IV Continuous <Continuous>      HEMATOLOGIC  [x] DVT Prophylaxis:   Transfusions:	[] PRBC	[] Platelets		[] FFP	[] Cryoprecipitate    INFECTIOUS DISEASE  Antimicrobials/Immunologic Medications:  cefepime   IVPB 2000 milliGRAM(s) IV Intermittent every 12 hours  clindamycin IVPB 900 milliGRAM(s) IV Intermittent every 8 hours        Tubes/Lines/Drains  ***  [x] Peripheral IV  [] Central Venous Line     	[] R	[] L	[] IJ	[] Fem	[] SC	Date Placed:   [] Arterial Line		[] R	[] L	[] Fem	[] Rad	[] Ax	Date Placed:   [] PICC:         	[] Midline		[] Mediport  [] Urinary Catheter		Date Placed:     LABS  --------------------------------------------------------------------------------------  ((Insert SICU Labs here))***  --------------------------------------------------------------------------------------    OTHER LABS    IMAGING RESULTS  Echo:   CT:   Xray:     ASSESSMENT:  56y Female ***    PLAN:  ***  Neurologic:   Respiratory:   Cardiovascular:   Gastrointestinal/Nutrition:   Renal/Genitourinary:   Hematologic:   Infectious Disease:   Tubes/Lines/Drains:   Endocrine:   Disposition:     --------------------------------------------------------------------------------------    Critical Care Diagnoses:   SICU Consultation Note  =====================================================  HPI:  56f presents to the ED with severe abd pain and chest pain. 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 Patient has been in [facility]-over the weekend noted her abd pain to be severe, localized mostly to Lower and r. side of abdomen, constant. Today also noted to have constant, tearing type chest pain which was severe and prompted her to have the ambulance called. She was given aspirin by [facility] prior to EMS arrival. Currently states her chest pain is improved at this point-abd pain is severe. Patient previously been evaluated by dermatology for pannus wound, thought to be pyoderma gangrenosum, on cyclosporine. Also follows with wound care outpatient for this chronic wound. Patient reports that pain has been on going for the last few days worsening today.  Also notes she has not had anything to eat in a week, drinking minimally. No ha, cough, sob, vomiting, diarrhea, dysuria, edema.  Patient noted to be hypotensive in ED to 60/40s, given 2L bolus, started on levo. Pt is now s/p extensive debriment with cautery, intubated and sedated. Transferred to SICU for close monitoring.   Given triple dose abx. WBC elevated to 35     Surgery Information Significant necrotic tissue noted in right gluteal and right lower abdominal wound. Both were extensively debrided with cautery. EBL 50. Patient remained on pressors throughout the case. Wound cultures x2 and tissue cultures x2 sent intraop. Patient remained intubated and on levo and vaso.  Case Duration: 	EBL: 50 	 IV Fluids: 300	Blood Products: 	Urine Output:   Complications:       Allergies: caffeine (Nausea)  latex (Rash)  penicillin (Nausea)  strawberry (Rash)    PAST MEDICAL & SURGICAL HISTORY:  COPD (chronic obstructive pulmonary disease)      DM (diabetes mellitus)      Atrial fibrillation  with loop recorder , battery most likely depleted, as per cardiac clearance, Dr. Reece Anesthesia aware, pt on Eliquis      HTN (hypertension)      Morbid obesity  BMI - 58.3      Chronic GERD      CHAMP (obstructive sleep apnea)  non compliance with CPAP, Anesthesia Dr. Reece aware, pt told to bring CPAP for sx, pr verbalized understanding      Potential difficult airway on pre-intubation assessment  airway class III, large neck, morbid obesity, hx of CHAMP, no compliance with CPAP- Dr. Reece, Anesthesia aware      End-stage renal disease      Anemia      Medication management      H/O tubal ligation        Status post placement of implantable loop recorder  left chest-       History of vascular access device  s/p insertion right chest permacath 2019, removal 2019, insertion left chest permacath 2019      S/P arteriovenous (AV) fistula creation  left  arm 2019        FAMILY HISTORY:  Family history of diabetes mellitus  mother-     Family hx of hypertension  mother-         SOCIAL HISTORY:     ADVANCE DIRECTIVES: Full Code      REVIEW OF SYSTEMS:   [x] Due to altered mental status/intubation, subjective information were not able to be obtained.     HOME MEDICATIONS:    · 	acetaminophen 325 mg oral tablet: 2 tab(s) orally every 6 hours, As needed, Temp greater or equal to 38C (100.4F), Mild Pain (1 - 3)  · 	oxyCODONE 5 mg oral tablet: 1 tab(s) orally every 6 hours, As needed, Severe Pain (7 - 10)  · 	aluminum hydroxide-magnesium hydroxide 200 mg-200 mg/5 mL oral suspension: 30 milliliter(s) orally every 6 hours, As needed, Dyspepsia  · 	traZODone 100 mg oral tablet: 1 tab(s) orally once a day (at bedtime)  · 	insulin lispro 100 units/mL injectable solution: 2 unit(s) injectable 3 times a day (before meals)  · 	melatonin 3 mg oral tablet: 2 tab(s) orally once a day (at bedtime), As needed, Insomnia  · 	lidocaine 4% topical film: Apply topically to affected area once a day  	Apply to neck  · 	nystatin 100,000 units/g topical cream: 1 application topically every 12 hours  · 	guaiFENesin 100 mg/5 mL oral liquid: 5 milliliter(s) orally every 6 hours, As needed, Cough  · 	lactobacillus acidophilus oral capsule: 1 cap(s) orally once a day  · 	midodrine 10 mg oral tablet: 1 tab(s) orally  3 times a week, As Needed 30 minutes prior to dialysis (08:00 on //). HOLD if SBP >130  · 	cinacalcet 30 mg oral tablet: 1 tab(s) orally once a day  · 	montelukast 10 mg oral tablet: 1 tab(s) orally once a day (at bedtime)  · 	bisacodyl 5 mg oral delayed release tablet: 1 tab(s) orally once a day (at bedtime)  · 	cycloSPORINE modified 100 mg oral capsule: 2 cap(s) orally 2 times a day  · 	lidocaine 4% topical film: Apply topically to affected area once a day to left knee  · 	lidocaine 4% topical film: Apply topically to affected area once a day to lower back  · 	lidocaine 4% topical film: Apply topically to affected area once a day to right knee  · 	tacrolimus 0.1% topical ointment: 1 application topically once a day  · 	dilTIAZem 120 mg/24 hours oral capsule, extended release: 1 cap(s) orally once a day  · 	benzonatate 100 mg oral capsule: 1 cap(s) orally 3 times a day, As needed, Cough  · 	sodium hypochlorite 0.125% topical solution: 1 application topically once a day to affected area (pannus wound on abdomen)  · 	diphenhydrAMINE 50 mg/mL injectable solution: 25 milligram(s) injectable 3 times a week (at 08:00 before dialysis sessions on Tuesday/Thursday/saturday)  · 	gabapentin 300 mg oral capsule: 1 cap(s) orally once a day  · 	apixaban 5 mg oral tablet: 1 tab(s) orally every 12 hours  · 	senna oral tablet: 2 tab(s) orally once a day (at bedtime)  · 	polyethylene glycol 3350 oral powder for reconstitution: 17 gram(s) orally once a day (at bedtime)  · 	pantoprazole 40 mg oral delayed release tablet: 1 tab(s) orally once a day  · 	sevelamer carbonate 800 mg oral tablet: 1 tab(s) orally 3 times a day (with meals)  · 	buPROPion 150 mg/24 hours (XL) oral tablet, extended release: 1 tab(s) orally once a day (in the morning)  · 	sertraline 50 mg oral tablet: 1 tab(s) orally once a day  · 	mirtazapine 7.5 mg oral tablet: 1 tab(s) orally once a day (at bedtime)  · 	simethicone 80 mg oral tablet, chewable: 1 tab(s) orally 3 times a day (before meals)    CURRENT MEDICATIONS:   --------------------------------------------------------------------------------------  Neurologic Medications  fentaNYL   Infusion. 0.5 MICROgram(s)/kG/Hr IV Continuous <Continuous>  propofol Infusion 10 MICROgram(s)/kG/Min IV Continuous <Continuous>    Respiratory Medications    Cardiovascular Medications  norepinephrine Infusion 0.05 MICROgram(s)/kG/Min IV Continuous <Continuous>    Gastrointestinal Medications  lactated ringers. 1000 milliLiter(s) IV Continuous <Continuous>    Genitourinary Medications    Hematologic/Oncologic Medications    Antimicrobial/Immunologic Medications  cefepime   IVPB 2000 milliGRAM(s) IV Intermittent every 12 hours  clindamycin IVPB 900 milliGRAM(s) IV Intermittent every 8 hours    Endocrine/Metabolic Medications    Topical/Other Medications  chlorhexidine 4% Liquid 1 Application(s) Topical daily    --------------------------------------------------------------------------------------    VITAL SIGNS, INS/OUTS (last 24 hours):  --------------------------------------------------------------------------------------  Vital Signs Last 24 Hrs  T(C): 37.1 (27 May 2022 12:30), Max: 37.8 (26 May 2022 19:14)  T(F): 98.7 (27 May 2022 12:30), Max: 100 (26 May 2022 19:14)  HR: 123 (27 May 2022 12:30) (93 - 142)  BP: 94/69 (27 May 2022 06:40) (63/45 - 102/84)  BP(mean): 77 (27 May 2022 06:40) (44 - 92)  RR: 18 (27 May 2022 12:30) (9 - 22)  SpO2: 100% (27 May 2022 12:30) (96% - 100%)      --------------------------------------------------------------------------------------    EXAM  NEUROLOGY  RASS:   	GCS:    Exam: Intubated/Sedated         RESPIRATORY  Exam: Lungs clear to auscultation, Normal expansion/effort.   Mechanical Ventilation: Mode: AC/ CMV (Assist Control/ Continuous Mandatory Ventilation), RR (machine): 18, TV (machine): 450, FiO2: 100, PEEP: 8, MAP: 15, PIP: 34    CARDIOVASCULAR  Exam: S1, S2.  Regular rate and rhythm.      GI/NUTRITION  Exam: Abdomen soft, ND, large right pannus wound dressed with guaze c/d/i, right gluteal wound dressed with guaze c/d/i  Current Diet:  NPO     VASCULAR  Exam: Extremities warm, pink, well-perfused.     MUSCULOSKELETAL  Exam: All extremities moving spontaneously without limitations.    SKIN:  Exam: Good skin turgor, no skin breakdown.    METABOLIC/FLUIDS/ELECTROLYTES  lactated ringers. 1000 milliLiter(s) IV Continuous <Continuous>      HEMATOLOGIC  [x] DVT Prophylaxis:   Transfusions:	[] PRBC	[] Platelets		[] FFP	[] Cryoprecipitate    INFECTIOUS DISEASE  Antimicrobials/Immunologic Medications:  cefepime   IVPB 2000 milliGRAM(s) IV Intermittent every 12 hours  clindamycin IVPB 900 milliGRAM(s) IV Intermittent every 8 hours        Tubes/Lines/Drains   [x] Peripheral IV  [] Central Venous Line     	[] R	[] L	[] IJ	[] Fem	[] SC	Date Placed:   [] Arterial Line		[] R	[] L	[] Fem	[] Rad	[] Ax	Date Placed:   [] PICC:         	[] Midline		[] Mediport  [] Urinary Catheter		Date Placed:     LABS  --------------------------------------------------------------------------------------                        8.6    50.37 )-----------( 641      ( 27 May 2022 10:18 )             29.4         130<L>  |  93<L>  |  24<H>  ----------------------------<  186<H>  3.8   |  19<L>  |  3.08<H>    Ca    9.7      27 May 2022 10:18    TPro  6.6  /  Alb  2.4<L>  /  TBili  0.9  /  DBili  x   /  AST  13  /  ALT  9   /  AlkPhos  248<H>        ABG - ( 27 May 2022 10:00 )  pH, Arterial: 7.34  pH, Blood: x     /  pCO2: 37    /  pO2: 379   / HCO3: 20    / Base Excess: -5.3  /  SaO2: 100.0       --------------------------------------------------------------------------------------    OTHER LABS    IMAGING RESULTS  Echo:   CT:   Xray:     ASSESSMENT:  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 Noted to have significant necrotic tissue noted in right gluteal and right lower abdominal wound. S/P   extensive debridement of both areas with cautery. Patient remained on pressors throughout the case. Wound cultures x2 and tissue cultures x2 sent intraop. Patient remained intubated and on levo and vaso. Admitted to SICU for close hemodynamic monitoring.       PLAN:      Neurologic:   - sedated with Propofol and Fentanyl  - Dilaudid PRN      Respiratory:   - Intubated  - f/u ABGs q6       Cardiovascular:   -Levo and Vaso gtt, wean as tolerated  - TTE to asses pericardial effusion   - monitor lactate  - MAP > 65      Gastrointestinal/Nutrition:   - Protonix  - NPO       Renal/Genitourinary:   - HD M/W/F- hold today, reassess in AM   - monitor I's and O's   - monitor electrolytes and replete PRN       Hematologic:   - Anemic; resume Epo 20K w/ HD  - Transfuse as needed  - *DVT ppx w/ SQH      Infectious Disease:   - Nec Fac- cont. cefepime  - ID consult, pending recs  - F/u BC x2  - F/u wound culture      Endocrine:   - FS q6  - ISS    Tubes/Lines/Drains:   - PIV  - R. radial A-line  **    Disposition: SICU    --------------------------------------------------------------------------------------    Critical Care Diagnoses:   SICU Consultation Note  =====================================================  HPI:  56f presents to the ED with severe abd pain and chest pain. 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 Patient has been in [facility]-over the weekend noted her abd pain to be severe, localized mostly to Lower and r. side of abdomen, constant. Today also noted to have constant, tearing type chest pain which was severe and prompted her to have the ambulance called. She was given aspirin by Stefan prior to EMS arrival. Currently states her chest pain is improved at this point-abd pain is severe. Patient previously been evaluated by dermatology for pannus wound, thought to be pyoderma gangrenosum, on cyclosporine. Also follows with wound care outpatient for this chronic wound. Patient reports that pain has been on going for the last few days worsening today.  Also notes she has not had anything to eat in a week, drinking minimally. No ha, cough, sob, vomiting, diarrhea, dysuria, edema.  Patient noted to be hypotensive in ED to 60/40s, given 2L bolus, started on levo. Pt is now s/p extensive debriment with cautery, intubated and sedated. Transferred to SICU for close monitoring.   Given triple dose abx. WBC elevated to 35     Surgery Information Significant necrotic tissue noted in right gluteal and right lower abdominal wound. Both were extensively debrided with cautery. EBL 50. Patient remained on pressors throughout the case. Wound cultures x2 and tissue cultures x2 sent intraop. Patient remained intubated and on levo and vaso.  Case Duration: 	EBL: 50 	 IV Fluids: 300	Blood Products: 	Urine Output:   Complications:       Allergies: caffeine (Nausea)  latex (Rash)  penicillin (Nausea)  strawberry (Rash)    PAST MEDICAL & SURGICAL HISTORY:  COPD (chronic obstructive pulmonary disease)      DM (diabetes mellitus)      Atrial fibrillation  with loop recorder , battery most likely depleted, as per cardiac clearance, Dr. Reece Anesthesia aware, pt on Eliquis      HTN (hypertension)      Morbid obesity  BMI - 58.3      Chronic GERD      CHAMP (obstructive sleep apnea)  non compliance with CPAP, Anesthesia Dr. Reece aware, pt told to bring CPAP for sx, pr verbalized understanding      Potential difficult airway on pre-intubation assessment  airway class III, large neck, morbid obesity, hx of CHAMP, no compliance with CPAP- Dr. Reece, Anesthesia aware      End-stage renal disease      Anemia      Medication management      H/O tubal ligation        Status post placement of implantable loop recorder  left chest-       History of vascular access device  s/p insertion right chest permacath 2019, removal 2019, insertion left chest permacath 2019      S/P arteriovenous (AV) fistula creation  left  arm 2019        FAMILY HISTORY:  Family history of diabetes mellitus  mother-     Family hx of hypertension  mother-         SOCIAL HISTORY:     ADVANCE DIRECTIVES: Full Code      REVIEW OF SYSTEMS:   [x] Due to altered mental status/intubation, subjective information were not able to be obtained.     HOME MEDICATIONS:    · 	acetaminophen 325 mg oral tablet: 2 tab(s) orally every 6 hours, As needed, Temp greater or equal to 38C (100.4F), Mild Pain (1 - 3)  · 	oxyCODONE 5 mg oral tablet: 1 tab(s) orally every 6 hours, As needed, Severe Pain (7 - 10)  · 	aluminum hydroxide-magnesium hydroxide 200 mg-200 mg/5 mL oral suspension: 30 milliliter(s) orally every 6 hours, As needed, Dyspepsia  · 	traZODone 100 mg oral tablet: 1 tab(s) orally once a day (at bedtime)  · 	insulin lispro 100 units/mL injectable solution: 2 unit(s) injectable 3 times a day (before meals)  · 	melatonin 3 mg oral tablet: 2 tab(s) orally once a day (at bedtime), As needed, Insomnia  · 	lidocaine 4% topical film: Apply topically to affected area once a day  	Apply to neck  · 	nystatin 100,000 units/g topical cream: 1 application topically every 12 hours  · 	guaiFENesin 100 mg/5 mL oral liquid: 5 milliliter(s) orally every 6 hours, As needed, Cough  · 	lactobacillus acidophilus oral capsule: 1 cap(s) orally once a day  · 	midodrine 10 mg oral tablet: 1 tab(s) orally  3 times a week, As Needed 30 minutes prior to dialysis (08:00 on //). HOLD if SBP >130  · 	cinacalcet 30 mg oral tablet: 1 tab(s) orally once a day  · 	montelukast 10 mg oral tablet: 1 tab(s) orally once a day (at bedtime)  · 	bisacodyl 5 mg oral delayed release tablet: 1 tab(s) orally once a day (at bedtime)  · 	cycloSPORINE modified 100 mg oral capsule: 2 cap(s) orally 2 times a day  · 	lidocaine 4% topical film: Apply topically to affected area once a day to left knee  · 	lidocaine 4% topical film: Apply topically to affected area once a day to lower back  · 	lidocaine 4% topical film: Apply topically to affected area once a day to right knee  · 	tacrolimus 0.1% topical ointment: 1 application topically once a day  · 	dilTIAZem 120 mg/24 hours oral capsule, extended release: 1 cap(s) orally once a day  · 	benzonatate 100 mg oral capsule: 1 cap(s) orally 3 times a day, As needed, Cough  · 	sodium hypochlorite 0.125% topical solution: 1 application topically once a day to affected area (pannus wound on abdomen)  · 	diphenhydrAMINE 50 mg/mL injectable solution: 25 milligram(s) injectable 3 times a week (at 08:00 before dialysis sessions on Tuesday/Thursday/saturday)  · 	gabapentin 300 mg oral capsule: 1 cap(s) orally once a day  · 	apixaban 5 mg oral tablet: 1 tab(s) orally every 12 hours  · 	senna oral tablet: 2 tab(s) orally once a day (at bedtime)  · 	polyethylene glycol 3350 oral powder for reconstitution: 17 gram(s) orally once a day (at bedtime)  · 	pantoprazole 40 mg oral delayed release tablet: 1 tab(s) orally once a day  · 	sevelamer carbonate 800 mg oral tablet: 1 tab(s) orally 3 times a day (with meals)  · 	buPROPion 150 mg/24 hours (XL) oral tablet, extended release: 1 tab(s) orally once a day (in the morning)  · 	sertraline 50 mg oral tablet: 1 tab(s) orally once a day  · 	mirtazapine 7.5 mg oral tablet: 1 tab(s) orally once a day (at bedtime)  · 	simethicone 80 mg oral tablet, chewable: 1 tab(s) orally 3 times a day (before meals)    CURRENT MEDICATIONS:   --------------------------------------------------------------------------------------  Neurologic Medications  fentaNYL   Infusion. 0.5 MICROgram(s)/kG/Hr IV Continuous <Continuous>  propofol Infusion 10 MICROgram(s)/kG/Min IV Continuous <Continuous>    Respiratory Medications    Cardiovascular Medications  norepinephrine Infusion 0.05 MICROgram(s)/kG/Min IV Continuous <Continuous>    Gastrointestinal Medications  lactated ringers. 1000 milliLiter(s) IV Continuous <Continuous>    Genitourinary Medications    Hematologic/Oncologic Medications    Antimicrobial/Immunologic Medications  cefepime   IVPB 2000 milliGRAM(s) IV Intermittent every 12 hours  clindamycin IVPB 900 milliGRAM(s) IV Intermittent every 8 hours    Endocrine/Metabolic Medications    Topical/Other Medications  chlorhexidine 4% Liquid 1 Application(s) Topical daily    --------------------------------------------------------------------------------------    VITAL SIGNS, INS/OUTS (last 24 hours):  --------------------------------------------------------------------------------------  Vital Signs Last 24 Hrs  T(C): 37.1 (27 May 2022 12:30), Max: 37.8 (26 May 2022 19:14)  T(F): 98.7 (27 May 2022 12:30), Max: 100 (26 May 2022 19:14)  HR: 123 (27 May 2022 12:30) (93 - 142)  BP: 94/69 (27 May 2022 06:40) (63/45 - 102/84)  BP(mean): 77 (27 May 2022 06:40) (44 - 92)  RR: 18 (27 May 2022 12:30) (9 - 22)  SpO2: 100% (27 May 2022 12:30) (96% - 100%)      --------------------------------------------------------------------------------------    EXAM  NEUROLOGY  RASS:   	GCS:    Exam: Intubated/Sedated         RESPIRATORY  Exam: Lungs clear to auscultation, Normal expansion/effort.   Mechanical Ventilation: Mode: AC/ CMV (Assist Control/ Continuous Mandatory Ventilation), RR (machine): 18, TV (machine): 450, FiO2: 100, PEEP: 8, MAP: 15, PIP: 34    CARDIOVASCULAR  Exam: S1, S2.  Regular rate and rhythm.      GI/NUTRITION  Exam: Abdomen soft, ND, large right pannus wound dressed with guaze c/d/i, right gluteal wound dressed with guaze c/d/i  Current Diet:  NPO     VASCULAR  Exam: Extremities warm, pink, well-perfused.     MUSCULOSKELETAL  Exam: All extremities moving spontaneously without limitations.    SKIN:  Exam: Good skin turgor, no skin breakdown.    METABOLIC/FLUIDS/ELECTROLYTES  lactated ringers. 1000 milliLiter(s) IV Continuous <Continuous>      HEMATOLOGIC  [x] DVT Prophylaxis:   Transfusions:	[] PRBC	[] Platelets		[] FFP	[] Cryoprecipitate    INFECTIOUS DISEASE  Antimicrobials/Immunologic Medications:  cefepime   IVPB 2000 milliGRAM(s) IV Intermittent every 12 hours  clindamycin IVPB 900 milliGRAM(s) IV Intermittent every 8 hours        Tubes/Lines/Drains   [x] Peripheral IV  [] Central Venous Line     	[] R	[] L	[] IJ	[] Fem	[] SC	Date Placed:   [] Arterial Line		[] R	[] L	[] Fem	[] Rad	[] Ax	Date Placed:   [] PICC:         	[] Midline		[] Mediport  [] Urinary Catheter		Date Placed:     LABS  --------------------------------------------------------------------------------------                        8.6    50.37 )-----------( 641      ( 27 May 2022 10:18 )             29.4         130<L>  |  93<L>  |  24<H>  ----------------------------<  186<H>  3.8   |  19<L>  |  3.08<H>    Ca    9.7      27 May 2022 10:18    TPro  6.6  /  Alb  2.4<L>  /  TBili  0.9  /  DBili  x   /  AST  13  /  ALT  9   /  AlkPhos  248<H>        ABG - ( 27 May 2022 10:00 )  pH, Arterial: 7.34  pH, Blood: x     /  pCO2: 37    /  pO2: 379   / HCO3: 20    / Base Excess: -5.3  /  SaO2: 100.0       --------------------------------------------------------------------------------------    OTHER LABS    IMAGING RESULTS  Echo:   CT:   Xray:     ASSESSMENT:  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 Noted to have significant necrotic tissue noted in right gluteal and right lower abdominal wound. S/P   extensive debridement of both areas with cautery. Patient remained on pressors throughout the case. Wound cultures x2 and tissue cultures x2 sent intraop. Patient remained intubated and on levo and vaso. Admitted to SICU for close hemodynamic monitoring.       PLAN:      Neurologic:   - sedated with Propofol and Fentanyl        Respiratory:   - Intubated  - AC : 450/18/8/100  - f/u ABGs q6       Cardiovascular:   -Levo and Vaso gtt, wean as tolerated  - TTE to asses pericardial effusion   - monitor lactate  - MAP > 65      Gastrointestinal/Nutrition:   - Protonix  - NPO       Renal/Genitourinary:   - HD M/W/F- hold today, reassess in AM   - monitor I's and O's   - monitor electrolytes and replete PRN       Hematologic:   - Anemic; resume Epo 20K w/ HD  - Transfuse as needed  - *DVT ppx w/ SQH      Infectious Disease:   - Nec Fac- cont. cefepime  - ID consult, pending recs  - F/u BC x2  - F/u wound culture      Endocrine:   - FS q6  - ISS    Tubes/Lines/Drains:   - PIV  - RIJ central line   - R. radial A-line  **    Disposition: SICU    --------------------------------------------------------------------------------------    Critical Care Diagnoses:

## 2022-05-27 NOTE — CONSULT NOTE ADULT - ASSESSMENT
56y Female with history of ESRD on HD presents with abdominal pain concern for necrotizing fascitis. Nephrology consulted for ESRD status.    1) ESRD: Last HD on 5/25 as an outpatient. No urgent need for RRT today and patient currently hemodynamically unstable to tolerate HD. Will reassess in AM. Monitor electrolytes.    2) Hypotension: BP low. On levo and vaso gtt. As per ICU. Will resume midodrine 20 mg pre-HD as needed. Monitor BP.    3) Anemia of renal disease: Hb low. Check AM iron stores. Will resume Epo 20K with HD. Monitor Hb.    4) Secondary HPT of renal origin: Check iPTH. Will resume sensipar 30 mg daily pending results. Monitor serum calcium and phosphorus.      [facility] NEPHROLOGY  Keaton Lopez M.D.  Juma Green D.O.  Myla Hoffmann M.D.  Julia Brewer, JASIEL, ANP-C    Telephone: [phone]  Facsimile: [phone]    [address] [place] [address]

## 2022-05-28 NOTE — DIETITIAN INITIAL EVALUATION ADULT - PERTINENT LABORATORY DATA
05-28    132<L>  |  93<L>  |  25<H>  ----------------------------<  80  4.2   |  12<L>  |  3.28<H>    Ca    9.9      28 May 2022 02:02  Phos  3.4     05-27  Mg     2.00     05-27    TPro  6.2  /  Alb  2.2<L>  /  TBili  0.9  /  DBili  x   /  AST  26  /  ALT  8   /  AlkPhos  262<H>  05-28  POCT Blood Glucose.: 89 mg/dL (05-28-22 @ 05:51)  A1C with Estimated Average Glucose Result: 6.3 % (05-28-22 @ 01:19)  A1C with Estimated Average Glucose Result: 6.1 % (03-20-22 @ 12:48)  A1C with Estimated Average Glucose Result: 7.0 % (01-13-22 @ 08:21)

## 2022-05-28 NOTE — DIETITIAN INITIAL EVALUATION ADULT - SIGNS/SYMPTOMS
23% weight loss over 2 months PTA with <50% of estimated nutrition needs met > 7 days PTA unstageable sacral and Stage II L buttocks pressure injuries identified

## 2022-05-28 NOTE — PROCEDURE NOTE - NSPROCDETAILS_GEN_ALL_CORE
guidewire recovered/lumen(s) aspirated and flushed/sterile dressing applied/sterile technique, catheter placed/ultrasound guidance with use of sterile gel and probe cove
location identified, draped/prepped, sterile technique used, needle inserted/introduced/positive blood return obtained via catheter/connected to a pressurized flush line/sutured in place/hemostasis with direct pressure, dressing applied/Seldinger technique/all materials/supplies accounted for at end of procedure
guidewire recovered/lumen(s) aspirated and flushed/sterile dressing applied/sterile technique, catheter placed/ultrasound guidance with use of sterile gel and probe cove

## 2022-05-28 NOTE — PROGRESS NOTE ADULT - ASSESSMENT
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 Noted to have significant necrotic tissue noted in right gluteal and right lower abdominal wound. Now S/P extensive debridement of both areas. Admitted to SICU for hemodynamic monitoring i/s/o septic shock.     PLAN:    Neurologic:   - sedated with Propofol and Fentanyl  - Rass Goal -2    Respiratory:   - AC : 400/18/5/40  - f/u ABGs q12    Cardiovascular:   - hypotension, tachycardia - on levo and vaso, wean as tolerated  - CT on admission with pericardial effusion, TTE performed to assess - small effision, unable to evaluate for tamponade physiology. bedside POCUS with no tamponade physiology  - Afib RVR -> s/p amio bolus, transitioned to cardizem gtt  - continue to trend lactate  - MAP goal > 65    Gastrointestinal/Nutrition:   - Protonix  - NPO   - NGT to be placed today    Renal/Genitourinary:   - HD M/W/F- held i/s/o Sepsis, hypotension rq pressors  - Nephrology following, will determine need for HD today vs CVVHD  - Worsening metabolic acidosis -> Started Bicarb gtt  - monitor I's and O's   - monitor electrolytes and replete PRN    Hematologic:   - Anemic; resume Epo 20K w/ HD  - Transfuse as needed  - restarted DVT ppx, SQH    Infectious Disease:   - ID consulted - continue with cefepime, change clinda to flagyl 500 IV q8, change vanco to linezolid 600 IV q12  - F/u BC x2  - F/u wound culture    Endocrine:   - FS q6  - ISS    Tubes/Lines/Drains:   - PIV  - L femoral a line  - L femoral central line  - daniel    Disposition: SICU

## 2022-05-28 NOTE — DIETITIAN INITIAL EVALUATION ADULT - ENERGY INTAKE
Currently NPO < 5 days 55 y/o female with ESRD/HD admitted with dx necrotizing fascitis. Pt intubated and sedated on fentanyl and propofol. Propofol presently running @ 8 mL/hr and contributing approx 279 non-nutritive lipid calories over the past 24 hrs. Pt presently NPO receiving IVF. Nutrition consult generated for unstageable sacral and Stage II L buttocks pressure injuries which confer a higher nutrition need. If pt to remain NPO for a prolonged period of time, consider enteral feeding of Nepro with Carb Steady @ goal rate of 27 mL/hr x 24 hrs which will offer pt 1166 kcals, 52 gms protein, 471 mL free H2O in 684 mL total volume. To meet pt's higher protein need for wound healing, suggest adding No Carb Prosource x 5/day (45 gms protein) which will provide a daily protein amount of 127 gms. Enteral recommendtion would give pt 14.5 kcals/kg (TF+Propofol) and 1.3 gms protein/kg (TF+Prosource) of dosing weight. Per H&P, pt noted not to have been able to consume solid food for 1 week PTA. Review of [facility] records indicates that pt had a recorded weight of 130 kg 3/31/22 (286 lbs) with current dosing weight of 100 kg (220) and no edema identified. Poor oral intake for one week with 23% weight loss over 2 months presents pt at severe risk for malnutrition. No GI distress at present; pt with fecal incontinence. Consider enteral feeding once hemodynamically stable or evaluate for oral PO intake. RDN services to remain available as needed.

## 2022-05-28 NOTE — CONSULT NOTE ADULT - SUBJECTIVE AND OBJECTIVE BOX
Patient is a 56y old  Female who presents with a chief complaint of necrotizing fascitis (28 May 2022 01:10)    HPI:   56f presents to the ED with severe abd pain and chest pain. 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 Patient has been in [facility]-over the weekend noted her abd pain to be severe, localized mostly to Lower and r. side of abdomen, constant. Today also noted to have constant, tearing type chest pain which was severe and prompted her to have the ambulance called. She was given aspirin by [facility] prior to EMS arrival. Currently states her chest pain is improved at this point-abd pain is severe. Patient previously been evaluated by dermatology for pannus wound, thought to be pyoderma gangrenosum, on cyclosporine. Also follows with wound care outpatient for this chronic wound. Patient reports that pain has been on going for the last few days worsening today.  Also notes she has not had anything to eat in a week, drinking minimally. No ha, cough, sob, vomiting, diarrhea, dysuria, edema.  Patient noted to be hypotensive in ED to 60/40s, given 2L bolus. Being started on Levo.   Given triple dose abx. WBC elevated to 35  (27 May 2022 05:17)     prior hospital charts reviewed [  ]  primary team notes reviewed [  ]  other consultant notes reviewed [  ]    PAST MEDICAL & SURGICAL HISTORY:  COPD (chronic obstructive pulmonary disease)      DM (diabetes mellitus)      Atrial fibrillation  with loop recorder , battery most likely depleted, as per cardiac clearance, Dr. Reece Anesthesia aware, pt on Eliquis      HTN (hypertension)      Morbid obesity  BMI - 58.3      Chronic GERD      CHAMP (obstructive sleep apnea)  non compliance with CPAP, Anesthesia Dr. Reece aware, pt told to bring CPAP for sx, pr verbalized understanding      Potential difficult airway on pre-intubation assessment  airway class III, large neck, morbid obesity, hx of CHAMP, no compliance with CPAP- Dr. Reece, Anesthesia aware      End-stage renal disease      Anemia      Medication management      H/O tubal ligation        Status post placement of implantable loop recorder  left chest-       History of vascular access device  s/p insertion right chest permacath 2019, removal 2019, insertion left chest permacath 2019      S/P arteriovenous (AV) fistula creation  left  arm 2019        Allergies  latex (Rash)  penicillin (Nausea)  strawberry (Rash)    ANTIMICROBIALS (past 90 days)  MEDICATIONS  (STANDING):  cefepime   IVPB   100 mL/Hr IV Intermittent (22 @ 20:34)    cefepime   IVPB   100 mL/Hr IV Intermittent (22 @ 05:53)    clindamycin IVPB   100 mL/Hr IV Intermittent (22 @ 04:13)    clindamycin IVPB   100 mL/Hr IV Intermittent (22 @ 05:24)   100 mL/Hr IV Intermittent (22 @ 20:43)   100 mL/Hr IV Intermittent (22 @ 13:17)    vancomycin  IVPB.   250 mL/Hr IV Intermittent (22 @ 20:55)      ANTIMICROBIALS:    cefepime   IVPB 1000 every 24 hours  clindamycin IVPB 900 every 8 hours    OTHER MEDS: MEDICATIONS  (STANDING):  diltiazem Infusion 5 <Continuous>  fentaNYL   Infusion. 0.5 <Continuous>  heparin   Injectable 5000 every 8 hours  hydrocortisone sodium succinate Injectable 50 every 8 hours  insulin lispro (ADMELOG) corrective regimen sliding scale  every 6 hours  norepinephrine Infusion 0.05 <Continuous>  pantoprazole  Injectable 40 daily  propofol Infusion 10 <Continuous>  vasopressin Infusion 0.03 <Continuous>    SOCIAL HISTORY:       FAMILY HISTORY:  Family history of diabetes mellitus  mother-     Family hx of hypertension  mother-       REVIEW OF SYSTEMS  [  ] ROS unobtainable because:    [  ] All other systems negative except as noted below:	    Constitutional:  [ ] fever [ ] chills  [ ] weight loss  [ ] weakness  Skin:  [ ] rash [ ] phlebitis	  Eyes: [ ] icterus [ ] pain  [ ] discharge	  ENMT: [ ] sore throat  [ ] thrush [ ] ulcers [ ] exudates  Respiratory: [ ] dyspnea [ ] hemoptysis [ ] cough [ ] sputum	  Cardiovascular:  [ ] chest pain [ ] palpitations [ ] edema	  Gastrointestinal:  [ ] nausea [ ] vomiting [ ] diarrhea [ ] constipation [ ] pain	  Genitourinary:  [ ] dysuria [ ] frequency [ ] hematuria [ ] discharge [ ] flank pain  [ ] incontinence  Musculoskeletal:  [ ] myalgias [ ] arthralgias [ ] arthritis  [ ] back pain  Neurological:  [ ] headache [ ] seizures  [ ] confusion/altered mental status  Psychiatric:  [ ] anxiety [ ] depression	  Hematology/Lymphatics:  [ ] lymphadenopathy  Endocrine:  [ ] adrenal [ ] thyroid  Allergic/Immunologic:	 [ ] transplant [ ] seasonal    Vital Signs Last 24 Hrs  T(F): 98.6 (22 @ 08:00), Max: 100 (22 @ 19:14)  Vital Signs Last 24 Hrs  HR: 127 (22 @ 09:00) (114 - 154)  BP: 89/63 (22 @ 04:00) (74/63 - 97/67)  RR: 17 (22 @ 09:00)  SpO2: 100% (22 @ 09:00) (99% - 100%)  Wt(kg): --    EXAM:  Constitutional: Not in acute distress  Eyes: pupils bilaterally reactive to light. No icterus.  Oral cavity: Clear, no lesions  Neck: No neck vein distension noted  RS: Chest clear to auscultation bilaterally. No wheeze/rhonchi/crepitations.  CVS: S1, S2 heard. Regular rate and rhythm. No murmurs/rubs/gallops.  Abdomen: Soft. No guarding/rigidity/tenderness.  : No acute abnormalities  Extremities: Warm. No pedal edema  Skin: No lesions noted  Vascular: No evidence of phlebitis  Neuro: Alert, oriented to time/place/person                          8.4    49.26 )-----------( 643      ( 27 May 2022 13:58 )             30.2         132<L>  |  93<L>  |  25<H>  ----------------------------<  80  4.2   |  12<L>  |  3.28<H>    Ca    9.9      28 May 2022 02:02  Phos  3.4       Mg     2.00         TPro  6.2  /  Alb  2.2<L>  /  TBili  0.9  /  DBili  x   /  AST  26  /  ALT  8   /  AlkPhos  262<H>      MICROBIOLOGY:  Culture - Blood (collected 26 May 2022 18:45)  Source: .Blood Blood-Venous  Preliminary Report (27 May 2022 22:01):    No growth to date.    Culture - Blood (collected 26 May 2022 18:30)  Source: .Blood Blood-Peripheral  Preliminary Report (27 May 2022 22:01):    No growth to date.      Rapid RVP Result: NotDetec ( @ 20:01)      RADIOLOGY:  imaging below personally reviewed    < from: CT Angio Chest Aorta w/wo IV Cont (22 @ 01:14) >  IMPRESSION:    No aortic dissection. Atherosclerotic changes.    Small pericardial effusion of high attenuation. Consider correlation with   echocardiogram to exclude hemorrhagic pericardial fluid or pericarditis.    Small left pleural effusionand associated left lower lobe consolidative   opacity, atelectasis or developing pneumonia.    Mild mosaic groundglass attenuation attenuation throughout the lungs,   possibly related to motion artifact, small airway disease, mild pulmonary   edema or atypical infection.    Gas within the ventral wall pannus, right medial buttock and creases of   the bilateral groin with associated soft tissue thickening consistent   with skin inflammation/dehiscence. Necrotizing fasciitis difficult to   exclude. No defined fluid collection is identified.    Enlarged uterus containing multiple calcified fibroids. Suggestion of   thickened endometrial echo complex. Nonemergent correlation with pelvic   ultrasound or MRI is recommended. Malpositioned intrauterine device   identified within cervical canal.    Additional findings as mentioned above.    < end of copied text >    OTHER TESTS:   Patient is a 56y old  Female who presents with a chief complaint of necrotizing fascitis (28 May 2022 01:10)    HPI:   56f presents to the ED with severe abd pain and chest pain. 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 Patient has been in [facility]-over the weekend noted her abd pain to be severe, localized mostly to Lower and r. side of abdomen, constant. Today also noted to have constant, tearing type chest pain which was severe and prompted her to have the ambulance called. She was given aspirin by [facility] prior to EMS arrival. Currently states her chest pain is improved at this point-abd pain is severe. Patient previously been evaluated by dermatology for pannus wound, thought to be pyoderma gangrenosum, on cyclosporine. Also follows with wound care outpatient for this chronic wound. Patient reports that pain has been on going for the last few days worsening today.  Also notes she has not had anything to eat in a week, drinking minimally. No ha, cough, sob, vomiting, diarrhea, dysuria, edema.  Patient noted to be hypotensive in ED to 60/40s, given 2L bolus. Being started on Levo.   Given triple dose abx. WBC elevated to 35     Taken to OR for debridement yesterday. Now s/p intubation and with pressor requirement.   Remains on cefepime and clinda.   Hx of chronic pannus wound with prior clx showing serratia, psuedomonas, enterococcus       prior hospital charts reviewed [ x]  primary team notes reviewed [ x ]  other consultant notes reviewed [ x ]    PAST MEDICAL & SURGICAL HISTORY:  COPD (chronic obstructive pulmonary disease)      DM (diabetes mellitus)      Atrial fibrillation  with loop recorder , battery most likely depleted, as per cardiac clearance, Dr. Reece Anesthesia aware, pt on Eliquis      HTN (hypertension)      Morbid obesity  BMI - 58.3      Chronic GERD      CHAMP (obstructive sleep apnea)  non compliance with CPAP, Anesthesia Dr. Reece aware, pt told to bring CPAP for sx, pr verbalized understanding      Potential difficult airway on pre-intubation assessment  airway class III, large neck, morbid obesity, hx of CHAMP, no compliance with CPAP- Dr. Reece, Anesthesia aware      End-stage renal disease      Anemia      Medication management      H/O tubal ligation        Status post placement of implantable loop recorder  left chest-       History of vascular access device  s/p insertion right chest permacath 2019, removal 2019, insertion left chest permacath 2019      S/P arteriovenous (AV) fistula creation  left  arm 2019        Allergies  latex (Rash)  penicillin (Nausea)  strawberry (Rash)    ANTIMICROBIALS (past 90 days)  MEDICATIONS  (STANDING):  cefepime   IVPB   100 mL/Hr IV Intermittent (22 @ 20:34)    cefepime   IVPB   100 mL/Hr IV Intermittent (22 @ 05:53)    clindamycin IVPB   100 mL/Hr IV Intermittent (22 @ 04:13)    clindamycin IVPB   100 mL/Hr IV Intermittent (22 @ 05:24)   100 mL/Hr IV Intermittent (22 @ 20:43)   100 mL/Hr IV Intermittent (22 @ 13:17)    vancomycin  IVPB.   250 mL/Hr IV Intermittent (22 @ 20:55)      ANTIMICROBIALS:    cefepime   IVPB 1000 every 24 hours  clindamycin IVPB 900 every 8 hours    OTHER MEDS: MEDICATIONS  (STANDING):  diltiazem Infusion 5 <Continuous>  fentaNYL   Infusion. 0.5 <Continuous>  heparin   Injectable 5000 every 8 hours  hydrocortisone sodium succinate Injectable 50 every 8 hours  insulin lispro (ADMELOG) corrective regimen sliding scale  every 6 hours  norepinephrine Infusion 0.05 <Continuous>  pantoprazole  Injectable 40 daily  propofol Infusion 10 <Continuous>  vasopressin Infusion 0.03 <Continuous>    SOCIAL HISTORY:   unable to assess as pt is intubated    FAMILY HISTORY:  Family history of diabetes mellitus  mother-     Family hx of hypertension  mother-       REVIEW OF SYSTEMS  [x  ] ROS unobtainable because:  unable to assess as pt is intubated  [  ] All other systems negative except as noted below:	    Constitutional:  [ ] fever [ ] chills  [ ] weight loss  [ ] weakness  Skin:  [ ] rash [ ] phlebitis	  Eyes: [ ] icterus [ ] pain  [ ] discharge	  ENMT: [ ] sore throat  [ ] thrush [ ] ulcers [ ] exudates  Respiratory: [ ] dyspnea [ ] hemoptysis [ ] cough [ ] sputum	  Cardiovascular:  [ ] chest pain [ ] palpitations [ ] edema	  Gastrointestinal:  [ ] nausea [ ] vomiting [ ] diarrhea [ ] constipation [ ] pain	  Genitourinary:  [ ] dysuria [ ] frequency [ ] hematuria [ ] discharge [ ] flank pain  [ ] incontinence  Musculoskeletal:  [ ] myalgias [ ] arthralgias [ ] arthritis  [ ] back pain  Neurological:  [ ] headache [ ] seizures  [ ] confusion/altered mental status  Psychiatric:  [ ] anxiety [ ] depression	  Hematology/Lymphatics:  [ ] lymphadenopathy  Endocrine:  [ ] adrenal [ ] thyroid  Allergic/Immunologic:	 [ ] transplant [ ] seasonal    Vital Signs Last 24 Hrs  T(F): 98.6 (22 @ 08:00), Max: 100 (22 @ 19:14)  Vital Signs Last 24 Hrs  HR: 127 (22 @ 09:00) (114 - 154)  BP: 89/63 (22 @ 04:00) (74/63 - 97/67)  RR: 17 (22 @ 09:00)  SpO2: 100% (22 @ 09:00) (99% - 100%)  Wt(kg): --    EXAM:  Constitutional: sedated  Eyes:No icterus.  Oral cavity: eTT in place  Neck: No neck vein distension noted  RS: Chest clear to auscultation bilaterally. No wheeze/rhonchi/crepitations.  CVS: S1, S2 heard. tachyacardic. No murmurs/rubs/gallops.  Abdomen: Soft. No guarding/rigidity/tenderness.   : No acute abnormalities  Extremities: Warm. No pedal edema  Skin: lower abd pannus wound opened with no visible drainage  Vascular: No evidence of phlebitis  Neuro: sedated                        8.4    49.26 )-----------( 643      ( 27 May 2022 13:58 )             30.2         132<L>  |  93<L>  |  25<H>  ----------------------------<  80  4.2   |  12<L>  |  3.28<H>    Ca    9.9      28 May 2022 02:02  Phos  3.4       Mg     2.00         TPro  6.2  /  Alb  2.2<L>  /  TBili  0.9  /  DBili  x   /  AST  26  /  ALT  8   /  AlkPhos  262<H>      MICROBIOLOGY:  Culture - Blood (collected 26 May 2022 18:45)  Source: .Blood Blood-Venous  Preliminary Report (27 May 2022 22:01):    No growth to date.    Culture - Blood (collected 26 May 2022 18:30)  Source: .Blood Blood-Peripheral  Preliminary Report (27 May 2022 22:01):    No growth to date.      Rapid RVP Result: NotDetec ( @ 20:01)      RADIOLOGY:  imaging below personally reviewed    < from: CT Angio Chest Aorta w/wo IV Cont (22 @ 01:14) >  IMPRESSION:    No aortic dissection. Atherosclerotic changes.    Small pericardial effusion of high attenuation. Consider correlation with   echocardiogram to exclude hemorrhagic pericardial fluid or pericarditis.    Small left pleural effusionand associated left lower lobe consolidative   opacity, atelectasis or developing pneumonia.    Mild mosaic groundglass attenuation attenuation throughout the lungs,   possibly related to motion artifact, small airway disease, mild pulmonary   edema or atypical infection.    Gas within the ventral wall pannus, right medial buttock and creases of   the bilateral groin with associated soft tissue thickening consistent   with skin inflammation/dehiscence. Necrotizing fasciitis difficult to   exclude. No defined fluid collection is identified.    Enlarged uterus containing multiple calcified fibroids. Suggestion of   thickened endometrial echo complex. Nonemergent correlation with pelvic   ultrasound or MRI is recommended. Malpositioned intrauterine device   identified within cervical canal.    Additional findings as mentioned above.    < end of copied text >    OTHER TESTS:

## 2022-05-28 NOTE — PROGRESS NOTE ADULT - SUBJECTIVE AND OBJECTIVE BOX
24 HOUR EVENTS:  - s/p debridement of necrotic R gluteal and RLQ abd wound  - Difficulty getting IJ access -> Fem Central line and A-line placed  - Hypotensive with rising lactate, tachy to 160s persistently  - Amio bolus given -> gtt; transitioned to cardizem gtt on AM rounds    SUBJECTIVE/ROS:   [ ] A ten-point review of systems was otherwise negative except as noted.  [X] Due to altered mental status/intubation, subjective information were not able to be obtained from the patient. History was obtained, to the extent possible, from review of the chart and collateral sources of information.      NEURO  Exam: intubated and sedated  Meds: fentaNYL   Infusion. 0.5 MICROgram(s)/kG/Hr IV Continuous <Continuous>  propofol Infusion 10 MICROgram(s)/kG/Min IV Continuous <Continuous>      RESPIRATORY  RR: 23 (05-28-22 @ 10:00) (7 - 23)  SpO2: 100% (05-28-22 @ 10:00) (99% - 100%)  Wt(kg): --  Exam: unlabored respirations, equal chest rise bilaterally  Mechanical Ventilation: Mode: AC/ CMV (Assist Control/ Continuous Mandatory Ventilation), RR (machine): 18, RR (patient): 21, TV (machine): 400, FiO2: 40, PEEP: 5, ITime: 0.7, MAP: 13, PIP: 30  ABG - ( 28 May 2022 04:20 )  pH: 7.27  /  pCO2: 29    /  pO2: 146   / HCO3: 13    / Base Excess: -12.4 /  SaO2: 98.6    Lactate: x        Meds: NA    CARDIOVASCULAR  HR: 135 (05-28-22 @ 10:00) (114 - 154)  BP: 89/63 (05-28-22 @ 04:00) (74/63 - 97/67)  BP(mean): 73 (05-28-22 @ 04:00) (69 - 77)  ABP: 110/51 (05-28-22 @ 10:00) (34/28 - 135/67)  ABP(mean): 67 (05-28-22 @ 10:00) (23 - 98)  Wt(kg): --  CVP(cm H2O): --  VBG - ( 26 May 2022 18:48 )  pH: 7.31  /  pCO2: 55    /  pO2: 28    / HCO3: 28    / Base Excess: 1.1   /  SaO2: 42.3   Lactate: 1.9            Exam: afib  Cardiac Rhythm: sinus  Perfusion     [X]Adequate   [ ]Inadequate  Mentation   [X]Normal       [ ]Reduced  Extremities  [X]Warm         [ ]Cool  Volume Status [ ]Hypervolemic [X]Euvolemic [ ]Hypovolemic  Meds: diltiazem Infusion 5 mG/Hr IV Continuous <Continuous>  norepinephrine Infusion 0.05 MICROgram(s)/kG/Min IV Continuous <Continuous>      GI/NUTRITION  Exam:  Abdomen soft, ND, large right pannus wound dressed with guaze c/d/i, right gluteal wound dressed with guaze c/d/i  Diet: NPO  Meds: pantoprazole  Injectable 40 milliGRAM(s) IV Push daily      GENITOURINARY  I&O's Detail    05-27 @ 07:01 - 05-28 @ 07:00  --------------------------------------------------------  IN:    Amiodarone: 99.9 mL    FentaNYL: 165 mL    IV PiggyBack: 50 mL    Lactated Ringers: 2000 mL    Norepinephrine: 88.1 mL    Norepinephrine: 424 mL    Norepinephrine: 253.1 mL    Propofol: 254 mL    Sodium Bicarbonate: 375 mL    Vasopressin: 17.4 mL    Vasopressin: 19.8 mL  Total IN: 3746.3 mL    OUT:    Voided (mL): 0 mL  Total OUT: 0 mL    Total NET: 3746.3 mL      05-28 @ 07:01 - 05-28 @ 11:20  --------------------------------------------------------  IN:    Diltiazem: 5 mL    FentaNYL: 30 mL    Norepinephrine: 90 mL    Propofol: 24 mL    Sodium Bicarbonate: 225 mL    Vasopressin: 5.4 mL  Total IN: 379.4 mL    OUT:  Total OUT: 0 mL    Total NET: 379.4 mL          05-28    132<L>  |  93<L>  |  25<H>  ----------------------------<  80  4.2   |  12<L>  |  3.28<H>    Ca    9.9      28 May 2022 02:02  Phos  3.4     05-27  Mg     2.00     05-27    TPro  6.2  /  Alb  2.2<L>  /  TBili  0.9  /  DBili  x   /  AST  26  /  ALT  8   /  AlkPhos  262<H>  05-28    Meds: sodium bicarbonate  Infusion 0.113 mEq/kG/Hr IV Continuous <Continuous>      HEMATOLOGIC  Meds: heparin   Injectable 5000 Unit(s) SubCutaneous every 8 hours                          8.4    49.26 )-----------( 643      ( 27 May 2022 13:58 )             30.2     PT/INR - ( 27 May 2022 10:18 )   PT: 21.7 sec;   INR: 1.86 ratio         PTT - ( 27 May 2022 10:18 )  PTT:36.0 sec    INFECTIOUS DISEASES  T(C): 37 (05-28-22 @ 08:00), Max: 37.1 (05-27-22 @ 12:30)  Wt(kg): --  POCT Blood Glucose.: 89 mg/dL (05-28 @ 05:51)    Recent Cultures:  Specimen Source: .Smear abdominal wound culture, 05-27 @ 10:45; Results --; Gram Stain:   No polymorphonuclear cells seen per low power field  No organisms seen per oil power field; Organism: --  Specimen Source: .Blood Blood-Venous, 05-26 @ 18:45; Results   No growth to date.; Gram Stain: --; Organism: --  Specimen Source: .Blood Blood-Peripheral, 05-26 @ 18:30; Results   No growth to date.; Gram Stain: --; Organism: --    Meds: cefepime   IVPB 1000 milliGRAM(s) IV Intermittent every 24 hours  clindamycin IVPB 900 milliGRAM(s) IV Intermittent every 8 hours      ENDOCRINE  POCT Blood Glucose.: 89 mg/dL (05-28 @ 05:51)    Meds: hydrocortisone sodium succinate Injectable 50 milliGRAM(s) IV Push every 8 hours  insulin lispro (ADMELOG) corrective regimen sliding scale   SubCutaneous every 6 hours  vasopressin Infusion 0.03 Unit(s)/Min IV Continuous <Continuous>      ACCESS DEVICES:  [ ] Peripheral IV  [X] Central Venous Line	[ ] R	[ ] L	[ ] IJ	[X] Fem	[ ] SC		Placed:   [X] Arterial Line			[ ] R	[ ] L	[X] Fem	[ ] Rad	[ ] Ax	Placed:   [ ] PICC:					[ ] Mediport  [ ] Urinary Catheter, Date Placed:   [ ] Necessity of urinary, arterial, and venous catheters discussed    OTHER MEDICATIONS:  chlorhexidine 0.12% Liquid 15 milliLiter(s) Oral Mucosa every 12 hours  chlorhexidine 4% Liquid 1 Application(s) Topical daily      IMAGING:  < from: TTE with Doppler (w/Cont) (05.27.22 @ 18:05) >  CONCLUSIONS:  Technically very difficult study.  1. Endocardium not well visualized; despite the use of  intravenous ultrasound enhancingagent, unable to evaluate  left ventricular systolic function.  2. Unable to accurately evaluate right ventricular size or  systolic function.  3. Moderate pericardial effusion, measuring about  1.1 cm  posterior to the left ventricle and about  1.7 cm lateral  to the left ventricle.  Small pericardial effusion adjacent  to the RV free wall.  Unable to evaluate for tamponade  physiology.    < end of copied text >

## 2022-05-28 NOTE — PROGRESS NOTE ADULT - SUBJECTIVE AND OBJECTIVE BOX
[facility] NEPHROLOGY- PROGRESS NOTE    56y Female with history of ESRD on HD presents with abdominal pain concern for necrotizing fascitis. Nephrology consulted for ESRD status.    REVIEW OF SYSTEMS: Unable to obtain as patient intubated and sedated.    caffeine (Nausea)  latex (Rash)  penicillin (Nausea)  strawberry (Rash)      Hospital Medications: Medications reviewed    VITALS:  T(F): 98.6 (05-28-22 @ 08:00), Max: 98.8 (05-27-22 @ 16:00)  HR: 135 (05-28-22 @ 10:00)  BP: 89/63 (05-28-22 @ 04:00)  RR: 23 (05-28-22 @ 10:00)  SpO2: 100% (05-28-22 @ 10:00)  Wt(kg): --  Height (cm): 1 (05-28 @ 04:05)  Weight (kg): 100 (05-27 @ 05:15)  BMI (kg/m2): 2212597 (05-28 @ 04:05)  BSA (m2): 0.05 (05-28 @ 04:05)    05-27 @ 07:01  -  05-28 @ 07:00  --------------------------------------------------------  IN: 3746.3 mL / OUT: 0 mL / NET: 3746.3 mL    05-28 @ 07:01  -  05-28 @ 10:28  --------------------------------------------------------  IN: 379.4 mL / OUT: 0 mL / NET: 379.4 mL        PHYSICAL EXAM:    Gen: Intubated and sedated  Cards: tachycardic, Irregularly irregular, no M/G/R  Resp: CTA B/L  GI: soft, NT/ND, NABS  Vascular: 1+ LE edema B/L, LUE AVF + faint bruit/thrill    LABS:  05-28    132<L>  |  93<L>  |  25<H>  ----------------------------<  80  4.2   |  12<L>  |  3.28<H>    Ca    9.9      28 May 2022 02:02  Phos  3.4     05-27  Mg     2.00     05-27    TPro  6.2  /  Alb  2.2<L>  /  TBili  0.9  /  DBili      /  AST  26  /  ALT  8   /  AlkPhos  262<H>  05-28    Creatinine Trend: 3.28 <--, 3.18 <--, 3.08 <--, 3.05 <--                        8.4    49.26 )-----------( 643      ( 27 May 2022 13:58 )             30.2     Urine Studies:        RADIOLOGY & ADDITIONAL STUDIES:    < from: Xray Chest 1 View- PORTABLE-Urgent (Xray Chest 1 View- PORTABLE-Urgent .) (05.27.22 @ 18:26) >  INTERPRETATION:  IMPRESSION: Right IJ central venous catheter with tip in   the left IJ. These findings were discussed with FELIX Nam at 5/27/2022   6:58 PM by Dr. Clark with read back.    --- End of Report ---    < end of copied text >

## 2022-05-28 NOTE — PROGRESS NOTE ADULT - SUBJECTIVE AND OBJECTIVE BOX
SURGERY PROGRESS NOTE  POD#1 Selective debridement of wound        SUBJECTIVE  Pt seen and examined at bedside.   No acute events overnight.         OBJECTIVE:    PHYSICAL EXAM   General Appearance: Appears well, NAD, sedated  Resp: Patent airway, non-labored breathing, on ventilator  CV: RRR  Abdomen: soft, ND, large right pannus wound dressed with guaze c/d/i, right gluteal wound dressed with guaze c/d/i        Vital Signs Last 24 Hrs  T(C): 36.2 (27 May 2022 20:00), Max: 37.6 (27 May 2022 04:59)  T(F): 97.2 (27 May 2022 20:00), Max: 99.6 (27 May 2022 04:59)  HR: 134 (27 May 2022 23:00) (99 - 142)  BP: 97/67 (27 May 2022 13:00) (63/45 - 102/84)  BP(mean): 77 (27 May 2022 13:00) (52 - 92)  RR: 11 (27 May 2022 23:00) (7 - 22)  SpO2: 100% (27 May 2022 23:00) (97% - 100%)        I's & O's    05-27-22 @ 07:01  -  05-28-22 @ 01:10  --------------------------------------------------------  IN:    FentaNYL: 85 mL    IV PiggyBack: 50 mL    Lactated Ringers: 1300 mL    Norepinephrine: 165.2 mL    Norepinephrine: 253.1 mL    Propofol: 138 mL    Vasopressin: 17.4 mL    Vasopressin: 7.2 mL  Total IN: 2015.9 mL    OUT:  Total OUT: 0 mL    Total NET: 2015.9 mL      MEDICATIONS    ANTIBIOTICS:   ·	cefepime   IVPB 1000 milliGRAM(s)  ·	clindamycin IVPB 900 milliGRAM(s)      LAB/STUDIES                        8.4    49.26 )-----------( 643      ( 27 May 2022 13:58 )             30.2     131<L>  |  93<L>  |  24<H>  ----------------------------<  165<H>  4.0   |  15<L>  |  3.18<H>    Ca    9.6      27 May 2022 13:58  Phos  3.4     05-27  Mg     2.00     05-27    TPro  6.6  /  Alb  2.4<L>  /  TBili  0.9  /  DBili  x   /  AST  13  /  ALT  9   /  AlkPhos  248<H>  05-27    PT/INR - ( 27 May 2022 10:18 )   PT: 21.7 sec;   INR: 1.86 ratio    PTT - ( 27 May 2022 10:18 )  PTT:36.0 sec    LIVER FUNCTIONS - ( 27 May 2022 10:18 )  Alb: 2.4 g/dL / Pro: 6.6 g/dL / ALK PHOS: 248 U/L / ALT: 9 U/L / AST: 13 U/L / GGT: x             ABG - ( 27 May 2022 13:58 )  pH, Arterial: 7.30  pH, Blood: x     /  pCO2: 29    /  pO2: 305   / HCO3: 14    / Base Excess: -11.0 /  SaO2: 100.0       Culture - Blood (collected 26 May 2022 18:45)  Source: .Blood Blood-Venous  Preliminary Report (27 May 2022 22:01):    No growth to date.    Culture - Blood (collected 26 May 2022 18:30)  Source: .Blood Blood-Peripheral  Preliminary Report (27 May 2022 22:01):    No growth to date.   SURGERY PROGRESS NOTE  POD#1 Selective debridement of wound    SUBJECTIVE  Pt seen and examined at bedside.   Overnight, patient remained on two pressors. Lactate was close to 12. Patient's A line was replaced and central line was placed.      OBJECTIVE:    PHYSICAL EXAM   General Appearance: Appears well, NAD, sedated  Resp: Patent airway, non-labored breathing, on ventilator  CV: RRR  Abdomen: soft, ND, large right pannus wound dressed with guaze c/d/i, right gluteal wound dressed with guaze c/d/i      Vital Signs Last 24 Hrs  T(C): 36.2 (27 May 2022 20:00), Max: 37.6 (27 May 2022 04:59)  T(F): 97.2 (27 May 2022 20:00), Max: 99.6 (27 May 2022 04:59)  HR: 134 (27 May 2022 23:00) (99 - 142)  BP: 97/67 (27 May 2022 13:00) (63/45 - 102/84)  BP(mean): 77 (27 May 2022 13:00) (52 - 92)  RR: 11 (27 May 2022 23:00) (7 - 22)  SpO2: 100% (27 May 2022 23:00) (97% - 100%)        I's & O's    05-27-22 @ 07:01  -  05-28-22 @ 01:10  --------------------------------------------------------  IN:    FentaNYL: 85 mL    IV PiggyBack: 50 mL    Lactated Ringers: 1300 mL    Norepinephrine: 165.2 mL    Norepinephrine: 253.1 mL    Propofol: 138 mL    Vasopressin: 17.4 mL    Vasopressin: 7.2 mL  Total IN: 2015.9 mL    OUT:  Total OUT: 0 mL    Total NET: 2015.9 mL      MEDICATIONS    ANTIBIOTICS:   ·	cefepime   IVPB 1000 milliGRAM(s)  ·	clindamycin IVPB 900 milliGRAM(s)      LAB/STUDIES                        8.4    49.26 )-----------( 643      ( 27 May 2022 13:58 )             30.2     131<L>  |  93<L>  |  24<H>  ----------------------------<  165<H>  4.0   |  15<L>  |  3.18<H>    Ca    9.6      27 May 2022 13:58  Phos  3.4     05-27  Mg     2.00     05-27    TPro  6.6  /  Alb  2.4<L>  /  TBili  0.9  /  DBili  x   /  AST  13  /  ALT  9   /  AlkPhos  248<H>  05-27    PT/INR - ( 27 May 2022 10:18 )   PT: 21.7 sec;   INR: 1.86 ratio    PTT - ( 27 May 2022 10:18 )  PTT:36.0 sec    LIVER FUNCTIONS - ( 27 May 2022 10:18 )  Alb: 2.4 g/dL / Pro: 6.6 g/dL / ALK PHOS: 248 U/L / ALT: 9 U/L / AST: 13 U/L / GGT: x             ABG - ( 27 May 2022 13:58 )  pH, Arterial: 7.30  pH, Blood: x     /  pCO2: 29    /  pO2: 305   / HCO3: 14    / Base Excess: -11.0 /  SaO2: 100.0       Culture - Blood (collected 26 May 2022 18:45)  Source: .Blood Blood-Venous  Preliminary Report (27 May 2022 22:01):    No growth to date.    Culture - Blood (collected 26 May 2022 18:30)  Source: .Blood Blood-Peripheral  Preliminary Report (27 May 2022 22:01):    No growth to date.

## 2022-05-28 NOTE — PROGRESS NOTE ADULT - ASSESSMENT
56y Female with history of ESRD on HD presents with abdominal pain concern for necrotizing fascitis. Nephrology consulted for ESRD status.    1) ESRD: Last HD on 5/25 as an outpatient. Patient now with worsening acidemia on bicarb gtt and volume overload. Will plan for CVVHDF if no improvement in hemodynamics on cardizem gtt. Monitor electrolytes.    2) Hypotension: BP low. On levo and vaso gtt. As per ICU. Monitor BP.    3) Anemia of renal disease: Hb low. Resume Epo 20K with HD. Monitor Hb.    4) Secondary HPT of renal origin: Phosphorus acceptable with high normal serum calcium. Check iPTH. Will resume sensipar 30 mg daily pending results. Monitor serum calcium and phosphorus.    5) Metabolic acidosis: Due to lactic acidosis. Continue with bicarb gtt until RRT initiated. Monitor pH.    D/W SICU. Poor prognosis.      [facility] NEPHROLOGY  Keaton Lopez M.D.  Juma Green D.O.  Myla Hoffmann M.D.  Julia Brewer, JASIEL, ANP-C    Telephone: [phone]  Facsimile: [phone]    [address] [place] [address]

## 2022-05-28 NOTE — DIETITIAN INITIAL EVALUATION ADULT - NSFNSPHYEXAMSKINFT_GEN_A_CORE
Pressure Injury 1: sacrum, Unstageable  Pressure Injury 2: Left:,buttocks, Stage II  Pressure Injury 3: none, none  Pressure Injury 4: none, none  Pressure Injury 5: none, none  Pressure Injury 6: none, none  Pressure Injury 7: none, none  Pressure Injury 8: none, none  Pressure Injury 9: none, none  Pressure Injury 10: none, none  Pressure Injury 11: none, none

## 2022-05-28 NOTE — CONSULT NOTE ADULT - ASSESSMENT
56f presents to the ED with severe abd pain and chest pain. Patient has h/o ESRD on HD, HTN, COPD, afib (on AC), known large chronic R. pannus wound which has been treated with cyclosporine presenting with worsening abd pain and now chest pain.  Found to have septic shock due to necrotizing fasciitis of lower abd pannus and groin and R buttock.     #septic shock, necrotizing fasciitis, pannus wound -   - f/u Bcx   - continue cefepime renally dosed   - can d/c vanco/clinda and start flagyll 500q8 and linezolid 600mg q12 to improve anaerobic coverage  - f/u wound clx    Hay Trejo MD  Fellow, Infectious Diseases, PGY-5  Available on MS TEAMS  Before 9am or after 5pm/Weekends: Call [phone]

## 2022-05-28 NOTE — DIETITIAN INITIAL EVALUATION ADULT - PERTINENT MEDS FT
MEDICATIONS  (STANDING):  cefepime   IVPB 1000 milliGRAM(s) IV Intermittent every 24 hours  chlorhexidine 0.12% Liquid 15 milliLiter(s) Oral Mucosa every 12 hours  chlorhexidine 4% Liquid 1 Application(s) Topical daily  diltiazem Infusion 5 mG/Hr (5 mL/Hr) IV Continuous <Continuous>  fentaNYL   Infusion. 0.5 MICROgram(s)/kG/Hr (5 mL/Hr) IV Continuous <Continuous>  heparin   Injectable 5000 Unit(s) SubCutaneous every 8 hours  hydrocortisone sodium succinate Injectable 50 milliGRAM(s) IV Push every 8 hours  insulin lispro (ADMELOG) corrective regimen sliding scale   SubCutaneous every 6 hours  linezolid  IVPB      metroNIDAZOLE  IVPB 500 milliGRAM(s) IV Intermittent every 8 hours  norepinephrine Infusion 0.05 MICROgram(s)/kG/Min (4.69 mL/Hr) IV Continuous <Continuous>  pantoprazole  Injectable 40 milliGRAM(s) IV Push daily  propofol Infusion 10 MICROgram(s)/kG/Min (6 mL/Hr) IV Continuous <Continuous>  sodium bicarbonate  Infusion 0.113 mEq/kG/Hr (75 mL/Hr) IV Continuous <Continuous>  vasopressin Infusion 0.03 Unit(s)/Min (1.8 mL/Hr) IV Continuous <Continuous>    MEDICATIONS  (PRN):

## 2022-05-28 NOTE — PROGRESS NOTE ADULT - ASSESSMENT
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 Noted to have significant necrotic tissue noted in right gluteal and right lower abdominal wound. S/P   extensive debridement of both areas with cautery. Patient remained on pressors throughout the case. Wound cultures x2 and tissue cultures x2 sent intraop. Patient remained intubated and on levo and vaso. Admitted to SICU for close hemodynamic monitoring.       PLAN  - wean off pressors as tolerated  - intubated  - NPO, IVF  - cefepime, clindamycin  - f/u BCx, wound Cx  - appreciate SICU care      B TEAM  [id number]  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 Noted to have significant necrotic tissue noted in right gluteal and right lower abdominal wound. S/P   extensive debridement of both areas with cautery. Patient remained on pressors throughout the case. Wound cultures x2 and tissue cultures x2 sent intraop. Patient remained intubated and on levo and vaso. Admitted to SICU for close hemodynamic monitoring.       PLAN  - wean off pressors as tolerated  - intubated  - NPO, IVF  - f/u cardiology and TTE  - f/u Renal for HD vs. CVVHD  - cefepime, clindamycin --> f/u ID recs  - f/u BCx, wound Cx  - appreciate SICU care      B TEAM  [id number]

## 2022-05-28 NOTE — CONSULT NOTE ADULT - ATTENDING COMMENTS
57 year old with ESRD presents with abdominal pain  She has a history of anterior abdominal wound with suspected pyoderma gangrenosum  Treated with cyclosporin in the past    At presentation, she had a CT with gas in the ventral abdominal wall and gluteal region  Associated leukocytosis    She was taken to the OR- necrotic tissue noted in right gluteal region and in the anterior abdominal wall    Post-op transferred to ICU- intubated  On pressors   Elevated lactate    Blood culture without growth    Continue cefepime  Continue clindamycin for anaerobe coverage/ toxin inhibition  -----after 72 hours- can probably change clindamycin to metronidazole for better anaerobe coverage  Dose vancomycin by level    ? if wound from pyoderma was portal for entry. Management of underlying pyoderma will still be improtant.  Derm eval 57 year old with ESRD presents with abdominal pain  She has a history of anterior abdominal wound with suspected pyoderma gangrenosum  Treated with cyclosporin in the past    At presentation, she had a CT with gas in the ventral abdominal wall and gluteal region  Associated leukocytosis    She was taken to the OR- necrotic tissue noted in right gluteal region and in the anterior abdominal wall    Post-op transferred to ICU- intubated  On pressors   Elevated lactate    Blood culture without growth    Continue cefepime  Change clindamycin to metronidazole 500 iv q 8  Change vancomycin to linezolid 600 mg iv q 12    This will provide better anaerobe coverage while also still having an agent for toxin inhibition    ? if wound from pyoderma was portal for entry. Management of underlying pyoderma will still be improtant.  Derm eval

## 2022-05-28 NOTE — PROCEDURE NOTE - NSANESTHESIA_GEN_A_CORE
PT WOULD LIKE TO TALK TO SHABNAM, HAS SOME QUESTIONS REGARDING GETTING A COLONOSCOPY.    no anesthesia administered

## 2022-05-28 NOTE — CONSULT NOTE ADULT - REASON FOR ADMISSION
necrotizing fascitis

## 2022-05-28 NOTE — PROCEDURE NOTE - NSICDXPROCEDURE_GEN_ALL_CORE_FT
PROCEDURES:  Imaging guided insertion of central venous cannula 28-May-2022 04:06:15  Juan A Bernal  Insertion, arterial line, percutaneous 28-May-2022 04:08:05  Juan A Bernal  
PROCEDURES:  Imaging guided insertion of central venous cannula 28-May-2022 04:06:15  Juan A Benral

## 2022-05-29 NOTE — PROGRESS NOTE ADULT - SUBJECTIVE AND OBJECTIVE BOX
SURGERY DAILY PROGRESS NOTE:     24 HOUR EVENTS:  - abx switched to cefepime, flagyl, linezolid  - worsening acidosis, right groin shiley placed and CVVH started     SUBJECTIVE/ROS: Intubated and sedated. Patient seen and examined at bedside during morning rounds.    OBJECTIVE:    Vital Signs Last 24 Hrs  T(C): 38.2 (28 May 2022 22:00), Max: 38.2 (28 May 2022 22:00)  T(F): 100.7 (28 May 2022 22:00), Max: 100.7 (28 May 2022 22:00)  HR: 87 (29 May 2022 03:26) (84 - 138)  BP: --  BP(mean): --  RR: 18 (29 May 2022 02:00) (13 - 23)  SpO2: 99% (29 May 2022 03:26) (99% - 100%)    I&O's Detail    27 May 2022 07:01  -  28 May 2022 07:00  --------------------------------------------------------  IN:    Amiodarone: 99.9 mL    FentaNYL: 165 mL    IV PiggyBack: 50 mL    Lactated Ringers: 2000 mL    Norepinephrine: 88.1 mL    Norepinephrine: 424 mL    Norepinephrine: 253.1 mL    Propofol: 254 mL    Sodium Bicarbonate: 375 mL    Vasopressin: 17.4 mL    Vasopressin: 19.8 mL  Total IN: 3746.3 mL    OUT:    Voided (mL): 0 mL  Total OUT: 0 mL    Total NET: 3746.3 mL      28 May 2022 07:01  -  29 May 2022 04:00  --------------------------------------------------------  IN:    Diltiazem: 100 mL    FentaNYL: 80 mL    IV PiggyBack: 300 mL    Norepinephrine: 269 mL    Propofol: 64 mL    Sodium Bicarbonate: 600 mL    Vasopressin: 14.4 mL  Total IN: 1427.4 mL    OUT:  Total OUT: 0 mL    Total NET: 1427.4 mL          PHYSICAL EXAM:  General Appearance: Appears well, NAD, sedated  Resp: Patent airway, non-labored breathing, on ventilator  CV: RRR  Abdomen: soft, ND, large right pannus wound dressed with guaze c/d/i, right gluteal wound dressed with guaze c/d/i    LABS:                        8.4    49.87 )-----------( 618      ( 29 May 2022 01:00 )             28.6     05-29    132<L>  |  93<L>  |  17  ----------------------------<  123<H>  4.1   |  12<L>  |  2.34<H>    Ca    8.8      29 May 2022 01:00  Phos  3.9     05-29  Mg     2.10     05-29    TPro  6.2  /  Alb  2.2<L>  /  TBili  0.9  /  DBili  x   /  AST  26  /  ALT  8   /  AlkPhos  262<H>  05-28    PT/INR - ( 29 May 2022 01:00 )   PT: 28.2 sec;   INR: 2.41 ratio         PTT - ( 29 May 2022 01:00 )  PTT:38.4 sec                     SURGERY DAILY PROGRESS NOTE:     24 HOUR EVENTS:  - abx switched to cefepime, flagyl, linezolid  - worsening acidosis, right groin shiley placed and CVVH started     SUBJECTIVE/ROS: Intubated and sedated. Patient seen and examined at bedside during morning rounds. Dressing changed at bedside.     OBJECTIVE:    Vital Signs Last 24 Hrs  T(C): 38.2 (28 May 2022 22:00), Max: 38.2 (28 May 2022 22:00)  T(F): 100.7 (28 May 2022 22:00), Max: 100.7 (28 May 2022 22:00)  HR: 87 (29 May 2022 03:26) (84 - 138)  BP: --  BP(mean): --  RR: 18 (29 May 2022 02:00) (13 - 23)  SpO2: 99% (29 May 2022 03:26) (99% - 100%)    I&O's Detail    27 May 2022 07:01  -  28 May 2022 07:00  --------------------------------------------------------  IN:    Amiodarone: 99.9 mL    FentaNYL: 165 mL    IV PiggyBack: 50 mL    Lactated Ringers: 2000 mL    Norepinephrine: 88.1 mL    Norepinephrine: 424 mL    Norepinephrine: 253.1 mL    Propofol: 254 mL    Sodium Bicarbonate: 375 mL    Vasopressin: 17.4 mL    Vasopressin: 19.8 mL  Total IN: 3746.3 mL    OUT:    Voided (mL): 0 mL  Total OUT: 0 mL    Total NET: 3746.3 mL      28 May 2022 07:01  -  29 May 2022 04:00  --------------------------------------------------------  IN:    Diltiazem: 100 mL    FentaNYL: 80 mL    IV PiggyBack: 300 mL    Norepinephrine: 269 mL    Propofol: 64 mL    Sodium Bicarbonate: 600 mL    Vasopressin: 14.4 mL  Total IN: 1427.4 mL    OUT:  Total OUT: 0 mL    Total NET: 1427.4 mL          PHYSICAL EXAM:  General Appearance: Appears well, NAD, sedated  Resp: Patent airway, non-labored breathing, on ventilator  CV: RRR  Abdomen: soft, ND, large right pannus wound dressed with guaze c/d/i, right gluteal wound dressed with guaze c/d/i    LABS:                        8.4    49.87 )-----------( 618      ( 29 May 2022 01:00 )             28.6     05-29    132<L>  |  93<L>  |  17  ----------------------------<  123<H>  4.1   |  12<L>  |  2.34<H>    Ca    8.8      29 May 2022 01:00  Phos  3.9     05-29  Mg     2.10     05-29    TPro  6.2  /  Alb  2.2<L>  /  TBili  0.9  /  DBili  x   /  AST  26  /  ALT  8   /  AlkPhos  262<H>  05-28    PT/INR - ( 29 May 2022 01:00 )   PT: 28.2 sec;   INR: 2.41 ratio         PTT - ( 29 May 2022 01:00 )  PTT:38.4 sec

## 2022-05-29 NOTE — PROGRESS NOTE ADULT - SUBJECTIVE AND OBJECTIVE BOX
[facility] NEPHROLOGY- PROGRESS NOTE    56y Female with history of ESRD on HD presents with abdominal pain concern for necrotizing fascitis. Nephrology consulted for ESRD status.    REVIEW OF SYSTEMS: Unable to obtain as patient intubated and sedated.    caffeine (Nausea)  latex (Rash)  penicillin (Nausea)  strawberry (Rash)      Hospital Medications: Medications reviewed      VITALS:  T(F): 98.5 (05-29-22 @ 04:00), Max: 100.7 (05-28-22 @ 22:00)  HR: 90 (05-29-22 @ 09:00)  BP: --  RR: 24 (05-29-22 @ 09:00)  SpO2: 96% (05-29-22 @ 09:00)  Wt(kg): --    05-28 @ 07:01  -  05-29 @ 07:00  --------------------------------------------------------  IN: 3736.5 mL / OUT: 687 mL / NET: 3049.5 mL    05-29 @ 07:01  -  05-29 @ 09:55  --------------------------------------------------------  IN: 345.6 mL / OUT: 0 mL / NET: 345.6 mL      Height (cm): 160 (05-28 @ 20:00)      PHYSICAL EXAM:    Gen: Intubated and sedated  Cards: tachycardic, Irregularly irregular, no M/G/R  Resp: CTA B/L  GI: soft, NT/ND, NABS  Vascular: 1+ LE edema B/L, LUE AVF + bruit/thrill      LABS:  05-29    131<L>  |  93<L>  |  14  ----------------------------<  122<H>  4.0   |  12<L>  |  1.88<H>    Ca    8.5      29 May 2022 06:00  Phos  3.7     05-29  Mg     2.10     05-29    TPro  6.2  /  Alb  2.2<L>  /  TBili  0.9  /  DBili      /  AST  26  /  ALT  8   /  AlkPhos  262<H>  05-28    Creatinine Trend: 1.88 <--, 2.34 <--, 3.32 <--, 3.28 <--, 3.18 <--, 3.08 <--, 3.05 <--                        8.2    42.53 )-----------( 572      ( 29 May 2022 06:00 )             28.3     Urine Studies:        < from: Xray Abdomen 1 View PORTABLE -Urgent (Xray Abdomen 1 View PORTABLE -Urgent .) (05.28.22 @ 17:59) >  IMPRESSION:  NG tube tip in stomach.    --- End of Report ---    < end of copied text >

## 2022-05-29 NOTE — PROGRESS NOTE ADULT - ASSESSMENT
56y Female with history of ESRD on HD presents with abdominal pain concern for necrotizing fascitis. Nephrology consulted for ESRD status.    1) ESRD: Initiated on CVVHDF given hemodynamic instability on pressors. Patient tolerating CVVHDF without clotting however currently with no fluid removal due to hypotension. Continue with CVVHDF as ordered. Please increase cefepime to 2 grams IV Q8 hours. Monitor electrolytes.    2) Hypotension: BP low. On levo and vaso gtt. As per ICU. Monitor BP.    3) Anemia of renal disease: Hb low. Will give Epo 20K X 1 dose on 5/30. Monitor Hb.    4) Secondary HPT of renal origin: Phosphorus acceptable. Holding home sensipar 30 mg daily. Monitor serum calcium and phosphorus.    5) Metabolic acidosis: Due to lactic acidosis. In process of weaning bicarb gtt. Can push ampule of sodium bicarbonate as needed to conserve volume if pH < 7.2. Monitor pH.    D/W SICU. Poor prognosis.      [facility] NEPHROLOGY  Keaton Lopez M.D.  Juma Green D.O.  Myla Hoffmann M.D.  Julia Brewer, MSN, ANP-C    Telephone: [phone]  Facsimile: [phone]    [address] [place] [id number]

## 2022-05-29 NOTE — PROGRESS NOTE ADULT - SUBJECTIVE AND OBJECTIVE BOX
ID Follow Up For necrotizing fasciitis    Patient is a 56y old  Female who presents with a chief complaint of necrotizing fascitis (29 May 2022 04:00)    Interval History/ROS: remains intubated, pressor requirement increasing. started on CVVH.   sedated in AM.    Allergies    latex (Rash)  penicillin (Nausea)  strawberry (Rash)    Intolerances    caffeine (Nausea)      ANTIMICROBIALS:  cefepime   IVPB 1000 every 24 hours  linezolid  IVPB    linezolid  IVPB 600 every 12 hours  metroNIDAZOLE  IVPB 500 every 8 hours      OTHER MEDS:  chlorhexidine 0.12% Liquid 15 milliLiter(s) Oral Mucosa every 12 hours  chlorhexidine 4% Liquid 1 Application(s) Topical daily  CRRT Treatment    <Continuous>  diltiazem Infusion 5 mG/Hr IV Continuous <Continuous>  fentaNYL   Infusion. 0.5 MICROgram(s)/kG/Hr IV Continuous <Continuous>  heparin   Injectable 5000 Unit(s) SubCutaneous every 8 hours  heparin  Infusion Syringe 300 Unit(s)/Hr CRRT <Continuous>  hydrocortisone sodium succinate Injectable 50 milliGRAM(s) IV Push every 8 hours  insulin lispro (ADMELOG) corrective regimen sliding scale   SubCutaneous every 6 hours  LORazepam   Injectable 0.5 milliGRAM(s) IV Push every 4 hours PRN  norepinephrine Infusion 0.05 MICROgram(s)/kG/Min IV Continuous <Continuous>  pantoprazole  Injectable 40 milliGRAM(s) IV Push daily  Phoxillum Filtration BK 4 / 2.5 5000 milliLiter(s) CRRT <Continuous>  PrismaSOL Filtration BGK 0 / 2.5 5000 milliLiter(s) CRRT <Continuous>  PrismaSOL Filtration BGK 0 / 2.5 5000 milliLiter(s) CRRT <Continuous>  propofol Infusion 10 MICROgram(s)/kG/Min IV Continuous <Continuous>  sodium bicarbonate  Infusion 0.113 mEq/kG/Hr IV Continuous <Continuous>  vasopressin Infusion 0.03 Unit(s)/Min IV Continuous <Continuous>      Vital Signs Last 24 Hrs  T(C): 36.9 (29 May 2022 04:00), Max: 38.2 (28 May 2022 22:00)  T(F): 98.5 (29 May 2022 04:00), Max: 100.7 (28 May 2022 22:00)  HR: 90 (29 May 2022 09:00) (84 - 135)  BP: --  BP(mean): --  RR: 24 (29 May 2022 09:00) (13 - 24)  SpO2: 96% (29 May 2022 09:00) (94% - 100%)    EXAM:  Constitutional: sedated  RS: Chest clear to auscultation bilaterally. No wheeze/rhonchi/crepitations.  CVS: S1, S2 heard. Regular rate and rhythm. No murmur  Abdomen: Soft. No guarding/rigidity/tenderness.  : No acute abnormalities  Extremities: Warm. No pedal edema  Skin: lower abd pannus wound  Vascular: No evidence of phlebitis  Neuro: sedated     Labs:                        8.2    42.53 )-----------( 572      ( 29 May 2022 06:00 )             28.3       05-29    131<L>  |  93<L>  |  14  ----------------------------<  122<H>  4.0   |  12<L>  |  1.88<H>    Ca    8.5      29 May 2022 06:00  Phos  3.7     05-29  Mg     2.10     05-29    TPro  6.2  /  Alb  2.2<L>  /  TBili  0.9  /  DBili  x   /  AST  26  /  ALT  8   /  AlkPhos  262<H>  05-28          MICROBIOLOGY:Culture Results:   Rare Klebsiella pneumoniae  Rare Enterococcus faecalis (05-27 @ 14:39)  Culture Results:   Few Gram Negative Rods (05-27 @ 10:45)  Culture Results:   No growth to date. (05-26 @ 18:45)  Culture Results:   No growth to date. (05-26 @ 18:30)      RADIOLOGY:  < from: Xray Abdomen 1 View PORTABLE -Urgent (Xray Abdomen 1 View PORTABLE -Urgent .) (05.28.22 @ 17:59) >  IMPRESSION:  NG tube tip in stomach.    < end of copied text >    OTHER INVESTIGATIONS:

## 2022-05-29 NOTE — PROGRESS NOTE ADULT - ASSESSMENT
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 Noted to have significant necrotic tissue noted in right gluteal and right lower abdominal wound. S/P   extensive debridement of both areas with cautery. Patient remained on pressors throughout the case. Wound cultures x2 and tissue cultures x2 sent intraop. Patient remained intubated and on levo and vaso. Admitted to SICU for close hemodynamic monitoring.     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 Noted to have significant necrotic tissue noted in right gluteal and right lower abdominal wound. S/P   extensive debridement of both areas with cautery. Patient remained on pressors throughout the case. Wound cultures x2 and tissue cultures x2 sent intraop. Patient remained intubated and on levo and vaso. Admitted to SICU for close hemodynamic monitoring.     PLAN  - wean off pressors as tolerated  - intubated  - NPO, IVF  - f/u cardiology and TTE  - f/u Renal for CVVHD  - Cefepime, flagyl, linezolid per ID  - f/u BCx, wound Cx  - daily dressing changes  - f/u AM labs  - pending plan for RTOR  - appreciate SICU care      B TEAM  [id number]

## 2022-05-29 NOTE — PROGRESS NOTE ADULT - ASSESSMENT
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 Noted to have significant necrotic tissue noted in right gluteal and right lower abdominal wound. Now S/P extensive debridement of both areas. Admitted to SICU for hemodynamic monitoring i/s/o septic shock.     PLAN:    Neurologic:   - sedated with Propofol and Fentanyl  - Rass Goal -2    Respiratory:   - AC : 400/18/5/40  - f/u ABGs q12    Cardiovascular:   - hypotension, tachycardia - on levo and vaso, wean as tolerated  - CT on admission with pericardial effusion, TTE performed to assess - small effision, unable to evaluate for tamponade physiology. bedside POCUS with no tamponade physiology  - Afib RVR -> s/p amio bolus 5/27, transitioned to cardizem gtt 5/28  - continue to trend lactate - rising  - MAP goal > 65    Gastrointestinal/Nutrition:   - Protonix  - NPO   - NGT placed, confirmed    Renal/Genitourinary: ESRD on MWF HD  - Worsening metabolic acidosis, R groin shiley placed and CVVH started  - c/w bicarb gtt for metabolic acidosis  - monitor I's and O's   - monitor electrolytes and replete PRN    Hematologic:   - Transfuse as needed  - DVT ppx, SQH    Infectious Disease:   - ID consulted - continue with cefepime, change clinda to flagyl 500 IV q8, change vanco to linezolid 600 IV q12  - continue to trend WBC (50)  - BCxs NGTD  - Surgical cultures with Klebsiella, E faecalis    Endocrine:   - FS q6  - ISS  - solu-cortef 50 Q8    Tubes/Lines/Drains:   - PIV  - L femoral a line  - L femoral central line  - R femoral shiley  - daniel    Disposition: SICU   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 Noted to have significant necrotic tissue noted in right gluteal and right lower abdominal wound. Now S/P extensive debridement of both areas. Admitted to SICU for hemodynamic monitoring i/s/o septic shock.     PLAN:    Neurologic:   - sedated with Propofol and Fentanyl  -Ativan 0.5 PRN  - Rass Goal -2    Respiratory:   - AC : 400/24/5/40  - f/u ABGs q12    Cardiovascular:   -weane diltizam gtt  - hypotension, tachycardia - on levo and vaso, wean as tolerated  - CT on admission with pericardial effusion, TTE performed to assess - small effision, unable to evaluate for tamponade physiology. bedside POCUS with no tamponade physiology  - Afib RVR -> s/p amio bolus 5/27, transitioned to cardizem gtt 5/28  - continue to trend lactate - rising  - MAP goal > 65    Gastrointestinal/Nutrition:   - Protonix  - NPO   - NGT placed, confirmed    Renal/Genitourinary: ESRD on MWF HD  - Worsening metabolic acidosis, R groin shiley placed and CVVH started  - c/w bicarb gtt for metabolic acidosis  - monitor I's and O's   - monitor electrolytes and replete PRN    Hematologic:   - Transfuse as needed  - DVT ppx, SQH    Infectious Disease:   - ID consulted - continue with cefepime changed from 1000mg to 2000mg q8h, changed clinda to flagyl 500 IV q8, change vanco to linezolid 600 IV q12  - continue to trend WBC (50)  - BCxs NGTD  - Surgical cultures with Klebsiella, E faecalis    Endocrine:   - FS q6  - ISS  - solu-cortef 50 Q8    Tubes/Lines/Drains:   - PIV  - L femoral a line  - L femoral central line  - R femoral shiley  - daniel    Disposition: SICU

## 2022-05-29 NOTE — PROGRESS NOTE ADULT - SUBJECTIVE AND OBJECTIVE BOX
24 HOUR EVENTS:  - abx switched to cefepime, flagyl, linezolid  - worsening acidosis, right groin shiley placed and CVVH started     SUBJECTIVE/ROS:   [ ] A ten-point review of systems was otherwise negative except as noted.  [X] Due to altered mental status/intubation, subjective information were not able to be obtained from the patient. History was obtained, to the extent possible, from review of the chart and collateral sources of information.      NEURO  Exam: intubated, sedated  Meds: fentaNYL   Infusion. 0.5 MICROgram(s)/kG/Hr IV Continuous <Continuous>  propofol Infusion 10 MICROgram(s)/kG/Min IV Continuous <Continuous>      RESPIRATORY  RR: 17 (05-28-22 @ 22:00) (8 - 23)  SpO2: 100% (05-28-22 @ 22:51) (100% - 100%)  Wt(kg): --  Exam: unlabored respirations, equal chest rise bilaterally  Mechanical Ventilation: Mode: AC/ CMV (Assist Control/ Continuous Mandatory Ventilation), RR (machine): 18, RR (patient): 18, TV (machine): 400, FiO2: 40, PEEP: 5, ITime: 0.8, MAP: 11, PIP: 28  ABG - ( 28 May 2022 15:20 )  pH: 7.19  /  pCO2: 24    /  pO2: 154   / HCO3: 9     / Base Excess: -17.4 /  SaO2: 98.8    Lactate: x          Meds: NA    CARDIOVASCULAR  HR: 86 (05-28-22 @ 22:51) (86 - 154)  BP: 89/63 (05-28-22 @ 04:00) (74/63 - 89/63)  BP(mean): 73 (05-28-22 @ 04:00) (69 - 73)  ABP: 118/46 (05-28-22 @ 22:00) (34/28 - 135/67)  ABP(mean): 60 (05-28-22 @ 22:00) (23 - 83)  Wt(kg): --  CVP(cm H2O): --      Exam: afib, rate controlled  Cardiac Rhythm: sinus  Perfusion     [X]Adequate   [ ]Inadequate  Mentation   [X]Normal       [ ]Reduced  Extremities  [X]Warm         [ ]Cool  Volume Status [X]Hypervolemic [ ]Euvolemic [ ]Hypovolemic  Meds: diltiazem Infusion 5 mG/Hr IV Continuous <Continuous>  norepinephrine Infusion 0.05 MICROgram(s)/kG/Min IV Continuous <Continuous>      GI/NUTRITION  Exam: Abdomen soft, ND, large right pannus wound dressed with guaze c/d/i, right gluteal wound dressed with guaze c/d/i  NGT in place  Diet: NPO  Meds: pantoprazole  Injectable 40 milliGRAM(s) IV Push daily      GENITOURINARY  I&O's Detail    05-27 @ 07:01 - 05-28 @ 07:00  --------------------------------------------------------  IN:    Amiodarone: 99.9 mL    FentaNYL: 165 mL    IV PiggyBack: 50 mL    Lactated Ringers: 2000 mL    Norepinephrine: 88.1 mL    Norepinephrine: 424 mL    Norepinephrine: 253.1 mL    Propofol: 254 mL    Sodium Bicarbonate: 375 mL    Vasopressin: 17.4 mL    Vasopressin: 19.8 mL  Total IN: 3746.3 mL    OUT:    Voided (mL): 0 mL  Total OUT: 0 mL    Total NET: 3746.3 mL      05-28 @ 07:01 - 05-29 @ 00:05  --------------------------------------------------------  IN:    Diltiazem: 100 mL    FentaNYL: 80 mL    IV PiggyBack: 300 mL    Norepinephrine: 269 mL    Propofol: 64 mL    Sodium Bicarbonate: 600 mL    Vasopressin: 14.4 mL  Total IN: 1427.4 mL    OUT:  Total OUT: 0 mL    Total NET: 1427.4 mL          05-28    131<L>  |  90<L>  |  25<H>  ----------------------------<  154<H>  4.7   |  9<LL>  |  3.32<H>    Ca    9.2      28 May 2022 15:15  Phos  4.4     05-28  Mg     1.90     05-28    TPro  6.2  /  Alb  2.2<L>  /  TBili  0.9  /  DBili  x   /  AST  26  /  ALT  8   /  AlkPhos  262<H>  05-28    Meds: sodium bicarbonate  Infusion 0.113 mEq/kG/Hr IV Continuous <Continuous>      HEMATOLOGIC  Meds: heparin   Injectable 5000 Unit(s) SubCutaneous every 8 hours  heparin  Infusion Syringe 300 Unit(s)/Hr CRRT <Continuous>                          8.1    50.92 )-----------( 605      ( 28 May 2022 15:15 )             28.4     PT/INR - ( 27 May 2022 10:18 )   PT: 21.7 sec;   INR: 1.86 ratio         PTT - ( 27 May 2022 10:18 )  PTT:36.0 sec    INFECTIOUS DISEASES  T(C): 38.2 (05-28-22 @ 22:00), Max: 38.2 (05-28-22 @ 22:00)  Wt(kg): --  POCT Blood Glucose.: 156 mg/dL (05-28 @ 17:15)  POCT Blood Glucose.: 84 mg/dL (05-28 @ 12:06)  POCT Blood Glucose.: 89 mg/dL (05-28 @ 05:51)    Recent Cultures:  Specimen Source: .Surgical Swab RIGHT BUTTOCK, 05-27 @ 14:39; Results   Rare Klebsiella pneumoniae  Rare Enterococcus faecalis; Gram Stain: --; Organism: --  Specimen Source: .Surgical Swab abdominal wound culture, 05-27 @ 10:45; Results   Few Gram Negative Rods; Gram Stain:   No polymorphonuclear cells seen per low power field  No organisms seen per oil power field; Organism: --  Specimen Source: .Blood Blood-Venous, 05-26 @ 18:45; Results   No growth to date.; Gram Stain: --; Organism: --  Specimen Source: .Blood Blood-Peripheral, 05-26 @ 18:30; Results   No growth to date.; Gram Stain: --; Organism: --    Meds: cefepime   IVPB 1000 milliGRAM(s) IV Intermittent every 24 hours  linezolid  IVPB      metroNIDAZOLE  IVPB 500 milliGRAM(s) IV Intermittent every 8 hours      ENDOCRINE  POCT Blood Glucose.: 156 mg/dL (05-28 @ 17:15)  POCT Blood Glucose.: 84 mg/dL (05-28 @ 12:06)  POCT Blood Glucose.: 89 mg/dL (05-28 @ 05:51)    Meds: hydrocortisone sodium succinate Injectable 50 milliGRAM(s) IV Push every 8 hours  insulin lispro (ADMELOG) corrective regimen sliding scale   SubCutaneous every 6 hours  vasopressin Infusion 0.03 Unit(s)/Min IV Continuous <Continuous>      ACCESS DEVICES:  [ ] Peripheral IV  [X] Central Venous Line	[X] R	[X] L	[ ] IJ	[X] Fem	[ ] SC		Placed: 5/28  [X] Arterial Line			[ ] R	[X] L	[X] Fem	[ ] Rad	[ ] Ax	Placed: 5/28  [ ] PICC:					[ ] Mediport  [X] Urinary Catheter, Date Placed:   [ ] Necessity of urinary, arterial, and venous catheters discussed    OTHER MEDICATIONS:  chlorhexidine 0.12% Liquid 15 milliLiter(s) Oral Mucosa every 12 hours  chlorhexidine 4% Liquid 1 Application(s) Topical daily  CRRT Treatment    <Continuous>  Phoxillum Filtration BK 4 / 2.5 5000 milliLiter(s) CRRT <Continuous>  PrismaSOL Filtration BGK 0 / 2.5 5000 milliLiter(s) CRRT <Continuous>  PrismaSOL Filtration BGK 0 / 2.5 5000 milliLiter(s) CRRT <Continuous>     24 HOUR EVENTS:  - abx switched to cefepime, flagyl, linezolid  - worsening acidosis, right groin shiley placed and CVVH started, unable to tolerate any fluid off     SUBJECTIVE/ROS:   [ ] A ten-point review of systems was otherwise negative except as noted.  [X] Due to altered mental status/intubation, subjective information were not able to be obtained from the patient. History was obtained, to the extent possible, from review of the chart and collateral sources of information.      NEURO  Exam: intubated, sedated  Meds: fentaNYL   Infusion. 0.5 MICROgram(s)/kG/Hr IV Continuous <Continuous>  propofol Infusion 10 MICROgram(s)/kG/Min IV Continuous <Continuous>      RESPIRATORY  RR: 17 (05-28-22 @ 22:00) (8 - 23)  SpO2: 100% (05-28-22 @ 22:51) (100% - 100%)  Wt(kg): --  Exam: unlabored respirations, equal chest rise bilaterally  Mechanical Ventilation: Mode: AC/ CMV (Assist Control/ Continuous Mandatory Ventilation), RR (machine): 18, RR (patient): 18, TV (machine): 400, FiO2: 40, PEEP: 5, ITime: 0.8, MAP: 11, PIP: 28  ABG - ( 28 May 2022 15:20 )  pH: 7.19  /  pCO2: 24    /  pO2: 154   / HCO3: 9     / Base Excess: -17.4 /  SaO2: 98.8    Lactate: x          Meds: NA    CARDIOVASCULAR  HR: 86 (05-28-22 @ 22:51) (86 - 154)  BP: 89/63 (05-28-22 @ 04:00) (74/63 - 89/63)  BP(mean): 73 (05-28-22 @ 04:00) (69 - 73)  ABP: 118/46 (05-28-22 @ 22:00) (34/28 - 135/67)  ABP(mean): 60 (05-28-22 @ 22:00) (23 - 83)  Wt(kg): --  CVP(cm H2O): --      Exam: afib, rate controlled  Cardiac Rhythm: sinus  Perfusion     [X]Adequate   [ ]Inadequate  Mentation   [X]Normal       [ ]Reduced  Extremities  [X]Warm         [ ]Cool  Volume Status [X]Hypervolemic [ ]Euvolemic [ ]Hypovolemic  Meds: diltiazem Infusion 5 mG/Hr IV Continuous <Continuous>  norepinephrine Infusion 0.05 MICROgram(s)/kG/Min IV Continuous <Continuous>      GI/NUTRITION  Exam: Abdomen soft, ND, large right pannus wound dressed with guaze c/d/i, right gluteal wound dressed with guaze c/d/i  NGT in place  Diet: NPO  Meds: pantoprazole  Injectable 40 milliGRAM(s) IV Push daily      GENITOURINARY  I&O's Detail    05-27 @ 07:01 - 05-28 @ 07:00  --------------------------------------------------------  IN:    Amiodarone: 99.9 mL    FentaNYL: 165 mL    IV PiggyBack: 50 mL    Lactated Ringers: 2000 mL    Norepinephrine: 88.1 mL    Norepinephrine: 424 mL    Norepinephrine: 253.1 mL    Propofol: 254 mL    Sodium Bicarbonate: 375 mL    Vasopressin: 17.4 mL    Vasopressin: 19.8 mL  Total IN: 3746.3 mL    OUT:    Voided (mL): 0 mL  Total OUT: 0 mL    Total NET: 3746.3 mL      05-28 @ 07:01 - 05-29 @ 00:05  --------------------------------------------------------  IN:    Diltiazem: 100 mL    FentaNYL: 80 mL    IV PiggyBack: 300 mL    Norepinephrine: 269 mL    Propofol: 64 mL    Sodium Bicarbonate: 600 mL    Vasopressin: 14.4 mL  Total IN: 1427.4 mL    OUT:  Total OUT: 0 mL    Total NET: 1427.4 mL          05-28    131<L>  |  90<L>  |  25<H>  ----------------------------<  154<H>  4.7   |  9<LL>  |  3.32<H>    Ca    9.2      28 May 2022 15:15  Phos  4.4     05-28  Mg     1.90     05-28    TPro  6.2  /  Alb  2.2<L>  /  TBili  0.9  /  DBili  x   /  AST  26  /  ALT  8   /  AlkPhos  262<H>  05-28    Meds: sodium bicarbonate  Infusion 0.113 mEq/kG/Hr IV Continuous <Continuous>      HEMATOLOGIC  Meds: heparin   Injectable 5000 Unit(s) SubCutaneous every 8 hours  heparin  Infusion Syringe 300 Unit(s)/Hr CRRT <Continuous>                          8.1    50.92 )-----------( 605      ( 28 May 2022 15:15 )             28.4     PT/INR - ( 27 May 2022 10:18 )   PT: 21.7 sec;   INR: 1.86 ratio         PTT - ( 27 May 2022 10:18 )  PTT:36.0 sec    INFECTIOUS DISEASES  T(C): 38.2 (05-28-22 @ 22:00), Max: 38.2 (05-28-22 @ 22:00)  Wt(kg): --  POCT Blood Glucose.: 156 mg/dL (05-28 @ 17:15)  POCT Blood Glucose.: 84 mg/dL (05-28 @ 12:06)  POCT Blood Glucose.: 89 mg/dL (05-28 @ 05:51)    Recent Cultures:  Specimen Source: .Surgical Swab RIGHT BUTTOCK, 05-27 @ 14:39; Results   Rare Klebsiella pneumoniae  Rare Enterococcus faecalis; Gram Stain: --; Organism: --  Specimen Source: .Surgical Swab abdominal wound culture, 05-27 @ 10:45; Results   Few Gram Negative Rods; Gram Stain:   No polymorphonuclear cells seen per low power field  No organisms seen per oil power field; Organism: --  Specimen Source: .Blood Blood-Venous, 05-26 @ 18:45; Results   No growth to date.; Gram Stain: --; Organism: --  Specimen Source: .Blood Blood-Peripheral, 05-26 @ 18:30; Results   No growth to date.; Gram Stain: --; Organism: --    Meds: cefepime   IVPB 1000 milliGRAM(s) IV Intermittent every 24 hours  linezolid  IVPB      metroNIDAZOLE  IVPB 500 milliGRAM(s) IV Intermittent every 8 hours      ENDOCRINE  POCT Blood Glucose.: 156 mg/dL (05-28 @ 17:15)  POCT Blood Glucose.: 84 mg/dL (05-28 @ 12:06)  POCT Blood Glucose.: 89 mg/dL (05-28 @ 05:51)    Meds: hydrocortisone sodium succinate Injectable 50 milliGRAM(s) IV Push every 8 hours  insulin lispro (ADMELOG) corrective regimen sliding scale   SubCutaneous every 6 hours  vasopressin Infusion 0.03 Unit(s)/Min IV Continuous <Continuous>      ACCESS DEVICES:  [ ] Peripheral IV  [X] Central Venous Line	[X] R	[X] L	[ ] IJ	[X] Fem	[ ] SC		Placed: 5/28  [X] Arterial Line			[ ] R	[X] L	[X] Fem	[ ] Rad	[ ] Ax	Placed: 5/28  [ ] PICC:					[ ] Mediport  [X] Urinary Catheter, Date Placed:   [ ] Necessity of urinary, arterial, and venous catheters discussed    OTHER MEDICATIONS:  chlorhexidine 0.12% Liquid 15 milliLiter(s) Oral Mucosa every 12 hours  chlorhexidine 4% Liquid 1 Application(s) Topical daily  CRRT Treatment    <Continuous>  Phoxillum Filtration BK 4 / 2.5 5000 milliLiter(s) CRRT <Continuous>  PrismaSOL Filtration BGK 0 / 2.5 5000 milliLiter(s) CRRT <Continuous>  PrismaSOL Filtration BGK 0 / 2.5 5000 milliLiter(s) CRRT <Continuous>     24 HOUR EVENTS:  - abx switched to cefepime, flagyl, linezolid  - worsening acidosis, right groin shiley placed and CVVH started, unable to tolerate any fluid off     SUBJECTIVE/ROS:   [ ] A ten-point review of systems was otherwise negative except as noted.  [X] Due to altered mental status/intubation, subjective information were not able to be obtained from the patient. History was obtained, to the extent possible, from review of the chart and collateral sources of information.      NEURO  Exam: intubated, sedated  Meds: fentaNYL   Infusion. 0.5 MICROgram(s)/kG/Hr IV Continuous <Continuous>  propofol Infusion 10 MICROgram(s)/kG/Min IV Continuous <Continuous>      RESPIRATORY  RR: 17 (05-28-22 @ 22:00) (8 - 23)  SpO2: 100% (05-28-22 @ 22:51) (100% - 100%)  Wt(kg): --  Exam: unlabored respirations, equal chest rise bilaterally  Mechanical Ventilation: Mode: AC/ CMV (Assist Control/ Continuous Mandatory Ventilation), RR (machine): 18, RR (patient): 24, TV (machine): 400, FiO2: 40, PEEP: 5, ITime: 0.8, MAP: 11, PIP: 28  ABG - ( 28 May 2022 15:20 )  pH: 7.19  /  pCO2: 24    /  pO2: 154   / HCO3: 9     / Base Excess: -17.4 /  SaO2: 98.8    Lactate: x          Meds: NA    CARDIOVASCULAR  HR: 86 (05-28-22 @ 22:51) (86 - 154)  BP: 89/63 (05-28-22 @ 04:00) (74/63 - 89/63)  BP(mean): 73 (05-28-22 @ 04:00) (69 - 73)  ABP: 118/46 (05-28-22 @ 22:00) (34/28 - 135/67)  ABP(mean): 60 (05-28-22 @ 22:00) (23 - 83)  Wt(kg): --  CVP(cm H2O): --      Exam: afib, rate controlled  Cardiac Rhythm: sinus  Perfusion     [X]Adequate   [ ]Inadequate  Mentation   [X]Normal       [ ]Reduced  Extremities  [X]Warm         [ ]Cool  Volume Status [X]Hypervolemic [ ]Euvolemic [ ]Hypovolemic  Meds: diltiazem Infusion 5 mG/Hr IV Continuous <Continuous>  norepinephrine Infusion 0.05 MICROgram(s)/kG/Min IV Continuous <Continuous>      GI/NUTRITION  Exam: Abdomen soft, ND, large right pannus wound dressed with guaze c/d/i, right gluteal wound dressed with guaze c/d/i  NGT in place  Diet: NPO  Meds: pantoprazole  Injectable 40 milliGRAM(s) IV Push daily      GENITOURINARY  I&O's Detail    05-27 @ 07:01 - 05-28 @ 07:00  --------------------------------------------------------  IN:    Amiodarone: 99.9 mL    FentaNYL: 165 mL    IV PiggyBack: 50 mL    Lactated Ringers: 2000 mL    Norepinephrine: 88.1 mL    Norepinephrine: 424 mL    Norepinephrine: 253.1 mL    Propofol: 254 mL    Sodium Bicarbonate: 375 mL    Vasopressin: 17.4 mL    Vasopressin: 19.8 mL  Total IN: 3746.3 mL    OUT:    Voided (mL): 0 mL  Total OUT: 0 mL    Total NET: 3746.3 mL      05-28 @ 07:01 - 05-29 @ 00:05  --------------------------------------------------------  IN:    Diltiazem: 100 mL    FentaNYL: 80 mL    IV PiggyBack: 300 mL    Norepinephrine: 269 mL    Propofol: 64 mL    Sodium Bicarbonate: 600 mL    Vasopressin: 14.4 mL  Total IN: 1427.4 mL    OUT:  Total OUT: 0 mL    Total NET: 1427.4 mL          05-28    131<L>  |  90<L>  |  25<H>  ----------------------------<  154<H>  4.7   |  9<LL>  |  3.32<H>    Ca    9.2      28 May 2022 15:15  Phos  4.4     05-28  Mg     1.90     05-28    TPro  6.2  /  Alb  2.2<L>  /  TBili  0.9  /  DBili  x   /  AST  26  /  ALT  8   /  AlkPhos  262<H>  05-28    Meds: sodium bicarbonate  Infusion 0.113 mEq/kG/Hr IV Continuous <Continuous>      HEMATOLOGIC  Meds: heparin   Injectable 5000 Unit(s) SubCutaneous every 8 hours  heparin  Infusion Syringe 300 Unit(s)/Hr CRRT <Continuous>                          8.1    50.92 )-----------( 605      ( 28 May 2022 15:15 )             28.4     PT/INR - ( 27 May 2022 10:18 )   PT: 21.7 sec;   INR: 1.86 ratio         PTT - ( 27 May 2022 10:18 )  PTT:36.0 sec    INFECTIOUS DISEASES  T(C): 38.2 (05-28-22 @ 22:00), Max: 38.2 (05-28-22 @ 22:00)  Wt(kg): --  POCT Blood Glucose.: 156 mg/dL (05-28 @ 17:15)  POCT Blood Glucose.: 84 mg/dL (05-28 @ 12:06)  POCT Blood Glucose.: 89 mg/dL (05-28 @ 05:51)    Recent Cultures:  Specimen Source: .Surgical Swab RIGHT BUTTOCK, 05-27 @ 14:39; Results   Rare Klebsiella pneumoniae  Rare Enterococcus faecalis; Gram Stain: --; Organism: --  Specimen Source: .Surgical Swab abdominal wound culture, 05-27 @ 10:45; Results   Few Gram Negative Rods; Gram Stain:   No polymorphonuclear cells seen per low power field  No organisms seen per oil power field; Organism: --  Specimen Source: .Blood Blood-Venous, 05-26 @ 18:45; Results   No growth to date.; Gram Stain: --; Organism: --  Specimen Source: .Blood Blood-Peripheral, 05-26 @ 18:30; Results   No growth to date.; Gram Stain: --; Organism: --    Meds: cefepime   IVPB 1000 milliGRAM(s) IV Intermittent every 24 hours  linezolid  IVPB      metroNIDAZOLE  IVPB 500 milliGRAM(s) IV Intermittent every 8 hours      ENDOCRINE  POCT Blood Glucose.: 156 mg/dL (05-28 @ 17:15)  POCT Blood Glucose.: 84 mg/dL (05-28 @ 12:06)  POCT Blood Glucose.: 89 mg/dL (05-28 @ 05:51)    Meds: hydrocortisone sodium succinate Injectable 50 milliGRAM(s) IV Push every 8 hours  insulin lispro (ADMELOG) corrective regimen sliding scale   SubCutaneous every 6 hours  vasopressin Infusion 0.03 Unit(s)/Min IV Continuous <Continuous>      ACCESS DEVICES:  [ ] Peripheral IV  [X] Central Venous Line	[X] R	[X] L	[ ] IJ	[X] Fem	[ ] SC		Placed: 5/28  [X] Arterial Line			[ ] R	[X] L	[X] Fem	[ ] Rad	[ ] Ax	Placed: 5/28  [ ] PICC:					[ ] Mediport  [X] Urinary Catheter, Date Placed:   [ ] Necessity of urinary, arterial, and venous catheters discussed    OTHER MEDICATIONS:  chlorhexidine 0.12% Liquid 15 milliLiter(s) Oral Mucosa every 12 hours  chlorhexidine 4% Liquid 1 Application(s) Topical daily  CRRT Treatment    <Continuous>  Phoxillum Filtration BK 4 / 2.5 5000 milliLiter(s) CRRT <Continuous>  PrismaSOL Filtration BGK 0 / 2.5 5000 milliLiter(s) CRRT <Continuous>  PrismaSOL Filtration BGK 0 / 2.5 5000 milliLiter(s) CRRT <Continuous>

## 2022-05-29 NOTE — PROGRESS NOTE ADULT - ASSESSMENT
56f presents to the ED with severe abd pain and chest pain. Patient has h/o ESRD on HD, HTN, COPD, afib (on AC), known large chronic R. pannus wound which has been treated with cyclosporine presenting with worsening abd pain and now chest pain.  Found to have septic shock due to necrotizing fasciitis of lower abd pannus and groin and R buttock.     #septic shock, necrotizing fasciitis, pannus wound -   - wound clx with prelim kleb pneumo and E faecalis  - BCx NGTD  - continue cefepime renally dosed - would adjust dose for CVVH   - cont flagyll 500q8 and linezolid 600mg q12for improved anaerobic coverage and toxin effect        Hay Trejo MD  Fellow, Infectious Diseases, PGY-5  Available on [place] TEAMS  Before 9am or after 5pm/Weekends: Call [phone]

## 2022-05-29 NOTE — GOALS OF CARE CONVERSATION - ADVANCED CARE PLANNING - CONVERSATION DETAILS
Discussed patient's prognosis with both daughters at the bedside.    Throughout the course of the day, patient has had a worsening metabolic acidosis with increasing pressor requirements. Discussed with family that patient's prognosis is very poor in light of her state of shock and multi-system organ failure. I explained that she is very unlikely to make any meaningful recovery from this event and that with her degree of acidosis, it is possible she does not make it through the night. We discussed treatment options and code status, including remaining full code and continuing to escalate care by adding additional pressors vs DNR and shifting towards more of a comfort-driven approach to minimize suffering. Patient's daughter Ashley (HCP) does not feel ready to withdraw or cap care at this time, but would rather prefer to wait until the morning to make a decision regarding code status. I explained that Ms. Wang may not make it until the morning and she demonstrated understanding. She remains full code at this time.    Discussed with Dr. Henao and Dr. Mathias.    WILLIAMS Broderick, PGY2  [facility] [id number] Discussed patient's prognosis with both daughters at the bedside.    Throughout the course of the day, patient has had a worsening metabolic acidosis with increasing pressor requirements. Discussed with family that patient's prognosis is very poor in light of her state of shock and multi-system organ failure. I explained that she is very unlikely to make any meaningful recovery from this event and that with her degree of acidosis, it is possible she does not make it through the night. We discussed treatment options and code status, including remaining full code and continuing to escalate care by adding additional pressors vs DNR and shifting towards more of a comfort-driven approach to minimize suffering. Patient's daughter Ashley (HCP) does not feel ready to make a decision regarding code status at this time, but would rather prefer to wait until the morning. I explained that Ms. Wang may not make it until the morning and she demonstrated understanding. She remains full code at this time.    Discussed with Dr. Henao and Dr. Mathias.    WILLIAMS Broderick, PGY2  SICU [id number]

## 2022-05-30 NOTE — PROGRESS NOTE ADULT - ATTENDING COMMENTS
57 year old with ESRD presents with abdominal pain  She has a history of anterior abdominal wound with suspected pyoderma gangrenosum  Treated with cyclosporin in the past    At presentation, she had a CT with gas in the ventral abdominal wall and gluteal region  Associated leukocytosis    She was taken to the OR- necrotic tissue noted in right gluteal region and in the anterior abdominal wall    Post-op transferred to ICU- intubated  On pressors   Elevated lactate    Blood culture without growth    Continue cefepime  Continue metronidazole 500 iv q 8  Continue linezolid 600 mg iv q 12    Surgery following- may need further debridement      ? if wound from pyoderma was portal for entry. Management of underlying pyoderma will still be important.  Derm eval
Septic shock secondary to necrotizing soft tissue infection  a.  Remains in multiorgan system failure  b.  Discuss with primary surgeon re: Re-exploration/further debridement  c.  Continue cefepime, flagyl and linezolid per ID  d.  On cardizem gtt, wean as tolerated for chronic atrial fib with RVR  e.  Hold anticoagulation at this time, continue SQH    GERI with metabolic gap acidosis  a.  On CVVH  b.  Wean NahCO3 gtt  c.  Recheck ABG    Respiratory abnormality  a.  Remains mechanical dependent  b.  Wean propofol gtt to ativan prn/fentanyl  c.  Increase RR to 24     At risk for severe malnutrition  a.  NPO at this time  b.  NGT to LWS
Critical Care Dx    48.20 Chronic atrial fibrillation   -d/c amiodarone and start diltiazem drip to target HR <120. Should improve cardiac filling and optimize physiology so to wean pressors   -holding full anticoagulation until surgical debridements are completed -  -oral home meds noted - will restart when more stable  M79.89 Necrotizing soft tissue infection   -s/p debridement, ongoing wound care  R65.21 Severe sepsis with septic shock   -broad spectrum abx for toxin control and broad coverage  -ID input appreciated   -adequately resuscitated but now with a lactic acidosis which we will trend  -levo/vas to target MAP of 65  J95.89 Acute respiratory insufficiency, postoperative   -low tiodal volume, high peep when needed  -sedation for vent synchrony   N18.6 ESRD (end stage renal disease)   -acidotic and likely at risk for volume overload.   -would attemptHD today when stabilized otherwise a shiley would have to be placed for CVVHD   -nephro recs appreciated      The patient is a critical care patient with life threatening hemodynamic and respiratory instability in SICU.  I have personally interviewed and examined the patient, reviewed data and laboratory tests/x-rays and all pertinent electronic images.  The SICU team has a constant risk benefit analyzes discussion with the primary team, all consultants, House Staff and PA's on all decisions.   Time involved in performance of separately billable procedures was not counted toward my critical care time. There is no overlap.    I have personally provided 65 minutes of critical care time concurrently with the resident/fellow. This time excludes time spent on separate procedures and time spent teaching. I have reviewed the resident's/fellow's documentation and agree with the assessment and plan of care.  I was physically present for the key portions of the evaluation and management (E/M) service provided.      Parag Connors MD  Acute and Critical Care Surgery
Resistant septic shock secondary to necrotizing fascitis  a.  s/p bedside debridement  b.  Progressive worsening ofn hemodynamic status  c.  On Cefepime, linezolid, and flagyl  d.  Poor Prognosis discussed at length with HCP, patient is now DNR  e.  On increasing dose of levo, vaso, epi    GERI with high anion gap metabolic acidosis  a.  On CVVH, keep +  b.  With nahco3 gtt  c.   Remains acidotic as lactate progressively worse    Respiratory failure  a.  Remains mechanically ventilated  b.  Will plan for palliative extubation

## 2022-05-30 NOTE — PROGRESS NOTE ADULT - NUTRITIONAL ASSESSMENT
This patient has been assessed with a concern for Malnutrition and has been determined to have a diagnosis/diagnoses of Severe protein-calorie malnutrition and Morbid obesity (BMI > 40).    This patient is being managed with:   Diet NPO-  Entered: May 27 2022  5:48AM    

## 2022-05-30 NOTE — PROGRESS NOTE ADULT - ASSESSMENT
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 Noted to have significant necrotic tissue noted in right gluteal and right lower abdominal wound. S/P   extensive debridement of both areas with cautery. Patient remained on pressors throughout the case. Wound cultures x2 and tissue cultures x2 sent intraop. Patient remained intubated and on levo and vaso. Admitted to SICU for close hemodynamic monitoring.     PLAN  - wean off pressors as tolerated  - intubated  - NPO, IVF  - f/u cardiology and TTE  - f/u Renal for CVVHD  - Cefepime, flagyl, linezolid per ID  - f/u BCx, wound Cx  - daily dressing changes  - f/u AM labs  - pending plan for RTOR  - appreciate SICU care      B TEAM  [id number]

## 2022-05-30 NOTE — PROGRESS NOTE ADULT - SUBJECTIVE AND OBJECTIVE BOX
TEAM Surgery Progress Note    INTERVAL EVENTS PER SICU    24 HOUR EVENTS:  - Wounds debrided at bedside  - Increased RR from 18 to 24, then decreased to 22 to promote a respiratory alkalosis  - D/c'd propofol, started ativan 0.5 PRN for agitation as needed  - Weaned diltizam gtt  - Increased cefepime dose to 2g q8h while on CRRT  - Worsening metabolic acidosis from septic shock requiring mutliple pushes of bicarb  - D50x2 for hypoglycemia  - Goals of care conversation with family -> to remain full code for now      SUBJECTIVE: Patient seen and examined at bedside with surgical team    OBJECTIVE:    Vital Signs Last 24 Hrs  T(C): 37.1 (29 May 2022 20:00), Max: 37.6 (29 May 2022 16:00)  T(F): 98.8 (29 May 2022 20:00), Max: 99.6 (29 May 2022 16:00)  HR: 101 (30 May 2022 01:00) (84 - 112)  BP: --  BP(mean): --  RR: 22 (30 May 2022 01:00) (17 - 24)  SpO2: 100% (29 May 2022 23:18) (94% - 100%)I&O's Detail    28 May 2022 07:01  -  29 May 2022 07:00  --------------------------------------------------------  IN:    Diltiazem: 275 mL    FentaNYL: 200 mL    IV PiggyBack: 550 mL    Norepinephrine: 919.5 mL    Propofol: 256 mL    Sodium Bicarbonate: 1500 mL    Vasopressin: 36 mL  Total IN: 3736.5 mL    OUT:    Nasogastric/Oral tube (mL): 100 mL    Other (mL): 587 mL  Total OUT: 687 mL    Total NET: 3049.5 mL      29 May 2022 07:01  -  30 May 2022 01:26  --------------------------------------------------------  IN:    Diltiazem: 105 mL    EPINEPHrine: 232.5 mL    FentaNYL: 180 mL    IV PiggyBack: 500 mL    Norepinephrine: 771 mL    Norepinephrine: 1159 mL    Propofol: 22 mL    Sodium Bicarbonate: 1162.5 mL    Vasopressin: 27 mL  Total IN: 4159 mL    OUT:    Other (mL): 0 mL  Total OUT: 0 mL    Total NET: 4159 mL      MEDICATIONS  (STANDING):  cefepime   IVPB 2000 milliGRAM(s) IV Intermittent every 8 hours  chlorhexidine 0.12% Liquid 15 milliLiter(s) Oral Mucosa every 12 hours  chlorhexidine 4% Liquid 1 Application(s) Topical daily  CRRT Treatment    <Continuous>  diltiazem Infusion 5 mG/Hr (5 mL/Hr) IV Continuous <Continuous>  EPINEPHrine    Infusion 0.1 MICROgram(s)/kG/Min (37.5 mL/Hr) IV Continuous <Continuous>  fentaNYL   Infusion. 0.5 MICROgram(s)/kG/Hr (5 mL/Hr) IV Continuous <Continuous>  heparin   Injectable 5000 Unit(s) SubCutaneous every 8 hours  heparin  Infusion Syringe 300 Unit(s)/Hr (0.6 mL/Hr) CRRT <Continuous>  hydrocortisone sodium succinate Injectable 50 milliGRAM(s) IV Push every 8 hours  insulin lispro (ADMELOG) corrective regimen sliding scale   SubCutaneous every 6 hours  linezolid  IVPB      linezolid  IVPB 600 milliGRAM(s) IV Intermittent every 12 hours  metroNIDAZOLE  IVPB 500 milliGRAM(s) IV Intermittent every 8 hours  norepinephrine Infusion 0.05 MICROgram(s)/kG/Min (4.69 mL/Hr) IV Continuous <Continuous>  pantoprazole  Injectable 40 milliGRAM(s) IV Push daily  Phoxillum Filtration BK 4 / 2.5 5000 milliLiter(s) (1500 mL/Hr) CRRT <Continuous>  PrismaSOL Filtration BGK 0 / 2.5 5000 milliLiter(s) (200 mL/Hr) CRRT <Continuous>  PrismaSOL Filtration BGK 0 / 2.5 5000 milliLiter(s) (1300 mL/Hr) CRRT <Continuous>  sodium bicarbonate  Infusion 0.113 mEq/kG/Hr (75 mL/Hr) IV Continuous <Continuous>  sodium bicarbonate  Injectable 50 milliEquivalent(s) IV Push once  sodium bicarbonate  Injectable 50 milliEquivalent(s) IV Push once  vasopressin Infusion 0.03 Unit(s)/Min (1.8 mL/Hr) IV Continuous <Continuous>    MEDICATIONS  (PRN):  LORazepam   Injectable 0.5 milliGRAM(s) IV Push every 4 hours PRN Agitation      PHYSICAL EXAM:  Constitutional: A&Ox3, NAD  Respiratory: Unlabored breathing  Abdomen: Soft, nondistended, NTTP. No rebound or guarding.  Extremities: WWP, ROB spontaneously    LABS:                        7.4    37.93 )-----------( 467      ( 29 May 2022 20:30 )             26.4     05-29    134<L>  |  95<L>  |  9   ----------------------------<  81  3.9   |  7<LL>  |  1.26    Ca    8.1<L>      29 May 2022 20:30  Phos  4.1     05-29  Mg     2.20     05-29    TPro  4.9<L>  /  Alb  1.7<L>  /  TBili  1.1  /  DBili  1.0<H>  /  AST  1314<H>  /  ALT  444<H>  /  AlkPhos  408<H>  05-29    PT/INR - ( 29 May 2022 14:30 )   PT: 31.7 sec;   INR: 2.71 ratio         PTT - ( 29 May 2022 01:00 )  PTT:38.4 sec  LIVER FUNCTIONS - ( 29 May 2022 20:30 )  Alb: 1.7 g/dL / Pro: 4.9 g/dL / ALK PHOS: 408 U/L / ALT: 444 U/L / AST: 1314 U/L / GGT: x                 IMAGING:

## 2022-05-30 NOTE — PROGRESS NOTE ADULT - SUBJECTIVE AND OBJECTIVE BOX
24 HOUR EVENTS:  - Wounds debrided at bedside  - Increased RR from 18 to 24, then decreased to 22 to promote a respiratory alkalosis  - D/c'd propofol, started ativan 0.5 PRN for agitation as needed  - Weaned diltizam gtt  - Increased cefepime dose to 2g q8h while on CRRT  - Worsening metabolic acidosis from septic shock requiring mutliple pushes of bicarb  - D50x2 for hypoglycemia  - Goals of care conversation with family -> to remain full code for now      SUBJECTIVE/ROS:  [ ] A ten-point review of systems was otherwise negative except as noted.  [ x Due to altered mental status/intubation, subjective information were not able to be obtained from the patient. History was obtained, to the extent possible, from review of the chart and collateral sources of information.    NEURO:  RASS: -5  Exam: off sedation, does not follow commands    RESPIRATORY  RR: 21 (05-29-22 @ 23:00) (17 - 24)  SpO2: 100% (05-29-22 @ 23:18) (94% - 100%)  Exam: intubated 400/22/5/40    CARDIOVASCULAR  HR: 109 (05-29-22 @ 23:18) (84 - 112)  ABP: 104/37 (05-29-22 @ 23:00) (99/38 - 124/45)  ABP(mean): 51 (05-29-22 @ 23:00) (51 - 64)  Exam: regular rate and rhythm    Cardiac Rhythm: sinus  Perfusion     [x]Adequate   [ ]Inadequate  Mentation   [x]Normal       [ ]Reduced  Extremities  [x]Warm         [ ]Cool  Volume Status [x]Hypervolemic []Euvolemic [ ]Hypovolemic    GI/NUTRITION  Exam: soft, midline pannus incision with subcutaneous fat that appears yellow, mildly malodorous, no drainage, pus or abscess pocket, posterior R buttock incision with clean edges, bleeding  Diet: NPO    GENITOURINARY  I&O's Detail    05-28 @ 07:01  -  05-29 @ 07:00  --------------------------------------------------------  IN:    Diltiazem: 275 mL    FentaNYL: 200 mL    IV PiggyBack: 550 mL    Norepinephrine: 919.5 mL    Propofol: 256 mL    Sodium Bicarbonate: 1500 mL    Vasopressin: 36 mL  Total IN: 3736.5 mL    OUT:    Nasogastric/Oral tube (mL): 100 mL    Other (mL): 587 mL  Total OUT: 687 mL    Total NET: 3049.5 mL      05-29 @ 07:01  -  05-30 @ 00:22  --------------------------------------------------------  IN:    Diltiazem: 100 mL    EPINEPHrine: 225 mL    FentaNYL: 180 mL    IV PiggyBack: 500 mL    Norepinephrine: 1145 mL    Norepinephrine: 771 mL    Propofol: 22 mL    Sodium Bicarbonate: 1162.5 mL    Vasopressin: 27 mL  Total IN: 4132.5 mL    OUT:    Other (mL): 0 mL  Total OUT: 0 mL    Total NET: 4132.5 mL          05-29    134<L>  |  95<L>  |  9   ----------------------------<  81  3.9   |  7<LL>  |  1.26    Ca    8.1<L>      29 May 2022 20:30  Phos  4.1     05-29  Mg     2.20     05-29    TPro  4.9<L>  /  Alb  1.7<L>  /  TBili  1.1  /  DBili  1.0<H>  /  AST  1314<H>  /  ALT  444<H>  /  AlkPhos  408<H>  05-29      HEMATOLOGIC                        7.4    37.93 )-----------( 467      ( 29 May 2022 20:30 )             26.4     PT/INR - ( 29 May 2022 14:30 )   PT: 31.7 sec;   INR: 2.71 ratio    PTT - ( 29 May 2022 01:00 )  PTT:38.4 sec    INFECTIOUS DISEASES  WBC Count: 37.93 K/uL (05-29 @ 20:30)  WBC Count: 37.80 K/uL (05-29 @ 14:30)  WBC Count: 42.53 K/uL (05-29 @ 06:00)  WBC Count: 49.87 K/uL (05-29 @ 01:00)      ENDOCRINE  CAPILLARY BLOOD GLUCOSE  POCT Blood Glucose.: 116 mg/dL (29 May 2022 12:32)  POCT Blood Glucose.: 120 mg/dL (29 May 2022 12:21)  POCT Blood Glucose.: 79 mg/dL (29 May 2022 11:46)  POCT Blood Glucose.: 58 mg/dL (29 May 2022 11:44)  POCT Blood Glucose.: 126 mg/dL (29 May 2022 00:58)      ACCESS DEVICES:  [] Peripheral IV  [x] Central Venous Line	[] R	[x] L	[ ] IJ	[x] Fem	[ ] SC	Placed: 5/28  [x] R femoral HD cath placed 5/28  [x] Arterial Line		[ ] R	[x] L	[x] Fem	[ ] Rad	[ ] Ax	Placed: 5/28  [ ] PICC:					[ ] Mediport  [ ] Urinary Catheter, Date Placed:   [x] Necessity of urinary, arterial, and venous catheters discussed    CODE STATUS: full code

## 2022-05-30 NOTE — DISCHARGE NOTE FOR THE EXPIRED PATIENT - HOSPITAL COURSE
56F with h/o ESRD (on HD), afib (on AC), HTN, COPD, known chronic pannus wound, presenting with chest and abd pain found to have a necrotizing fasciitis infection of a right gluteal wound and inferior pannus. On presentation in the ED, patient was in septic shock, hypotensive and tachycardic with a coagulopathy requiring initiation of pressors. She was taken emergently to the operating room and underwent an extensive debridement of her pannus and gluteal wounds on 2022. She transferred to the SICU postoperatively in septic shock, intubated and on pressor support. Patient was subsequently started on CRRT. On POD2, patient underwent further bedside debridement of her wounds. Despite all medical interventions, patient had a worsening acidosis with worsening hemodynamic status and increasing pressor requirements. Patient's healthcare proxy opted to make her DNR and decided to pursue a comfort-centered approach with palliative extubation. Patient subsequently .

## 2022-05-30 NOTE — PROGRESS NOTE ADULT - ASSESSMENT
56F with h/o ESRD (on HD), afib (on AC), HTN, COPD, chronic right pannus wound presenting with chest and abd pain found to have necrotizing fasciitis of right gluteal wound and RLQ abd now s/p OR (5/27) for extensive debridement, transferred to SICU in septic shock, intubated and on pressor support with a worsening metabolic acidosis.    PLAN:    Neurologic:   - Off sedation, remains without mental status  - Ativan 0.5 PRN  - Pain control with fentanyl gtt    Respiratory:   - AC : 400/22/5/40  - ABG q6h  - Daily CXR    Cardiovascular:   - Remains on vaso and high dose levo, epi gtt added overnight  - Diltiazem gtt for afib with RVR, will wean as tolerated  - Lactate rising, currently > 17    Gastrointestinal/Nutrition:   - Protonix  - NPO   - NGT placed, confirmed    Renal/Genitourinary: ESRD on MWF HD  - Worsening metabolic acidosis  - Continue CRRT, goal net even as patient cannot tolerated fluid removal  - c/w bicarb gtt @75ml/hr  - Sodium bicarb pushes as needed  - No daniel (anuric)    Hematologic:   - Transfuse as needed  - DVT ppx, SQH    Infectious Disease:   - ID following -> recommend cefepime and flagyl, plus linezolid  - WBC downtrending  - BCxs NGTD  - Surgical cultures with Klebsiella, E faecalis, bacteroides and pseudomonas, all sensitive to cefepime    Endocrine:   - FS q6  - ISS  - solu-cortef 50 Q8    Tubes/Lines/Drains:   - PIV  - L femoral a line  - L femoral central line  - R femoral shiley    Disposition: SICU  Code status: full code, will follow up with family in the morning regarding further goals of care discussions

## 2022-05-30 NOTE — CHART NOTE - NSCHARTNOTEFT_GEN_A_CORE
Patient's daughter, Ashley, requested to make patient DNR after discussing goals of care with the family. They would also like to stop all medical interventions and request a compassionate extubation at this time.  to see family while in the SICU.    Dr. Henao updated on events.    WILLIAMS Broderick, PGY2  SICU [id number]

## 2022-05-31 LAB
CULTURE RESULTS: SIGNIFICANT CHANGE UP
CULTURE RESULTS: SIGNIFICANT CHANGE UP
SPECIMEN SOURCE: SIGNIFICANT CHANGE UP
SPECIMEN SOURCE: SIGNIFICANT CHANGE UP

## 2022-06-07 ENCOUNTER — APPOINTMENT (OUTPATIENT)
Dept: DERMATOLOGY | Facility: CLINIC | Age: 57
End: 2022-06-07

## 2022-06-20 LAB — SURGICAL PATHOLOGY STUDY: SIGNIFICANT CHANGE UP

## 2022-07-21 ENCOUNTER — APPOINTMENT (OUTPATIENT)
Dept: OBGYN | Facility: HOSPITAL | Age: 57
End: 2022-07-21

## 2022-08-29 NOTE — ED PROVIDER NOTE - CADM POA CENTRAL LINE
[FreeTextEntry1] : pt here for followup T2DM\par  has not been doing as well with his sugars  some mor low BS lately bc changed diet and is eating  no starches\par  only frutis fish \par Type: 2\par Severity: moderate\par Duration: years\par Associated symptoms: neuropathy\par Modifying factors:  pt  does not carb count\par Had Covid MArch 2020\par \par + site rotation\par   however OmniPOD is without charge now - so could not be downloaded\par advised pt to  keepOMniPOd PDM charged \par \par s/p cholecystecomty \par \par Current Medication Regimen: \par Insulin Aspart via Omnipod insulin pump \par \par \par Self Blood sugar monitoring: Moise -\par Avg  \par GMI 6.3% \par Target 67% \par high 20% \par very high 10% \par low 3% very  low 20 \par Pt. reports hsome low BS at times\par  overnight\par \par  has been changing diet-- NO carbs  only fruits\par  \par \par \par Last eye exam: July 2021 -, mild background DM retinopathy \par Last foot exam:Ju this year-- had ingrown toenail removed , history of neuropathy\par Diet: carb counting\par Weight: stable\par Exercise: limited by dyspnea \par \par Has been better at  rotating  infusion sites
No

## 2022-09-01 NOTE — PROVIDER CONTACT NOTE (OTHER) - REASON
Patient c/o itchiness on her left upper arm and right upper arm.
Patient refused morning labs
Hold bedtime Lantus
Pt c/o pain, not due for pain meds
Blood glucose is 511
Patient FS 73, due for 64 units lantus
Patient has cefepime with instructions to administer post HD; HD scheduled for tonight at 10PM
Pt has no IV access unable to give solumedrol
pt refused AM labs
Pt refusing labs to be drawn and wants them drawn at dialysis
FS=85, due for 11 units Admelog
Patient refused renvela for the lunch dose
Unsuccessful attempts to draw labs
N/A

## 2022-10-03 NOTE — DISCHARGE NOTE FOR THE EXPIRED PATIENT - NS PATIENT DEATH CRITERIA
Patient safe to DC home per MD. Discharge instructions reviewed with patient, including when and how to follow up. Patient verbalizes understanding. Patient ambulatory with steady gait to exit.
Patient is dead based on Cardiopulmonary criteria including absent breath sounds, pulselessness and/or asystole

## 2022-10-08 NOTE — PROGRESS NOTE ADULT - ASSESSMENT
"Goal Outcome Evaluation:  Plan of Care Reviewed With: patient         VSS, very uncooperative and refused all treatment, education was given but pt refused meds, lab sticks, became combative. Pt refused to answer neuro assessment questions or cooperate, however pt was able to clearly yell to \"leave me alone\", \"I want you to quit bugging me\" and similar responses as well as hit away (moving everything evenly and briskly), Dr. Jennyfer vázquez also witnessed this and made note. No AM labs able to be collected     " conservative gi magent  no acute complaints  continue current rx/ dx

## 2022-11-18 NOTE — PROGRESS NOTE ADULT - ASSESSMENT
Anesthesia Pre Eval Note    Anesthesia ROS/Med Hx    Overall Review:  EKG was reviewed       Pulmonary Review:    Positive for sleep apnea     Cardiovascular Review:  Exercise tolerance: poor (<4 METS)  Positive for PVD    GI/HEPATIC/RENAL Review:  Positive for hiatal hernia    End/Other Review:    Positive for obesity class II - 35.00 - 39.99  Positive for cancer  Additional Results:     ALLERGIES:  No Known Allergies       Last Labs        Component                Value               Date/Time                  WBC                      4.7                 11/18/2022 0929            RBC                      4.44                11/18/2022 0929            HGB                      13.2                11/18/2022 0929            HCT                      41.0                11/18/2022 0929            MCV                      92.3                11/18/2022 0929            MCH                      29.7                11/18/2022 0929            MCHC                     32.2                11/18/2022 0929            RDW-CV                   13.2                11/18/2022 0929            Sodium                   140                 11/18/2022 0929            Potassium                4.3                 11/18/2022 0929            Chloride                 109                 11/18/2022 0929            Carbon Dioxide           28                  11/18/2022 0929            Glucose                  120 (H)             11/18/2022 0929            BUN                      14                  11/18/2022 0929            Creatinine               0.95                11/18/2022 0929            Glomerular Filtrati*     63                  11/18/2022 0929            Calcium                  8.9                 11/18/2022 0929            PLT                      239                 11/18/2022 0929            PTT                      27                  09/09/2022 1122            INR                      0.9                 09/09/2022 1122         Past Medical History:  No date: Acute on chronic respiratory failure with hypoxia (CMS/[facility])  12/14/2021: LIZET (acute kidney injury) (CMS/HCC)  No date: Ambulates with cane      Comment:  and walker  No date: Anesthesia complication      Comment:  pt stated she stopped breathing  No date: Arthritis      Comment:  back  No date: At risk for sleep apnea  No date: Blood clot associated with vein wall inflammation      Comment:  bilateral legs  No date: Breast cancer (CMS/[facility])  No date: Chronic venous insufficiency  No date: Coronary artery disease  11/2021: COVID-19  02/12/2020: Debility  11/13/2019: Diverticulitis  No date: DVT, bilateral lower limbs (CMS/[facility])  No date: Failed moderate sedation during procedure  No date: Fracture      Comment:  RIGHT HAND, RIGHT FOOT RIGHT 5TH TOE  No date: Gastroesophageal reflux disease  No date: Has stopped breathing (CMS/HCC)      Comment:  patient states she was told she stopped breathing during               a surgery before and found out at her follow-up                appointment  No date: Hernia, hiatal  No date: Hernia, umbilical  No date: High cholesterol  02/11/2020: Lumbar post-laminectomy syndrome  11/13/2019: Lumbar radiculopathy  No date: Macular degeneration  11/13/2019: Malignant melanoma of face (CMS/[facility])  11/13/2019: Malignant neoplasm of ascending colon (CMS/HCC)  No date: On home oxygen therapy  01/22/2020: Preop exam for internal medicine  No date: Pulmonary emboli (CMS/HCC)  No date: Pulmonary embolism (CMS/HCC)      Comment:  post surgery 2020  No date: Sleep apnea      Comment:  +CHRISTINA on recent study- needs to get CPAP machine yet  No date: Thickened endometrium  No date: Thyroid condition  02/13/2020: Urinary retention  No date: UTI (urinary tract infection)  No date: Vertigo  11/13/2019: Vitamin D deficiency  No date: Wears glasses    Past Surgical History:  No date: Appendectomy  No date: Back surgery      Comment:  laminectomy  08/26/2021: Breast  56y Female with history of ESRD on HD presents with vaginal bleeding. Nephrology consulted for ESRD status.    1) ESRD: Last HD 4/9 tolerated well with 2L removed with heparin to prevent circuit clotting. Plan for next maintenance HD on 4/12. Monitor electrolytes.    2) HTN with ESRD: BP acceptable on Cardizem. Continue with midodrine pre-HD on HD days to avoid intradialytic hypotension. Monitor BP.    3) Anemia of renal disease: With component of blood loss from vaginal bleeding. Hb low with acceptable iron stores. Continue with Epo 20K with HD. Monitor Hb.    4) Secondary HPT of renal origin: Phosphorus acceptable with low iPTH for which sensipar decreased to 30 mg daily. Continue with renvela 1 tab with meals. Monitor serum calcium and phosphorus.      [facility] NEPHROLOGY  Keaton Lopez M.D.  Juma Green D.O.  Myla Hoffmann M.D.  Julia Brewer, JASIEL, ANP-C    Telephone: [phone]  Facsimile: [phone]    [address] [place] [address] surgery; Right      Comment:  wide excision  No date: Colon surgery      Comment:  colon resection for colon cancer  No date: Colonoscopy diagnostic  No date: Dilation and curettage of uterus  No date: Fracture surgery  08/2022: Peripheral angiogram; Bilateral      Comment:  iliac vein stent  No date: Removal gallbladder  No date: Skin biopsy  No date: Thrombolysis      Comment:  EKOS  with TPA 3/6/20  No date: Tonsillectomy       Prior to Admission medications :  Medication levothyroxine 50 MCG tablet, Sig TAKE 1 TABLET BY MOUTH EVERY DAY  Patient taking differently: 50 mcg., Start Date 9/12/22, End Date , Taking? Yes, Authorizing Provider Kaylin Eugene MD    Medication atorvastatin (LIPITOR) 10 MG tablet, Sig Take 1 tablet by mouth daily., Start Date 9/10/22, End Date , Taking? Yes, Authorizing Provider Kaylin Eugene MD    Medication furosemide (LASIX) 40 MG tablet, Sig Take 1 tablet by mouth daily., Start Date 7/13/22, End Date , Taking? Yes, Authorizing Provider Frederic Angel, DO    Medication famotidine (PEPCID) 20 MG tablet, Sig TAKE 1 TABLET BY MOUTH TWICE A DAY, Start Date 5/4/22, End Date , Taking? Yes, Authorizing Provider Kaylin Eugene MD    Medication Elastic Bandages & Supports (Medical Compression Stockings) Misc, Sig 1 Units daily. Bilateral knee high compression 10-15mmhg open or closed toe, Start Date 1/10/22, End Date , Taking? Yes, Authorizing Provider Aimee Barahona MD    Medication anastrozole (ARIMIDEX) 1 MG tablet, Sig Take 1 tablet by mouth daily., Start Date 10/24/22, End Date 1/22/23, Taking? , Authorizing Provider Pierre Tubbs MD    Medication clopidogrel (Plavix) 75 MG tablet, Sig Take 1 tablet by mouth daily., Start Date 8/16/22, End Date , Taking? , Authorizing Provider Aimee Barahona MD    Medication apixaBAN (Eliquis) 5 MG Tab, Sig Take 1 tablet by mouth 2 times daily., Start Date 4/18/22, End Date , Taking? , Authorizing Provider Pierre Tubbs MD    Medication  Multiple Vitamins-Minerals (ICAPS AREDS 2 PO), Sig Take 1 capsule by mouth 2 times daily., Start Date 1/5/22, End Date , Taking? , Authorizing Provider Provider Not In System    Medication cholecalciferol (cholecalciferol) 25 mcg (1,000 units) tablet, Sig Take by mouth daily. , Start Date , End Date , Taking? , Authorizing Provider Outside Provider    Medication Multiple Vitamins-Minerals (MULTIVITAMIN ADULT PO), Sig Take 1 tablet by mouth daily. Do not start before March 1, 2020., Start Date 3/1/20, End Date , Taking? , Authorizing Provider Outside Provider         Patient Vitals in the past 24 hrs:  11/18/22 0915, BP:121/73, Temp:36.8 °C (98.2 °F), Temp src:Oral, Pulse:66, Resp:17, SpO2:97 %, Height:5' 8\" (1.727 m), Weight:111.6 kg (246 lb)    Study Conclusions     Summary:     1. Myocardial perfusion imaging: Left ventricular size is normal. There is     no transient ischemic dilation of the left ventricle during stress.     Myocardial perfusion study is normal. There is breast uptake. Recommend     further evaluation.  2. Gated SPECT: The calculated left ventricular ejection fraction is 67%.     No diagnostic evidence for left ventricular regional abnormality.  3. Stress ECG conclusions: The stress ECG is negative for ischemia.  4. Baseline ECG: Normal sinus rhythm.  Impressions:  There is breast uptake, recommend further evaluation.       Relevant Problems   No relevant active problems       Physical Exam     Airway   Mallampati: II  TM Distance: >3 FB  Neck ROM: Full  Neck: Non-tender and Able to place in sniff position  TMJ Mobility: Good    Cardiovascular  Cardiovascular exam normal  Cardio Rhythm: Regular  Cardio Rate: Normal    Head Assessment  Head assessment: Normocephalic and Atraumatic    General Assessment  General Assessment: Alert and oriented and No acute distress    Dental Exam  Dental exam normal    Pulmonary Exam  Pulmonary exam normal  Breath sounds clear to auscultation:  Yes    Abdominal  56y Female with history of ESRD on HD presents with vaginal bleeding. Nephrology consulted for ESRD status.    1) ESRD: Last HD 4/9 tolerated well with 2L removed with heparin to prevent circuit clotting. Plan for next maintenance HD today. Monitor electrolytes.    2) HTN with ESRD: BP acceptable on Cardizem. Continue with midodrine pre-HD on HD days to avoid intradialytic hypotension. Monitor BP.    3) Anemia of renal disease: With component of blood loss from vaginal bleeding. Hb low with acceptable iron stores. Continue with Epo 20K with HD. Monitor Hb.    4) Secondary HPT of renal origin: Phosphorus acceptable with low iPTH for which sensipar decreased to 30 mg daily. Continue with renvela 1 tab with meals. Monitor serum calcium and phosphorus.      [facility] NEPHROLOG[facility]  Keaton Lopez M.D.  Juma Green D.O.  Myla Hoffmann M.D.  Julia Brewer, JASIEL, ANP-C    Telephone: [phone]  Facsimile: [phone]    [address] [place] [address] Exam  Abdominal exam normal      Anesthesia Plan:    ASA Status: 3  Anesthesia Type: General    Induction: Intravenous  Preferred Airway Type: ETT  Maintenance: Inhalational    Post-op Pain Management: Per Surgeon      Checklist  Reviewed: NPO Status, Allergies, Medications, Problem list, Past Med History, Lab Results and EKG  Consent/Risks Discussed Statement:  The proposed anesthetic plan, including its risks and benefits, have been discussed with the Patient along with the risks and benefits of alternatives. Questions were encouraged and answered and the patient and/or representative understands and agrees to proceed.        I discussed with the patient (and/or patient's legal representative) the risks and benefits of the proposed anesthesia plan, General, which may include services performed by other anesthesia providers.    Alternative anesthesia plans, if available, were reviewed with the patient (and/or patient's legal representative). Discussion has been held with the patient (and/or patient's legal representative) regarding risks of anesthesia, which include Nausea, Vomiting and Dental Injury and emergent situations that may require change in anesthesia plan.    The patient (and/or patient's legal representative) has indicated understanding, his/her questions have been answered, and he/she wishes to proceed with the planned anesthetic.    Blood Products: Not Anticipated

## 2023-01-11 NOTE — PROGRESS NOTE ADULT - SUBJECTIVE AND OBJECTIVE BOX
[facility] NEPHROLOGY- PROGRESS NOTE    56y Female with history of ESRD on HD presents with vomiting and diarrhea found to be COVID-19 positive. Nephrology consulted for ESRD status.    REVIEW OF SYSTEMS:  Gen: no changes in weight  Cards: no chest pain  Resp: no dyspnea  GI: no nausea or vomiting or diarrhea + ab wound pain  Vascular: no LE edema    caffeine (Nausea)  latex (Rash)  penicillin (Nausea)  strawberry (Rash)      Hospital Medications: Medications reviewed      VITALS:  T(F): 98.2 (01-31-22 @ 11:00), Max: 98.9 (01-31-22 @ 06:50)  HR: 81 (01-31-22 @ 11:00)  BP: 146/68 (01-31-22 @ 11:00)  RR: 16 (01-31-22 @ 11:00)  SpO2: 100% (01-31-22 @ 06:00)  Wt(kg): --    01-31 @ 07:01 - 01-31 @ 13:28  --------------------------------------------------------  IN: 500 mL / OUT: 2300 mL / NET: -1800 mL        PHYSICAL EXAM:    Gen: NAD, calm  Cards: RRR, +S1/S2, no M/G/R  Resp: CTA B/L  GI: soft, RLQ bandage/tender  Vascular: no LE edema B/L, LUE AVF + bruit/thrill      LABS:  01-31    131<L>  |  85<L>  |  47<H>  ----------------------------<  90  5.5<H>   |  24  |  7.54<H>    Ca    10.0      31 Jan 2022 07:38  Phos  4.7     01-31  Mg     3.10     01-31      Creatinine Trend: 7.54 <--, 6.01 <--, 4.89 <--, 5.42 <--, 6.88 <--, 5.48 <--                        7.7    12.97 )-----------( 494      ( 31 Jan 2022 07:38 )             28.1     Urine Studies:                        used

## 2023-03-31 NOTE — ED PROVIDER NOTE - CROS ED [facility] ALL NEG
Clinic Care Coordination Contact  RUST/Voicemail    Referral Source: Care Team  Clinical Data: Care Coordinator Outreach  Outreach attempted x 1.  Left message on patient's voicemail with call back information and requested return call.  RNCC was notified by in-basket message from OMAR Kim that patient was discharged from [facility] on 3/27/2023 and RNCC reaching out for a post-hospital assessment.    Plan: Care Coordinator will try to reach patient again in 1-2 business days.     Rhea Knapp RN, BSN, PHN  Primary Care / Care Coordinator   [facility]  E-mail [email or web address]   [phone]      
- - -

## 2023-04-19 NOTE — H&P ADULT - NSHPLABSRESULTS_GEN_ALL_CORE
Has The Growth Been Previously Biopsied?: has been previously biopsied
Labs:                        8.0    35.31 )-----------( 578      ( 26 May 2022 19:02 )             28.4     PT/INR - ( 26 May 2022 19:02 )   PT: 34.7 sec;   INR: 2.96 ratio           05-26    131<L>  |  90<L>  |  22  ----------------------------<  144<H>  3.4<L>   |  25  |  3.05<H>    Ca    9.6      26 May 2022 19:02    TPro  6.4  /  Alb  2.4<L>  /  TBili  0.8  /  DBili  x   /  AST  10  /  ALT  5   /  AlkPhos  232<H>  05-26            Imaging and other studies:
Body Location Override (Optional): mid occipital scalp

## 2023-08-14 NOTE — PROGRESS NOTE ADULT - PROVIDER SPECIALTY LIST ADULT
Nephrology
Nephrology
Neurology
Cardiology
[facility]
Cardiology
Nephrology
Neurology
Cardiology
Internal Medicine
Neurology
Internal Medicine
Internal Medicine

## 2023-12-01 NOTE — H&P PST ADULT - VENOUS THROMBOEMBOLISM HISTORY
Remote Patient Monitoring Note      Date/Time:  12/1/2023 11:46 AM  Patient Current Location: [address] [place] [address]  RPM red alert received for \"Weight Increase of 5 lbs In Last 7 Days\". Patients current recorded weight is 148.2#. Suspected false red alert for weight increase. 10.4# decrease from 11/28/23-11/29/23, 10.2# increase since 11/29/23, 1.2# decrease in the last day, and 0.8# decrease per 7 day look back noted in HRS. This nurse spoke with pt on 11/30/23 regarding alert for weight increase. Pt stated, \"I've always weighed close to 150\", reported continued compliance with scheduled lasix, and verbalized understanding of when to notify provider(s) of changes / concerns and when to seek emergent medical care. See RPM note from 11/30/23. No outreach from this LPN indicated at this time. FYI update routed to RN ACM. Background: Pt enrolled in RPM r/t COPD. Plan/Follow Up: Will continue to review, monitor and address alerts with follow up based on severity of symptoms and risk factors. (0) indicator not present

## 2024-01-29 NOTE — ED PROVIDER NOTE - DOMESTIC TRAVEL HIGH RISK QUESTION
Patient ambulatory to triage with CO cough unable to \"cough up the mucus\". Reports dx with flu 1/24 taking OTC medications without improvement.   
No

## 2024-03-01 NOTE — CONSULT NOTE ADULT - ATTENDING COMMENTS
Pt seeing Dr. Hernandez this Monday (3/4/24)- fax from dentist left in Dr. Hernandez's inbox and faxed to [facility]    [facility] NEPHROLOGY  Keaton Lopez M.D.  Juma Green D.O.  Myla Hoffmann M.D.  Julia Brewer, MSN, ANP-C    Telephone: [phone]  Facsimile: [phone]    [address] [place] [address]

## 2024-03-26 NOTE — H&P PST ADULT - HEALTH CARE MAINTENANCE
DIABETES regimen as of  3/26/2024    (*) Labs soon    (*) Continue VITAMIN D3  2,000IU daily     (*) Continue Metformin 1000mg twice a day with food    (*) Continue Jardiance 25mg daily- STAY well hydrated     (*) Check BG 1 time a day  Goal BG , call for BG > 160 not coming down     (*) Check your blood sugars at least 1 times a day       -Before breakfast everyday       -Either before lunch or dinner or at bedtime (rotate these times around)         -Anytime you feel \"low\"      (*) Call office ([phone]) if your blood sugar goes below 70 on more than one occasion within a short period of time, if you have any readings under 50, or if it is above 200 and not coming down.    (*) Bring in your blood sugar readings or glucose meter to every appointment       If you have not heard from my office regarding your results within 5 days, please contact [phone] or send [employer] message.     (*) It is important to have a dilated eye exam every year.    (*)Keep on exercising regularly- aim for at least 30 minutes a day       FOLLOW UP :  6 months           Additional Educational Resources:  For additional resources regarding your symptoms, diagnosis, or further health information, please visit the Health Resources section on [email or web address] or the Online Health Resources section in [employer].    flu vaccine  11/2018

## 2024-04-05 NOTE — PATIENT PROFILE ADULT - NSTRANSFERBELONGINGSRESP_GEN_A_NUR
ED 4/4/24 Upper abdominal pain; Nausea; CT abd/pelv done @ [facility]. Please advise patient for follow up recommendations [phone]  
yes

## 2024-06-20 NOTE — CHART NOTE - NSCHARTNOTEFT_GEN_A_CORE
Vascular surgery planning for LUE fistulogram 4/20, requesting medical and cardiac clearance. Medicine attending and cardiology aware of request for documentation of clearance. Deferred as above See hpi See separate  doc Left VM for children's school guidance counselor, Monique CASTELLON([phone]), updating her pt will be cleared for discharge and to refer her back to the ED for acute safety concerns.

## 2024-07-24 NOTE — PROGRESS NOTE ADULT - ASSESSMENT
56 yr old F with morbid obesity, CHAMP not on home O2, ESRD (HD MWF), HTN, DM2 A1C 7.0, COPD, Afib no longer on AC, chronic R pannus wound here with worsening wound, diarrhea.  Pt exhibits persistent steroid induced hyperglycemia despite increased insulin regimen.         1. Type 2 diabetes mellitus uncontrolled  A1c 7.0% (may be inaccurate in setting of ESRD)  Home Regimen: Tresiba 80 units HS and Trulicity 1.5mg subq weekly  While inpatient:  BG target 100-180 mg/dL  Trending above goal   Will continue Lantus 16 units SQ qHS - glucose close to goal this AM  Will continue Admelog 10 units SQ TID before meals (Hold if NPO/not eating meal)    Continue Admelog correctional scale as LOW before meals and bedtime   Check BG before meals and bedtime  Hypoglycemia protocol   Consistent carbohydrate diet    Discharge Plan:  STOP Trulicity (patient ESRD on HD)  Steroids have been dc'd,    likely dc plan is basal/oral regimen. For oral agent, depends on inpatient requirements but can consider renally dosed DPP4 (Januvia 25 mg or Tradjenta 5 mg daily) and Prandin 1 mg po tid before meals- hold if skips meal  Patient was on Tresiba at home may benefit from a different LA insulin such as Lantus or Levemir given ESRD.  Tresiba is ultra-LA insulin  Please assess insulin coverage for Levemir or Lantus, instead of Tresiba pens  Consider CGM (such as Freestyle Libre2 outpatient)  If desiring to followup with [facility] Endocrinology: [address] [place] [address], [address], [facility] [address], [phone]    2. HTN  Management per primary team     3. Hyperlipidemia  Can continue home dose of Atorvastatin 80 mg  Note, limited benefit in ESRD. Followup lipid panel as outpatient      Tamela Ruiz  Nurse Practitioner  Division of Endocrinology & Diabetes  Pager # [phone]      If after 6PM or before 9AM, or on weekends/holidays, please call endocrine answering service for assistance ([phone]).  For nonurgent matters email [email or web address] for assistance.      skin normal color for race, warm, dry and intact.

## 2024-07-31 NOTE — H&P ADULT - PROBLEM/PLAN-6
Abrasions bilateral legs and left side of face cleaned with bacitracin applied-abrasions legs dressed  
Pt given crutches adjusted to the appropriate height. Pt and family educated on using crutches and given d/c papers.   
DISPLAY PLAN FREE TEXT

## 2024-09-16 NOTE — DISCHARGE NOTE FOR THE EXPIRED PATIENT - REASON FOR ADMISSION
necrotizing fascitis [Normocephalic] : normocephalic [Atraumatic] : atraumatic [Sclera nonicteric] : sclera nonicteric [Supple] : supple [No Supraclavicular Adenopathy] : no supraclavicular adenopathy [No Cervical Adenopathy] : no cervical adenopathy [Clear to Auscultation Bilat] : clear to auscultation bilaterally [Normal Sinus Rhythm] : normal sinus rhythm [Examined in the supine and seated position] : examined in the supine and seated position [Bra Size: ___] : Bra Size: [unfilled] [No dominant masses] : no dominant masses in right breast  [No dominant masses] : no dominant masses left breast [No Nipple Retraction] : no left nipple retraction [No Nipple Discharge] : no left nipple discharge [No Axillary Lymphadenopathy] : no left axillary lymphadenopathy [No Edema] : no edema [No Rashes] : no rashes [No Ulceration] : no ulceration

## (undated) DEVICE — POSITIONER STRAP ARMBOARD VELCRO TS-30

## (undated) DEVICE — GLV 7 PROTEXIS (WHITE)

## (undated) DEVICE — SOL IRR POUR H2O 500ML

## (undated) DEVICE — TUBING OLYMPUS INSUFFLATION

## (undated) DEVICE — SYR LUER LOK 10CC

## (undated) DEVICE — DRAPE LAPAROTOMY TRANSVERSE

## (undated) DEVICE — LIJ/LIA-RENTAL VAC MACHINE VACUUM ASSISTED C: Type: DURABLE MEDICAL EQUIPMENT

## (undated) DEVICE — VENODYNE/SCD SLEEVE CALF MEDIUM

## (undated) DEVICE — DRAPE LIGHT HANDLE COVER (GREEN)

## (undated) DEVICE — ELCTR GROUNDING PAD ADULT COVIDIEN

## (undated) DEVICE — UTERINE MANIPULATOR COOPER SURGICAL 5MM 33CM GREEN

## (undated) DEVICE — BASIN SET DOUBLE

## (undated) DEVICE — POSITIONER PURPLE ARM ONE STEP (LARGE)

## (undated) DEVICE — DRAPE TOWEL BLUE 17" X 24"

## (undated) DEVICE — SUT MONOCRYL 4-0 27" PS-2 UNDYED

## (undated) DEVICE — DRSG MASTISOL

## (undated) DEVICE — DRSG VAC ABTHERS

## (undated) DEVICE — PACK GENERAL LAPAROSCOPY

## (undated) DEVICE — PACK PERI GYN

## (undated) DEVICE — WARMING BLANKET FULL ADULT

## (undated) DEVICE — PACK MINOR WITH LAP

## (undated) DEVICE — BLADE SURGICAL #15 CARBON

## (undated) DEVICE — TROCAR COVIDIEN VERSAONE FIXATION CANNULA 5MM

## (undated) DEVICE — DRSG TELFA 2 X 3

## (undated) DEVICE — FOLEY TRAY 16FR LF URINE METER SURESTEP

## (undated) DEVICE — GLV 7.5 PROTEXIS (WHITE)

## (undated) DEVICE — SUT VICRYL 0 27" UR-6

## (undated) DEVICE — GLV 7.5 PROTEXIS (CREAM) MICRO

## (undated) DEVICE — POSITIONER PINK PAD PIGAZZI SYSTEM

## (undated) DEVICE — INSUFFLATION NDL COVIDIEN SURGINEEDLE VERESS 120MM

## (undated) DEVICE — PREP BETADINE SPONGE STICKS

## (undated) DEVICE — BASIN SET SINGLE

## (undated) DEVICE — ELCTR BOVIE TIP BLADE INSULATED 2.75" EDGE

## (undated) DEVICE — UTERINE MANIPULATOR CLINICAL INNOVATIONS CLEARVIEW 7CM

## (undated) DEVICE — NDL HYPO REGULAR BEVEL 25G X 1.5" (BLUE)

## (undated) DEVICE — SUT VICRYL 3-0 27" SH UNDYED

## (undated) DEVICE — TROCAR COVIDIEN BLUNT TIP HASSAN 10MM

## (undated) DEVICE — DRSG TEGADERM 4X4.75"

## (undated) DEVICE — SOL IRR POUR NS 0.9% 500ML

## (undated) DEVICE — DRSG TEGADERM 2.5X3"

## (undated) DEVICE — TROCAR COVIDIEN VERSAPORT BLADELESS OPTICAL 5MM STANDARD

## (undated) DEVICE — DRSG PAD SANITARY OB

## (undated) DEVICE — DRSG COMBINE 5X9"